# Patient Record
Sex: MALE | Race: WHITE | Employment: OTHER | URBAN - METROPOLITAN AREA
[De-identification: names, ages, dates, MRNs, and addresses within clinical notes are randomized per-mention and may not be internally consistent; named-entity substitution may affect disease eponyms.]

---

## 2017-01-03 RX ORDER — BUDESONIDE AND FORMOTEROL FUMARATE DIHYDRATE 160; 4.5 UG/1; UG/1
AEROSOL RESPIRATORY (INHALATION)
Qty: 1 INHALER | Refills: 5 | Status: SHIPPED | OUTPATIENT
Start: 2017-01-03 | End: 2018-10-30 | Stop reason: ALTCHOICE

## 2017-01-04 ENCOUNTER — OFFICE VISIT (OUTPATIENT)
Dept: PULMONOLOGY | Age: 57
End: 2017-01-04

## 2017-01-04 VITALS
HEIGHT: 64 IN | WEIGHT: 252 LBS | DIASTOLIC BLOOD PRESSURE: 70 MMHG | TEMPERATURE: 97.8 F | BODY MASS INDEX: 43.02 KG/M2 | SYSTOLIC BLOOD PRESSURE: 110 MMHG | OXYGEN SATURATION: 98 % | RESPIRATION RATE: 14 BRPM | HEART RATE: 78 BPM

## 2017-01-04 DIAGNOSIS — G47.33 SEVERE OBSTRUCTIVE SLEEP APNEA: Primary | ICD-10-CM

## 2017-01-04 DIAGNOSIS — Z91.09 ENVIRONMENTAL ALLERGIES: ICD-10-CM

## 2017-01-04 PROCEDURE — 99214 OFFICE O/P EST MOD 30 MIN: CPT | Performed by: INTERNAL MEDICINE

## 2017-01-04 RX ORDER — ALBUTEROL SULFATE 2.5 MG/3ML
2.5 SOLUTION RESPIRATORY (INHALATION) EVERY 6 HOURS PRN
Qty: 120 EACH | Refills: 6 | Status: SHIPPED | OUTPATIENT
Start: 2017-01-04 | End: 2017-02-23

## 2017-01-04 ASSESSMENT — SLEEP AND FATIGUE QUESTIONNAIRES
HOW LIKELY ARE YOU TO NOD OFF OR FALL ASLEEP WHILE SITTING QUIETLY AFTER LUNCH WITHOUT ALCOHOL: 1
HOW LIKELY ARE YOU TO NOD OFF OR FALL ASLEEP WHILE WATCHING TV: 1
NECK CIRCUMFERENCE (INCHES): 20
HOW LIKELY ARE YOU TO NOD OFF OR FALL ASLEEP WHILE SITTING INACTIVE IN A PUBLIC PLACE: 0
ESS TOTAL SCORE: 8
HOW LIKELY ARE YOU TO NOD OFF OR FALL ASLEEP WHILE LYING DOWN TO REST IN THE AFTERNOON WHEN CIRCUMSTANCES PERMIT: 1
HOW LIKELY ARE YOU TO NOD OFF OR FALL ASLEEP WHEN YOU ARE A PASSENGER IN A CAR FOR AN HOUR WITHOUT A BREAK: 2
HOW LIKELY ARE YOU TO NOD OFF OR FALL ASLEEP WHILE SITTING AND TALKING TO SOMEONE: 0
HOW LIKELY ARE YOU TO NOD OFF OR FALL ASLEEP IN A CAR, WHILE STOPPED FOR A FEW MINUTES IN TRAFFIC: 0
HOW LIKELY ARE YOU TO NOD OFF OR FALL ASLEEP WHILE SITTING AND READING: 3

## 2017-01-05 ENCOUNTER — HOSPITAL ENCOUNTER (OUTPATIENT)
Dept: PULMONOLOGY | Age: 57
Discharge: HOME | End: 2017-01-05
Attending: INTERNAL MEDICINE | Admitting: INTERNAL MEDICINE

## 2017-01-05 RX ORDER — ALBUTEROL SULFATE 2.5 MG/3ML
2.5 SOLUTION RESPIRATORY (INHALATION) ONCE
Status: COMPLETED | OUTPATIENT
Start: 2017-01-05 | End: 2017-01-05

## 2017-01-05 RX ORDER — CYCLOBENZAPRINE HCL 10 MG
TABLET ORAL
Qty: 30 TABLET | Refills: 0 | Status: SHIPPED | OUTPATIENT
Start: 2017-01-05 | End: 2017-01-27 | Stop reason: SDUPTHER

## 2017-01-05 RX ADMIN — ALBUTEROL SULFATE 2.5 MG: 2.5 SOLUTION RESPIRATORY (INHALATION) at 13:45

## 2017-01-10 RX ORDER — OMEGA-3-ACID ETHYL ESTERS 1 G/1
CAPSULE, LIQUID FILLED ORAL
Qty: 120 CAPSULE | Refills: 0 | Status: SHIPPED | OUTPATIENT
Start: 2017-01-10 | End: 2017-02-07 | Stop reason: SDUPTHER

## 2017-01-12 RX ORDER — GLUCOSAM/CHON-MSM1/C/MANG/BOSW 500-416.6
TABLET ORAL
Qty: 100 EACH | Refills: 0 | Status: SHIPPED | OUTPATIENT
Start: 2017-01-12 | End: 2017-02-07 | Stop reason: SDUPTHER

## 2017-01-12 RX ORDER — DILTIAZEM HYDROCHLORIDE 180 MG/1
CAPSULE, COATED, EXTENDED RELEASE ORAL
Qty: 30 CAPSULE | Refills: 0 | Status: SHIPPED | OUTPATIENT
Start: 2017-01-12 | End: 2017-02-07 | Stop reason: SDUPTHER

## 2017-01-12 RX ORDER — METOPROLOL SUCCINATE 200 MG/1
TABLET, EXTENDED RELEASE ORAL
Qty: 30 TABLET | Refills: 0 | Status: SHIPPED | OUTPATIENT
Start: 2017-01-12 | End: 2017-02-07 | Stop reason: SDUPTHER

## 2017-01-12 RX ORDER — BLOOD SUGAR DIAGNOSTIC
STRIP MISCELLANEOUS
Qty: 50 EACH | Refills: 0 | Status: SHIPPED | OUTPATIENT
Start: 2017-01-12 | End: 2017-03-09 | Stop reason: SDUPTHER

## 2017-01-12 RX ORDER — FLUTICASONE PROPIONATE 50 MCG
SPRAY, SUSPENSION (ML) NASAL
Qty: 16 G | Refills: 0 | Status: SHIPPED | OUTPATIENT
Start: 2017-01-12 | End: 2017-02-20 | Stop reason: SDUPTHER

## 2017-01-12 RX ORDER — CETIRIZINE HYDROCHLORIDE 10 MG/1
TABLET ORAL
Qty: 30 TABLET | Refills: 0 | Status: SHIPPED | OUTPATIENT
Start: 2017-01-12 | End: 2017-02-07 | Stop reason: SDUPTHER

## 2017-01-13 RX ORDER — EZETIMIBE 10 MG/1
10 TABLET ORAL DAILY
Qty: 30 TABLET | Refills: 5 | Status: SHIPPED | OUTPATIENT
Start: 2017-01-13 | End: 2018-07-05 | Stop reason: ALTCHOICE

## 2017-01-23 RX ORDER — RANITIDINE 150 MG/1
150 TABLET ORAL 2 TIMES DAILY
Qty: 60 TABLET | Refills: 3 | Status: SHIPPED | OUTPATIENT
Start: 2017-01-23 | End: 2017-04-21 | Stop reason: ALTCHOICE

## 2017-01-24 ENCOUNTER — OFFICE VISIT (OUTPATIENT)
Dept: FAMILY MEDICINE CLINIC | Age: 57
End: 2017-01-24

## 2017-01-24 VITALS
OXYGEN SATURATION: 97 % | WEIGHT: 251 LBS | BODY MASS INDEX: 42.85 KG/M2 | RESPIRATION RATE: 17 BRPM | HEART RATE: 69 BPM | SYSTOLIC BLOOD PRESSURE: 122 MMHG | TEMPERATURE: 97.5 F | HEIGHT: 64 IN | DIASTOLIC BLOOD PRESSURE: 78 MMHG

## 2017-01-24 DIAGNOSIS — G47.33 OSA ON CPAP: ICD-10-CM

## 2017-01-24 DIAGNOSIS — K21.9 GASTROESOPHAGEAL REFLUX DISEASE WITHOUT ESOPHAGITIS: ICD-10-CM

## 2017-01-24 DIAGNOSIS — I15.2 HYPERTENSION DUE TO ENDOCRINE DISORDER: ICD-10-CM

## 2017-01-24 DIAGNOSIS — Z99.89 OSA ON CPAP: ICD-10-CM

## 2017-01-24 DIAGNOSIS — I25.10 CORONARY ARTERY DISEASE INVOLVING NATIVE CORONARY ARTERY OF NATIVE HEART WITHOUT ANGINA PECTORIS: ICD-10-CM

## 2017-01-24 DIAGNOSIS — K64.0 FIRST DEGREE HEMORRHOIDS: ICD-10-CM

## 2017-01-24 DIAGNOSIS — E11.9 TYPE 2 DIABETES MELLITUS WITHOUT COMPLICATION, WITHOUT LONG-TERM CURRENT USE OF INSULIN (HCC): Primary | ICD-10-CM

## 2017-01-24 DIAGNOSIS — E78.5 HYPERLIPIDEMIA WITH TARGET LDL LESS THAN 100: ICD-10-CM

## 2017-01-24 LAB — HBA1C MFR BLD: 7.5 %

## 2017-01-24 PROCEDURE — 83036 HEMOGLOBIN GLYCOSYLATED A1C: CPT | Performed by: NURSE PRACTITIONER

## 2017-01-24 PROCEDURE — 99214 OFFICE O/P EST MOD 30 MIN: CPT | Performed by: NURSE PRACTITIONER

## 2017-01-24 RX ORDER — GABAPENTIN 100 MG/1
200 CAPSULE ORAL 3 TIMES DAILY
Qty: 90 CAPSULE | Refills: 0 | Status: SHIPPED
Start: 2017-01-24 | End: 2017-09-25 | Stop reason: SDUPTHER

## 2017-01-24 ASSESSMENT — ENCOUNTER SYMPTOMS
HEARTBURN: 0
VOMITING: 0
COUGH: 0
DIARRHEA: 0

## 2017-02-10 ENCOUNTER — TELEPHONE (OUTPATIENT)
Dept: FAMILY MEDICINE CLINIC | Age: 57
End: 2017-02-10

## 2017-02-10 RX ORDER — BUSPIRONE HYDROCHLORIDE 10 MG/1
TABLET ORAL
Qty: 30 TABLET | Refills: 0 | Status: SHIPPED | OUTPATIENT
Start: 2017-02-10 | End: 2017-03-02 | Stop reason: SDUPTHER

## 2017-02-13 ENCOUNTER — TELEPHONE (OUTPATIENT)
Dept: PULMONOLOGY | Age: 57
End: 2017-02-13

## 2017-02-15 ENCOUNTER — OFFICE VISIT (OUTPATIENT)
Dept: CARDIOLOGY CLINIC | Age: 57
End: 2017-02-15

## 2017-02-15 ENCOUNTER — PROCEDURE VISIT (OUTPATIENT)
Dept: CARDIOLOGY CLINIC | Age: 57
End: 2017-02-15

## 2017-02-15 VITALS
DIASTOLIC BLOOD PRESSURE: 70 MMHG | HEIGHT: 64 IN | BODY MASS INDEX: 42.92 KG/M2 | SYSTOLIC BLOOD PRESSURE: 128 MMHG | HEART RATE: 80 BPM | WEIGHT: 251.4 LBS

## 2017-02-15 DIAGNOSIS — I15.2 HYPERTENSION DUE TO ENDOCRINE DISORDER: ICD-10-CM

## 2017-02-15 DIAGNOSIS — Z95.810 AUTOMATIC IMPLANTABLE CARDIOVERTER-DEFIBRILLATOR IN SITU: ICD-10-CM

## 2017-02-15 DIAGNOSIS — I47.20 VENTRICULAR TACHYCARDIA: ICD-10-CM

## 2017-02-15 DIAGNOSIS — I25.10 CORONARY ARTERY DISEASE INVOLVING NATIVE CORONARY ARTERY OF NATIVE HEART WITHOUT ANGINA PECTORIS: Primary | ICD-10-CM

## 2017-02-15 PROCEDURE — 99213 OFFICE O/P EST LOW 20 MIN: CPT | Performed by: INTERNAL MEDICINE

## 2017-02-15 PROCEDURE — 93282 PRGRMG EVAL IMPLANTABLE DFB: CPT | Performed by: INTERNAL MEDICINE

## 2017-02-20 RX ORDER — PEN NEEDLE, DIABETIC 31 GX5/16"
NEEDLE, DISPOSABLE MISCELLANEOUS
Qty: 100 EACH | Refills: 5 | Status: ON HOLD | OUTPATIENT
Start: 2017-02-20 | End: 2022-10-04 | Stop reason: ALTCHOICE

## 2017-02-20 RX ORDER — FLUTICASONE PROPIONATE 50 MCG
SPRAY, SUSPENSION (ML) NASAL
Qty: 16 G | Refills: 3 | Status: SHIPPED | OUTPATIENT
Start: 2017-02-20 | End: 2017-05-24 | Stop reason: SDUPTHER

## 2017-02-20 RX ORDER — DICYCLOMINE HYDROCHLORIDE 10 MG/1
CAPSULE ORAL
Qty: 120 CAPSULE | Refills: 3 | Status: SHIPPED | OUTPATIENT
Start: 2017-02-20 | End: 2017-03-02

## 2017-02-20 RX ORDER — ASPIRIN 325 MG
TABLET, DELAYED RELEASE (ENTERIC COATED) ORAL
Qty: 30 TABLET | Refills: 11 | Status: SHIPPED | OUTPATIENT
Start: 2017-02-20 | End: 2021-11-11

## 2017-02-20 RX ORDER — LISINOPRIL 20 MG/1
TABLET ORAL
Qty: 30 TABLET | Refills: 11 | Status: ON HOLD | OUTPATIENT
Start: 2017-02-20 | End: 2022-02-07 | Stop reason: HOSPADM

## 2017-02-20 RX ORDER — ATORVASTATIN CALCIUM 80 MG/1
TABLET, FILM COATED ORAL
Qty: 30 TABLET | Refills: 11 | Status: SHIPPED | OUTPATIENT
Start: 2017-02-20

## 2017-02-21 ENCOUNTER — TELEPHONE (OUTPATIENT)
Dept: FAMILY MEDICINE CLINIC | Age: 57
End: 2017-02-21

## 2017-02-21 DIAGNOSIS — K21.9 GASTROESOPHAGEAL REFLUX DISEASE, ESOPHAGITIS PRESENCE NOT SPECIFIED: Primary | ICD-10-CM

## 2017-02-21 RX ORDER — FAMOTIDINE 20 MG/1
20 TABLET, FILM COATED ORAL 2 TIMES DAILY
Qty: 60 TABLET | Refills: 3 | Status: SHIPPED | OUTPATIENT
Start: 2017-02-21 | End: 2017-05-24 | Stop reason: SDUPTHER

## 2017-03-02 ENCOUNTER — OFFICE VISIT (OUTPATIENT)
Dept: FAMILY MEDICINE CLINIC | Age: 57
End: 2017-03-02

## 2017-03-02 VITALS
SYSTOLIC BLOOD PRESSURE: 124 MMHG | BODY MASS INDEX: 42 KG/M2 | WEIGHT: 246 LBS | TEMPERATURE: 98.2 F | DIASTOLIC BLOOD PRESSURE: 76 MMHG | HEIGHT: 64 IN | HEART RATE: 84 BPM | OXYGEN SATURATION: 94 %

## 2017-03-02 DIAGNOSIS — R06.2 WHEEZING: ICD-10-CM

## 2017-03-02 DIAGNOSIS — R06.02 SOB (SHORTNESS OF BREATH): ICD-10-CM

## 2017-03-02 DIAGNOSIS — J44.1 CHRONIC OBSTRUCTIVE PULMONARY DISEASE WITH ACUTE EXACERBATION (HCC): Primary | ICD-10-CM

## 2017-03-02 PROCEDURE — 96372 THER/PROPH/DIAG INJ SC/IM: CPT | Performed by: NURSE PRACTITIONER

## 2017-03-02 PROCEDURE — 94640 AIRWAY INHALATION TREATMENT: CPT | Performed by: NURSE PRACTITIONER

## 2017-03-02 PROCEDURE — 99214 OFFICE O/P EST MOD 30 MIN: CPT | Performed by: NURSE PRACTITIONER

## 2017-03-02 RX ORDER — ALBUTEROL SULFATE 2.5 MG/3ML
2.5 SOLUTION RESPIRATORY (INHALATION) ONCE
Status: COMPLETED | OUTPATIENT
Start: 2017-03-02 | End: 2017-03-02

## 2017-03-02 RX ORDER — BUDESONIDE 0.5 MG/2ML
0.5 INHALANT ORAL ONCE
Status: COMPLETED | OUTPATIENT
Start: 2017-03-02 | End: 2017-03-02

## 2017-03-02 RX ORDER — METHYLPREDNISOLONE 4 MG/1
TABLET ORAL
Qty: 1 KIT | Refills: 0 | Status: SHIPPED | OUTPATIENT
Start: 2017-03-02 | End: 2017-03-15 | Stop reason: ALTCHOICE

## 2017-03-02 RX ADMIN — ALBUTEROL SULFATE 2.5 MG: 2.5 SOLUTION RESPIRATORY (INHALATION) at 13:37

## 2017-03-02 RX ADMIN — BUDESONIDE 500 MCG: 0.5 INHALANT ORAL at 13:38

## 2017-03-02 ASSESSMENT — ENCOUNTER SYMPTOMS
SORE THROAT: 0
SHORTNESS OF BREATH: 1
COUGH: 1
GASTROINTESTINAL NEGATIVE: 1
EYES NEGATIVE: 1

## 2017-03-03 RX ORDER — INSULIN GLARGINE 100 [IU]/ML
INJECTION, SOLUTION SUBCUTANEOUS
Qty: 15 ML | Refills: 0 | Status: SHIPPED | OUTPATIENT
Start: 2017-03-03 | End: 2017-03-15 | Stop reason: DRUGHIGH

## 2017-03-03 RX ORDER — BUSPIRONE HYDROCHLORIDE 10 MG/1
TABLET ORAL
Qty: 30 TABLET | Refills: 0 | Status: SHIPPED | OUTPATIENT
Start: 2017-03-03 | End: 2017-03-16 | Stop reason: SDUPTHER

## 2017-03-09 RX ORDER — BLOOD SUGAR DIAGNOSTIC
STRIP MISCELLANEOUS
Qty: 50 EACH | Refills: 0 | Status: SHIPPED | OUTPATIENT
Start: 2017-03-09 | End: 2017-04-03 | Stop reason: SDUPTHER

## 2017-03-09 RX ORDER — INSULIN ASPART 100 [IU]/ML
INJECTION, SUSPENSION SUBCUTANEOUS
Qty: 15 ML | Refills: 0 | Status: SHIPPED | OUTPATIENT
Start: 2017-03-09 | End: 2017-03-16 | Stop reason: SDUPTHER

## 2017-03-09 RX ORDER — CLOPIDOGREL BISULFATE 75 MG/1
TABLET ORAL
Qty: 30 TABLET | Refills: 0 | Status: SHIPPED | OUTPATIENT
Start: 2017-03-09 | End: 2017-03-16 | Stop reason: SDUPTHER

## 2017-03-14 ENCOUNTER — TELEPHONE (OUTPATIENT)
Dept: FAMILY MEDICINE CLINIC | Age: 57
End: 2017-03-14

## 2017-03-15 ENCOUNTER — OFFICE VISIT (OUTPATIENT)
Dept: FAMILY MEDICINE CLINIC | Age: 57
End: 2017-03-15

## 2017-03-15 VITALS
HEIGHT: 65 IN | DIASTOLIC BLOOD PRESSURE: 64 MMHG | TEMPERATURE: 97.5 F | SYSTOLIC BLOOD PRESSURE: 118 MMHG | BODY MASS INDEX: 40.69 KG/M2 | WEIGHT: 244.2 LBS | OXYGEN SATURATION: 98 % | HEART RATE: 76 BPM

## 2017-03-15 DIAGNOSIS — J01.00 ACUTE NON-RECURRENT MAXILLARY SINUSITIS: Primary | ICD-10-CM

## 2017-03-15 PROCEDURE — G8598 ASA/ANTIPLAT THER USED: HCPCS | Performed by: NURSE PRACTITIONER

## 2017-03-15 PROCEDURE — G8417 CALC BMI ABV UP PARAM F/U: HCPCS | Performed by: NURSE PRACTITIONER

## 2017-03-15 PROCEDURE — 3017F COLORECTAL CA SCREEN DOC REV: CPT | Performed by: NURSE PRACTITIONER

## 2017-03-15 PROCEDURE — G8427 DOCREV CUR MEDS BY ELIG CLIN: HCPCS | Performed by: NURSE PRACTITIONER

## 2017-03-15 PROCEDURE — G8484 FLU IMMUNIZE NO ADMIN: HCPCS | Performed by: NURSE PRACTITIONER

## 2017-03-15 PROCEDURE — 99213 OFFICE O/P EST LOW 20 MIN: CPT | Performed by: NURSE PRACTITIONER

## 2017-03-15 PROCEDURE — 1036F TOBACCO NON-USER: CPT | Performed by: NURSE PRACTITIONER

## 2017-03-15 RX ORDER — SULFAMETHOXAZOLE AND TRIMETHOPRIM 800; 160 MG/1; MG/1
1 TABLET ORAL 2 TIMES DAILY
Qty: 20 TABLET | Refills: 0 | Status: SHIPPED | OUTPATIENT
Start: 2017-03-15 | End: 2017-03-25

## 2017-03-15 RX ORDER — METHYLPREDNISOLONE 4 MG/1
TABLET ORAL
Qty: 1 KIT | Refills: 0 | Status: SHIPPED | OUTPATIENT
Start: 2017-03-15 | End: 2017-04-24 | Stop reason: ALTCHOICE

## 2017-03-15 ASSESSMENT — ENCOUNTER SYMPTOMS
RESPIRATORY NEGATIVE: 1
SORE THROAT: 1
SINUS PRESSURE: 1
EYES NEGATIVE: 1
GASTROINTESTINAL NEGATIVE: 1

## 2017-03-16 RX ORDER — INSULIN GLARGINE 100 [IU]/ML
INJECTION, SOLUTION SUBCUTANEOUS
Qty: 15 ML | Refills: 0 | Status: SHIPPED | OUTPATIENT
Start: 2017-03-16 | End: 2017-04-24 | Stop reason: SDUPTHER

## 2017-03-16 RX ORDER — INSULIN ASPART 100 [IU]/ML
INJECTION, SUSPENSION SUBCUTANEOUS
Qty: 15 ML | Refills: 0 | Status: SHIPPED | OUTPATIENT
Start: 2017-03-16 | End: 2017-07-24 | Stop reason: SDUPTHER

## 2017-03-16 RX ORDER — BUSPIRONE HYDROCHLORIDE 10 MG/1
TABLET ORAL
Qty: 30 TABLET | Refills: 0 | Status: SHIPPED | OUTPATIENT
Start: 2017-03-16 | End: 2017-04-17 | Stop reason: SDUPTHER

## 2017-03-16 RX ORDER — CLOPIDOGREL BISULFATE 75 MG/1
TABLET ORAL
Qty: 30 TABLET | Refills: 0 | Status: SHIPPED | OUTPATIENT
Start: 2017-03-16 | End: 2017-05-05 | Stop reason: SDUPTHER

## 2017-03-17 ENCOUNTER — TELEPHONE (OUTPATIENT)
Dept: FAMILY MEDICINE CLINIC | Age: 57
End: 2017-03-17

## 2017-04-03 RX ORDER — BLOOD SUGAR DIAGNOSTIC
STRIP MISCELLANEOUS
Qty: 50 EACH | Refills: 0 | Status: SHIPPED | OUTPATIENT
Start: 2017-04-03 | End: 2017-06-09 | Stop reason: SDUPTHER

## 2017-04-07 RX ORDER — TIOTROPIUM BROMIDE INHALATION SPRAY 3.12 UG/1
SPRAY, METERED RESPIRATORY (INHALATION)
Qty: 1 INHALER | Refills: 5 | Status: SHIPPED | OUTPATIENT
Start: 2017-04-07 | End: 2018-07-05 | Stop reason: ALTCHOICE

## 2017-04-10 ENCOUNTER — TELEPHONE (OUTPATIENT)
Dept: FAMILY MEDICINE CLINIC | Age: 57
End: 2017-04-10

## 2017-04-18 RX ORDER — BUSPIRONE HYDROCHLORIDE 10 MG/1
TABLET ORAL
Qty: 30 TABLET | Refills: 0 | Status: SHIPPED | OUTPATIENT
Start: 2017-04-18 | End: 2017-05-05 | Stop reason: SDUPTHER

## 2017-04-19 ENCOUNTER — HOSPITAL ENCOUNTER (OUTPATIENT)
Dept: OTHER | Age: 57
Discharge: HOME | End: 2017-04-19
Attending: NURSE PRACTITIONER | Admitting: NURSE PRACTITIONER

## 2017-04-19 LAB
CHOLESTEROL, TOTAL: 138 MG/DL (ref 0–199)
HDLC SERPL-MCNC: 31 MG/DL (ref 40–60)
LDL CHOLESTEROL CALCULATED: 48 MG/DL
TRIGL SERPL-MCNC: 297 MG/DL (ref 0–150)
VLDLC SERPL CALC-MCNC: 59 MG/DL

## 2017-04-24 ENCOUNTER — OFFICE VISIT (OUTPATIENT)
Dept: FAMILY MEDICINE CLINIC | Age: 57
End: 2017-04-24

## 2017-04-24 VITALS
HEIGHT: 65 IN | WEIGHT: 234.4 LBS | OXYGEN SATURATION: 98 % | HEART RATE: 88 BPM | SYSTOLIC BLOOD PRESSURE: 134 MMHG | DIASTOLIC BLOOD PRESSURE: 78 MMHG | BODY MASS INDEX: 39.05 KG/M2

## 2017-04-24 DIAGNOSIS — Z79.4 TYPE 2 DIABETES MELLITUS WITHOUT COMPLICATION, WITH LONG-TERM CURRENT USE OF INSULIN (HCC): Primary | ICD-10-CM

## 2017-04-24 DIAGNOSIS — E78.5 HYPERLIPIDEMIA WITH TARGET LDL LESS THAN 100: ICD-10-CM

## 2017-04-24 DIAGNOSIS — K21.9 GASTROESOPHAGEAL REFLUX DISEASE WITHOUT ESOPHAGITIS: ICD-10-CM

## 2017-04-24 DIAGNOSIS — E11.9 TYPE 2 DIABETES MELLITUS WITHOUT COMPLICATION, WITH LONG-TERM CURRENT USE OF INSULIN (HCC): Primary | ICD-10-CM

## 2017-04-24 DIAGNOSIS — I15.2 HYPERTENSION DUE TO ENDOCRINE DISORDER: ICD-10-CM

## 2017-04-24 DIAGNOSIS — M54.50 ACUTE MIDLINE LOW BACK PAIN WITHOUT SCIATICA: ICD-10-CM

## 2017-04-24 LAB — HBA1C MFR BLD: 6.9 %

## 2017-04-24 PROCEDURE — 3044F HG A1C LEVEL LT 7.0%: CPT | Performed by: NURSE PRACTITIONER

## 2017-04-24 PROCEDURE — G8417 CALC BMI ABV UP PARAM F/U: HCPCS | Performed by: NURSE PRACTITIONER

## 2017-04-24 PROCEDURE — 3017F COLORECTAL CA SCREEN DOC REV: CPT | Performed by: NURSE PRACTITIONER

## 2017-04-24 PROCEDURE — G8598 ASA/ANTIPLAT THER USED: HCPCS | Performed by: NURSE PRACTITIONER

## 2017-04-24 PROCEDURE — 1036F TOBACCO NON-USER: CPT | Performed by: NURSE PRACTITIONER

## 2017-04-24 PROCEDURE — 99214 OFFICE O/P EST MOD 30 MIN: CPT | Performed by: NURSE PRACTITIONER

## 2017-04-24 PROCEDURE — 83036 HEMOGLOBIN GLYCOSYLATED A1C: CPT | Performed by: NURSE PRACTITIONER

## 2017-04-24 PROCEDURE — G8427 DOCREV CUR MEDS BY ELIG CLIN: HCPCS | Performed by: NURSE PRACTITIONER

## 2017-04-24 RX ORDER — METHYLPREDNISOLONE 4 MG/1
TABLET ORAL
Qty: 1 KIT | Refills: 0 | Status: SHIPPED | OUTPATIENT
Start: 2017-04-24 | End: 2018-07-05 | Stop reason: ALTCHOICE

## 2017-04-24 ASSESSMENT — ENCOUNTER SYMPTOMS
CHEST TIGHTNESS: 0
SPUTUM PRODUCTION: 0
WHEEZING: 0
SHORTNESS OF BREATH: 1
HEMOPTYSIS: 0
FREQUENT THROAT CLEARING: 1
DIFFICULTY BREATHING: 1
COUGH: 1

## 2017-04-24 ASSESSMENT — COPD QUESTIONNAIRES: COPD: 1

## 2017-04-25 ENCOUNTER — TELEPHONE (OUTPATIENT)
Dept: FAMILY MEDICINE CLINIC | Age: 57
End: 2017-04-25

## 2017-05-01 ENCOUNTER — PROCEDURE VISIT (OUTPATIENT)
Dept: CARDIOLOGY CLINIC | Age: 57
End: 2017-05-01

## 2017-05-01 ENCOUNTER — OFFICE VISIT (OUTPATIENT)
Dept: CARDIOLOGY CLINIC | Age: 57
End: 2017-05-01

## 2017-05-01 VITALS
DIASTOLIC BLOOD PRESSURE: 78 MMHG | HEART RATE: 73 BPM | SYSTOLIC BLOOD PRESSURE: 118 MMHG | WEIGHT: 239.4 LBS | RESPIRATION RATE: 16 BRPM | HEIGHT: 64 IN | BODY MASS INDEX: 40.87 KG/M2

## 2017-05-01 DIAGNOSIS — I47.20 VENTRICULAR TACHYCARDIA: ICD-10-CM

## 2017-05-01 DIAGNOSIS — Z95.810 AUTOMATIC IMPLANTABLE CARDIOVERTER-DEFIBRILLATOR IN SITU: ICD-10-CM

## 2017-05-01 DIAGNOSIS — I47.20 VENTRICULAR TACHYCARDIA: Primary | ICD-10-CM

## 2017-05-01 PROCEDURE — G8598 ASA/ANTIPLAT THER USED: HCPCS | Performed by: INTERNAL MEDICINE

## 2017-05-01 PROCEDURE — 3017F COLORECTAL CA SCREEN DOC REV: CPT | Performed by: INTERNAL MEDICINE

## 2017-05-01 PROCEDURE — 99204 OFFICE O/P NEW MOD 45 MIN: CPT | Performed by: INTERNAL MEDICINE

## 2017-05-01 PROCEDURE — G8427 DOCREV CUR MEDS BY ELIG CLIN: HCPCS | Performed by: INTERNAL MEDICINE

## 2017-05-01 PROCEDURE — 1036F TOBACCO NON-USER: CPT | Performed by: INTERNAL MEDICINE

## 2017-05-01 PROCEDURE — 93282 PRGRMG EVAL IMPLANTABLE DFB: CPT | Performed by: INTERNAL MEDICINE

## 2017-05-01 PROCEDURE — G8417 CALC BMI ABV UP PARAM F/U: HCPCS | Performed by: INTERNAL MEDICINE

## 2017-05-05 RX ORDER — CLOPIDOGREL BISULFATE 75 MG/1
TABLET ORAL
Qty: 30 TABLET | Refills: 0 | Status: SHIPPED | OUTPATIENT
Start: 2017-05-05 | End: 2017-05-24 | Stop reason: SDUPTHER

## 2017-05-05 RX ORDER — LIRAGLUTIDE 6 MG/ML
INJECTION SUBCUTANEOUS
Qty: 9 ML | Refills: 0 | Status: SHIPPED | OUTPATIENT
Start: 2017-05-05 | End: 2017-07-06 | Stop reason: SDUPTHER

## 2017-05-05 RX ORDER — BUSPIRONE HYDROCHLORIDE 10 MG/1
TABLET ORAL
Qty: 30 TABLET | Refills: 0 | Status: SHIPPED | OUTPATIENT
Start: 2017-05-05 | End: 2017-06-01 | Stop reason: SDUPTHER

## 2017-05-09 ENCOUNTER — TELEPHONE (OUTPATIENT)
Dept: FAMILY MEDICINE CLINIC | Age: 57
End: 2017-05-09

## 2017-05-24 RX ORDER — FLUTICASONE PROPIONATE 50 MCG
SPRAY, SUSPENSION (ML) NASAL
Qty: 16 G | Refills: 3 | OUTPATIENT
Start: 2017-05-24

## 2017-05-24 RX ORDER — CLOPIDOGREL BISULFATE 75 MG/1
TABLET ORAL
Qty: 30 TABLET | Refills: 3 | Status: SHIPPED | OUTPATIENT
Start: 2017-05-24 | End: 2017-09-12 | Stop reason: SDUPTHER

## 2017-05-24 RX ORDER — FAMOTIDINE 20 MG/1
20 TABLET, FILM COATED ORAL 2 TIMES DAILY
Qty: 60 TABLET | Refills: 3 | OUTPATIENT
Start: 2017-05-24

## 2017-05-24 RX ORDER — FAMOTIDINE 20 MG/1
TABLET, FILM COATED ORAL
Qty: 60 TABLET | Refills: 0 | Status: SHIPPED | OUTPATIENT
Start: 2017-05-24 | End: 2017-07-06 | Stop reason: SDUPTHER

## 2017-05-24 RX ORDER — CLOPIDOGREL BISULFATE 75 MG/1
TABLET ORAL
Qty: 30 TABLET | Refills: 0 | OUTPATIENT
Start: 2017-05-24

## 2017-05-24 RX ORDER — FLUTICASONE PROPIONATE 50 MCG
SPRAY, SUSPENSION (ML) NASAL
Qty: 16 G | Refills: 0 | Status: SHIPPED | OUTPATIENT
Start: 2017-05-24 | End: 2017-08-22 | Stop reason: SDUPTHER

## 2017-05-25 RX ORDER — DICYCLOMINE HYDROCHLORIDE 10 MG/1
CAPSULE ORAL
Qty: 120 CAPSULE | Refills: 0 | Status: SHIPPED | OUTPATIENT
Start: 2017-05-25 | End: 2018-10-30 | Stop reason: ALTCHOICE

## 2017-06-01 RX ORDER — BUSPIRONE HYDROCHLORIDE 10 MG/1
TABLET ORAL
Qty: 30 TABLET | Refills: 0 | Status: SHIPPED | OUTPATIENT
Start: 2017-06-01 | End: 2018-07-05

## 2017-06-08 ENCOUNTER — TELEPHONE (OUTPATIENT)
Dept: PULMONOLOGY | Age: 57
End: 2017-06-08

## 2017-06-09 ENCOUNTER — TELEPHONE (OUTPATIENT)
Dept: FAMILY MEDICINE CLINIC | Age: 57
End: 2017-06-09

## 2017-06-09 RX ORDER — CALCIUM CITRATE/VITAMIN D3 200MG-6.25
TABLET ORAL
Qty: 100 EACH | Refills: 5 | Status: ON HOLD | OUTPATIENT
Start: 2017-06-09 | End: 2022-10-04 | Stop reason: ALTCHOICE

## 2017-07-06 RX ORDER — LIRAGLUTIDE 6 MG/ML
INJECTION SUBCUTANEOUS
Qty: 9 ML | Refills: 0 | Status: SHIPPED | OUTPATIENT
Start: 2017-07-06 | End: 2017-07-21 | Stop reason: SDUPTHER

## 2017-07-06 RX ORDER — INSULIN GLARGINE 100 [IU]/ML
INJECTION, SOLUTION SUBCUTANEOUS
Qty: 15 ML | Refills: 0 | Status: SHIPPED | OUTPATIENT
Start: 2017-07-06 | End: 2017-07-21 | Stop reason: SDUPTHER

## 2017-07-06 RX ORDER — FAMOTIDINE 20 MG/1
TABLET, FILM COATED ORAL
Qty: 60 TABLET | Refills: 0 | Status: SHIPPED | OUTPATIENT
Start: 2017-07-06 | End: 2017-07-21 | Stop reason: SDUPTHER

## 2017-07-06 RX ORDER — CANAGLIFLOZIN 300 MG/1
TABLET, FILM COATED ORAL
Qty: 30 TABLET | Refills: 0 | Status: SHIPPED | OUTPATIENT
Start: 2017-07-06 | End: 2017-07-21 | Stop reason: SDUPTHER

## 2017-07-10 RX ORDER — CYCLOBENZAPRINE HCL 10 MG
TABLET ORAL
Qty: 30 TABLET | Refills: 0 | Status: SHIPPED | OUTPATIENT
Start: 2017-07-10 | End: 2017-07-21 | Stop reason: SDUPTHER

## 2017-07-21 RX ORDER — INSULIN GLARGINE 100 [IU]/ML
INJECTION, SOLUTION SUBCUTANEOUS
Qty: 15 ML | Refills: 0 | Status: SHIPPED | OUTPATIENT
Start: 2017-07-21 | End: 2017-08-22 | Stop reason: SDUPTHER

## 2017-07-21 RX ORDER — LIRAGLUTIDE 6 MG/ML
INJECTION SUBCUTANEOUS
Qty: 9 ML | Refills: 0 | Status: SHIPPED | OUTPATIENT
Start: 2017-07-21 | End: 2017-08-15 | Stop reason: SDUPTHER

## 2017-07-21 RX ORDER — CYCLOBENZAPRINE HCL 10 MG
TABLET ORAL
Qty: 30 TABLET | Refills: 0 | Status: SHIPPED | OUTPATIENT
Start: 2017-07-21 | End: 2017-08-15 | Stop reason: SDUPTHER

## 2017-07-21 RX ORDER — CANAGLIFLOZIN 300 MG/1
TABLET, FILM COATED ORAL
Qty: 30 TABLET | Refills: 0 | Status: SHIPPED | OUTPATIENT
Start: 2017-07-21 | End: 2017-08-15 | Stop reason: SDUPTHER

## 2017-07-21 RX ORDER — FAMOTIDINE 20 MG/1
TABLET, FILM COATED ORAL
Qty: 60 TABLET | Refills: 0 | Status: SHIPPED | OUTPATIENT
Start: 2017-07-21 | End: 2017-08-15 | Stop reason: SDUPTHER

## 2017-07-24 RX ORDER — INSULIN ASPART 100 [IU]/ML
INJECTION, SUSPENSION SUBCUTANEOUS
Qty: 15 ML | Refills: 0 | Status: SHIPPED | OUTPATIENT
Start: 2017-07-24 | End: 2017-08-15 | Stop reason: SDUPTHER

## 2017-08-08 ENCOUNTER — NURSE ONLY (OUTPATIENT)
Dept: CARDIOLOGY CLINIC | Age: 57
End: 2017-08-08

## 2017-08-08 DIAGNOSIS — Z95.810 AUTOMATIC IMPLANTABLE CARDIOVERTER-DEFIBRILLATOR IN SITU: ICD-10-CM

## 2017-08-08 DIAGNOSIS — I47.20 VENTRICULAR TACHYCARDIA: ICD-10-CM

## 2017-08-08 PROCEDURE — 93296 REM INTERROG EVL PM/IDS: CPT | Performed by: INTERNAL MEDICINE

## 2017-08-08 PROCEDURE — 93295 DEV INTERROG REMOTE 1/2/MLT: CPT | Performed by: INTERNAL MEDICINE

## 2017-08-15 RX ORDER — INSULIN ASPART 100 [IU]/ML
INJECTION, SUSPENSION SUBCUTANEOUS
Qty: 15 ML | Refills: 0 | Status: SHIPPED | OUTPATIENT
Start: 2017-08-15 | End: 2018-07-05 | Stop reason: ALTCHOICE

## 2017-08-15 RX ORDER — LIRAGLUTIDE 6 MG/ML
INJECTION SUBCUTANEOUS
Qty: 9 ML | Refills: 0 | Status: SHIPPED | OUTPATIENT
Start: 2017-08-15 | End: 2018-07-05 | Stop reason: SDUPTHER

## 2017-08-15 RX ORDER — CYCLOBENZAPRINE HCL 10 MG
TABLET ORAL
Qty: 30 TABLET | Refills: 0 | Status: SHIPPED | OUTPATIENT
Start: 2017-08-15 | End: 2017-09-25 | Stop reason: SDUPTHER

## 2017-08-15 RX ORDER — FAMOTIDINE 20 MG/1
TABLET, FILM COATED ORAL
Qty: 60 TABLET | Refills: 0 | Status: SHIPPED | OUTPATIENT
Start: 2017-08-15 | End: 2017-09-25 | Stop reason: SDUPTHER

## 2017-08-15 RX ORDER — CANAGLIFLOZIN 300 MG/1
TABLET, FILM COATED ORAL
Qty: 30 TABLET | Refills: 0 | Status: SHIPPED | OUTPATIENT
Start: 2017-08-15 | End: 2017-09-25 | Stop reason: SDUPTHER

## 2017-08-23 RX ORDER — FLUTICASONE PROPIONATE 50 MCG
SPRAY, SUSPENSION (ML) NASAL
Qty: 16 G | Refills: 0 | Status: SHIPPED | OUTPATIENT
Start: 2017-08-23 | End: 2017-09-12 | Stop reason: SDUPTHER

## 2017-08-23 RX ORDER — INSULIN GLARGINE 100 [IU]/ML
INJECTION, SOLUTION SUBCUTANEOUS
Qty: 15 ML | Refills: 0 | Status: SHIPPED | OUTPATIENT
Start: 2017-08-23 | End: 2018-10-30 | Stop reason: ALTCHOICE

## 2017-09-08 ENCOUNTER — OFFICE VISIT (OUTPATIENT)
Dept: CARDIOLOGY CLINIC | Age: 57
End: 2017-09-08

## 2017-09-08 VITALS
BODY MASS INDEX: 41.64 KG/M2 | SYSTOLIC BLOOD PRESSURE: 110 MMHG | HEART RATE: 71 BPM | DIASTOLIC BLOOD PRESSURE: 60 MMHG | WEIGHT: 242.6 LBS

## 2017-09-08 DIAGNOSIS — I15.2 HYPERTENSION DUE TO ENDOCRINE DISORDER: ICD-10-CM

## 2017-09-08 DIAGNOSIS — I25.10 CORONARY ARTERY DISEASE INVOLVING NATIVE CORONARY ARTERY OF NATIVE HEART WITHOUT ANGINA PECTORIS: Primary | ICD-10-CM

## 2017-09-08 DIAGNOSIS — I47.20 VENTRICULAR TACHYCARDIA: ICD-10-CM

## 2017-09-08 PROCEDURE — 99214 OFFICE O/P EST MOD 30 MIN: CPT | Performed by: INTERNAL MEDICINE

## 2017-09-13 RX ORDER — CLOPIDOGREL BISULFATE 75 MG/1
TABLET ORAL
Qty: 30 TABLET | Refills: 0 | Status: SHIPPED | OUTPATIENT
Start: 2017-09-13 | End: 2017-09-25 | Stop reason: SDUPTHER

## 2017-09-13 RX ORDER — FLUTICASONE PROPIONATE 50 MCG
SPRAY, SUSPENSION (ML) NASAL
Qty: 16 G | Refills: 0 | Status: ON HOLD | OUTPATIENT
Start: 2017-09-13 | End: 2022-10-04 | Stop reason: ALTCHOICE

## 2017-09-26 RX ORDER — CYCLOBENZAPRINE HCL 10 MG
TABLET ORAL
Qty: 30 TABLET | Refills: 0 | Status: SHIPPED | OUTPATIENT
Start: 2017-09-26 | End: 2020-04-08 | Stop reason: ALTCHOICE

## 2017-09-26 RX ORDER — FAMOTIDINE 20 MG/1
TABLET, FILM COATED ORAL
Qty: 60 TABLET | Refills: 0 | Status: ON HOLD | OUTPATIENT
Start: 2017-09-26 | End: 2022-01-27 | Stop reason: HOSPADM

## 2017-09-26 RX ORDER — CANAGLIFLOZIN 300 MG/1
TABLET, FILM COATED ORAL
Qty: 30 TABLET | Refills: 0 | Status: SHIPPED | OUTPATIENT
Start: 2017-09-26 | End: 2019-05-24

## 2017-09-26 RX ORDER — CLOPIDOGREL BISULFATE 75 MG/1
TABLET ORAL
Qty: 30 TABLET | Refills: 0 | Status: SHIPPED | OUTPATIENT
Start: 2017-09-26 | End: 2022-01-12

## 2017-09-26 RX ORDER — GABAPENTIN 100 MG/1
CAPSULE ORAL
Qty: 180 CAPSULE | Refills: 0 | Status: ON HOLD | OUTPATIENT
Start: 2017-09-26 | End: 2022-10-04 | Stop reason: ALTCHOICE

## 2017-10-11 RX ORDER — POTASSIUM CHLORIDE 20 MEQ/1
TABLET, EXTENDED RELEASE ORAL
Qty: 30 TABLET | Refills: 5 | Status: SHIPPED | OUTPATIENT
Start: 2017-10-11 | End: 2018-10-22 | Stop reason: SDUPTHER

## 2017-10-11 RX ORDER — FUROSEMIDE 20 MG/1
TABLET ORAL
Qty: 30 TABLET | Refills: 5 | Status: SHIPPED | OUTPATIENT
Start: 2017-10-11 | End: 2018-10-22 | Stop reason: SDUPTHER

## 2017-11-07 ENCOUNTER — NURSE ONLY (OUTPATIENT)
Dept: CARDIOLOGY CLINIC | Age: 57
End: 2017-11-07

## 2017-11-07 DIAGNOSIS — I47.20 VENTRICULAR TACHYCARDIA: ICD-10-CM

## 2017-11-07 DIAGNOSIS — Z95.810 AUTOMATIC IMPLANTABLE CARDIOVERTER-DEFIBRILLATOR IN SITU: ICD-10-CM

## 2017-11-07 PROCEDURE — 93296 REM INTERROG EVL PM/IDS: CPT | Performed by: INTERNAL MEDICINE

## 2017-11-07 PROCEDURE — 93295 DEV INTERROG REMOTE 1/2/MLT: CPT | Performed by: INTERNAL MEDICINE

## 2018-02-06 ENCOUNTER — NURSE ONLY (OUTPATIENT)
Dept: CARDIOLOGY CLINIC | Age: 58
End: 2018-02-06

## 2018-02-06 DIAGNOSIS — Z95.810 AUTOMATIC IMPLANTABLE CARDIOVERTER-DEFIBRILLATOR IN SITU: ICD-10-CM

## 2018-02-06 DIAGNOSIS — I47.20 VENTRICULAR TACHYCARDIA: ICD-10-CM

## 2018-02-06 PROCEDURE — 93295 DEV INTERROG REMOTE 1/2/MLT: CPT | Performed by: INTERNAL MEDICINE

## 2018-02-06 PROCEDURE — 93296 REM INTERROG EVL PM/IDS: CPT | Performed by: INTERNAL MEDICINE

## 2018-02-06 NOTE — LETTER
[address] [facility] [phone]  Luige Hai [address] [facility]    Pacemaker/Defibrillator Clinic          02/09/18        [address]        Dear [address] [facility] [address] This letter is to inform you that we received the transmission from your monitor at home that checks your pacemaker and/or defibrillator, or implanted heart monitor. Everything is within normal limits. The next date your monitor will automatically transmit will be 5/8/18. Please do not send additional routine transmissions unless specifically requested. Your device and monitor are wireless and most transmit cellularly, but please periodically check your monitor is still plugged in to the electrical outlet. If you still use the telephone land line to send please ensure the connection to the phone edu is secure. This will help to ensure successful automatic transmissions in the future. Also, the monitor needs to be close to you while sleeping at night. Please be aware that the remote device transmission sites are periodically monitored only during regular business hours during which simultaneous in-office device clinics are being run. If your transmission requires attention, we will contact you as soon as possible. Thank you.             Matt

## 2018-03-09 ENCOUNTER — OFFICE VISIT (OUTPATIENT)
Dept: CARDIOLOGY CLINIC | Age: 58
End: 2018-03-09

## 2018-03-09 VITALS
BODY MASS INDEX: 41.37 KG/M2 | SYSTOLIC BLOOD PRESSURE: 140 MMHG | HEART RATE: 85 BPM | WEIGHT: 241 LBS | DIASTOLIC BLOOD PRESSURE: 80 MMHG | OXYGEN SATURATION: 96 %

## 2018-03-09 DIAGNOSIS — Z95.810 AUTOMATIC IMPLANTABLE CARDIOVERTER-DEFIBRILLATOR IN SITU: ICD-10-CM

## 2018-03-09 DIAGNOSIS — I15.2 HYPERTENSION DUE TO ENDOCRINE DISORDER: Primary | ICD-10-CM

## 2018-03-09 DIAGNOSIS — E78.5 HYPERLIPIDEMIA WITH TARGET LDL LESS THAN 100: ICD-10-CM

## 2018-03-09 DIAGNOSIS — I25.10 CORONARY ARTERY DISEASE INVOLVING NATIVE CORONARY ARTERY OF NATIVE HEART WITHOUT ANGINA PECTORIS: ICD-10-CM

## 2018-03-09 PROCEDURE — G8598 ASA/ANTIPLAT THER USED: HCPCS | Performed by: INTERNAL MEDICINE

## 2018-03-09 PROCEDURE — G8417 CALC BMI ABV UP PARAM F/U: HCPCS | Performed by: INTERNAL MEDICINE

## 2018-03-09 PROCEDURE — 99213 OFFICE O/P EST LOW 20 MIN: CPT | Performed by: INTERNAL MEDICINE

## 2018-03-09 PROCEDURE — G8484 FLU IMMUNIZE NO ADMIN: HCPCS | Performed by: INTERNAL MEDICINE

## 2018-03-09 PROCEDURE — 1036F TOBACCO NON-USER: CPT | Performed by: INTERNAL MEDICINE

## 2018-03-09 PROCEDURE — 3017F COLORECTAL CA SCREEN DOC REV: CPT | Performed by: INTERNAL MEDICINE

## 2018-03-09 PROCEDURE — G8427 DOCREV CUR MEDS BY ELIG CLIN: HCPCS | Performed by: INTERNAL MEDICINE

## 2018-03-09 RX ORDER — NITROGLYCERIN 400 UG/1
1 SPRAY ORAL EVERY 5 MIN PRN
Qty: 2 BOTTLE | Refills: 3 | Status: SHIPPED | OUTPATIENT
Start: 2018-03-09 | End: 2018-09-28 | Stop reason: SDUPTHER

## 2018-03-09 NOTE — PROGRESS NOTES
LDL less than 100    Hypertension due to endocrine disorder             Plan:       STable ischemic CMP. Patient continues to take metoprolol without any ICD discharges. Gets meds refilled weekly packs by [address]. Add lasix 20 mg Mon thurs and K 10 meq mon and thurs. LVEF 44% with no ischemia and inferolateral infarction. No signs of CHF. NYHA 1 and CCS 1. He got his disability coverage after 7 years. Continue current medications. May proceed with stimulator implant for bowel motililty from the cardiac perspective on March 20 2018. Roland Zhong

## 2018-03-29 DIAGNOSIS — M79.671 CHRONIC PAIN OF RIGHT HEEL: Primary | ICD-10-CM

## 2018-03-29 DIAGNOSIS — G89.29 CHRONIC PAIN OF RIGHT HEEL: Primary | ICD-10-CM

## 2018-05-08 ENCOUNTER — NURSE ONLY (OUTPATIENT)
Dept: CARDIOLOGY CLINIC | Age: 58
End: 2018-05-08

## 2018-05-08 DIAGNOSIS — I47.20 VENTRICULAR TACHYCARDIA: ICD-10-CM

## 2018-05-08 DIAGNOSIS — Z95.810 AUTOMATIC IMPLANTABLE CARDIOVERTER-DEFIBRILLATOR IN SITU: ICD-10-CM

## 2018-05-08 PROCEDURE — 93296 REM INTERROG EVL PM/IDS: CPT | Performed by: INTERNAL MEDICINE

## 2018-05-08 PROCEDURE — 93295 DEV INTERROG REMOTE 1/2/MLT: CPT | Performed by: INTERNAL MEDICINE

## 2018-05-23 ENCOUNTER — PROCEDURE VISIT (OUTPATIENT)
Dept: CARDIOLOGY CLINIC | Age: 58
End: 2018-05-23

## 2018-05-23 ENCOUNTER — OFFICE VISIT (OUTPATIENT)
Dept: CARDIOLOGY CLINIC | Age: 58
End: 2018-05-23

## 2018-05-23 VITALS
HEART RATE: 83 BPM | DIASTOLIC BLOOD PRESSURE: 70 MMHG | SYSTOLIC BLOOD PRESSURE: 170 MMHG | WEIGHT: 240 LBS | BODY MASS INDEX: 40.97 KG/M2 | HEIGHT: 64 IN | OXYGEN SATURATION: 97 %

## 2018-05-23 DIAGNOSIS — Z95.810 AUTOMATIC IMPLANTABLE CARDIOVERTER-DEFIBRILLATOR IN SITU: Primary | ICD-10-CM

## 2018-05-23 DIAGNOSIS — I25.5 ISCHEMIC CARDIOMYOPATHY: ICD-10-CM

## 2018-05-23 DIAGNOSIS — I47.20 VENTRICULAR TACHYCARDIA: Primary | ICD-10-CM

## 2018-05-23 DIAGNOSIS — Z95.810 AUTOMATIC IMPLANTABLE CARDIOVERTER-DEFIBRILLATOR IN SITU: ICD-10-CM

## 2018-05-23 PROCEDURE — 99214 OFFICE O/P EST MOD 30 MIN: CPT | Performed by: INTERNAL MEDICINE

## 2018-05-23 PROCEDURE — 3017F COLORECTAL CA SCREEN DOC REV: CPT | Performed by: INTERNAL MEDICINE

## 2018-05-23 PROCEDURE — G8427 DOCREV CUR MEDS BY ELIG CLIN: HCPCS | Performed by: INTERNAL MEDICINE

## 2018-05-23 PROCEDURE — G8598 ASA/ANTIPLAT THER USED: HCPCS | Performed by: INTERNAL MEDICINE

## 2018-05-23 PROCEDURE — 93282 PRGRMG EVAL IMPLANTABLE DFB: CPT | Performed by: INTERNAL MEDICINE

## 2018-05-23 PROCEDURE — G8417 CALC BMI ABV UP PARAM F/U: HCPCS | Performed by: INTERNAL MEDICINE

## 2018-05-23 PROCEDURE — 1036F TOBACCO NON-USER: CPT | Performed by: INTERNAL MEDICINE

## 2018-07-06 NOTE — PROGRESS NOTES
you have not been preregistered yet. On the day of your procedure bring your insurance card and photo ID. You will be registered at your bedside once brought back to your room. 5. DO NOT EAT OR DRINK ANYTHING AFTER MIDNIGHT. 6. MEDICATIONS    Take the following medications with a SMALL sip of water: metoprolol, diltiazem, pepcid, Breo, Depakote, Cymbalta. Bring ventolin. Follow advise for lantus dose evening before,   Use your usual dose of inhalers the morning of surgery. BRING your rescue inhaler with you to hospital.    Anesthesia does NOT want you to take insulin the morning of surgery. They will control your blood sugar while you are at the hospital. Please contact your ordering physician for instructions regarding your insulin the night before your procedure. If you have an insulin pump, please keep it set on basal rate. 7. Do not swallow water when brushing teeth. No gum, candy, mints or ice chips. Refrain from smoking or at least decrease the amount. 8. Dress in loose, comfortable clothing appropriate for redressing after your procedure. Do not wear jewelry (including body piercings), make-up (especially NO eye make-up), fingernail polish (NO toenail polish if foot/leg surgery), lotion, powders or metal hairclips. 9. Dentures, glasses, or contacts will need to be removed before your procedure. Bring cases for your glasses, contacts, dentures, or hearing aids to protect them while you are in surgery. 10. If you use a CPAP, please bring it with you on the day of your procedure. 11. We recommend that valuable personal  belongings, such as credit cards, cash, cell phones, e-tablets or jewelry, be left at home during your stay. The hospital will not be responsible for valuables that are not secured in the hospital safe. However, if your insurance requires a co-pay, you may want to bring a method of payment, i.e. Check or credit card, if you wish to pay your co-pay the day of surgery. drowsiness, changes in your vital signs or breathing patterns. Nausea, headache, muscle aches, or sore throat may also occur after anesthesia. Your nurse will help you manage these potential side effects. 2. For comfort and safety, arrange to have someone at home with you for the first 24 hours after discharge. 3. You and your family will be given written instructions about your diet, activity, dressing care, medications, and return visits. 4. Once at home, should issues with nausea, pain, or bleeding occur, or should you notice any signs of infection, you should call your surgeon. 5. Always clean your hands before and after caring for your wound. Do not let your family touch your surgery site without cleaning their hands. 6. Narcotic pain medications can cause significant constipation. You may want to add a stool softener to your postoperative medication schedule or speak to your surgeon on how best to manage this SIDE EFFECT. SPECIAL INSTRUCTIONS     Thank you for allowing us to care for you. We strive to exceed your expectations in the delivery of care and service provided to you and your family. If you need to contact us for any reason, please call us at [phone]    Instructions reviewed with patient during preadmission testing phone interview. Florence Dawkins. 7/6/2018 .10:50 AM      ADDITIONAL EDUCATIONAL INFORMATION REVIEWED PER PHONE WITH YOU AND/OR YOUR FAMILY:  No Bring a urine sample on day of surgery  Yes Pain Goal-Taking Control of Your Pain  No FAQs about Surgical Site Infections  No Hibiclens® Bathing Instructions   Yes Antibacterial Soap  No Iggy® Wipes Bathing Instructions (Obtained from: https://www.[employer].com/. pdf )  No Incentive Spirometer Education  No Other

## 2018-07-06 NOTE — PROGRESS NOTES
[facility] / [facility] ([facility]) [address] [facility], [address]    Acknowledgment of Informed Consent for Surgical or Medical Procedure and Sedation  I agree to allow doctor(s) ZIGGY JOHNSON and his/her associates or assistants, including residents and/or other qualified medical practitioner to perform the following medical treatment or procedure and to administer or direct the administration of sedation as necessary:  Procedure(s): REPAIR CALCANEAL SPUR, REPAIR OF ACHILLES TENDON RIGHT FOOT   My doctor has explained the following regarding the proposed procedure:   the explanation of the procedure   the benefits of the procedure   the potential problems that might occur during recuperation   the risks and side effects of the procedure which could include but are not limited to severe blood loss, infection, stroke or death   the benefits, risks and side effect of alternative procedures including the consequences of declining this procedure or any alternative procedures   the likelihood of achieving satisfactory results. I acknowledge no guarantee or assurance has been made to me regarding the results. I understand that during the course of this treatment/procedure, unforeseen conditions can occur which require an additional or different procedure. I agree to allow my physician or assistants to perform such extension of the original procedure as they may find necessary. I understand that sedation will often result in temporary impairment of memory and fine motor skills and that sedation can occasionally progress to a state of deep sedation or general anesthesia. I understand the risks of anesthesia for surgery include, but are not limited to, sore throat, hoarseness, injury to face, mouth, or teeth; nausea; headache; injury to blood vessels or nerves; death, brain damage, or paralysis.     I understand that if I have a Limitation of Treatment order in effect during my hospitalization, the order may or may not be in effect during this procedure. I give my doctor permission to give me blood or blood products. I understand that there are risks with receiving blood such as hepatitis, AIDS, fever, or allergic reaction. I acknowledge that the risks, benefits, and alternatives of this treatment have been explained to me and that no express or implied warranty has been given by the hospital, any blood bank, or any person or entity as to the blood or blood components transfused. At the discretion of my doctor, I agree to allow observers, equipment/product representatives and allow photographing, and/or televising of the procedure, provided my name or identity is maintained confidentially. I agree the hospital may dispose of or use for scientific or educational purposes any tissue, fluid, or body parts which may be removed.     ________________________________Date________Time______ am/pm  ([facility])  Patient or Signature of Closest Relative or Legal Guardian    ________________________________Date________Time______am/pm      Page 1 of  1  Witness

## 2018-07-06 NOTE — PROGRESS NOTES
The following educational items and goals will be achieved upon completion of the patient's Pre-admission testing experience:             Identify the learner who is being assessed for education:  patient                    Ability to Learn:  Exhibits ability to grasp concepts and respond to questions: High  Ready to Learn: Yes  calm   Preferred Method of Learning:  verbal  Barriers to Learning: Verbalizes interest  Special Considerations due to cultural, Adventism, spiritual beliefs:  No  Language:  English  :  Sarah Beth Au  [x] Appropriate evaluation / integration of data as delineated by ASPAN Standards of Perianesthesia Nursing Practice    Pain scale and pain management   [x]Patient verbalizes understanding of pain scale and pain management  [x]Pre-operative determination of patients anticipated Post-Operative pain goal:   4 of 10 on 10 point scale post op goal  [] Other     Medication(s) - Compliance with preop medication instructions  [x] Patient verbalizes understanding of preop medications (see [facility] Presurgical Instructions)    Instructions, Pre op                                                                                            [x] Patient verbalizes understanding of presurgical instructions as reviewed with phone interview nurse or in-person nurse review    Fall Risk Potential, Preoperatively                                                                                   [x]No preoperative risk identified  []Preop risk identified:                    []Sensory deficit        []Motor deficit        []Balance problem        []Home medication        []Uses assistive device                    []History of a Fall within the last 30 days    Goal(s) for fall prevention:  [x]Prevent fall or injury by requesting assistance with activities of daily living  [x]Patient / Significant other verbalizes understanding the need to call for

## 2018-07-09 ENCOUNTER — HOSPITAL ENCOUNTER (OUTPATIENT)
Dept: PREADMISSION TESTING | Age: 58
Discharge: HOME | End: 2018-07-09
Attending: PODIATRIST | Admitting: PODIATRIST

## 2018-07-09 VITALS
DIASTOLIC BLOOD PRESSURE: 59 MMHG | TEMPERATURE: 97.2 F | OXYGEN SATURATION: 94 % | HEART RATE: 69 BPM | BODY MASS INDEX: 41.15 KG/M2 | HEIGHT: 65 IN | SYSTOLIC BLOOD PRESSURE: 104 MMHG | WEIGHT: 247 LBS | RESPIRATION RATE: 16 BRPM

## 2018-07-09 DIAGNOSIS — M77.31 CALCANEAL SPUR, RIGHT: ICD-10-CM

## 2018-07-09 DIAGNOSIS — G89.18 POST-OP PAIN: Primary | ICD-10-CM

## 2018-07-09 LAB
GLUCOSE BLD-MCNC: 158 MG/DL (ref 70–99)
GLUCOSE BLD-MCNC: 169 MG/DL (ref 70–99)
PERFORMED ON: ABNORMAL
PERFORMED ON: ABNORMAL

## 2018-07-09 RX ORDER — SODIUM CHLORIDE 0.9 % (FLUSH) 0.9 %
10 SYRINGE (ML) INJECTION EVERY 12 HOURS SCHEDULED
Status: DISCONTINUED | OUTPATIENT
Start: 2018-07-09 | End: 2018-07-10 | Stop reason: HOSPADM

## 2018-07-09 RX ORDER — HYDRALAZINE HYDROCHLORIDE 20 MG/ML
5 INJECTION INTRAMUSCULAR; INTRAVENOUS EVERY 10 MIN PRN
Status: DISCONTINUED | OUTPATIENT
Start: 2018-07-09 | End: 2018-07-10 | Stop reason: HOSPADM

## 2018-07-09 RX ORDER — IBUPROFEN 200 MG
800 TABLET ORAL EVERY 8 HOURS PRN
Qty: 30 TABLET | Refills: 0 | Status: SHIPPED | OUTPATIENT
Start: 2018-07-09 | End: 2019-09-18 | Stop reason: ALTCHOICE

## 2018-07-09 RX ORDER — SODIUM CHLORIDE, SODIUM LACTATE, POTASSIUM CHLORIDE, CALCIUM CHLORIDE 600; 310; 30; 20 MG/100ML; MG/100ML; MG/100ML; MG/100ML
INJECTION, SOLUTION INTRAVENOUS CONTINUOUS
Status: DISCONTINUED | OUTPATIENT
Start: 2018-07-09 | End: 2018-07-10 | Stop reason: HOSPADM

## 2018-07-09 RX ORDER — ONDANSETRON 2 MG/ML
4 INJECTION INTRAMUSCULAR; INTRAVENOUS
Status: ACTIVE | OUTPATIENT
Start: 2018-07-09 | End: 2018-07-09

## 2018-07-09 RX ORDER — LIDOCAINE HYDROCHLORIDE 10 MG/ML
1 INJECTION, SOLUTION EPIDURAL; INFILTRATION; INTRACAUDAL; PERINEURAL
Status: ACTIVE | OUTPATIENT
Start: 2018-07-09 | End: 2018-07-09

## 2018-07-09 RX ORDER — LABETALOL HYDROCHLORIDE 5 MG/ML
5 INJECTION, SOLUTION INTRAVENOUS EVERY 10 MIN PRN
Status: DISCONTINUED | OUTPATIENT
Start: 2018-07-09 | End: 2018-07-10 | Stop reason: HOSPADM

## 2018-07-09 RX ORDER — SODIUM CHLORIDE 0.9 % (FLUSH) 0.9 %
10 SYRINGE (ML) INJECTION PRN
Status: DISCONTINUED | OUTPATIENT
Start: 2018-07-09 | End: 2018-07-10 | Stop reason: HOSPADM

## 2018-07-09 RX ORDER — OXYCODONE HYDROCHLORIDE AND ACETAMINOPHEN 5; 325 MG/1; MG/1
1-2 TABLET ORAL EVERY 6 HOURS PRN
Qty: 25 TABLET | Refills: 0 | Status: SHIPPED | OUTPATIENT
Start: 2018-07-09 | End: 2018-07-16

## 2018-07-09 RX ADMIN — SODIUM CHLORIDE, SODIUM LACTATE, POTASSIUM CHLORIDE, CALCIUM CHLORIDE: 600; 310; 30; 20 INJECTION, SOLUTION INTRAVENOUS at 07:10

## 2018-07-09 ASSESSMENT — PAIN - FUNCTIONAL ASSESSMENT: PAIN_FUNCTIONAL_ASSESSMENT: 0-10

## 2018-07-09 ASSESSMENT — PAIN SCALES - GENERAL
PAINLEVEL_OUTOF10: 0
PAINLEVEL_OUTOF10: 0

## 2018-07-09 NOTE — PROGRESS NOTES
PACU Transfer to [facility]    Vitals:    07/09/18 1000   BP: (!) 119/58   Pulse: 66   Resp: 16   Temp: 97.5 °F (36.4 °C)   SpO2: 94%   B/P meet [facility] discharge criteria      Intake/Output Summary (Last 24 hours) at 07/09/18 1016  Last data filed at 07/09/18 0909   Gross per 24 hour   Intake             1600 ml   Output                0 ml   Net             1600 ml       Pain assessment:  none  Pain Level: 0    Patient transferred to care of [facility] RN.    7/9/2018 10:16 AM

## 2018-07-09 NOTE — BRIEF OP NOTE
Brief Postoperative Note    Chano Dias. YOB: 1960  [id number]     Surgeon: Dr. Brennan Gale DPM    Assistant(s):Gaby Cortes PGY2, Prudence Bourgeois MS4    Pre-operative Diagnosis:   Retrocalcaneal spur to right foot    Post-operative Diagnosis: Same    Procedure:   Removal of calcaneal spur, right   Repair of achilles tendon, right   Application of splint    Anesthesia: General    Injectables:   20 cc of 0.5% marcaine plain, post op     Hemostasis: pneumatic thigh tourniquet, 56 min    Materials:   Arthrex speed bridge fiberwires  2-0 vicryl  4-0 vicryl  4-0 monocryl      Estimated Blood Loss: minimal    Complications: None    Specimens: Was Not Obtained    Findings: thickened, fibrotic tissue noted.       Electronically signed by Viv Ayers DPM on 7/9/2018 at 9:11 AM

## 2018-07-09 NOTE — OP NOTE
vascular status intact to all aspects of the patient's right lower extremity and digital capillary refill time immediate to the digits of the right  foot. Following a period of post-operative monitoring, the patient will be discharged home with written and oral wound care and follow-up instructions per Dr. Sukhdeep Dangelo. The patient is to follow-up with Dr. Sukhdeep Dangelo in his private office within 5-7 days. The patient is to keep dressing clean, dry and intact at all times. The patient is to call with if any complications occur. Dictated on behalf of Dr. Sukhdeep Dangelo.     Hill Weber DPM   PGY-2, Pager #: [phone]

## 2018-07-09 NOTE — PROGRESS NOTES
[facility] PACU Education and Care Plan Goals  The following items will be achieved upon completion of the patient's transfer or discharge from the PACU:    Post Operative Pain Management                                                                               [x] Patient will verbalize understanding of pain scale and pain management. [x] Patient achieves predetermined pain goal of 4  [x] Self reports a comfort level acceptable for discharge  [] Other     Fall Risk Potential  [x] Due to Perioperative medication administration  Additional Risk Identified:   [x] Sensory deficit         [x] Motor deficit         [x] Balance problem         [] Home medication         [] Uses assistive device to ambulate    Goal(s) for fall prevention:  [] Prevent fall or injury by calling for assistance with activity and use of siderails while hospitalized  [x] Prevent fall or injury by using assistance with activity after discharge. [] Patient / Significant other verbalize understanding in use of any ordered assistive devices    Mobility Safety/ ADL  [x] Reach a functional mobility goal within limitations of the procedure. Infection Precautions                                                                                                            []Patient understands implementation of infection precautions (see [facility] Presurgical Instructions and SSI Prevention Handout)    Post operative Assessment and Care                                                             [x] Standards of care met as delineated by ASPAN.                                                               Discharge Education and Goals  [x]Patient voices understanding of PACU discharge criteria  [x]Outpatient / significant other voices understanding of home care and follow up procedures (See [facility] Procedure Discharge Instructions)  [x] Patient / significant other understanding of Special Needs:  [] Cooling device  [x]Wound Support

## 2018-07-09 NOTE — ANESTHESIA PRE-OP
ANA Acosta CNP   fluticasone (FLONASE) 50 MCG/ACT nasal spray USE 1 SPRAY IN EACH NOSTRIL EVERY DAY 9/13/17  Yes ANA Pope CNP   LANTUS SOLOSTAR 100 UNIT/ML injection pen INJECT 60 UNITS AT BEDTIME SUBCUTANEOUSLY 8/23/17  Yes ANA Pope CNP   albuterol sulfate HFA (PROAIR HFA) 108 (90 BASE) MCG/ACT inhaler Inhale 2 puffs into the lungs every 6 hours as needed for Wheezing 2/23/17 7/6/18 Yes Allison Rosales MD   lisinopril (PRINIVIL;ZESTRIL) 20 MG tablet TAKE 1 TABLET BY MOUTH DAILY 2/20/17  Yes ANA Pond CNP   atorvastatin (LIPITOR) 80 MG tablet TAKE ONE TABLET BY MOUTH DAILY 2/20/17  Yes ANA Pond CNP   diltiazem (CARDIZEM CD) 180 MG extended release capsule TAKE 1 CAPSULE DAILY 2/8/17  Yes ANA Pond CNP   ALL DAY ALLERGY 10 MG tablet TAKE 1 TABLET BY MOUTH DAILY 2/8/17  Yes ANA Pond CNP   metoprolol succinate (TOPROL XL) 200 MG extended release tablet TAKE ONE-HALF TABLET TWICE A DAY 2/8/17  Yes ANA Pond CNP   DULoxetine (CYMBALTA) 60 MG extended release capsule Take 60 mg by mouth daily   Yes Historical Provider, MD   divalproex (DEPAKOTE) 500 MG DR tablet Take 1,000 mg by mouth nightly   Yes Historical Provider, MD   traZODone (DESYREL) 50 MG tablet Take 100 mg by mouth nightly  12/8/14  Yes Historical Provider, MD   Liraglutide (VICTOZA SC) Inject 1.8 Units into the skin daily.    Yes Historical Provider, MD NAVARRO  (90 Base) MCG/ACT inhaler INHALE 2 PUFFS EVERY 4 HOURS AS NEEDED 9/26/17   ANA Hood CNP   TRUE METRIX BLOOD GLUCOSE TEST strip CHECK SUGARS TWICE A DAY AS DIRECTED, DX: E11.9 6/9/17   ANA Pope CNP   dicyclomine (BENTYL) 10 MG capsule TAKE ONE CAPSULE BY MOUTH 4 TIMES DAILY 5/25/17   ANA Pope CNP   aspirin 325 MG EC tablet TAKE ONE TABLET BY MOUTH DAILY 2/20/17   ANA Pond CNP   UNIFINE PENTIPS 31G X 8 MM MISC USE AS DIRECTED FOR for Wheezing 1 Inhaler 0    lisinopril (PRINIVIL;ZESTRIL) 20 MG tablet TAKE 1 TABLET BY MOUTH DAILY 30 tablet 11    atorvastatin (LIPITOR) 80 MG tablet TAKE ONE TABLET BY MOUTH DAILY 30 tablet 11    diltiazem (CARDIZEM CD) 180 MG extended release capsule TAKE 1 CAPSULE DAILY 30 capsule 11    ALL DAY ALLERGY 10 MG tablet TAKE 1 TABLET BY MOUTH DAILY 30 tablet 11    metoprolol succinate (TOPROL XL) 200 MG extended release tablet TAKE ONE-HALF TABLET TWICE A DAY 30 tablet 11    DULoxetine (CYMBALTA) 60 MG extended release capsule Take 60 mg by mouth daily      divalproex (DEPAKOTE) 500 MG DR tablet Take 1,000 mg by mouth nightly      traZODone (DESYREL) 50 MG tablet Take 100 mg by mouth nightly       Liraglutide (VICTOZA SC) Inject 1.8 Units into the skin daily.  VENTOLIN  (90 Base) MCG/ACT inhaler INHALE 2 PUFFS EVERY 4 HOURS AS NEEDED 18 g 0    TRUE METRIX BLOOD GLUCOSE TEST strip CHECK SUGARS TWICE A DAY AS DIRECTED, DX: E11.9 100 each 5    dicyclomine (BENTYL) 10 MG capsule TAKE ONE CAPSULE BY MOUTH 4 TIMES DAILY 120 capsule 0    aspirin 325 MG EC tablet TAKE ONE TABLET BY MOUTH DAILY 30 tablet 11    UNIFINE PENTIPS 31G X 8 MM MISC USE AS DIRECTED FOR INSULIN .00 100 each 5    TRUEPLUS LANCETS 30G MISC CHECK BLOOD SUGAR TWICE A DAY DX E11.9 100 each 11    SYMBICORT 160-4.5 MCG/ACT AERO INHALE 2 PUFFS TWICE A DAY IN THE MORNING AND EVENING 1 Inhaler 5     Current Facility-Administered Medications   Medication Dose Route Frequency Provider Last Rate Last Dose    lactated ringers infusion   Intravenous Continuous Dell Morrow  mL/hr at 07/09/18 0710         Allergies:     Allergies   Allergen Reactions    Pcn [Penicillins] Hives       Problem List:    Patient Active Problem List   Diagnosis Code    Ventricular tachycardia (HCC) I47.2    Presence of stent in left circumflex coronary artery Z95.5    Myocardial infarction, nontransmural (HCC) I21.4    Myocardial 5/22/2001    Smokeless tobacco: Never Used      Comment: PASSIVE SMOKER    Alcohol use No                                Counseling given: Not Answered      Vital Signs (Current):   Vitals:    07/06/18 1044   Weight: 247 lb (112 kg)   Height: 5' 4.5\" (1.638 m)                                              BP Readings from Last 3 Encounters:   07/05/18 132/78   06/04/18 (!) 156/85   05/23/18 (!) 170/70       NPO Status: Time of last liquid consumption: 0500                        Time of last solid consumption: 2000                        Date of last liquid consumption: 07/09/18                        Date of last solid food consumption: 07/08/18    BMI:   Wt Readings from Last 3 Encounters:   07/06/18 247 lb (112 kg)   07/05/18 247 lb (112 kg)   06/04/18 230 lb (104.3 kg)     Body mass index is 41.74 kg/m². Anesthesia Evaluation  Patient summary reviewed  Airway: Mallampati: II  TM distance: >3 FB   Neck ROM: full  Mouth opening: > = 3 FB Dental:          Pulmonary:   (+) COPD:  sleep apnea: on CPAP and noncompliant,                             Cardiovascular:    (+) hypertension:, pacemaker:, past MI:, CAD:,                ROS comment:    Conclusions      Summary   Left ventricle size is normal. Normal left ventricular wall thickness. The   left ventricular systolic function is lower limits of normal of normal with   an ejection fraction of 50%. There is mild hypokinesis of the basal inferior   wall. Trivial mitral & tricuspid regurgitation is present.   Pacer / ICD wire is visualized in the right ventricle.        Neuro/Psych:               GI/Hepatic/Renal:   (+) GERD:, liver disease:,           Endo/Other:    (+) Diabetes, . Abdominal:           Vascular:                                        Anesthesia Plan      general     ASA 3             Anesthetic plan and risks discussed with patient.                       Nathaniel Baumgarten, MD   7/9/2018

## 2018-07-27 ENCOUNTER — APPOINTMENT (OUTPATIENT)
Dept: GENERAL RADIOLOGY | Age: 58
End: 2018-07-27
Payer: MEDICARE

## 2018-07-27 ENCOUNTER — HOSPITAL ENCOUNTER (EMERGENCY)
Age: 58
Discharge: HOME | End: 2018-07-27
Attending: EMERGENCY MEDICINE
Payer: MEDICARE

## 2018-07-27 VITALS
SYSTOLIC BLOOD PRESSURE: 122 MMHG | HEIGHT: 64 IN | RESPIRATION RATE: 18 BRPM | HEART RATE: 76 BPM | OXYGEN SATURATION: 99 % | DIASTOLIC BLOOD PRESSURE: 71 MMHG | TEMPERATURE: 98.1 F | BODY MASS INDEX: 41.15 KG/M2 | WEIGHT: 241 LBS

## 2018-07-27 DIAGNOSIS — Z51.89 VISIT FOR WOUND CHECK: Primary | ICD-10-CM

## 2018-07-27 PROCEDURE — 99283 EMERGENCY DEPT VISIT LOW MDM: CPT

## 2018-07-27 PROCEDURE — 73630 X-RAY EXAM OF FOOT: CPT

## 2018-07-27 PROCEDURE — 73610 X-RAY EXAM OF ANKLE: CPT

## 2018-07-27 ASSESSMENT — PAIN SCALES - GENERAL
PAINLEVEL_OUTOF10: 7
PAINLEVEL_OUTOF10: 2

## 2018-07-27 ASSESSMENT — PAIN DESCRIPTION - LOCATION
LOCATION: ANKLE
LOCATION: ANKLE

## 2018-07-27 ASSESSMENT — ENCOUNTER SYMPTOMS
COUGH: 0
NAUSEA: 0
SHORTNESS OF BREATH: 0
VOMITING: 0
RECTAL PAIN: 0
ABDOMINAL PAIN: 0

## 2018-07-27 ASSESSMENT — PAIN DESCRIPTION - ORIENTATION
ORIENTATION: LEFT
ORIENTATION: RIGHT

## 2018-07-27 ASSESSMENT — PAIN DESCRIPTION - DESCRIPTORS
DESCRIPTORS: DISCOMFORT
DESCRIPTORS: ACHING;TENDER

## 2018-07-27 ASSESSMENT — PAIN DESCRIPTION - PAIN TYPE: TYPE: ACUTE PAIN

## 2018-07-27 NOTE — ED PROVIDER NOTES
his maternal uncle and mother; Hypertension in his maternal uncle. SOCIAL HISTORY   reports that he quit smoking about 17 years ago. His smoking use included Cigarettes. He has a 40.00 pack-year smoking history. He has never used smokeless tobacco. He reports that he does not drink alcohol or use drugs. HOME MEDICATIONS     Prior to Admission medications    Medication Sig Start Date End Date Taking?  Authorizing Provider   ibuprofen (ADVIL;MOTRIN) 200 MG tablet Take 4 tablets by mouth every 8 hours as needed for Pain 7/9/18   Anel Magana DPM   Fluticasone Furoate-Vilanterol (BREO ELLIPTA IN) Inhale into the lungs daily     Historical Provider, MD   albuterol (ACCUNEB) 1.25 MG/3ML nebulizer solution Inhale 1 ampule into the lungs every 6 hours as needed for Wheezing    Historical Provider, MD   insulin lispro (HUMALOG) 100 UNIT/ML injection vial Inject into the skin 2 times daily    Historical Provider, MD   nitroGLYCERIN (NITROLINGUAL) 0.4 MG/SPRAY 0.4 mg spray Place 1 spray under the tongue every 5 minutes as needed for Chest pain 3/9/18   Isabel Ling MD   furosemide (LASIX) 20 MG tablet TAKE ONE TABLET BY MOUTH MONDAY AND THURSDAY 10/11/17   Isabel Ling MD   potassium chloride (KLOR-CON M) 20 MEQ extended release tablet TAKE ONE TABLET BY MOUTH WITH LASIX ON MONDAY AND THURSDAY 10/11/17   Isabel Ling MD   clopidogrel (PLAVIX) 75 MG tablet TAKE ONE TABLET BY MOUTH DAILY 9/26/17   ANA Hanna CNP   VENTOLIN  (90 Base) MCG/ACT inhaler INHALE 2 PUFFS EVERY 4 HOURS AS NEEDED 9/26/17   ANA Hanna CNP   INVOKANA 300 MG TABS tablet TAKE 1 TABLET BY MOUTH DAILY BEFORE THE FIRST MEAL OF THE DAY 9/26/17   ANA Hanna CNP   metFORMIN (GLUCOPHAGE) 1000 MG tablet TAKE ONE TABLET BY MOUTH 2 TIMES DAILY WITH MORNING AND EVENING MEALS 9/26/17   ANA Hanna CNP   cyclobenzaprine (FLEXERIL) 10 MG tablet TAKE 1 TABLET BY MOUTH DAILY, MAY CAUSE DROWSINESS 9/26/17   ANA Chavez CNP   famotidine (PEPCID) 20 MG tablet TAKE 1 TABLET BY MOUTH 2 TIMES DAILY 9/26/17   ANA Chavez CNP   gabapentin (NEURONTIN) 100 MG capsule TAKE 2 CAPSULES BY MOUTH 3 TIMES DAILY 9/26/17   ANA Chavez CNP   fluticasone [facility]) 50 MCG/ACT nasal spray USE 1 SPRAY IN [facility] NOSTRIL EVERY DAY 9/13/17   ANA Davalos CNP   LANTUS SOLOSTAR 100 UNIT/ML injection pen INJECT 60 UNITS AT BEDTIME SUBCUTANEOUSLY 8/23/17   ANA Davalos CNP   TRUE METRIX BLOOD GLUCOSE TEST strip CHECK SUGARS TWICE A DAY AS DIRECTED, DX: E11.9 6/9/17   ANA Davalos CNP   dicyclomine (BENTYL) 10 MG capsule TAKE ONE CAPSULE BY MOUTH 4 TIMES DAILY 5/25/17   ANA Davalos CNP   albuterol sulfate HFA (PROAIR HFA) 108 (90 BASE) MCG/ACT inhaler Inhale 2 puffs into the lungs every 6 hours as needed for Wheezing 2/23/17 7/6/18  Alana Markham MD   lisinopril (PRINIVIL;ZESTRIL) 20 MG tablet TAKE 1 TABLET BY MOUTH DAILY 2/20/17   ANA Pond CNP   atorvastatin (LIPITOR) 80 MG tablet TAKE ONE TABLET BY MOUTH DAILY 2/20/17   ANA Pond CNP   aspirin 325 MG EC tablet TAKE ONE TABLET BY MOUTH DAILY 2/20/17   ANA Pond CNP   UNIFINE PENTIPS 31G X 8 MM MISC USE AS DIRECTED FOR INSULIN .00 2/20/17   ANA Pond CNP   diltiazem (CARDIZEM CD) 180 MG extended release capsule TAKE 1 CAPSULE DAILY 2/8/17   ANA Pond CNP   ALL DAY ALLERGY 10 MG tablet TAKE 1 TABLET BY MOUTH DAILY 2/8/17   ANA Pond CNP   metoprolol succinate (TOPROL XL) 200 MG extended release tablet TAKE ONE-HALF TABLET TWICE A DAY 2/8/17   Farrah [place], APRN - CNP   TRUEPLUS LANCETS 30G MISC CHECK BLOOD SUGAR TWICE A DAY DX E11.9 2/8/17   ANA Pond - CNP   SYMBICORT 160-4.5 MCG/ACT AERO INHALE 2 PUFFS TWICE A DAY IN THE MORNING AND EVENING 1/3/17   Daysi Caceres MD   DULoxetine Comparison: None. Findings: No acute fractures or dislocation. There are postsurgical changes in the posterior soft tissues over the heel. Enthesopathic spurring at the Achilles insertion; adjacent lucency likely reflects postsurgical changes. There is a plantar calcaneal spur. Vascular calcifications. No acute fracture. Xr Toe Left (min 2 Views)    Result Date: 7/5/2018  EXAMINATION: 3 XRAY VIEWS OF THE LEFT TOE 7/5/2018 3:07 pm COMPARISON: None. HISTORY: ORDERING PHYSICIAN PROVIDED HISTORY: fall TECHNOLOGIST PROVIDED HISTORY: Technologist Provided Reason for Exam: bent toe back yesterday fall Acuity: Acute FINDINGS: The soft tissues are unremarkable. There is no fracture or dislocation. No focal destructive osseous lesion. No radiopaque foreign body is identified. No acute abnormality       Emergency Department Course:  I spoke with the patient and we discussed his examination and that there was no overt sign of infection at this time. I would contact his orthopedist to discuss the case. 2:16 PM  I spoke with Dr. Minor Espinal. We thoroughly discussed the history, physical exam, laboratory and imaging studies, as well as, emergency department course. Based upon that discussion, we've decided to discharge [facility]. home. We've agreed upon prompt follow in their office for a re-assessment. We discussed at this time patient has no calf tenderness no leg edema and no leg pain. His only concerns were at the incision site and foot. So at this time patient will be discharged to follow-up with Dr. Tamir Aldridge. Is not felt that any further testing or imaging would be of benefit at this time. 2:24 PM:  Discussed results, diagnosis and plan with patient and/or family. Questions addressed. Disposition and follow-up agreed upon. Specific discharge instructions explained.    The patient and/or family and I have discussed the diagnosis and risks, and we agree with discharging home to follow-up with their primary care, specialist or referral doctor. We also discussed returning to the Emergency Department immediately if new or worsening symptoms occur. We have discussed the symptoms which are most concerning that necessitate immediate return. This document serves as a record of the services and decisions personally performed by Brenda Barajas MD. It was created on the provider's behalf by Danelle Wong, a trained medical scribe. The creation of this document is based on the provider's statements to the medical scribe. The document has been reviewed and approved by the provider. Anahi Sandy, 1:13 PM 7/27/18 scribing for and in the presence of Brenda Barajas MD.        This dictation was generated by voice recognition computer software. Although all attempts are made to edit the dictation for accuracy, there may be errors in the transcription that are not intended.      The Clinical Impression is wound check        Brenda Barajas MD  07/27/18 142

## 2018-08-20 ENCOUNTER — OFFICE VISIT (OUTPATIENT)
Dept: PULMONOLOGY | Age: 58
End: 2018-08-20

## 2018-08-20 VITALS
WEIGHT: 239 LBS | HEART RATE: 97 BPM | OXYGEN SATURATION: 98 % | BODY MASS INDEX: 40.8 KG/M2 | TEMPERATURE: 98.8 F | SYSTOLIC BLOOD PRESSURE: 116 MMHG | RESPIRATION RATE: 16 BRPM | DIASTOLIC BLOOD PRESSURE: 76 MMHG | HEIGHT: 64 IN

## 2018-08-20 DIAGNOSIS — Z99.89 OSA ON CPAP: Primary | ICD-10-CM

## 2018-08-20 DIAGNOSIS — G47.33 OSA ON CPAP: Primary | ICD-10-CM

## 2018-08-20 DIAGNOSIS — Z71.89 CPAP USE COUNSELING: ICD-10-CM

## 2018-08-20 DIAGNOSIS — E66.01 OBESITY, MORBID, BMI 40.0-49.9 (HCC): ICD-10-CM

## 2018-08-20 PROCEDURE — 99213 OFFICE O/P EST LOW 20 MIN: CPT | Performed by: NURSE PRACTITIONER

## 2018-08-20 PROCEDURE — G8427 DOCREV CUR MEDS BY ELIG CLIN: HCPCS | Performed by: NURSE PRACTITIONER

## 2018-08-20 PROCEDURE — G8598 ASA/ANTIPLAT THER USED: HCPCS | Performed by: NURSE PRACTITIONER

## 2018-08-20 PROCEDURE — G8417 CALC BMI ABV UP PARAM F/U: HCPCS | Performed by: NURSE PRACTITIONER

## 2018-08-20 PROCEDURE — 3017F COLORECTAL CA SCREEN DOC REV: CPT | Performed by: NURSE PRACTITIONER

## 2018-08-20 PROCEDURE — 1036F TOBACCO NON-USER: CPT | Performed by: NURSE PRACTITIONER

## 2018-08-20 ASSESSMENT — SLEEP AND FATIGUE QUESTIONNAIRES
HOW LIKELY ARE YOU TO NOD OFF OR FALL ASLEEP WHILE SITTING AND READING: 3
HOW LIKELY ARE YOU TO NOD OFF OR FALL ASLEEP WHILE SITTING QUIETLY AFTER LUNCH WITHOUT ALCOHOL: 3
ESS TOTAL SCORE: 18
NECK CIRCUMFERENCE (INCHES): 19.25
HOW LIKELY ARE YOU TO NOD OFF OR FALL ASLEEP WHEN YOU ARE A PASSENGER IN A CAR FOR AN HOUR WITHOUT A BREAK: 3
HOW LIKELY ARE YOU TO NOD OFF OR FALL ASLEEP WHILE WATCHING TV: 3
HOW LIKELY ARE YOU TO NOD OFF OR FALL ASLEEP WHILE LYING DOWN TO REST IN THE AFTERNOON WHEN CIRCUMSTANCES PERMIT: 3
HOW LIKELY ARE YOU TO NOD OFF OR FALL ASLEEP WHILE SITTING AND TALKING TO SOMEONE: 1
HOW LIKELY ARE YOU TO NOD OFF OR FALL ASLEEP IN A CAR, WHILE STOPPED FOR A FEW MINUTES IN TRAFFIC: 1
HOW LIKELY ARE YOU TO NOD OFF OR FALL ASLEEP WHILE SITTING INACTIVE IN A PUBLIC PLACE: 1

## 2018-08-20 NOTE — PROGRESS NOTES
Pt is UTD on flu vaccine.
History:   Diagnosis Date    Arthritis     CAD (coronary artery disease)     COPD (chronic obstructive pulmonary disease) ([facility])     Dr. Greta Gamez at [facility] told the patient that he did not have COPD, just asthma    Emphysema of lung ([address]     Fatty liver     GERD (gastroesophageal reflux disease)     Hyperlipidemia     Hypertension     MI, old     Sleep apnea     pt wears cpap at night. states does not have cpap    Type II or unspecified type diabetes mellitus without mention of complication, not stated as uncontrolled        Past Surgical History:        Procedure Laterality Date    CARDIAC PACEMAKER PLACEMENT  2011    [facility]. pace maker difib    CARPAL TUNNEL RELEASE Bilateral 2013    CATARACT REMOVAL WITH IMPLANT Left 2/28/14    COLONOSCOPY      CORONARY ANGIOPLASTY WITH STENT PLACEMENT  2001,2008    CYST REMOVAL  1982    coccyx area    DIAGNOSTIC CARDIAC CATH LAB PROCEDURE      ENDOSCOPY, COLON, DIAGNOSTIC      ERCP  9/15/2013    ERCP  10/11/13    WITH STENT REMOVAL    FOOT SURGERY Right 07/09/2018     REPAIR CALCANEAL SPUR, REPAIR OF ACHILLES TENDON RIGHT FOOT    OTHER SURGICAL HISTORY      back stimulator in lower back    PACEMAKER PLACEMENT      ICD    UPPER GASTROINTESTINAL ENDOSCOPY  7/18/13    and colonoscopy with biopsies taken    UPPER GASTROINTESTINAL ENDOSCOPY  8/23/13    EUS    UPPER GASTROINTESTINAL ENDOSCOPY  9/15/2013       Allergies:  is allergic to pcn [penicillins]. Social History:    TOBACCO:   reports that he quit smoking about 17 years ago. His smoking use included Cigarettes. He has a 40.00 pack-year smoking history. He has never used smokeless tobacco.  ETOH:   reports that he does not drink alcohol.     Family History:   Family History   Problem Relation Age of Onset    Heart Disease Mother     Heart Failure Mother     Cancer Father     Diabetes Maternal Grandmother     Heart Failure Maternal Uncle     Hypertension Maternal Uncle     Asthma Neg Hx

## 2018-08-21 PROBLEM — E66.01 OBESITY, MORBID, BMI 40.0-49.9 (HCC): Status: ACTIVE | Noted: 2018-08-21

## 2018-08-28 ENCOUNTER — NURSE ONLY (OUTPATIENT)
Dept: CARDIOLOGY CLINIC | Age: 58
End: 2018-08-28

## 2018-08-28 DIAGNOSIS — Z95.810 AUTOMATIC IMPLANTABLE CARDIOVERTER-DEFIBRILLATOR IN SITU: ICD-10-CM

## 2018-08-28 DIAGNOSIS — I25.5 ISCHEMIC CARDIOMYOPATHY: ICD-10-CM

## 2018-08-28 PROCEDURE — 93295 DEV INTERROG REMOTE 1/2/MLT: CPT | Performed by: INTERNAL MEDICINE

## 2018-08-28 PROCEDURE — 93296 REM INTERROG EVL PM/IDS: CPT | Performed by: INTERNAL MEDICINE

## 2018-08-28 NOTE — LETTER
[address] [facility] [phone]  [address] [facility]  Fredi Lore [address] [facility]    Pacemaker/Defibrillator Clinic          08/28/18        [address]        [place] [address] This letter is to inform you that we received the transmission from your monitor at home that checks your pacemaker and/or defibrillator, or implanted heart monitor. The next date your monitor will automatically transmit will be 2/26/19. Please do not send additional routine transmissions unless specifically requested. Your device and monitor are wireless and most transmit cellularly, but please periodically check your monitor is still plugged in to the electrical outlet. If you still use the telephone land line to send please ensure the connection to the phone edu is secure. This will help to ensure successful automatic transmissions in the future. Also, the monitor needs to be close to you while sleeping at night. Please be aware that the remote device transmission sites are periodically monitored only during regular business hours during which simultaneous in-office device clinics are being run. If your transmission requires attention, we will contact you as soon as possible. Thank you.             Tania [address]

## 2018-08-28 NOTE — PROGRESS NOTES
Merlin remote transmission shows normal function. No new episodes/arrhythmias noted. Dr. iJmbo Valentin to review interrogation. See interrogation/Paceart report for further details.

## 2018-09-21 ENCOUNTER — TELEPHONE (OUTPATIENT)
Dept: PULMONOLOGY | Age: 58
End: 2018-09-21

## 2018-09-28 ENCOUNTER — OFFICE VISIT (OUTPATIENT)
Dept: CARDIOLOGY CLINIC | Age: 58
End: 2018-09-28
Payer: MEDICARE

## 2018-09-28 VITALS
WEIGHT: 240.6 LBS | BODY MASS INDEX: 41.3 KG/M2 | DIASTOLIC BLOOD PRESSURE: 70 MMHG | HEART RATE: 84 BPM | SYSTOLIC BLOOD PRESSURE: 130 MMHG

## 2018-09-28 DIAGNOSIS — Z95.5 PRESENCE OF STENT IN LEFT CIRCUMFLEX CORONARY ARTERY: ICD-10-CM

## 2018-09-28 DIAGNOSIS — I47.20 VENTRICULAR TACHYCARDIA: Primary | ICD-10-CM

## 2018-09-28 DIAGNOSIS — Z95.810 AUTOMATIC IMPLANTABLE CARDIOVERTER-DEFIBRILLATOR IN SITU: ICD-10-CM

## 2018-09-28 DIAGNOSIS — I25.10 CORONARY ARTERY DISEASE INVOLVING NATIVE CORONARY ARTERY OF NATIVE HEART WITHOUT ANGINA PECTORIS: ICD-10-CM

## 2018-09-28 PROCEDURE — G8598 ASA/ANTIPLAT THER USED: HCPCS | Performed by: INTERNAL MEDICINE

## 2018-09-28 PROCEDURE — G8417 CALC BMI ABV UP PARAM F/U: HCPCS | Performed by: INTERNAL MEDICINE

## 2018-09-28 PROCEDURE — 1036F TOBACCO NON-USER: CPT | Performed by: INTERNAL MEDICINE

## 2018-09-28 PROCEDURE — G8427 DOCREV CUR MEDS BY ELIG CLIN: HCPCS | Performed by: INTERNAL MEDICINE

## 2018-09-28 PROCEDURE — 3017F COLORECTAL CA SCREEN DOC REV: CPT | Performed by: INTERNAL MEDICINE

## 2018-09-28 PROCEDURE — 99213 OFFICE O/P EST LOW 20 MIN: CPT | Performed by: INTERNAL MEDICINE

## 2018-09-28 RX ORDER — NITROGLYCERIN 400 UG/1
1 SPRAY ORAL EVERY 5 MIN PRN
Qty: 2 BOTTLE | Refills: 3 | Status: ON HOLD | OUTPATIENT
Start: 2018-09-28 | End: 2021-07-14 | Stop reason: HOSPADM

## 2018-10-24 RX ORDER — FUROSEMIDE 20 MG/1
TABLET ORAL
Qty: 30 TABLET | Refills: 1 | Status: SHIPPED | OUTPATIENT
Start: 2018-10-24 | End: 2018-12-18 | Stop reason: SDUPTHER

## 2018-10-24 RX ORDER — POTASSIUM CHLORIDE 20 MEQ/1
TABLET, EXTENDED RELEASE ORAL
Qty: 30 TABLET | Refills: 1 | Status: SHIPPED | OUTPATIENT
Start: 2018-10-24 | End: 2018-12-18 | Stop reason: SDUPTHER

## 2018-10-30 ENCOUNTER — ANESTHESIA EVENT (OUTPATIENT)
Dept: OPERATING ROOM | Age: 58
End: 2018-10-30
Payer: MEDICARE

## 2018-10-30 RX ORDER — TRAZODONE HYDROCHLORIDE 100 MG/1
300 TABLET ORAL NIGHTLY
COMMUNITY
End: 2019-05-24

## 2018-10-30 NOTE — PROGRESS NOTES
if you have not been preregistered yet. On the day of your procedure bring your insurance card and photo ID. You will be registered at your bedside once brought back to your room. 5. DO NOT EAT OR DRINK ANYTHING AFTER MIDNIGHT. 6. MEDICATIONS    Take the following medications with a SMALL sip of water: METOPROLOL AND DILTIAZEM. PATIENT STATED HIS MEDS COME IN PACKS. HE WILL BRING HIS AM PACK WITH HIM DOS    Use your usual dose of inhalers the morning of surgery. BRING your rescue inhaler with you to hospital.    Anesthesia does NOT want you to take insulin the morning of surgery. They will control your blood sugar while you are at the hospital. Please contact your ordering physician for instructions regarding your insulin the night before your procedure. If you have an insulin pump, please keep it set on basal rate. 7. Do not swallow water when brushing teeth. No gum, candy, mints or ice chips. Refrain from smoking or at least decrease the amount. 8. Dress in loose, comfortable clothing appropriate for redressing after your procedure. Do not wear jewelry (including body piercings), make-up (especially NO eye make-up), fingernail polish (NO toenail polish if foot/leg surgery), lotion, powders or metal hairclips. 9. Dentures, glasses, or contacts will need to be removed before your procedure. Bring cases for your glasses, contacts, dentures, or hearing aids to protect them while you are in surgery. 10. If you use a CPAP, please bring it with you on the day of your procedure. 11. We recommend that valuable personal  belongings, such as credit cards, cash, cell phones, e-tablets or jewelry, be left at home during your stay. The hospital will not be responsible for valuables that are not secured in the hospital safe. However, if your insurance requires a co-pay, you may want to bring a method of payment, i.e. Check or credit card, if you wish to pay your co-pay the day of surgery.       12. drowsiness, changes in your vital signs or breathing patterns. Nausea, headache, muscle aches, or sore throat may also occur after anesthesia. Your nurse will help you manage these potential side effects. 2. For comfort and safety, arrange to have someone at home with you for the first 24 hours after discharge. 3. You and your family will be given written instructions about your diet, activity, dressing care, medications, and return visits. 4. Once at home, should issues with nausea, pain, or bleeding occur, or should you notice any signs of infection, you should call your surgeon. 5. Always clean your hands before and after caring for your wound. Do not let your family touch your surgery site without cleaning their hands. 6. Narcotic pain medications can cause significant constipation. You may want to add a stool softener to your postoperative medication schedule or speak to your surgeon on how best to manage this SIDE EFFECT. SPECIAL INSTRUCTIONS     Thank you for allowing us to care for you. We strive to exceed your expectations in the delivery of care and service provided to you and your family. If you need to contact us for any reason, please call us at [phone]    Instructions reviewed with patient during preadmission testing phone interview. Jas Nick. 10/30/2018 .2:04 PM      ADDITIONAL EDUCATIONAL INFORMATION REVIEWED PER PHONE WITH YOU AND/OR YOUR FAMILY:    Yes Antibacterial Soap

## 2018-10-31 ENCOUNTER — ANESTHESIA (OUTPATIENT)
Dept: OPERATING ROOM | Age: 58
End: 2018-10-31
Payer: MEDICARE

## 2018-10-31 ENCOUNTER — HOSPITAL ENCOUNTER (OUTPATIENT)
Age: 58
Setting detail: OUTPATIENT SURGERY
Discharge: HOME | End: 2018-10-31
Attending: PODIATRIST | Admitting: PODIATRIST
Payer: MEDICARE

## 2018-10-31 ENCOUNTER — APPOINTMENT (OUTPATIENT)
Dept: GENERAL RADIOLOGY | Age: 58
End: 2018-10-31
Attending: PODIATRIST
Payer: MEDICARE

## 2018-10-31 VITALS — TEMPERATURE: 98.4 F | SYSTOLIC BLOOD PRESSURE: 129 MMHG | OXYGEN SATURATION: 99 % | DIASTOLIC BLOOD PRESSURE: 63 MMHG

## 2018-10-31 VITALS
BODY MASS INDEX: 40.65 KG/M2 | WEIGHT: 244 LBS | DIASTOLIC BLOOD PRESSURE: 78 MMHG | HEIGHT: 65 IN | HEART RATE: 94 BPM | RESPIRATION RATE: 16 BRPM | TEMPERATURE: 97.7 F | OXYGEN SATURATION: 91 % | SYSTOLIC BLOOD PRESSURE: 132 MMHG

## 2018-10-31 DIAGNOSIS — G89.18 POST-OP PAIN: Primary | ICD-10-CM

## 2018-10-31 LAB
ANION GAP SERPL CALCULATED.3IONS-SCNC: 17 MMOL/L (ref 3–16)
BUN BLDV-MCNC: 15 MG/DL (ref 7–20)
CALCIUM SERPL-MCNC: 9.4 MG/DL (ref 8.3–10.6)
CHLORIDE BLD-SCNC: 100 MMOL/L (ref 99–110)
CO2: 21 MMOL/L (ref 21–32)
CREAT SERPL-MCNC: 0.5 MG/DL (ref 0.9–1.3)
GFR AFRICAN AMERICAN: >60
GFR NON-AFRICAN AMERICAN: >60
GLUCOSE BLD-MCNC: 186 MG/DL (ref 70–99)
GLUCOSE BLD-MCNC: 194 MG/DL (ref 70–99)
GLUCOSE BLD-MCNC: 228 MG/DL (ref 70–99)
HCT VFR BLD CALC: 44.6 % (ref 40.5–52.5)
HEMOGLOBIN: 15.1 G/DL (ref 13.5–17.5)
MCH RBC QN AUTO: 29.5 PG (ref 26–34)
MCHC RBC AUTO-ENTMCNC: 33.9 G/DL (ref 31–36)
MCV RBC AUTO: 87.2 FL (ref 80–100)
PDW BLD-RTO: 14.2 % (ref 12.4–15.4)
PERFORMED ON: ABNORMAL
PERFORMED ON: ABNORMAL
PLATELET # BLD: 146 K/UL (ref 135–450)
PMV BLD AUTO: 7.5 FL (ref 5–10.5)
POTASSIUM SERPL-SCNC: 4.4 MMOL/L (ref 3.5–5.1)
RBC # BLD: 5.12 M/UL (ref 4.2–5.9)
SODIUM BLD-SCNC: 138 MMOL/L (ref 136–145)
WBC # BLD: 5.1 K/UL (ref 4–11)

## 2018-10-31 PROCEDURE — 7100000011 HC PHASE II RECOVERY - ADDTL 15 MIN: Performed by: PODIATRIST

## 2018-10-31 PROCEDURE — 6360000002 HC RX W HCPCS

## 2018-10-31 PROCEDURE — 2709999900 HC NON-CHARGEABLE SUPPLY: Performed by: PODIATRIST

## 2018-10-31 PROCEDURE — 3700000001 HC ADD 15 MINUTES (ANESTHESIA): Performed by: PODIATRIST

## 2018-10-31 PROCEDURE — C1713 ANCHOR/SCREW BN/BN,TIS/BN: HCPCS | Performed by: PODIATRIST

## 2018-10-31 PROCEDURE — 6360000002 HC RX W HCPCS: Performed by: ANESTHESIOLOGY

## 2018-10-31 PROCEDURE — 2500000003 HC RX 250 WO HCPCS: Performed by: PODIATRIST

## 2018-10-31 PROCEDURE — 7100000000 HC PACU RECOVERY - FIRST 15 MIN: Performed by: PODIATRIST

## 2018-10-31 PROCEDURE — 73630 X-RAY EXAM OF FOOT: CPT

## 2018-10-31 PROCEDURE — S0028 INJECTION, FAMOTIDINE, 20 MG: HCPCS

## 2018-10-31 PROCEDURE — 85027 COMPLETE CBC AUTOMATED: CPT

## 2018-10-31 PROCEDURE — 2500000003 HC RX 250 WO HCPCS: Performed by: NURSE ANESTHETIST, CERTIFIED REGISTERED

## 2018-10-31 PROCEDURE — 3700000000 HC ANESTHESIA ATTENDED CARE: Performed by: PODIATRIST

## 2018-10-31 PROCEDURE — 80048 BASIC METABOLIC PNL TOTAL CA: CPT

## 2018-10-31 PROCEDURE — 2580000003 HC RX 258: Performed by: NURSE ANESTHETIST, CERTIFIED REGISTERED

## 2018-10-31 PROCEDURE — 3600000004 HC SURGERY LEVEL 4 BASE: Performed by: PODIATRIST

## 2018-10-31 PROCEDURE — S0028 INJECTION, FAMOTIDINE, 20 MG: HCPCS | Performed by: NURSE ANESTHETIST, CERTIFIED REGISTERED

## 2018-10-31 PROCEDURE — 6360000002 HC RX W HCPCS: Performed by: NURSE ANESTHETIST, CERTIFIED REGISTERED

## 2018-10-31 PROCEDURE — 2500000003 HC RX 250 WO HCPCS

## 2018-10-31 PROCEDURE — 2580000003 HC RX 258: Performed by: ANESTHESIOLOGY

## 2018-10-31 PROCEDURE — 7100000001 HC PACU RECOVERY - ADDTL 15 MIN: Performed by: PODIATRIST

## 2018-10-31 PROCEDURE — 6360000002 HC RX W HCPCS: Performed by: PODIATRIST

## 2018-10-31 PROCEDURE — 7100000010 HC PHASE II RECOVERY - FIRST 15 MIN: Performed by: PODIATRIST

## 2018-10-31 PROCEDURE — 2580000003 HC RX 258: Performed by: PODIATRIST

## 2018-10-31 PROCEDURE — 3600000014 HC SURGERY LEVEL 4 ADDTL 15MIN: Performed by: PODIATRIST

## 2018-10-31 DEVICE — ALLOGRAFT HUM TISS 1 CC AMNION AMNIFLO CRYOPRESERVED: Type: IMPLANTABLE DEVICE | Site: FOOT | Status: FUNCTIONAL

## 2018-10-31 RX ORDER — ONDANSETRON 2 MG/ML
4 INJECTION INTRAMUSCULAR; INTRAVENOUS
Status: DISCONTINUED | OUTPATIENT
Start: 2018-10-31 | End: 2018-10-31 | Stop reason: HOSPADM

## 2018-10-31 RX ORDER — FENTANYL CITRATE 50 UG/ML
50 INJECTION, SOLUTION INTRAMUSCULAR; INTRAVENOUS EVERY 5 MIN PRN
Status: DISCONTINUED | OUTPATIENT
Start: 2018-10-31 | End: 2018-10-31 | Stop reason: HOSPADM

## 2018-10-31 RX ORDER — PROMETHAZINE HYDROCHLORIDE 25 MG/ML
6.25 INJECTION, SOLUTION INTRAMUSCULAR; INTRAVENOUS
Status: DISCONTINUED | OUTPATIENT
Start: 2018-10-31 | End: 2018-10-31 | Stop reason: HOSPADM

## 2018-10-31 RX ORDER — HYDROMORPHONE HCL 110MG/55ML
PATIENT CONTROLLED ANALGESIA SYRINGE INTRAVENOUS PRN
Status: DISCONTINUED | OUTPATIENT
Start: 2018-10-31 | End: 2018-10-31 | Stop reason: SDUPTHER

## 2018-10-31 RX ORDER — LIDOCAINE HYDROCHLORIDE 10 MG/ML
1 INJECTION, SOLUTION EPIDURAL; INFILTRATION; INTRACAUDAL; PERINEURAL
Status: DISCONTINUED | OUTPATIENT
Start: 2018-10-31 | End: 2018-10-31 | Stop reason: HOSPADM

## 2018-10-31 RX ORDER — SODIUM CHLORIDE 0.9 % (FLUSH) 0.9 %
10 SYRINGE (ML) INJECTION EVERY 12 HOURS SCHEDULED
Status: DISCONTINUED | OUTPATIENT
Start: 2018-10-31 | End: 2018-10-31 | Stop reason: HOSPADM

## 2018-10-31 RX ORDER — OXYCODONE HYDROCHLORIDE AND ACETAMINOPHEN 5; 325 MG/1; MG/1
2 TABLET ORAL EVERY 4 HOURS PRN
Status: DISCONTINUED | OUTPATIENT
Start: 2018-10-31 | End: 2018-10-31 | Stop reason: HOSPADM

## 2018-10-31 RX ORDER — PROPOFOL 10 MG/ML
INJECTION, EMULSION INTRAVENOUS PRN
Status: DISCONTINUED | OUTPATIENT
Start: 2018-10-31 | End: 2018-10-31 | Stop reason: SDUPTHER

## 2018-10-31 RX ORDER — LIDOCAINE HYDROCHLORIDE 20 MG/ML
INJECTION, SOLUTION INFILTRATION; PERINEURAL PRN
Status: DISCONTINUED | OUTPATIENT
Start: 2018-10-31 | End: 2018-10-31 | Stop reason: SDUPTHER

## 2018-10-31 RX ORDER — BUPIVACAINE HYDROCHLORIDE 5 MG/ML
INJECTION, SOLUTION EPIDURAL; INTRACAUDAL PRN
Status: DISCONTINUED | OUTPATIENT
Start: 2018-10-31 | End: 2018-10-31 | Stop reason: HOSPADM

## 2018-10-31 RX ORDER — MEPERIDINE HYDROCHLORIDE 25 MG/ML
12.5 INJECTION INTRAMUSCULAR; INTRAVENOUS; SUBCUTANEOUS EVERY 10 MIN PRN
Status: DISCONTINUED | OUTPATIENT
Start: 2018-10-31 | End: 2018-10-31 | Stop reason: HOSPADM

## 2018-10-31 RX ORDER — EPHEDRINE SULFATE 50 MG/ML
INJECTION INTRAVENOUS PRN
Status: DISCONTINUED | OUTPATIENT
Start: 2018-10-31 | End: 2018-10-31 | Stop reason: SDUPTHER

## 2018-10-31 RX ORDER — LIDOCAINE HYDROCHLORIDE 20 MG/ML
INJECTION, SOLUTION INFILTRATION; PERINEURAL PRN
Status: DISCONTINUED | OUTPATIENT
Start: 2018-10-31 | End: 2018-10-31 | Stop reason: HOSPADM

## 2018-10-31 RX ORDER — ROCURONIUM BROMIDE 10 MG/ML
INJECTION, SOLUTION INTRAVENOUS PRN
Status: DISCONTINUED | OUTPATIENT
Start: 2018-10-31 | End: 2018-10-31 | Stop reason: SDUPTHER

## 2018-10-31 RX ORDER — SUCCINYLCHOLINE CHLORIDE 20 MG/ML
INJECTION INTRAMUSCULAR; INTRAVENOUS PRN
Status: DISCONTINUED | OUTPATIENT
Start: 2018-10-31 | End: 2018-10-31 | Stop reason: SDUPTHER

## 2018-10-31 RX ORDER — CLINDAMYCIN PHOSPHATE 900 MG/50ML
900 INJECTION INTRAVENOUS ONCE
Status: COMPLETED | OUTPATIENT
Start: 2018-10-31 | End: 2018-10-31

## 2018-10-31 RX ORDER — BACITRACIN ZINC AND POLYMYXIN B SULFATE 500; 10000 [USP'U]/G; [USP'U]/G
OINTMENT TOPICAL PRN
Status: DISCONTINUED | OUTPATIENT
Start: 2018-10-31 | End: 2018-10-31 | Stop reason: HOSPADM

## 2018-10-31 RX ORDER — SODIUM CHLORIDE 9 MG/ML
INJECTION, SOLUTION INTRAVENOUS CONTINUOUS PRN
Status: DISCONTINUED | OUTPATIENT
Start: 2018-10-31 | End: 2018-10-31 | Stop reason: SDUPTHER

## 2018-10-31 RX ORDER — FENTANYL CITRATE 50 UG/ML
25 INJECTION, SOLUTION INTRAMUSCULAR; INTRAVENOUS EVERY 5 MIN PRN
Status: DISCONTINUED | OUTPATIENT
Start: 2018-10-31 | End: 2018-10-31 | Stop reason: HOSPADM

## 2018-10-31 RX ORDER — NEOSTIGMINE METHYLSULFATE 1 MG/ML
INJECTION, SOLUTION INTRAVENOUS PRN
Status: DISCONTINUED | OUTPATIENT
Start: 2018-10-31 | End: 2018-10-31 | Stop reason: SDUPTHER

## 2018-10-31 RX ORDER — SODIUM CHLORIDE 0.9 % (FLUSH) 0.9 %
10 SYRINGE (ML) INJECTION PRN
Status: DISCONTINUED | OUTPATIENT
Start: 2018-10-31 | End: 2018-10-31 | Stop reason: HOSPADM

## 2018-10-31 RX ORDER — OXYCODONE HYDROCHLORIDE AND ACETAMINOPHEN 5; 325 MG/1; MG/1
1-2 TABLET ORAL EVERY 6 HOURS PRN
Qty: 20 TABLET | Refills: 0 | Status: SHIPPED | OUTPATIENT
Start: 2018-10-31 | End: 2018-11-07

## 2018-10-31 RX ORDER — CEFAZOLIN SODIUM 2 G/50ML
2 SOLUTION INTRAVENOUS ONCE
Status: DISCONTINUED | OUTPATIENT
Start: 2018-10-31 | End: 2018-10-31

## 2018-10-31 RX ORDER — SODIUM CHLORIDE, SODIUM LACTATE, POTASSIUM CHLORIDE, CALCIUM CHLORIDE 600; 310; 30; 20 MG/100ML; MG/100ML; MG/100ML; MG/100ML
INJECTION, SOLUTION INTRAVENOUS CONTINUOUS
Status: DISCONTINUED | OUTPATIENT
Start: 2018-10-31 | End: 2018-10-31 | Stop reason: HOSPADM

## 2018-10-31 RX ORDER — GLYCOPYRROLATE 0.2 MG/ML
INJECTION INTRAMUSCULAR; INTRAVENOUS PRN
Status: DISCONTINUED | OUTPATIENT
Start: 2018-10-31 | End: 2018-10-31 | Stop reason: SDUPTHER

## 2018-10-31 RX ORDER — DIPHENHYDRAMINE HYDROCHLORIDE 50 MG/ML
INJECTION INTRAMUSCULAR; INTRAVENOUS PRN
Status: DISCONTINUED | OUTPATIENT
Start: 2018-10-31 | End: 2018-10-31 | Stop reason: SDUPTHER

## 2018-10-31 RX ORDER — ONDANSETRON 2 MG/ML
INJECTION INTRAMUSCULAR; INTRAVENOUS PRN
Status: DISCONTINUED | OUTPATIENT
Start: 2018-10-31 | End: 2018-10-31 | Stop reason: SDUPTHER

## 2018-10-31 RX ORDER — MEPERIDINE HYDROCHLORIDE 25 MG/ML
INJECTION INTRAMUSCULAR; INTRAVENOUS; SUBCUTANEOUS
Status: COMPLETED
Start: 2018-10-31 | End: 2018-10-31

## 2018-10-31 RX ADMIN — ONDANSETRON 8 MG: 2 INJECTION INTRAMUSCULAR; INTRAVENOUS at 07:42

## 2018-10-31 RX ADMIN — EPHEDRINE SULFATE 10 MG: 50 INJECTION INTRAVENOUS at 08:33

## 2018-10-31 RX ADMIN — Medication 5 MG: at 09:09

## 2018-10-31 RX ADMIN — PHENYLEPHRINE HYDROCHLORIDE 80 MCG: 10 INJECTION, SOLUTION INTRAMUSCULAR; INTRAVENOUS; SUBCUTANEOUS at 08:44

## 2018-10-31 RX ADMIN — ROCURONIUM BROMIDE 10 MG: 10 INJECTION, SOLUTION INTRAVENOUS at 07:45

## 2018-10-31 RX ADMIN — EPHEDRINE SULFATE 10 MG: 50 INJECTION INTRAVENOUS at 08:27

## 2018-10-31 RX ADMIN — EPHEDRINE SULFATE 15 MG: 50 INJECTION INTRAVENOUS at 09:04

## 2018-10-31 RX ADMIN — FAMOTIDINE 20 MG: 10 INJECTION, SOLUTION INTRAVENOUS at 07:08

## 2018-10-31 RX ADMIN — PROPOFOL 200 MG: 10 INJECTION, EMULSION INTRAVENOUS at 07:45

## 2018-10-31 RX ADMIN — MEPERIDINE HYDROCHLORIDE 12.5 MG: 25 INJECTION INTRAMUSCULAR; INTRAVENOUS; SUBCUTANEOUS at 10:23

## 2018-10-31 RX ADMIN — LIDOCAINE HYDROCHLORIDE 100 MG: 20 INJECTION, SOLUTION INFILTRATION; PERINEURAL at 07:45

## 2018-10-31 RX ADMIN — SODIUM CHLORIDE, POTASSIUM CHLORIDE, SODIUM LACTATE AND CALCIUM CHLORIDE: 600; 310; 30; 20 INJECTION, SOLUTION INTRAVENOUS at 06:50

## 2018-10-31 RX ADMIN — EPHEDRINE SULFATE 15 MG: 50 INJECTION INTRAVENOUS at 08:59

## 2018-10-31 RX ADMIN — PHENYLEPHRINE HYDROCHLORIDE 80 MCG: 10 INJECTION, SOLUTION INTRAMUSCULAR; INTRAVENOUS; SUBCUTANEOUS at 08:47

## 2018-10-31 RX ADMIN — FAMOTIDINE 20 MG: 10 INJECTION, SOLUTION INTRAVENOUS at 07:42

## 2018-10-31 RX ADMIN — PHENYLEPHRINE HYDROCHLORIDE 100 MCG: 10 INJECTION, SOLUTION INTRAMUSCULAR; INTRAVENOUS; SUBCUTANEOUS at 08:15

## 2018-10-31 RX ADMIN — HYDROMORPHONE HYDROCHLORIDE 2 MG: 2 INJECTION, SOLUTION INTRAMUSCULAR; INTRAVENOUS; SUBCUTANEOUS at 07:45

## 2018-10-31 RX ADMIN — SODIUM CHLORIDE: 900 INJECTION, SOLUTION INTRAVENOUS at 08:43

## 2018-10-31 RX ADMIN — PHENYLEPHRINE HYDROCHLORIDE 80 MCG: 10 INJECTION, SOLUTION INTRAMUSCULAR; INTRAVENOUS; SUBCUTANEOUS at 08:51

## 2018-10-31 RX ADMIN — DIPHENHYDRAMINE HYDROCHLORIDE 12.5 MG: 50 INJECTION, SOLUTION INTRAMUSCULAR; INTRAVENOUS at 07:43

## 2018-10-31 RX ADMIN — GLYCOPYRROLATE 0.7 MG: 0.2 INJECTION INTRAMUSCULAR; INTRAVENOUS at 09:08

## 2018-10-31 RX ADMIN — SUCCINYLCHOLINE CHLORIDE 150 MG: 20 INJECTION, SOLUTION INTRAMUSCULAR; INTRAVENOUS; PARENTERAL at 07:46

## 2018-10-31 RX ADMIN — FENTANYL CITRATE 50 MCG: 50 INJECTION, SOLUTION INTRAMUSCULAR; INTRAVENOUS at 10:00

## 2018-10-31 RX ADMIN — CLINDAMYCIN PHOSPHATE 900 MG: 18 INJECTION, SOLUTION INTRAVENOUS at 07:55

## 2018-10-31 ASSESSMENT — PULMONARY FUNCTION TESTS
PIF_VALUE: 25
PIF_VALUE: 27
PIF_VALUE: 14
PIF_VALUE: 27
PIF_VALUE: 26
PIF_VALUE: 26
PIF_VALUE: 7
PIF_VALUE: 27
PIF_VALUE: 28
PIF_VALUE: 25
PIF_VALUE: 22
PIF_VALUE: 28
PIF_VALUE: 26
PIF_VALUE: 25
PIF_VALUE: 27
PIF_VALUE: 1
PIF_VALUE: 27
PIF_VALUE: 25
PIF_VALUE: 29
PIF_VALUE: 28
PIF_VALUE: 9
PIF_VALUE: 17
PIF_VALUE: 6
PIF_VALUE: 7
PIF_VALUE: 6
PIF_VALUE: 27
PIF_VALUE: 21
PIF_VALUE: 27
PIF_VALUE: 28
PIF_VALUE: 22
PIF_VALUE: 25
PIF_VALUE: 12
PIF_VALUE: 22
PIF_VALUE: 27
PIF_VALUE: 1
PIF_VALUE: 26
PIF_VALUE: 27
PIF_VALUE: 27
PIF_VALUE: 1
PIF_VALUE: 22
PIF_VALUE: 27
PIF_VALUE: 0
PIF_VALUE: 22
PIF_VALUE: 27
PIF_VALUE: 1
PIF_VALUE: 28
PIF_VALUE: 10
PIF_VALUE: 27
PIF_VALUE: 22
PIF_VALUE: 26
PIF_VALUE: 28
PIF_VALUE: 26
PIF_VALUE: 23
PIF_VALUE: 7
PIF_VALUE: 27
PIF_VALUE: 5
PIF_VALUE: 1
PIF_VALUE: 27
PIF_VALUE: 19
PIF_VALUE: 27
PIF_VALUE: 28
PIF_VALUE: 27
PIF_VALUE: 16
PIF_VALUE: 18
PIF_VALUE: 25
PIF_VALUE: 24
PIF_VALUE: 21
PIF_VALUE: 23
PIF_VALUE: 7
PIF_VALUE: 26
PIF_VALUE: 22
PIF_VALUE: 28
PIF_VALUE: 21
PIF_VALUE: 27
PIF_VALUE: 28
PIF_VALUE: 29
PIF_VALUE: 0
PIF_VALUE: 27
PIF_VALUE: 3
PIF_VALUE: 23
PIF_VALUE: 8
PIF_VALUE: 28
PIF_VALUE: 22
PIF_VALUE: 27
PIF_VALUE: 29
PIF_VALUE: 29
PIF_VALUE: 27
PIF_VALUE: 8
PIF_VALUE: 27
PIF_VALUE: 27
PIF_VALUE: 14
PIF_VALUE: 25
PIF_VALUE: 27
PIF_VALUE: 26
PIF_VALUE: 25
PIF_VALUE: 7
PIF_VALUE: 8
PIF_VALUE: 23
PIF_VALUE: 28
PIF_VALUE: 1
PIF_VALUE: 26
PIF_VALUE: 25
PIF_VALUE: 23
PIF_VALUE: 7
PIF_VALUE: 5
PIF_VALUE: 19
PIF_VALUE: 19

## 2018-10-31 ASSESSMENT — PAIN DESCRIPTION - DESCRIPTORS: DESCRIPTORS: SHARP

## 2018-10-31 ASSESSMENT — PAIN SCALES - GENERAL
PAINLEVEL_OUTOF10: 7
PAINLEVEL_OUTOF10: 0
PAINLEVEL_OUTOF10: 0
PAINLEVEL_OUTOF10: 3

## 2018-10-31 ASSESSMENT — PAIN - FUNCTIONAL ASSESSMENT: PAIN_FUNCTIONAL_ASSESSMENT: 0-10

## 2018-10-31 ASSESSMENT — PAIN DESCRIPTION - ORIENTATION: ORIENTATION: RIGHT

## 2018-10-31 ASSESSMENT — PAIN DESCRIPTION - LOCATION: LOCATION: FOOT

## 2018-10-31 NOTE — PROGRESS NOTES
PACU Transfer to hospitals  Pt's Current Allergies: Pcn [penicillins]    Pt meets criteria to transfer to next phase of care per LUIS SCORE and ASPAN standards    Recent Labs      10/31/18   0702  10/31/18   0957   POCGLU  186*  228*       Vitals:    10/31/18 1030   BP: (!) 150/68   Pulse: 92   Resp: 11   Temp: 98 °F (36.7 °C)   SpO2: 96%      BP within 20% of pt's admitting BP as per Luis Score      Intake/Output Summary (Last 24 hours) at 10/31/18 1048  Last data filed at 10/31/18 1030   Gross per 24 hour   Intake             1275 ml   Output                0 ml   Net             1275 ml       Pain assessment:  none  Pain Level: 0    Post op shivering resolved. Patient transferred to care of Pritesh Keane RN.   Family updated and directed to Pritesh Keane    10/31/2018 10:48 AM

## 2018-10-31 NOTE — PROGRESS NOTES
Ambulatory Surgery/Procedure Discharge Note    Vitals:    10/31/18 1056   BP: 132/78   Pulse: 94   Resp: 16   Temp: 97.7 °F (36.5 °C)   SpO2: 91%       In: 1275 [P.O.:150; I.V.:1125]  Out: -     Restroom use offered before discharge. Yes    Pain assessment:  present - adequately treated  Pain Level: 0        Patient discharged to home/self care.  Patient discharged via wheel chair by transporter to waiting family/S.O.       10/31/2018 11:11 AM

## 2018-10-31 NOTE — ANESTHESIA POSTPROCEDURE EVALUATION
Department of Anesthesiology  Postprocedure Note    Patient: Jani Welch. MRN: [id number]  YOB: 1960  Date of evaluation: 10/31/2018  Time:  12:04 PM     Procedure Summary     Date:  10/31/18 Room / Location:  [facility] OR [address] [place] [address] [facility]    Anesthesia Start:  0741 Anesthesia Stop:      Procedures:       REMOVAL OF CALCANEAL SPUR, ACHILLES TENDON REPAIR RIGHT LOWER EXTREMITY (Right )      GASTROCNEMIUS RECESSION RIGHT FOOT (Right ) Diagnosis:  (CALCANEAL SPUR , ACHILLES TENDON TEAR RIGHT LOWER EXTREMITY)    Surgeon:  Brook Junior DPM Responsible Provider:  Tamara Mccormick MD    Anesthesia Type:  general ASA Status:  4          Anesthesia Type: general    Shekhar Phase I: Shekhar Score: 10    Shekhar Phase II: Shekhar Score: 10    Last vitals: Reviewed and per EMR flowsheets.        Anesthesia Post Evaluation    Patient location during evaluation: PACU  Patient participation: complete - patient participated  Level of consciousness: awake and alert  Airway patency: patent  Nausea & Vomiting: no nausea and no vomiting  Complications: no  Cardiovascular status: blood pressure returned to baseline  Respiratory status: acceptable  Hydration status: stable

## 2018-10-31 NOTE — ANESTHESIA PRE PROCEDURE
cyclobenzaprine (FLEXERIL) 10 MG tablet TAKE 1 TABLET BY MOUTH DAILY, MAY CAUSE DROWSINESS 9/26/17  Yes ANA Mohr CNP   famotidine (PEPCID) 20 MG tablet TAKE 1 TABLET BY MOUTH 2 TIMES DAILY 9/26/17  Yes ANA Mohr CNP   gabapentin (NEURONTIN) 100 MG capsule TAKE 2 CAPSULES BY MOUTH 3 TIMES DAILY 9/26/17  Yes ANA Mohr CNP   fluticasone (FLONASE) 50 MCG/ACT nasal spray USE 1 SPRAY IN EACH NOSTRIL EVERY DAY 9/13/17  Yes ANA Horan CNP   TRUE METRIX BLOOD GLUCOSE TEST strip CHECK SUGARS TWICE A DAY AS DIRECTED, DX: E11.9 6/9/17  Yes ANA Horan CNP   albuterol sulfate HFA (PROAIR HFA) 108 (90 BASE) MCG/ACT inhaler Inhale 2 puffs into the lungs every 6 hours as needed for Wheezing 2/23/17 10/30/18 Yes Yanet Gonzalez MD   lisinopril (PRINIVIL;ZESTRIL) 20 MG tablet TAKE 1 TABLET BY MOUTH DAILY 2/20/17  Yes ANA Pond CNP   atorvastatin (LIPITOR) 80 MG tablet TAKE ONE TABLET BY MOUTH DAILY 2/20/17  Yes ANA Pond CNP   UNIFINE PENTIPS 31G X 8 MM MISC USE AS DIRECTED FOR INSULIN .00 2/20/17  Yes ANA Lowe CNP   diltiazem (CARDIZEM CD) 180 MG extended release capsule TAKE 1 CAPSULE DAILY 2/8/17  Yes ANA Pond CNP   ALL DAY ALLERGY 10 MG tablet TAKE 1 TABLET BY MOUTH DAILY 2/8/17  Yes ANA Pond CNP   metoprolol succinate (TOPROL XL) 200 MG extended release tablet TAKE ONE-HALF TABLET TWICE A DAY 2/8/17  Yes ANA Pond CNP   TRUEPLUS LANCETS 30G MISC CHECK BLOOD SUGAR TWICE A DAY DX E11.9 2/8/17  Yes ANA Lowe CNP   DULoxetine (CYMBALTA) 60 MG extended release capsule Take 60 mg by mouth daily   Yes Historical Provider, MD   divalproex (DEPAKOTE) 500 MG DR tablet Take 1,000 mg by mouth nightly   Yes Historical Provider, MD   traZODone (DESYREL) 50 MG tablet Take 100 mg by mouth nightly  12/8/14  Yes Historical Provider, MD   Liraglutide (VICTOZA SC) Inject 1.8 Units of surgery. Cardiovascular:Negative CV ROS    (+) hypertension: moderate, pacemaker: AICD, past MI: no interval change, CAD: no interval change, CABG/stent: no interval change, dysrhythmias: ventricular tachycardia, hyperlipidemia      ECG reviewed  Rhythm: regular  Rate: normal  Echocardiogram reviewed         Beta Blocker:  Dose within 24 Hrs      ROS comment: Ischemic cardiomyopathy  ECHO 2015 EF 50%     Neuro/Psych:   Negative Neuro/Psych ROS              GI/Hepatic/Renal:   (+) GERD:, morbid obesity     (-) hiatal hernia       Endo/Other:    (+) DiabetesType II DM, , .                 Abdominal:           Vascular: negative vascular ROS. Anesthesia Plan      general     ASA 4       Induction: intravenous. MIPS: Postoperative opioids intended. Anesthetic plan and risks discussed with patient. Plan discussed with CRNA.     Attending anesthesiologist reviewed and agrees with Charlee Zhang MD   10/31/2018

## 2018-10-31 NOTE — PROGRESS NOTES
Pt's shivering not helped by fentanyl but pain greatly improving.   Call to Dr Colleen Sosa re pt post op shivering and tenseness

## 2018-10-31 NOTE — OP NOTE
block was then injected proximal to the incision site. The right  lower extremity was then scrubbed, prepped, and draped in the usual sterile fashion. A time-out was performed. The patient, procedure, and operative site were confirmed. An Esmarch bandage was then utilized to exsanguinate the patient's right lower extremity. The tourniquet was then inflated to 300 mmHg and the following procedure was performed. PROCEDURE#1: Gastrocnemius recession, right     Attention was directed towards the distal and middle 1/3 of the leg. A 2 cm incision was made linearly at the center of the gastroc-soleal junction with a #15 blade. A hemostat was used as blunt dissection down to the crural fascia to the medial and lateral margins. The peritenon was then reflected off the tendinous junction. Using a 15 blade the tendon band was incised medial to lateral with a transverse incision. The foot was then dorsiflexed and noted significant increase in dorsiflexion with knee flexed and extended. The incision site irrigated copiously. The crural fascia and deep tissue was then closed with 3-0 vicryl. The subcutaneous tissue was closed with 4-0 vicryl and the skin was closed with 4-0 monocryl. PROCEDURE #2: Retrocalcaneal spur removal, right     Attention was directed towards the posterior heel where significant scar tissue was noted along the achilles tendon. an approximately 6-cm linear skin incision was created along the midline of the patient Achilles tendon. The incision was deepened down to the level of the subcutaneous tissue. Significant, thick, gelatin-like scar tissue with no defined tissue plane was noted. All coursing venous tributaries were identified, isolated, clamped, cut, and electrocoagulated as encountered. All vital neurovascular structures were retracted in a medial-to-lateral-type fashion. At this time, the Achilles tendon was incised down to the level of the superior aspect of the calcaneus.  It was

## 2018-11-30 ENCOUNTER — TELEPHONE (OUTPATIENT)
Dept: PULMONOLOGY | Age: 58
End: 2018-11-30

## 2018-11-30 ENCOUNTER — OFFICE VISIT (OUTPATIENT)
Dept: PULMONOLOGY | Age: 58
End: 2018-11-30
Payer: MEDICARE

## 2018-11-30 VITALS
DIASTOLIC BLOOD PRESSURE: 71 MMHG | BODY MASS INDEX: 39.82 KG/M2 | RESPIRATION RATE: 16 BRPM | WEIGHT: 239 LBS | HEIGHT: 65 IN | HEART RATE: 78 BPM | TEMPERATURE: 97.7 F | OXYGEN SATURATION: 97 % | SYSTOLIC BLOOD PRESSURE: 136 MMHG

## 2018-11-30 DIAGNOSIS — Z99.89 OSA ON CPAP: Primary | ICD-10-CM

## 2018-11-30 DIAGNOSIS — G47.33 OSA ON CPAP: Primary | ICD-10-CM

## 2018-11-30 DIAGNOSIS — Z71.89 CPAP USE COUNSELING: ICD-10-CM

## 2018-11-30 DIAGNOSIS — Z78.9 DIFFICULTY WITH CPAP USE: ICD-10-CM

## 2018-11-30 DIAGNOSIS — E66.01 OBESITY, MORBID, BMI 40.0-49.9 (HCC): ICD-10-CM

## 2018-11-30 PROCEDURE — G8598 ASA/ANTIPLAT THER USED: HCPCS | Performed by: NURSE PRACTITIONER

## 2018-11-30 PROCEDURE — 1036F TOBACCO NON-USER: CPT | Performed by: NURSE PRACTITIONER

## 2018-11-30 PROCEDURE — 3017F COLORECTAL CA SCREEN DOC REV: CPT | Performed by: NURSE PRACTITIONER

## 2018-11-30 PROCEDURE — G8484 FLU IMMUNIZE NO ADMIN: HCPCS | Performed by: NURSE PRACTITIONER

## 2018-11-30 PROCEDURE — G8427 DOCREV CUR MEDS BY ELIG CLIN: HCPCS | Performed by: NURSE PRACTITIONER

## 2018-11-30 PROCEDURE — G8417 CALC BMI ABV UP PARAM F/U: HCPCS | Performed by: NURSE PRACTITIONER

## 2018-11-30 PROCEDURE — 99214 OFFICE O/P EST MOD 30 MIN: CPT | Performed by: NURSE PRACTITIONER

## 2018-11-30 ASSESSMENT — SLEEP AND FATIGUE QUESTIONNAIRES
ESS TOTAL SCORE: 13
HOW LIKELY ARE YOU TO NOD OFF OR FALL ASLEEP WHEN YOU ARE A PASSENGER IN A CAR FOR AN HOUR WITHOUT A BREAK: 3
HOW LIKELY ARE YOU TO NOD OFF OR FALL ASLEEP WHILE SITTING AND READING: 3
HOW LIKELY ARE YOU TO NOD OFF OR FALL ASLEEP WHILE SITTING AND TALKING TO SOMEONE: 0
HOW LIKELY ARE YOU TO NOD OFF OR FALL ASLEEP WHILE SITTING QUIETLY AFTER LUNCH WITHOUT ALCOHOL: 3
HOW LIKELY ARE YOU TO NOD OFF OR FALL ASLEEP WHILE WATCHING TV: 2
HOW LIKELY ARE YOU TO NOD OFF OR FALL ASLEEP WHILE LYING DOWN TO REST IN THE AFTERNOON WHEN CIRCUMSTANCES PERMIT: 2
NECK CIRCUMFERENCE (INCHES): 19.25
HOW LIKELY ARE YOU TO NOD OFF OR FALL ASLEEP WHILE SITTING INACTIVE IN A PUBLIC PLACE: 0
HOW LIKELY ARE YOU TO NOD OFF OR FALL ASLEEP IN A CAR, WHILE STOPPED FOR A FEW MINUTES IN TRAFFIC: 0

## 2018-11-30 NOTE — PATIENT INSTRUCTIONS
Please keep all of your future appointments scheduled by [facility], [facility], [place] ([facility]) Pulmonary and Sleep. Remember to bring your sleep machine, cord and mask to each appointment    You should have a follow up appointment scheduled with a sleep medicine provider within 12 months. Routine parts, masks, tubing and filters should all be obtainable from your DME (equipment) provider. Any problems related to mask fit, comfort or functioning of equipment should also be addressed directly with your DME provider. If you are unable to resolve problems, then please notify our office and schedule an appointment to be seen sooner than the usual follow up appointment. For all patients who are evaluated for sleep disorders, never drive or operate motorized equipment while sleepy, fatigued or groggy. Please bring in all of your sleep equipment to all of your appointments, including machine, mask, cords and hoses. Here are some tips to to getting better sleep  1- Avoid napping during the day: This will ensure you are tired at bedtime. If you have to take a nap, sleep less than one hour, before 3 pm.   2- Exercise regularly, but not right before bed: but the timing of the workout is important. Exercising in the morning or early afternoon will not interfere with sleep. Exercising within two hours before bedtime can decrease your ability to fall asleep. Regular exercise is recommended to help you deepen the sleep. 3- Avoid heavy, spicy, or sugary foods 4-6 hours before bedtime: These can affect your ability to stay asleep. 4- Have a light snack before bed: Having an empty stomach can interfere with your sleep. Dairy products and turkey contain tryptophan, which acts as a natural sleep inducer. 5- Stay away from caffeine, nicotine and alcohol at least 4-6 hours before bed: Caffeine and nicotine are stimulants that interfere with your ability to fall asleep.  While alcohol has an immediate sleep-inducing effect, a few hours later, as alcohol levels in your blood start to fall, there is a stimulant effect and you will experience fragmented sleep. 6- Take a hot bath 90 minutes before bedtime:  A hot bath will raise your body temperature, but it is the drop in body temperature that may leave you feeling sleepy  7- Develop sleep rituals: it is important to give your body cues that it is time to slow down and sleep. Listen to relaxing music, read something soothing for 15 minutes, have a cup of caffeine free tea, or do relaxation exercises such as yoga or deep breathing help relieve anxiety and reduce muscle tension. 8- Fix a bedtime and an awakening time: Even on weekends! When your sleep cycle has a regular rhythm, you will feel better. 9- Sleep only when sleepy: This reduces the time you are awake in bed. 10- Get into your favorite sleeping position: If you can't fall asleep within 15-30 minutes, get up and do something boring until you feel sleepy. Sit quietly in the dark or read the warranty on your refrigerator. Don't expose yourself to bright light while you are up, it gives cues to your brain that it is time to wake up. 11- Only use your bed for sleeping: Dont use the bed as an office, workroom or recreation room. Let your body \"know\" that the bed is associated with sleeping  12- Use comfortable bedding. Uncomfortable bedding can prevent good sleep. Evaluate whether or not this is a source of your problem, and make appropriate changes. 13- Make sure your bed and bedroom are quiet and comfortable: A hot room can be uncomfortable. A cooler room, along with enough blankets to stay warm is recommended. Get a blackout shade or wear a slumber mask and wear earplugs or get a \"white noise\" machine for light and noise distractions. 14- Use sunlight to set your biological clock: When you get up in the morning, go outside and turn your face to the sun for 15 minutes.   15- Dont take your worries to bed: Leave

## 2018-11-30 NOTE — PROGRESS NOTES
Patient ID: Crystal Peterson is a 56 y.o. male who is being seen today for   Chief Complaint   Patient presents with    Sleep Apnea         HPI:     Crystal Peterson is a 56 y.o. male in office for ALIN follow up. States he is not doing well with new CPAP states pressure is too low. Patient is using CPAP 1-3 hrs/night. Using humidifier. Unsure if snoring on CPAP. The pressure feels light, states he is waking choking and gasping after about an hour of having CPAP on. The mask is comfortable- nasal mask. No mask leak- does feel he might be opening his mouth. Some daytime sleepiness. No nodding off when driving. No dry nose or throat. No fatigue. Bedtime is 10-11 pm and rise time is 8 am. Sleep onset is few minutes. Wakes up 0-1 times at night total. 0-1 nocturia. It takes few minutes to fall back a sleep. Sometimes naps during the day. No headache in am. No weight gain. 8 caffienated beverages during the day. No alcohol. ESS is 13.       Sleep Medicine 11/30/2018 8/20/2018 1/4/2017 6/27/2016 10/12/2015 8/13/2015   Sitting and reading 3 3 3 2 0 1   Watching TV 2 3 1 3 0 1   Sitting, inactive in a public place (e.g. a theatre or a meeting) 0 1 0 1 0 0   As a passenger in a car for an hour without a break 3 3 2 0 2 3   Lying down to rest in the afternoon when circumstances permit 2 3 1 3 3 3   Sitting and talking to someone 0 1 0 1 0 0   Sitting quietly after a lunch without alcohol 3 3 1 3 1 2   In a car, while stopped for a few minutes in traffic 0 1 0 1 0 0   Total score 13 18 8 14 6 10   Neck circumference 19.25 19.25 20 19 19 19.5       Past Medical History:  Past Medical History:   Diagnosis Date    Arthritis     Asthma     CAD (coronary artery disease)     COPD (chronic obstructive pulmonary disease) ([facility]     Dr[facility] Lashanda Hearn at [facility] told the patient that he did not have COPD, just asthma    [facility] of lung [facility]     Fatty liver     GERD (gastroesophageal reflux disease)     Emphysema Neg Hx        Current Medications:    Current Outpatient Prescriptions:     traZODone (DESYREL) 100 MG tablet, Take 300 mg by mouth nightly, Disp: , Rfl:     furosemide (LASIX) 20 MG tablet, TAKE ONE TABLET BY MOUTH MONDAY AND THURSDAY, Disp: 30 tablet, Rfl: 1    potassium chloride (KLOR-CON M) 20 MEQ extended release tablet, TAKE ONE TABLET BY MOUTH WITH LASIX ON MONDAY AND THURSDAY, Disp: 30 tablet, Rfl: 1    nitroGLYCERIN (NITROLINGUAL) 0.4 MG/SPRAY 0.4 mg spray, Place 1 spray under the tongue every 5 minutes as needed for Chest pain, Disp: 2 Bottle, Rfl: 3    insulin detemir (LEVEMIR) 100 UNIT/ML injection vial, Inject 60 Units into the skin nightly , Disp: , Rfl:     ibuprofen (ADVIL;MOTRIN) 200 MG tablet, Take 4 tablets by mouth every 8 hours as needed for Pain, Disp: 30 tablet, Rfl: 0    Fluticasone Furoate-Vilanterol (BREO ELLIPTA IN), Inhale into the lungs daily , Disp: , Rfl:     albuterol (ACCUNEB) 1.25 MG/3ML nebulizer solution, Inhale 1 ampule into the lungs every 6 hours as needed for Wheezing, Disp: , Rfl:     insulin lispro (HUMALOG) 100 UNIT/ML injection vial, Inject into the skin 2 times daily, Disp: , Rfl:     clopidogrel (PLAVIX) 75 MG tablet, TAKE ONE TABLET BY MOUTH DAILY, Disp: 30 tablet, Rfl: 0    INVOKANA 300 MG TABS tablet, TAKE 1 TABLET BY MOUTH DAILY BEFORE THE FIRST MEAL OF THE DAY, Disp: 30 tablet, Rfl: 0    metFORMIN (GLUCOPHAGE) 1000 MG tablet, TAKE ONE TABLET BY MOUTH 2 TIMES DAILY WITH MORNING AND EVENING MEALS, Disp: 60 tablet, Rfl: 0    cyclobenzaprine (FLEXERIL) 10 MG tablet, TAKE 1 TABLET BY MOUTH DAILY, MAY CAUSE DROWSINESS, Disp: 30 tablet, Rfl: 0    famotidine (PEPCID) 20 MG tablet, TAKE 1 TABLET BY MOUTH 2 TIMES DAILY, Disp: 60 tablet, Rfl: 0    gabapentin (NEURONTIN) 100 MG capsule, TAKE 2 CAPSULES BY MOUTH 3 TIMES DAILY, Disp: 180 capsule, Rfl: 0    fluticasone (FLONASE) 50 MCG/ACT nasal spray, USE 1 SPRAY IN EACH NOSTRIL EVERY DAY, Disp: 16 g,

## 2018-12-19 RX ORDER — FUROSEMIDE 20 MG/1
TABLET ORAL
Qty: 30 TABLET | Refills: 5 | Status: SHIPPED | OUTPATIENT
Start: 2018-12-19 | End: 2020-01-15

## 2018-12-19 RX ORDER — POTASSIUM CHLORIDE 20 MEQ/1
TABLET, EXTENDED RELEASE ORAL
Qty: 30 TABLET | Refills: 5 | Status: SHIPPED | OUTPATIENT
Start: 2018-12-19 | End: 2020-01-15

## 2019-01-01 ENCOUNTER — APPOINTMENT (OUTPATIENT)
Dept: GENERAL RADIOLOGY | Age: 59
End: 2019-01-01
Payer: MEDICARE

## 2019-01-01 ENCOUNTER — HOSPITAL ENCOUNTER (EMERGENCY)
Age: 59
Discharge: HOME | End: 2019-01-01
Payer: MEDICARE

## 2019-01-01 VITALS
SYSTOLIC BLOOD PRESSURE: 154 MMHG | BODY MASS INDEX: 41.32 KG/M2 | HEIGHT: 64 IN | OXYGEN SATURATION: 98 % | WEIGHT: 242 LBS | HEART RATE: 81 BPM | DIASTOLIC BLOOD PRESSURE: 85 MMHG | RESPIRATION RATE: 20 BRPM | TEMPERATURE: 98.1 F

## 2019-01-01 DIAGNOSIS — S90.212A CONTUSION OF LEFT GREAT TOE WITH DAMAGE TO NAIL, INITIAL ENCOUNTER: ICD-10-CM

## 2019-01-01 DIAGNOSIS — S91.209A TOENAIL AVULSION, INITIAL ENCOUNTER: Primary | ICD-10-CM

## 2019-01-01 PROCEDURE — 73660 X-RAY EXAM OF TOE(S): CPT

## 2019-01-01 PROCEDURE — 99283 EMERGENCY DEPT VISIT LOW MDM: CPT

## 2019-01-01 PROCEDURE — 6370000000 HC RX 637 (ALT 250 FOR IP)

## 2019-01-01 RX ORDER — CEPHALEXIN 500 MG/1
500 CAPSULE ORAL ONCE
Status: COMPLETED | OUTPATIENT
Start: 2019-01-01 | End: 2019-01-01

## 2019-01-01 RX ORDER — CEPHALEXIN 500 MG/1
500 CAPSULE ORAL 3 TIMES DAILY
Qty: 15 CAPSULE | Refills: 0 | Status: SHIPPED | OUTPATIENT
Start: 2019-01-01 | End: 2019-01-06

## 2019-01-01 RX ORDER — CEPHALEXIN 500 MG/1
CAPSULE ORAL
Status: COMPLETED
Start: 2019-01-01 | End: 2019-01-01

## 2019-01-01 RX ADMIN — CEPHALEXIN 500 MG: 500 CAPSULE ORAL at 20:23

## 2019-01-01 ASSESSMENT — PAIN DESCRIPTION - DESCRIPTORS: DESCRIPTORS: ACHING;DISCOMFORT

## 2019-01-01 ASSESSMENT — PAIN DESCRIPTION - LOCATION: LOCATION: TOE (COMMENT WHICH ONE)

## 2019-01-01 ASSESSMENT — PAIN DESCRIPTION - PAIN TYPE: TYPE: ACUTE PAIN

## 2019-01-01 ASSESSMENT — ENCOUNTER SYMPTOMS
VOMITING: 0
COLOR CHANGE: 0

## 2019-01-01 ASSESSMENT — PAIN DESCRIPTION - ORIENTATION: ORIENTATION: LEFT

## 2019-01-01 ASSESSMENT — PAIN SCALES - GENERAL: PAINLEVEL_OUTOF10: 2

## 2019-01-01 ASSESSMENT — PAIN DESCRIPTION - FREQUENCY: FREQUENCY: INTERMITTENT

## 2019-01-04 ENCOUNTER — OFFICE VISIT (OUTPATIENT)
Dept: PULMONOLOGY | Age: 59
End: 2019-01-04
Payer: MEDICARE

## 2019-01-04 VITALS
WEIGHT: 242 LBS | BODY MASS INDEX: 40.32 KG/M2 | HEIGHT: 65 IN | RESPIRATION RATE: 20 BRPM | TEMPERATURE: 98 F | HEART RATE: 88 BPM | DIASTOLIC BLOOD PRESSURE: 68 MMHG | OXYGEN SATURATION: 98 % | SYSTOLIC BLOOD PRESSURE: 132 MMHG

## 2019-01-04 DIAGNOSIS — E66.01 OBESITY, MORBID, BMI 40.0-49.9 (HCC): ICD-10-CM

## 2019-01-04 DIAGNOSIS — G47.33 OSA ON CPAP: Primary | ICD-10-CM

## 2019-01-04 DIAGNOSIS — Z71.89 CPAP USE COUNSELING: ICD-10-CM

## 2019-01-04 DIAGNOSIS — Z99.89 OSA ON CPAP: Primary | ICD-10-CM

## 2019-01-04 PROCEDURE — 99213 OFFICE O/P EST LOW 20 MIN: CPT | Performed by: NURSE PRACTITIONER

## 2019-01-04 PROCEDURE — 3017F COLORECTAL CA SCREEN DOC REV: CPT | Performed by: NURSE PRACTITIONER

## 2019-01-04 PROCEDURE — G8598 ASA/ANTIPLAT THER USED: HCPCS | Performed by: NURSE PRACTITIONER

## 2019-01-04 PROCEDURE — G8484 FLU IMMUNIZE NO ADMIN: HCPCS | Performed by: NURSE PRACTITIONER

## 2019-01-04 PROCEDURE — G8427 DOCREV CUR MEDS BY ELIG CLIN: HCPCS | Performed by: NURSE PRACTITIONER

## 2019-01-04 PROCEDURE — G8417 CALC BMI ABV UP PARAM F/U: HCPCS | Performed by: NURSE PRACTITIONER

## 2019-01-04 PROCEDURE — 1036F TOBACCO NON-USER: CPT | Performed by: NURSE PRACTITIONER

## 2019-01-04 ASSESSMENT — SLEEP AND FATIGUE QUESTIONNAIRES
HOW LIKELY ARE YOU TO NOD OFF OR FALL ASLEEP WHEN YOU ARE A PASSENGER IN A CAR FOR AN HOUR WITHOUT A BREAK: 2
NECK CIRCUMFERENCE (INCHES): 18.75
HOW LIKELY ARE YOU TO NOD OFF OR FALL ASLEEP WHILE SITTING AND TALKING TO SOMEONE: 1
HOW LIKELY ARE YOU TO NOD OFF OR FALL ASLEEP WHILE SITTING QUIETLY AFTER LUNCH WITHOUT ALCOHOL: 2
HOW LIKELY ARE YOU TO NOD OFF OR FALL ASLEEP WHILE LYING DOWN TO REST IN THE AFTERNOON WHEN CIRCUMSTANCES PERMIT: 3
HOW LIKELY ARE YOU TO NOD OFF OR FALL ASLEEP WHILE WATCHING TV: 2
HOW LIKELY ARE YOU TO NOD OFF OR FALL ASLEEP WHILE SITTING AND READING: 2
HOW LIKELY ARE YOU TO NOD OFF OR FALL ASLEEP WHILE SITTING INACTIVE IN A PUBLIC PLACE: 1
HOW LIKELY ARE YOU TO NOD OFF OR FALL ASLEEP IN A CAR, WHILE STOPPED FOR A FEW MINUTES IN TRAFFIC: 1
ESS TOTAL SCORE: 14

## 2019-02-25 ENCOUNTER — TELEPHONE (OUTPATIENT)
Dept: PULMONOLOGY | Age: 59
End: 2019-02-25

## 2019-02-26 ENCOUNTER — NURSE ONLY (OUTPATIENT)
Dept: CARDIOLOGY CLINIC | Age: 59
End: 2019-02-26
Payer: MEDICARE

## 2019-02-26 DIAGNOSIS — Z95.810 AUTOMATIC IMPLANTABLE CARDIOVERTER-DEFIBRILLATOR IN SITU: ICD-10-CM

## 2019-02-26 DIAGNOSIS — I25.5 ISCHEMIC CARDIOMYOPATHY: ICD-10-CM

## 2019-02-26 PROCEDURE — 93295 DEV INTERROG REMOTE 1/2/MLT: CPT | Performed by: INTERNAL MEDICINE

## 2019-02-26 PROCEDURE — 93296 REM INTERROG EVL PM/IDS: CPT | Performed by: INTERNAL MEDICINE

## 2019-03-08 ENCOUNTER — HOSPITAL ENCOUNTER (OUTPATIENT)
Dept: SLEEP CENTER | Age: 59
Discharge: HOME | End: 2019-03-10
Payer: MEDICARE

## 2019-03-08 DIAGNOSIS — Z99.89 OSA ON CPAP: ICD-10-CM

## 2019-03-08 DIAGNOSIS — E66.01 OBESITY, MORBID, BMI 40.0-49.9 (HCC): ICD-10-CM

## 2019-03-08 DIAGNOSIS — G47.33 OSA ON CPAP: ICD-10-CM

## 2019-03-08 DIAGNOSIS — Z71.89 CPAP USE COUNSELING: ICD-10-CM

## 2019-03-08 DIAGNOSIS — Z78.9 DIFFICULTY WITH CPAP USE: ICD-10-CM

## 2019-03-08 PROCEDURE — 95811 POLYSOM 6/>YRS CPAP 4/> PARM: CPT

## 2019-03-13 DIAGNOSIS — G47.33 OSA (OBSTRUCTIVE SLEEP APNEA): Primary | ICD-10-CM

## 2019-03-22 ENCOUNTER — TELEPHONE (OUTPATIENT)
Dept: PULMONOLOGY | Age: 59
End: 2019-03-22

## 2019-03-27 ENCOUNTER — PROCEDURE VISIT (OUTPATIENT)
Dept: CARDIOLOGY CLINIC | Age: 59
End: 2019-03-27
Payer: MEDICARE

## 2019-03-27 ENCOUNTER — OFFICE VISIT (OUTPATIENT)
Dept: CARDIOLOGY CLINIC | Age: 59
End: 2019-03-27
Payer: MEDICARE

## 2019-03-27 VITALS
SYSTOLIC BLOOD PRESSURE: 166 MMHG | HEART RATE: 93 BPM | BODY MASS INDEX: 39.49 KG/M2 | HEIGHT: 65 IN | WEIGHT: 237 LBS | DIASTOLIC BLOOD PRESSURE: 84 MMHG

## 2019-03-27 DIAGNOSIS — Z95.810 AUTOMATIC IMPLANTABLE CARDIOVERTER-DEFIBRILLATOR IN SITU: ICD-10-CM

## 2019-03-27 DIAGNOSIS — I25.5 ISCHEMIC CARDIOMYOPATHY: ICD-10-CM

## 2019-03-27 DIAGNOSIS — I47.20 VENTRICULAR TACHYCARDIA: Primary | ICD-10-CM

## 2019-03-27 DIAGNOSIS — I47.20 VENTRICULAR TACHYCARDIA: ICD-10-CM

## 2019-03-27 PROCEDURE — G8598 ASA/ANTIPLAT THER USED: HCPCS | Performed by: INTERNAL MEDICINE

## 2019-03-27 PROCEDURE — 93000 ELECTROCARDIOGRAM COMPLETE: CPT | Performed by: INTERNAL MEDICINE

## 2019-03-27 PROCEDURE — G8417 CALC BMI ABV UP PARAM F/U: HCPCS | Performed by: INTERNAL MEDICINE

## 2019-03-27 PROCEDURE — 93282 PRGRMG EVAL IMPLANTABLE DFB: CPT | Performed by: INTERNAL MEDICINE

## 2019-03-27 PROCEDURE — 99214 OFFICE O/P EST MOD 30 MIN: CPT | Performed by: INTERNAL MEDICINE

## 2019-03-27 PROCEDURE — 1036F TOBACCO NON-USER: CPT | Performed by: INTERNAL MEDICINE

## 2019-03-27 PROCEDURE — G8484 FLU IMMUNIZE NO ADMIN: HCPCS | Performed by: INTERNAL MEDICINE

## 2019-03-27 PROCEDURE — 3017F COLORECTAL CA SCREEN DOC REV: CPT | Performed by: INTERNAL MEDICINE

## 2019-03-27 PROCEDURE — G8427 DOCREV CUR MEDS BY ELIG CLIN: HCPCS | Performed by: INTERNAL MEDICINE

## 2019-03-29 NOTE — TELEPHONE ENCOUNTER
We did received a fax order confirmation that they received orders for Bipap. LM asking patient to return call to office.

## 2019-03-29 NOTE — TELEPHONE ENCOUNTER
Patient returned call and was informed of message below. Patient stated that he spoke to [facility] and they told him that they have the order. Patient was advised to call the office by the end of next week if [facility] has not contacted him to set him up.

## 2019-04-01 ENCOUNTER — OFFICE VISIT (OUTPATIENT)
Dept: CARDIOLOGY CLINIC | Age: 59
End: 2019-04-01
Payer: MEDICARE

## 2019-04-01 VITALS
WEIGHT: 238 LBS | SYSTOLIC BLOOD PRESSURE: 130 MMHG | BODY MASS INDEX: 40.22 KG/M2 | DIASTOLIC BLOOD PRESSURE: 84 MMHG | HEART RATE: 82 BPM

## 2019-04-01 DIAGNOSIS — I47.20 VENTRICULAR TACHYCARDIA: ICD-10-CM

## 2019-04-01 DIAGNOSIS — I25.10 CORONARY ARTERY DISEASE INVOLVING NATIVE CORONARY ARTERY OF NATIVE HEART WITHOUT ANGINA PECTORIS: Primary | ICD-10-CM

## 2019-04-01 DIAGNOSIS — R06.02 SHORTNESS OF BREATH: ICD-10-CM

## 2019-04-01 DIAGNOSIS — E78.2 MIXED HYPERLIPIDEMIA: ICD-10-CM

## 2019-04-01 DIAGNOSIS — R00.2 PALPITATIONS: ICD-10-CM

## 2019-04-01 PROCEDURE — G8598 ASA/ANTIPLAT THER USED: HCPCS | Performed by: INTERNAL MEDICINE

## 2019-04-01 PROCEDURE — 1036F TOBACCO NON-USER: CPT | Performed by: INTERNAL MEDICINE

## 2019-04-01 PROCEDURE — 99214 OFFICE O/P EST MOD 30 MIN: CPT | Performed by: INTERNAL MEDICINE

## 2019-04-01 PROCEDURE — G8417 CALC BMI ABV UP PARAM F/U: HCPCS | Performed by: INTERNAL MEDICINE

## 2019-04-01 PROCEDURE — 3017F COLORECTAL CA SCREEN DOC REV: CPT | Performed by: INTERNAL MEDICINE

## 2019-04-01 PROCEDURE — G8427 DOCREV CUR MEDS BY ELIG CLIN: HCPCS | Performed by: INTERNAL MEDICINE

## 2019-04-01 RX ORDER — EMPAGLIFLOZIN 25 MG/1
25 TABLET, FILM COATED ORAL DAILY
COMMUNITY
Start: 2019-02-18

## 2019-04-01 NOTE — PROGRESS NOTES
Subjective:      Patient ID: Alex Sanches. is a 66 y.o. . CC:  Follow up of Cardiomyopathy and CAD. HPI:   Mr. Raj Benton has no chest pain and wife past away 25 months ago. Patient seems to be coping adequately with his loss. He feels well. Needs stimulator implanted to move bowels. No chest pain nor syncope, no ICD discharges. Going to bipap. Allergies   Allergen Reactions    Pcn [Penicillins] Hives        Social History     Socioeconomic History    Marital status:       Spouse name: Not on file    Number of children: Not on file    Years of education: Not on file    Highest education level: Not on file   Occupational History    Not on file   Social Needs    Financial resource strain: Not on file    Food insecurity:     Worry: Not on file     Inability: Not on file    Transportation needs:     Medical: Not on file     Non-medical: Not on file   Tobacco Use    Smoking status: Former Smoker     Packs/day: 2.00     Years: 20.00     Pack years: 40.00     Types: Cigarettes     Last attempt to quit: 2001     Years since quittin.8    Smokeless tobacco: Never Used    Tobacco comment: PASSIVE SMOKER   Substance and Sexual Activity    Alcohol use: No     Alcohol/week: 0.0 oz    Drug use: No    Sexual activity: Never   Lifestyle    Physical activity:     Days per week: Not on file     Minutes per session: Not on file    Stress: Not on file   Relationships    Social connections:     Talks on phone: Not on file     Gets together: Not on file     Attends Tenriism service: Not on file     Active member of club or organization: Not on file     Attends meetings of clubs or organizations: Not on file     Relationship status: Not on file    Intimate partner violence:     Fear of current or ex partner: Not on file     Emotionally abused: Not on file     Physically abused: Not on file     Forced sexual activity: Not on file   Other Topics Concern    Not on file   Social History Narrative    Not on file        Patient has a family history includes Cancer in his father; Diabetes in his maternal grandmother; Heart Disease in his mother; Heart Failure in his maternal uncle and mother; Hypertension in his maternal uncle. Patient  has a past medical history of Arthritis, Asthma, CAD (coronary artery disease), COPD (chronic obstructive pulmonary disease) ([address] Emphysema of lung ([address] Fatty liver, GERD (gastroesophageal reflux disease), Hyperlipidemia, Hypertension, MI, old, Sleep apnea, and Type II or unspecified type diabetes mellitus without mention of complication, not stated as uncontrolled. Vitals  Weight: 238 lb (108 kg)  Blood Pressure: BP: (130)/(84)    Pulse: 82       Review of Systems   Constitutional: Negative. HENT: Negative. Eyes: Negative. Respiratory: Negative. Cardiovascular: Negative. Gastrointestinal: Negative. Genitourinary: Negative. Musculoskeletal: Negative. Skin: Negative. Neurological: Negative. Hematological: Negative. Psychiatric/Behavioral: Negative. Objective:   Physical Exam   Constitutional: He is oriented to person, place, and time. Vital signs are normal. He appears well-nourished. HENT:   Head: Normocephalic and atraumatic. Mouth/Throat: Oropharynx is clear and moist and mucous membranes are normal.   Eyes: Conjunctivae and EOM are normal. Pupils are equal, round, and reactive to light. Neck: Normal range of motion. Neck supple. No JVD present. No tracheal deviation present. No thyromegaly present. Cardiovascular: Normal rate, regular rhythm, S1 normal, S2 normal, normal heart sounds, intact distal pulses and normal pulses. PMI is not displaced. Exam reveals no gallop and no friction rub. No murmur heard. Pulses:       Carotid pulses are 2+ on the right side, and 2+ on the left side. Radial pulses are 2+ on the right side, and 2+ on the left side.    Pulmonary/Chest: Effort normal and breath sounds normal.   Abdominal: Normal appearance and bowel sounds are normal.   Musculoskeletal: Normal range of motion. Lymphadenopathy:     He has no cervical adenopathy. Neurological: He is alert and oriented to person, place, and time. He has normal strength. No cranial nerve deficit. Coordination normal.   Skin: Skin is warm, dry and intact. Psychiatric: He has a normal mood and affect. His speech is normal and behavior is normal.       Assessment:       Diagnosis Orders   1. Coronary artery disease involving native coronary artery of native heart without angina pectoris  CBC Auto Differential    Comprehensive Metabolic Panel    Lipid Panel   2. Mixed hyperlipidemia  CBC Auto Differential    Comprehensive Metabolic Panel    Lipid Panel   3. Palpitations  CBC Auto Differential    Comprehensive Metabolic Panel    Lipid Panel   4. Shortness of breath  CBC Auto Differential    Comprehensive Metabolic Panel    Lipid Panel   5. Ventricular tachycardia (HCC)  CBC Auto Differential    Comprehensive Metabolic Panel    Lipid Panel             Plan:       STable ischemic CMP. Patient continues to take metoprolol without any ICD discharges. Gets meds refilled weekly packs by [facility]. Edema:  Controlled. Continue lasix 20 mg Mon thurs and K 10 meq mon and thurs. HLP:  Check labs. HTN:  Controlled. LVEF 44% with no ischemia and inferolateral infarction. No signs of CHF. NYHA 1 and CCS 1. He got his disability coverage after 7 years. Continue current medications. Continue current medications. Stable cardiovascular status.

## 2019-04-12 ENCOUNTER — TELEPHONE (OUTPATIENT)
Dept: PULMONOLOGY | Age: 59
End: 2019-04-12

## 2019-04-12 NOTE — TELEPHONE ENCOUNTER
[facility] stating that PSG is needed on pt. Faxed PSG from 4/5/12 to [phone].

## 2019-04-18 ENCOUNTER — TELEPHONE (OUTPATIENT)
Dept: PULMONOLOGY | Age: 59
End: 2019-04-18

## 2019-04-18 NOTE — TELEPHONE ENCOUNTER
SW patient. He states [facility] is telling him they never received his orders. I called [facility] and told them that they actually requested more info on patient. Patient has decided to switch to [facility]. Orders faxed to them. 31-90 day fu is scheduled for 6/3/19.

## 2019-05-14 ENCOUNTER — TELEPHONE (OUTPATIENT)
Dept: CARDIOLOGY CLINIC | Age: 59
End: 2019-05-14

## 2019-05-14 NOTE — TELEPHONE ENCOUNTER
Left detailed message for pt to call me back ASAP after battery advisory alert on [facility]. Need to see pt ASAP and review.

## 2019-05-15 ENCOUNTER — TELEPHONE (OUTPATIENT)
Dept: CARDIOLOGY CLINIC | Age: 59
End: 2019-05-15

## 2019-05-15 NOTE — PRE-PROCEDURE INSTRUCTIONS
Left voicemail regarding their procedure tomorrow. Patient given these instructions:    1. Arrive at 0730 for your 0900 procedure. 2.  DO NOT eat or drink anything after midnight the night before your procedure. 3.  Take all of your usual morning medications the day of the procedure with just a sip of water as directed by your physician. 4.  Bring a current list of all of your medications with you. 5. Please have a responsible adult drive you home after your procedure.     Vick Wood RN

## 2019-05-15 NOTE — TELEPHONE ENCOUNTER
Pt called back and I explained the SJ premature battery depletion advisory may have indeed occurred in his case. We will see him in the office ASAP.

## 2019-05-16 ENCOUNTER — HOSPITAL ENCOUNTER (OUTPATIENT)
Dept: CARDIAC CATH/INVASIVE PROCEDURES | Age: 59
Discharge: HOME | End: 2019-05-16
Attending: INTERNAL MEDICINE | Admitting: INTERNAL MEDICINE
Payer: MEDICARE

## 2019-05-16 VITALS — BODY MASS INDEX: 40.15 KG/M2 | WEIGHT: 241 LBS | OXYGEN SATURATION: 95 % | HEIGHT: 65 IN | TEMPERATURE: 97.8 F

## 2019-05-16 LAB
ANION GAP SERPL CALCULATED.3IONS-SCNC: 15 MMOL/L (ref 3–16)
BUN BLDV-MCNC: 9 MG/DL (ref 7–20)
CALCIUM SERPL-MCNC: 9.4 MG/DL (ref 8.3–10.6)
CHLORIDE BLD-SCNC: 96 MMOL/L (ref 99–110)
CO2: 23 MMOL/L (ref 21–32)
CREAT SERPL-MCNC: 0.6 MG/DL (ref 0.9–1.3)
GFR AFRICAN AMERICAN: >60
GFR NON-AFRICAN AMERICAN: >60
GLUCOSE BLD-MCNC: 245 MG/DL (ref 70–99)
HCT VFR BLD CALC: 46.5 % (ref 40.5–52.5)
HEMOGLOBIN: 15.7 G/DL (ref 13.5–17.5)
INR BLD: 0.87 (ref 0.86–1.14)
MCH RBC QN AUTO: 29.3 PG (ref 26–34)
MCHC RBC AUTO-ENTMCNC: 33.8 G/DL (ref 31–36)
MCV RBC AUTO: 86.8 FL (ref 80–100)
PDW BLD-RTO: 14.7 % (ref 12.4–15.4)
PLATELET # BLD: 162 K/UL (ref 135–450)
PMV BLD AUTO: 7 FL (ref 5–10.5)
POTASSIUM SERPL-SCNC: 4.3 MMOL/L (ref 3.5–5.1)
PROTHROMBIN TIME: 9.9 SEC (ref 9.8–13)
RBC # BLD: 5.36 M/UL (ref 4.2–5.9)
SODIUM BLD-SCNC: 134 MMOL/L (ref 136–145)
WBC # BLD: 4.5 K/UL (ref 4–11)

## 2019-05-16 PROCEDURE — 6360000002 HC RX W HCPCS: Performed by: INTERNAL MEDICINE

## 2019-05-16 PROCEDURE — 33262 RMVL& REPLC PULSE GEN 1 LEAD: CPT | Performed by: INTERNAL MEDICINE

## 2019-05-16 PROCEDURE — 33262 RMVL& REPLC PULSE GEN 1 LEAD: CPT

## 2019-05-16 PROCEDURE — 94761 N-INVAS EAR/PLS OXIMETRY MLT: CPT

## 2019-05-16 PROCEDURE — C1722 AICD, SINGLE CHAMBER: HCPCS

## 2019-05-16 PROCEDURE — 99153 MOD SED SAME PHYS/QHP EA: CPT

## 2019-05-16 PROCEDURE — 85027 COMPLETE CBC AUTOMATED: CPT

## 2019-05-16 PROCEDURE — 99152 MOD SED SAME PHYS/QHP 5/>YRS: CPT

## 2019-05-16 PROCEDURE — 93005 ELECTROCARDIOGRAM TRACING: CPT | Performed by: INTERNAL MEDICINE

## 2019-05-16 PROCEDURE — 94640 AIRWAY INHALATION TREATMENT: CPT

## 2019-05-16 PROCEDURE — 2580000003 HC RX 258: Performed by: INTERNAL MEDICINE

## 2019-05-16 PROCEDURE — 94664 DEMO&/EVAL PT USE INHALER: CPT

## 2019-05-16 PROCEDURE — 2709999900 HC NON-CHARGEABLE SUPPLY

## 2019-05-16 PROCEDURE — 85610 PROTHROMBIN TIME: CPT

## 2019-05-16 PROCEDURE — 80048 BASIC METABOLIC PNL TOTAL CA: CPT

## 2019-05-16 PROCEDURE — 2720000010 HC SURG SUPPLY STERILE

## 2019-05-16 RX ORDER — ALBUTEROL SULFATE 2.5 MG/3ML
2.5 SOLUTION RESPIRATORY (INHALATION) EVERY 6 HOURS PRN
Status: DISCONTINUED | OUTPATIENT
Start: 2019-05-16 | End: 2019-05-16 | Stop reason: HOSPADM

## 2019-05-16 RX ORDER — ESOMEPRAZOLE MAGNESIUM 40 MG/1
40 CAPSULE, DELAYED RELEASE ORAL DAILY
COMMUNITY

## 2019-05-16 RX ORDER — AMINOPHYLLINE DIHYDRATE 25 MG/ML
350 INJECTION, SOLUTION INTRAVENOUS ONCE
Status: DISCONTINUED | OUTPATIENT
Start: 2019-05-16 | End: 2019-05-16 | Stop reason: SDUPTHER

## 2019-05-16 RX ADMIN — ALBUTEROL SULFATE 2.5 MG: 2.5 SOLUTION RESPIRATORY (INHALATION) at 11:45

## 2019-05-16 RX ADMIN — VANCOMYCIN HYDROCHLORIDE 1000 MG: 10 INJECTION, POWDER, LYOPHILIZED, FOR SOLUTION INTRAVENOUS at 09:44

## 2019-05-16 NOTE — PROCEDURES
[address]     Electrophysiology Procedure Note       Date of Procedure: 5/16/2019  Patient's Name: Sakina Sevilla. YOB: 1960   Medical Record Number: [id number]  Procedure Performed by: Horacio Penn MD    FORTIFY ICD GENERATOR UNDER RECALL     Procedures performed:    · Explantation of SJM FORTIFY ICD generator   · Implantation of SC SJMICD generator  · Re-connection of  right ventricular pace/sense/shock lead  · Electronic analysis of lead and device. · Anesthesia: Local and Monitored Anesthesia Care  · Level of sedation plan: Moderate sedation (conscious sedation) with intravenous Midazolam 2 mg and Fentanyl 50 mcg   · Start time: 10:45  · Stop time: 11:30  · Mallampati airway assessment class: I  · ASA class: 1  · (there were no independent trained observers who had no other duties involved in this procedure)      Indication of the procedure:    Sakina Berrios is a 66 y.o. male with ischemic cardiomyopathy, VT and SJM device has premature battery depletion. Hence, he was recommended to have ICD generator replacement. Details of procedure: The patient was brought to the electrophysiology laboratory in stable condition. The patient was in a fasting and non-sedated state. The risks, benefits and alternatives of the procedure were discussed with the patient. The risks including, but not limited to, the risks of vascular injury, bleeding, infection, device malfunction, lead dislodgement, radiation exposure, injury to cardiac and surrounding structures (including pneumothorax), stroke, myocardial infarction and death were discussed in detail. The patient opted to proceed with the device implantation. Written informed consent was signed and placed in the chart. Prophylactic antibiotic using vancomycin 1 g was given. Fluoroscopy was performed to identify any insulation defect on the leads.   The lead was assessed in 3 different views and cine fluoroscopy was performed. There is no evidence of externalization of the leads. The patient was prepped and draped in sterile fashion. A timeout protocol was completed to identify the patient and the procedure being performed. IV sedation was provided with IV Versed and IV Fentanyl. The patient was monitored continuously with ECG, pulse oximetry, blood pressure monitoring, and direct observation. An incision was made in the previous surgical site after administration of lidocaine/bupivicaine combination. Using electrocautery and blunt dissection, the previous pocket identified and entered. The previous generator was removed, and a new generator was inserted. The chronic leads were then connected to the new pulse generator which was then placed into the pocket. Copious amount (> 200 cc) of saline flush was used to irrigate the pocket. The pocket was then closed using a 2-0 Vicryl and 3- Vicryl subcutaneous layer and a subcuticular layer using 4-0 Vicryl. The skin was covered with Steri-Strips and pressure dressing. All sponge and needle counts were reported as correct at the end of the procedure. The patient tolerated the procedure well and there were no complications. Patient was transported to the holding area in stable condition. Device and Leads information:    Device pulse generator is SJM Model # CD 1357/40 c, serial # [id number]    Right ventricular lead model # D0027703, serial # ADT [id number]    RV lead measurements: sensed R  wave of more than 12 mV, impedance of 560 ohms, high voltage lead impedance 98 ohms, pacing capture threshold of 0.75 V @ 0.5 ms. Device was programmed to VVI with a low rate of 40 bpm.    ICD therapy - VT 1 - 177 bpm;  bpm  Impression:    Pre- and post-procedural diagnoses of ischemic with premature depletion of ICD battery, successful generator change to a SJM ICD. Plan:     The patient will be receive the usual post-operative care.  The patient may be discharged home today after recovery. Follow-up will be a 1-week wound check with our nurse in the [facility] and a 1-month follow-up with Ms. Evelio Kramer    Thank you for allowing us to participate in the care of your patient. If you have any questions, please do not hesitate to contact me.     Kasandra Rubalcava MD   Cardiac Electrophysiology  [address] [facility] [phone]  [employer]

## 2019-05-16 NOTE — H&P
[address]   Cardiac Electrophysiology H & P  Date: 5/16/2019  Admit Date:  5/16/2019  Reason for admission : ICD gen change  Consult Requesting Physician: Ousmane Richardson*     No chief complaint on file. HPI: Wynelle Spatz. is a 66 y.o. male with a past medical history significant for ischemic cardiomyopathy, status post single chamber ICD implantation for primary prevention of sudden cardiac arrest, history of ICD shock, is coming in for ICD generator change on an urgent basis as his device prematurely depleted battery. In 2001, he had a single chamber [employer] [place] ICD implantation. In 2005, which was replaced as he had a battery depletion. In 2007, he underwent lead extraction at [facility] for fractured RV lead. In 2008, he received several ICD shocks in the setting of not taking his medications. I do not have the records of the same. There is a remote history of ventricular tachycardia. His device was checked couple of months ago and he had 4 years of battery life left. His ICD device - SJM Fortify VR - known to have lithium deposits leading on to premature battery depletion    Yesterday, received an urgent message through [facility] that his device battery prematurely depleted. Patient did not get any vibration and alert. Even though, patient does not dependent on his device, due to previous history of ICD therapy and history of ventricular tachycardia (records are not available for my personal review), he was recommended to go through ICD generator change as soon as possible. Hence, patient is here today.     Past Medical History:   Diagnosis Date    Arthritis     Asthma     CAD (coronary artery disease)     COPD (chronic obstructive pulmonary disease) (HCC)     Dr. Bruno Gallardo at [facility] told the patient that he did not have COPD, just asthma    Emphysema of lung ([facility] [address]     Fatty liver     GERD (gastroesophageal reflux disease)     Hyperlipidemia     Hypertension     MI, old     Sleep apnea     pt wears cpap at night. states does not have cpap    Type II or unspecified type diabetes mellitus without mention of complication, not stated as uncontrolled         Past Surgical History:   Procedure Laterality Date    CARDIAC PACEMAKER PLACEMENT  2011    [facility] [employer] [facility]. pace maker difib   [address] Bilateral 2013    CATARACT REMOVAL WITH IMPLANT Left 2/28/14    COLONOSCOPY      CORONARY ANGIOPLASTY WITH STENT PLACEMENT  2001,2008    CYST REMOVAL  1982    coccyx area    DIAGNOSTIC CARDIAC CATH LAB PROCEDURE      ENDOSCOPY, COLON, DIAGNOSTIC      ERCP  9/15/2013    ERCP  10/11/13    WITH STENT REMOVAL    FOOT SURGERY Right 07/09/2018     REPAIR CALCANEAL SPUR, REPAIR OF ACHILLES TENDON RIGHT FOOT    OTHER SURGICAL HISTORY      back stimulator in lower back    PACEMAKER PLACEMENT      ICD    [place] GASTROCNEMIUS RECESSION Right 10/31/2018    GASTROCNEMIUS RECESSION RIGHT FOOT performed by Dian Maxwell DPM at [address] Right 10/31/2018    REMOVAL OF CALCANEAL SPUR, ACHILLES TENDON REPAIR RIGHT LOWER EXTREMITY [facility]  7/18/13    and colonoscopy with biopsies taken    UPPER GASTROINTESTINAL ENDOSCOPY  8/23/13    EUS    UPPER GASTROINTESTINAL ENDOSCOPY  9/15/2013       Allergies   Allergen Reactions    Pcn [Penicillins] Hives       Social History:  Reviewed. reports that he quit smoking about 17 years ago. His smoking use included cigarettes. He has a 40.00 pack-year smoking history. He has never used smokeless tobacco. He reports that he does not drink alcohol or use drugs. Family History:  Reviewed. family history includes Cancer in his father; Diabetes in his maternal grandmother; Heart Disease in his mother; Heart Failure in his maternal uncle and mother; Hypertension in his maternal uncle. No premature CAD.      Review of System:  All other systems reviewed except for that noted above. Pertinent negatives and positives are:     Objective      · General: negative for fever, chills   · Ophthalmic ROS: negative for - eye pain or loss of vision  · ENT ROS: negative for - headaches, sore throat   · Respiratory: negative for - cough, sputum  · Cardiovascular: Reviewed in HPI  · Gastrointestinal: negative for - abdominal pain, diarrhea, N/V  · Hematology: negative for - bleeding, blood clots, bruising or jaundice  · Genito-Urinary:  negative for - Dysuria or incontinence  · Musculoskeletal: negative for - Joint swelling, muscle pain  · Neurological: negative for - confusion, dizziness, headaches   · Psychiatric: No anxiety, no depression. · Dermatological: negative for - rash    Physical Examination:  Vitals:    19 0720   Temp: 97.8 °F (36.6 °C)      No intake or output data in the 24 hours ending 19 0958  No intake/output data recorded. Wt Readings from Last 3 Encounters:   19 241 lb (109.3 kg)   19 238 lb (108 kg)   19 237 lb (107.5 kg)     Temp  Av.8 °F (36.6 °C)  Min: 97.8 °F (36.6 °C)  Max: 97.8 °F (36.6 °C)    · Telemetry: Sinus rhythm   · Constitutional: Alert. Oriented to person, place, and time. No distress. · Head: Normocephalic and atraumatic. · Mouth/Throat: Lips appear moist. Oropharynx is clear and moist.  · Eyes: Conjunctivae normal. EOM are normal.   · Neck: Neck supple. No lymphadenopathy. No rigidity. No JVD present. · Cardiovascular: Normal rate, regular rhythm. Normal S1&S2. Carotid pulse 2+ bilaterally. · Pulmonary/Chest: Bilateral respiratory sounds present. No respiratory accessory muscle use. No wheezes, No rhonchi. · Abdominal: Soft. Normal bowel sounds present. No distension, No tenderness. No splenomegaly. No hernia. · Musculoskeletal: No tenderness. No edema    · Lymphadenopathy: Has no cervical adenopathy. · Neurological: Alert and oriented.  Cranial nerve II-XII grossly intact, No gross deficit to touch. · Skin: Skin is warm and dry. No rash, lesions, ulcerations noted. · Psychiatric: No anxiety nor agitation. Labs:  Reviewed. Recent Labs     05/16/19  0707   *   K 4.3   CL 96*   CO2 23   BUN 9   CREATININE 0.6*     Recent Labs     05/16/19  0707   WBC 4.5   HGB 15.7   HCT 46.5   MCV 86.8        Lab Results   Component Value Date    TROPONINI <0.01 03/28/2017     Lab Results   Component Value Date    BNP 30.0 03/28/2017     Lab Results   Component Value Date    PROTIME 9.9 05/16/2019    PROTIME 11.5 05/19/2011    INR 0.87 05/16/2019    INR 1.05 05/19/2011     Lab Results   Component Value Date    CHOL 138 04/19/2017    HDL 31 04/19/2017    TRIG 297 04/19/2017       Diagnostic and imaging results reviewed. I independently reviewed the ECG and telemetry. Scheduled Meds:   vancomycin  1,000 mg Intravenous Once    irrigation builder  2 g Irrigation Once     Continuous Infusions:  PRN Meds:.      Assessment:   Patient Active Problem List    Diagnosis Date Noted    Obesity, morbid, BMI 40.0-49.9 ([address] 08/21/2018    Ischemic cardiomyopathy 05/23/2018    Hypertension due to endocrine disorder 01/24/2017    ALIN on CPAP 08/11/2015    Gastroesophageal reflux disease without esophagitis 08/11/2015    Hyperlipidemia with target LDL less than 100 08/11/2015    Automatic implantable cardioverter-defibrillator in situ 06/03/2014    CAD (coronary artery disease) 05/28/2014    Automatic implantable cardioverter-defibrillator in situ 05/29/2013    Ventricular tachycardia ([address] 02/22/2012    Presence of stent in left circumflex coronary artery 02/22/2012    Myocardial infarction, nontransmural ([address] 02/22/2012    Myocardial infarction, inferoposterior wall, subsequent care 02/22/2012      Active Hospital Problems    Diagnosis Date Noted    Ischemic cardiomyopathy [I25.5] 05/23/2018    Ventricular tachycardia ([address] [I47.2] 02/22/2012         Recommendation (s):    I had a long discussion with the patient about the need for ICD generator change. This device had at least 4 years of battery life left when this was checked 2 months ago. Now, that is so certain depletion of the battery. Most likely this is secondary to lithium deposits leading onto premature battery depletion. Proceed with ICD generator change. I also had a long discussion with [facility] clinical representative about the financial implications for the patient. Apparently, as the device is more than 8 years, it is out of the warranty as per the company regulations. However, the device was noted to have 4 years of life left, couple of months ago. Hence, I do strongly believe that this is premature depletion of the ICD battery. CONSENT:  The indications, risks, benefits and alternatives to ICD gen change have been reviewed in detail with the patient. Procedure will be performed with moderate sedation. The risks include perforation, bleeding, pocket pain or hematoma, pocket or system infection, lead dislodgement, malfunctioning device and complications associated with defibrillation testing. Lay terms were used and patient's questions were answered in detail. Post-procedure wound care and restrictions were also discussed. A detailed instruction sheet will be given to the patient and family after procedure and before discharge from the hospital. Regular post-procedure follow-up in the device clinic was emphasized. The patient verbalized understanding and wishes to proceed with the procedure. Thank you for allowing me to participate in the care of [address] . If you have any questions/comments, please do not hesitate to contact us.       Nader Alas MD   Cardiac Electrophysiology  [facility]

## 2019-05-17 LAB
EKG ATRIAL RATE: 77 BPM
EKG DIAGNOSIS: NORMAL
EKG P AXIS: 54 DEGREES
EKG P-R INTERVAL: 178 MS
EKG Q-T INTERVAL: 372 MS
EKG QRS DURATION: 92 MS
EKG QTC CALCULATION (BAZETT): 420 MS
EKG R AXIS: 22 DEGREES
EKG T AXIS: 83 DEGREES
EKG VENTRICULAR RATE: 77 BPM

## 2019-05-17 PROCEDURE — 93010 ELECTROCARDIOGRAM REPORT: CPT | Performed by: INTERNAL MEDICINE

## 2019-05-21 ENCOUNTER — APPOINTMENT (OUTPATIENT)
Dept: GENERAL RADIOLOGY | Age: 59
End: 2019-05-21
Payer: MEDICARE

## 2019-05-21 ENCOUNTER — HOSPITAL ENCOUNTER (EMERGENCY)
Age: 59
Discharge: HOME | End: 2019-05-21
Attending: EMERGENCY MEDICINE
Payer: MEDICARE

## 2019-05-21 VITALS
WEIGHT: 240 LBS | DIASTOLIC BLOOD PRESSURE: 58 MMHG | HEART RATE: 84 BPM | SYSTOLIC BLOOD PRESSURE: 122 MMHG | BODY MASS INDEX: 40.97 KG/M2 | RESPIRATION RATE: 17 BRPM | OXYGEN SATURATION: 94 % | HEIGHT: 64 IN | TEMPERATURE: 98.2 F

## 2019-05-21 DIAGNOSIS — R53.1 GENERAL WEAKNESS: Primary | ICD-10-CM

## 2019-05-21 LAB
A/G RATIO: 1.4 (ref 1.1–2.2)
ALBUMIN SERPL-MCNC: 4.3 G/DL (ref 3.4–5)
ALP BLD-CCNC: 89 U/L (ref 40–129)
ALT SERPL-CCNC: 14 U/L (ref 10–40)
ANION GAP SERPL CALCULATED.3IONS-SCNC: 17 MMOL/L (ref 3–16)
AST SERPL-CCNC: 11 U/L (ref 15–37)
BASOPHILS ABSOLUTE: 0 K/UL (ref 0–0.2)
BASOPHILS RELATIVE PERCENT: 0.5 %
BILIRUB SERPL-MCNC: 0.5 MG/DL (ref 0–1)
BILIRUBIN URINE: NEGATIVE
BLOOD, URINE: NEGATIVE
BUN BLDV-MCNC: 8 MG/DL (ref 7–20)
CALCIUM SERPL-MCNC: 9.8 MG/DL (ref 8.3–10.6)
CHLORIDE BLD-SCNC: 96 MMOL/L (ref 99–110)
CLARITY: CLEAR
CO2: 23 MMOL/L (ref 21–32)
COLOR: YELLOW
CREAT SERPL-MCNC: 0.7 MG/DL (ref 0.9–1.3)
EKG ATRIAL RATE: 96 BPM
EKG DIAGNOSIS: NORMAL
EKG P AXIS: 62 DEGREES
EKG P-R INTERVAL: 152 MS
EKG Q-T INTERVAL: 314 MS
EKG QRS DURATION: 96 MS
EKG QTC CALCULATION (BAZETT): 396 MS
EKG R AXIS: -23 DEGREES
EKG T AXIS: 64 DEGREES
EKG VENTRICULAR RATE: 96 BPM
EOSINOPHILS ABSOLUTE: 0 K/UL (ref 0–0.6)
EOSINOPHILS RELATIVE PERCENT: 0.4 %
GFR AFRICAN AMERICAN: >60
GFR NON-AFRICAN AMERICAN: >60
GLOBULIN: 3 G/DL
GLUCOSE BLD-MCNC: 267 MG/DL (ref 70–99)
GLUCOSE URINE: >=1000 MG/DL
HCT VFR BLD CALC: 44.6 % (ref 40.5–52.5)
HEMOGLOBIN: 14.9 G/DL (ref 13.5–17.5)
KETONES, URINE: 15 MG/DL
LEUKOCYTE ESTERASE, URINE: NEGATIVE
LYMPHOCYTES ABSOLUTE: 1.5 K/UL (ref 1–5.1)
LYMPHOCYTES RELATIVE PERCENT: 16.4 %
MCH RBC QN AUTO: 29.1 PG (ref 26–34)
MCHC RBC AUTO-ENTMCNC: 33.4 G/DL (ref 31–36)
MCV RBC AUTO: 87 FL (ref 80–100)
MICROSCOPIC EXAMINATION: ABNORMAL
MONOCYTES ABSOLUTE: 1 K/UL (ref 0–1.3)
MONOCYTES RELATIVE PERCENT: 10.5 %
NEUTROPHILS ABSOLUTE: 6.6 K/UL (ref 1.7–7.7)
NEUTROPHILS RELATIVE PERCENT: 72.2 %
NITRITE, URINE: NEGATIVE
PDW BLD-RTO: 14.4 % (ref 12.4–15.4)
PH UA: 6.5 (ref 5–8)
PLATELET # BLD: 183 K/UL (ref 135–450)
PMV BLD AUTO: 6.8 FL (ref 5–10.5)
POTASSIUM SERPL-SCNC: 3.8 MMOL/L (ref 3.5–5.1)
PROTEIN UA: NEGATIVE MG/DL
RBC # BLD: 5.12 M/UL (ref 4.2–5.9)
SODIUM BLD-SCNC: 136 MMOL/L (ref 136–145)
SPECIFIC GRAVITY UA: <=1.005 (ref 1–1.03)
TOTAL PROTEIN: 7.3 G/DL (ref 6.4–8.2)
TROPONIN: <0.01 NG/ML
URINE REFLEX TO CULTURE: ABNORMAL
URINE TYPE: ABNORMAL
UROBILINOGEN, URINE: 0.2 E.U./DL
WBC # BLD: 9.1 K/UL (ref 4–11)

## 2019-05-21 PROCEDURE — 93010 ELECTROCARDIOGRAM REPORT: CPT | Performed by: INTERNAL MEDICINE

## 2019-05-21 PROCEDURE — 84484 ASSAY OF TROPONIN QUANT: CPT

## 2019-05-21 PROCEDURE — 85025 COMPLETE CBC W/AUTO DIFF WBC: CPT

## 2019-05-21 PROCEDURE — 2580000003 HC RX 258: Performed by: EMERGENCY MEDICINE

## 2019-05-21 PROCEDURE — 99285 EMERGENCY DEPT VISIT HI MDM: CPT

## 2019-05-21 PROCEDURE — 93005 ELECTROCARDIOGRAM TRACING: CPT | Performed by: EMERGENCY MEDICINE

## 2019-05-21 PROCEDURE — 71045 X-RAY EXAM CHEST 1 VIEW: CPT

## 2019-05-21 PROCEDURE — 80053 COMPREHEN METABOLIC PANEL: CPT

## 2019-05-21 PROCEDURE — 96360 HYDRATION IV INFUSION INIT: CPT

## 2019-05-21 PROCEDURE — 81003 URINALYSIS AUTO W/O SCOPE: CPT

## 2019-05-21 PROCEDURE — 36415 COLL VENOUS BLD VENIPUNCTURE: CPT

## 2019-05-21 RX ORDER — 0.9 % SODIUM CHLORIDE 0.9 %
1000 INTRAVENOUS SOLUTION INTRAVENOUS ONCE
Status: COMPLETED | OUTPATIENT
Start: 2019-05-21 | End: 2019-05-21

## 2019-05-21 RX ADMIN — SODIUM CHLORIDE 1000 ML: 9 INJECTION, SOLUTION INTRAVENOUS at 04:27

## 2019-05-21 NOTE — ED PROVIDER NOTES
Triage Chief Complaint:   Extremity Weakness (Pt brought in by [facility] squad, pt sts \"got up to the bathroom, legs became weak, fell into my nightstand, difibrilator placed on Tuesday\". No EKG or IV access by squad)      [place]:  Wynelle Spatz. is a 66 y.o. male that presents with generalized weakness. Patient states that he had a defibrillating pacemaker placed a week ago. He has had evolving generalized weakness and felt his legs were very wobbly for the last 2 or 3 days and today he was very weak and had difficulty getting out of bed and fell and then had difficulty getting into the living room to call 911. He had difficulty sitting up in the bed without assistance. He has had atrial fibrillation in the past, and MI, and is diabetic with COPD hypertension and hyperlipidemia. He did not have chest pain or shortness of breath was not diaphoretic and denies a fever. ROS:  Review of systems was reviewed for 10 systems and is otherwise negative except as in the [address]    Past Medical History:   Diagnosis Date    Arthritis     Asthma     CAD (coronary artery disease)     COPD (chronic obstructive pulmonary disease) ([address]     Dr. Bruno Gallardo at [facility] told the patient that he did not have COPD, just asthma    Emphysema of lung ([address]     Fatty liver     GERD (gastroesophageal reflux disease)     Hyperlipidemia     Hypertension     MI, old     Sleep apnea     pt wears cpap at night. states does not have cpap    Type II or unspecified type diabetes mellitus without mention of complication, not stated as uncontrolled      Past Surgical History:   Procedure Laterality Date    C[facility]  [facility] difib   [address] Bilateral 2013    CATARACT REMOVAL WITH IMPLANT Left 2/28/14    COLONOSCOPY      CORONARY ANGIOPLASTY WITH STENT PLACEMENT  2001,2008    CYST REMOVAL  1982    coccyx area    DIAGNOSTIC CARDIAC CATH LAB PROCEDURE      ENDOSCOPY, COLON, DIAGNOSTIC      ERCP  9/15/2013    ERCP  10/11/13    WITH STENT REMOVAL    FOOT SURGERY Right 2018     REPAIR CALCANEAL SPUR, REPAIR OF ACHILLES TENDON RIGHT FOOT    OTHER SURGICAL HISTORY      back stimulator in lower back    PACEMAKER PLACEMENT      ICD    [place] GASTROCNEMIUS RECESSION Right 10/31/2018    GASTROCNEMIUS RECESSION RIGHT FOOT performed by Roland Thayer DPM at [address] Right 10/31/2018    REMOVAL OF CALCANEAL SPUR, ACHILLES TENDON REPAIR RIGHT LOWER EXTREMITY performed by Roland Thayer DPM at [address]  13    and colonoscopy with biopsies taken    UPPER GASTROINTESTINAL ENDOSCOPY  13    EUS    UPPER GASTROINTESTINAL ENDOSCOPY  9/15/2013     Family History   Problem Relation Age of Onset    Heart Disease Mother     Heart Failure Mother     Cancer Father     Diabetes Maternal Grandmother     Heart Failure Maternal Uncle     Hypertension Maternal Uncle     Asthma Neg Hx     Emphysema Neg Hx      Social History     Socioeconomic History    Marital status:       Spouse name: Not on file    Number of children: Not on file    Years of education: Not on file    Highest education level: Not on file   Occupational History    Not on file   Social Needs    Financial resource strain: Not on file    Food insecurity:     Worry: Not on file     Inability: Not on file    Transportation needs:     Medical: Not on file     Non-medical: Not on file   Tobacco Use    Smoking status: Former Smoker     Packs/day: 2.00     Years: 20.00     Pack years: 40.00     Types: Cigarettes     Last attempt to quit: 2001     Years since quittin.0    Smokeless tobacco: Never Used    Tobacco comment: PASSIVE SMOKER   Substance and Sexual Activity    Alcohol use: No     Alcohol/week: 0.0 oz    Drug use: No    Sexual activity: Never   Lifestyle    Physical activity:     Days per week: Not on file Minutes per session: Not on file    Stress: Not on file   Relationships    Social connections:     Talks on phone: Not on file     Gets together: Not on file     Attends Mu-ism service: Not on file     Active member of club or organization: Not on file     Attends meetings of clubs or organizations: Not on file     Relationship status: Not on file    Intimate partner violence:     Fear of current or ex partner: Not on file     Emotionally abused: Not on file     Physically abused: Not on file     Forced sexual activity: Not on file   Other Topics Concern    Not on file   Social History Narrative    Not on file     No current facility-administered medications for this encounter.       Current Outpatient Medications   Medication Sig Dispense Refill    Esomeprazole Magnesium (NEXIUM PO) Take by mouth      JARDIANCE 25 MG tablet       potassium chloride (KLOR-CON M) 20 MEQ extended release tablet TAKE ONE TABLET BY MOUTH WITH LASIX ON MONDAY AND THURSDAY 30 tablet 5    furosemide (LASIX) 20 MG tablet TAKE ONE TABLET BY MOUTH MONDAY AND THURSDAY 30 tablet 5    traZODone (DESYREL) 100 MG tablet Take 300 mg by mouth nightly      nitroGLYCERIN (NITROLINGUAL) 0.4 MG/SPRAY 0.4 mg spray Place 1 spray under the tongue every 5 minutes as needed for Chest pain 2 Bottle 3    insulin detemir (LEVEMIR) 100 UNIT/ML injection vial Inject 60 Units into the skin nightly       ibuprofen (ADVIL;MOTRIN) 200 MG tablet Take 4 tablets by mouth every 8 hours as needed for Pain 30 tablet 0    Fluticasone Furoate-Vilanterol (BREO ELLIPTA IN) Inhale into the lungs daily       albuterol (ACCUNEB) 1.25 MG/3ML nebulizer solution Inhale 1 ampule into the lungs every 6 hours as needed for Wheezing      insulin lispro (HUMALOG) 100 UNIT/ML injection vial Inject into the skin 2 times daily      clopidogrel (PLAVIX) 75 MG tablet TAKE ONE TABLET BY MOUTH DAILY 30 tablet 0    INVOKANA 300 MG TABS tablet TAKE 1 TABLET BY MOUTH DAILY BEFORE THE FIRST MEAL OF THE DAY 30 tablet 0    metFORMIN (GLUCOPHAGE) 1000 MG tablet TAKE ONE TABLET BY MOUTH 2 TIMES DAILY WITH MORNING AND EVENING MEALS 60 tablet 0    cyclobenzaprine (FLEXERIL) 10 MG tablet TAKE 1 TABLET BY MOUTH DAILY, MAY CAUSE DROWSINESS 30 tablet 0    famotidine (PEPCID) 20 MG tablet TAKE 1 TABLET BY MOUTH 2 TIMES DAILY 60 tablet 0    gabapentin (NEURONTIN) 100 MG capsule TAKE 2 CAPSULES BY MOUTH 3 TIMES DAILY 180 capsule 0    fluticasone (FLONASE) 50 MCG/ACT nasal spray USE 1 SPRAY IN EACH NOSTRIL EVERY DAY 16 g 0    TRUE METRIX BLOOD GLUCOSE TEST strip CHECK SUGARS TWICE A DAY AS DIRECTED, DX: E11.9 100 each 5    lisinopril (PRINIVIL;ZESTRIL) 20 MG tablet TAKE 1 TABLET BY MOUTH DAILY 30 tablet 11    atorvastatin (LIPITOR) 80 MG tablet TAKE ONE TABLET BY MOUTH DAILY 30 tablet 11    aspirin 325 MG EC tablet TAKE ONE TABLET BY MOUTH DAILY 30 tablet 11    UNIFINE PENTIPS 31G X 8 MM MISC USE AS DIRECTED FOR INSULIN .00 100 each 5    diltiazem (CARDIZEM CD) 180 MG extended release capsule TAKE 1 CAPSULE DAILY 30 capsule 11    ALL DAY ALLERGY 10 MG tablet TAKE 1 TABLET BY MOUTH DAILY 30 tablet 11    metoprolol succinate (TOPROL XL) 200 MG extended release tablet TAKE ONE-HALF TABLET TWICE A DAY 30 tablet 11    TRUEPLUS LANCETS 30G MISC CHECK BLOOD SUGAR TWICE A DAY DX E11.9 100 each 11    DULoxetine (CYMBALTA) 60 MG extended release capsule Take 60 mg by mouth daily      divalproex (DEPAKOTE) 500 MG DR tablet Take 1,000 mg by mouth nightly      Liraglutide (VICTOZA SC) Inject 1.8 Units into the skin daily.       albuterol sulfate HFA (PROAIR HFA) 108 (90 BASE) MCG/ACT inhaler Inhale 2 puffs into the lungs every 6 hours as needed for Wheezing 1 Inhaler 0     Allergies   Allergen Reactions    Pcn [Penicillins] Hives     Nursing Notes Reviewed    Physical Exam:  ED Triage Vitals [05/21/19 0402]   Enc Vitals Group      /69      Pulse 96      Resp 18      Temp 98.2 °F (36.8 °C)      Temp Source Oral      SpO2 95 %      Weight 240 lb (108.9 kg)      Height 5' 4\" (1.626 m)      Head Circumference       Peak Flow       Pain Score       Pain Loc       Pain Edu? Excl. in [address]? GENERAL APPEARANCE: A pleasant unhealthy appearing elderly male in moderate distress   HEAD: Normocephalic, atraumatic  EYES: Sclera anicteric.no conjunctival injection,   ENT: Mucous membranes moist, no nasal discharge, pharynx clear,   NECK: Supple, no meningismus, no adenopathy,  HEART: RRR without rubs murmurs or gallops  LUNGS:  Clear good air movement no wheezing no retraction or accessory muscle use,  ABDOMEN: Soft, non-tender to palpation, no guarding or rebound. , no mass or distention and no hepatosplenomegaly. No peritoneal signs, focal findings, or evidence of an acute abdomen at time of exam   EXTREMITIES: No acute deformities, no peripheral edema  SKIN: Warm and dry.  Normal color, no rash,  capillary refill less than 2 seconds  MENTAL STATUS: Alert, oriented, interactive,   NEUROLOGICAL:  No facial drooping. moves all 4 extremities, sensation intact, no focal findings     Nursing note and vital signs reviewed     I have reviewed and interpreted all of the currently available lab results from this visit (if applicable):  Results for orders placed or performed during the hospital encounter of 05/21/19   CBC Auto Differential   Result Value Ref Range    WBC 9.1 4.0 - 11.0 K/uL    RBC 5.12 4.20 - 5.90 M/uL    Hemoglobin 14.9 13.5 - 17.5 g/dL    Hematocrit 44.6 40.5 - 52.5 %    MCV 87.0 80.0 - 100.0 fL    MCH 29.1 26.0 - 34.0 pg    MCHC 33.4 31.0 - 36.0 g/dL    RDW 14.4 12.4 - 15.4 %    Platelets 562 890 - 474 K/uL    MPV 6.8 5.0 - 10.5 fL    Neutrophils % 72.2 %    Lymphocytes % 16.4 %    Monocytes % 10.5 %    Eosinophils % 0.4 %    Basophils % 0.5 %    Neutrophils # 6.6 1.7 - 7.7 K/uL    Lymphocytes # 1.5 1.0 - 5.1 K/uL    Monocytes # 1.0 0.0 - 1.3 K/uL    Eosinophils # 0.0 0.0 - 0.6 K/uL Basophils # 0.0 0.0 - 0.2 K/uL   Comprehensive Metabolic Panel   Result Value Ref Range    Sodium 136 136 - 145 mmol/L    Potassium 3.8 3.5 - 5.1 mmol/L    Chloride 96 (L) 99 - 110 mmol/L    CO2 23 21 - 32 mmol/L    Anion Gap 17 (H) 3 - 16    Glucose 267 (H) 70 - 99 mg/dL    BUN 8 7 - 20 mg/dL    CREATININE 0.7 (L) 0.9 - 1.3 mg/dL    GFR Non-African American >60 >60    GFR African American >60 >60    Calcium 9.8 8.3 - 10.6 mg/dL    Total Protein 7.3 6.4 - 8.2 g/dL    Alb 4.3 3.4 - 5.0 g/dL    Albumin/Globulin Ratio 1.4 1.1 - 2.2    Total Bilirubin 0.5 0.0 - 1.0 mg/dL    Alkaline Phosphatase 89 40 - 129 U/L    ALT 14 10 - 40 U/L    AST 11 (L) 15 - 37 U/L    Globulin 3.0 g/dL   Troponin   Result Value Ref Range    Troponin <0.01 <0.01 ng/mL   Urinalysis Reflex to Culture   Result Value Ref Range    Color, UA Yellow Straw/Yellow    Clarity, UA Clear Clear    Glucose, Ur >=1000 (A) Negative mg/dL    Bilirubin Urine Negative Negative    Ketones, Urine 15 (A) Negative mg/dL    Specific Gravity, UA <=1.005 1.005 - 1.030    Blood, Urine Negative Negative    pH, UA 6.5 5.0 - 8.0    Protein, UA Negative Negative mg/dL    Urobilinogen, Urine 0.2 <2.0 E.U./dL    Nitrite, Urine Negative Negative    Leukocyte Esterase, Urine Negative Negative    Microscopic Examination Not Indicated     Urine Reflex to Culture Not Indicated     Urine Type Not Specified         Radiographs (if obtained):  ? Radiologist's Report Reviewed:  XR CHEST PORTABLE   Final Result   No acute disease. ? Discussed with Radiologist:     ?  The following radiograph was interpreted by myself in the absence of a radiologist:     EKG (if obtained): (All EKG's are interpreted by myself in the absence of a cardiologist)  EKG demonstrates a normal sinus rhythm with inferior and lateral Q waves but no acute ischemic changes or arrhythmia identified heart rate 96 and not significantly different from previous EKGs    MDM:   Patient with generalized weakness presents to the ER for evaluation. Initially stated he was unable to get up from bed and walk to the bathroom. His labs were not diagnostically abnormal and he is been to the bathroom here in the emergency department had no difficulty walking. I have offered him admission if he feels he cannot function at home but he prefers to be treated as an outpatient we'll follow up with his family physician. At this point in time I found no evidence of any dangerous or emergent process as a cause for his generalized weakness which seems to have resolved. Final Impression:  1. General weakness        Critical Care:       Disposition referral (if applicable):  Melanie Healy PA-C  [address] [place] [address]  [phone]    In 1 day        Disposition medications (if applicable):  New Prescriptions    No medications on file       Comment: Please note this report has been produced using speech recognition software and may contain errors related to that system including errors in grammar, punctuation, and spelling, as well as words and phrases that may be inappropriate. If there are any questions or concerns please feel free to contact the dictating provider for clarification.       (Please note that portions of this note may have been completed with a voice recognition program. Efforts were made to edit the dictations but occasionally words are mis-transcribed.)    MD Vilma Dumont., MD  05/21/19 8198

## 2019-05-23 ENCOUNTER — NURSE ONLY (OUTPATIENT)
Dept: CARDIOLOGY CLINIC | Age: 59
End: 2019-05-23

## 2019-05-24 ENCOUNTER — HOSPITAL ENCOUNTER (EMERGENCY)
Age: 59
Discharge: HOME | End: 2019-05-24
Admitting: EMERGENCY MEDICINE
Payer: MEDICARE

## 2019-05-24 VITALS
BODY MASS INDEX: 40.15 KG/M2 | TEMPERATURE: 97.2 F | RESPIRATION RATE: 18 BRPM | WEIGHT: 241 LBS | OXYGEN SATURATION: 95 % | SYSTOLIC BLOOD PRESSURE: 127 MMHG | HEIGHT: 65 IN | DIASTOLIC BLOOD PRESSURE: 71 MMHG | HEART RATE: 87 BPM

## 2019-05-24 DIAGNOSIS — J45.909 ASTHMA WITH BRONCHITIS: Primary | ICD-10-CM

## 2019-05-24 DIAGNOSIS — Z87.891 FORMER SMOKER: ICD-10-CM

## 2019-05-24 DIAGNOSIS — Z77.22 SECOND HAND SMOKE EXPOSURE: ICD-10-CM

## 2019-05-24 PROCEDURE — 99283 EMERGENCY DEPT VISIT LOW MDM: CPT

## 2019-05-24 RX ORDER — DOXYCYCLINE HYCLATE 100 MG
100 TABLET ORAL 2 TIMES DAILY
Qty: 20 TABLET | Refills: 0 | Status: SHIPPED | OUTPATIENT
Start: 2019-05-24 | End: 2019-06-03

## 2019-05-24 ASSESSMENT — PAIN DESCRIPTION - LOCATION: LOCATION: HEAD;THROAT

## 2019-05-24 ASSESSMENT — PAIN SCALES - GENERAL: PAINLEVEL_OUTOF10: 6

## 2019-05-24 ASSESSMENT — PAIN DESCRIPTION - ORIENTATION: ORIENTATION: LEFT

## 2019-06-03 ENCOUNTER — TELEPHONE (OUTPATIENT)
Dept: PULMONOLOGY | Age: 59
End: 2019-06-03

## 2019-06-03 NOTE — TELEPHONE ENCOUNTER
Patient did not show for 31-90 day sleep follow-up appointment  with JENSEN Vizcaino on 6/3/19    Same Day Cancellation: No    Patient rescheduled:  NA    New appointment: n/a    Patient was also no show on: 6/8/17    LOV   Assessment: 1/4/19      · Severe ALIN. CPAP 14 cm H2O- poor compliance today due to tolerance of the pressure   · Obesity  · Asthma followed by PCP  · Ischemic cardiomyopathy, ICD, CAD- followed by cardiology  Plan:        - Changed to CPAP 16 cm H2O for now   -CPAP/ BIPAP Titration since no improvement with new CPAP- need to reschedule as patient canceled previously scheduled testing.  -Try full face mask  - Advised to use CPAP 6-8 hrs at night and during naps- need to increase use. -Again discussed severity of sleep apnea and importance of CPAP use. - Replacement of mask, tubing, head straps every 3-6 months or sooner if damaged. - Patient instructed to contact [employer] company for any mask, tubing or machine trouble shooting if problems arise.  - Sleep hygiene  - Avoid sedatives, alcohol and caffeinated drinks at bed time. - Patient counseled to never drive or operate heavy machinery while fatigue, drowsy or sleepy.    - Weight loss is recommended as a long-term intervention.    - Complications of ALIN if not treated were discussed with patient patient, including: systemic hypertension, pulmonary hypertension, cardiovascular morbidities, car accidents and all cause mortality.  -Patient education handout provided regarding sleep tips and CPAP cleaning recommendations

## 2019-06-05 NOTE — TELEPHONE ENCOUNTER
Spoke w/ pt states he's having mask problems, that he has two different mask that he's tried. Instructed to call DME as well. Did r/s appt to discuss with [facility]. States hasn't been using machine d/t mask issues.

## 2019-06-10 ENCOUNTER — HOSPITAL ENCOUNTER (OUTPATIENT)
Dept: NEUROLOGY | Age: 59
Discharge: HOME | End: 2019-06-10
Payer: MEDICARE

## 2019-06-10 PROCEDURE — 95909 NRV CNDJ TST 5-6 STUDIES: CPT

## 2019-06-10 PROCEDURE — 95886 MUSC TEST DONE W/N TEST COMP: CPT

## 2019-06-10 NOTE — PROCEDURES
Electrodiagnostic Report  [address] [place] [address]  Physical Medicine & Rehabilitation    06/10/19    Say Smith.  66 y.o.  1960  [id number]  Referring Provider: Maryjane Galarza PA-C    Clinical Problem:  66 y.o with history of bilateral leg weakness, left more severe than right. Onset few months ago. No known injury PMH: + diabetes managed with insulin and oral meds. + heart disease.  + right heel spur surgery 7 and 10 2019 no back or hip surgery PE: reflexes absent normal     EMG SUMMARY TABLE RIGHT/LEFT LOWER       Spontaneous     MUAP   Recruitment    Insertional Activity Fibrillation Potential Positive Sharp Waves   Fasiculation High Frequency Potentials   Amp Duration PPP Pattern   Vastus Medialis L2-4 Femoral normal none none none none normal normal normal normal   Anterior Tibialis L4,5 Deep Peroneal normal none none none none normal normal normal normal   Gluteus Medius L4-S1 Superior Gut normal none none none none normal normal normal normal   Peroneus Longus L5-S1 Sup Peroneal normal none none none none normal normal normal normal   Posterior Tibialis L5-S1 Tibial normal none none none none normal normal normal normal   Medial Gastroc S1,2 Tibial normal none none none none normal normal normal normal   Extensor Hallicis B4-U2 Peroneal normal none none none none normal normal normal normal   Extensor Dig Brevis L5-S1 Peroneal normal none none none none normal normal normal normal   LS Paraspinals Posterior Rami       normal none none none none normal normal normal normal       Nerve Conduction Studies     Nerve Sensory Distal Latency (msec) Sensory Distal Latency (msec) Amp uv Amp uv Motor Distal Latency (msec) Motor Distal Latency (msec) Amp uv Amp uv Motor NCV (m/sec) Motor NCV (m/sec)    Right Left Right Left Right Left Right Left Right Left   Sural 4.5 (n<4.2) 4.6 (n<4.2) 3  3          Peroneal     5.0 (n<5.5) 4.5 (n<5.5) 3.6  5.3  40 (n>40) 41 (n>40)   Above Knee (n>40) (n>40)   Tibial     (n<6.2) 4.1 (n<6.2)  11.0  40 (n>40) (n>40)   H-Reflex                  Summary of EMG and Nerve Conduction Findings: The above EMG needle exam was within normal limits. Nerve conduction studies demonstrate prolongation of both sural sensroy distal latencies remainder within normal limits    Overall Impression: Mild predmoninantly sensory peripheral neuropathy. No evidence of an acute radiculopathy Thank you. Electronically signed by:  Yuly Oconnell DO,6/10/2019,9:51 AM

## 2019-07-01 ENCOUNTER — TELEPHONE (OUTPATIENT)
Dept: PULMONOLOGY | Age: 59
End: 2019-07-01

## 2019-08-27 ENCOUNTER — NURSE ONLY (OUTPATIENT)
Dept: CARDIOLOGY CLINIC | Age: 59
End: 2019-08-27
Payer: MEDICARE

## 2019-08-27 DIAGNOSIS — I47.20 VENTRICULAR TACHYCARDIA: ICD-10-CM

## 2019-08-27 DIAGNOSIS — Z95.810 AUTOMATIC IMPLANTABLE CARDIOVERTER-DEFIBRILLATOR IN SITU: Primary | ICD-10-CM

## 2019-08-27 DIAGNOSIS — I25.5 ISCHEMIC CARDIOMYOPATHY: ICD-10-CM

## 2019-08-27 PROCEDURE — 93295 DEV INTERROG REMOTE 1/2/MLT: CPT | Performed by: INTERNAL MEDICINE

## 2019-08-27 PROCEDURE — 93296 REM INTERROG EVL PM/IDS: CPT | Performed by: INTERNAL MEDICINE

## 2019-09-16 ENCOUNTER — TELEPHONE (OUTPATIENT)
Dept: CARDIOLOGY CLINIC | Age: 59
End: 2019-09-16

## 2019-09-18 ENCOUNTER — NURSE ONLY (OUTPATIENT)
Dept: CARDIOLOGY CLINIC | Age: 59
End: 2019-09-18
Payer: MEDICARE

## 2019-09-18 ENCOUNTER — OFFICE VISIT (OUTPATIENT)
Dept: CARDIOLOGY CLINIC | Age: 59
End: 2019-09-18
Payer: MEDICARE

## 2019-09-18 VITALS
HEART RATE: 74 BPM | BODY MASS INDEX: 40.8 KG/M2 | WEIGHT: 239 LBS | SYSTOLIC BLOOD PRESSURE: 122 MMHG | HEIGHT: 64 IN | DIASTOLIC BLOOD PRESSURE: 70 MMHG

## 2019-09-18 DIAGNOSIS — I47.20 VENTRICULAR TACHYCARDIA: ICD-10-CM

## 2019-09-18 DIAGNOSIS — Z95.810 ICD (IMPLANTABLE CARDIOVERTER-DEFIBRILLATOR) IN PLACE: Primary | ICD-10-CM

## 2019-09-18 DIAGNOSIS — I25.5 ISCHEMIC CARDIOMYOPATHY: ICD-10-CM

## 2019-09-18 DIAGNOSIS — Z95.810 AUTOMATIC IMPLANTABLE CARDIOVERTER-DEFIBRILLATOR IN SITU: Primary | ICD-10-CM

## 2019-09-18 PROCEDURE — 93282 PRGRMG EVAL IMPLANTABLE DFB: CPT | Performed by: INTERNAL MEDICINE

## 2019-09-18 PROCEDURE — 3017F COLORECTAL CA SCREEN DOC REV: CPT | Performed by: INTERNAL MEDICINE

## 2019-09-18 PROCEDURE — 99214 OFFICE O/P EST MOD 30 MIN: CPT | Performed by: INTERNAL MEDICINE

## 2019-09-18 PROCEDURE — G8427 DOCREV CUR MEDS BY ELIG CLIN: HCPCS | Performed by: INTERNAL MEDICINE

## 2019-09-18 PROCEDURE — 1036F TOBACCO NON-USER: CPT | Performed by: INTERNAL MEDICINE

## 2019-09-18 PROCEDURE — G8598 ASA/ANTIPLAT THER USED: HCPCS | Performed by: INTERNAL MEDICINE

## 2019-09-18 PROCEDURE — G8417 CALC BMI ABV UP PARAM F/U: HCPCS | Performed by: INTERNAL MEDICINE

## 2019-09-18 NOTE — PROGRESS NOTES
Patient comes in for programming evaluation for his defibrillator. All sensing and pacing parameters are within normal range. Patient's ventricular output was lowered from 2.5V to 2.0V. No events recorded. Please see interrogation for more detail. Patient will see Dr. Rosaura Serna in office today. Patient will follow up in 3 months in office or remotely. See Paceart report under the Cardiology tab.

## 2019-09-18 NOTE — PROGRESS NOTES
gastrointestinal endoscopy (8/23/13); Carpal tunnel release (Bilateral, 2013); ERCP (9/15/2013); Upper gastrointestinal endoscopy (9/15/2013); ERCP (10/11/13); Cataract removal with implant (Left, 2/28/14); Diagnostic Cardiac Cath Lab Procedure; other surgical history; pacemaker placement; Cardiac pacemaker placement (2011); Foot surgery (Right, 07/09/2018); pr length/short leg/ankl tendon,single (Right, 10/31/2018); and pr gastrocnemius recession (Right, 10/31/2018). Social History:   reports that he quit smoking about 18 years ago. His smoking use included cigarettes. He has a 40.00 pack-year smoking history. He has never used smokeless tobacco. He reports that he does not drink alcohol or use drugs. Family History:  family history includes Cancer in his father; Diabetes in his maternal grandmother; Heart Disease in his mother; Heart Failure in his maternal uncle and mother; Hypertension in his maternal uncle.      Home Medications:  Outpatient Encounter Medications as of 9/18/2019   Medication Sig Dispense Refill    Fexofenadine HCl (ALLEGRA PO) Take by mouth      Esomeprazole Magnesium (NEXIUM PO) Take 40 mg by mouth       JARDIANCE 25 MG tablet Take 25 mg by mouth daily       potassium chloride (KLOR-CON M) 20 MEQ extended release tablet TAKE ONE TABLET BY MOUTH WITH LASIX ON MONDAY AND THURSDAY 30 tablet 5    furosemide (LASIX) 20 MG tablet TAKE ONE TABLET BY MOUTH MONDAY AND THURSDAY 30 tablet 5    nitroGLYCERIN (NITROLINGUAL) 0.4 MG/SPRAY 0.4 mg spray Place 1 spray under the tongue every 5 minutes as needed for Chest pain 2 Bottle 3    insulin detemir (LEVEMIR) 100 UNIT/ML injection vial Inject 60 Units into the skin nightly       Fluticasone Furoate-Vilanterol (BREO ELLIPTA IN) Inhale into the lungs daily       insulin lispro (HUMALOG) 100 UNIT/ML injection vial Inject into the skin 2 times daily Indications: 10 units in AM, 10 units at dinner       clopidogrel (PLAVIX) 75 MG tablet TAKE

## 2019-10-14 ENCOUNTER — OFFICE VISIT (OUTPATIENT)
Dept: CARDIOLOGY CLINIC | Age: 59
End: 2019-10-14
Payer: MEDICARE

## 2019-10-14 VITALS
HEART RATE: 65 BPM | DIASTOLIC BLOOD PRESSURE: 70 MMHG | SYSTOLIC BLOOD PRESSURE: 110 MMHG | WEIGHT: 241.4 LBS | BODY MASS INDEX: 41.44 KG/M2

## 2019-10-14 DIAGNOSIS — I47.20 VENTRICULAR TACHYCARDIA: ICD-10-CM

## 2019-10-14 DIAGNOSIS — I25.10 CORONARY ARTERY DISEASE INVOLVING NATIVE CORONARY ARTERY OF NATIVE HEART WITHOUT ANGINA PECTORIS: Primary | ICD-10-CM

## 2019-10-14 DIAGNOSIS — E78.5 HYPERLIPIDEMIA WITH TARGET LDL LESS THAN 100: ICD-10-CM

## 2019-10-14 PROCEDURE — G8484 FLU IMMUNIZE NO ADMIN: HCPCS | Performed by: INTERNAL MEDICINE

## 2019-10-14 PROCEDURE — G8427 DOCREV CUR MEDS BY ELIG CLIN: HCPCS | Performed by: INTERNAL MEDICINE

## 2019-10-14 PROCEDURE — G8417 CALC BMI ABV UP PARAM F/U: HCPCS | Performed by: INTERNAL MEDICINE

## 2019-10-14 PROCEDURE — 3017F COLORECTAL CA SCREEN DOC REV: CPT | Performed by: INTERNAL MEDICINE

## 2019-10-14 PROCEDURE — 1036F TOBACCO NON-USER: CPT | Performed by: INTERNAL MEDICINE

## 2019-10-14 PROCEDURE — 99213 OFFICE O/P EST LOW 20 MIN: CPT | Performed by: INTERNAL MEDICINE

## 2019-10-14 PROCEDURE — G8598 ASA/ANTIPLAT THER USED: HCPCS | Performed by: INTERNAL MEDICINE

## 2019-11-27 ENCOUNTER — HOSPITAL ENCOUNTER (OUTPATIENT)
Age: 59
Setting detail: OBSERVATION
Discharge: HOME | End: 2019-11-29
Attending: INTERNAL MEDICINE | Admitting: INTERNAL MEDICINE
Payer: MEDICARE

## 2019-11-27 ENCOUNTER — APPOINTMENT (OUTPATIENT)
Dept: GENERAL RADIOLOGY | Age: 59
End: 2019-11-27
Payer: MEDICARE

## 2019-11-27 ENCOUNTER — APPOINTMENT (OUTPATIENT)
Dept: CT IMAGING | Age: 59
End: 2019-11-27
Payer: MEDICARE

## 2019-11-27 ENCOUNTER — HOSPITAL ENCOUNTER (EMERGENCY)
Age: 59
Discharge: ANOTHER HOSPITAL | End: 2019-11-27
Attending: EMERGENCY MEDICINE
Payer: MEDICARE

## 2019-11-27 VITALS
HEART RATE: 94 BPM | RESPIRATION RATE: 14 BRPM | BODY MASS INDEX: 41.2 KG/M2 | TEMPERATURE: 98.6 F | DIASTOLIC BLOOD PRESSURE: 69 MMHG | OXYGEN SATURATION: 95 % | WEIGHT: 240 LBS | SYSTOLIC BLOOD PRESSURE: 137 MMHG

## 2019-11-27 DIAGNOSIS — J44.9 CHRONIC OBSTRUCTIVE PULMONARY DISEASE, UNSPECIFIED COPD TYPE (HCC): ICD-10-CM

## 2019-11-27 DIAGNOSIS — R25.1 TREMORS OF NERVOUS SYSTEM: Primary | ICD-10-CM

## 2019-11-27 DIAGNOSIS — I42.8 OTHER CARDIOMYOPATHY (HCC): ICD-10-CM

## 2019-11-27 DIAGNOSIS — M62.81 GENERALIZED MUSCLE WEAKNESS: ICD-10-CM

## 2019-11-27 DIAGNOSIS — R29.6 FREQUENT FALLS: ICD-10-CM

## 2019-11-27 DIAGNOSIS — Z95.0 PACEMAKER: ICD-10-CM

## 2019-11-27 DIAGNOSIS — R26.2 INABILITY TO WALK: ICD-10-CM

## 2019-11-27 PROBLEM — R53.1 WEAKNESS: Status: ACTIVE | Noted: 2019-11-27

## 2019-11-27 LAB
A/G RATIO: 1.8 (ref 1.1–2.2)
ALBUMIN SERPL-MCNC: 4.6 G/DL (ref 3.4–5)
ALP BLD-CCNC: 62 U/L (ref 40–129)
ALT SERPL-CCNC: 23 U/L (ref 10–40)
ANION GAP SERPL CALCULATED.3IONS-SCNC: 16 MMOL/L (ref 3–16)
ANION GAP SERPL CALCULATED.3IONS-SCNC: 17 MMOL/L (ref 3–16)
AST SERPL-CCNC: 16 U/L (ref 15–37)
BASOPHILS ABSOLUTE: 0 K/UL (ref 0–0.2)
BASOPHILS ABSOLUTE: 0.1 K/UL (ref 0–0.2)
BASOPHILS RELATIVE PERCENT: 0.4 %
BASOPHILS RELATIVE PERCENT: 0.5 %
BILIRUB SERPL-MCNC: 0.7 MG/DL (ref 0–1)
BILIRUBIN URINE: NEGATIVE
BLOOD, URINE: NEGATIVE
BUN BLDV-MCNC: 10 MG/DL (ref 7–20)
BUN BLDV-MCNC: 9 MG/DL (ref 7–20)
CALCIUM SERPL-MCNC: 9.1 MG/DL (ref 8.3–10.6)
CALCIUM SERPL-MCNC: 9.2 MG/DL (ref 8.3–10.6)
CHLORIDE BLD-SCNC: 92 MMOL/L (ref 99–110)
CHLORIDE BLD-SCNC: 94 MMOL/L (ref 99–110)
CLARITY: CLEAR
CO2: 22 MMOL/L (ref 21–32)
CO2: 23 MMOL/L (ref 21–32)
COLOR: YELLOW
CREAT SERPL-MCNC: 0.7 MG/DL (ref 0.9–1.3)
CREAT SERPL-MCNC: 0.7 MG/DL (ref 0.9–1.3)
EKG ATRIAL RATE: 99 BPM
EKG DIAGNOSIS: NORMAL
EKG P AXIS: 56 DEGREES
EKG P-R INTERVAL: 180 MS
EKG Q-T INTERVAL: 332 MS
EKG QRS DURATION: 94 MS
EKG QTC CALCULATION (BAZETT): 426 MS
EKG R AXIS: -35 DEGREES
EKG T AXIS: 73 DEGREES
EKG VENTRICULAR RATE: 99 BPM
EOSINOPHILS ABSOLUTE: 0 K/UL (ref 0–0.6)
EOSINOPHILS ABSOLUTE: 0.1 K/UL (ref 0–0.6)
EOSINOPHILS RELATIVE PERCENT: 0.3 %
EOSINOPHILS RELATIVE PERCENT: 0.5 %
GFR AFRICAN AMERICAN: >60
GFR AFRICAN AMERICAN: >60
GFR NON-AFRICAN AMERICAN: >60
GFR NON-AFRICAN AMERICAN: >60
GLOBULIN: 2.5 G/DL
GLUCOSE BLD-MCNC: 134 MG/DL (ref 70–99)
GLUCOSE BLD-MCNC: 142 MG/DL (ref 70–99)
GLUCOSE BLD-MCNC: 149 MG/DL (ref 70–99)
GLUCOSE URINE: >=1000 MG/DL
HCT VFR BLD CALC: 43.9 % (ref 40.5–52.5)
HCT VFR BLD CALC: 44.9 % (ref 40.5–52.5)
HEMOGLOBIN: 14.9 G/DL (ref 13.5–17.5)
HEMOGLOBIN: 14.9 G/DL (ref 13.5–17.5)
INR BLD: 0.96 (ref 0.86–1.14)
KETONES, URINE: 15 MG/DL
LACTIC ACID: 2.3 MMOL/L (ref 0.4–2)
LACTIC ACID: 3.8 MMOL/L (ref 0.4–2)
LEUKOCYTE ESTERASE, URINE: NEGATIVE
LYMPHOCYTES ABSOLUTE: 1.4 K/UL (ref 1–5.1)
LYMPHOCYTES ABSOLUTE: 2.2 K/UL (ref 1–5.1)
LYMPHOCYTES RELATIVE PERCENT: 11.9 %
LYMPHOCYTES RELATIVE PERCENT: 17.8 %
MAGNESIUM: 1.8 MG/DL (ref 1.8–2.4)
MCH RBC QN AUTO: 29.3 PG (ref 26–34)
MCH RBC QN AUTO: 29.8 PG (ref 26–34)
MCHC RBC AUTO-ENTMCNC: 33.1 G/DL (ref 31–36)
MCHC RBC AUTO-ENTMCNC: 34 G/DL (ref 31–36)
MCV RBC AUTO: 87.8 FL (ref 80–100)
MCV RBC AUTO: 88.5 FL (ref 80–100)
MICROSCOPIC EXAMINATION: ABNORMAL
MONOCYTES ABSOLUTE: 0.9 K/UL (ref 0–1.3)
MONOCYTES ABSOLUTE: 1 K/UL (ref 0–1.3)
MONOCYTES RELATIVE PERCENT: 8.1 %
MONOCYTES RELATIVE PERCENT: 8.2 %
NEUTROPHILS ABSOLUTE: 9 K/UL (ref 1.7–7.7)
NEUTROPHILS ABSOLUTE: 9.1 K/UL (ref 1.7–7.7)
NEUTROPHILS RELATIVE PERCENT: 73.2 %
NEUTROPHILS RELATIVE PERCENT: 79.1 %
NITRITE, URINE: NEGATIVE
PDW BLD-RTO: 14.5 % (ref 12.4–15.4)
PDW BLD-RTO: 14.6 % (ref 12.4–15.4)
PERFORMED ON: ABNORMAL
PH UA: 7.5 (ref 5–8)
PHOSPHORUS: 2.1 MG/DL (ref 2.5–4.9)
PLATELET # BLD: 113 K/UL (ref 135–450)
PLATELET # BLD: 94 K/UL (ref 135–450)
PLATELET SLIDE REVIEW: ABNORMAL
PMV BLD AUTO: 7 FL (ref 5–10.5)
PMV BLD AUTO: 7.1 FL (ref 5–10.5)
POTASSIUM REFLEX MAGNESIUM: 4 MMOL/L (ref 3.5–5.1)
POTASSIUM SERPL-SCNC: 4.5 MMOL/L (ref 3.5–5.1)
PROTEIN UA: NEGATIVE MG/DL
PROTHROMBIN TIME: 11.1 SEC (ref 10–13.2)
RBC # BLD: 5 M/UL (ref 4.2–5.9)
RBC # BLD: 5.07 M/UL (ref 4.2–5.9)
SLIDE REVIEW: ABNORMAL
SODIUM BLD-SCNC: 130 MMOL/L (ref 136–145)
SODIUM BLD-SCNC: 134 MMOL/L (ref 136–145)
SPECIFIC GRAVITY UA: 1.01 (ref 1–1.03)
TOTAL CK: 61 U/L (ref 39–308)
TOTAL PROTEIN: 7.1 G/DL (ref 6.4–8.2)
TROPONIN: <0.01 NG/ML
TROPONIN: <0.01 NG/ML
URINE TYPE: ABNORMAL
UROBILINOGEN, URINE: 0.2 E.U./DL
WBC # BLD: 11.4 K/UL (ref 4–11)
WBC # BLD: 12.3 K/UL (ref 4–11)

## 2019-11-27 PROCEDURE — 36415 COLL VENOUS BLD VENIPUNCTURE: CPT

## 2019-11-27 PROCEDURE — 84436 ASSAY OF TOTAL THYROXINE: CPT

## 2019-11-27 PROCEDURE — 84484 ASSAY OF TROPONIN QUANT: CPT

## 2019-11-27 PROCEDURE — 94150 VITAL CAPACITY TEST: CPT

## 2019-11-27 PROCEDURE — 83735 ASSAY OF MAGNESIUM: CPT

## 2019-11-27 PROCEDURE — 84443 ASSAY THYROID STIM HORMONE: CPT

## 2019-11-27 PROCEDURE — 83605 ASSAY OF LACTIC ACID: CPT

## 2019-11-27 PROCEDURE — 99285 EMERGENCY DEPT VISIT HI MDM: CPT

## 2019-11-27 PROCEDURE — 2580000003 HC RX 258: Performed by: NURSE PRACTITIONER

## 2019-11-27 PROCEDURE — 81003 URINALYSIS AUTO W/O SCOPE: CPT

## 2019-11-27 PROCEDURE — 82010 KETONE BODYS QUAN: CPT

## 2019-11-27 PROCEDURE — 71045 X-RAY EXAM CHEST 1 VIEW: CPT

## 2019-11-27 PROCEDURE — 83036 HEMOGLOBIN GLYCOSYLATED A1C: CPT

## 2019-11-27 PROCEDURE — 82550 ASSAY OF CK (CPK): CPT

## 2019-11-27 PROCEDURE — 85610 PROTHROMBIN TIME: CPT

## 2019-11-27 PROCEDURE — 96366 THER/PROPH/DIAG IV INF ADDON: CPT

## 2019-11-27 PROCEDURE — 80048 BASIC METABOLIC PNL TOTAL CA: CPT

## 2019-11-27 PROCEDURE — 6370000000 HC RX 637 (ALT 250 FOR IP): Performed by: INTERNAL MEDICINE

## 2019-11-27 PROCEDURE — 85025 COMPLETE CBC W/AUTO DIFF WBC: CPT

## 2019-11-27 PROCEDURE — 96365 THER/PROPH/DIAG IV INF INIT: CPT

## 2019-11-27 PROCEDURE — 84100 ASSAY OF PHOSPHORUS: CPT

## 2019-11-27 PROCEDURE — 2580000003 HC RX 258: Performed by: EMERGENCY MEDICINE

## 2019-11-27 PROCEDURE — 93010 ELECTROCARDIOGRAM REPORT: CPT | Performed by: INTERNAL MEDICINE

## 2019-11-27 PROCEDURE — 6370000000 HC RX 637 (ALT 250 FOR IP): Performed by: NURSE PRACTITIONER

## 2019-11-27 PROCEDURE — 80053 COMPREHEN METABOLIC PANEL: CPT

## 2019-11-27 PROCEDURE — 93005 ELECTROCARDIOGRAM TRACING: CPT | Performed by: EMERGENCY MEDICINE

## 2019-11-27 PROCEDURE — G0379 DIRECT REFER HOSPITAL OBSERV: HCPCS

## 2019-11-27 PROCEDURE — 84480 ASSAY TRIIODOTHYRONINE (T3): CPT

## 2019-11-27 PROCEDURE — 70450 CT HEAD/BRAIN W/O DYE: CPT

## 2019-11-27 PROCEDURE — G0378 HOSPITAL OBSERVATION PER HR: HCPCS

## 2019-11-27 RX ORDER — SODIUM CHLORIDE 9 MG/ML
INJECTION, SOLUTION INTRAVENOUS CONTINUOUS
Status: DISCONTINUED | OUTPATIENT
Start: 2019-11-27 | End: 2019-11-29 | Stop reason: HOSPADM

## 2019-11-27 RX ORDER — DILTIAZEM HYDROCHLORIDE 180 MG/1
180 CAPSULE, COATED, EXTENDED RELEASE ORAL DAILY
Status: DISCONTINUED | OUTPATIENT
Start: 2019-11-28 | End: 2019-11-29 | Stop reason: HOSPADM

## 2019-11-27 RX ORDER — DEXTROSE MONOHYDRATE 25 G/50ML
12.5 INJECTION, SOLUTION INTRAVENOUS PRN
Status: DISCONTINUED | OUTPATIENT
Start: 2019-11-27 | End: 2019-11-29 | Stop reason: HOSPADM

## 2019-11-27 RX ORDER — CLOPIDOGREL BISULFATE 75 MG/1
75 TABLET ORAL DAILY
Status: DISCONTINUED | OUTPATIENT
Start: 2019-11-28 | End: 2019-11-29 | Stop reason: HOSPADM

## 2019-11-27 RX ORDER — NICOTINE POLACRILEX 4 MG
15 LOZENGE BUCCAL PRN
Status: DISCONTINUED | OUTPATIENT
Start: 2019-11-27 | End: 2019-11-29 | Stop reason: HOSPADM

## 2019-11-27 RX ORDER — FAMOTIDINE 20 MG/1
20 TABLET, FILM COATED ORAL 2 TIMES DAILY
Status: DISCONTINUED | OUTPATIENT
Start: 2019-11-27 | End: 2019-11-29 | Stop reason: HOSPADM

## 2019-11-27 RX ORDER — LISINOPRIL 20 MG/1
20 TABLET ORAL DAILY
Status: DISCONTINUED | OUTPATIENT
Start: 2019-11-28 | End: 2019-11-29 | Stop reason: HOSPADM

## 2019-11-27 RX ORDER — SODIUM CHLORIDE 0.9 % (FLUSH) 0.9 %
10 SYRINGE (ML) INJECTION EVERY 12 HOURS SCHEDULED
Status: DISCONTINUED | OUTPATIENT
Start: 2019-11-27 | End: 2019-11-29 | Stop reason: HOSPADM

## 2019-11-27 RX ORDER — ACETAMINOPHEN 325 MG/1
650 TABLET ORAL EVERY 4 HOURS PRN
Status: DISCONTINUED | OUTPATIENT
Start: 2019-11-27 | End: 2019-11-29 | Stop reason: HOSPADM

## 2019-11-27 RX ORDER — DEXTROSE MONOHYDRATE 50 MG/ML
100 INJECTION, SOLUTION INTRAVENOUS PRN
Status: DISCONTINUED | OUTPATIENT
Start: 2019-11-27 | End: 2019-11-29 | Stop reason: HOSPADM

## 2019-11-27 RX ORDER — ALBUTEROL SULFATE 90 UG/1
2 AEROSOL, METERED RESPIRATORY (INHALATION) EVERY 6 HOURS PRN
Status: DISCONTINUED | OUTPATIENT
Start: 2019-11-27 | End: 2019-11-29 | Stop reason: HOSPADM

## 2019-11-27 RX ORDER — DIVALPROEX SODIUM 250 MG/1
1000 TABLET, DELAYED RELEASE ORAL NIGHTLY
Status: DISCONTINUED | OUTPATIENT
Start: 2019-11-27 | End: 2019-11-29 | Stop reason: HOSPADM

## 2019-11-27 RX ORDER — FLUTICASONE PROPIONATE 50 MCG
1 SPRAY, SUSPENSION (ML) NASAL DAILY
Status: DISCONTINUED | OUTPATIENT
Start: 2019-11-28 | End: 2019-11-29 | Stop reason: HOSPADM

## 2019-11-27 RX ORDER — INSULIN GLARGINE 100 [IU]/ML
60 INJECTION, SOLUTION SUBCUTANEOUS NIGHTLY
Status: DISCONTINUED | OUTPATIENT
Start: 2019-11-27 | End: 2019-11-29 | Stop reason: HOSPADM

## 2019-11-27 RX ORDER — ONDANSETRON 2 MG/ML
4 INJECTION INTRAMUSCULAR; INTRAVENOUS EVERY 6 HOURS PRN
Status: DISCONTINUED | OUTPATIENT
Start: 2019-11-27 | End: 2019-11-29 | Stop reason: HOSPADM

## 2019-11-27 RX ORDER — 0.9 % SODIUM CHLORIDE 0.9 %
1000 INTRAVENOUS SOLUTION INTRAVENOUS ONCE
Status: COMPLETED | OUTPATIENT
Start: 2019-11-27 | End: 2019-11-27

## 2019-11-27 RX ORDER — SODIUM CHLORIDE 9 MG/ML
INJECTION, SOLUTION INTRAVENOUS CONTINUOUS
Status: DISCONTINUED | OUTPATIENT
Start: 2019-11-27 | End: 2019-11-27 | Stop reason: HOSPADM

## 2019-11-27 RX ORDER — FLUTICASONE FUROATE AND VILANTEROL 200; 25 UG/1; UG/1
1 POWDER RESPIRATORY (INHALATION) DAILY
Status: DISCONTINUED | OUTPATIENT
Start: 2019-11-28 | End: 2019-11-27 | Stop reason: CLARIF

## 2019-11-27 RX ORDER — DULOXETIN HYDROCHLORIDE 60 MG/1
60 CAPSULE, DELAYED RELEASE ORAL DAILY
Status: DISCONTINUED | OUTPATIENT
Start: 2019-11-28 | End: 2019-11-29 | Stop reason: HOSPADM

## 2019-11-27 RX ORDER — ATORVASTATIN CALCIUM 80 MG/1
80 TABLET, FILM COATED ORAL DAILY
Status: DISCONTINUED | OUTPATIENT
Start: 2019-11-28 | End: 2019-11-29 | Stop reason: HOSPADM

## 2019-11-27 RX ORDER — GABAPENTIN 100 MG/1
100 CAPSULE ORAL 3 TIMES DAILY
Status: DISCONTINUED | OUTPATIENT
Start: 2019-11-27 | End: 2019-11-29 | Stop reason: HOSPADM

## 2019-11-27 RX ORDER — METOPROLOL SUCCINATE 50 MG/1
100 TABLET, EXTENDED RELEASE ORAL 2 TIMES DAILY
Status: DISCONTINUED | OUTPATIENT
Start: 2019-11-27 | End: 2019-11-29 | Stop reason: HOSPADM

## 2019-11-27 RX ORDER — SODIUM CHLORIDE 0.9 % (FLUSH) 0.9 %
10 SYRINGE (ML) INJECTION PRN
Status: DISCONTINUED | OUTPATIENT
Start: 2019-11-27 | End: 2019-11-29 | Stop reason: HOSPADM

## 2019-11-27 RX ADMIN — FAMOTIDINE 20 MG: 20 TABLET ORAL at 23:20

## 2019-11-27 RX ADMIN — INSULIN GLARGINE 60 UNITS: 100 INJECTION, SOLUTION SUBCUTANEOUS at 23:20

## 2019-11-27 RX ADMIN — SODIUM CHLORIDE: 9 INJECTION, SOLUTION INTRAVENOUS at 23:24

## 2019-11-27 RX ADMIN — SODIUM CHLORIDE 1000 ML: 9 INJECTION, SOLUTION INTRAVENOUS at 21:54

## 2019-11-27 RX ADMIN — Medication 10 ML: at 23:30

## 2019-11-27 RX ADMIN — SODIUM CHLORIDE: 9 INJECTION, SOLUTION INTRAVENOUS at 16:18

## 2019-11-27 RX ADMIN — INSULIN LISPRO 1 UNITS: 100 INJECTION, SOLUTION INTRAVENOUS; SUBCUTANEOUS at 23:21

## 2019-11-27 RX ADMIN — DIVALPROEX SODIUM 1000 MG: 250 TABLET, DELAYED RELEASE ORAL at 23:20

## 2019-11-27 RX ADMIN — GABAPENTIN 100 MG: 100 CAPSULE ORAL at 23:19

## 2019-11-27 RX ADMIN — METOPROLOL SUCCINATE 100 MG: 50 TABLET, EXTENDED RELEASE ORAL at 23:28

## 2019-11-27 ASSESSMENT — ENCOUNTER SYMPTOMS
ABDOMINAL PAIN: 0
FACIAL SWELLING: 0
STRIDOR: 0
WHEEZING: 0
TROUBLE SWALLOWING: 0
ABDOMINAL DISTENTION: 0
SINUS PAIN: 0
SINUS PRESSURE: 0
SORE THROAT: 0
VOMITING: 0
VOICE CHANGE: 0
SHORTNESS OF BREATH: 0

## 2019-11-27 ASSESSMENT — PAIN SCALES - GENERAL: PAINLEVEL_OUTOF10: 8

## 2019-11-27 ASSESSMENT — PAIN DESCRIPTION - LOCATION: LOCATION: NECK

## 2019-11-27 ASSESSMENT — PAIN DESCRIPTION - PAIN TYPE: TYPE: CHRONIC PAIN

## 2019-11-28 LAB
A/G RATIO: 1.7 (ref 1.1–2.2)
ALBUMIN SERPL-MCNC: 4.2 G/DL (ref 3.4–5)
ALP BLD-CCNC: 54 U/L (ref 40–129)
ALT SERPL-CCNC: 18 U/L (ref 10–40)
AMPHETAMINE SCREEN, URINE: NORMAL
ANION GAP SERPL CALCULATED.3IONS-SCNC: 14 MMOL/L (ref 3–16)
AST SERPL-CCNC: 14 U/L (ref 15–37)
BARBITURATE SCREEN URINE: NORMAL
BASOPHILS ABSOLUTE: 0 K/UL (ref 0–0.2)
BASOPHILS RELATIVE PERCENT: 0.4 %
BENZODIAZEPINE SCREEN, URINE: NORMAL
BETA-HYDROXYBUTYRATE: 0.58 MMOL/L (ref 0–0.27)
BILIRUB SERPL-MCNC: 0.5 MG/DL (ref 0–1)
BUN BLDV-MCNC: 10 MG/DL (ref 7–20)
CALCIUM SERPL-MCNC: 9 MG/DL (ref 8.3–10.6)
CANNABINOID SCREEN URINE: NORMAL
CHLORIDE BLD-SCNC: 97 MMOL/L (ref 99–110)
CO2: 24 MMOL/L (ref 21–32)
COCAINE METABOLITE SCREEN URINE: NORMAL
CREAT SERPL-MCNC: 0.6 MG/DL (ref 0.9–1.3)
EOSINOPHILS ABSOLUTE: 0 K/UL (ref 0–0.6)
EOSINOPHILS RELATIVE PERCENT: 0.5 %
ESTIMATED AVERAGE GLUCOSE: 159.9 MG/DL
ETHANOL: NORMAL MG/DL (ref 0–0.08)
GFR AFRICAN AMERICAN: >60
GFR NON-AFRICAN AMERICAN: >60
GLOBULIN: 2.5 G/DL
GLUCOSE BLD-MCNC: 118 MG/DL (ref 70–99)
GLUCOSE BLD-MCNC: 144 MG/DL (ref 70–99)
GLUCOSE BLD-MCNC: 152 MG/DL (ref 70–99)
GLUCOSE BLD-MCNC: 152 MG/DL (ref 70–99)
GLUCOSE BLD-MCNC: 164 MG/DL (ref 70–99)
HBA1C MFR BLD: 7.2 %
HCT VFR BLD CALC: 43.7 % (ref 40.5–52.5)
HEMOGLOBIN: 14.8 G/DL (ref 13.5–17.5)
LYMPHOCYTES ABSOLUTE: 2.3 K/UL (ref 1–5.1)
LYMPHOCYTES RELATIVE PERCENT: 28 %
Lab: NORMAL
MCH RBC QN AUTO: 30 PG (ref 26–34)
MCHC RBC AUTO-ENTMCNC: 34 G/DL (ref 31–36)
MCV RBC AUTO: 88.3 FL (ref 80–100)
METHADONE SCREEN, URINE: NORMAL
MONOCYTES ABSOLUTE: 0.6 K/UL (ref 0–1.3)
MONOCYTES RELATIVE PERCENT: 8 %
NEUTROPHILS ABSOLUTE: 5.1 K/UL (ref 1.7–7.7)
NEUTROPHILS RELATIVE PERCENT: 63.1 %
OPIATE SCREEN URINE: NORMAL
OXYCODONE URINE: NORMAL
PDW BLD-RTO: 14.5 % (ref 12.4–15.4)
PERFORMED ON: ABNORMAL
PH UA: 7
PHENCYCLIDINE SCREEN URINE: NORMAL
PLATELET # BLD: 96 K/UL (ref 135–450)
PLATELET SLIDE REVIEW: ABNORMAL
PMV BLD AUTO: 7 FL (ref 5–10.5)
POTASSIUM REFLEX MAGNESIUM: 4.1 MMOL/L (ref 3.5–5.1)
PROPOXYPHENE SCREEN: NORMAL
RBC # BLD: 4.95 M/UL (ref 4.2–5.9)
SLIDE REVIEW: ABNORMAL
SODIUM BLD-SCNC: 135 MMOL/L (ref 136–145)
T3 TOTAL: 1.1 NG/ML (ref 0.8–2)
T4 TOTAL: 6.7 UG/DL (ref 4.5–10.9)
TOTAL PROTEIN: 6.7 G/DL (ref 6.4–8.2)
TROPONIN: <0.01 NG/ML
TSH SERPL DL<=0.05 MIU/L-ACNC: 2.75 UIU/ML (ref 0.27–4.2)
WBC # BLD: 8.1 K/UL (ref 4–11)

## 2019-11-28 PROCEDURE — 2580000003 HC RX 258: Performed by: NURSE PRACTITIONER

## 2019-11-28 PROCEDURE — G0378 HOSPITAL OBSERVATION PER HR: HCPCS

## 2019-11-28 PROCEDURE — 85025 COMPLETE CBC W/AUTO DIFF WBC: CPT

## 2019-11-28 PROCEDURE — 94640 AIRWAY INHALATION TREATMENT: CPT

## 2019-11-28 PROCEDURE — 80053 COMPREHEN METABOLIC PANEL: CPT

## 2019-11-28 PROCEDURE — G0480 DRUG TEST DEF 1-7 CLASSES: HCPCS

## 2019-11-28 PROCEDURE — 36415 COLL VENOUS BLD VENIPUNCTURE: CPT

## 2019-11-28 PROCEDURE — 6370000000 HC RX 637 (ALT 250 FOR IP): Performed by: NURSE PRACTITIONER

## 2019-11-28 PROCEDURE — 51798 US URINE CAPACITY MEASURE: CPT

## 2019-11-28 PROCEDURE — 6360000002 HC RX W HCPCS: Performed by: NURSE PRACTITIONER

## 2019-11-28 PROCEDURE — 80307 DRUG TEST PRSMV CHEM ANLYZR: CPT

## 2019-11-28 PROCEDURE — 96372 THER/PROPH/DIAG INJ SC/IM: CPT

## 2019-11-28 PROCEDURE — 84484 ASSAY OF TROPONIN QUANT: CPT

## 2019-11-28 RX ADMIN — METOPROLOL SUCCINATE 100 MG: 50 TABLET, EXTENDED RELEASE ORAL at 08:20

## 2019-11-28 RX ADMIN — FLUTICASONE PROPIONATE 1 SPRAY: 50 SPRAY, METERED NASAL at 08:18

## 2019-11-28 RX ADMIN — INSULIN LISPRO 1 UNITS: 100 INJECTION, SOLUTION INTRAVENOUS; SUBCUTANEOUS at 08:24

## 2019-11-28 RX ADMIN — INSULIN LISPRO 10 UNITS: 100 INJECTION, SOLUTION INTRAVENOUS; SUBCUTANEOUS at 18:29

## 2019-11-28 RX ADMIN — GABAPENTIN 100 MG: 100 CAPSULE ORAL at 20:26

## 2019-11-28 RX ADMIN — INSULIN LISPRO 10 UNITS: 100 INJECTION, SOLUTION INTRAVENOUS; SUBCUTANEOUS at 08:24

## 2019-11-28 RX ADMIN — Medication 2 PUFF: at 19:56

## 2019-11-28 RX ADMIN — INSULIN GLARGINE 60 UNITS: 100 INJECTION, SOLUTION SUBCUTANEOUS at 21:37

## 2019-11-28 RX ADMIN — ENOXAPARIN SODIUM 40 MG: 40 INJECTION SUBCUTANEOUS at 08:18

## 2019-11-28 RX ADMIN — SODIUM CHLORIDE: 9 INJECTION, SOLUTION INTRAVENOUS at 18:33

## 2019-11-28 RX ADMIN — Medication 2 PUFF: at 08:35

## 2019-11-28 RX ADMIN — DIVALPROEX SODIUM 1000 MG: 250 TABLET, DELAYED RELEASE ORAL at 20:26

## 2019-11-28 RX ADMIN — GABAPENTIN 100 MG: 100 CAPSULE ORAL at 14:51

## 2019-11-28 RX ADMIN — FAMOTIDINE 20 MG: 20 TABLET ORAL at 20:26

## 2019-11-28 RX ADMIN — LISINOPRIL 20 MG: 20 TABLET ORAL at 08:20

## 2019-11-28 RX ADMIN — INSULIN LISPRO 1 UNITS: 100 INJECTION, SOLUTION INTRAVENOUS; SUBCUTANEOUS at 18:29

## 2019-11-28 RX ADMIN — CLOPIDOGREL BISULFATE 75 MG: 75 TABLET ORAL at 08:20

## 2019-11-28 RX ADMIN — ATORVASTATIN CALCIUM 80 MG: 80 TABLET, FILM COATED ORAL at 08:20

## 2019-11-28 RX ADMIN — GABAPENTIN 100 MG: 100 CAPSULE ORAL at 08:20

## 2019-11-28 RX ADMIN — Medication 10 ML: at 20:26

## 2019-11-28 RX ADMIN — SODIUM CHLORIDE: 9 INJECTION, SOLUTION INTRAVENOUS at 08:18

## 2019-11-28 RX ADMIN — METOPROLOL SUCCINATE 100 MG: 50 TABLET, EXTENDED RELEASE ORAL at 20:26

## 2019-11-28 RX ADMIN — FAMOTIDINE 20 MG: 20 TABLET ORAL at 08:20

## 2019-11-28 RX ADMIN — ASPIRIN 325 MG: 325 TABLET, DELAYED RELEASE ORAL at 08:20

## 2019-11-28 RX ADMIN — DULOXETINE HYDROCHLORIDE 60 MG: 60 CAPSULE, DELAYED RELEASE ORAL at 08:20

## 2019-11-28 RX ADMIN — INSULIN LISPRO 1 UNITS: 100 INJECTION, SOLUTION INTRAVENOUS; SUBCUTANEOUS at 21:37

## 2019-11-28 RX ADMIN — DILTIAZEM HYDROCHLORIDE 180 MG: 180 CAPSULE, COATED, EXTENDED RELEASE ORAL at 08:20

## 2019-11-28 ASSESSMENT — PAIN SCALES - GENERAL
PAINLEVEL_OUTOF10: 0
PAINLEVEL_OUTOF10: 0

## 2019-11-29 VITALS
WEIGHT: 242 LBS | TEMPERATURE: 97.5 F | HEART RATE: 81 BPM | BODY MASS INDEX: 41.32 KG/M2 | OXYGEN SATURATION: 95 % | DIASTOLIC BLOOD PRESSURE: 72 MMHG | SYSTOLIC BLOOD PRESSURE: 137 MMHG | HEIGHT: 64 IN | RESPIRATION RATE: 16 BRPM

## 2019-11-29 LAB
GLUCOSE BLD-MCNC: 114 MG/DL (ref 70–99)
GLUCOSE BLD-MCNC: 140 MG/DL (ref 70–99)
GLUCOSE BLD-MCNC: 187 MG/DL (ref 70–99)
LV EF: 38 %
LVEF MODALITY: NORMAL
PERFORMED ON: ABNORMAL

## 2019-11-29 PROCEDURE — 97116 GAIT TRAINING THERAPY: CPT

## 2019-11-29 PROCEDURE — G0378 HOSPITAL OBSERVATION PER HR: HCPCS

## 2019-11-29 PROCEDURE — 94640 AIRWAY INHALATION TREATMENT: CPT

## 2019-11-29 PROCEDURE — C8929 TTE W OR WO FOL WCON,DOPPLER: HCPCS

## 2019-11-29 PROCEDURE — 97161 PT EVAL LOW COMPLEX 20 MIN: CPT

## 2019-11-29 PROCEDURE — 97535 SELF CARE MNGMENT TRAINING: CPT

## 2019-11-29 PROCEDURE — 2580000003 HC RX 258: Performed by: NURSE PRACTITIONER

## 2019-11-29 PROCEDURE — 99220 PR INITIAL OBSERVATION CARE/DAY 70 MINUTES: CPT | Performed by: PSYCHIATRY & NEUROLOGY

## 2019-11-29 PROCEDURE — 97110 THERAPEUTIC EXERCISES: CPT

## 2019-11-29 PROCEDURE — 6370000000 HC RX 637 (ALT 250 FOR IP): Performed by: NURSE PRACTITIONER

## 2019-11-29 PROCEDURE — 97166 OT EVAL MOD COMPLEX 45 MIN: CPT

## 2019-11-29 PROCEDURE — 93306 TTE W/DOPPLER COMPLETE: CPT

## 2019-11-29 RX ADMIN — Medication 10 ML: at 09:16

## 2019-11-29 RX ADMIN — Medication 2 PUFF: at 08:26

## 2019-11-29 RX ADMIN — INSULIN LISPRO 1 UNITS: 100 INJECTION, SOLUTION INTRAVENOUS; SUBCUTANEOUS at 13:06

## 2019-11-29 RX ADMIN — LISINOPRIL 20 MG: 20 TABLET ORAL at 09:15

## 2019-11-29 RX ADMIN — DILTIAZEM HYDROCHLORIDE 180 MG: 180 CAPSULE, COATED, EXTENDED RELEASE ORAL at 09:15

## 2019-11-29 RX ADMIN — CLOPIDOGREL BISULFATE 75 MG: 75 TABLET ORAL at 09:15

## 2019-11-29 RX ADMIN — ASPIRIN 325 MG: 325 TABLET, DELAYED RELEASE ORAL at 09:15

## 2019-11-29 RX ADMIN — METOPROLOL SUCCINATE 100 MG: 50 TABLET, EXTENDED RELEASE ORAL at 09:15

## 2019-11-29 RX ADMIN — ATORVASTATIN CALCIUM 80 MG: 80 TABLET, FILM COATED ORAL at 09:15

## 2019-11-29 RX ADMIN — DULOXETINE HYDROCHLORIDE 60 MG: 60 CAPSULE, DELAYED RELEASE ORAL at 09:15

## 2019-11-29 RX ADMIN — GABAPENTIN 100 MG: 100 CAPSULE ORAL at 14:43

## 2019-11-29 RX ADMIN — FAMOTIDINE 20 MG: 20 TABLET ORAL at 09:15

## 2019-11-29 RX ADMIN — GABAPENTIN 100 MG: 100 CAPSULE ORAL at 09:15

## 2019-11-29 RX ADMIN — FLUTICASONE PROPIONATE 1 SPRAY: 50 SPRAY, METERED NASAL at 13:05

## 2019-11-29 RX ADMIN — SODIUM CHLORIDE: 9 INJECTION, SOLUTION INTRAVENOUS at 04:46

## 2019-12-24 ENCOUNTER — NURSE ONLY (OUTPATIENT)
Dept: CARDIOLOGY CLINIC | Age: 59
End: 2019-12-24
Payer: MEDICARE

## 2019-12-24 DIAGNOSIS — I25.5 ISCHEMIC CARDIOMYOPATHY: ICD-10-CM

## 2019-12-24 DIAGNOSIS — Z95.810 AUTOMATIC IMPLANTABLE CARDIOVERTER-DEFIBRILLATOR IN SITU: ICD-10-CM

## 2019-12-24 DIAGNOSIS — Z95.810 ICD (IMPLANTABLE CARDIOVERTER-DEFIBRILLATOR) IN PLACE: ICD-10-CM

## 2019-12-24 DIAGNOSIS — I47.20 VENTRICULAR TACHYCARDIA: ICD-10-CM

## 2019-12-24 PROCEDURE — 93296 REM INTERROG EVL PM/IDS: CPT | Performed by: INTERNAL MEDICINE

## 2019-12-24 PROCEDURE — 93295 DEV INTERROG REMOTE 1/2/MLT: CPT | Performed by: INTERNAL MEDICINE

## 2020-01-15 ENCOUNTER — HOSPITAL ENCOUNTER (OUTPATIENT)
Age: 60
Discharge: HOME | End: 2020-01-15
Payer: MEDICARE

## 2020-01-15 ENCOUNTER — HOSPITAL ENCOUNTER (OUTPATIENT)
Dept: GENERAL RADIOLOGY | Age: 60
Discharge: HOME | End: 2020-01-15
Payer: MEDICARE

## 2020-01-15 PROCEDURE — 72040 X-RAY EXAM NECK SPINE 2-3 VW: CPT

## 2020-01-15 PROCEDURE — 72100 X-RAY EXAM L-S SPINE 2/3 VWS: CPT

## 2020-01-15 RX ORDER — FUROSEMIDE 20 MG/1
TABLET ORAL
Qty: 4 TABLET | Refills: 0 | Status: SHIPPED | OUTPATIENT
Start: 2020-01-15 | End: 2020-03-06

## 2020-01-15 RX ORDER — POTASSIUM CHLORIDE 20 MEQ/1
TABLET, EXTENDED RELEASE ORAL
Qty: 8 TABLET | Refills: 0 | Status: SHIPPED | OUTPATIENT
Start: 2020-01-15 | End: 2020-03-06

## 2020-03-03 ENCOUNTER — OFFICE VISIT (OUTPATIENT)
Dept: ORTHOPEDIC SURGERY | Age: 60
End: 2020-03-03
Payer: MEDICARE

## 2020-03-03 VITALS
SYSTOLIC BLOOD PRESSURE: 132 MMHG | BODY MASS INDEX: 41.33 KG/M2 | WEIGHT: 242.06 LBS | DIASTOLIC BLOOD PRESSURE: 86 MMHG | HEIGHT: 64 IN | HEART RATE: 76 BPM

## 2020-03-03 PROCEDURE — 99204 OFFICE O/P NEW MOD 45 MIN: CPT | Performed by: PHYSICAL MEDICINE & REHABILITATION

## 2020-03-03 NOTE — PROGRESS NOTES
New Patient: SPINE    Referring Provider:  Bard Tru PA-C    Chief Complaint   Patient presents with    Lower Back Pain     NP LSP       HISTORY OF PRESENT ILLNESS:      · The patient is being sent at the request of Bard Tru PA-C in consultation as a new spine patient for low back pain. The patient is a 64 y.o. male whom reports symptoms for 10 years. Symptoms progressed over the last  10 years. Patient reports there was not a significant event to cause the symptoms. Today discomfort is report at 10 out of 10, describing it as sharp, stabbing. Symptoms are aggravated by: bending. Patient has undergone recent treatment including, physical therapy, muscle relaxer. Patient denies previous lumbar spine surgery. · Mr. Quiroga presents today for chronic low back pain without any radiation. He notes that his symptoms are intermittent in nature. He feels as if there is weakness in his legs that causes him to fall. However, he is unsure if this is related to his low back pain or not. His symptoms do not wake him up at night. Physical therapy helped slightly. He feels that Flexeril has stopped working and his body has built up a tolerance to the medication. He is not currently ambulating with any assistive devices today in the office.      Pain Assessment  Location of Pain: Back(LSP)  Severity of Pain: 10  Quality of Pain: Sharp  Duration of Pain: Persistent  Frequency of Pain: Intermittent  Date Pain First Started: (2008)  Aggravating Factors: Bending  Limiting Behavior: Yes  Result of Injury: No  Work-Related Injury: No  Are there other pain locations you wish to document?: No      Associated signs and symptoms:   Neurogenic bowel or bladder symptoms:  no   Perceived weakness:  yes   Difficulty walking:  no    Recent Imaging (within past one year)   Xrays: yes   MRI or CT of spine: no    Current/Past Treatment:   · Physical Therapy:  yes  · Chiropractic:  none  · Injection: metFORMIN (GLUCOPHAGE) 1000 MG tablet, TAKE ONE TABLET BY MOUTH 2 TIMES DAILY WITH MORNING AND EVENING MEALS, Disp: 60 tablet, Rfl: 0    cyclobenzaprine (FLEXERIL) 10 MG tablet, TAKE 1 TABLET BY MOUTH DAILY, MAY CAUSE DROWSINESS, Disp: 30 tablet, Rfl: 0    famotidine (PEPCID) 20 MG tablet, TAKE 1 TABLET BY MOUTH 2 TIMES DAILY, Disp: 60 tablet, Rfl: 0    gabapentin (NEURONTIN) 100 MG capsule, TAKE 2 CAPSULES BY MOUTH 3 TIMES DAILY, Disp: 180 capsule, Rfl: 0    fluticasone (FLONASE) 50 MCG/ACT nasal spray, USE 1 SPRAY IN EACH NOSTRIL EVERY DAY, Disp: 16 g, Rfl: 0    TRUE METRIX BLOOD GLUCOSE TEST strip, CHECK SUGARS TWICE A DAY AS DIRECTED, DX: E11.9, Disp: 100 each, Rfl: 5    lisinopril (PRINIVIL;ZESTRIL) 20 MG tablet, TAKE 1 TABLET BY MOUTH DAILY, Disp: 30 tablet, Rfl: 11    atorvastatin (LIPITOR) 80 MG tablet, TAKE ONE TABLET BY MOUTH DAILY, Disp: 30 tablet, Rfl: 11    aspirin 325 MG EC tablet, TAKE ONE TABLET BY MOUTH DAILY, Disp: 30 tablet, Rfl: 11    UNIFINE PENTIPS 31G X 8 MM MISC, USE AS DIRECTED FOR INSULIN .00, Disp: 100 each, Rfl: 5    diltiazem (CARDIZEM CD) 180 MG extended release capsule, TAKE 1 CAPSULE DAILY, Disp: 30 capsule, Rfl: 11    metoprolol succinate (TOPROL XL) 200 MG extended release tablet, TAKE ONE-HALF TABLET TWICE A DAY, Disp: 30 tablet, Rfl: 11    TRUEPLUS LANCETS 30G MISC, CHECK BLOOD SUGAR TWICE A DAY DX E11.9, Disp: 100 each, Rfl: 11    DULoxetine (CYMBALTA) 60 MG extended release capsule, Take 60 mg by mouth daily, Disp: , Rfl:     divalproex (DEPAKOTE) 500 MG DR tablet, Take 1,000 mg by mouth nightly, Disp: , Rfl:     Liraglutide (VICTOZA SC), Inject 1.8 Units into the skin daily. , Disp: , Rfl:     albuterol sulfate HFA (PROAIR HFA) 108 (90 BASE) MCG/ACT inhaler, Inhale 2 puffs into the lungs every 6 hours as needed for Wheezing, Disp: 1 Inhaler, Rfl: 0  Allergies:  Pcn [penicillins]  Social History:    reports that he quit smoking about 18 years ago.  His Neutrophils % 63.1 %    Lymphocytes % 28.0 %    Monocytes % 8.0 %    Eosinophils % 0.5 %    Basophils % 0.4 %    Neutrophils Absolute 5.1 1.7 - 7.7 K/uL    Lymphocytes Absolute 2.3 1.0 - 5.1 K/uL    Monocytes Absolute 0.6 0.0 - 1.3 K/uL    Eosinophils Absolute 0.0 0.0 - 0.6 K/uL    Basophils Absolute 0.0 0.0 - 0.2 K/uL   Basic Metabolic Panel w/ Reflex to MG   Result Value Ref Range    Sodium 130 (L) 136 - 145 mmol/L    Potassium reflex Magnesium 4.0 3.5 - 5.1 mmol/L    Chloride 92 (L) 99 - 110 mmol/L    CO2 22 21 - 32 mmol/L    Anion Gap 16 3 - 16    Glucose 134 (H) 70 - 99 mg/dL    BUN 9 7 - 20 mg/dL    CREATININE 0.7 (L) 0.9 - 1.3 mg/dL    GFR Non-African American >60 >60    GFR African American >60 >60    Calcium 9.2 8.3 - 10.6 mg/dL   CBC auto differential   Result Value Ref Range    WBC 12.3 (H) 4.0 - 11.0 K/uL    RBC 5.00 4.20 - 5.90 M/uL    Hemoglobin 14.9 13.5 - 17.5 g/dL    Hematocrit 43.9 40.5 - 52.5 %    MCV 87.8 80.0 - 100.0 fL    MCH 29.8 26.0 - 34.0 pg    MCHC 34.0 31.0 - 36.0 g/dL    RDW 14.6 12.4 - 15.4 %    Platelets 802 (L) 851 - 450 K/uL    MPV 7.0 5.0 - 10.5 fL    Neutrophils % 73.2 %    Lymphocytes % 17.8 %    Monocytes % 8.2 %    Eosinophils % 0.3 %    Basophils % 0.5 %    Neutrophils Absolute 9.0 (H) 1.7 - 7.7 K/uL    Lymphocytes Absolute 2.2 1.0 - 5.1 K/uL    Monocytes Absolute 1.0 0.0 - 1.3 K/uL    Eosinophils Absolute 0.0 0.0 - 0.6 K/uL    Basophils Absolute 0.1 0.0 - 0.2 K/uL   Lactic acid, plasma   Result Value Ref Range    Lactic Acid 2.3 (H) 0.4 - 2.0 mmol/L   Hemoglobin A1c   Result Value Ref Range    Hemoglobin A1C 7.2 See comment %    eAG 159.9 mg/dL   Beta-Hydroxybutyrate   Result Value Ref Range    Beta-Hydroxybutyrate 0.58 (H) 0.00 - 0.27 mmol/L   Ethanol   Result Value Ref Range    Ethanol Lvl None Detected mg/dL   Drug screen multi urine   Result Value Ref Range    Amphetamine Screen, Urine Neg Negative <1000ng/mL    Barbiturate Screen, Ur Neg Negative <200 ng/mL therapeutic injections. The indications for additional imaging/laboratory studies. The indications for (possible future) interventions. After considering the various options discussed, Tej Martinez elected to pursue a course of treatment that includes the followin. Medications: No further recommendations for new medications. 2. PT:  Encouraged to continue with Home exercise program.    3. Further studies: CT of the lumbar spine due to a defibrillator. Failed PT from December to 2020.      4. Interventional:  After further imaging is obtained, interventional options will be reviewed and recommended. 5. Healthy Lifestyle Measures:  Patient education material reviewing the following was distributed to Tej Rodriguez. Anatomic drawings  Healthy lifestyle education  Osteoporosis prevention,   Back and neck pain educational information   Advanced imaging preparedness    Posture education   Proper lifting and carrying techniques,   Weight management  Quitting smoking and   Minor ways to treat back pain  For further information regarding the spine conditions and to review interventional treatments the patient was directed to [employer].    6.  Follow up:  1-2 weeks    Tej Martinez was instructed to call the office if his symptoms worsen or if new symptoms appear prior to the next scheduled visit. He is specifically instructed to contact the office between now & his scheduled appointment if he has concerns related to his condition or if he needs assistance in scheduling the above tests. He is welcome to call for an appointment sooner if he has any additional concerns or questions. Jim Valle ATC, am scribing for and in the presence of Dr. Juvenal Tim. 20 3:54 PM Efe Goncalves ATC    The physical examination was performed between the patient and Dr. Juvenal Tim.  All counseling during the appointment was performed between the patient and

## 2020-03-04 ENCOUNTER — TELEPHONE (OUTPATIENT)
Dept: ORTHOPEDIC SURGERY | Age: 60
End: 2020-03-04

## 2020-03-04 NOTE — TELEPHONE ENCOUNTER
L/m on patient's v/m regarding CT LSP approval and authorization being valid until 05/02/20. Patient was instructed to call [facility] to schedule CT LSP, then contact our office for follow up appointment. CT LSP results will not be given over the phone. Patient currently has a follow up appointment schedule for 3/17/20 @ [facility]. Advised to contact the office if needing to reschedule this to accommodate MRI scan.

## 2020-03-05 ENCOUNTER — HOSPITAL ENCOUNTER (OUTPATIENT)
Dept: CT IMAGING | Age: 60
Discharge: HOME | End: 2020-03-05
Payer: MEDICARE

## 2020-03-05 PROCEDURE — 72131 CT LUMBAR SPINE W/O DYE: CPT

## 2020-03-06 RX ORDER — FUROSEMIDE 20 MG/1
TABLET ORAL
Qty: 30 TABLET | Refills: 0 | Status: SHIPPED | OUTPATIENT
Start: 2020-03-06 | End: 2020-06-01

## 2020-03-06 RX ORDER — POTASSIUM CHLORIDE 20 MEQ/1
TABLET, EXTENDED RELEASE ORAL
Qty: 30 TABLET | Refills: 0 | Status: SHIPPED | OUTPATIENT
Start: 2020-03-06 | End: 2020-05-01

## 2020-03-06 NOTE — TELEPHONE ENCOUNTER
Requested Prescriptions     Pending Prescriptions Disp Refills    potassium chloride (KLOR-CON M) 20 MEQ extended release tablet [Pharmacy Med Name: POTASSIUM CL ER 20 MEQ TABLET] 8 tablet 0     Sig: TAKE ONE TABLET BY MOUTH WITH LASIX ON MONDAY AND THURSDAY    furosemide (LASIX) 20 MG tablet [Pharmacy Med Name: FUROSEMIDE TABLET 20 MG] 8 tablet 0     Sig: TAKE ONE TABLET BY MOUTH MONDAY AND THURSDAY     Last ov 10/14/19  Next ov 4/17/20

## 2020-03-24 ENCOUNTER — NURSE ONLY (OUTPATIENT)
Dept: CARDIOLOGY CLINIC | Age: 60
End: 2020-03-24
Payer: MEDICARE

## 2020-03-24 PROCEDURE — 93296 REM INTERROG EVL PM/IDS: CPT | Performed by: INTERNAL MEDICINE

## 2020-03-24 PROCEDURE — 93295 DEV INTERROG REMOTE 1/2/MLT: CPT | Performed by: INTERNAL MEDICINE

## 2020-04-07 ENCOUNTER — TELEPHONE (OUTPATIENT)
Dept: ORTHOPEDIC SURGERY | Age: 60
End: 2020-04-07

## 2020-04-07 ENCOUNTER — OFFICE VISIT (OUTPATIENT)
Dept: ORTHOPEDIC SURGERY | Age: 60
End: 2020-04-07
Payer: MEDICARE

## 2020-04-07 VITALS
DIASTOLIC BLOOD PRESSURE: 86 MMHG | SYSTOLIC BLOOD PRESSURE: 132 MMHG | WEIGHT: 242.06 LBS | HEIGHT: 64 IN | BODY MASS INDEX: 41.33 KG/M2

## 2020-04-07 PROCEDURE — 99214 OFFICE O/P EST MOD 30 MIN: CPT | Performed by: PHYSICIAN ASSISTANT

## 2020-04-07 RX ORDER — METHYLPREDNISOLONE 4 MG/1
TABLET ORAL
Qty: 1 KIT | Refills: 0 | Status: SHIPPED | OUTPATIENT
Start: 2020-04-07 | End: 2020-04-21 | Stop reason: ALTCHOICE

## 2020-04-07 RX ORDER — TAMSULOSIN HYDROCHLORIDE 0.4 MG/1
0.4 CAPSULE ORAL DAILY
COMMUNITY
End: 2021-07-13 | Stop reason: ALTCHOICE

## 2020-04-07 NOTE — TELEPHONE ENCOUNTER
Patient needs scheduled for an injection. Pre-Procedure sheet was given to the patient. x1 (LMBB #1 B/L L3, L4, L5 DR)    JENNA Categorized as level 2    Please call patient to schedule once able to do so.

## 2020-04-07 NOTE — LETTER
Please schedule the following with:     Date:   05/15/20 @      Account: [id number]  Patient: Benji Egan. : 1960  Address:  [address]    Phone (H):  [phone] (home)      ----------------------------------------------------------------------------------------------  Diagnosis:     ICD-10-CM    1. Spondylosis without myelopathy or radiculopathy, lumbar region M47.816    2. Degenerative disc disease, lumbar M51.36    3. Lumbar stenosis with neurogenic claudication M48.062      Procedure: Medial Branch Block Lumbar #1    Levels: L3, L4, L5 DR   Side: Bilateral   CPT Codes 34084, 32470  ----------------------------------------------------------------------------------------------  Injection # MBB 1  [email or web address]    Attending Physician       Donald Hearn.  Jasmin Pizano MD.  ----------------------------------------------------------------------------------------------  Injection Scheduled For:    At:    [facility]  Pre-Cert#    [employer]     Pre-Cert#    Comments:    · Infection control  · Tested positive for MRSA in past 12 months:  no  · Tested positive for MSSA \"staph infection\" in past 12 months: no  · Tested positive for VRE (Vancomycin Resistant Enterococci) in past 12 months:   no  · Currently on any antibiotics for an infection: no  · Anticoagulants:  · On a blood thinner:  yes , ASA and Plavix - DOES NOT STOP FOR MBB  · Any history of bleeding disorder: no   · Advanced Liver disease: no   · Advanced Renal disease: no   · Glaucoma: no   · Diabetes: yes     Sedation:  No  -----------------------------------------------------------------------------------------------  Allergies   Allergen Reactions    Pcn [Penicillins] Hives

## 2020-04-07 NOTE — PROGRESS NOTES
Follow up: [address].  1960  [id number]         Chief Complaint   Patient presents with    Lower Back Pain     TR CT LSP         HISTORY OF PRESENT ILLNESS:  Mr. Bala Roger is a 64 y.o. male returns for a follow up visit for multiple medical problems. His current presenting problems are   1. Spondylosis without myelopathy or radiculopathy, lumbar region    2. Degenerative disc disease, lumbar    3. Lumbar stenosis with neurogenic claudication    . As per information/history obtained from the PADT(patient assessment and documentation tool) - He complains of pain in the lower back with radiation to the hips Bilateral, upper leg Bilateral and lower leg Bilateral He rates the pain 8/10 and describes it as sharp. Pain is made worse by: bending. He denies side effects from the current pain regimen. Patient reports that since the last follow up visit the physical functioning is worse, family/social relationships are worse, mood is worse and sleep patterns are worse, and that the overall functioning is worse. Patient denies neurological bowel or bladder. The patient presents today in follow up to review the CT scan of the lumbar spine. The patient describes that a majority of his pain is in the lower back and he will have occasional pain down the bilateral legs. The patient describes that the biggest complaint in the legs is that he feels weak and he will have moments when he will fall, the left leg is worse than the right at this time. The patient uses Flexeril to help with the pain.        Associated signs and symptoms:   Neurogenic bowel or bladder symptoms:  no   Perceived weakness:  yes   Difficulty walking:  yes              Past Medical History:   Past Medical History:   Diagnosis Date    Arthritis     Asthma     CAD (coronary artery disease)     COPD (chronic obstructive pulmonary disease) ([facility])     Dr. Yun Gayle at [facility] told the patient that he did not have COPD, just aspirin 325 MG EC tablet, TAKE ONE TABLET BY MOUTH DAILY, Disp: 30 tablet, Rfl: 11    UNIFINE PENTIPS 31G X 8 MM MISC, USE AS DIRECTED FOR INSULIN .00, Disp: 100 each, Rfl: 5    diltiazem (CARDIZEM CD) 180 MG extended release capsule, TAKE 1 CAPSULE DAILY, Disp: 30 capsule, Rfl: 11    metoprolol succinate (TOPROL XL) 200 MG extended release tablet, TAKE ONE-HALF TABLET TWICE A DAY, Disp: 30 tablet, Rfl: 11    TRUEPLUS LANCETS 30G MISC, CHECK BLOOD SUGAR TWICE A DAY DX E11.9, Disp: 100 each, Rfl: 11    DULoxetine (CYMBALTA) 60 MG extended release capsule, Take 60 mg by mouth daily, Disp: , Rfl:     divalproex (DEPAKOTE) 500 MG DR tablet, Take 1,000 mg by mouth nightly, Disp: , Rfl:     Liraglutide (VICTOZA SC), Inject 1.8 Units into the skin daily. , Disp: , Rfl:   Allergies:  Pcn [penicillins]  Social History:    reports that he quit smoking about 18 years ago. His smoking use included cigarettes. He has a 40.00 pack-year smoking history. He has never used smokeless tobacco. He reports that he does not drink alcohol or use drugs. Family History:   Family History   Problem Relation Age of Onset    Heart Disease Mother     Heart Failure Mother     Cancer Father     Diabetes Maternal Grandmother     Heart Failure Maternal Uncle     Hypertension Maternal Uncle     Asthma Neg Hx     Emphysema Neg Hx        REVIEW OF SYSTEMS:   CONSTITUTIONAL: Denies unexplained weight loss, fevers, chills or fatigue  NEUROLOGICAL: Denies unsteady gait or progressive weakness  MUSCULOSKELETAL: Denies joint swelling or redness  GI: Denies nausea, vomiting, diarrhea   : Denies bowel or bladder issues       PHYSICAL EXAM:    Vitals: Blood pressure 132/86, height 5' 4.02\" (1.626 m), weight 242 lb 1 oz (109.8 kg). GENERAL EXAM:  · General Apparence: Patient is adequately groomed with no evidence of malnutrition. · Psychiatric: Orientation: The patient is oriented to time, place and person.  The patient's mood and affect are appropriate   · Vascular: Examination reveals no swelling and palpation reveals no tenderness in upper or lower extremities. Good capillary refill. · The lymphatic examination of the neck, axillae and groin reveals all areas to be without enlargement or induration  · Sensation is intact without deficit in the upper and lower extremities to light touch and pinprick  · Coordination of the upper and lower extremities are normal.  · RIGHT UPPER EXTREMITY:  Inspection/examination of the right upper extremity does not show any tenderness, deformity or injury. Range of motion is unremarkable and pain-free. There is no gross instability. There are no rashes, ulcerations or lesions. Strength and tone are normal. No atrophy or abnormal movements are noted. · LEFT UPPER EXTREMITY: Inspection/examination of the left upper extremity does not show any tenderness, deformity or injury. Range of motion is unremarkable and pain-free. There is no gross instability. There are no rashes, ulcerations or lesions. Strength and tone are normal. No atrophy or abnormal movements are noted. LUMBAR/SACRAL EXAMINATION:  · Inspection: Local inspection shows no step-off or bruising. Lumbar alignment is normal. No instability is noted. · Palpation:   No evidence of tenderness at the midline. Lumbar paraspinal tenderness: Mild L4/5 and L5/S1 tenderness  Bursal tenderness No tenderness bilaterally  There is no paraspinal spasm. · Range of Motion: limited by 50% in all planes due to pain specifically with extension and facet loading bilaterally. · Strength:   Strength testing is 5/5 in all muscle groups tested. · Special Tests:   Straight leg raise and crossed SLR negative. Alexey's testing is negative bilaterally. FADIR's testing is negative bilaterally. Bowstring test negative. Slump test negative. · Skin: There are no rashes, ulcerations or lesions.   · Reflexes: Reflexes are symmetrically 1+ at the patellar and ankle tendons. Clonus absent bilaterally at the feet. · Gait & station: normal, patient ambulates without assistance and no ataxia  · Additional Examinations:  · RIGHT LOWER EXTREMITY: Inspection/examination of the right lower extremity does not show any tenderness, deformity or injury. Range of motion is normal and pain-free. There is no gross instability. There are no rashes, ulcerations or lesions. Strength and tone are normal. No atrophy or abnormal movements are noted. · LEFT LOWER EXTREMITY:  Inspection/examination of the left lower extremity does not show any tenderness, deformity or injury. Range of motion is normal and pain-free. There is no gross instability. There are no rashes, ulcerations or lesions. Strength and tone are normal. No atrophy or abnormal movements are noted. Diagnostic Testing:    CT Scan of the Lumbar Spine from 3/5/20:   FINDINGS:   BONES/ALIGNMENT: There is normal alignment of the spine. The vertebral body   heights are maintained. No osseous destructive lesion is seen.       DEGENERATIVE CHANGES:       L1-L2: There is no significant disc protrusion, central spinal canal stenosis   or neural foraminal narrowing.       L2-L3: There is no significant disc protrusion, central spinal canal stenosis   or neural foraminal narrowing.       L3-L4: There is no significant disc protrusion, central spinal canal stenosis   or neural foraminal narrowing.       L4-L5: There is no significant disc protrusion, central spinal canal stenosis   or neural foraminal narrowing.       L5-S1: There is no significant disc protrusion, central spinal canal stenosis   or neural foraminal narrowing.       SOFT TISSUES/RETROPERITONEUM: Paraspinal soft tissues are normal.  Partial   visualization of a stimulator wire in the region of the left sacrum.           Impression   Unremarkable non-contrast CT of the lumbar spine.         Results for orders placed or performed during the hospital encounter of 11/27/19 Comprehensive Metabolic Panel w/ Reflex to MG   Result Value Ref Range    Sodium 135 (L) 136 - 145 mmol/L    Potassium reflex Magnesium 4.1 3.5 - 5.1 mmol/L    Chloride 97 (L) 99 - 110 mmol/L    CO2 24 21 - 32 mmol/L    Anion Gap 14 3 - 16    Glucose 118 (H) 70 - 99 mg/dL    BUN 10 7 - 20 mg/dL    CREATININE 0.6 (L) 0.9 - 1.3 mg/dL    GFR Non-African American >60 >60    GFR African American >60 >60    Calcium 9.0 8.3 - 10.6 mg/dL    Total Protein 6.7 6.4 - 8.2 g/dL    Alb 4.2 3.4 - 5.0 g/dL    Albumin/Globulin Ratio 1.7 1.1 - 2.2    Total Bilirubin 0.5 0.0 - 1.0 mg/dL    Alkaline Phosphatase 54 40 - 129 U/L    ALT 18 10 - 40 U/L    AST 14 (L) 15 - 37 U/L    Globulin 2.5 g/dL   Troponin   Result Value Ref Range    Troponin <0.01 <0.01 ng/mL   Troponin   Result Value Ref Range    Troponin <0.01 <0.01 ng/mL   CBC auto differential   Result Value Ref Range    WBC 8.1 4.0 - 11.0 K/uL    RBC 4.95 4.20 - 5.90 M/uL    Hemoglobin 14.8 13.5 - 17.5 g/dL    Hematocrit 43.7 40.5 - 52.5 %    MCV 88.3 80.0 - 100.0 fL    MCH 30.0 26.0 - 34.0 pg    MCHC 34.0 31.0 - 36.0 g/dL    RDW 14.5 12.4 - 15.4 %    Platelets 96 (L) 747 - 450 K/uL    MPV 7.0 5.0 - 10.5 fL    PLATELET SLIDE REVIEW Decreased     SLIDE REVIEW see below     Neutrophils % 63.1 %    Lymphocytes % 28.0 %    Monocytes % 8.0 %    Eosinophils % 0.5 %    Basophils % 0.4 %    Neutrophils Absolute 5.1 1.7 - 7.7 K/uL    Lymphocytes Absolute 2.3 1.0 - 5.1 K/uL    Monocytes Absolute 0.6 0.0 - 1.3 K/uL    Eosinophils Absolute 0.0 0.0 - 0.6 K/uL    Basophils Absolute 0.0 0.0 - 0.2 K/uL   Basic Metabolic Panel w/ Reflex to MG   Result Value Ref Range    Sodium 130 (L) 136 - 145 mmol/L    Potassium reflex Magnesium 4.0 3.5 - 5.1 mmol/L    Chloride 92 (L) 99 - 110 mmol/L    CO2 22 21 - 32 mmol/L    Anion Gap 16 3 - 16    Glucose 134 (H) 70 - 99 mg/dL    BUN 9 7 - 20 mg/dL    CREATININE 0.7 (L) 0.9 - 1.3 mg/dL    GFR Non-African American >60 >60    GFR  >60 >60

## 2020-04-08 ENCOUNTER — TELEPHONE (OUTPATIENT)
Dept: ORTHOPEDIC SURGERY | Age: 60
End: 2020-04-08

## 2020-04-08 RX ORDER — CYCLOBENZAPRINE HCL 5 MG
5 TABLET ORAL NIGHTLY
Qty: 30 TABLET | Refills: 0 | Status: CANCELLED | OUTPATIENT
Start: 2020-04-08 | End: 2020-05-08

## 2020-04-08 RX ORDER — CYCLOBENZAPRINE HCL 10 MG
10 TABLET ORAL NIGHTLY
Qty: 30 TABLET | Refills: 0 | Status: SHIPPED | OUTPATIENT
Start: 2020-04-08 | End: 2020-05-08

## 2020-04-08 NOTE — TELEPHONE ENCOUNTER
S/W PEDRO PABLO at .[facility] - states there is no Christine Haas that work there - regarding questions about medication.

## 2020-04-21 ENCOUNTER — VIRTUAL VISIT (OUTPATIENT)
Dept: ORTHOPEDIC SURGERY | Age: 60
End: 2020-04-21
Payer: MEDICARE

## 2020-04-21 VITALS — BODY MASS INDEX: 41.33 KG/M2 | WEIGHT: 242.06 LBS | HEIGHT: 64 IN | RESPIRATION RATE: 12 BRPM

## 2020-04-21 PROCEDURE — 99213 OFFICE O/P EST LOW 20 MIN: CPT | Performed by: PHYSICAL MEDICINE & REHABILITATION

## 2020-04-21 NOTE — PROGRESS NOTES
pulmonary disease) ([facility] [address]     Dr. Angeles Gerber at [facility] told the patient that he did not have COPD, just asthma    Emphysema of lung ([facility] [address]     Fatty liver     GERD (gastroesophageal reflux disease)     Hyperlipidemia     Hypertension     Medical history reviewed with no changes     MI, old     Sleep apnea     pt wears cpap at night. states does not have cpap    Type II or unspecified type diabetes mellitus without mention of complication, not stated as uncontrolled       Past Surgical History:     Past Surgical History:   Procedure Laterality Date    CARDIAC PACEMAKER PLACEMENT  2011    NOT MRI COMPATIABLE OLD LEADS st Lemmie Johanny.  pace maker difib   Everlena Fermo Bilateral 2013    CATARACT REMOVAL WITH IMPLANT Left 2/28/14    COLONOSCOPY      CORONARY ANGIOPLASTY WITH STENT PLACEMENT  2001,2008    CYST REMOVAL  1982    coccyx area    DIAGNOSTIC CARDIAC CATH LAB PROCEDURE      ENDOSCOPY, COLON, DIAGNOSTIC      ERCP  9/15/2013    ERCP  10/11/13    WITH STENT REMOVAL    FOOT SURGERY Right 07/09/2018     REPAIR CALCANEAL SPUR, REPAIR OF ACHILLES TENDON RIGHT FOOT    OTHER SURGICAL HISTORY      back stimulator in lower back    PACEMAKER PLACEMENT      ICD    [place] GASTROCNEMIUS RECESSION Right 10/31/2018    GASTROCNEMIUS RECESSION RIGHT FOOT performed by Sharad Farr DPM at [address] [facility] Right 10/31/2018    REMOVAL OF CALCANEAL SPUR, ACHILLES TENDON REPAIR RIGHT LOWER EXTREMITY performed by Sharad Farr DPM at [address] [facility]  7/18/13    and colonoscopy with biopsies taken    UPPER GASTROINTESTINAL ENDOSCOPY  8/23/13    EUS    UPPER GASTROINTESTINAL ENDOSCOPY  9/15/2013     Current Medications:     Current Outpatient Medications:     cyclobenzaprine (FLEXERIL) 10 MG tablet, Take 1 tablet by mouth nightly, Disp: 30 tablet, Rfl: 0    tamsulosin (FLOMAX) 0.4 MG capsule, Take 0.4 mg by mouth daily, Disp: , Rfl:     potassium Troponin <0.01 <0.01 ng/mL   Troponin   Result Value Ref Range    Troponin <0.01 <0.01 ng/mL   CBC auto differential   Result Value Ref Range    WBC 8.1 4.0 - 11.0 K/uL    RBC 4.95 4.20 - 5.90 M/uL    Hemoglobin 14.8 13.5 - 17.5 g/dL    Hematocrit 43.7 40.5 - 52.5 %    MCV 88.3 80.0 - 100.0 fL    MCH 30.0 26.0 - 34.0 pg    MCHC 34.0 31.0 - 36.0 g/dL    RDW 14.5 12.4 - 15.4 %    Platelets 96 (L) 707 - 450 K/uL    MPV 7.0 5.0 - 10.5 fL    PLATELET SLIDE REVIEW Decreased     SLIDE REVIEW see below     Neutrophils % 63.1 %    Lymphocytes % 28.0 %    Monocytes % 8.0 %    Eosinophils % 0.5 %    Basophils % 0.4 %    Neutrophils Absolute 5.1 1.7 - 7.7 K/uL    Lymphocytes Absolute 2.3 1.0 - 5.1 K/uL    Monocytes Absolute 0.6 0.0 - 1.3 K/uL    Eosinophils Absolute 0.0 0.0 - 0.6 K/uL    Basophils Absolute 0.0 0.0 - 0.2 K/uL   Basic Metabolic Panel w/ Reflex to MG   Result Value Ref Range    Sodium 130 (L) 136 - 145 mmol/L    Potassium reflex Magnesium 4.0 3.5 - 5.1 mmol/L    Chloride 92 (L) 99 - 110 mmol/L    CO2 22 21 - 32 mmol/L    Anion Gap 16 3 - 16    Glucose 134 (H) 70 - 99 mg/dL    BUN 9 7 - 20 mg/dL    CREATININE 0.7 (L) 0.9 - 1.3 mg/dL    GFR Non-African American >60 >60    GFR African American >60 >60    Calcium 9.2 8.3 - 10.6 mg/dL   CBC auto differential   Result Value Ref Range    WBC 12.3 (H) 4.0 - 11.0 K/uL    RBC 5.00 4.20 - 5.90 M/uL    Hemoglobin 14.9 13.5 - 17.5 g/dL    Hematocrit 43.9 40.5 - 52.5 %    MCV 87.8 80.0 - 100.0 fL    MCH 29.8 26.0 - 34.0 pg    MCHC 34.0 31.0 - 36.0 g/dL    RDW 14.6 12.4 - 15.4 %    Platelets 113 (L) 170 - 450 K/uL    MPV 7.0 5.0 - 10.5 fL    Neutrophils % 73.2 %    Lymphocytes % 17.8 %    Monocytes % 8.2 %    Eosinophils % 0.3 %    Basophils % 0.5 %    Neutrophils Absolute 9.0 (H) 1.7 - 7.7 K/uL    Lymphocytes Absolute 2.2 1.0 - 5.1 K/uL    Monocytes Absolute 1.0 0.0 - 1.3 K/uL    Eosinophils Absolute 0.0 0.0 - 0.6 K/uL    Basophils Absolute 0.1 0.0 - 0.2 K/uL   Lactic acid, Medicine  [facility] ([facility])             This dictation was performed with a verbal recognition program [facility] ) and it was checked for errors. It is possible that there are still dictated errors within this office note. If so, please bring any errors to my attention for an addendum. All efforts were made to ensure that this office note is accurate. Patient has verbally accepted the following: We confirm that, for purposes of billing, this is a virtual visit with the provider for which we will submit a claim for reimbursement with the insurance company. The patient accepts responsibility for any co-pays, coinsurance amounts or other amounts not covered by the insurance company. Patient location: [facility] - [place]  Physician location: [facility]  Time spent: 15 min      Pursuant to the emergency declaration under the [facility], [facility] waiver authority and the [employer] and [employer] General Act, this Virtual  Visit was conducted, with patient's consent, to reduce the patient's risk of exposure to COVID-19 and provide continuity of care for an established patient. Services were provided through a video synchronous discussion virtually to substitute for in-person clinic visit. We have confirmed that, for purposes of billing, this is a virtual visit with for which we will submit a claim for reimbursement with your insurance company. The patient has accepted responsibility for any copays, coinsurance amounts or other amounts not covered by their insurance company. This visit was completed virtually using [email or web address] me.

## 2020-05-01 NOTE — TELEPHONE ENCOUNTER
Requested Prescriptions     Pending Prescriptions Disp Refills    potassium chloride (KLOR-CON M) 20 MEQ extended release tablet [Pharmacy Med Name: POTASSIUM CL ER 20 MEQ TABLET] 30 tablet 3     Sig: TAKE ONE TABLET BY MOUTH WITH LASIX ON MONDAY AND THURSDAY       Last Office Visit: 0735/65    Next Office Visit: 5/11/20

## 2020-05-05 RX ORDER — POTASSIUM CHLORIDE 20 MEQ/1
TABLET, EXTENDED RELEASE ORAL
Qty: 30 TABLET | Refills: 3 | Status: ON HOLD | OUTPATIENT
Start: 2020-05-05 | End: 2021-04-27 | Stop reason: HOSPADM

## 2020-05-06 ENCOUNTER — TELEPHONE (OUTPATIENT)
Dept: ORTHOPEDIC SURGERY | Age: 60
End: 2020-05-06

## 2020-05-11 ENCOUNTER — TELEPHONE (OUTPATIENT)
Dept: ORTHOPEDIC SURGERY | Age: 60
End: 2020-05-11

## 2020-05-11 ENCOUNTER — VIRTUAL VISIT (OUTPATIENT)
Dept: CARDIOLOGY CLINIC | Age: 60
End: 2020-05-11
Payer: MEDICARE

## 2020-05-11 VITALS
BODY MASS INDEX: 41.52 KG/M2 | DIASTOLIC BLOOD PRESSURE: 67 MMHG | HEART RATE: 82 BPM | SYSTOLIC BLOOD PRESSURE: 137 MMHG | WEIGHT: 242 LBS

## 2020-05-11 PROCEDURE — 99214 OFFICE O/P EST MOD 30 MIN: CPT | Performed by: INTERNAL MEDICINE

## 2020-05-11 NOTE — PROGRESS NOTES
2020    TELEHEALTH EVALUATION -- Audio/Visual (During ITZJX-60 public health emergency)    HPI:  Follow up CAD and defibrillator discharge historically. HLP and HTN    [address] (:  1960) has requested an audio/video evaluation for the following concern(s):    Feels good. No CP no sob no orthopnea no ICD discharges. No syncope. No palps or muscle aches. Reviewed meds. Walks dogs daily. Mows grass. Review of Systems    Prior to Visit Medications    Medication Sig Taking?  Authorizing Provider   potassium chloride (KLOR-CON M) 20 MEQ extended release tablet TAKE ONE TABLET BY MOUTH WITH LASIX ON MONDAY AND THURSDAY  Radha Bennett MD   tamsulosin (FLOMAX) 0.4 MG capsule Take 0.4 mg by mouth daily  Historical Provider, MD   furosemide (LASIX) 20 MG tablet TAKE ONE TABLET BY MOUTH MONDAY AND THURSDAY  Radha Bennett MD   TRAZODONE HCL PO Take by mouth  Historical Provider, MD   UREA 20 EX Apply topically  Historical Provider, MD   Fexofenadine HCl (ALLEGRA PO) Take by mouth  Historical Provider, MD   Esomeprazole Magnesium (NEXIUM PO) Take 40 mg by mouth   Historical Provider, MD   JARDIANCE 25 MG tablet Take 25 mg by mouth daily   Historical Provider, MD   nitroGLYCERIN (NITROLINGUAL) 0.4 MG/SPRAY 0.4 mg spray Place 1 spray under the tongue every 5 minutes as needed for Chest pain  Radha Bennett MD   insulin detemir (LEVEMIR) 100 UNIT/ML injection vial Inject 60 Units into the skin nightly   Historical Provider, MD   Fluticasone Furoate-Vilanterol (BREO ELLIPTA IN) Inhale into the lungs daily   Historical Provider, MD   insulin lispro (HUMALOG) 100 UNIT/ML injection vial Inject into the skin 2 times daily Indications: 10 units in AM, 10 units at dinner   Historical Provider, MD   clopidogrel (PLAVIX) 75 MG tablet TAKE ONE TABLET BY MOUTH DAILY  ANA Vela CNP   metFORMIN (GLUCOPHAGE) 1000 MG tablet TAKE ONE TABLET BY MOUTH 2 TIMES DAILY WITH MORNING AND

## 2020-05-21 ENCOUNTER — OFFICE VISIT (OUTPATIENT)
Dept: PRIMARY CARE CLINIC | Age: 60
End: 2020-05-21

## 2020-05-22 LAB
SARS-COV-2: NOT DETECTED
SOURCE: NORMAL

## 2020-05-26 ENCOUNTER — HOSPITAL ENCOUNTER (OUTPATIENT)
Age: 60
Setting detail: OUTPATIENT SURGERY
Discharge: HOME | End: 2020-05-26
Attending: PHYSICAL MEDICINE & REHABILITATION | Admitting: PHYSICAL MEDICINE & REHABILITATION
Payer: MEDICARE

## 2020-05-26 VITALS
SYSTOLIC BLOOD PRESSURE: 149 MMHG | HEIGHT: 65 IN | DIASTOLIC BLOOD PRESSURE: 90 MMHG | HEART RATE: 81 BPM | RESPIRATION RATE: 16 BRPM | BODY MASS INDEX: 40.32 KG/M2 | OXYGEN SATURATION: 99 % | WEIGHT: 242 LBS

## 2020-05-26 LAB
GLUCOSE BLD-MCNC: 195 MG/DL (ref 70–99)
PERFORMED ON: ABNORMAL

## 2020-05-26 PROCEDURE — 6360000004 HC RX CONTRAST MEDICATION: Performed by: PHYSICAL MEDICINE & REHABILITATION

## 2020-05-26 PROCEDURE — 3600000002 HC SURGERY LEVEL 2 BASE: Performed by: PHYSICAL MEDICINE & REHABILITATION

## 2020-05-26 PROCEDURE — 3600000012 HC SURGERY LEVEL 2 ADDTL 15MIN: Performed by: PHYSICAL MEDICINE & REHABILITATION

## 2020-05-26 PROCEDURE — 2709999900 HC NON-CHARGEABLE SUPPLY: Performed by: PHYSICAL MEDICINE & REHABILITATION

## 2020-05-26 PROCEDURE — 2500000003 HC RX 250 WO HCPCS: Performed by: PHYSICAL MEDICINE & REHABILITATION

## 2020-05-26 PROCEDURE — 7100000010 HC PHASE II RECOVERY - FIRST 15 MIN: Performed by: PHYSICAL MEDICINE & REHABILITATION

## 2020-05-26 RX ORDER — LIDOCAINE HYDROCHLORIDE 10 MG/ML
INJECTION, SOLUTION EPIDURAL; INFILTRATION; INTRACAUDAL; PERINEURAL PRN
Status: DISCONTINUED | OUTPATIENT
Start: 2020-05-26 | End: 2020-05-26 | Stop reason: ALTCHOICE

## 2020-05-26 RX ORDER — BUPIVACAINE HYDROCHLORIDE 5 MG/ML
INJECTION, SOLUTION EPIDURAL; INTRACAUDAL PRN
Status: DISCONTINUED | OUTPATIENT
Start: 2020-05-26 | End: 2020-05-26 | Stop reason: ALTCHOICE

## 2020-05-26 RX ORDER — BUPIVACAINE HYDROCHLORIDE 5 MG/ML
INJECTION, SOLUTION EPIDURAL; INTRACAUDAL
Status: DISCONTINUED
Start: 2020-05-26 | End: 2020-05-26 | Stop reason: HOSPADM

## 2020-05-26 ASSESSMENT — PAIN DESCRIPTION - DESCRIPTORS: DESCRIPTORS: SHARP

## 2020-05-26 ASSESSMENT — PAIN - FUNCTIONAL ASSESSMENT
PAIN_FUNCTIONAL_ASSESSMENT: 0-10
PAIN_FUNCTIONAL_ASSESSMENT: PREVENTS OR INTERFERES SOME ACTIVE ACTIVITIES AND ADLS

## 2020-05-28 ENCOUNTER — TELEPHONE (OUTPATIENT)
Dept: ORTHOPEDIC SURGERY | Age: 60
End: 2020-05-28

## 2020-06-03 RX ORDER — FUROSEMIDE 20 MG/1
TABLET ORAL
Qty: 8 TABLET | Refills: 6 | Status: SHIPPED | OUTPATIENT
Start: 2020-06-03 | End: 2021-01-06

## 2020-06-05 ENCOUNTER — TELEPHONE (OUTPATIENT)
Dept: ORTHOPEDIC SURGERY | Age: 60
End: 2020-06-05

## 2020-06-08 ENCOUNTER — TELEPHONE (OUTPATIENT)
Dept: ORTHOPEDIC SURGERY | Age: 60
End: 2020-06-08

## 2020-06-08 NOTE — TELEPHONE ENCOUNTER
DOS   06/09/2020  CPT   62160.50  51020.50  DX   M47.816   M51.36  M48.062  OP SX AUTH  [id number]  VALID   06/09/2020 - 06/22/2020    Bilateral   LEVELS   L3  L4  L5   PROCEDURE   Medial branch blocks  DR. Tamika harveyrmont  INSURANCE:   Tia Little  CPT  68458.14   13542  LIT

## 2020-06-09 ENCOUNTER — HOSPITAL ENCOUNTER (OUTPATIENT)
Age: 60
Setting detail: OUTPATIENT SURGERY
Discharge: HOME | End: 2020-06-09
Attending: PHYSICAL MEDICINE & REHABILITATION | Admitting: PHYSICAL MEDICINE & REHABILITATION
Payer: MEDICARE

## 2020-06-09 VITALS
WEIGHT: 242 LBS | TEMPERATURE: 97 F | DIASTOLIC BLOOD PRESSURE: 96 MMHG | BODY MASS INDEX: 40.32 KG/M2 | OXYGEN SATURATION: 95 % | SYSTOLIC BLOOD PRESSURE: 166 MMHG | HEIGHT: 65 IN | RESPIRATION RATE: 16 BRPM | HEART RATE: 86 BPM

## 2020-06-09 LAB
GLUCOSE BLD-MCNC: 215 MG/DL (ref 70–99)
PERFORMED ON: ABNORMAL

## 2020-06-09 PROCEDURE — 3600000002 HC SURGERY LEVEL 2 BASE: Performed by: PHYSICAL MEDICINE & REHABILITATION

## 2020-06-09 PROCEDURE — 7100000010 HC PHASE II RECOVERY - FIRST 15 MIN: Performed by: PHYSICAL MEDICINE & REHABILITATION

## 2020-06-09 PROCEDURE — 2709999900 HC NON-CHARGEABLE SUPPLY: Performed by: PHYSICAL MEDICINE & REHABILITATION

## 2020-06-09 PROCEDURE — 2500000003 HC RX 250 WO HCPCS: Performed by: PHYSICAL MEDICINE & REHABILITATION

## 2020-06-09 PROCEDURE — 6360000004 HC RX CONTRAST MEDICATION: Performed by: PHYSICAL MEDICINE & REHABILITATION

## 2020-06-09 RX ORDER — LIDOCAINE HYDROCHLORIDE 20 MG/ML
INJECTION, SOLUTION EPIDURAL; INFILTRATION; INTRACAUDAL; PERINEURAL PRN
Status: DISCONTINUED | OUTPATIENT
Start: 2020-06-09 | End: 2020-06-09 | Stop reason: ALTCHOICE

## 2020-06-09 RX ORDER — LIDOCAINE HYDROCHLORIDE 10 MG/ML
INJECTION, SOLUTION EPIDURAL; INFILTRATION; INTRACAUDAL; PERINEURAL PRN
Status: DISCONTINUED | OUTPATIENT
Start: 2020-06-09 | End: 2020-06-09 | Stop reason: ALTCHOICE

## 2020-06-09 RX ORDER — LIDOCAINE HYDROCHLORIDE 20 MG/ML
INJECTION, SOLUTION EPIDURAL; INFILTRATION; INTRACAUDAL; PERINEURAL
Status: DISCONTINUED
Start: 2020-06-09 | End: 2020-06-09 | Stop reason: HOSPADM

## 2020-06-09 ASSESSMENT — PAIN - FUNCTIONAL ASSESSMENT
PAIN_FUNCTIONAL_ASSESSMENT: PREVENTS OR INTERFERES WITH MANY ACTIVE NOT PASSIVE ACTIVITIES
PAIN_FUNCTIONAL_ASSESSMENT: 0-10

## 2020-06-09 ASSESSMENT — PAIN DESCRIPTION - DESCRIPTORS: DESCRIPTORS: SHARP

## 2020-06-09 NOTE — H&P
BILATERAL LUMBAR THREE, LUMBAR FOUR, LUMBAR FIVE DORSAL RAMUS MEDIAL BRANCH BLOCK SITE CONFIRMED BY FLUOROSCOPY performed by Ruy Rodríguez MD at [address] [facility] Right 10/31/2018    GASTROCNEMIUS RECESSION RIGHT FOOT performed by Coy Bennett DPM at [address] [facility] Right 10/31/2018    REMOVAL OF CALCANEAL SPUR, ACHILLES TENDON REPAIR RIGHT LOWER EXTREMITY performed by Coy Bennett DPM at [address]  7/18/13    and colonoscopy with biopsies taken    UPPER GASTROINTESTINAL ENDOSCOPY  8/23/13    EUS    UPPER GASTROINTESTINAL ENDOSCOPY  9/15/2013     Current Medications:   Prior to Admission medications    Medication Sig Start Date End Date Taking?  Authorizing Provider   furosemide (LASIX) 20 MG tablet TAKE ONE TABLET BY MOUTH MONDAY AND THURSDAY 6/3/20  Yes Christi Leon MD   potassium chloride (KLOR-CON M) 20 MEQ extended release tablet TAKE ONE TABLET BY MOUTH WITH LASIX ON MONDAY AND THURSDAY 5/5/20  Yes Christi Leon MD   tamsulosin (FLOMAX) 0.4 MG capsule Take 0.4 mg by mouth daily   Yes Historical Provider, MD   TRAZODONE HCL PO Take by mouth   Yes Historical Provider, MD   UREA 20 EX Apply topically   Yes Historical Provider, MD   Fexofenadine HCl (ALLEGRA PO) Take by mouth   Yes Historical Provider, MD   Esomeprazole Magnesium (NEXIUM PO) Take 40 mg by mouth    Yes Historical Provider, MD   JARDIANCE 25 MG tablet Take 25 mg by mouth daily  2/18/19  Yes Historical Provider, MD   insulin detemir (LEVEMIR) 100 UNIT/ML injection vial Inject 60 Units into the skin nightly    Yes Historical Provider, MD   Fluticasone Furoate-Vilanterol (BREO ELLIPTA IN) Inhale into the lungs daily    Yes Historical Provider, MD   insulin lispro (HUMALOG) 100 UNIT/ML injection vial Inject into the skin 2 times daily Indications: 10 units in AM, 10 units at dinner    Yes Historical Provider, MD   metFORMIN (GLUCOPHAGE)

## 2020-06-09 NOTE — PROGRESS NOTES
Pt arrived to PACU, alert and oriented, denies any new pain, vitals stable, site unremarkable, will monitor

## 2020-06-10 ENCOUNTER — TELEPHONE (OUTPATIENT)
Dept: ORTHOPEDIC SURGERY | Age: 60
End: 2020-06-10

## 2020-06-22 ENCOUNTER — TELEPHONE (OUTPATIENT)
Dept: ORTHOPEDIC SURGERY | Age: 60
End: 2020-06-22

## 2020-06-22 NOTE — TELEPHONE ENCOUNTER
PATIENT STATES THAT HE IS FEELING MUCH BETTER AFTER HIS LAST INJECTION, PATIENT CAN BE REACHED [phone]

## 2020-06-23 ENCOUNTER — NURSE ONLY (OUTPATIENT)
Dept: CARDIOLOGY CLINIC | Age: 60
End: 2020-06-23
Payer: MEDICARE

## 2020-06-23 PROCEDURE — 93296 REM INTERROG EVL PM/IDS: CPT | Performed by: INTERNAL MEDICINE

## 2020-06-23 PROCEDURE — 93295 DEV INTERROG REMOTE 1/2/MLT: CPT | Performed by: INTERNAL MEDICINE

## 2020-09-22 ENCOUNTER — NURSE ONLY (OUTPATIENT)
Dept: CARDIOLOGY CLINIC | Age: 60
End: 2020-09-22
Payer: MEDICARE

## 2020-09-22 PROCEDURE — 93295 DEV INTERROG REMOTE 1/2/MLT: CPT | Performed by: INTERNAL MEDICINE

## 2020-09-22 PROCEDURE — 93296 REM INTERROG EVL PM/IDS: CPT | Performed by: INTERNAL MEDICINE

## 2020-09-22 NOTE — PROGRESS NOTES
Merlin Remote transmission of pacemaker and/or ICD, or implanted heart monitor shows normal cardiac device function. No new arrhythmias. Follow up in 3 months via [phone].

## 2020-10-08 ENCOUNTER — APPOINTMENT (OUTPATIENT)
Dept: CT IMAGING | Age: 60
End: 2020-10-08
Payer: MEDICARE

## 2020-10-08 ENCOUNTER — APPOINTMENT (OUTPATIENT)
Dept: GENERAL RADIOLOGY | Age: 60
End: 2020-10-08
Payer: MEDICARE

## 2020-10-08 ENCOUNTER — HOSPITAL ENCOUNTER (EMERGENCY)
Age: 60
Discharge: HOME | End: 2020-10-08
Attending: EMERGENCY MEDICINE
Payer: MEDICARE

## 2020-10-08 VITALS
TEMPERATURE: 98 F | HEART RATE: 85 BPM | WEIGHT: 244 LBS | DIASTOLIC BLOOD PRESSURE: 71 MMHG | HEIGHT: 65 IN | SYSTOLIC BLOOD PRESSURE: 130 MMHG | OXYGEN SATURATION: 98 % | BODY MASS INDEX: 40.65 KG/M2 | RESPIRATION RATE: 15 BRPM

## 2020-10-08 PROCEDURE — 70450 CT HEAD/BRAIN W/O DYE: CPT

## 2020-10-08 PROCEDURE — 73560 X-RAY EXAM OF KNEE 1 OR 2: CPT

## 2020-10-08 PROCEDURE — 99284 EMERGENCY DEPT VISIT MOD MDM: CPT

## 2020-10-08 PROCEDURE — 73502 X-RAY EXAM HIP UNI 2-3 VIEWS: CPT

## 2020-10-08 PROCEDURE — 72100 X-RAY EXAM L-S SPINE 2/3 VWS: CPT

## 2020-10-08 PROCEDURE — 73030 X-RAY EXAM OF SHOULDER: CPT

## 2020-10-08 PROCEDURE — 6370000000 HC RX 637 (ALT 250 FOR IP): Performed by: EMERGENCY MEDICINE

## 2020-10-08 RX ORDER — OXYCODONE HYDROCHLORIDE AND ACETAMINOPHEN 5; 325 MG/1; MG/1
1 TABLET ORAL ONCE
Status: COMPLETED | OUTPATIENT
Start: 2020-10-08 | End: 2020-10-08

## 2020-10-08 RX ADMIN — OXYCODONE AND ACETAMINOPHEN 1 TABLET: 5; 325 TABLET ORAL at 19:48

## 2020-10-08 ASSESSMENT — PAIN DESCRIPTION - ORIENTATION
ORIENTATION_2: RIGHT
ORIENTATION: LEFT;RIGHT;LOWER

## 2020-10-08 ASSESSMENT — PAIN DESCRIPTION - ONSET
ONSET: SUDDEN
ONSET_2: SUDDEN

## 2020-10-08 ASSESSMENT — PAIN - FUNCTIONAL ASSESSMENT: PAIN_FUNCTIONAL_ASSESSMENT: ACTIVITIES ARE NOT PREVENTED

## 2020-10-08 ASSESSMENT — PAIN SCALES - GENERAL
PAINLEVEL_OUTOF10: 3
PAINLEVEL_OUTOF10: 8
PAINLEVEL_OUTOF10: 8

## 2020-10-08 ASSESSMENT — PAIN DESCRIPTION - INTENSITY: RATING_2: 7

## 2020-10-08 ASSESSMENT — PAIN DESCRIPTION - DESCRIPTORS
DESCRIPTORS_2: CONSTANT
DESCRIPTORS: SHARP

## 2020-10-08 ASSESSMENT — PAIN DESCRIPTION - LOCATION
LOCATION_2: SHOULDER
LOCATION: BACK

## 2020-10-08 ASSESSMENT — PAIN DESCRIPTION - PROGRESSION
CLINICAL_PROGRESSION: NOT CHANGED
CLINICAL_PROGRESSION_2: NOT CHANGED

## 2020-10-08 ASSESSMENT — PAIN DESCRIPTION - PAIN TYPE: TYPE: ACUTE PAIN

## 2020-10-08 ASSESSMENT — PAIN DESCRIPTION - DURATION: DURATION_2: CONTINUOUS

## 2020-10-08 ASSESSMENT — PAIN DESCRIPTION - FREQUENCY: FREQUENCY: CONTINUOUS

## 2020-10-08 NOTE — ED PROVIDER NOTES
Emergency Department Attending Note    Georgi Howard MD    Date of ED VIsit: 10/8/2020    CHIEF COMPLAINT  Fall (Increasing falls over the last year, today around 1pm patient fell into gravel from a standing positon into gravel while unloading his truck. Has small laceration to right knee, laceration with bleeding stopped to right forearm also complains of right shoulder pain and lower back. Patient states it took him an hour and a half to crawl to his truck to get here. )      Voldi Merlin Lion. is a 64 y.o. male  With Vital signs of BP (!) 144/69   Pulse 88   Temp 98.2 °F (36.8 °C) (Oral)   Resp 16   Ht 5' 4.5\" (1.638 m)   Wt 244 lb (110.7 kg)   SpO2 96%   BMI 41.24 kg/m²  who presents to the ED with a complaint of dizziness and fall. Patient seen and evaluated in room 3. Patient states he has had bouts of dizziness ongoing for about a year and is currently being worked up by his primary care doctor for that. Today he had a bout of dizziness not syncope and he fell to the ground. He is on Plavix did not hit his head does not complain of any headache. But despite that was to do a scan of his head just to make sure there is no injury there but otherwise he is complaining of right shoulder pain left hip pain lumbar sacral pain and right knee pain. .  Right now he is not dizzy he is able to get up and ambulate without any difficulty in the room. So we will undergo this work-up and see to make sure that everything is okay and refer him back to the primary care physician for further work-up of this intermittent dizziness no other complaints, modifying factors or associated symptoms.     Patients Past medical history reviewed and listed below  Past Medical History:   Diagnosis Date    Arthritis     Asthma     CAD (coronary artery disease)     COPD (chronic obstructive pulmonary disease) ([facility])     Dr. Zohreh Campo at [facility] told the patient that he did not have COPD, just asthma    Emphysema of lung ([facility] [address]     Fatty liver     GERD (gastroesophageal reflux disease)     Hyperlipidemia     Hypertension     Medical history reviewed with no changes     MI, old     Sleep apnea     pt wears cpap at night. states does not have cpap    Type II or unspecified type diabetes mellitus without mention of complication, not stated as uncontrolled      Past Surgical History:   Procedure Laterality Date    CARDIAC PACEMAKER PLACEMENT  2011    NOT MRI COMPATIABLE OLD LEADS st [facility].  pace maker difib   [address] Bilateral 2013    CATARACT REMOVAL WITH IMPLANT Left 2/28/14    COLONOSCOPY      CORONARY ANGIOPLASTY WITH STENT PLACEMENT  2001,2008    CYST REMOVAL  1982    coccyx area    DIAGNOSTIC CARDIAC CATH LAB PROCEDURE      ENDOSCOPY, COLON, DIAGNOSTIC      ERCP  9/15/2013    ERCP  10/11/13    WITH STENT REMOVAL    FOOT SURGERY Right 07/09/2018     REPAIR CALCANEAL SPUR, REPAIR OF ACHILLES TENDON RIGHT FOOT    OTHER SURGICAL HISTORY      back stimulator in lower back    PACEMAKER PLACEMENT      ICD    PAIN MANAGEMENT PROCEDURE Bilateral 5/26/2020    BILATERAL LUMBAR THREE, LUMBAR FOUR, LUMBAR FIVE DORSAL RAMUS MEDIAL BRANCH BLOCK SITE CONFIRMED BY FLUOROSCOPY performed by Ofe Gonzalez MD at [address] Bilateral 6/9/2020    BILATERAL LUMBAR THREE, LUMBAR FOUR, LUMBAR FIVE DORSAL RAMUS MEDIAL BRANCH BLOCK SITE CONFIRMED BY FLUOROSCOPY performed by Ofe Gonzalez MD at [facility] Right 10/31/2018    GASTROCNEMIUS RECESSION RIGHT FOOT performed by Alicia Bush DPM at [address] [facility] Right 10/31/2018    REMOVAL OF CALCANEAL SPUR, ACHILLES TENDON REPAIR RIGHT LOWER EXTREMITY performed by Alicia Bush DPM at [address]  7/18/13    and colonoscopy with biopsies taken   [facility]  8/23/13    EUS    UPPER GASTROINTESTINAL ENDOSCOPY  9/15/2013       I have reviewed the following from the nursing documentation. Family History   Problem Relation Age of Onset    Heart Disease Mother     Heart Failure Mother     Cancer Father     Diabetes Maternal Grandmother     Heart Failure Maternal Uncle     Hypertension Maternal Uncle     Asthma Neg Hx     Emphysema Neg Hx      Social History     Socioeconomic History    Marital status:       Spouse name: Not on file    Number of children: Not on file    Years of education: Not on file    Highest education level: Not on file   Occupational History    Not on file   Social Needs    Financial resource strain: Not on file    Food insecurity     Worry: Not on file     Inability: Not on file    Transportation needs     Medical: Not on file     Non-medical: Not on file   Tobacco Use    Smoking status: Former Smoker     Packs/day: 2.00     Years: 20.00     Pack years: 40.00     Types: Cigarettes     Last attempt to quit: 2001     Years since quittin.3    Smokeless tobacco: Never Used    Tobacco comment: PASSIVE SMOKER   Substance and Sexual Activity    Alcohol use: No     Alcohol/week: 0.0 standard drinks    Drug use: No    Sexual activity: Never   Lifestyle    Physical activity     Days per week: Not on file     Minutes per session: Not on file    Stress: Not on file   Relationships    Social connections     Talks on phone: Not on file     Gets together: Not on file     Attends Nondenominational service: Not on file     Active member of club or organization: Not on file     Attends meetings of clubs or organizations: Not on file     Relationship status: Not on file    Intimate partner violence     Fear of current or ex partner: Not on file     Emotionally abused: Not on file     Physically abused: Not on file     Forced sexual activity: Not on file   Other Topics Concern    Not on file   Social History Narrative    Not on file     Current Facility-Administered Medications   Medication Dose Route Frequency Provider Last Rate Last Dose    oxyCODONE-acetaminophen (PERCOCET) 5-325 MG per tablet 1 tablet  1 tablet Oral Once Teri Mcfarlane MD         Current Outpatient Medications   Medication Sig Dispense Refill    furosemide (LASIX) 20 MG tablet TAKE ONE TABLET BY MOUTH MONDAY AND THURSDAY 8 tablet 6    potassium chloride (KLOR-CON M) 20 MEQ extended release tablet TAKE ONE TABLET BY MOUTH WITH LASIX ON MONDAY AND THURSDAY 30 tablet 3    tamsulosin (FLOMAX) 0.4 MG capsule Take 0.4 mg by mouth daily      TRAZODONE HCL PO Take by mouth      UREA 20 EX Apply topically      Fexofenadine HCl (ALLEGRA PO) Take by mouth      Esomeprazole Magnesium (NEXIUM PO) Take 40 mg by mouth       JARDIANCE 25 MG tablet Take 25 mg by mouth daily       nitroGLYCERIN (NITROLINGUAL) 0.4 MG/SPRAY 0.4 mg spray Place 1 spray under the tongue every 5 minutes as needed for Chest pain 2 Bottle 3    insulin detemir (LEVEMIR) 100 UNIT/ML injection vial Inject 60 Units into the skin nightly       Fluticasone Furoate-Vilanterol (BREO ELLIPTA IN) Inhale into the lungs daily       insulin lispro (HUMALOG) 100 UNIT/ML injection vial Inject into the skin 2 times daily Indications: 10 units in AM, 10 units at dinner       clopidogrel (PLAVIX) 75 MG tablet TAKE ONE TABLET BY MOUTH DAILY 30 tablet 0    metFORMIN (GLUCOPHAGE) 1000 MG tablet TAKE ONE TABLET BY MOUTH 2 TIMES DAILY WITH MORNING AND EVENING MEALS 60 tablet 0    famotidine (PEPCID) 20 MG tablet TAKE 1 TABLET BY MOUTH 2 TIMES DAILY 60 tablet 0    gabapentin (NEURONTIN) 100 MG capsule TAKE 2 CAPSULES BY MOUTH 3 TIMES DAILY 180 capsule 0    fluticasone (FLONASE) 50 MCG/ACT nasal spray USE 1 SPRAY IN EACH NOSTRIL EVERY DAY 16 g 0    TRUE METRIX BLOOD GLUCOSE TEST strip CHECK SUGARS TWICE A DAY AS DIRECTED, DX: E11.9 100 each 5    lisinopril (PRINIVIL;ZESTRIL) 20 MG tablet TAKE 1 TABLET BY MOUTH DAILY 30 tablet 11  atorvastatin (LIPITOR) 80 MG tablet TAKE ONE TABLET BY MOUTH DAILY 30 tablet 11    aspirin 325 MG EC tablet TAKE ONE TABLET BY MOUTH DAILY 30 tablet 11    UNIFINE PENTIPS 31G X 8 MM MISC USE AS DIRECTED FOR INSULIN .00 100 each 5    diltiazem (CARDIZEM CD) 180 MG extended release capsule TAKE 1 CAPSULE DAILY 30 capsule 11    metoprolol succinate (TOPROL XL) 200 MG extended release tablet TAKE ONE-HALF TABLET TWICE A DAY 30 tablet 11    TRUEPLUS LANCETS 30G MISC CHECK BLOOD SUGAR TWICE A DAY DX E11.9 100 each 11    DULoxetine (CYMBALTA) 60 MG extended release capsule Take 60 mg by mouth daily      divalproex (DEPAKOTE) 500 MG DR tablet Take 1,000 mg by mouth nightly      Liraglutide (VICTOZA SC) Inject 1.8 Units into the skin daily. Allergies   Allergen Reactions    Pcn [Penicillins] Hives       REVIEW OF SYSTEMS  10 systems reviewed, pertinent positives per HPI otherwise noted to be negative     PHYSICAL EXAM  BP (!) 144/69   Pulse 88   Temp 98.2 °F (36.8 °C) (Oral)   Resp 16   Ht 5' 4.5\" (1.638 m)   Wt 244 lb (110.7 kg)   SpO2 96%   BMI 41.24 kg/m²   GENERAL APPEARANCE: Awake and alert. Cooperative. In no obvious distress. HEAD: Normocephalic. Atraumatic. EYES: PERRL. EOM's grossly intact. ENT: Mucous membranes are pink and moist.   NECK: Supple. HEART: RRR. No murmurs. LUNGS: Respirations unlabored. CTAB. Good air exchange. ABDOMEN: Soft. Non-distended. Non-tender. No masses. No organomegaly. No guarding or rebound. EXTREMITIES: No peripheral edema. Moves all extremities equally. All extremities neurovascularly intact. Patient has pain over the right knee right shoulder and left hip all have full mobility. And distally they are neurovascularly intact. He is also complaining lumbar sacral back pain as well. SKIN: Warm and dry. No acute rashes. NEUROLOGICAL: Alert and oriented. Annual nerves II through XII are intact. Strength 5/5, sensation intact.    Gait normal. PSYCHIATRIC: Normal mood and affect. No HI or SI expressed to me. RADIOLOGY    If acquired see below     EKG:     If acquired see below       ED COURSE/MDM    Patient had radiographs that were all negative. From his fall. At this point though I explained to him that he needed to check in with his primary care doctor as to why he is having of these falls. Especially being on a blood thinner so he is in a follow-up with his primary care physician for further testing    ED Course as of Oct 08 2053   Thu Oct 08, 2020   2011 IMPRESSION:  No acute osseous abnormality of the right shoulder   XR SHOULDER RIGHT (MIN 2 VIEWS) [DL]   2011 IMPRESSION:  No acute osseous abnormality of the right knee. XR KNEE RIGHT (1-2 VIEWS) [DL]   2011 XR HIP LEFT (2-3 VIEWS) [DL]   2011 IMPRESSION:  No acute osseous abnormality of the lumbar spine. Mild lumbar spondylosis  and facet arthropathy.     No acute osseous abnormality of the pelvis or left hip. XR LUMBAR SPINE (2-3 VIEWS) [DL]   2051 CT Head WO Contrast [DL]   2051 IMPRESSION:  No acute intracranial abnormality.     Stable generalized cerebral atrophy   CT Head WO Contrast [DL]      ED Course User Index  [DL] Jc Canas MD       The ED course and plan were reviewed and results discussed with the patient    The patient understood and agreed with the Discharge/transfer planning.     CLINICAL IMPRESSION and [address] was stable and diagnosed with fall       Jc Canas MD  10/08/20 3850       Jc Canas MD  10/14/20 1939       Jc Canas MD  10/14/20 0630

## 2020-10-22 ENCOUNTER — TELEPHONE (OUTPATIENT)
Dept: CARDIOLOGY CLINIC | Age: 60
End: 2020-10-22

## 2020-10-22 NOTE — TELEPHONE ENCOUNTER
Park from patient's dentist's office called, Rubi Medina DDS). Chano needs to have 4 teeth extracted. They would like permission and instructions for how long patient can be off of Plavix 75 mg and aspirin 325 mg. Please call Park about this at [phone]. Thanks.

## 2020-10-27 NOTE — TELEPHONE ENCOUNTER
Manjit Parents from pt dental office called in requesting to speak to nurse Bridger Majano regarding pt teeth extracted. Manjit Parents would like Elyse to give her a call.  Thanks

## 2020-10-27 NOTE — TELEPHONE ENCOUNTER
Spoke with dental office and clarified per Gail's last message. All questions answered at this time.

## 2020-11-12 ENCOUNTER — TELEPHONE (OUTPATIENT)
Dept: ORTHOPEDIC SURGERY | Age: 60
End: 2020-11-12

## 2020-11-12 NOTE — TELEPHONE ENCOUNTER
SARTHAK CALLED PATIENT WANTS TO SCHEDULE AN INJECTION NEEDS TO BE BETWEEN 1 & 2 O'CLOCK, NO SPECIFIC DAY.

## 2020-11-13 ENCOUNTER — TELEPHONE (OUTPATIENT)
Dept: ORTHOPEDIC SURGERY | Age: 60
End: 2020-11-13

## 2020-11-25 ENCOUNTER — OFFICE VISIT (OUTPATIENT)
Dept: PRIMARY CARE CLINIC | Age: 60
End: 2020-11-25
Payer: MEDICARE

## 2020-11-25 PROCEDURE — 99211 OFF/OP EST MAY X REQ PHY/QHP: CPT | Performed by: NURSE PRACTITIONER

## 2020-11-25 NOTE — PROGRESS NOTES
[address] [place] [address] received a viral test for COVID-19. They were educated on isolation and quarantine as appropriate. For any symptoms, they were directed to seek care from their PCP, given contact information to establish with a doctor, directed to an urgent care or the emergency room.

## 2020-11-25 NOTE — PATIENT INSTRUCTIONS

## 2020-11-26 LAB — SARS-COV-2: NOT DETECTED

## 2020-11-30 ENCOUNTER — TELEPHONE (OUTPATIENT)
Dept: ORTHOPEDIC SURGERY | Age: 60
End: 2020-11-30

## 2020-11-30 NOTE — TELEPHONE ENCOUNTER
Auth: # [id number]    Date: 12/08/2020 thru 12/21/2020  Type of SX:  Outpatient Neuroromty  Location: [facility]  CPT: 50031, 67961   DX Code: Y28.790, M51.36, M48.062  SX area: Lumbar spine  Insurance: [place] [employer]    CPT: 50 MOD, [id number], [id number]  NPR

## 2020-12-01 ENCOUNTER — HOSPITAL ENCOUNTER (OUTPATIENT)
Age: 60
Setting detail: OUTPATIENT SURGERY
Discharge: HOME | End: 2020-12-01
Attending: PHYSICAL MEDICINE & REHABILITATION | Admitting: PHYSICAL MEDICINE & REHABILITATION
Payer: MEDICARE

## 2020-12-01 VITALS
TEMPERATURE: 97 F | HEART RATE: 83 BPM | OXYGEN SATURATION: 98 % | HEIGHT: 64 IN | DIASTOLIC BLOOD PRESSURE: 64 MMHG | BODY MASS INDEX: 41.32 KG/M2 | SYSTOLIC BLOOD PRESSURE: 106 MMHG | WEIGHT: 242 LBS | RESPIRATION RATE: 15 BRPM

## 2020-12-01 LAB
GLUCOSE BLD-MCNC: 167 MG/DL (ref 70–99)
PERFORMED ON: ABNORMAL

## 2020-12-01 PROCEDURE — 2580000003 HC RX 258: Performed by: PHYSICAL MEDICINE & REHABILITATION

## 2020-12-01 PROCEDURE — 99153 MOD SED SAME PHYS/QHP EA: CPT | Performed by: PHYSICAL MEDICINE & REHABILITATION

## 2020-12-01 PROCEDURE — 6360000002 HC RX W HCPCS: Performed by: PHYSICAL MEDICINE & REHABILITATION

## 2020-12-01 PROCEDURE — 7100000010 HC PHASE II RECOVERY - FIRST 15 MIN: Performed by: PHYSICAL MEDICINE & REHABILITATION

## 2020-12-01 PROCEDURE — 3600000002 HC SURGERY LEVEL 2 BASE: Performed by: PHYSICAL MEDICINE & REHABILITATION

## 2020-12-01 PROCEDURE — 3600000012 HC SURGERY LEVEL 2 ADDTL 15MIN: Performed by: PHYSICAL MEDICINE & REHABILITATION

## 2020-12-01 PROCEDURE — 99152 MOD SED SAME PHYS/QHP 5/>YRS: CPT | Performed by: PHYSICAL MEDICINE & REHABILITATION

## 2020-12-01 PROCEDURE — 2709999900 HC NON-CHARGEABLE SUPPLY: Performed by: PHYSICAL MEDICINE & REHABILITATION

## 2020-12-01 PROCEDURE — 2500000003 HC RX 250 WO HCPCS

## 2020-12-01 PROCEDURE — 7100000011 HC PHASE II RECOVERY - ADDTL 15 MIN: Performed by: PHYSICAL MEDICINE & REHABILITATION

## 2020-12-01 PROCEDURE — 2500000003 HC RX 250 WO HCPCS: Performed by: PHYSICAL MEDICINE & REHABILITATION

## 2020-12-01 RX ORDER — LIDOCAINE HYDROCHLORIDE 10 MG/ML
INJECTION, SOLUTION INFILTRATION; PERINEURAL PRN
Status: DISCONTINUED | OUTPATIENT
Start: 2020-12-01 | End: 2020-12-01 | Stop reason: ALTCHOICE

## 2020-12-01 RX ORDER — BUPIVACAINE HYDROCHLORIDE 5 MG/ML
INJECTION, SOLUTION EPIDURAL; INTRACAUDAL PRN
Status: DISCONTINUED | OUTPATIENT
Start: 2020-12-01 | End: 2020-12-01 | Stop reason: ALTCHOICE

## 2020-12-01 RX ORDER — FENTANYL CITRATE 50 UG/ML
INJECTION, SOLUTION INTRAMUSCULAR; INTRAVENOUS
Status: DISCONTINUED
Start: 2020-12-01 | End: 2020-12-01 | Stop reason: HOSPADM

## 2020-12-01 RX ORDER — FENTANYL CITRATE 50 UG/ML
INJECTION, SOLUTION INTRAMUSCULAR; INTRAVENOUS PRN
Status: DISCONTINUED | OUTPATIENT
Start: 2020-12-01 | End: 2020-12-01 | Stop reason: ALTCHOICE

## 2020-12-01 RX ORDER — MIDAZOLAM HYDROCHLORIDE 1 MG/ML
INJECTION INTRAMUSCULAR; INTRAVENOUS
Status: DISCONTINUED
Start: 2020-12-01 | End: 2020-12-01 | Stop reason: HOSPADM

## 2020-12-01 RX ORDER — SODIUM CHLORIDE, SODIUM LACTATE, POTASSIUM CHLORIDE, CALCIUM CHLORIDE 600; 310; 30; 20 MG/100ML; MG/100ML; MG/100ML; MG/100ML
INJECTION, SOLUTION INTRAVENOUS CONTINUOUS
Status: DISCONTINUED | OUTPATIENT
Start: 2020-12-01 | End: 2020-12-01 | Stop reason: HOSPADM

## 2020-12-01 RX ORDER — LIDOCAINE HYDROCHLORIDE 10 MG/ML
INJECTION, SOLUTION EPIDURAL; INFILTRATION; INTRACAUDAL; PERINEURAL
Status: COMPLETED
Start: 2020-12-01 | End: 2020-12-01

## 2020-12-01 RX ORDER — DEXAMETHASONE SODIUM PHOSPHATE 10 MG/ML
INJECTION INTRAMUSCULAR; INTRAVENOUS PRN
Status: DISCONTINUED | OUTPATIENT
Start: 2020-12-01 | End: 2020-12-01 | Stop reason: ALTCHOICE

## 2020-12-01 RX ORDER — MIDAZOLAM HYDROCHLORIDE 1 MG/ML
INJECTION INTRAMUSCULAR; INTRAVENOUS PRN
Status: DISCONTINUED | OUTPATIENT
Start: 2020-12-01 | End: 2020-12-01 | Stop reason: ALTCHOICE

## 2020-12-01 RX ORDER — LIDOCAINE HYDROCHLORIDE 20 MG/ML
INJECTION, SOLUTION EPIDURAL; INFILTRATION; INTRACAUDAL; PERINEURAL PRN
Status: DISCONTINUED | OUTPATIENT
Start: 2020-12-01 | End: 2020-12-01 | Stop reason: ALTCHOICE

## 2020-12-01 RX ADMIN — SODIUM CHLORIDE, POTASSIUM CHLORIDE, SODIUM LACTATE AND CALCIUM CHLORIDE: 600; 310; 30; 20 INJECTION, SOLUTION INTRAVENOUS at 08:58

## 2020-12-01 RX ADMIN — LIDOCAINE HYDROCHLORIDE 0.1 ML: 10 INJECTION, SOLUTION EPIDURAL; INFILTRATION; INTRACAUDAL; PERINEURAL at 08:58

## 2020-12-01 ASSESSMENT — PAIN DESCRIPTION - DESCRIPTORS: DESCRIPTORS: ACHING;SHARP

## 2020-12-01 NOTE — PROGRESS NOTES
Discharge instructions given to pt and pts friend verbalized understanding, states pain is at a tolerable level, no numbness or weakness noted, pt wheeled out to car without complications

## 2020-12-01 NOTE — H&P
HISTORY AND PHYSICAL/PRE-SEDATION ASSESSMENT    Patient:  Maria Dolores Shepherd   :  1960  Medical Record No.:  [id number]   Date:  2020  Physician:  Roshan Rene M.D. Facility: [facility]    HISTORY OF PRESENT ILLNESS:                 The patient is a 64 y.o. male whom presents with lower back pain. Review of the imaging and physical exam of the patient confirmed the pre-procedure diagnosis. After a thorough discussion of risks, benefits and alternatives informed consent was obtained. Past Medical History:   Past Medical History:   Diagnosis Date    Arthritis     Asthma     CAD (coronary artery disease)     COPD [facility]ch[facility] obstructive [facility] disease) ([facility])     [facility] Shayna Morales at [facility] told the patient that he did not have COPD, just asthma    Emphysema of lung ([address]     Fatty liver     GERD (gastroesophageal reflux disease)     Hyperlipidemia     Hypertension     Medical history reviewed with no changes     MI, old     Sleep apnea     pt wears cpap at night. states does not have cpap    Type II or unspecified type diabetes mellitus without mention of complication, not stated as uncontrolled       Past Surgical History:     Past Surgical History:   Procedure Laterality Date    CARDIAC PACEMAKER PLACEMENT      NOT MRI COMPATIABLE OLD LEADS st Nazanin Zechariah.  pace maker difib   Rossy Madden Bilateral 2013    CATARACT REMOVAL WITH IMPLANT Left 14    COLONOSCOPY      CORONARY ANGIOPLASTY WITH STENT PLACEMENT  ,    CYST REMOVAL  1982    coccyx area    DIAGNOSTIC CARDIAC CATH LAB PROCEDURE      ENDOSCOPY, COLON, DIAGNOSTIC      ERCP  9/15/2013    ERCP  10/11/13    WITH STENT REMOVAL    FOOT SURGERY Right 2018     REPAIR CALCANEAL SPUR, REPAIR OF ACHILLES TENDON RIGHT FOOT    OTHER SURGICAL HISTORY      back stimulator in lower back    PACEMAKER PLACEMENT      ICD    PAIN MANAGEMENT PROCEDURE Bilateral 2020 Indications: 10 units in AM, 10 units at dinner    Yes Historical Provider, MD   clopidogrel (PLAVIX) 75 MG tablet TAKE ONE TABLET BY MOUTH DAILY 9/26/17  Yes Corrinne Plumber, APRN - CNP   metFORMIN (GLUCOPHAGE) 1000 MG tablet TAKE ONE TABLET BY MOUTH 2 TIMES DAILY WITH MORNING AND EVENING MEALS 9/26/17  Yes Corrinne Plumber, APRN - CNP   famotidine (PEPCID) 20 MG tablet TAKE 1 TABLET BY MOUTH 2 TIMES DAILY 9/26/17  Yes Corrinne Plumber, APRN - CNP   gabapentin (NEURONTIN) 100 MG capsule TAKE 2 CAPSULES BY MOUTH 3 TIMES DAILY 9/26/17  Yes Corrinne Plumber, APRN - CNP   fluticasone (FLONASE) 50 MCG/ACT nasal spray USE 1 SPRAY IN EACH NOSTRIL EVERY DAY 9/13/17  Yes ANA Avila CNP   lisinopril (PRINIVIL;ZESTRIL) 20 MG tablet TAKE 1 TABLET BY MOUTH DAILY 2/20/17  Yes ANA Pond CNP   atorvastatin (LIPITOR) 80 MG tablet TAKE ONE TABLET BY MOUTH DAILY 2/20/17  Yes ANA Pond CNP   aspirin 325 MG EC tablet TAKE ONE TABLET BY MOUTH DAILY 2/20/17  Yes ANA Pond CNP   diltiazem (CARDIZEM CD) 180 MG extended release capsule TAKE 1 CAPSULE DAILY 2/8/17  Yes ANA Pond CNP   metoprolol succinate (TOPROL XL) 200 MG extended release tablet TAKE ONE-HALF TABLET TWICE A DAY 2/8/17  Yes ANA Pond CNP   DULoxetine (CYMBALTA) 60 MG extended release capsule Take 60 mg by mouth daily   Yes Historical Provider, MD   divalproex (DEPAKOTE) 500 MG DR tablet Take 1,000 mg by mouth nightly   Yes Historical Provider, MD   Liraglutide (VICTOZA SC) Inject 1.8 Units into the skin daily.    Yes Historical Provider, MD   TRAZODONE HCL PO Take by mouth    Historical Provider, MD   UREA 20 EX Apply topically    Historical Provider, MD   nitroGLYCERIN (NITROLINGUAL) 0.4 MG/SPRAY 0.4 mg spray Place 1 spray under the tongue every 5 minutes as needed for Chest pain 9/28/18   aDrya Antunez MD   TRUE METRIX BLOOD GLUCOSE TEST strip CHECK SUGARS TWICE A DAY AS DIRECTED, DX: E11.9 6/9/17   ANA Powell CNP   UNIFINE PENTIPS 31G X 8 MM MISC USE AS DIRECTED FOR INSULIN .00 2/20/17   ANA Pond CNP   TRUEPLUS LANCETS 30G MISC CHECK BLOOD SUGAR TWICE A DAY DX E11.9 2/8/17   ANA Pond CNP     Allergies:  Pcn [penicillins]  Social History:    reports that he quit smoking about 19 years ago. His smoking use included cigarettes. He has a 40.00 pack-year smoking history. He has never used smokeless tobacco. He reports that he does not drink alcohol or use drugs. Family History:   Family History   Problem Relation Age of Onset    Heart Disease Mother     Heart Failure Mother     Cancer Father     Diabetes Maternal Grandmother     Heart Failure Maternal Uncle     Hypertension Maternal Uncle     Asthma Neg Hx     Emphysema Neg Hx        Vitals: Blood pressure 133/89, pulse 83, temperature 97 °F (36.1 °C), temperature source Temporal, resp. rate 18, height 5' 4\" (1.626 m), weight 242 lb (109.8 kg), SpO2 96 %. PHYSICAL EXAM:  HENT: Airway patent and reviewed  Cardiovascular: Normal rate, regular rhythm, normal heart sounds. Pulmonary/Chest: No wheezes. No rhonchi. No rales. Abdominal: Soft. Bowel sounds are normal. No distension. Extremities: Moves all extremities equally  Lumbar Spine: Painful range of motion, no midline tenderness       Diagnosis:Lumbar spondylosis without radiculopathy    Plan: Proceed with planned procedure    The patient was counseled at length about the risks of angelia Covid-19 in the sophie-operative and post-operative states including the recovery window of their procedure. The patient was made aware that angelia Covid-19 after a surgical procedure may worsen their prognosis for recovering from the virus and lend to a higher morbidity and or mortality risk. The patient was given the options of postponing their procedure. All of the risks, benefits, and alternatives were discussed.  The patient does wish to proceed with the procedure. ASA CLASS:         []   I. Normal, healthy adult           [x]   II.  Mild systemic disease            []   III. Severe systemic disease      Mallampati: Mallampati Class II - (soft palate, fauces & uvula are visible)      Sedation plan:   [x]  Local              []  Minimal                  []  General anesthesia    Patient's condition acceptable for planned procedure/sedation. Post Procedure Plan   Return to same level of care   ______________________     The risks and benefits as well as alternatives to the procedure have been discussed with the patient and or family. The patient and or next of kin understands and agrees to proceed.     Steven Love M.D.

## 2020-12-28 ENCOUNTER — OFFICE VISIT (OUTPATIENT)
Dept: CARDIOLOGY CLINIC | Age: 60
End: 2020-12-28
Payer: MEDICARE

## 2020-12-28 VITALS
TEMPERATURE: 97.1 F | HEART RATE: 100 BPM | SYSTOLIC BLOOD PRESSURE: 138 MMHG | WEIGHT: 239 LBS | BODY MASS INDEX: 41.02 KG/M2 | DIASTOLIC BLOOD PRESSURE: 62 MMHG

## 2020-12-28 PROCEDURE — 99214 OFFICE O/P EST MOD 30 MIN: CPT | Performed by: INTERNAL MEDICINE

## 2020-12-28 NOTE — PROGRESS NOTES
Subjective:      Patient ID: Stephanie Julian. is a 64 y.o. . CC:  Follow up of Cardiomyopathy and CAD. HPI:   Mr. Bibiana Ruiz has no chest pain and wife past away 25 months ago. Patient seems to be coping adequately with his loss. He feels well. Needs stimulator implanted to move bowels. No chest pain nor syncope, no ICD discharges. Going to bipap. Says chol is high but cant see any results anywhere. Cant do cpap. Got nerve injections in low back. Has sciatic nerve pain. Allergies   Allergen Reactions    Pcn [Penicillins] Hives        Social History     Socioeconomic History    Marital status:       Spouse name: Not on file    Number of children: Not on file    Years of education: Not on file    Highest education level: Not on file   Occupational History    Not on file   Social Needs    Financial resource strain: Not on file    Food insecurity     Worry: Not on file     Inability: Not on file    Transportation needs     Medical: Not on file     Non-medical: Not on file   Tobacco Use    Smoking status: Former Smoker     Packs/day: 2.00     Years: 20.00     Pack years: 40.00     Types: Cigarettes     Quit date: 2001     Years since quittin.6    Smokeless tobacco: Never Used    Tobacco comment: PASSIVE SMOKER   Substance and Sexual Activity    Alcohol use: No     Alcohol/week: 0.0 standard drinks    Drug use: No    Sexual activity: Never   Lifestyle    Physical activity     Days per week: Not on file     Minutes per session: Not on file    Stress: Not on file   Relationships    Social connections     Talks on phone: Not on file     Gets together: Not on file     Attends [employer] service: Not on file     Active member of club or organization: Not on file     Attends meetings of clubs or organizations: Not on file     Relationship status: Not on file    Intimate partner violence     Fear of current or ex partner: Not on file     Emotionally abused: Not on file     Physically abused: Not on file     Forced sexual activity: Not on file   Other Topics Concern    Not on file   Social History Narrative    Not on file        Patient has a family history includes Cancer in his father; Diabetes in his maternal grandmother; Heart Disease in his mother; Heart Failure in his maternal uncle and mother; Hypertension in his maternal uncle. Patient  has a past medical history of Arthritis, Asthma, CAD (coronary artery disease), COPD (chronic obstructive pulmonary disease) ([address] Emphysema of lung ([address] Fatty liver, GERD (gastroesophageal reflux disease), Hyperlipidemia, Hypertension, Medical history reviewed with no changes, MI, old, Sleep apnea, and Type II or unspecified type diabetes mellitus without mention of complication, not stated as uncontrolled. Vitals  Weight: 239 lb (108.4 kg)  Blood Pressure: BP: (138)/(62)    Pulse: 100       Review of Systems   Constitutional: Negative. HENT: Negative. Eyes: Negative. Respiratory: Negative. Cardiovascular: Negative. Gastrointestinal: Negative. Genitourinary: Negative. Musculoskeletal: Negative. Skin: Negative. Neurological: Negative. Hematological: Negative. Psychiatric/Behavioral: Negative. Objective:   Physical Exam   Constitutional: He is oriented to person, place, and time. Vital signs are normal. He appears well-nourished. HENT:   Head: Normocephalic and atraumatic. Mouth/Throat: Oropharynx is clear and moist and mucous membranes are normal.   Eyes: Conjunctivae and EOM are normal. Pupils are equal, round, and reactive to light. Neck: Normal range of motion. Neck supple. No JVD present. No tracheal deviation present. No thyromegaly present. Cardiovascular: Normal rate, regular rhythm, S1 normal, S2 normal, normal heart sounds, intact distal pulses and normal pulses. PMI is not displaced. Exam reveals no gallop and no friction rub. No murmur heard.   Pulses: Carotid pulses are 2+ on the right side, and 2+ on the left side. Radial pulses are 2+ on the right side, and 2+ on the left side. Pulmonary/Chest: Effort normal and breath sounds normal.   Abdominal: Normal appearance and bowel sounds are normal.   Musculoskeletal: Normal range of motion. Lymphadenopathy:     He has no cervical adenopathy. Neurological: He is alert and oriented to person, place, and time. He has normal strength. No cranial nerve deficit. Coordination normal.   Skin: Skin is warm, dry and intact. Psychiatric: He has a normal mood and affect. His speech is normal and behavior is normal.       Assessment:       Diagnosis Orders   1. Ventricular tachycardia (HCC)  LIPID PANEL    Comprehensive Metabolic Panel    CBC WITH AUTO DIFFERENTIAL   2. Ischemic cardiomyopathy  LIPID PANEL    Comprehensive Metabolic Panel    CBC WITH AUTO DIFFERENTIAL   3. Hyperlipidemia with target LDL less than 100  LIPID PANEL    Comprehensive Metabolic Panel    CBC WITH AUTO DIFFERENTIAL   4. HTN (hypertension), benign  LIPID PANEL    Comprehensive Metabolic Panel    CBC WITH AUTO DIFFERENTIAL             Plan:       STable ischemic CMP. Patient continues to take metoprolol without any ICD discharges. Gets meds refilled weekly packs by [facility]. Edema:  Controlled. Continue lasix 20 mg Mon thurs and K 10 meq mon and thurs. HLP:  Check labs. HTN:  Controlled. LVEF 44% with no ischemia and inferolateral infarction. No signs of CHF. NYHA 1 and CCS 1. He got his disability coverage after 7 years. Continue current medications. Continue current medications. Stable cardiovascular status.

## 2020-12-29 ENCOUNTER — HOSPITAL ENCOUNTER (OUTPATIENT)
Age: 60
Discharge: HOME | End: 2020-12-29
Payer: MEDICARE

## 2020-12-29 ENCOUNTER — OFFICE VISIT (OUTPATIENT)
Dept: ORTHOPEDIC SURGERY | Age: 60
End: 2020-12-29
Payer: MEDICARE

## 2020-12-29 VITALS — BODY MASS INDEX: 40.8 KG/M2 | WEIGHT: 238.98 LBS | RESPIRATION RATE: 14 BRPM | HEIGHT: 64 IN | TEMPERATURE: 96.9 F

## 2020-12-29 LAB
A/G RATIO: 1.8 (ref 1.1–2.2)
ALBUMIN SERPL-MCNC: 4.2 G/DL (ref 3.4–5)
ALP BLD-CCNC: 71 U/L (ref 40–129)
ALT SERPL-CCNC: 22 U/L (ref 10–40)
ANION GAP SERPL CALCULATED.3IONS-SCNC: 9 MMOL/L (ref 3–16)
AST SERPL-CCNC: 12 U/L (ref 15–37)
BASOPHILS ABSOLUTE: 0 K/UL (ref 0–0.2)
BASOPHILS RELATIVE PERCENT: 0.5 %
BILIRUB SERPL-MCNC: 0.4 MG/DL (ref 0–1)
BUN BLDV-MCNC: 15 MG/DL (ref 7–20)
CALCIUM SERPL-MCNC: 9.7 MG/DL (ref 8.3–10.6)
CHLORIDE BLD-SCNC: 100 MMOL/L (ref 99–110)
CHOLESTEROL, TOTAL: 170 MG/DL (ref 0–199)
CO2: 26 MMOL/L (ref 21–32)
CREAT SERPL-MCNC: 0.7 MG/DL (ref 0.9–1.3)
EOSINOPHILS ABSOLUTE: 0.1 K/UL (ref 0–0.6)
EOSINOPHILS RELATIVE PERCENT: 1.3 %
GFR AFRICAN AMERICAN: >60
GFR NON-AFRICAN AMERICAN: >60
GLOBULIN: 2.4 G/DL
GLUCOSE BLD-MCNC: 201 MG/DL (ref 70–99)
HCT VFR BLD CALC: 44.9 % (ref 40.5–52.5)
HDLC SERPL-MCNC: 29 MG/DL (ref 40–60)
HEMOGLOBIN: 15.2 G/DL (ref 13.5–17.5)
LDL CHOLESTEROL CALCULATED: ABNORMAL MG/DL
LDL CHOLESTEROL DIRECT: 93 MG/DL
LYMPHOCYTES ABSOLUTE: 1.9 K/UL (ref 1–5.1)
LYMPHOCYTES RELATIVE PERCENT: 32.8 %
MCH RBC QN AUTO: 29.3 PG (ref 26–34)
MCHC RBC AUTO-ENTMCNC: 33.8 G/DL (ref 31–36)
MCV RBC AUTO: 86.5 FL (ref 80–100)
MONOCYTES ABSOLUTE: 0.5 K/UL (ref 0–1.3)
MONOCYTES RELATIVE PERCENT: 8.6 %
NEUTROPHILS ABSOLUTE: 3.2 K/UL (ref 1.7–7.7)
NEUTROPHILS RELATIVE PERCENT: 56.8 %
PDW BLD-RTO: 14.7 % (ref 12.4–15.4)
PLATELET # BLD: 143 K/UL (ref 135–450)
PMV BLD AUTO: 6.9 FL (ref 5–10.5)
POTASSIUM SERPL-SCNC: 4.4 MMOL/L (ref 3.5–5.1)
RBC # BLD: 5.18 M/UL (ref 4.2–5.9)
SODIUM BLD-SCNC: 135 MMOL/L (ref 136–145)
TOTAL PROTEIN: 6.6 G/DL (ref 6.4–8.2)
TRIGL SERPL-MCNC: 442 MG/DL (ref 0–150)
VLDLC SERPL CALC-MCNC: ABNORMAL MG/DL
WBC # BLD: 5.7 K/UL (ref 4–11)

## 2020-12-29 PROCEDURE — 36415 COLL VENOUS BLD VENIPUNCTURE: CPT

## 2020-12-29 PROCEDURE — 20611 DRAIN/INJ JOINT/BURSA W/US: CPT | Performed by: PHYSICAL MEDICINE & REHABILITATION

## 2020-12-29 PROCEDURE — 85025 COMPLETE CBC W/AUTO DIFF WBC: CPT

## 2020-12-29 PROCEDURE — 83721 ASSAY OF BLOOD LIPOPROTEIN: CPT

## 2020-12-29 PROCEDURE — 80053 COMPREHEN METABOLIC PANEL: CPT

## 2020-12-29 PROCEDURE — 80061 LIPID PANEL: CPT

## 2020-12-29 PROCEDURE — 99214 OFFICE O/P EST MOD 30 MIN: CPT | Performed by: PHYSICAL MEDICINE & REHABILITATION

## 2020-12-29 RX ORDER — TRIAMCINOLONE ACETONIDE 40 MG/ML
80 INJECTION, SUSPENSION INTRA-ARTICULAR; INTRAMUSCULAR ONCE
Status: COMPLETED | OUTPATIENT
Start: 2020-12-29 | End: 2020-12-29

## 2020-12-29 RX ADMIN — TRIAMCINOLONE ACETONIDE 80 MG: 40 INJECTION, SUSPENSION INTRA-ARTICULAR; INTRAMUSCULAR at 12:55

## 2020-12-29 NOTE — LETTER
Please schedule the following with:     Date:   2021 @ 8:30     Account: [id number]  Patient: Alyssa Lemon. : 1960  Address:  [address]    Phone (H):  [phone] (home)      ----------------------------------------------------------------------------------------------  Diagnosis:     ICD-10-CM    1. Sacroiliac joint dysfunction of left side  M53.3    2. Spondylosis without myelopathy or radiculopathy, lumbar region  M47.816    3. Degenerative disc disease, lumbar  M51.36    4. Greater trochanteric bursitis of left hip  M70.62        Levels:sacroiliac joint   Procedure type sacroiliac joint dysfunction  Side left  CPT Codes 20981  ----------------------------------------------------------------------------------------------  Injection # 1   [email or web address]    Attending Physician       Tara Dominguez. Reyes Rocha MD.  ----------------------------------------------------------------------------------------------  Injection Scheduled For:    At:    [facility]  Pre-Cert#  [employer]     Pre-Cert#    Comments or Special instructions:  COVID TEST: yes    · Infection control  ? Tested positive for MRSA in past 12 months:  no  ? Tested positive for MSSA \"staph infection\" in past 12 months: no  ? Tested positive for VRE (Vancomycin Resistant Enterococci) in past 12 months:   no  ? Currently on any antibiotics for an infection: no  · Anticoagulants:  ? On a blood thinner:  yes , ASA and Plavix - does not need to stop  ?  Any history of bleeding disorder: no   · Advanced Liver disease: no   · Advanced Renal disease: no   · Glaucoma: no   · Diabetes: yes      Sedation:         No  -----------------------------------------------------------------------------------------------  Allergies   Allergen Reactions    Pcn [Penicillins] Hives

## 2020-12-29 NOTE — PROGRESS NOTES
12/29/20 12:52 PM         NDC: 4637-3748-38   -   LIDOCAINE 1%    Lot Number: [id number]       Comments: L GREATER TROCHANTER BURSA INJECTION

## 2020-12-29 NOTE — PROGRESS NOTES
Follow up: Yahir Corky [address]  1960  [id number]         Chief Complaint   Patient presents with    Lower Back Pain     12/1/2020 RFA B/L L3, L4 MB L5 DR     Hip Pain     c/o L hip pain. feels this is the primary issue at this time. HISTORY OF PRESENT ILLNESS:  Mr. Cornelius Duval is a 64 y.o. male returns for a follow up visit for multiple medical problems. His current presenting problems are   1. Sacroiliac joint dysfunction of left side    2. Spondylosis without myelopathy or radiculopathy, lumbar region    3. Degenerative disc disease, lumbar    4. Greater trochanteric bursitis of left hip    . As per information/history obtained from the PADT(patient assessment and documentation tool) - He complains of pain in the lower back with radiation to the buttocks and hips Left He rates the pain 8/10 and describes it as stabbing. Pain is made worse by: bending. He denies side effects from the current pain regimen. Patient reports that since the last follow up visit the physical functioning is better, family/social relationships are better, mood is better and sleep patterns are better, and that the overall functioning is better. Patient denies neurological bowel or bladder. Mr. Cornelius Duval presents for follow up of ongoing low back pain. He recently underwent a bilateral L3, L4, L5 DR radiofrequency ablation on 12/1/2020. He reports 95% relief of his low back pain following this procedure. The patient reports his left hip is still bothering him. This has been ongoing since the back pain began. The patient states the pain is near the spinal cord stimulator battery. Bending over and putting weight over the left hip and buttock aggravate his pain. Standing up alleviates his pain. He is able to sit, stand and walk for 20 minutes without pain.      Associated signs and symptoms:   Neurogenic bowel or bladder symptoms:  no   Perceived weakness:  no   Difficulty walking:  no Past medical, surgical, social and family history reviewed with the patient. No pertinent relevant history  Past Medical History:   Past Medical History:   Diagnosis Date    Arthritis     Asthma     CAD (coronary artery disease)     COPD (chronic obstructive pulmonary disease) ([facility])     Dr. Mane aVlentin at [facility] told the patient that he did not have COPD, just asthma    Emphysema of lung ([address]     Fatty liver     GERD (gastroesophageal reflux disease)     Hyperlipidemia     Hypertension     Medical history reviewed with no changes     MI, old     Sleep apnea     pt wears cpap at night. states does not have cpap    Type II or unspecified type diabetes mellitus without mention of complication, not stated as uncontrolled       Past Surgical History:     Past Surgical History:   Procedure Laterality Date    CARDIAC PACEMAKER PLACEMENT  2011    NOT MRI COMPATIABLE OLD LEADS st [facility] Dec.  pace maker difib   Tammi Rees Bilateral 2013    CATARACT REMOVAL WITH IMPLANT Left 2/28/14    COLONOSCOPY      CORONARY ANGIOPLASTY WITH STENT PLACEMENT  2001,2008    CYST REMOVAL  1982    coccyx area    DIAGNOSTIC CARDIAC CATH LAB PROCEDURE      ENDOSCOPY, COLON, DIAGNOSTIC      ERCP  9/15/2013    ERCP  10/11/13    WITH STENT REMOVAL    FOOT SURGERY Right 07/09/2018     REPAIR CALCANEAL SPUR, REPAIR OF ACHILLES TENDON RIGHT FOOT    NERVE SURGERY Bilateral 12/1/2020    BILATERAL LUMBAR THREE LUMBAR FOUR LUMBAR FIVE DORSAL RAMUS  RADIOFREQUENCY ABLATION SITE CONFIRMED BY FLUOROSCOPY performed by Beverley Cardenas MD at [address]      back stimulator in lower back    PACEMAKER PLACEMENT      ICD    PAIN MANAGEMENT PROCEDURE Bilateral 5/26/2020    BILATERAL LUMBAR THREE, LUMBAR FOUR, LUMBAR FIVE DORSAL RAMUS MEDIAL BRANCH BLOCK SITE CONFIRMED BY FLUOROSCOPY performed by Beverley Cardenas MD at [address] Bilateral 6/9/2020 BILATERAL LUMBAR THREE, LUMBAR FOUR, LUMBAR FIVE DORSAL RAMUS MEDIAL BRANCH BLOCK SITE CONFIRMED BY FLUOROSCOPY performed by Asif Cruz MD at [address] [facility] Right 10/31/2018    GASTROCNEMIUS RECESSION RIGHT FOOT performed by Mike Rushing DPM at [address] Right 10/31/2018    REMOVAL OF CALCANEAL SPUR, ACHILLES TENDON REPAIR RIGHT LOWER EXTREMITY performed by Mike Rushing DPM at [address]  7/18/13    and colonoscopy with biopsies taken    UPPER GASTROINTESTINAL ENDOSCOPY  8/23/13    EUS    UPPER GASTROINTESTINAL ENDOSCOPY  9/15/2013     Current Medications:     Current Outpatient Medications:     furosemide (LASIX) 20 MG tablet, TAKE ONE TABLET BY MOUTH MONDAY AND THURSDAY, Disp: 8 tablet, Rfl: 6    potassium chloride (KLOR-CON M) 20 MEQ extended release tablet, TAKE ONE TABLET BY MOUTH WITH LASIX ON MONDAY AND THURSDAY, Disp: 30 tablet, Rfl: 3    tamsulosin (FLOMAX) 0.4 MG capsule, Take 0.4 mg by mouth daily, Disp: , Rfl:     TRAZODONE HCL PO, Take by mouth, Disp: , Rfl:     UREA 20 EX, Apply topically, Disp: , Rfl:     Fexofenadine HCl (ALLEGRA PO), Take by mouth, Disp: , Rfl:     Esomeprazole Magnesium (NEXIUM PO), Take 40 mg by mouth , Disp: , Rfl:     JARDIANCE 25 MG tablet, Take 25 mg by mouth daily , Disp: , Rfl:     nitroGLYCERIN (NITROLINGUAL) 0.4 MG/SPRAY 0.4 mg spray, Place 1 spray under the tongue every 5 minutes as needed for Chest pain, Disp: 2 Bottle, Rfl: 3    insulin detemir (LEVEMIR) 100 UNIT/ML injection vial, Inject 60 Units into the skin nightly , Disp: , Rfl:     Fluticasone Furoate-Vilanterol (BREO ELLIPTA IN), Inhale into the lungs daily , Disp: , Rfl:     insulin lispro (HUMALOG) 100 UNIT/ML injection vial, Inject into the skin 2 times daily Indications: 10 units in AM, 10 units at dinner , Disp: , Rfl:   clopidogrel (PLAVIX) 75 MG tablet, TAKE ONE TABLET BY MOUTH DAILY, Disp: 30 tablet, Rfl: 0    metFORMIN (GLUCOPHAGE) 1000 MG tablet, TAKE ONE TABLET BY MOUTH 2 TIMES DAILY WITH MORNING AND EVENING MEALS, Disp: 60 tablet, Rfl: 0    famotidine (PEPCID) 20 MG tablet, TAKE 1 TABLET BY MOUTH 2 TIMES DAILY, Disp: 60 tablet, Rfl: 0    gabapentin (NEURONTIN) 100 MG capsule, TAKE 2 CAPSULES BY MOUTH 3 TIMES DAILY, Disp: 180 capsule, Rfl: 0    fluticasone (FLONASE) 50 MCG/ACT nasal spray, USE 1 SPRAY IN EACH NOSTRIL EVERY DAY, Disp: 16 g, Rfl: 0    TRUE METRIX BLOOD GLUCOSE TEST strip, CHECK SUGARS TWICE A DAY AS DIRECTED, DX: E11.9, Disp: 100 each, Rfl: 5    lisinopril (PRINIVIL;ZESTRIL) 20 MG tablet, TAKE 1 TABLET BY MOUTH DAILY, Disp: 30 tablet, Rfl: 11    atorvastatin (LIPITOR) 80 MG tablet, TAKE ONE TABLET BY MOUTH DAILY, Disp: 30 tablet, Rfl: 11    aspirin 325 MG EC tablet, TAKE ONE TABLET BY MOUTH DAILY, Disp: 30 tablet, Rfl: 11    UNIFINE PENTIPS 31G X 8 MM MISC, USE AS DIRECTED FOR INSULIN .00, Disp: 100 each, Rfl: 5    diltiazem (CARDIZEM CD) 180 MG extended release capsule, TAKE 1 CAPSULE DAILY, Disp: 30 capsule, Rfl: 11    metoprolol succinate (TOPROL XL) 200 MG extended release tablet, TAKE ONE-HALF TABLET TWICE A DAY, Disp: 30 tablet, Rfl: 11    TRUEPLUS LANCETS 30G MISC, CHECK BLOOD SUGAR TWICE A DAY DX E11.9, Disp: 100 each, Rfl: 11    DULoxetine (CYMBALTA) 60 MG extended release capsule, Take 60 mg by mouth daily, Disp: , Rfl:     divalproex (DEPAKOTE) 500 MG DR tablet, Take 1,000 mg by mouth nightly, Disp: , Rfl:     Liraglutide (VICTOZA SC), Inject 1.8 Units into the skin daily. , Disp: , Rfl:   Allergies:  Pcn [penicillins]  Social History:    reports that he quit smoking about 19 years ago. His smoking use included cigarettes. He has a 40.00 pack-year smoking history. He has never used smokeless tobacco. He reports that he does not drink alcohol or use drugs.   Family History: Family History   Problem Relation Age of Onset    Heart Disease Mother     Heart Failure Mother     Cancer Father     Diabetes Maternal Grandmother     Heart Failure Maternal Uncle     Hypertension Maternal Uncle     Asthma Neg Hx     Emphysema Neg Hx        REVIEW OF SYSTEMS:   CONSTITUTIONAL: Denies unexplained weight loss, fevers, chills or fatigue  NEUROLOGICAL: Denies unsteady gait or progressive weakness  MUSCULOSKELETAL: Denies joint swelling or redness  GI: Denies nausea, vomiting, diarrhea   : Denies bowel or bladder issues       PHYSICAL EXAM:    Vitals: Temperature 96.9 °F (36.1 °C), resp. rate 14, height 5' 4.02\" (1.626 m), weight 238 lb 15.7 oz (108.4 kg). GENERAL EXAM:  · General Apparence: Patient is adequately groomed with no evidence of malnutrition. · Psychiatric: Orientation: The patient is oriented to time, place and person. The patient's mood and affect are appropriate   · Vascular: Examination reveals no swelling and palpation reveals no tenderness in upper or lower extremities. Good capillary refill. · The lymphatic examination of the neck, axillae and groin reveals all areas to be without enlargement or induration  · Sensation is intact without deficit in the upper and lower extremities to light touch and pinprick  · Coordination of the upper and lower extremities are normal.  · RIGHT UPPER EXTREMITY:  Inspection/examination of the right upper extremity does not show any tenderness, deformity or injury. Range of motion is unremarkable and pain-free. There is no gross instability. There are no rashes, ulcerations or lesions. Strength and tone are normal. No atrophy or abnormal movements are noted. · LEFT UPPER EXTREMITY: Inspection/examination of the left upper extremity does not show any tenderness, deformity or injury. Range of motion is unremarkable and pain-free. There is no gross instability. There are no rashes, ulcerations or lesions. Strength and tone are normal. No atrophy or abnormal movements are noted. LUMBAR/SACRAL EXAMINATION:  · Inspection: Local inspection shows no step-off or bruising. Lumbar alignment is normal. No instability is noted. · Palpation:   No evidence of tenderness at the midline. Lumbar paraspinal tenderness: No tenderness to palpation of the lumbar paraspinals  Bursal tenderness + left GT bursa tenderness There is no paraspinal spasm. · Range of Motion: limited by 25% in all planes due to pain  · Strength:   Strength testing is 5/5 in all muscle groups tested. · Special Tests:   Straight leg raise and crossed SLR negative. Alexey's testing is positive left. Thigh thrust positive left. SI joint compression test positive. FADIR's testing is negative bilaterally. Bowstring test negative. Slump test negative. · Skin: There are no rashes, ulcerations or lesions. · Reflexes: Reflexes are symmetrically 1+ at the patellar and ankle tendons. Clonus absent bilaterally at the feet. · Gait & station: normal, patient ambulates without assistance and no ataxia  · Additional Examinations:  · RIGHT LOWER EXTREMITY: Inspection/examination of the right lower extremity does not show any tenderness, deformity or injury. Range of motion is normal and pain-free. There is no gross instability. There are no rashes, ulcerations or lesions. Strength and tone are normal. No atrophy or abnormal movements are noted. · LEFT LOWER EXTREMITY:  Inspection/examination of the left lower extremity does not show any tenderness, deformity or injury. Range of motion is normal and pain-free. There is no gross instability. There are no rashes, ulcerations or lesions. Strength and tone are normal. No atrophy or abnormal movements are noted. Diagnostic Testing:    MR Lumbar spine shows  3/5/2020      L1-L2: There is no significant disc protrusion, central spinal canal stenosis   or neural foraminal narrowing.       L2-L3: There is no significant disc protrusion, central spinal canal stenosis   or neural foraminal narrowing.       L3-L4: There is no significant disc protrusion, central spinal canal stenosis   or neural foraminal narrowing.       L4-L5: There is no significant disc protrusion, central spinal canal stenosis   or neural foraminal narrowing.       L5-S1: There is no significant disc protrusion, central spinal canal stenosis   or neural foraminal narrowing.       SOFT TISSUES/RETROPERITONEUM: Paraspinal soft tissues are normal.  Partial   visualization of a stimulator wire in the region of the left sacrum.        Results for orders placed or performed during the hospital encounter of 12/29/20   CBC WITH AUTO DIFFERENTIAL   Result Value Ref Range    WBC 5.7 4.0 - 11.0 K/uL    RBC 5.18 4.20 - 5.90 M/uL    Hemoglobin 15.2 13.5 - 17.5 g/dL    Hematocrit 44.9 40.5 - 52.5 %    MCV 86.5 80.0 - 100.0 fL    MCH 29.3 26.0 - 34.0 pg    MCHC 33.8 31.0 - 36.0 g/dL    RDW 14.7 12.4 - 15.4 %    Platelets 651 899 - 012 K/uL    MPV 6.9 5.0 - 10.5 fL    Neutrophils % 56.8 %    Lymphocytes % 32.8 %    Monocytes % 8.6 %    Eosinophils % 1.3 %    Basophils % 0.5 %    Neutrophils Absolute 3.2 1.7 - 7.7 K/uL    Lymphocytes Absolute 1.9 1.0 - 5.1 K/uL    Monocytes Absolute 0.5 0.0 - 1.3 K/uL    Eosinophils Absolute 0.1 0.0 - 0.6 K/uL    Basophils Absolute 0.0 0.0 - 0.2 K/uL   Comprehensive Metabolic Panel   Result Value Ref Range Sodium 135 (L) 136 - 145 mmol/L    Potassium 4.4 3.5 - 5.1 mmol/L    Chloride 100 99 - 110 mmol/L    CO2 26 21 - 32 mmol/L    Anion Gap 9 3 - 16    Glucose 201 (H) 70 - 99 mg/dL    BUN 15 7 - 20 mg/dL    CREATININE 0.7 (L) 0.9 - 1.3 mg/dL    GFR Non-African American >60 >60    GFR African American >60 >60    Calcium 9.7 8.3 - 10.6 mg/dL    Total Protein 6.6 6.4 - 8.2 g/dL    Alb 4.2 3.4 - 5.0 g/dL    Albumin/Globulin Ratio 1.8 1.1 - 2.2    Total Bilirubin 0.4 0.0 - 1.0 mg/dL    Alkaline Phosphatase 71 40 - 129 U/L    ALT 22 10 - 40 U/L    AST 12 (L) 15 - 37 U/L    Globulin 2.4 g/dL     Impression:       1. Sacroiliac joint dysfunction of left side    2. Spondylosis without myelopathy or radiculopathy, lumbar region    3. Degenerative disc disease, lumbar    4. Greater trochanteric bursitis of left hip        Plan:  Clinical Course: Diagnosis #4 is worsening, diagnoses 2-3 are improving. I discussed the diagnosis and the treatment options with Brad NickPrateek today. In Summary:  The various treatment options were outlined and discussed with Brad NickPrateek including:  Conservative care options: physical therapy, ice, medications, bracing, and activity modification. The indications for therapeutic injections. The indications for additional imaging/laboratory studies. The indications for (possible future) interventions. After considering the various options discussed, Brad NickPrateek elected to pursue a course of treatment that includes the followin. Medications:  He is not taking any pain medications    2. PT:  Encouraged to continue with Home exercise program.    3. Further studies: No further studies. 4. Interventional:  We discussed pursuing a left Sacroiliac joint injection as SI joint pathology is suspected as the etiology of the chronic low back/hip pain. There has been failure of non-invasive and conservative treatment including physical therapy/home exercise program, rest, NSAIDs or acetaminophen. Risks include but are not limited to bleeding, infection, nerve injury, increase in pain and lack of pain relief. The patient verbalized understanding and would like to proceed. As such, I have confirmed the patient has met the general requirements including failure of conservative management including prescription strength analgesics, adjunctive medications were utilized , therapeutic exercise program, and no underlying addiction or behavioral disorders were identified to the ability of the provider. Symptoms are impacting their ADLs or iADLs such as walking and transferring and significant pain was noted in the HPI. Imaging studies as noted in the diagnostic imaging section of the consultation were reviewed and correlated with clinical findings. Fluoroscopy is utilized for interventional procedures. Repeat Therapeutic Intraarticular Sacroiliac Joint Injections  A repeat therapeutic intra-articular sacroiliac joint injection is considered medically necessary as the patient has recurrence of symptoms, 50% relief following and improvement in ADLs for at least 6 weeks following the previous injection.

## 2021-01-04 ENCOUNTER — TELEPHONE (OUTPATIENT)
Dept: CARDIOLOGY CLINIC | Age: 61
End: 2021-01-04

## 2021-01-04 ENCOUNTER — NURSE ONLY (OUTPATIENT)
Dept: CARDIOLOGY CLINIC | Age: 61
End: 2021-01-04
Payer: MEDICARE

## 2021-01-04 DIAGNOSIS — I47.20 VENTRICULAR TACHYCARDIA: ICD-10-CM

## 2021-01-04 DIAGNOSIS — I25.5 ISCHEMIC CARDIOMYOPATHY: ICD-10-CM

## 2021-01-04 DIAGNOSIS — Z95.810 AUTOMATIC IMPLANTABLE CARDIOVERTER-DEFIBRILLATOR IN SITU: ICD-10-CM

## 2021-01-04 PROCEDURE — 93296 REM INTERROG EVL PM/IDS: CPT | Performed by: INTERNAL MEDICINE

## 2021-01-04 PROCEDURE — 93295 DEV INTERROG REMOTE 1/2/MLT: CPT | Performed by: INTERNAL MEDICINE

## 2021-01-04 NOTE — PROGRESS NOTES
[facility]lin Remote transmission of pacemaker and/or ICD, or implanted heart monitor shows normal cardiac device function. No  arrhythmias recorded. Magnet response on 12/1/20. Pt had a Procedure: Radiofrequency ablation of the Bilateral L3, L4 Medial branches and L5 Dorsal ramus. Pre-Procedure Diagnosis: Lumbar spondylosis without radiculopathy. Follow up in 3 months via [phone].

## 2021-01-04 NOTE — TELEPHONE ENCOUNTER
Patient retuning a call he received on 12-31-20. He thinks its about his results. Please call patient

## 2021-01-06 ENCOUNTER — OFFICE VISIT (OUTPATIENT)
Dept: PRIMARY CARE CLINIC | Age: 61
End: 2021-01-06
Payer: MEDICARE

## 2021-01-06 DIAGNOSIS — Z01.818 PREOP TESTING: Primary | ICD-10-CM

## 2021-01-06 PROCEDURE — 99211 OFF/OP EST MAY X REQ PHY/QHP: CPT | Performed by: NURSE PRACTITIONER

## 2021-01-06 RX ORDER — ICOSAPENT ETHYL 1000 MG/1
2 CAPSULE ORAL 2 TIMES DAILY
Qty: 120 CAPSULE | Refills: 1 | Status: SHIPPED | OUTPATIENT
Start: 2021-01-06 | End: 2021-02-04

## 2021-01-06 NOTE — TELEPHONE ENCOUNTER
Requested Prescriptions     Pending Prescriptions Disp Refills    furosemide (LASIX) 20 MG tablet [Pharmacy Med Name: FUROSEMIDE TABLET 20 MG] 8 tablet 0     Sig: TAKE ONE TABLET BY MOUTH [address]          Number: 8    Refills: 5    Last Office Visit: 12/28/2020     Next Office Visit: 4/8/2021     Last Labs: 12/29/2020  Creat: 0.7  BUN: 15

## 2021-01-06 NOTE — PATIENT INSTRUCTIONS
Advance Care Planning  People with COVID-19 may have no symptoms, mild symptoms, such as fever, cough, and shortness of breath or they may have more severe illness, developing severe and fatal pneumonia. As a result, Advance Care Planning with attention to naming a health care decision maker (someone you trust to make healthcare decisions for you if you could not speak for yourself) and sharing other health care preferences is important BEFORE a possible health crisis. Please contact your Primary Care Provider to discuss Advance Care Planning. Preventing the Spread of Coronavirus Disease 2019 in Homes and Residential Communities  For the most recent information go to [email or web address]    Prevention steps for People with confirmed or suspected COVID-19 (including persons under investigation) who do not need to be hospitalized  and   People with confirmed COVID-19 who were hospitalized and determined to be medically stable to go home    Your healthcare provider and public health staff will evaluate whether you can be cared for at home. If it is determined that you do not need to be hospitalized and can be isolated at home, you will be monitored by staff from your local or state health department. You should follow the prevention steps below until a healthcare provider or local or state health department says you can return to your normal activities. Stay home except to get medical care  People who are mildly ill with COVID-19 are able to isolate at home during their illness. You should restrict activities outside your home, except for getting medical care. Do not go to work, school, or public areas. Avoid using public transportation, ride-sharing, or taxis.   Separate yourself from other people and animals in your home People: As much as possible, you should stay in a specific room and away from other people in your home. Also, you should use a separate bathroom, if available. Animals: You should restrict contact with pets and other animals while you are sick with COVID-19, just like you would around other people. Although there have not been reports of pets or other animals becoming sick with COVID-19, it is still recommended that people sick with COVID-19 limit contact with animals until more information is known about the virus. When possible, have another member of your household care for your animals while you are sick. If you are sick with COVID-19, avoid contact with your pet, including petting, snuggling, being kissed or licked, and sharing food. If you must care for your pet or be around animals while you are sick, wash your hands before and after you interact with pets and wear a facemask. Call ahead before visiting your doctor  If you have a medical appointment, call the healthcare provider and tell them that you have or may have COVID-19. This will help the healthcare providers office take steps to keep other people from getting infected or exposed. Wear a facemask  You should wear a facemask when you are around other people (e.g., sharing a room or vehicle) or pets and before you enter a healthcare providers office. If you are not able to wear a facemask (for example, because it causes trouble breathing), then people who live with you should not stay in the same room with you, or they should wear a facemask if they enter your room. Cover your coughs and sneezes  Cover your mouth and nose with a tissue when you cough or sneeze. Throw used tissues in a lined trash can. Immediately wash your hands with soap and water for at least 20 seconds or, if soap and water are not available, clean your hands with an alcohol-based hand  that contains at least 60% alcohol.   Clean your hands often Seek prompt medical attention if your illness is worsening (e.g., difficulty breathing). Before seeking care, call your healthcare provider and tell them that you have, or are being evaluated for, COVID-19. Put on a facemask before you enter the facility. These steps will help the healthcare providers office to keep other people in the office or waiting room from getting infected or exposed. Ask your healthcare provider to call the local or state health department. Persons who are placed under active monitoring or facilitated self-monitoring should follow instructions provided by their local health department or occupational health professionals, as appropriate. When working with your local health department check their available hours. If you have a medical emergency and need to call 911, notify the dispatch personnel that you have, or are being evaluated for COVID-19. If possible, put on a facemask before emergency medical services arrive. Discontinuing home isolation  Patients with confirmed COVID-19 should remain under home isolation precautions until the risk of secondary transmission to others is thought to be low. The decision to discontinue home isolation precautions should be made on a case-by-case basis, in consultation with healthcare providers and state and local health departments.

## 2021-01-06 NOTE — PROGRESS NOTES
[address] [place] [address] received a viral test for COVID-19. They were educated on isolation and quarantine as appropriate. For any symptoms, they were directed to seek care from their PCP, given contact information to establish with a doctor, directed to an urgent care or the emergency room.

## 2021-01-07 LAB — SARS-COV-2: NOT DETECTED

## 2021-01-07 RX ORDER — FUROSEMIDE 20 MG/1
TABLET ORAL
Qty: 8 TABLET | Refills: 5 | Status: ON HOLD | OUTPATIENT
Start: 2021-01-07 | End: 2021-04-27 | Stop reason: HOSPADM

## 2021-01-11 ENCOUNTER — TELEPHONE (OUTPATIENT)
Dept: ORTHOPEDIC SURGERY | Age: 61
End: 2021-01-11

## 2021-01-12 ENCOUNTER — HOSPITAL ENCOUNTER (OUTPATIENT)
Age: 61
Setting detail: OUTPATIENT SURGERY
Discharge: HOME | End: 2021-01-12
Attending: PHYSICAL MEDICINE & REHABILITATION | Admitting: PHYSICAL MEDICINE & REHABILITATION
Payer: MEDICARE

## 2021-01-12 VITALS
TEMPERATURE: 96.3 F | SYSTOLIC BLOOD PRESSURE: 147 MMHG | OXYGEN SATURATION: 97 % | HEART RATE: 78 BPM | RESPIRATION RATE: 16 BRPM | DIASTOLIC BLOOD PRESSURE: 79 MMHG

## 2021-01-12 LAB
GLUCOSE BLD-MCNC: 191 MG/DL (ref 70–99)
PERFORMED ON: ABNORMAL

## 2021-01-12 PROCEDURE — 3600000002 HC SURGERY LEVEL 2 BASE: Performed by: PHYSICAL MEDICINE & REHABILITATION

## 2021-01-12 PROCEDURE — 6360000004 HC RX CONTRAST MEDICATION: Performed by: PHYSICAL MEDICINE & REHABILITATION

## 2021-01-12 PROCEDURE — 2709999900 HC NON-CHARGEABLE SUPPLY: Performed by: PHYSICAL MEDICINE & REHABILITATION

## 2021-01-12 PROCEDURE — 6360000002 HC RX W HCPCS: Performed by: PHYSICAL MEDICINE & REHABILITATION

## 2021-01-12 PROCEDURE — 7100000010 HC PHASE II RECOVERY - FIRST 15 MIN: Performed by: PHYSICAL MEDICINE & REHABILITATION

## 2021-01-12 PROCEDURE — 2500000003 HC RX 250 WO HCPCS: Performed by: PHYSICAL MEDICINE & REHABILITATION

## 2021-01-12 PROCEDURE — 3600000012 HC SURGERY LEVEL 2 ADDTL 15MIN: Performed by: PHYSICAL MEDICINE & REHABILITATION

## 2021-01-12 RX ORDER — BUPIVACAINE HYDROCHLORIDE 5 MG/ML
INJECTION, SOLUTION EPIDURAL; INTRACAUDAL
Status: DISCONTINUED
Start: 2021-01-12 | End: 2021-01-12 | Stop reason: HOSPADM

## 2021-01-12 RX ORDER — METHYLPREDNISOLONE ACETATE 80 MG/ML
INJECTION, SUSPENSION INTRA-ARTICULAR; INTRALESIONAL; INTRAMUSCULAR; SOFT TISSUE
Status: DISCONTINUED
Start: 2021-01-12 | End: 2021-01-12 | Stop reason: HOSPADM

## 2021-01-12 RX ORDER — LIDOCAINE HYDROCHLORIDE 10 MG/ML
INJECTION, SOLUTION EPIDURAL; INFILTRATION; INTRACAUDAL; PERINEURAL PRN
Status: DISCONTINUED | OUTPATIENT
Start: 2021-01-12 | End: 2021-01-12 | Stop reason: ALTCHOICE

## 2021-01-12 ASSESSMENT — PAIN DESCRIPTION - DESCRIPTORS: DESCRIPTORS: ACHING

## 2021-01-12 NOTE — OP NOTE
Patient:  Ari Staton  YOB: 1960  Medical Record #:  [id number]   Place:  [address], [place]  Date:  1/12/2021   Physician:  Emma Padilla MD, ABRAHAM    Procedure:   Left Sacro-iliac Joint Injection with Fluoroscopy    CPT 45671     Pre-Procedure Diagnosis: Left SI joint dysfunction and pain    Post-Procedure Diagnosis: Same    Sedation: Local with 1% Lidocaine 3 ml and no IV sedation    EBL: None    Complications: None    Procedure Summary:    The patient was seen in the office for complaints of left sacral pain. Review of the imaging and physical exam of the patient confirmed the pre-procedure diagnosis. After a thorough discussion of risks, benefits and alternatives informed consent was obtained. The patient was brought to the procedure suite and placed in the prone position. The skin overlying the sacrum was prepped and draped in the usual sterile fashion. Using rotational fluoroscopic guidance, the distal 1/3 of the left sacro-iliac joint space was identified. Through anesthetized skin a 22 gauge 3.5 inch curved tip spinal needle was advanced into the articular space. Isovue M300 was instilled showing an articular pattern. 2 ml of a solution mixed with 80 mg of depomedrol and 0.5% Marcaine was instilled. The needle was removed and a band-aid applied. The patient was transferred to the post-operative area in stable condition.

## 2021-01-12 NOTE — H&P
BILATERAL LUMBAR THREE LUMBAR FOUR LUMBAR FIVE DORSAL RAMUS  RADIOFREQUENCY ABLATION SITE CONFIRMED BY FLUOROSCOPY performed by Bri Lewis MD at [address]      back stimulator in lower back    PACEMAKER PLACEMENT      ICD    PAIN MANAGEMENT PROCEDURE Bilateral 5/26/2020    BILATERAL LUMBAR THREE, LUMBAR FOUR, LUMBAR FIVE DORSAL RAMUS MEDIAL BRANCH BLOCK SITE CONFIRMED BY FLUOROSCOPY performed by Bri Lewis MD at [address] Bilateral 6/9/2020    BILATERAL LUMBAR THREE, LUMBAR FOUR, LUMBAR FIVE DORSAL RAMUS MEDIAL BRANCH BLOCK SITE CONFIRMED BY FLUOROSCOPY performed by Bri Lewis MD at [address] [facility] Right 10/31/2018    GASTROCNEMIUS RECESSION RIGHT FOOT performed by Robby Min DPM at [address]  Right 10/31/2018    REMOVAL OF CALCANEAL SPUR, ACHILLES TENDON REPAIR RIGHT LOWER EXTREMITY performed by Robby Min DPM at [address]  7/18/13    and colonoscopy with biopsies taken    UPPER GASTROINTESTINAL ENDOSCOPY  8/23/13    EUS    UPPER GASTROINTESTINAL ENDOSCOPY  9/15/2013     Current Medications:   Prior to Admission medications    Medication Sig Start Date End Date Taking?  Authorizing Provider   furosemide (LASIX) 20 MG tablet TAKE ONE TABLET BY MOUTH MONDAY AND THURSDAY 1/7/21  Yes Brooke Bernardo MD   Icosapent Ethyl (VASCEPA) 1 g CAPS capsule Take 2 capsules by mouth 2 times daily 1/6/21  Yes Brooke Bernardo MD   potassium chloride (KLOR-CON M) 20 MEQ extended release tablet TAKE ONE TABLET BY MOUTH WITH LASIX ON MONDAY AND THURSDAY 5/5/20  Yes Brooke Bernardo MD   tamsulosin (FLOMAX) 0.4 MG capsule Take 0.4 mg by mouth daily   Yes Historical Provider, MD   Fexofenadine HCl (ALLEGRA PO) Take by mouth   Yes Historical Provider, MD JARDIANCE 25 MG tablet Take 25 mg by mouth daily  2/18/19  Yes Historical Provider, MD   insulin detemir (LEVEMIR) 100 UNIT/ML injection vial Inject 60 Units into the skin nightly    Yes Historical Provider, MD   Fluticasone Furoate-Vilanterol (BREO ELLIPTA IN) Inhale into the lungs daily    Yes Historical Provider, MD   insulin lispro (HUMALOG) 100 UNIT/ML injection vial Inject into the skin 2 times daily Indications: 10 units in AM, 10 units at dinner    Yes Historical Provider, MD   metFORMIN (GLUCOPHAGE) 1000 MG tablet TAKE ONE TABLET BY MOUTH 2 TIMES DAILY WITH MORNING AND EVENING MEALS 9/26/17  Yes ANA Quintana CNP   famotidine (PEPCID) 20 MG tablet TAKE 1 TABLET BY MOUTH 2 TIMES DAILY 9/26/17  Yes ANA Quintana CNP   gabapentin (NEURONTIN) 100 MG capsule TAKE 2 CAPSULES BY MOUTH 3 TIMES DAILY 9/26/17  Yes ANA Quintana CNP   fluticasone (FLONASE) 50 MCG/ACT nasal spray USE 1 SPRAY IN EACH NOSTRIL EVERY DAY 9/13/17  Yes ANA Maurer CNP   lisinopril (PRINIVIL;ZESTRIL) 20 MG tablet TAKE 1 TABLET BY MOUTH DAILY 2/20/17  Yes ANA Pond CNP   atorvastatin (LIPITOR) 80 MG tablet TAKE ONE TABLET BY MOUTH DAILY 2/20/17  Yes ANA Pond CNP   diltiazem (CARDIZEM CD) 180 MG extended release capsule TAKE 1 CAPSULE DAILY 2/8/17  Yes ANA Pond CNP   metoprolol succinate (TOPROL XL) 200 MG extended release tablet TAKE ONE-HALF TABLET TWICE A DAY 2/8/17  Yes ANA Pond CNP   DULoxetine (CYMBALTA) 60 MG extended release capsule Take 60 mg by mouth daily   Yes Historical Provider, MD   divalproex (DEPAKOTE) 500 MG DR tablet Take 1,000 mg by mouth nightly   Yes Historical Provider, MD   Liraglutide (VICTOZA SC) Inject 1.8 Units into the skin daily.    Yes Historical Provider, MD   Esomeprazole Magnesium (NEXIUM PO) Take 40 mg by mouth     Historical Provider, MD The patient was counseled at length about the risks of angelia Covid-19 in the sophie-operative and post-operative states including the recovery window of their procedure. The patient was made aware that angelia Covid-19 after a surgical procedure may worsen their prognosis for recovering from the virus and lend to a higher morbidity and or mortality risk. The patient was given the options of postponing their procedure. All of the risks, benefits, and alternatives were discussed. The patient does wish to proceed with the procedure. ASA CLASS:         []   I. Normal, healthy adult           [x]   II.  Mild systemic disease            []   III. Severe systemic disease      Mallampati: Mallampati Class II - (soft palate, fauces & uvula are visible)      Sedation plan:   [x]  Local              []  Minimal                  []  General anesthesia    Patient's condition acceptable for planned procedure/sedation. Post Procedure Plan   Return to same level of care   ______________________     The risks and benefits as well as alternatives to the procedure have been discussed with the patient and or family. The patient and or next of kin understands and agrees to proceed.     John Hollins M.D.

## 2021-01-18 NOTE — TELEPHONE ENCOUNTER
L/m on reviewer line to call back for peer to peer.  Left PA MARSHALL direct line and PSS direct line

## 2021-01-18 NOTE — TELEPHONE ENCOUNTER
PENDING DENIAL  Date: 1/12/2021  Type of SX: Outpatient JENNA  Location: [facility]  CPT: 25250  SX area: SI Joint  REASON: Additional information required  Peer to peer: [phone]  Reference # [id number]       [id number]  NPR

## 2021-01-26 ENCOUNTER — OFFICE VISIT (OUTPATIENT)
Dept: ORTHOPEDIC SURGERY | Age: 61
End: 2021-01-26
Payer: MEDICARE

## 2021-01-26 VITALS — WEIGHT: 238.98 LBS | TEMPERATURE: 96.9 F | HEIGHT: 64 IN | BODY MASS INDEX: 40.8 KG/M2 | RESPIRATION RATE: 14 BRPM

## 2021-01-26 DIAGNOSIS — M51.36 DEGENERATIVE DISC DISEASE, LUMBAR: ICD-10-CM

## 2021-01-26 DIAGNOSIS — M53.3 SACROILIAC JOINT DYSFUNCTION OF LEFT SIDE: Primary | ICD-10-CM

## 2021-01-26 DIAGNOSIS — M47.816 SPONDYLOSIS WITHOUT MYELOPATHY OR RADICULOPATHY, LUMBAR REGION: ICD-10-CM

## 2021-01-26 PROCEDURE — 3017F COLORECTAL CA SCREEN DOC REV: CPT | Performed by: PHYSICAL MEDICINE & REHABILITATION

## 2021-01-26 PROCEDURE — 1036F TOBACCO NON-USER: CPT | Performed by: PHYSICAL MEDICINE & REHABILITATION

## 2021-01-26 PROCEDURE — G8427 DOCREV CUR MEDS BY ELIG CLIN: HCPCS | Performed by: PHYSICAL MEDICINE & REHABILITATION

## 2021-01-26 PROCEDURE — 99213 OFFICE O/P EST LOW 20 MIN: CPT | Performed by: PHYSICAL MEDICINE & REHABILITATION

## 2021-01-26 PROCEDURE — G8484 FLU IMMUNIZE NO ADMIN: HCPCS | Performed by: PHYSICAL MEDICINE & REHABILITATION

## 2021-01-26 PROCEDURE — G8417 CALC BMI ABV UP PARAM F/U: HCPCS | Performed by: PHYSICAL MEDICINE & REHABILITATION

## 2021-02-04 NOTE — TELEPHONE ENCOUNTER
Requested Prescriptions     Pending Prescriptions Disp Refills    VASCEPA 1 g CAPS capsule [Pharmacy Med Name: Sondra Yan 1 GM] 360 capsule 3     Sig: TAKE 2 CAPSULES BY MOUTH 2 TIMES A DAY              Last Office Visit: 12/28/2020     Next Office Visit: 4/8/2021

## 2021-02-09 ENCOUNTER — TELEPHONE (OUTPATIENT)
Dept: CARDIOLOGY CLINIC | Age: 61
End: 2021-02-09

## 2021-02-09 RX ORDER — ICOSAPENT ETHYL 1000 MG/1
CAPSULE ORAL
Qty: 360 CAPSULE | Refills: 3 | Status: SHIPPED | OUTPATIENT
Start: 2021-02-09 | End: 2021-02-12

## 2021-02-09 NOTE — TELEPHONE ENCOUNTER
The patient called stating the Vascepa he was prescribed is causing him to get a very itchy rash. The patient states since he stopped taking the medication the rash started clearing up. The patient is not having any other side effects at this time. Please call the patient back at [phone] to advise.

## 2021-02-11 RX ORDER — OMEGA-3-ACID ETHYL ESTERS 1 G/1
2 CAPSULE, LIQUID FILLED ORAL 2 TIMES DAILY
Qty: 60 CAPSULE | Refills: 3 | Status: ON HOLD | OUTPATIENT
Start: 2021-02-11 | End: 2021-04-26

## 2021-04-05 ENCOUNTER — INITIAL CONSULT (OUTPATIENT)
Dept: SURGERY | Age: 61
End: 2021-04-05
Payer: MEDICARE

## 2021-04-05 VITALS
HEART RATE: 82 BPM | WEIGHT: 247.6 LBS | BODY MASS INDEX: 42.27 KG/M2 | SYSTOLIC BLOOD PRESSURE: 135 MMHG | DIASTOLIC BLOOD PRESSURE: 76 MMHG | TEMPERATURE: 97.1 F | HEIGHT: 64 IN

## 2021-04-05 DIAGNOSIS — M79.89 SOFT TISSUE MASS: Primary | ICD-10-CM

## 2021-04-05 PROCEDURE — 3017F COLORECTAL CA SCREEN DOC REV: CPT | Performed by: SURGERY

## 2021-04-05 PROCEDURE — G8417 CALC BMI ABV UP PARAM F/U: HCPCS | Performed by: SURGERY

## 2021-04-05 PROCEDURE — G8427 DOCREV CUR MEDS BY ELIG CLIN: HCPCS | Performed by: SURGERY

## 2021-04-05 PROCEDURE — 99203 OFFICE O/P NEW LOW 30 MIN: CPT | Performed by: SURGERY

## 2021-04-05 PROCEDURE — 1036F TOBACCO NON-USER: CPT | Performed by: SURGERY

## 2021-04-18 NOTE — PROGRESS NOTES
[facility]    HPI:  Patient is 64y.o. year old male seen at request of Fatemeh Altamirano PA-C. He presents because of lump located on LUQ abdominal wall. There has been a recent increase in size. There is not  previous history of similar. There has not been any biopsy. He denies pain. Past Medical History:   Diagnosis Date    Arthritis     Asthma     CAD (coronary artery disease)     COPD (chronic obstructive pulmonary disease) ([facility])     Dr. Bouchra Lynch at [facility] told the patient that he did not have COPD, just asthma    Emphysema of lung ([address]     Fatty liver     GERD (gastroesophageal reflux disease)     Hyperlipidemia     Hypertension     Medical history reviewed with no changes     MI, old     Sleep apnea     pt wears cpap at night. states does not have cpap    Type II or unspecified type diabetes mellitus without mention of complication, not stated as uncontrolled        Past Surgical History:   Procedure Laterality Date    CARDIAC PACEMAKER PLACEMENT  2011    NOT MRI COMPATIABLE OLD LEADS st [address].  pace maker difib   [address] Bilateral 2013    CATARACT REMOVAL WITH IMPLANT Left 2/28/14    COLONOSCOPY      CORONARY ANGIOPLASTY WITH STENT PLACEMENT  2001,2008    CYST REMOVAL  1982    coccyx area    DIAGNOSTIC CARDIAC CATH LAB PROCEDURE      ENDOSCOPY, COLON, DIAGNOSTIC      ERCP  9/15/2013    ERCP  10/11/13    WITH STENT REMOVAL    FOOT SURGERY Right 07/09/2018     REPAIR CALCANEAL SPUR, REPAIR OF ACHILLES TENDON RIGHT FOOT    NERVE SURGERY Bilateral 12/1/2020    BILATERAL LUMBAR THREE LUMBAR FOUR LUMBAR FIVE DORSAL RAMUS  RADIOFREQUENCY ABLATION SITE CONFIRMED BY FLUOROSCOPY performed by Agus Ramirez MD at [address]      back stimulator in lower back    PACEMAKER PLACEMENT      ICD    PAIN MANAGEMENT PROCEDURE Bilateral 5/26/2020    BILATERAL LUMBAR THREE, LUMBAR FOUR, LUMBAR FIVE DORSAL RAMUS MEDIAL BRANCH  insulin lispro (HUMALOG) 100 UNIT/ML injection vial Inject into the skin 2 times daily Indications: 10 units in AM, 10 units at dinner       clopidogrel (PLAVIX) 75 MG tablet TAKE ONE TABLET BY MOUTH DAILY 30 tablet 0    metFORMIN (GLUCOPHAGE) 1000 MG tablet TAKE ONE TABLET BY MOUTH 2 TIMES DAILY WITH MORNING AND EVENING MEALS 60 tablet 0    famotidine (PEPCID) 20 MG tablet TAKE 1 TABLET BY MOUTH 2 TIMES DAILY 60 tablet 0    gabapentin (NEURONTIN) 100 MG capsule TAKE 2 CAPSULES BY MOUTH 3 TIMES DAILY 180 capsule 0    fluticasone (FLONASE) 50 MCG/ACT nasal spray USE 1 SPRAY IN EACH NOSTRIL EVERY DAY 16 g 0    TRUE METRIX BLOOD GLUCOSE TEST strip CHECK SUGARS TWICE A DAY AS DIRECTED, DX: E11.9 100 each 5    lisinopril (PRINIVIL;ZESTRIL) 20 MG tablet TAKE 1 TABLET BY MOUTH DAILY 30 tablet 11    atorvastatin (LIPITOR) 80 MG tablet TAKE ONE TABLET BY MOUTH DAILY 30 tablet 11    aspirin 325 MG EC tablet TAKE ONE TABLET BY MOUTH DAILY 30 tablet 11    UNIFINE PENTIPS 31G X 8 MM MISC USE AS DIRECTED FOR INSULIN .00 100 each 5    diltiazem (CARDIZEM CD) 180 MG extended release capsule TAKE 1 CAPSULE DAILY 30 capsule 11    metoprolol succinate (TOPROL XL) 200 MG extended release tablet TAKE ONE-HALF TABLET TWICE A DAY 30 tablet 11    TRUEPLUS LANCETS 30G MISC CHECK BLOOD SUGAR TWICE A DAY DX E11.9 100 each 11    DULoxetine (CYMBALTA) 60 MG extended release capsule Take 60 mg by mouth daily      divalproex (DEPAKOTE) 500 MG DR tablet Take 1,000 mg by mouth nightly      Liraglutide (VICTOZA SC) Inject 1.8 Units into the skin daily. No current facility-administered medications on file prior to visit. Allergies   Allergen Reactions    Other      VASCEPA CAUSED RASH AND ITCHING    Pcn [Penicillins] Hives       Social History     Socioeconomic History    Marital status:       Spouse name: Not on file    Number of children: Not on file    Years of education: Not on file    Highest education level: Not on file   Occupational History    Not on file   Social Needs    Financial resource strain: Not on file    Food insecurity     Worry: Not on file     Inability: Not on file    Transportation needs     Medical: Not on file     Non-medical: Not on file   Tobacco Use    Smoking status: Former Smoker     Packs/day: 2.00     Years: 20.00     Pack years: 40.00     Types: Cigarettes     Quit date: 2001     Years since quittin.9    Smokeless tobacco: Never Used    Tobacco comment: PASSIVE SMOKER   Substance and Sexual Activity    Alcohol use: No     Alcohol/week: 0.0 standard drinks    Drug use: No    Sexual activity: Never   Lifestyle    Physical activity     Days per week: Not on file     Minutes per session: Not on file    Stress: Not on file   Relationships    Social connections     Talks on phone: Not on file     Gets together: Not on file     Attends Yazidism service: Not on file     Active member of club or organization: Not on file     Attends meetings of clubs or organizations: Not on file     Relationship status: Not on file    Intimate partner violence     Fear of current or ex partner: Not on file     Emotionally abused: Not on file     Physically abused: Not on file     Forced sexual activity: Not on file   Other Topics Concern    Not on file   Social History Narrative    Not on file       Family History   Problem Relation Age of Onset    Heart Disease Mother     Heart Failure Mother     Cancer Father     Diabetes Maternal Grandmother     Heart Failure Maternal Uncle     Hypertension Maternal Uncle     Asthma Neg Hx     Emphysema Neg Hx        ROS:  He reports no complaints related to the eyes, ears , nose throat or mouth. He denies weight loss. No chest pain. No SOB. No urinary complaints. No musculoskeletal complaints. Skin complaints include lump LUQ abdominal wall. No neurologic deficits. No bleeding tendencies. No GI complaints.     Physical Exam:  Vitals:    04/05/21 1349   BP: 135/76   Pulse: 82   Temp: 97.1 °F (36.2 °C)   247#    General:  Comfortable. No distress. Eyes:  No scleral icterus  Ears:  Normal  Nose:  Normal  Mouth:  Mucous membranes moist  Respiratory: Lungs CTA. No accessory muscle use. Heart:  Regular rhythm  Abdomen:  Soft. Non tender. Non distended. Musculoskeletal:  No abnormal movements. ROM extremities normal.  Skin:  No rashes. Lesion    Location:   LUQ abdominal wall asymmetry. Non tender. Pigmented:   No   Diameter:  5 cm   Crusting absent             Neurologic:  No focal deficits. Psychiatric:  AAA. O x 3.            ASSESSMENT:  1. Soft tissue mass            PLAN:  No symptoms and no evidence of hernia. Suspect soft tissue mass. Observe at present. Follow up prn.

## 2021-04-22 ENCOUNTER — HOSPITAL ENCOUNTER (OUTPATIENT)
Dept: CT IMAGING | Age: 61
Discharge: HOME | End: 2021-04-22
Payer: MEDICARE

## 2021-04-22 DIAGNOSIS — M54.12 RADICULOPATHY OF CERVICAL SPINE: ICD-10-CM

## 2021-04-22 PROCEDURE — 72125 CT NECK SPINE W/O DYE: CPT

## 2021-04-26 ENCOUNTER — APPOINTMENT (OUTPATIENT)
Dept: GENERAL RADIOLOGY | Age: 61
End: 2021-04-26
Payer: MEDICARE

## 2021-04-26 ENCOUNTER — HOSPITAL ENCOUNTER (OUTPATIENT)
Age: 61
Setting detail: OBSERVATION
Discharge: HOME | End: 2021-04-27
Attending: EMERGENCY MEDICINE | Admitting: INTERNAL MEDICINE
Payer: MEDICARE

## 2021-04-26 ENCOUNTER — APPOINTMENT (OUTPATIENT)
Dept: CT IMAGING | Age: 61
End: 2021-04-26
Payer: MEDICARE

## 2021-04-26 DIAGNOSIS — R73.9 HYPERGLYCEMIA: ICD-10-CM

## 2021-04-26 DIAGNOSIS — S00.01XA ABRASION, SCALP W/O INFECTION: ICD-10-CM

## 2021-04-26 DIAGNOSIS — R26.81 UNSTEADY GAIT: ICD-10-CM

## 2021-04-26 DIAGNOSIS — S09.90XA TRAUMATIC INJURY OF HEAD, INITIAL ENCOUNTER: ICD-10-CM

## 2021-04-26 DIAGNOSIS — R55 NEAR SYNCOPE: ICD-10-CM

## 2021-04-26 DIAGNOSIS — E87.20 LACTIC ACIDOSIS: Primary | ICD-10-CM

## 2021-04-26 LAB
A/G RATIO: 1.8 (ref 1.1–2.2)
ALBUMIN SERPL-MCNC: 4.6 G/DL (ref 3.4–5)
ALP BLD-CCNC: 80 U/L (ref 40–129)
ALT SERPL-CCNC: 20 U/L (ref 10–40)
ANION GAP SERPL CALCULATED.3IONS-SCNC: 15 MMOL/L (ref 3–16)
AST SERPL-CCNC: 12 U/L (ref 15–37)
BASOPHILS ABSOLUTE: 0.1 K/UL (ref 0–0.2)
BASOPHILS RELATIVE PERCENT: 0.8 %
BILIRUB SERPL-MCNC: 0.6 MG/DL (ref 0–1)
BILIRUBIN URINE: NEGATIVE
BLOOD, URINE: NEGATIVE
BUN BLDV-MCNC: 13 MG/DL (ref 7–20)
CALCIUM SERPL-MCNC: 10.3 MG/DL (ref 8.3–10.6)
CHLORIDE BLD-SCNC: 95 MMOL/L (ref 99–110)
CLARITY: CLEAR
CO2: 24 MMOL/L (ref 21–32)
COLOR: YELLOW
CREAT SERPL-MCNC: 0.8 MG/DL (ref 0.9–1.3)
EKG ATRIAL RATE: 91 BPM
EKG DIAGNOSIS: NORMAL
EKG P AXIS: 47 DEGREES
EKG P-R INTERVAL: 160 MS
EKG Q-T INTERVAL: 342 MS
EKG QRS DURATION: 94 MS
EKG QTC CALCULATION (BAZETT): 420 MS
EKG R AXIS: 7 DEGREES
EKG T AXIS: 59 DEGREES
EKG VENTRICULAR RATE: 91 BPM
EOSINOPHILS ABSOLUTE: 0 K/UL (ref 0–0.6)
EOSINOPHILS RELATIVE PERCENT: 0.3 %
GFR AFRICAN AMERICAN: >60
GFR NON-AFRICAN AMERICAN: >60
GLOBULIN: 2.6 G/DL
GLUCOSE BLD-MCNC: 280 MG/DL (ref 70–99)
GLUCOSE BLD-MCNC: 294 MG/DL (ref 70–99)
GLUCOSE BLD-MCNC: 362 MG/DL (ref 70–99)
GLUCOSE URINE: >=1000 MG/DL
HCT VFR BLD CALC: 44.1 % (ref 40.5–52.5)
HEMOGLOBIN: 14.8 G/DL (ref 13.5–17.5)
KETONES, URINE: 15 MG/DL
LACTIC ACID: 0.9 MMOL/L (ref 0.4–2)
LACTIC ACID: 1.1 MMOL/L (ref 0.4–2)
LACTIC ACID: 3 MMOL/L (ref 0.4–2)
LEUKOCYTE ESTERASE, URINE: NEGATIVE
LYMPHOCYTES ABSOLUTE: 1.6 K/UL (ref 1–5.1)
LYMPHOCYTES RELATIVE PERCENT: 15.9 %
MAGNESIUM: 1.9 MG/DL (ref 1.8–2.4)
MCH RBC QN AUTO: 29.3 PG (ref 26–34)
MCHC RBC AUTO-ENTMCNC: 33.5 G/DL (ref 31–36)
MCV RBC AUTO: 87.6 FL (ref 80–100)
MICROSCOPIC EXAMINATION: ABNORMAL
MONOCYTES ABSOLUTE: 0.8 K/UL (ref 0–1.3)
MONOCYTES RELATIVE PERCENT: 8.4 %
NEUTROPHILS ABSOLUTE: 7.5 K/UL (ref 1.7–7.7)
NEUTROPHILS RELATIVE PERCENT: 74.6 %
NITRITE, URINE: NEGATIVE
PDW BLD-RTO: 14.8 % (ref 12.4–15.4)
PERFORMED ON: ABNORMAL
PERFORMED ON: ABNORMAL
PH UA: 6.5 (ref 5–8)
PLATELET # BLD: 148 K/UL (ref 135–450)
PMV BLD AUTO: 7.5 FL (ref 5–10.5)
POTASSIUM SERPL-SCNC: 4.7 MMOL/L (ref 3.5–5.1)
PROTEIN UA: NEGATIVE MG/DL
RBC # BLD: 5.04 M/UL (ref 4.2–5.9)
SARS-COV-2, NAAT: NOT DETECTED
SODIUM BLD-SCNC: 134 MMOL/L (ref 136–145)
SPECIFIC GRAVITY UA: 1.01 (ref 1–1.03)
TOTAL PROTEIN: 7.2 G/DL (ref 6.4–8.2)
TROPONIN: <0.01 NG/ML
URINE TYPE: ABNORMAL
UROBILINOGEN, URINE: 0.2 E.U./DL
WBC # BLD: 10 K/UL (ref 4–11)

## 2021-04-26 PROCEDURE — 6370000000 HC RX 637 (ALT 250 FOR IP): Performed by: NURSE PRACTITIONER

## 2021-04-26 PROCEDURE — 81003 URINALYSIS AUTO W/O SCOPE: CPT

## 2021-04-26 PROCEDURE — 1200000000 HC SEMI PRIVATE

## 2021-04-26 PROCEDURE — 72125 CT NECK SPINE W/O DYE: CPT

## 2021-04-26 PROCEDURE — 83605 ASSAY OF LACTIC ACID: CPT

## 2021-04-26 PROCEDURE — 36415 COLL VENOUS BLD VENIPUNCTURE: CPT

## 2021-04-26 PROCEDURE — 85025 COMPLETE CBC W/AUTO DIFF WBC: CPT

## 2021-04-26 PROCEDURE — 71045 X-RAY EXAM CHEST 1 VIEW: CPT

## 2021-04-26 PROCEDURE — 96360 HYDRATION IV INFUSION INIT: CPT

## 2021-04-26 PROCEDURE — 96372 THER/PROPH/DIAG INJ SC/IM: CPT

## 2021-04-26 PROCEDURE — 2580000003 HC RX 258: Performed by: NURSE PRACTITIONER

## 2021-04-26 PROCEDURE — 87040 BLOOD CULTURE FOR BACTERIA: CPT

## 2021-04-26 PROCEDURE — 6360000002 HC RX W HCPCS: Performed by: NURSE PRACTITIONER

## 2021-04-26 PROCEDURE — 83735 ASSAY OF MAGNESIUM: CPT

## 2021-04-26 PROCEDURE — 93005 ELECTROCARDIOGRAM TRACING: CPT | Performed by: EMERGENCY MEDICINE

## 2021-04-26 PROCEDURE — 70450 CT HEAD/BRAIN W/O DYE: CPT

## 2021-04-26 PROCEDURE — 99222 1ST HOSP IP/OBS MODERATE 55: CPT | Performed by: PHYSICIAN ASSISTANT

## 2021-04-26 PROCEDURE — 84484 ASSAY OF TROPONIN QUANT: CPT

## 2021-04-26 PROCEDURE — 96361 HYDRATE IV INFUSION ADD-ON: CPT

## 2021-04-26 PROCEDURE — 87635 SARS-COV-2 COVID-19 AMP PRB: CPT

## 2021-04-26 PROCEDURE — 93010 ELECTROCARDIOGRAM REPORT: CPT | Performed by: INTERNAL MEDICINE

## 2021-04-26 PROCEDURE — 94640 AIRWAY INHALATION TREATMENT: CPT

## 2021-04-26 PROCEDURE — 6370000000 HC RX 637 (ALT 250 FOR IP)

## 2021-04-26 PROCEDURE — 94761 N-INVAS EAR/PLS OXIMETRY MLT: CPT

## 2021-04-26 PROCEDURE — 2580000003 HC RX 258: Performed by: EMERGENCY MEDICINE

## 2021-04-26 PROCEDURE — 99285 EMERGENCY DEPT VISIT HI MDM: CPT

## 2021-04-26 PROCEDURE — 80053 COMPREHEN METABOLIC PANEL: CPT

## 2021-04-26 RX ORDER — DIVALPROEX SODIUM 500 MG/1
1000 TABLET, DELAYED RELEASE ORAL NIGHTLY
Status: DISCONTINUED | OUTPATIENT
Start: 2021-04-26 | End: 2021-04-27 | Stop reason: HOSPADM

## 2021-04-26 RX ORDER — DULOXETIN HYDROCHLORIDE 60 MG/1
60 CAPSULE, DELAYED RELEASE ORAL DAILY
Status: DISCONTINUED | OUTPATIENT
Start: 2021-04-26 | End: 2021-04-27 | Stop reason: HOSPADM

## 2021-04-26 RX ORDER — SODIUM CHLORIDE 0.9 % (FLUSH) 0.9 %
5-40 SYRINGE (ML) INJECTION PRN
Status: DISCONTINUED | OUTPATIENT
Start: 2021-04-26 | End: 2021-04-27 | Stop reason: HOSPADM

## 2021-04-26 RX ORDER — FLUTICASONE PROPIONATE 50 MCG
1 SPRAY, SUSPENSION (ML) NASAL DAILY
Status: DISCONTINUED | OUTPATIENT
Start: 2021-04-26 | End: 2021-04-27 | Stop reason: HOSPADM

## 2021-04-26 RX ORDER — DEXTROSE MONOHYDRATE 25 G/50ML
12.5 INJECTION, SOLUTION INTRAVENOUS PRN
Status: DISCONTINUED | OUTPATIENT
Start: 2021-04-26 | End: 2021-04-27 | Stop reason: HOSPADM

## 2021-04-26 RX ORDER — GABAPENTIN 100 MG/1
CAPSULE ORAL
Status: COMPLETED
Start: 2021-04-26 | End: 2021-04-26

## 2021-04-26 RX ORDER — ATORVASTATIN CALCIUM 40 MG/1
80 TABLET, FILM COATED ORAL DAILY
Status: DISCONTINUED | OUTPATIENT
Start: 2021-04-26 | End: 2021-04-27 | Stop reason: HOSPADM

## 2021-04-26 RX ORDER — CETIRIZINE HYDROCHLORIDE 10 MG/1
10 TABLET ORAL DAILY
Status: DISCONTINUED | OUTPATIENT
Start: 2021-04-26 | End: 2021-04-27 | Stop reason: HOSPADM

## 2021-04-26 RX ORDER — SODIUM CHLORIDE 0.9 % (FLUSH) 0.9 %
5-40 SYRINGE (ML) INJECTION EVERY 12 HOURS SCHEDULED
Status: DISCONTINUED | OUTPATIENT
Start: 2021-04-26 | End: 2021-04-27 | Stop reason: HOSPADM

## 2021-04-26 RX ORDER — CYCLOBENZAPRINE HCL 10 MG
10 TABLET ORAL NIGHTLY
Status: ON HOLD | COMMUNITY
Start: 2021-03-10 | End: 2021-04-27 | Stop reason: HOSPADM

## 2021-04-26 RX ORDER — DEXTROSE MONOHYDRATE 50 MG/ML
100 INJECTION, SOLUTION INTRAVENOUS PRN
Status: DISCONTINUED | OUTPATIENT
Start: 2021-04-26 | End: 2021-04-27 | Stop reason: HOSPADM

## 2021-04-26 RX ORDER — ASPIRIN 81 MG/1
324 TABLET ORAL DAILY
Status: DISCONTINUED | OUTPATIENT
Start: 2021-04-26 | End: 2021-04-27 | Stop reason: HOSPADM

## 2021-04-26 RX ORDER — CLOPIDOGREL BISULFATE 75 MG/1
75 TABLET ORAL DAILY
Status: DISCONTINUED | OUTPATIENT
Start: 2021-04-26 | End: 2021-04-27 | Stop reason: HOSPADM

## 2021-04-26 RX ORDER — 0.9 % SODIUM CHLORIDE 0.9 %
500 INTRAVENOUS SOLUTION INTRAVENOUS ONCE
Status: COMPLETED | OUTPATIENT
Start: 2021-04-26 | End: 2021-04-26

## 2021-04-26 RX ORDER — SODIUM CHLORIDE 9 MG/ML
25 INJECTION, SOLUTION INTRAVENOUS PRN
Status: DISCONTINUED | OUTPATIENT
Start: 2021-04-26 | End: 2021-04-27 | Stop reason: HOSPADM

## 2021-04-26 RX ORDER — ACETAMINOPHEN 650 MG/1
650 SUPPOSITORY RECTAL EVERY 6 HOURS PRN
Status: DISCONTINUED | OUTPATIENT
Start: 2021-04-26 | End: 2021-04-27 | Stop reason: HOSPADM

## 2021-04-26 RX ORDER — GABAPENTIN 100 MG/1
200 CAPSULE ORAL 3 TIMES DAILY
Status: DISCONTINUED | OUTPATIENT
Start: 2021-04-26 | End: 2021-04-27 | Stop reason: HOSPADM

## 2021-04-26 RX ORDER — LISINOPRIL 20 MG/1
20 TABLET ORAL DAILY
Status: DISCONTINUED | OUTPATIENT
Start: 2021-04-26 | End: 2021-04-27 | Stop reason: HOSPADM

## 2021-04-26 RX ORDER — SODIUM CHLORIDE 9 MG/ML
INJECTION, SOLUTION INTRAVENOUS CONTINUOUS
Status: DISCONTINUED | OUTPATIENT
Start: 2021-04-26 | End: 2021-04-27 | Stop reason: HOSPADM

## 2021-04-26 RX ORDER — TAMSULOSIN HYDROCHLORIDE 0.4 MG/1
0.4 CAPSULE ORAL DAILY
Status: DISCONTINUED | OUTPATIENT
Start: 2021-04-26 | End: 2021-04-27 | Stop reason: HOSPADM

## 2021-04-26 RX ORDER — BUDESONIDE AND FORMOTEROL FUMARATE DIHYDRATE 160; 4.5 UG/1; UG/1
2 AEROSOL RESPIRATORY (INHALATION) 2 TIMES DAILY
Status: DISCONTINUED | OUTPATIENT
Start: 2021-04-26 | End: 2021-04-27 | Stop reason: HOSPADM

## 2021-04-26 RX ORDER — PANTOPRAZOLE SODIUM 40 MG/1
40 TABLET, DELAYED RELEASE ORAL
Status: DISCONTINUED | OUTPATIENT
Start: 2021-04-27 | End: 2021-04-27 | Stop reason: HOSPADM

## 2021-04-26 RX ORDER — CHLORAL HYDRATE 500 MG
2 CAPSULE ORAL 4 TIMES DAILY
COMMUNITY
End: 2022-09-21 | Stop reason: ALTCHOICE

## 2021-04-26 RX ORDER — POLYETHYLENE GLYCOL 3350 17 G/17G
17 POWDER, FOR SOLUTION ORAL DAILY PRN
Status: DISCONTINUED | OUTPATIENT
Start: 2021-04-26 | End: 2021-04-27 | Stop reason: HOSPADM

## 2021-04-26 RX ORDER — FENOFIBRATE 160 MG/1
1 TABLET ORAL
COMMUNITY
Start: 2021-03-10 | End: 2021-07-13 | Stop reason: ALTCHOICE

## 2021-04-26 RX ORDER — PROMETHAZINE HYDROCHLORIDE 25 MG/1
12.5 TABLET ORAL EVERY 6 HOURS PRN
Status: DISCONTINUED | OUTPATIENT
Start: 2021-04-26 | End: 2021-04-27 | Stop reason: HOSPADM

## 2021-04-26 RX ORDER — NICOTINE POLACRILEX 4 MG
15 LOZENGE BUCCAL PRN
Status: DISCONTINUED | OUTPATIENT
Start: 2021-04-26 | End: 2021-04-27 | Stop reason: HOSPADM

## 2021-04-26 RX ORDER — INSULIN GLARGINE 100 [IU]/ML
60 INJECTION, SOLUTION SUBCUTANEOUS NIGHTLY
Status: DISCONTINUED | OUTPATIENT
Start: 2021-04-26 | End: 2021-04-27 | Stop reason: HOSPADM

## 2021-04-26 RX ORDER — DILTIAZEM HYDROCHLORIDE 180 MG/1
180 CAPSULE, COATED, EXTENDED RELEASE ORAL DAILY
Status: DISCONTINUED | OUTPATIENT
Start: 2021-04-26 | End: 2021-04-27 | Stop reason: HOSPADM

## 2021-04-26 RX ORDER — METOPROLOL SUCCINATE 50 MG/1
200 TABLET, EXTENDED RELEASE ORAL DAILY
Status: DISCONTINUED | OUTPATIENT
Start: 2021-04-26 | End: 2021-04-27 | Stop reason: HOSPADM

## 2021-04-26 RX ORDER — ACETAMINOPHEN 325 MG/1
650 TABLET ORAL EVERY 6 HOURS PRN
Status: DISCONTINUED | OUTPATIENT
Start: 2021-04-26 | End: 2021-04-27 | Stop reason: HOSPADM

## 2021-04-26 RX ORDER — ONDANSETRON 2 MG/ML
4 INJECTION INTRAMUSCULAR; INTRAVENOUS EVERY 6 HOURS PRN
Status: DISCONTINUED | OUTPATIENT
Start: 2021-04-26 | End: 2021-04-27 | Stop reason: HOSPADM

## 2021-04-26 RX ADMIN — SODIUM CHLORIDE 500 ML: 9 INJECTION, SOLUTION INTRAVENOUS at 13:30

## 2021-04-26 RX ADMIN — TAMSULOSIN HYDROCHLORIDE 0.4 MG: 0.4 CAPSULE ORAL at 18:10

## 2021-04-26 RX ADMIN — DILTIAZEM HYDROCHLORIDE 180 MG: 180 CAPSULE, EXTENDED RELEASE ORAL at 18:09

## 2021-04-26 RX ADMIN — SODIUM CHLORIDE: 9 INJECTION, SOLUTION INTRAVENOUS at 20:59

## 2021-04-26 RX ADMIN — GABAPENTIN 100 MG: 100 CAPSULE ORAL at 18:10

## 2021-04-26 RX ADMIN — DULOXETINE HYDROCHLORIDE 60 MG: 60 CAPSULE, DELAYED RELEASE ORAL at 18:11

## 2021-04-26 RX ADMIN — ASPIRIN 324 MG: 81 TABLET, FILM COATED ORAL at 18:09

## 2021-04-26 RX ADMIN — SODIUM CHLORIDE 500 ML: 9 INJECTION, SOLUTION INTRAVENOUS at 13:29

## 2021-04-26 RX ADMIN — LISINOPRIL 20 MG: 20 TABLET ORAL at 18:11

## 2021-04-26 RX ADMIN — CLOPIDOGREL BISULFATE 75 MG: 75 TABLET ORAL at 18:11

## 2021-04-26 RX ADMIN — GABAPENTIN 100 MG: 100 CAPSULE ORAL at 18:28

## 2021-04-26 RX ADMIN — GABAPENTIN 200 MG: 100 CAPSULE ORAL at 20:59

## 2021-04-26 RX ADMIN — ATORVASTATIN CALCIUM 80 MG: 40 TABLET, FILM COATED ORAL at 18:10

## 2021-04-26 RX ADMIN — ENOXAPARIN SODIUM 40 MG: 40 INJECTION SUBCUTANEOUS at 18:11

## 2021-04-26 RX ADMIN — INSULIN GLARGINE 60 UNITS: 100 INJECTION, SOLUTION SUBCUTANEOUS at 21:03

## 2021-04-26 RX ADMIN — METOPROLOL SUCCINATE 200 MG: 50 TABLET, EXTENDED RELEASE ORAL at 18:09

## 2021-04-26 RX ADMIN — SODIUM CHLORIDE: 9 INJECTION, SOLUTION INTRAVENOUS at 18:04

## 2021-04-26 RX ADMIN — CETIRIZINE HYDROCHLORIDE 10 MG: 10 TABLET ORAL at 18:07

## 2021-04-26 RX ADMIN — Medication 2 PUFF: at 19:39

## 2021-04-26 RX ADMIN — DIVALPROEX SODIUM 1000 MG: 500 TABLET, DELAYED RELEASE ORAL at 20:59

## 2021-04-26 ASSESSMENT — PAIN SCALES - GENERAL
PAINLEVEL_OUTOF10: 5
PAINLEVEL_OUTOF10: 5

## 2021-04-26 ASSESSMENT — PAIN DESCRIPTION - LOCATION: LOCATION: HEAD

## 2021-04-26 ASSESSMENT — PAIN DESCRIPTION - PAIN TYPE: TYPE: ACUTE PAIN

## 2021-04-26 ASSESSMENT — PAIN DESCRIPTION - FREQUENCY: FREQUENCY: CONTINUOUS

## 2021-04-26 ASSESSMENT — PAIN DESCRIPTION - DESCRIPTORS: DESCRIPTORS: ACHING

## 2021-04-26 ASSESSMENT — PAIN DESCRIPTION - ORIENTATION: ORIENTATION: POSTERIOR

## 2021-04-26 NOTE — H&P
Hospital Medicine History & Physical      PCP: Trixie Obrien PA-C    Date of Admission: 4/26/2021    Date of Service: Pt seen/examined on 4/26/2021    Chief Complaint:    Chief Complaint   Patient presents with    Fall     History Of Present Illness: The patient is a 64 y.o. male with CAD, COPD, GERD, HLD, HTN, ALIN, DM who presented to [facility] ED with complaint of fall and weakness. Patient reports that this has been an ongoing issue for him. He gets days where he gets weak, will feel like he cant walk well, will have multiple falls, and then it will resolve and he is able to start walking normally again. He reports feeling weak this morning and went out to his truck to drive when his legs were too wobbly and he fell backward, striking his head and left hip on the ground. His family was able to come help him up and he reports no associate LOC. He was able to walk and drive after falling but did notice some pain to the L hip and overall felt weak. He reports a hx of cardiac arrhythmia with pacemaker/defibillator placement but denies any associated CP, SOB or shocks. He reports he has otherwise been feeling well without sick contacts. He does report chronic tremors and states that he and his brother grew up in foster care and have no access to family medical history. He does have chronic back and hip pain that he follows with pain management but states that his defibrillator is not MRI compatible. UTD on his tetanus shot.       Past Medical History:        Diagnosis Date    Arthritis     Asthma     CAD (coronary artery disease)     COPD (chronic obstructive pulmonary disease) ([facility])     Dr. Kristina Webb at [facility] told the patient that he did not have COPD, just asthma    Emphysema of lung ([facility] [address]     Fatty liver     GERD (gastroesophageal reflux disease)     Hyperlipidemia     Hypertension     Medical history reviewed with no changes     MI, old     Sleep apnea     pt wears cpap at night. states does not have cpap    Type II or unspecified type diabetes mellitus without mention of complication, not stated as uncontrolled        Past Surgical History:        Procedure Laterality Date    CARDIAC PACEMAKER PLACEMENT  2011    NOT MRI COMPATIABLE OLD LEADS [facility].  pace maker difib   [address] Bilateral 2013    CATARACT REMOVAL WITH IMPLANT Left 2/28/14    COLONOSCOPY      CORONARY ANGIOPLASTY WITH STENT PLACEMENT  2001,2008    CYST REMOVAL  1982    coccyx area    DIAGNOSTIC CARDIAC CATH LAB PROCEDURE      ENDOSCOPY, COLON, DIAGNOSTIC      ERCP  9/15/2013    ERCP  10/11/13    WITH STENT REMOVAL    FOOT SURGERY Right 07/09/2018     REPAIR CALCANEAL SPUR, REPAIR OF ACHILLES TENDON RIGHT FOOT    NERVE SURGERY Bilateral 12/1/2020    BILATERAL LUMBAR THREE LUMBAR FOUR LUMBAR FIVE DORSAL RAMUS  RADIOFREQUENCY ABLATION SITE CONFIRMED BY FLUOROSCOPY performed by Lady Hood MD at [address]      back stimulator in lower back    PACEMAKER PLACEMENT      ICD    PAIN MANAGEMENT PROCEDURE Bilateral 5/26/2020    BILATERAL LUMBAR THREE, LUMBAR FOUR, LUMBAR FIVE DORSAL RAMUS MEDIAL BRANCH BLOCK SITE CONFIRMED BY FLUOROSCOPY performed by Lady Hood MD at [address] [facility] Bilateral 6/9/2020    BILATERAL LUMBAR THREE, LUMBAR FOUR, LUMBAR FIVE DORSAL RAMUS MEDIAL BRANCH BLOCK SITE CONFIRMED BY FLUOROSCOPY performed by Lady Hood MD at [facility] Left 1/12/2021    LEFT SACROILIAC JOINT INJECTION SITE CONFIRMED BY FLUOROSCOPY performed by Lady Hood MD at [facility] Right 10/31/2018    GASTROCNEMIUS RECESSION RIGHT FOOT performed by Bud Osman DPM at [address] [facility] Right 10/31/2018    REMOVAL OF CALCANEAL SPUR, ACHILLES TENDON REPAIR RIGHT LOWER EXTREMITY performed by Bud Osman DPM at [address] [facility] GASTROINTESTINAL ENDOSCOPY  7/18/13    and colonoscopy with biopsies taken    UPPER GASTROINTESTINAL ENDOSCOPY  8/23/13    EUS    UPPER GASTROINTESTINAL ENDOSCOPY  9/15/2013       Medications Prior to Admission:    Prior to Admission medications    Medication Sig Start Date End Date Taking?  Authorizing Provider   omega-3 acid ethyl esters (LOVAZA) 1 g capsule Take 2 capsules by mouth 2 times daily 2/11/21   Nenita Watt MD   furosemide (LASIX) 20 MG tablet TAKE ONE TABLET BY MOUTH MONDAY AND THURSDAY 1/7/21   Nenita Watt MD   potassium chloride (KLOR-CON M) 20 MEQ extended release tablet TAKE ONE TABLET BY MOUTH WITH LASIX ON MONDAY AND THURSDAY 5/5/20   Nenita Watt MD   tamsulosin (FLOMAX) 0.4 MG capsule Take 0.4 mg by mouth daily    Historical Provider, MD   Fexofenadine HCl (ALLEGRA PO) Take by mouth    Historical Provider, MD   Esomeprazole Magnesium (NEXIUM PO) Take 40 mg by mouth     Historical Provider, MD   JARDIANCE 25 MG tablet Take 25 mg by mouth daily  2/18/19   Historical Provider, MD   nitroGLYCERIN (NITROLINGUAL) 0.4 MG/SPRAY 0.4 mg spray Place 1 spray under the tongue every 5 minutes as needed for Chest pain 9/28/18   Nenita Watt MD   insulin detemir (LEVEMIR) 100 UNIT/ML injection vial Inject 60 Units into the skin nightly     Historical Provider, MD   Fluticasone Furoate-Vilanterol (BREO ELLIPTA IN) Inhale into the lungs daily     Historical Provider, MD   insulin lispro (HUMALOG) 100 UNIT/ML injection vial Inject into the skin 2 times daily Indications: 10 units in AM, 10 units at dinner     Historical Provider, MD   clopidogrel (PLAVIX) 75 MG tablet TAKE ONE TABLET BY MOUTH DAILY 9/26/17   ANA Head CNP   metFORMIN (GLUCOPHAGE) 1000 MG tablet TAKE ONE TABLET BY MOUTH 2 TIMES DAILY WITH MORNING AND EVENING MEALS 9/26/17   ANA Head CNP   famotidine (PEPCID) 20 MG tablet TAKE 1 TABLET BY MOUTH 2 TIMES DAILY 9/26/17   Karuna El ANA Batres CNP   gabapentin (NEURONTIN) 100 MG capsule TAKE 2 CAPSULES BY MOUTH 3 TIMES DAILY 9/26/17   Brendanian PedrosANA CNP   fluticasone [facility]) 50 MCG/ACT nasal spray USE 1 SPRAY IN EACH NOSTRIL EVERY DAY 9/13/17   Usha Gutierrez, APRN - CNP   TRUE METRIX BLOOD GLUCOSE TEST strip CHECK SUGARS TWICE A DAY AS DIRECTED, DX: E11.9 6/9/17   Usha Gutierrez, APRN - CNP   lisinopril (PRINIVIL;ZESTRIL) 20 MG tablet TAKE 1 TABLET BY MOUTH DAILY 2/20/17   ANA Pond CNP   atorvastatin (LIPITOR) 80 MG tablet TAKE ONE TABLET BY MOUTH DAILY 2/20/17   ANA Pond CNP   aspirin 325 MG EC tablet TAKE ONE TABLET BY MOUTH DAILY 2/20/17   ANA Pond CNP   UNIFINE PENTIPS 31G X 8 MM MISC USE AS DIRECTED FOR INSULIN .00 2/20/17   ANA Pond CNP   diltiazem (CARDIZEM CD) 180 MG extended release capsule TAKE 1 CAPSULE DAILY 2/8/17   ANA Pond CNP   metoprolol succinate (TOPROL XL) 200 MG extended release tablet TAKE ONE-HALF TABLET TWICE A DAY 2/8/17   ANA Pond CNP   TRUEPLUS LANCETS 30G MISC CHECK BLOOD SUGAR TWICE A DAY DX E11.9 2/8/17   ANA Pond CNP   DULoxetine (CYMBALTA) 60 MG extended release capsule Take 60 mg by mouth daily    Historical Provider, MD   divalproex (DEPAKOTE) 500 MG DR tablet Take 1,000 mg by mouth nightly    Historical Provider, MD   Liraglutide (VICTOZA SC) Inject 1.8 Units into the skin daily. Historical Provider, MD       Allergies: Other and Pcn [penicillins]    Social History:  The patient currently lives at home     TOBACCO:   reports that he quit smoking about 19 years ago. His smoking use included cigarettes. He has a 40.00 pack-year smoking history. He has never used smokeless tobacco.  ETOH:   reports no history of alcohol use.       Family History:   Positive as follows:        Problem Relation Age of Onset    Heart Disease Mother     Heart Failure Mother     Cancer Father     Diabetes Maternal Grandmother     Heart Failure Maternal Uncle     Hypertension Maternal Uncle     Asthma Neg Hx     Emphysema Neg Hx        REVIEW OF SYSTEMS:       Constitutional: Negative for fever   HENT: Negative for sore throat   Eyes: Negative for redness   Respiratory: Negative  for dyspnea, cough   Cardiovascular: Negative for chest pain   Gastrointestinal: Negative for vomiting, diarrhea   Genitourinary: Negative for hematuria   Musculoskeletal: + arthralgias   Skin: Negative for rash   Neurological: + weakness, + tremors   Hematological: Negative for adenopathy   Psychiatric/Behavorial: Negative for anxiety    PHYSICAL EXAM:    BP (!) 163/71   Pulse 78   Temp 97.8 °F (36.6 °C) (Oral)   Resp 16   Ht 5' 4\" (1.626 m)   Wt 244 lb (110.7 kg)   SpO2 97%   BMI 41.88 kg/m²     Gen: Obese male, No distress. Alert. Eyes: PERRL. No sclera icterus. No conjunctival injection. HENT: No discharge. Pharynx clear. superficail abrasion to the crown of the head   Neck:  Trachea midline. Resp: No accessory muscle use. No crackles. No wheezes. No rhonchi. CV: Regular rate. Regular rhythm. No murmur. No rub. + edema. Capillary Refill: Brisk,< 3 seconds   Peripheral Pulses: +2 palpable, equal bilaterally   GI: Non-tender. Non-distended. Normal bowel sounds. Skin: Warm and dry. See above  M/S: Mild TTP to L greater trochanter with full ROM of the L hip and negative log roll, no midline C/T/L spine TTP   Neuro: Awake. Grossly nonfocal, mild R sided LE weakness compared to the L with hip flexion, normal DTR in BLEs    Psych: Oriented x 3. No anxiety or agitation.      CBC:   Recent Labs     04/26/21  1238   WBC 10.0   HGB 14.8   HCT 44.1   MCV 87.6        BMP:   Recent Labs     04/26/21  1238   *   K 4.7   CL 95*   CO2 24   BUN 13   CREATININE 0.8*     LIVER PROFILE:   Recent Labs     04/26/21  1238   AST 12*   ALT 20   BILITOT 0.6   ALKPHOS 80     UA:  Recent Labs     04/26/21  1230   COLORU Yellow   PHUR 6.5 CLARITYU Clear   SPECGRAV 1.010   LEUKOCYTESUR Negative   UROBILINOGEN 0.2   BILIRUBINUR Negative   BLOODU Negative   GLUCOSEU >=1000*     CULTURES  COVID-19: not detected     EKG:  I have reviewed the EKG with the following interpretation:   Normal sinus rhythm  Possible Lateral infarct , age undetermined  Inferior infarct , age undetermined    RADIOLOGY  XR CHEST PORTABLE   Final Result   No active cardiopulmonary disease         CT CERVICAL SPINE WO CONTRAST   Final Result   No acute intracranial abnormality. Cerebral atrophy. No acute fracture or subluxation of cervical spine. Stable degenerative   changes. CT HEAD WO CONTRAST   Final Result   No acute intracranial abnormality. Cerebral atrophy. No acute fracture or subluxation of cervical spine. Stable degenerative   changes.            Pertinent previous results reviewed   None     ASSESSMENT/PLAN:  Fall   - 2/2 weakness and fall   - Interrogate pacemaker    - Hit his head, no LOC  - CT head and C spine WNL  - PT/OT evaluation     LA--resolved   - no acute infectious source  - Initial LA: 3.0 > 1.1   - suspect 2/2 dehydration   - IVF and monitor     L hip pain  - check Xray   - suspect likely contusion     DM2  - BG is poorly controlled, BG > 350 on admission   - Hold home oral Rx   - Continue Lantus, SSI   - Hgb A1c: pending     Soft Tissue Abdominal Mass  - Has been evaluated by general surgery   - Non-tender     CAD  Hx of VT   - s/p pacemaker placement   - Follows with Cardiology   - HR controlled   - Continue ASA, Plavix, BB, statin,     COPD  - No AE   - Continue home medications     GERD  - Continue PPI     HLD  - Continue statin     HTN  - BP is elevated    - Continue BB, lisinopril, Cardizem      ALIN  - Continue home CPAP    Tremors   - reports this has been ongoing for a while  - reports difficulty with walking at times with weakness  - Did not evaluate gait at this time, await Xray of L hip   - will likely need further OP work-up, possible neuro eval with no family medical history knowledge as he grew up in foster care    Morbid Obesity  - Body mass index is 41.88 kg/m². - Complicating assessment and treatment. Placing patient at risk for multiple co-morbidities as well as early death and contributing to the patient's presentation.   - Counseled on weight loss.       DVT Prophylaxis: Lovenox   Diet: DIET CARB CONTROL;   Code Status: Full Code      Valentine Hercules PA-C  4/26/2021 3:49 PM

## 2021-04-26 NOTE — ED TRIAGE NOTES
Presents per chapin ems from home for a fall from standing position. States he was getting into his vehicle when his legs became wobbly and he fell backward and hit his head. Denies loss of consciousness. Was able to get up and then drove his brother to the store and returned home. Pt has dried blood around 2 superficial lacerations posterior head.

## 2021-04-26 NOTE — ED NOTES
[facility] [address]  04/26/21 [id number]

## 2021-04-26 NOTE — PROGRESS NOTES
Patient is not able to demonstrated the ability to move from a reclining position to an upright position within the recliner.  however patient is alert, oriented and able to provide informed consent Gabriel Wiggins RN\

## 2021-04-26 NOTE — PROGRESS NOTES
Received return call from [address] (rep from [facility]) representative to be here around 8am to interrogate pacer/defib. Ashley Ferrer RN

## 2021-04-26 NOTE — PROGRESS NOTES
4 Eyes Skin Assessment     The patient is being assess for   Admission    I agree that 2 RN's have performed a thorough Head to Toe Skin Assessment on the patient. ALL assessment sites listed below have been assessed. Areas assessed by both nurses:   [x]   Head, Face, and Ears   [x]   Shoulders, Back, and Chest, Abdomen  [x]   Arms, Elbows, and Hands   [x]   Coccyx, Sacrum, and Ischium  [x]   Legs, Feet, and Heels        2 abrasions to back of head and skin tear to left hand     **SHARE this note so that the co-signing nurse is able to place an eSignature**    Co-signer eSignature: Electronically signed by Severo Saucier, RN on 4/26/21 at 7:11 PM EDT    Does the Patient have Skin Breakdown?   No          Omega Prevention initiated:  No   Wound Care Orders initiated:  No      WOC nurse consulted for Pressure Injury (Stage 3,4, Unstageable, DTI, NWPT, Complex wounds)and New or Established Ostomies:  No      Primary Nurse eSignature: Electronically signed by Maira Flores RN on 4/26/21 at 7:06 PM EDT

## 2021-04-26 NOTE — ED PROVIDER NOTES
[address]        Pt Name: Conrad Bauer MRN: [id number]  Birthdate 1960  Date of evaluation: 4/26/2021  Provider: Carmelo Hanna MD  PCP: Rah Birmingham PA-C      CHIEF COMPLAINT       Chief Complaint   Patient presents with    Fall       [facility]   (Location/Symptom, Timing/Onset, Context/Setting, Quality, Duration, Modifying Factors,Severity)  Note limiting factors. Conrad Bauer is a 64 y.o. male presenting today due to concern for being severely lightheaded along with feeling like he is going to fall multiple times since last night to the point where he did ultimately fall and hit his head just prior to arrival.  He feels that his \"legs are wobbly. \"  He does complain of a mild headache since the fall along with some neck discomfort on the left side. No numbness or weakness on one side of the body although he does feel weak all over. No reported vomiting or diarrhea. He denies any recent medication changes. No chest pain or shortness of breath. No abdominal pain. He normally does not need a cane but due to severely unsteady gait, he came to the emergency department for further evaluation. He believes that his tetanus shot is up-to-date. He does have a wound to his head from the fall. He still does not feel like himself. REVIEW OF SYSTEMS    (2-9 systems for level 4, 10 or more for level 5)     Review of Systems   Constitutional: Negative for chills, diaphoresis, fatigue and fever. HENT: Negative for congestion. Eyes: Negative for visual disturbance. Respiratory: Negative for shortness of breath. Cardiovascular: Negative for chest pain. Gastrointestinal: Negative for abdominal pain, diarrhea, nausea and vomiting. Genitourinary: Negative for difficulty urinating, dysuria and flank pain. Musculoskeletal: Positive for gait problem (due to weakness) and neck pain (left side). Negative for arthralgias, back pain, joint swelling and neck stiffness. Skin: Positive for wound (scalp). Neurological: Positive for weakness (generalized), light-headedness and headaches. Negative for dizziness, syncope and numbness. Psychiatric/Behavioral: Negative for confusion. The patient is not nervous/anxious. Positives and Pertinent negatives as per HPI. PASTMEDICAL HISTORY     Past Medical History:   Diagnosis Date    Arthritis     Asthma     CAD (coronary artery disease)     COPD (chronic obstructive pulmonary disease) ([facility])     Dr. Milena Bartholomew at [facility] told the patient that he did not have COPD, just asthma    Emphysema of lung ([facility] [address]     Fatty liver     GERD (gastroesophageal reflux disease)     Hyperlipidemia     Hypertension     Medical history reviewed with no changes     MI, old     Sleep apnea     pt wears cpap at night. states does not have cpap    Type II or unspecified type diabetes mellitus without mention of complication, not stated as uncontrolled          SURGICAL HISTORY       Past Surgical History:   Procedure Laterality Date    CARDIAC PACEMAKER PLACEMENT  2011    NOT MRI COMPATIABLE OLD LEADS st Ladena Jennifer.  pace maker difib   [address] Bilateral 2013    CATARACT REMOVAL WITH IMPLANT Left 2/28/14    COLONOSCOPY      CORONARY ANGIOPLASTY WITH STENT PLACEMENT  2001,2008    CYST REMOVAL  1982    coccyx area    DIAGNOSTIC CARDIAC CATH LAB PROCEDURE      ENDOSCOPY, COLON, DIAGNOSTIC      ERCP  9/15/2013    ERCP  10/11/13    WITH STENT REMOVAL    FOOT SURGERY Right 07/09/2018     REPAIR CALCANEAL SPUR, REPAIR OF ACHILLES TENDON RIGHT FOOT    NERVE SURGERY Bilateral 12/1/2020    BILATERAL LUMBAR THREE LUMBAR FOUR LUMBAR FIVE DORSAL RAMUS  RADIOFREQUENCY ABLATION SITE CONFIRMED BY FLUOROSCOPY performed by Rema Ayala MD at [address]      back stimulator in lower back   [facility]      ICD    PAIN MANAGEMENT PROCEDURE Bilateral 5/26/2020    BILATERAL LUMBAR THREE, LUMBAR FOUR, LUMBAR FIVE DORSAL RAMUS MEDIAL BRANCH BLOCK SITE CONFIRMED BY FLUOROSCOPY performed by Keith Rodriguez MD at [address] [facility] Bilateral 6/9/2020    BILATERAL LUMBAR THREE, LUMBAR FOUR, LUMBAR FIVE DORSAL RAMUS MEDIAL BRANCH BLOCK SITE CONFIRMED BY FLUOROSCOPY performed by Keith Rodriguez MD at [address] [facility] Left 1/12/2021    LEFT SACROILIAC JOINT INJECTION SITE CONFIRMED BY FLUOROSCOPY performed by Keith Rodriguez MD at [address] [facility] Right 10/31/2018    GASTROCNEMIUS RECESSION RIGHT FOOT performed by Rosa Francois DPM at [address] [facility] Right 10/31/2018    REMOVAL OF CALCANEAL SPUR, ACHILLES TENDON REPAIR RIGHT LOWER EXTREMITY performed by Rosa Francois DPM at [place] [address]  7/18/13    and colonoscopy with biopsies taken    UPPER GASTROINTESTINAL ENDOSCOPY  8/23/13    EUS    UPPER GASTROINTESTINAL ENDOSCOPY  9/15/2013         CURRENT MEDICATIONS       Discharge Medication List as of 4/27/2021  2:42 PM      CONTINUE these medications which have NOT CHANGED    Details   Omega-3 1000 MG CAPS Take 2 capsules by mouth 4 times dailyHistorical Med      tamsulosin (FLOMAX) 0.4 MG capsule Take 0.4 mg by mouth dailyHistorical Med      Fexofenadine HCl (ALLEGRA PO) Take by mouthHistorical Med      Esomeprazole Magnesium (NEXIUM PO) Take 40 mg by mouth Historical Med      JARDIANCE 25 MG tablet Take 25 mg by mouth daily , DAWHistorical Med      insulin detemir (LEVEMIR) 100 UNIT/ML injection vial Inject 60 Units into the skin nightly Historical Med      Fluticasone Furoate-Vilanterol (BREO ELLIPTA IN) Inhale into the lungs daily Historical Med      clopidogrel (PLAVIX) 75 MG tablet TAKE ONE TABLET BY MOUTH DAILY, Disp-30 tablet, R-0Normal      metFORMIN (GLUCOPHAGE) 1000 MG Onset    Heart Disease Mother     Heart Failure Mother     Cancer Father     Diabetes Maternal Grandmother     Heart Failure Maternal Uncle     Hypertension Maternal Uncle     Asthma Neg Hx     Emphysema Neg Hx           SOCIAL HISTORY       Social History     Socioeconomic History    Marital status:       Spouse name: None    Number of children: None    Years of education: None    Highest education level: None   Occupational History    None   Social Needs    Financial resource strain: None    Food insecurity     Worry: None     Inability: None    Transportation needs     Medical: None     Non-medical: None   Tobacco Use    Smoking status: Former Smoker     Packs/day: 2.00     Years: 20.00     Pack years: 40.00     Types: Cigarettes     Quit date: 2001     Years since quittin.9    Smokeless tobacco: Never Used    Tobacco comment: PASSIVE SMOKER   Substance and Sexual Activity    Alcohol use: No     Alcohol/week: 0.0 standard drinks    Drug use: No    Sexual activity: Never   Lifestyle    Physical activity     Days per week: None     Minutes per session: None    Stress: None   Relationships    Social connections     Talks on phone: None     Gets together: None     Attends [employer] service: None     Active member of club or organization: None     Attends meetings of clubs or organizations: None     Relationship status: None    Intimate partner violence     Fear of current or ex partner: None     Emotionally abused: None     Physically abused: None     Forced sexual activity: None   Other Topics Concern    None   Social History Narrative    None       SCREENINGS    Fifi Coma Scale  Eye Opening: Spontaneous  Best Verbal Response: Oriented  Best Motor Response: Obeys commands  Fifi Coma Scale Score: 15           PHYSICAL EXAM    (up to 7 for level 4, 8 or more for level 5)     ED Triage Vitals   BP Temp Temp src Pulse Resp SpO2 Height Weight   -- -- -- -- -- -- -- -- Physical Exam  Vitals signs and nursing note reviewed. Constitutional:       General: He is awake. He is not in acute distress. Appearance: Normal appearance. He is well-developed and well-groomed. He is morbidly obese. He is not ill-appearing, toxic-appearing or diaphoretic. Interventions: He is not intubated. HENT:      Head: Normocephalic. Abrasion present. No raccoon eyes, Wei's sign, contusion, masses, right periorbital erythema, left periorbital erythema or laceration. Hair is normal.      Jaw: There is normal jaw occlusion. No trismus, tenderness, swelling or pain on movement. Right Ear: External ear normal. No tenderness. No mastoid tenderness. Left Ear: External ear normal. No tenderness. No mastoid tenderness. Nose: Nose normal. No laceration, nasal tenderness, mucosal edema, congestion or rhinorrhea. Right Nostril: No epistaxis or septal hematoma. Left Nostril: No epistaxis or septal hematoma. Right Sinus: No maxillary sinus tenderness or frontal sinus tenderness. Left Sinus: No maxillary sinus tenderness or frontal sinus tenderness. Mouth/Throat:      Lips: Pink. No lesions. Mouth: Mucous membranes are dry. No injury, lacerations, oral lesions or angioedema. Tongue: No lesions. Tongue does not deviate from midline. Palate: No mass and lesions. Pharynx: Oropharynx is clear. No oropharyngeal exudate. Eyes:      General: No scleral icterus. Right eye: No discharge. Left eye: No discharge. Pupils: Pupils are equal, round, and reactive to light. Neck:      Musculoskeletal: Full passive range of motion without pain, normal range of motion and neck supple. Normal range of motion. Muscular tenderness (left) present. No edema, erythema, neck rigidity, crepitus, injury, pain with movement, torticollis or spinous process tenderness. Vascular: No JVD.       Trachea: Trachea and phonation normal. No tracheal deviation. Cardiovascular:      Rate and Rhythm: Normal rate and regular rhythm. Pulses: Normal pulses. Radial pulses are 2+ on the right side and 2+ on the left side. Heart sounds: No friction rub. No gallop. Pulmonary:      Effort: Pulmonary effort is normal. No tachypnea, bradypnea, accessory muscle usage, prolonged expiration, respiratory distress or retractions. He is not intubated. Breath sounds: Normal breath sounds and air entry. No stridor, decreased air movement or transmitted upper airway sounds. No decreased breath sounds, wheezing, rhonchi or rales. Chest:      Chest wall: No tenderness. Abdominal:      General: Abdomen is flat. Bowel sounds are normal. There is no distension. Palpations: Abdomen is soft. Tenderness: There is no abdominal tenderness. There is no right CVA tenderness, left CVA tenderness, guarding or rebound. Negative signs include Solano's sign and McBurney's sign. Musculoskeletal: Normal range of motion. General: No deformity. Cervical back: He exhibits tenderness and pain. He exhibits normal range of motion, no bony tenderness, no swelling, no edema, no deformity, no laceration and no spasm. Thoracic back: He exhibits normal range of motion, no tenderness and no bony tenderness. Lumbar back: He exhibits normal range of motion, no tenderness and no bony tenderness. Right lower le+ Pitting Edema present. Left lower le+ Pitting Edema present. Comments: MSK: Normal range of motion of bilateral shoulders, elbows, wrists, hips, knees, ankles and nontender to palpation of all joints      Skin:     General: Skin is warm and dry. Coloration: Skin is not ashen, cyanotic, jaundiced or pale. Findings: Abrasion (scalp) present. No abscess, bruising, burn, ecchymosis, erythema, signs of injury, laceration, lesion or rash. Neurological:      General: No focal deficit present.       Mental Status: He is alert and oriented to person, place, and time. Mental status is at baseline. GCS: GCS eye subscore is 4. GCS verbal subscore is 5. GCS motor subscore is 6. Sensory: Sensation is intact. No sensory deficit. Motor: Motor function is intact. No weakness, tremor, atrophy, abnormal muscle tone or seizure activity. Gait: Gait abnormal (needing cane for assistance). Psychiatric:         Mood and Affect: Mood normal.         Behavior: Behavior normal. Behavior is cooperative. DIAGNOSTIC RESULTS   :    Labs Reviewed   COMPREHENSIVE METABOLIC PANEL - Abnormal; Notable for the following components:       Result Value    Sodium 134 (*)     Chloride 95 (*)     Glucose 362 (*)     CREATININE 0.8 (*)     AST 12 (*)     All other components within normal limits    Narrative:     Performed at:  [facility]. [facility]  [address] [place] [address]. [place] [facility] [employer]    Phone [phone]   LACTIC ACID, PLASMA - Abnormal; Notable for the following components:    Lactic Acid 3.0 (*)     All other components within normal limits    Narrative:     Performed at:  [facility]. [facility]  [address] [place] [address]. [place], [address] [employer]   Phone [phone] Abnormal; Notable for the following components:    Glucose, Ur >=1000 (*)     Ketones, Urine 15 (*)     All other components within normal limits    Narrative:     Performed at:  [facility]. [facility]  [address] [place] [address].  [place], [address] [employer]   Phone [phone]   BASIC METABOLIC PANEL W/ REFLEX TO MG FOR LOW K - Abnormal; Notable for the following components:    Sodium 131 (*)     Chloride 95 (*)     Glucose 141 (*)     CREATININE 0.6 (*)     All other components within normal limits    Narrative:     Performed at:  [facility],  [place], [facility]   Phone [phone]   POCT GLUCOSE - Abnormal; Notable for the following components:    POC Glucose 280 (*)     All other components within normal limits    Narrative:     Performed at:  [facility],  [place], [employer]   Phone [phone]   POCT GLUCOSE - Abnormal; Notable for the following components:    POC Glucose 294 (*)     All other components within normal limits    Narrative:     Performed at:  [facility],  [place], [employer]   Phone [phone]   POCT GLUCOSE - Abnormal; Notable for the following components:    POC Glucose 134 (*)     All other components within normal limits    Narrative:     Performed at:  [facility],  [place], [employer]   Phone [phone]   POCT GLUCOSE - Abnormal; Notable for the following components:    POC Glucose 135 (*)     All other components within normal limits    Narrative:     Performed at:  [facility],  [place], [employer]   Phone [phone]   POCT GLUCOSE - Abnormal; Notable for the following components:    POC Glucose 171 (*)     All other components within normal limits    Narrative:     Performed at:  [facility],  [place], [employer]   Phone [phone], RAPID    Narrative:     Performed at:  [facility],  [place], [employer]   Phone [phone]   CULTURE, BLOOD 1   CULTURE, BLOOD 2   CBC WITH AUTO DIFFERENTIAL    Narrative:     Performed at:  [facility]. [facility]  [address] [place] [address]. [place] [address] [employer]    Phone [phone]   MAGNESIUM    Narrative:     Performed at:  [facility]. [facility]  [address] [place] [address].  [place] [address] [employer]    Phone [phone]   TROPONIN    Narrative:     Performed at:  [facility]. [facility]  [facility] [place], [place]. [place], [facility]    Phone [phone]   LACTIC ACID, PLASMA    Narrative:     Performed at:  [facility]. [facility]  [facility] [place], [place]. [place], [facility] Main    Phone [phone]   LACTIC ACID, PLASMA    Narrative:     Performed at:  [facility] - [facility],  [place], [facility]   Phone [phone]   LACTIC ACID, PLASMA    Narrative:     Performed at:  [facility],  [place], [facility]   Phone [phone]   HEMOGLOBIN A1C    Narrative:     Performed at:  [facility]  [address] [place] [address]   Phone [phone]   CBC    Narrative:     Performed at:  [facility]) - [facility],  [place], [facility]   Phone [phone]   POCT GLUCOSE   POCT GLUCOSE   POCT GLUCOSE   POCT GLUCOSE   POCT GLUCOSE   POCT GLUCOSE       All other labs were within normal range or not returned asof this dictation. EKG: All EKG's are interpreted by the Emergency Department Physician who either signs or Co-signs this chart in the absence of a cardiologist.    The Ekg interpreted by me shows  normal sinus rhythm with a rate of 91  Axis is   Normal  QTc is  normal  Intervals and Durations are unremarkable. ST Segments: no acute change and nonspecific changes  No significant change from prior EKG dated - 11/27/19  No STEMI           RADIOLOGY:   Non-plain film images such as CT, Ultrasound and MRI are read by the radiologist. Emily Hafsa images are visualized and preliminarily interpreted by the  ED Provider with the belowfindings:        Interpretation per the Radiologist below, if available at the time of this note:    XR HIP 1 VW W PELVIS LEFT   Final Result   No acute bone or joint abnormality with above findings unchanged. XR CHEST PORTABLE   Final Result   No active cardiopulmonary disease         CT CERVICAL SPINE WO CONTRAST   Final Result   No acute intracranial abnormality. Cerebral atrophy. No acute fracture or subluxation of cervical spine. Stable degenerative   changes. CT HEAD WO CONTRAST   Final Result   No acute intracranial abnormality. Cerebral atrophy. No acute fracture or subluxation of cervical spine. Stable degenerative   changes. PROCEDURES   Unless otherwise noted below, none     Procedures    CRITICAL CARE TIME   N/A    CONSULTS: Spoke with MADISON Jeffery at [phone] for admission.    IP CONSULT TO HOSPITALIST  IP CONSULT TO HOME CARE NEEDS    EMERGENCY DEPARTMENT COURSE and DIFFERENTIAL DIAGNOSIS/MDM:   Vitals:    Vitals:    04/27/21 0158 04/27/21 0733 04/27/21 0800 04/27/21 1425   BP: 119/68  133/75 127/70   Pulse: 66  72 74   Resp: 16 16 16    Temp: 98.2 °F (36.8 °C)  98.4 °F (36.9 °C) 96.8 °F (36 °C)   TempSrc: Oral  Oral Axillary   SpO2: 94% 94% 94% 99%   Weight:       Height:           Patient was given the following medications:  Medications   aspirin EC tablet 324 mg (324 mg Oral Given 4/27/21 0901)   atorvastatin (LIPITOR) tablet 80 mg (80 mg Oral Given 4/27/21 0901)   clopidogrel (PLAVIX) tablet 75 mg (75 mg Oral Given 4/27/21 0902)   dilTIAZem (CARDIZEM CD) extended release capsule 180 mg (180 mg Oral Given 4/27/21 0904)   divalproex (DEPAKOTE) DR tablet 1,000 mg (1,000 mg Oral Given 4/26/21 2059)   DULoxetine (CYMBALTA) extended release capsule 60 mg (60 mg Oral Given 4/27/21 0901)   pantoprazole (PROTONIX) tablet 40 mg (40 mg Oral Given 4/27/21 0601)   fluticasone (FLONASE) 50 MCG/ACT nasal spray 1 spray (1 spray Each Nostril Given 4/27/21 0904)   cetirizine (ZYRTEC) tablet 10 mg (10 mg Oral Given 4/27/21 0901)   budesonide-formoterol (SYMBICORT) 160-4.5 MCG/ACT inhaler 2 puff (2 puffs Inhalation Given 4/27/21 0730)   gabapentin (NEURONTIN) capsule 200 mg (200 mg Oral Not Given 4/27/21 1430)   insulin glargine (LANTUS) injection vial 60 Units (60 Units Subcutaneous Given 4/26/21 2103)   lisinopril (PRINIVIL;ZESTRIL) tablet 20 mg (20 mg Oral Given 4/27/21 0901)   metoprolol succinate (TOPROL XL) extended release tablet 200 mg (200 mg Oral Given 4/27/21 0901)   tamsulosin (FLOMAX) capsule 0.4 mg (0.4 mg Oral Given 4/27/21 0901)   sodium chloride flush 0.9 % injection 5-40 mL (10 mLs Intravenous Given 4/27/21 0905)   sodium chloride flush 0.9 % injection 5-40 mL (has no administration in time range)   0.9 % sodium chloride infusion (has no administration in time range)   enoxaparin (LOVENOX) injection 40 mg (40 mg Subcutaneous Given 4/27/21 0900)   promethazine (PHENERGAN) tablet 12.5 mg (has no administration in time range)     Or   ondansetron (ZOFRAN) injection 4 mg (has no administration in time range)   polyethylene glycol (GLYCOLAX) packet 17 g (has no administration in time range)   acetaminophen (TYLENOL) tablet 650 mg (has no administration in time range)     Or   acetaminophen (TYLENOL) suppository 650 mg (has no administration in time range)   0.9 % sodium chloride infusion ( Intravenous New Bag 4/26/21 2059)   insulin lispro (HUMALOG) injection vial 0-12 Units (0 Units Subcutaneous Not Given 4/27/21 1337)   insulin lispro (HUMALOG) injection vial 0-6 Units (3 Units Subcutaneous Given 4/26/21 2103)   glucose (GLUTOSE) 40 % oral gel 15 g (has no administration in time range)   dextrose 50 % IV solution (has no administration in time range)   glucagon (rDNA) injection 1 mg (has no administration in time range)   dextrose 5 % solution (has no administration in time range)   insulin lispro (HUMALOG) 100 UNIT/ML injection vial (  Not Given 4/26/21 1815)   0.9 % sodium chloride bolus (0 mLs Intravenous Stopped 4/26/21 1439)   0.9 % sodium chloride bolus (0 mLs Intravenous Stopped 4/26/21 1438)     Patient was evaluated due to concern for feeling very unsteady with his gait since yesterday to the point where he fell and hit his head because he felt so weak. He has a mild headache at this time. No midline neck pain but based on traumatic head injury, CT of the cervical spine was obtained and head CT showed no intracranial hemorrhage and cervical spine CT showed no fracture per radiologist.  He did initially have a lactic acidosis and therefore with his generalized weakness, I do wonder if he could be dealing with either dehydration or early infection. After IV fluids, lactic acidosis did improve but he still did not feel well. At this point, I do believe he would benefit from admission to ensure he does not worsen over the night. No sign of infection at this point. The patient was in no acute distress at time of admission and feels comfortable with this plan. He would prefer to be admitted if possible. The patient tolerated their visit well. The patient and / or the family were informed of the results of any tests, a time was given to answer questions. FINAL IMPRESSION      1. Lactic acidosis    2. Traumatic injury of head, initial encounter    3. Abrasion, scalp w/o infection    4. Near syncope    5. Unsteady gait    6.  Hyperglycemia          DISPOSITION/PLAN   DISPOSITION Admitted 04/26/2021 01:52:57 PM      PATIENT REFERRED TO:  Mike Mario PA-C  [address] [place] [address]  [phone]    In 2 weeks        DISCHARGEMEDICATIONS:  Discharge Medication List as of 4/27/2021  2:42 PM          DISCONTINUED MEDICATIONS:  Discharge Medication List as of 4/27/2021  2:42 PM      STOP taking these medications       cyclobenzaprine (FLEXERIL) 10 MG tablet Comments:   Reason for Stopping:         omega-3 acid ethyl esters (LOVAZA) 1 g capsule Comments:   Reason for Stopping:         furosemide (LASIX) 20 MG tablet Comments:   Reason for Stopping:         potassium chloride (KLOR-CON M) 20 MEQ extended release tablet Comments:   Reason for Stopping:

## 2021-04-26 NOTE — PROGRESS NOTES
Call placed to [facility] ([place]) to interrogate pacemaker/defibrillator, writer to receive return call from representative by 1930 for confirmation. Bobo Contreras RN\

## 2021-04-27 ENCOUNTER — APPOINTMENT (OUTPATIENT)
Dept: GENERAL RADIOLOGY | Age: 61
End: 2021-04-27
Payer: MEDICARE

## 2021-04-27 VITALS
WEIGHT: 244 LBS | RESPIRATION RATE: 16 BRPM | OXYGEN SATURATION: 99 % | TEMPERATURE: 96.8 F | SYSTOLIC BLOOD PRESSURE: 127 MMHG | HEIGHT: 64 IN | HEART RATE: 74 BPM | DIASTOLIC BLOOD PRESSURE: 70 MMHG | BODY MASS INDEX: 41.66 KG/M2

## 2021-04-27 PROBLEM — R53.1 GENERAL WEAKNESS: Status: ACTIVE | Noted: 2021-04-27

## 2021-04-27 LAB
ANION GAP SERPL CALCULATED.3IONS-SCNC: 14 MMOL/L (ref 3–16)
BUN BLDV-MCNC: 12 MG/DL (ref 7–20)
CALCIUM SERPL-MCNC: 9.2 MG/DL (ref 8.3–10.6)
CHLORIDE BLD-SCNC: 95 MMOL/L (ref 99–110)
CO2: 22 MMOL/L (ref 21–32)
CREAT SERPL-MCNC: 0.6 MG/DL (ref 0.9–1.3)
ESTIMATED AVERAGE GLUCOSE: 205.9 MG/DL
GFR AFRICAN AMERICAN: >60
GFR NON-AFRICAN AMERICAN: >60
GLUCOSE BLD-MCNC: 134 MG/DL (ref 70–99)
GLUCOSE BLD-MCNC: 135 MG/DL (ref 70–99)
GLUCOSE BLD-MCNC: 141 MG/DL (ref 70–99)
GLUCOSE BLD-MCNC: 171 MG/DL (ref 70–99)
HBA1C MFR BLD: 8.8 %
HCT VFR BLD CALC: 40.5 % (ref 40.5–52.5)
HEMOGLOBIN: 13.6 G/DL (ref 13.5–17.5)
LACTIC ACID: 0.7 MMOL/L (ref 0.4–2)
MCH RBC QN AUTO: 29.5 PG (ref 26–34)
MCHC RBC AUTO-ENTMCNC: 33.6 G/DL (ref 31–36)
MCV RBC AUTO: 87.8 FL (ref 80–100)
PDW BLD-RTO: 14.9 % (ref 12.4–15.4)
PERFORMED ON: ABNORMAL
PLATELET # BLD: 135 K/UL (ref 135–450)
PMV BLD AUTO: 7.3 FL (ref 5–10.5)
POTASSIUM REFLEX MAGNESIUM: 3.9 MMOL/L (ref 3.5–5.1)
RBC # BLD: 4.61 M/UL (ref 4.2–5.9)
SODIUM BLD-SCNC: 131 MMOL/L (ref 136–145)
WBC # BLD: 6.7 K/UL (ref 4–11)

## 2021-04-27 PROCEDURE — 2580000003 HC RX 258: Performed by: NURSE PRACTITIONER

## 2021-04-27 PROCEDURE — 85027 COMPLETE CBC AUTOMATED: CPT

## 2021-04-27 PROCEDURE — 83605 ASSAY OF LACTIC ACID: CPT

## 2021-04-27 PROCEDURE — 36415 COLL VENOUS BLD VENIPUNCTURE: CPT

## 2021-04-27 PROCEDURE — G0378 HOSPITAL OBSERVATION PER HR: HCPCS

## 2021-04-27 PROCEDURE — 99238 HOSP IP/OBS DSCHRG MGMT 30/<: CPT | Performed by: INTERNAL MEDICINE

## 2021-04-27 PROCEDURE — 73501 X-RAY EXAM HIP UNI 1 VIEW: CPT

## 2021-04-27 PROCEDURE — 83036 HEMOGLOBIN GLYCOSYLATED A1C: CPT

## 2021-04-27 PROCEDURE — 94640 AIRWAY INHALATION TREATMENT: CPT

## 2021-04-27 PROCEDURE — 6360000002 HC RX W HCPCS: Performed by: NURSE PRACTITIONER

## 2021-04-27 PROCEDURE — 96372 THER/PROPH/DIAG INJ SC/IM: CPT

## 2021-04-27 PROCEDURE — 6370000000 HC RX 637 (ALT 250 FOR IP): Performed by: NURSE PRACTITIONER

## 2021-04-27 PROCEDURE — 80048 BASIC METABOLIC PNL TOTAL CA: CPT

## 2021-04-27 RX ADMIN — DULOXETINE HYDROCHLORIDE 60 MG: 60 CAPSULE, DELAYED RELEASE ORAL at 09:01

## 2021-04-27 RX ADMIN — METOPROLOL SUCCINATE 200 MG: 50 TABLET, EXTENDED RELEASE ORAL at 09:01

## 2021-04-27 RX ADMIN — PANTOPRAZOLE SODIUM 40 MG: 40 TABLET, DELAYED RELEASE ORAL at 06:01

## 2021-04-27 RX ADMIN — SODIUM CHLORIDE, PRESERVATIVE FREE 10 ML: 5 INJECTION INTRAVENOUS at 09:05

## 2021-04-27 RX ADMIN — Medication 2 PUFF: at 07:30

## 2021-04-27 RX ADMIN — CETIRIZINE HYDROCHLORIDE 10 MG: 10 TABLET ORAL at 09:01

## 2021-04-27 RX ADMIN — DILTIAZEM HYDROCHLORIDE 180 MG: 180 CAPSULE, EXTENDED RELEASE ORAL at 09:04

## 2021-04-27 RX ADMIN — ATORVASTATIN CALCIUM 80 MG: 40 TABLET, FILM COATED ORAL at 09:01

## 2021-04-27 RX ADMIN — LISINOPRIL 20 MG: 20 TABLET ORAL at 09:01

## 2021-04-27 RX ADMIN — ASPIRIN 324 MG: 81 TABLET, FILM COATED ORAL at 09:01

## 2021-04-27 RX ADMIN — ENOXAPARIN SODIUM 40 MG: 40 INJECTION SUBCUTANEOUS at 09:00

## 2021-04-27 RX ADMIN — TAMSULOSIN HYDROCHLORIDE 0.4 MG: 0.4 CAPSULE ORAL at 09:01

## 2021-04-27 RX ADMIN — GABAPENTIN 200 MG: 100 CAPSULE ORAL at 09:01

## 2021-04-27 RX ADMIN — FLUTICASONE PROPIONATE 1 SPRAY: 50 SPRAY, METERED NASAL at 09:04

## 2021-04-27 RX ADMIN — CLOPIDOGREL BISULFATE 75 MG: 75 TABLET ORAL at 09:02

## 2021-04-27 ASSESSMENT — ENCOUNTER SYMPTOMS
SHORTNESS OF BREATH: 0
ABDOMINAL PAIN: 0
DIARRHEA: 0
NAUSEA: 0
VOMITING: 0
BACK PAIN: 0

## 2021-04-27 ASSESSMENT — PAIN SCALES - GENERAL: PAINLEVEL_OUTOF10: 3

## 2021-04-27 NOTE — PROGRESS NOTES
Discharge instruction given, including med review, follow up appointments and signs and symptoms to report to MD. Helen King patient ridaugustina.

## 2021-04-27 NOTE — FLOWSHEET NOTE
04/26/21 1908   Vital Signs   Temp 97.9 °F (36.6 °C)   Temp Source Oral   Pulse 85   Heart Rate Source Monitor   Resp 16   BP (!) 162/81   BP Location Left upper arm   Patient Position Semi fowlers   Level of Consciousness Alert (0)   MEWS Score 1   Oxygen Therapy   SpO2 96 %   O2 Device None (Room air)   Pt A/O x 4 assessment completed. Scabbed over laceration noted to back of head TILA. Meds given per MAR. Pt denies any needs. Call light within reach and bed alarm on.

## 2021-04-27 NOTE — PROGRESS NOTES
Bedside report given to Librado Bain [id number] RN pt in stable condition no needs at this time.  Call light within reach

## 2021-04-27 NOTE — CARE COORDINATION
DISCHARGE ORDER  Date/Time 2021 2:56 PM  Completed by: Alma Rosa King, Case Management    Patient Name: Jim An. : 1960  Admitting Diagnosis: Lactic acidosis [E87.2]  General weakness [R53.1]      Admit order Date and Status: Observation 21  (verify MD's last order for status of admission)      Noted discharge order. If applicable PT/OT recommendation at Discharge: N/A  DME recommendation by PT/OT:N/A  Confirmed discharge plan   Yes  with whom patient  If pt confirmed DC plan does family need to be contacted by CM No  Discharge Plan: Order for dc noted. Spoke with pt at bedside. Plan remains for home at dc. Discussed HHC and pt wou;d like [facility] and chooses [place] [id number]. Referral called and faxed to [facility] at [place] [address] who states in network with insurance and will accept with [facility] on Thursday. Chart reviewed and no other dc needs identified. Reviewed chart. Role of discharge planner explained and patient verbalized understanding. Discharge order is noted. Has Home O2 in place on admit:  No  Informed of need to bring portable home O2 tank on day of discharge for nursing to connect prior to leaving:   Not Indicated  Verbalized agreement/Understanding:   Not Indicated  Pt is being d/c'd to home today. Pt's O2 sats are 99% on RA    Discharge timeout done with  Nsg, CM and pt. All discharge needs and concerns addressed.

## 2021-04-27 NOTE — PROGRESS NOTES
Awake, in bed. Resp. Even and unlabored. Denies pain or discomfort. Bed alarm on. Pacemaker rep at bedside. Pacemaker interrogated. Per rep. No issues/events seen on interrogation.

## 2021-04-27 NOTE — DISCHARGE INSTR - COC
Continuity of Care Form    Patient Name: Matt Herndon. :  1960  MRN:  [id number]    Admit date:  2021  Discharge date:  ***    Code Status Order: Full Code   Advance Directives:   Advance Care Flowsheet Documentation     Date/Time Healthcare Directive Type of Healthcare Directive Copy in [address] Agent's Name Healthcare Agent's Phone Number    [phone]  No, patient does not have an advance directive for healthcare treatment -- -- -- -- --          Admitting Physician:  Oliver Draper MD  PCP: Farhat Cason PA-C    Discharging Nurse: [facility] Unit/Room#: 0215/0215-02  Discharging Unit Phone Number: ***    Emergency Contact:   Extended Emergency Contact Information  Primary Emergency Contact: Jonah Farris  [place] Phone: [phone]  Relation: Other  Secondary Emergency Contact: [address] [place] [address] Phone: [phone]  Relation: Brother/Sister    Past Surgical History:  Past Surgical History:   Procedure Laterality Date    CARDIAC PACEMAKER PLACEMENT      NOT MRI COMPATIABLE OLD LEADS st Freddy Shone.  pace maker difib   [address] Bilateral     CATARACT REMOVAL WITH IMPLANT Left 14    COLONOSCOPY      CORONARY ANGIOPLASTY WITH STENT PLACEMENT  ,    CYST REMOVAL  1982    coccyx area    DIAGNOSTIC CARDIAC CATH LAB PROCEDURE      ENDOSCOPY, COLON, DIAGNOSTIC      ERCP  9/15/2013    ERCP  10/11/13    WITH STENT REMOVAL    FOOT SURGERY Right 2018     REPAIR CALCANEAL SPUR, REPAIR OF ACHILLES TENDON RIGHT FOOT    NERVE SURGERY Bilateral 2020    BILATERAL LUMBAR THREE LUMBAR FOUR LUMBAR FIVE DORSAL RAMUS  RADIOFREQUENCY ABLATION SITE CONFIRMED BY FLUOROSCOPY performed by Keith Rodriguez MD at [address]      back stimulator in lower back    PACEMAKER PLACEMENT      ICD    PAIN MANAGEMENT PROCEDURE Bilateral 2020    BILATERAL LUMBAR THREE, LUMBAR FOUR, LUMBAR FIVE DORSAL RAMUS MEDIAL BRANCH BLOCK SITE CONFIRMED BY FLUOROSCOPY performed by Eusebio Juárez MD at [facility] Bilateral 6/9/2020    BILATERAL LUMBAR THREE, LUMBAR FOUR, LUMBAR FIVE DORSAL RAMUS MEDIAL BRANCH BLOCK SITE CONFIRMED BY FLUOROSCOPY performed by Eusebio Juárez MD at [address] Left 1/12/2021    LEFT SACROILIAC JOINT INJECTION SITE CONFIRMED BY FLUOROSCOPY performed by Eusebio Juárez MD at [facility] Right 10/31/2018    GASTROCNEMIUS RECESSION RIGHT FOOT performed by Monisha Owusu DPM at [address] Right 10/31/2018    REMOVAL OF CALCANEAL SPUR, ACHILLES TENDON REPAIR RIGHT LOWER EXTREMITY performed by Monisha Owusu DPM at [facility]  7/18/13    and colonoscopy with biopsies taken    UPPER GASTROINTESTINAL ENDOSCOPY  8/23/13    EUS    UPPER GASTROINTESTINAL ENDOSCOPY  9/15/2013       Immunization History:   Immunization History   Administered Date(s) Administered    Influenza Virus Vaccine 12/12/2008, 10/22/2012, 09/17/2015, 10/01/2018    Influenza, Kathlen Ing, IM, (6 mo and older Fluzone, Flulaval, Fluarix and 3 yrs and older Afluria) 11/18/2016, 10/02/2017    Pneumococcal Conjugate 7-valent (Prevnar7) 10/22/2012    Pneumococcal Polysaccharide (Mmlzycyvi88) 12/12/2008    Tdap (Boostrix, Adacel) 05/11/2015       Active Problems:  Patient Active Problem List   Diagnosis Code    Ventricular tachycardia ([facility] [address] I47.2    Presence of stent in left circumflex coronary artery Z95.5    Myocardial infarction, nontransmural (HCC) I21.4    Myocardial infarction, inferoposterior wall, subsequent care     Automatic implantable cardioverter-defibrillator in situ Z95.810    CAD (coronary artery disease) I25.10    Automatic implantable cardioverter-defibrillator in situ Z95.810    ALIN on CPAP G47.33, Z99.89    Gastroesophageal reflux disease without esophagitis K21.9    Hyperlipidemia with target LDL less than 100 E78.5    Hypertension due to endocrine disorder I15.2    Ischemic cardiomyopathy I25.5    Obesity, morbid, BMI 40.0-49.9 (HCC) E66.01    Weakness R53.1    Acute encephalopathy G93.40    TIA involving left internal carotid artery G45.1    DM (diabetes mellitus), secondary, uncontrolled, w/neurologic complic ([facility]) U85.04, D66.73    Dyslipidemia E78.5    HTN (hypertension), benign I10    Lactic acidosis E87.2    Head trauma S09.90XA    Abrasion, scalp w/o infection S00. 01XA    Unsteady gait R26.81    Hyperglycemia R73.9    Chronic obstructive pulmonary disease (HCC) J44.9    Coarse tremor G25.2    General weakness R53.1       Isolation/Infection:   Isolation          No Isolation        Patient Infection Status     Infection Onset Added Last Indicated Last Indicated By Review Planned Expiration Resolved Resolved By    None active    Resolved    COVID-19 Rule Out 04/26/21 04/26/21 04/26/21 COVID-19, Rapid (Ordered)   04/26/21 Rule-Out Test Resulted          Nurse Assessment:  Last Vital Signs: /70   Pulse 74   Temp 96.8 °F (36 °C) (Axillary)   Resp 16   Ht 5' 4\" (1.626 m)   Wt 244 lb (110.7 kg)   SpO2 99%   BMI 41.88 kg/m²     Last documented pain score (0-10 scale): Pain Level: 3  Last Weight:   Wt Readings from Last 1 Encounters:   04/26/21 244 lb (110.7 kg)     Mental Status:  {IP PT MENTAL STATUS:87732}    IV Access:  { DELTA IV ACCESS:053136799}    Nursing Mobility/ADLs:  Walking   {P DME PSQZ:567475968}  Transfer  {[facility] DME CZKO:932524125}  Bathing  {[facility] DME HHMP:398934848}  Dressing  {P DME LASL:164994896}  Toileting  {P DME MYHT:203349281}  Feeding  {P DME GKOY:750404772}  Med Admin  {P DME VNBN:955130228}  Med Delivery   {[facility] MED Delivery:830466303}    Wound Care Documentation and Therapy:  Incision 10/31/18 Foot Right (Active)   Number of days: 122 at Discharge: Stable    Rehab Potential (if transferring to Rehab): {Prognosis:7738397101}    Recommended Labs or Other Treatments After Discharge: ***    Physician Certification: I certify the above information and transfer of Johny Burrell.  is necessary for the continuing treatment of the diagnosis listed and that he requires [address] for less 30 days.      Update Admission H&P: No change in H&P    PHYSICIAN SIGNATURE:  Electronically signed by LISSY Sanderson RN on 4/27/21 at 2:41 PM EDT

## 2021-04-27 NOTE — PROGRESS NOTES
Pt awake at this time. AM meds given. Pt denies any needs.  Call light within reach and bed alarm on

## 2021-04-27 NOTE — DISCHARGE SUMMARY
Name:  Christiana Lagos  Room:  0215/0215-02  MRN:    [id number]    Discharge Summary      This discharge summary is in conjunction with a complete physical exam done on the day of discharge. Discharging Physician: Dr. Smith Edwar: 4/26/2021  Discharge:  4/27/2021    HPI taken from admission H&P:      The patient is a 64 y.o. male with CAD, COPD, GERD, HLD, HTN, ALIN, DM who presented to [place] ED with complaint of fall and weakness. Patient reports that this has been an ongoing issue for him. He gets days where he gets weak, will feel like he cant walk well, will have multiple falls, and then it will resolve and he is able to start walking normally again. He reports feeling weak this morning and went out to his truck to drive when his legs were too wobbly and he fell backward, striking his head and left hip on the ground. His family was able to come help him up and he reports no associate LOC. He was able to walk and drive after falling but did notice some pain to the L hip and overall felt weak. He reports a hx of cardiac arrhythmia with pacemaker/defibillator placement but denies any associated CP, SOB or shocks. He reports he has otherwise been feeling well without sick contacts. He does report chronic tremors and states that he and his brother grew up in foster care and have no access to family medical history. He does have chronic back and hip pain that he follows with pain management but states that his defibrillator is not MRI compatible. UTD on his tetanus shot. Diagnoses this Admission and Hospital Course     Fall   - 2/2 weakness and  dehydration   - Interrogated pacemaker - functioning well . No arrhythmias noted   - Hit his head, no LOC  - CT head and C spine WNL  - PT/OT evaluated. - he has mild hyponatremia. Also elevated glucose . Hydrated gently . feels much better today . Up and ambulating . Not orthostatic .    DC home   I stopped lasix and flexeril     LA - resolved - no acute infectious source  - Initial LA: 3.0 > 1.1   - suspect 2/2 dehydration   - s/p IVF and monitored (trended to normal)     L hip pain  - checked Xray - this showed no acute fractures or joint abnormalities     DM2  - BG  poorly controlled, BG > 350 on admission   - Continued Lantus, SSI   - Hgb A1c was 8.8 %  - resumed home regimen at d/c, will need to f/u with PCP for better glycemic control. D/W pt . He admits to poor diet compliance at home. BG much better in the hospital with proper diet      Soft Tissue Abdominal Mass  - Has been evaluated by general surgery   - Non-tender   F/U outpt     CAD  Hx of VT   - s/p pacemaker placement   - Follows with Cardiology   - HR controlled   - Continued ASA, Plavix, BB, statin. PM was interrogated today     COPD  - No AE   - Continued home medications      GERD  - Continued PPI      HLD  - Continued statin      HTN  - BP  was elevated on arrival but improved    - Continued BB, lisinopril, Cardizem       ALIN  - Continued home CPAP     Tremors   - reported this has been ongoing for a while  - reported difficulty with walking at times with weakneness  - will likely need further OP work-up, possible neuro eval with no family medical history knowledge as he grew up in foster care     Morbid Obesity  - Body mass index is 41.88 kg/m². - Complicating assessment and treatment. Placing patient at risk for multiple co-morbidities as well as early death and contributing to the patient's presentation.   - Counseled on weight loss. Procedures (Please Review Full Report for Details)  None    Consults    None    Physical Exam at Discharge:    /75   Pulse 72   Temp 98.4 °F (36.9 °C) (Oral)   Resp 16   Ht 5' 4\" (1.626 m)   Wt 244 lb (110.7 kg)   SpO2 94%   BMI 41.88 kg/m²     Gen: Obese male, No distress. Alert. Eyes: PERRL. No sclera icterus. No conjunctival injection. HENT: No discharge. Pharynx clear.  superficail abrasion to the crown of the head   Neck: Trachea midline. Resp: No accessory muscle use. No crackles. No wheezes. No rhonchi. CV: Regular rate. Regular rhythm. No murmur. No rub. + edema. Capillary Refill: Brisk,< 3 seconds   Peripheral Pulses: +2 palpable, equal bilaterally   GI: Non-tender. Non-distended. Normal bowel sounds. Skin: Warm and dry. See above  M/S: Mild TTP to L greater trochanter with full ROM of the L hip and negative log roll, no midline C/T/L spine TTP   Neuro: Awake. Grossly nonfocal, mild R sided LE weakness compared to the L with hip flexion, normal DTR in BLEs    Psych: Oriented x 3. No anxiety or agitation. CBC:   Recent Labs     04/26/21  1238 04/27/21  0304   WBC 10.0 6.7   HGB 14.8 13.6   HCT 44.1 40.5   MCV 87.6 87.8    135     BMP:   Recent Labs     04/26/21  1238 04/27/21  0304   * 131*   K 4.7 3.9   CL 95* 95*   CO2 24 22   BUN 13 12   CREATININE 0.8* 0.6*     LIVER PROFILE:   Recent Labs     04/26/21  1238   AST 12*   ALT 20   BILITOT 0.6   ALKPHOS 80     UA:  Recent Labs     04/26/21  1230   COLORU Yellow   PHUR 6.5   CLARITYU Clear   SPECGRAV 1.010   LEUKOCYTESUR Negative   UROBILINOGEN 0.2   BILIRUBINUR Negative   BLOODU Negative   GLUCOSEU >=1000*     CULTURES    SARS-COV-2 - Rapid: Not detected     Blood cx x2:  NGTD    RADIOLOGY    XR HIP 1 VW W PELVIS LEFT 4/27/2021   Final Result   No acute bone or joint abnormality with above findings unchanged. XR CHEST PORTABLE   Final Result   No active cardiopulmonary disease         CT CERVICAL SPINE WO CONTRAST 4/26/2021   Final Result   No acute intracranial abnormality. Cerebral atrophy. No acute fracture or subluxation of cervical spine. Stable degenerative   changes. CT HEAD WO CONTRAST 4/26/2021   Final Result   No acute intracranial abnormality. Cerebral atrophy. No acute fracture or subluxation of cervical spine. Stable degenerative   changes.            Discharge Medications     Medication List      CHANGE how you take these medications    fluticasone 50 MCG/ACT nasal spray  Commonly known as: FLONASE  USE 1 SPRAY IN EACH NOSTRIL EVERY DAY  What changed: See the new instructions. insulin lispro 100 UNIT/ML injection vial  Commonly known as: HUMALOG  What changed:   · how much to take  · when to take this  · Another medication with the same name was removed. Continue taking this medication, and follow the directions you see here.         CONTINUE taking these medications    ALLEGRA PO     aspirin 325 MG EC tablet  TAKE ONE TABLET BY MOUTH DAILY     atorvastatin 80 MG tablet  Commonly known as: LIPITOR  TAKE ONE TABLET BY MOUTH DAILY     BREO ELLIPTA IN     clopidogrel 75 MG tablet  Commonly known as: PLAVIX  TAKE ONE TABLET BY MOUTH DAILY     Cymbalta 60 MG extended release capsule  Generic drug: DULoxetine     dilTIAZem 180 MG extended release capsule  Commonly known as: CARDIZEM CD  TAKE 1 CAPSULE DAILY     divalproex 500 MG DR tablet  Commonly known as: DEPAKOTE     famotidine 20 MG tablet  Commonly known as: PEPCID  TAKE 1 TABLET BY MOUTH 2 TIMES DAILY     fenofibrate 160 MG tablet  Commonly known as: TRIGLIDE     gabapentin 100 MG capsule  Commonly known as: NEURONTIN  TAKE 2 CAPSULES BY MOUTH 3 TIMES DAILY     Jardiance 25 MG tablet  Generic drug: empagliflozin     Levemir 100 UNIT/ML injection vial  Generic drug: insulin detemir     lisinopril 20 MG tablet  Commonly known as: PRINIVIL;ZESTRIL  TAKE 1 TABLET BY MOUTH DAILY     metFORMIN 1000 MG tablet  Commonly known as: GLUCOPHAGE  TAKE ONE TABLET BY MOUTH 2 TIMES DAILY WITH MORNING AND EVENING MEALS     metoprolol succinate 200 MG extended release tablet  Commonly known as: TOPROL XL  TAKE ONE-HALF TABLET TWICE A DAY     NEXIUM PO     nitroGLYCERIN 0.4 MG/SPRAY 0.4 mg spray  Commonly known as: NITROLINGUAL  Place 1 spray under the tongue every 5 minutes as needed for Chest pain     Omega-3 1000 MG Caps     ProAir  (90 Base) MCG/ACT inhaler  Generic drug: albuterol sulfate HFA     tamsulosin 0.4 MG capsule  Commonly known as: FLOMAX     True Metrix Blood Glucose Test strip  Generic drug: blood glucose test strips  CHECK SUGARS TWICE A DAY AS DIRECTED, DX: E11.9     TRUEplus Lancets 30G Misc  CHECK BLOOD SUGAR TWICE A DAY DX E11.9     Unifine Pentips 31G X 8 MM Misc  Generic drug: Insulin Pen Needle  USE AS DIRECTED FOR INSULIN .00     VICTOZA SC        STOP taking these medications    cyclobenzaprine 10 MG tablet  Commonly known as: FLEXERIL     furosemide 20 MG tablet  Commonly known as: LASIX     potassium chloride 20 MEQ extended release tablet  Commonly known as: KLOR-CON M              Discharged in stable condition to home. Follow Up: Follow up with PCP in 1 week.     Sudheer Bridges MD  4/27/21

## 2021-04-27 NOTE — PLAN OF CARE
Problem: Falls - Risk of:  Goal: Will remain free from falls  Description: Will remain free from falls  4/27/2021 0032 by Christine Vuong RN  Outcome: Ongoing  4/26/2021 1833 by Jasmina Ma RN  Outcome: Ongoing  Goal: Absence of physical injury  Description: Absence of physical injury  4/26/2021 1833 by Jasmina Ma RN  Outcome: Ongoing     Problem: Infection:  Goal: Will remain free from infection  Description: Will remain free from infection  4/27/2021 0032 by Christine Vuong RN  Outcome: Ongoing  4/26/2021 1833 by Jasmina Ma RN  Outcome: Ongoing     Problem: Safety:  Goal: Free from accidental physical injury  Description: Free from accidental physical injury  4/26/2021 1833 by Jasmina Ma RN  Outcome: Ongoing  Goal: Free from intentional harm  Description: Free from intentional harm  4/26/2021 1833 by Jasmina Ma RN  Outcome: Ongoing     Problem: Daily Care:  Goal: Daily care needs are met  Description: Daily care needs are met  4/27/2021 0032 by Christine Vuong RN  Outcome: Ongoing  4/26/2021 1833 by Jasmina Ma RN  Outcome: Ongoing     Problem: Pain:  Goal: Patient's pain/discomfort is manageable  Description: Patient's pain/discomfort is manageable  4/26/2021 1833 by Jasmina Ma RN  Outcome: Ongoing     Problem: Skin Integrity:  Goal: Skin integrity will stabilize  Description: Skin integrity will stabilize  4/27/2021 0032 by Christine Vuong RN  Outcome: Ongoing  4/26/2021 1833 by Jasmina Ma RN  Outcome: Ongoing     Problem: Discharge Planning:  Goal: Patients continuum of care needs are met  Description: Patients continuum of care needs are met  4/27/2021 0032 by Christine Vuong RN  Outcome: Ongoing  4/26/2021 1833 by Jasmina Ma RN  Outcome: Ongoing

## 2021-04-27 NOTE — ACP (ADVANCE CARE PLANNING)
Advance Care Planning   Healthcare Decision Maker: Tamika Cruz, brother in law: [phone]      Click here to complete Healthcare Decision Makers including selection of the Healthcare Decision Maker Relationship (ie \"Primary\").

## 2021-04-27 NOTE — CARE COORDINATION
Case Management Assessment  Initial Evaluation      Patient Name: Melly Hernandez. YOB: 1960  Diagnosis: Lactic acidosis [E87.2]  Date / Time: 4/26/2021 11:46 AM    Admission status/Date:INPT 4/26/21  Chart Reviewed: Yes      Patient Interviewed: Yes   Family Interviewed:  No      Hospitalization in the last 30 days:  No      Health Care Decision Maker : Kiran Damian, brother in law   (CM - must 1st enter selection under Navigator - emergency contact- Health Care Decision Maker Relationship and pick relationship)   Who do you trust or have selected to make healthcare decisions for you      Met with: pt at bedside  Interview conducted  (bedside/phone):    Current PCP:  Fredi Au required for SNF : Y, N          3 night stay required - Y, N    ADLS  Support Systems/Care Needs: Family Members  Transportation: self    Meal Preparation: self    Housing  Living Arrangements: home alone.  Brother lives in same apt complex  Steps: n/a  Intent for return to present living arrangements: Yes  Identified Issues: n/a    Home Care Information  Active with Home Health Care : No Agency:(Services)  Type of Home Care Services: None  Passport/Waiver : No  :                      Phone Number:    Passport/Waiver Services: n/a          Durable Medical Equiptment   DME Provider: n/a  Equipment:   Walker_prn__Cane_X__RTS___ BSC___Shower Chair___Hospital Bed___W/C_X-doesn't use___Other________  02 at ____Liter(s)---wears(frequency)_______ HHN _X__ CPAP_X- doesn't use__ BiPap___   N/A____      Home O2 Use :  No    If No for home O2---if presently on O2 during hospitalization:  No  if yes CM to follow for potential DC O2 need  Informed of need for care provider to bring portable home O2 tank on day of discharge for nursing to connect prior to leaving:   Not Indicated  Verbalized agreement/Understanding:   Not Indicated    Community Service Affiliation  Dialysis:  No · Agency:  · Location:  · Dialysis Schedule:  · Phone:   · Fax: Other Community Services: (ex:PT/OT,Mental Health,Wound Clinic, [address] [facility] Pain clinic PRN. DISCHARGE PLAN: Explained Case Management role/services. Reviewed chart and met with pt. Pt reports to be IPTA and lives at home alone. Pt states that his brother lives nearby in the same apt complex. Pt plans to return home at discharge. Denies needs currently. CM will follow.

## 2021-04-28 RX ORDER — POTASSIUM CHLORIDE 20 MEQ/1
TABLET, EXTENDED RELEASE ORAL
Qty: 24 TABLET | Refills: 3 | Status: SHIPPED | OUTPATIENT
Start: 2021-04-28 | End: 2021-06-18 | Stop reason: SDUPTHER

## 2021-05-01 LAB
BLOOD CULTURE, ROUTINE: NORMAL
CULTURE, BLOOD 2: NORMAL

## 2021-06-18 ENCOUNTER — OFFICE VISIT (OUTPATIENT)
Dept: CARDIOLOGY CLINIC | Age: 61
End: 2021-06-18
Payer: MEDICARE

## 2021-06-18 VITALS
HEART RATE: 78 BPM | SYSTOLIC BLOOD PRESSURE: 128 MMHG | WEIGHT: 244.2 LBS | BODY MASS INDEX: 41.92 KG/M2 | DIASTOLIC BLOOD PRESSURE: 84 MMHG

## 2021-06-18 DIAGNOSIS — I10 HTN (HYPERTENSION), BENIGN: Primary | ICD-10-CM

## 2021-06-18 DIAGNOSIS — R55 VASOVAGAL SYNCOPE: ICD-10-CM

## 2021-06-18 DIAGNOSIS — E66.01 OBESITY, CLASS III, BMI 40-49.9 (MORBID OBESITY) (HCC): ICD-10-CM

## 2021-06-18 DIAGNOSIS — I25.5 ISCHEMIC CARDIOMYOPATHY: ICD-10-CM

## 2021-06-18 DIAGNOSIS — E78.5 DYSLIPIDEMIA: ICD-10-CM

## 2021-06-18 PROCEDURE — 99214 OFFICE O/P EST MOD 30 MIN: CPT | Performed by: INTERNAL MEDICINE

## 2021-06-18 RX ORDER — FUROSEMIDE 20 MG/1
TABLET ORAL
Qty: 90 TABLET | Refills: 2 | Status: ON HOLD | OUTPATIENT
Start: 2021-06-18 | End: 2022-01-27 | Stop reason: HOSPADM

## 2021-06-18 RX ORDER — POTASSIUM CHLORIDE 20 MEQ/1
TABLET, EXTENDED RELEASE ORAL
Qty: 24 TABLET | Refills: 3 | Status: ON HOLD | OUTPATIENT
Start: 2021-06-18 | End: 2022-01-27 | Stop reason: HOSPADM

## 2021-06-18 NOTE — PROGRESS NOTES
Emotionally Abused:     Physically Abused:     Sexually Abused:         Patient has a family history includes Cancer in his father; Diabetes in his maternal grandmother; Heart Disease in his mother; Heart Failure in his maternal uncle and mother; Hypertension in his maternal uncle. Patient  has a past medical history of Arthritis, Asthma, CAD (coronary artery disease), COPD (chronic obstructive pulmonary disease) ([address] Emphysema of lung ([address] Fatty liver, GERD (gastroesophageal reflux disease), Hyperlipidemia, Hypertension, Medical history reviewed with no changes, MI, old, Sleep apnea, and Type II or unspecified type diabetes mellitus without mention of complication, not stated as uncontrolled. Vitals  Weight: 244 lb 3.2 oz (110.8 kg)  Blood Pressure: BP: (128)/(84)    Pulse: 78       Review of Systems   Constitutional: Negative. HENT: Negative. Eyes: Negative. Respiratory: Negative. Cardiovascular: Negative. Gastrointestinal: Negative. Genitourinary: Negative. Musculoskeletal: Negative. Skin: Negative. Neurological: Negative. Hematological: Negative. Psychiatric/Behavioral: Negative. Objective:   Physical Exam   Constitutional: He is oriented to person, place, and time. Vital signs are normal. He appears well-nourished. HENT:   Head: Normocephalic and atraumatic. Mouth/Throat: Oropharynx is clear and moist and mucous membranes are normal.   Eyes: Conjunctivae and EOM are normal. Pupils are equal, round, and reactive to light. Neck: Normal range of motion. Neck supple. No JVD present. No tracheal deviation present. No thyromegaly present. Cardiovascular: Normal rate, regular rhythm, S1 normal, S2 normal, normal heart sounds, intact distal pulses and normal pulses. PMI is not displaced. Exam reveals no gallop and no friction rub. No murmur heard. Pulses:       Carotid pulses are 2+ on the right side, and 2+ on the left side.        Radial pulses are 2+ on the right side, and 2+ on the left side. Pulmonary/Chest: Effort normal and breath sounds normal.   Abdominal: Normal appearance and bowel sounds are normal.   Musculoskeletal: Normal range of motion. Lymphadenopathy:     He has no cervical adenopathy. Neurological: He is alert and oriented to person, place, and time. He has normal strength. No cranial nerve deficit. Coordination normal.   Skin: Skin is warm, dry and intact. Psychiatric: He has a normal mood and affect. His speech is normal and behavior is normal.       Assessment:       Diagnosis Orders   1. HTN (hypertension), benign  Stress test, lexiscan   2. Vasovagal syncope  Stress test, lexiscan   3. Ischemic cardiomyopathy  Stress test, lexiscan   4. Dyslipidemia  Stress test, lexiscan   5. Obesity, Class III, BMI 40-49.9 (morbid obesity) ([facility]         Plan:       STable ischemic CMP. Patient continues to take metoprolol without any ICD discharges. Gets meds refilled weekly packs by [facility]. Edema:  Controlled. Continue lasix 20 mg Mon thurs and K 10 meq mon and thurs. HLP:  Check labs. HTN:  Controlled. LVEF 44% with no ischemia and inferolateral infarction. No signs of CHF. NYHA 1 and CCS 1. He got his disability coverage after 7 years. Syncope:  Check lexiscan MPI in a week or so. Continue current medications. Stable cardiovascular status.

## 2021-07-02 ENCOUNTER — HOSPITAL ENCOUNTER (OUTPATIENT)
Dept: NON INVASIVE DIAGNOSTICS | Age: 61
Discharge: HOME | End: 2021-07-02
Payer: MEDICARE

## 2021-07-02 LAB
LV EF: 49 %
LVEF MODALITY: NORMAL

## 2021-07-02 PROCEDURE — 93017 CV STRESS TEST TRACING ONLY: CPT

## 2021-07-02 PROCEDURE — 6360000002 HC RX W HCPCS: Performed by: INTERNAL MEDICINE

## 2021-07-02 PROCEDURE — 3430000000 HC RX DIAGNOSTIC RADIOPHARMACEUTICAL: Performed by: INTERNAL MEDICINE

## 2021-07-02 PROCEDURE — 78452 HT MUSCLE IMAGE SPECT MULT: CPT

## 2021-07-02 PROCEDURE — 2580000003 HC RX 258: Performed by: INTERNAL MEDICINE

## 2021-07-02 PROCEDURE — A9502 TC99M TETROFOSMIN: HCPCS | Performed by: INTERNAL MEDICINE

## 2021-07-02 RX ORDER — SODIUM CHLORIDE 0.9 % (FLUSH) 0.9 %
10 SYRINGE (ML) INJECTION PRN
Status: DISCONTINUED | OUTPATIENT
Start: 2021-07-02 | End: 2021-07-03 | Stop reason: HOSPADM

## 2021-07-02 RX ADMIN — Medication 10 ML: at 14:32

## 2021-07-02 RX ADMIN — TETROFOSMIN 10 MILLICURIE: 1.38 INJECTION, POWDER, LYOPHILIZED, FOR SOLUTION INTRAVENOUS at 13:13

## 2021-07-02 RX ADMIN — TETROFOSMIN 30 MILLICURIE: 1.38 INJECTION, POWDER, LYOPHILIZED, FOR SOLUTION INTRAVENOUS at 14:21

## 2021-07-02 RX ADMIN — REGADENOSON 0.4 MG: 0.08 INJECTION, SOLUTION INTRAVENOUS at 14:41

## 2021-07-02 RX ADMIN — Medication 10 ML: at 13:23

## 2021-07-08 ENCOUNTER — PREP FOR PROCEDURE (OUTPATIENT)
Dept: CARDIOLOGY CLINIC | Age: 61
End: 2021-07-08

## 2021-07-08 DIAGNOSIS — R94.39 ABNORMAL STRESS TEST: Primary | ICD-10-CM

## 2021-07-08 RX ORDER — SODIUM CHLORIDE 9 MG/ML
25 INJECTION, SOLUTION INTRAVENOUS PRN
Status: CANCELLED | OUTPATIENT
Start: 2021-07-08

## 2021-07-08 RX ORDER — SODIUM CHLORIDE 0.9 % (FLUSH) 0.9 %
5-40 SYRINGE (ML) INJECTION EVERY 12 HOURS SCHEDULED
Status: CANCELLED | OUTPATIENT
Start: 2021-07-08

## 2021-07-08 RX ORDER — ASPIRIN 325 MG
325 TABLET ORAL ONCE
Status: CANCELLED | OUTPATIENT
Start: 2021-07-13

## 2021-07-08 RX ORDER — SODIUM CHLORIDE 9 MG/ML
INJECTION, SOLUTION INTRAVENOUS CONTINUOUS
Status: CANCELLED | OUTPATIENT
Start: 2021-07-08

## 2021-07-08 RX ORDER — SODIUM CHLORIDE 0.9 % (FLUSH) 0.9 %
5-40 SYRINGE (ML) INJECTION PRN
Status: CANCELLED | OUTPATIENT
Start: 2021-07-08

## 2021-07-08 NOTE — PROGRESS NOTES
Left Heart Catheterization    A left heart catheterization is a procedure that provides your cardiologist with detailed information regarding how your heart functions. A small catheter (long, fine tube) is inserted into an artery (a vessel that carries blood and oxygen) that leads to your heart. While watching with x-ray equipment, small amounts of dye are injected which enables visualization of the heart arteries and chambers. The pictures that your cardiologist receives from the cardiac catheterization enable him or her to decide on the best treatment for you. Date of the procedure:  7/13/21    Time of arrival: 0830    Cardiologist performing the procedure: ___Tramuta_______________________________    Instructions for your left heart catheterization:    1. Bring a list of your medications to the hospital.    2.  Please notify us before the procedure if you are allergic to anything; especially x-ray contrast dye, iodine, nickel, or any type of jewelry. This is very important! 3. Do not eat or drink anything at all after midnight (or 8 hours) prior to the procedure. 4.  Take all morning medications EXCEPT any diuretics (water pills) the day of the procedure with a small sip of water. 5.  If you are on Coumadin, Warfarin, or Latisha Sickle, please notify us so that we can make adjustments to your medication. 6.  If you are taking Xarelto, Eliquis, or Pradaxa, please stop staking these medications two days prior to the procedure (including the day of the procedure). 7.  If you are diabetic, check your blood sugar in the morning. If your blood sugar is 120 or less, do not take insulin. If your blood sugar is more than 120, take half the dose of your normal insulin. Do not take Metformin the night before your procedure or morning of the procedure. 8.  You MUST have someone to drive you home--no driving for 24 hours after your procedure.   If an intervention is performed, you might stay overnight in the hospital.    9.  Discharge instructions will be given to you at the time of your procedure. 10.  For any questions or if you cannot keep this appointment for any reason, please call [phone]. Spoke with pt regarding procedure. Pre-op instructions, time and location of arrival discussed. Pt will get COVID test 5-6 days prior to procedure. Pt verbalized understanding and all questions answered at this time.

## 2021-07-12 ENCOUNTER — NURSE ONLY (OUTPATIENT)
Dept: CARDIOLOGY CLINIC | Age: 61
End: 2021-07-12
Payer: MEDICARE

## 2021-07-12 DIAGNOSIS — I47.20 VENTRICULAR TACHYCARDIA: ICD-10-CM

## 2021-07-12 DIAGNOSIS — I25.5 ISCHEMIC CARDIOMYOPATHY: ICD-10-CM

## 2021-07-12 DIAGNOSIS — Z95.810 AUTOMATIC IMPLANTABLE CARDIOVERTER-DEFIBRILLATOR IN SITU: ICD-10-CM

## 2021-07-12 PROCEDURE — 93295 DEV INTERROG REMOTE 1/2/MLT: CPT | Performed by: INTERNAL MEDICINE

## 2021-07-12 PROCEDURE — 93296 REM INTERROG EVL PM/IDS: CPT | Performed by: INTERNAL MEDICINE

## 2021-07-12 NOTE — PROGRESS NOTES
Remote transmission received for patients single chamber ICD. Transmission shows normal sensing and pacing function. EP physician will review. See interrogation under the cardiology tab in the [address] [place] [address] [address] field for more details. Will continue to monitor remotely. 1 PSVT x 8 sec. (toprol xl). Corvue is within normal limits.   Vern WOODS 7/14

## 2021-07-12 NOTE — PRE-PROCEDURE INSTRUCTIONS
Called patient about procedure. Told to be here at 0830 for procedure at 1000. NPO after midnight, but can take morning medication with sips of water, patient stated they are not on blood thinners. To have a responsible adult be with patient take them home and stay with them afterwards, if they do not get admitted to [address] [place] [address]. And if available bring current list of medications. No other questions or concerns.

## 2021-07-13 ENCOUNTER — HOSPITAL ENCOUNTER (OUTPATIENT)
Dept: CARDIAC CATH/INVASIVE PROCEDURES | Age: 61
Setting detail: OBSERVATION
Discharge: HOME | End: 2021-07-14
Attending: INTERNAL MEDICINE | Admitting: INTERNAL MEDICINE
Payer: MEDICARE

## 2021-07-13 DIAGNOSIS — Z98.61 CAD S/P PERCUTANEOUS CORONARY ANGIOPLASTY: ICD-10-CM

## 2021-07-13 DIAGNOSIS — I25.10 CAD S/P PERCUTANEOUS CORONARY ANGIOPLASTY: ICD-10-CM

## 2021-07-13 LAB
A/G RATIO: 1.7 (ref 1.1–2.2)
ALBUMIN SERPL-MCNC: 4.3 G/DL (ref 3.4–5)
ALP BLD-CCNC: 72 U/L (ref 40–129)
ALT SERPL-CCNC: 26 U/L (ref 10–40)
ANION GAP SERPL CALCULATED.3IONS-SCNC: 15 MMOL/L (ref 3–16)
AST SERPL-CCNC: 24 U/L (ref 15–37)
BILIRUB SERPL-MCNC: 0.3 MG/DL (ref 0–1)
BUN BLDV-MCNC: 10 MG/DL (ref 7–20)
CALCIUM SERPL-MCNC: 9.8 MG/DL (ref 8.3–10.6)
CHLORIDE BLD-SCNC: 97 MMOL/L (ref 99–110)
CO2: 23 MMOL/L (ref 21–32)
CREAT SERPL-MCNC: 0.7 MG/DL (ref 0.9–1.3)
GFR AFRICAN AMERICAN: >60
GFR NON-AFRICAN AMERICAN: >60
GLOBULIN: 2.6 G/DL
GLUCOSE BLD-MCNC: 151 MG/DL (ref 70–99)
GLUCOSE BLD-MCNC: 200 MG/DL (ref 70–99)
GLUCOSE BLD-MCNC: 227 MG/DL (ref 70–99)
GLUCOSE BLD-MCNC: 284 MG/DL (ref 70–99)
HCT VFR BLD CALC: 44.6 % (ref 40.5–52.5)
HEMOGLOBIN: 15.1 G/DL (ref 13.5–17.5)
INR BLD: 0.9 (ref 0.88–1.12)
LEFT VENTRICULAR EJECTION FRACTION MODE: NORMAL
LV EF: 40 %
MCH RBC QN AUTO: 29.8 PG (ref 26–34)
MCHC RBC AUTO-ENTMCNC: 33.8 G/DL (ref 31–36)
MCV RBC AUTO: 88.1 FL (ref 80–100)
PDW BLD-RTO: 15.3 % (ref 12.4–15.4)
PERFORMED ON: ABNORMAL
PLATELET # BLD: 140 K/UL (ref 135–450)
PMV BLD AUTO: 7.2 FL (ref 5–10.5)
POC ACT LR: 351 SEC
POC ACT LR: >400 SEC
POTASSIUM SERPL-SCNC: 4.1 MMOL/L (ref 3.5–5.1)
PROTHROMBIN TIME: 10.1 SEC (ref 9.9–12.7)
RBC # BLD: 5.07 M/UL (ref 4.2–5.9)
SODIUM BLD-SCNC: 135 MMOL/L (ref 136–145)
TOTAL PROTEIN: 6.9 G/DL (ref 6.4–8.2)
WBC # BLD: 4.8 K/UL (ref 4–11)

## 2021-07-13 PROCEDURE — C1874 STENT, COATED/COV W/DEL SYS: HCPCS

## 2021-07-13 PROCEDURE — 2060000000 HC ICU INTERMEDIATE R&B

## 2021-07-13 PROCEDURE — 92929 HC PRQ CARD STENT W/ANGIO ADDL: CPT

## 2021-07-13 PROCEDURE — 92928 PRQ TCAT PLMT NTRAC ST 1 LES: CPT

## 2021-07-13 PROCEDURE — 6370000000 HC RX 637 (ALT 250 FOR IP)

## 2021-07-13 PROCEDURE — 6360000002 HC RX W HCPCS

## 2021-07-13 PROCEDURE — C1769 GUIDE WIRE: HCPCS

## 2021-07-13 PROCEDURE — 6370000000 HC RX 637 (ALT 250 FOR IP): Performed by: INTERNAL MEDICINE

## 2021-07-13 PROCEDURE — 99220 PR INITIAL OBSERVATION CARE/DAY 70 MINUTES: CPT | Performed by: INTERNAL MEDICINE

## 2021-07-13 PROCEDURE — 85347 COAGULATION TIME ACTIVATED: CPT

## 2021-07-13 PROCEDURE — C1894 INTRO/SHEATH, NON-LASER: HCPCS

## 2021-07-13 PROCEDURE — 2580000003 HC RX 258: Performed by: INTERNAL MEDICINE

## 2021-07-13 PROCEDURE — 85027 COMPLETE CBC AUTOMATED: CPT

## 2021-07-13 PROCEDURE — 85610 PROTHROMBIN TIME: CPT

## 2021-07-13 PROCEDURE — 80053 COMPREHEN METABOLIC PANEL: CPT

## 2021-07-13 PROCEDURE — 99152 MOD SED SAME PHYS/QHP 5/>YRS: CPT

## 2021-07-13 PROCEDURE — 93005 ELECTROCARDIOGRAM TRACING: CPT | Performed by: INTERNAL MEDICINE

## 2021-07-13 PROCEDURE — C1760 CLOSURE DEV, VASC: HCPCS

## 2021-07-13 PROCEDURE — 99153 MOD SED SAME PHYS/QHP EA: CPT

## 2021-07-13 PROCEDURE — 2709999900 HC NON-CHARGEABLE SUPPLY

## 2021-07-13 PROCEDURE — G0378 HOSPITAL OBSERVATION PER HR: HCPCS

## 2021-07-13 PROCEDURE — G0379 DIRECT REFER HOSPITAL OBSERV: HCPCS

## 2021-07-13 PROCEDURE — 93458 L HRT ARTERY/VENTRICLE ANGIO: CPT

## 2021-07-13 PROCEDURE — C1887 CATHETER, GUIDING: HCPCS

## 2021-07-13 RX ORDER — TRAZODONE HYDROCHLORIDE 100 MG/1
100 TABLET ORAL NIGHTLY
Status: DISCONTINUED | OUTPATIENT
Start: 2021-07-13 | End: 2021-07-14 | Stop reason: HOSPADM

## 2021-07-13 RX ORDER — SODIUM CHLORIDE 0.9 % (FLUSH) 0.9 %
5-40 SYRINGE (ML) INJECTION EVERY 12 HOURS SCHEDULED
Status: DISCONTINUED | OUTPATIENT
Start: 2021-07-13 | End: 2021-07-14 | Stop reason: HOSPADM

## 2021-07-13 RX ORDER — ONDANSETRON 2 MG/ML
4 INJECTION INTRAMUSCULAR; INTRAVENOUS EVERY 6 HOURS PRN
Status: DISCONTINUED | OUTPATIENT
Start: 2021-07-13 | End: 2021-07-14 | Stop reason: HOSPADM

## 2021-07-13 RX ORDER — ALBUTEROL SULFATE 2.5 MG/3ML
2.5 SOLUTION RESPIRATORY (INHALATION) 4 TIMES DAILY
Status: DISCONTINUED | OUTPATIENT
Start: 2021-07-13 | End: 2021-07-13

## 2021-07-13 RX ORDER — SODIUM CHLORIDE 9 MG/ML
INJECTION, SOLUTION INTRAVENOUS CONTINUOUS
Status: ACTIVE | OUTPATIENT
Start: 2021-07-13 | End: 2021-07-13

## 2021-07-13 RX ORDER — DEXTROSE MONOHYDRATE 25 G/50ML
12.5 INJECTION, SOLUTION INTRAVENOUS PRN
Status: DISCONTINUED | OUTPATIENT
Start: 2021-07-13 | End: 2021-07-14 | Stop reason: HOSPADM

## 2021-07-13 RX ORDER — METOPROLOL SUCCINATE 100 MG/1
100 TABLET, EXTENDED RELEASE ORAL 2 TIMES DAILY
Status: DISCONTINUED | OUTPATIENT
Start: 2021-07-13 | End: 2021-07-14 | Stop reason: HOSPADM

## 2021-07-13 RX ORDER — POTASSIUM CHLORIDE 750 MG/1
10 TABLET, EXTENDED RELEASE ORAL
Status: DISCONTINUED | OUTPATIENT
Start: 2021-07-14 | End: 2021-07-14 | Stop reason: HOSPADM

## 2021-07-13 RX ORDER — LISINOPRIL 20 MG/1
20 TABLET ORAL DAILY
Status: DISCONTINUED | OUTPATIENT
Start: 2021-07-14 | End: 2021-07-14 | Stop reason: HOSPADM

## 2021-07-13 RX ORDER — DIVALPROEX SODIUM 500 MG/1
1000 TABLET, DELAYED RELEASE ORAL NIGHTLY
Status: DISCONTINUED | OUTPATIENT
Start: 2021-07-13 | End: 2021-07-14 | Stop reason: HOSPADM

## 2021-07-13 RX ORDER — ATROPINE SULFATE 0.4 MG/ML
0.5 AMPUL (ML) INJECTION
Status: ACTIVE | OUTPATIENT
Start: 2021-07-13 | End: 2021-07-13

## 2021-07-13 RX ORDER — ARFORMOTEROL TARTRATE 15 UG/2ML
15 SOLUTION RESPIRATORY (INHALATION) 2 TIMES DAILY
Status: DISCONTINUED | OUTPATIENT
Start: 2021-07-13 | End: 2021-07-14 | Stop reason: HOSPADM

## 2021-07-13 RX ORDER — SODIUM CHLORIDE 9 MG/ML
INJECTION, SOLUTION INTRAVENOUS CONTINUOUS
Status: DISCONTINUED | OUTPATIENT
Start: 2021-07-13 | End: 2021-07-13 | Stop reason: SDUPTHER

## 2021-07-13 RX ORDER — BUDESONIDE 0.5 MG/2ML
0.5 INHALANT ORAL 2 TIMES DAILY
Status: DISCONTINUED | OUTPATIENT
Start: 2021-07-13 | End: 2021-07-14 | Stop reason: HOSPADM

## 2021-07-13 RX ORDER — ALBUTEROL SULFATE 2.5 MG/3ML
2.5 SOLUTION RESPIRATORY (INHALATION) EVERY 4 HOURS PRN
Status: DISCONTINUED | OUTPATIENT
Start: 2021-07-13 | End: 2021-07-14 | Stop reason: HOSPADM

## 2021-07-13 RX ORDER — SODIUM CHLORIDE 0.9 % (FLUSH) 0.9 %
5-40 SYRINGE (ML) INJECTION PRN
Status: DISCONTINUED | OUTPATIENT
Start: 2021-07-13 | End: 2021-07-14 | Stop reason: HOSPADM

## 2021-07-13 RX ORDER — GABAPENTIN 100 MG/1
200 CAPSULE ORAL 3 TIMES DAILY
Status: DISCONTINUED | OUTPATIENT
Start: 2021-07-13 | End: 2021-07-14 | Stop reason: HOSPADM

## 2021-07-13 RX ORDER — SODIUM CHLORIDE 9 MG/ML
25 INJECTION, SOLUTION INTRAVENOUS PRN
Status: DISCONTINUED | OUTPATIENT
Start: 2021-07-13 | End: 2021-07-14 | Stop reason: HOSPADM

## 2021-07-13 RX ORDER — ATORVASTATIN CALCIUM 80 MG/1
80 TABLET, FILM COATED ORAL DAILY
Status: DISCONTINUED | OUTPATIENT
Start: 2021-07-13 | End: 2021-07-14 | Stop reason: HOSPADM

## 2021-07-13 RX ORDER — OMEGA-3-ACID ETHYL ESTERS 1 G/1
2 CAPSULE, LIQUID FILLED ORAL 2 TIMES DAILY
Status: DISCONTINUED | OUTPATIENT
Start: 2021-07-13 | End: 2021-07-14 | Stop reason: HOSPADM

## 2021-07-13 RX ORDER — DULOXETIN HYDROCHLORIDE 60 MG/1
60 CAPSULE, DELAYED RELEASE ORAL DAILY
Status: DISCONTINUED | OUTPATIENT
Start: 2021-07-13 | End: 2021-07-14 | Stop reason: HOSPADM

## 2021-07-13 RX ORDER — ASPIRIN 325 MG
325 TABLET ORAL ONCE
Status: COMPLETED | OUTPATIENT
Start: 2021-07-13 | End: 2021-07-13

## 2021-07-13 RX ORDER — NITROGLYCERIN 0.4 MG/1
0.4 TABLET SUBLINGUAL EVERY 5 MIN PRN
Refills: 3 | Status: DISCONTINUED | OUTPATIENT
Start: 2021-07-13 | End: 2021-07-14 | Stop reason: HOSPADM

## 2021-07-13 RX ORDER — TRAZODONE HYDROCHLORIDE 100 MG/1
100 TABLET ORAL NIGHTLY
Status: ON HOLD | COMMUNITY
End: 2022-02-07 | Stop reason: HOSPADM

## 2021-07-13 RX ORDER — ACETAMINOPHEN 325 MG/1
650 TABLET ORAL EVERY 4 HOURS PRN
Status: DISCONTINUED | OUTPATIENT
Start: 2021-07-13 | End: 2021-07-14 | Stop reason: HOSPADM

## 2021-07-13 RX ORDER — FLUTICASONE FUROATE AND VILANTEROL 200; 25 UG/1; UG/1
1 POWDER RESPIRATORY (INHALATION) DAILY
Status: DISCONTINUED | OUTPATIENT
Start: 2021-07-13 | End: 2021-07-13 | Stop reason: CLARIF

## 2021-07-13 RX ORDER — FLUTICASONE PROPIONATE 50 MCG
2 SPRAY, SUSPENSION (ML) NASAL DAILY
Status: DISCONTINUED | OUTPATIENT
Start: 2021-07-13 | End: 2021-07-14 | Stop reason: HOSPADM

## 2021-07-13 RX ORDER — NICOTINE POLACRILEX 4 MG
15 LOZENGE BUCCAL PRN
Status: DISCONTINUED | OUTPATIENT
Start: 2021-07-13 | End: 2021-07-14 | Stop reason: HOSPADM

## 2021-07-13 RX ORDER — INSULIN LISPRO 100 [IU]/ML
10 INJECTION, SOLUTION INTRAVENOUS; SUBCUTANEOUS 2 TIMES DAILY
Status: DISCONTINUED | OUTPATIENT
Start: 2021-07-13 | End: 2021-07-14 | Stop reason: HOSPADM

## 2021-07-13 RX ORDER — DEXTROSE MONOHYDRATE 50 MG/ML
100 INJECTION, SOLUTION INTRAVENOUS PRN
Status: DISCONTINUED | OUTPATIENT
Start: 2021-07-13 | End: 2021-07-14 | Stop reason: HOSPADM

## 2021-07-13 RX ORDER — DILTIAZEM HYDROCHLORIDE 180 MG/1
180 CAPSULE, COATED, EXTENDED RELEASE ORAL DAILY
Status: DISCONTINUED | OUTPATIENT
Start: 2021-07-13 | End: 2021-07-14 | Stop reason: HOSPADM

## 2021-07-13 RX ORDER — CLOPIDOGREL BISULFATE 75 MG/1
75 TABLET ORAL DAILY
Status: DISCONTINUED | OUTPATIENT
Start: 2021-07-13 | End: 2021-07-14 | Stop reason: HOSPADM

## 2021-07-13 RX ADMIN — INSULIN GLARGINE 60 UNITS: 100 INJECTION, SOLUTION SUBCUTANEOUS at 21:44

## 2021-07-13 RX ADMIN — DULOXETINE HYDROCHLORIDE 60 MG: 60 CAPSULE, DELAYED RELEASE ORAL at 13:46

## 2021-07-13 RX ADMIN — TRAZODONE HYDROCHLORIDE 100 MG: 100 TABLET ORAL at 21:45

## 2021-07-13 RX ADMIN — OMEGA-3-ACID ETHYL ESTERS 2 G: 1 CAPSULE, LIQUID FILLED ORAL at 21:45

## 2021-07-13 RX ADMIN — SODIUM CHLORIDE: 9 INJECTION, SOLUTION INTRAVENOUS at 13:45

## 2021-07-13 RX ADMIN — INSULIN LISPRO 10 UNITS: 100 INJECTION, SOLUTION INTRAVENOUS; SUBCUTANEOUS at 20:32

## 2021-07-13 RX ADMIN — GABAPENTIN 200 MG: 100 CAPSULE ORAL at 21:45

## 2021-07-13 RX ADMIN — DIVALPROEX SODIUM 1000 MG: 500 TABLET, DELAYED RELEASE ORAL at 21:45

## 2021-07-13 RX ADMIN — OMEGA-3-ACID ETHYL ESTERS 2 G: 1 CAPSULE, LIQUID FILLED ORAL at 13:46

## 2021-07-13 RX ADMIN — SODIUM CHLORIDE: 9 INJECTION, SOLUTION INTRAVENOUS at 09:20

## 2021-07-13 RX ADMIN — Medication 10 ML: at 21:55

## 2021-07-13 RX ADMIN — DILTIAZEM HYDROCHLORIDE 180 MG: 180 CAPSULE, COATED, EXTENDED RELEASE ORAL at 13:45

## 2021-07-13 RX ADMIN — ASPIRIN 325 MG ORAL TABLET 325 MG: 325 PILL ORAL at 09:45

## 2021-07-13 RX ADMIN — METOPROLOL SUCCINATE 100 MG: 100 TABLET, EXTENDED RELEASE ORAL at 17:25

## 2021-07-13 RX ADMIN — ATORVASTATIN CALCIUM 80 MG: 80 TABLET, FILM COATED ORAL at 13:45

## 2021-07-13 RX ADMIN — GABAPENTIN 200 MG: 100 CAPSULE ORAL at 13:46

## 2021-07-13 ASSESSMENT — PAIN SCALES - GENERAL
PAINLEVEL_OUTOF10: 0
PAINLEVEL_OUTOF10: 0

## 2021-07-13 NOTE — PROCEDURES
[address], [address]                            CARDIAC CATHETERIZATION    PATIENT NAME: Daron Foley                 :        1960  MED REC NO:   [id number]                          ROOM:  ACCOUNT NO:   [de-identified]                           ADMIT DATE: 2021  PROVIDER:     Richard Anderson. Umair Vargas MD    DATE OF PROCEDURE:  2021    CARDIAC CATHETERIZATION, PERCUTANEOUS CORONARY INTERVENTION, LEFT  VENTRICULOGRAPHY, AND ANGIO-SEAL PROCEDURE NOTE    INDICATION FOR PROCEDURE:  The patient had a recent episode of syncope. He has known coronary artery disease with prior defibrillator placement. There were no ICD discharges. A nuclear scan was performed which showed  inferior, inferolateral ischemia. His ejection fraction is known to be  reduced in the 40% range. Because of the findings of the nuclear scan,  the recent syncope and the known coronary artery disease, status post  stenting as for MI in the past, he presents today for invasive risk  stratification with coronary angiography. DESCRIPTION OF PROCEDURE:  The start time of the procedure was 10:28,  the stop time was 11:29. The patient was sedated per my order during  the entire procedure with a total of 3 mg of Versed and 175 mcg of  fentanyl given in divided doses by my order. We did monitor the  patient's cardiopulmonary status that includes myself during the entire  procedure. After informed consent was obtained, the patient was prepped and draped  in a sterile manner. Locally anesthetized with 1% lidocaine in the  right femoral region. A 5-[place] sheath was placed in a retrograde  manner in right femoral artery over a guidewire. Georgann Luke and pigtail  catheters were used during the diagnostic procedure. All were aspirated  and flushed prior to use.     FINDINGS:  The left main coronary artery is normal.  The left anterior  descending coronary artery has minor luminal irregularities. There is  JAMES grade 3 flow. Diagonal branches unremarkable, courses around the  apex of the left ventricle. The circumflex is a dominant artery. The  circumflex early on comes off in the AV groove and there is a stent in  the distal circumflex into the obtuse marginal branch. That obtuse  marginal continues on and forms the left posterior descending and a  large obtuse marginal branch. Just in front of the stent in the OM,  there is 80% stenosis. There is some haziness within the proximal  portion of the stent. The true left PDA and its mid distal area has an  80% stenosis. There is pre-aneurysmal dilatation of that artery. The obtuse marginal branch is large and feeds a significant portion of  the posterolateral wall of left ventricle and there was an 80% diffuse  stenosis from the bifurcation. It does not include the bifurcation,  however. The JR4 catheter engages the right coronary artery. It is a nondominant  right coronary artery. There was some dampening of pressure, but there  was no disease with two small RV branches. A pigtail catheter in the left ventricle showed a post angiogram LVEDP  of 15 mmHg. Power injection in the SANDERS projection shows inferior wall  and inferoposterior wall hypokinesia with an LV ejection fraction  estimated at 40%. The anterior wall and the apex appeared to be moving  well. There was no mitral regurgitation. There was no gradient upon  pullback from the left ventricle to the ascending aorta. The 5-Vietnamese sheath was upsized to a 6-Vietnamese sheath over the 0.035  J-wire. Injection of the sheath showed that it was located squarely in  the normal right common femoral artery. An XP 3.5 guide was used to engage the left main coronary artery. BMW  guidewire was advanced from the circumflex into the left posterior  descending coronary artery. This was beyond the 80% stenosis.   I chose  a 2.5 x 15 mm Resolute [place] drug-eluting stent. The stent was deployed  at 14 atmospheres x20 seconds. There was JAMES-3 blood flow with nearly  0% residual stenosis. The BMW guidewire was redirected into the obtuse  marginal branch beyond the diffuse 80% stenosis and I took a 2.5 x 22 mm  Resolute [place] drug-eluting stent, inflated that to 14 atmospheres x20  seconds with JAMES-3 blood flow and nearly 0% residual stenosis. Next, my attention was turned to the circumflex and the early portion of  that obtuse marginal where there was a large stent in place, that stent  proximal to it had a fairly focal 80% stenosis and there was some  narrowing within the proximal aspect of that stent. I chose a 3.5 x 18  Resolute [place] drug-eluting stent and deployed that at 16 atmospheres x25  seconds. There was JAMES-3 flow with nearly 0% residual stenosis. The  patient tolerated the procedure well and the angioplasty hardware was  removed. An Evolution Angio-Seal device was deployed. Heparin was given during  the procedure. At the conclusion of the procedure, the ACT was 350  seconds. I gave one dose of Integrilin bolus and then Plavix 600 mg. The angioplasty hardware like I said was removed and the Angio-Seal  device was deployed successfully with hemostasis achieved. CONCLUSION:  Successful stenting of the true circumflex into the obtuse  marginal branch, successful stenting of the left posterior descending  coronary artery and successful stenting of the obtuse marginal branch,  all 80% stenosis reduced to 0 with these three drug-eluting stents. JAMES-3 blood flow was present in the circumflex artery and all its  branches. The LAD is normal with minor irregularities only and the left  main is normal.  Nondominant right coronary artery. LV ejection  fraction of 40% with inferior wall and inferoposterior hypokinesia. PLAN:  Hydration, bedrest, risk factor modifications to continue.     ESTIMATED BLOOD LOSS:  Less than 25 mL.    TOTAL CONTRAST USED:  180 mL.         Josefina Cervantes MD    D: 07/13/2021 12:20:06       T: 07/13/2021 13:20:50     [email or web address]  Job#: [id number]     Doc#: [id number]    CC:

## 2021-07-13 NOTE — PLAN OF CARE
Problem: Falls - Risk of:  Goal: Will remain free from falls  Outcome: Ongoing  Patient is alert, oriented and able to follow commands. Ambulates in room independently. Gait steady. Sitting on side of bed with call light in reach. Will keep room free of clutter and continue to monitor for safety. Problem: Falls - Risk of:  Goal: Absence of physical injury  Outcome: Ongoing  Patient was received from cardiac cath lab at 1606. Alert and oriented. NSR on monitor. Right groin cath site remains unremarkable and without signs of bleeding. Will continue to monitor.

## 2021-07-13 NOTE — H&P
Subjective:      Patient ID: Zain Henry. is a 64 y.o. .     CC: Follow up of Cardiomyopathy and CAD.     HPI:   Mr. Maxime Hays had syncope and hit head  No MI at [facility]. .  Continue diuretics. Has CAD. No ICD discharges.           Allergies   Allergen Reactions    Other         VASCEPA CAUSED RASH AND ITCHING    Pcn [Penicillins] Hives         Social History               Socioeconomic History    Marital status:        Spouse name: Not on file    Number of children: Not on file    Years of education: Not on file    Highest education level: Not on file   Occupational History    Not on file   Tobacco Use    Smoking status: Former Smoker       Packs/day: 2.00       Years: 20.00       Pack years: 40.00       Types: Cigarettes       Quit date: 2001       Years since quittin.0    Smokeless tobacco: Never Used    Tobacco comment: PASSIVE SMOKER   Vaping Use    Vaping Use: Never used   Substance and Sexual Activity    Alcohol use: No       Alcohol/week: 0.0 standard drinks    Drug use: No    Sexual activity: Never   Other Topics Concern    Not on file   Social History Narrative    Not on file      Social Determinants of Health          Financial Resource Strain:     Difficulty of Paying Living Expenses:    Food Insecurity:     Worried About Running Out of Food in the Last Year:     [address] in the Last Year:    Transportation Needs:     Lack of Transportation (Medical):      Lack of Transportation (Non-Medical):    Physical Activity:     Days of Exercise per Week:     Minutes of Exercise per Session:    Stress:     Feeling of Stress :    Social Connections:     Frequency of Communication with Friends and Family:     Frequency of Social Gatherings with Friends and Family:     Attends Taoist Services:     Active Member of Clubs or Organizations:     Attends Club or Organization Meetings:     Marital Status:    Intimate Partner Violence:     Fear of Current or Ex-Partner:     Emotionally Abused:     Physically Abused:     Sexually Abused:             Patient has a family history includes Cancer in his father; Diabetes in his maternal grandmother; Heart Disease in his mother; Heart Failure in his maternal uncle and mother; Hypertension in his maternal uncle.     Patient  has a past medical history of Arthritis, Asthma, CAD (coronary artery disease), COPD (chronic obstructive pulmonary disease) ([address] Emphysema of lung ([address] Fatty liver, GERD (gastroesophageal reflux disease), Hyperlipidemia, Hypertension, Medical history reviewed with no changes, MI, old, Sleep apnea, and Type II or unspecified type diabetes mellitus without mention of complication, not stated as uncontrolled.            Vitals  Weight: 244 lb 3.2 oz (110.8 kg)  Blood Pressure: BP: (128)/(84)    Pulse: 78         Review of Systems   Constitutional: Negative. HENT: Negative. Eyes: Negative. Respiratory: Negative. Cardiovascular: Negative. Gastrointestinal: Negative. Genitourinary: Negative. Musculoskeletal: Negative. Skin: Negative. Neurological: Negative. Hematological: Negative. Psychiatric/Behavioral: Negative.          Objective:   Physical Exam   Constitutional: He is oriented to person, place, and time. Vital signs are normal. He appears well-nourished. HENT:   Head: Normocephalic and atraumatic. Mouth/Throat: Oropharynx is clear and moist and mucous membranes are normal.   Eyes: Conjunctivae and EOM are normal. Pupils are equal, round, and reactive to light. Neck: Normal range of motion. Neck supple. No JVD present. No tracheal deviation present. No thyromegaly present. Cardiovascular: Normal rate, regular rhythm, S1 normal, S2 normal, normal heart sounds, intact distal pulses and normal pulses. PMI is not displaced. Exam reveals no gallop and no friction rub. No murmur heard.   Pulses:       Carotid pulses are 2+ on the right side, and 2+ on the left side. Radial pulses are 2+ on the right side, and 2+ on the left side. Pulmonary/Chest: Effort normal and breath sounds normal.   Abdominal: Normal appearance and bowel sounds are normal.   Musculoskeletal: Normal range of motion. Lymphadenopathy:     He has no cervical adenopathy. Neurological: He is alert and oriented to person, place, and time. He has normal strength. No cranial nerve deficit. Coordination normal.   Skin: Skin is warm, dry and intact. Psychiatric: He has a normal mood and affect. His speech is normal and behavior is normal.         Assessment:     Diagnosis Orders   1. HTN (hypertension), benign  Stress test, lexiscan   2. Vasovagal syncope  Stress test, lexiscan   3. Ischemic cardiomyopathy  Stress test, lexiscan   4. Dyslipidemia  Stress test, lexiscan   5. Obesity, Class III, BMI 40-49.9 (morbid obesity) ([facility]            Plan:       STable ischemic CMP. Patient continues to take metoprolol without any ICD discharges. Gets meds refilled weekly packs by [facility]. Edema:  Controlled. Continue lasix 20 mg Mon thurs and K 10 meq mon and thurs. HLP:  Check labs. HTN:  Controlled. LVEF 44% with no ischemia and inferolateral infarction. No signs of CHF. NYHA 1 and CCS 1. He got his disability coverage after 7 years.   Syncope:  Check lexiscan MPI in a week or so.      Addendum:    Syncope workup showed ischemia on myoview in the inferolateral and mid inferior segments.

## 2021-07-13 NOTE — PLAN OF CARE
Brief Pre-Op Note/Sedation Assessment      Chanoperry Quiroga   1960  Room/bed info not found      [id number]  10:31 AM    Planned Procedure: Cardiac Catheterization Procedure    Post Procedure Plan: Return to same level of care    Consent: I have discussed with the patient and/or the patient representative the indication, alternatives, and the possible risks and/or complications of the planned procedure and the anesthesia methods. The patient and/or patient representative appear to understand and agree to proceed. Chief Complaint: Anginal Equivalent      Indications for Cath Procedure:  Suspected CAD, LV Dysfunction and Syncope  Anginal Classification within 2 weeks:  No symptoms  NYHA Heart Failure Class within 2 weeks: No symptoms  Is Cath Lab Visit Valve-related?: No  Surgical Risk: Intermediate  Functional Type: < 4 METS    Anti- Anginal Meds within 2 weeks:   Yes: Beta Blockers    Stress or Imaging Studies Performed (within 6 months):  Stress Test with SPECT Result: Positive:  inferior and inferolateral Risk/Extent of Ischemia:  Intermediate     Vital Signs:  Temp 98.4 °F (36.9 °C) (Oral)   Ht 5' 4\" (1.626 m)   Wt 244 lb (110.7 kg)   BMI 41.88 kg/m²     Allergies:   Allergies   Allergen Reactions    Other      VASCEPA CAUSED RASH AND ITCHING    Pcn [Penicillins] Hives       Past Medical History:  Past Medical History:   Diagnosis Date    Arthritis     Asthma     CAD (coronary artery disease)     COPD [facility]chronic obstructive pulmonary disease) [facility])     Dr. Wallace Owens at [facility] told the patient that he did not have COPD, just asthma    Emphysema of lung ([facility] [address]     Fatty liver     GERD (gastroesophageal reflux disease)     Hyperlipidemia     Hypertension     Medical history reviewed with no changes     MI, old     Sleep apnea     pt wears cpap at night. states does not have cpap    Type II or unspecified type diabetes mellitus without mention of complication, not stated as uncontrolled          Surgical History:  Past Surgical History:   Procedure Laterality Date    CARDIAC PACEMAKER PLACEMENT  2011    NOT MRI COMPATIABLE OLD LEADS [facility].  pace maker difib   [address] Bilateral 2013    CATARACT REMOVAL WITH IMPLANT Left 2/28/14    COLONOSCOPY      CORONARY ANGIOPLASTY WITH STENT PLACEMENT  2001,2008    CYST REMOVAL  1982    coccyx area    DIAGNOSTIC CARDIAC CATH LAB PROCEDURE      ENDOSCOPY, COLON, DIAGNOSTIC      ERCP  9/15/2013    ERCP  10/11/13    WITH STENT REMOVAL    FOOT SURGERY Right 07/09/2018     REPAIR CALCANEAL SPUR, REPAIR OF ACHILLES TENDON RIGHT FOOT    NERVE SURGERY Bilateral 12/1/2020    BILATERAL LUMBAR THREE LUMBAR FOUR LUMBAR FIVE DORSAL RAMUS  RADIOFREQUENCY ABLATION SITE CONFIRMED BY FLUOROSCOPY performed by Kyle Ahuja MD at [address]      back stimulator in lower back    PACEMAKER PLACEMENT      ICD    PAIN MANAGEMENT PROCEDURE Bilateral 5/26/2020    BILATERAL LUMBAR THREE, LUMBAR FOUR, LUMBAR FIVE DORSAL RAMUS MEDIAL BRANCH BLOCK SITE CONFIRMED BY FLUOROSCOPY performed by Kyle Ahuja MD at [facility] Bilateral 6/9/2020    BILATERAL LUMBAR THREE, LUMBAR FOUR, LUMBAR FIVE DORSAL RAMUS MEDIAL BRANCH BLOCK SITE CONFIRMED BY FLUOROSCOPY performed by Kyle Ahuja MD at [facility] Left 1/12/2021    LEFT SACROILIAC JOINT INJECTION SITE CONFIRMED BY FLUOROSCOPY performed by Kyle Ahuja MD at [facility] Right 10/31/2018    GASTROCNEMIUS RECESSION RIGHT FOOT performed by Lorene Choi DPM at [facility] Right 10/31/2018    REMOVAL OF CALCANEAL SPUR, ACHILLES TENDON REPAIR RIGHT LOWER EXTREMITY performed by Lorene Choi DPM at [facility]  7/18/13    and colonoscopy with biopsies taken    UPPER GASTROINTESTINAL ENDOSCOPY  8/23/13    EUS    UPPER GASTROINTESTINAL ENDOSCOPY  9/15/2013         Medications:  Current Outpatient Medications   Medication Sig Dispense Refill    traZODone (DESYREL) 100 MG tablet Take 100 mg by mouth nightly      Dulaglutide (TRULICITY) 8.58 /3.3JY SOPN Inject 0.75 mg into the skin once a week      furosemide (LASIX) 20 MG tablet TAKE ONE TABLET  ON Monday and Thursday 90 tablet 2    potassium chloride (KLOR-CON M) 20 MEQ extended release tablet TAKE ONE TABLET BY MOUTH WITH LASIX ON MONDAY AND THURSDAY 24 tablet 3    insulin lispro (HUMALOG) 100 UNIT/ML injection vial Inject 10 Units into the skin 2 times daily Indications: 10 units in AM, 10 units at dinner      Fexofenadine HCl (ALLEGRA PO) Take by mouth      Esomeprazole Magnesium (NEXIUM PO) Take 40 mg by mouth       JARDIANCE 25 MG tablet Take 25 mg by mouth daily       clopidogrel (PLAVIX) 75 MG tablet TAKE ONE TABLET BY MOUTH DAILY 30 tablet 0    famotidine (PEPCID) 20 MG tablet TAKE 1 TABLET BY MOUTH 2 TIMES DAILY 60 tablet 0    gabapentin (NEURONTIN) 100 MG capsule TAKE 2 CAPSULES BY MOUTH 3 TIMES DAILY 180 capsule 0    fluticasone (FLONASE) 50 MCG/ACT nasal spray USE 1 SPRAY IN EACH NOSTRIL EVERY DAY (Patient taking differently: 2 sprays daily ) 16 g 0    lisinopril (PRINIVIL;ZESTRIL) 20 MG tablet TAKE 1 TABLET BY MOUTH DAILY 30 tablet 11    atorvastatin (LIPITOR) 80 MG tablet TAKE ONE TABLET BY MOUTH DAILY 30 tablet 11    aspirin 325 MG EC tablet TAKE ONE TABLET BY MOUTH DAILY 30 tablet 11    diltiazem (CARDIZEM CD) 180 MG extended release capsule TAKE 1 CAPSULE DAILY 30 capsule 11    metoprolol succinate (TOPROL XL) 200 MG extended release tablet TAKE ONE-HALF TABLET TWICE A DAY 30 tablet 11    DULoxetine (CYMBALTA) 60 MG extended release capsule Take 60 mg by mouth daily      divalproex (DEPAKOTE) 500 MG DR tablet Take 1,000 mg by mouth nightly      omega-3 acid ethyl esters (LOVAZA) 1 g capsule Take 2 capsules by mouth 2 times daily 360 capsule 3    Omega-3 1000 MG CAPS Take 2 capsules by mouth 4 times daily      PROAIR  (90 Base) MCG/ACT inhaler Inhale 2 puffs into the lungs every 4 hours as needed      nitroGLYCERIN (NITROLINGUAL) 0.4 MG/SPRAY 0.4 mg spray Place 1 spray under the tongue every 5 minutes as needed for Chest pain 2 Bottle 3    insulin detemir (LEVEMIR) 100 UNIT/ML injection vial Inject 60 Units into the skin nightly       Fluticasone Furoate-Vilanterol (BREO ELLIPTA IN) Inhale into the lungs daily       metFORMIN (GLUCOPHAGE) 1000 MG tablet TAKE ONE TABLET BY MOUTH 2 TIMES DAILY WITH MORNING AND EVENING MEALS 60 tablet 0    TRUE METRIX BLOOD GLUCOSE TEST strip CHECK SUGARS TWICE A DAY AS DIRECTED, DX: E11.9 100 each 5    UNIFINE PENTIPS 31G X 8 MM MISC USE AS DIRECTED FOR INSULIN .00 100 each 5    TRUEPLUS LANCETS 30G MISC CHECK BLOOD SUGAR TWICE A DAY DX E11.9 100 each 11     Current Facility-Administered Medications   Medication Dose Route Frequency Provider Last Rate Last Admin    0.9 % sodium chloride infusion   Intravenous Continuous Chris Demarco MD        0.9 % sodium chloride infusion  25 mL Intravenous PRN Chris Demarco MD        aspirin tablet 325 mg  325 mg Oral Once Chris Demarco MD        sodium chloride flush 0.9 % injection 5-40 mL  5-40 mL Intravenous 2 times per day Chris Demarco MD        sodium chloride flush 0.9 % injection 5-40 mL  5-40 mL Intravenous PRN Chris Demarco MD               Pre-Sedation:    Pre-Sedation Documentation and Exam:  I have personally completed a history, physical exam & review of systems for this patient (see notes). Prior History of Anesthesia Complications:   none    Modified Mallampati:  II (soft palate, uvula, fauces visible)    ASA Classification:  Class 2 - A normal healthy patient with mild systemic disease      Shekhar Scale:   Activity:  2 - Able to move 4 extremities voluntarily on command  Respiration:

## 2021-07-13 NOTE — RT PROTOCOL NOTE
RT Nebulizer Bronchodilator Protocol Note    There is a bronchodilator order in the chart from a provider indicating to follow the RT Bronchodilator Protocol and there is an Initiate RT Bronchodilator Protocol order as well (see protocol at bottom of note). The findings from the last RT Protocol Assessment were as follows:  Smoking: <15 Pack years  Surgical Status: No surgery  Xray: X-ray not available  Respiratory Pattern: RR 12-20  Mental Status: Alert and Oriented  Breath Sounds: Clear  Cough: Strong, spontaneous, non-productive  Activity Level: Walking unassisted  Oxygen Requirement: Room Air - 2LNC/28% or home setting  Indication for Bronchodilator Therapy: On home bronchodilators  Bronchodilator Assessment Score: 1    Aerosolized bronchodilator medication orders have been revised according to the RT Bronchodilator Protocol. RT Bronchodilator Protocol:    Respiratory Therapist to perform RT Therapy Protocol Assessment then follow the protocol. No Indications  adjust the frequency to every 6 hours PRN wheezing or bronchospasm, if no treatments needed after 48 hours then discontinue using Per Protocol order mode. If indication present, adjust the RT bronchodilator orders based on the Bronchodilator Assessment Score as follows:    0-6  enter or revise RT Bronchodilator order to Albuterol Nebulizer order with frequency of every 2 hours PRN for wheezing or increased work of breathing using Per Protocol order mode. Repeat RT Therapy Protocol Assessment as needed. 7-10 - discontinue any other Inpatient aerosolized bronchodilator medication orders and enter or revise two Albuterol Nebulizer orders, one with BID frequency and one with frequency of every 2 hours PRN wheezing or increased work of breathing using Per Protocol order mode. Repeat RT Therapy Protocol Assessment with second treatment then BID and as needed.       11-13 - discontinue any other Inpatient aerosolized bronchodilator medication orders and enter [employer] order with QID frequency and an Albuterol Nebulizer order with frequency of every 2 hours PRN wheezing or increased work of breathing using Per Protocol order mode. Repeat RT Therapy Protocol Assessment with second treatment then QID and as needed. Greater than 13 - discontinue any other Inpatient aerosolized bronchodilator medication orders and enter a DuoNeb Nebulizer order with every 4 hours frequency and an Albuterol Nebulizer order with frequency of every 2 hours PRN wheezing or increased work of breathing using Per Protocol order mode. Repeat RT Therapy Protocol Assessment with second treatment then every 4 hours and as needed. RT to enter RT Home Evaluation for COPD & MDI Assessment order using Per Protocol order mode.     Electronically signed by Maxx Cabrera RCP on 7/13/2021 at 6:36 PM

## 2021-07-14 VITALS
RESPIRATION RATE: 16 BRPM | HEART RATE: 68 BPM | SYSTOLIC BLOOD PRESSURE: 140 MMHG | BODY MASS INDEX: 40.99 KG/M2 | TEMPERATURE: 98 F | OXYGEN SATURATION: 97 % | HEIGHT: 64 IN | DIASTOLIC BLOOD PRESSURE: 72 MMHG | WEIGHT: 240.08 LBS

## 2021-07-14 LAB
GLUCOSE BLD-MCNC: 157 MG/DL (ref 70–99)
GLUCOSE BLD-MCNC: 232 MG/DL (ref 70–99)
HCT VFR BLD CALC: 42.5 % (ref 40.5–52.5)
HEMOGLOBIN: 14.7 G/DL (ref 13.5–17.5)
MCH RBC QN AUTO: 29.9 PG (ref 26–34)
MCHC RBC AUTO-ENTMCNC: 34.6 G/DL (ref 31–36)
MCV RBC AUTO: 86.5 FL (ref 80–100)
PDW BLD-RTO: 15.3 % (ref 12.4–15.4)
PERFORMED ON: ABNORMAL
PERFORMED ON: ABNORMAL
PLATELET # BLD: 121 K/UL (ref 135–450)
PMV BLD AUTO: 7 FL (ref 5–10.5)
RBC # BLD: 4.91 M/UL (ref 4.2–5.9)
WBC # BLD: 4.8 K/UL (ref 4–11)

## 2021-07-14 PROCEDURE — G0378 HOSPITAL OBSERVATION PER HR: HCPCS

## 2021-07-14 PROCEDURE — 36415 COLL VENOUS BLD VENIPUNCTURE: CPT

## 2021-07-14 PROCEDURE — 2580000003 HC RX 258: Performed by: INTERNAL MEDICINE

## 2021-07-14 PROCEDURE — 83036 HEMOGLOBIN GLYCOSYLATED A1C: CPT

## 2021-07-14 PROCEDURE — 99217 PR OBSERVATION CARE DISCHARGE MANAGEMENT: CPT | Performed by: INTERNAL MEDICINE

## 2021-07-14 PROCEDURE — 6370000000 HC RX 637 (ALT 250 FOR IP): Performed by: INTERNAL MEDICINE

## 2021-07-14 PROCEDURE — 85027 COMPLETE CBC AUTOMATED: CPT

## 2021-07-14 RX ORDER — ALBUTEROL SULFATE 2.5 MG/3ML
2.5 SOLUTION RESPIRATORY (INHALATION) EVERY 4 HOURS PRN
Qty: 120 EACH | Refills: 3 | Status: ON HOLD | OUTPATIENT
Start: 2021-07-14 | End: 2022-02-07 | Stop reason: HOSPADM

## 2021-07-14 RX ORDER — CLOPIDOGREL BISULFATE 75 MG/1
75 TABLET ORAL DAILY
Qty: 90 TABLET | Refills: 1 | Status: SHIPPED | OUTPATIENT
Start: 2021-07-14 | End: 2022-01-12

## 2021-07-14 RX ADMIN — CLOPIDOGREL BISULFATE 75 MG: 75 TABLET ORAL at 08:14

## 2021-07-14 RX ADMIN — GABAPENTIN 200 MG: 100 CAPSULE ORAL at 08:14

## 2021-07-14 RX ADMIN — ASPIRIN 325 MG: 325 TABLET, COATED ORAL at 08:13

## 2021-07-14 RX ADMIN — LISINOPRIL 20 MG: 20 TABLET ORAL at 08:14

## 2021-07-14 RX ADMIN — Medication 10 ML: at 08:15

## 2021-07-14 RX ADMIN — GABAPENTIN 200 MG: 100 CAPSULE ORAL at 15:21

## 2021-07-14 RX ADMIN — DILTIAZEM HYDROCHLORIDE 180 MG: 180 CAPSULE, COATED, EXTENDED RELEASE ORAL at 08:14

## 2021-07-14 RX ADMIN — POTASSIUM CHLORIDE 10 MEQ: 750 TABLET, EXTENDED RELEASE ORAL at 08:15

## 2021-07-14 RX ADMIN — METOPROLOL SUCCINATE 100 MG: 100 TABLET, EXTENDED RELEASE ORAL at 08:14

## 2021-07-14 RX ADMIN — DULOXETINE HYDROCHLORIDE 60 MG: 60 CAPSULE, DELAYED RELEASE ORAL at 08:14

## 2021-07-14 RX ADMIN — OMEGA-3-ACID ETHYL ESTERS 2 G: 1 CAPSULE, LIQUID FILLED ORAL at 15:20

## 2021-07-14 RX ADMIN — ATORVASTATIN CALCIUM 80 MG: 80 TABLET, FILM COATED ORAL at 08:14

## 2021-07-14 ASSESSMENT — PAIN SCALES - GENERAL
PAINLEVEL_OUTOF10: 0

## 2021-07-14 NOTE — PLAN OF CARE
Problem: Falls - Risk of:  Goal: Will remain free from falls  Description: Will remain free from falls  7/14/2021 0618 by Ping Wild RN  Outcome: Ongoing  Post angio bed rest patient was made up adlib by day shift. Patient is stable on his feet and has called out if needing help. No falls this shift. Problem: Cardiac Output - Decreased:  Goal: Hemodynamic stability will improve  Description: Hemodynamic stability will improve  7/14/2021 0618 by Ping Wild RN  Outcome: Ongoing  Patients vitals have been stable. Groin site had no significant changes or concerns. Checking Q2 for any issues.

## 2021-07-14 NOTE — DISCHARGE SUMMARY
prescribed as long as well as his diabetes and COPD medications and he can see me back in the office in 2 to 3 weeks

## 2021-07-14 NOTE — CARE COORDINATION
Case Management Assessment            Discharge Note                    Date / Time of Note: 7/14/2021 11:52 AM                  Discharge Note Completed by: Marcin Brown RN    Patient Name: Dani Francis YOB: 1960  Diagnosis: CAD S/P percutaneous coronary angioplasty [I25.10, Z98.61]   Date / Time: 7/13/2021  3:59 PM    Current PCP: Bernice Aldridge PA-C  Clinic patient: No    Hospitalization in the last 30 days: No    Advance Directives:  Code Status: Full Code  [place] DNR form completed and on chart: Not Indicated    Financial:  Payor: Cayla Ba / Plan: Dale Prasad ESSENTIAL/PLUS / Product Type: *No Product type* /      Pharmacy:    [address] [place] [address] [phone] - F [phone]  [address] [place] [address]  Phone: [phone] Fax: [phone], [facility] [phone] - f [phone]  [facility] [address]  [address] [facility]  [address] [place] [address]  Phone: [phone] Fax: [phone]      Assistance purchasing medications?: Potential Assistance Purchasing Medications: No  Assistance provided by Case Management: None at this time    Does patient want to participate in local refill/ meds to beds program?: No    Meds To Beds General Rules:  1. Can ONLY be done Monday- Friday between 8:30am-5pm  2. Prescription(s) must be in pharmacy by 3pm to be filled same day  3. Copy of patient's insurance/ prescription drug card and patient face sheet must be sent along with the prescription(s)  4. Cost of Rx cannot be added to hospital bill. If financial assistance is needed, please contact unit  or ;  or  CANNOT provide pharmacy voucher for patients co-pays  5.  Patients can then  the prescription on their way out of the hospital at discharge, or pharmacy can deliver to the bedside if staff is available. (payment due at time of pick-up or delivery - cash, check, or card accepted)     Able to afford home medications/ co-pay costs: Yes    ADLS:  Current PT AM-PAC Score:   /24  Current OT AM-PAC Score:   /24      DISCHARGE Disposition: Home- No Services Needed    LOC at discharge: Not Applicable  DELTA Completed: Not Indicated    Notification completed in HENS/PAS?:  Not Applicable    IMM Completed:   Not Indicated    Transportation:  Transportation PLAN for discharge: friend   Mode of Transport: [address]:  [address] ordered at discharge: Not [address]: Not Applicable  Orders faxed: No    Durable Medical Equipment:  DME Provider: n/a  Equipment obtained during hospitalization: [address] and Respiratory Equipment:  Oxygen needed at discharge?: Not [address]: Not Applicable  Portable tank available for discharge?: Not Indicated    Dialysis:  Dialysis patient: No    Dialysis Center:  Not Applicable    Hospice Services:  Location: Not Applicable  Agency: Not Applicable    Consents signed: Not Indicated    Referrals made at [facility] for outpatient continued care:  Not Applicable    Additional CM Notes: Patient is from home alone, independent pta. No CM needs at this time. A friend will transport home. The Plan for Transition of Care is related to the following treatment goals of CAD S/P percutaneous coronary angioplasty [I25.10, Z98.61]    The Patient and/or patient representative Sandy Pérez and his family were provided with a choice of provider and agrees with the discharge plan Yes    Freedom of choice list was provided with basic dialogue that supports the patient's individualized plan of care/goals and shares the quality data associated with the providers.  Yes    Care Transitions patient: No    Toni Ray RN  [facility] [employer]  Case Management Department  Ph: [phone]  Fax: [phone]

## 2021-07-15 LAB
EKG ATRIAL RATE: 72 BPM
EKG DIAGNOSIS: NORMAL
EKG P AXIS: 39 DEGREES
EKG P-R INTERVAL: 190 MS
EKG Q-T INTERVAL: 386 MS
EKG QRS DURATION: 98 MS
EKG QTC CALCULATION (BAZETT): 422 MS
EKG R AXIS: 17 DEGREES
EKG T AXIS: 90 DEGREES
EKG VENTRICULAR RATE: 72 BPM
ESTIMATED AVERAGE GLUCOSE: 211.6 MG/DL
HBA1C MFR BLD: 9 %

## 2021-07-15 PROCEDURE — 93010 ELECTROCARDIOGRAM REPORT: CPT | Performed by: INTERNAL MEDICINE

## 2021-07-29 ENCOUNTER — TELEPHONE (OUTPATIENT)
Dept: GASTROENTEROLOGY | Age: 61
End: 2021-07-29

## 2021-07-29 NOTE — TELEPHONE ENCOUNTER
NP to establish w Dr Sudheer Pelletier in [facility]   Stated he has a complicated hx and prev patient of Janet Leiva.  Faxed records request today to [phone]

## 2021-09-13 ENCOUNTER — OFFICE VISIT (OUTPATIENT)
Dept: CARDIOLOGY CLINIC | Age: 61
End: 2021-09-13
Payer: MEDICARE

## 2021-09-13 VITALS
HEART RATE: 82 BPM | WEIGHT: 239 LBS | BODY MASS INDEX: 41.02 KG/M2 | DIASTOLIC BLOOD PRESSURE: 76 MMHG | SYSTOLIC BLOOD PRESSURE: 128 MMHG

## 2021-09-13 DIAGNOSIS — I25.10 CORONARY ARTERY DISEASE INVOLVING NATIVE CORONARY ARTERY OF NATIVE HEART WITHOUT ANGINA PECTORIS: Primary | ICD-10-CM

## 2021-09-13 DIAGNOSIS — I47.20 VENTRICULAR TACHYCARDIA: ICD-10-CM

## 2021-09-13 DIAGNOSIS — E78.5 HYPERLIPIDEMIA, UNSPECIFIED HYPERLIPIDEMIA TYPE: ICD-10-CM

## 2021-09-13 DIAGNOSIS — I10 HTN (HYPERTENSION), BENIGN: ICD-10-CM

## 2021-09-13 PROCEDURE — 99214 OFFICE O/P EST MOD 30 MIN: CPT | Performed by: INTERNAL MEDICINE

## 2021-09-13 NOTE — PROGRESS NOTES
Subjective:      Patient ID: Lennox Duhamel. is a 64 y.o. . CC:  Follow up of Cardiomyopathy and CAD. HPI:   Mr. Eliana Bethea had syncope and hit head  No MI at [facility]. .  Continue diuretics. Has CAD. No ICD discharges. Allergies   Allergen Reactions    Other      VASCEPA CAUSED RASH AND ITCHING    Pcn [Penicillins] Hives        Social History     Socioeconomic History    Marital status:      Spouse name: Not on file    Number of children: Not on file    Years of education: Not on file    Highest education level: Not on file   Occupational History    Not on file   Tobacco Use    Smoking status: Former Smoker     Packs/day: 2.00     Years: 20.00     Pack years: 40.00     Types: Cigarettes     Quit date: 2001     Years since quittin.3    Smokeless tobacco: Never Used    Tobacco comment: PASSIVE SMOKER   Vaping Use    Vaping Use: Never used   Substance and Sexual Activity    Alcohol use: No     Alcohol/week: 0.0 standard drinks    Drug use: No    Sexual activity: Never   Other Topics Concern    Not on file   Social History Narrative    Not on file     Social Determinants of Health     Financial Resource Strain:     Difficulty of Paying Living Expenses:    Food Insecurity:     Worried About [address] in the Last Year:     [address] [facility] [address] in the Last Year:    Transportation Needs:     Lack of Transportation (Medical):      Lack of Transportation (Non-Medical):    Physical Activity:     Days of Exercise per Week:     Minutes of Exercise per Session:    Stress:     Feeling of Stress :    Social Connections:     Frequency of Communication with Friends and Family:     Frequency of Social Gatherings with Friends and Family:     Attends [employer] Services:     Active Member of Clubs or Organizations:     Attends Club or Organization Meetings:     Marital Status:    Intimate Partner Violence:     Fear of Current or Ex-Partner:     Emotionally Abused:     Physically Abused:     Sexually Abused:         Patient has a family history includes Cancer in his father; Diabetes in his maternal grandmother; Heart Disease in his mother; Heart Failure in his maternal uncle and mother; Hypertension in his maternal uncle. Patient  has a past medical history of Arthritis, Asthma, CAD (coronary artery disease), COPD (chronic obstructive pulmonary disease) ([address] Emphysema of lung ([address] Fatty liver, GERD (gastroesophageal reflux disease), Hyperlipidemia, Hypertension, Medical history reviewed with no changes, MI, old, Sleep apnea, and Type II or unspecified type diabetes mellitus without mention of complication, not stated as uncontrolled. Vitals  Weight: 239 lb (108.4 kg)  Blood Pressure: BP: (128)/(76)    Pulse: 82       Review of Systems   Constitutional: Negative. HENT: Negative. Eyes: Negative. Respiratory: Negative. Cardiovascular: Negative. Gastrointestinal: Negative. Genitourinary: Negative. Musculoskeletal: Negative. Skin: Negative. Neurological: Negative. Hematological: Negative. Psychiatric/Behavioral: Negative. Objective:   Physical Exam   Constitutional: He is oriented to person, place, and time. Vital signs are normal. He appears well-nourished. HENT:   Head: Normocephalic and atraumatic. Mouth/Throat: Oropharynx is clear and moist and mucous membranes are normal.   Eyes: Conjunctivae and EOM are normal. Pupils are equal, round, and reactive to light. Neck: Normal range of motion. Neck supple. No JVD present. No tracheal deviation present. No thyromegaly present. Cardiovascular: Normal rate, regular rhythm, S1 normal, S2 normal, normal heart sounds, intact distal pulses and normal pulses. PMI is not displaced. Exam reveals no gallop and no friction rub. No murmur heard. Pulses:       Carotid pulses are 2+ on the right side, and 2+ on the left side.        Radial pulses are 2+ on

## 2021-09-29 ENCOUNTER — TELEPHONE (OUTPATIENT)
Dept: CARDIOLOGY CLINIC | Age: 61
End: 2021-09-29

## 2021-09-29 NOTE — TELEPHONE ENCOUNTER
Pt had an apt with CHUCK today and he had to cancel due to lack of transportation. Pt wanted to reschedule but there is nothing for the rest of the year. Can pt wait until we get the 2022 calendar or does he need to be worked in sometime this year. Please advise.

## 2021-10-11 ENCOUNTER — NURSE ONLY (OUTPATIENT)
Dept: CARDIOLOGY CLINIC | Age: 61
End: 2021-10-11
Payer: MEDICARE

## 2021-10-11 DIAGNOSIS — I47.20 VENTRICULAR TACHYCARDIA: ICD-10-CM

## 2021-10-11 DIAGNOSIS — Z95.810 AUTOMATIC IMPLANTABLE CARDIOVERTER-DEFIBRILLATOR IN SITU: ICD-10-CM

## 2021-10-11 DIAGNOSIS — I25.5 ISCHEMIC CARDIOMYOPATHY: ICD-10-CM

## 2021-10-11 PROCEDURE — 93296 REM INTERROG EVL PM/IDS: CPT | Performed by: INTERNAL MEDICINE

## 2021-10-11 PROCEDURE — 93295 DEV INTERROG REMOTE 1/2/MLT: CPT | Performed by: INTERNAL MEDICINE

## 2021-10-11 NOTE — PROGRESS NOTES
Remote transmission received for patients single chamber ICD. Transmission shows normal sensing and pacing function. EP physician will review. See interrogation under the cardiology tab in the [address] [place] [address] [address] field for more details. Will continue to monitor remotely. 1 NSVT (cardizem cd, toprol xl)). Corvue is within normal limits.

## 2021-10-18 ENCOUNTER — TELEPHONE (OUTPATIENT)
Dept: CARDIOLOGY CLINIC | Age: 61
End: 2021-10-18

## 2021-10-18 NOTE — TELEPHONE ENCOUNTER
Attempted to call patient - no VM set up. Patient needs in office device interrogation for programming changes per JMB from last remote. Walt Hardin MD   10/14/2021  3:43 PM EDT       VT episode not consistently sensed. Lets change VT detection to 380 msec. Lot #: [id number] Lot #: [id number]

## 2021-10-20 ENCOUNTER — NURSE ONLY (OUTPATIENT)
Dept: CARDIOLOGY CLINIC | Age: 61
End: 2021-10-20
Payer: MEDICARE

## 2021-10-20 DIAGNOSIS — G45.1 TIA INVOLVING LEFT INTERNAL CAROTID ARTERY: ICD-10-CM

## 2021-10-20 DIAGNOSIS — Z95.810 ICD (IMPLANTABLE CARDIOVERTER-DEFIBRILLATOR) IN PLACE: ICD-10-CM

## 2021-10-20 DIAGNOSIS — I47.20 VENTRICULAR TACHYCARDIA: ICD-10-CM

## 2021-10-20 PROCEDURE — 93282 PRGRMG EVAL IMPLANTABLE DFB: CPT | Performed by: INTERNAL MEDICINE

## 2021-10-20 NOTE — PROGRESS NOTES
Patient presents to the device clinic today for a programming evaluation for his defibrillator. Patient has a history of VT, TIA, and ICM. Takes Plavix, Cardizem, and Toprol XL. Last device interrogation was on 10/11. Since then, no arrhythmias recorded.  0.0%    All sensing and pacing parameters are within normal range. VT detection changed to 380 ms per JMB's request.    Patient education was provided about device functionality, in home monitoring, and any other patient questions and/or concerns were addressed. Patient voices understanding. Please see interrogation for more detail. Patient will follow up in 3 months in office or remotely. See Paceart report under the Cardiology tab.

## 2021-11-09 ENCOUNTER — INITIAL CONSULT (OUTPATIENT)
Dept: GASTROENTEROLOGY | Age: 61
End: 2021-11-09

## 2021-11-09 VITALS
HEART RATE: 84 BPM | DIASTOLIC BLOOD PRESSURE: 53 MMHG | WEIGHT: 243 LBS | BODY MASS INDEX: 41.48 KG/M2 | SYSTOLIC BLOOD PRESSURE: 194 MMHG | HEIGHT: 64 IN

## 2021-11-09 DIAGNOSIS — Z86.010 HX OF ADENOMATOUS COLONIC POLYPS: Primary | ICD-10-CM

## 2021-11-09 PROCEDURE — 99999 PR OFFICE/OUTPT VISIT,PROCEDURE ONLY: CPT | Performed by: INTERNAL MEDICINE

## 2021-11-09 RX ORDER — POLYETHYLENE GLYCOL 3350 17 G/17G
238 POWDER ORAL DAILY
Qty: 255 G | Refills: 0 | Status: SHIPPED | OUTPATIENT
Start: 2021-11-09 | End: 2022-08-23

## 2021-11-09 NOTE — PATIENT INSTRUCTIONS
Patient Education        Colon Polyps: Care Instructions  Your Care Instructions     Colon polyps are growths in the colon or the rectum. The cause of most colon polyps is not known, and most people who get them do not have any problems. But a certain kind can turn into cancer. For this reason, regular testing for colon polyps is important for people as they get older. It is also important for anyone who has an increased risk for colon cancer. Polyps are usually found through routine colon cancer screening tests. Although most colon polyps are not cancerous, they are usually removed and then tested for cancer. Screening for colon cancer saves lives because the cancer can usually be cured if it is caught early. If you have a polyp that is the type that can turn into cancer, you may need more tests to examine your entire colon. The doctor will remove any other polyps that he or she finds, and you will be tested more often. Follow-up care is a key part of your treatment and safety. Be sure to make and go to all appointments, and call your doctor if you are having problems. It's also a good idea to know your test results and keep a list of the medicines you take. How can you care for yourself at home? Regular exams to look for colon polyps are the best way to prevent polyps from turning into colon cancer. These can include stool tests, sigmoidoscopy, colonoscopy, and CT colonography. Talk with your doctor about a testing schedule that is right for you. To prevent polyps  There is no home treatment that can prevent colon polyps. But these steps may help lower your risk for cancer. · Stay active. Being active can help you get to and stay at a healthy weight. Try to exercise on most days of the week. Walking is a good choice. · Eat well. Choose a variety of vegetables, fruits, legumes (such as peas and beans), fish, poultry, and whole grains. · Do not smoke.  If you need help quitting, talk to your doctor about stop-smoking programs and medicines. These can increase your chances of quitting for good. · If you drink alcohol, limit how much you drink. Limit alcohol to 2 drinks a day for men and 1 drink a day for women. When should you call for help? Call your doctor now or seek immediate medical care if:    · You have severe belly pain.     · Your stools are maroon or very bloody. Watch closely for changes in your health, and be sure to contact your doctor if:    · You have a fever.     · You have nausea or vomiting.     · You have a change in bowel habits (new constipation or diarrhea).     · Your symptoms get worse or are not improving as expected. Where can you learn more? Go to https://MDJunctionpeZankeb.[employer]. org and sign in to your [employer] account. Enter 95 [id number] in the [employer] box to learn more about \"Colon Polyps: Care Instructions. \"     If you do not have an account, please click on the \"Sign Up Now\" link. Current as of: December 17, 2020               Content Version: 13.0  © 2006-2021 [employer]. Care instructions adapted under license by [facility] ([facility]). If you have questions about a medical condition or this instruction, always ask your healthcare professional. [place] [address] any warranty or liability for your use of this information. Patient Education        Colonoscopy: Before Your Procedure  What is a colonoscopy? A colonoscopy is a test that lets a doctor look inside your colon. The doctor uses a thin, lighted tube called a colonoscope to look for problems. These include small growths called polyps, cancer, or bleeding. During the test, the doctor can take samples of tissue that can be checked for cancer or other problems. This is called a biopsy. The doctor can also take out polyps. Before the test, you will need to stop eating solid foods. You also will be given instructions on how to clean out your colon.  This helps your doctor be able to see inside your colon during the test.  How do you prepare for the procedure? Procedures can be stressful. This information will help you understand what you can expect. And it will help you safely prepare for your procedure. Preparing for the procedure    · Be sure you have someone to take you home. Anesthesia and pain medicine will make it unsafe for you to drive or get home on your own. · Understand exactly what procedure is planned, along with the risks, benefits, and other options.     · Tell your doctor ALL the medicines, vitamins, supplements, and herbal remedies you take. Some may increase the risk of problems during your procedure. Your doctor will tell you if you should stop taking any of them before the procedure and how soon to do it.     · If you take aspirin or some other blood thinner, ask your doctor if you should stop taking it before your procedure. Make sure that you understand exactly what your doctor wants you to do. These medicines increase the risk of bleeding.     · Make sure your doctor and the hospital have a copy of your advance directive. If you don't have one, you may want to prepare one. It lets others know your health care wishes. It's a good thing to have before any type of surgery or procedure. Before the procedure    · Follow your doctor's directions about when to stop eating solid foods and drink only clear liquids. You can drink water, clear juices, clear broths, flavored ice pops, and gelatin (such as Jell-O). Do not eat or drink anything red or purple. This includes grape juice and grape-flavored ice pops. It also includes fruit punch and cherry gelatin.     · Drink the \"colon prep\" liquid as your doctor tells you. You will want to stay home, because the liquid will make you go to the bathroom a lot. Your stools will be loose and watery. It's very important to drink all of the liquid.  If you have problems drinking it, call your doctor.     · Do not eat any solid foods after you drink the colon prep.     · Stop drinking clear liquids for a few hours before the test. Your doctor will tell you how many hours this will be. What happens on the day of the procedure? · Follow the instructions exactly about when to stop eating and drinking. If you don't, your procedure may be canceled. If your doctor told you to take your medicines on the day of the procedure, take them with only a sip of water.     · Take a bath or shower before you come in for your procedure. Do not apply lotions, perfumes, deodorants, or nail polish.     · Take off all jewelry and piercings. And take out contact lenses, if you wear them. At the doctor's office or hospital   · Bring a picture ID.     · You will be kept comfortable and safe by your anesthesia provider. The anesthesia may make you sleep.     · You will lie on your back or your side with your knees drawn up toward your belly. The doctor will gently put a gloved finger into your anus. Then the doctor puts the scope in and moves it into your colon. The scope goes in easily because it is lubricated.     · The doctor may also use small tools to take tissue samples for a biopsy or to remove polyps. This does not hurt.     · The test usually takes 30 to 45 minutes. But it may take longer. It depends on what is found and what is done. When should you call your doctor? · You have questions or concerns.     · You don't understand how to prepare for your procedure.     · You are having trouble with the bowel prep.     · You become ill before the procedure (such as fever, flu, or a cold).     · You need to reschedule or have changed your mind about having the procedure. Where can you learn more? Go to https://[employer]. org and sign in to your [employer] account. Enter C315 in the [employer] box to learn more about \"Colonoscopy: Before Your Procedure. \"     If you do not have an account, please click on the \"Sign Up Now\" link. Current as of: December 17, 2020               Content Version: 13.0  © 6019-8514 [employer]. Care instructions adapted under license by [employer]. If you have questions about a medical condition or this instruction, always ask your healthcare professional. [address] any warranty or liability for your use of this information.

## 2021-11-09 NOTE — PROGRESS NOTES
[facility]    [address],  [address] [facility]  [place], [facility]  Phone: [phone]   BYD:[phone]    CHIEF COMPLAINT     Chief Complaint   Patient presents with    New Patient     consult for colonoscopy, last one was 2018, polyps       HPI     Thank you Anirudh Brandon PA-C for asking me to see Rancho WoodsonPrateek in consultation. Rancho Post is a 64 y.o. 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 Denies abdominal pain, nausea, vomiting, fever, chills, unintentional weight loss, constipation, diarrhea, hematochezia or melenic stools. Last Encounter Reviewed: None  Pertinent PMH, FH, SH is reviewed below. EGD 10/11/2013 Dr. Tommy Landeros: Apparent complete resection of the periampullary adenoma. Successful stent removal   Last EGD 7/18/2013 Dr. Gasper Perea: Prominent periampullary mucosa, biopsied (path-duodenal adenoma, negative for high-grade dysplasia). Normal duodenum (path-normal). Mild chronic gastritis negative for H. pylori, intestinal metaplasia. Last Colonoscopy 7/18/2013 Dr. Jackman Confer: Normal terminal ileum, biopsied (path - normal). 11 mm sessile polyp in the ascending colon status post snare cautery polypectomy (path -tubular adenoma). 2 clips applied. 7 mm sessile polyp in the sigmoid colon status post hot snare polypectomy (path -tubular adenoma). Mild internal hemorrhoids. Random colon biopsies (path - normal)   Upper EUS 8/23/2013 Dr. Tommy Landeros: Superficial duodenal adenoma 1.33 X 0.31 cm with involvement of the ampulla.    ERCP 9/102013 Dr. Tommy Landeros: Periampullary adenoma status post biliary and pancreatic stent placements, biliary sphincterotomy, and polypectomy with Argon plasma coagulation to a bit of residual polyp at the proximal aspect and status post. Endoclip placement to close the defect. Review of available records reveals:   Wt Readings from Last 50 Encounters:   11/09/21 243 lb (110.2 kg)   09/13/21 239 lb (108.4 kg)   07/13/21 240 lb 1.3 oz (108.9 kg)   06/18/21 244 lb 3.2 oz (110.8 kg)   04/26/21 244 lb (110.7 kg)   04/05/21 247 lb 9.6 oz (112.3 kg)   01/26/21 238 lb 15.7 oz (108.4 kg)   12/29/20 238 lb 15.7 oz (108.4 kg)   12/28/20 239 lb (108.4 kg)   12/01/20 242 lb (109.8 kg)   10/08/20 244 lb (110.7 kg)   06/09/20 242 lb (109.8 kg)   05/26/20 242 lb (109.8 kg)   05/11/20 242 lb (109.8 kg)   04/21/20 242 lb 1 oz (109.8 kg)   04/07/20 242 lb 1 oz (109.8 kg)   03/03/20 242 lb 1 oz (109.8 kg)   11/27/19 242 lb (109.8 kg)   11/27/19 240 lb (108.9 kg)   10/14/19 241 lb 6.4 oz (109.5 kg)   09/18/19 239 lb (108.4 kg)   05/24/19 241 lb (109.3 kg)   05/21/19 240 lb (108.9 kg)   05/16/19 241 lb (109.3 kg)   04/01/19 238 lb (108 kg)   03/27/19 237 lb (107.5 kg)   01/04/19 242 lb (109.8 kg)   01/01/19 242 lb (109.8 kg)   11/30/18 239 lb (108.4 kg)   10/31/18 244 lb (110.7 kg)   09/28/18 240 lb 9.6 oz (109.1 kg)   08/20/18 239 lb (108.4 kg)   07/27/18 241 lb (109.3 kg)   07/09/18 247 lb (112 kg)   07/05/18 247 lb (112 kg)   06/04/18 230 lb (104.3 kg)   05/23/18 240 lb (108.9 kg)   03/09/18 241 lb (109.3 kg)   09/08/17 242 lb 9.6 oz (110 kg)   05/01/17 239 lb 6.4 oz (108.6 kg)   04/24/17 234 lb 6.4 oz (106.3 kg)   04/21/17 244 lb (110.7 kg)   03/28/17 244 lb (110.7 kg)   03/15/17 244 lb 3.2 oz (110.8 kg)   03/11/17 241 lb (109.3 kg)   03/02/17 246 lb (111.6 kg)   02/23/17 251 lb (113.9 kg)   02/15/17 251 lb 6.4 oz (114 kg)   01/24/17 251 lb (113.9 kg)   01/04/17 252 lb (114.3 kg)       No components found for: HGBA1C  BP Readings from Last 3 Encounters:   11/09/21 (!) 194/53   09/13/21 128/76   07/14/21 (!) 140/72     Health Maintenance   Topic Date Due    Hepatitis C screen  Never done    COVID-19 Vaccine (1) Never done    HIV screen  Never done    Hepatitis B vaccine (1 of 3 - Risk 3-dose series) Never done    Shingles Vaccine (1 of 2) Never done    Diabetic microalbuminuria test  08/18/2017    Diabetic retinal exam  08/29/2017    Diabetic foot exam  11/01/2017    Annual Wellness Visit (AWV)  Never done    Flu vaccine (1) 09/01/2021    A1C test (Diabetic or Prediabetic)  10/14/2021    Lipid screen  12/29/2021    Potassium monitoring  07/13/2022    Creatinine monitoring  07/13/2022    DTaP/Tdap/Td vaccine (2 - Td or Tdap) 05/11/2025    Colon cancer screen colonoscopy  02/12/2026    Pneumococcal 0-64 years Vaccine (2 of 2 - PPSV23) 12/08/2027    Hepatitis A vaccine  Aged Out    Hib vaccine  Aged Out    Meningococcal (ACWY) vaccine  Aged Out       No components found for: Abundance Generation     PAST MEDICAL HISTORY     Past Medical History:   Diagnosis Date    Arthritis     Asthma     CAD (coronary artery disease)     COPD (chronic obstructive pulmonary disease) ([facility] [address]     [facility] Eryn Maloney at [facility] told the patient that he did not have COPD, just asthma    Emphysema of lung ([facility] [address]     Fatty liver     GERD (gastroesophageal reflux disease)     Hyperlipidemia     Hypertension     Medical history reviewed with no changes     MI, old     Sleep apnea     pt wears cpap at night. states does not have cpap    Type II or unspecified type diabetes mellitus without mention of complication, not stated as uncontrolled      FAMILY HISTORY     Family History   Problem Relation Age of Onset    Heart Disease Mother     Heart Failure Mother     Cancer Father     Diabetes Maternal Grandmother     Heart Failure Maternal Uncle     Hypertension Maternal Uncle     Asthma Neg Hx     Emphysema Neg Hx      SOCIAL HISTORY     Social History     Socioeconomic History    Marital status:      Spouse name: Not on file    Number of children: Not on file    Years of education: Not on file    Highest education level: Not on file   Occupational History    Not on file   Tobacco Use    Smoking status: Former Smoker     Packs/day: 2.00     Years: 20.00     Pack years: 40.00     Types: Cigarettes     Quit date: 2001     Years since quittin.4    Smokeless tobacco: Never Used    Tobacco comment: PASSIVE SMOKER   Vaping Use    Vaping Use: Never used   Substance and Sexual Activity    Alcohol use: No     Alcohol/week: 0.0 standard drinks    Drug use: No    Sexual activity: Never   Other Topics Concern    Not on file   Social History Narrative    Not on file     Social Determinants of Health     Financial Resource Strain:     Difficulty of Paying Living Expenses: Not on file   Food Insecurity:     Worried About [address] in the Last Year: Not on file    Luis of Food in the Last Year: Not on file   Transportation Needs:     Lack of Transportation (Medical): Not on file    Lack of Transportation (Non-Medical):  Not on file   Physical Activity:     Days of Exercise per Week: Not on file    Minutes of Exercise per Session: Not on file   Stress:     Feeling of Stress : Not on file   Social Connections:     Frequency of Communication with Friends and Family: Not on file    Frequency of Social Gatherings with Friends and Family: Not on file    Attends [employer] Services: Not on file    Active Member of Clubs or Organizations: Not on file    Attends Club or Organization Meetings: Not on file    Marital Status: Not on file   Intimate Partner Violence:     Fear of Current or Ex-Partner: Not on file    Emotionally Abused: Not on file    Physically Abused: Not on file    Sexually Abused: Not on file   Housing Stability:     Unable to Pay for Housing in the Last Year: Not on file    Number of [place] in the Last Year: Not on file    Unstable Housing in the Last Year: Not on file     SURGICAL HISTORY     Past Surgical History:   Procedure Laterality Date    CARDIAC PACEMAKER PLACEMENT  2011    NOT MRI COMPATIABLE OLD LEADS [facility].  pace maker difib   [address] Bilateral 2013    CATARACT REMOVAL WITH IMPLANT Left 2/28/14    COLONOSCOPY      CORONARY ANGIOPLASTY WITH STENT PLACEMENT  2001,2008    CYST REMOVAL  1982    coccyx area    DIAGNOSTIC CARDIAC CATH LAB PROCEDURE      ENDOSCOPY, COLON, DIAGNOSTIC      ERCP  9/15/2013    ERCP  10/11/13    WITH STENT REMOVAL    FOOT SURGERY Right 07/09/2018     REPAIR CALCANEAL SPUR, REPAIR OF ACHILLES TENDON RIGHT FOOT    NERVE SURGERY Bilateral 12/1/2020    BILATERAL LUMBAR THREE LUMBAR FOUR LUMBAR FIVE DORSAL RAMUS  RADIOFREQUENCY ABLATION SITE CONFIRMED BY FLUOROSCOPY performed by Enid Rees MD at [address]      back stimulator in lower back    PACEMAKER PLACEMENT      ICD    PAIN MANAGEMENT PROCEDURE Bilateral 5/26/2020    BILATERAL LUMBAR THREE, LUMBAR FOUR, LUMBAR FIVE DORSAL RAMUS MEDIAL BRANCH BLOCK SITE CONFIRMED BY FLUOROSCOPY performed by Enid Rees MD at [address] [facility] Bilateral 6/9/2020    BILATERAL LUMBAR THREE, LUMBAR FOUR, LUMBAR FIVE DORSAL RAMUS MEDIAL BRANCH BLOCK SITE CONFIRMED BY FLUOROSCOPY performed by Enid Rees MD at [address] [facility] Left 1/12/2021    LEFT SACROILIAC JOINT INJECTION SITE CONFIRMED BY FLUOROSCOPY performed by Enid Rees MD at [address] [facility] Right 10/31/2018    GASTROCNEMIUS RECESSION RIGHT FOOT performed by Ze Loving DPM at [address] [facility] Right 10/31/2018    REMOVAL OF CALCANEAL SPUR, ACHILLES TENDON REPAIR RIGHT LOWER EXTREMITY performed by Ze Loving DPM at [address] [facility]  7/18/13    and colonoscopy with biopsies taken    UPPER GASTROINTESTINAL ENDOSCOPY  8/23/13    EUS    UPPER GASTROINTESTINAL ENDOSCOPY  9/15/2013     CURRENT MEDICATIONS   (This list may include medications prescribed during this encounter as epic can not insert only the list prior to this encounter.)  Current Outpatient Rx   Medication Sig Dispense Refill    bisacodyl (DULCOLAX) 5 MG EC tablet Take 4 tablets by mouth once for 1 dose Take as directed for colonoscopy.  4 tablet 0    polyethylene glycol (MIRALAX) 17 GM/SCOOP POWD powder Take 238 g by mouth daily Take as directed for colonoscopy 255 g 0    aspirin 325 MG EC tablet Take 1 tablet by mouth daily 30 tablet 3    albuterol (PROVENTIL) (2.5 MG/3ML) 0.083% nebulizer solution Take 3 mLs by nebulization every 4 hours as needed for Wheezing 120 each 3    clopidogrel (PLAVIX) 75 MG tablet Take 1 tablet by mouth daily 90 tablet 1    traZODone (DESYREL) 100 MG tablet Take 100 mg by mouth nightly      Dulaglutide (TRULICITY) 8.52 WB/3.7ED SOPN Inject 0.75 mg into the skin once a week      furosemide (LASIX) 20 MG tablet TAKE ONE TABLET  ON Monday and Thursday 90 tablet 2    potassium chloride (KLOR-CON M) 20 MEQ extended release tablet TAKE ONE TABLET BY MOUTH WITH LASIX ON MONDAY AND THURSDAY 24 tablet 3    omega-3 acid ethyl esters (LOVAZA) 1 g capsule Take 2 capsules by mouth 2 times daily 360 capsule 3    insulin lispro (HUMALOG) 100 UNIT/ML injection vial Inject 10 Units into the skin 2 times daily Indications: 10 units in AM, 10 units at dinner      Omega-3 1000 MG CAPS Take 2 capsules by mouth 4 times daily      PROAIR  (90 Base) MCG/ACT inhaler Inhale 2 puffs into the lungs every 4 hours as needed      Fexofenadine HCl (ALLEGRA PO) Take by mouth      Esomeprazole Magnesium (NEXIUM PO) Take 40 mg by mouth       JARDIANCE 25 MG tablet Take 25 mg by mouth daily       insulin detemir (LEVEMIR) 100 UNIT/ML injection vial Inject 60 Units into the skin nightly       Fluticasone Furoate-Vilanterol (BREO ELLIPTA IN) Inhale into the lungs daily       clopidogrel (PLAVIX) 75 MG tablet TAKE ONE TABLET BY MOUTH DAILY 30 tablet 0    metFORMIN (GLUCOPHAGE) 1000 MG tablet TAKE ONE TABLET BY MOUTH 2 TIMES DAILY WITH MORNING AND EVENING MEALS 60 tablet 0    famotidine (PEPCID) 20 MG tablet TAKE 1 TABLET BY MOUTH 2 TIMES DAILY 60 tablet 0    gabapentin (NEURONTIN) 100 MG capsule TAKE 2 CAPSULES BY MOUTH 3 TIMES DAILY 180 capsule 0    fluticasone (FLONASE) 50 MCG/ACT nasal spray USE 1 SPRAY IN EACH NOSTRIL EVERY DAY (Patient taking differently: 2 sprays daily ) 16 g 0    TRUE METRIX BLOOD GLUCOSE TEST strip CHECK SUGARS TWICE A DAY AS DIRECTED, DX: E11.9 100 each 5    lisinopril (PRINIVIL;ZESTRIL) 20 MG tablet TAKE 1 TABLET BY MOUTH DAILY 30 tablet 11    atorvastatin (LIPITOR) 80 MG tablet TAKE ONE TABLET BY MOUTH DAILY 30 tablet 11    aspirin 325 MG EC tablet TAKE ONE TABLET BY MOUTH DAILY 30 tablet 11    UNIFINE PENTIPS 31G X 8 MM MISC USE AS DIRECTED FOR INSULIN .00 100 each 5    diltiazem (CARDIZEM CD) 180 MG extended release capsule TAKE 1 CAPSULE DAILY 30 capsule 11    metoprolol succinate (TOPROL XL) 200 MG extended release tablet TAKE ONE-HALF TABLET TWICE A DAY 30 tablet 11    TRUEPLUS LANCETS 30G MISC CHECK BLOOD SUGAR TWICE A DAY DX E11.9 100 each 11    DULoxetine (CYMBALTA) 60 MG extended release capsule Take 60 mg by mouth daily      divalproex (DEPAKOTE) 500 MG DR tablet Take 1,000 mg by mouth nightly       ALLERGIES     Allergies   Allergen Reactions    Other      VASCEPA CAUSED RASH AND ITCHING    Pcn [Penicillins] Hives     IMMUNIZATIONS     Immunization History   Administered Date(s) Administered    Influenza Virus Vaccine 12/12/2008, 10/22/2012, 09/17/2015, 10/01/2018    Influenza, Quadv, IM, (6 mo and older Fluzone, Flulaval, Fluarix and 3 yrs and older Afluria) 11/18/2016, 10/02/2017    Pneumococcal Conjugate 7-valent (Prevnar7) 10/22/2012    Pneumococcal Polysaccharide (Vcaefflsj71) 12/12/2008    Tdap (Boostrix, Adacel) 05/11/2015     REVIEW OF SYSTEMS   See HPI for further details and pertinent postiives. Negative for the following:  Constitutional: Negative for weight change. Negative for appetite change and fatigue. HENT: Negative for nosebleeds, sore throat, mouth sores, and voice change. Respiratory: Negative for cough, choking and chest tightness. Cardiovascular: Negative for chest pain   Gastrointestinal: See HPI  Musculoskeletal: Negative for arthralgias. Skin: Negative for pallor. Neurological: Negative for weakness and light-headedness. Hematological: Negative for adenopathy. Does not bruise/bleed easily. Psychiatric/Behavioral: Negative for suicidal ideas. PHYSICAL EXAM   VITAL SIGNS: BP (!) 194/53 (Site: Left Wrist, Position: Sitting)   Pulse 84   Ht 5' 4\" (1.626 m)   Wt 243 lb (110.2 kg)   BMI 41.71 kg/m²   Wt Readings from Last 3 Encounters:   11/09/21 243 lb (110.2 kg)   09/13/21 239 lb (108.4 kg)   07/13/21 240 lb 1.3 oz (108.9 kg)     Constitutional: Obese male. Well developed, Well nourished, No acute distress, Non-toxic appearance. HENT: Normocephalic, Atraumatic, Bilateral external ears normal, Nose normal.   Eyes: Conjunctiva normal, No discharge. Neck: Normal range of motion, No tenderness, Supple  Cardiovascular: Normal heart rate, Normal rhythm, No murmurs, No rubs, No gallops. Thorax & Lungs: Normal breath sounds, No respiratory distress, No wheezing  Abdomen: Pannus abdomen, normal bowel sounds, soft, non tender, non distended, scars consistent with stated surgeries, no hernias   Rectal:  Deferred. Skin: Warm, Dry, No erythema, No rash. No bruising  Lower Extremities: Intact distal pulses, No edema, No tenderness, No cyanosis, No clubbing. Neurologic: Alert & oriented x 3  RADIOLOGY/PROCEDURES   No results found. FINAL IMPRESSION   Harrison Tate was seen today for new patient.     Diagnoses and all orders for this visit:    Hx of adenomatous colonic polyps  -     COLONOSCOPY W/ OR W/O BIOPSY  -     bisacodyl (DULCOLAX) 5 MG EC tablet; Take 4 tablets by mouth once for 1 dose Take as directed for colonoscopy. -     polyethylene glycol (MIRALAX) 17 GM/SCOOP POWD powder; Take 238 g by mouth daily Take as directed for colonoscopy  -     Requested cardiac and on holding Plavix 5 days prior to planned colonoscopy. Patient may decrease to aspirin 81 mg daily. Colonoscopy with alternatives, rationale, benefits and risks, not limited to bleeding, infection, perforation, need of surgery, prolong hospital stay and anesthesia side effects were explained. Patient verbalized understanding and agrees to proceed with colonoscopy. Orders Placed This Encounter   Procedures    COLONOSCOPY W/ OR W/O BIOPSY     Scheduling Instructions:      Schedule with anesthesia provided diprivan. Please provide prep of choice instructions and prescription. General guidelines for holding blood thinners/anticoagulants around endoscopic procedure are but patients are encouraged to check with their prescribing physician. The patient may hold Plavix, Effient, Brilinta 5 days prior to the procedure unless:       A drug eluting stent has been placed within past 12 months. A nondrug eluting stent has been placed within past 1 month. Coumadin may be held 4 days prior to the procedure unless:        Mechanical mitral valve replacement (requires heparin bridge while Coumadin held and is managed by pharmacy)      Pradaxa, Xarelto, Eliquis may be held 2-3 days prior to procedure. According to pharmacokinetics of the drug, package insert, cardiology practice patterns, and T1/2 of theses drugs (12 hrs), Eliquis and Xarelto are held 48hrs prior to any procedure, including major surgical procedures w/o       increased bleeding.  That is usually the standard of care, as coagulation would/should be normalized at 48hrs.       Every attempt should be made to maintain ASA 81mg per day throughout the sophie-operative period in patients with diagnosis of ASHD. These recommendations may need to be modified by the provider/ based on risk /benefit analysis of the procedure and the patients history. If anticoagulation can not be held because recent cardiac stent, elective endoscopic procedures should be delayed until they have received the minimum duration of recommended antiplatlet therapy and it can safely be held. Again if unsure, patient should discuss with prescribing physician/service. If anticoagulation can not be stopped, endoscopic procedures can still be performed either diagnostically at a somewhat higher risk. Understand that any therapeutic procedure where anything beyond looking is performed, carries higher risks. For this reason without overt bleeding other testing       such as cologuard may be more appropriate. High risk endoscopic procedures that require stopping antiplatelet and anticoagulation therapy include polypectomy, biliary or pancreatic sphincterotomy, pneumatic or bougie dilation, PEG placement, therapeutic balloon-assisted enteroscopy, EUS and FNA, tumor ablation by any technique,       cystogastrostomy,and treatment of varices. ORDERED FUTURE/PENDING TESTS     Lab Frequency Next Occurrence   EMG Once 09/30/2021   ICD remote interrogate     ICD interrogate     ICD analysis single with reprogram         FOLLOWUP   No follow-ups on file.           Lashonda Murrieta MD 11/9/21 9:52 AM EST    CC:  Isaias Peña PA-C

## 2021-11-11 ENCOUNTER — TELEPHONE (OUTPATIENT)
Dept: GASTROENTEROLOGY | Age: 61
End: 2021-11-11

## 2021-11-11 DIAGNOSIS — Z12.11 SCREEN FOR COLON CANCER: Primary | ICD-10-CM

## 2021-11-11 RX ORDER — ASPIRIN 325 MG
TABLET, DELAYED RELEASE (ENTERIC COATED) ORAL
Qty: 90 TABLET | Refills: 3 | Status: SHIPPED | OUTPATIENT
Start: 2021-11-11 | End: 2022-01-12

## 2021-11-11 NOTE — TELEPHONE ENCOUNTER
Requested Prescriptions     Pending Prescriptions Disp Refills    aspirin 325 MG EC tablet [Pharmacy Med Name: ASPIRIN  MG TABLET] 90 tablet 3     Sig: TAKE ONE TABLET BY MOUTH EVERY DAY                  Last Office Visit: 9/13/2021     Next Office Visit: 3/14/2022

## 2021-11-11 NOTE — TELEPHONE ENCOUNTER
Pt number voice mail not set up  Left message on emergency contact to have patient call the office regarding rescheduling procedure.      Sent cancel to endo  Awaiting call from patient to reschedule

## 2021-11-11 NOTE — TELEPHONE ENCOUNTER
----- Message from Danica Farias MD sent at 11/10/2021  3:32 PM EST -----  Thank you Elyse. We will schedule colonoscopy after January 20 with him stopping Plavix for 5 days and continuing aspirin 81 mg daily. Lilia Benjamin.  ----- Message -----  From: Parker Keita RN  Sent: 11/10/2021   2:33 PM EST  To: Danica Farias MD    Per Dr Samantha Malin, patient needs to wait until a minimum of Jan 20 th before stopping Plavix for 5 days. He can continue the Aspirin at 81 mg as requested  Thank Epifanio Merlin RN  ----- Message -----  From: Danica Farias MD  Sent: 11/9/2021   3:31 PM EST  To: Ho Valderrama MD    Hi Dr. Samantha Malin,     I am planning a surveillance colonoscopy on Mr. Quiroga and want ask if it is ok to hold Plavix for 5 days prior. He may be on aspirin 81mg but not 325mg if possible.  Thanks    Lilia Benjamin

## 2021-11-11 NOTE — TELEPHONE ENCOUNTER
Pt scheduled at [facility] on 12- @ 12:00pm arrive at 11:00am   Instructions given in office  COVID test needs done on 12-  Order faxed to [facility]

## 2021-11-12 RX ORDER — POLYETHYLENE GLYCOL 3350 17 G/17G
POWDER, FOR SOLUTION ORAL
Qty: 238 G | Refills: 0 | Status: ON HOLD | OUTPATIENT
Start: 2021-11-12 | End: 2022-02-07 | Stop reason: HOSPADM

## 2021-11-12 RX ORDER — BISACODYL 5 MG
TABLET, DELAYED RELEASE (ENTERIC COATED) ORAL
Qty: 4 TABLET | Refills: 0 | Status: ON HOLD | OUTPATIENT
Start: 2021-11-12 | End: 2022-02-07 | Stop reason: HOSPADM

## 2021-11-12 NOTE — TELEPHONE ENCOUNTER
Patient returned call and is aware   Colonoscopy has been rescheduled for 1- @ 8am arriving at 7:00am  Patient new prep instructions mailed with instructions on when to hold medications  Covid sheet and directions to [facility] updated  Faxed to endo

## 2021-12-17 ENCOUNTER — TELEPHONE (OUTPATIENT)
Dept: GASTROENTEROLOGY | Age: 61
End: 2021-12-17

## 2021-12-17 NOTE — TELEPHONE ENCOUNTER
Pt was scheduled at [facility]   Dr. Yadira Simental updated his schedule and he will be at [facility] that week with procedures.      Need to know if pt wants to change location or days    No vm set up unable to leave message

## 2022-01-10 ENCOUNTER — NURSE ONLY (OUTPATIENT)
Dept: CARDIOLOGY CLINIC | Age: 62
End: 2022-01-10
Payer: MEDICARE

## 2022-01-10 DIAGNOSIS — Z95.810 AUTOMATIC IMPLANTABLE CARDIOVERTER-DEFIBRILLATOR IN SITU: ICD-10-CM

## 2022-01-10 DIAGNOSIS — I47.20 VENTRICULAR TACHYCARDIA: ICD-10-CM

## 2022-01-10 DIAGNOSIS — I25.5 ISCHEMIC CARDIOMYOPATHY: ICD-10-CM

## 2022-01-11 PROCEDURE — 93295 DEV INTERROG REMOTE 1/2/MLT: CPT | Performed by: INTERNAL MEDICINE

## 2022-01-11 PROCEDURE — 93296 REM INTERROG EVL PM/IDS: CPT | Performed by: INTERNAL MEDICINE

## 2022-01-11 NOTE — PROGRESS NOTES
Remote transmission received for patients single chamber ICD. Transmission shows normal sensing and pacing function. EP physician will review. See interrogation under the cardiology tab in the [address] [place] [address] [address] field for more details. Will continue to monitor remotely. 1 NSVT (cardizem cd, toprol xl)). Corvue is within normal limits.

## 2022-01-12 ENCOUNTER — APPOINTMENT (OUTPATIENT)
Dept: GENERAL RADIOLOGY | Age: 62
DRG: 177 | End: 2022-01-12
Payer: MEDICARE

## 2022-01-12 ENCOUNTER — APPOINTMENT (OUTPATIENT)
Dept: CT IMAGING | Age: 62
DRG: 177 | End: 2022-01-12
Payer: MEDICARE

## 2022-01-12 ENCOUNTER — HOSPITAL ENCOUNTER (INPATIENT)
Age: 62
LOS: 14 days | Discharge: NURSING, REHAB OR LONG-TERM CARE | DRG: 177 | End: 2022-01-27
Attending: EMERGENCY MEDICINE | Admitting: HOSPITALIST
Payer: MEDICARE

## 2022-01-12 DIAGNOSIS — R26.2 UNABLE TO AMBULATE: ICD-10-CM
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
## 2022-01-12 LAB
A/G RATIO: 1.7 (ref 1.1–2.2)
ALBUMIN SERPL-MCNC: 4.6 G/DL (ref 3.4–5)
ALP BLD-CCNC: 72 U/L (ref 40–129)
ALT SERPL-CCNC: 33 U/L (ref 10–40)
ANION GAP SERPL CALCULATED.3IONS-SCNC: 18 MMOL/L (ref 3–16)
AST SERPL-CCNC: 27 U/L (ref 15–37)
BASE EXCESS VENOUS: -0.6 MMOL/L (ref -3–3)
BASOPHILS ABSOLUTE: 0 K/UL (ref 0–0.2)
BASOPHILS RELATIVE PERCENT: 0.5 %
BILIRUB SERPL-MCNC: 0.5 MG/DL (ref 0–1)
BILIRUBIN URINE: NEGATIVE
BLOOD, URINE: NEGATIVE
BUN BLDV-MCNC: 12 MG/DL (ref 7–20)
CALCIUM SERPL-MCNC: 10.1 MG/DL (ref 8.3–10.6)
CARBOXYHEMOGLOBIN: 1.7 % (ref 0–1.5)
CHLORIDE BLD-SCNC: 97 MMOL/L (ref 99–110)
CLARITY: CLEAR
CO2: 20 MMOL/L (ref 21–32)
COLOR: YELLOW
CREAT SERPL-MCNC: 0.8 MG/DL (ref 0.9–1.3)
EOSINOPHILS ABSOLUTE: 0 K/UL (ref 0–0.6)
EOSINOPHILS RELATIVE PERCENT: 0.3 %
GFR AFRICAN AMERICAN: >60
GFR NON-AFRICAN AMERICAN: >60
GLUCOSE BLD-MCNC: 220 MG/DL (ref 70–99)
GLUCOSE URINE: >=1000 MG/DL
HCO3 VENOUS: 23.7 MMOL/L (ref 23–29)
HCT VFR BLD CALC: 44.1 % (ref 40.5–52.5)
HEMOGLOBIN: 14.8 G/DL (ref 13.5–17.5)
KETONES, URINE: 15 MG/DL
LEUKOCYTE ESTERASE, URINE: NEGATIVE
LYMPHOCYTES ABSOLUTE: 1.4 K/UL (ref 1–5.1)
LYMPHOCYTES RELATIVE PERCENT: 29.1 %
MCH RBC QN AUTO: 29.4 PG (ref 26–34)
MCHC RBC AUTO-ENTMCNC: 33.7 G/DL (ref 31–36)
MCV RBC AUTO: 87.4 FL (ref 80–100)
METHEMOGLOBIN VENOUS: 0.3 %
MICROSCOPIC EXAMINATION: ABNORMAL
MONOCYTES ABSOLUTE: 0.7 K/UL (ref 0–1.3)
MONOCYTES RELATIVE PERCENT: 14 %
NEUTROPHILS ABSOLUTE: 2.6 K/UL (ref 1.7–7.7)
NEUTROPHILS RELATIVE PERCENT: 56.1 %
NITRITE, URINE: NEGATIVE
O2 SAT, VEN: 57 %
O2 THERAPY: ABNORMAL
PCO2, VEN: 38.4 MMHG (ref 40–50)
PDW BLD-RTO: 15.3 % (ref 12.4–15.4)
PH UA: 5 (ref 5–8)
PH VENOUS: 7.41 (ref 7.35–7.45)
PLATELET # BLD: 86 K/UL (ref 135–450)
PLATELET SLIDE REVIEW: ABNORMAL
PMV BLD AUTO: 6.9 FL (ref 5–10.5)
PO2, VEN: 29.5 MMHG (ref 25–40)
POTASSIUM REFLEX MAGNESIUM: 4.5 MMOL/L (ref 3.5–5.1)
PROTEIN UA: NEGATIVE MG/DL
RBC # BLD: 5.04 M/UL (ref 4.2–5.9)
SARS-COV-2, NAAT: DETECTED
SLIDE REVIEW: ABNORMAL
SODIUM BLD-SCNC: 135 MMOL/L (ref 136–145)
SPECIFIC GRAVITY UA: 1.01 (ref 1–1.03)
TCO2 CALC VENOUS: 25 MMOL/L
TOTAL PROTEIN: 7.3 G/DL (ref 6.4–8.2)
URINE TYPE: ABNORMAL
UROBILINOGEN, URINE: 0.2 E.U./DL
WBC # BLD: 4.7 K/UL (ref 4–11)

## 2022-01-12 PROCEDURE — 82728 ASSAY OF FERRITIN: CPT

## 2022-01-12 PROCEDURE — 70450 CT HEAD/BRAIN W/O DYE: CPT

## 2022-01-12 PROCEDURE — 87635 SARS-COV-2 COVID-19 AMP PRB: CPT

## 2022-01-12 PROCEDURE — 86140 C-REACTIVE PROTEIN: CPT

## 2022-01-12 PROCEDURE — 99285 EMERGENCY DEPT VISIT HI MDM: CPT

## 2022-01-12 PROCEDURE — 81003 URINALYSIS AUTO W/O SCOPE: CPT

## 2022-01-12 PROCEDURE — 2580000003 HC RX 258: Performed by: EMERGENCY MEDICINE

## 2022-01-12 PROCEDURE — 82803 BLOOD GASES ANY COMBINATION: CPT

## 2022-01-12 PROCEDURE — 71045 X-RAY EXAM CHEST 1 VIEW: CPT

## 2022-01-12 PROCEDURE — 83036 HEMOGLOBIN GLYCOSYLATED A1C: CPT

## 2022-01-12 PROCEDURE — 85025 COMPLETE CBC W/AUTO DIFF WBC: CPT

## 2022-01-12 PROCEDURE — 80053 COMPREHEN METABOLIC PANEL: CPT

## 2022-01-12 PROCEDURE — 36415 COLL VENOUS BLD VENIPUNCTURE: CPT

## 2022-01-12 PROCEDURE — 83615 LACTATE (LD) (LDH) ENZYME: CPT

## 2022-01-12 RX ORDER — NICOTINE POLACRILEX 4 MG
15 LOZENGE BUCCAL PRN
Status: DISCONTINUED | OUTPATIENT
Start: 2022-01-12 | End: 2022-01-27 | Stop reason: HOSPADM

## 2022-01-12 RX ORDER — ALBUTEROL SULFATE 2.5 MG/3ML
2.5 SOLUTION RESPIRATORY (INHALATION) EVERY 4 HOURS PRN
Status: DISCONTINUED | OUTPATIENT
Start: 2022-01-12 | End: 2022-01-27 | Stop reason: HOSPADM

## 2022-01-12 RX ORDER — DEXTROSE MONOHYDRATE 25 G/50ML
12.5 INJECTION, SOLUTION INTRAVENOUS PRN
Status: DISCONTINUED | OUTPATIENT
Start: 2022-01-12 | End: 2022-01-27 | Stop reason: HOSPADM

## 2022-01-12 RX ORDER — DILTIAZEM HYDROCHLORIDE 180 MG/1
180 CAPSULE, COATED, EXTENDED RELEASE ORAL DAILY
Status: DISCONTINUED | OUTPATIENT
Start: 2022-01-13 | End: 2022-01-16

## 2022-01-12 RX ORDER — PANTOPRAZOLE SODIUM 40 MG/1
40 TABLET, DELAYED RELEASE ORAL DAILY
Status: DISCONTINUED | OUTPATIENT
Start: 2022-01-13 | End: 2022-01-27 | Stop reason: HOSPADM

## 2022-01-12 RX ORDER — DEXTROSE MONOHYDRATE 50 MG/ML
100 INJECTION, SOLUTION INTRAVENOUS PRN
Status: DISCONTINUED | OUTPATIENT
Start: 2022-01-12 | End: 2022-01-27 | Stop reason: HOSPADM

## 2022-01-12 RX ORDER — METOPROLOL SUCCINATE 50 MG/1
100 TABLET, EXTENDED RELEASE ORAL DAILY
Status: DISCONTINUED | OUTPATIENT
Start: 2022-01-13 | End: 2022-01-15

## 2022-01-12 RX ORDER — GABAPENTIN 100 MG/1
200 CAPSULE ORAL 3 TIMES DAILY
Status: DISCONTINUED | OUTPATIENT
Start: 2022-01-12 | End: 2022-01-27 | Stop reason: HOSPADM

## 2022-01-12 RX ORDER — ONDANSETRON 2 MG/ML
4 INJECTION INTRAMUSCULAR; INTRAVENOUS EVERY 6 HOURS PRN
Status: DISCONTINUED | OUTPATIENT
Start: 2022-01-12 | End: 2022-01-18

## 2022-01-12 RX ORDER — LISINOPRIL 10 MG/1
20 TABLET ORAL DAILY
Status: DISCONTINUED | OUTPATIENT
Start: 2022-01-13 | End: 2022-01-16

## 2022-01-12 RX ORDER — SODIUM CHLORIDE 0.9 % (FLUSH) 0.9 %
5-40 SYRINGE (ML) INJECTION EVERY 12 HOURS SCHEDULED
Status: DISCONTINUED | OUTPATIENT
Start: 2022-01-12 | End: 2022-01-16 | Stop reason: SDUPTHER

## 2022-01-12 RX ORDER — DULOXETIN HYDROCHLORIDE 60 MG/1
60 CAPSULE, DELAYED RELEASE ORAL DAILY
Status: DISCONTINUED | OUTPATIENT
Start: 2022-01-13 | End: 2022-01-17

## 2022-01-12 RX ORDER — CLOPIDOGREL BISULFATE 75 MG/1
75 TABLET ORAL DAILY
Status: DISCONTINUED | OUTPATIENT
Start: 2022-01-13 | End: 2022-01-27 | Stop reason: HOSPADM

## 2022-01-12 RX ORDER — POLYETHYLENE GLYCOL 3350 17 G/17G
17 POWDER, FOR SOLUTION ORAL DAILY PRN
Status: DISCONTINUED | OUTPATIENT
Start: 2022-01-12 | End: 2022-01-27 | Stop reason: HOSPADM

## 2022-01-12 RX ORDER — ONDANSETRON 4 MG/1
4 TABLET, ORALLY DISINTEGRATING ORAL EVERY 8 HOURS PRN
Status: DISCONTINUED | OUTPATIENT
Start: 2022-01-12 | End: 2022-01-18

## 2022-01-12 RX ORDER — SODIUM CHLORIDE 9 MG/ML
25 INJECTION, SOLUTION INTRAVENOUS PRN
Status: DISCONTINUED | OUTPATIENT
Start: 2022-01-12 | End: 2022-01-16 | Stop reason: SDUPTHER

## 2022-01-12 RX ORDER — ACETAMINOPHEN 650 MG/1
650 SUPPOSITORY RECTAL EVERY 6 HOURS PRN
Status: DISCONTINUED | OUTPATIENT
Start: 2022-01-12 | End: 2022-01-27 | Stop reason: HOSPADM

## 2022-01-12 RX ORDER — ATORVASTATIN CALCIUM 40 MG/1
80 TABLET, FILM COATED ORAL DAILY
Status: DISCONTINUED | OUTPATIENT
Start: 2022-01-13 | End: 2022-01-27 | Stop reason: HOSPADM

## 2022-01-12 RX ORDER — 0.9 % SODIUM CHLORIDE 0.9 %
1000 INTRAVENOUS SOLUTION INTRAVENOUS ONCE
Status: COMPLETED | OUTPATIENT
Start: 2022-01-12 | End: 2022-01-12

## 2022-01-12 RX ORDER — FLUTICASONE FUROATE AND VILANTEROL 200; 25 UG/1; UG/1
2 POWDER RESPIRATORY (INHALATION) DAILY
Status: DISCONTINUED | OUTPATIENT
Start: 2022-01-13 | End: 2022-01-13

## 2022-01-12 RX ORDER — TRAZODONE HYDROCHLORIDE 50 MG/1
100 TABLET ORAL NIGHTLY
Status: DISCONTINUED | OUTPATIENT
Start: 2022-01-12 | End: 2022-01-14

## 2022-01-12 RX ORDER — ACETAMINOPHEN 325 MG/1
650 TABLET ORAL EVERY 6 HOURS PRN
Status: DISCONTINUED | OUTPATIENT
Start: 2022-01-12 | End: 2022-01-27 | Stop reason: HOSPADM

## 2022-01-12 RX ORDER — SODIUM CHLORIDE 0.9 % (FLUSH) 0.9 %
5-40 SYRINGE (ML) INJECTION PRN
Status: DISCONTINUED | OUTPATIENT
Start: 2022-01-12 | End: 2022-01-16 | Stop reason: SDUPTHER

## 2022-01-12 RX ORDER — INSULIN GLARGINE 100 [IU]/ML
60 INJECTION, SOLUTION SUBCUTANEOUS NIGHTLY
Status: DISCONTINUED | OUTPATIENT
Start: 2022-01-12 | End: 2022-01-27 | Stop reason: HOSPADM

## 2022-01-12 RX ORDER — FLUTICASONE PROPIONATE 50 MCG
2 SPRAY, SUSPENSION (ML) NASAL DAILY
Status: DISCONTINUED | OUTPATIENT
Start: 2022-01-13 | End: 2022-01-27 | Stop reason: HOSPADM

## 2022-01-12 RX ORDER — DIVALPROEX SODIUM 500 MG/1
1000 TABLET, DELAYED RELEASE ORAL NIGHTLY
Status: DISCONTINUED | OUTPATIENT
Start: 2022-01-12 | End: 2022-01-16

## 2022-01-12 RX ADMIN — SODIUM CHLORIDE 1000 ML: 9 INJECTION, SOLUTION INTRAVENOUS at 20:04

## 2022-01-13 PROBLEM — U07.1 COVID-19: Status: ACTIVE | Noted: 2022-01-13

## 2022-01-13 LAB
ALBUMIN SERPL-MCNC: 4.4 G/DL (ref 3.4–5)
ALP BLD-CCNC: 60 U/L (ref 40–129)
ALT SERPL-CCNC: 28 U/L (ref 10–40)
ANION GAP SERPL CALCULATED.3IONS-SCNC: 17 MMOL/L (ref 3–16)
AST SERPL-CCNC: 22 U/L (ref 15–37)
BASOPHILS ABSOLUTE: 0 K/UL (ref 0–0.2)
BASOPHILS RELATIVE PERCENT: 0.3 %
BILIRUB SERPL-MCNC: 0.6 MG/DL (ref 0–1)
BILIRUBIN DIRECT: <0.2 MG/DL (ref 0–0.3)
BILIRUBIN, INDIRECT: NORMAL MG/DL (ref 0–1)
BUN BLDV-MCNC: 10 MG/DL (ref 7–20)
C-REACTIVE PROTEIN: 5.3 MG/L (ref 0–5.1)
CALCIUM SERPL-MCNC: 9.3 MG/DL (ref 8.3–10.6)
CHLORIDE BLD-SCNC: 96 MMOL/L (ref 99–110)
CHP ED QC CHECK: NORMAL
CO2: 21 MMOL/L (ref 21–32)
CREAT SERPL-MCNC: 0.7 MG/DL (ref 0.9–1.3)
EOSINOPHILS ABSOLUTE: 0 K/UL (ref 0–0.6)
EOSINOPHILS RELATIVE PERCENT: 0.1 %
ESTIMATED AVERAGE GLUCOSE: 171.4 MG/DL
FERRITIN: 161 NG/ML (ref 30–400)
GFR AFRICAN AMERICAN: >60
GFR NON-AFRICAN AMERICAN: >60
GLUCOSE BLD-MCNC: 140 MG/DL
GLUCOSE BLD-MCNC: 140 MG/DL (ref 70–99)
GLUCOSE BLD-MCNC: 140 MG/DL (ref 70–99)
GLUCOSE BLD-MCNC: 148 MG/DL (ref 70–99)
GLUCOSE BLD-MCNC: 169 MG/DL (ref 70–99)
GLUCOSE BLD-MCNC: 224 MG/DL
GLUCOSE BLD-MCNC: 224 MG/DL (ref 70–99)
HBA1C MFR BLD: 7.6 %
HCT VFR BLD CALC: 43.3 % (ref 40.5–52.5)
HEMOGLOBIN: 14.5 G/DL (ref 13.5–17.5)
LACTATE DEHYDROGENASE: 184 U/L (ref 100–190)
LYMPHOCYTES ABSOLUTE: 0.9 K/UL (ref 1–5.1)
LYMPHOCYTES RELATIVE PERCENT: 22 %
MCH RBC QN AUTO: 29.6 PG (ref 26–34)
MCHC RBC AUTO-ENTMCNC: 33.6 G/DL (ref 31–36)
MCV RBC AUTO: 88.2 FL (ref 80–100)
MONOCYTES ABSOLUTE: 0.6 K/UL (ref 0–1.3)
MONOCYTES RELATIVE PERCENT: 15.2 %
NEUTROPHILS ABSOLUTE: 2.7 K/UL (ref 1.7–7.7)
NEUTROPHILS RELATIVE PERCENT: 62.4 %
PDW BLD-RTO: 15.1 % (ref 12.4–15.4)
PERFORMED ON: ABNORMAL
PLATELET # BLD: 76 K/UL (ref 135–450)
PMV BLD AUTO: 6.8 FL (ref 5–10.5)
POTASSIUM REFLEX MAGNESIUM: 4.2 MMOL/L (ref 3.5–5.1)
RBC # BLD: 4.92 M/UL (ref 4.2–5.9)
SODIUM BLD-SCNC: 134 MMOL/L (ref 136–145)
TOTAL PROTEIN: 6.9 G/DL (ref 6.4–8.2)
WBC # BLD: 4.3 K/UL (ref 4–11)

## 2022-01-13 PROCEDURE — 1200000000 HC SEMI PRIVATE

## 2022-01-13 PROCEDURE — 94761 N-INVAS EAR/PLS OXIMETRY MLT: CPT

## 2022-01-13 PROCEDURE — 36415 COLL VENOUS BLD VENIPUNCTURE: CPT

## 2022-01-13 PROCEDURE — 6370000000 HC RX 637 (ALT 250 FOR IP): Performed by: HOSPITALIST

## 2022-01-13 PROCEDURE — 85025 COMPLETE CBC W/AUTO DIFF WBC: CPT

## 2022-01-13 PROCEDURE — 80048 BASIC METABOLIC PNL TOTAL CA: CPT

## 2022-01-13 PROCEDURE — 99221 1ST HOSP IP/OBS SF/LOW 40: CPT | Performed by: NURSE PRACTITIONER

## 2022-01-13 PROCEDURE — 83036 HEMOGLOBIN GLYCOSYLATED A1C: CPT

## 2022-01-13 PROCEDURE — 6370000000 HC RX 637 (ALT 250 FOR IP): Performed by: EMERGENCY MEDICINE

## 2022-01-13 PROCEDURE — 94640 AIRWAY INHALATION TREATMENT: CPT

## 2022-01-13 PROCEDURE — 80076 HEPATIC FUNCTION PANEL: CPT

## 2022-01-13 RX ORDER — CYCLOBENZAPRINE HCL 5 MG
5 TABLET ORAL 3 TIMES DAILY PRN
Status: ON HOLD | COMMUNITY
End: 2022-02-07 | Stop reason: HOSPADM

## 2022-01-13 RX ORDER — BUDESONIDE AND FORMOTEROL FUMARATE DIHYDRATE 160; 4.5 UG/1; UG/1
2 AEROSOL RESPIRATORY (INHALATION) 2 TIMES DAILY
Status: DISCONTINUED | OUTPATIENT
Start: 2022-01-13 | End: 2022-01-27 | Stop reason: HOSPADM

## 2022-01-13 RX ORDER — HYDROXYZINE HYDROCHLORIDE 25 MG/1
25 TABLET, FILM COATED ORAL 3 TIMES DAILY PRN
COMMUNITY
End: 2022-08-26

## 2022-01-13 RX ORDER — LORAZEPAM 1 MG/1
1 TABLET ORAL ONCE
Status: COMPLETED | OUTPATIENT
Start: 2022-01-13 | End: 2022-01-13

## 2022-01-13 RX ORDER — ALBUTEROL SULFATE 90 UG/1
2 AEROSOL, METERED RESPIRATORY (INHALATION) 2 TIMES DAILY
Status: DISCONTINUED | OUTPATIENT
Start: 2022-01-13 | End: 2022-01-27 | Stop reason: HOSPADM

## 2022-01-13 RX ADMIN — LORAZEPAM 1 MG: 1 TABLET ORAL at 04:03

## 2022-01-13 RX ADMIN — GABAPENTIN 200 MG: 100 CAPSULE ORAL at 00:39

## 2022-01-13 RX ADMIN — INSULIN GLARGINE 60 UNITS: 100 INJECTION, SOLUTION SUBCUTANEOUS at 22:12

## 2022-01-13 RX ADMIN — DIVALPROEX SODIUM 1000 MG: 500 TABLET, DELAYED RELEASE ORAL at 22:09

## 2022-01-13 RX ADMIN — TRAZODONE HYDROCHLORIDE 100 MG: 50 TABLET ORAL at 22:09

## 2022-01-13 RX ADMIN — BUDESONIDE AND FORMOTEROL FUMARATE DIHYDRATE 2 PUFF: 160; 4.5 AEROSOL RESPIRATORY (INHALATION) at 19:51

## 2022-01-13 RX ADMIN — GABAPENTIN 200 MG: 100 CAPSULE ORAL at 22:09

## 2022-01-13 NOTE — ED NOTES
Pt is still waiting on his ride home at this time. Pt hit call light stating he spilled his water all over himself and needs help getting cleaned up. Asked pt again how he expected to be able to take care of himself when he gets home, when he cannot do it here. Pt stated his friend will help him. And that he sleeps in his recliner and doesn't have this problem at home. Pt cleaned up and placed in new gown. Spoke with pt again instructing him that he should stay in hospital but continues to refuse stating he wants to go home with his dogs.      Bryon Conley RN  01/13/22 [id number]

## 2022-01-13 NOTE — ED NOTES
Repeat blood work collected at this time for morning labs. Pt took off all EKG leads at this time and pulled gown up to his neck so he is partially naked. Pt sleeping at this time, no signs of any distress.      Armani Lara RN  01/13/22 [id number]

## 2022-01-13 NOTE — ED NOTES
Pt yelled out for his brother Remy Lazaro at this time. Pt apologized and stated he dozed off and thought he was at home and was yelling for his brother to make sure he was there. Stated he can see now that he is not at home. Notified pt that I do not think his ride is coming, pt agreed. Notified him that I am going to put his IV back in that he pulled off. Instructed pt to not pull it out again and to leave his monitor leads and clothes on as well. States he usually sleeps naked at home. Instructed pt that he needs to keep his gown on here or he will freeze.      Farideh Abraham RN  01/13/22 [id number]

## 2022-01-13 NOTE — ED NOTES
Pt noted with large BM smeared to ER bed, floor and trash can. Upon further questioning, pt states \"I had to poop. \" Pt asked by ER staff why he did not use the bedside commode, states \"I don't know. \" Alexia care given to pt via ER staff x [address], RN  01/13/22 [id number]

## 2022-01-13 NOTE — ED NOTES
Pt stating his neighbor is coming to get him, because someone is coming to get him. Pt is alert and oriented X3 at this time. Stating he wants to get home to be with his dogs.      Jigar Riddle RN  01/13/22 [id number]

## 2022-01-13 NOTE — ED NOTES
Pt placed on bedpan at this time per request. Ivonne Truong he had to have BM.      Aleks Benton RN  01/13/22 [id number]

## 2022-01-13 NOTE — ED NOTES
Report called to Umu Abarca RN @ [facility] in [address] format     Brady Bolaños RN  01/13/22 4374

## 2022-01-13 NOTE — ED NOTES
Pt yelled out of help at this time. Stated he dropped his call light and he cannot reach it also stated he dropped his urinal as well and messed himself. Pt cleaned up and placed another pad on the bed. Pt given another warm blanket for comfort at this time. Also placed another pad between pt.'s legs just in case he needed it. Urinal placed within reach and pt denied any other needs at this time.      Mayela Licona RN  01/12/22 6915

## 2022-01-13 NOTE — ED NOTES
Pt hit call light at this time asking if his ride is here. Notified pt that his ride is not here.      Edna Lyons RN  01/13/22 6538

## 2022-01-13 NOTE — ED NOTES
Pt continues to refuse ordered meds. States \"I don't need that now. I will take it when I get settled. \" MD made aware.  Pt continues to await bed placement @ Josseline Briscoe RN  01/13/22 6521

## 2022-01-13 NOTE — ED NOTES
Pt refusing to be admitted, states \"I just want to go home. \" MD made aware.  Pts emergency contact, Mono Skaggs, called for transport home, per pts request.     Suni Hadley RN  01/13/22 1123

## 2022-01-13 NOTE — ED NOTES
Pt taken off of bedpan at this time and pt did not have bowel movement. Pt placed back in hospital gown after he striped himself naked. Pt given another warm blanket after throwing other one on the floor. Lights turned off and instructed pt to try and get some sleep. Pt stated he cannot sleep without his dogs.      Jaime Burdick RN  01/13/22 2277

## 2022-01-13 NOTE — ED NOTES
Pt hit call light again at this time asking whether his ride is here yet.   Notified pt that his ride is not here and I will let him know when he is.     Meghann Camilo RN  01/13/22 8714

## 2022-01-13 NOTE — ED NOTES
Pts friend, Zita Fernandez, in to ER to transport pt to home. Pt now refusing to go home, states \"I want to be admitted now. \" MD made aware     Roro Stout RN  01/13/22 1128

## 2022-01-13 NOTE — ED NOTES
Pt had another episode of incontinence at this time. Pt cleaned up and given new pad.      Natasha Russell, MAX  01/13/22 1717

## 2022-01-13 NOTE — ED PROVIDER NOTES
Emergency Department Attending Note    Robin Queen MD    Date of ED VIsit: 1/12/2022    CHIEF COMPLAINT  Fall (pt reports trying to get out of bed to use restroom, reports falling three times en route to bathroom. Denies any known injurys. Bruise to right flank. Per EMS pt has fallen approx 8 times in the last two days, pt lives at home alone.)      Maryuri Dominguez. is a 63 y.o. male  With Vital signs of /62   Pulse 87   Temp 98.6 °F (37 °C) (Oral)   Resp 14   Ht 5' 4\" (1.626 m)   Wt 241 lb (109.3 kg)   SpO2 97%   BMI 41.37 kg/m²  who presents to the ED with a complaint of multiple falls at home. Patient seen and evaluated in room 2. Patient reports that he has fell 8 times over the past 2days especially while trying to go to the bathroom. He has hit his right side and has an ecchymotic area over the right rib cage but has no rib tenderness or any shortness of breath. When asked about the falling the patient does not state that it feels like he is in a pass out it is just that he feels like his legs get weak and he falls to the ground. He has had some back work including an abrasion to his left sciatic nerve. Patient has not fallen and hit his head. There has been no loss of consciousness. No fevers chills no vomiting but he has been nauseated. No urinary complaints. No blood from down below. NIH stroke scale is documented below. .  No other complaints, modifying factors or associated symptoms.     Angela Jones Jr.'s NIH Stroke Scale at 7:16 PM is:  Level of Consciousness:  0 - alert; keenly responsive    LOC Questions:  0 - answers both questions correctly    LOC Commands:  0 - performs both tasks correctly    Best Gaze:  0 - normal    Visual Fields:  0 - no visual loss    Facial Palsy:  0 - normal symmetric movement    Motor-Arm-Left:  0 - no drift, limb holds 90 (or 45) degrees for full 10 seconds    Motor-Leg-Left:  0 - no drift; leg holds 30 degree position for full 5 seconds    Motor-Arm-Right:  0 - no drift, limb holds 90 (or 45) degrees for full 10 seconds    Motor-Leg-Right:  0 - no drift; leg holds 30 degree position for full 5 seconds    Limb Ataxia:  0 - absent    Sensory:  0 - normal; no sensory loss    Best Language:  0 - no aphasia, normal    Dysarthria:  0 - normal    Extinction and Inattention:  0 - no abnormality    NIH stroke scale of 0    Patients Past medical history reviewed and listed below  Past Medical History:   Diagnosis Date    Arthritis     Asthma     CAD (coronary artery disease)     COPD (chronic obstructive pulmonary disease) ([facility])     Dr. Elias Martínez at [facility] told the patient that he did not have COPD, just asthma    Emphysema of lung ([address]     Fatty liver     GERD (gastroesophageal reflux disease)     Hyperlipidemia     Hypertension     Medical history reviewed with no changes     MI, old     Sleep apnea     pt wears cpap at night. states does not have cpap    Type II or unspecified type diabetes mellitus without mention of complication, not stated as uncontrolled      Past Surgical History:   Procedure Laterality Date    CARDIAC PACEMAKER PLACEMENT  2011    NOT MRI COMPATIABLE OLD LEADS st [facility].  pace maker difib   [address] Bilateral 2013    CATARACT REMOVAL WITH IMPLANT Left 2/28/14    COLONOSCOPY      CORONARY ANGIOPLASTY WITH STENT PLACEMENT  2001,2008    CYST REMOVAL  1982    coccyx area    DIAGNOSTIC CARDIAC CATH LAB PROCEDURE      ENDOSCOPY, COLON, DIAGNOSTIC      ERCP  9/15/2013    ERCP  10/11/13    WITH STENT REMOVAL    FOOT SURGERY Right 07/09/2018     REPAIR CALCANEAL SPUR, REPAIR OF ACHILLES TENDON RIGHT FOOT    NERVE SURGERY Bilateral 12/1/2020    BILATERAL LUMBAR THREE LUMBAR FOUR LUMBAR FIVE DORSAL RAMUS  RADIOFREQUENCY ABLATION SITE CONFIRMED BY FLUOROSCOPY performed by Hector Farley MD at [address]      back stimulator in lower back    PACEMAKER PLACEMENT      ICD    PAIN MANAGEMENT PROCEDURE Bilateral 2020    BILATERAL LUMBAR THREE, LUMBAR FOUR, LUMBAR FIVE DORSAL RAMUS MEDIAL BRANCH BLOCK SITE CONFIRMED BY FLUOROSCOPY performed by Shelly St MD at [address] [facility] Bilateral 2020    BILATERAL LUMBAR THREE, LUMBAR FOUR, LUMBAR FIVE DORSAL RAMUS MEDIAL BRANCH BLOCK SITE CONFIRMED BY FLUOROSCOPY performed by Shelly St MD at [address] [facility] Left 2021    LEFT SACROILIAC JOINT INJECTION SITE CONFIRMED BY FLUOROSCOPY performed by Shelly St MD at [address] [facility] Right 10/31/2018    GASTROCNEMIUS RECESSION RIGHT FOOT performed by Luis Carlos Easton DPM at [address] [employer] [facility] Right 10/31/2018    REMOVAL OF CALCANEAL SPUR, ACHILLES TENDON REPAIR RIGHT LOWER EXTREMITY performed by Luis Carlos Easton DPM at [address]    and colonoscopy with biopsies taken    UPPER GASTROINTESTINAL ENDOSCOPY  13    EUS    UPPER GASTROINTESTINAL ENDOSCOPY  9/15/2013       I have reviewed the following from the nursing documentation. Family History   Problem Relation Age of Onset    Heart Disease Mother     Heart Failure Mother     Cancer Father     Diabetes Maternal Grandmother     Heart Failure Maternal Uncle     Hypertension Maternal Uncle     Asthma Neg Hx     Emphysema Neg Hx      Social History     Socioeconomic History    Marital status:       Spouse name: Not on file    Number of children: Not on file    Years of education: Not on file    Highest education level: Not on file   Occupational History    Not on file   Tobacco Use    Smoking status: Former Smoker     Packs/day: 2.00     Years: 20.00     Pack years: 40.00     Types: Cigarettes     Quit date: 2001     Years since quittin.6    Smokeless tobacco: Never Used    Tobacco comment: PASSIVE SMOKER Vaping Use    Vaping Use: Never used   Substance and Sexual Activity    Alcohol use: No     Alcohol/week: 0.0 standard drinks    Drug use: No    Sexual activity: Never   Other Topics Concern    Not on file   Social History Narrative    Not on file     Social Determinants of Health     Financial Resource Strain:     Difficulty of Paying Living Expenses: Not on file   Food Insecurity:     Worried About Running Out of Food in the Last Year: Not on file    Luis of Food in the Last Year: Not on file   Transportation Needs:     Lack of Transportation (Medical): Not on file    Lack of Transportation (Non-Medical):  Not on file   Physical Activity:     Days of Exercise per Week: Not on file    Minutes of Exercise per Session: Not on file   Stress:     Feeling of Stress : Not on file   Social Connections:     Frequency of Communication with Friends and Family: Not on file    Frequency of Social Gatherings with Friends and Family: Not on file    Attends Voodoo Services: Not on file    Active Member of [address] [place] [address] or Organizations: Not on file    Attends Club or Organization Meetings: Not on file    Marital Status: Not on file   Intimate Partner Violence:     Fear of Current or Ex-Partner: Not on file    Emotionally Abused: Not on file    Physically Abused: Not on file    Sexually Abused: Not on file   Housing Stability:     Unable to Pay for Housing in the Last Year: Not on file    Number of [place] in the Last Year: Not on file    Unstable Housing in the Last Year: Not on file     Current Facility-Administered Medications   Medication Dose Route Frequency Provider Last Rate Last Admin    0.9 % sodium chloride bolus  1,000 mL IntraVENous Once Yazmin Mcnulty MD         Current Outpatient Medications   Medication Sig Dispense Refill    clopidogrel (PLAVIX) 75 MG tablet TAKE ONE TABLET BY MOUTH DAILY 30 tablet 3    aspirin 325 MG EC tablet Take 1 tablet by mouth daily 30 tablet 3    albuterol (PROVENTIL) (2.5 MG/3ML) 0.083% nebulizer solution Take 3 mLs by nebulization every 4 hours as needed for Wheezing 120 each 3    traZODone (DESYREL) 100 MG tablet Take 100 mg by mouth nightly      Dulaglutide (TRULICITY) 9.88 TN/4.5KT SOPN Inject 0.75 mg into the skin once a week      furosemide (LASIX) 20 MG tablet TAKE ONE TABLET  ON Monday and Thursday 90 tablet 2    potassium chloride (KLOR-CON M) 20 MEQ extended release tablet TAKE ONE TABLET BY MOUTH WITH LASIX ON MONDAY AND THURSDAY 24 tablet 3    omega-3 acid ethyl esters (LOVAZA) 1 g capsule Take 2 capsules by mouth 2 times daily 360 capsule 3    insulin lispro (HUMALOG) 100 UNIT/ML injection vial Inject 10 Units into the skin 2 times daily Indications: 10 units in AM, 10 units at dinner      Omega-3 1000 MG CAPS Take 2 capsules by mouth 4 times daily      PROAIR  (90 Base) MCG/ACT inhaler Inhale 2 puffs into the lungs every 4 hours as needed      Fexofenadine HCl (ALLEGRA PO) Take by mouth      Esomeprazole Magnesium (NEXIUM PO) Take 40 mg by mouth       JARDIANCE 25 MG tablet Take 25 mg by mouth daily       insulin detemir (LEVEMIR) 100 UNIT/ML injection vial Inject 60 Units into the skin nightly       Fluticasone Furoate-Vilanterol (BREO ELLIPTA IN) Inhale into the lungs daily       metFORMIN (GLUCOPHAGE) 1000 MG tablet TAKE ONE TABLET BY MOUTH 2 TIMES DAILY WITH MORNING AND EVENING MEALS 60 tablet 0    famotidine (PEPCID) 20 MG tablet TAKE 1 TABLET BY MOUTH 2 TIMES DAILY 60 tablet 0    gabapentin (NEURONTIN) 100 MG capsule TAKE 2 CAPSULES BY MOUTH 3 TIMES DAILY 180 capsule 0    fluticasone (FLONASE) 50 MCG/ACT nasal spray USE 1 SPRAY IN EACH NOSTRIL EVERY DAY (Patient taking differently: 2 sprays daily ) 16 g 0    TRUE METRIX BLOOD GLUCOSE TEST strip CHECK SUGARS TWICE A DAY AS DIRECTED, DX: E11.9 100 each 5    lisinopril (PRINIVIL;ZESTRIL) 20 MG tablet TAKE 1 TABLET BY MOUTH DAILY 30 tablet 11    atorvastatin (LIPITOR) 80 MG tablet TAKE ONE TABLET BY MOUTH DAILY 30 tablet 11    UNIFINE PENTIPS 31G X 8 MM MISC USE AS DIRECTED FOR INSULIN .00 100 each 5    diltiazem (CARDIZEM CD) 180 MG extended release capsule TAKE 1 CAPSULE DAILY 30 capsule 11    metoprolol succinate (TOPROL XL) 200 MG extended release tablet TAKE ONE-HALF TABLET TWICE A DAY 30 tablet 11    TRUEPLUS LANCETS 30G MISC CHECK BLOOD SUGAR TWICE A DAY DX E11.9 100 each 11    DULoxetine (CYMBALTA) 60 MG extended release capsule Take 60 mg by mouth daily      divalproex (DEPAKOTE) 500 MG DR tablet Take 1,000 mg by mouth nightly      polyethylene glycol (GLYCOLAX) 17 GM/SCOOP powder Use as directed for coloscopy prep 238 g 0    bisacodyl (BISACODYL) 5 MG EC tablet Take all four tablets day before colonscopy 4 tablet 0    polyethylene glycol (MIRALAX) 17 GM/SCOOP POWD powder Take 238 g by mouth daily Take as directed for colonoscopy 255 g 0     Allergies   Allergen Reactions    Other      VASCEPA CAUSED RASH AND ITCHING    Pcn [Penicillins] Hives       REVIEW OF SYSTEMS  10 systems reviewed, pertinent positives per HPI otherwise noted to be negative    PHYSICAL EXAM  /62   Pulse 87   Temp 98.6 °F (37 °C) (Oral)   Resp 14   Ht 5' 4\" (1.626 m)   Wt 241 lb (109.3 kg)   SpO2 97%   BMI 41.37 kg/m²   GENERAL APPEARANCE: Awake and alert. Cooperative. In no obvious distress. HEAD: Normocephalic. Atraumatic. EYES: PERRL. EOM's grossly intact. ENT: Mucous membranes are pink and moist.   NECK: Supple. HEART: RRR. No murmurs. LUNGS: Respirations unlabored. CTAB. Good air exchange. ABDOMEN: Soft. Non-distended. Non-tender. No masses. No organomegaly. No guarding or rebound. EXTREMITIES: No peripheral edema. Moves all extremities equally. All extremities neurovascularly intact. SKIN: Warm and dry. No acute rashes. NEUROLOGICAL: Alert and oriented.  There seems to be a mild weakness in the left leg which the patient says is normal and has been there since he has had the ablation to his sciatic nerve on that side. . Strength 5/5, sensation intact. Gait normal.   PSYCHIATRIC: Normal mood and affect. No HI or SI expressed to me. RADIOLOGY    If acquired see below     EKG:     If acquired see below       ED COURSE/MDM      ED Course as of 01/13/22 0614   Wed Jan 12, 2022 2013 CBC Auto Differential(!):    WBC 4.7   RBC 5.04   Hemoglobin Quant 14.8   Hematocrit 44.1   MCV 87.4   MCH 29.4   MCHC 33.7   RDW 15.3   Platelet Count 86(!)   MPV 6.9   Neutrophils % 56.1   Lymphocyte % 29.1   Monocytes % 14.0   Eosinophils % 0.3   Basophils % 0.5   Neutrophils Absolute 2.6   Lymphocytes Absolute 1.4   Monocytes Absolute 0.7   Eosinophils Absolute 0.0   Basophils Absolute 0.0  CBC was essentially normal. [DL]   2013 COVID-19, Rapid(!!):    SARS-CoV-2, NAAT DETECTED(!!)  Patient is COVID positive [DL]   2014 Comprehensive Metabolic Panel w/ Reflex to MG(!):    Sodium 135(!)   Potassium 4.5   Chloride 97(!)   CO2 20(!)   Anion Gap 18(!)   Glucose 220(!)   BUN 12   Creatinine 0.8(!)   GFR Non- >60   GFR  >60   CALCIUM, SERUM, 948649 10.1   Total Protein 7.3   Albumin 4.6   Albumin/Globulin Ratio 1.7   Bilirubin 0.5   Alk Phos 72   ALT 33   AST 27 [DL]   2014 Urinalysis, reflex to microscopic [DL]   2014 Comprehensive Metabolic Panel w/ Reflex to MG(!):    Sodium 135(!)   Potassium 4.5   Chloride 97(!)   CO2 20(!)   Anion Gap 18(!)   Glucose 220(!)   BUN 12   Creatinine 0.8(!)   GFR Non- >60   GFR  >60   CALCIUM, SERUM, 826716 10.1   Total Protein 7.3   Albumin 4.6   Albumin/Globulin Ratio 1.7   Bilirubin 0.5   Alk Phos 72   ALT 33   AST 27  Comprehensive metabolic panel reveals a sodium of 135 chloride of 97 CO2 of 20 with a glucose of 220 anion gap of 18 his creatinine was 0.8 with a GFR greater than 60 [DL]   2015 XR CHEST PORTABLE  FINDINGS:  Frontal view of the chest.  Normal lung volume. No focal airspace disease. Normal pulmonary vasculature. No pleural effusion or pneumothorax. Stable  cardiomediastinal silhouette and great vessels with redemonstration of  cardiomegaly. Stable left pectoral transvenous cardiac pacer/ICD. Multilevel degenerative disc disease.     IMPRESSION:  No acute cardiopulmonary process.    [DL]   2027    IMPRESSION:  No acute intracranial abnormality.    [DL]   2137 CBC Auto Differential(!):    WBC 4.7   RBC 5.04   Hemoglobin Quant 14.8   Hematocrit 44.1   MCV 87.4   MCH 29.4   MCHC 33.7   RDW 15.3   Platelet Count 86(!)   MPV 6.9   PLATELET SLIDE REVIEW Decreased   SLIDE REVIEW see below   Neutrophils % 56.1   Lymphocyte % 29.1   Monocytes % 14.0   Eosinophils % 0.3   Basophils % 0.5   Neutrophils Absolute 2.6   Lymphocytes Absolute 1.4   Monocytes Absolute 0.7   Eosinophils Absolute 0.0   Basophils Absolute 0.0  CBC was normal on this patient [DL]   2157 Urinalysis, reflex to microscopic(!):    Color, UA Yellow   Clarity, UA Clear   Glucose, UA >=1000(!)   Bilirubin, Urine Negative   Ketones, Urine 15(!)   Specific Gravity, UA 1.015   Blood, Urine Negative   pH, UA 5.0   Protein, UA Negative   Urobilinogen, Urine 0.2   Nitrite, Urine Negative   Leukocyte Esterase, Urine Negative   Microscopic Examination Not Indicated   Urine Type NotGiven  Urinalysis reveals glucose greater than thousand ketones of 15 but negative nitrite negative leukocyte esterase.  [DL]   7885 Blood Gas, Venous(!):    pH, Reji 7.409   pCO2, Reji 38.4(!)   pO2, Reji 29.5   HCO3, Venous 23.7   Base Excess, Reji -0.6   O2 Sat, Reji 57   Carboxyhemoglobin 1.7(!)   MetHgb, Reji 0.3   TC02 (Calc), Reji 25   O2 Therapy Unknown  Venous blood gas revealed a pH of 7.409 with a PCO2 of 38 PO2 of 29 bicarb of 23 and a base deficit of 0.6 [DL]      ED Course User Index  [DL] Elijah Chang MD       The ED course and plan were reviewed and results discussed with the patient who is in agreement for admission    The patient understood and agreed with the Discharge/transfer planning. CLINICAL IMPRESSION and [address]. was stable and diagnosed with multiple falls and inability to walk along with COVID positivity    Patient was admitted/transferred to [facility] in the morning. I spoke to Dr. Leta Chaparro and reviewed the patients labs and radiographic images, who accepted the continuity of this patients care.          Nichelle Hardy MD  01/12/22 3863       Nichelle Hardy MD  01/13/22 9493

## 2022-01-13 NOTE — H&P
Hospital Medicine History & Physical      PCP: Abril Forrest PA-C    Date of Admission: 2022    Date of Service: Pt seen/examined on 22     Chief Complaint:    Chief Complaint   Patient presents with   [facility] Fall     pt reports trying to get out of bed to use restroom, reports falling three times en route to bathroom. Denies any known injurys. Bruise to right flank. Per EMS pt has fallen approx 8 times in the last two days, pt lives at home alone. History Of Present Illness: The patient is a 63 y.o. male with DMII, Chronic sCHF, CAD s/p stent placement x3, hx of MI, asthma, HTN, HLD, GERD, lumbar spondylosis and DDD s/p bilateral dorsal ramus medial branch ablation and interstim implant, depression who presented to [address] ED with complaint of generalized weakness ongoing for approximately 2 months and frequent falls. Patient is awake and alert appears to be a poor historian. He stated that I could call his brother for collateral information, brother did not answer but Sima Madsen was on emergency contacts. I was able to talk with Sima Madsen who stated that patient is usually very active he remains to drive. He stated patient has been depressed since his wife . Patient stated that he has a walker at home when he does not use it. Patient denies any chest pain, palpitations, dizziness or lightheadedness prior to these falls. He stated his legs are weak and they just give out. He denies any increase in pain since his falls. He denies any incontinent episodes of bowel or bladder. He does follow with Dr. Chloe Jane for lumbar spndylosis and DDD. He denies any shortness of breath, does have a cough. He stated his cough is dry, he is not able to cough anything up. In ED, CT of the head showed no acute abnormality. Chest x-ray with no acute cardiopulmonary process. Patient did test positive for COVID. He is vaccinated x2, no booster. Patient admitted to med-surg for further care. PT/OT consulted. Past Medical History:        Diagnosis Date    Arthritis     Asthma     CAD (coronary artery disease)     COPD (chronic obstructive pulmonary disease) ([facility])     Dr. Claudeen Curl at [facility] told the patient that he did not have COPD, just asthma    Emphysema of lung ([address]     Fatty liver     GERD (gastroesophageal reflux disease)     Hyperlipidemia     Hypertension     Medical history reviewed with no changes     MI, old     Sleep apnea     pt wears cpap at night. states does not have cpap    Type II or unspecified type diabetes mellitus without mention of complication, not stated as uncontrolled        Past Surgical History:        Procedure Laterality Date    CARDIAC PACEMAKER PLACEMENT  2011    NOT MRI COMPATIABLE OLD LE[employer] [facility].  pace maker difib    CARPAL TUNNEL RELEASE Bilateral 2013    CATARACT REMOVAL WITH IMPLANT Left 2/28/14    COLONOSCOPY      CORONARY ANGIOPLASTY WITH STENT PLACEMENT  2001,2008    CYST REMOVAL  1982    coccyx area    DIAGNOSTIC CARDIAC CATH LAB PROCEDURE      ENDOSCOPY, COLON, DIAGNOSTIC      ERCP  9/15/2013    ERCP  10/11/13    WITH STENT REMOVAL    FOOT SURGERY Right 07/09/2018     REPAIR CALCANEAL SPUR, REPAIR OF ACHILLES TENDON RIGHT FOOT    NERVE SURGERY Bilateral 12/1/2020    BILATERAL LUMBAR THREE LUMBAR FOUR LUMBAR FIVE DORSAL RAMUS  RADIOFREQUENCY ABLATION SITE CONFIRMED BY FLUOROSCOPY performed by Jaxon Tompkins MD at [address]      back stimulator in lower back    PACEMAKER PLACEMENT      ICD    PAIN MANAGEMENT PROCEDURE Bilateral 5/26/2020    BILATERAL LUMBAR THREE, LUMBAR FOUR, LUMBAR FIVE DORSAL RAMUS MEDIAL BRANCH BLOCK SITE CONFIRMED BY FLUOROSCOPY performed by Jaxon Tompkins MD at [facility] Bilateral 6/9/2020    BILATERAL LUMBAR THREE, LUMBAR FOUR, LUMBAR FIVE DORSAL RAMUS MEDIAL BRANCH BLOCK SITE CONFIRMED BY FLUOROSCOPY performed by Jaxon Tompkins MD at [facility]    PAIN MANAGEMENT PROCEDURE Left 1/12/2021    LEFT SACROILIAC JOINT INJECTION SITE CONFIRMED BY FLUOROSCOPY performed by Taz Moreno MD at [facility] Right 10/31/2018    GASTROCNEMIUS RECESSION RIGHT FOOT performed by Ruperto Triana DPM at [facility] Right 10/31/2018    REMOVAL OF CALCANEAL SPUR, ACHILLES TENDON REPAIR RIGHT LOWER EXTREMITY performed by Ruperto Triana DPM at [address]  7/18/13    and colonoscopy with biopsies taken    UPPER GASTROINTESTINAL ENDOSCOPY  8/23/13    EUS    UPPER GASTROINTESTINAL ENDOSCOPY  9/15/2013       Medications Prior to Admission:    Prior to Admission medications    Medication Sig Start Date End Date Taking?  Authorizing Provider   clopidogrel (PLAVIX) 75 MG tablet TAKE ONE TABLET BY MOUTH DAILY 1/5/22  Yes Mariel Reynolds MD   aspirin 325 MG EC tablet Take 1 tablet by mouth daily 7/15/21  Yes Mariel Reynolds MD   albuterol (PROVENTIL) (2.5 MG/3ML) 0.083% nebulizer solution Take 3 mLs by nebulization every 4 hours as needed for Wheezing 7/14/21  Yes Mariel Reynolds MD   traZODone (DESYREL) 100 MG tablet Take 100 mg by mouth nightly   Yes Historical Provider, MD   Dulaglutide (TRULICITY) 1.52 MH/6.9YG SOPN Inject 0.75 mg into the skin once a week   Yes Historical Provider, MD   furosemide (LASIX) 20 MG tablet TAKE ONE TABLET  ON Monday and Thursday 6/18/21  Yes Mariel Reynolds MD   potassium chloride (KLOR-CON M) 20 MEQ extended release tablet TAKE ONE TABLET BY MOUTH WITH LASIX ON MONDAY AND THURSDAY 6/18/21  Yes Mariel Reynolds MD   omega-3 acid ethyl esters (LOVAZA) 1 g capsule Take 2 capsules by mouth 2 times daily 5/27/21  Yes Mariel eRynolds MD   insulin lispro (HUMALOG) 100 UNIT/ML injection vial Inject 10 Units into the skin 2 times daily Indications: 10 units in AM, 10 units at dinner 4/27/21  Yes Avery Dubois MD   Omega-3 1000 MG CAPS Take 2 capsules by mouth 4 times daily   Yes Historical Provider, MD   PROAIR  (90 Base) MCG/ACT inhaler Inhale 2 puffs into the lungs every 4 hours as needed 2/9/21  Yes Historical Provider, MD   Fexofenadine HCl (ALLEGRA PO) Take by mouth   Yes Historical Provider, MD   Esomeprazole Magnesium (NEXIUM PO) Take 40 mg by mouth    Yes Historical Provider, MD   JARDIANCE 25 MG tablet Take 25 mg by mouth daily  2/18/19  Yes Historical Provider, MD   insulin detemir (LEVEMIR) 100 UNIT/ML injection vial Inject 60 Units into the skin nightly    Yes Historical Provider, MD   Fluticasone Furoate-Vilanterol (BREO ELLIPTA IN) Inhale into the lungs daily    Yes Historical Provider, MD   metFORMIN (GLUCOPHAGE) 1000 MG tablet TAKE ONE TABLET BY MOUTH 2 TIMES DAILY WITH MORNING AND EVENING MEALS 9/26/17  Yes ANA Fair CNP   famotidine (PEPCID) 20 MG tablet TAKE 1 TABLET BY MOUTH 2 TIMES DAILY 9/26/17  Yes ANA Fair CNP   gabapentin (NEURONTIN) 100 MG capsule TAKE 2 CAPSULES BY MOUTH 3 TIMES DAILY 9/26/17  Yes ANA Fair CNP   fluticasone (FLONASE) 50 MCG/ACT nasal spray USE 1 SPRAY IN EACH NOSTRIL EVERY DAY  Patient taking differently: 2 sprays daily  9/13/17  Yes ANA Griffith CNP   TRUE METRIX BLOOD GLUCOSE TEST strip CHECK SUGARS TWICE A DAY AS DIRECTED, DX: E11.9 6/9/17  Yes ANA Griffith CNP   lisinopril (PRINIVIL;ZESTRIL) 20 MG tablet TAKE 1 TABLET BY MOUTH DAILY 2/20/17  Yes ANA Pond CNP   atorvastatin (LIPITOR) 80 MG tablet TAKE ONE TABLET BY MOUTH DAILY 2/20/17  Yes ANA Pond CNP   UNIFINE PENTIPS 31G X 8 MM MISC USE AS DIRECTED FOR INSULIN .00 2/20/17  Yes ANA Pond CNP   diltiazem (CARDIZEM CD) 180 MG extended release capsule TAKE 1 CAPSULE DAILY 2/8/17  Yes ANA Pond CNP   metoprolol succinate (TOPROL XL) 200 MG extended release tablet TAKE ONE-HALF TABLET TWICE A DAY 2/8/17  Yes NAA Pond CNP   TRUEPLUS LANCETS 30G MISC CHECK BLOOD SUGAR TWICE A DAY DX E11.9 2/8/17  Yes ANA Pond CNP   DULoxetine (CYMBALTA) 60 MG extended release capsule Take 60 mg by mouth daily   Yes Historical Provider, MD   divalproex (DEPAKOTE) 500 MG DR tablet Take 1,000 mg by mouth nightly   Yes Historical Provider, MD   polyethylene glycol (GLYCOLAX) 17 GM/SCOOP powder Use as directed for coloscopy prep 11/12/21   Barry Pedro MD   bisacodyl (BISACODYL) 5 MG EC tablet Take all four tablets day before colonscopy 11/12/21   Barry Pedro MD   polyethylene glycol (MIRALAX) 17 GM/SCOOP POWD powder Take 238 g by mouth daily Take as directed for colonoscopy 11/9/21   Barry Pedro MD       Allergies: Other and Pcn [penicillins]    Social History:  The patient currently lives home alone     TOBACCO:   reports that he quit smoking about 20 years ago. His smoking use included cigarettes. He has a 40.00 pack-year smoking history. He has never used smokeless tobacco.  ETOH:   reports no history of alcohol use.       Family History:   Positive as follows:        Problem Relation Age of Onset    Heart Disease Mother     Heart Failure Mother     Cancer Father     Diabetes Maternal Grandmother     Heart Failure Maternal Uncle     Hypertension Maternal Uncle     Asthma Neg Hx     Emphysema Neg Hx        REVIEW OF SYSTEMS:     Constitutional: Negative for fever +genearlzied weakness, fatigue  HENT: Negative for sore throat   Eyes: Negative for redness   Respiratory: Negative  for dyspnea,  +cough   Cardiovascular: Negative for chest pain   Gastrointestinal: Negative for vomiting, diarrhea   Genitourinary: Negative for hematuria   Musculoskeletal: +chronic back pain   Skin: Negative for rash   Neurological: Negative for syncope   Hematological: Negative for adenopathy   Psychiatric/Behavorial: Negative for anxiety    PHYSICAL EXAM:    /76   Pulse 70   Temp 98.6 °F (37 °C) (Oral) Resp 18   Ht 5' 4\" (1.626 m)   Wt 241 lb (109.3 kg)   SpO2 98%   BMI 41.37 kg/m²     Gen: No distress. Alert. Appears older than stated age  Eyes: PERRL. No sclera icterus. No conjunctival injection. ENT: No discharge. Pharynx clear. Neck: No JVD. Trachea midline. Resp: No accessory muscle use. No crackles. No wheezes. No rhonchi. CV: Regular rate. Regular rhythm. No murmur. No rub. No edema. Capillary Refill: Brisk,< 3 seconds   Peripheral Pulses: +2 palpable, equal bilaterally   GI: Non-tender. Non-distended. No masses. No organomegaly. Normal bowel sounds. No hernia. Skin: Warm and dry. No nodule on exposed extremities. No rash on exposed extremities. ecchymosis on right rib cage, no tenderness upon palpation   M/S: No cyanosis. No joint deformity. No clubbing. Neuro: Awake. Grossly nonfocal, BLE with generalized weakness, neurovascularly intact  Psych: Oriented x 3. No anxiety or agitation. CBC:   Recent Labs     01/12/22 2007 01/13/22  0730   WBC 4.7 4.3   HGB 14.8 14.5   HCT 44.1 43.3   MCV 87.4 88.2   PLT 86* 76*     BMP:   Recent Labs     01/12/22 1840 01/13/22  0730   * 134*   K 4.5 4.2   CL 97* 96*   CO2 20* 21   BUN 12 10   CREATININE 0.8* 0.7*     LIVER PROFILE:   Recent Labs     01/12/22 1840 01/13/22  0730   AST 27 22   ALT 33 28   BILIDIR  --  <0.2   BILITOT 0.5 0.6   ALKPHOS 72 60     UA:  Recent Labs     01/12/22  2150   COLORU Yellow   PHUR 5.0   CLARITYU Clear   SPECGRAV 1.015   LEUKOCYTESUR Negative   UROBILINOGEN 0.2   BILIRUBINUR Negative   BLOODU Negative   GLUCOSEU >=1000*        U/A:    Lab Results   Component Value Date    COLORU Yellow 01/12/2022    CLARITYU Clear 01/12/2022    SPECGRAV 1.015 01/12/2022    LEUKOCYTESUR Negative 01/12/2022    BLOODU Negative 01/12/2022    GLUCOSEU >=1000 01/12/2022     CULTURES  Results for Jc Palomo (MRN [id number]) as of 1/13/2022 13:29   Ref.  Range 1/12/2022 19:35   SARS-CoV-2, NAAT Latest Ref Range: Not Detected  DETECTED (AA)       EKG:  Normal sinus rhythm  Inferior infarct , age undetermined  Nonspecific ST and T wave abnormality  Abnormal ECG  Confirmed by [facility] - ZUNILDA FU MD ([id number]) on 7/15/2021 1:29:22 PM    RADIOLOGY  CT Head WO Contrast   Final Result   No acute intracranial abnormality. XR CHEST PORTABLE   Final Result   No acute cardiopulmonary process. Pertinent previous results reviewed   Diagnostic cardiac cath 7/13/21  CONCLUSION:  Successful stenting of the true circumflex into the obtuse  marginal branch, successful stenting of the left posterior descending  coronary artery and successful stenting of the obtuse marginal branch,  all 80% stenosis reduced to 0 with these three drug-eluting stents. JAMES-3 blood flow was present in the circumflex artery and all its  branches. The LAD is normal with minor irregularities only and the left  main is normal.  Nondominant right coronary artery. LV ejection  fraction of 40% with inferior wall and inferoposterior hypokinesia. ECHO 11/27/19  Summary   The left ventricular systolic function is moderately reduced with an   ejection fraction of 35 -40 %. There is hypokinesis of the inferior and basal inferior walls. There is akinesis of the inferolateral wall. Left ventricular cavity size is moderately dilated. Compared to previous study from 2-2-2015 no changes noted in left   ventricular function. Mild mitral regurgitation.     ASSESSMENT/PLAN:  #Debility and weakness  #Multiple Falls  -patient stated that his legs give out which makes him fall  -PT/OT evaluation  -CT of head showed no acute abnormaility  -check orthostatic blood pressure and interrogate pacemaker/ICD    #COVID 19  -Positive 1/12/22  -only complaints is a cough  -patient is vaccinated x2- no booster  -Not hypoxic  -No covid specific treatment indicated at this time  -Monitor, continuous pulse ox and telemetry     #DMII  - not controlled noted to have >1000 glucose in urine  -Hold home oral meds  -Continue lantus 60 nightly  -Low dose SSI  -POCT glucose  -CC diet   - add A1C    #Thrombocytopenia  -Plt 86 -> 76  -Monitor CBC while on lovenox      #CAD s/p stent placement x3  #Hx of MI  # s/p single ICD- not MRI compatible per notes  Hx of VTACH  - ASA, statin, plavix  - denies chest pain or palpitations   - interrogation of  ICD on 1/10 showed x1 episode of NSVT  - interrogate pacemaker today  - place on telemetry     #Chronic sCHF no AE   -Last echo 11/27/19, EF 35-40%  -Pacemaker/ICD in place  -Continue ACEi, toprol XL, cardizem  - patient takes Lasix on Monday and Thursday - holding at this time     #Asthma no AE  -Symbicort  -albuterol prn  - on room air    #HTN  -BP stable  -Continue home meds  -Monitor BP    #HLD  -continue statin    #GERD  -continue ppi    #Lumbar spondylosis and DDD   -s/p bilateral dorsal ramus medial branch ablation and interstim implant  -PDMP displays on chronic gabapentin, continue  - follows with Dr. Roz Valentin     #Tremors  - ongoing, noted admission in 4/21        #ALIN  - unsure if patient is compliant with CPAP ? If he still has CPAP at home    Morbid Obesity  - Body mass index is 41.37 kg/m². - Complicating assessment and treatment. Placing patient at risk for multiple co-morbidities as well as early death and contributing to the patient's presentation.   - Counseled on weight loss. DVT Prophylaxis: Lovenox  Diet: ADULT DIET;  Regular; 3 carb choices (45 gm/meal)  Code Status: Full Code    ANA Tovar - JENSEN  01/13/22

## 2022-01-13 NOTE — ED NOTES
Pt refusing repeat accucheck. States \"I'm OK. \" MD made aware. [facility] here to transport pt.  Pt remains alert and without c/o's     Misty Phelps RN  01/13/22 [id number]

## 2022-01-14 LAB
ANION GAP SERPL CALCULATED.3IONS-SCNC: 21 MMOL/L (ref 3–16)
ANION GAP SERPL CALCULATED.3IONS-SCNC: 29 MMOL/L (ref 3–16)
BASOPHILS ABSOLUTE: 0 K/UL (ref 0–0.2)
BASOPHILS RELATIVE PERCENT: 0.5 %
BUN BLDV-MCNC: 12 MG/DL (ref 7–20)
BUN BLDV-MCNC: 18 MG/DL (ref 7–20)
CALCIUM SERPL-MCNC: 9.4 MG/DL (ref 8.3–10.6)
CALCIUM SERPL-MCNC: 9.5 MG/DL (ref 8.3–10.6)
CHLORIDE BLD-SCNC: 91 MMOL/L (ref 99–110)
CHLORIDE BLD-SCNC: 93 MMOL/L (ref 99–110)
CO2: 10 MMOL/L (ref 21–32)
CO2: 14 MMOL/L (ref 21–32)
CREAT SERPL-MCNC: 0.9 MG/DL (ref 0.9–1.3)
CREAT SERPL-MCNC: 0.9 MG/DL (ref 0.9–1.3)
EKG ATRIAL RATE: 106 BPM
EKG DIAGNOSIS: NORMAL
EKG P AXIS: 65 DEGREES
EKG P-R INTERVAL: 172 MS
EKG Q-T INTERVAL: 330 MS
EKG QRS DURATION: 102 MS
EKG QTC CALCULATION (BAZETT): 438 MS
EKG R AXIS: -38 DEGREES
EKG T AXIS: 122 DEGREES
EKG VENTRICULAR RATE: 106 BPM
EOSINOPHILS ABSOLUTE: 0 K/UL (ref 0–0.6)
EOSINOPHILS RELATIVE PERCENT: 0 %
ESTIMATED AVERAGE GLUCOSE: 165.7 MG/DL
GFR AFRICAN AMERICAN: >60
GFR AFRICAN AMERICAN: >60
GFR NON-AFRICAN AMERICAN: >60
GFR NON-AFRICAN AMERICAN: >60
GLUCOSE BLD-MCNC: 144 MG/DL (ref 70–99)
GLUCOSE BLD-MCNC: 180 MG/DL (ref 70–99)
GLUCOSE BLD-MCNC: 193 MG/DL (ref 70–99)
GLUCOSE BLD-MCNC: 217 MG/DL (ref 70–99)
GLUCOSE BLD-MCNC: 219 MG/DL (ref 70–99)
GLUCOSE BLD-MCNC: 240 MG/DL (ref 70–99)
GLUCOSE BLD-MCNC: 242 MG/DL (ref 70–99)
HBA1C MFR BLD: 7.4 %
HCT VFR BLD CALC: 49.3 % (ref 40.5–52.5)
HEMOGLOBIN: 16 G/DL (ref 13.5–17.5)
LYMPHOCYTES ABSOLUTE: 1.4 K/UL (ref 1–5.1)
LYMPHOCYTES RELATIVE PERCENT: 25.3 %
MCH RBC QN AUTO: 28.8 PG (ref 26–34)
MCHC RBC AUTO-ENTMCNC: 32.6 G/DL (ref 31–36)
MCV RBC AUTO: 88.4 FL (ref 80–100)
MONOCYTES ABSOLUTE: 1 K/UL (ref 0–1.3)
MONOCYTES RELATIVE PERCENT: 17.8 %
NEUTROPHILS ABSOLUTE: 3.1 K/UL (ref 1.7–7.7)
NEUTROPHILS RELATIVE PERCENT: 56.4 %
PDW BLD-RTO: 15.5 % (ref 12.4–15.4)
PERFORMED ON: ABNORMAL
PLATELET # BLD: 97 K/UL (ref 135–450)
PMV BLD AUTO: 7 FL (ref 5–10.5)
POTASSIUM REFLEX MAGNESIUM: 4.3 MMOL/L (ref 3.5–5.1)
POTASSIUM SERPL-SCNC: 4.6 MMOL/L (ref 3.5–5.1)
RBC # BLD: 5.57 M/UL (ref 4.2–5.9)
SODIUM BLD-SCNC: 126 MMOL/L (ref 136–145)
SODIUM BLD-SCNC: 132 MMOL/L (ref 136–145)
WBC # BLD: 5.5 K/UL (ref 4–11)

## 2022-01-14 PROCEDURE — 97530 THERAPEUTIC ACTIVITIES: CPT

## 2022-01-14 PROCEDURE — 93010 ELECTROCARDIOGRAM REPORT: CPT | Performed by: INTERNAL MEDICINE

## 2022-01-14 PROCEDURE — 97162 PT EVAL MOD COMPLEX 30 MIN: CPT

## 2022-01-14 PROCEDURE — 1200000000 HC SEMI PRIVATE

## 2022-01-14 PROCEDURE — 99232 SBSQ HOSP IP/OBS MODERATE 35: CPT | Performed by: INTERNAL MEDICINE

## 2022-01-14 PROCEDURE — 97535 SELF CARE MNGMENT TRAINING: CPT

## 2022-01-14 PROCEDURE — 93005 ELECTROCARDIOGRAM TRACING: CPT | Performed by: INTERNAL MEDICINE

## 2022-01-14 PROCEDURE — 6370000000 HC RX 637 (ALT 250 FOR IP): Performed by: INTERNAL MEDICINE

## 2022-01-14 PROCEDURE — 85025 COMPLETE CBC W/AUTO DIFF WBC: CPT

## 2022-01-14 PROCEDURE — 97166 OT EVAL MOD COMPLEX 45 MIN: CPT

## 2022-01-14 PROCEDURE — 80048 BASIC METABOLIC PNL TOTAL CA: CPT

## 2022-01-14 PROCEDURE — 6370000000 HC RX 637 (ALT 250 FOR IP): Performed by: HOSPITALIST

## 2022-01-14 PROCEDURE — 2500000003 HC RX 250 WO HCPCS: Performed by: INTERNAL MEDICINE

## 2022-01-14 PROCEDURE — 94761 N-INVAS EAR/PLS OXIMETRY MLT: CPT

## 2022-01-14 PROCEDURE — 94640 AIRWAY INHALATION TREATMENT: CPT

## 2022-01-14 PROCEDURE — 6360000002 HC RX W HCPCS: Performed by: HOSPITALIST

## 2022-01-14 PROCEDURE — 36415 COLL VENOUS BLD VENIPUNCTURE: CPT

## 2022-01-14 PROCEDURE — 2580000003 HC RX 258: Performed by: HOSPITALIST

## 2022-01-14 PROCEDURE — 2580000003 HC RX 258: Performed by: INTERNAL MEDICINE

## 2022-01-14 RX ORDER — TRAZODONE HYDROCHLORIDE 100 MG/1
100 TABLET ORAL NIGHTLY PRN
Status: DISCONTINUED | OUTPATIENT
Start: 2022-01-14 | End: 2022-01-27 | Stop reason: HOSPADM

## 2022-01-14 RX ADMIN — DULOXETINE HYDROCHLORIDE 60 MG: 60 CAPSULE, DELAYED RELEASE ORAL at 09:26

## 2022-01-14 RX ADMIN — GABAPENTIN 200 MG: 100 CAPSULE ORAL at 12:21

## 2022-01-14 RX ADMIN — LISINOPRIL 20 MG: 10 TABLET ORAL at 09:26

## 2022-01-14 RX ADMIN — Medication 2 PUFF: at 07:39

## 2022-01-14 RX ADMIN — GABAPENTIN 200 MG: 100 CAPSULE ORAL at 09:26

## 2022-01-14 RX ADMIN — SODIUM CHLORIDE, PRESERVATIVE FREE 10 ML: 5 INJECTION INTRAVENOUS at 21:34

## 2022-01-14 RX ADMIN — Medication 2 PUFF: at 19:26

## 2022-01-14 RX ADMIN — DIVALPROEX SODIUM 1000 MG: 500 TABLET, DELAYED RELEASE ORAL at 21:33

## 2022-01-14 RX ADMIN — INSULIN GLARGINE 60 UNITS: 100 INJECTION, SOLUTION SUBCUTANEOUS at 21:36

## 2022-01-14 RX ADMIN — CLOPIDOGREL BISULFATE 75 MG: 75 TABLET ORAL at 09:26

## 2022-01-14 RX ADMIN — TRAZODONE HYDROCHLORIDE 100 MG: 100 TABLET ORAL at 21:34

## 2022-01-14 RX ADMIN — ENOXAPARIN SODIUM 40 MG: 100 INJECTION SUBCUTANEOUS at 09:25

## 2022-01-14 RX ADMIN — ATORVASTATIN CALCIUM 80 MG: 40 TABLET, FILM COATED ORAL at 09:26

## 2022-01-14 RX ADMIN — ACETAMINOPHEN 650 MG: 325 TABLET ORAL at 02:59

## 2022-01-14 RX ADMIN — GABAPENTIN 200 MG: 100 CAPSULE ORAL at 21:33

## 2022-01-14 RX ADMIN — SODIUM CHLORIDE, PRESERVATIVE FREE 10 ML: 5 INJECTION INTRAVENOUS at 09:24

## 2022-01-14 RX ADMIN — ACETAMINOPHEN 650 MG: 325 TABLET ORAL at 21:34

## 2022-01-14 RX ADMIN — SODIUM BICARBONATE: 84 INJECTION, SOLUTION INTRAVENOUS at 10:48

## 2022-01-14 RX ADMIN — BUDESONIDE AND FORMOTEROL FUMARATE DIHYDRATE 2 PUFF: 160; 4.5 AEROSOL RESPIRATORY (INHALATION) at 19:26

## 2022-01-14 RX ADMIN — PANTOPRAZOLE SODIUM 40 MG: 40 TABLET, DELAYED RELEASE ORAL at 09:26

## 2022-01-14 RX ADMIN — METOPROLOL SUCCINATE 100 MG: 50 TABLET, EXTENDED RELEASE ORAL at 09:26

## 2022-01-14 RX ADMIN — BUDESONIDE AND FORMOTEROL FUMARATE DIHYDRATE 2 PUFF: 160; 4.5 AEROSOL RESPIRATORY (INHALATION) at 07:39

## 2022-01-14 RX ADMIN — ASPIRIN 325 MG: 325 TABLET, COATED ORAL at 09:26

## 2022-01-14 RX ADMIN — DILTIAZEM HYDROCHLORIDE 180 MG: 180 CAPSULE, COATED, EXTENDED RELEASE ORAL at 09:26

## 2022-01-14 ASSESSMENT — PAIN SCALES - GENERAL
PAINLEVEL_OUTOF10: 0

## 2022-01-14 NOTE — PROGRESS NOTES
Called into Pt room by PCA. Pt vital signs all increased. Gave Tylenol PRN for temp 100.6 and advised to recheck. Notified Charge of Mews score being 5 at the time.  Upon recheck Mews score went back down to 3 and vitals returned to normal

## 2022-01-14 NOTE — PROGRESS NOTES
Spoke with [facility] about interrogating patients pacemaker they will be here around 230-3pm and will leave findings in patient chart

## 2022-01-14 NOTE — PLAN OF CARE
Problem: Airway Clearance - Ineffective  Goal: Achieve or maintain patent airway  1/14/2022 0354 by Tammy Cope RN  Outcome: Ongoing  1/13/2022 1753 by Rut Zambrano RN  Outcome: Ongoing     Problem: Gas Exchange - Impaired  Goal: Absence of hypoxia  1/14/2022 0354 by Tammy Cope RN  Outcome: Ongoing  1/13/2022 1753 by Rut Zambrano RN  Outcome: Ongoing  Goal: Promote optimal lung function  Outcome: Ongoing     Problem: Breathing Pattern - Ineffective  Goal: Ability to achieve and maintain a regular respiratory rate  Outcome: Ongoing     Problem:  Body Temperature -  Risk of, Imbalanced  Goal: Ability to maintain a body temperature within defined limits  Outcome: Ongoing  Goal: Will regain or maintain usual level of consciousness  Outcome: Ongoing  Goal: Complications related to the disease process, condition or treatment will be avoided or minimized  Outcome: Ongoing     Problem: Isolation Precautions - Risk of Spread of Infection  Goal: Prevent transmission of infection  Outcome: Ongoing     Problem: Nutrition Deficits  Goal: Optimize nutritional status  Outcome: Ongoing     Problem: Risk for Fluid Volume Deficit  Goal: Maintain normal heart rhythm  Outcome: Ongoing  Goal: Maintain absence of muscle cramping  Outcome: Ongoing  Goal: Maintain normal serum potassium, sodium, calcium, phosphorus, and pH  Outcome: Ongoing     Problem: Loneliness or Risk for Loneliness  Goal: Demonstrate positive use of time alone when socialization is not possible  Outcome: Ongoing     Problem: Fatigue  Goal: Verbalize increase energy and improved vitality  Outcome: Ongoing     Problem: Patient Education: Go to Patient Education Activity  Goal: Patient/Family Education  Outcome: Ongoing     Problem: Falls - Risk of:  Goal: Will remain free from falls  Description: Will remain free from falls  Outcome: Ongoing  Goal: Absence of physical injury  Description: Absence of physical injury  Outcome: Ongoing     Problem: Skin Integrity:  Goal: Will show no infection signs and symptoms  Description: Will show no infection signs and symptoms  Outcome: Ongoing  Goal: Absence of new skin breakdown  Description: Absence of new skin breakdown  Outcome: Ongoing     Problem: Confusion - Acute:  Goal: Absence of continued neurological deterioration signs and symptoms  Description: Absence of continued neurological deterioration signs and symptoms  Outcome: Ongoing  Goal: Mental status will be restored to baseline  Description: Mental status will be restored to baseline  Outcome: Ongoing     Problem: Discharge Planning:  Goal: Ability to perform activities of daily living will improve  Description: Ability to perform activities of daily living will improve  Outcome: Ongoing  Goal: Participates in care planning  Description: Participates in care planning  Outcome: Ongoing     Problem: Injury - Risk of, Physical Injury:  Goal: Will remain free from falls  Description: Will remain free from falls  Outcome: Ongoing  Goal: Absence of physical injury  Description: Absence of physical injury  Outcome: Ongoing     Problem: Mood - Altered:  Goal: Mood stable  Description: Mood stable  Outcome: Ongoing  Goal: Absence of abusive behavior  Description: Absence of abusive behavior  Outcome: Ongoing  Goal: Verbalizations of feeling emotionally comfortable while being cared for will increase  Description: Verbalizations of feeling emotionally comfortable while being cared for will increase  Outcome: Ongoing     Problem: Psychomotor Activity - Altered:  Goal: Absence of psychomotor disturbance signs and symptoms  Description: Absence of psychomotor disturbance signs and symptoms  Outcome: Ongoing     Problem: Sensory Perception - Impaired:  Goal: Demonstrations of improved sensory functioning will increase  Description: Demonstrations of improved sensory functioning will increase  Outcome: Ongoing  Goal: Decrease in sensory misperception frequency  Description: Decrease in sensory misperception frequency  Outcome: Ongoing  Goal: Able to refrain from responding to false sensory perceptions  Description: Able to refrain from responding to false sensory perceptions  Outcome: Ongoing  Goal: Demonstrates accurate environmental perceptions  Description: Demonstrates accurate environmental perceptions  Outcome: Ongoing  Goal: Able to distinguish between reality-based and nonreality-based thinking  Description: Able to distinguish between reality-based and nonreality-based thinking  Outcome: Ongoing  Goal: Able to interrupt nonreality-based thinking  Description: Able to interrupt nonreality-based thinking  Outcome: Ongoing     Problem: Sleep Pattern Disturbance:  Goal: Appears well-rested  Description: Appears well-rested  Outcome: Ongoing

## 2022-01-14 NOTE — PROGRESS NOTES
Handoff report and transfer of care given at bedside to [facility]. Patient in stable condition, denies needs/concerns at this time. Call light within reach.

## 2022-01-14 NOTE — PROGRESS NOTES
Inpatient Occupational Therapy  Evaluation and Treatment    Unit: [facility]  Date:  2022  Patient Name:    César Graham. Admitting diagnosis:  Unable to ambulate [R26.2]  Fall, initial encounter [W19. XXXA]  COVID-19 [U07.1]  Admit Date:  2022  Precautions/Restrictions/WB Status/ Lines/ Wounds/ Oxygen: fall risk, purewick catheter, telemetry, continuous pulse ox and Droplet Plus precautions (+ COVID 19)    Treatment Time:    Treatment Number: 1     Billable Treatment Time: 35 minutes   Total Treatment Time:   45   minutes    Patient Goals for Therapy:  be able to move in his bed a home easier       Discharge Recommendations: SNF (continue to assess as able to increase participation/tolerance)  DME needs for discharge: defer to facility       Therapy recommendations for staff:   Assist of 1 with bed mobility (unable to tolerate standing due to increase in HR at EOB)    History of Present Illness: 63 y.o. male with DMII, Chronic sCHF, CAD s/p stent placement x3, hx of MI, asthma, HTN, HLD, GERD, lumbar spondylosis and DDD s/p bilateral dorsal ramus medial branch ablation and interstim implant, depression who presented to [facility] ED with complaint of generalized weakness ongoing for approximately 2 months and frequent falls. Patient is awake and alert appears to be a poor historian. He stated that I could call his brother for collateral information, brother did not answer but Nanette Garrido was on emergency contacts. I was able to talk with Nanette Garirdo who stated that patient is usually very active he remains to drive. He stated patient has been depressed since his wife . Patient stated that he has a walker at home when he does not use it. Patient denies any chest pain, palpitations, dizziness or lightheadedness prior to these falls. He stated his legs are weak and they just give out. He denies any increase in pain since his falls. He denies any incontinent episodes of bowel or bladder.  He does follow with Dr. Celeste Nails for lumbar spndylosis and DDD. He denies any shortness of breath, does have a cough. He stated his cough is dry, he is not able to cough anything up.     In ED, CT of the head showed no acute abnormality. Chest x-ray with no acute cardiopulmonary process. Patient did test positive for COVID. He is vaccinated x2, no booster. Patient admitted to med-surg for further care. PT/OT consulted. Home Health S4 Level Recommendation:  NA  AM-PAC Score: AM-PAC Inpatient Daily Activity Raw Score: 14    Preadmission Environment    Pt. Lives Alone, brother lives in the same apartment Pt is in, Pt looks after his brother who has paranoid schizophrenia    Home environment:  apartment   Steps to enter first floor:   0 steps to enter    Steps to second floor: N/A  Bathroom:  Walk-in Shower standard height toilet  Equipment owned:  [employer]      Preadmission Status / PLOF:  History of falls   Yes, falls 2-3 times a week for a long time, gets light headed when he first stands up and his legs give out when he gets tired  Pt. Able to drive   Yes  Pt Fully independent with ADL's  Yes  Pt. Required assistance from family for: Independent PTA    Pt. Fully independent for transfers and gait and walked with: [employer] just when up for a while, but doesn't use it in the house  Uses the motorized scooter at grocery store when shopping    Pain  No  Rating:NA  Location:  Pain Medicine Status: No request made      Cognition    A&O WFLs  Able to follow 1 step commands , delay in responses at times to questions and commands    Subjective  Patient lying supine in bed with no family present   Pt agreeable to this OT eval & tx.      Upper Extremity ROM:    Impaired R shld flexion 0-90 (PT reports hx of RCT), elbow to flexion WFLs   Left UE WFLs  Upper Extremity Strength:    BUE strength impaired but not formally assessed w/ MMT    Upper Extremity Sensation    Impaired    Upper Extremity Proprioception:  WFL    Coordination and Tone  WFL    Balance  Functional Sitting Balance:  Impaired SBA  Sat at the EOB for 10 minutes  Functional Standing Balance:NT due to increase in HR sitting at EOB    Bed mobility:    Supine to sit:   Mod A  Sit to supine:   Min A  Rolling:    CGA  Scooting in sitting:  Min A  Scooting to head of bed:   Min A of 2    Bridging:   Not Tested    Transfers:    Sit to stand:  Not Tested  Stand to sit:  Not Tested  Bed to chair:   Not Tested  Standard toilet: Not Tested  Bed to [facility]:  Not Tested    Dressing:      UE:   Not Tested  LE:    Total A     Bathing:    UE:  Not Tested  LE:  Not Tested    Eating:   Not Tested    Toileting:  Not Tested ,  purewick  Activity Tolerance   Pt completed therapy session with HR = 147 BPM   Supine at rest:  SpO2: 94%  HR: 132  BP: 145/92  Seated EOB:  SpO2: 100% on RA  HR: 145  BP: 149/105    Positioning Needs: In bed, call light and needs in reach. Alarm Set    Exercise / Activities Initiated:   N/A    Patient/Family Education:   Role of OT  Recommendations for DC    Assessment of Deficits: Pt seen for Occupational therapy evaluation in acute care setting. Pt demonstrated decreased Activity tolerance, ADLs, IADLs, Balance , Bed mobility, ROM, Safety Awareness, Strength and Transfers. Pt functioning below baseline and will likely benefit from skilled occupational therapy services to maximize safety and independence. Goal(s) : To be met in 3 Visits:  1). Bed to toilet/BSC: Min A    To be met in 5 Visits:  1). Supine to/from Sit:  Min A  2). Upper Body Bathing:   SBA  3). Lower Body Bathing: Mod A  4). Upper Body Dressing:  SBA  5). Lower Body Dressing: Mod A  6). Pt to demonstrate UE exs x 15 reps with minimal cues    Rehabilitation Potential:  Good for goals listed above. Strengths for achieving goals include: Pt motivated and Pt cooperative  Barriers to achieving goals include:  Weakness     Plan:   To be seen 3-5 x/wk while in acute care setting for therapeutic exercises, bed mobility, transfers, dressing, bathing, family/patient education, ADL/IADL retraining, energy conservation training.      Clementina Peng, OTR/L [id number]        If patient discharges from this facility prior to next visit, this note will serve as the Discharge Summary

## 2022-01-14 NOTE — ACP (ADVANCE CARE PLANNING)
Advance Care Planning     General Advance Care Planning (ACP) Conversation    Date of Conversation: 1/12/2022  Conducted with: Patient with Decision Making Capacity    Healthcare Decision Maker:    Primary Decision Maker: Miguelito Quiroga - Brother/Sister - [phone]  Click here to complete Healthcare Decision Makers including selection of the Healthcare Decision Maker Relationship (ie \"Primary\"). Today we documented Decision Maker(s) consistent with Legal Next of Kin hierarchy. Content/Action Overview:  Has NO ACP documents/care preferences - information provided, considering goals and options  Reviewed DNR/DNI and patient elects Full Code (Attempt Resuscitation)  ventilation preferences and resuscitation preferences  Pt wants to be a Full Code and wants CPR and intubation if needed.     Length of Voluntary ACP Conversation in minutes:  <16 minutes (Non-Billable)    Casandra Copeland RN

## 2022-01-14 NOTE — PROGRESS NOTES
Inpatient Physical Therapy Evaluation and Treatment    Unit: [facility]  Date:  2022  Patient Name:    Brinda Wayne Admitting diagnosis:  Unable to ambulate [R26.2]  Fall, initial encounter [W19. XXXA]  COVID-19 [U07.1]  Admit Date:  2022  Precautions/Restrictions/WB Status/ Lines/ Wounds/ Oxygen: Fall risk, Bed/chair alarm, Lines -Purewick catheter, Telemetry, Continuous pulse oximetry and Isolation Precautions: Droplet Plus - COVID     Treatment Time:  09:15-10:03  Treatment Number:  1   Timed Code Treatment Minutes: 38 minutes  Total Treatment Minutes:  48  minutes    Patient Goals for Therapy: not stated          Discharge Recommendations: SNF (CTA pending additional participation as medically appropriate)  DME needs for discharge: defer to facility       Therapy recommendation for EMS Transport: requires transport by cot due to medical concerns with mobility     Therapy recommendations for staff:   Assist of 1 for bed mobility. OOB mobility not assessed yet. History of Present Illness: Per H&P Verline Helvetia APRN : \"The patient is a 63 y.o. male with DMII, Chronic sCHF, CAD s/p stent placement x3, hx of MI, asthma, HTN, HLD, GERD, lumbar spondylosis and DDD s/p bilateral dorsal ramus medial branch ablation and interstim implant, depression who presented to [facility] ED with complaint of generalized weakness ongoing for approximately 2 months and frequent falls. Patient is awake and alert appears to be a poor historian. He stated that I could call his brother for collateral information, brother did not answer but [facility] was on emergency contacts. I was able to talk with [facility] who stated that patient is usually very active he remains to drive. He stated patient has been depressed since his wife . Patient stated that he has a walker at home when he does not use it. Patient denies any chest pain, palpitations, dizziness or lightheadedness prior to these falls.   He stated his legs are weak and they just give out. He denies any increase in pain since his falls. He denies any incontinent episodes of bowel or bladder. He does follow with Dr. Lio Patel for lumbar spndylosis and DDD. He denies any shortness of breath, does have a cough. He stated his cough is dry, he is not able to cough anything up.     In ED, CT of the head showed no acute abnormality. Chest x-ray with no acute cardiopulmonary process. Patient did test positive for COVID. He is vaccinated x2, no booster. Patient admitted to med-surg for further care. PT/OT consulted. \"    Home Health S4 Level Recommendation:  NA  AM-PAC Mobility Score       AM-PAC Inpatient Mobility without Stair Climbing Raw Score : 9    Preadmission Environment    Pt. Lives Alone, brother lives in the same apartment Pt is in, Pt looks after his brother who has paranoid schizophrenia    Home environment:    apartment   Steps to enter first floor:   0 steps to enter       Steps to second floor: N/A  Bathroom:       Walk-in Shower standard height toilet  Equipment owned:      [facility]       Preadmission Status / PLOF:  History of falls             Yes, falls 2-3 times a week for a long time, gets light headed when he first stands up and his legs give out when he gets tired  Pt. Able to drive          Yes  Pt Fully independent with ADL's         Yes  Pt. Required assistance from family for: Independent PTA    Pt. Fully independent for transfers and gait and walked with: [employer] just when up for a while, but doesn't use it in the house  Uses the motorized scooter at grocery store when shopping    Pain   No    Cognition    A&O WFL  Able to follow 1 step commands    Subjective  Patient lying supine in bed with no family present. Pt agreeable to this PT eval & tx. Upper Extremity ROM/Strength  Please see OT evaluation. Lower Extremity ROM / Strength   AROM WFL: Yes    Formal strength testing deferred due to pt's fatigue while sitting EOB.  Appears to be grossly sitting EOB. Pt reports long-standing h/o multiple falls per week. Pt will benefit from skilled PT in acute setting to promote activity tolerance and safe, independent functional mobility. Recommending SNF upon discharge as patient functioning well below baseline, demonstrates good rehab potential and unable to return home due to limited or no family support, home environment not conducive to patient recovery and limited safety awareness. Goals : To be met in 3 visits:  1). Independent with LE Ex x 10 reps    To be met in 6 visits:  1). Supine to/from sit: Independent  2). Sit to/from stand: Modified Independent  3). Bed to chair: Modified Independent  4). Gait: Ambulate 150 ft.  with  Modified Independent and use of LRAD (least restrictive assistive device)  5). Tolerate B LE exercises 3 sets of 10-15 reps  6). Ascend/descend 1 steps with SBA with use of hand rail unilateral and LRAD (least restrictive assistive device)    Rehabilitation Potential: Good  Strengths for achieving goals include:   Pt motivated, PLOF and Pt cooperative   Barriers to achieving goals include:    Complexity of condition    Plan    To be seen 3-5 x / week  while in acute care setting for therapeutic exercises, bed mobility, transfers, progressive gait training, balance training, and family/patient education. Signature: Verónica Duggan PT, DPT    If patient discharges from this facility prior to next visit, this note will serve as the Discharge Summary.

## 2022-01-14 NOTE — CARE COORDINATION
Case Management Assessment  Initial Evaluation      Patient Name: Ching Shepard. YOB: 1960  Diagnosis: Unable to ambulate [R26.2]  Fall, initial encounter [W19. TEQM]  [id number] [U07.1]  Date / Time: 1/12/2022  6:41 PM    Admission status/Date:1/13/2022  Chart Reviewed: Yes      Patient Interviewed: Yes   Family Interviewed:  No      Hospitalization in the last 30 days:  No      Health Care Decision Maker :   Primary Decision Maker: Miguelito Quiroga - Brother/Sister - [phone]    (CM - must 1st enter selection under Navigator - emergency contact- [place] Relationship and pick relationship)   Who do you trust or have selected to make healthcare decisions for you      Met with: pt  Interview conducted  (bedside/phone):telephone    Current PCP:   Saundra Briones required for SNF : N  waived        3 night stay required - [employer]  Support Systems/Care Needs: Family Members  Transportation: self    Meal Preparation: self    Housing  Living Arrangements: lives in 1st floor apt alone  Steps: 0  Intent for return to present living arrangements: Yes  Identified Issues: STR-pt is considering    Home Care Information  Active with [address] : No Agency:(Services)     Passport/Waiver : No  :                      Phone Number:    Passport/Waiver Services:           Durable Medical Equiptment   DME Provider: none  Equipment:   Walker___Cane_x__RTS___ BSC___Shower Chair___Hospital Bed___W/C____Other________  02 at ____Liter(s)---wears(frequency)_______ HHN ___ CPAP___ BiPap___   N/A____      Home O2 Use :  No    If No for home O2---if presently on O2 during hospitalization:  No     Community Service Affiliation  Dialysis:  No    · Agency:  · Location:  · Dialysis Schedule:  · Phone:   · Fax: Other Community Services:none     DISCHARGE PLAN: Explained Case Management role/services. Chart reviewed. C19+ on RA.  Pt states he lives in apt alone. Pt is IPTA +drives. Discussed PT/OT req's for STR and pt wants to think about it. Made pt aware there are limited facilities that will accept covid pt's. Will follow.

## 2022-01-14 NOTE — PROGRESS NOTES
Patient lying in bed offers no c/o denies pain/discomfort remains on room air 02 sat 94% no cough or congestion noted IV site to LFA unremarkable saline locked at this time

## 2022-01-14 NOTE — PROGRESS NOTES
Patient admitted to room 221 from ER. Patient oriented to room, call light, bed rails, phone, lights and bathroom. Patient instructed about the schedule of the day including: vital sign frequency, lab draws, possible tests, frequency of MD and staff rounds, daily weights, I &O's and prescribed diet. . Telemetry box in place, patient aware of placement and reason. Bed locked, in lowest position, side rails up 2/4, call light within reach. Recliner Assessment:     Patient is not able to demonstrated the ability to move from a reclining position to an upright position within the recliner. however patient is alert, oriented and able to provide informed consent       4 Eyes Skin Assessment     The patient is being assess for   Admission    I agree that 2 RN's have performed a thorough Head to Toe Skin Assessment on the patient. ALL assessment sites listed below have been assessed. Areas assessed for pressure by both nurses:   [x]   Head, Face, and Ears   [x]   Shoulders, Back, and Chest, Abdomen  [x]   Arms, Elbows, and Hands   [x]   Coccyx, Sacrum, and Ischium  [x]   Legs, Feet, and Heels    PT has a large bruise on his Right flank. Scattered bruises and abrasions. Dry, cracked heels. Red scrotum. **SHARE this note so that the co-signing nurse is able to place an eSignature**    Co-signer eSignature: {Esignature:[id number]}    Does the Patient have Skin Breakdown related to pressure? No              Omega Prevention initiated:  No   Wound Care Orders initiated:  no      [facility] nurse consulted for Pressure Injury (Stage 3,4, Unstageable, DTI, NWPT, Complex wounds)and New or Established Ostomies:  No      Primary Nurse eSignature: Electronically signed by Marj Hassan RN on 1/13/22 at 7:13 PM EST    Bedside Mobility Assessment Tool (BMAT):     Assessment Level 1- Sit and Shake    1. From a semi-reclined position, ask patient to sit up and rotate to a seated position at the side of the bed. Can use the bedrail. 2. Ask patient to reach out and grab your hand and shake making sure patient reaches across his/her midline. Pass- Patient is able to come to a seated position, maintain core strength. Maintains seated balance while reaching across midline. Move on to Assessment Level 2. Assessment Level 2- Stretch and Point   1. With patient in seated position at the side of the bed, have patient place both feet on the floor (or stool) with knees no higher than hips. 2. Ask patient to stretch one leg and straighten the knee, then bend the ankle/flex and point the toes. If appropriate, repeat with the other leg. Fail- Patient is unable to complete task. Patient is MOBILITY LEVEL 2. Assessment Level 3- Stand   1. Ask patient to elevate off the bed or chair (seated to standing) using an assistive device (cane, bedrail). 2. Patient should be able to raise buttocks off be and hold for a count of five. May repeat once. Fail- Patient unable to demonstrate standing stability. Patient is MOBILITY LEVEL 3. Assessment Level 4- Walk   1. Ask patient to march in place at bedside. 2. Then ask patient to advance step and return each foot. Some medical conditions may render a patient from stepping backwards, use your best clinical judgement. Fail- Patient not able to complete tasks OR requires use of assistive device. Patient is MOBILITY LEVEL 3.        Mobility Level- 2 This document is complete and the patient is ready for discharge.

## 2022-01-14 NOTE — PROGRESS NOTES
Shift assessment complete; see flow sheet. Scheduled medications administered; See MAR. IV flushed without difficulty. Pt denies pain at this time. Pt denies any needs at this time.  Pt educated on use of call light and to call out with needs, verbalized understanding, bed in low locked position for pt safety'

## 2022-01-14 NOTE — PROGRESS NOTES
Progress Note    Admit Date:  1/12/2022    63 y.o. male with DMII, Chronic sCHF, CAD s/p stent placement x3, hx of MI, asthma, HTN, HLD, GERD, lumbar spondylosis and DDD s/p bilateral dorsal ramus medial branch ablation and interstim implant, depression who presented to \[facility]\"" ED with complaint of generalized weakness ongoing for approximately 2 months and frequent falls. In ED, CT of the head showed no acute abnormality. Chest x-ray with no acute cardiopulmonary process. Patient did test positive for COVID. He is vaccinated x2, no booster. Patient admitted to med-surg for further care. PT/OT consulted. Subjective:  Mr. Claudio Carrasquillo seen  Up in bed,reports weakness and fatigue  No diarrhea    Acidotic on labs this am, remains on RA      Objective:   Patient Vitals for the past 4 hrs:   BP Temp Temp src Pulse Resp SpO2   01/14/22 0915 (!) 145/92 98.4 °F (36.9 °C) Oral 132 18 94 %        No intake or output data in the 24 hours ending 01/14/22 0931    Physical Exam:  Gen: middle aged obese male, No distress. Alert. Appears older than stated age  Eyes: PERRL. No sclera icterus. No conjunctival injection. ENT: No discharge. Pharynx clear. Neck: No JVD. Trachea midline. Resp: No accessory muscle use. No crackles. No wheezes. No rhonchi. CV: Regular rate. Regular rhythm. No murmur. No rub. No edema. Capillary Refill: Brisk,< 3 seconds   Peripheral Pulses: +2 palpable, equal bilaterally   GI: Non-tender. Non-distended. No masses. No organomegaly. Normal bowel sounds. No hernia. Skin: Warm and dry. No nodule on exposed extremities. No rash on exposed extremities. ecchymosis on right rib cage, no tenderness upon palpation   M/S: No cyanosis. No joint deformity. No clubbing. Neuro: Awake. Grossly nonfocal, BLE with generalized weakness, neurovascularly intact  Psych: Oriented x 3. No anxiety or agitation.      Data:  CBC:   Recent Labs     01/12/22 2007 01/13/22  0730 01/14/22  0600   WBC 4.7 4.3 5.5 HGB 14.8 14.5 16.0   HCT 44.1 43.3 49.3   MCV 87.4 88.2 88.4   PLT 86* 76* 97*     BMP:   Recent Labs     01/12/22  1840 01/13/22  0730 01/14/22  0600   * 134* 132*   K 4.5 4.2 4.3   CL 97* 96* 93*   CO2 20* 21 10*   BUN 12 10 12   CREATININE 0.8* 0.7* 0.9     LIVER PROFILE:   Recent Labs     01/12/22  1840 01/13/22  0730   AST 27 22   ALT 33 28   BILIDIR  --  <0.2   BILITOT 0.5 0.6   ALKPHOS 72 60     PT/INR: No results for input(s): PROTIME, INR in the last 72 hours. CULTURES  Results for Keny Irving (MRN [id number]) as of 1/13/2022 13:29    Ref. Range 1/12/2022 19:35   SARS-CoV-2, NAAT Latest Ref Range: Not Detected  DETECTED (AA)          RADIOLOGY  CT Head WO Contrast   Final Result   No acute intracranial abnormality. XR CHEST PORTABLE   Final Result   No acute cardiopulmonary process. Pertinent previous results reviewed   Diagnostic cardiac cath 7/13/21  CONCLUSION:  Successful stenting of the true circumflex into the obtuse  marginal branch, successful stenting of the left posterior descending  coronary artery and successful stenting of the obtuse marginal branch,  all 80% stenosis reduced to 0 with these three drug-eluting stents. JAMES-3 blood flow was present in the circumflex artery and all its  branches.  The LAD is normal with minor irregularities only and the left  main is normal.  Nondominant right coronary artery.  LV ejection  fraction of 40% with inferior wall and inferoposterior hypokinesia.     ECHO 11/27/19  Summary   The left ventricular systolic function is moderately reduced with an   ejection fraction of 35 -40 %.   There is hypokinesis of the inferior and basal inferior walls.   There is akinesis of the inferolateral wall.   Left ventricular cavity size is moderately dilated.   Compared to previous study from 2-2-2015 no changes noted in left   ventricular function.   Mild mitral regurgitation.       Assessment/Plan:    #Debility and weakness  #Multiple Falls  -patient stated that his legs give out which makes him fall  -PT/OT evaluation  -CT of head showed no acute abnormaility  -check orthostatic blood pressure and EKG , tele monitor   - recent ICD interrogation with one episode of NSVT     #COVID 19  -Positive 1/12/22  -only complaints is a cough  -patient is vaccinated x2- no booster  -Not hypoxic  -No covid specific treatment indicated at this time  -Monitor, continuous pulse ox and telemetry      #DMII  - not controlled noted to have >1000 glucose in urine  -Hold home oral meds  -Continue lantus 60 nightly  -Low dose SSI  -POCT glucose  -CC diet   -  A1C: 7.4     #Thrombocytopenia  -Plt 86 -> 76  -Monitor CBC while on lovenox        #CAD s/p stent placement x3  #Hx of MI  # s/p single ICD- not MRI compatible per notes  Hx of VTACH  - ASA, statin, plavix  - denies chest pain or palpitations   - interrogation of  ICD on 1/10 showed x1 episode of NSVT- conitnue BB, cardizem  - place on telemetry      #Chronic sCHF no AE   -Last echo 11/27/19, EF 35-40%  -Pacemaker/ICD in place  -Continue ACEi, toprol XL, cardizem  - patient takes Lasix on Monday and Thursday - holding at this time      #Asthma no AE  -Symbicort  -albuterol prn  - on room air     #HTN  -BP stable  -Continue home meds  -Monitor BP     #HLD  -continue statin     #GERD  -continue ppi     #Lumbar spondylosis and DDD   -s/p bilateral dorsal ramus medial branch ablation and interstim implant  -PDMP displays on chronic gabapentin, continue  - follows with Dr. Biat King      #Tremors  - ongoing, noted admission in 4/21          #ALIN  - unsure if patient is compliant with CPAP ? If he still has CPAP at home     Morbid Obesity  - Body mass index is 41.37 kg/m². - Complicating assessment and treatment.  Placing patient at risk for multiple co-morbidities as well as early death and contributing to the patient's presentation.   - Counseled on weight loss.      DVT Prophylaxis: Lovenox    Diet: ADULT DIET;  Regular; 3 carb choices (45 gm/meal)  Code Status: Full Code    Preeti Medina MD, 1/14/2022 1:30 PM

## 2022-01-14 NOTE — PLAN OF CARE
Problem: Airway Clearance - Ineffective  Goal: Achieve or maintain patent airway  1/14/2022 1028 by Javon Trinidad RN  Outcome: Ongoing  1/14/2022 0354 by Marcellina Krabbe, RN  Outcome: Ongoing     Problem: Gas Exchange - Impaired  Goal: Absence of hypoxia  1/14/2022 1028 by Javon Trinidad RN  Outcome: Ongoing  1/14/2022 0354 by Marcellina Krabbe, RN  Outcome: Ongoing  Goal: Promote optimal lung function  1/14/2022 1028 by Javon Trinidad RN  Outcome: Ongoing  1/14/2022 0354 by Marcellina Krabbe, RN  Outcome: Ongoing     Problem: Breathing Pattern - Ineffective  Goal: Ability to achieve and maintain a regular respiratory rate  1/14/2022 1028 by Javon Trinidad RN  Outcome: Ongoing  1/14/2022 0354 by Marcellina Krabbe, RN  Outcome: Ongoing     Problem:  Body Temperature -  Risk of, Imbalanced  Goal: Ability to maintain a body temperature within defined limits  1/14/2022 1028 by Javon Trinidad RN  Outcome: Ongoing  1/14/2022 0354 by Marcellina Krabbe, RN  Outcome: Ongoing  Goal: Will regain or maintain usual level of consciousness  1/14/2022 1028 by Javon Trinidad RN  Outcome: Ongoing  1/14/2022 0354 by Marcellina Krabbe, RN  Outcome: Ongoing  Goal: Complications related to the disease process, condition or treatment will be avoided or minimized  1/14/2022 1028 by Javon Trinidad RN  Outcome: Ongoing  1/14/2022 0354 by Marcellina Krabbe, RN  Outcome: Ongoing     Problem: Isolation Precautions - Risk of Spread of Infection  Goal: Prevent transmission of infection  1/14/2022 1028 by Javon Trinidad RN  Outcome: Ongoing  1/14/2022 0354 by Marcellina Krabbe, RN  Outcome: Ongoing     Problem: Nutrition Deficits  Goal: Optimize nutritional status  1/14/2022 1028 by Javon Trinidad RN  Outcome: Ongoing  1/14/2022 0354 by Marcellina Krabbe, RN  Outcome: Ongoing     Problem: Risk for Fluid Volume Deficit  Goal: Maintain normal heart rhythm  1/14/2022 1028 by Javon Trinidad RN  Outcome: Ongoing  1/14/2022 0354 by Marcellina Krabbe, RN  Outcome: Ongoing  Goal: Maintain absence of muscle by Gaby Hackett RN  Outcome: Ongoing  1/14/2022 0354 by Malika Alex RN  Outcome: Ongoing  Goal: Mental status will be restored to baseline  Description: Mental status will be restored to baseline  1/14/2022 1028 by Gaby Hackett RN  Outcome: Ongoing  1/14/2022 0354 by Malika Alex RN  Outcome: Ongoing     Problem: Discharge Planning:  Goal: Ability to perform activities of daily living will improve  Description: Ability to perform activities of daily living will improve  1/14/2022 1028 by Gaby Hackett RN  Outcome: Ongoing  1/14/2022 0354 by Malika Alex RN  Outcome: Ongoing  Goal: Participates in care planning  Description: Participates in care planning  1/14/2022 1028 by Gaby Hackett RN  Outcome: Ongoing  1/14/2022 0354 by Malika Alex RN  Outcome: Ongoing     Problem: Injury - Risk of, Physical Injury:  Goal: Will remain free from falls  Description: Will remain free from falls  1/14/2022 1028 by Gaby Hackett RN  Outcome: Ongoing  1/14/2022 0354 by Malika Alex RN  Outcome: Ongoing  Goal: Absence of physical injury  Description: Absence of physical injury  1/14/2022 1028 by Gaby Hackett RN  Outcome: Ongoing  1/14/2022 0354 by Malika Alex RN  Outcome: Ongoing     Problem: Mood - Altered:  Goal: Mood stable  Description: Mood stable  1/14/2022 1028 by Gaby Hackett RN  Outcome: Ongoing  1/14/2022 0354 by Malika Alex RN  Outcome: Ongoing  Goal: Absence of abusive behavior  Description: Absence of abusive behavior  1/14/2022 1028 by Gaby Hackett RN  Outcome: Ongoing  1/14/2022 0354 by Malika Alex RN  Outcome: Ongoing  Goal: Verbalizations of feeling emotionally comfortable while being cared for will increase  Description: Verbalizations of feeling emotionally comfortable while being cared for will increase  1/14/2022 1028 by Gaby Hackett RN  Outcome: Ongoing  1/14/2022 0354 by Malika Alex RN  Outcome: Ongoing     Problem: Psychomotor Activity - Altered:  Goal: Absence of psychomotor disturbance signs and symptoms  Description: Absence of psychomotor disturbance signs and symptoms  1/14/2022 1028 by Javon Massey RN  Outcome: Ongoing  1/14/2022 0354 by Meme Sharma RN  Outcome: Ongoing     Problem: Sensory Perception - Impaired:  Goal: Demonstrations of improved sensory functioning will increase  Description: Demonstrations of improved sensory functioning will increase  1/14/2022 1028 by Javon Massey RN  Outcome: Ongoing  1/14/2022 0354 by Meme Sharma RN  Outcome: Ongoing  Goal: Decrease in sensory misperception frequency  Description: Decrease in sensory misperception frequency  1/14/2022 1028 by Javon Massey RN  Outcome: Ongoing  1/14/2022 0354 by Meme Sharma RN  Outcome: Ongoing  Goal: Able to refrain from responding to false sensory perceptions  Description: Able to refrain from responding to false sensory perceptions  1/14/2022 1028 by Javon Massey RN  Outcome: Ongoing  1/14/2022 0354 by Meme Sharma RN  Outcome: Ongoing  Goal: Demonstrates accurate environmental perceptions  Description: Demonstrates accurate environmental perceptions  1/14/2022 1028 by Javon Massey RN  Outcome: Ongoing  1/14/2022 0354 by Meme Sharma RN  Outcome: Ongoing  Goal: Able to distinguish between reality-based and nonreality-based thinking  Description: Able to distinguish between reality-based and nonreality-based thinking  1/14/2022 1028 by Javon Massey RN  Outcome: Ongoing  1/14/2022 0354 by Meme Sharma RN  Outcome: Ongoing  Goal: Able to interrupt nonreality-based thinking  Description: Able to interrupt nonreality-based thinking  1/14/2022 1028 by Javon Massey RN  Outcome: Ongoing  1/14/2022 0354 by Meme Sharma RN  Outcome: Ongoing     Problem: Sleep Pattern Disturbance:  Goal: Appears well-rested  Description: Appears well-rested  1/14/2022 1028 by Javon Massey RN  Outcome: Ongoing  1/14/2022 0354 by Meme Sharma RN  Outcome: Ongoing

## 2022-01-14 NOTE — PROGRESS NOTES
Bronchodilator order with Frequency of every 4 hours PRN for wheezing or increased work of breathing using Per Protocol order mode. 4-6 - enter or revise RT Bronchodilator order(s) to two equivalent RT bronchodilator orders with one order with BID Frequency and one order with Frequency of every 4 hours PRN wheezing or increased work of breathing using Per Protocol order mode. 7-10 - enter or revise RT Bronchodilator order(s) to two equivalent RT bronchodilator orders with one order with TID Frequency and one order with Frequency of every 4 hours PRN wheezing or increased work of breathing using Per Protocol order mode. 11-13 - enter or revise RT Bronchodilator order(s) to one equivalent RT bronchodilator order with QID Frequency and an Albuterol order with Frequency of every 4 hours PRN wheezing or increased work of breathing using Per Protocol order mode. Greater than 13 - enter or revise RT Bronchodilator order(s) to one equivalent RT bronchodilator order with every 4 hours Frequency and an Albuterol order with Frequency of every 2 hours PRN wheezing or increased work of breathing using Per Protocol order mode.        Electronically signed by Farrukh Harris RCP on 1/13/2022 at 7:58 PM

## 2022-01-15 LAB
ANION GAP SERPL CALCULATED.3IONS-SCNC: 23 MMOL/L (ref 3–16)
BASOPHILS ABSOLUTE: 0 K/UL (ref 0–0.2)
BASOPHILS RELATIVE PERCENT: 0.4 %
BUN BLDV-MCNC: 18 MG/DL (ref 7–20)
CALCIUM SERPL-MCNC: 9 MG/DL (ref 8.3–10.6)
CHLORIDE BLD-SCNC: 91 MMOL/L (ref 99–110)
CO2: 14 MMOL/L (ref 21–32)
CREAT SERPL-MCNC: 0.9 MG/DL (ref 0.9–1.3)
EKG ATRIAL RATE: 111 BPM
EKG DIAGNOSIS: NORMAL
EKG P AXIS: 80 DEGREES
EKG P-R INTERVAL: 186 MS
EKG Q-T INTERVAL: 298 MS
EKG QRS DURATION: 96 MS
EKG QTC CALCULATION (BAZETT): 405 MS
EKG R AXIS: -6 DEGREES
EKG T AXIS: 93 DEGREES
EKG VENTRICULAR RATE: 111 BPM
EOSINOPHILS ABSOLUTE: 0 K/UL (ref 0–0.6)
EOSINOPHILS RELATIVE PERCENT: 0.2 %
GFR AFRICAN AMERICAN: >60
GFR NON-AFRICAN AMERICAN: >60
GLUCOSE BLD-MCNC: 163 MG/DL (ref 70–99)
GLUCOSE BLD-MCNC: 171 MG/DL (ref 70–99)
GLUCOSE BLD-MCNC: 180 MG/DL (ref 70–99)
GLUCOSE BLD-MCNC: 180 MG/DL (ref 70–99)
GLUCOSE BLD-MCNC: 183 MG/DL (ref 70–99)
GLUCOSE BLD-MCNC: 242 MG/DL (ref 70–99)
HCT VFR BLD CALC: 45.8 % (ref 40.5–52.5)
HEMOGLOBIN: 15.1 G/DL (ref 13.5–17.5)
LACTIC ACID: 1 MMOL/L (ref 0.4–2)
LYMPHOCYTES ABSOLUTE: 1.6 K/UL (ref 1–5.1)
LYMPHOCYTES RELATIVE PERCENT: 35.2 %
MCH RBC QN AUTO: 29 PG (ref 26–34)
MCHC RBC AUTO-ENTMCNC: 33 G/DL (ref 31–36)
MCV RBC AUTO: 88.1 FL (ref 80–100)
MONOCYTES ABSOLUTE: 0.5 K/UL (ref 0–1.3)
MONOCYTES RELATIVE PERCENT: 12 %
NEUTROPHILS ABSOLUTE: 2.4 K/UL (ref 1.7–7.7)
NEUTROPHILS RELATIVE PERCENT: 52.2 %
PDW BLD-RTO: 15.6 % (ref 12.4–15.4)
PERFORMED ON: ABNORMAL
PLATELET # BLD: 87 K/UL (ref 135–450)
PMV BLD AUTO: 7.2 FL (ref 5–10.5)
POTASSIUM REFLEX MAGNESIUM: 4.2 MMOL/L (ref 3.5–5.1)
RBC # BLD: 5.2 M/UL (ref 4.2–5.9)
SODIUM BLD-SCNC: 128 MMOL/L (ref 136–145)
WBC # BLD: 4.5 K/UL (ref 4–11)

## 2022-01-15 PROCEDURE — 2500000003 HC RX 250 WO HCPCS: Performed by: INTERNAL MEDICINE

## 2022-01-15 PROCEDURE — 85025 COMPLETE CBC W/AUTO DIFF WBC: CPT

## 2022-01-15 PROCEDURE — 83605 ASSAY OF LACTIC ACID: CPT

## 2022-01-15 PROCEDURE — 6370000000 HC RX 637 (ALT 250 FOR IP): Performed by: HOSPITALIST

## 2022-01-15 PROCEDURE — 2580000003 HC RX 258: Performed by: HOSPITALIST

## 2022-01-15 PROCEDURE — 80048 BASIC METABOLIC PNL TOTAL CA: CPT

## 2022-01-15 PROCEDURE — 99222 1ST HOSP IP/OBS MODERATE 55: CPT | Performed by: INTERNAL MEDICINE

## 2022-01-15 PROCEDURE — 6370000000 HC RX 637 (ALT 250 FOR IP): Performed by: INTERNAL MEDICINE

## 2022-01-15 PROCEDURE — 2500000003 HC RX 250 WO HCPCS: Performed by: HOSPITALIST

## 2022-01-15 PROCEDURE — 2000000000 HC ICU R&B

## 2022-01-15 PROCEDURE — 94761 N-INVAS EAR/PLS OXIMETRY MLT: CPT

## 2022-01-15 PROCEDURE — 2500000003 HC RX 250 WO HCPCS

## 2022-01-15 PROCEDURE — 99233 SBSQ HOSP IP/OBS HIGH 50: CPT | Performed by: INTERNAL MEDICINE

## 2022-01-15 PROCEDURE — 36415 COLL VENOUS BLD VENIPUNCTURE: CPT

## 2022-01-15 PROCEDURE — 93308 TTE F-UP OR LMTD: CPT

## 2022-01-15 PROCEDURE — 93010 ELECTROCARDIOGRAM REPORT: CPT | Performed by: INTERNAL MEDICINE

## 2022-01-15 PROCEDURE — 2700000000 HC OXYGEN THERAPY PER DAY

## 2022-01-15 PROCEDURE — 93005 ELECTROCARDIOGRAM TRACING: CPT | Performed by: INTERNAL MEDICINE

## 2022-01-15 PROCEDURE — 94640 AIRWAY INHALATION TREATMENT: CPT

## 2022-01-15 RX ORDER — DILTIAZEM HYDROCHLORIDE 5 MG/ML
10 INJECTION INTRAVENOUS ONCE
Status: DISCONTINUED | OUTPATIENT
Start: 2022-01-15 | End: 2022-01-15

## 2022-01-15 RX ORDER — METOPROLOL SUCCINATE 50 MG/1
200 TABLET, EXTENDED RELEASE ORAL DAILY
Status: DISCONTINUED | OUTPATIENT
Start: 2022-01-16 | End: 2022-01-16

## 2022-01-15 RX ORDER — DEXMEDETOMIDINE HYDROCHLORIDE 4 UG/ML
INJECTION, SOLUTION INTRAVENOUS
Status: COMPLETED
Start: 2022-01-15 | End: 2022-01-15

## 2022-01-15 RX ORDER — DEXMEDETOMIDINE HYDROCHLORIDE 4 UG/ML
.2-1.4 INJECTION, SOLUTION INTRAVENOUS CONTINUOUS
Status: DISCONTINUED | OUTPATIENT
Start: 2022-01-15 | End: 2022-01-16

## 2022-01-15 RX ORDER — METOPROLOL SUCCINATE 50 MG/1
100 TABLET, EXTENDED RELEASE ORAL ONCE
Status: COMPLETED | OUTPATIENT
Start: 2022-01-15 | End: 2022-01-15

## 2022-01-15 RX ORDER — METOPROLOL TARTRATE 5 MG/5ML
INJECTION INTRAVENOUS
Status: DISPENSED
Start: 2022-01-15 | End: 2022-01-15

## 2022-01-15 RX ORDER — ASPIRIN 81 MG/1
81 TABLET, CHEWABLE ORAL DAILY
Status: DISCONTINUED | OUTPATIENT
Start: 2022-01-16 | End: 2022-01-17

## 2022-01-15 RX ADMIN — Medication 2 PUFF: at 07:19

## 2022-01-15 RX ADMIN — METOPROLOL SUCCINATE 100 MG: 50 TABLET, EXTENDED RELEASE ORAL at 09:29

## 2022-01-15 RX ADMIN — Medication 0.6 MCG/KG/HR: at 21:18

## 2022-01-15 RX ADMIN — Medication 0.5 MCG/KG/HR: at 22:13

## 2022-01-15 RX ADMIN — CLOPIDOGREL BISULFATE 75 MG: 75 TABLET ORAL at 09:29

## 2022-01-15 RX ADMIN — INSULIN GLARGINE 60 UNITS: 100 INJECTION, SOLUTION SUBCUTANEOUS at 21:08

## 2022-01-15 RX ADMIN — Medication 0.4 MCG/KG/HR: at 13:11

## 2022-01-15 RX ADMIN — DILTIAZEM HYDROCHLORIDE 10 MG: 5 INJECTION, SOLUTION INTRAVENOUS at 11:04

## 2022-01-15 RX ADMIN — LISINOPRIL 20 MG: 10 TABLET ORAL at 09:28

## 2022-01-15 RX ADMIN — DIVALPROEX SODIUM 1000 MG: 500 TABLET, DELAYED RELEASE ORAL at 21:09

## 2022-01-15 RX ADMIN — BUDESONIDE AND FORMOTEROL FUMARATE DIHYDRATE 2 PUFF: 160; 4.5 AEROSOL RESPIRATORY (INHALATION) at 19:39

## 2022-01-15 RX ADMIN — DULOXETINE HYDROCHLORIDE 60 MG: 60 CAPSULE, DELAYED RELEASE ORAL at 09:28

## 2022-01-15 RX ADMIN — PANTOPRAZOLE SODIUM 40 MG: 40 TABLET, DELAYED RELEASE ORAL at 09:28

## 2022-01-15 RX ADMIN — Medication 2 PUFF: at 19:39

## 2022-01-15 RX ADMIN — ATORVASTATIN CALCIUM 80 MG: 40 TABLET, FILM COATED ORAL at 09:28

## 2022-01-15 RX ADMIN — SODIUM CHLORIDE, PRESERVATIVE FREE 10 ML: 5 INJECTION INTRAVENOUS at 09:29

## 2022-01-15 RX ADMIN — GABAPENTIN 200 MG: 100 CAPSULE ORAL at 21:09

## 2022-01-15 RX ADMIN — METOPROLOL SUCCINATE 100 MG: 50 TABLET, EXTENDED RELEASE ORAL at 12:10

## 2022-01-15 RX ADMIN — BUDESONIDE AND FORMOTEROL FUMARATE DIHYDRATE 2 PUFF: 160; 4.5 AEROSOL RESPIRATORY (INHALATION) at 07:19

## 2022-01-15 RX ADMIN — ASPIRIN 325 MG: 325 TABLET, COATED ORAL at 09:28

## 2022-01-15 RX ADMIN — Medication 0.5 MCG/KG/HR: at 19:45

## 2022-01-15 RX ADMIN — DILTIAZEM HYDROCHLORIDE 180 MG: 180 CAPSULE, COATED, EXTENDED RELEASE ORAL at 09:33

## 2022-01-15 RX ADMIN — GABAPENTIN 200 MG: 100 CAPSULE ORAL at 09:28

## 2022-01-15 ASSESSMENT — PAIN SCALES - GENERAL
PAINLEVEL_OUTOF10: 0

## 2022-01-15 ASSESSMENT — PAIN SCALES - WONG BAKER
WONGBAKER_NUMERICALRESPONSE: 0

## 2022-01-15 NOTE — PROGRESS NOTES
46 -- Writer called to evaluate pt due to \"pt. Calling out that he is being shocked\". Upon entering the room, pt. Is awake, alert and oriented x4. Pt. Is diaphoretic and has had a large incontinency of urine. Dr. Preet Rosales at bedside. At 1000 -- Pt. On bedside crash cart monitor, atrial fibrillation, rate 147   - Metoprolol 5m ivp given by Dr. Preet Rosales    At 1004, Cardizem 10mg ivp given per 1x order. 1006 -- EKG completed. 1015 -- Dr. Gaytan Heading at bedside to evaluate pt with Dr. Preet Rosales. 1030 -- Pt.  Transferred to [facility] via bed. Norberto Cabot, Rn Clinical

## 2022-01-15 NOTE — CONSULTS
[facility]  [phone]        Reason for Consultation/Chief Complaint: \"I have been having shocks . \"  Consulted for afib RVR    History of Present Illness:  Jim Glynn is a 63 y.o. patient who presented to [facility] 1/12/22 with c/o weakness and falls. Follows Dr. Martha Harris for cardiology--last saw 7/21. He has PMH CAD s/p 3 ROSHNI (7/21 s/p ROSHNI CCx into OM branch, Left PDA, and OM branch), chronic systolic CHF, DM, hx MI, ischemic CM, hx VT s/p ICD with generator change due to recall 5/19 and DJD/lumbar spondylosis. Most recent Lexiscan myoview 7/2/21 showed reversible defect RCA/CCx territory; EF=49%. Underwent LHC and 3 ROSHNI placed per Dr. Martha Harris. Reported EF=40% in 7/21 but no ECHO report found. Now presents with mainly weakness and falls. Tested positive for Covid. CT head and CXR unremarkable. Admit EKG ST; PVC; inferolateral infarct; NST change. Second EKG similar without PVC. Note he called out due to being shocked from device. Dr. Odell Geronimo at bedside reports felt rapid afib and gave 5mg IV metoprolol followed by 10mg IV diltiazem. He had frequent sustained grouped shocks intermittently per report. He denied any complaints prior to ICD firing. K+ 4.2; Mj=330; Covid + on 1/12. Patient with no complaints of chest pain, SOB, palpitations, dizziness, edema, or orthopnea/PND. I have been asked to provide consultation regarding further management and testing. Past Medical History:   has a past medical history of Arthritis, Asthma, CAD (coronary artery disease), COPD (chronic obstructive pulmonary disease) ([address] Emphysema of lung ([address] Fatty liver, GERD (gastroesophageal reflux disease), Hyperlipidemia, Hypertension, Medical history reviewed with no changes, MI, old, Sleep apnea, and Type II or unspecified type diabetes mellitus without mention of complication, not stated as uncontrolled.     Surgical History:   has a past surgical history that includes Coronary angioplasty with stent ([id number]); Colonoscopy; Endoscopy, colon, diagnostic; cyst removal (1982); Upper gastrointestinal endoscopy (7/18/13); Upper gastrointestinal endoscopy (8/23/13); Carpal tunnel release (Bilateral, 2013); ERCP (9/15/2013); Upper gastrointestinal endoscopy (9/15/2013); ERCP (10/11/13); Cataract removal with implant (Left, 2/28/14); Diagnostic Cardiac Cath Lab Procedure; other surgical history; pacemaker placement; Cardiac pacemaker placement (2011); Foot surgery (Right, 07/09/2018); pr length/short leg/ankl tendon,single (Right, 10/31/2018); pr gastrocnemius recession (Right, 10/31/2018); Pain management procedure (Bilateral, 5/26/2020); Pain management procedure (Bilateral, 6/9/2020); Nerve Surgery (Bilateral, 12/1/2020); and Pain management procedure (Left, 1/12/2021). Social History:   reports that he quit smoking about 20 years ago. His smoking use included cigarettes. He has a 40.00 pack-year smoking history. He has never used smokeless tobacco. He reports that he does not drink alcohol and does not use drugs. Family History:  family history includes Cancer in his father; Diabetes in his maternal grandmother; Heart Disease in his mother; Heart Failure in his maternal uncle and mother; Hypertension in his maternal uncle. Home Medications:  Were reviewed and are listed in nursing record. and/or listed below  Prior to Admission medications    Medication Sig Start Date End Date Taking?  Authorizing Provider   hydrOXYzine (ATARAX) 25 MG tablet Take 25 mg by mouth 3 times daily as needed for Itching or Anxiety   Yes Historical Provider, MD   cyclobenzaprine (FLEXERIL) 5 MG tablet Take 5 mg by mouth 3 times daily as needed for Muscle spasms   Yes Historical Provider, MD   clopidogrel (PLAVIX) 75 MG tablet TAKE ONE TABLET BY MOUTH DAILY 1/5/22  Yes Estelita Jordan MD   aspirin 325 MG EC tablet Take 1 tablet by mouth daily 7/15/21  Yes Estelita Jordan MD   albuterol (PROVENTIL) (2.5 MG/3ML) 0.083% nebulizer solution Take 3 mLs by nebulization every 4 hours as needed for Wheezing 7/14/21  Yes Diallo Gracia MD   traZODone (DESYREL) 100 MG tablet Take 100 mg by mouth nightly   Yes Historical Provider, MD   Dulaglutide (TRULICITY) 7.07 EW/1.0UW SOPN Inject 3 mg into the skin once a week    Yes Historical Provider, MD   furosemide (LASIX) 20 MG tablet TAKE ONE TABLET  ON Monday and Thursday 6/18/21  Yes Diallo Gracia MD   potassium chloride (KLOR-CON M) 20 MEQ extended release tablet TAKE ONE TABLET BY MOUTH WITH LASIX ON MONDAY AND THURSDAY 6/18/21  Yes Diallo Gracia, MD   omega-3 acid ethyl esters (LOVAZA) 1 g capsule Take 2 capsules by mouth 2 times daily 5/27/21  Yes Diallo Gracia MD   insulin lispro (HUMALOG) 100 UNIT/ML injection vial Inject 10 Units into the skin 2 times daily Indications: 10 units in AM, 10 units at dinner 4/27/21  Yes Sudheer Bridges MD   [place]-3 1000 MG CAPS Take 2 capsules by mouth 4 times daily   Yes Historical Provider, MD   PROAIR  (90 Base) MCG/ACT inhaler Inhale 2 puffs into the lungs every 4 hours as needed 2/9/21  Yes Historical Provider, MD   Fexofenadine HCl (ALLEGRA PO) Take by mouth   Yes Historical Provider, MD   Esomeprazole Magnesium (NEXIUM PO) Take 40 mg by mouth    Yes Historical Provider, MD   JARDIANCE 25 MG tablet Take 25 mg by mouth daily  2/18/19  Yes Historical Provider, MD   insulin detemir (LEVEMIR) 100 UNIT/ML injection vial Inject 60 Units into the skin nightly    Yes Historical Provider, MD   Fluticasone Furoate-Vilanterol (BREO ELLIPTA IN) Inhale into the lungs daily    Yes Historical Provider, MD   metFORMIN (GLUCOPHAGE) 1000 MG tablet TAKE ONE TABLET BY MOUTH 2 TIMES DAILY WITH MORNING AND EVENING MEALS 9/26/17  Yes ANA Li CNP   famotidine (PEPCID) 20 MG tablet TAKE 1 TABLET BY MOUTH 2 TIMES DAILY 9/26/17  Yes AAN Li CNP   gabapentin (NEURONTIN) 100 MG capsule TAKE 2 CAPSULES BY MOUTH 3 TIMES DAILY 9/26/17  Yes ANA Tan CNP   fluticasone [facility]) 50 MCG/ACT nasal spray USE 1 SPRAY IN EACH NOSTRIL EVERY DAY  Patient taking differently: 2 sprays daily  9/13/17  Yes ANA Yusuf CNP   TRUE METRIX BLOOD GLUCOSE TEST strip CHECK SUGARS TWICE A DAY AS DIRECTED, DX: E11.9 6/9/17  Yes ANA Yusuf CNP   lisinopril (PRINIVIL;ZESTRIL) 20 MG tablet TAKE 1 TABLET BY MOUTH DAILY 2/20/17  Yes ANA Pond CNP   atorvastatin (LIPITOR) 80 MG tablet TAKE ONE TABLET BY MOUTH DAILY 2/20/17  Yes ANA Pond CNP   UNIFINE PENTIPS 31G X 8 MM MISC USE AS DIRECTED FOR INSULIN .00 2/20/17  Yes ANA Bustillos CNP   diltiazem (CARDIZEM CD) 180 MG extended release capsule TAKE 1 CAPSULE DAILY 2/8/17  Yes ANA Pond CNP   metoprolol succinate (TOPROL XL) 200 MG extended release tablet TAKE ONE-HALF TABLET TWICE A DAY 2/8/17  Yes ANA Pond CNP   TRUEPLUS LANCETS 30G MISC CHECK BLOOD SUGAR TWICE A DAY DX E11.9 2/8/17  Yes ANA Pond CNP   DULoxetine (CYMBALTA) 60 MG extended release capsule Take 60 mg by mouth daily   Yes Historical Provider, MD   divalproex (DEPAKOTE) 500 MG DR tablet Take 1,000 mg by mouth nightly   Yes Historical Provider, MD   polyethylene glycol (GLYCOLAX) 17 GM/SCOOP powder Use as directed for coloscopy prep 11/12/21   Rinku Aparicio MD   bisacodyl (BISACODYL) 5 MG EC tablet Take all four tablets day before colonscopy 11/12/21   Rinku Aparicio MD   polyethylene glycol (MIRALAX) 17 GM/SCOOP POWD powder Take 238 g by mouth daily Take as directed for colonoscopy 11/9/21   Rinku Aparicio MD        Allergies:   Other and Pcn [penicillins]     Review of Systems:   12 point ROS negative in all areas as listed below except as in [place]  Constitutional, EENT, Cardiovascular, pulmonary, GI, , Musculoskeletal, skin, neurological, hematological, endocrine, Psychiatric    Physical Examination: Vitals:    01/15/22 1042   BP: (!) 134/106   Pulse: 124   Resp: 21   Temp: 97.1 °F (36.2 °C)   SpO2:     Weight: 241 lb (109.3 kg)         General Appearance:  Alert, cooperative, no distress, appears stated age   Head:  Normocephalic, without obvious abnormality, atraumatic   Eyes:  PERRL, conjunctiva/corneas clear       Nose: Nares normal, no drainage or sinus tenderness   Throat: Lips, mucosa, and tongue normal   Neck: Supple, symmetrical, trachea midline, no adenopathy, thyroid: not enlarged, symmetric, no tenderness/mass/nodules, no carotid bruit or JVD       Lungs:   Clear to auscultation bilaterally, respirations unlabored   Chest Wall:  No tenderness or deformity   Heart:  Regular rate and rhythm, S1, S2 normal, no murmur, rub or gallop   Abdomen:   Soft, non-tender, bowel sounds active all four quadrants,  no masses, no organomegaly           Extremities: Extremities normal, atraumatic, no cyanosis or edema   Pulses: 2+ and symmetric   Skin: Skin color, texture, turgor normal, no rashes or lesions   Pysch: Normal mood and affect   Neurologic: Normal gross motor and sensory exam.         Labs  CBC:   Lab Results   Component Value Date    WBC 4.5 01/15/2022    RBC 5.20 01/15/2022    HGB 15.1 01/15/2022    HCT 45.8 01/15/2022    MCV 88.1 01/15/2022    RDW 15.6 01/15/2022    PLT 87 01/15/2022     CMP:    Lab Results   Component Value Date     01/15/2022    K 4.2 01/15/2022    CL 91 01/15/2022    CO2 14 01/15/2022    BUN 18 01/15/2022    CREATININE 0.9 01/15/2022    GFRAA >60 01/15/2022    GFRAA >60 05/19/2011    AGRATIO 1.7 01/12/2022    LABGLOM >60 01/15/2022    GLUCOSE 163 01/15/2022    PROT 6.9 01/13/2022    PROT 6.9 11/08/2010    CALCIUM 9.0 01/15/2022    BILITOT 0.6 01/13/2022    ALKPHOS 60 01/13/2022    AST 22 01/13/2022    ALT 28 01/13/2022     PT/INR:  No results found for: [place]  Lab Results   Component Value Date    CKTOTAL 61 11/27/2019    TROPONINI <0.01 04/26/2021       EKG:  I have reviewed EKG with the following interpretation:  Impression:  See HPI    Savita Orozco 7/2/21      Summary    Overall findings represent a high risk scan.    Normal LV size with reduced function. EF 49%    Large mostly reversible defect involving the basal inferolateral, basal    inferior, mid inferolateral and mid inferior segments.    Findings concerning for ischemia.    ECG: Non-diagnostic EKG response due to failure to reach target heart rate. 7/13/21 CATH CONCLUSION:  Successful stenting of the true circumflex into the obtuse  marginal branch, successful stenting of the left posterior descending  coronary artery and successful stenting of the obtuse marginal branch,  all 80% stenosis reduced to 0 with these three drug-eluting stents. JAMES-3 blood flow was present in the circumflex artery and all its  branches. The LAD is normal with minor irregularities only and the left  main is normal.  Nondominant right coronary artery. LV ejection  fraction of 40% with inferior wall and inferoposterior hypokinesia. Assessment:  Komal Crespo is a 63 y.o. patient who presented to [facility] 1/12/22 with c/o weakness and falls. Follows Dr. Izzy Cordero for cardiology--last saw 7/21. He has PMH CAD s/p 3 ROSHNI (7/21 s/p ROSHNI CCx into OM branch, Left PDA, and OM branch), chronic systolic CHF, DM, hx MI, cardiomyopathy undefined, hx VT s/p ICD with generator change due to recall 5/19 and DJD/lumbar spondylosis. Most recent Lexiscan myoview 7/2/21 showed reversible defect RCA/CCx territory; EF=49%. Underwent LHC and 3 ROSHNI placed per Dr. Izzy Cordero. Reported EF=40% in 7/21 but no ECHO report found. Now presents with mainly weakness and falls. Tested positive for Covid. CT head and CXR unremarkable. Admit EKG ST; PVC; inferolateral infarct; NST change. Second EKG similar without PVC. Note he called out due to being shocked from device.  Dr. Kanchan Pop at bedside reports felt rapid afib and gave 5mg IV metoprolol followed by 10mg IV diltiazem. He had frequent sustained grouped shocks intermittently per report. He denied any complaints prior to ICD firing. K+ 4.2; Zn=176; Covid + on 1/12. Diagnosis of frequent ICD firing and rapid afib in middle-aged male with hx CAD s/p stents, chronic systolic CHF, and ischemic CM. Recs:  1. Concern for malfunctioning ICD given multiple, grouped firings for narrow complex tachycardia. 2. Need [facility] device interrogated. Dr. Joshua Ramírez to arrange. 3. Increase Toprol XL back to 200mg daily (decreased to 100mg due to lower BP last 2 days). 4. Continue diltiazem CD 180mg qd.  5. Continue plavix 75mg qd. 6. Switch from adult to baby asa given on DAPT and higher bleed risk with adult dose. 7. Continue lisinopril 20mg daily  8. Keep lytes repleted properly  9. I recommend ECHO to evaluate LVEF  10. Will need AC with NOAC if afib confirmed on device interrogation.       Patient Active Problem List   Diagnosis    Ventricular tachycardia ([address]    Presence of stent in left circumflex coronary artery    Myocardial infarction, nontransmural (HCC)    Myocardial infarction, inferoposterior wall, subsequent care    Automatic implantable cardioverter-defibrillator in situ    CAD (coronary artery disease)    Automatic implantable cardioverter-defibrillator in situ    ALIN on CPAP    Gastroesophageal reflux disease without esophagitis    Hyperlipidemia with target LDL less than 100    Hypertension due to endocrine disorder    Ischemic cardiomyopathy    Obesity, morbid, BMI 40.0-49.9 ([address]    Weakness    Acute encephalopathy    TIA involving left internal carotid artery    DM (diabetes mellitus), secondary, uncontrolled, w/neurologic complic ([address]    Dyslipidemia    HTN (hypertension), benign    Lactic acidosis    Head trauma    Abrasion, scalp w/o infection    Unsteady gait    Hyperglycemia    Chronic obstructive pulmonary disease (HCC)    Coarse tremor    Generalized weakness    Type 2 diabetes mellitus with hyperglycemia, with long-term current use of insulin ([address]    Essential hypertension    CAD S/P percutaneous coronary angioplasty    COVID-19    Fall    Unable to ambulate      Thank you for allowing to us to participate in the care or [address] . Further evaluation will be based upon the patient's clinical course and testing results.

## 2022-01-15 NOTE — PROGRESS NOTES
RT Inhaler-Nebulizer Bronchodilator Protocol Note    There is a bronchodilator order in the chart from a provider indicating to follow the RT Bronchodilator Protocol and there is an Initiate RT Inhaler-Nebulizer Bronchodilator Protocol order as well (see protocol at bottom of note). CXR Findings:  No results found. The findings from the last RT Protocol Assessment were as follows:   History Pulmonary Disease: Chronic pulmonary disease  Respiratory Pattern: Dyspnea on exertion or RR 21-25 bpm  Breath Sounds: Slightly diminished and/or crackles  Cough: Strong, spontaneous, non-productive  Indication for Bronchodilator Therapy: Decreased or absent breath sounds  Bronchodilator Assessment Score: 6    Aerosolized bronchodilator medication orders have been revised according to the RT Inhaler-Nebulizer Bronchodilator Protocol below. Respiratory Therapist to perform RT Therapy Protocol Assessment initially then follow the protocol. Repeat RT Therapy Protocol Assessment PRN for score 0-3 or on second treatment, BID, and PRN for scores above 3. No Indications - adjust the frequency to every 6 hours PRN wheezing or bronchospasm, if no treatments needed after 48 hours then discontinue using Per Protocol order mode. If indication present, adjust the RT bronchodilator orders based on the Bronchodilator Assessment Score as indicated below. Use Inhaler orders unless patient has one or more of the following: on home nebulizer, not able to hold breath for 10 seconds, is not alert and oriented, cannot activate and use MDI correctly, or respiratory rate 25 breaths per minute or more, then use the equivalent nebulizer order(s) with same Frequency and PRN reasons based on the score. If a patient is on this medication at home then do not decrease Frequency below that used at home.     0-3 - enter or revise RT bronchodilator order(s) to equivalent RT Bronchodilator order with Frequency of every 4 hours PRN for wheezing or increased work of breathing using Per Protocol order mode. 4-6 - enter or revise RT Bronchodilator order(s) to two equivalent RT bronchodilator orders with one order with BID Frequency and one order with Frequency of every 4 hours PRN wheezing or increased work of breathing using Per Protocol order mode. 7-10 - enter or revise RT Bronchodilator order(s) to two equivalent RT bronchodilator orders with one order with TID Frequency and one order with Frequency of every 4 hours PRN wheezing or increased work of breathing using Per Protocol order mode. 11-13 - enter or revise RT Bronchodilator order(s) to one equivalent RT bronchodilator order with QID Frequency and an Albuterol order with Frequency of every 4 hours PRN wheezing or increased work of breathing using Per Protocol order mode. Greater than 13 - enter or revise RT Bronchodilator order(s) to one equivalent RT bronchodilator order with every 4 hours Frequency and an Albuterol order with Frequency of every 2 hours PRN wheezing or increased work of breathing using Per Protocol order mode.        Electronically signed by Dionicio Sanders RCP on 1/14/2022 at 7:28 PM

## 2022-01-15 NOTE — PROGRESS NOTES
Progress Note    Admit Date:  1/12/2022    63 y.o. male with DMII, Chronic sCHF, CAD s/p stent placement x3, hx of MI, asthma, HTN, HLD, GERD, lumbar spondylosis and DDD s/p bilateral dorsal ramus medial branch ablation and interstim implant, depression who presented to [address] ED with complaint of generalized weakness ongoing for approximately 2 months and frequent falls. In ED, CT of the head showed no acute abnormality. Chest x-ray with no acute cardiopulmonary process. Patient did test positive for COVID. He is vaccinated x2, no booster. Patient admitted to med-surg for further care. PT/OT consulted. Subjective:  Mr. Abida Blum developed rapid HR what appears to be Afibb on TELE monitor and his pacer inappropriately delivered shock x 5 and this was witnessed by me and nursing staff. Pt awake, conscious during these episodes and by the end of the 5th shock, he started getting slightly confused    Given cardizem 10 mg IV x 1 and metoprolol 5 mg with control of HR  EKG after this episode showed sinus tach    Pt remains asymptomatic from covid so far except for gen weakness  No fevers. No hypoxia        Objective:   Patient Vitals for the past 4 hrs:   SpO2   01/15/22 0721 93 %            Intake/Output Summary (Last 24 hours) at 1/15/2022 0738  Last data filed at 1/14/2022 2134  Gross per 24 hour   Intake 250 ml   Output --   Net 250 ml       Physical Exam:  Gen: middle aged obese male, No distress. Awake, slightly delirious  Appears older than stated age  Eyes: PERRL. No sclera icterus. No conjunctival injection. ENT: No discharge. .   Neck: No JVD. Trachea midline. Resp: No accessory muscle use. No crackles. No wheezes. No rhonchi. CV: Regular rate. Regular rhythm. No murmur. No rub. No edema. Left sided pacer  Capillary Refill: Brisk,< 3 seconds   Peripheral Pulses: +2 palpable, equal bilaterally   GI: Non-tender. Non-distended. No masses. No organomegaly. Normal bowel sounds. No hernia. Skin: Warm and dry. No nodule on exposed extremities. No rash on exposed extremities. ecchymosis on right rib cage, no tenderness upon palpation   M/S: No cyanosis. No joint deformity. No clubbing. Neuro: Awake. Grossly nonfocal, BLE with generalized weakness, neurovascularly intact  Psych: Oriented x 2. No anxiety or agitation. Data:  CBC:   Recent Labs     01/13/22  0730 01/14/22  0600 01/15/22  0605   WBC 4.3 5.5 4.5   HGB 14.5 16.0 15.1   HCT 43.3 49.3 45.8   MCV 88.2 88.4 88.1   PLT 76* 97* 87*     BMP:   Recent Labs     01/14/22  0600 01/14/22  1747 01/15/22  0523   * 126* 128*   K 4.3 4.6 4.2   CL 93* 91* 91*   CO2 10* 14* 14*   BUN 12 18 18   CREATININE 0.9 0.9 0.9     LIVER PROFILE:   Recent Labs     01/12/22  1840 01/13/22  0730   AST 27 22   ALT 33 28   BILIDIR  --  <0.2   BILITOT 0.5 0.6   ALKPHOS 72 60     PT/INR: No results for input(s): PROTIME, INR in the last 72 hours. CULTURES  Results for Levokailyn Wong (MRN [id number]) as of 1/13/2022 13:29    Ref. Range 1/12/2022 19:35   SARS-CoV-2, NAAT Latest Ref Range: Not Detected  DETECTED (AA)          RADIOLOGY  CT Head WO Contrast   Final Result   No acute intracranial abnormality. XR CHEST PORTABLE   Final Result   No acute cardiopulmonary process. Pertinent previous results reviewed   Diagnostic cardiac cath 7/13/21  CONCLUSION:  Successful stenting of the true circumflex into the obtuse  marginal branch, successful stenting of the left posterior descending  coronary artery and successful stenting of the obtuse marginal branch,  all 80% stenosis reduced to 0 with these three drug-eluting stents.    JAMES-3 blood flow was present in the circumflex artery and all its  branches.  The LAD is normal with minor irregularities only and the left  main is normal.  Nondominant right coronary artery.  LV ejection  fraction of 40% with inferior wall and inferoposterior hypokinesia.     ECHO 11/27/19  Summary   The left ventricular systolic function is moderately reduced with an   ejection fraction of 35 -40 %.   There is hypokinesis of the inferior and basal inferior walls.   There is akinesis of the inferolateral wall.   Left ventricular cavity size is moderately dilated.   Compared to previous study from 2-2-2015 no changes noted in left   ventricular function.   Mild mitral regurgitation. Assessment/Plan:    #Debility and weakness  #Multiple Falls  -patient stated that his legs give out which makes him fall  -CT of head showed no acute abnormaility  - recent ICD interrogation with one episode of NSVT but now with recurrent shocks delivered from ICD. See workup below    # ICD firing - he had 5 witnessed shocks and concern for malfunctioning ICD  - for [facility] eval today . Cardiology consulted. EKG with Sinus tach. Hold further cardizem. Resume home meds toprol 200 mg , cardizem 180 mg  - further workup by cards  - transfer to PCU        #COVID 19  -Positive 1/12/22  -only complaints is a cough  -patient is vaccinated x2- no booster  -Not hypoxic  -No covid specific treatment indicated at this time  -Monitor, continuous pulse ox and telemetry      #DMII  - not controlled noted to have >1000 glucose in urine  -Hold home oral meds  -Continue lantus 60 nightly  -Low dose SSI  -POCT glucose  -CC diet   -  A1C: 7.4     #Thrombocytopenia  -Plt 87  -Monitor CBC while on lovenox        #CAD s/p stent placement x3  #Hx of MI  Hx of VTACH s.p ICD  - ASA, statin, plavix  - denies chest pain or palpitations   - interrogation of  ICD on 1/10 showed x1 episode of NSVT- repeat interrogation as above .   rozinaitrm BB, cardizem  - place on telemetry      #Chronic sCHF no AE   -Last echo 11/27/19, EF 35-40%  -Pacemaker/ICD in place  -Continue ACEi, toprol XL, cardizem  - patient takes Lasix on Monday and Thursday - holding at this time      #Asthma no AE  -Symbicort  -albuterol prn  - on room air     #HTN  -BP stable  -Continue home meds  -Monitor BP     #HLD  -continue statin     #GERD  -continue ppi     #Lumbar spondylosis and DDD   -s/p bilateral dorsal ramus medial branch ablation and interstim implant  -PDMP displays on chronic gabapentin, continue  - follows with Dr. Gwendolyn Yousif      #Tremors  - ongoing, noted admission in 4/21          #ALIN  - unsure if patient is compliant with CPAP ? If he still has CPAP at home     Morbid Obesity  - Body mass index is 41.37 kg/m². - Complicating assessment and treatment. Placing patient at risk for multiple co-morbidities as well as early death and contributing to the patient's presentation.   - Counseled on weight loss.      DVT Prophylaxis: Lovenox    Diet: ADULT DIET;  Regular; 3 carb choices (45 gm/meal)  Code Status: Full Code    Romeo Frankel, MD, 1/15/2022 7:38 AM

## 2022-01-15 NOTE — PROGRESS NOTES
Monitor tech called writer and stated that pacemaker appeared to be shocking patient and trying to get him back into rhythm HR now 144 charge notified and MD notified patient calling out stating that his pacemaker is shocking him

## 2022-01-15 NOTE — PLAN OF CARE
Problem: Airway Clearance - Ineffective  Goal: Achieve or maintain patent airway  Outcome: Ongoing     Problem: Gas Exchange - Impaired  Goal: Absence of hypoxia  Outcome: Ongoing  Goal: Promote optimal lung function  Outcome: Ongoing     Problem: Breathing Pattern - Ineffective  Goal: Ability to achieve and maintain a regular respiratory rate  Outcome: Ongoing     Problem:  Body Temperature -  Risk of, Imbalanced  Goal: Ability to maintain a body temperature within defined limits  Outcome: Ongoing  Goal: Will regain or maintain usual level of consciousness  Outcome: Ongoing  Goal: Complications related to the disease process, condition or treatment will be avoided or minimized  Outcome: Ongoing     Problem: Isolation Precautions - Risk of Spread of Infection  Goal: Prevent transmission of infection  Outcome: Ongoing     Problem: Nutrition Deficits  Goal: Optimize nutritional status  Outcome: Ongoing     Problem: Risk for Fluid Volume Deficit  Goal: Maintain normal heart rhythm  Outcome: Ongoing  Goal: Maintain absence of muscle cramping  Outcome: Ongoing  Goal: Maintain normal serum potassium, sodium, calcium, phosphorus, and pH  Outcome: Ongoing     Problem: Loneliness or Risk for Loneliness  Goal: Demonstrate positive use of time alone when socialization is not possible  Outcome: Ongoing     Problem: Fatigue  Goal: Verbalize increase energy and improved vitality  Outcome: Ongoing     Problem: Patient Education: Go to Patient Education Activity  Goal: Patient/Family Education  Outcome: Ongoing     Problem: Falls - Risk of:  Goal: Will remain free from falls  Description: Will remain free from falls  Outcome: Ongoing  Goal: Absence of physical injury  Description: Absence of physical injury  Outcome: Ongoing     Problem: Skin Integrity:  Goal: Will show no infection signs and symptoms  Description: Will show no infection signs and symptoms  Outcome: Ongoing  Goal: Absence of new skin breakdown  Description: Absence of new skin breakdown  Outcome: Ongoing     Problem: Confusion - Acute:  Goal: Absence of continued neurological deterioration signs and symptoms  Description: Absence of continued neurological deterioration signs and symptoms  Outcome: Ongoing  Goal: Mental status will be restored to baseline  Description: Mental status will be restored to baseline  Outcome: Ongoing     Problem: Discharge Planning:  Goal: Ability to perform activities of daily living will improve  Description: Ability to perform activities of daily living will improve  Outcome: Ongoing  Goal: Participates in care planning  Description: Participates in care planning  Outcome: Ongoing     Problem: Injury - Risk of, Physical Injury:  Goal: Will remain free from falls  Description: Will remain free from falls  Outcome: Ongoing  Goal: Absence of physical injury  Description: Absence of physical injury  Outcome: Ongoing     Problem: Mood - Altered:  Goal: Mood stable  Description: Mood stable  Outcome: Ongoing  Goal: Absence of abusive behavior  Description: Absence of abusive behavior  Outcome: Ongoing  Goal: Verbalizations of feeling emotionally comfortable while being cared for will increase  Description: Verbalizations of feeling emotionally comfortable while being cared for will increase  Outcome: Ongoing     Problem: Psychomotor Activity - Altered:  Goal: Absence of psychomotor disturbance signs and symptoms  Description: Absence of psychomotor disturbance signs and symptoms  Outcome: Ongoing     Problem: Sensory Perception - Impaired:  Goal: Demonstrations of improved sensory functioning will increase  Description: Demonstrations of improved sensory functioning will increase  Outcome: Ongoing  Goal: Decrease in sensory misperception frequency  Description: Decrease in sensory misperception frequency  Outcome: Ongoing  Goal: Able to refrain from responding to false sensory perceptions  Description: Able to refrain from responding to false sensory perceptions  Outcome: Ongoing  Goal: Demonstrates accurate environmental perceptions  Description: Demonstrates accurate environmental perceptions  Outcome: Ongoing  Goal: Able to distinguish between reality-based and nonreality-based thinking  Description: Able to distinguish between reality-based and nonreality-based thinking  Outcome: Ongoing  Goal: Able to interrupt nonreality-based thinking  Description: Able to interrupt nonreality-based thinking  Outcome: Ongoing     Problem: Sleep Pattern Disturbance:  Goal: Appears well-rested  Description: Appears well-rested  Outcome: Ongoing

## 2022-01-15 NOTE — PROGRESS NOTES
Patient resting comfortably at this time. Intermittently wakes up and attempts to pull at lines and leads. Restraints remain in place for patient safety. RASS appears to be -1. See flowsheet and eMar for additional documentation.

## 2022-01-15 NOTE — PROGRESS NOTES
Monitor tech called writer and notified Jean Greenwood that patient rhythm changed to a-fib with RVR with -170 MD notified consult placed to cardiology patient denies symptoms stating he feels pretty good at this time

## 2022-01-15 NOTE — PROGRESS NOTES
At 1694 charge first on scene to assess patient staff followed charge in with crash cart clinical notified by charge Dr. Kimberly Pollock on scene patient shaking and diaphoretic incontinent of urine stating that pacemaker keeps shocking him shocked patient total of 4 times at 1000 patient placed on crash cart monitor with rate of 147 a-fib metoprolol 5mg IVP x1 given by MD at 1004 MD pushed cardizem 10mg IVP x1 at 1006 STAT EKG performed at 1015 Dr. Evelina Blackburn at bedside with Dr. Kimberly Pollock assessing patient at 21  patient transferred to  report given to Senthil mAes RN

## 2022-01-15 NOTE — PROGRESS NOTES
Patient arrived on 3E A/O x4, however makes statements demonstrating he is confused. Patient is attempting to pull at leads, able to be directed at times. He appears diaphoretic, but pacemaker has not shocked him since he arrived.

## 2022-01-16 LAB
AMMONIA: 17 UMOL/L (ref 16–60)
ANION GAP SERPL CALCULATED.3IONS-SCNC: 20 MMOL/L (ref 3–16)
BASE EXCESS VENOUS: -8.6 MMOL/L (ref -3–3)
BASOPHILS ABSOLUTE: 0 K/UL (ref 0–0.2)
BASOPHILS RELATIVE PERCENT: 0.4 %
BUN BLDV-MCNC: 34 MG/DL (ref 7–20)
CALCIUM SERPL-MCNC: 8.8 MG/DL (ref 8.3–10.6)
CARBOXYHEMOGLOBIN: 8.6 % (ref 0–1.5)
CHLORIDE BLD-SCNC: 93 MMOL/L (ref 99–110)
CO2: 15 MMOL/L (ref 21–32)
CREAT SERPL-MCNC: 1.4 MG/DL (ref 0.9–1.3)
EOSINOPHILS ABSOLUTE: 0 K/UL (ref 0–0.6)
EOSINOPHILS RELATIVE PERCENT: 0 %
GFR AFRICAN AMERICAN: >60
GFR NON-AFRICAN AMERICAN: 52
GLUCOSE BLD-MCNC: 103 MG/DL (ref 70–99)
GLUCOSE BLD-MCNC: 153 MG/DL (ref 70–99)
GLUCOSE BLD-MCNC: 180 MG/DL (ref 70–99)
GLUCOSE BLD-MCNC: 198 MG/DL (ref 70–99)
GLUCOSE BLD-MCNC: 208 MG/DL (ref 70–99)
GLUCOSE BLD-MCNC: 234 MG/DL (ref 70–99)
GLUCOSE BLD-MCNC: 245 MG/DL (ref 70–99)
GLUCOSE BLD-MCNC: 97 MG/DL (ref 70–99)
HCO3 VENOUS: 16.1 MMOL/L (ref 23–29)
HCT VFR BLD CALC: 42.8 % (ref 40.5–52.5)
HEMOGLOBIN: 14.3 G/DL (ref 13.5–17.5)
INR BLD: 0.91 (ref 0.88–1.12)
INR BLD: 0.94 (ref 0.88–1.12)
LACTIC ACID: 1 MMOL/L (ref 0.4–2)
LYMPHOCYTES ABSOLUTE: 1 K/UL (ref 1–5.1)
LYMPHOCYTES RELATIVE PERCENT: 17.8 %
MCH RBC QN AUTO: 29 PG (ref 26–34)
MCHC RBC AUTO-ENTMCNC: 33.3 G/DL (ref 31–36)
MCV RBC AUTO: 86.9 FL (ref 80–100)
METHEMOGLOBIN VENOUS: 0.3 %
MONOCYTES ABSOLUTE: 0.5 K/UL (ref 0–1.3)
MONOCYTES RELATIVE PERCENT: 9.6 %
NEUTROPHILS ABSOLUTE: 4.1 K/UL (ref 1.7–7.7)
NEUTROPHILS RELATIVE PERCENT: 72.2 %
O2 CONTENT, VEN: 18 VOL %
O2 SAT, VEN: 89 %
O2 THERAPY: ABNORMAL
PCO2, VEN: 31.6 MMHG (ref 40–50)
PDW BLD-RTO: 15.5 % (ref 12.4–15.4)
PERFORMED ON: ABNORMAL
PERFORMED ON: NORMAL
PH VENOUS: 7.32 (ref 7.35–7.45)
PLATELET # BLD: 91 K/UL (ref 135–450)
PMV BLD AUTO: 7.3 FL (ref 5–10.5)
PO2, VEN: 59.8 MMHG (ref 25–40)
POTASSIUM REFLEX MAGNESIUM: 4.3 MMOL/L (ref 3.5–5.1)
PROTHROMBIN TIME: 10.3 SEC (ref 9.9–12.7)
PROTHROMBIN TIME: 10.6 SEC (ref 9.9–12.7)
RBC # BLD: 4.93 M/UL (ref 4.2–5.9)
SODIUM BLD-SCNC: 128 MMOL/L (ref 136–145)
TCO2 CALC VENOUS: 17 MMOL/L
VALPROIC ACID LEVEL: 31.5 UG/ML (ref 50–100)
WBC # BLD: 5.7 K/UL (ref 4–11)

## 2022-01-16 PROCEDURE — 80164 ASSAY DIPROPYLACETIC ACD TOT: CPT

## 2022-01-16 PROCEDURE — 2500000003 HC RX 250 WO HCPCS: Performed by: INTERNAL MEDICINE

## 2022-01-16 PROCEDURE — 6360000002 HC RX W HCPCS

## 2022-01-16 PROCEDURE — 6370000000 HC RX 637 (ALT 250 FOR IP): Performed by: HOSPITALIST

## 2022-01-16 PROCEDURE — 6370000000 HC RX 637 (ALT 250 FOR IP): Performed by: INTERNAL MEDICINE

## 2022-01-16 PROCEDURE — 85025 COMPLETE CBC W/AUTO DIFF WBC: CPT

## 2022-01-16 PROCEDURE — 2700000000 HC OXYGEN THERAPY PER DAY

## 2022-01-16 PROCEDURE — 82803 BLOOD GASES ANY COMBINATION: CPT

## 2022-01-16 PROCEDURE — 99223 1ST HOSP IP/OBS HIGH 75: CPT | Performed by: INTERNAL MEDICINE

## 2022-01-16 PROCEDURE — 94761 N-INVAS EAR/PLS OXIMETRY MLT: CPT

## 2022-01-16 PROCEDURE — 36415 COLL VENOUS BLD VENIPUNCTURE: CPT

## 2022-01-16 PROCEDURE — 82140 ASSAY OF AMMONIA: CPT

## 2022-01-16 PROCEDURE — 2580000003 HC RX 258: Performed by: INTERNAL MEDICINE

## 2022-01-16 PROCEDURE — 83605 ASSAY OF LACTIC ACID: CPT

## 2022-01-16 PROCEDURE — 99233 SBSQ HOSP IP/OBS HIGH 50: CPT | Performed by: INTERNAL MEDICINE

## 2022-01-16 PROCEDURE — 85610 PROTHROMBIN TIME: CPT

## 2022-01-16 PROCEDURE — 2000000000 HC ICU R&B

## 2022-01-16 PROCEDURE — 99291 CRITICAL CARE FIRST HOUR: CPT | Performed by: INTERNAL MEDICINE

## 2022-01-16 PROCEDURE — 80048 BASIC METABOLIC PNL TOTAL CA: CPT

## 2022-01-16 RX ORDER — DIVALPROEX SODIUM 250 MG/1
250 TABLET, DELAYED RELEASE ORAL NIGHTLY
Status: DISCONTINUED | OUTPATIENT
Start: 2022-01-16 | End: 2022-01-18

## 2022-01-16 RX ORDER — SODIUM CHLORIDE 9 MG/ML
25 INJECTION, SOLUTION INTRAVENOUS PRN
Status: DISCONTINUED | OUTPATIENT
Start: 2022-01-16 | End: 2022-01-27 | Stop reason: HOSPADM

## 2022-01-16 RX ORDER — DEXMEDETOMIDINE HYDROCHLORIDE 4 UG/ML
.2-1.4 INJECTION, SOLUTION INTRAVENOUS CONTINUOUS
Status: DISCONTINUED | OUTPATIENT
Start: 2022-01-16 | End: 2022-01-19

## 2022-01-16 RX ORDER — SODIUM CHLORIDE 0.9 % (FLUSH) 0.9 %
5-40 SYRINGE (ML) INJECTION PRN
Status: DISCONTINUED | OUTPATIENT
Start: 2022-01-16 | End: 2022-01-27 | Stop reason: HOSPADM

## 2022-01-16 RX ORDER — LORAZEPAM 2 MG/ML
INJECTION INTRAMUSCULAR
Status: COMPLETED
Start: 2022-01-16 | End: 2022-01-16

## 2022-01-16 RX ORDER — 0.9 % SODIUM CHLORIDE 0.9 %
500 INTRAVENOUS SOLUTION INTRAVENOUS ONCE
Status: COMPLETED | OUTPATIENT
Start: 2022-01-16 | End: 2022-01-16

## 2022-01-16 RX ORDER — LIDOCAINE HYDROCHLORIDE 10 MG/ML
5 INJECTION, SOLUTION INFILTRATION; PERINEURAL ONCE
Status: COMPLETED | OUTPATIENT
Start: 2022-01-16 | End: 2022-01-16

## 2022-01-16 RX ORDER — 0.9 % SODIUM CHLORIDE 0.9 %
1000 INTRAVENOUS SOLUTION INTRAVENOUS ONCE
Status: COMPLETED | OUTPATIENT
Start: 2022-01-16 | End: 2022-01-16

## 2022-01-16 RX ORDER — LORAZEPAM 2 MG/ML
2 INJECTION INTRAMUSCULAR EVERY 4 HOURS PRN
Status: DISCONTINUED | OUTPATIENT
Start: 2022-01-16 | End: 2022-01-17

## 2022-01-16 RX ORDER — DIVALPROEX SODIUM 250 MG/1
250 TABLET, DELAYED RELEASE ORAL NIGHTLY
Status: DISCONTINUED | OUTPATIENT
Start: 2022-01-16 | End: 2022-01-16

## 2022-01-16 RX ORDER — QUETIAPINE FUMARATE 25 MG/1
50 TABLET, FILM COATED ORAL 2 TIMES DAILY
Status: DISCONTINUED | OUTPATIENT
Start: 2022-01-16 | End: 2022-01-17

## 2022-01-16 RX ORDER — SODIUM CHLORIDE 0.9 % (FLUSH) 0.9 %
5-40 SYRINGE (ML) INJECTION EVERY 12 HOURS SCHEDULED
Status: DISCONTINUED | OUTPATIENT
Start: 2022-01-16 | End: 2022-01-27 | Stop reason: HOSPADM

## 2022-01-16 RX ADMIN — ATORVASTATIN CALCIUM 80 MG: 40 TABLET, FILM COATED ORAL at 08:57

## 2022-01-16 RX ADMIN — Medication 10 ML: at 21:55

## 2022-01-16 RX ADMIN — LORAZEPAM 2 MG: 2 INJECTION INTRAMUSCULAR; INTRAVENOUS at 10:40

## 2022-01-16 RX ADMIN — LIDOCAINE HYDROCHLORIDE 5 ML: 10 INJECTION, SOLUTION INFILTRATION; PERINEURAL at 15:08

## 2022-01-16 RX ADMIN — Medication 0.4 MCG/KG/HR: at 08:29

## 2022-01-16 RX ADMIN — PANTOPRAZOLE SODIUM 40 MG: 40 TABLET, DELAYED RELEASE ORAL at 08:58

## 2022-01-16 RX ADMIN — SODIUM CHLORIDE 1000 ML: 9 INJECTION, SOLUTION INTRAVENOUS at 08:45

## 2022-01-16 RX ADMIN — QUETIAPINE FUMARATE 50 MG: 25 TABLET ORAL at 16:11

## 2022-01-16 RX ADMIN — INSULIN GLARGINE 60 UNITS: 100 INJECTION, SOLUTION SUBCUTANEOUS at 21:43

## 2022-01-16 RX ADMIN — DIVALPROEX SODIUM 250 MG: 250 TABLET, DELAYED RELEASE ORAL at 21:43

## 2022-01-16 RX ADMIN — SODIUM CHLORIDE 500 ML: 9 INJECTION, SOLUTION INTRAVENOUS at 07:26

## 2022-01-16 RX ADMIN — SODIUM BICARBONATE: 84 INJECTION, SOLUTION INTRAVENOUS at 08:33

## 2022-01-16 RX ADMIN — Medication 0.2 MCG/KG/HR: at 13:15

## 2022-01-16 RX ADMIN — ASPIRIN 81 MG: 81 TABLET, CHEWABLE ORAL at 08:57

## 2022-01-16 RX ADMIN — DULOXETINE HYDROCHLORIDE 60 MG: 60 CAPSULE, DELAYED RELEASE ORAL at 08:57

## 2022-01-16 RX ADMIN — METOPROLOL TARTRATE 25 MG: 25 TABLET, FILM COATED ORAL at 21:34

## 2022-01-16 RX ADMIN — DILTIAZEM HYDROCHLORIDE 180 MG: 180 CAPSULE, COATED, EXTENDED RELEASE ORAL at 08:57

## 2022-01-16 ASSESSMENT — PAIN SCALES - GENERAL
PAINLEVEL_OUTOF10: 0

## 2022-01-16 ASSESSMENT — PAIN SCALES - WONG BAKER

## 2022-01-16 NOTE — PLAN OF CARE
symptoms  Outcome: Ongoing  Goal: Absence of new skin breakdown  Description: Absence of new skin breakdown  Outcome: Ongoing  Note: Turn and reposition self in bed. Problem: Confusion - Acute:  Goal: Absence of continued neurological deterioration signs and symptoms  Description: Absence of continued neurological deterioration signs and symptoms  Outcome: Ongoing  Goal: Mental status will be restored to baseline  Description: Mental status will be restored to baseline  Outcome: Ongoing     Problem: Discharge Planning:  Goal: Ability to perform activities of daily living will improve  Description: Ability to perform activities of daily living will improve  Outcome: Ongoing  Goal: Participates in care planning  Description: Participates in care planning  Outcome: Ongoing     Problem: Injury - Risk of, Physical Injury:  Goal: Will remain free from falls  Description: Will remain free from falls  Outcome: Ongoing  Note: Call light within reach, bed alarm intact, non-skid socks in place.    Goal: Absence of physical injury  Description: Absence of physical injury  Outcome: Ongoing     Problem: Mood - Altered:  Goal: Mood stable  Description: Mood stable  Outcome: Ongoing  Goal: Absence of abusive behavior  Description: Absence of abusive behavior  Outcome: Ongoing  Goal: Verbalizations of feeling emotionally comfortable while being cared for will increase  Description: Verbalizations of feeling emotionally comfortable while being cared for will increase  Outcome: Ongoing     Problem: Psychomotor Activity - Altered:  Goal: Absence of psychomotor disturbance signs and symptoms  Description: Absence of psychomotor disturbance signs and symptoms  Outcome: Ongoing     Problem: Sensory Perception - Impaired:  Goal: Demonstrations of improved sensory functioning will increase  Description: Demonstrations of improved sensory functioning will increase  Outcome: Ongoing  Goal: Decrease in sensory misperception frequency  Description: Decrease in sensory misperception frequency  Outcome: Ongoing  Goal: Able to refrain from responding to false sensory perceptions  Description: Able to refrain from responding to false sensory perceptions  Outcome: Ongoing  Goal: Demonstrates accurate environmental perceptions  Description: Demonstrates accurate environmental perceptions  Outcome: Ongoing  Goal: Able to distinguish between reality-based and nonreality-based thinking  Description: Able to distinguish between reality-based and nonreality-based thinking  Outcome: Ongoing  Goal: Able to interrupt nonreality-based thinking  Description: Able to interrupt nonreality-based thinking  Outcome: Ongoing     Problem: Sleep Pattern Disturbance:  Goal: Appears well-rested  Description: Appears well-rested  Outcome: Ongoing     Problem: Non-Violent Restraints  Goal: Removal from restraints as soon as assessed to be safe  Outcome: Ongoing  Note: Restraints in place, per order. Assess patient. Goal: No harm/injury to patient while restraints in use  Outcome: Ongoing  Goal: Patient's dignity will be maintained  Outcome: Ongoing     Problem: Gas Exchange - Impaired  Goal: Absence of hypoxia  Outcome: Not Met This Shift  Note: Pt has sleep apnea requiring oxygen at night. Wears a Cpap at home, but refuses it here.

## 2022-01-16 NOTE — PROGRESS NOTES
Consent obtained for PICC line by stevie SANTANA and Taisha SANTAAN.  Consent obtained by brother Radha Huang RN

## 2022-01-16 NOTE — FLOWSHEET NOTE
01/16/22 1720   Pain Assessment   RASS Score +2       Pt attempting to get out of bed. Ripping of leads and pulling at call. Unable to redirect. RASS score as shown above. Precedex gtt increased to 0.4 mcg/kg/hr. Pt stable and will continue to monitor.     Lio Hough RN

## 2022-01-16 NOTE — PROGRESS NOTES
Progress Note    Admit Date:  1/12/2022    63 y.o. male with DMII, Chronic sCHF, CAD s/p stent placement x3, hx of MI, asthma, HTN, HLD, GERD, lumbar spondylosis and DDD s/p bilateral dorsal ramus medial branch ablation and interstim implant, depression who presented to [address] ED with complaint of generalized weakness ongoing for approximately 2 months and frequent falls. In ED, CT of the head showed no acute abnormality. Chest x-ray with no acute cardiopulmonary process. Patient did test positive for COVID. He is vaccinated x2, no booster. Patient admitted to med-surg for further care. PT/OT consulted. Subjective:    Per Dr Liliam Tomlin Report on 1/15  Mr. Quiroga developed rapid HR what appears to be Afib on TELE monitor and his pacer inappropriately delivered shock x 5 and this was witnessed by me and nursing staff. Pt awake, conscious during these episodes and by the end of the 5th shock, he started getting slightly confused    Given cardizem 10 mg IV x 1 and metoprolol 5 mg with control of HR  EKG after this episode showed sinus tach    Pt remains asymptomatic from covid so far except for gen weakness  No fevers. No hypoxia      1/16  Pt transferred to ICU yesterday. He continued to have bursts of cardiac shocks delivered by his AICD pacemaker - I reviewed telemetry - it appears these shocks are delivered in rapid succession - and look like runs of [facility] on telemetry, though there are truly not. Underlying rhythm was Afib at the time and resulting rhythm is sinus. There were at least 4-5 bursts of this activity yesterday and none since then - he has actually remained in sinus rhythm since then. His BP was low - he is receiving IVF with good response. He continues to be agitated, albeit he is oriented to place and person. He is pulling out devices and all attempts to keep him clothed have failed.  He was resumed on precedex this am.     When I spoke to him he continues to keli any alcohol or drug use, though he clearly appears to be going through withdrawals on ?substance. I am getting CT abd pelvis today. Will try to resume low dose BB today. Discussed with cardiology - his device interrogation was not very helpful - it is being interpreted as [facility] - Dr Neha Dozier plans to talk to one of his EP partners at [facility] to get a better picture on his device function. I also decreased the dose of his depakote (1000DR) and ordered to check the level. Objective:   Patient Vitals for the past 4 hrs:   BP Temp Temp src Pulse Resp SpO2   01/16/22 1315 (!) 174/74 -- Oral 74 19 98 %   01/16/22 1115 (!) 108/56 -- -- 85 25 93 %   01/16/22 1045 (!) 88/55 98.2 °F (36.8 °C) Oral 75 23 93 %   01/16/22 1015 (!) 108/54 -- -- -- -- --   01/16/22 1002 110/71 -- -- 81 -- 97 %            Intake/Output Summary (Last 24 hours) at 1/16/2022 1336  Last data filed at 1/16/2022 1229  Gross per 24 hour   Intake 2506.6 ml   Output 1300 ml   Net 1206.6 ml       Physical Exam:  Gen: middle aged obese male, No distress. Awake, slightly delirious  Appears older than stated age  Eyes: PERRL. No sclera icterus. No conjunctival injection. ENT: No discharge. .   Neck: No JVD. Trachea midline. Resp: No accessory muscle use. No crackles. No wheezes. No rhonchi. CV: Regular rate. Regular rhythm. No murmur. No rub. No edema. Left sided pacer  Capillary Refill: Brisk,< 3 seconds   Peripheral Pulses: +2 palpable, equal bilaterally   GI: Non-tender. Non-distended. No masses. No organomegaly. Normal bowel sounds. No hernia. Skin: Warm and dry. No nodule on exposed extremities. No rash on exposed extremities. ecchymosis on right rib cage, no tenderness upon palpation   M/S: No cyanosis. No joint deformity. No clubbing. Neuro: Awake. Grossly nonfocal, BLE with generalized weakness, neurovascularly intact  Psych: Oriented x 2. No anxiety or agitation.      Data:  CBC:   Recent Labs     01/14/22  0600 01/15/22  0605 01/16/22  0513   WBC 5.5 4.5 5.7   HGB 16.0 15.1 14.3   HCT 49.3 45.8 42.8   MCV 88.4 88.1 86.9   PLT 97* 87* 91*     BMP:   Recent Labs     01/14/22  1747 01/15/22  0523 01/16/22 0513   * 128* 128*   K 4.6 4.2 4.3   CL 91* 91* 93*   CO2 14* 14* 15*   BUN 18 18 34*   CREATININE 0.9 0.9 1.4*     LIVER PROFILE:   No results for input(s): AST, ALT, LIPASE, BILIDIR, BILITOT, ALKPHOS in the last 72 hours. Invalid input(s): AMYLASE,  ALB  PT/INR:   Recent Labs     01/16/22 0513   PROTIME 10.3   INR 0.91       CULTURES  Results for Nury Bergeron (MRN [id number]) as of 1/13/2022 13:29    Ref. Range 1/12/2022 19:35   SARS-CoV-2, NAAT Latest Ref Range: Not Detected  DETECTED (AA)          RADIOLOGY  CT Head WO Contrast   Final Result   No acute intracranial abnormality. XR CHEST PORTABLE   Final Result   No acute cardiopulmonary process. IR PICC WO SQ PORT/PUMP > 5 YEARS    (Results Pending)   CT ABDOMEN PELVIS WO CONTRAST Additional Contrast? None    (Results Pending)     Pertinent previous results reviewed   Diagnostic cardiac cath 7/13/21  CONCLUSION:  Successful stenting of the true circumflex into the obtuse  marginal branch, successful stenting of the left posterior descending  coronary artery and successful stenting of the obtuse marginal branch,  all 80% stenosis reduced to 0 with these three drug-eluting stents.    JAMES-3 blood flow was present in the circumflex artery and all its  branches.  The LAD is normal with minor irregularities only and the left  main is normal.  Nondominant right coronary artery.  LV ejection  fraction of 40% with inferior wall and inferoposterior hypokinesia.     ECHO 11/27/19  Summary   The left ventricular systolic function is moderately reduced with an   ejection fraction of 35 -40 %.   There is hypokinesis of the inferior and basal inferior walls.   There is akinesis of the inferolateral wall.   Left ventricular cavity size is moderately dilated.   Compared to previous study from 2-2-2015 no changes noted in left   ventricular function.   Mild mitral regurgitation. Assessment/Plan:      # ICD firing - he had 5 witnessed shocks and then more bursts (4-5 additional episodes) of AICD discharges and concern for malfunctioning ICD  - for [facility] interrogation.   - Cardiology consulted. - Dr Evelina Blackburn will discuss with EP partner at [facility].    - BB - toprol XL stopped due to low BP - metoprolol 25BID with close monitoring.    - has been sinus rhythm since yesterday. Acute Encephalopathy - possibly toxic. Etiology remains unclear. CT head non focal.   He denies any drug or alcohol use. I am growing more concerned about possible inadvertent depakote toxicity - he normally takes 1000DR daily (constellation of AGMA with normal LA, tachycardia, encephalopathy, hypotension, tremors, agitation). - I stopped the med today   - check ammonia and valproic acid level stat. - cont supportive care. ADDENDUM:    - ammonia and depakote levels normal - will resume depakote at decreased dose and add PO seroquel to help with agitation. Wean precedex as able.          #COVID 19  -Positive 1/12/22  -only complaints is a cough  -patient is vaccinated x2- no booster  -Not hypoxic  -No covid specific treatment indicated at this time  -Monitor, continuous pulse ox and telemetry        #DMII  - not controlled noted to have >1000 glucose in urine  -Hold home oral meds  -Continue lantus 60 nightly  -Low dose SSI  -POCT glucose  -CC diet   -  A1C: 7.4       #Thrombocytopenia  -Plt 87  -Monitor CBC while on lovenox        #CAD s/p stent placement x3  #Hx of MI  Hx of VTACH s.p ICD  - ASA, statin, plavix  - denies chest pain or palpitations   - interrogation of  ICD on 1/10 showed x1 episode of NSVT- repeat interrogation as above.   conitnue BB, cardizem  - place on telemetry        #Chronic sCHF no AE   -Last echo 11/27/19, EF 35-40%  -Pacemaker/ICD in place  -Continue ACEi, toprol XL, cardizem  - patient takes Lasix on Monday and Thursday - holding at this time        #Asthma no AE  -Symbicort  -albuterol prn  - on room air     #HTN  BP low - see above.        #HLD  -continue statin       #GERD  -continue ppi       #Lumbar spondylosis and DDD   -s/p bilateral dorsal ramus medial branch ablation and interstim implant  -PDMP displays on chronic gabapentin, continue  - follows with Dr. Bueno Sample  #ALIN  - unsure if patient is compliant with CPAP ? If he still has CPAP at home       Morbid Obesity  - Body mass index is 41.37 kg/m². - Complicating assessment and treatment. Placing patient at risk for multiple co-morbidities as well as early death and contributing to the patient's presentation.   - Counseled on weight loss.        DVT Prophylaxis: Lovenox      Diet: ADULT DIET; Regular; 3 carb choices (45 gm/meal)  Code Status: Full Code      >30min of CC time today.        Haley Bolanos MD, 1/16/2022 1:36 PM

## 2022-01-16 NOTE — FLOWSHEET NOTE
01/16/22 1530   Vital Signs   BP (!) 129/43   MAP (mmHg) (!) 63   Pain Assessment   RASS Score -1     Softer BP; precedex gtt turned down to 0.2mcg/kg/hr. Pt able to arose. Stable.     Selam Do, RN

## 2022-01-16 NOTE — PROGRESS NOTES
Writer called in DIT for PICC placement; spoke with Faviola Borrego, DIT representative. Update given to shift MAX Viera Shaper.  Zelda Ramirez Clinical

## 2022-01-16 NOTE — PROGRESS NOTES
[address]   Progress Note  Cardiology      HPI: Seeing Mr. Quiroga today for f/u ICD shocks. He is confused in restraints. Oriented to person and place but not time. Started on precedex gtt yesterday for agitation and pulling lines out. Issues with hypotension overnight and gtt held but now restarted. He denies any specific complaints. Tele reviewed shows NSR. Physical Examination:    Vitals:    01/16/22 0645   BP: 107/76   Pulse: 65   Resp: 20   Temp:    SpO2: 95%          Constitutional and General Appearance: NAD   Respiratory:  · Normal excursion and expansion without use of accessory muscles  · Resp Auscultation: Normal breath sounds without dullness  Cardiovascular:  · The apical impulses not displaced  · Heart tones are crisp and normal  · Cervical veins are not engorged  · The carotid upstroke is normal in amplitude and contour without delay or bruit  · Normal S1S2, No S3, No Murmur  · Peripheral pulses are symmetrical and full  · There is no clubbing, cyanosis of the extremities. · No edema  · Pedal Pulses: 2+ and equal   Abdomen:  · No masses or tenderness  · Liver/Spleen: No Abnormalities Noted  Neurological/Psychiatric:  · Alert and oriented to person and place but not time  · Moves all extremities well  · +confusion  Skin: warm and dry        Lab Results   Component Value Date    WBC 5.7 01/16/2022    HGB 14.3 01/16/2022    HCT 42.8 01/16/2022    MCV 86.9 01/16/2022    PLT 91 (L) 01/16/2022     Lab Results   Component Value Date    CREATININE 1.4 (H) 01/16/2022    BUN 34 (H) 01/16/2022     (L) 01/16/2022    K 4.3 01/16/2022    CL 93 (L) 01/16/2022    CO2 15 (L) 01/16/2022     Lab Results   Component Value Date    INR 0.90 07/13/2021    PROTIME 10.1 07/13/2021           Lexiscan myoview 7/2/21       Summary    Overall findings represent a high risk scan.    Normal LV size with reduced function.  EF 49%    Large mostly reversible defect involving the basal inferolateral, basal  inferior, mid inferolateral and mid inferior segments.    Findings concerning for ischemia.    ECG: Non-diagnostic EKG response due to failure to reach target heart rate.      7/13/21 CATH CONCLUSION:  Successful stenting of the true circumflex into the obtuse  marginal branch, successful stenting of the left posterior descending  coronary artery and successful stenting of the obtuse marginal branch,  all 80% stenosis reduced to 0 with these three drug-eluting stents. JAMES-3 blood flow was present in the circumflex artery and all its  branches.  The LAD is normal with minor irregularities only and the left  main is normal.  Nondominant right coronary artery.  LV ejection  fraction of 40% with inferior wall and inferoposterior hypokinesia.     ECHO 1/15/22 Summary   This is a limited study afib, ischemic cmp, recurrent pacer firing. LV systolic function appears mildly reduced with EF estimated at 45-50%. There is more prominent HK of the inferior wall. Left ventricular size is decreased. There is mild concentric left ventricular hypertrophy. 11- 35-40% inf basal hypo, inferolateral akinesis, LVE, mild MR       Assessment:  César Sands is a 63 y.o. patient who presented to [facility] 1/12/22 with c/o weakness and falls. Follows Dr. John Mckeon for cardiology--last saw 7/21. He has PMH CAD s/p 3 ROSHNI (7/21 s/p ROSHNI CCx into OM branch, Left PDA, and OM branch), chronic systolic CHF, DM, hx MI, ischemic CM, hx VT s/p ICD with generator change due to recall 5/19 and DJD/lumbar spondylosis. Most recent Lexiscan myoview 7/2/21 showed reversible defect RCA/CCx territory; EF=49%. Underwent LHC and 3 ROSHNI placed per Dr. John Mckeon. Reported EF=40% in 7/21 but no ECHO report found. Now presents with mainly weakness and falls. Tested positive for Covid. CT head and CXR unremarkable. Admit EKG ST; PVC; inferolateral infarct; NST change. Second EKG similar without PVC.  Note he called out due to being shocked from device. Dr. Lars Rodríguez at bedside reports felt rapid afib and gave 5mg IV metoprolol followed by 10mg IV diltiazem. He had frequent sustained grouped shocks intermittently per report. He denied any complaints prior to ICD firing. K+ 4.2; Md=370; Covid + on 1/12. Diagnosis of frequent ICD firing in middle-aged male with hx CAD s/p stents, chronic systolic CHF, and ischemic CM.      Recs:  1. Hypotension possibly related to precedex gtt. 2. Restart Toprol XL at lower dose 25-50mg daily if BP will tolerate (prior home dose 200mg)  3. Would hold cardizem and use Toprol XL for rate control due to LV dysfcn. 4. Restart lisinopril (prior dose 20mg) for LV dysfunction when BP improves. 5. Continue plavix 75mg qd, baby aspirin, lipitor 80mg qd  6. I personally reviewed ECHO 1/15/22 EF=45-50%; inferior HK (improved EF from 11/19). 7. Keep lytes repleted properly (K+ 4 and Mg+ 2)  8. Will have EP partner review ICD interrogation.  Appears he received ATP and shocks for VT      Patient Active Problem List   Diagnosis    [facility]ricular tachycardia ([facility]    Presence of stent in left circumflex coronary artery    Myocardial infarction, nontransmural (HCC)    Myocardial infarction, inferoposterior wall, subsequent care    Automatic implantable cardioverter-defibrillator in situ    CAD (coronary artery disease)    Automatic implantable cardioverter-defibrillator in situ    ALIN on CPAP    Gastroesophageal reflux disease without esophagitis    Hyperlipidemia with target LDL less than 100    Hypertension due to endocrine disorder    Ischemic cardiomyopathy    Obesity, morbid, BMI 40.0-49.9 (HCC)    Weakness    Acute encephalopathy    TIA involving left internal carotid artery    DM (diabetes mellitus), secondary, uncontrolled, w/neurologic complic ([address]    Dyslipidemia    HTN (hypertension), benign    Lactic acidosis    Head trauma    Abrasion, scalp w/o infection    Unsteady gait  Hyperglycemia    Chronic obstructive pulmonary disease (HCC)    Coarse tremor    Generalized weakness    Type 2 diabetes mellitus with hyperglycemia, with long-term current use of insulin (HCC)    Essential hypertension    CAD S/P percutaneous coronary angioplasty    COVID-19    Fall    Unable to ambulate    Implantable cardioverter-defibrillator (ICD) discharge

## 2022-01-16 NOTE — PROGRESS NOTES
Jaclyn Bennett (brother) updated on care plan for patient. Addressed all family and patient concerns at this time. Pt stable.     Eddie Garcia RN

## 2022-01-16 NOTE — FLOWSHEET NOTE
01/16/22 0355   Vital Signs   Pulse 72   Heart Rate Source Monitor   Resp 20   BP (!) 75/44  (Precedex stopped and message to MD)   BP Location Left upper arm   Patient Position Supine   Level of Consciousness Responds to Voice (1)   Patient Currently in Pain Denies

## 2022-01-16 NOTE — FLOWSHEET NOTE
01/16/22 1314   Pain Assessment   RASS Score +2     Pt increasingly restless. Precedex restarted at 0.2mcg/kg/hr.     Loren Schaumann, RN

## 2022-01-16 NOTE — PROGRESS NOTES
Shift report given to Taisha SANTANA  at bedside. Patient care handed off in stable condition at this time.

## 2022-01-16 NOTE — CONSULTS
Reason for referral and CC: ICD firing, delerielijah COVID 19    HISTORY OF PRESENT ILLNESS: 64 yo male history of CAD, vtach, asthma, ICD presented progressive weakness and falls admission he was a poor historian but oriented to situation and self. Since admission his pacer has been firing frequently. He had his device interrogated and he was noted to be having A. fib as well as V. Tach. He has become progressively disoriented and agitated. The pt became hypotensive on Precedex and a bolus was given. I tried to stop the Precedex and use Ativan, but the precedex was more effective. Patient is delirious and not able to provide good history but does deny alcohol or drug use. PICC placed today  Past Medical History:   Diagnosis Date    Arthritis     Asthma     CAD (coronary artery disease)     COPD [facility]chronic obstructive pulmonary disease) ([facility])     [facility] Ashlie [facility] at [facility] told the patient that he did not have COPD, just asthma    Emphysema of lung ([address]     Fatty liver     GERD (gastroesophageal reflux disease)     Hyperlipidemia     Hypertension     Medical history reviewed with no changes     MI, old     Sleep apnea     pt wears cpap at night. states does not have cpap    Type II or unspecified type diabetes mellitus without mention of complication, not stated as uncontrolled      Past Surgical History:   Procedure Laterality Date    CARDIAC PACEMAKER PLACEMENT  2011    NOT MRI COMPATIABLE OLD LEADS st [place] [address].  pace maker difib   [address] Bilateral 2013    CATARACT REMOVAL WITH IMPLANT Left 2/28/14    COLONOSCOPY      CORONARY ANGIOPLASTY WITH STENT PLACEMENT  2001,2008    CYST REMOVAL  1982    coccyx area    DIAGNOSTIC CARDIAC CATH LAB PROCEDURE      ENDOSCOPY, COLON, DIAGNOSTIC      ERCP  9/15/2013    ERCP  10/11/13    WITH STENT REMOVAL    FOOT SURGERY Right 07/09/2018     REPAIR CALCANEAL SPUR, REPAIR OF ACHILLES TENDON RIGHT FOOT    NERVE SURGERY Bilateral 12/1/2020 BILATERAL LUMBAR THREE LUMBAR FOUR LUMBAR FIVE DORSAL RAMUS  RADIOFREQUENCY ABLATION SITE CONFIRMED BY FLUOROSCOPY performed by Siddharth Caraballo MD at [address]      back stimulator in lower back    PACEMAKER PLACEMENT      ICD    PAIN MANAGEMENT PROCEDURE Bilateral 5/26/2020    BILATERAL LUMBAR THREE, LUMBAR FOUR, LUMBAR FIVE DORSAL RAMUS MEDIAL BRANCH BLOCK SITE CONFIRMED BY FLUOROSCOPY performed by Siddharth Caraballo MD at [address] Bilateral 6/9/2020    BILATERAL LUMBAR THREE, LUMBAR FOUR, LUMBAR FIVE DORSAL RAMUS MEDIAL BRANCH BLOCK SITE CONFIRMED BY FLUOROSCOPY performed by Siddharth Caraballo MD at [facility] Left 1/12/2021    LEFT SACROILIAC JOINT INJECTION SITE CONFIRMED BY FLUOROSCOPY performed by Siddharth Caraballo MD at [facility] Right 10/31/2018    GASTROCNEMIUS RECESSION RIGHT FOOT performed by Daniel Hess DPM at [facility] Right 10/31/2018    REMOVAL OF CALCANEAL SPUR, ACHILLES TENDON REPAIR RIGHT LOWER EXTREMITY performed by Daniel Hess DPM at [facility]  7/18/13    and colonoscopy with biopsies taken    UPPER GASTROINTESTINAL ENDOSCOPY  8/23/13    EUS    UPPER GASTROINTESTINAL ENDOSCOPY  9/15/2013     Family History  family history includes Cancer in his father; Diabetes in his maternal grandmother; Heart Disease in his mother; Heart Failure in his maternal uncle and mother; Hypertension in his maternal uncle. Social History:  reports that he quit smoking about 20 years ago. His smoking use included cigarettes. He has a 40.00 pack-year smoking history. He has never used smokeless tobacco.   reports no history of alcohol use. ALLERGIES:  Patient is allergic to other and pcn [penicillins].   Continuous Infusions:   IV infusion builder      dextrose      sodium chloride Scheduled Meds:   sodium chloride  500 mL IntraVENous Once    [Held by provider] metoprolol succinate  200 mg Oral Daily    aspirin  81 mg Oral Daily    budesonide-formoterol  2 puff Inhalation BID    albuterol sulfate HFA  2 puff Inhalation BID    insulin lispro  0-6 Units SubCUTAneous TID WC    insulin lispro  0-3 Units SubCUTAneous Nightly    atorvastatin  80 mg Oral Daily    clopidogrel  75 mg Oral Daily    dilTIAZem  180 mg Oral Daily    divalproex  1,000 mg Oral Nightly    DULoxetine  60 mg Oral Daily    pantoprazole  40 mg Oral Daily    fluticasone  2 spray Each Nostril Daily    [Held by provider] gabapentin  200 mg Oral TID    insulin glargine  60 Units SubCUTAneous Nightly    sodium chloride flush  5-40 mL IntraVENous 2 times per day    enoxaparin  40 mg SubCUTAneous Daily     PRN Meds:  perflutren lipid microspheres, traZODone, glucose, dextrose, glucagon (rDNA), dextrose, albuterol, sodium chloride flush, sodium chloride, ondansetron **OR** ondansetron, polyethylene glycol, acetaminophen **OR** acetaminophen    REVIEW OF SYSTEMS:  Pt is unable to answer    PHYSICAL EXAM: /76   Pulse 65   Temp 98.1 °F (36.7 °C) (Oral)   Resp 20   Ht 5' 4\" (1.626 m)   Wt 241 lb (109.3 kg)   SpO2 95%   BMI 41.37 kg/m²  on 1L  Constitutional:  No acute distress. Eyes: PERRL. Conjunctivae anicteric. ENT: Normal nose. Normal tongue. Neck:  Trachea is midline. No thyroid tenderness. Respiratory: + accessory muscle usage. No wheezes. No rales. No Rhonchi. Cardiovascular: Normal S1S2. No digit clubbing. No digit cyanosis. No LE edema. Capillary refill is normal  Gastrointestinal: No mass palpated. No tenderness palpated. No umbilical hernia. Lymphatic: No cervical or supraclavicular adenopathy. Skin: No rash on the exposed extremities. No Nodules or induration on exposed extremities. Psychiatric: +  Agitation.  Alert not Oriented     CBC:   Recent Labs     01/14/22  0600 01/15/22  0605 01/16/22  0513   WBC 5.5 4.5 5.7   HGB 16.0 15.1 14.3   HCT 49.3 45.8 42.8   MCV 88.4 88.1 86.9   PLT 97* 87* 91*     BMP:   Recent Labs     01/14/22  1747 01/15/22  0523 01/16/22  0513   * 128* 128*   K 4.6 4.2 4.3   CL 91* 91* 93*   CO2 14* 14* 15*   BUN 18 18 34*   CREATININE 0.9 0.9 1.4*        Recent Labs     01/13/22  0730   AST 22   ALT 28   BILIDIR <0.2   BILITOT 0.6   ALKPHOS 60     No results for input(s): PROTIME, INR in the last 72 hours. No results for input(s): NITRITE, COLORU, PHUR, LABCAST, WBCUA, RBCUA, MUCUS, TRICHOMONAS, YEAST, BACTERIA, CLARITYU, SPECGRAV, LEUKOCYTESUR, UROBILINOGEN, BILIRUBINUR, BLOODU, GLUCOSEU, AMORPHOUS in the last 72 hours. Invalid input(s): KETONESU  No results for input(s): PHART, GIQ5ZHR, PO2ART in the last 72 hours.     1/12/22 COVID 19 detected    CT Head 1/12/22: no abnormality    Chest imaging was reviewed by me and showed 1/12/22 clear lungs    ASSESSMENT:  · Vtach vs Afib with frequent firing of  ICD: appropriate vs inappropriate firing  · Cardiomyopathy, EF 35% -improved to 45% on 1/12/22 TTE  · Agitated Delirium - cause unclear  · Metabolic acidosis - check lactate  · COVID 19 detected  · JAZMIN  · TMP  · DM2  · CAD s/p stents  · Asthma  · ALIN -CPAP 14 in the past but lost to follow up    PLAN:  · COVID 19 Droplet plus isolation  · COVID-specific intervention not indicated at this time - only hypoxic when sedated and has alin  · MIVF with bicarb  · Precedex for agitation  · PRN ativan  · Lantus and SSI  · Try to stop precedex  · Depakote was held and a level is pending  · Check lactate  · Cardiology following and requested EP to review tele and ICD data  · Betablocker if BP tolerates  · Plavix, asa, statin, (holding) Ace  · Lovenox DVT prophylaxis      Thank you Melford Scale, MD for this consult

## 2022-01-16 NOTE — PROGRESS NOTES
Precedex gtt increased to 0.5 mcg/kg/hr for restlessness. BP maintaining. Pt stable.     Loren Schaumann, RN

## 2022-01-16 NOTE — PROGRESS NOTES
Upon arrival to place PICC line assessed chart for issues related to picc placement, check for consent, and did time out with RN Dorita Morrow. Pt. Tolerated PICC placement well, no difficulty accessing basilic vein and 3CG technology used to verify PICC tip placement. Positive P wave with no negative deflection. Printed wave form and placed In chart.  Reported off to [employer]

## 2022-01-16 NOTE — FLOWSHEET NOTE
01/16/22 1045   Vital Signs   Pulse 75   BP (!) 88/55   Oxygen Therapy   SpO2 93 %   O2 Device Nasal cannula   O2 Flow Rate (L/min) 3 L/min     MD aware. Verbal order to given 2mg of ativan now and stop precedex gtt. IV bolus started. Pt snoring in bed. Able to arose. will continue to monitor.      Emile Rosa RN

## 2022-01-16 NOTE — FLOWSHEET NOTE
01/16/22 0830   Assessment   Charting Type Reassessment   Neurological   Neuro (WDL) X  (very impulsive; confused at times)   Level of Consciousness Alert (0)   Orientation Level Oriented X4   Cognition Poor judgement;Poor safety awareness;Poor attention/concentration   Language Clear   RUE Motor Response Responds to command;Normal extension;Normal flexion   Sensation RUE Full sensation   LUE Motor Response Responds to command;Normal extension;Normal flexion   Sensation LUE Full sensation   RLE Motor Response Responds to command;Normal extension;Normal flexion   Sensation RLE Full sensation   LLE Motor Response Responds to command;Normal extension;Normal flexion   Sensation LLE Full sensation   Swallow Screening   Is the patient able to remain alert for testing? Yes   Fifi Coma Scale   Eye Opening 4   Best Verbal Response 5   Best Motor Response 6   Radnor Coma Scale Score 15   HEENT   HEENT (WDL) WDL   Respiratory   Respiratory (WDL) X   Respiratory Pattern Regular   Respiratory Depth Normal   Respiratory Quality/Effort Unlabored   Chest Assessment Chest expansion symmetrical;Trachea midline   L Breath Sounds Diminished   R Breath Sounds Diminished   Cardiac   Cardiac (WDL) X  (pace for 20 years)   Cardiac Regularity Regular   Heart Sounds S1, S2   Cardiac Rhythm Sinus rhythm   Rhythm Interpretation   Pulse 75   Cardiac Monitor   Telemetry Monitor On Yes   Telemetry Audible Yes   Telemetry Alarms Set Yes   Pacemaker   Pacemaker Yes   Pacemaker Type Permanent   Gastrointestinal   Abdominal (WDL) WDL   Last BM (including prior to admit) 01/16/22   Peripheral Vascular   Peripheral Vascular (WDL) WDL   Edema None   Skin Color/Condition   Skin Color/Condition (WDL) X   Skin Color Ecchymosis   Skin Condition/Temp Warm;Dry;Flaky   Skin Integrity   Skin Integrity (WDL) X   Skin Integrity Abrasion;Bruising   Location scattered;  Large bruise to R side   Musculoskeletal   Musculoskeletal (WDL) X  (gen weakness) Genitourinary   Genitourinary (WDL) X  (call)   Flank Tenderness No   Suprapubic Tenderness No   Dysuria CLAUDIA   Anus/Rectum   Anus/Rectum (WDL) WDL   Psychosocial   Psychosocial (WDL) X   Patient Behaviors Anxious; Uncooperative   Pain Assessment   RASS Score +1     Pt assessment complete; see flow sheets. Vitals completed. Pt placed on precedex gtt for increased restlessnes and trying to pull out lines. Restraints remain. meds given per mar. Pt assisted with breakfast. Pt stable.     Jesus Mims RN

## 2022-01-16 NOTE — PROGRESS NOTES
JOSEFA +2= Agitated. Ripping off leads; attempting to get out of bed; trying to rip out call. Precedex gtt increased to 0.4mcg/kg/hr. Will continue to monitor.     Alden Dale RN

## 2022-01-17 ENCOUNTER — APPOINTMENT (OUTPATIENT)
Dept: GENERAL RADIOLOGY | Age: 62
DRG: 177 | End: 2022-01-17
Payer: MEDICARE

## 2022-01-17 ENCOUNTER — APPOINTMENT (OUTPATIENT)
Dept: CT IMAGING | Age: 62
DRG: 177 | End: 2022-01-17
Payer: MEDICARE

## 2022-01-17 ENCOUNTER — NURSE ONLY (OUTPATIENT)
Dept: CARDIOLOGY CLINIC | Age: 62
End: 2022-01-17
Payer: MEDICARE

## 2022-01-17 DIAGNOSIS — I47.20 VENTRICULAR TACHYCARDIA: ICD-10-CM

## 2022-01-17 DIAGNOSIS — I25.5 ISCHEMIC CARDIOMYOPATHY: ICD-10-CM

## 2022-01-17 DIAGNOSIS — Z95.810 ICD (IMPLANTABLE CARDIOVERTER-DEFIBRILLATOR) IN PLACE: ICD-10-CM

## 2022-01-17 LAB
ALBUMIN SERPL-MCNC: 3.5 G/DL (ref 3.4–5)
ALP BLD-CCNC: 58 U/L (ref 40–129)
ALT SERPL-CCNC: 22 U/L (ref 10–40)
AMMONIA: 43 UMOL/L (ref 16–60)
ANION GAP SERPL CALCULATED.3IONS-SCNC: 20 MMOL/L (ref 3–16)
AST SERPL-CCNC: 30 U/L (ref 15–37)
BASOPHILS ABSOLUTE: 0 K/UL (ref 0–0.2)
BASOPHILS RELATIVE PERCENT: 0.3 %
BILIRUB SERPL-MCNC: 0.5 MG/DL (ref 0–1)
BILIRUBIN DIRECT: <0.2 MG/DL (ref 0–0.3)
BILIRUBIN, INDIRECT: ABNORMAL MG/DL (ref 0–1)
BUN BLDV-MCNC: 14 MG/DL (ref 7–20)
CALCIUM SERPL-MCNC: 8.8 MG/DL (ref 8.3–10.6)
CHLORIDE BLD-SCNC: 94 MMOL/L (ref 99–110)
CO2: 20 MMOL/L (ref 21–32)
CREAT SERPL-MCNC: 0.8 MG/DL (ref 0.9–1.3)
EKG ATRIAL RATE: 96 BPM
EKG DIAGNOSIS: NORMAL
EKG P AXIS: 59 DEGREES
EKG P-R INTERVAL: 186 MS
EKG Q-T INTERVAL: 352 MS
EKG QRS DURATION: 100 MS
EKG QTC CALCULATION (BAZETT): 444 MS
EKG R AXIS: 32 DEGREES
EKG T AXIS: 116 DEGREES
EKG VENTRICULAR RATE: 96 BPM
EOSINOPHILS ABSOLUTE: 0 K/UL (ref 0–0.6)
EOSINOPHILS RELATIVE PERCENT: 0.1 %
GFR AFRICAN AMERICAN: >60
GFR NON-AFRICAN AMERICAN: >60
GLUCOSE BLD-MCNC: 130 MG/DL (ref 70–99)
GLUCOSE BLD-MCNC: 184 MG/DL (ref 70–99)
GLUCOSE BLD-MCNC: 75 MG/DL (ref 70–99)
GLUCOSE BLD-MCNC: 81 MG/DL (ref 70–99)
GLUCOSE BLD-MCNC: 95 MG/DL (ref 70–99)
HCT VFR BLD CALC: 38.6 % (ref 40.5–52.5)
HEMOGLOBIN: 13.3 G/DL (ref 13.5–17.5)
LYMPHOCYTES ABSOLUTE: 0.6 K/UL (ref 1–5.1)
LYMPHOCYTES RELATIVE PERCENT: 21.9 %
MAGNESIUM: 1.8 MG/DL (ref 1.8–2.4)
MCH RBC QN AUTO: 29 PG (ref 26–34)
MCHC RBC AUTO-ENTMCNC: 34.5 G/DL (ref 31–36)
MCV RBC AUTO: 84.1 FL (ref 80–100)
MONOCYTES ABSOLUTE: 0.3 K/UL (ref 0–1.3)
MONOCYTES RELATIVE PERCENT: 11.6 %
NEUTROPHILS ABSOLUTE: 1.9 K/UL (ref 1.7–7.7)
NEUTROPHILS RELATIVE PERCENT: 66.1 %
PDW BLD-RTO: 15.4 % (ref 12.4–15.4)
PERFORMED ON: ABNORMAL
PERFORMED ON: ABNORMAL
PERFORMED ON: NORMAL
PERFORMED ON: NORMAL
PLATELET # BLD: 62 K/UL (ref 135–450)
PMV BLD AUTO: 7.1 FL (ref 5–10.5)
POTASSIUM REFLEX MAGNESIUM: 3.4 MMOL/L (ref 3.5–5.1)
RBC # BLD: 4.59 M/UL (ref 4.2–5.9)
SODIUM BLD-SCNC: 134 MMOL/L (ref 136–145)
TOTAL PROTEIN: 6.3 G/DL (ref 6.4–8.2)
WBC # BLD: 2.9 K/UL (ref 4–11)

## 2022-01-17 PROCEDURE — 80076 HEPATIC FUNCTION PANEL: CPT

## 2022-01-17 PROCEDURE — 93005 ELECTROCARDIOGRAM TRACING: CPT | Performed by: INTERNAL MEDICINE

## 2022-01-17 PROCEDURE — 6360000002 HC RX W HCPCS: Performed by: INTERNAL MEDICINE

## 2022-01-17 PROCEDURE — 87040 BLOOD CULTURE FOR BACTERIA: CPT

## 2022-01-17 PROCEDURE — 94761 N-INVAS EAR/PLS OXIMETRY MLT: CPT

## 2022-01-17 PROCEDURE — 6370000000 HC RX 637 (ALT 250 FOR IP): Performed by: INTERNAL MEDICINE

## 2022-01-17 PROCEDURE — 93282 PRGRMG EVAL IMPLANTABLE DFB: CPT | Performed by: INTERNAL MEDICINE

## 2022-01-17 PROCEDURE — 99233 SBSQ HOSP IP/OBS HIGH 50: CPT | Performed by: INTERNAL MEDICINE

## 2022-01-17 PROCEDURE — 2500000003 HC RX 250 WO HCPCS: Performed by: INTERNAL MEDICINE

## 2022-01-17 PROCEDURE — 83735 ASSAY OF MAGNESIUM: CPT

## 2022-01-17 PROCEDURE — 2580000003 HC RX 258: Performed by: INTERNAL MEDICINE

## 2022-01-17 PROCEDURE — 6370000000 HC RX 637 (ALT 250 FOR IP): Performed by: HOSPITALIST

## 2022-01-17 PROCEDURE — 71045 X-RAY EXAM CHEST 1 VIEW: CPT

## 2022-01-17 PROCEDURE — 2000000000 HC ICU R&B

## 2022-01-17 PROCEDURE — 82140 ASSAY OF AMMONIA: CPT

## 2022-01-17 PROCEDURE — 85025 COMPLETE CBC W/AUTO DIFF WBC: CPT

## 2022-01-17 PROCEDURE — 36415 COLL VENOUS BLD VENIPUNCTURE: CPT

## 2022-01-17 PROCEDURE — 36592 COLLECT BLOOD FROM PICC: CPT

## 2022-01-17 PROCEDURE — 94640 AIRWAY INHALATION TREATMENT: CPT

## 2022-01-17 PROCEDURE — 93010 ELECTROCARDIOGRAM REPORT: CPT | Performed by: INTERNAL MEDICINE

## 2022-01-17 PROCEDURE — 99291 CRITICAL CARE FIRST HOUR: CPT | Performed by: INTERNAL MEDICINE

## 2022-01-17 PROCEDURE — 70450 CT HEAD/BRAIN W/O DYE: CPT

## 2022-01-17 PROCEDURE — 86022 PLATELET ANTIBODIES: CPT

## 2022-01-17 PROCEDURE — 80048 BASIC METABOLIC PNL TOTAL CA: CPT

## 2022-01-17 PROCEDURE — 2700000000 HC OXYGEN THERAPY PER DAY

## 2022-01-17 RX ORDER — LORAZEPAM 2 MG/ML
1 INJECTION INTRAMUSCULAR ONCE
Status: COMPLETED | OUTPATIENT
Start: 2022-01-17 | End: 2022-01-17

## 2022-01-17 RX ORDER — DILTIAZEM HYDROCHLORIDE 5 MG/ML
10 INJECTION INTRAVENOUS EVERY 6 HOURS PRN
Status: DISCONTINUED | OUTPATIENT
Start: 2022-01-17 | End: 2022-01-19

## 2022-01-17 RX ORDER — QUETIAPINE FUMARATE 25 MG/1
50 TABLET, FILM COATED ORAL NIGHTLY
Status: DISCONTINUED | OUTPATIENT
Start: 2022-01-17 | End: 2022-01-20

## 2022-01-17 RX ORDER — DULOXETIN HYDROCHLORIDE 60 MG/1
60 CAPSULE, DELAYED RELEASE ORAL DAILY
Status: DISCONTINUED | OUTPATIENT
Start: 2022-01-18 | End: 2022-01-19

## 2022-01-17 RX ORDER — POTASSIUM CHLORIDE 29.8 MG/ML
20 INJECTION INTRAVENOUS
Status: COMPLETED | OUTPATIENT
Start: 2022-01-17 | End: 2022-01-17

## 2022-01-17 RX ORDER — MAGNESIUM SULFATE IN WATER 40 MG/ML
2000 INJECTION, SOLUTION INTRAVENOUS ONCE
Status: COMPLETED | OUTPATIENT
Start: 2022-01-17 | End: 2022-01-17

## 2022-01-17 RX ORDER — METOPROLOL TARTRATE 5 MG/5ML
5 INJECTION INTRAVENOUS EVERY 6 HOURS PRN
Status: DISCONTINUED | OUTPATIENT
Start: 2022-01-17 | End: 2022-01-19

## 2022-01-17 RX ORDER — DEXAMETHASONE SODIUM PHOSPHATE 10 MG/ML
6 INJECTION, SOLUTION INTRAMUSCULAR; INTRAVENOUS EVERY 24 HOURS
Status: DISCONTINUED | OUTPATIENT
Start: 2022-01-17 | End: 2022-01-23

## 2022-01-17 RX ADMIN — POTASSIUM CHLORIDE 20 MEQ: 400 INJECTION, SOLUTION INTRAVENOUS at 13:26

## 2022-01-17 RX ADMIN — APIXABAN 5 MG: 5 TABLET, FILM COATED ORAL at 15:26

## 2022-01-17 RX ADMIN — Medication 10 ML: at 10:12

## 2022-01-17 RX ADMIN — INSULIN GLARGINE 60 UNITS: 100 INJECTION, SOLUTION SUBCUTANEOUS at 20:51

## 2022-01-17 RX ADMIN — FOLIC ACID: 5 INJECTION, SOLUTION INTRAMUSCULAR; INTRAVENOUS; SUBCUTANEOUS at 13:23

## 2022-01-17 RX ADMIN — CEFTRIAXONE 2000 MG: 2 INJECTION, POWDER, FOR SOLUTION INTRAMUSCULAR; INTRAVENOUS at 14:21

## 2022-01-17 RX ADMIN — ATORVASTATIN CALCIUM 80 MG: 40 TABLET, FILM COATED ORAL at 10:12

## 2022-01-17 RX ADMIN — ASPIRIN 81 MG: 81 TABLET, CHEWABLE ORAL at 10:14

## 2022-01-17 RX ADMIN — METOPROLOL TARTRATE 25 MG: 25 TABLET, FILM COATED ORAL at 10:12

## 2022-01-17 RX ADMIN — DEXAMETHASONE SODIUM PHOSPHATE 6 MG: 10 INJECTION, SOLUTION INTRAMUSCULAR; INTRAVENOUS at 13:56

## 2022-01-17 RX ADMIN — CLOPIDOGREL BISULFATE 75 MG: 75 TABLET ORAL at 10:12

## 2022-01-17 RX ADMIN — QUETIAPINE FUMARATE 50 MG: 25 TABLET ORAL at 22:36

## 2022-01-17 RX ADMIN — POTASSIUM CHLORIDE 20 MEQ: 400 INJECTION, SOLUTION INTRAVENOUS at 15:19

## 2022-01-17 RX ADMIN — Medication 0.5 MCG/KG/HR: at 19:57

## 2022-01-17 RX ADMIN — POTASSIUM CHLORIDE: 29.8 INJECTION INTRAVENOUS at 14:18

## 2022-01-17 RX ADMIN — LORAZEPAM 1 MG: 2 INJECTION INTRAMUSCULAR; INTRAVENOUS at 09:42

## 2022-01-17 RX ADMIN — APIXABAN 5 MG: 5 TABLET, FILM COATED ORAL at 20:48

## 2022-01-17 RX ADMIN — ACETAMINOPHEN 650 MG: 325 TABLET ORAL at 17:51

## 2022-01-17 RX ADMIN — PANTOPRAZOLE SODIUM 40 MG: 40 TABLET, DELAYED RELEASE ORAL at 10:12

## 2022-01-17 RX ADMIN — DIVALPROEX SODIUM 250 MG: 250 TABLET, DELAYED RELEASE ORAL at 20:48

## 2022-01-17 RX ADMIN — BUDESONIDE AND FORMOTEROL FUMARATE DIHYDRATE 2 PUFF: 160; 4.5 AEROSOL RESPIRATORY (INHALATION) at 19:10

## 2022-01-17 RX ADMIN — GABAPENTIN 200 MG: 100 CAPSULE ORAL at 20:48

## 2022-01-17 RX ADMIN — POTASSIUM CHLORIDE 20 MEQ: 400 INJECTION, SOLUTION INTRAVENOUS at 14:17

## 2022-01-17 RX ADMIN — MAGNESIUM SULFATE IN WATER 2000 MG: 40 INJECTION, SOLUTION INTRAVENOUS at 13:32

## 2022-01-17 RX ADMIN — Medication 2 PUFF: at 19:10

## 2022-01-17 ASSESSMENT — PAIN SCALES - WONG BAKER
WONGBAKER_NUMERICALRESPONSE: 0

## 2022-01-17 ASSESSMENT — PAIN SCALES - GENERAL: PAINLEVEL_OUTOF10: 2

## 2022-01-17 NOTE — PROGRESS NOTES
Bedside report and transfer of care given to [address] [place] [address]. Pt currently resting in bed with the call light within reach. Pt denies any other care needs at this time. Pt stable at this time.     Landen Smith RN

## 2022-01-17 NOTE — PROGRESS NOTES
Shift assessment complete; see flow sheet. Pt remains on oxygen at 2L. Restraints for impulsive/confusion behaviors; tele sitter in room as well and activated. Coker in place and draining appropriate. When asked pt has no complaints of pain or discomfort, however, pt arms remain flexed and fidgity with sheets and tele heart leads. Pt is redirectable after several repeated encounters of encouragement. VSS. Writer will continue to monitor.

## 2022-01-17 NOTE — PROGRESS NOTES
Device interrogation by company representative. See interrogation for further details. 1/12/2022 - present (5 days)  [facility] [place]  Adjustments made! Morphology only with interval stability and sudden onset set to passive. NID set to 30. PDF attached and I will upload full interrogation to [employer]. Patient still having a lot of RVR. Jodi Leavitt  CCDS, CEPS          See PACEART report under Cardiology tab.

## 2022-01-17 NOTE — PROGRESS NOTES
Pt transported to Ct for head and abdominal scan. Pt restless, pulling at telemetry leads, pulling mask off and moving head and legs. Attempted head ct x2 without success. Called Dr Kaykay Jenkins and obtained ativan 1mg ivp for head ct and order to skip abdominal ct. Pt given med. Scanned and brought back to 3e room 2021.   Evelio Gracia BSN, RN

## 2022-01-17 NOTE — PROGRESS NOTES
1/12/2022 - present (5 days)  [facility]    Since Last interrogation 1/10/22 5 episodes w/ ATP delivered for 5 events (ATP x3 attempts) and shocks delivered for 4 evetns. 20 NSVT and 12 SVT recordings. All events occurred on 1/15/21 between 1707-0179. SVT discriminator disagree. NSVT events show SVT. ATP and shocks slowed SVT. SVT w/ rates 160-190 bpm.  JMB TO REVIEW. Remote transmission received for patients single chamber ICD from [facility] on demand. Yang Graves SHOCKS FOR SVT. Transmission shows normal sensing and pacing function. EP physician will review. See interrogation under the cardiology tab in the [address] [place] [address] [address] field for more details. Will continue to monitor remotely. (cardizem cd, toprol xl)). Corvue is well above reference impedance indicating very dry status, trending down as of recent.     Site with [employer] capability: [facility]  [place], [address] [place] [address],Third Floor  JFDXK:[phone]  MZB:[phone]

## 2022-01-17 NOTE — CARE COORDINATION
INTERDISCIPLINARY PLAN OF CARE CONFERENCE    Date/Time: 1/17/2022 3:42 PM  Completed by: Cathy Ceron RN, Case Management      Patient Name:  Ching Shepard. YOB: 1960  Admitting Diagnosis: Unable to ambulate [R26.2]  Fall, initial encounter [W19. PVBT]  ICLLJ-39 [U07.1]     Admit Date/Time:  1/12/2022  6:41 PM    Chart reviewed. Interdisciplinary team contacted or reviewed plan related to patient progress and discharge plans. Disciplines included Case Management, Nursing, and Dietitian. Current Status: IP 01/13/2022  PT/OT recommendation for discharge plan of care: Will need PT/OT order when appropriate    Expected D/C Disposition:  SNF  Discharge Plan Comments:   +C19. O2 2 liters. No home O2 prior. Needs PT/OT when appropriate for possible SNF placement.  +Cm following

## 2022-01-17 NOTE — PROGRESS NOTES
Pulmonary & Critical Care Medicine ICU Progress Note    CC: COVID-19    Events of Last 24 hours:   Precedex 0.4 mcg/kg/hr -agitation and tachycardia when weaning Precedex  2LPM   Still with AMS   Denies any alcohol       Vascular lines: IV: PICC RUE  /   No results for input(s): PHART, [id number], PO2ART in the last 72 hours. IV:   IV infusion builder 75 mL/hr at 22 003    sodium chloride      dexmedetomidine HCl in NaCl 0.3 mcg/kg/hr (22)    dextrose         Vitals:  Blood pressure (!) 131/52, pulse 116, temperature 100.2 °F (37.9 °C), temperature source Axillary, resp. rate 19, height 5' 4\" (1.626 m), weight 241 lb (109.3 kg), SpO2 100 %. on 2 LPM   Temp  Av.3 °F (36.8 °C)  Min: 96.2 °F (35.7 °C)  Max: 100.2 °F (37.9 °C)    Intake/Output Summary (Last 24 hours) at 2022 0715  Last data filed at 2022 1192  Gross per 24 hour   Intake 1745.18 ml   Output 2450 ml   Net -704.82 ml     PE:  \Gen: No distress. Eyes: PERRL. No sclera icterus. No conjunctival injection. ENT: No discharge. Pharynx clear. Neck: Trachea midline. No obvious mass. Resp: No accessory muscle use. Few crackles. No wheezes. No rhonchi. No dullness on percussion. Good air entry. CV: Tachy rate. irregular rhythm. No murmur or rub. No edema. GI: Non-tender. Non-distended. No hernia. Skin: Warm and dry. No nodule on exposed extremities. Lymph: No cervical LAD. No supraclavicular LAD. M/S: No cyanosis. No joint deformity. No clubbing. Neuro: Awake. Alert. Moves all four extremities. Psych: Oriented x 1. No anxiety.        Scheduled Meds:   sodium chloride flush  5-40 mL IntraVENous 2 times per day    metoprolol tartrate  25 mg Oral BID    divalproex  250 mg Oral Nightly    QUEtiapine  50 mg Oral BID    aspirin  81 mg Oral Daily    budesonide-formoterol  2 puff Inhalation BID    albuterol sulfate HFA  2 puff Inhalation BID    insulin lispro  0-6 Units SubCUTAneous TID WC  insulin lispro  0-3 Units SubCUTAneous Nightly    atorvastatin  80 mg Oral Daily    clopidogrel  75 mg Oral Daily    DULoxetine  60 mg Oral Daily    pantoprazole  40 mg Oral Daily    fluticasone  2 spray Each Nostril Daily    [Held by provider] gabapentin  200 mg Oral TID    insulin glargine  60 Units SubCUTAneous Nightly    enoxaparin  40 mg SubCUTAneous Daily       Data:  CBC:   Recent Labs     01/15/22  0605 22  0513 22  0538   WBC 4.5 5.7 2.9*   HGB 15.1 14.3 13.3*   HCT 45.8 42.8 38.6*   MCV 88.1 86.9 84.1   PLT 87* 91* 62*     BMP:   Recent Labs     01/15/22  0523 22  0513 22  0538   * 128* 134*   K 4.2 4.3 3.4*   CL 91* 93* 94*   CO2 14* 15* 20*   BUN 18 34* 14   CREATININE 0.9 1.4* 0.8*     LIVER PROFILE: No results for input(s): AST, ALT, LIPASE, BILIDIR, BILITOT, ALKPHOS in the last 72 hours. Invalid input(s): AMYLASE,  ALB    Microbiology:   COVID-19 detected    Imagin/12 chest x-ray imaging reviewed by me and showed  No acute cardiopulmonary disease       CT head   No acute intracranial abnormalities      ASSESSMENT:  · Acute encephalopathy/metabolic encephalopathy-unclear etiology.  DDx alcohol/drug withdrawal, COVID encephalitis, viral meningitis/encephalitis  · Afib RVR with frequent firing of  ICD: appropriate vs inappropriate firing  · Electrolytes disorder   · Ischemic cardiomyopathy/chronic systolic CHF EF 40% on  TTE  · PAF with RVR   · Low-grade fever   · COVID-19 viral infection  · JAZMIN  · Thrombocytopenia - worsening   · CAD s/p stents  · ALIN -CPAP 14 in the past but lost to follow up       PLAN:  · COVID 19 Droplet plus isolation  · Precedex for agitation-wean off  · Start Decadron 6 mg IV daily   · Start IV ceftriaxone  · Repeat CXR  · Check cultures  · Bicarb drip for another 24 hrs   · Lantus and SSI  · Cardiology following and requested EP to review tele and ICD data  · Rally pack for 3 days  · Electrolytes replacement · Place NG for meds   · Check HIT, LFTs and ammonia   · Plavix and statin  · Eliquis and hold ASA/Lovenox per cardiology  · Holding Trazodone and Neurontin   · Lovenox DVT prophylaxis  · Discussed with internal medicine        Total critical care time caring for this patient with life threatening, unstable organ failure, including direct patient contact, management of life support systems, review of data including imaging and labs, discussions with other team members and physicians is 32 minutes, excluding procedures.

## 2022-01-17 NOTE — PROGRESS NOTES
Spoke with CT. Patient needs CT of Abdomen and head. Call prior to transferring to CT. Called [facility] for transport to Ct.  Electronically signed by John Paul Gimenez RN on 1/17/2022 at 8:50 AM

## 2022-01-17 NOTE — PROGRESS NOTES
Loosened the patients restraints because he has able to verbalize where he is and was able to say that he would not pull at any of his lines or tubes. Patient immediately attempted to pull his NG tube. Explained that he cannot pull this out and he verbalized understanding. Telesitter at bedside with loosened restraints. A: telesitter called out ant the patients pulled his wrist IV out. Attempted to verbally re-orient the patient failed. Restraints will remain on as the patient has repeatedly pulled his lines.  . Electronically signed by Moris Woods RN on 1/17/2022 at 5:42 PM

## 2022-01-17 NOTE — PROGRESS NOTES
RT Inhaler-Nebulizer Bronchodilator Protocol Note    There is a bronchodilator order in the chart from a provider indicating to follow the RT Bronchodilator Protocol and there is an Initiate RT Inhaler-Nebulizer Bronchodilator Protocol order as well (see protocol at bottom of note). CXR Findings:  No results found. The findings from the last RT Protocol Assessment were as follows:   History Pulmonary Disease: (P) Chronic pulmonary disease  Respiratory Pattern: (P) Dyspnea on exertion or RR 21-25 bpm  Breath Sounds: (P) Slightly diminished and/or crackles  Cough: (P) Strong, spontaneous, non-productive  Indication for Bronchodilator Therapy: (P) Decreased or absent breath sounds  Bronchodilator Assessment Score: (P) 6    Aerosolized bronchodilator medication orders have been revised according to the RT Inhaler-Nebulizer Bronchodilator Protocol below. Respiratory Therapist to perform RT Therapy Protocol Assessment initially then follow the protocol. Repeat RT Therapy Protocol Assessment PRN for score 0-3 or on second treatment, BID, and PRN for scores above 3. No Indications - adjust the frequency to every 6 hours PRN wheezing or bronchospasm, if no treatments needed after 48 hours then discontinue using Per Protocol order mode. If indication present, adjust the RT bronchodilator orders based on the Bronchodilator Assessment Score as indicated below. Use Inhaler orders unless patient has one or more of the following: on home nebulizer, not able to hold breath for 10 seconds, is not alert and oriented, cannot activate and use MDI correctly, or respiratory rate 25 breaths per minute or more, then use the equivalent nebulizer order(s) with same Frequency and PRN reasons based on the score. If a patient is on this medication at home then do not decrease Frequency below that used at home.     0-3 - enter or revise RT bronchodilator order(s) to equivalent RT Bronchodilator order with Frequency of every 4 hours PRN for wheezing or increased work of breathing using Per Protocol order mode. 4-6 - enter or revise RT Bronchodilator order(s) to two equivalent RT bronchodilator orders with one order with BID Frequency and one order with Frequency of every 4 hours PRN wheezing or increased work of breathing using Per Protocol order mode. 7-10 - enter or revise RT Bronchodilator order(s) to two equivalent RT bronchodilator orders with one order with TID Frequency and one order with Frequency of every 4 hours PRN wheezing or increased work of breathing using Per Protocol order mode. 11-13 - enter or revise RT Bronchodilator order(s) to one equivalent RT bronchodilator order with QID Frequency and an Albuterol order with Frequency of every 4 hours PRN wheezing or increased work of breathing using Per Protocol order mode. Greater than 13 - enter or revise RT Bronchodilator order(s) to one equivalent RT bronchodilator order with every 4 hours Frequency and an Albuterol order with Frequency of every 2 hours PRN wheezing or increased work of breathing using Per Protocol order mode.          Electronically signed by Julia Lopez RCP on 1/17/2022 at 11:06 AM

## 2022-01-17 NOTE — PROGRESS NOTES
D: Patient came back from the CT of his head and his precedex was slowed to half it's previous dose. 30 minutes later he was pulling at his restraints, heart rate jumped to 160 beats per minutes. He is alert but not able to follow commands.  Electronically signed by Kit Abreu RN on 1/17/2022 at 10:27 AM

## 2022-01-17 NOTE — PROGRESS NOTES
Patient is covid positive. Looks like morphology discriminator is accurate for all of them so maybe we can turn off the others. Also increase number of intervals to detection to 30 like Dr Vira Hook likes for all of his. But the 30 intervals may not have prevented these because the episodes were so long. Morphology may have. Shraa Checkmarxs  CCDS, CEPS      MD Stanley Suarez  It looks like the EGM criterion was satisfied for a match.  The arrhythmia was quite irregular as well.  Initially, it looks like VT therapies were withheld but eventually were delivered.  Did these criteria just time out? There were 3 discriminators running. Sudden onset, interval stability, and morphology. The device is currently set to if 2 of 3 discriminate as VT, them give therapy. The episodes started off quite irregular, so interval stability and morphology discriminated as SVT. Unfortunately the rates then got little faster and looked more stable and tricked the interval stability discriminator. At that point then sudden onset and interval stability indicated VT and triggered therapy. That is why I was potentially recommending only keeping morphology on since that seemed to always indicate SVT correctly. I could also change interval stability delta, but that would be making it very low and more likely to discriminate SVT on VT. Shara Checkmarxs  CCDS, CEPS      MD Stanley Suarez  Lets use only EGM recognition for now. [employer] rep Dawood Grimm aware and will make changes. Perfect. I'll make the change. That's HRS clinical consensus for SC-ICD too. NID to 30 as well? Maribel Gotti MD  Yes, NID to 30.

## 2022-01-17 NOTE — PROGRESS NOTES
Spoke with Dr. Joshua Ramírez. Orders to hold PO medications at this time. Possible administration after CT head and precedex is discontinued.  Electronically signed by Dorita Cruz RN on 1/17/2022 at 8:49 AM

## 2022-01-17 NOTE — PROGRESS NOTES
[address]   Progress Note  Cardiology      HPI: Seeing Mr. Quiroga today for f/u ICD shocks. He remains in restraints. Oriented to person and place but not time. Started on precedex gtt over weekend for agitation and pulling lines out. Issues with lower BP. He denies any specific complaints. Tele reviewed shows NSR. Physical Examination:    Vitals:    01/17/22 0800   BP: 121/63   Pulse:    Resp: 18   Temp:    SpO2:           Constitutional and General Appearance: NAD   Respiratory:  · Normal excursion and expansion without use of accessory muscles  · Resp Auscultation: Normal breath sounds without dullness  Cardiovascular:  · The apical impulses not displaced  · Heart tones are crisp and normal  · Cervical veins are not engorged  · The carotid upstroke is normal in amplitude and contour without delay or bruit  · Normal S1S2, No S3, No Murmur  · Peripheral pulses are symmetrical and full  · There is no clubbing, cyanosis of the extremities. · No edema  · Pedal Pulses: 2+ and equal   Abdomen:  · No masses or tenderness  · Liver/Spleen: No Abnormalities Noted  Neurological/Psychiatric:  · Alert and oriented to person and place but not time  · Moves all extremities well  · +confusion  Skin: warm and dry        Lab Results   Component Value Date    WBC 2.9 (L) 01/17/2022    HGB 13.3 (L) 01/17/2022    HCT 38.6 (L) 01/17/2022    MCV 84.1 01/17/2022    PLT 62 (L) 01/17/2022     Lab Results   Component Value Date    CREATININE 0.8 (L) 01/17/2022    BUN 14 01/17/2022     (L) 01/17/2022    K 3.4 (L) 01/17/2022    CL 94 (L) 01/17/2022    CO2 20 (L) 01/17/2022     Lab Results   Component Value Date    INR 0.94 01/16/2022    PROTIME 10.6 01/16/2022           Lexiscan myoview 7/2/21       Summary    Overall findings represent a high risk scan.    Normal LV size with reduced function.  EF 49%    Large mostly reversible defect involving the basal inferolateral, basal    inferior, mid inferolateral and mid inferior segments.    Findings concerning for ischemia.    ECG: Non-diagnostic EKG response due to failure to reach target heart rate.      7/13/21 CATH CONCLUSION:  Successful stenting of the true circumflex into the obtuse  marginal branch, successful stenting of the left posterior descending  coronary artery and successful stenting of the obtuse marginal branch,  all 80% stenosis reduced to 0 with these three drug-eluting stents. JAMES-3 blood flow was present in the circumflex artery and all its  branches.  The LAD is normal with minor irregularities only and the left  main is normal.  Nondominant right coronary artery.  LV ejection  fraction of 40% with inferior wall and inferoposterior hypokinesia.     ECHO 1/15/22 Summary   This is a limited study afib, ischemic cmp, recurrent pacer firing. LV systolic function appears mildly reduced with EF estimated at 45-50%. There is more prominent HK of the inferior wall. Left ventricular size is decreased. There is mild concentric left ventricular hypertrophy. 11- 35-40% inf basal hypo, inferolateral akinesis, LVE, mild MR     [id number] Score for Atrial Fibrillation Stroke Risk   Risk   Factors  Component Value   C CHF No 0   H HTN Yes 1   A2 Age >= 76 No,  (66 y.o.) 0   D DM Yes 1   S2 Prior Stroke/TIA Yes 2   V Vascular Disease Yes 1   A Age 74-69 No,  (66 y.o.) 0   Sc Sex male 0    [id number]  Score  5   Score last updated 1/17/22 9:02 AM EST    Assessment:  Deb Jensen is a 63 y.o. patient who presented to [facility] 1/12/22 with c/o weakness and falls. Follows Dr. Shaggy Sr for cardiology--last saw 7/21. He has PMH CAD s/p 3 ROSHNI (7/21 s/p ROSHNI CCx into OM branch, Left PDA, and OM branch), chronic systolic CHF, DM, hx MI, ischemic CM, hx VT s/p ICD with generator change due to recall 5/19 and DJD/lumbar spondylosis. Most recent Lexiscan myoview 7/2/21 showed reversible defect RCA/CCx territory; EF=49%.  Underwent LHC and 3 ROSHNI placed per  Luan. Reported EF=40% in 7/21 but no ECHO report found. Now presents with mainly weakness and falls. Tested positive for Covid. CT head and CXR unremarkable. Admit EKG ST; PVC; inferolateral infarct; NST change. Second EKG similar without PVC. Note he called out due to being shocked from device. Dr. Sherley Ojeda at bedside reports felt rapid afib and gave 5mg IV metoprolol followed by 10mg IV diltiazem. He had frequent sustained grouped shocks intermittently per report. He denied any complaints prior to ICD firing. K+ 4.2; Ih=270; Covid + on 1/12. Diagnosis of frequent ICD firing and rapid afib in middle-aged male with hx CAD s/p stents, chronic systolic CHF, and ischemic CM.      Recs:  1. Hypotension possibly related to precedex gtt. Ween off if able. 2. Continue metoprolol 25mg BID. Titrate if needed and BP can tolerate (prior home dose 200mg)  3. Hold cardizem and use BB for rate control due to LV dysfcn. 4. Restart lisinopril (prior dose 20mg) for LV dysfunction when BP improves. 5. Continue plavix 75mg qd and lipitor 80mg qd  6. I personally reviewed ECHO 1/15/22 EF=45-50%; inferior HK (improved EF from 11/19). 7. Keep lytes repleted properly (K+ 4 and Mg+ 2)  8. I d/w EP partner interrogation results and arrhythmia was rapid afib. ICD shocks inappropriate for rapid afib and he will see if any changes to device need to be made. 9. CT head pending. If no bleed then needs NOAC for AC of PAF. I d/w Dr. Maile Fabry. Hold aspirin if starting NOAC and continue plavix given hx stents. 10. EKG daily. I reviewed today's showing NSR with normal QTc.     Patient Active Problem List   Diagnosis    Ventricular tachycardia ([address]    Presence of stent in left circumflex coronary artery    Myocardial infarction, nontransmural (HCC)    Myocardial infarction, inferoposterior wall, subsequent care    Automatic implantable cardioverter-defibrillator in situ    CAD (coronary artery disease)  Automatic implantable cardioverter-defibrillator in situ    ALIN on CPAP    Gastroesophageal reflux disease without esophagitis    Hyperlipidemia with target LDL less than 100    Hypertension due to endocrine disorder    Ischemic cardiomyopathy    Obesity, morbid, BMI 40.0-49.9 (HCC)    Weakness    Acute encephalopathy    TIA involving left internal carotid artery    DM (diabetes mellitus), secondary, uncontrolled, w/neurologic complic ([facility] [facility]    Dyslipidemia    HTN (hypertension), benign    Lactic acidosis    Head trauma    Abrasion, scalp w/o infection    Unsteady gait    Hyperglycemia    Chronic obstructive pulmonary disease (HCC)    Coarse tremor    Generalized weakness    Type 2 diabetes mellitus with hyperglycemia, with long-term current use of insulin ([facility])    Essential hypertension    CAD S/P percutaneous coronary angioplasty    COVID-19    Fall    Unable to ambulate    Implantable cardioverter-defibrillator (ICD) discharge    AICD malfunction    Hypotension    Valproic acid toxicity

## 2022-01-17 NOTE — PROGRESS NOTES
Progress Note    Admit Date:  1/12/2022    63 y.o. male with DMII, Chronic sCHF, CAD s/p stent placement x3, hx of MI, asthma, HTN, HLD, GERD, lumbar spondylosis and DDD s/p bilateral dorsal ramus medial branch ablation and interstim implant, depression who presented to [facility] ED with complaint of generalized weakness ongoing for approximately 2 months and frequent falls. In ED, CT of the head showed no acute abnormality. Chest x-ray with no acute cardiopulmonary process. Patient did test positive for COVID. He is vaccinated x2, no booster. Patient admitted to med-surg for further care. PT/OT consulted. 1/15-   Mr. Quiroga developed rapid HR what appears to be Afib on TELE monitor and his pacer inappropriately delivered shock x 5 and this was witnessed by me and nursing staff. Pt awake, conscious during these episodes and by the end of the 5th shock, he started getting slightly confused    Given cardizem 10 mg IV x 1 and metoprolol 5 mg with control of HR  EKG after this episode showed sinus tach    Pt remains asymptomatic from covid so far except for gen weakness  No fevers. No hypoxia      Subjective:      Pt transferred to ICU    Pt very confused and became agitated with removing lines. Now on precedex gtt  No further shocks from ICD.    Now remains in Afibb , intermittent high rates    Low grade fevers noted       Objective:   Patient Vitals for the past 4 hrs:   BP Temp Temp src Pulse Resp SpO2   01/17/22 0800 121/63 -- -- -- 18 --   01/17/22 0730 105/63 100.5 °F (38.1 °C) Axillary -- 18 --   01/17/22 0625 (!) 131/52 -- -- 116 -- 100 %   01/17/22 0551 (!) 140/76 -- -- 137 -- 99 %   01/17/22 0436 (!) 121/52 100.2 °F (37.9 °C) Axillary 91 -- 99 %            Intake/Output Summary (Last 24 hours) at 1/17/2022 0818  Last data filed at 1/17/2022 1021  Gross per 24 hour   Intake 1745.18 ml   Output 2450 ml   Net -704.82 ml       Physical Exam:  Gen: middle aged obese male, confused and drowsy on sedation   Restless and agitated   . Appears to be not in any distress  Mucous Membranes:  Pink , anicteric  Neck: No JVD, no carotid bruit, no thyromegaly  Chest:  Clear to auscultation bilaterally, diminished in bases   Cardiovascular:  Irregular,  S1S2 heard, no murmurs or gallops- left sided ICD +  Abdomen:  Soft, undistended, non tender, no organomegaly, BS present  Extremities: No edema or cyanosis. Distal pulses well felt  Neurological : drowsy , restless , moving all ext. Confused       Data:  CBC:   Recent Labs     01/15/22  0605 01/16/22  0513 01/17/22  0538   WBC 4.5 5.7 2.9*   HGB 15.1 14.3 13.3*   HCT 45.8 42.8 38.6*   MCV 88.1 86.9 84.1   PLT 87* 91* 62*     BMP:   Recent Labs     01/15/22  0523 01/16/22  0513 01/17/22  0538   * 128* 134*   K 4.2 4.3 3.4*   CL 91* 93* 94*   CO2 14* 15* 20*   BUN 18 34* 14   CREATININE 0.9 1.4* 0.8*     LIVER PROFILE:   No results for input(s): AST, ALT, LIPASE, BILIDIR, BILITOT, ALKPHOS in the last 72 hours. Invalid input(s): AMYLASE,  ALB  PT/INR:   Recent Labs     01/16/22 0513 01/16/22  1426   PROTIME 10.3 10.6   INR 0.91 0.94       CULTURES  Results for Duglas Sims (MRN [id number]) as of 1/13/2022 13:29    Ref. Range 1/12/2022 19:35   SARS-CoV-2, NAAT Latest Ref Range: Not Detected  DETECTED (AA)          RADIOLOGY  XR CHEST PORTABLE   Final Result   NG tube is difficult to visualize, tip is likely below the diaphragm. Repeat   exam could be considered if clinically indicated. XR CHEST PORTABLE   Final Result   New elevated right hemidiaphragm, likely atelectasis or possibly infiltrate. Subpulmonic effusion not excluded. No overt failure. CT HEAD WO CONTRAST   Final Result   1. No acute intracranial abnormality. CT Head WO Contrast   Final Result   No acute intracranial abnormality. XR CHEST PORTABLE   Final Result   No acute cardiopulmonary process.            Pertinent previous results reviewed Diagnostic cardiac cath 7/13/21  CONCLUSION:  Successful stenting of the true circumflex into the obtuse  marginal branch, successful stenting of the left posterior descending  coronary artery and successful stenting of the obtuse marginal branch,  all 80% stenosis reduced to 0 with these three drug-eluting stents. JAMES-3 blood flow was present in the circumflex artery and all its  branches.  The LAD is normal with minor irregularities only and the left  main is normal.  Nondominant right coronary artery.  LV ejection  fraction of 40% with inferior wall and inferoposterior hypokinesia.     ECHO 11/27/19  Summary   The left ventricular systolic function is moderately reduced with an   ejection fraction of 35 -40 %.   There is hypokinesis of the inferior and basal inferior walls.   There is akinesis of the inferolateral wall.   Left ventricular cavity size is moderately dilated.   Compared to previous study from 2-2-2015 no changes noted in left   ventricular function.   Mild mitral regurgitation. Assessment/Plan:      # ICD firing - he had 5 witnessed shocks and then more bursts (4-5 additional episodes) of AICD discharges and concern for malfunctioning ICD  - s.p  [employer] interrogation and now ICD is adjusted . - Cardiology consulted. - resumed  BB - toprol XL stopped due to low BP - decreased to  metoprolol 25BID with close monitoring. Afibb with RVR  - new onset with above ICD issues  - resume home cardizem and BB if tolerated. Can use IV meds intermittently  - for Eliquis to start today       Acute Encephalopathy - possible sec to covid     CT head non focal x 2   He denies any drug or alcohol use. precedex gtt for agitation  Ammonia pending   Consider MRI brain  Supportive care for now        #COVID 19  -Positive 1/12/22  -only complaints is a cough  -patient is vaccinated x2- no booster  - minimal  Hypoxia at 2 L.  Starting decadron today   -Monitor, continuous pulse ox and telemetry      #DMII  - not controlled noted to have >1000 glucose in urine  -Hold home oral meds  -hold lantus , start D 5 fluids with NPO and low sugars   -Low dose SSI  -POCT glucose    #Thrombocytopenia  -Plt 87  -Monitor CBC while on Eliquis         #CAD s/p stent placement x3  #Hx of MI  Hx of VTACH s.p ICD  - ASA, statin, plavix       #Chronic sCHF no AE   -Last echo 11/27/19, EF 35-40%  -Pacemaker/ICD in place  -Continue ACEi, toprol XL, cardizem  - patient takes Lasix on Monday and Thursday - holding at this time        #Asthma no AE  -Symbicort  -albuterol prn  - on room air       #HLD  -continue statin       #GERD  -continue ppi       #Lumbar spondylosis and DDD   -s/p bilateral dorsal ramus medial branch ablation and interstim implant  -PDMP displays on chronic gabapentin, continue  - follows with Dr. Briseyda Barajas         #ALIN  - unsure if patient is compliant with CPAP ? If he still has CPAP at home       Morbid Obesity  - Body mass index is 41.37 kg/m². - Complicating assessment and treatment. Placing patient at risk for multiple co-morbidities as well as early death and contributing to the patient's presentation.   - Counseled on weight loss.        DVT Prophylaxis: Lovenox      Diet: ADULT DIET;  Regular; 3 carb choices (45 gm/meal)  Code Status: Full Code      Solitario Del Toro MD, 1/17/2022 8:18 AM

## 2022-01-18 ENCOUNTER — APPOINTMENT (OUTPATIENT)
Dept: GENERAL RADIOLOGY | Age: 62
DRG: 177 | End: 2022-01-18
Payer: MEDICARE

## 2022-01-18 LAB
ANION GAP SERPL CALCULATED.3IONS-SCNC: 14 MMOL/L (ref 3–16)
BASE EXCESS ARTERIAL: -0.8 MMOL/L (ref -3–3)
BASOPHILS ABSOLUTE: 0 K/UL (ref 0–0.2)
BASOPHILS RELATIVE PERCENT: 0.2 %
BUN BLDV-MCNC: 12 MG/DL (ref 7–20)
CALCIUM SERPL-MCNC: 8.2 MG/DL (ref 8.3–10.6)
CARBOXYHEMOGLOBIN ARTERIAL: 0.1 % (ref 0–1.5)
CHLORIDE BLD-SCNC: 94 MMOL/L (ref 99–110)
CO2: 21 MMOL/L (ref 21–32)
CREAT SERPL-MCNC: 0.7 MG/DL (ref 0.9–1.3)
EKG ATRIAL RATE: 70 BPM
EKG DIAGNOSIS: NORMAL
EKG P AXIS: 45 DEGREES
EKG P-R INTERVAL: 216 MS
EKG Q-T INTERVAL: 460 MS
EKG QRS DURATION: 90 MS
EKG QTC CALCULATION (BAZETT): 506 MS
EKG R AXIS: 12 DEGREES
EKG T AXIS: 102 DEGREES
EKG VENTRICULAR RATE: 73 BPM
EOSINOPHILS ABSOLUTE: 0 K/UL (ref 0–0.6)
EOSINOPHILS RELATIVE PERCENT: 0 %
GFR AFRICAN AMERICAN: >60
GFR NON-AFRICAN AMERICAN: >60
GLUCOSE BLD-MCNC: 165 MG/DL (ref 70–99)
GLUCOSE BLD-MCNC: 168 MG/DL (ref 70–99)
GLUCOSE BLD-MCNC: 173 MG/DL (ref 70–99)
GLUCOSE BLD-MCNC: 196 MG/DL (ref 70–99)
HCO3 ARTERIAL: 21.9 MMOL/L (ref 21–29)
HCT VFR BLD CALC: 37.7 % (ref 40.5–52.5)
HEMOGLOBIN, ART, EXTENDED: 13.3 G/DL (ref 13.5–17.5)
HEMOGLOBIN: 12.6 G/DL (ref 13.5–17.5)
LYMPHOCYTES ABSOLUTE: 0.7 K/UL (ref 1–5.1)
LYMPHOCYTES RELATIVE PERCENT: 29 %
MAGNESIUM: 2.1 MG/DL (ref 1.8–2.4)
MCH RBC QN AUTO: 29 PG (ref 26–34)
MCHC RBC AUTO-ENTMCNC: 33.3 G/DL (ref 31–36)
MCV RBC AUTO: 87.1 FL (ref 80–100)
METHEMOGLOBIN ARTERIAL: 0.3 %
MONOCYTES ABSOLUTE: 0.3 K/UL (ref 0–1.3)
MONOCYTES RELATIVE PERCENT: 12.3 %
NEUTROPHILS ABSOLUTE: 1.4 K/UL (ref 1.7–7.7)
NEUTROPHILS RELATIVE PERCENT: 58.5 %
O2 SAT, ARTERIAL: 96.9 %
O2 THERAPY: ABNORMAL
PCO2 ARTERIAL: 30.8 MMHG (ref 35–45)
PDW BLD-RTO: 15.5 % (ref 12.4–15.4)
PERFORMED ON: ABNORMAL
PH ARTERIAL: 7.47 (ref 7.35–7.45)
PHOSPHORUS: 3 MG/DL (ref 2.5–4.9)
PLATELET # BLD: 51 K/UL (ref 135–450)
PMV BLD AUTO: 7.8 FL (ref 5–10.5)
PO2 ARTERIAL: 83.2 MMHG (ref 75–108)
POTASSIUM REFLEX MAGNESIUM: 3.9 MMOL/L (ref 3.5–5.1)
RBC # BLD: 4.33 M/UL (ref 4.2–5.9)
SODIUM BLD-SCNC: 129 MMOL/L (ref 136–145)
TCO2 ARTERIAL: 22.9 MMOL/L
WBC # BLD: 2.3 K/UL (ref 4–11)

## 2022-01-18 PROCEDURE — 6370000000 HC RX 637 (ALT 250 FOR IP): Performed by: INTERNAL MEDICINE

## 2022-01-18 PROCEDURE — 6370000000 HC RX 637 (ALT 250 FOR IP): Performed by: HOSPITALIST

## 2022-01-18 PROCEDURE — 94761 N-INVAS EAR/PLS OXIMETRY MLT: CPT

## 2022-01-18 PROCEDURE — 99233 SBSQ HOSP IP/OBS HIGH 50: CPT | Performed by: INTERNAL MEDICINE

## 2022-01-18 PROCEDURE — 99291 CRITICAL CARE FIRST HOUR: CPT | Performed by: INTERNAL MEDICINE

## 2022-01-18 PROCEDURE — 2580000003 HC RX 258: Performed by: INTERNAL MEDICINE

## 2022-01-18 PROCEDURE — 71045 X-RAY EXAM CHEST 1 VIEW: CPT

## 2022-01-18 PROCEDURE — 2000000000 HC ICU R&B

## 2022-01-18 PROCEDURE — 2700000000 HC OXYGEN THERAPY PER DAY

## 2022-01-18 PROCEDURE — 6360000002 HC RX W HCPCS: Performed by: INTERNAL MEDICINE

## 2022-01-18 PROCEDURE — 80048 BASIC METABOLIC PNL TOTAL CA: CPT

## 2022-01-18 PROCEDURE — 82803 BLOOD GASES ANY COMBINATION: CPT

## 2022-01-18 PROCEDURE — 93010 ELECTROCARDIOGRAM REPORT: CPT | Performed by: INTERNAL MEDICINE

## 2022-01-18 PROCEDURE — 94640 AIRWAY INHALATION TREATMENT: CPT

## 2022-01-18 PROCEDURE — 2500000003 HC RX 250 WO HCPCS: Performed by: INTERNAL MEDICINE

## 2022-01-18 PROCEDURE — 85025 COMPLETE CBC W/AUTO DIFF WBC: CPT

## 2022-01-18 PROCEDURE — 83735 ASSAY OF MAGNESIUM: CPT

## 2022-01-18 PROCEDURE — 36415 COLL VENOUS BLD VENIPUNCTURE: CPT

## 2022-01-18 PROCEDURE — 84100 ASSAY OF PHOSPHORUS: CPT

## 2022-01-18 PROCEDURE — 93005 ELECTROCARDIOGRAM TRACING: CPT | Performed by: INTERNAL MEDICINE

## 2022-01-18 RX ORDER — VALPROIC ACID 250 MG/5ML
500 SOLUTION ORAL 3 TIMES DAILY
Status: DISCONTINUED | OUTPATIENT
Start: 2022-01-18 | End: 2022-01-20

## 2022-01-18 RX ORDER — DIVALPROEX SODIUM 250 MG/1
500 TABLET, DELAYED RELEASE ORAL EVERY 8 HOURS SCHEDULED
Status: DISCONTINUED | OUTPATIENT
Start: 2022-01-18 | End: 2022-01-18

## 2022-01-18 RX ORDER — QUETIAPINE FUMARATE 25 MG/1
25 TABLET, FILM COATED ORAL
Status: DISCONTINUED | OUTPATIENT
Start: 2022-01-19 | End: 2022-01-19

## 2022-01-18 RX ADMIN — QUETIAPINE FUMARATE 50 MG: 25 TABLET ORAL at 21:20

## 2022-01-18 RX ADMIN — DEXAMETHASONE SODIUM PHOSPHATE 6 MG: 10 INJECTION, SOLUTION INTRAMUSCULAR; INTRAVENOUS at 12:22

## 2022-01-18 RX ADMIN — METOPROLOL TARTRATE 25 MG: 25 TABLET, FILM COATED ORAL at 09:02

## 2022-01-18 RX ADMIN — Medication 0.3 MCG/KG/HR: at 11:49

## 2022-01-18 RX ADMIN — BUDESONIDE AND FORMOTEROL FUMARATE DIHYDRATE 2 PUFF: 160; 4.5 AEROSOL RESPIRATORY (INHALATION) at 08:53

## 2022-01-18 RX ADMIN — ATORVASTATIN CALCIUM 80 MG: 40 TABLET, FILM COATED ORAL at 09:02

## 2022-01-18 RX ADMIN — VALPROIC ACID 500 MG: 250 SOLUTION ORAL at 21:20

## 2022-01-18 RX ADMIN — METOPROLOL TARTRATE 25 MG: 25 TABLET, FILM COATED ORAL at 21:20

## 2022-01-18 RX ADMIN — FOLIC ACID: 5 INJECTION, SOLUTION INTRAMUSCULAR; INTRAVENOUS; SUBCUTANEOUS at 09:11

## 2022-01-18 RX ADMIN — CEFTRIAXONE 2000 MG: 2 INJECTION, POWDER, FOR SOLUTION INTRAMUSCULAR; INTRAVENOUS at 06:45

## 2022-01-18 RX ADMIN — DULOXETINE HYDROCHLORIDE 60 MG: 60 CAPSULE, DELAYED RELEASE ORAL at 09:02

## 2022-01-18 RX ADMIN — Medication 2 PUFF: at 08:53

## 2022-01-18 RX ADMIN — GABAPENTIN 200 MG: 100 CAPSULE ORAL at 09:02

## 2022-01-18 RX ADMIN — GABAPENTIN 200 MG: 100 CAPSULE ORAL at 13:57

## 2022-01-18 RX ADMIN — GABAPENTIN 200 MG: 100 CAPSULE ORAL at 21:20

## 2022-01-18 RX ADMIN — Medication 0.5 MCG/KG/HR: at 03:30

## 2022-01-18 RX ADMIN — PANTOPRAZOLE SODIUM 40 MG: 40 TABLET, DELAYED RELEASE ORAL at 09:02

## 2022-01-18 RX ADMIN — FLUTICASONE PROPIONATE 2 SPRAY: 50 SPRAY, METERED NASAL at 09:04

## 2022-01-18 RX ADMIN — INSULIN GLARGINE 60 UNITS: 100 INJECTION, SOLUTION SUBCUTANEOUS at 21:21

## 2022-01-18 RX ADMIN — DIVALPROEX SODIUM 500 MG: 250 TABLET, DELAYED RELEASE ORAL at 13:57

## 2022-01-18 RX ADMIN — Medication 2 PUFF: at 21:05

## 2022-01-18 RX ADMIN — BUDESONIDE AND FORMOTEROL FUMARATE DIHYDRATE 2 PUFF: 160; 4.5 AEROSOL RESPIRATORY (INHALATION) at 21:05

## 2022-01-18 RX ADMIN — Medication 10 ML: at 09:03

## 2022-01-18 RX ADMIN — Medication 0.6 MCG/KG/HR: at 23:14

## 2022-01-18 RX ADMIN — TRAZODONE HYDROCHLORIDE 100 MG: 100 TABLET ORAL at 21:20

## 2022-01-18 RX ADMIN — CLOPIDOGREL BISULFATE 75 MG: 75 TABLET ORAL at 09:02

## 2022-01-18 RX ADMIN — Medication 10 ML: at 21:20

## 2022-01-18 RX ADMIN — CEFTRIAXONE 2000 MG: 2 INJECTION, POWDER, FOR SOLUTION INTRAMUSCULAR; INTRAVENOUS at 17:32

## 2022-01-18 RX ADMIN — Medication 0.5 MCG/KG/HR: at 17:33

## 2022-01-18 ASSESSMENT — PAIN SCALES - GENERAL
PAINLEVEL_OUTOF10: 0

## 2022-01-18 ASSESSMENT — PAIN SCALES - WONG BAKER

## 2022-01-18 NOTE — PROGRESS NOTES
Speech Language Pathology  SLP Attempt Note        Name: Octavia Mallory. : 1960  Medical Diagnosis: Unable to ambulate [R26.2]  Fall, initial encounter [W19. GJLD]  DLKAM-49 [U07.1]    SLP attempting to see pt for dysphagia evaluation. Per RN, Sherlyn Marrero, pt was combative all day. Precedex was given to sedate. Pt finally calmed and sedated therefore unable to complete eval as pt not alert enough for PO trials. Will attempt to see pt  pending condition. Will attempt in am as priority as Sherlyn Marrero states MD has discussed feeding tube. No charges.  Thank you,    Signature:   Juliana Yung   SLP  Clinician     Co-Signing Supervisor:  Jessy De La Rosa M.A., [address] [place] [address] Pathologist  Phone: [phone], [phone]

## 2022-01-18 NOTE — FLOWSHEET NOTE
01/18/22 0751   Vital Signs   Temp 98 °F (36.7 °C)   Temp Source Oral   Pulse 70   Heart Rate Source Monitor   Resp 20   BP 98/67   Patient Position Supine   Level of Consciousness Alert (0)   MEWS Score 2   Pain Assessment   Pain Assessment 0-10   Pain Level 0   Oxygen Therapy   SpO2 98 %   O2 Device Nasal cannula   O2 Flow Rate (L/min) 2 L/min   Initial assessment pt resting quietly with restraints in place precedex infusing per order and telemonitor in place for pt safety

## 2022-01-18 NOTE — PROGRESS NOTES
Report to oncoming RN to assume care of pt .  Pt in bed tube feeding to left nare NG per order , tele sitter at [facility] for pt safety all lines intact at time of transfer of care

## 2022-01-18 NOTE — PLAN OF CARE
Problem: Airway Clearance - Ineffective  Goal: Achieve or maintain patent airway  Outcome: Ongoing     Problem: Breathing Pattern - Ineffective  Goal: Ability to achieve and maintain a regular respiratory rate  Outcome: Ongoing     Problem:  Body Temperature -  Risk of, Imbalanced  Goal: Ability to maintain a body temperature within defined limits  Outcome: Ongoing  Goal: Will regain or maintain usual level of consciousness  Outcome: Ongoing  Goal: Complications related to the disease process, condition or treatment will be avoided or minimized  Outcome: Ongoing     Problem: Risk for Fluid Volume Deficit  Goal: Maintain normal heart rhythm  Outcome: Ongoing  Goal: Maintain absence of muscle cramping  Outcome: Ongoing  Goal: Maintain normal serum potassium, sodium, calcium, phosphorus, and pH  Outcome: Ongoing     Problem: Nutrition Deficits  Goal: Optimize nutritional status  Outcome: Ongoing

## 2022-01-18 NOTE — FLOWSHEET NOTE
01/18/22 1800   Vital Signs   Temp 98 °F (36.7 °C)   Temp Source Axillary   Pulse 71   Heart Rate Source Monitor   Resp 24   BP (!) 125/58   BP Location Left upper arm   Patient Position Supine   Level of Consciousness Alert (0)   MEWS Score 2   Pain Assessment   Pain Assessment 0-10   Pain Level 0   Oxygen Therapy   SpO2 100 %   O2 Device Nasal cannula   O2 Flow Rate (L/min) 2 L/min   Pt continues to pull on  call positions self to get his right hand and pull on tubing , pt attempting to bend forward to pull out NG tube states \" I want that out: , this RN explained restraints will need to stay on as long as he pulls at medical equipment .

## 2022-01-18 NOTE — PROGRESS NOTES
[address]   Progress Note  Cardiology      HPI: Seeing Mr. Quiroga today for f/u ICD shocks. He is more alert today and answers questions appropriately. Started on precedex gtt over weekend for agitation and pulling lines out. Issues with lower BP. He denies any specific complaints. Tele reviewed shows NSR. Physical Examination:    Vitals:    01/18/22 0700   BP: 115/65   Pulse: 70   Resp:    Temp:    SpO2:           Constitutional and General Appearance: NAD   Respiratory:  · Normal excursion and expansion without use of accessory muscles  · Resp Auscultation: Normal breath sounds without dullness  Cardiovascular:  · The apical impulses not displaced  · Heart tones are crisp and normal  · Cervical veins are not engorged  · The carotid upstroke is normal in amplitude and contour without delay or bruit  · Normal S1S2, No S3, No Murmur  · Peripheral pulses are symmetrical and full  · There is no clubbing, cyanosis of the extremities. · No edema  · Pedal Pulses: 2+ and equal   Abdomen:  · No masses or tenderness  · Liver/Spleen: No Abnormalities Noted  Neurological/Psychiatric:  · Alert and oriented x3  · Moves all extremities well  Skin: warm and dry        Lab Results   Component Value Date    WBC 2.9 (L) 01/17/2022    HGB 13.3 (L) 01/17/2022    HCT 38.6 (L) 01/17/2022    MCV 84.1 01/17/2022    PLT 62 (L) 01/17/2022     Lab Results   Component Value Date    CREATININE 0.8 (L) 01/17/2022    BUN 14 01/17/2022     (L) 01/17/2022    K 3.4 (L) 01/17/2022    CL 94 (L) 01/17/2022    CO2 20 (L) 01/17/2022     Lab Results   Component Value Date    INR 0.94 01/16/2022    PROTIME 10.6 01/16/2022           Lexiscan myoview 7/2/21       Summary    Overall findings represent a high risk scan.    Normal LV size with reduced function.  EF 49%    Large mostly reversible defect involving the basal inferolateral, basal    inferior, mid inferolateral and mid inferior segments.    Findings concerning for ischemia.    ECG: Non-diagnostic EKG response due to failure to reach target heart rate.      7/13/21 CATH CONCLUSION:  Successful stenting of the true circumflex into the obtuse  marginal branch, successful stenting of the left posterior descending  coronary artery and successful stenting of the obtuse marginal branch,  all 80% stenosis reduced to 0 with these three drug-eluting stents. JAMES-3 blood flow was present in the circumflex artery and all its  branches.  The LAD is normal with minor irregularities only and the left  main is normal.  Nondominant right coronary artery.  LV ejection  fraction of 40% with inferior wall and inferoposterior hypokinesia.     ECHO 1/15/22 Summary   This is a limited study afib, ischemic cmp, recurrent pacer firing. LV systolic function appears mildly reduced with EF estimated at 45-50%. There is more prominent HK of the inferior wall. Left ventricular size is decreased. There is mild concentric left ventricular hypertrophy. 11- 35-40% inf basal hypo, inferolateral akinesis, LVE, mild MR     [id number] Score for Atrial Fibrillation Stroke Risk   Risk   Factors  Component Value   C CHF No 0   H HTN Yes 1   A2 Age >= 76 No,  (66 y.o.) 0   D DM Yes 1   S2 Prior Stroke/TIA Yes 2   V Vascular Disease Yes 1   A Age 74-69 No,  (66 y.o.) 0   Sc Sex male 0    [id number]  Score  5   Score last updated 1/17/22 9:02 AM EST    Assessment:  Aislinn Curiel is a 63 y.o. patient who presented to [facility] 1/12/22 with c/o weakness and falls. Follows Dr. Joanne Mancilla for cardiology--last saw 7/21. He has PMH CAD s/p 3 ROSHNI (7/21 s/p ROSHNI CCx into OM branch, Left PDA, and OM branch), chronic systolic CHF, DM, hx MI, ischemic CM, hx VT s/p ICD with generator change due to recall 5/19 and DJD/lumbar spondylosis. Most recent Lexiscan myoview 7/2/21 showed reversible defect RCA/CCx territory; EF=49%. Underwent LHC and 3 ROSHNI placed per Dr. Joanne Mancilla.  Reported EF=40% in 7/21 but no ECHO report found. Now presents with mainly weakness and falls. Tested positive for Covid. CT head and CXR unremarkable. Admit EKG ST; PVC; inferolateral infarct; NST change. Second EKG similar without PVC. Note he called out due to being shocked from device. Dr. Kamini Nicholas at bedside reports felt rapid afib and gave 5mg IV metoprolol followed by 10mg IV diltiazem. He had frequent sustained grouped shocks intermittently per report. He denied any complaints prior to ICD firing. K+ 4.2; Oi=912; Covid + on 1/12. Diagnosis of frequent ICD firing and rapid afib in middle-aged male with hx CAD s/p stents, chronic systolic CHF, and ischemic CM.      Recs:  1. Continue metoprolol 25mg BID. Titrate if needed and BP can tolerate (prior home dose 200mg)  2. Hold cardizem and use BB for rate control due to LV dysfcn. 3. Restart lisinopril (prior dose 20mg) for LV dysfunction when BP improves. 4. Continue plavix 75mg qd and lipitor 80mg qd  5. I personally reviewed ECHO 1/15/22 EF=45-50%; inferior HK (improved EF from 11/19). 6. Keep lytes repleted properly (K+ 4 and Mg+ 2)  7. I d/w EP partner interrogation results and arrhythmia was rapid afib. ICD shocks inappropriate and adjustments made by device rep on 1/17/21.   8. Hold eliquis given lower platelets 19V. Continue plavix given stents < 1 year. Can restart eliquis when platelets increase. Follow CBC.   10. I reviewed today's showing NSR (no sig change 1/17 EKG--QTc today not accurate)    Patient Active Problem List   Diagnosis    Ventricular tachycardia ([facility] [address]    Presence of stent in left circumflex coronary artery    Myocardial infarction, nontransmural (HCC)    Myocardial infarction, inferoposterior wall, subsequent care    Automatic implantable cardioverter-defibrillator in situ    CAD (coronary artery disease)    Automatic implantable cardioverter-defibrillator in situ    ALIN on CPAP    Gastroesophageal reflux disease without esophagitis    Hyperlipidemia with target LDL less than 100    Hypertension due to endocrine disorder    Ischemic cardiomyopathy    Obesity, morbid, BMI 40.0-49.9 (HCC)    Weakness    Acute encephalopathy    TIA involving left internal carotid artery    DM (diabetes mellitus), secondary, uncontrolled, w/neurologic complic ([facility])    Dyslipidemia    HTN (hypertension), benign    Lactic acidosis    Head trauma    Abrasion, scalp w/o infection    Unsteady gait    Hyperglycemia    Chronic obstructive pulmonary disease (HCC)    Coarse tremor    Generalized weakness    Type 2 diabetes mellitus with hyperglycemia, with long-term current use of insulin (HCC)    Essential hypertension    CAD S/P percutaneous coronary angioplasty    COVID-19    Fall    Unable to ambulate    Implantable cardioverter-defibrillator (ICD) discharge    AICD malfunction    Hypotension    Valproic acid toxicity    PAF (paroxysmal atrial fibrillation) (HCC)    COVID-19 virus infection    Disorder of electrolytes    Atrial fibrillation with RVR (HCC)    Low grade fever    Thrombocytopenia (HCC)

## 2022-01-18 NOTE — PROGRESS NOTES
Pulmonary & Critical Care Medicine ICU Progress Note    CC: COVID-19    Events of Last 24 hours:   Precedex 0.3 mcg/kg/hr -agitation and tachycardia when weaning down   BG in place for meds   2LPM  Low grade fever overnight    Still with AMS   Denies any alcohol       Vascular lines: IV: PICC RUE    No results for input(s): PHART, IFD1KEO, PO2ART in the last 72 hours. IV:   IV infusion builder 75 mL/hr at 22 0507    sodium chloride      dexmedetomidine HCl in NaCl 0.5 mcg/kg/hr (22 0507)    dextrose         Vitals:  Blood pressure 115/65, pulse 70, temperature 98.8 °F (37.1 °C), temperature source Oral, resp. rate 20, height 5' 4\" (1.626 m), weight 241 lb (109.3 kg), SpO2 94 %. on 2 LPM   Temp  Av.8 °F (37.7 °C)  Min: 98.8 °F (37.1 °C)  Max: 100.9 °F (38.3 °C)    Intake/Output Summary (Last 24 hours) at 2022 0715  Last data filed at 2022 0534  Gross per 24 hour   Intake 2688.35 ml   Output 1450 ml   Net 1238.35 ml     PE:  \Gen: No distress. Eyes: PERRL. No sclera icterus. No conjunctival injection. ENT: No discharge. Pharynx clear. Neck: Trachea midline. No obvious mass. Resp: No accessory muscle use. R crackles. No wheezes. No rhonchi. No dullness on percussion. Good air entry. CV: Tachy rate. irregular rhythm. No murmur or rub. No edema. GI: Non-tender. Non-distended. No hernia. Skin: Warm and dry. No nodule on exposed extremities. Lymph: No cervical LAD. No supraclavicular LAD. M/S: No cyanosis. No joint deformity. No clubbing. Neuro: Awake. Alert. Moves all four extremities. Psych: Oriented x 3. No anxiety.        Scheduled Meds:   DULoxetine  60 mg Oral Daily    QUEtiapine  50 mg Oral Nightly    folic acid, thiamine, multi-vitamin with vitamin K infusion   IntraVENous Daily    dexamethasone  6 mg IntraVENous Q24H    cefTRIAXone (ROCEPHIN) IV  2,000 mg IntraVENous Q12H    apixaban  5 mg Oral BID    sodium chloride flush  5-40 mL IntraVENous 2 times per day    metoprolol tartrate  25 mg Oral BID    divalproex  250 mg Oral Nightly    budesonide-formoterol  2 puff Inhalation BID    albuterol sulfate HFA  2 puff Inhalation BID    insulin lispro  0-6 Units SubCUTAneous TID WC    insulin lispro  0-3 Units SubCUTAneous Nightly    atorvastatin  80 mg Oral Daily    clopidogrel  75 mg Oral Daily    pantoprazole  40 mg Oral Daily    fluticasone  2 spray Each Nostril Daily    gabapentin  200 mg Oral TID    insulin glargine  60 Units SubCUTAneous Nightly       Data:  CBC:   Recent Labs     22  0513 22  0538   WBC 5.7 2.9*   HGB 14.3 13.3*   HCT 42.8 38.6*   MCV 86.9 84.1   PLT 91* 62*     BMP:   Recent Labs     22  0513 22  0538   * 134*   K 4.3 3.4*   CL 93* 94*   CO2 15* 20*   BUN 34* 14   CREATININE 1.4* 0.8*     LIVER PROFILE:   Recent Labs     22  1326   AST 30   ALT 22   BILIDIR <0.2   BILITOT 0.5   ALKPHOS 58       Microbiology:   COVID-19 detected   BC      Imagin/17 chest x-ray imaging reviewed by me and showed  R basilar ASD      CT head   No acute intracranial abnormalities      ASSESSMENT:  · Acute encephalopathy/metabolic encephalopathy-unclear etiology.  DDx alcohol/drug withdrawal, COVID encephalitis, viral meningitis/encephalitis  · Abnormal CXR- probably atelectasis and can not exclude PNA   · Afib RVR with frequent firing of  ICD: appropriate vs inappropriate firing  · Electrolytes disorder   · Ischemic cardiomyopathy/chronic systolic CHF EF 40% on  TTE  · PAF with RVR   · Low-grade fever   · COVID-19 viral infection  · JAZMIN  · Thrombocytopenia - worsening   · CAD s/p stents  · ALIN -CPAP 14 in the past but lost to follow up       PLAN:  · COVID 19 Droplet plus isolation  · Precedex for agitation-wean off  · Seroquel QHS  · Decadron 6 mg IV daily D#2  · IV ceftriaxone D#2  · Follow up Cx   · Check cultures  · D/C Bicarb drip   · Lantus and SSI  · Cardiology following · Rally pack for 3 days  · Electrolytes replacement   · NG for meds   · Plavix and statin   · Change Depakote to home dose   · Holding Elquis given TCP  · D/W cardiology and IM   · Holding Trazodone and Neurontin   · Lovenox DVT prophylaxis          Total critical care time caring for this patient with life threatening, unstable organ failure, including direct patient contact, management of life support systems, review of data including imaging and labs, discussions with other team members and physicians is 31 minutes, excluding procedures.

## 2022-01-18 NOTE — PROGRESS NOTES
Attempted to remove restaints . Pt instantly began to pull at call and NG tube replaced wrist restraints for pt safety .  Orders rec to adjust meds for confusion

## 2022-01-18 NOTE — PROGRESS NOTES
RT Inhaler-Nebulizer Bronchodilator Protocol Note    There is a bronchodilator order in the chart from a provider indicating to follow the RT Bronchodilator Protocol and there is an Initiate RT Inhaler-Nebulizer Bronchodilator Protocol order as well (see protocol at bottom of note). CXR Findings:  XR CHEST PORTABLE    Result Date: 1/17/2022  NG tube is difficult to visualize, tip is likely below the diaphragm. Repeat exam could be considered if clinically indicated. XR CHEST PORTABLE    Result Date: 1/17/2022  New elevated right hemidiaphragm, likely atelectasis or possibly infiltrate. Subpulmonic effusion not excluded. No overt failure. The findings from the last RT Protocol Assessment were as follows:   History Pulmonary Disease: (P) Chronic pulmonary disease  Respiratory Pattern: (P) Regular pattern and RR 12-20 bpm  Breath Sounds: (P) Slightly diminished and/or crackles  Cough: (P) Strong, spontaneous, non-productive  Indication for Bronchodilator Therapy: (P) Decreased or absent breath sounds  Bronchodilator Assessment Score: (P) 4    Aerosolized bronchodilator medication orders have been revised according to the RT Inhaler-Nebulizer Bronchodilator Protocol below. Respiratory Therapist to perform RT Therapy Protocol Assessment initially then follow the protocol. Repeat RT Therapy Protocol Assessment PRN for score 0-3 or on second treatment, BID, and PRN for scores above 3. No Indications - adjust the frequency to every 6 hours PRN wheezing or bronchospasm, if no treatments needed after 48 hours then discontinue using Per Protocol order mode. If indication present, adjust the RT bronchodilator orders based on the Bronchodilator Assessment Score as indicated below.   Use Inhaler orders unless patient has one or more of the following: on home nebulizer, not able to hold breath for 10 seconds, is not alert and oriented, cannot activate and use MDI correctly, or respiratory rate 25 breaths per minute or more, then use the equivalent nebulizer order(s) with same Frequency and PRN reasons based on the score. If a patient is on this medication at home then do not decrease Frequency below that used at home. 0-3 - enter or revise RT bronchodilator order(s) to equivalent RT Bronchodilator order with Frequency of every 4 hours PRN for wheezing or increased work of breathing using Per Protocol order mode. 4-6 - enter or revise RT Bronchodilator order(s) to two equivalent RT bronchodilator orders with one order with BID Frequency and one order with Frequency of every 4 hours PRN wheezing or increased work of breathing using Per Protocol order mode. 7-10 - enter or revise RT Bronchodilator order(s) to two equivalent RT bronchodilator orders with one order with TID Frequency and one order with Frequency of every 4 hours PRN wheezing or increased work of breathing using Per Protocol order mode. 11-13 - enter or revise RT Bronchodilator order(s) to one equivalent RT bronchodilator order with QID Frequency and an Albuterol order with Frequency of every 4 hours PRN wheezing or increased work of breathing using Per Protocol order mode. Greater than 13 - enter or revise RT Bronchodilator order(s) to one equivalent RT bronchodilator order with every 4 hours Frequency and an Albuterol order with Frequency of every 2 hours PRN wheezing or increased work of breathing using Per Protocol order mode.          Electronically signed by Sayra Rosa RCP on 1/17/2022 at 9:23 PM

## 2022-01-18 NOTE — FLOWSHEET NOTE
01/18/22 1115   Vital Signs   Temp 98.6 °F (37 °C)   Temp Source Axillary   Pulse 71   Heart Rate Source Monitor   Resp 18   /75   Patient Position Supine; Turns self   Level of Consciousness Alert (0)   MEWS Score 1   Pain Assessment   Pain Assessment 0-10   Pain Level 0   Oxygen Therapy   SpO2 98 %   O2 Device Nasal cannula   O2 Flow Rate (L/min) 2 L/min   PT CONTINUES  To pull at lines and yell out , speech clear will follow commands unable to leave restraints off at this time multiple attempts and tele monitor call pt pulling at call or cardiac lines and IV

## 2022-01-18 NOTE — FLOWSHEET NOTE
01/18/22 1030   Vital Signs   Temp 98 °F (36.7 °C)   Temp Source Oral   Pulse 72   Heart Rate Source Monitor   Resp 18   /75   Patient Position Supine   Level of Consciousness Alert (0)   MEWS Score 1   Pain Assessment   Pain Assessment 0-10   Pain Level 0   Oxygen Therapy   SpO2 98 %   O2 Device Nasal cannula   O2 Flow Rate (L/min) 2 L/min   attempting to titrate precedex down and remove restraints .  Pt continues to pull at call and NG when not restrained yells out will continue to monitor cognition and ability to safely remove restraints unable to at this time

## 2022-01-18 NOTE — FLOWSHEET NOTE
01/18/22 0915   Vital Signs   Pulse 78   Heart Rate Source Monitor   Resp 20   BP (!) 107/55   Patient Position Supine   Level of Consciousness Alert (0)   Pain Assessment   Pain Assessment 0-10   Pain Level 0   Oxygen Therapy   SpO2 100 %   O2 Device Nasal cannula   O2 Flow Rate (L/min) 2 L/min   precedex titrated down .  Pt becomes agitated/anxious  Yelling and pulling at NG and call ,incon;tinent of bowel   restraints in place for pt safety verbal order physician ordering seroquel , pt repositioned for comfort cold rag to head per his request , speech clear pt follows commands

## 2022-01-18 NOTE — PROGRESS NOTES
Tube feeding Jevity 1.5 with fiber started per order at 20ml/hr with a flush of 100ml q4 hours orders to  increase by 20ml every 4 hours to goal of 60ml/hr  See orders

## 2022-01-18 NOTE — PROGRESS NOTES
Initial assessment pt CAOX3. Speech clear, follows commands , reports \" loosen my hands and I will not pull anything out \" , this RN attempted trial out of wrist restraints  pt had call tubing and cardiac monitor wires  in hand , pulling at NG, attempted to redirect ,pt followed commands , BEDSIDE nursing swallow eval , pt able to sip water without noted diff , swallowed pill PO no noted throat clearing or concerns. Pt without redirection pulls at call, IV and NG , confirmed need for continued  Restraints will continue to monitor and assess pt safety.

## 2022-01-19 LAB
ANION GAP SERPL CALCULATED.3IONS-SCNC: 14 MMOL/L (ref 3–16)
BASOPHILS ABSOLUTE: 0 K/UL (ref 0–0.2)
BASOPHILS RELATIVE PERCENT: 0.3 %
BUN BLDV-MCNC: 11 MG/DL (ref 7–20)
CALCIUM SERPL-MCNC: 8.7 MG/DL (ref 8.3–10.6)
CHLORIDE BLD-SCNC: 95 MMOL/L (ref 99–110)
CO2: 23 MMOL/L (ref 21–32)
CREAT SERPL-MCNC: 0.7 MG/DL (ref 0.9–1.3)
EOSINOPHILS ABSOLUTE: 0 K/UL (ref 0–0.6)
EOSINOPHILS RELATIVE PERCENT: 0.1 %
GFR AFRICAN AMERICAN: >60
GFR NON-AFRICAN AMERICAN: >60
GLUCOSE BLD-MCNC: 164 MG/DL (ref 70–99)
GLUCOSE BLD-MCNC: 173 MG/DL (ref 70–99)
GLUCOSE BLD-MCNC: 203 MG/DL (ref 70–99)
GLUCOSE BLD-MCNC: 211 MG/DL (ref 70–99)
GLUCOSE BLD-MCNC: 238 MG/DL (ref 70–99)
GLUCOSE BLD-MCNC: 253 MG/DL (ref 70–99)
HCT VFR BLD CALC: 39.2 % (ref 40.5–52.5)
HEMOGLOBIN: 13.4 G/DL (ref 13.5–17.5)
HEPARIN INDUCED PLATELET ANTIBODY: NEGATIVE
LYMPHOCYTES ABSOLUTE: 0.7 K/UL (ref 1–5.1)
LYMPHOCYTES RELATIVE PERCENT: 21.3 %
MAGNESIUM: 1.9 MG/DL (ref 1.8–2.4)
MCH RBC QN AUTO: 28.8 PG (ref 26–34)
MCHC RBC AUTO-ENTMCNC: 34.2 G/DL (ref 31–36)
MCV RBC AUTO: 84.1 FL (ref 80–100)
MONOCYTES ABSOLUTE: 0.2 K/UL (ref 0–1.3)
MONOCYTES RELATIVE PERCENT: 6.2 %
NEUTROPHILS ABSOLUTE: 2.4 K/UL (ref 1.7–7.7)
NEUTROPHILS RELATIVE PERCENT: 72.1 %
PDW BLD-RTO: 15.7 % (ref 12.4–15.4)
PERFORMED ON: ABNORMAL
PHOSPHORUS: 2.7 MG/DL (ref 2.5–4.9)
PLATELET # BLD: 62 K/UL (ref 135–450)
PMV BLD AUTO: 7.4 FL (ref 5–10.5)
POTASSIUM REFLEX MAGNESIUM: 3.6 MMOL/L (ref 3.5–5.1)
POTASSIUM SERPL-SCNC: 3.6 MMOL/L (ref 3.5–5.1)
RBC # BLD: 4.66 M/UL (ref 4.2–5.9)
SODIUM BLD-SCNC: 132 MMOL/L (ref 136–145)
WBC # BLD: 3.4 K/UL (ref 4–11)

## 2022-01-19 PROCEDURE — 99232 SBSQ HOSP IP/OBS MODERATE 35: CPT | Performed by: NURSE PRACTITIONER

## 2022-01-19 PROCEDURE — 99233 SBSQ HOSP IP/OBS HIGH 50: CPT | Performed by: INTERNAL MEDICINE

## 2022-01-19 PROCEDURE — 6370000000 HC RX 637 (ALT 250 FOR IP): Performed by: HOSPITALIST

## 2022-01-19 PROCEDURE — 97530 THERAPEUTIC ACTIVITIES: CPT

## 2022-01-19 PROCEDURE — 92526 ORAL FUNCTION THERAPY: CPT

## 2022-01-19 PROCEDURE — 94761 N-INVAS EAR/PLS OXIMETRY MLT: CPT

## 2022-01-19 PROCEDURE — 85025 COMPLETE CBC W/AUTO DIFF WBC: CPT

## 2022-01-19 PROCEDURE — 2580000003 HC RX 258: Performed by: INTERNAL MEDICINE

## 2022-01-19 PROCEDURE — 2500000003 HC RX 250 WO HCPCS: Performed by: INTERNAL MEDICINE

## 2022-01-19 PROCEDURE — 94640 AIRWAY INHALATION TREATMENT: CPT

## 2022-01-19 PROCEDURE — 6360000002 HC RX W HCPCS: Performed by: INTERNAL MEDICINE

## 2022-01-19 PROCEDURE — 97164 PT RE-EVAL EST PLAN CARE: CPT

## 2022-01-19 PROCEDURE — 97162 PT EVAL MOD COMPLEX 30 MIN: CPT

## 2022-01-19 PROCEDURE — 6370000000 HC RX 637 (ALT 250 FOR IP): Performed by: INTERNAL MEDICINE

## 2022-01-19 PROCEDURE — 94644 CONT INHLJ TX 1ST HOUR: CPT

## 2022-01-19 PROCEDURE — 97110 THERAPEUTIC EXERCISES: CPT

## 2022-01-19 PROCEDURE — 97168 OT RE-EVAL EST PLAN CARE: CPT

## 2022-01-19 PROCEDURE — 92610 EVALUATE SWALLOWING FUNCTION: CPT

## 2022-01-19 PROCEDURE — 97535 SELF CARE MNGMENT TRAINING: CPT

## 2022-01-19 PROCEDURE — 97112 NEUROMUSCULAR REEDUCATION: CPT

## 2022-01-19 PROCEDURE — 2060000000 HC ICU INTERMEDIATE R&B

## 2022-01-19 PROCEDURE — 6370000000 HC RX 637 (ALT 250 FOR IP): Performed by: NURSE PRACTITIONER

## 2022-01-19 PROCEDURE — 80048 BASIC METABOLIC PNL TOTAL CA: CPT

## 2022-01-19 PROCEDURE — 83735 ASSAY OF MAGNESIUM: CPT

## 2022-01-19 PROCEDURE — 2700000000 HC OXYGEN THERAPY PER DAY

## 2022-01-19 PROCEDURE — 84100 ASSAY OF PHOSPHORUS: CPT

## 2022-01-19 RX ORDER — ASPIRIN 81 MG/1
81 TABLET ORAL DAILY
Status: DISCONTINUED | OUTPATIENT
Start: 2022-01-19 | End: 2022-01-21

## 2022-01-19 RX ORDER — DULOXETIN HYDROCHLORIDE 30 MG/1
30 CAPSULE, DELAYED RELEASE ORAL DAILY
Status: DISCONTINUED | OUTPATIENT
Start: 2022-01-19 | End: 2022-01-20

## 2022-01-19 RX ADMIN — INSULIN LISPRO 3 UNITS: 100 INJECTION, SOLUTION INTRAVENOUS; SUBCUTANEOUS at 13:44

## 2022-01-19 RX ADMIN — Medication 10 ML: at 09:45

## 2022-01-19 RX ADMIN — CLOPIDOGREL BISULFATE 75 MG: 75 TABLET ORAL at 09:43

## 2022-01-19 RX ADMIN — VALPROIC ACID 500 MG: 250 SOLUTION ORAL at 22:10

## 2022-01-19 RX ADMIN — GABAPENTIN 200 MG: 100 CAPSULE ORAL at 09:44

## 2022-01-19 RX ADMIN — BUDESONIDE AND FORMOTEROL FUMARATE DIHYDRATE 2 PUFF: 160; 4.5 AEROSOL RESPIRATORY (INHALATION) at 07:47

## 2022-01-19 RX ADMIN — METOPROLOL TARTRATE 25 MG: 25 TABLET, FILM COATED ORAL at 09:43

## 2022-01-19 RX ADMIN — CEFTRIAXONE 2000 MG: 2 INJECTION, POWDER, FOR SOLUTION INTRAMUSCULAR; INTRAVENOUS at 17:47

## 2022-01-19 RX ADMIN — METOPROLOL TARTRATE 25 MG: 25 TABLET, FILM COATED ORAL at 22:10

## 2022-01-19 RX ADMIN — INSULIN LISPRO 3 UNITS: 100 INJECTION, SOLUTION INTRAVENOUS; SUBCUTANEOUS at 16:43

## 2022-01-19 RX ADMIN — SODIUM CHLORIDE 25 ML: 9 INJECTION, SOLUTION INTRAVENOUS at 17:46

## 2022-01-19 RX ADMIN — DEXAMETHASONE SODIUM PHOSPHATE 6 MG: 10 INJECTION, SOLUTION INTRAMUSCULAR; INTRAVENOUS at 16:07

## 2022-01-19 RX ADMIN — GABAPENTIN 200 MG: 100 CAPSULE ORAL at 16:11

## 2022-01-19 RX ADMIN — ASPIRIN 81 MG: 81 TABLET, COATED ORAL at 22:10

## 2022-01-19 RX ADMIN — DULOXETINE HYDROCHLORIDE 30 MG: 30 CAPSULE, DELAYED RELEASE ORAL at 12:32

## 2022-01-19 RX ADMIN — INSULIN GLARGINE 60 UNITS: 100 INJECTION, SOLUTION SUBCUTANEOUS at 22:11

## 2022-01-19 RX ADMIN — QUETIAPINE FUMARATE 50 MG: 25 TABLET ORAL at 22:10

## 2022-01-19 RX ADMIN — Medication 2 PUFF: at 20:32

## 2022-01-19 RX ADMIN — Medication 0.6 MCG/KG/HR: at 05:57

## 2022-01-19 RX ADMIN — GABAPENTIN 200 MG: 100 CAPSULE ORAL at 22:10

## 2022-01-19 RX ADMIN — ATORVASTATIN CALCIUM 80 MG: 40 TABLET, FILM COATED ORAL at 09:43

## 2022-01-19 RX ADMIN — CEFTRIAXONE 2000 MG: 2 INJECTION, POWDER, FOR SOLUTION INTRAMUSCULAR; INTRAVENOUS at 05:57

## 2022-01-19 RX ADMIN — FOLIC ACID: 5 INJECTION, SOLUTION INTRAMUSCULAR; INTRAVENOUS; SUBCUTANEOUS at 10:00

## 2022-01-19 RX ADMIN — ENOXAPARIN SODIUM 40 MG: 100 INJECTION SUBCUTANEOUS at 16:07

## 2022-01-19 RX ADMIN — VALPROIC ACID 500 MG: 250 SOLUTION ORAL at 09:44

## 2022-01-19 RX ADMIN — QUETIAPINE FUMARATE 25 MG: 25 TABLET ORAL at 05:54

## 2022-01-19 RX ADMIN — PANTOPRAZOLE SODIUM 40 MG: 40 TABLET, DELAYED RELEASE ORAL at 09:44

## 2022-01-19 RX ADMIN — TRAZODONE HYDROCHLORIDE 100 MG: 100 TABLET ORAL at 22:10

## 2022-01-19 RX ADMIN — BUDESONIDE AND FORMOTEROL FUMARATE DIHYDRATE 2 PUFF: 160; 4.5 AEROSOL RESPIRATORY (INHALATION) at 20:32

## 2022-01-19 RX ADMIN — Medication 2 PUFF: at 07:47

## 2022-01-19 RX ADMIN — INSULIN LISPRO 3 UNITS: 100 INJECTION, SOLUTION INTRAVENOUS; SUBCUTANEOUS at 22:11

## 2022-01-19 RX ADMIN — ENOXAPARIN SODIUM 40 MG: 100 INJECTION SUBCUTANEOUS at 22:59

## 2022-01-19 RX ADMIN — VALPROIC ACID 500 MG: 250 SOLUTION ORAL at 16:11

## 2022-01-19 ASSESSMENT — PAIN SCALES - WONG BAKER
WONGBAKER_NUMERICALRESPONSE: 0

## 2022-01-19 ASSESSMENT — PAIN DESCRIPTION - LOCATION: LOCATION: ARM

## 2022-01-19 ASSESSMENT — PAIN DESCRIPTION - PAIN TYPE: TYPE: ACUTE PAIN

## 2022-01-19 ASSESSMENT — PAIN SCALES - GENERAL
PAINLEVEL_OUTOF10: 0
PAINLEVEL_OUTOF10: 0
PAINLEVEL_OUTOF10: 5

## 2022-01-19 ASSESSMENT — PAIN DESCRIPTION - ORIENTATION: ORIENTATION: RIGHT

## 2022-01-19 NOTE — PROGRESS NOTES
Rounding completed with Dr. Fred Corona and interdisciplinary team.  POC, labs, lines and assessment reviewed.        - pt okay to transfer from ICU  -checking to see if pacemaker is compatible with MRI  -add lovenox see MAR

## 2022-01-19 NOTE — CARE COORDINATION
[facility] - Acute Rehab Unit   After review, this patient is felt to be:       []  Appropriate for Acute Inpatient Rehab    []  Appropriate for Acute Inpatient Rehab Pending Insurance Authorization    []  Not appropriate for Acute Inpatient Rehab    [x]  Referral received and ARU reviewing patient; Evaluation ongoing. Will notify DCP with further updates. Thank you for the referral.    Thank you.    Mary Lou Khan M.A, FENG-SLP/ CBIS   Clinical Liason

## 2022-01-19 NOTE — PROGRESS NOTES
Sent message to Dr Chauncey Constantino per Dr. Timothy Andrade ok to transfer do you want to place orders and notified MD pacemaker is not MRI compatible. Will await orders.

## 2022-01-19 NOTE — PROGRESS NOTES
[address]  Cardiology  Progress Note    Admission date:  2022    Reason for follow up visit: ICD shocks    HPI/CC: Silverio Pichardo is a 63 y.o. male who presented 2022 for weakness and falls. While admitted he had inappropriate ICD shocks for AF RVR. Echo showed EF 45-50%. He has been treated for COVID and anemia. Rhythm has been sinus. Vitals:  Blood pressure 126/62, pulse 81, temperature 97.4 °F (36.3 °C), temperature source Axillary, resp. rate 16, height 5' 4\" (1.626 m), weight 241 lb (109.3 kg), SpO2 99 %.   Temp  Av °F (36.7 °C)  Min: 97.4 °F (36.3 °C)  Max: 98.6 °F (37 °C)  Pulse  Av  Min: 60  Max: 81  BP  Min: 107/55  Max: 144/75  SpO2  Av.5 %  Min: 95 %  Max: 100 %    24 hour I/O    Intake/Output Summary (Last 24 hours) at 2022 0845  Last data filed at 2022 0430  Gross per 24 hour   Intake 100 ml   Output 2190 ml   Net -2090 ml     Current Facility-Administered Medications   Medication Dose Route Frequency Provider Last Rate Last Admin    QUEtiapine (SEROQUEL) tablet 25 mg  25 mg Oral QAM AC Giuliano Pereyra MD   25 mg at 22 0554    valproic acid (DEPAKENE) 250 MG/5ML oral solution 500 mg  500 mg Per NG tube TID Man Garza MD   500 mg at 22    DULoxetine (CYMBALTA) extended release capsule 60 mg  60 mg Oral Daily Giuliano Pereyra MD   60 mg at 22 0902    QUEtiapine (SEROQUEL) tablet 50 mg  50 mg Oral Nightly Giuliano Pereyra MD   50 mg at 22    sodium chloride 0.9 % 044 mL with folic acid 1 mg, adult multi-vitamin with vitamin k 10 mL, thiamine 100 mg   IntraVENous Daily Roseanna Pink  mL/hr at 22 0911 New Bag at 22 0911    dexamethasone (PF) (DECADRON) injection 6 mg  6 mg IntraVENous Q24H Roseanna Pink MD   6 mg at 22 1222    cefTRIAXone (ROCEPHIN) 2000 mg IVPB in D5W 50ml minibag  2,000 mg IntraVENous Q12H Roseanna Pink MD   Stopped at 22 0630    [Held by provider] apixaban (ELIQUIS) tablet 5 mg  5 mg Oral BID Kendall Lynn MD   5 mg at 01/17/22 2048    metoprolol (LOPRESSOR) injection 5 mg  5 mg IntraVENous Q6H PRN Mart Parker MD        dilTIAZem injection 10 mg  10 mg IntraVENous Q6H PRN Mart Parker MD        sodium chloride flush 0.9 % injection 5-40 mL  5-40 mL IntraVENous 2 times per day Marielena Trinidad MD   10 mL at 01/18/22 2120    sodium chloride flush 0.9 % injection 5-40 mL  5-40 mL IntraVENous PRN Marielena Trinidad MD        0.9 % sodium chloride infusion  25 mL IntraVENous PRN Marielena Trinidad MD        dexmedetomidine (PRECEDEX) 400 mcg in sodium chloride 0.9 % 100 mL infusion  0.2-1.4 mcg/kg/hr IntraVENous Continuous Marielena Trinidad MD 16.4 mL/hr at 01/19/22 0557 0.6 mcg/kg/hr at 01/19/22 0557    metoprolol tartrate (LOPRESSOR) tablet 25 mg  25 mg Oral BID Marielena Trinidad MD   25 mg at 01/18/22 2120    perflutren lipid microspheres (DEFINITY) injection 1.65 mg  1.5 mL IntraVENous ONCE PRN Mart Parker MD        traZODone (DESYREL) tablet 100 mg  100 mg Oral Nightly PRN Mart Parker MD   100 mg at 01/18/22 2120    budesonide-formoterol (SYMBICORT) 160-4.5 MCG/ACT inhaler 2 puff  2 puff Inhalation BID Kat Schmitz MD   2 puff at 01/19/22 0747    albuterol sulfate  (90 Base) MCG/ACT inhaler 2 puff  2 puff Inhalation BID Kat Schmitz MD   2 puff at 01/19/22 0747    glucose (GLUTOSE) 40 % oral gel 15 g  15 g Oral PRN Kat Schmitz MD        dextrose 50 % IV solution  12.5 g IntraVENous PRN Kat Schmitz MD        glucagon (rDNA) injection 1 mg  1 mg IntraMUSCular PRN Kat Schmitz MD        dextrose 5 % solution  100 mL/hr IntraVENous PRN Kat Schmitz MD        insulin lispro (HUMALOG) injection vial 0-6 Units  0-6 Units SubCUTAneous TID  Kat Schmitz MD   1 Units at 01/16/22 1612    insulin lispro (HUMALOG) injection vial 0-3 Units  0-3 Units SubCUTAneous known to be  reduced in the 40% range. Because of the findings of the nuclear scan,  the recent syncope and the known coronary artery disease, status post  stenting as for MI in the past, he presents today for invasive risk  stratification with coronary angiography. DESCRIPTION OF PROCEDURE:  The start time of the procedure was 10:28,  the stop time was 11:29. The patient was sedated per my order during  the entire procedure with a total of 3 mg of Versed and 175 mcg of  fentanyl given in divided doses by my order. We did monitor the  patient's cardiopulmonary status that includes myself during the entire  procedure. After informed consent was obtained, the patient was prepped and draped  in a sterile manner. Locally anesthetized with 1% lidocaine in the  right femoral region. A 5-Ma[place] sheath was placed in a retrograde  manner in right femoral artery over a guidewire. [place] and pigtail  catheters were used during the diagnostic procedure. All were aspirated  and flushed prior to use. FINDINGS:  The left main coronary artery is normal.  The left anterior  descending coronary artery has minor luminal irregularities. There is  JAMES grade 3 flow. Diagonal branches unremarkable, courses around the  apex of the left ventricle. The circumflex is a dominant artery. The  circumflex early on comes off in the AV groove and there is a stent in  the distal circumflex into the obtuse marginal branch. That obtuse  marginal continues on and forms the left posterior descending and a  large obtuse marginal branch. Just in front of the stent in the OM,  there is 80% stenosis. There is some haziness within the proximal  portion of the stent. The true left PDA and its mid distal area has an  80% stenosis. There is pre-aneurysmal dilatation of that artery.   The obtuse marginal branch is large and feeds a significant portion of  the posterolateral wall of left ventricle and there was an 80% diffuse  stenosis from the bifurcation. It does not include the bifurcation,  however. The JR4 catheter engages the right coronary artery. It is a nondominant  right coronary artery. There was some dampening of pressure, but there  was no disease with two small RV branches. A pigtail catheter in the left ventricle showed a post angiogram LVEDP  of 15 mmHg. Power injection in the SANDERS projection shows inferior wall  and inferoposterior wall hypokinesia with an LV ejection fraction  estimated at 40%. The anterior wall and the apex appeared to be moving  well. There was no mitral regurgitation. There was no gradient upon  pullback from the left ventricle to the ascending aorta. The 5-Citizen of [place] sheath was upsized to a 6-Citizen of [place] sheath over the 0.035  J-wire. Injection of the sheath showed that it was located squarely in  the normal right common femoral artery. An XP 3.5 guide was used to engage the left main coronary artery. BMW  guidewire was advanced from the circumflex into the left posterior  descending coronary artery. This was beyond the 80% stenosis. I chose  a 2.5 x 15 mm Resolute [place] drug-eluting stent. The stent was deployed  at 14 atmospheres x20 seconds. There was JAMES-3 blood flow with nearly  0% residual stenosis. The BMW guidewire was redirected into the obtuse  marginal branch beyond the diffuse 80% stenosis and I took a 2.5 x 22 mm  Resolute [place] drug-eluting stent, inflated that to 14 atmospheres x20  seconds with JAMES-3 blood flow and nearly 0% residual stenosis. Next, my attention was turned to the circumflex and the early portion of  that obtuse marginal where there was a large stent in place, that stent  proximal to it had a fairly focal 80% stenosis and there was some  narrowing within the proximal aspect of that stent. I chose a 3.5 x 18  Resolute [place] drug-eluting stent and deployed that at 16 atmospheres x25  seconds. There was JAMES-3 flow with nearly 0% residual stenosis.   The  patient tolerated the procedure well and the angioplasty hardware was  removed. An Evolution Angio-Seal device was deployed. Heparin was given during  the procedure. At the conclusion of the procedure, the ACT was 350  seconds. I gave one dose of Integrilin bolus and then Plavix 600 mg. The angioplasty hardware like I said was removed and the Angio-Seal  device was deployed successfully with hemostasis achieved. CONCLUSION:  Successful stenting of the true circumflex into the obtuse  marginal branch, successful stenting of the left posterior descending  coronary artery and successful stenting of the obtuse marginal branch,  all 80% stenosis reduced to 0 with these three drug-eluting stents. JAMES-3 blood flow was present in the circumflex artery and all its  branches. The LAD is normal with minor irregularities only and the left  main is normal.  Nondominant right coronary artery. LV ejection  fraction of 40% with inferior wall and inferoposterior hypokinesia. PLAN:  Hydration, bedrest, risk factor modifications to continue.     Lab Reviewed:     Renal Profile:  Lab Results   Component Value Date    CREATININE 0.7 01/19/2022    BUN 11 01/19/2022     01/19/2022    K 3.6 01/19/2022    K 3.6 01/19/2022    CL 95 01/19/2022    CO2 23 01/19/2022     CBC:    Lab Results   Component Value Date    WBC 3.4 01/19/2022    RBC 4.66 01/19/2022    HGB 13.4 01/19/2022    HCT 39.2 01/19/2022    MCV 84.1 01/19/2022    RDW 15.7 01/19/2022    PLT 62 01/19/2022     BNP:  No results found for: PROBNP  Fasting Lipid Panel:    Lab Results   Component Value Date    CHOL 170 12/29/2020    HDL 29 12/29/2020    TRIG 442 12/29/2020     Cardiac Enzymes:  CK/MbTroponin  Lab Results   Component Value Date    CKTOTAL 61 11/27/2019    TROPONINI <0.01 04/26/2021     PT/ INR   Lab Results   Component Value Date    INR 0.94 01/16/2022    INR 0.91 01/16/2022    INR 0.90 07/13/2021    PROTIME 10.6 01/16/2022    PROTIME 10.3 01/16/2022    PROTIME 10.1 07/13/2021     PTT No results found for: PTT   Lab Results   Component Value Date    MG 1.90 01/19/2022      Lab Results   Component Value Date    TSH 2.75 11/27/2019     All labs and imaging reviewed today    Assessment:  Paroxysmal atrial fibrillation: stable, now in sinus, new diagnosis   - [id number] score 4 (CHF, HTN, DM, CAD)   - inappropriate ICD shocks noted 1/15/2022  CAD: s/p ROSHNI x3 LCx, LPDA, L OM 7/2021  Ischemic cardiomyopathy: ongoing, EF 45-50%  History of VT   - s/p ICD  Chronic systolic CHF: stable  COVID+  DM  ALIN  Encephalopathy  Thrombocytopenia    Plan:   1. Discussed with EP and patients ICD is NOT MRI compatible  2. Inappropriate ICD shocks for AF RVR noted 1/15/2022, device adjustments made  3. Continue plavix given PCI <1 year   4. Recommend eliquis 5 mg BID once platelets stable/increasing and deemed safe  5. Continue metoprolol 25 mg BID; change back to toprol at discharge, was taking 200 mg daily, may need adjusted  6.  Lisinopril once BP stable    Alvah Kayser, APRN-CNP  [address]  [phone]

## 2022-01-19 NOTE — PROGRESS NOTES
Shift assessment completed, see flow sheet. Pt is able to answer questions appropriately but stil with confusion pulling at lines. SpO2 97%. Respirations are easy, even, and unlabored. Bilateral lung sounds diminished. VSS  NSR on the monitor  NG in place at 65  NG removed per MD    PICC/PIV WNL precedex off  Coker and restraints removed by Dr Ivette Graham    All lines and monitoring devices in place. Bed in lowest position with wheels locked. No needs expressed at this time. Will continue to monitor.

## 2022-01-19 NOTE — PROGRESS NOTES
Spoke to pharmacist about Depakote being in pill form and not able to crush them.  Pharmacist changed to valproic acid syrup for using with OG tube

## 2022-01-19 NOTE — PROGRESS NOTES
Attempted to call Emmy Gamble to let him know pt was transferred to PCU room 320. No answer and no message available.

## 2022-01-19 NOTE — PROGRESS NOTES
Reassessment completed - see flowsheet  Pt is A/O x 4 still with intermittent confusion  Pt continues on RA. PICC line dressing changed. Pt continues with telesitter and reassurance to not pull monitors off.

## 2022-01-19 NOTE — PROGRESS NOTES
Pulmonary & Critical Care Medicine ICU Progress Note    CC: COVID-19    Events of Last 24 hours: Off Precedex since this am  RA  Low grade fever overnight          Vascular lines: IV: PICC RUE          /  /   Recent Labs     22  0830   PHART 7.470*   TVN2JVM 30.8*   PO2ART 83.2       IV:   sodium chloride      dexmedetomidine HCl in NaCl 0.6 mcg/kg/hr (22 0557)    dextrose         Vitals:  Blood pressure 126/62, pulse 81, temperature 97.4 °F (36.3 °C), temperature source Axillary, resp. rate 16, height 5' 4\" (1.626 m), weight 241 lb (109.3 kg), SpO2 99 %. on RA   Temp  Av °F (36.7 °C)  Min: 97.4 °F (36.3 °C)  Max: 98.6 °F (37 °C)    Intake/Output Summary (Last 24 hours) at 2022 0717  Last data filed at 2022 0430  Gross per 24 hour   Intake 100 ml   Output 2190 ml   Net -2090 ml     PE:  \Gen: No distress. Eyes: PERRL. No sclera icterus. No conjunctival injection. ENT: No discharge. Pharynx clear. Neck: Trachea midline. No obvious mass. Resp: No accessory muscle use. R crackles. No wheezes. No rhonchi. No dullness on percussion. Good air entry. CV: Tachy rate. irregular rhythm. No murmur or rub. No edema. GI: Non-tender. Non-distended. No hernia. Skin: Warm and dry. No nodule on exposed extremities. Lymph: No cervical LAD. No supraclavicular LAD. M/S: No cyanosis. No joint deformity. No clubbing. Neuro: Awake. Alert. Moves all four extremities. Some RUE weakness today with positive finger-nose test  Psych: Oriented x 2. No anxiety.        Scheduled Meds:   QUEtiapine  25 mg Oral QAM AC    valproic acid  500 mg Per NG tube TID    DULoxetine  60 mg Oral Daily    QUEtiapine  50 mg Oral Nightly    folic acid, thiamine, multi-vitamin with vitamin K infusion   IntraVENous Daily    dexamethasone  6 mg IntraVENous Q24H    cefTRIAXone (ROCEPHIN) IV  2,000 mg IntraVENous Q12H    [Held by provider] apixaban  5 mg Oral BID    sodium chloride flush  5-40 mL IntraVENous 2 times per day    metoprolol tartrate  25 mg Oral BID    budesonide-formoterol  2 puff Inhalation BID    albuterol sulfate HFA  2 puff Inhalation BID    insulin lispro  0-6 Units SubCUTAneous TID WC    insulin lispro  0-3 Units SubCUTAneous Nightly    atorvastatin  80 mg Oral Daily    clopidogrel  75 mg Oral Daily    pantoprazole  40 mg Oral Daily    fluticasone  2 spray Each Nostril Daily    gabapentin  200 mg Oral TID    insulin glargine  60 Units SubCUTAneous Nightly       Data:  CBC:   Recent Labs     22  0538 22  0517 22  0604   WBC 2.9* 2.3* 3.4*   HGB 13.3* 12.6* 13.4*   HCT 38.6* 37.7* 39.2*   MCV 84.1 87.1 84.1   PLT 62* 51* 62*     BMP:   Recent Labs     22  0538 22  0517 22  0604   * 129*  --    K 3.4* 3.9 3.6   CL 94* 94* 95*   CO2 20* 21 23   PHOS  --  3.0 2.7   BUN 14 12 11   CREATININE 0.8* 0.7* 0.7*     LIVER PROFILE:   Recent Labs     22  1326   AST 30   ALT 22   BILIDIR <0.2   BILITOT 0.5   ALKPHOS 62       Microbiology:   COVID-19 detected   BC      Imagin/18 chest x-ray imaging reviewed by me and showed  Basilar ASD      CT head   No acute intracranial abnormalities      ASSESSMENT:  · Acute encephalopathy/metabolic encephalopathy-unclear etiology.  DDx alcohol/drug withdrawal, COVID encephalitis, viral meningitis/encephalitis, and midbrain CVA  · Abnormal CXR- probably atelectasis and can not exclude PNA   · Afib RVR with frequent firing of  ICD: appropriate vs inappropriate firing  · Electrolytes disorder   · Ischemic cardiomyopathy/chronic systolic CHF EF 67% on  TTE  · PAF with RVR   · Low-grade fever   · COVID-19 viral infection  · JAZMIN  · Thrombocytopenia - worsening   · CAD s/p stents  · ALIN -CPAP 14 in the past but lost to follow up       PLAN:  · COVID 19 Droplet plus isolation  · D/C Precedex   · Seroquel QHS  · Decadron 6 mg IV daily D#3  · IV ceftriaxone D#3  · Follow up Cx   · Lantus and SSI  · Cardiology following   · Rally pack for 3 days  · Electrolytes replacement   · Plavix and statin   · Depakote to home dose   · Holding Trazodone and Neurontin   · Consider brain MRI or repeat head CT -discussed with internal medicine  · Lovenox DVT prophylaxis  · Okay to move out of the ICU from our perspective

## 2022-01-19 NOTE — PROGRESS NOTES
Shift change, bedside report given to [facility]. Care has been transferred at this time.

## 2022-01-19 NOTE — PROGRESS NOTES
Inpatient Occupational Therapy  Re-Evaluation and Treatment    Unit: [facility] overflow  Date:  1/19/2022  Patient Name:    April Pritchard. Admitting diagnosis:  Unable to ambulate [R26.2]  Fall, initial encounter [W19. XXXA]  COVID-19 [U07.1]  Admit Date:  1/12/2022  Precautions/Restrictions/WB Status/ Lines/ Wounds/ Oxygen: fall risk, IV, bed/chair alarm, telemetry and continuous pulse ox, cognitive deficit     Treatment Time:  9:50-10:40, 11:20-11:38 (to assist patient back to bed)  Treatment Number: 1     Billable Treatment Time: 40 + 18 minutes   Total Treatment Time:  50  minutes    Patient Goals for Therapy:  \" to go home \"      Discharge Recommendations: Acute Rehab (ARU)/ Inpatient [facility] ([facility])  DME needs for discharge: defer to facility       Therapy recommendations for staff:   Assist of 2 with use of HOSEA STEDY for all transfers to/from BSC/chair    History of Present Illness: 63 y. o. male with DMII, Chronic sCHF, CAD s/p stent placement x3, hx of MI, asthma, HTN, HLD, GERD, lumbar spondylosis and DDD s/p bilateral dorsal ramus medial branch ablation and interstim implant, depression who presented to [facility] ED with complaint of generalized weakness ongoing for approximately 2 months and frequent falls.     In ED, CT of the head showed no acute abnormality.  Chest x-ray with no acute cardiopulmonary process.  Patient did test positive for Chick Mitten is vaccinated x2, no booster.  Patient admitted to med-surg for further care. PT/OT consulted.      Mr. Quiroga developed rapid HR what appears to be Afib on TELE monitor and his pacer inappropriately delivered shock x 5 and this was witnessed by me and nursing staff.      Pt awake, conscious during these episodes and by the end of the 5th shock, he started getting slightly confused     Given cardizem 10 mg IV x 1 and metoprolol 5 mg with control of HR  EKG after this episode showed sinus tach     Progressively developed confusion , fevers , started on precedex [facility]       Home Health S4 Level Recommendation:  NA  AM-PAC Score: AM-PAC Inpatient Daily Activity Raw Score: 14    Preadmission Environment    Pt. Lives Alone, brother lives in the same apartment Pt is in, Pt looks after his brother who has paranoid schizophrenia    Home environment:    apartment   Steps to enter first floor:   0 steps to enter       Steps to second floor: N/A  Bathroom:       Walk-in Shower standard height toilet  Equipment owned:      [employer] and [employer]       Preadmission Status / PLOF:  History of falls             Yes, falls 2-3 times a week for a long time, gets light headed when he first stands up and his legs give out when he gets tired  Pt. Able to drive          Yes  Pt Fully independent with ADL's         Yes  Pt. Required assistance from family for: Independent PTA    Pt. Fully independent for transfers and gait and walked with: [employer] just when up for a while, but doesn't use it in the house  Uses the motorized scooter at grocery store when shopping    Pain  No  Rating:NA  Location:  Pain Medicine Status: Denies need      Cognition    A&O Person and Place   Able to follow 1 step commands with delayed response time and maximal verbal cues. Patient with flat affect and difficulty tracking therapist.     Subjective  Patient lying supine in bed with no family present   Pt agreeable to this OT eval & tx. Upper Extremity ROM:    WFL,  pt able to perform all bed mobility, transfers, and gait without ROM limitation. Upper Extremity Strength:    BUE strength impaired but not formally assessed w/ MMT due to poor motor planning    Upper Extremity Sensation    Impaired    Upper Extremity Proprioception:  Impaired (lateral lean to the left and posterior)     Coordination and Tone  Impaired- intensified ahney grasp reflex and difficulty with motor planning.      Balance  Functional Sitting Balance:  Impaired (mod A initially but improved to CGA)  Functional Standing Balance:Impaired (mod A in STEDY)    Bed mobility:    Supine to sit:   Mod A of 2  Sit to supine:   Not Tested  Rolling: Mod A  Scooting in sitting:  Min A  Scooting to head of bed:   Not Tested    Bridging:   Not Tested    Transfers:    Sit to stand: Mod A of 2 from EOB to STEDY  Stand to sit:  Mod A  Bed to chair:   Total A via Stedy  Standard toilet: Not Tested  Bed to [facility]:  Not Tested    Dressing:      UE: Mod A to don gwon  LE:    Max A to don briefs (poor motor planning with pulling pants up)    Bathing:    UE: Mod A  LE:  Max A    Eating: Mod A/HOHA to take a drink from cup    Toileting:  N/A    Activity Tolerance   Pt completed therapy session with No adverse symptoms noted w/activity  SpO2: >93%  HR: 80s  BP: 126/61 after transfer to chair    Positioning Needs:   Up in chair, call light and needs in reach. Exercise / Activities Initiated:   N/A    Patient/Family Education:   Role of OT  Recommendations for DC    Assessment of Deficits: Pt seen for Occupational therapy evaluation in acute care setting. Pt demonstrated decreased Activity tolerance, ADLs, IADLs, Balance , Bathing, Bed mobility, Dressing, ROM, Safety Awareness, Strength, Transfers, Cognition and Coping Skills. Pt functioning below baseline and will likely benefit from skilled occupational therapy services to maximize safety and independence. Goal(s) : To be met in 3 Visits:  1). Bed to toilet/BSC: Mod A and with use of RW    To be met in 5 Visits:  1). Supine to/from Sit:  Min A  2). Upper Body Bathing:   Min A  3). Lower Body Bathing: Mod A  4). Upper Body Dressing:  Min A  5). Lower Body Dressing: Mod A  6). Pt to demonstrate UE exs x 15 reps with minimal cues    Rehabilitation Potential:  Fair for goals listed above. Strengths for achieving goals include: PLOF  Barriers to achieving goals include:  Complexity of condition     Plan:   To be seen 3-5 x/wk while in acute care setting for therapeutic exercises, bed mobility, transfers, dressing, bathing, family/patient education, ADL/IADL retraining, energy conservation training.        Mae, OTR/L #[id number]      If patient discharges from this facility prior to next visit, this note will serve as the Discharge Summary

## 2022-01-19 NOTE — PROGRESS NOTES
RT Inhaler-Nebulizer Bronchodilator Protocol Note    There is a bronchodilator order in the chart from a provider indicating to follow the RT Bronchodilator Protocol and there is an Initiate RT Inhaler-Nebulizer Bronchodilator Protocol order as well (see protocol at bottom of note). CXR Findings:  XR CHEST PORTABLE    Result Date: 1/18/2022  NG tube with the tip and side-port in the stomach. Bibasilar atelectasis. XR CHEST PORTABLE    Result Date: 1/17/2022  NG tube is difficult to visualize, tip is likely below the diaphragm. Repeat exam could be considered if clinically indicated. XR CHEST PORTABLE    Result Date: 1/17/2022  New elevated right hemidiaphragm, likely atelectasis or possibly infiltrate. Subpulmonic effusion not excluded. No overt failure. The findings from the last RT Protocol Assessment were as follows:   History Pulmonary Disease: (P) Chronic pulmonary disease  Respiratory Pattern: (P) Regular pattern and RR 12-20 bpm  Breath Sounds: (P) Slightly diminished and/or crackles  Cough: (P) Strong, spontaneous, non-productive  Indication for Bronchodilator Therapy: (P) Decreased or absent breath sounds  Bronchodilator Assessment Score: (P) 4    Aerosolized bronchodilator medication orders have been revised according to the RT Inhaler-Nebulizer Bronchodilator Protocol below. Respiratory Therapist to perform RT Therapy Protocol Assessment initially then follow the protocol. Repeat RT Therapy Protocol Assessment PRN for score 0-3 or on second treatment, BID, and PRN for scores above 3. No Indications - adjust the frequency to every 6 hours PRN wheezing or bronchospasm, if no treatments needed after 48 hours then discontinue using Per Protocol order mode. If indication present, adjust the RT bronchodilator orders based on the Bronchodilator Assessment Score as indicated below.   Use Inhaler orders unless patient has one or more of the following: on home nebulizer, not able to hold breath for 10 seconds, is not alert and oriented, cannot activate and use MDI correctly, or respiratory rate 25 breaths per minute or more, then use the equivalent nebulizer order(s) with same Frequency and PRN reasons based on the score. If a patient is on this medication at home then do not decrease Frequency below that used at home. 0-3 - enter or revise RT bronchodilator order(s) to equivalent RT Bronchodilator order with Frequency of every 4 hours PRN for wheezing or increased work of breathing using Per Protocol order mode. 4-6 - enter or revise RT Bronchodilator order(s) to two equivalent RT bronchodilator orders with one order with BID Frequency and one order with Frequency of every 4 hours PRN wheezing or increased work of breathing using Per Protocol order mode. 7-10 - enter or revise RT Bronchodilator order(s) to two equivalent RT bronchodilator orders with one order with TID Frequency and one order with Frequency of every 4 hours PRN wheezing or increased work of breathing using Per Protocol order mode. 11-13 - enter or revise RT Bronchodilator order(s) to one equivalent RT bronchodilator order with QID Frequency and an Albuterol order with Frequency of every 4 hours PRN wheezing or increased work of breathing using Per Protocol order mode. Greater than 13 - enter or revise RT Bronchodilator order(s) to one equivalent RT bronchodilator order with every 4 hours Frequency and an Albuterol order with Frequency of every 2 hours PRN wheezing or increased work of breathing using Per Protocol order mode.          Electronically signed by Florence Cabrera RCP on 1/19/2022 at 1:14 AM

## 2022-01-19 NOTE — CARE COORDINATION
INTERDISCIPLINARY PLAN OF CARE CONFERENCE    Date/Time: 1/19/2022 10:46 AM  Completed by: Jasmine Gutierrez RN, Case Management      Patient Name:  Lang Guzman YOB: 1960  Admitting Diagnosis: Unable to ambulate [R26.2]  Fall, initial encounter [W19. ISTR]  [id number] [U07.1]     Admit Date/Time:  1/12/2022  6:41 PM    Chart reviewed. Interdisciplinary team contacted or reviewed plan related to patient progress and discharge plans. Disciplines included Case Management, Nursing, and Dietitian. Current Status:inpt  PT/OT recommendation for discharge plan of care: tbd    Expected D/C Disposition:  Home    Discharge Plan Comments: Reviewed chart. Pt confused. Pt from home alone. Awaiting PT evaluation. Following. 11:12 AM referral called to ARU to screen pt. Following. [id number] writer spoke with Jaclyn Ogden, from [place] [id number] and she states will follow and review PT progress tomorrow. Will need to find out whether ICD issues resolve and what they are related to, also cannot accept until 01/23/22 r/t C-19 precautions. Following.     Home O2 in place on admit: No  Pt informed of need to bring portable home O2 tank on day of discharge for nursing to connect prior to leaving:  Not Indicated  Verbalized agreement/Understanding:  Not Indicated

## 2022-01-19 NOTE — PROGRESS NOTES
Speech Language Pathology  Facility/Department: [facility]   CLINICAL BEDSIDE SWALLOW EVALUATION    NAME: Tomas Dias. : 1960  MRN: [id number]     Diet recommendation: IDDSI 7 Regular Solids; IDDSI 0 Thin Liquids - straws OK; Meds whole with thin liquids  Risk management: upright for all intake, stay upright for at least 30 mins after intake, small bites/sips, distant supervision with intake, oral care 2-3x/day to reduce adverse affects in the event of aspiration, increase physical mobility as able, slow rate of intake, general GERD precautions, general aspiration precautions and hold PO and contact SLP if s/s of aspiration or worsening respiratory status develop. Control risk factors for aspiration PNA by completing oral care 3-4x/day and increasing physical mobility as is medically feasible      ADMISSION DATE: 2022  ADMITTING DIAGNOSIS: has Ventricular tachycardia ([address] Presence of stent in left circumflex coronary artery; Myocardial infarction, nontransmural ([address] Myocardial infarction, inferoposterior wall, subsequent care; Automatic implantable cardioverter-defibrillator in situ; CAD (coronary artery disease); Automatic implantable cardioverter-defibrillator in situ; ALIN on CPAP; Gastroesophageal reflux disease without esophagitis; Hyperlipidemia with target LDL less than 100; Hypertension due to endocrine disorder; Ischemic cardiomyopathy; Obesity, morbid, BMI 40.0-49.9 ([address] Weakness; Acute encephalopathy; TIA involving left internal carotid artery; DM (diabetes mellitus), secondary, uncontrolled, w/neurologic complic ([address] Dyslipidemia; HTN (hypertension), benign; Lactic acidosis; Head trauma; Abrasion, scalp w/o infection; Unsteady gait; Hyperglycemia; Chronic obstructive pulmonary disease ([address] Coarse tremor; Generalized weakness; Type 2 diabetes mellitus with hyperglycemia, with long-term current use of insulin ([address]  Essential hypertension; CAD S/P percutaneous coronary angioplasty; COVID-19; Fall; Unable to ambulate; Implantable cardioverter-defibrillator (ICD) discharge; AICD malfunction; Hypotension; Valproic acid toxicity; PAF (paroxysmal atrial fibrillation) ([address] COVID-19 virus infection; Disorder of electrolytes; Atrial fibrillation with RVR ([address] Low grade fever; Thrombocytopenia ([facility] [facility] and Abnormal chest x-ray on their problem list.  ONSET DATE: 01/12/2022    Recent Chest Xray/CT of Chest: (01/18/2022)  NG tube with the tip and side-port in the stomach.  Bibasilar atelectasis.         Recent CT Head (01/17/2022)  No acute intracranial abnormality    Date of Eval: 1/19/2022  Evaluating Therapist: CHANTELLE Villalba    Current Diet level:  Current Diet : NPO  Current Liquid Diet : NPO      Primary Complaint  Patient Complaint: soure throat    Pain:  Pain Assessment  Pain Assessment: 0-10  Pain Level: 0  Morfin-Baker Pain Rating: No hurt  Patient's Stated Pain Goal: No pain  Response to Pain Intervention: Patient Satisfied  RASS Score: Alert and calm    Reason for Referral  Lacey Chase was referred for a bedside swallow evaluation to assess the efficiency of his swallow function, identify signs and symptoms of aspiration and make recommendations regarding safe dietary consistencies, effective compensatory strategies, and safe eating environment. Medical record review/interview: Per MD note, \"The patient is a 63 y.o. male with DMII, Chronic sCHF, CAD s/p stent placement x3, hx of MI, asthma, HTN, HLD, GERD, lumbar spondylosis and DDD s/p bilateral dorsal ramus medial branch ablation and interstim implant, depression who [facility] with complaint of generalized weakness ongoing for approximately 2 months and frequent falls. Patient is awake and alert appears to be a poor historian. He stated that I could call his brother for collateral information, brother did not answer but Silvia Marques was on emergency contacts.  I was able to talk with Griffin Cole who stated that patient is usually very active he remains to drive. He stated patient has been depressed since his wife . Patient stated that he has a walker at home when he does not use it. Patient denies any chest pain, palpitations, dizziness or lightheadedness prior to these falls. He stated his legs are weak and they just give out. He denies any increase in pain since his falls. He denies any incontinent episodes of bowel or bladder. He does follow with Dr. Jay Zhong for lumbar spndylosis and DDD. He denies any shortness of breath, does have a cough. He stated his cough is dry, he is not able to cough anything up. \"    Pt in wrist restraints. Orients to person, place, time. Pt somewhat impulsive and occasionally need repetition of commands. Pt c/o sore throat but denies pain. Independent PTA, drove, ate regular solids and thin liquids. No h/o dysphagia. Predisposing dysphagia risk factors: COPD (conflicting info in chart and pt report COPD v. Asthma) and GERD  Clinical signs of possible chronic dysphagia: N/A  Precipitating dysphagia risk factors: reduced physical mobility, increased O2 demands and AMS    Vitals/labs:   Temp: 97.4  SpO2: 95-96%  RR: 22  BP:126/82  HR: 63  WBCs: WNL    Cranial nerve exam:   CN V (trigeminal): ophthalmic, maxillary, and mandibular facial sensation- Impaired Land R (upper and lower face, though response may be impacted by confusion)  CN VII (facial): Impaired L and Reduced ROM to left lower face. CN IX/X (glossopharyngeal/vagus): MPT: Reduced; 3 sec; pitch range: WNL; vocal quality: WNL; cough: Strong-perceptually  CN XII (hypoglossal): WNL    RN aware of asymmetry and will reassess once NGT removed this a.m. CT head negative for acute abnormality.     Laryngeal function exam:   Secretions:   Vocal quality: See CN exam above  MPT: See CN exam above  S/Z ratio: DNT  Pitch range: See CN exam above  Cough: See CN exam above    Oral Care Status:   Good oral status. PO trials:   Ice: Oral swallow is WNL. No overt clinical signs of aspiration observed. IDDSI 0 (thin):   - 5cc bolus (tsp): no anterior bolus loss , suspect functional A-P bolus transit and no clinical s/s of aspiration  - Cup: DNT  - Straw: no anterior bolus loss , suspect functional A-P bolus transit, no clinical s/s of aspiration and vitals stable  IDDSI 2 (mildly thick): DNT  IDDSI 3 (moderately thick): DNT  IDDSI 4 (puree): no anterior bolus loss , suspect functional A-P bolus transit, oral clearance grossly WFL, no clinical s/s of aspiration and vitals stable  IDDSI 5 (minced and moist): DNT  IDDSI 6 (soft and bite sized): DNT  IDDSI 7 (regular): no anterior bolus loss , suspect functional A-P bolus transit, grossly functional mastication, oral clearance grossly WFL, oral stasis noted post swallow for which pt is aware, no clinical s/s of aspiration and vitals stable  3 oz water: PASS    Assessment: No clinical signs of oropharyngeal dysphagia, however pt would benefit from f/u 1-2 times to ensure diet tolerance and for education given recent confusion requiring medication and restraints and impulsivity. Swallow prognosis is good. Instrumental swallow study is not indicated. Given tolerance to PO at bedside, lack of clinical s/s of aspiration at bedside, stable respiratory status, good oral care status, immunocompetence  and potential for spontaneous improvement, pt is safe for oral diet at this time    Instrumentation: not warranted at this time  Diet recommendation: IDDSI 7 Regular Solids; IDDSI 0 Thin Liquids - straws OK;  Meds whole with thin liquids  Risk management: upright for all intake, stay upright for at least 30 mins after intake, small bites/sips, distant supervision with intake, oral care 2-3x/day to reduce adverse affects in the event of aspiration, increase physical mobility as able, slow rate of intake, general GERD precautions, general aspiration precautions and hold PO and contact SLP if s/s of aspiration or worsening respiratory status develop. Control risk factors for aspiration PNA by completing oral care 3-4x/day and increasing physical mobility as is medically feasible    Skilled instruction re: evidence based methods of decreasing adverse outcomes of associated with aspiration, demonstration and explanation of benefits of oral care and self-feeding, and sequencing of compensatory strategeies developed based on clinical assessment. Pt able to recall and demonstrate understanding of recommendations with min cues    Impression  Dysphagia Diagnosis: Swallow function appears grossly intact  Dysphagia Outcome Severity Scale: Level 7: Normal in all situations     Treatment Plan  Requires SLP Intervention: Yes  Duration/Frequency of Treatment: 1-2x/wk for LOS  D/C Recommendations: To be determined       Recommended Diet and Intervention  Diet Solids Recommendation: Regular  Liquid Consistency Recommendation: Thin  Recommended Form of Meds: Whole with water  Recommendations: Dysphagia treatment  Therapeutic Interventions: Diet tolerance monitoring;Patient/Family education    Compensatory Swallowing Strategies  Compensatory Swallowing Strategies: Small bites/sips;Eat/Feed slowly;Upright as possible for all oral intake;Remain upright for 30-45 minutes after meals    Treatment/Goals  Short-term Goals  Timeframe for Short-term Goals: 5 days by 01/24/2022  Goal 1: Pt will demonstrate functional swallow for recommended consistencies with no clinical signs of aspiration in 5/5 opportunities  Goal 2: Pt/caregiver will demonstrate understanding of safe swallow strategies and ways to decrease adverse outcomes associated with aspiration.   Long-term Goals  Timeframe for Long-term Goals: 7 days by 01/26/2022  Goal 1: Pt will demonstrate functional swallow of preferred consistencies with use of safe swallow strategies and no decline in respiratory status    General  Chart Reviewed: Yes  Subjective  Subjective: Chandrika/[employer] parks Slp for entry  Behavior/Cognition: Alert; Cooperative; Requires cueing  Respiratory Status: O2 via nasual cannula  O2 Device: Nasal cannula  Liters of Oxygen: 2 L  Communication Observation: Functional  Follows Directions: Simple  Dentition: Adequate  Patient Positioning: Upright in bed  Baseline Vocal Quality: Normal  Volitional Cough: Strong  Prior Dysphagia History: No h/o dysphagia  Consistencies Administered: Ice Chips; Thin - straw; Thin - teaspoon;Dysphagia Pureed (Dysphagia I); Reg solid                 Prognosis  Prognosis  Prognosis for safe diet advancement: good  Individuals consulted  Consulted and agree with results and recommendations: Patient;RN;MD    Education  Patient Education: Education completed with pt/RN re: results and recommendations, role of SLP in dysphagia, signs of aspiration, risks of aspiration, and benefits of oral care and self-feeding    Patient Education Response: Verbalizes understanding;Needs reinforcement  Safety Devices in place: Yes  Type of devices: All fall risk precautions in place;Call light within reach; Left in bed;Patient at risk for falls;Nurse notified (Dr with pt in no distress at end of session)       Therapy Time  SLP Individual Minutes  Time In: 0800  Time Out: [facility]  Minutes: [address] [facility]. Danii Chaudhary M.A.  [address] Cynthia Bailey, SLP  1/19/2022 8:57 AM

## 2022-01-19 NOTE — PROGRESS NOTES
Writer call and spoke with Janette Oviedo at cardiology , Shyam June was informed the device IS NOT compatible. writer called and spoke with WILL and updated her that device is not compatible.

## 2022-01-19 NOTE — PROGRESS NOTES
when he does not use it.  Patient denies any chest pain, palpitations, dizziness or lightheadedness prior to these falls. Nena Doyle stated his legs are weak and they just give out. He denies any increase in pain since his falls. He denies any incontinent episodes of bowel or bladder. He does follow with Dr. Leidy Wagner for lumbar spndylosis and DDD.   He denies any shortness of breath, does have a cough.  He stated his cough is dry, he is not able to cough anything up. In ED, CT of the head showed no acute abnormality.  Chest x-ray with no acute cardiopulmonary process.  Patient did test positive for Les Folds is vaccinated x2, no booster.  Patient admitted to [facility] for further care. PT/OT consulted. \"    Home Health S4 Level Recommendation:  Level 3 Safety  AM-PAC Mobility Score    AM-PAC Inpatient Mobility Raw Score : 9    Preadmission Environment  (Information taken from recent PT evaluation dated 1/14/2022, patient was unable to provide information)  Pt. Kati St, brother lives in the same apartment Pt is in, Pt looks after his brother who has paranoid schizophrenia    Home environment:    apartment   Steps to enter first floor:   0 steps to enter       Steps to second floor: N/A  Bathroom:       Walk-in Shower standard height toilet  Equipment owned:      SPC and Rollator       Preadmission Status / PLOF:  History of falls             Yes, falls 2-3 times a week for a long time, gets light headed when he first stands up and his legs give out when he gets tired  Pt. Able to drive          Yes  Pt Fully independent with ADL's         Yes  Pt. Required assistance from family for:  Independent PTA    Pt. Fully independent for transfers and gait and walked with: Cane just when up for a while, but doesn't use it in the house  Uses the motorized scooter at grocery store when shopping    Pain   No    Cognition    A&O x4   Able to follow 2 step commands    Subjective  Patient lying supine in bed with no family present.    Pt agreeable to this PT eval & tx. Upper Extremity ROM/Strength  Please see OT evaluation. Lower Extremity ROM / Strength   AROM WFL: Yes  ROM limitations: N/A    Strength Assessment (measured on a 0-5 scale):  R LE   Quad   3   Ant Tib  3   Hamstring 3   Iliopsoas 3  L LE  Quad   3   Ant Tib  3   Hamstring 3   Iliopsoas 3    Lower Extremity Sensation    NT    Lower Extremity Proprioception:   Impaired, Patient presented with diminished proprioceptive feedback and righting reactions on the R side. Absent body schema awareness, presence of grasp reflex bilaterally    Coordination and Tone  Impaired dynamic posture tone present during rolling in the bed, abnormal voluntary control and impaired coordination in RUE and RLE    Balance  Sitting:  Fair -; Min A   Comments: ~10 min, patient presented with LOB on the R side and needed Min A to maintain upright posture. Standing: Fair -; Max A  with STEDY  Comments: ~30 sec tolerance x 3 reps. Bed Mobility   Supine to Sit:    Mod A  and 2 persons  Sit to Supine: Mod A  and 2 persons  Rolling: Mod A   Scooting in sitting: Not Tested  Scooting in supine:  Max A  and 2 persons    Transfer Training    Sit to stand: Mod A  with STEDY  Stand to sit: Max A   Bed to Chair:   Total A and 2 persons with use of gait belt and HOSEA STEDY    Gait gait deferred due to difficulty with transfers; pt ambulated 0 ft. Stair Training deferred, pt does not have stairs in home environment     Activity Tolerance   Pt completed therapy session with No adverse symptoms noted w/activity  BP: after bed to chair transfers 126/61  HR: 78 bpm  SpO2: remained around 98-99% on RA    Positioning Needs   Pt up in chair, alarm set, positioned in proper neutral alignment and pressure relief provided. Call light provided and all needs within reach. Patient sat for 30 min after which RN reported that patient is sliding out in the attempt of getting up.   Pt in bed, alarm set, positioned in proper neutral alignment and pressure relief provided. Call light provided and all needs within reach. Patient was kept in chair sitting position. Exercises Initiated  all completed bilaterally unless indicated  LAQ x 10 reps    Other  None. Patient/Family Education   Pt educated on role of inpatient PT, POC, importance of continued activity, safety awareness and calling for assist with mobility. Assessment  Pt seen for Physical Therapy evaluation in acute care setting. Pt demonstrated decreased Activity tolerance, Balance, ROM, Safety and Strength as well as decreased independence with Ambulation, Bed Mobility  and Transfers. Recommending ARU/IRF/Inpatient Rehab Facility upon discharge as patient functioning well below baseline, demonstrates good rehab potential and unable to return home due to limited or no family support, burden of care beyond caregiver ability, home environment not conducive to patient recovery and limited safety awareness. Goals : To be met in 3 visits:  1). Independent with LE Ex x 10 reps    To be met in 6 visits:  1). Supine to/from sit: CGA  2). Sit to/from stand: Min A   3). Bed to chair: Min A   4). Gait: Ambulate 10 ft.   with  Mod A  and use of LRAD (least restrictive assistive device)  5). Tolerate B LE exercises 3 sets of 10-15 reps    Rehabilitation Potential: Fair  Strengths for achieving goals include:   PLOF   Barriers to achieving goals include:    Complexity of condition and imapaired cognition    Plan    To be seen 5x / week  while in acute care setting for therapeutic exercises, bed mobility, transfers, progressive gait training, balance training, and family/patient education. Signature: Noel Bose, MS PT, # [id number]    If patient discharges from this facility prior to next visit, this note will serve as the Discharge Summary.

## 2022-01-19 NOTE — PLAN OF CARE
Problem: Nutrition  Intervention: Swallowing evaluation  Note: Education completed with pt/RN re: results and recommendations, role of SLP in dysphagia, signs of aspiration, risks of aspiration, and benefits of oral care and self-feeding    Intervention: Aspiration precautions  Note: Education completed with pt/RN re: results and recommendations, role of SLP in dysphagia, signs of aspiration, risks of aspiration, and benefits of oral care and self-feeding

## 2022-01-20 LAB
ANION GAP SERPL CALCULATED.3IONS-SCNC: 12 MMOL/L (ref 3–16)
BASOPHILS ABSOLUTE: 0 K/UL (ref 0–0.2)
BASOPHILS RELATIVE PERCENT: 0.1 %
BUN BLDV-MCNC: 7 MG/DL (ref 7–20)
CALCIUM SERPL-MCNC: 9.3 MG/DL (ref 8.3–10.6)
CHLORIDE BLD-SCNC: 97 MMOL/L (ref 99–110)
CO2: 25 MMOL/L (ref 21–32)
CREAT SERPL-MCNC: 0.6 MG/DL (ref 0.9–1.3)
EOSINOPHILS ABSOLUTE: 0 K/UL (ref 0–0.6)
EOSINOPHILS RELATIVE PERCENT: 0 %
GFR AFRICAN AMERICAN: >60
GFR NON-AFRICAN AMERICAN: >60
GLUCOSE BLD-MCNC: 126 MG/DL (ref 70–99)
GLUCOSE BLD-MCNC: 153 MG/DL (ref 70–99)
GLUCOSE BLD-MCNC: 212 MG/DL (ref 70–99)
GLUCOSE BLD-MCNC: 256 MG/DL (ref 70–99)
GLUCOSE BLD-MCNC: 303 MG/DL (ref 70–99)
HCT VFR BLD CALC: 39.5 % (ref 40.5–52.5)
HEMOGLOBIN: 13.2 G/DL (ref 13.5–17.5)
LYMPHOCYTES ABSOLUTE: 0.6 K/UL (ref 1–5.1)
LYMPHOCYTES RELATIVE PERCENT: 20.6 %
MAGNESIUM: 1.9 MG/DL (ref 1.8–2.4)
MCH RBC QN AUTO: 28.8 PG (ref 26–34)
MCHC RBC AUTO-ENTMCNC: 33.4 G/DL (ref 31–36)
MCV RBC AUTO: 86.3 FL (ref 80–100)
MONOCYTES ABSOLUTE: 0.2 K/UL (ref 0–1.3)
MONOCYTES RELATIVE PERCENT: 8.9 %
NEUTROPHILS ABSOLUTE: 2 K/UL (ref 1.7–7.7)
NEUTROPHILS RELATIVE PERCENT: 70.4 %
PDW BLD-RTO: 15.8 % (ref 12.4–15.4)
PERFORMED ON: ABNORMAL
PHOSPHORUS: 2.3 MG/DL (ref 2.5–4.9)
PLATELET # BLD: 75 K/UL (ref 135–450)
PMV BLD AUTO: 7.8 FL (ref 5–10.5)
POTASSIUM SERPL-SCNC: 3.1 MMOL/L (ref 3.5–5.1)
RBC # BLD: 4.58 M/UL (ref 4.2–5.9)
SODIUM BLD-SCNC: 134 MMOL/L (ref 136–145)
WBC # BLD: 2.8 K/UL (ref 4–11)

## 2022-01-20 PROCEDURE — 6370000000 HC RX 637 (ALT 250 FOR IP): Performed by: NURSE PRACTITIONER

## 2022-01-20 PROCEDURE — 94761 N-INVAS EAR/PLS OXIMETRY MLT: CPT

## 2022-01-20 PROCEDURE — 94640 AIRWAY INHALATION TREATMENT: CPT

## 2022-01-20 PROCEDURE — 36415 COLL VENOUS BLD VENIPUNCTURE: CPT

## 2022-01-20 PROCEDURE — 85025 COMPLETE CBC W/AUTO DIFF WBC: CPT

## 2022-01-20 PROCEDURE — 80048 BASIC METABOLIC PNL TOTAL CA: CPT

## 2022-01-20 PROCEDURE — 2580000003 HC RX 258: Performed by: INTERNAL MEDICINE

## 2022-01-20 PROCEDURE — 6370000000 HC RX 637 (ALT 250 FOR IP): Performed by: INTERNAL MEDICINE

## 2022-01-20 PROCEDURE — 84100 ASSAY OF PHOSPHORUS: CPT

## 2022-01-20 PROCEDURE — 6360000002 HC RX W HCPCS: Performed by: INTERNAL MEDICINE

## 2022-01-20 PROCEDURE — 99233 SBSQ HOSP IP/OBS HIGH 50: CPT | Performed by: NURSE PRACTITIONER

## 2022-01-20 PROCEDURE — 83735 ASSAY OF MAGNESIUM: CPT

## 2022-01-20 PROCEDURE — 2060000000 HC ICU INTERMEDIATE R&B

## 2022-01-20 PROCEDURE — 6360000002 HC RX W HCPCS: Performed by: NURSE PRACTITIONER

## 2022-01-20 RX ORDER — QUETIAPINE FUMARATE 25 MG/1
12.5 TABLET, FILM COATED ORAL DAILY
Status: DISCONTINUED | OUTPATIENT
Start: 2022-01-20 | End: 2022-01-24

## 2022-01-20 RX ORDER — POTASSIUM CHLORIDE 20 MEQ/1
40 TABLET, EXTENDED RELEASE ORAL PRN
Status: DISCONTINUED | OUTPATIENT
Start: 2022-01-20 | End: 2022-01-27 | Stop reason: HOSPADM

## 2022-01-20 RX ORDER — METOPROLOL TARTRATE 50 MG/1
50 TABLET, FILM COATED ORAL 2 TIMES DAILY
Status: DISCONTINUED | OUTPATIENT
Start: 2022-01-20 | End: 2022-01-27 | Stop reason: HOSPADM

## 2022-01-20 RX ORDER — DIVALPROEX SODIUM 500 MG/1
1000 TABLET, DELAYED RELEASE ORAL NIGHTLY
Status: DISCONTINUED | OUTPATIENT
Start: 2022-01-20 | End: 2022-01-20

## 2022-01-20 RX ORDER — QUETIAPINE FUMARATE 25 MG/1
25 TABLET, FILM COATED ORAL NIGHTLY
Status: DISCONTINUED | OUTPATIENT
Start: 2022-01-20 | End: 2022-01-24

## 2022-01-20 RX ORDER — DIGOXIN 0.25 MG/ML
250 INJECTION INTRAMUSCULAR; INTRAVENOUS EVERY 6 HOURS
Status: DISCONTINUED | OUTPATIENT
Start: 2022-01-20 | End: 2022-01-20

## 2022-01-20 RX ORDER — POTASSIUM CHLORIDE 7.45 MG/ML
10 INJECTION INTRAVENOUS PRN
Status: DISCONTINUED | OUTPATIENT
Start: 2022-01-20 | End: 2022-01-27 | Stop reason: HOSPADM

## 2022-01-20 RX ORDER — DIVALPROEX SODIUM 500 MG/1
1000 TABLET, DELAYED RELEASE ORAL EVERY EVENING
Status: DISCONTINUED | OUTPATIENT
Start: 2022-01-20 | End: 2022-01-27 | Stop reason: HOSPADM

## 2022-01-20 RX ORDER — LISINOPRIL 5 MG/1
5 TABLET ORAL DAILY
Status: DISCONTINUED | OUTPATIENT
Start: 2022-01-21 | End: 2022-01-27 | Stop reason: HOSPADM

## 2022-01-20 RX ORDER — DULOXETIN HYDROCHLORIDE 60 MG/1
60 CAPSULE, DELAYED RELEASE ORAL DAILY
Status: DISCONTINUED | OUTPATIENT
Start: 2022-01-21 | End: 2022-01-27 | Stop reason: HOSPADM

## 2022-01-20 RX ORDER — DIVALPROEX SODIUM 500 MG/1
500 TABLET, DELAYED RELEASE ORAL EVERY MORNING
Status: DISCONTINUED | OUTPATIENT
Start: 2022-01-21 | End: 2022-01-24

## 2022-01-20 RX ORDER — LISINOPRIL 10 MG/1
10 TABLET ORAL DAILY
Status: DISCONTINUED | OUTPATIENT
Start: 2022-01-21 | End: 2022-01-20

## 2022-01-20 RX ADMIN — VALPROIC ACID 500 MG: 250 SOLUTION ORAL at 09:42

## 2022-01-20 RX ADMIN — DIVALPROEX SODIUM 1000 MG: 500 TABLET, DELAYED RELEASE ORAL at 18:58

## 2022-01-20 RX ADMIN — DEXAMETHASONE SODIUM PHOSPHATE 6 MG: 10 INJECTION, SOLUTION INTRAMUSCULAR; INTRAVENOUS at 13:37

## 2022-01-20 RX ADMIN — DULOXETINE HYDROCHLORIDE 30 MG: 30 CAPSULE, DELAYED RELEASE ORAL at 09:43

## 2022-01-20 RX ADMIN — CEFTRIAXONE 2000 MG: 2 INJECTION, POWDER, FOR SOLUTION INTRAMUSCULAR; INTRAVENOUS at 05:41

## 2022-01-20 RX ADMIN — ENOXAPARIN SODIUM 40 MG: 100 INJECTION SUBCUTANEOUS at 20:36

## 2022-01-20 RX ADMIN — INSULIN LISPRO 9 UNITS: 100 INJECTION, SOLUTION INTRAVENOUS; SUBCUTANEOUS at 18:50

## 2022-01-20 RX ADMIN — INSULIN LISPRO 6 UNITS: 100 INJECTION, SOLUTION INTRAVENOUS; SUBCUTANEOUS at 20:39

## 2022-01-20 RX ADMIN — QUETIAPINE FUMARATE 25 MG: 25 TABLET ORAL at 20:35

## 2022-01-20 RX ADMIN — INSULIN GLARGINE 60 UNITS: 100 INJECTION, SOLUTION SUBCUTANEOUS at 20:39

## 2022-01-20 RX ADMIN — METOPROLOL TARTRATE 50 MG: 50 TABLET, FILM COATED ORAL at 09:43

## 2022-01-20 RX ADMIN — INSULIN LISPRO 6 UNITS: 100 INJECTION, SOLUTION INTRAVENOUS; SUBCUTANEOUS at 12:13

## 2022-01-20 RX ADMIN — Medication 2 PUFF: at 08:22

## 2022-01-20 RX ADMIN — ATORVASTATIN CALCIUM 80 MG: 40 TABLET, FILM COATED ORAL at 09:43

## 2022-01-20 RX ADMIN — POTASSIUM CHLORIDE 40 MEQ: 1500 TABLET, EXTENDED RELEASE ORAL at 09:43

## 2022-01-20 RX ADMIN — ASPIRIN 81 MG: 81 TABLET, COATED ORAL at 09:43

## 2022-01-20 RX ADMIN — TRAZODONE HYDROCHLORIDE 100 MG: 100 TABLET ORAL at 20:35

## 2022-01-20 RX ADMIN — ENOXAPARIN SODIUM 40 MG: 100 INJECTION SUBCUTANEOUS at 09:43

## 2022-01-20 RX ADMIN — GABAPENTIN 200 MG: 100 CAPSULE ORAL at 09:43

## 2022-01-20 RX ADMIN — BUDESONIDE AND FORMOTEROL FUMARATE DIHYDRATE 2 PUFF: 160; 4.5 AEROSOL RESPIRATORY (INHALATION) at 08:22

## 2022-01-20 RX ADMIN — METOPROLOL TARTRATE 50 MG: 50 TABLET, FILM COATED ORAL at 20:36

## 2022-01-20 RX ADMIN — GABAPENTIN 200 MG: 100 CAPSULE ORAL at 20:35

## 2022-01-20 RX ADMIN — BUDESONIDE AND FORMOTEROL FUMARATE DIHYDRATE 2 PUFF: 160; 4.5 AEROSOL RESPIRATORY (INHALATION) at 20:27

## 2022-01-20 RX ADMIN — CEFTRIAXONE 2000 MG: 2 INJECTION, POWDER, FOR SOLUTION INTRAMUSCULAR; INTRAVENOUS at 18:58

## 2022-01-20 RX ADMIN — Medication 2 PUFF: at 20:27

## 2022-01-20 RX ADMIN — CLOPIDOGREL BISULFATE 75 MG: 75 TABLET ORAL at 09:43

## 2022-01-20 RX ADMIN — DIGOXIN 250 MCG: 250 INJECTION, SOLUTION INTRAMUSCULAR; INTRAVENOUS at 10:21

## 2022-01-20 RX ADMIN — QUETIAPINE FUMARATE 12.5 MG: 25 TABLET ORAL at 14:08

## 2022-01-20 RX ADMIN — GABAPENTIN 200 MG: 100 CAPSULE ORAL at 13:37

## 2022-01-20 RX ADMIN — VALPROIC ACID 500 MG: 250 SOLUTION ORAL at 13:37

## 2022-01-20 RX ADMIN — PANTOPRAZOLE SODIUM 40 MG: 40 TABLET, DELAYED RELEASE ORAL at 09:43

## 2022-01-20 ASSESSMENT — PAIN SCALES - WONG BAKER
WONGBAKER_NUMERICALRESPONSE: 0

## 2022-01-20 ASSESSMENT — PAIN SCALES - GENERAL
PAINLEVEL_OUTOF10: 0
PAINLEVEL_OUTOF10: 0

## 2022-01-20 NOTE — PLAN OF CARE
Nutrition Problem #1: Inadequate oral intake  Intervention: Food and/or Nutrient Delivery: Continue Current Diet,Start Oral Nutrition Supplement  Nutritional Goals: patient will consume 75% or greater of meals on ADULT DIET;  Regular; 4 carb choices per meal diet order x 3 meals per day + he will consume 75% or greater of magic cups with lunch and dinner meals

## 2022-01-20 NOTE — PLAN OF CARE
Problem: Airway Clearance - Ineffective  Goal: Achieve or maintain patent airway  Outcome: Met This Shift     Problem: Gas Exchange - Impaired  Goal: Absence of hypoxia  Outcome: Met This Shift  Goal: Promote optimal lung function  Outcome: Met This Shift     Problem: Breathing Pattern - Ineffective  Goal: Ability to achieve and maintain a regular respiratory rate  Outcome: Met This Shift     Problem:  Body Temperature -  Risk of, Imbalanced  Goal: Ability to maintain a body temperature within defined limits  Outcome: Met This Shift  Goal: Will regain or maintain usual level of consciousness  Outcome: Met This Shift  Goal: Complications related to the disease process, condition or treatment will be avoided or minimized  Outcome: Met This Shift     Problem: Isolation Precautions - Risk of Spread of Infection  Goal: Prevent transmission of infection  Outcome: Met This Shift     Problem: Nutrition Deficits  Goal: Optimize nutritional status  Outcome: Met This Shift     Problem: Risk for Fluid Volume Deficit  Goal: Maintain normal heart rhythm  Outcome: Met This Shift  Goal: Maintain absence of muscle cramping  Outcome: Met This Shift  Goal: Maintain normal serum potassium, sodium, calcium, phosphorus, and pH  Outcome: Met This Shift     Problem: Loneliness or Risk for Loneliness  Goal: Demonstrate positive use of time alone when socialization is not possible  Outcome: Met This Shift     Problem: Fatigue  Goal: Verbalize increase energy and improved vitality  Outcome: Met This Shift     Problem: Patient Education: Go to Patient Education Activity  Goal: Patient/Family Education  Outcome: Met This Shift     Problem: Falls - Risk of:  Goal: Will remain free from falls  Description: Will remain free from falls  Outcome: Met This Shift  Goal: Absence of physical injury  Description: Absence of physical injury  Outcome: Met This Shift     Problem: Skin Integrity:  Goal: Will show no infection signs and symptoms  Description: Will show no infection signs and symptoms  Outcome: Met This Shift  Goal: Absence of new skin breakdown  Description: Absence of new skin breakdown  Outcome: Met This Shift     Problem: Confusion - Acute:  Goal: Absence of continued neurological deterioration signs and symptoms  Description: Absence of continued neurological deterioration signs and symptoms  Outcome: Met This Shift  Goal: Mental status will be restored to baseline  Description: Mental status will be restored to baseline  Outcome: Met This Shift     Problem: Discharge Planning:  Goal: Ability to perform activities of daily living will improve  Description: Ability to perform activities of daily living will improve  Outcome: Met This Shift  Goal: Participates in care planning  Description: Participates in care planning  Outcome: Met This Shift     Problem: Injury - Risk of, Physical Injury:  Goal: Will remain free from falls  Description: Will remain free from falls  Outcome: Met This Shift  Goal: Absence of physical injury  Description: Absence of physical injury  Outcome: Met This Shift     Problem: Mood - Altered:  Goal: Mood stable  Description: Mood stable  Outcome: Met This Shift  Goal: Absence of abusive behavior  Description: Absence of abusive behavior  Outcome: Met This Shift  Goal: Verbalizations of feeling emotionally comfortable while being cared for will increase  Description: Verbalizations of feeling emotionally comfortable while being cared for will increase  Outcome: Met This Shift     Problem: Psychomotor Activity - Altered:  Goal: Absence of psychomotor disturbance signs and symptoms  Description: Absence of psychomotor disturbance signs and symptoms  Outcome: Met This Shift     Problem: Sensory Perception - Impaired:  Goal: Demonstrations of improved sensory functioning will increase  Description: Demonstrations of improved sensory functioning will increase  Outcome: Met This Shift  Goal: Decrease in sensory misperception frequency  Description: Decrease in sensory misperception frequency  Outcome: Met This Shift  Goal: Able to refrain from responding to false sensory perceptions  Description: Able to refrain from responding to false sensory perceptions  Outcome: Met This Shift  Goal: Demonstrates accurate environmental perceptions  Description: Demonstrates accurate environmental perceptions  Outcome: Met This Shift  Goal: Able to distinguish between reality-based and nonreality-based thinking  Description: Able to distinguish between reality-based and nonreality-based thinking  Outcome: Met This Shift  Goal: Able to interrupt nonreality-based thinking  Description: Able to interrupt nonreality-based thinking  Outcome: Met This Shift     Problem: Sleep Pattern Disturbance:  Goal: Appears well-rested  Description: Appears well-rested  Outcome: Met This Shift     Problem: Non-Violent Restraints  Goal: Removal from restraints as soon as assessed to be safe  Outcome: Met This Shift  Goal: No harm/injury to patient while restraints in use  Outcome: Met This Shift  Goal: Patient's dignity will be maintained  Outcome: Met This Shift     Problem: Pain:  Goal: Pain level will decrease  Description: Pain level will decrease  Outcome: Met This Shift  Goal: Control of acute pain  Description: Control of acute pain  Outcome: Met This Shift  Goal: Control of chronic pain  Description: Control of chronic pain  Outcome: Met This Shift

## 2022-01-20 NOTE — FLOWSHEET NOTE
01/19/22 1946   Vital Signs   Temp 98.6 °F (37 °C)   Temp Source Oral   Pulse 87   Heart Rate Source Monitor   Resp 20   /60   BP Location Left upper arm   Patient Position High fowlers   Level of Consciousness Alert (0)   MEWS Score 1   Patient Currently in Pain Yes   Pain Assessment   Pain Assessment 0-10   Pain Level 5   Patient's Stated Pain Goal No pain   Pain Type Acute pain   Pain Location Arm   Pain Orientation Right  (pt pulled out PICC line)   Oxygen Therapy   SpO2 92 %     Shift assessment completed, see flow sheet. Patient is A&O x4. Medications administered. Patient denies further needs at this time. Placed in restraints due to interference with medical devices and pulling out PICC line. Call light within reach and Telesitter in place for observation of safety. Will continue to monitor.

## 2022-01-20 NOTE — PROGRESS NOTES
Comprehensive Nutrition Assessment    Type and Reason for Visit:  Initial (LOS x 7 days)    Nutrition Recommendations/Plan:   1. Continue ADULT DIET; 4 carb choices per meal diet order. 2. Added magic cups with lunch and dinner meals. 3. Monitor appetite, meal intake, and acceptance/intake of ONS. 4. Monitor nutrition-related labs, bowel function, and weight trends. Nutrition Assessment:  patient was nutritionally compromised upon admission AEB decreased appetite/po intake and reported nausea PTA + patient was falling at home PTA; patient has slightly improved from a nutritional standpoint AEB improved po intake during admission (although, inconsistent intake) and improved mental status (+ confusion at times); will continue ADULT DIET; Regular; 4 carb choices per meal diet order and will add magic cups with lunch and dinner meals    Malnutrition Assessment:  Malnutrition Status: At risk for malnutrition    Context:  Acute Illness     Findings of the 6 clinical characteristics of malnutrition:  Energy Intake:  Mild decrease in energy intake (inconsistent po intake)  Weight Loss:  No significant weight loss     Body Fat Loss:  Unable to assess (COVID-19 +)     Muscle Mass Loss:  Unable to assess (COVID-19 +)    Fluid Accumulation:  No significant fluid accumulation     Strength:  Not Performed    Estimated Daily Nutrient Needs:  Energy (kcal):  1455 - 1746 kcals based on 15-18 kcals/kg/CBW; Weight Used for Energy Requirements:  Current     Protein (g):  77 - 89 g protein based on 1.3-1.5 g/kg/IBW; Weight Used for Protein Requirements:  Ideal        Fluid (ml/day):  1455 - 1746 ml; Method Used for Fluid Requirements:  1 ml/kcal      Nutrition Related Findings:  patient A & O x 4; + confusion at times; skin is dry and flaky; patient is from home alone and he reported having 8 falls in two days PTA; + BM on 1/20/22;  Na, K, Cl, and h/h are low; patient has lipitor, decadron, cymbalta, lovenox, gabapentin, 60 units Lantus nightly, high-dose SSI, protonix, seroquel, and depakene ordered at this time      Wounds:  None       Current Nutrition Therapies:    ADULT DIET; Regular; 4 carb choices (60 gm/meal)  ADULT ORAL NUTRITION SUPPLEMENT; Lunch, Dinner; Frozen Oral Supplement    Anthropometric Measures:  · Height: 5' 4\" (162.6 cm)  · Current Body Weight: 214 lb 4.8 oz (97.2 kg) (obtained on 1/20/22; actual weight)   · Admission Body Weight: 214 lb 4.8 oz (97.2 kg) (obtained on 1/20/22; actual weight)    · Usual Body Weight: 238 lb 15.7 oz (108.4 kg) (obtained on 1/26/21; actual weight)     · Ideal Body Weight: 130 lbs; % Ideal Body Weight 164.8 %   · BMI: 36.8  · BMI Categories: Obese Class 2 (BMI 35.0 -39.9)       Nutrition Diagnosis:   · Inadequate oral intake related to inadequate protein-energy intake,impaired respiratory function,increase demand for energy/nutrients,psychological cause or life stress as evidenced by poor intake prior to admission,intake 0-25%,intake 26-50%,intake 51-75%,lab values,other (comment) (COVID-19 +)      Nutrition Interventions:   Food and/or Nutrient Delivery:  Continue Current Diet,Start Oral Nutrition Supplement  Nutrition Education/Counseling:  No recommendation at this time   Coordination of Nutrition Care:  Continue to monitor while inpatient    Goals:  patient will consume 75% or greater of meals on ADULT DIET;  Regular; 4 carb choices per meal diet order x 3 meals per day + he will consume 75% or greater of magic cups with lunch and dinner meals       Nutrition Monitoring and Evaluation:   Behavioral-Environmental Outcomes:  None Identified   Food/Nutrient Intake Outcomes:  Food and Nutrient Intake,Supplement Intake  Physical Signs/Symptoms Outcomes:  Biochemical Data,GI Status,Hemodynamic Status,Skin,Weight     Discharge Planning:    Continue current diet     Electronically signed by Juan Haynes RD, LD on 1/20/22 at 1:39 PM EST    Contact: [phone]
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
## 2022-01-20 NOTE — PROGRESS NOTES
Progress Note    Admit Date:  1/12/2022    63 y.o. male with DMII, Chronic sCHF, CAD s/p stent placement x3, hx of MI, asthma, HTN, HLD, GERD, lumbar spondylosis and DDD s/p bilateral dorsal ramus medial branch ablation and interstim implant, depression who presented to [facility] ED with complaint of generalized weakness ongoing for approximately 2 months and frequent falls. In ED, CT of the head showed no acute abnormality. Chest x-ray with no acute cardiopulmonary process. Patient did test positive for COVID. He is vaccinated x2, no booster. Patient admitted to med-surg for further care. PT/OT consulted. 1/15  Mr. Quiroga developed rapid HR what appears to be Afib on TELE monitor and his pacer inappropriately delivered shock x 5 and this was witnessed by me and nursing staff. Pt awake, conscious during these episodes and by the end of the 5th shock, he started getting slightly confused    Given cardizem 10 mg IV x 1 and metoprolol 5 mg with control of HR  EKG after this episode showed sinus tach    Progressively developed confusion , fevers , started on precedex gtt    Pt transferred to ICU    Pt very confused and became agitated with removing lines. Now on precedex gtt, ct head neg   No further shocks from ICD. His ICD has been adjusted by cardiology   Tx to PCU on 1/19  Placed in restraints evening of 1/19    Subjective:  Resting in bed, pt alert and oriented. Bilateral wrist restraints removed. Pt is calm and cooperative at this time. He did go into AFIB with RVR this am and IV dig was given. He has now converted back to NSR. On 2 liters n/c. Denies any complaints at this time other than he is thirsty.          Objective:   Patient Vitals for the past 4 hrs:   BP Temp Pulse Resp SpO2 Height   01/20/22 1330 138/74 98.1 °F (36.7 °C) 99 22 90 % --   01/20/22 1246 -- -- -- -- -- 5' 4\" (1.626 m)   01/20/22 1021 -- -- 117 -- -- --            Intake/Output Summary (Last 24 hours) at 1/20/2022 2021 Que Dockery filed at 1/20/2022 1150  Gross per 24 hour   Intake 330 ml   Output 400 ml   Net -70 ml       Physical Exam:  Gen: middle aged obese male awake. Alert and better oriented today   Appears to be not in any distress  Mucous Membranes:  Pink , anicteric  Neck: No JVD, no carotid bruit, no thyromegaly  Chest:  Clear to auscultation bilaterally, diminished in bases   Cardiovascular:  regular,  S1S2 heard, no murmurs or gallops- left sided ICD +  Abdomen:  Soft, undistended, non tender, no organomegaly, BS present  Extremities: No edema or cyanosis. Distal pulses well felt  Neurological : improving mentation but still with intermittent confusion  Right sided increased stiffness noted yesterday, this has resolved today  No facial weakness. Speech clear      Data:  CBC:   Recent Labs     01/18/22  0517 01/19/22  0604 01/20/22  0533   WBC 2.3* 3.4* 2.8*   HGB 12.6* 13.4* 13.2*   HCT 37.7* 39.2* 39.5*   MCV 87.1 84.1 86.3   PLT 51* 62* 75*     BMP:   Recent Labs     01/18/22  0517 01/18/22  0517 01/19/22  0604 01/20/22  0533   *  --  132* 134*   K 3.9   < > 3.6  3.6 3.1*   CL 94*  --  95* 97*   CO2 21  --  23 25   PHOS 3.0  --  2.7 2.3*   BUN 12  --  11 7   CREATININE 0.7*  --  0.7* 0.6*    < > = values in this interval not displayed. CULTURES  Results for Nury Bergeron (MRN [id number]) as of 1/13/2022 13:29    Ref. Range 1/12/2022 19:35   SARS-CoV-2, NAAT Latest Ref Range: Not Detected  DETECTED (AA)          RADIOLOGY  XR CHEST PORTABLE   Final Result   NG tube with the tip and side-port in the stomach. Bibasilar atelectasis. XR CHEST PORTABLE   Final Result   NG tube is difficult to visualize, tip is likely below the diaphragm. Repeat   exam could be considered if clinically indicated. XR CHEST PORTABLE   Final Result   New elevated right hemidiaphragm, likely atelectasis or possibly infiltrate. Subpulmonic effusion not excluded. No overt failure.          CT HEAD WO CONTRAST   Final Result   1. No acute intracranial abnormality. CT Head WO Contrast   Final Result   No acute intracranial abnormality. XR CHEST PORTABLE   Final Result   No acute cardiopulmonary process. Pertinent previous results reviewed   Diagnostic cardiac cath 7/13/21  CONCLUSION:  Successful stenting of the true circumflex into the obtuse  marginal branch, successful stenting of the left posterior descending  coronary artery and successful stenting of the obtuse marginal branch,  all 80% stenosis reduced to 0 with these three drug-eluting stents. JAMES-3 blood flow was present in the circumflex artery and all its  branches.  The LAD is normal with minor irregularities only and the left  main is normal.  Nondominant right coronary artery.  LV ejection  fraction of 40% with inferior wall and inferoposterior hypokinesia.     ECHO 11/27/19  Summary   The left ventricular systolic function is moderately reduced with an   ejection fraction of 35 -40 %.   There is hypokinesis of the inferior and basal inferior walls.   There is akinesis of the inferolateral wall.   Left ventricular cavity size is moderately dilated.   Compared to previous study from 2-2-2015 no changes noted in left   ventricular function.   Mild mitral regurgitation. Limited echo 1/15/2021     Summary   This is a limited study afib, ischemic cmp, recurrent pacer firing. LV systolic function appears mildly reduced with EF estimated at 45-50%. There is more prominent HK of the inferior wall. Left ventricular size is decreased. There is mild concentric left ventricular hypertrophy. 11- 35-40% inf basal hypo, inferolateral akinesis, LVE, mild MR       Assessment/Plan  # ICD firing - he had 5 witnessed shocks and then more bursts (4-5 additional episodes) of AICD discharges and concern for malfunctioning ICD  - s.p  [employer] [place] interrogation and now ICD is adjusted . - Cardiology consulted.    -now back in NSR  - resumed  BB - toprol XL stopped due to low BP - decreased to  metoprolol 25BID--  Increased on 1/20 to 50 mg BID    Afibb with RVR  - new onset with above ICD issues  - resume home cardizem and BB if tolerated. Can use IV meds intermittently  - was in AFIB with RVR this am now in NSR   - started eliquis but holding with low PLT, likely resume tomorrow if platelets remain to increase  - cardiology following    Hypokalemia  - keep greater than 4  - PRN potassium SS ordered     Acute Encephalopathy - possible sec to covid encephalopathy   - NOT POA - developed slowly most pronounced after ICD firing  -CT head non focal x 2   -He denies any drug or alcohol use.   -precedex gtt for agitation used--- d/c on 1/19  - added seroquel  -Ammonia level wnl   -Consider MRI brain - unable to do with incompatible pacer  -Started on rocephin meningitis dose D#4  -Supportive care for now - improving slowly   -Stop prcedex gtt on 1/19  -Continue Seroquel- add 12.5 mg now and 25 at night, discussed with Dr. Olivia Ramey   -Supportive care, removed restraints.         #COVID 19 infection   -Positive 1/12/22  -only complaints is a cough  -patient is vaccinated x2- no booster  - minimal  Hypoxia at 2 L.   - Decadron D4  -Monitor, continuous pulse ox and telemetry        #DMII  - not controlled noted to have >1000 glucose in urine on admission   -Hold home oral meds- resume lantus increase as tolerated  -Low dose SSI--switched to high dose SSI  -resume diet today . Removed NG      #Thrombocytopenia  -Plt dropping to 61--75  -Monitor CBC , hold eliquis, if remains stable, cardiology likely to resume tomorrow  - Depakote can be contributing to this         #CAD s/p stent placement x3  #Hx of MI  Hx of VTACH s.p ICD  - ASA, statin, plavix       #Chronic sCHF no AE   - Last echo 11/27/19, EF 35-40 % Limited echo completed 1/15 which showed EF 45-50%.   - Pacemaker/ICD in place  -Continue toprol XL, cardizem  - patient takes Lasix on Monday and Thursday - holding at this time   - Lisinopril has been on hold, resume at a lower dose tomorrow patient is on 20 mg at home. Start 5 mg in am and can increase as blood pressure allows. Holding parameters in place.     #Asthma no AE  -Symbicort  -albuterol prn     #HLD  -continue statin       #GERD  -continue ppi       #Lumbar spondylosis and DDD   -s/p bilateral dorsal ramus medial branch ablation and interstim implant  -PDMP displays on chronic gabapentin, continue  - follows with Dr. Letty Romeo      #ALIN  - unsure if patient is compliant with CPAP ? If he still has CPAP at home       #Morbid Obesity  - Body mass index is 41.37 kg/m². - Complicating assessment and treatment. Placing patient at risk for multiple co-morbidities as well as early death and contributing to the patient's presentation.   - Counseled on weight loss.      Depression  - patient is on Cymbalta 60 mg at home, medication verified with pharmacy. Will increase dose  - also takes Depakote 500 in am and 1000 in evening also verified with pharmacy. Removed restraints at this time, discussed with nursing. Keep patient awake during day and blinds open in room. PT/OT is following       DVT Prophylaxis: eliquis- on hold Lovenox at this time  Diet: ADULT DIET;  Regular; 4 carb choices (60 gm/meal)  ADULT ORAL NUTRITION SUPPLEMENT; Lunch, Dinner; Frozen Oral Supplement  Code Status: Full Code    Discussed plan of care with Dr. Bryce Wright, ANA - CNP, 1/20/2022 1:45 PM

## 2022-01-20 NOTE — FLOWSHEET NOTE
01/20/22 1330   Vital Signs   Temp 98.1 °F (36.7 °C)   Pulse 99   Heart Rate Source Monitor   Resp 22   /74   BP Location Left lower arm   Patient Position High fowlers   Level of Consciousness Alert (0)   MEWS Score 2   Oxygen Therapy   SpO2 90 %   O2 Device Nasal cannula   O2 Flow Rate (L/min) 2 L/min       Pt sitting calmly in bed. Restraints removed.

## 2022-01-20 NOTE — PROGRESS NOTES
Shift assessment complete- see flow sheet. Patient is A/Ox4, but still appears confused, pulling at lines. VSS. However pt in Afib RVR this morning. -140's. New orders from Cardio. Morning medications given without difficulty. Currently on 2 l, SpO2 WNL. Lung sounds diminished. Denies CP, dizziness, or  shortness of breath. Says he feels great. Patient denies any further needs. Call light explained and in reach. Bed alarm on telesitter in place. Soft restraints in place. Restraint break given. Pt continued to pull off lines. Will continue to monitor.

## 2022-01-20 NOTE — PROGRESS NOTES
[facility]  Cardiology  Progress Note    Admission date:  2022    Reason for follow up visit: ICD shocks    HPI/CC: Radhikaor Malia. is a 63 y.o. male who presented 2022 for weakness and falls. While admitted he had inappropriate ICD shocks for AF RVR. Echo showed EF 45-50%. He has been treated for COVID, encephalopathy, anemia. Rhythm had been sinus, AF RVR since 0600 today. Subjective: Per RN possible hallucinations overnight, in restraints. Vitals:  Blood pressure 128/67, pulse 87, temperature 99 °F (37.2 °C), temperature source Axillary, resp. rate 20, height 5' 4\" (1.626 m), weight 214 lb 4.8 oz (97.2 kg), SpO2 93 %.   Temp  Av.6 °F (37 °C)  Min: 97.4 °F (36.3 °C)  Max: 100.5 °F (38.1 °C)  Pulse  Av.3  Min: 72  Max: 87  BP  Min: 126/57  Max: 163/51  SpO2  Av.3 %  Min: 92 %  Max: 97 %    24 hour I/O    Intake/Output Summary (Last 24 hours) at 2022 0911  Last data filed at 2022 0853  Gross per 24 hour   Intake 370 ml   Output 400 ml   Net -30 ml     Current Facility-Administered Medications   Medication Dose Route Frequency Provider Last Rate Last Admin    potassium chloride (KLOR-CON M) extended release tablet 40 mEq  40 mEq Oral PRN Marisue Flair, APRN - CNP        Or    potassium bicarb-citric acid (EFFER-K) effervescent tablet 40 mEq  40 mEq Oral PRN Marisue Flair, APRN - CNP        Or    potassium chloride 10 mEq/100 mL IVPB (Peripheral Line)  10 mEq IntraVENous PRN Marisue Flair, APRN - CNP        metoprolol tartrate (LOPRESSOR) tablet 50 mg  50 mg Oral BID ANA Shay CNP        digoxin (LANOXIN) injection 250 mcg  250 mcg IntraVENous Q6H ANA Shay CNP        DULoxetine (CYMBALTA) extended release capsule 30 mg  30 mg Oral Daily Jacqueline Mcdaniel MD   30 mg at 22 1232    insulin lispro (HUMALOG) injection vial 0-18 Units  0-18 Units SubCUTAneous TID  Jacqueline Mcdaniel MD   3 Units at 22 6312    insulin lispro (HUMALOG) injection vial 0-9 Units  0-9 Units SubCUTAneous Nightly Solitario Del Toro MD   3 Units at 01/19/22 2211    enoxaparin (LOVENOX) injection 40 mg  40 mg SubCUTAneous BID Solitario Del Toro MD   40 mg at 01/19/22 2259    aspirin EC tablet 81 mg  81 mg Oral Daily Enmanuel LeyvaANA - CNP   81 mg at 01/19/22 2210    valproic acid (DEPAKENE) 250 MG/5ML oral solution 500 mg  500 mg Per NG tube TID Solitario Del Toro MD   500 mg at 01/19/22 2210    QUEtiapine (SEROQUEL) tablet 50 mg  50 mg Oral Nightly Solitario Del Toro MD   50 mg at 01/19/22 2210    dexamethasone (PF) (DECADRON) injection 6 mg  6 mg IntraVENous Q24H Solitario Del Toro MD   6 mg at 01/19/22 1607    cefTRIAXone (ROCEPHIN) 2000 mg IVPB in D5W 50ml minibag  2,000 mg IntraVENous Q12H Solitario Del Toro MD   Stopped at 01/20/22 1981    [Held by provider] apixaban (ELIQUIS) tablet 5 mg  5 mg Oral BID Dany Milligan MD   5 mg at 01/17/22 2048    sodium chloride flush 0.9 % injection 5-40 mL  5-40 mL IntraVENous 2 times per day Solitario Del Toro MD   10 mL at 01/19/22 0945    sodium chloride flush 0.9 % injection 5-40 mL  5-40 mL IntraVENous PRN Solitario Del Toro MD        0.9 % sodium chloride infusion  25 mL IntraVENous PRN Solitario Del Toro  mL/hr at 01/19/22 1746 25 mL at 01/19/22 1746    perflutren lipid microspheres (DEFINITY) injection 1.65 mg  1.5 mL IntraVENous ONCE PRN Solitario Del Toro MD        traZODone (DESYREL) tablet 100 mg  100 mg Oral Nightly PRN Solitario Del Toro MD   100 mg at 01/19/22 2210    budesonide-formoterol (SYMBICORT) 160-4.5 MCG/ACT inhaler 2 puff  2 puff Inhalation BID Solitario Del Toro MD   2 puff at 01/20/22 0822    albuterol sulfate  (90 Base) MCG/ACT inhaler 2 puff  2 puff Inhalation BID Solitario Del Toro MD   2 puff at 01/20/22 0822    glucose (GLUTOSE) 40 % oral gel 15 g  15 g Oral PRN Solitario Del Toro MD        dextrose 50 % IV solution  12.5 g IntraVENous PRN Cassandra Jj MD        glucagon (rDNA) injection 1 mg  1 mg IntraMUSCular PRN Cassandra Jj MD        dextrose 5 % solution  100 mL/hr IntraVENous PRN Cassandra Jj MD        albuterol (PROVENTIL) nebulizer solution 2.5 mg  2.5 mg Nebulization Q4H PRN Cassandra Jj MD        atorvastatin (LIPITOR) tablet 80 mg  80 mg Oral Daily Cassandra Jj MD   80 mg at 01/19/22 0943    clopidogrel (PLAVIX) tablet 75 mg  75 mg Oral Daily Cassandra Jj MD   75 mg at 01/19/22 0943    pantoprazole (PROTONIX) tablet 40 mg  40 mg Oral Daily Cassandra Jj MD   40 mg at 01/19/22 0944    fluticasone (FLONASE) 50 MCG/ACT nasal spray 2 spray  2 spray Each Nostril Daily Cassandra Jj MD   2 spray at 01/18/22 6225    gabapentin (NEURONTIN) capsule 200 mg  200 mg Oral TID Cassandra Jj MD   200 mg at 01/19/22 2210    insulin glargine (LANTUS) injection vial 60 Units  60 Units SubCUTAneous Nightly Cassandra Jj MD   60 Units at 01/19/22 2211    polyethylene glycol (GLYCOLAX) packet 17 g  17 g Oral Daily PRN Cassandra Jj MD        acetaminophen (TYLENOL) tablet 650 mg  650 mg Oral Q6H PRN Cassandra Jj MD   650 mg at 01/17/22 1751    Or    acetaminophen (TYLENOL) suppository 650 mg  650 mg Rectal Q6H PRN Cassandra Jj MD         Objective:     Telemetry monitor: SR, AF RVR since 0600 today    Physical Exam:  Deferred due to COVID status and preserve PPE    Data Reviewed:    Echo 1/15/2022: This is a limited study afib, ischemic cmp, recurrent pacer firing. LV systolic function appears mildly reduced with EF estimated at 45-50%. There is more prominent HK of the inferior wall. Left ventricular size is decreased. There is mild concentric left ventricular hypertrophy.    11- 35-40% inf basal hypo, inferolateral akinesis, LVE, mild MR    Coronary angiogram 7/2021:  INDICATION FOR PROCEDURE:  The patient had a recent episode of syncope. He has known coronary artery disease with prior defibrillator placement. There were no ICD discharges. A nuclear scan was performed which showed  inferior, inferolateral ischemia. His ejection fraction is known to be  reduced in the 40% range. Because of the findings of the nuclear scan,  the recent syncope and the known coronary artery disease, status post  stenting as for MI in the past, he presents today for invasive risk  stratification with coronary angiography. DESCRIPTION OF PROCEDURE:  The start time of the procedure was 10:28,  the stop time was 11:29. The patient was sedated per my order during  the entire procedure with a total of 3 mg of Versed and 175 mcg of  fentanyl given in divided doses by my order. We did monitor the  patient's cardiopulmonary status that includes myself during the entire  procedure. After informed consent was obtained, the patient was prepped and draped  in a sterile manner. Locally anesthetized with 1% lidocaine in the  right femoral region. A 5-[place] sheath was placed in a retrograde  manner in right femoral artery over a guidewire. Clarance Ax and pigtail  catheters were used during the diagnostic procedure. All were aspirated  and flushed prior to use. FINDINGS:  The left main coronary artery is normal.  The left anterior  descending coronary artery has minor luminal irregularities. There is  JAMES grade 3 flow. Diagonal branches unremarkable, courses around the  apex of the left ventricle. The circumflex is a dominant artery. The  circumflex early on comes off in the AV groove and there is a stent in  the distal circumflex into the obtuse marginal branch. That obtuse  marginal continues on and forms the left posterior descending and a  large obtuse marginal branch. Just in front of the stent in the OM,  there is 80% stenosis. There is some haziness within the proximal  portion of the stent.   The true left PDA and its mid distal area has an  80% stenosis. There is pre-aneurysmal dilatation of that artery. The obtuse marginal branch is large and feeds a significant portion of  the posterolateral wall of left ventricle and there was an 80% diffuse  stenosis from the bifurcation. It does not include the bifurcation,  however. The JR4 catheter engages the right coronary artery. It is a nondominant  right coronary artery. There was some dampening of pressure, but there  was no disease with two small RV branches. A pigtail catheter in the left ventricle showed a post angiogram LVEDP  of 15 mmHg. Power injection in the SANDERS projection shows inferior wall  and inferoposterior wall hypokinesia with an LV ejection fraction  estimated at 40%. The anterior wall and the apex appeared to be moving  well. There was no mitral regurgitation. There was no gradient upon  pullback from the left ventricle to the ascending aorta. The 5-[place] sheath was upsized to a 6-Tongan sheath over the 0.035  J-wire. Injection of the sheath showed that it was located squarely in  the normal right common femoral artery. An XP 3.5 guide was used to engage the left main coronary artery. BMW  guidewire was advanced from the circumflex into the left posterior  descending coronary artery. This was beyond the 80% stenosis. I chose  a 2.5 x 15 mm Resolute [place] drug-eluting stent. The stent was deployed  at 14 atmospheres x20 seconds. There was JAMES-3 blood flow with nearly  0% residual stenosis. The BMW guidewire was redirected into the obtuse  marginal branch beyond the diffuse 80% stenosis and I took a 2.5 x 22 mm  Resolute [place] drug-eluting stent, inflated that to 14 atmospheres x20  seconds with JAMES-3 blood flow and nearly 0% residual stenosis.   Next, my attention was turned to the circumflex and the early portion of  that obtuse marginal where there was a large stent in place, that stent  proximal to it had a fairly focal 80% stenosis and there was some  narrowing within the proximal aspect of that stent. I chose a 3.5 x 18  Resolute [place] drug-eluting stent and deployed that at 16 atmospheres x25  seconds. There was JAMES-3 flow with nearly 0% residual stenosis. The  patient tolerated the procedure well and the angioplasty hardware was  removed. An Evolution Angio-Seal device was deployed. Heparin was given during  the procedure. At the conclusion of the procedure, the ACT was 350  seconds. I gave one dose of Integrilin bolus and then Plavix 600 mg. The angioplasty hardware like I said was removed and the Angio-Seal  device was deployed successfully with hemostasis achieved. CONCLUSION:  Successful stenting of the true circumflex into the obtuse  marginal branch, successful stenting of the left posterior descending  coronary artery and successful stenting of the obtuse marginal branch,  all 80% stenosis reduced to 0 with these three drug-eluting stents. JAMES-3 blood flow was present in the circumflex artery and all its  branches. The LAD is normal with minor irregularities only and the left  main is normal.  Nondominant right coronary artery. LV ejection  fraction of 40% with inferior wall and inferoposterior hypokinesia. PLAN:  Hydration, bedrest, risk factor modifications to continue.     Lab Reviewed:     Renal Profile:  Lab Results   Component Value Date    CREATININE 0.6 01/20/2022    BUN 7 01/20/2022     01/20/2022    K 3.1 01/20/2022    K 3.6 01/19/2022    CL 97 01/20/2022    CO2 25 01/20/2022     CBC:    Lab Results   Component Value Date    WBC 2.8 01/20/2022    RBC 4.58 01/20/2022    HGB 13.2 01/20/2022    HCT 39.5 01/20/2022    MCV 86.3 01/20/2022    RDW 15.8 01/20/2022    PLT 75 01/20/2022     BNP:  No results found for: PROBNP  Fasting Lipid Panel:    Lab Results   Component Value Date    CHOL 170 12/29/2020    HDL 29 12/29/2020    TRIG 442 12/29/2020     Cardiac Enzymes:  CK/MbTroponin  Lab Results   Component Value Date    CKTOTAL 64 11/27/2019    TROPONINI <0.01 04/26/2021     PT/ INR   Lab Results   Component Value Date    INR 0.94 01/16/2022    INR 0.91 01/16/2022    INR 0.90 07/13/2021    PROTIME 10.6 01/16/2022    PROTIME 10.3 01/16/2022    PROTIME 10.1 07/13/2021     PTT No results found for: PTT   Lab Results   Component Value Date    MG 1.90 01/20/2022      Lab Results   Component Value Date    TSH 2.75 11/27/2019     All labs and imaging reviewed today    Assessment:  Paroxysmal atrial fibrillation: RVR currently, new diagnosis   - [id number] score 4 (CHF, HTN, DM, CAD)   - inappropriate ICD shocks noted 1/15/2022  CAD: s/p ROSHNI x3 LCx, LPDA, L OM 7/2021  Ischemic cardiomyopathy: ongoing, EF 45-50%  History of VT   - s/p ICD  Chronic systolic CHF: stable  COVID+  DM  ALIN  Encephalopathy  Thrombocytopenia    Plan:   1. Replace potassium then load with IV digoxin 250 mcg q6 hours x4 (avoiding non dihydropyridine calcium channel blockers due to cardiomyopathy)  2. Increase metoprolol to 50 mg BID  3. Recommend eliquis 5 mg BID once platelets stable/increasing and deemed safe, today vs tomorrow  4. Continue aspirin 81 mg daily until eliquis started; PCI <1 year ago; stop aspirin after eliquis started to avoid triple therapy  5. Continue plavix  6. Discussed with EP and patients ICD is NOT MRI compatible  7. Inappropriate ICD shocks for AF RVR noted 1/15/2022, device adjustments made  8.  Lisinopril when able    Discussed with Dr. Caridad Alexis, APRN-CNP  [address]  [phone]

## 2022-01-21 LAB
ANION GAP SERPL CALCULATED.3IONS-SCNC: 12 MMOL/L (ref 3–16)
BASOPHILS ABSOLUTE: 0 K/UL (ref 0–0.2)
BASOPHILS RELATIVE PERCENT: 0.2 %
BLOOD CULTURE, ROUTINE: NORMAL
BUN BLDV-MCNC: 7 MG/DL (ref 7–20)
CALCIUM SERPL-MCNC: 8.8 MG/DL (ref 8.3–10.6)
CHLORIDE BLD-SCNC: 97 MMOL/L (ref 99–110)
CO2: 25 MMOL/L (ref 21–32)
CREAT SERPL-MCNC: <0.5 MG/DL (ref 0.9–1.3)
CULTURE, BLOOD 2: NORMAL
EOSINOPHILS ABSOLUTE: 0 K/UL (ref 0–0.6)
EOSINOPHILS RELATIVE PERCENT: 0.2 %
GFR AFRICAN AMERICAN: >60
GFR NON-AFRICAN AMERICAN: >60
GLUCOSE BLD-MCNC: 151 MG/DL (ref 70–99)
GLUCOSE BLD-MCNC: 174 MG/DL (ref 70–99)
GLUCOSE BLD-MCNC: 216 MG/DL (ref 70–99)
GLUCOSE BLD-MCNC: 224 MG/DL (ref 70–99)
GLUCOSE BLD-MCNC: 300 MG/DL (ref 70–99)
HCT VFR BLD CALC: 40.4 % (ref 40.5–52.5)
HEMOGLOBIN: 13.8 G/DL (ref 13.5–17.5)
LYMPHOCYTES ABSOLUTE: 0.7 K/UL (ref 1–5.1)
LYMPHOCYTES RELATIVE PERCENT: 22.7 %
MAGNESIUM: 2 MG/DL (ref 1.8–2.4)
MCH RBC QN AUTO: 28.9 PG (ref 26–34)
MCHC RBC AUTO-ENTMCNC: 34.1 G/DL (ref 31–36)
MCV RBC AUTO: 84.9 FL (ref 80–100)
MONOCYTES ABSOLUTE: 0.4 K/UL (ref 0–1.3)
MONOCYTES RELATIVE PERCENT: 12.5 %
NEUTROPHILS ABSOLUTE: 1.9 K/UL (ref 1.7–7.7)
NEUTROPHILS RELATIVE PERCENT: 64.4 %
PDW BLD-RTO: 15.5 % (ref 12.4–15.4)
PERFORMED ON: ABNORMAL
PHOSPHORUS: 2.1 MG/DL (ref 2.5–4.9)
PLATELET # BLD: 90 K/UL (ref 135–450)
PMV BLD AUTO: 7.3 FL (ref 5–10.5)
POTASSIUM SERPL-SCNC: 3.1 MMOL/L (ref 3.5–5.1)
RBC # BLD: 4.76 M/UL (ref 4.2–5.9)
SODIUM BLD-SCNC: 134 MMOL/L (ref 136–145)
WBC # BLD: 3 K/UL (ref 4–11)

## 2022-01-21 PROCEDURE — 2060000000 HC ICU INTERMEDIATE R&B

## 2022-01-21 PROCEDURE — 6370000000 HC RX 637 (ALT 250 FOR IP): Performed by: INTERNAL MEDICINE

## 2022-01-21 PROCEDURE — 36415 COLL VENOUS BLD VENIPUNCTURE: CPT

## 2022-01-21 PROCEDURE — 2580000003 HC RX 258: Performed by: INTERNAL MEDICINE

## 2022-01-21 PROCEDURE — 99232 SBSQ HOSP IP/OBS MODERATE 35: CPT | Performed by: NURSE PRACTITIONER

## 2022-01-21 PROCEDURE — 83735 ASSAY OF MAGNESIUM: CPT

## 2022-01-21 PROCEDURE — 6370000000 HC RX 637 (ALT 250 FOR IP): Performed by: NURSE PRACTITIONER

## 2022-01-21 PROCEDURE — 6360000002 HC RX W HCPCS: Performed by: INTERNAL MEDICINE

## 2022-01-21 PROCEDURE — 97535 SELF CARE MNGMENT TRAINING: CPT

## 2022-01-21 PROCEDURE — 99232 SBSQ HOSP IP/OBS MODERATE 35: CPT | Performed by: INTERNAL MEDICINE

## 2022-01-21 PROCEDURE — 2700000000 HC OXYGEN THERAPY PER DAY

## 2022-01-21 PROCEDURE — 97530 THERAPEUTIC ACTIVITIES: CPT

## 2022-01-21 PROCEDURE — 80048 BASIC METABOLIC PNL TOTAL CA: CPT

## 2022-01-21 PROCEDURE — 94640 AIRWAY INHALATION TREATMENT: CPT

## 2022-01-21 PROCEDURE — 97110 THERAPEUTIC EXERCISES: CPT

## 2022-01-21 PROCEDURE — 85025 COMPLETE CBC W/AUTO DIFF WBC: CPT

## 2022-01-21 PROCEDURE — 84100 ASSAY OF PHOSPHORUS: CPT

## 2022-01-21 PROCEDURE — 94761 N-INVAS EAR/PLS OXIMETRY MLT: CPT

## 2022-01-21 RX ORDER — MECOBALAMIN 5000 MCG
10 TABLET,DISINTEGRATING ORAL NIGHTLY
Status: DISCONTINUED | OUTPATIENT
Start: 2022-01-21 | End: 2022-01-27 | Stop reason: HOSPADM

## 2022-01-21 RX ADMIN — GABAPENTIN 200 MG: 100 CAPSULE ORAL at 14:25

## 2022-01-21 RX ADMIN — ATORVASTATIN CALCIUM 80 MG: 40 TABLET, FILM COATED ORAL at 09:13

## 2022-01-21 RX ADMIN — QUETIAPINE FUMARATE 25 MG: 25 TABLET ORAL at 22:06

## 2022-01-21 RX ADMIN — GABAPENTIN 200 MG: 100 CAPSULE ORAL at 09:13

## 2022-01-21 RX ADMIN — Medication 2 PUFF: at 07:54

## 2022-01-21 RX ADMIN — CEFTRIAXONE 2000 MG: 2 INJECTION, POWDER, FOR SOLUTION INTRAMUSCULAR; INTRAVENOUS at 18:54

## 2022-01-21 RX ADMIN — LISINOPRIL 5 MG: 5 TABLET ORAL at 09:13

## 2022-01-21 RX ADMIN — POTASSIUM CHLORIDE 40 MEQ: 1500 TABLET, EXTENDED RELEASE ORAL at 09:15

## 2022-01-21 RX ADMIN — APIXABAN 5 MG: 5 TABLET, FILM COATED ORAL at 22:06

## 2022-01-21 RX ADMIN — METOPROLOL TARTRATE 50 MG: 50 TABLET, FILM COATED ORAL at 22:06

## 2022-01-21 RX ADMIN — Medication 10 ML: at 22:08

## 2022-01-21 RX ADMIN — INSULIN LISPRO 3 UNITS: 100 INJECTION, SOLUTION INTRAVENOUS; SUBCUTANEOUS at 09:21

## 2022-01-21 RX ADMIN — Medication 2 PUFF: at 20:52

## 2022-01-21 RX ADMIN — ASPIRIN 81 MG: 81 TABLET, COATED ORAL at 09:14

## 2022-01-21 RX ADMIN — INSULIN LISPRO 6 UNITS: 100 INJECTION, SOLUTION INTRAVENOUS; SUBCUTANEOUS at 11:43

## 2022-01-21 RX ADMIN — Medication 10 ML: at 09:15

## 2022-01-21 RX ADMIN — ENOXAPARIN SODIUM 40 MG: 100 INJECTION SUBCUTANEOUS at 09:13

## 2022-01-21 RX ADMIN — GABAPENTIN 200 MG: 100 CAPSULE ORAL at 22:06

## 2022-01-21 RX ADMIN — PANTOPRAZOLE SODIUM 40 MG: 40 TABLET, DELAYED RELEASE ORAL at 09:13

## 2022-01-21 RX ADMIN — METOPROLOL TARTRATE 50 MG: 50 TABLET, FILM COATED ORAL at 09:13

## 2022-01-21 RX ADMIN — BUDESONIDE AND FORMOTEROL FUMARATE DIHYDRATE 2 PUFF: 160; 4.5 AEROSOL RESPIRATORY (INHALATION) at 20:52

## 2022-01-21 RX ADMIN — INSULIN LISPRO 6 UNITS: 100 INJECTION, SOLUTION INTRAVENOUS; SUBCUTANEOUS at 22:10

## 2022-01-21 RX ADMIN — Medication 10 MG: at 22:05

## 2022-01-21 RX ADMIN — BUDESONIDE AND FORMOTEROL FUMARATE DIHYDRATE 2 PUFF: 160; 4.5 AEROSOL RESPIRATORY (INHALATION) at 07:53

## 2022-01-21 RX ADMIN — INSULIN GLARGINE 60 UNITS: 100 INJECTION, SOLUTION SUBCUTANEOUS at 22:10

## 2022-01-21 RX ADMIN — CLOPIDOGREL BISULFATE 75 MG: 75 TABLET ORAL at 09:13

## 2022-01-21 RX ADMIN — DULOXETINE HYDROCHLORIDE 60 MG: 60 CAPSULE, DELAYED RELEASE ORAL at 09:13

## 2022-01-21 RX ADMIN — DEXAMETHASONE SODIUM PHOSPHATE 6 MG: 10 INJECTION, SOLUTION INTRAMUSCULAR; INTRAVENOUS at 14:26

## 2022-01-21 RX ADMIN — INSULIN LISPRO 6 UNITS: 100 INJECTION, SOLUTION INTRAVENOUS; SUBCUTANEOUS at 18:56

## 2022-01-21 RX ADMIN — CEFTRIAXONE 2000 MG: 2 INJECTION, POWDER, FOR SOLUTION INTRAMUSCULAR; INTRAVENOUS at 06:17

## 2022-01-21 RX ADMIN — QUETIAPINE FUMARATE 12.5 MG: 25 TABLET ORAL at 09:14

## 2022-01-21 RX ADMIN — DIVALPROEX SODIUM 1000 MG: 500 TABLET, DELAYED RELEASE ORAL at 18:55

## 2022-01-21 ASSESSMENT — PAIN SCALES - GENERAL: PAINLEVEL_OUTOF10: 0

## 2022-01-21 ASSESSMENT — PAIN SCALES - WONG BAKER
WONGBAKER_NUMERICALRESPONSE: 0

## 2022-01-21 NOTE — PROGRESS NOTES
Inpatient Physical Therapy Daily Treatment Note    Unit: PCU  Date:  1/21/2022  Patient Name:    Malcolm Dia. Admitting diagnosis:  Unable to ambulate [R26.2]  Fall, initial encounter [W19. XXXA]  COVID-19 [U07.1]  Admit Date:  1/12/2022  Precautions/Restrictions:   fall risk, IV, bed/chair alarm, telemetry and continuous pulse ox, cognitive deficit, pacemaker, 2L of O2      Discharge Recommendations: ARU/IRF (inpatient rehab facility)   DME needs for discharge: defer to facility       Therapy recommendation for EMS Transport: requires transport by cot due to requires lift equiptment to transfer    Therapy recommendations for staff:   Assist of 1 with use of [address] for all transfers to/from [facility]  to/from chair  to/from chair (keep holding the patient on the R side to maintain trunk upright)  History of Present Illness: \"63 y. o. male with DMII, Chronic sCHF, CAD s/p stent placement x3, hx of MI, asthma, HTN, HLD, GERD, lumbar spondylosis and DDD s/p bilateral dorsal ramus medial branch ablation and interstim implant, depression who presented to [facility] ED with complaint of generalized weakness ongoing for approximately 2 months and frequent falls.     In ED, CT of the head showed no acute abnormality.  Chest x-ray with no acute cardiopulmonary process.  Patient did test positive for [place] is vaccinated x2, no booster.  Patient admitted to med-surg for further care. PT/OT consulted.      Mr. Quiroga developed rapid HR what appears to be Afib on TELE monitor and his pacer inappropriately delivered shock x 5 and this was witnessed by me and nursing staff.      Pt awake, conscious during these episodes and by the end of the 5th shock, he started getting slightly confused     Given cardizem 10 mg IV x 1 and metoprolol 5 mg with control of HR  EKG after this episode showed sinus tach\"     Progressively developed confusion , fevers , started on precedex gtt    Home Health S4 Level Recommendation: NA  AM-PAC Mobility Score   AM-PAC Inpatient Mobility Raw Score : 10  AM-PAC Inpatient Mobility without Stair Climbing Raw Score : [address]. scored a 10/24 on the AM-PAC short mobility form. Current research shows that an AM-PAC score of 17 or less is typically not associated with a discharge to the patient's home setting. If patient discharges prior to next session this note will serve as a discharge summary. Please see below for the latest assessment towards goals. Treatment Time:  16:32-17:05  Treatment number: 2  Timed Code Treatment Minutes: 33 minutes  Total Treatment Minutes:  33  minutes    Cognition    A&O Person, Place and Time   Able to follow 1 step commands with increased time. Demonstrated flat affest    Subjective  Patient lying supine in bed with no family present   Pt agreeable to this PT tx. Pain   Yes  Location: R knee  Rating:    mild/10  Pain Medicine Status: No request made     Bed Mobility   Supine to Sit:    Mod A ,used handrail,increased time  Sit to Supine: Mod A   Rolling:   Not Tested  Scooting:   CGA    Transfer Training     Sit to stand:   Not Tested  Stand to sit:   Not Tested  Bed to Chair:   Not Tested with use of N/A (patient just returned to bed form chair prior to this session      Therapeutic Exercise all completed bilaterally unless indicated  Sitting EOB: weight shifting S-S required min assist to prevent LOB to the Right; unilateral reaches with UE  LAQ,seated march. Patient demonstrated fatigue completing this task. Supine: bridging, UE weighted reach in hooklying, bent knee fall outs, SAQ  Balance  Sitting:  Fair ; fluctuated from [address] A to CGA  Comments: sat ~10 mintues, fatigued    Standing: Not tested; Not Tested  Comments:     Patient Education      Role of PT, POC, Discharge recommendations, safety awareness, transfer techniques and calling for assist with mobility.      Positioning Needs       Pt in bed, alarm set, positioned in proper neutral alignment and pressure relief provided. Call light provided and all needs within reach  Patient sat in high sitting to eat dinner      Activity Tolerance   Pt completed therapy session with No adverse symptoms noted w/activity. Spo2 remained 93-96% wearing 2 L  HR 70-80  Other  Patient observed eating partial meal. Patient coughed x 2 while chewing food, which cleared. RN made aware of this. Assessment :  Patient demonstrated good participation with this session. Continues to demonstrate impaired balance, and deconditioning, along with decreased overall mobility. Recommending ARU/IRF/Inpatient Rehab Facility upon discharge as patient functioning well below baseline, demonstrates good rehab potential and unable to return home due to limited or no family support, burden of care beyond caregiver ability and home environment not conducive to patient recovery. Goals : To be met in 3 visits:  1). Independent with LE Ex x 10 reps     To be met in 6 visits:  1). Supine to/from sit: CGA  2). Sit to/from stand: Min A   3). Bed to chair: Min A   4). Gait: Ambulate 10 ft.   with  Mod A  and use of LRAD (least restrictive assistive device)  5). Tolerate B LE exercises 3 sets of 10-15 reps    Plan   Continue with plan of care. Signature: Kaylee Faith, PT #[id number]    If patient discharges from this facility prior to next visit, this note will serve as the Discharge Summary.

## 2022-01-21 NOTE — PROGRESS NOTES
RT Inhaler-Nebulizer Bronchodilator Protocol Note    There is a bronchodilator order in the chart from a provider indicating to follow the RT Bronchodilator Protocol and there is an Initiate RT Inhaler-Nebulizer Bronchodilator Protocol order as well (see protocol at bottom of note). CXR Findings:  No results found. The findings from the last RT Protocol Assessment were as follows:   History Pulmonary Disease: Chronic pulmonary disease  Respiratory Pattern: Regular pattern and RR 12-20 bpm  Breath Sounds: Slightly diminished and/or crackles  Cough: Strong, spontaneous, non-productive  Indication for Bronchodilator Therapy: Decreased or absent breath sounds  Bronchodilator Assessment Score: 4    Aerosolized bronchodilator medication orders have been revised according to the RT Inhaler-Nebulizer Bronchodilator Protocol below. Respiratory Therapist to perform RT Therapy Protocol Assessment initially then follow the protocol. Repeat RT Therapy Protocol Assessment PRN for score 0-3 or on second treatment, BID, and PRN for scores above 3. No Indications - adjust the frequency to every 6 hours PRN wheezing or bronchospasm, if no treatments needed after 48 hours then discontinue using Per Protocol order mode. If indication present, adjust the RT bronchodilator orders based on the Bronchodilator Assessment Score as indicated below. Use Inhaler orders unless patient has one or more of the following: on home nebulizer, not able to hold breath for 10 seconds, is not alert and oriented, cannot activate and use MDI correctly, or respiratory rate 25 breaths per minute or more, then use the equivalent nebulizer order(s) with same Frequency and PRN reasons based on the score. If a patient is on this medication at home then do not decrease Frequency below that used at home.     0-3 - enter or revise RT bronchodilator order(s) to equivalent RT Bronchodilator order with Frequency of every 4 hours PRN for wheezing or increased work of breathing using Per Protocol order mode. 4-6 - enter or revise RT Bronchodilator order(s) to two equivalent RT bronchodilator orders with one order with BID Frequency and one order with Frequency of every 4 hours PRN wheezing or increased work of breathing using Per Protocol order mode. 7-10 - enter or revise RT Bronchodilator order(s) to two equivalent RT bronchodilator orders with one order with TID Frequency and one order with Frequency of every 4 hours PRN wheezing or increased work of breathing using Per Protocol order mode. 11-13 - enter or revise RT Bronchodilator order(s) to one equivalent RT bronchodilator order with QID Frequency and an Albuterol order with Frequency of every 4 hours PRN wheezing or increased work of breathing using Per Protocol order mode. Greater than 13 - enter or revise RT Bronchodilator order(s) to one equivalent RT bronchodilator order with every 4 hours Frequency and an Albuterol order with Frequency of every 2 hours PRN wheezing or increased work of breathing using Per Protocol order mode.      .    Electronically signed by Renee Saenz RCP on 1/21/2022 at 12:23 PM

## 2022-01-21 NOTE — PROGRESS NOTES
Progress Note    Admit Date:  1/12/2022    63 y.o. male with DMII, Chronic sCHF, CAD s/p stent placement x3, hx of MI, asthma, HTN, HLD, GERD, lumbar spondylosis and DDD s/p bilateral dorsal ramus medial branch ablation and interstim implant, depression who presented to [address] [place] ED with complaint of generalized weakness ongoing for approximately 2 months and frequent falls. In ED, CT of the head showed no acute abnormality. Chest x-ray with no acute cardiopulmonary process. Patient did test positive for COVID. He is vaccinated x2, no booster. Patient admitted to med-surg for further care. PT/OT consulted. 1/15  Mr. Quiroga developed rapid HR what appears to be Afib on TELE monitor and his pacer inappropriately delivered shock x 5 and this was witnessed by me and nursing staff. Pt awake, conscious during these episodes and by the end of the 5th shock, he started getting slightly confused    Given cardizem 10 mg IV x 1 and metoprolol 5 mg with control of HR  EKG after this episode showed sinus tach    Progressively developed confusion , fevers , started on precedex gtt    Pt transferred to ICU    Pt very confused and became agitated with removing lines. Now on precedex gtt, ct head neg   No further shocks from ICD. His ICD has been adjusted by cardiology       Tx to PCU on 1/19  Placed in restraints evening of 1/19  Pt has not slept in over 24 hours    Subjective:  He is off restraints now still having intermittent episodes of restlessness and agitation. .  Not slept well. On O2 2L    Objective:   Patient Vitals for the past 4 hrs:   BP Temp Temp src Pulse Resp SpO2   01/21/22 1415 137/72 98.7 °F (37.1 °C) Oral 73 18 93 %            Intake/Output Summary (Last 24 hours) at 1/21/2022 1548  Last data filed at 1/21/2022 1222  Gross per 24 hour   Intake 120 ml   Output --   Net 120 ml       Physical Exam:    Gen: middle aged obese male awake.  Alert and better oriented today   Appears to be not in any distress  Mucous Membranes:  Pink , anicteric  Neck: No JVD, no carotid bruit, no thyromegaly  Chest:  Clear to auscultation bilaterally, diminished in bases   Cardiovascular:  regular,  S1S2 heard, no murmurs or gallops- left sided ICD +  Abdomen:  Soft, undistended, non tender, no organomegaly, BS present  Extremities: No edema or cyanosis. Distal pulses well felt  Neurological : improving mentation but still with intermittent confusion  Right sided increased stiffness noted yesterday, this has resolved today  No facial weakness. Speech clear      Data:  CBC:   Recent Labs     01/19/22  0604 01/20/22  0533 01/21/22  0513   WBC 3.4* 2.8* 3.0*   HGB 13.4* 13.2* 13.8   HCT 39.2* 39.5* 40.4*   MCV 84.1 86.3 84.9   PLT 62* 75* 90*     BMP:   Recent Labs     01/19/22  0604 01/20/22  0533 01/21/22  0513   * 134* 134*   K 3.6  3.6 3.1* 3.1*   CL 95* 97* 97*   CO2 23 25 25   PHOS 2.7 2.3* 2.1*   BUN 11 7 7   CREATININE 0.7* 0.6* <0.5*     CULTURES  Results for Nury Bergeron (MRN [id number]) as of 1/13/2022 13:29    Ref. Range 1/12/2022 19:35   SARS-CoV-2, NAAT Latest Ref Range: Not Detected  DETECTED (AA)          RADIOLOGY  XR CHEST PORTABLE   Final Result   NG tube with the tip and side-port in the stomach. Bibasilar atelectasis. XR CHEST PORTABLE   Final Result   NG tube is difficult to visualize, tip is likely below the diaphragm. Repeat   exam could be considered if clinically indicated. XR CHEST PORTABLE   Final Result   New elevated right hemidiaphragm, likely atelectasis or possibly infiltrate. Subpulmonic effusion not excluded. No overt failure. CT HEAD WO CONTRAST   Final Result   1. No acute intracranial abnormality. CT Head WO Contrast   Final Result   No acute intracranial abnormality. XR CHEST PORTABLE   Final Result   No acute cardiopulmonary process.            Pertinent previous results reviewed   Diagnostic cardiac cath 7/13/21  CONCLUSION:  Successful stenting of the true circumflex into the obtuse  marginal branch, successful stenting of the left posterior descending  coronary artery and successful stenting of the obtuse marginal branch,  all 80% stenosis reduced to 0 with these three drug-eluting stents. JAMES-3 blood flow was present in the circumflex artery and all its  branches.  The LAD is normal with minor irregularities only and the left  main is normal.  Nondominant right coronary artery.  LV ejection  fraction of 40% with inferior wall and inferoposterior hypokinesia.     ECHO 11/27/19  Summary   The left ventricular systolic function is moderately reduced with an   ejection fraction of 35 -40 %.   There is hypokinesis of the inferior and basal inferior walls.   There is akinesis of the inferolateral wall.   Left ventricular cavity size is moderately dilated.   Compared to previous study from 2-2-2015 no changes noted in left   ventricular function.   Mild mitral regurgitation. Limited echo 1/15/2021   Summary   This is a limited study afib, ischemic cmp, recurrent pacer firing. LV systolic function appears mildly reduced with EF estimated at 45-50%. There is more prominent HK of the inferior wall. Left ventricular size is decreased. There is mild concentric left ventricular hypertrophy. 11- 35-40% inf basal hypo, inferolateral akinesis, LVE, mild MR       Assessment/Plan  # ICD firing - he had 5 witnessed shocks and then more bursts (4-5 additional episodes) of AICD discharges and concern for malfunctioning ICD  - s.p  [place] interrogation and now ICD is adjusted . - Cardiology consulted. -now back in NSR  - resumed  BB - toprol XL stopped due to low BP - decreased to  metoprolol 25 BID--  Increased on 1/20 to 50 mg BID    #Afibb with RVR  - new onset with above ICD issues  - resume home cardizem and BB if tolerated.  Can use IV meds intermittently  - was in AFIB with RVR this am now in NSR   - started eliquis but holding with low PLT, likely resume tomorrow if platelets remain to increase  - resumed eliquis 1/21  - cardiology following      #Hypokalemia  - keep greater than 4  - PRN potassium SS ordered     #Acute Encephalopathy - possible sec to covid encephalopathy   - NOT POA - developed slowly most pronounced after ICD firing  -CT head non focal x 2   -He denies any drug or alcohol use.   -precedex gtt for agitation used--- d/c on 1/19  - added seroquel  -Ammonia level wnl   -Consider MRI brain - unable to do with incompatible pacer  -Started on rocephin meningitis dose D#4  -Supportive care for now - improving slowly   -Stop prcedex gtt on 1/19  -Continue Seroquel- add 12.5 mg now and 25 at night  -Supportive care, removed restraints. - add Melatonin tonight        #COVID 19 infection   -Positive 1/12/22  -only complaints is a cough  -patient is vaccinated x2- no booster  - minimal  Hypoxia at 2 L.   - Decadron D4  -Monitor, continuous pulse ox and telemetry        #DMII  - not controlled noted to have >1000 glucose in urine on admission   -Hold home oral meds- resume lantus increase as tolerated  -Low dose SSI--switched to high dose SSI  -resume diet today . Removed NG      #Thrombocytopenia  -Plt dropping to 61--75--90  -Monitor CBC , hold eliquis, if remains stable, cardiology likely to resumed today   - Depakote can be contributing to this         #CAD s/p stent placement x3  #Hx of MI  #Hx of VTACH s.p ICD  - ASA, statin, plavix       #Chronic sCHF no AE   - Last echo 11/27/19, EF 35-40 % Limited echo completed 1/15 which showed EF 45-50%. - Pacemaker/ICD in place  -Continue toprol XL, cardizem  - patient takes Lasix on Monday and Thursday - holding at this time   - Lisinopril has been on hold, resume at a lower dose tomorrow patient is on 20 mg at home. Start 5 mg in am and can increase as blood pressure allows.  Holding parameters in place.     #Asthma no AE  -Symbicort  -albuterol prn     #HLD  -continue statin       #GERD  -continue ppi       #Lumbar spondylosis and DDD   -s/p bilateral dorsal ramus medial branch ablation and interstim implant  -PDMP displays on chronic gabapentin, continue  - follows with Dr. Arlet Velasco      #ALIN  - unsure if patient is compliant with CPAP ? If he still has CPAP at home       #Morbid Obesity  - Body mass index is 41.37 kg/m². - Complicating assessment and treatment. Placing patient at risk for multiple co-morbidities as well as early death and contributing to the patient's presentation.   - Counseled on weight loss.      #Depression  - patient is on Cymbalta 60 mg at home, medication verified with pharmacy. Will increase dose  - also takes Depakote 500 in am and 1000 in evening also verified with pharmacy. PT/OT consulted recommend ARU    DVT Prophylaxis: eliquis- on hold Lovenox at this time  Diet: ADULT DIET;  Regular; 4 carb choices (60 gm/meal)  ADULT ORAL NUTRITION SUPPLEMENT; Lunch, Dinner; Frozen Oral Supplement  Code Status: Full Code    Nichelle Chun MD

## 2022-01-21 NOTE — FLOWSHEET NOTE
01/20/22 2036   Vital Signs   Temp 98 °F (36.7 °C)   Temp Source Oral   Pulse 102   Heart Rate Source Monitor   Resp 20   /69   BP Location Left upper arm   Patient Position Lying right side   Level of Consciousness Alert (0)   MEWS Score 2   Patient Currently in Pain Denies   Pain Assessment   Pain Assessment 0-10   Pain Level 0   Morfin-Baker Pain Rating 0   Oxygen Therapy   SpO2 90 %   O2 Device Nasal cannula   O2 Flow Rate (L/min) 3 L/min     Shift assessment completed, see flow sheet. Patient is A&O x4. Medications administered. Patient denies further needs at this time. Call light within reach and telesitter in place for safety. Will continue to monitor.

## 2022-01-21 NOTE — CARE COORDINATION
ARU is continuing to follow progress with therapy and would need to be out of restraints for 24 hours. Any questions, please call [phone]  Thank you. Mari Washington RN

## 2022-01-21 NOTE — PROGRESS NOTES
Occupational Therapy Daily Treatment Note    Unit: PCU  Date:  1/21/2022  Patient Name:    Christina Garcia. Admitting diagnosis:  Unable to ambulate [R26.2]  Fall, initial encounter [W19. XXXA]  COVID-19 [U07.1]  Admit Date:  1/12/2022  Precautions/Restrictions:   fall risk, IV, bed/chair alarm, telemetry and continuous pulse ox, cognitive deficit, pacemaker, 2L of O2        Discharge Recommendations: Acute Rehab (ARU)/ Inpatient Rehab Facility (IRF)  DME needs for discharge: defer to facility       Therapy recommendations for staff:   Assist of 1 (minimal assist) with use of [place] for all transfers to/from BSC/chair    AM-PAC Score: AM-PAC Inpatient Daily Activity Raw Score: 14  Home Health S4 Level: NA       Treatment Time:  14:51-15:25  Treatment number:  2   Total Treatment Time:  34 minutes    History of Present Illness: 63 y. o. male with DMII, Chronic sCHF, CAD s/p stent placement x3, hx of MI, asthma, HTN, HLD, GERD, lumbar spondylosis and DDD s/p bilateral dorsal ramus medial branch ablation and interstim implant, depression who presented to [facility] ED with complaint of generalized weakness ongoing for approximately 2 months and frequent falls.     In ED, CT of the head showed no acute abnormality.  Chest x-ray with no acute cardiopulmonary process.  Patient did test positive for [place] is vaccinated x2, no booster.  Patient admitted to med-surg for further care. PT/OT consulted.      Mr. Quiroga developed rapid HR what appears to be Afib on TELE monitor and his pacer inappropriately delivered shock x 5 and this was witnessed by me and nursing staff.      Pt awake, conscious during these episodes and by the end of the 5th shock, he started getting slightly confused     Given cardizem 10 mg IV x 1 and metoprolol 5 mg with control of HR  EKG after this episode showed sinus tach     Progressively developed confusion , fevers , started on precedex gtt    Subjective:  Patient supine in bed and agreeable to treatment. Oriented x4. Patient continues to require increased time to process information and follow directions. Patient with flat affect and decreased motor planning with simple tasks. Pain   No  Rating: NA  Location:  Pain Medicine Status: Denies need      Bed Mobility:   Supine to Sit:  Mod A  Sit to Supine:  Not Tested  Rolling:           Min A  Scooting:        Min A     Sitting balance: patient with posterior lean, requiring intermittent min A to correct. Transfer Training:   Sit to stand:   Min A from EOB to STEDY. Min A from chair to RW  Stand to sit:  Min A  Bed to Chair:  Total A via [place]. Attempted to take steps with RW, patient with posterior lean and required mod A to correct loss of balance. Bed to [facility]:   Not Tested  Standard toilet:   Not Tested    Activity Tolerance   Pt completed therapy session with No adverse symptoms noted w/activity  SpO2: 94% on 2L of O2. Dropped to 86% while seated at EOB with NC doffed. Recovered with NC donned and PLB. HR: 70s  BP:     ADL Training: limited by poor motor planning and posterior lean. Upper body dressing: Mod A  Upper body bathing: Mod A  Lower body dressing:  Max A (patient losing balance when attempting to don socks)   Lower body bathing:  Max A  Toileting:   Not Tested  Grooming/Hygiene: Mod A    Therapeutic Exercise:   N/A    Patient Education:   Role of OT  Recommendations for DC    Positioning Needs:   Up in chair, call light and needs in reach. Alarm Set    Family Present:  No    Assessment: Patient with significant improvements with bed mobility and standing in STEDY. Patient is also more alert and following directions. There continues to be cognitive deficits, weakness, and poor balance. Patient would benefit from intensive inpatient rehabilitation to return to independent lifestyle safely. GOALS  To be met in 3 Visits:  1). Bed to toilet/BSC: Mod A and with use of RW     To be met in 5 Visits:  1).  Supine to/from Sit:             Min A  2). Upper Body Bathing:         Min A  3). Lower Body Bathing: Mod A  4). Upper Body Dressing:       Min A  5). Lower Body Dressing: Mod A  6).  Pt to demonstrate UE exs x 15 reps with minimal cues    Plan: cont with [facility], OTR/L #[id number]        If patient discharges from this facility prior to next visit, this note will serve as the Discharge Summary

## 2022-01-21 NOTE — PLAN OF CARE
Problem: Airway Clearance - Ineffective  Goal: Achieve or maintain patent airway  Outcome: Met This Shift     Problem: Gas Exchange - Impaired  Goal: Absence of hypoxia  Outcome: Met This Shift  Goal: Promote optimal lung function  Outcome: Met This Shift     Problem: Breathing Pattern - Ineffective  Goal: Ability to achieve and maintain a regular respiratory rate  Outcome: Met This Shift     Problem:  Body Temperature -  Risk of, Imbalanced  Goal: Ability to maintain a body temperature within defined limits  Outcome: Met This Shift  Goal: Will regain or maintain usual level of consciousness  Outcome: Met This Shift  Goal: Complications related to the disease process, condition or treatment will be avoided or minimized  Outcome: Met This Shift     Problem: Isolation Precautions - Risk of Spread of Infection  Goal: Prevent transmission of infection  Outcome: Met This Shift     Problem: Nutrition Deficits  Goal: Optimize nutritional status  Outcome: Met This Shift     Problem: Risk for Fluid Volume Deficit  Goal: Maintain normal heart rhythm  Outcome: Met This Shift  Goal: Maintain absence of muscle cramping  Outcome: Met This Shift  Goal: Maintain normal serum potassium, sodium, calcium, phosphorus, and pH  Outcome: Met This Shift     Problem: Loneliness or Risk for Loneliness  Goal: Demonstrate positive use of time alone when socialization is not possible  Outcome: Met This Shift     Problem: Fatigue  Goal: Verbalize increase energy and improved vitality  Outcome: Met This Shift     Problem: Patient Education: Go to Patient Education Activity  Goal: Patient/Family Education  Outcome: Met This Shift     Problem: Falls - Risk of:  Goal: Will remain free from falls  Description: Will remain free from falls  Outcome: Met This Shift  Goal: Absence of physical injury  Description: Absence of physical injury  Outcome: Met This Shift     Problem: Skin Integrity:  Goal: Will show no infection signs and symptoms  Description: Will show no infection signs and symptoms  Outcome: Met This Shift  Goal: Absence of new skin breakdown  Description: Absence of new skin breakdown  Outcome: Met This Shift     Problem: Confusion - Acute:  Goal: Absence of continued neurological deterioration signs and symptoms  Description: Absence of continued neurological deterioration signs and symptoms  Outcome: Met This Shift  Goal: Mental status will be restored to baseline  Description: Mental status will be restored to baseline  Outcome: Met This Shift     Problem: Discharge Planning:  Goal: Ability to perform activities of daily living will improve  Description: Ability to perform activities of daily living will improve  Outcome: Met This Shift  Goal: Participates in care planning  Description: Participates in care planning  Outcome: Met This Shift     Problem: Injury - Risk of, Physical Injury:  Goal: Will remain free from falls  Description: Will remain free from falls  Outcome: Met This Shift  Goal: Absence of physical injury  Description: Absence of physical injury  Outcome: Met This Shift     Problem: Mood - Altered:  Goal: Mood stable  Description: Mood stable  Outcome: Met This Shift  Goal: Absence of abusive behavior  Description: Absence of abusive behavior  Outcome: Met This Shift  Goal: Verbalizations of feeling emotionally comfortable while being cared for will increase  Description: Verbalizations of feeling emotionally comfortable while being cared for will increase  Outcome: Met This Shift     Problem: Psychomotor Activity - Altered:  Goal: Absence of psychomotor disturbance signs and symptoms  Description: Absence of psychomotor disturbance signs and symptoms  Outcome: Met This Shift     Problem: Sensory Perception - Impaired:  Goal: Demonstrations of improved sensory functioning will increase  Description: Demonstrations of improved sensory functioning will increase  Outcome: Met This Shift  Goal: Decrease in sensory misperception frequency  Description: Decrease in sensory misperception frequency  Outcome: Met This Shift  Goal: Able to refrain from responding to false sensory perceptions  Description: Able to refrain from responding to false sensory perceptions  Outcome: Met This Shift  Goal: Demonstrates accurate environmental perceptions  Description: Demonstrates accurate environmental perceptions  Outcome: Met This Shift  Goal: Able to distinguish between reality-based and nonreality-based thinking  Description: Able to distinguish between reality-based and nonreality-based thinking  Outcome: Met This Shift  Goal: Able to interrupt nonreality-based thinking  Description: Able to interrupt nonreality-based thinking  Outcome: Met This Shift     Problem: Sleep Pattern Disturbance:  Goal: Appears well-rested  Description: Appears well-rested  Outcome: Met This Shift     Problem: Non-Violent Restraints  Goal: Removal from restraints as soon as assessed to be safe  Outcome: Met This Shift  Goal: No harm/injury to patient while restraints in use  Outcome: Met This Shift  Goal: Patient's dignity will be maintained  Outcome: Met This Shift     Problem: Nutrition  Goal: Optimal nutrition therapy  1/21/2022 0019 by Swathi Lemon RN  Outcome: Met This Shift  1/20/2022 1338 by Nirmal Mays RD, LD  Outcome: Ongoing  Goal: Understanding of nutritional guidelines  1/21/2022 0019 by Swathi Lemon RN  Outcome: Met This Shift  1/20/2022 1338 by Nirmal Mays RD, LD  Outcome: Ongoing     Problem: Pain:  Goal: Pain level will decrease  Description: Pain level will decrease  Outcome: Met This Shift  Goal: Control of acute pain  Description: Control of acute pain  Outcome: Met This Shift  Goal: Control of chronic pain  Description: Control of chronic pain  Outcome: Met This Shift

## 2022-01-21 NOTE — PROGRESS NOTES
RT Inhaler-Nebulizer Bronchodilator Protocol Note    There is a bronchodilator order in the chart from a provider indicating to follow the RT Bronchodilator Protocol and there is an Initiate RT Inhaler-Nebulizer Bronchodilator Protocol order as well (see protocol at bottom of note). CXR Findings:  No results found. The findings from the last RT Protocol Assessment were as follows:   History Pulmonary Disease: Chronic pulmonary disease  Respiratory Pattern: Regular pattern and RR 12-20 bpm  Breath Sounds: Slightly diminished and/or crackles  Cough: Strong, spontaneous, non-productive  Indication for Bronchodilator Therapy: Decreased or absent breath sounds  Bronchodilator Assessment Score: 4    Aerosolized bronchodilator medication orders have been revised according to the RT Inhaler-Nebulizer Bronchodilator Protocol below. Respiratory Therapist to perform RT Therapy Protocol Assessment initially then follow the protocol. Repeat RT Therapy Protocol Assessment PRN for score 0-3 or on second treatment, BID, and PRN for scores above 3. No Indications - adjust the frequency to every 6 hours PRN wheezing or bronchospasm, if no treatments needed after 48 hours then discontinue using Per Protocol order mode. If indication present, adjust the RT bronchodilator orders based on the Bronchodilator Assessment Score as indicated below. Use Inhaler orders unless patient has one or more of the following: on home nebulizer, not able to hold breath for 10 seconds, is not alert and oriented, cannot activate and use MDI correctly, or respiratory rate 25 breaths per minute or more, then use the equivalent nebulizer order(s) with same Frequency and PRN reasons based on the score. If a patient is on this medication at home then do not decrease Frequency below that used at home.     0-3 - enter or revise RT bronchodilator order(s) to equivalent RT Bronchodilator order with Frequency of every 4 hours PRN for wheezing or increased work of breathing using Per Protocol order mode. 4-6 - enter or revise RT Bronchodilator order(s) to two equivalent RT bronchodilator orders with one order with BID Frequency and one order with Frequency of every 4 hours PRN wheezing or increased work of breathing using Per Protocol order mode. 7-10 - enter or revise RT Bronchodilator order(s) to two equivalent RT bronchodilator orders with one order with TID Frequency and one order with Frequency of every 4 hours PRN wheezing or increased work of breathing using Per Protocol order mode. 11-13 - enter or revise RT Bronchodilator order(s) to one equivalent RT bronchodilator order with QID Frequency and an Albuterol order with Frequency of every 4 hours PRN wheezing or increased work of breathing using Per Protocol order mode. Greater than 13 - enter or revise RT Bronchodilator order(s) to one equivalent RT bronchodilator order with every 4 hours Frequency and an Albuterol order with Frequency of every 2 hours PRN wheezing or increased work of breathing using Per Protocol order mode.        Electronically signed by Lola Wiseman RCP on 1/20/2022 at 9:00 PM

## 2022-01-21 NOTE — PROGRESS NOTES
[address]   Daily Progress Note    Admit Date:  1/12/2022  HPI:    Chief Complaint   Patient presents with   Bita Odonnell Fall     pt reports trying to get out of bed to use restroom, reports falling three times en route to bathroom. Denies any known injurys. Bruise to right flank. Per EMS pt has fallen approx 8 times in the last two days, pt lives at home alone. Interval history: Virgie Soares is being followed for afib and ICD shocks. Admitted with falls, being treated for COVID, encephalopathy, anemia. Subjective:  Mr. Lauri Álvarez up to the chair. No chest pain. No shortness of breath. Remains in NSR with PVCs.      Objective:   BP (P) 137/72   Pulse (P) 73   Temp (P) 98.7 °F (37.1 °C) (Oral)   Resp (P) 18   Ht 5' 4\" (1.626 m)   Wt 208 lb 11.2 oz (94.7 kg)   SpO2 (P) 93%   BMI 35.82 kg/m²       Intake/Output Summary (Last 24 hours) at 1/21/2022 1432  Last data filed at 1/21/2022 1222  Gross per 24 hour   Intake 120 ml   Output --   Net 120 ml       NYHA: III    Physical Exam:  General:  Awake, alert, NAD, up to the chair   Skin:  Warm and dry  Neck:  JVD<8  Chest:  Clear throughout  to auscultation, no wheezes/rhonchi/rales  Telemetry: NSR  Cardiovascular:  RRR S1S2, no m/r/g   Abdomen:  Soft, nontender, +bowel sounds  Extremities:  no bilateral lower extremity edema, cool feet     Medications:    metoprolol tartrate  50 mg Oral BID    QUEtiapine  12.5 mg Oral Daily    QUEtiapine  25 mg Oral Nightly    [Held by provider] divalproex  500 mg Oral QAM    DULoxetine  60 mg Oral Daily    divalproex  1,000 mg Oral QPM    lisinopril  5 mg Oral Daily    insulin lispro  0-18 Units SubCUTAneous TID WC    insulin lispro  0-9 Units SubCUTAneous Nightly    enoxaparin  40 mg SubCUTAneous BID    aspirin  81 mg Oral Daily    dexamethasone  6 mg IntraVENous Q24H    cefTRIAXone (ROCEPHIN) IV  2,000 mg IntraVENous Q12H    [Held by provider] apixaban  5 mg Oral BID    sodium chloride flush  5-40 mL IntraVENous 2 times per day    budesonide-formoterol  2 puff Inhalation BID    albuterol sulfate HFA  2 puff Inhalation BID    atorvastatin  80 mg Oral Daily    clopidogrel  75 mg Oral Daily    pantoprazole  40 mg Oral Daily    fluticasone  2 spray Each Nostril Daily    gabapentin  200 mg Oral TID    insulin glargine  60 Units SubCUTAneous Nightly      sodium chloride 25 mL (01/19/22 8046)    dextrose         Lab Data:  CBC:   Recent Labs     01/19/22  0604 01/20/22  0533 01/21/22  0513   WBC 3.4* 2.8* 3.0*   HGB 13.4* 13.2* 13.8   PLT 62* 75* 90*     BMP:    Recent Labs     01/19/22  0604 01/20/22  0533 01/21/22  0513   * 134* 134*   K 3.6  3.6 3.1* 3.1*   CO2 23 25 25   BUN 11 7 7   CREATININE 0.7* 0.6* <0.5*     INR:  No results for input(s): INR in the last 72 hours. BNP:  No results for input(s): PROBNP in the last 72 hours. Lab Results   Component Value Date    LVEF 40 07/13/2021    LVEFMODE Cardiac Cath 07/13/2021       Testing:    Echo 1/15/2022: This is a limited study afib, ischemic cmp, recurrent pacer firing.   LV systolic function appears mildly reduced with EF estimated at 45-50%.   There is more prominent HK of the inferior wall.   Left ventricular size is decreased.   There is mild concentric left ventricular hypertrophy.   22- 35-40% inf basal hypo, inferolateral akinesis, LVE, mild MR     Coronary angiogram 7/2021:  INDICATION FOR PROCEDURE:  The patient had a recent episode of syncope. He has known coronary artery disease with prior defibrillator placement.   There were no ICD discharges.  A nuclear scan was performed which showed  inferior, inferolateral ischemia.  His ejection fraction is known to be  reduced in the 40% range.  Because of the findings of the nuclear scan,  the recent syncope and the known coronary artery disease, status post  stenting as for MI in the past, he presents today for invasive risk  stratification with coronary angiography. DESCRIPTION OF PROCEDURE:  The start time of the procedure was 10:28,  the stop time was 11:29.  The patient was sedated per my order during  the entire procedure with a total of 3 mg of Versed and 175 mcg of  fentanyl given in divided doses by my order.  We did monitor the  patient's cardiopulmonary status that includes myself during the entire  procedure. After informed consent was obtained, the patient was prepped and draped  in a sterile manner.  Locally anesthetized with 1% lidocaine in the  right femoral region.  A 5-[place] sheath was placed in a retrograde  manner in right femoral artery over a guidewire.  JL4, JR4 and pigtail  catheters were used during the diagnostic procedure.  All were aspirated  and flushed prior to use. FINDINGS:  The left main coronary artery is normal.  The left anterior  descending coronary artery has minor luminal irregularities.  There is  JAMES grade 3 flow.  Diagonal branches unremarkable, courses around the  apex of the left ventricle.  The circumflex is a dominant artery.  The  circumflex early on comes off in the AV groove and there is a stent in  the distal circumflex into the obtuse marginal branch.  That obtuse  marginal continues on and forms the left posterior descending and a  large obtuse marginal branch.  Just in front of the stent in the OM,  there is 80% stenosis. Lenetta Cave is some haziness within the proximal  portion of the stent.  The true left PDA and its mid distal area has an  80% stenosis. Lenetta Cave is pre-aneurysmal dilatation of that artery. The obtuse marginal branch is large and feeds a significant portion of  the posterolateral wall of left ventricle and there was an 80% diffuse  stenosis from the bifurcation.  It does not include the bifurcation,  however.   The JR4 catheter engages the right coronary artery.  It is a nondominant  right coronary artery. Lenetta Cave was some dampening of pressure, but there  was no disease with two small RV branches. A pigtail catheter in the left ventricle showed a post angiogram LVEDP  of 15 mmHg.  Power injection in the SANDERS projection shows inferior wall  and inferoposterior wall hypokinesia with an LV ejection fraction  estimated at 40%.  The anterior wall and the apex appeared to be moving  well.  There was no mitral regurgitation.  There was no gradient upon  pullback from the left ventricle to the ascending aorta. The 5-Ethiopian sheath was upsized to a 6-[place]ian sheath over the 0.035  J-wire.  Injection of the sheath showed that it was located squarely in  the normal right common femoral artery. An XP 3.5 guide was used to engage the left main coronary artery.  BMW  guidewire was advanced from the circumflex into the left posterior  descending coronary artery.  This was beyond the 80% stenosis.  I chose  a 2.5 x 15 mm Resolute [place] drug-eluting stent.  The stent was deployed  at 14 atmospheres x20 seconds.  There was JAMES-3 blood flow with nearly  0% residual stenosis.  The BMW guidewire was redirected into the obtuse  marginal branch beyond the diffuse 80% stenosis and I took a 2.5 x 22 mm  Resolute [place] drug-eluting stent, inflated that to 14 atmospheres x20  seconds with JAMES-3 blood flow and nearly 0% residual stenosis. Next, my attention was turned to the circumflex and the early portion of  that obtuse marginal where there was a large stent in place, that stent  proximal to it had a fairly focal 80% stenosis and there was some  narrowing within the proximal aspect of that stent.  I chose a 3.5 x 18  Resolute [place] drug-eluting stent and deployed that at 16 atmospheres x25  seconds.  There was JAMES-3 flow with nearly 0% residual stenosis.  The  patient tolerated the procedure well and the angioplasty hardware was  removed.   An Evolution Angio-Seal device was deployed. Marene Worth was given during  the procedure.  At the conclusion of the procedure, the ACT was 350  seconds.  I gave one dose of Integrilin bolus and then Plavix 600 mg. The angioplasty hardware like I said was removed and the Angio-Seal  device was deployed successfully with hemostasis achieved. CONCLUSION:  Successful stenting of the true circumflex into the obtuse  marginal branch, successful stenting of the left posterior descending  coronary artery and successful stenting of the obtuse marginal branch,  all 80% stenosis reduced to 0 with these three drug-eluting stents. JAMES-3 blood flow was present in the circumflex artery and all its  branches.  The LAD is normal with minor irregularities only and the left  main is normal.  Nondominant right coronary artery.  LV ejection  fraction of 40% with inferior wall and inferoposterior hypokinesia. PLAN:  Hydration, bedrest, risk factor modifications to continue. Active Problems:    Automatic implantable cardioverter-defibrillator in situ    CAD (coronary artery disease)    Gastroesophageal reflux disease without esophagitis    Ischemic cardiomyopathy    DM (diabetes mellitus), secondary, uncontrolled, w/neurologic complic (HCC)    HTN (hypertension), benign    Coarse tremor    Generalized weakness    COVID-19    Fall    Unable to ambulate    Implantable cardioverter-defibrillator (ICD) discharge    AICD malfunction    Hypotension    Valproic acid toxicity    PAF (paroxysmal atrial fibrillation) ([address]    COVID-19 virus infection    Disorder of electrolytes    Atrial fibrillation with RVR (HCC)    Low grade fever    Thrombocytopenia (HCC)    Abnormal chest x-ray  Resolved Problems:    * No resolved hospital problems.  *      Assessment:  PAF with RVR- resolved    - inappropriate ICD shocks 1/15/22  CAD s/p PCI to LCx, LPDA, L OM 7/2021  Ischemic cardiomyopathy  Hx of VT s/p ICD   -ICD is NOT MRI compatible   Systolic heart failure- chronic  COVID 19  Dm  ALIN  Encephalopathy      Plan:  Was loaded with digoxin 1/20/22 with successful conversion to NSR  Metoprolol 50mg BID  Restart eliquis 5mg BID    stop the aspirin to avoid triple therapy  Continue plavix   Replace potassium and target K of 4; will give an additional 40meq today   Lisinopril 5mg daily     Discussed with nursing  Cardiology will sign off, please call with questions       ANA Howe - CNP,  1/21/2022, 3:36 PM

## 2022-01-21 NOTE — CARE COORDINATION
INTERDISCIPLINARY PLAN OF CARE CONFERENCE    Date/Time: 1/21/2022 1:25 PM  Completed by: Cammy Gutierrez, RN, Case Management      Patient Name:  Oli Orlando. YOB: 1960  Admitting Diagnosis: Unable to ambulate [R26.2]  Fall, initial encounter [W19. DDRF]  [id number] [U07.1]     Admit Date/Time:  1/12/2022  6:41 PM    Chart reviewed. Interdisciplinary team contacted or reviewed plan related to patient progress and discharge plans. Disciplines included Case Management, Nursing, and Dietitian. Current Status:ongoing  PT/OT recommendation for discharge plan of care: ARU    Expected D/C Disposition:  AARU  Confirmed plan with patient and/or family Yes confirmed with: (name) Kaykay Howell ([phone])--attempted but no VM set up and he did not answer  Met with:Miguelito ([phone])--attempted but no VM set up and he did not answer    Discharge Plan Comments: Reviewed chart. LYNN attempted to call pt's primary contact/brother, Kaykay Howell ([phone]), but no VM was set up and he did not answer the phone. Pt lives alone at home and is independent prior to admission. Per Olaf Palacios RN, pt no longer has restraints. Pt is covid pos as of 1/12/22. Per Yumiko Howell with [facility], pt will be out of covid precautions as of 1/23/22 and they could accept then, if they are accepting pt. Awaiting for updated PT/OT notes. LYNN informed Wilber with PT that updated notes are needed.  She verbalized understanding      Home O2 in place on admit: No  Pt informed of need to bring portable home O2 tank on day of discharge for nursing to connect prior to leaving:  No  Verbalized agreement/Understanding:  No

## 2022-01-22 LAB
ANION GAP SERPL CALCULATED.3IONS-SCNC: 12 MMOL/L (ref 3–16)
BASOPHILS ABSOLUTE: 0 K/UL (ref 0–0.2)
BASOPHILS RELATIVE PERCENT: 0.1 %
BUN BLDV-MCNC: 9 MG/DL (ref 7–20)
CALCIUM SERPL-MCNC: 9 MG/DL (ref 8.3–10.6)
CHLORIDE BLD-SCNC: 99 MMOL/L (ref 99–110)
CO2: 26 MMOL/L (ref 21–32)
CREAT SERPL-MCNC: <0.5 MG/DL (ref 0.9–1.3)
EOSINOPHILS ABSOLUTE: 0 K/UL (ref 0–0.6)
EOSINOPHILS RELATIVE PERCENT: 0.1 %
GFR AFRICAN AMERICAN: >60
GFR NON-AFRICAN AMERICAN: >60
GLUCOSE BLD-MCNC: 132 MG/DL (ref 70–99)
GLUCOSE BLD-MCNC: 139 MG/DL (ref 70–99)
GLUCOSE BLD-MCNC: 191 MG/DL (ref 70–99)
GLUCOSE BLD-MCNC: 241 MG/DL (ref 70–99)
GLUCOSE BLD-MCNC: 243 MG/DL (ref 70–99)
GLUCOSE BLD-MCNC: 290 MG/DL (ref 70–99)
HCT VFR BLD CALC: 39.6 % (ref 40.5–52.5)
HEMOGLOBIN: 13.4 G/DL (ref 13.5–17.5)
LYMPHOCYTES ABSOLUTE: 0.6 K/UL (ref 1–5.1)
LYMPHOCYTES RELATIVE PERCENT: 24.1 %
MAGNESIUM: 2.1 MG/DL (ref 1.8–2.4)
MCH RBC QN AUTO: 28.6 PG (ref 26–34)
MCHC RBC AUTO-ENTMCNC: 33.8 G/DL (ref 31–36)
MCV RBC AUTO: 84.4 FL (ref 80–100)
MONOCYTES ABSOLUTE: 0.3 K/UL (ref 0–1.3)
MONOCYTES RELATIVE PERCENT: 10.9 %
NEUTROPHILS ABSOLUTE: 1.6 K/UL (ref 1.7–7.7)
NEUTROPHILS RELATIVE PERCENT: 64.8 %
PDW BLD-RTO: 15.7 % (ref 12.4–15.4)
PERFORMED ON: ABNORMAL
PHOSPHORUS: 2.9 MG/DL (ref 2.5–4.9)
PLATELET # BLD: 109 K/UL (ref 135–450)
PMV BLD AUTO: 7 FL (ref 5–10.5)
POTASSIUM SERPL-SCNC: 3.2 MMOL/L (ref 3.5–5.1)
RBC # BLD: 4.69 M/UL (ref 4.2–5.9)
SODIUM BLD-SCNC: 137 MMOL/L (ref 136–145)
WBC # BLD: 2.5 K/UL (ref 4–11)

## 2022-01-22 PROCEDURE — 80048 BASIC METABOLIC PNL TOTAL CA: CPT

## 2022-01-22 PROCEDURE — 6370000000 HC RX 637 (ALT 250 FOR IP): Performed by: NURSE PRACTITIONER

## 2022-01-22 PROCEDURE — 2700000000 HC OXYGEN THERAPY PER DAY

## 2022-01-22 PROCEDURE — 97530 THERAPEUTIC ACTIVITIES: CPT

## 2022-01-22 PROCEDURE — 2060000000 HC ICU INTERMEDIATE R&B

## 2022-01-22 PROCEDURE — 94761 N-INVAS EAR/PLS OXIMETRY MLT: CPT

## 2022-01-22 PROCEDURE — 6370000000 HC RX 637 (ALT 250 FOR IP): Performed by: INTERNAL MEDICINE

## 2022-01-22 PROCEDURE — 97535 SELF CARE MNGMENT TRAINING: CPT

## 2022-01-22 PROCEDURE — 36415 COLL VENOUS BLD VENIPUNCTURE: CPT

## 2022-01-22 PROCEDURE — 85025 COMPLETE CBC W/AUTO DIFF WBC: CPT

## 2022-01-22 PROCEDURE — 2580000003 HC RX 258: Performed by: INTERNAL MEDICINE

## 2022-01-22 PROCEDURE — 94640 AIRWAY INHALATION TREATMENT: CPT

## 2022-01-22 PROCEDURE — 97116 GAIT TRAINING THERAPY: CPT

## 2022-01-22 PROCEDURE — 84100 ASSAY OF PHOSPHORUS: CPT

## 2022-01-22 PROCEDURE — 6360000002 HC RX W HCPCS: Performed by: INTERNAL MEDICINE

## 2022-01-22 PROCEDURE — 83735 ASSAY OF MAGNESIUM: CPT

## 2022-01-22 RX ADMIN — DULOXETINE HYDROCHLORIDE 60 MG: 60 CAPSULE, DELAYED RELEASE ORAL at 08:52

## 2022-01-22 RX ADMIN — QUETIAPINE FUMARATE 25 MG: 25 TABLET ORAL at 20:28

## 2022-01-22 RX ADMIN — INSULIN GLARGINE 60 UNITS: 100 INJECTION, SOLUTION SUBCUTANEOUS at 21:11

## 2022-01-22 RX ADMIN — GABAPENTIN 200 MG: 100 CAPSULE ORAL at 20:28

## 2022-01-22 RX ADMIN — Medication 10 ML: at 08:53

## 2022-01-22 RX ADMIN — GABAPENTIN 200 MG: 100 CAPSULE ORAL at 14:55

## 2022-01-22 RX ADMIN — GABAPENTIN 200 MG: 100 CAPSULE ORAL at 08:52

## 2022-01-22 RX ADMIN — SODIUM CHLORIDE 25 ML: 9 INJECTION, SOLUTION INTRAVENOUS at 05:57

## 2022-01-22 RX ADMIN — ATORVASTATIN CALCIUM 80 MG: 40 TABLET, FILM COATED ORAL at 08:52

## 2022-01-22 RX ADMIN — Medication 10 ML: at 20:27

## 2022-01-22 RX ADMIN — INSULIN LISPRO 6 UNITS: 100 INJECTION, SOLUTION INTRAVENOUS; SUBCUTANEOUS at 08:58

## 2022-01-22 RX ADMIN — Medication 10 MG: at 20:28

## 2022-01-22 RX ADMIN — METOPROLOL TARTRATE 50 MG: 50 TABLET, FILM COATED ORAL at 20:28

## 2022-01-22 RX ADMIN — POTASSIUM CHLORIDE 40 MEQ: 1500 TABLET, EXTENDED RELEASE ORAL at 06:24

## 2022-01-22 RX ADMIN — DIVALPROEX SODIUM 1000 MG: 500 TABLET, DELAYED RELEASE ORAL at 17:53

## 2022-01-22 RX ADMIN — DIVALPROEX SODIUM 500 MG: 500 TABLET, DELAYED RELEASE ORAL at 08:52

## 2022-01-22 RX ADMIN — CEFTRIAXONE 2000 MG: 2 INJECTION, POWDER, FOR SOLUTION INTRAMUSCULAR; INTRAVENOUS at 17:59

## 2022-01-22 RX ADMIN — CLOPIDOGREL BISULFATE 75 MG: 75 TABLET ORAL at 08:52

## 2022-01-22 RX ADMIN — BUDESONIDE AND FORMOTEROL FUMARATE DIHYDRATE 2 PUFF: 160; 4.5 AEROSOL RESPIRATORY (INHALATION) at 20:31

## 2022-01-22 RX ADMIN — APIXABAN 5 MG: 5 TABLET, FILM COATED ORAL at 20:28

## 2022-01-22 RX ADMIN — QUETIAPINE FUMARATE 12.5 MG: 25 TABLET ORAL at 08:52

## 2022-01-22 RX ADMIN — LISINOPRIL 5 MG: 5 TABLET ORAL at 08:52

## 2022-01-22 RX ADMIN — METOPROLOL TARTRATE 50 MG: 50 TABLET, FILM COATED ORAL at 08:52

## 2022-01-22 RX ADMIN — CEFTRIAXONE 2000 MG: 2 INJECTION, POWDER, FOR SOLUTION INTRAMUSCULAR; INTRAVENOUS at 05:58

## 2022-01-22 RX ADMIN — INSULIN LISPRO 5 UNITS: 100 INJECTION, SOLUTION INTRAVENOUS; SUBCUTANEOUS at 21:11

## 2022-01-22 RX ADMIN — Medication 2 PUFF: at 20:31

## 2022-01-22 RX ADMIN — DEXAMETHASONE SODIUM PHOSPHATE 6 MG: 10 INJECTION, SOLUTION INTRAMUSCULAR; INTRAVENOUS at 14:55

## 2022-01-22 RX ADMIN — PANTOPRAZOLE SODIUM 40 MG: 40 TABLET, DELAYED RELEASE ORAL at 08:52

## 2022-01-22 RX ADMIN — APIXABAN 5 MG: 5 TABLET, FILM COATED ORAL at 08:52

## 2022-01-22 NOTE — FLOWSHEET NOTE
01/21/22 2122   Vital Signs   Temp 96.9 °F (36.1 °C)   Temp Source Oral   Pulse 94   Heart Rate Source Monitor   Resp 18   BP (!) 115/58   BP Location Left upper arm   Patient Position Lying right side   Level of Consciousness Alert (0)   MEWS Score 1   Patient Currently in Pain Denies   Oxygen Therapy   SpO2 94 %   Pulse Oximeter Device Mode Continuous   Pulse Oximeter Device Location Finger   O2 Device Nasal cannula   O2 Flow Rate (L/min) 2 L/min     Shift assessment complete, see flowsheets. Medications given see MAR. Pt A&Ox4. VSS. No s/s of distress. Drink provided. Repositioned for comfort. Complete bed/gown change. Bed locked in lowest position. Call light and bedside table within reach. Will continue to monitor.

## 2022-01-22 NOTE — PROGRESS NOTES
Progress Note    Admit Date:  1/12/2022    63 y.o. male with DMII, Chronic sCHF, CAD s/p stent placement x3, hx of MI, asthma, HTN, HLD, GERD, lumbar spondylosis and DDD s/p bilateral dorsal ramus medial branch ablation and interstim implant, depression who [facility] with complaint of generalized weakness ongoing for approximately 2 months and frequent falls. In ED, CT of the head showed no acute abnormality. Chest x-ray with no acute cardiopulmonary process. Patient did test positive for COVID. He is vaccinated x2, no booster. Patient admitted to med-surg for further care. PT/OT consulted. 1/15  Mr. Quiroga developed rapid HR what appears to be Afib on TELE monitor and his pacer inappropriately delivered shock x 5 and this was witnessed by me and nursing staff. Pt awake, conscious during these episodes and by the end of the 5th shock, he started getting slightly confused    Given cardizem 10 mg IV x 1 and metoprolol 5 mg with control of HR  EKG after this episode showed sinus tach    Progressively developed confusion , fevers , started on precedex gtt    Pt transferred to ICU    Pt very confused and became agitated with removing lines. Now on precedex gtt, ct head neg   No further shocks from ICD. His ICD has been adjusted by cardiology       Tx to PCU on 1/19  Placed in restraints evening of 1/19. He had not slept in a couple of days. Is doing much better now. Subjective:  Patient has remained stable off of restraints. He slept much better yesterday,  got melatonin. He is more calm now oxygen saturation stable on 2 L. .          Objective: Meryle Sandman      Vitals:    01/21/22 2122 01/22/22 0157 01/22/22 0326 01/22/22 0849   BP: (!) 115/58  131/75 129/68   Pulse: 94  67 65   Resp: 18  18 18   Temp: 96.9 °F (36.1 °C)  96.9 °F (36.1 °C) 97 °F (36.1 °C)   TempSrc: Oral  Axillary Oral   SpO2: 94%  93% 93%   Weight:  214 lb (97.1 kg)     Height:  5' 4\" (1.626 m) Intake/Output Summary (Last 24 hours) at 1/22/2022 1421  Last data filed at 1/22/2022 1211  Gross per 24 hour   Intake 928.51 ml   Output --   Net 928.51 ml       Physical Exam:    Gen: middle aged obese male awake. Alert and better oriented today   Appears to be not in any distress  Mucous Membranes:  Pink , anicteric  Neck: No JVD, no carotid bruit, no thyromegaly  Chest:  Clear to auscultation bilaterally, diminished in bases   Cardiovascular:  regular,  S1S2 heard, no murmurs or gallops- left sided ICD +  Abdomen:  Soft, undistended, non tender, no organomegaly, BS present  Extremities: No edema or cyanosis. Distal pulses well felt  Neurological : improving mentation but still with intermittent confusion  Right sided increased stiffness noted yesterday, this has resolved today  No facial weakness. Speech clear      Data:  CBC:   Recent Labs     01/20/22  0533 01/21/22  0513 01/22/22  0418   WBC 2.8* 3.0* 2.5*   HGB 13.2* 13.8 13.4*   HCT 39.5* 40.4* 39.6*   MCV 86.3 84.9 84.4   PLT 75* 90* 109*     BMP:   Recent Labs     01/20/22  0533 01/21/22  0513 01/22/22  0418   * 134* 137   K 3.1* 3.1* 3.2*   CL 97* 97* 99   CO2 25 25 26   PHOS 2.3* 2.1* 2.9   BUN 7 7 9   CREATININE 0.6* <0.5* <0.5*     CULTURES  Results for Akash Terrell (MRN [id number]) as of 1/13/2022 13:29    Ref. Range 1/12/2022 19:35   SARS-CoV-2, NAAT Latest Ref Range: Not Detected  DETECTED (AA)          RADIOLOGY  XR CHEST PORTABLE   Final Result   NG tube with the tip and side-port in the stomach. Bibasilar atelectasis. XR CHEST PORTABLE   Final Result   NG tube is difficult to visualize, tip is likely below the diaphragm. Repeat   exam could be considered if clinically indicated. XR CHEST PORTABLE   Final Result   New elevated right hemidiaphragm, likely atelectasis or possibly infiltrate. Subpulmonic effusion not excluded. No overt failure. CT HEAD WO CONTRAST   Final Result   1.  No acute intracranial abnormality. CT Head WO Contrast   Final Result   No acute intracranial abnormality. XR CHEST PORTABLE   Final Result   No acute cardiopulmonary process. Pertinent previous results reviewed   Diagnostic cardiac cath 7/13/21  CONCLUSION:  Successful stenting of the true circumflex into the obtuse  marginal branch, successful stenting of the left posterior descending  coronary artery and successful stenting of the obtuse marginal branch,  all 80% stenosis reduced to 0 with these three drug-eluting stents. JAMES-3 blood flow was present in the circumflex artery and all its  branches.  The LAD is normal with minor irregularities only and the left  main is normal.  Nondominant right coronary artery.  LV ejection  fraction of 40% with inferior wall and inferoposterior hypokinesia.     ECHO 11/27/19  Summary   The left ventricular systolic function is moderately reduced with an   ejection fraction of 35 -40 %.   There is hypokinesis of the inferior and basal inferior walls.   There is akinesis of the inferolateral wall.   Left ventricular cavity size is moderately dilated.   Compared to previous study from 2-2-2015 no changes noted in left   ventricular function.   Mild mitral regurgitation. Limited echo 1/15/2021   Summary   This is a limited study afib, ischemic cmp, recurrent pacer firing. LV systolic function appears mildly reduced with EF estimated at 45-50%. There is more prominent HK of the inferior wall. Left ventricular size is decreased. There is mild concentric left ventricular hypertrophy. 11- 35-40% inf basal hypo, inferolateral akinesis, LVE, mild MR       Assessment/Plan  # ICD firing - he had 5 witnessed shocks and then more bursts (4-5 additional episodes) of AICD discharges and concern for malfunctioning ICD  - s.p  [employer] [place] interrogation and now ICD is adjusted . - Cardiology consulted.    -now back in NSR  - resumed  BB - toprol XL stopped due to low BP - decreased to  metoprolol 25 BID--  Increased on 1/20 to 50 mg BID    #Afibb with RVR  - new onset with above ICD issues  - resume home cardizem and BB if tolerated. Can use IV meds intermittently  - was in AFIB with RVR this am now in NSR   - started eliquis but holding with low PLT, likely resume tomorrow if platelets remain to increase  - resumed eliquis 1/21  - cardiology following      #Hypokalemia  - keep greater than 4  - PRN potassium SS ordered     #Acute Encephalopathy - possible sec to covid encephalopathy   - NOT POA - developed slowly most pronounced after ICD firing  -CT head non focal x 2   -He denies any drug or alcohol use.   -precedex gtt for agitation used--- d/c on 1/19  - added seroquel  -Ammonia level wnl   -Consider MRI brain - unable to do with incompatible pacer  -Started on rocephin meningitis dose D#5  -Supportive care for now - improving slowly   -Stop prcedex gtt on 1/19  -Continue Seroquel- add 12.5 mg now and 25 at night  -Supportive care, removed restraints.    -Insomnia ->added Melatonin nightly. Patient is doing much better now, slept well last night.    Mental status changes during       #COVID 19 infection   -Positive 1/12/22  -only complaints is a cough  -patient is vaccinated x2- no booster  - minimal  Hypoxia at 2 L.   - Decadron D5  -Monitor, continuous pulse ox and telemetry        #DMII  - not controlled noted to have >1000 glucose in urine on admission   -Hold home oral meds- resume lantus increase as tolerated  -Low dose SSI--switched to high dose SSI  -Tolerating carb controlled diet      #Thrombocytopenia  -Plt dropping to 61--75--90  -Monitor CBC , hold eliquis, if remains stable, cardiology likely to resumed today   - Depakote can be contributing to this         #CAD s/p stent placement x3  #Hx of MI  #Hx of VTACH s.p ICD  - ASA, statin, plavix       #Chronic sCHF no AE   - Last echo 11/27/19, EF 35-40 % Limited echo completed 1/15 which showed EF

## 2022-01-22 NOTE — PROGRESS NOTES
on this medication at home then do not decrease Frequency below that used at home. 0-3 - enter or revise RT bronchodilator order(s) to equivalent RT Bronchodilator order with Frequency of every 4 hours PRN for wheezing or increased work of breathing using Per Protocol order mode. 4-6 - enter or revise RT Bronchodilator order(s) to two equivalent RT bronchodilator orders with one order with BID Frequency and one order with Frequency of every 4 hours PRN wheezing or increased work of breathing using Per Protocol order mode. 7-10 - enter or revise RT Bronchodilator order(s) to two equivalent RT bronchodilator orders with one order with TID Frequency and one order with Frequency of every 4 hours PRN wheezing or increased work of breathing using Per Protocol order mode. 11-13 - enter or revise RT Bronchodilator order(s) to one equivalent RT bronchodilator order with QID Frequency and an Albuterol order with Frequency of every 4 hours PRN wheezing or increased work of breathing using Per Protocol order mode. Greater than 13 - enter or revise RT Bronchodilator order(s) to one equivalent RT bronchodilator order with every 4 hours Frequency and an Albuterol order with Frequency of every 2 hours PRN wheezing or increased work of breathing using Per Protocol order mode.      Electronically signed by Nette Spann RCP on 1/22/2022 at 1:15 AM

## 2022-01-22 NOTE — FLOWSHEET NOTE
01/22/22 0326   Vital Signs   Temp 96.9 °F (36.1 °C)   Temp Source Axillary   Pulse 67   Heart Rate Source Monitor   Resp 18   /75   BP Location Left upper arm   Patient Position Semi fowlers   Level of Consciousness Alert (0)   MEWS Score 1   Patient Currently in Pain Denies   Oxygen Therapy   SpO2 93 %   Pulse Oximeter Device Mode Continuous   Pulse Oximeter Device Location Toe   O2 Device Nasal cannula   O2 Flow Rate (L/min) 2 L/min     Pt resting in bed with eyes open. Denies complaints of pain or SOB. VSS. No s/s of distress. Snack and drink refused. Repositioned for comfort. No needs at this time. Bed locked in lowest position. Call light and bedside table within reach. Will continue to monitor.

## 2022-01-22 NOTE — PROGRESS NOTES
Inpatient Physical Therapy Daily Treatment Note    Unit: PCU  Date:  1/22/2022  Patient Name:    Jim An. Admitting diagnosis:  Unable to ambulate [R26.2]  Fall, initial encounter [W19. XXXA]  COVID-19 [U07.1]  Admit Date:  1/12/2022  Precautions/Restrictions:  Fall risk, Bed/chair alarm, Lines -IV and Supplemental O2 (2 L), Telemetry, Continuous pulse oximetry and Isolation Precautions: Droplet Plus - COVID, Cognitive deficit      Discharge Recommendations: ARU/IRF (inpatient rehab facility)   DME needs for discharge: defer to facility       Therapy recommendation for EMS Transport: requires transport by cot due to requires lift equiptment to transfer    Therapy recommendations for staff:   Assist of 1 with use of HOSEA STEDY and gait belt for all transfers to/from [facility]  to/from chair    History of Present Illness: 63 y. o. male with DMII, Chronic sCHF, CAD s/p stent placement x3, hx of MI, asthma, HTN, HLD, GERD, lumbar spondylosis and DDD s/p bilateral dorsal ramus medial branch ablation and interstim implant, depression who presented to [facility] ED with complaint of generalized weakness ongoing for approximately 2 months and frequent falls.     In ED, CT of the head showed no acute abnormality.  Chest x-ray with no acute cardiopulmonary process.  Patient did test positive for [place] is vaccinated x2, no booster.  Patient admitted to med-surg for further care. PT/OT consulted.      Mr. Quiroga developed rapid HR what appears to be Afib on TELE monitor and his pacer inappropriately delivered shock x 5 and this was witnessed by me and nursing staff.      Pt awake, conscious during these episodes and by the end of the 5th shock, he started getting slightly confused     Given cardizem 10 mg IV x 1 and metoprolol 5 mg with control of HR  EKG after this episode showed sinus tach     Progressively developed confusion , fevers , started on precedex gtt    Home Health S4 Level Recommendation: NA  AM-PAC Mobility Score   AM-PAC Inpatient Mobility Raw Score : 600 [address] scored a 13/24 on the AM-PAC short mobility form. Current research shows that an AM-PAC score of 17 or less is typically not associated with a discharge to the patient's home setting. If patient discharges prior to next session this note will serve as a discharge summary. Please see below for the latest assessment towards goals. Treatment Time:  11:10-11:45  Treatment number: 3  Timed Code Treatment Minutes: 35 minutes  Total Treatment Minutes:  35 minutes    Cognition    A&O Person and Place, Time ([facility],1175)   Able to follow 1 step commands    Subjective  Patient lying supine in bed with no family present   Pt agreeable to this PT tx.      Pain   No      Bed Mobility   Supine to Sit:    Mod A   Sit to Supine:   Not Tested  Rolling:   Min A   Scooting:   Min A     Transfer Training     Sit to stand:   Min A  from EOB to RW,2 attempts, heavy VC for hand placement,and foot placement  Stand to sit:   Min A   Bed to Chair:   Min A  and 2 persons with use of gait belt and rolling walker (RW); Max VC for sequencing, motor planning and advancing RW    Gait Training gait completed as indicated below  Distance:      4 ft  Deviations (firm surface/linoleum):  decreased dory, increased BRYCE, shuffles and decreased step length bilaterally  Assistive Device Used:    gait belt and rolling walker (RW)  Level of Assist:    Min A  and 2 persons  Comment: unsteady , decreased safety awareness    Stair Training deferred, pt unsafe/not appropriate to complete stairs at this time    Therapeutic Exercise Moises deferred secondary to treatment focus on functional mobility    Balance  Sitting:  Fair +; CGA  Comments: impulsive and with posterior lean at times    Standing: Fair -; Min A  and 2 persons 75% of the time  Comments: decreased safety awareness, impulsive    Patient Education      Role of PT, POC, Discharge recommendations, DC recommendations, safety awareness, transfer techniques and calling for assist with mobility. Positioning Needs       Pt up in chair, alarm set, positioned in proper neutral alignment and pressure relief provided. Call light provided and all needs within reach      Activity Tolerance   Pt completed therapy session with No adverse symptoms noted w/activity. Spo2 90-94% wearing 2 L NP  HR 70's with activity  Other  RN/PCA aware of level of assist    Assessment :  Patient demonstrated improved functional mobility this date and progress toward goals, using RW to complete transfers. Recommend continued PT to improve balance, safety with transfers and progress ambulation to return to [facility]. Recommending ARU/IRF/Inpatient Rehab Facility upon discharge as patient functioning well below baseline, demonstrates good rehab potential and unable to return home due to limited or no family support, burden of care beyond caregiver ability, home environment not conducive to patient recovery and limited safety awareness. Goals : To be met in 3 visits:  1). Independent with LE Ex x 10 reps     To be met in 6 visits:  1). Supine to/from sit: CGA  2). Sit to/from stand: Min A   3). Bed to chair: Min A   4). Gait: Ambulate 10 ft.   with  Mod A  and use of LRAD (least restrictive assistive device)  5). Tolerate B LE exercises 3 sets of 10-15 reps    Plan   Continue with plan of care. Signature: Tariq Xiong PT #[id number]    If patient discharges from this facility prior to next visit, this note will serve as the Discharge Summary.

## 2022-01-22 NOTE — PROGRESS NOTES
Occupational Therapy Daily Treatment Note    Unit: PCU  Date:  1/22/2022  Patient Name:    Alcira Jones. Admitting diagnosis:  Unable to ambulate [R26.2]  Fall, initial encounter [W19. XXXA]  COVID-19 [U07.1]  Admit Date:  1/12/2022  Precautions/Restrictions:   fall risk, IV, bed/chair alarm, telemetry and continuous pulse ox, cognitive deficit, pacemaker, 2L of O2        Discharge Recommendations: Acute Rehab (ARU)/ Inpatient Rehab Facility (IRF)  DME needs for discharge: defer to facility       Therapy recommendations for staff:   Assist of 1 (minimal assist) with use of HOSEA STEDY for all transfers to/from BSC/chair    AM-PAC Score: AM-PAC Inpatient Daily Activity Raw Score: 14  Home Health S4 Level: NA       Treatment Time:  11:10-11:45  Treatment number:  3  Total Treatment Time:  35 minutes    History of Present Illness: 63 y. o. male with DMII, Chronic sCHF, CAD s/p stent placement x3, hx of MI, asthma, HTN, HLD, GERD, lumbar spondylosis and DDD s/p bilateral dorsal ramus medial branch ablation and interstim implant, depression who presented to [facility] ED with complaint of generalized weakness ongoing for approximately 2 months and frequent falls.     In ED, CT of the head showed no acute abnormality.  Chest x-ray with no acute cardiopulmonary process.  Patient did test positive for [place] is vaccinated x2, no booster.  Patient admitted to med-surg for further care. PT/OT consulted.      Mr. Quiroga developed rapid HR what appears to be Afib on TELE monitor and his pacer inappropriately delivered shock x 5 and this was witnessed by me and nursing staff.      Pt awake, conscious during these episodes and by the end of the 5th shock, he started getting slightly confused     Given cardizem 10 mg IV x 1 and metoprolol 5 mg with control of HR  EKG after this episode showed sinus tach     Progressively developed confusion , fevers , started on precedex gtt    Subjective:  Patient supine in bed and agreeable to treatment. Oriented x4. Patient continues to require increased time to process information and follow directions. Patient with flat affect and decreased motor planning with simple tasks. Unable to smile when asked. Patient also grinding teeth throughout session without being aware. Pain   No  Rating: NA  Location:  Pain Medicine Status: Denies need      Bed Mobility:   Supine to Sit:  Mod A  Sit to Supine:  Not Tested  Rolling:           Min A  Scooting:        Min A     Sitting balance: min -CGA provided due to impulsivity and posterior lean. Transfer Training:   Sit to stand:   Min A from EOB/chair to RW. Stand to sit:  Min A  Bed to Chair:  Min A of 2 and with use of RW (maximal verbal cues for motor planning and managing RW)   Bed to [facility]:   Not Tested  Standard toilet:   Not Tested    Activity Tolerance   Pt completed therapy session with No adverse symptoms noted w/activity  SpO2: 90-94% on 2L of O2. HR: 70s  BP:     ADL Training: limited by decreased motor planning and posterior lean. Upper body dressing:  Min A  Upper body bathing:  Min A  Lower body dressing: Mod A   Lower body bathing:  Max A  Toileting:   Not Tested  Grooming/Hygiene: Mod A    Therapeutic Exercise:   N/A    Patient Education:   Role of OT  Recommendations for DC    Positioning Needs:   Up in chair, call light and needs in reach. Alarm Set    Family Present:  No    Assessment: Patient with significant improvements with bed mobility and standing with walker. Progressing with ADLs but continues to be limited by decreased balance and motor planning. Patient would benefit from intensive inpatient rehabilitation to return to independent lifestyle safely. GOALS  To be met in 3 Visits:  1). Bed to toilet/BSC: Mod A and with use of RW     To be met in 5 Visits:  1). Supine to/from Sit:             Min A  2). Upper Body Bathing:         Min A  3). Lower Body Bathing: Mod A  4).  Upper Body Dressing: Min A  5). Lower Body Dressing: Mod A  6).  Pt to demonstrate UE exs x 15 reps with minimal cues    Plan: cont with [address] [facility], OTR/L #[id number]        If patient discharges from this facility prior to next visit, this note will serve as the Discharge Summary

## 2022-01-22 NOTE — PLAN OF CARE
Problem: Airway Clearance - Ineffective  Goal: Achieve or maintain patent airway  1/22/2022 0548 by Padma Ahn RN  Outcome: Ongoing  1/22/2022 0548 by Padma Ahn RN  Outcome: Ongoing     Problem: Gas Exchange - Impaired  Goal: Absence of hypoxia  1/22/2022 0548 by Padma Ahn RN  Outcome: Ongoing  1/22/2022 0548 by Padma Ahn, RN  Outcome: Ongoing  Goal: Promote optimal lung function  1/22/2022 0548 by Padma Ahn, RN  Outcome: Ongoing  1/22/2022 0548 by Padma Ahn, RN  Outcome: Ongoing     Problem: Breathing Pattern - Ineffective  Goal: Ability to achieve and maintain a regular respiratory rate  1/22/2022 0548 by Padma Ahn, RN  Outcome: Ongoing  1/22/2022 0548 by Padma Ahn RN  Outcome: Ongoing     Problem:  Body Temperature -  Risk of, Imbalanced  Goal: Ability to maintain a body temperature within defined limits  1/22/2022 0548 by Padma Ahn RN  Outcome: Ongoing  1/22/2022 0548 by Padma Ahn RN  Outcome: Ongoing  Goal: Will regain or maintain usual level of consciousness  1/22/2022 0548 by Padma Ahn, RN  Outcome: Ongoing  1/22/2022 0548 by Padma Ahn RN  Outcome: Ongoing  Goal: Complications related to the disease process, condition or treatment will be avoided or minimized  1/22/2022 0548 by Padma Ahn, RN  Outcome: Ongoing  1/22/2022 0548 by Padma Ahn RN  Outcome: Ongoing     Problem: Isolation Precautions - Risk of Spread of Infection  Goal: Prevent transmission of infection  1/22/2022 0548 by Padma Ahn, RN  Outcome: Ongoing  1/22/2022 0548 by Padma Ahn RN  Outcome: Ongoing     Problem: Nutrition Deficits  Goal: Optimize nutritional status  1/22/2022 0548 by Padma Ahn, RN  Outcome: Ongoing  1/22/2022 0548 by Padma Ahn RN  Outcome: Ongoing     Problem: Risk for Fluid Volume Deficit  Goal: Maintain normal heart rhythm  1/22/2022 0548 by Padma Ahn, RN  Outcome: Ongoing  1/22/2022 0548 by Padma Ahn RN  Outcome: Ongoing  Goal: Maintain absence of muscle cramping  1/22/2022 0548 by Margarete Angelucci, RN  Outcome: Ongoing  1/22/2022 0548 by Margarete Angelucci, RN  Outcome: Ongoing  Goal: Maintain normal serum potassium, sodium, calcium, phosphorus, and pH  1/22/2022 0548 by Margarete Angelucci, RN  Outcome: Ongoing  1/22/2022 0548 by Margarete Angelucci, RN  Outcome: Ongoing    Problem: Falls - Risk of:  Goal: Will remain free from falls  Description: Will remain free from falls  1/22/2022 0548 by Margarete Angelucci, RN  Outcome: Ongoing  1/22/2022 0548 by Margarete Angelucci, RN  Outcome: Ongoing  Goal: Absence of physical injury  Description: Absence of physical injury  1/22/2022 0548 by Margarete Angelucci, RN  Outcome: Ongoing  1/22/2022 0548 by Margarete Angelucci, RN  Outcome: Ongoing     Problem: Skin Integrity:  Goal: Will show no infection signs and symptoms  Description: Will show no infection signs and symptoms  1/22/2022 0548 by Margarete Angelucci, RN  Outcome: Ongoing  1/22/2022 0548 by Margarete Angelucci, RN  Outcome: Ongoing  Goal: Absence of new skin breakdown  Description: Absence of new skin breakdown  1/22/2022 0548 by Margarete Angelucci, RN  Outcome: Ongoing  1/22/2022 0548 by Margarete Angelucci, RN  Outcome: Ongoing     Problem: Confusion - Acute:  Goal: Absence of continued neurological deterioration signs and symptoms  Description: Absence of continued neurological deterioration signs and symptoms  1/22/2022 0548 by Margarete Angelucci, RN  Outcome: Ongoing  1/22/2022 0548 by Margarete Angelucci, RN  Outcome: Ongoing  Goal: Mental status will be restored to baseline  Description: Mental status will be restored to baseline  1/22/2022 0548 by Margarete Angelucci, RN  Outcome: Ongoing  1/22/2022 0548 by Margarete Angelucci, RN  Outcome: Ongoing     Problem: Discharge Planning:  Goal: Ability to perform activities of daily living will improve  Description: Ability to perform activities of daily living will improve  1/22/2022 0548 by Margarete Angelucci, RN  Outcome: Ongoing  1/22/2022 0548 by Margarete Angelucci, RN  Outcome: Ongoing  Goal: Participates in care planning  Description: Participates in care planning  1/22/2022 0548 by Berto Mcgowan RN  Outcome: Ongoing  1/22/2022 0548 by Berto Mcgowan RN  Outcome: Ongoing     Problem: Injury - Risk of, Physical Injury:  Goal: Will remain free from falls  Description: Will remain free from falls  1/22/2022 0548 by Berto Mcgowan RN  Outcome: Ongoing  1/22/2022 0548 by Berto Mcgowan RN  Outcome: Ongoing  Goal: Absence of physical injury  Description: Absence of physical injury  1/22/2022 0548 by Berto Mcgowan RN  Outcome: Ongoing  1/22/2022 0548 by Berto Mcgowan RN  Outcome: Ongoing     Problem: Mood - Altered:  Goal: Mood stable  Description: Mood stable  1/22/2022 0548 by Berto Mcgowan RN  Outcome: Ongoing  1/22/2022 0548 by Berto Mcgowan RN  Outcome: Ongoing  Goal: Absence of abusive behavior  Description: Absence of abusive behavior  1/22/2022 0548 by Berto Mcgowan RN  Outcome: Ongoing  1/22/2022 0548 by Berto Mcgowan RN  Outcome: Ongoing  Goal: Verbalizations of feeling emotionally comfortable while being cared for will increase  Description: Verbalizations of feeling emotionally comfortable while being cared for will increase  1/22/2022 0548 by Berto Mcgowan RN  Outcome: Ongoing  1/22/2022 0548 by Berto Mcgowan RN  Outcome: Ongoing     Problem: Psychomotor Activity - Altered:  Goal: Absence of psychomotor disturbance signs and symptoms  Description: Absence of psychomotor disturbance signs and symptoms  1/22/2022 0548 by Berto Mcgowan RN  Outcome: Ongoing  1/22/2022 0548 by Berto Mcgowan RN  Outcome: Ongoing     Problem: Sensory Perception - Impaired:  Goal: Demonstrations of improved sensory functioning will increase  Description: Demonstrations of improved sensory functioning will increase  1/22/2022 0548 by Berto Mcgowan RN  Outcome: Ongoing  1/22/2022 0548 by Berto Mcgowan RN  Outcome: Ongoing  Goal: Decrease in sensory misperception frequency  Description: Decrease in sensory misperception frequency  1/22/2022 0548 by Ruchi Navarrete RN  Outcome: Ongoing  1/22/2022 0548 by uRchi Navarrete RN  Outcome: Ongoing  Goal: Able to refrain from responding to false sensory perceptions  Description: Able to refrain from responding to false sensory perceptions  1/22/2022 0548 by Ruchi Navarrete RN  Outcome: Ongoing  1/22/2022 0548 by Ruchi Navarrete RN  Outcome: Ongoing  Goal: Demonstrates accurate environmental perceptions  Description: Demonstrates accurate environmental perceptions  1/22/2022 0548 by Ruchi Navarrete RN  Outcome: Ongoing  1/22/2022 0548 by Ruchi Navarrete RN  Outcome: Ongoing  Goal: Able to distinguish between reality-based and nonreality-based thinking  Description: Able to distinguish between reality-based and nonreality-based thinking  1/22/2022 0548 by Ruchi Navarrete RN  Outcome: Ongoing  1/22/2022 0548 by Ruchi Navarrete RN  Outcome: Ongoing  Goal: Able to interrupt nonreality-based thinking  Description: Able to interrupt nonreality-based thinking  1/22/2022 0548 by Ruchi Navarrete RN  Outcome: Ongoing  1/22/2022 0548 by Ruchi Navarrete RN  Outcome: Ongoing     Problem: Sleep Pattern Disturbance:  Goal: Appears well-rested  Description: Appears well-rested  1/22/2022 0548 by uRchi Navarrete RN  Outcome: Ongoing  1/22/2022 0548 by Ruchi Navarrete RN  Outcome: Ongoing

## 2022-01-22 NOTE — FLOWSHEET NOTE
01/22/22 1454   Vital Signs   Temp 97.2 °F (36.2 °C)   Temp Source Oral   Pulse 59   Heart Rate Source Monitor   Resp 18   /68   BP Location Left upper arm   Level of Consciousness Alert (0)   MEWS Score 1   Oxygen Therapy   SpO2 93 %   O2 Device Nasal cannula   O2 Flow Rate (L/min) 2 L/min     Pt. Resting in bed. Pt. Had taken oxygen off and saturation dropped to 83% on RA. Pt. Placed back on 2 L NC with a saturation of 93%. Pt. Reminded to leave O2 in nose. Pt. Paula Graves understanding. Will continue to monitor. Pt. denies further needs at this time. Call light is within reach.   Faith Logan RN

## 2022-01-22 NOTE — PROGRESS NOTES
Pt. Awake in bed. No signs of distress noted. Pt. Assessment completed- see flow sheet. Pt. denies further needs at this time. Will continue to monitor. Call light is within reach.   Shawna Silvestre RN

## 2022-01-23 LAB
ANION GAP SERPL CALCULATED.3IONS-SCNC: 12 MMOL/L (ref 3–16)
BASOPHILS ABSOLUTE: 0 K/UL (ref 0–0.2)
BASOPHILS RELATIVE PERCENT: 0.2 %
BUN BLDV-MCNC: 10 MG/DL (ref 7–20)
CALCIUM SERPL-MCNC: 8.9 MG/DL (ref 8.3–10.6)
CHLORIDE BLD-SCNC: 96 MMOL/L (ref 99–110)
CO2: 26 MMOL/L (ref 21–32)
CREAT SERPL-MCNC: <0.5 MG/DL (ref 0.9–1.3)
EOSINOPHILS ABSOLUTE: 0 K/UL (ref 0–0.6)
EOSINOPHILS RELATIVE PERCENT: 0.3 %
GFR AFRICAN AMERICAN: >60
GFR NON-AFRICAN AMERICAN: >60
GLUCOSE BLD-MCNC: 101 MG/DL (ref 70–99)
GLUCOSE BLD-MCNC: 140 MG/DL (ref 70–99)
GLUCOSE BLD-MCNC: 164 MG/DL (ref 70–99)
GLUCOSE BLD-MCNC: 175 MG/DL (ref 70–99)
GLUCOSE BLD-MCNC: 184 MG/DL (ref 70–99)
GLUCOSE BLD-MCNC: 196 MG/DL (ref 70–99)
HCT VFR BLD CALC: 41.5 % (ref 40.5–52.5)
HEMOGLOBIN: 13.7 G/DL (ref 13.5–17.5)
LYMPHOCYTES ABSOLUTE: 0.5 K/UL (ref 1–5.1)
LYMPHOCYTES RELATIVE PERCENT: 19.1 %
MAGNESIUM: 1.9 MG/DL (ref 1.8–2.4)
MCH RBC QN AUTO: 28.6 PG (ref 26–34)
MCHC RBC AUTO-ENTMCNC: 33 G/DL (ref 31–36)
MCV RBC AUTO: 86.7 FL (ref 80–100)
MONOCYTES ABSOLUTE: 0.2 K/UL (ref 0–1.3)
MONOCYTES RELATIVE PERCENT: 8.7 %
NEUTROPHILS ABSOLUTE: 1.9 K/UL (ref 1.7–7.7)
NEUTROPHILS RELATIVE PERCENT: 71.7 %
PDW BLD-RTO: 15.2 % (ref 12.4–15.4)
PERFORMED ON: ABNORMAL
PHOSPHORUS: 2.9 MG/DL (ref 2.5–4.9)
PLATELET # BLD: 120 K/UL (ref 135–450)
PMV BLD AUTO: 7.2 FL (ref 5–10.5)
POTASSIUM SERPL-SCNC: 3.3 MMOL/L (ref 3.5–5.1)
RBC # BLD: 4.79 M/UL (ref 4.2–5.9)
SODIUM BLD-SCNC: 134 MMOL/L (ref 136–145)
WBC # BLD: 2.6 K/UL (ref 4–11)

## 2022-01-23 PROCEDURE — 1200000000 HC SEMI PRIVATE

## 2022-01-23 PROCEDURE — 6360000002 HC RX W HCPCS: Performed by: INTERNAL MEDICINE

## 2022-01-23 PROCEDURE — 6370000000 HC RX 637 (ALT 250 FOR IP): Performed by: INTERNAL MEDICINE

## 2022-01-23 PROCEDURE — 2700000000 HC OXYGEN THERAPY PER DAY

## 2022-01-23 PROCEDURE — 2580000003 HC RX 258: Performed by: INTERNAL MEDICINE

## 2022-01-23 PROCEDURE — 94761 N-INVAS EAR/PLS OXIMETRY MLT: CPT

## 2022-01-23 PROCEDURE — 84100 ASSAY OF PHOSPHORUS: CPT

## 2022-01-23 PROCEDURE — 36415 COLL VENOUS BLD VENIPUNCTURE: CPT

## 2022-01-23 PROCEDURE — 85025 COMPLETE CBC W/AUTO DIFF WBC: CPT

## 2022-01-23 PROCEDURE — 83735 ASSAY OF MAGNESIUM: CPT

## 2022-01-23 PROCEDURE — 6370000000 HC RX 637 (ALT 250 FOR IP): Performed by: NURSE PRACTITIONER

## 2022-01-23 PROCEDURE — 94640 AIRWAY INHALATION TREATMENT: CPT

## 2022-01-23 PROCEDURE — 80048 BASIC METABOLIC PNL TOTAL CA: CPT

## 2022-01-23 PROCEDURE — 2060000000 HC ICU INTERMEDIATE R&B

## 2022-01-23 RX ORDER — POTASSIUM CHLORIDE 20 MEQ/1
20 TABLET, EXTENDED RELEASE ORAL
Status: DISCONTINUED | OUTPATIENT
Start: 2022-01-23 | End: 2022-01-23

## 2022-01-23 RX ORDER — POTASSIUM CHLORIDE 20 MEQ/1
20 TABLET, EXTENDED RELEASE ORAL
Status: DISCONTINUED | OUTPATIENT
Start: 2022-01-24 | End: 2022-01-27 | Stop reason: HOSPADM

## 2022-01-23 RX ORDER — QUETIAPINE FUMARATE 25 MG/1
12.5 TABLET, FILM COATED ORAL 2 TIMES DAILY PRN
Status: DISCONTINUED | OUTPATIENT
Start: 2022-01-23 | End: 2022-01-25

## 2022-01-23 RX ADMIN — Medication 10 ML: at 21:12

## 2022-01-23 RX ADMIN — QUETIAPINE FUMARATE 12.5 MG: 25 TABLET ORAL at 14:44

## 2022-01-23 RX ADMIN — TRAZODONE HYDROCHLORIDE 100 MG: 100 TABLET ORAL at 21:11

## 2022-01-23 RX ADMIN — BUDESONIDE AND FORMOTEROL FUMARATE DIHYDRATE 2 PUFF: 160; 4.5 AEROSOL RESPIRATORY (INHALATION) at 07:54

## 2022-01-23 RX ADMIN — QUETIAPINE FUMARATE 25 MG: 25 TABLET ORAL at 21:10

## 2022-01-23 RX ADMIN — DIVALPROEX SODIUM 1000 MG: 500 TABLET, DELAYED RELEASE ORAL at 19:13

## 2022-01-23 RX ADMIN — PANTOPRAZOLE SODIUM 40 MG: 40 TABLET, DELAYED RELEASE ORAL at 08:33

## 2022-01-23 RX ADMIN — DEXAMETHASONE 6 MG: 4 TABLET ORAL at 14:44

## 2022-01-23 RX ADMIN — DIVALPROEX SODIUM 500 MG: 500 TABLET, DELAYED RELEASE ORAL at 08:33

## 2022-01-23 RX ADMIN — APIXABAN 5 MG: 5 TABLET, FILM COATED ORAL at 08:34

## 2022-01-23 RX ADMIN — LISINOPRIL 5 MG: 5 TABLET ORAL at 08:34

## 2022-01-23 RX ADMIN — CEFTRIAXONE 2000 MG: 2 INJECTION, POWDER, FOR SOLUTION INTRAMUSCULAR; INTRAVENOUS at 06:31

## 2022-01-23 RX ADMIN — Medication 2 PUFF: at 07:54

## 2022-01-23 RX ADMIN — SODIUM CHLORIDE 25 ML: 9 INJECTION, SOLUTION INTRAVENOUS at 06:30

## 2022-01-23 RX ADMIN — POTASSIUM CHLORIDE 40 MEQ: 1500 TABLET, EXTENDED RELEASE ORAL at 08:33

## 2022-01-23 RX ADMIN — ATORVASTATIN CALCIUM 80 MG: 40 TABLET, FILM COATED ORAL at 08:33

## 2022-01-23 RX ADMIN — GABAPENTIN 200 MG: 100 CAPSULE ORAL at 21:10

## 2022-01-23 RX ADMIN — GABAPENTIN 200 MG: 100 CAPSULE ORAL at 08:33

## 2022-01-23 RX ADMIN — METOPROLOL TARTRATE 50 MG: 50 TABLET, FILM COATED ORAL at 08:35

## 2022-01-23 RX ADMIN — Medication 10 MG: at 21:11

## 2022-01-23 RX ADMIN — INSULIN LISPRO 3 UNITS: 100 INJECTION, SOLUTION INTRAVENOUS; SUBCUTANEOUS at 08:35

## 2022-01-23 RX ADMIN — APIXABAN 5 MG: 5 TABLET, FILM COATED ORAL at 21:11

## 2022-01-23 RX ADMIN — DULOXETINE HYDROCHLORIDE 60 MG: 60 CAPSULE, DELAYED RELEASE ORAL at 08:33

## 2022-01-23 RX ADMIN — QUETIAPINE FUMARATE 12.5 MG: 25 TABLET ORAL at 08:33

## 2022-01-23 RX ADMIN — CLOPIDOGREL BISULFATE 75 MG: 75 TABLET ORAL at 08:33

## 2022-01-23 RX ADMIN — GABAPENTIN 200 MG: 100 CAPSULE ORAL at 13:56

## 2022-01-23 NOTE — PROGRESS NOTES
Pt. In bed. Pt. Very restless at this time. Pts. Constantly moving in bed and rolling from side to side to where his monitor/leads come off. This RN and other staff in to fix it multiple times. Pt. Assessment completed- see flow sheet. Pt. denies further needs at this time. Call light is within reach.   Marylene Lively, RN

## 2022-01-23 NOTE — PLAN OF CARE
Problem: Gas Exchange - Impaired  Goal: Absence of hypoxia  1/23/2022 0339 by Clint Cerda RN  Outcome: Ongoing  1/23/2022 0339 by Clint Cerda RN  Outcome: Ongoing  1/23/2022 0338 by Clint Cerda RN  Outcome: Ongoing     Problem: Body Temperature -  Risk of, Imbalanced  Goal: Ability to maintain a body temperature within defined limits  1/23/2022 0339 by Clint Cerda RN  Outcome: Ongoing  1/23/2022 0339 by Clint Cerda RN  Outcome: Ongoing  1/23/2022 0338 by Clint Cerda RN  Outcome: Ongoing     Problem:  Body Temperature -  Risk of, Imbalanced  Goal: Will regain or maintain usual level of consciousness  1/23/2022 0339 by Clint Creda RN  Outcome: Ongoing  1/23/2022 0339 by Clint Cerda RN  Outcome: Ongoing  1/23/2022 0338 by Clint Cerda RN  Outcome: Ongoing     Problem: Isolation Precautions - Risk of Spread of Infection  Goal: Prevent transmission of infection  1/23/2022 0339 by Clitn Cerda RN  Outcome: Ongoing  1/23/2022 0339 by Clint Cerda RN  Outcome: Ongoing  1/23/2022 0338 by Clint Cerda RN  Outcome: Ongoing     Problem: Loneliness or Risk for Loneliness  Goal: Demonstrate positive use of time alone when socialization is not possible  1/23/2022 0339 by Clint Cerda RN  Outcome: Ongoing  1/23/2022 0339 by Clint Cerda RN  Outcome: Ongoing  1/23/2022 0338 by Clint Cerda RN  Outcome: Ongoing     Problem: Fatigue  Goal: Verbalize increase energy and improved vitality  1/23/2022 0339 by Clint Cerda RN  Outcome: Ongoing  1/23/2022 0339 by Clint Cerda RN  Outcome: Ongoing  1/23/2022 0338 by Clint Cerda RN  Outcome: Ongoing     Problem: Falls - Risk of:  Goal: Will remain free from falls  Description: Will remain free from falls  1/23/2022 0339 by Clint Cerda RN  Outcome: Ongoing  1/23/2022 0339 by Clint Cerda RN  Outcome: Ongoing  1/23/2022 0338 by Clint Cerda RN  Outcome: Ongoing

## 2022-01-23 NOTE — PROGRESS NOTES
RT Inhaler-Nebulizer Bronchodilator Protocol Note    There is a bronchodilator order in the chart from a provider indicating to follow the RT Bronchodilator Protocol and there is an Initiate RT Inhaler-Nebulizer Bronchodilator Protocol order as well (see protocol at bottom of note). CXR Findings:  No results found. The findings from the last RT Protocol Assessment were as follows:   History Pulmonary Disease: Chronic pulmonary disease  Respiratory Pattern: Regular pattern and RR 12-20 bpm  Breath Sounds: Slightly diminished and/or crackles  Cough: Strong, spontaneous, non-productive  Indication for Bronchodilator Therapy: Decreased or absent breath sounds  Bronchodilator Assessment Score: 4    Aerosolized bronchodilator medication orders have been revised according to the RT Inhaler-Nebulizer Bronchodilator Protocol below. Respiratory Therapist to perform RT Therapy Protocol Assessment initially then follow the protocol. Repeat RT Therapy Protocol Assessment PRN for score 0-3 or on second treatment, BID, and PRN for scores above 3. No Indications - adjust the frequency to every 6 hours PRN wheezing or bronchospasm, if no treatments needed after 48 hours then discontinue using Per Protocol order mode. If indication present, adjust the RT bronchodilator orders based on the Bronchodilator Assessment Score as indicated below. Use Inhaler orders unless patient has one or more of the following: on home nebulizer, not able to hold breath for 10 seconds, is not alert and oriented, cannot activate and use MDI correctly, or respiratory rate 25 breaths per minute or more, then use the equivalent nebulizer order(s) with same Frequency and PRN reasons based on the score. If a patient is on this medication at home then do not decrease Frequency below that used at home.     0-3 - enter or revise RT bronchodilator order(s) to equivalent RT Bronchodilator order with Frequency of every 4 hours PRN for wheezing or

## 2022-01-23 NOTE — FLOWSHEET NOTE
01/23/22 0030   Vital Signs   Temp 96.4 °F (35.8 °C)   Temp Source Oral   Pulse 63   Heart Rate Source Monitor   Resp 18   /69   BP Location Right upper arm   Patient Position Semi fowlers   Level of Consciousness Alert (0)   MEWS Score 1   Patient Currently in Pain Denies   Oxygen Therapy   SpO2 95 %   Pulse Oximeter Device Mode Continuous   Pulse Oximeter Device Location Other(comment)  (ear)   O2 Device Nasal cannula   O2 Flow Rate (L/min) 2 L/min   Height and Weight   Height 5' 4\" (1.626 m)   Weight 216 lb 3.2 oz (98.1 kg)   Weight Method Bed scale   BSA (Calculated - sq m) 2.1 sq meters   BMI (Calculated) 37.2     Pt resting in bed awake. VSS. Pt appears to be agitated. Pt pulling off heart monitor and o2. Monitor and o2 replaced. Spo2 remained above 90%. Drink provided. Repositioned for comfort. Bed locked in lowest position. Call light and bedside table within reach. Will continue to monitor.

## 2022-01-23 NOTE — PROGRESS NOTES
01/22/22 2100   RT Protocol   History Pulmonary Disease 2   Respiratory pattern 0   Breath sounds 2   Cough 0   Indications for Bronchodilator Therapy Decreased or absent breath sounds   Bronchodilator Assessment Score 4   RT Inhaler-Nebulizer Bronchodilator Protocol Note    There is a bronchodilator order in the chart from a provider indicating to follow the RT Bronchodilator Protocol and there is an Initiate RT Inhaler-Nebulizer Bronchodilator Protocol order as well (see protocol at bottom of note). CXR Findings:  No results found. The findings from the last RT Protocol Assessment were as follows:   History Pulmonary Disease: Chronic pulmonary disease  Respiratory Pattern: Regular pattern and RR 12-20 bpm  Breath Sounds: Slightly diminished and/or crackles  Cough: Strong, spontaneous, non-productive  Indication for Bronchodilator Therapy: Decreased or absent breath sounds  Bronchodilator Assessment Score: 4    Aerosolized bronchodilator medication orders have been revised according to the RT Inhaler-Nebulizer Bronchodilator Protocol below. Respiratory Therapist to perform RT Therapy Protocol Assessment initially then follow the protocol. Repeat RT Therapy Protocol Assessment PRN for score 0-3 or on second treatment, BID, and PRN for scores above 3. No Indications - adjust the frequency to every 6 hours PRN wheezing or bronchospasm, if no treatments needed after 48 hours then discontinue using Per Protocol order mode. If indication present, adjust the RT bronchodilator orders based on the Bronchodilator Assessment Score as indicated below. Use Inhaler orders unless patient has one or more of the following: on home nebulizer, not able to hold breath for 10 seconds, is not alert and oriented, cannot activate and use MDI correctly, or respiratory rate 25 breaths per minute or more, then use the equivalent nebulizer order(s) with same Frequency and PRN reasons based on the score.   If a patient is on this medication at home then do not decrease Frequency below that used at home. 0-3 - enter or revise RT bronchodilator order(s) to equivalent RT Bronchodilator order with Frequency of every 4 hours PRN for wheezing or increased work of breathing using Per Protocol order mode. 4-6 - enter or revise RT Bronchodilator order(s) to two equivalent RT bronchodilator orders with one order with BID Frequency and one order with Frequency of every 4 hours PRN wheezing or increased work of breathing using Per Protocol order mode. 7-10 - enter or revise RT Bronchodilator order(s) to two equivalent RT bronchodilator orders with one order with TID Frequency and one order with Frequency of every 4 hours PRN wheezing or increased work of breathing using Per Protocol order mode. 11-13 - enter or revise RT Bronchodilator order(s) to one equivalent RT bronchodilator order with QID Frequency and an Albuterol order with Frequency of every 4 hours PRN wheezing or increased work of breathing using Per Protocol order mode. Greater than 13 - enter or revise RT Bronchodilator order(s) to one equivalent RT bronchodilator order with every 4 hours Frequency and an Albuterol order with Frequency of every 2 hours PRN wheezing or increased work of breathing using Per Protocol order mode.      Electronically signed by Singh Ocasio RCP on 1/22/2022 at 9:50 PM

## 2022-01-23 NOTE — PROGRESS NOTES
Spoke to family Dylan Rushing) and gave an update. Updated him on possible D/C tomorrow. He requested to be called if pt will be discharged.

## 2022-01-23 NOTE — CARE COORDINATION
Writer spoke with Kortney Garcia from [facility] and she will review pt information with MD and let CM know whether can accept. Awaiting call back from Kortney Garcia. Following. 26 writer left  for Kortney Garcia with [facility] r/t pt is d/c ready. [address] spoke with Kortney Garcia from [facility] and she states  is covering for the weekend and unable to accept pt today. Will need to f/u with  in the AM. Following. 1500 writer spoke with Kortney Garcia from [facility] and she states cannot accept the pt at this time as he is on 2 liters of oxygen and has had C-19+ symptoms and pt is not 20 days out from initial positive test on 01/12/22. Will need f/u with pt's brother in the AM for SNF choices. Following.

## 2022-01-23 NOTE — CARE COORDINATION
[facility] Acute [facility]   After review, this patient is felt to be:       []  Appropriate for Acute Inpatient Rehab    []  Appropriate for Acute Inpatient Rehab Pending Insurance Authorization    [x]  Not appropriate for Acute Inpatient Rehab Patient tested positive for COVID on 1/12/22 and has new oxygen needs and sxs per MD so would not be able to accept until 2/2/22 if he was still appropriate for ARU    []  Referral received and ARU reviewing patient; Evaluation ongoing. Discussed with [facility], [address].  Thank you for the referral.  Leila Ortega RN

## 2022-01-23 NOTE — PROGRESS NOTES
Progress Note    Admit Date:  1/12/2022    63 y.o. male with DMII, Chronic sCHF, CAD s/p stent placement x3, hx of MI, asthma, HTN, HLD, GERD, lumbar spondylosis and DDD s/p bilateral dorsal ramus medial branch ablation and interstim implant, depression who presented to [facility] [place] [facility] with complaint of generalized weakness ongoing for approximately 2 months and frequent falls. In ED, CT of the head showed no acute abnormality. Chest x-ray with no acute cardiopulmonary process. Patient did test positive for COVID. He is vaccinated x2, no booster. Patient admitted to med-surg for further care. PT/OT consulted. 1/15  Mr. Quiroga developed rapid HR what appears to be Afib on TELE monitor and his pacer inappropriately delivered shock x 5  Pt awake, conscious during these episodes and by the end of the 5th shock, he started getting slightly confused  Given cardizem 10 mg IV x 1 and metoprolol 5 mg with control of HR  EKG after this episode showed sinus tach  Progressively developed confusion , fevers , started on precedex gtt  Pt transferred to ICU    Pt very confused and became agitated with removing lines. Placed on  on precedex gtt, ct head neg   No further shocks from ICD. His ICD has been adjusted by cardiology       Tx to PCU on 1/19  Placed in restraints evening of 1/19. He had not slept in a couple of days. Is doing much better now. He has been off restraints for the last couple of days. He started sleeping after starting on melatonin daily night. Mental status is clear. Neuro exam remains nonfocal    Subjective:    Patient remained stable on oxygen 2 L . Strength slowly improving with PT OT . Needs acute rehab vital signs stable oxygen saturation stable on 2 L . Objective: Markel Bernal      Vitals:    01/22/22 1930 01/23/22 0030 01/23/22 0754 01/23/22 0830   BP: (!) 128/56 135/69  (!) 118/50   Pulse: 67 63  68   Resp: 18 18 18 18   Temp: 97.1 °F (36.2 °C) 96.4 °F (35.8 °C)  97.2 °F (36.2 °C)   TempSrc: Axillary Oral  Oral   SpO2: 94% 95% 92% 95%   Weight:  216 lb 3.2 oz (98.1 kg)     Height:  5' 4\" (1.626 m)              Intake/Output Summary (Last 24 hours) at 1/23/2022 1254  Last data filed at 1/23/2022 1245  Gross per 24 hour   Intake 770 ml   Output --   Net 770 ml       Physical Exam:    Gen: middle aged obese male awake. Alert and better oriented today   Appears to be not in any distress  Mucous Membranes:  Pink , anicteric  Neck: No JVD, no carotid bruit, no thyromegaly. No nuchal rigidity  Chest:  Clear to auscultation bilaterally, diminished in bases   Cardiovascular:  regular,  S1S2 heard, no murmurs or gallops- left sided ICD +  Abdomen:  Soft, undistended, non tender, no organomegaly, BS present  Extremities: No edema or cyanosis. Distal pulses well felt  Neurological : improving mentation but still with intermittent confusion  Right sided increased stiffness noted yesterday, this has resolved today. Nonfocal exam now. Confusion is clear. He is awake and well-oriented  No facial weakness. Speech clear      Data:  CBC:   Recent Labs     01/21/22  0513 01/22/22  0418 01/23/22  0436   WBC 3.0* 2.5* 2.6*   HGB 13.8 13.4* 13.7   HCT 40.4* 39.6* 41.5   MCV 84.9 84.4 86.7   PLT 90* 109* 120*     BMP:   Recent Labs     01/21/22  0513 01/22/22  0418 01/23/22  0436   * 137 134*   K 3.1* 3.2* 3.3*   CL 97* 99 96*   CO2 25 26 26   PHOS 2.1* 2.9 2.9   BUN 7 9 10   CREATININE <0.5* <0.5* <0.5*     CULTURES  Results for Edgar Messina (MRN [id number]) as of 1/13/2022 13:29    Ref. Range 1/12/2022 19:35   SARS-CoV-2, NAAT Latest Ref Range: Not Detected  DETECTED (AA)          RADIOLOGY  XR CHEST PORTABLE   Final Result   NG tube with the tip and side-port in the stomach. Bibasilar atelectasis. XR CHEST PORTABLE   Final Result   NG tube is difficult to visualize, tip is likely below the diaphragm. Repeat   exam could be considered if clinically indicated.          XR CHEST PORTABLE   Final Result   New elevated right hemidiaphragm, likely atelectasis or possibly infiltrate. Subpulmonic effusion not excluded. No overt failure. CT HEAD WO CONTRAST   Final Result   1. No acute intracranial abnormality. CT Head WO Contrast   Final Result   No acute intracranial abnormality. XR CHEST PORTABLE   Final Result   No acute cardiopulmonary process. Pertinent previous results reviewed   Diagnostic cardiac cath 7/13/21  CONCLUSION:  Successful stenting of the true circumflex into the obtuse  marginal branch, successful stenting of the left posterior descending  coronary artery and successful stenting of the obtuse marginal branch,  all 80% stenosis reduced to 0 with these three drug-eluting stents. JAMES-3 blood flow was present in the circumflex artery and all its  branches.  The LAD is normal with minor irregularities only and the left  main is normal.  Nondominant right coronary artery.  LV ejection  fraction of 40% with inferior wall and inferoposterior hypokinesia.     ECHO 11/27/19  Summary   The left ventricular systolic function is moderately reduced with an   ejection fraction of 35 -40 %.   There is hypokinesis of the inferior and basal inferior walls.   There is akinesis of the inferolateral wall.   Left ventricular cavity size is moderately dilated.   Compared to previous study from 2-2-2015 no changes noted in left   ventricular function.   Mild mitral regurgitation. Limited echo 1/15/2021   Summary   This is a limited study afib, ischemic cmp, recurrent pacer firing. LV systolic function appears mildly reduced with EF estimated at 45-50%. There is more prominent HK of the inferior wall. Left ventricular size is decreased. There is mild concentric left ventricular hypertrophy.    11- 35-40% inf basal hypo, inferolateral akinesis, LVE, mild MR       Assessment/Plan:    # ICD firing - he had 5 witnessed shocks and then more bursts (4-5 additional episodes) of AICD discharges and concern for malfunctioning ICD  - s.p  [employer] [place] interrogation and now ICD is adjusted . - Cardiology consulted. - now back in NSR  - resumed  BB - toprol XL stopped due to low BP - decreased to  metoprolol 25 BID--  Increased on 1/20 to 50 mg BID. Heart rate remained stable now    #Afibb with RVR  - new onset with above ICD issues  -Seen by cardiology  - resume home cardizem and BB if tolerated. Can use IV meds intermittently  - was in AFIB with RVR ->  now in NSR   - started eliquis but holding with low PLT, likely resume tomorrow if platelets remain to increase  - resumed eliquis 1/21  Currently heart rate well controlled. Continued on beta-blockers as above . continued on Eliquis. #Hypokalemia  - keep greater than 4  - PRN potassium SS ordered   Continue scheduled potassium daily    #Acute Encephalopathy - possible sec to covid encephalopathy   - NOT POA - developed slowly most pronounced after ICD firing  -CT head non focal x 2   -He denies any drug or alcohol use.   -precedex gtt for agitation used--- d/c on 1/19  - added seroquel  -Ammonia level wnl   -Consider MRI brain - unable to do with incompatible pacer  -Started empirically on rocephin meningitis dose D#7. clinically no signs of meningitis. no nuchal rigidity nonfocal exam.  He was treated empirically for 7 days with antibiotics;  now can be discontinued. Patient has improved very well with supportive care  -Stopped prcedex gtt on 1/19  -Continue Seroquel- add 12.5 mg now and 25 at night  - removed restraints.    -Insomnia ->added Melatonin nightly. Patient is doing much better now, slept well last night.    Mental status changes have resolved     #COVID 19 infection   -Positive 1/12/22  -only complaints is a cough  -patient is vaccinated x2- no booster  - minimal  Hypoxia at 2 L.   - Decadron D6  -Monitor, continuous pulse ox and telemetry     #DMII  - not controlled noted to have >1000 glucose in urine on admission   -Hold home oral meds- resume lantus increase as tolerated  -Low dose SSI--switched to high dose SSI  -Tolerating carb controlled diet    #Thrombocytopenia  -Platelet count is improved now.      #CAD s/p stent placement x3  #Hx of MI  #Hx of VTACH s.p ICD  - ASA, statin, plavix    #Chronic sCHF no AE   - Last echo 11/27/19, EF 35-40 % Limited echo completed 1/15 which showed EF 45-50%. - Pacemaker/ICD in place  -Continue toprol XL, cardizem  - patient takes Lasix on Monday and Thursday - holding at this time   - Lisinopril has been on hold, resumed at a lower dose,   on 20 mg at home. Started 5 mg in am and can increase as blood pressure allows. Holding parameters in place.     #Asthma no AE  -Symbicort  -albuterol prn     #HLD  -continue statin     #GERD  -continue ppi    #Lumbar spondylosis and DDD   -s/p bilateral dorsal ramus medial branch ablation and interstim implant  -PDMP displays on chronic gabapentin, continue  - follows with Dr. Leidy Wagner      #ALIN  - unsure if patient is compliant with CPAP ? If he still has CPAP at home     #Morbid Obesity  - Body mass index is 41.37 kg/m². - Complicating assessment and treatment. Placing patient at risk for multiple co-morbidities as well as early death and contributing to the patient's presentation.   - Counseled on weight loss.      #Depression  - patient is on Cymbalta 60 mg at home, medication verified with pharmacy. Continued on home dose  - also takes Depakote 500 in am and 1000 in evening also verified with pharmacy. Continued on home dose    #Weakness  PT/OT consulted,  recommend ARU    DVT Prophylaxis: eliquis- on hold Lovenox at this time  Diet: ADULT DIET; Regular; 4 carb choices (60 gm/meal)  ADULT ORAL NUTRITION SUPPLEMENT; Lunch, Dinner; Frozen Oral Supplement  Code Status: Full Code     Patient stable he can be discharged to acute rehab today.        Sudheer Bridges MD

## 2022-01-23 NOTE — FLOWSHEET NOTE
01/22/22 1930   Vital Signs   Temp 97.1 °F (36.2 °C)   Temp Source Axillary   Pulse 67   Heart Rate Source Monitor   Resp 18   BP (!) 128/56   BP Location Right upper arm   Patient Position Lying left side   Level of Consciousness Alert (0)   MEWS Score 1   Patient Currently in Pain Denies   Oxygen Therapy   SpO2 94 %   Pulse Oximeter Device Mode Continuous   Pulse Oximeter Device Location Other(comment)  (ear)   O2 Device Nasal cannula   O2 Flow Rate (L/min) 2 L/min     Shift assessment complete, see flowsheets. Medications given, see MAR. Pt A&Ox4. Pt denies complaint of SOB or pain. No s/s of distress. VSS. Repositioned for comfort. Drink and snack provided to pt. Bed locked in lowest position. Call light and bedside table within reach. Will continue to monitor.

## 2022-01-24 LAB
ANION GAP SERPL CALCULATED.3IONS-SCNC: 13 MMOL/L (ref 3–16)
ANISOCYTOSIS: ABNORMAL
ATYPICAL LYMPHOCYTE RELATIVE PERCENT: 5 % (ref 0–6)
BASOPHILS ABSOLUTE: 0 K/UL (ref 0–0.2)
BASOPHILS RELATIVE PERCENT: 0 %
BUN BLDV-MCNC: 10 MG/DL (ref 7–20)
CALCIUM SERPL-MCNC: 9.1 MG/DL (ref 8.3–10.6)
CHLORIDE BLD-SCNC: 99 MMOL/L (ref 99–110)
CO2: 25 MMOL/L (ref 21–32)
CREAT SERPL-MCNC: <0.5 MG/DL (ref 0.9–1.3)
EOSINOPHILS ABSOLUTE: 0 K/UL (ref 0–0.6)
EOSINOPHILS RELATIVE PERCENT: 0 %
GFR AFRICAN AMERICAN: >60
GFR NON-AFRICAN AMERICAN: >60
GLUCOSE BLD-MCNC: 152 MG/DL (ref 70–99)
GLUCOSE BLD-MCNC: 154 MG/DL (ref 70–99)
GLUCOSE BLD-MCNC: 164 MG/DL (ref 70–99)
GLUCOSE BLD-MCNC: 173 MG/DL (ref 70–99)
GLUCOSE BLD-MCNC: 212 MG/DL (ref 70–99)
HCT VFR BLD CALC: 38.6 % (ref 40.5–52.5)
HEMOGLOBIN: 13.3 G/DL (ref 13.5–17.5)
LYMPHOCYTES ABSOLUTE: 0.6 K/UL (ref 1–5.1)
LYMPHOCYTES RELATIVE PERCENT: 13 %
MAGNESIUM: 1.8 MG/DL (ref 1.8–2.4)
MCH RBC QN AUTO: 28.6 PG (ref 26–34)
MCHC RBC AUTO-ENTMCNC: 34.4 G/DL (ref 31–36)
MCV RBC AUTO: 83.2 FL (ref 80–100)
METAMYELOCYTES RELATIVE PERCENT: 1 %
MONOCYTES ABSOLUTE: 0.4 K/UL (ref 0–1.3)
MONOCYTES RELATIVE PERCENT: 11 %
MYELOCYTE PERCENT: 2 %
NEUTROPHILS ABSOLUTE: 2.3 K/UL (ref 1.7–7.7)
NEUTROPHILS RELATIVE PERCENT: 68 %
PDW BLD-RTO: 15.3 % (ref 12.4–15.4)
PERFORMED ON: ABNORMAL
PHOSPHORUS: 3.4 MG/DL (ref 2.5–4.9)
PLATELET # BLD: 132 K/UL (ref 135–450)
PLATELET SLIDE REVIEW: ABNORMAL
PMV BLD AUTO: 6.7 FL (ref 5–10.5)
POIKILOCYTES: ABNORMAL
POTASSIUM SERPL-SCNC: 3.9 MMOL/L (ref 3.5–5.1)
RBC # BLD: 4.64 M/UL (ref 4.2–5.9)
SLIDE REVIEW: ABNORMAL
SODIUM BLD-SCNC: 137 MMOL/L (ref 136–145)
WBC # BLD: 3.2 K/UL (ref 4–11)

## 2022-01-24 PROCEDURE — 36415 COLL VENOUS BLD VENIPUNCTURE: CPT

## 2022-01-24 PROCEDURE — 6370000000 HC RX 637 (ALT 250 FOR IP): Performed by: NURSE PRACTITIONER

## 2022-01-24 PROCEDURE — 92526 ORAL FUNCTION THERAPY: CPT

## 2022-01-24 PROCEDURE — 85025 COMPLETE CBC W/AUTO DIFF WBC: CPT

## 2022-01-24 PROCEDURE — 2580000003 HC RX 258: Performed by: INTERNAL MEDICINE

## 2022-01-24 PROCEDURE — 84100 ASSAY OF PHOSPHORUS: CPT

## 2022-01-24 PROCEDURE — 83735 ASSAY OF MAGNESIUM: CPT

## 2022-01-24 PROCEDURE — 6370000000 HC RX 637 (ALT 250 FOR IP): Performed by: INTERNAL MEDICINE

## 2022-01-24 PROCEDURE — 97112 NEUROMUSCULAR REEDUCATION: CPT

## 2022-01-24 PROCEDURE — 94640 AIRWAY INHALATION TREATMENT: CPT

## 2022-01-24 PROCEDURE — 99233 SBSQ HOSP IP/OBS HIGH 50: CPT | Performed by: INTERNAL MEDICINE

## 2022-01-24 PROCEDURE — 2060000000 HC ICU INTERMEDIATE R&B

## 2022-01-24 PROCEDURE — 6360000002 HC RX W HCPCS: Performed by: INTERNAL MEDICINE

## 2022-01-24 PROCEDURE — 97530 THERAPEUTIC ACTIVITIES: CPT

## 2022-01-24 PROCEDURE — 94761 N-INVAS EAR/PLS OXIMETRY MLT: CPT

## 2022-01-24 PROCEDURE — 80048 BASIC METABOLIC PNL TOTAL CA: CPT

## 2022-01-24 RX ORDER — QUETIAPINE FUMARATE 25 MG/1
50 TABLET, FILM COATED ORAL NIGHTLY
Status: DISCONTINUED | OUTPATIENT
Start: 2022-01-24 | End: 2022-01-25

## 2022-01-24 RX ORDER — QUETIAPINE FUMARATE 25 MG/1
25 TABLET, FILM COATED ORAL DAILY
Status: DISCONTINUED | OUTPATIENT
Start: 2022-01-24 | End: 2022-01-25

## 2022-01-24 RX ADMIN — BUDESONIDE AND FORMOTEROL FUMARATE DIHYDRATE 2 PUFF: 160; 4.5 AEROSOL RESPIRATORY (INHALATION) at 08:03

## 2022-01-24 RX ADMIN — INSULIN LISPRO 6 UNITS: 100 INJECTION, SOLUTION INTRAVENOUS; SUBCUTANEOUS at 17:36

## 2022-01-24 RX ADMIN — Medication 2 PUFF: at 08:03

## 2022-01-24 RX ADMIN — DIVALPROEX SODIUM 1000 MG: 500 TABLET, DELAYED RELEASE ORAL at 17:35

## 2022-01-24 RX ADMIN — DEXAMETHASONE 6 MG: 4 TABLET ORAL at 09:54

## 2022-01-24 RX ADMIN — QUETIAPINE FUMARATE 25 MG: 25 TABLET ORAL at 09:54

## 2022-01-24 RX ADMIN — Medication 10 ML: at 22:00

## 2022-01-24 RX ADMIN — ATORVASTATIN CALCIUM 80 MG: 40 TABLET, FILM COATED ORAL at 09:54

## 2022-01-24 RX ADMIN — APIXABAN 5 MG: 5 TABLET, FILM COATED ORAL at 21:56

## 2022-01-24 RX ADMIN — BUDESONIDE AND FORMOTEROL FUMARATE DIHYDRATE 2 PUFF: 160; 4.5 AEROSOL RESPIRATORY (INHALATION) at 18:41

## 2022-01-24 RX ADMIN — Medication 10 MG: at 21:56

## 2022-01-24 RX ADMIN — APIXABAN 5 MG: 5 TABLET, FILM COATED ORAL at 09:54

## 2022-01-24 RX ADMIN — GABAPENTIN 200 MG: 100 CAPSULE ORAL at 21:56

## 2022-01-24 RX ADMIN — INSULIN LISPRO 3 UNITS: 100 INJECTION, SOLUTION INTRAVENOUS; SUBCUTANEOUS at 12:05

## 2022-01-24 RX ADMIN — INSULIN LISPRO 2 UNITS: 100 INJECTION, SOLUTION INTRAVENOUS; SUBCUTANEOUS at 21:59

## 2022-01-24 RX ADMIN — CLOPIDOGREL BISULFATE 75 MG: 75 TABLET ORAL at 09:55

## 2022-01-24 RX ADMIN — PANTOPRAZOLE SODIUM 40 MG: 40 TABLET, DELAYED RELEASE ORAL at 09:54

## 2022-01-24 RX ADMIN — GABAPENTIN 200 MG: 100 CAPSULE ORAL at 09:54

## 2022-01-24 RX ADMIN — POTASSIUM CHLORIDE 20 MEQ: 1500 TABLET, EXTENDED RELEASE ORAL at 10:05

## 2022-01-24 RX ADMIN — QUETIAPINE FUMARATE 50 MG: 25 TABLET ORAL at 21:55

## 2022-01-24 RX ADMIN — Medication 2 PUFF: at 18:41

## 2022-01-24 RX ADMIN — METOPROLOL TARTRATE 50 MG: 50 TABLET, FILM COATED ORAL at 09:55

## 2022-01-24 RX ADMIN — LISINOPRIL 5 MG: 5 TABLET ORAL at 09:55

## 2022-01-24 RX ADMIN — METOPROLOL TARTRATE 50 MG: 50 TABLET, FILM COATED ORAL at 21:56

## 2022-01-24 RX ADMIN — GABAPENTIN 200 MG: 100 CAPSULE ORAL at 14:28

## 2022-01-24 RX ADMIN — DULOXETINE HYDROCHLORIDE 60 MG: 60 CAPSULE, DELAYED RELEASE ORAL at 09:54

## 2022-01-24 ASSESSMENT — PAIN SCALES - GENERAL: PAINLEVEL_OUTOF10: 0

## 2022-01-24 NOTE — PROGRESS NOTES
RT Inhaler-Nebulizer Bronchodilator Protocol Note    There is a bronchodilator order in the chart from a provider indicating to follow the RT Bronchodilator Protocol and there is an Initiate RT Inhaler-Nebulizer Bronchodilator Protocol order as well (see protocol at bottom of note). CXR Findings:  No results found. The findings from the last RT Protocol Assessment were as follows:   History Pulmonary Disease: Chronic pulmonary disease  Respiratory Pattern: Regular pattern and RR 12-20 bpm  Breath Sounds: Slightly diminished and/or crackles  Cough: Strong, spontaneous, non-productive  Indication for Bronchodilator Therapy: Decreased or absent breath sounds  Bronchodilator Assessment Score: 4    Aerosolized bronchodilator medication orders have been revised according to the RT Inhaler-Nebulizer Bronchodilator Protocol below. Respiratory Therapist to perform RT Therapy Protocol Assessment initially then follow the protocol. Repeat RT Therapy Protocol Assessment PRN for score 0-3 or on second treatment, BID, and PRN for scores above 3. No Indications - adjust the frequency to every 6 hours PRN wheezing or bronchospasm, if no treatments needed after 48 hours then discontinue using Per Protocol order mode. If indication present, adjust the RT bronchodilator orders based on the Bronchodilator Assessment Score as indicated below. Use Inhaler orders unless patient has one or more of the following: on home nebulizer, not able to hold breath for 10 seconds, is not alert and oriented, cannot activate and use MDI correctly, or respiratory rate 25 breaths per minute or more, then use the equivalent nebulizer order(s) with same Frequency and PRN reasons based on the score. If a patient is on this medication at home then do not decrease Frequency below that used at home.     0-3 - enter or revise RT bronchodilator order(s) to equivalent RT Bronchodilator order with Frequency of every 4 hours PRN for wheezing or increased work of breathing using Per Protocol order mode. 4-6 - enter or revise RT Bronchodilator order(s) to two equivalent RT bronchodilator orders with one order with BID Frequency and one order with Frequency of every 4 hours PRN wheezing or increased work of breathing using Per Protocol order mode. 7-10 - enter or revise RT Bronchodilator order(s) to two equivalent RT bronchodilator orders with one order with TID Frequency and one order with Frequency of every 4 hours PRN wheezing or increased work of breathing using Per Protocol order mode. 11-13 - enter or revise RT Bronchodilator order(s) to one equivalent RT bronchodilator order with QID Frequency and an Albuterol order with Frequency of every 4 hours PRN wheezing or increased work of breathing using Per Protocol order mode. Greater than 13 - enter or revise RT Bronchodilator order(s) to one equivalent RT bronchodilator order with every 4 hours Frequency and an Albuterol order with Frequency of every 2 hours PRN wheezing or increased work of breathing using Per Protocol order mode.          Electronically signed by Carmel Lira RCP on 1/24/2022 at 6:49 PM

## 2022-01-24 NOTE — PROGRESS NOTES
Pt. Constantly pulling off telemetry leads and O2 probe. Pt. Very restless. Dr. Noelle Galvan made aware. New order for Seroquel PRN BID. Dose given at this time. Will continue to monitor.  Froilan Durham RN

## 2022-01-24 NOTE — PLAN OF CARE
Problem: Airway Clearance - Ineffective  Goal: Achieve or maintain patent airway  Outcome: Ongoing     Problem: Gas Exchange - Impaired  Goal: Absence of hypoxia  Outcome: Ongoing  Goal: Promote optimal lung function  Outcome: Ongoing     Problem: Breathing Pattern - Ineffective  Goal: Ability to achieve and maintain a regular respiratory rate  Outcome: Ongoing     Problem:  Body Temperature -  Risk of, Imbalanced  Goal: Ability to maintain a body temperature within defined limits  Outcome: Ongoing  Goal: Will regain or maintain usual level of consciousness  Outcome: Ongoing  Goal: Complications related to the disease process, condition or treatment will be avoided or minimized  Outcome: Ongoing     Problem: Isolation Precautions - Risk of Spread of Infection  Goal: Prevent transmission of infection  Outcome: Ongoing     Problem: Nutrition Deficits  Goal: Optimize nutritional status  Outcome: Ongoing     Problem: Risk for Fluid Volume Deficit  Goal: Maintain normal heart rhythm  Outcome: Ongoing  Goal: Maintain absence of muscle cramping  Outcome: Ongoing  Goal: Maintain normal serum potassium, sodium, calcium, phosphorus, and pH  Outcome: Ongoing     Problem: Loneliness or Risk for Loneliness  Goal: Demonstrate positive use of time alone when socialization is not possible  Outcome: Ongoing     Problem: Fatigue  Goal: Verbalize increase energy and improved vitality  Outcome: Ongoing     Problem: Patient Education: Go to Patient Education Activity  Goal: Patient/Family Education  Outcome: Ongoing     Problem: Falls - Risk of:  Goal: Will remain free from falls  Description: Will remain free from falls  Outcome: Ongoing  Goal: Absence of physical injury  Description: Absence of physical injury  Outcome: Ongoing     Problem: Skin Integrity:  Goal: Will show no infection signs and symptoms  Description: Will show no infection signs and symptoms  Outcome: Ongoing  Goal: Absence of new skin breakdown  Description: Absence of new skin breakdown  Outcome: Ongoing     Problem: Confusion - Acute:  Goal: Absence of continued neurological deterioration signs and symptoms  Description: Absence of continued neurological deterioration signs and symptoms  Outcome: Ongoing  Goal: Mental status will be restored to baseline  Description: Mental status will be restored to baseline  Outcome: Ongoing     Problem: Discharge Planning:  Goal: Ability to perform activities of daily living will improve  Description: Ability to perform activities of daily living will improve  Outcome: Ongoing  Goal: Participates in care planning  Description: Participates in care planning  Outcome: Ongoing     Problem: Injury - Risk of, Physical Injury:  Goal: Will remain free from falls  Description: Will remain free from falls  Outcome: Ongoing  Goal: Absence of physical injury  Description: Absence of physical injury  Outcome: Ongoing     Problem: Mood - Altered:  Goal: Mood stable  Description: Mood stable  Outcome: Ongoing  Goal: Absence of abusive behavior  Description: Absence of abusive behavior  Outcome: Ongoing  Goal: Verbalizations of feeling emotionally comfortable while being cared for will increase  Description: Verbalizations of feeling emotionally comfortable while being cared for will increase  Outcome: Ongoing     Problem: Psychomotor Activity - Altered:  Goal: Absence of psychomotor disturbance signs and symptoms  Description: Absence of psychomotor disturbance signs and symptoms  Outcome: Ongoing     Problem: Sensory Perception - Impaired:  Goal: Demonstrations of improved sensory functioning will increase  Description: Demonstrations of improved sensory functioning will increase  Outcome: Ongoing  Goal: Decrease in sensory misperception frequency  Description: Decrease in sensory misperception frequency  Outcome: Ongoing  Goal: Able to refrain from responding to false sensory perceptions  Description: Able to refrain from responding to false sensory perceptions  Outcome: Ongoing  Goal: Demonstrates accurate environmental perceptions  Description: Demonstrates accurate environmental perceptions  Outcome: Ongoing  Goal: Able to distinguish between reality-based and nonreality-based thinking  Description: Able to distinguish between reality-based and nonreality-based thinking  Outcome: Ongoing  Goal: Able to interrupt nonreality-based thinking  Description: Able to interrupt nonreality-based thinking  Outcome: Ongoing     Problem: Sleep Pattern Disturbance:  Goal: Appears well-rested  Description: Appears well-rested  Outcome: Ongoing     Problem: Nutrition  Goal: Optimal nutrition therapy  Outcome: Ongoing  Goal: Understanding of nutritional guidelines  Outcome: Ongoing     Problem: Non-Violent Restraints  Goal: Removal from restraints as soon as assessed to be safe  Outcome: Ongoing  Goal: No harm/injury to patient while restraints in use  Outcome: Ongoing  Goal: Patient's dignity will be maintained  Outcome: Ongoing     Problem: Pain:  Goal: Pain level will decrease  Description: Pain level will decrease  Outcome: Ongoing  Goal: Control of acute pain  Description: Control of acute pain  Outcome: Ongoing  Goal: Control of chronic pain  Description: Control of chronic pain  Outcome: Ongoing  Goal: Patient's pain/discomfort is manageable  Description: Patient's pain/discomfort is manageable  Outcome: Ongoing     Problem: Infection:  Goal: Will remain free from infection  Description: Will remain free from infection  Outcome: Ongoing     Problem: Safety:  Goal: Free from accidental physical injury  Description: Free from accidental physical injury  Outcome: Ongoing  Goal: Free from intentional harm  Description: Free from intentional harm  Outcome: Ongoing     Problem: Daily Care:  Goal: Daily care needs are met  Description: Daily care needs are met  Outcome: Ongoing     Problem: Skin Integrity:  Goal: Skin integrity will stabilize  Description: Skin integrity will stabilize  Outcome: Ongoing     Problem: Discharge Planning:  Goal: Patients continuum of care needs are met  Description: Patients continuum of care needs are met  Outcome: Ongoing

## 2022-01-24 NOTE — CARE COORDINATION
This RN CM spoke to Tenzin Jaime with [facility] who states that she spoke with Dr. Sruthi Barber and they are able to accept the Pt once he is out of restraints for 24 hours. Nursing aware and attempting to remove restraints today, if able. Will follow.

## 2022-01-24 NOTE — PROGRESS NOTES
Occupational Therapy Daily Treatment Note    Unit: [address] [place]  Date:  1/24/2022  Patient Name:    Octavia Mallory. Admitting diagnosis:  Unable to ambulate [R26.2]  Fall, initial encounter [W19. XXXA]  COVID-19 [U07.1]  Admit Date:  1/12/2022  Precautions/Restrictions:  fall risk  IV, bed/chair alarm, telemetry and continuous pulse ox, cognitive deficit, pacemaker, 2L of O2, posey belt         Discharge Recommendations: Acute Rehab (ARU)/ Inpatient Rehab Facility (IRF)  DME needs for discharge: defer to facility       Therapy recommendations for staff:   Assist of 1 (minimal assist) with use of rolling walker (RW) for all transfers to/from BSC/chair    AM-PAC Score: AM-PAC Inpatient Daily Activity Raw Score: 14  Home Health S4 Level: NA       Treatment Time:  15:00-15:26  Treatment number:  4   Total Treatment Time:   26 minutes    History of Present Illness: 63 y. o. male with DMII, Chronic sCHF, CAD s/p stent placement x3, hx of MI, asthma, HTN, HLD, GERD, lumbar spondylosis and DDD s/p bilateral dorsal ramus medial branch ablation and interstim implant, depression who presented to [facility] ED with complaint of generalized weakness ongoing for approximately 2 months and frequent falls.     In ED, CT of the head showed no acute abnormality.  Chest x-ray with no acute cardiopulmonary process.  Patient did test positive for Tanya Tyner is vaccinated x2, no booster.  Patient admitted to med-surg for further care. PT/OT consulted.      Mr. Quiroga developed rapid HR what appears to be Afib on TELE monitor and his pacer inappropriately delivered shock x 5 and this was witnessed by me and nursing staff.      Pt awake, conscious during these episodes and by the end of the 5th shock, he started getting slightly confused     Given cardizem 10 mg IV x 1 and metoprolol 5 mg with control of HR  EKG after this episode showed sinus tach     Progressively developed confusion , fevers , started on precedex gtt     Subjective: Patient supine in bed and agreeable to treatment. Subjective:  Pt agreeable to tx, RN taking telemetry monitor off so he can trial no wrist restraints     Pain   No  Rating: NA  Location:NA  Pain Medicine Status: No request made      Bed Mobility: all activities are compromised by pt cognitive deficit and slow processing  Supine to Sit:  Mod A  Sit to Supine:  Min A  Rolling:           Min A  Scooting:        CGA    Transfer Training:   Sit to stand:   CGA  Stand to sit:  CGA  Bed to Chair:  Min A and with use of RW  Bed to BSC:   N/A  Standard toilet:   N/A    Activity Tolerance   Pt completed therapy session with No adverse symptoms noted w/activity    ADL Training:   Upper body dressing:  N/A  Upper body bathing:  N/A  Lower body dressing: Mod A don socks   Lower body bathing:  N/A  Toileting:   N/A  Grooming/Hygiene:  N/A    Therapeutic Exercise:   N/A    Patient Education:   Role of OT  Safe RW use/hand placement    Positioning Needs: In bed, call light and needs in reach.  with posey belt and bed in locked postion     Family Present:  No    Assessment: When entered pt room this day he had managed to get his telemetry leads off and monitor was on floor (pt was in wrist restraints). Nurse entered shortly after therapist and took telemetry monitor off as well as wrist restraints for a trial.  Pt has slow processing and this makes transfers difficult as pt will stand at EOB not moving and when physically cues to take steps pt was still unable to follow directions. Pt asked for STNA to come assist as therapist was concerned that pt would attempt to sit without chair behind him. Pt was finally able to transfer with Min A but it took 3 attempts to complete task. Cont with OT tx as pt will greatly benefit from services. GOALS  1). Bed to toilet/BSC: Mod A and with use of RW     To be met in 5 Visits:  1). Supine to/from Sit:             Min A  2). Upper Body Bathing:         Min A  3).  Lower Body Bathing:         Mod A  4). Upper Body Dressing:       Min A  5). Lower Body Dressing:       Mod A  6).  Pt to demonstrate UE exs x 15 reps with minimal cues      Plan: cont with POC      Blu High OTR/L #[id number]        If patient discharges from this facility prior to next visit, this note will serve as the Discharge Summary

## 2022-01-24 NOTE — PROGRESS NOTES
RT Inhaler-Nebulizer Bronchodilator Protocol Note    There is a bronchodilator order in the chart from a provider indicating to follow the RT Bronchodilator Protocol and there is an Initiate RT Inhaler-Nebulizer Bronchodilator Protocol order as well (see protocol at bottom of note). CXR Findings:  No results found. The findings from the last RT Protocol Assessment were as follows:   History Pulmonary Disease: (P) Chronic pulmonary disease  Respiratory Pattern: (P) Regular pattern and RR 12-20 bpm  Breath Sounds: (P) Slightly diminished and/or crackles  Cough: (P) Strong, spontaneous, non-productive  Indication for Bronchodilator Therapy: (P) Decreased or absent breath sounds  Bronchodilator Assessment Score: (P) 4    Aerosolized bronchodilator medication orders have been revised according to the RT Inhaler-Nebulizer Bronchodilator Protocol below. Respiratory Therapist to perform RT Therapy Protocol Assessment initially then follow the protocol. Repeat RT Therapy Protocol Assessment PRN for score 0-3 or on second treatment, BID, and PRN for scores above 3. No Indications - adjust the frequency to every 6 hours PRN wheezing or bronchospasm, if no treatments needed after 48 hours then discontinue using Per Protocol order mode. If indication present, adjust the RT bronchodilator orders based on the Bronchodilator Assessment Score as indicated below. Use Inhaler orders unless patient has one or more of the following: on home nebulizer, not able to hold breath for 10 seconds, is not alert and oriented, cannot activate and use MDI correctly, or respiratory rate 25 breaths per minute or more, then use the equivalent nebulizer order(s) with same Frequency and PRN reasons based on the score. If a patient is on this medication at home then do not decrease Frequency below that used at home.     0-3 - enter or revise RT bronchodilator order(s) to equivalent RT Bronchodilator order with Frequency of every 4 hours PRN for wheezing or increased work of breathing using Per Protocol order mode. 4-6 - enter or revise RT Bronchodilator order(s) to two equivalent RT bronchodilator orders with one order with BID Frequency and one order with Frequency of every 4 hours PRN wheezing or increased work of breathing using Per Protocol order mode. 7-10 - enter or revise RT Bronchodilator order(s) to two equivalent RT bronchodilator orders with one order with TID Frequency and one order with Frequency of every 4 hours PRN wheezing or increased work of breathing using Per Protocol order mode. 11-13 - enter or revise RT Bronchodilator order(s) to one equivalent RT bronchodilator order with QID Frequency and an Albuterol order with Frequency of every 4 hours PRN wheezing or increased work of breathing using Per Protocol order mode. Greater than 13 - enter or revise RT Bronchodilator order(s) to one equivalent RT bronchodilator order with every 4 hours Frequency and an Albuterol order with Frequency of every 2 hours PRN wheezing or increased work of breathing using Per Protocol order mode. RT to enter RT Home Evaluation for COPD & MDI Assessment order using Per Protocol order mode.     Electronically signed by Satish Lin RCP on 1/24/2022 at 8:05 AM

## 2022-01-24 NOTE — PROGRESS NOTES
Tele-sitter alarmed nurse, pt nearly fell to to the floor. PCA in room holding patient between both bed rails with bottom off floor. Pt stated \" I was trying to get up so I can go to [facility] ([facility] \" Bilateral wrist restraints and pose belt implemented for patients safety. Pt also pulling at Oxygen. IV, telemetry. Three nurses and PCA placed pt back in bed and implemented restraints.

## 2022-01-24 NOTE — CARE COORDINATION
[facility] - Acute Rehab Unit   After review, this patient is felt to be:       [x]  Appropriate for Acute Inpatient Rehab pending the patient remains restraint free for 24 hours prior to admission. []  Appropriate for Acute Inpatient Rehab Pending Insurance Authorization    []  Not appropriate for Acute Inpatient Rehab    []  Referral received and ARU reviewing patient; Evaluation ongoing. Discussed with . Pt ok for ARU admission pending he remains medically stable and restraint free for 24 hours.   Thank you for the referral.    Nakul Ramirez, [address]  Speech-Language Pathologist  [facility]  Desk: [phone]

## 2022-01-24 NOTE — PLAN OF CARE
Problem: Airway Clearance - Ineffective  Goal: Achieve or maintain patent airway  Outcome: Ongoing     Problem: Gas Exchange - Impaired  Goal: Absence of hypoxia  Outcome: Ongoing  Goal: Promote optimal lung function  Outcome: Ongoing     Problem: Breathing Pattern - Ineffective  Goal: Ability to achieve and maintain a regular respiratory rate  Outcome: Ongoing     Problem:  Body Temperature -  Risk of, Imbalanced  Goal: Ability to maintain a body temperature within defined limits  Outcome: Ongoing  Goal: Will regain or maintain usual level of consciousness  Outcome: Ongoing  Goal: Complications related to the disease process, condition or treatment will be avoided or minimized  Outcome: Ongoing     Problem: Isolation Precautions - Risk of Spread of Infection  Goal: Prevent transmission of infection  Outcome: Ongoing     Problem: Nutrition Deficits  Goal: Optimize nutritional status  Outcome: Ongoing     Problem: Risk for Fluid Volume Deficit  Goal: Maintain normal heart rhythm  Outcome: Ongoing  Goal: Maintain absence of muscle cramping  Outcome: Ongoing  Goal: Maintain normal serum potassium, sodium, calcium, phosphorus, and pH  Outcome: Ongoing     Problem: Loneliness or Risk for Loneliness  Goal: Demonstrate positive use of time alone when socialization is not possible  Outcome: Ongoing     Problem: Fatigue  Goal: Verbalize increase energy and improved vitality  Outcome: Ongoing     Problem: Patient Education: Go to Patient Education Activity  Goal: Patient/Family Education  Outcome: Ongoing     Problem: Falls - Risk of:  Goal: Will remain free from falls  Description: Will remain free from falls  Outcome: Ongoing  Goal: Absence of physical injury  Description: Absence of physical injury  Outcome: Ongoing     Problem: Injury - Risk of, Physical Injury:  Goal: Will remain free from falls  Description: Will remain free from falls  Outcome: Ongoing  Goal: Absence of physical injury  Description: Absence of physical injury  Outcome: Ongoing

## 2022-01-24 NOTE — FLOWSHEET NOTE
01/24/22 0930   Vital Signs   Temp 96.9 °F (36.1 °C)   Temp Source Oral   Pulse 103   Heart Rate Source Monitor   Resp 18   /79   BP Location Left upper arm   Patient Position Semi fowlers   Level of Consciousness Alert (0)   MEWS Score 2   Patient Currently in Pain Denies   Pain Assessment   Pain Assessment 0-10   Pain Level 0   Oxygen Therapy   SpO2 90 %   O2 Device None (Room air)   Shift assessment complete. See flow sheet. Scheduled medications given, See MAR. Head to toe complete. Vital signs logged and active bowel sounds in all 4 quadrants. Pt awake in bed. Pt in bilateral wrist restraints and in posey lap belt. Pt able to answer orientation questions however pt very impulsive. Taking heart monitor off and throwing feet over bedside rails. Meds taken without difficulty. No further needs noted at this time. Call light and bedside table within reach. Bed in lowest position, wheels locked and side rails up x2.      Mitesh Mehta RN

## 2022-01-24 NOTE — PROGRESS NOTES
Pt transferred to [facility] via charge nurse and PCA, care transferred to [address]. Pt stable.

## 2022-01-24 NOTE — PROGRESS NOTES
Speech Language Pathology  Facility/Department: [address] [place] [address] [address] [place] [facility]  Dysphagia Daily Treatment Note    NAME: Virgie Soares   : 1960  MRN: [id number]    Patient Diagnosis(es):   Patient Active Problem List    Diagnosis Date Noted    Encephalopathy     Abnormal chest x-ray     [facility]oxysmal atrial fibrillation) [facility] [address]     COVID-19 virus infection     Disorder of electrolytes     Atrial fibrillation with RVR (HCC)     Low grade fever     Thrombocytopenia (HCC)     AICD malfunction     Hypotension     Valproic acid toxicity     Implantable cardioverter-defibrillator (ICD) discharge     Unable to ambulate     COVID-19 2022    Fall     CAD S/P percutaneous coronary angioplasty 2021    Type 2 diabetes mellitus with hyperglycemia, with long-term current use of insulin (HCC)     Essential hypertension     Generalized weakness 2021    Head trauma     Abrasion, scalp w/o infection     Unsteady gait     Hyperglycemia     Chronic obstructive pulmonary disease (HCC)     Coarse tremor     Lactic acidosis 2021    Acute encephalopathy     TIA involving left internal carotid artery     DM (diabetes mellitus), secondary, uncontrolled, w/neurologic complic ([facility] [address]     Dyslipidemia     HTN (hypertension), benign     Weakness 2019    Obesity, morbid, BMI 40.0-49.9 ([address] 2018    Ischemic cardiomyopathy 2018    Hypertension due to endocrine disorder 2017    ALIN on CPAP 2015    Gastroesophageal reflux disease without esophagitis 2015    Hyperlipidemia with target LDL less than 100 2015    Automatic implantable cardioverter-defibrillator in situ 2014    CAD (coronary artery disease) 2014    Automatic implantable cardioverter-defibrillator in situ 2013    Ventricular tachycardia ([facility] [address] 2012    Presence of stent in left circumflex coronary artery 2012    Myocardial infarction, nontransmural ([facility] [address] 02/22/2012    Myocardial infarction, inferoposterior wall, subsequent care 02/22/2012     Allergies: Allergies   Allergen Reactions    Other      VASCEPA CAUSED RASH AND ITCHING    Pcn [Penicillins] Hives     Onset Date: 01/12/2022    Subjective:Pt orients to person, place, year, recent events. Pt in wrist restraints as was pulling at heart monitor. Pain:denies pain    Current Diet: ADULT DIET; Regular; 4 carb choices (60 gm/meal)  ADULT ORAL NUTRITION SUPPLEMENT; Lunch, Dinner; Frozen Oral Supplement    Diet Tolerance:  Patient tolerating current diet level without signs/symptoms of aspiration. Dysphagia Treatment and Impressions:   Pt seen in room at bedside with RN permission   Chart Review/Interview : RN reports pt eating and drinking without difficulty. Rn states she was instructed to offer meds crushed or whole in puree.  Respiratory Status: Pt with SPO2% of 92% on RA  with RR of 16   Oral care completed by SLP d/t wrist restraints.  Liquid PO Trials:    IDDSI 0 Thin:  Assessed via straw: no anterior bolus loss , suspect functional A-P bolus transit and no clinical s/s of aspiration   Solid PO Trials   IDDSI 7 Regular:   no anterior bolus loss , suspect functional A-P bolus transit, grossly functional mastication, oral clearance grossly WFL, oral stasis noted post swallow of which pt is aware and which pt clears with thin liquids wash, and no clinical s/s of aspiration   IDDSI 7 Regular/Mixed consistencies: Pt assessed with thin liquids with whole meds with RN present. no anterior bolus loss , suspect functional A-P bolus transit, oral clearance grossly WFL and no clinical s/s of aspiration     Education: SLP edu pt re: Role of SLP, Rationale for dysphagia tx, Aspiration precautions, Evidenced based practice for current recommendations and treatment and Importance of oral care to reduce adverse affects in the event of aspiration.  Pt verbalized understanding and RN aware of recommendations   Assessment: Pt tolerating baseline diet with no clinical s/s of aspiration. Good carryover of recommended compensatory strategies   Recommendations: SLP recommends to continue with IDDSI 7 Regular Solids; IDDSI 0 Thin Liquids - straws OK; Meds whole with thin liquids   Risk Management: upright for all intake, stay upright for at least 30 mins after intake, small bites/sips, assist feed, oral care 2-3x/day to reduce adverse affects in the event of aspiration, increase physical mobility as able, slow rate of intake, general aspiration precautions and hold PO and contact SLP if s/s of aspiration or worsening respiratory status develop. . If the patient exhibits s/s of aspiration and/or worsening respiratory status, hold PO and contact SLP. Dysphagia Goals:  Timeframe for Long-term Goals: 7 days by 01/26/2022  Goal 1: Pt will demonstrate functional swallow of preferred consistencies with use of safe swallow strategies and no decline in respiratory status-01/24 GOAL MET. Pt demonstrating functional swallow of baseline consistencies     Short-term Goals  Timeframe for Short-term Goals: 5 days by 01/24/2022  Goal 1: Pt will demonstrate functional swallow for recommended consistencies with no clinical signs of aspiration in 5/5 opportunities 01/24 GOAL MET. Pt demonstrating functional swallow of baseline consistencies    Goal 2: Pt/caregiver will demonstrate understanding of safe swallow strategies and ways to decrease adverse outcomes associated with aspiration. 01/24 GOAL MET. See above.        Speech/Language/Cog Goals: N/A                        Recommendations:  Solid Consistency: IDDSI 7 Regular Solids  Liquid Consistency: IDDSI 0 Thin Liquids - straws OK  Medication: Meds whole with thin liquids    Patient/Family/Caregiver Education: Role of SLP, Rationale for dysphagia tx, Recommended compensatory strategies, Aspiration precautions, Evidenced based practice for current recommendations and treatment and Importance of oral care to reduce adverse affects in the event of aspiration    Compensatory Strategies: Upright for all intake, Remain upright at least 30 mins after intake, Small bites and Small sips    Plan:    Continued Dysphagia treatment with goals per plan of care. Discharge Recommendations: Recommend SLP tx at ARU for assessment of cognitive needs at next level of care to increase safety and independence upon d/c. Pt was independent in ADLs and drove PTA. Pt oriented in acute setting, however pt judgment and reasoning questionable as pt pulling at lines and requiring wrist restraints. Etiology for change in cognition from baseline is unclear as CT head shows no abnormality. Question role of acute infection, acute encephalopathy, medication, misfiring of ICD or combination of the above. Assessment of cognitive needs at ARU level may assist in identifying barriers/determining needs and functional treatment strategies which may contribute to pt's progress and ability to reach desired level of independence. No further dysphagia tx recommended at this time. If pt discharges from hospital prior to Speech/Swallowing discharge, this note serves as tx and discharge summary. Total Treatment Time / Charges     Time in Time out Total Time / units   Cognitive Tx         Speech Tx      Dysphagia Tx 1412 1438 26 min/1 unit     Signature:  Rafaela Solares, M.A.  [address] [facility]  Speech-Language Pathologist        Kaykay Restrepo, SLP

## 2022-01-24 NOTE — PROGRESS NOTES
Progress Note    Admit Date:  1/12/2022    63 y.o. male with DMII, Chronic sCHF, CAD s/p stent placement x3, hx of MI, asthma, HTN, HLD, GERD, lumbar spondylosis and DDD s/p bilateral dorsal ramus medial branch ablation and interstim implant, depression who presented to [facility] ED with complaint of generalized weakness ongoing for approximately 2 months and frequent falls. In ED, CT of the head showed no acute abnormality. Chest x-ray with no acute cardiopulmonary process. Patient did test positive for COVID. He is vaccinated x2, no booster. Patient admitted to med-surg for further care. PT/OT consulted. 1/15  Mr. Quiroga developed rapid HR what appears to be Afib on TELE monitor and his pacer inappropriately delivered shock x 5  Pt awake, conscious during these episodes and by the end of the 5th shock, he started getting slightly confused  Given cardizem 10 mg IV x 1 and metoprolol 5 mg with control of HR  EKG after this episode showed sinus tach  Progressively developed confusion , fevers , started on precedex gtt  Pt transferred to ICU    Pt very confused and became agitated with removing lines. Placed on  on precedex gtt, ct head neg   No further shocks from ICD. His ICD has been adjusted by cardiology       Tx to PCU on 1/19  Placed in restraints evening of 1/19. He had not slept in a couple of days. Is doing much better now. He has been off restraints for the last couple of days. He started sleeping after starting on melatonin daily night. Mental status is clear. Neuro exam remains nonfocal        Subjective:    Mental status waxing and waning - he is alert and oriented today, but with bouts of impulsive behavior, pulling out devices, ripping off heart monitor and trying to get up from bed - redirectable though very short lived. Objective: Ari Clancy      Vitals:    01/24/22 0159 01/24/22 0803 01/24/22 0930 01/24/22 1245   BP: 120/68  128/79 (!) 139/54   Pulse: 61  103 69   Resp: 20 18 18 18   Temp: 96.7 °F (35.9 °C)  96.9 °F (36.1 °C) 98.3 °F (36.8 °C)   TempSrc: Axillary  Oral Oral   SpO2: 91% 90% 90% 92%   Weight:       Height:                Intake/Output Summary (Last 24 hours) at 1/24/2022 1525  Last data filed at 1/24/2022 1250  Gross per 24 hour   Intake 240 ml   Output 100 ml   Net 140 ml       Physical Exam:    Gen: middle aged obese male awake. Alert and better oriented today   Appears to be not in any distress  Mucous Membranes:  Pink , anicteric  Neck: No JVD, no carotid bruit, no thyromegaly. No nuchal rigidity  Chest:  Clear to auscultation bilaterally, diminished in bases   Cardiovascular:  regular,  S1S2 heard, no murmurs or gallops- left sided ICD +  Abdomen:  Soft, undistended, non tender, no organomegaly, BS present  Extremities: No edema or cyanosis. Distal pulses well felt  Neurological : improving mentation but still with intermittent confusion  Right sided increased stiffness noted yesterday, this has resolved today. Nonfocal exam now. Confusion is clear. He is awake and well-oriented  No facial weakness. Speech clear      Data:  CBC:   Recent Labs     01/22/22 0418 01/23/22 0436 01/24/22  0518   WBC 2.5* 2.6* 3.2*   HGB 13.4* 13.7 13.3*   HCT 39.6* 41.5 38.6*   MCV 84.4 86.7 83.2   * 120* 132*     BMP:   Recent Labs     01/22/22 0418 01/23/22  0436 01/24/22  0518    134* 137   K 3.2* 3.3* 3.9   CL 99 96* 99   CO2 26 26 25   PHOS 2.9 2.9 3.4   BUN 9 10 10   CREATININE <0.5* <0.5* <0.5*     CULTURES  Results for Saurav Rust (MRN [id number]) as of 1/13/2022 13:29    Ref. Range 1/12/2022 19:35   SARS-CoV-2, NAAT Latest Ref Range: Not Detected  DETECTED (AA)          RADIOLOGY  XR CHEST PORTABLE   Final Result   NG tube with the tip and side-port in the stomach. Bibasilar atelectasis. XR CHEST PORTABLE   Final Result   NG tube is difficult to visualize, tip is likely below the diaphragm.   Repeat   exam could be considered if clinically indicated. XR CHEST PORTABLE   Final Result   New elevated right hemidiaphragm, likely atelectasis or possibly infiltrate. Subpulmonic effusion not excluded. No overt failure. CT HEAD WO CONTRAST   Final Result   1. No acute intracranial abnormality. CT Head WO Contrast   Final Result   No acute intracranial abnormality. XR CHEST PORTABLE   Final Result   No acute cardiopulmonary process. Pertinent previous results reviewed   Diagnostic cardiac cath 7/13/21  CONCLUSION:  Successful stenting of the true circumflex into the obtuse  marginal branch, successful stenting of the left posterior descending  coronary artery and successful stenting of the obtuse marginal branch,  all 80% stenosis reduced to 0 with these three drug-eluting stents. JAMES-3 blood flow was present in the circumflex artery and all its  branches.  The LAD is normal with minor irregularities only and the left  main is normal.  Nondominant right coronary artery.  LV ejection  fraction of 40% with inferior wall and inferoposterior hypokinesia.     ECHO 11/27/19  Summary   The left ventricular systolic function is moderately reduced with an   ejection fraction of 35 -40 %.   There is hypokinesis of the inferior and basal inferior walls.   There is akinesis of the inferolateral wall.   Left ventricular cavity size is moderately dilated.   Compared to previous study from 2-2-2015 no changes noted in left   ventricular function.   Mild mitral regurgitation. Limited echo 1/15/2021   Summary   This is a limited study afib, ischemic cmp, recurrent pacer firing. LV systolic function appears mildly reduced with EF estimated at 45-50%. There is more prominent HK of the inferior wall. Left ventricular size is decreased. There is mild concentric left ventricular hypertrophy.    11- 35-40% inf basal hypo, inferolateral akinesis, LVE, mild MR       Assessment/Plan:    # ICD firing - he had 5 witnessed shocks and then more bursts (4-5 additional episodes) of AICD discharges and concern for malfunctioning ICD  - s.p  [employer] interrogation and now ICD is adjusted . - Cardiology consulted. - now back in NSR  - resumed  BB - toprol XL stopped due to low BP - decreased to  metoprolol 25 BID--  Increased on 1/20 to 50 mg BID. Heart rate remained stable now    #Afibb with RVR  - new onset with above ICD issues  -Seen by cardiology  - resume home cardizem and BB if tolerated. Can use IV meds intermittently  - was in AFIB with RVR ->  now in NSR   - started eliquis but holding with low PLT, likely resume tomorrow if platelets remain to increase  - resumed eliquis 1/21  Currently heart rate well controlled. Continued on beta-blockers as above . continued on Eliquis. #Hypokalemia  - keep greater than 4  - PRN potassium SS ordered   Continue scheduled potassium daily    #Acute Encephalopathy - possible sec to covid encephalopathy   - NOT POA - developed slowly most pronounced after ICD firing  -CT head non focal x 2   -He denies any drug or alcohol use.   -precedex gtt for agitation used--- d/c on 1/19  - added seroquel  -Ammonia level wnl   -Consider MRI brain - unable to do with incompatible pacer  -Started empirically on rocephin meningitis dose D#7. clinically no signs of meningitis. no nuchal rigidity nonfocal exam.  He was treated empirically for 7 days with antibiotics;  now can be discontinued. Patient has improved very well with supportive care  -Stopped prcedex gtt on 1/19  -Continue Seroquel- add 12.5 mg now and 25 at night  - removed restraints.    -Insomnia ->added Melatonin nightly. Patient is doing much better now, slept well last night.    Mental status changes have resolved     #COVID 19 infection   -Positive 1/12/22  -only complaints is a cough  -patient is vaccinated x2- no booster  - minimal  Hypoxia at 2 L.   - Decadron D6  -Monitor, continuous pulse ox and telemetry #DMII  - not controlled noted to have >1000 glucose in urine on admission   -Hold home oral meds- resume lantus increase as tolerated  -Low dose SSI--switched to high dose SSI  -Tolerating carb controlled diet    #Thrombocytopenia  -Platelet count is improved now.      #CAD s/p stent placement x3  #Hx of MI  #Hx of VTACH s.p ICD  - ASA, statin, plavix    #Chronic sCHF no AE   - Last echo 11/27/19, EF 35-40 % Limited echo completed 1/15 which showed EF 45-50%. - Pacemaker/ICD in place  -Continue toprol XL, cardizem  - patient takes Lasix on Monday and Thursday - holding at this time   - Lisinopril has been on hold, resumed at a lower dose,   on 20 mg at home. Started 5 mg in am and can increase as blood pressure allows. Holding parameters in place.     #Asthma no AE  -Symbicort  -albuterol prn     #HLD  -continue statin     #GERD  -continue ppi    #Lumbar spondylosis and DDD   -s/p bilateral dorsal ramus medial branch ablation and interstim implant  -PDMP displays on chronic gabapentin, continue  - follows with Dr. Joceline Hampton      #ALIN  - unsure if patient is compliant with CPAP ? If he still has CPAP at home     #Morbid Obesity  - Body mass index is 41.37 kg/m². - Complicating assessment and treatment. Placing patient at risk for multiple co-morbidities as well as early death and contributing to the patient's presentation.   - Counseled on weight loss.      #Depression  - patient is on Cymbalta 60 mg at home, medication verified with pharmacy. Continued on home dose  - also takes Depakote 500 in am and 1000 in evening also verified with pharmacy. Continued on home dose    #Weakness  PT/OT consulted,  recommend ARU    DVT Prophylaxis: eliquis- on hold Lovenox at this time  Diet: ADULT DIET; Regular; 4 carb choices (60 gm/meal)  ADULT ORAL NUTRITION SUPPLEMENT; Lunch, Dinner; Frozen Oral Supplement  Code Status: Full Code       ARU once mental status better.    Stop day time dose of depakote - cont nightly dose - this would be his home regimen. Increase seroquel dose.          Elisa Paz MD

## 2022-01-24 NOTE — PROGRESS NOTES
New IV placed since pt. Pulled his out. Pt. Tolerated. Report given to [facility].  Lluvia Drummond RN

## 2022-01-24 NOTE — PROGRESS NOTES
Shift assessment complete, all meds given per MAR crushed in pudding, pt answered orientation questions correctly but unable to follow commands. Pt constantly pulling & removing monitor, pulse ox. VSS. , insulin held. Metoprolol held. Bed in lowest position, call light within reach. All needs addressed.

## 2022-01-25 LAB
ANION GAP SERPL CALCULATED.3IONS-SCNC: 10 MMOL/L (ref 3–16)
BANDED NEUTROPHILS RELATIVE PERCENT: 1 % (ref 0–7)
BASOPHILS ABSOLUTE: 0 K/UL (ref 0–0.2)
BASOPHILS RELATIVE PERCENT: 1 %
BUN BLDV-MCNC: 11 MG/DL (ref 7–20)
CALCIUM SERPL-MCNC: 9.2 MG/DL (ref 8.3–10.6)
CHLORIDE BLD-SCNC: 99 MMOL/L (ref 99–110)
CO2: 27 MMOL/L (ref 21–32)
CREAT SERPL-MCNC: 0.6 MG/DL (ref 0.9–1.3)
EOSINOPHILS ABSOLUTE: 0 K/UL (ref 0–0.6)
EOSINOPHILS RELATIVE PERCENT: 0 %
GFR AFRICAN AMERICAN: >60
GFR NON-AFRICAN AMERICAN: >60
GLUCOSE BLD-MCNC: 129 MG/DL (ref 70–99)
GLUCOSE BLD-MCNC: 146 MG/DL (ref 70–99)
GLUCOSE BLD-MCNC: 188 MG/DL (ref 70–99)
GLUCOSE BLD-MCNC: 193 MG/DL (ref 70–99)
GLUCOSE BLD-MCNC: 231 MG/DL (ref 70–99)
HCT VFR BLD CALC: 40.2 % (ref 40.5–52.5)
HEMOGLOBIN: 13.4 G/DL (ref 13.5–17.5)
LYMPHOCYTES ABSOLUTE: 0.9 K/UL (ref 1–5.1)
LYMPHOCYTES RELATIVE PERCENT: 25 %
MAGNESIUM: 1.9 MG/DL (ref 1.8–2.4)
MCH RBC QN AUTO: 28.6 PG (ref 26–34)
MCHC RBC AUTO-ENTMCNC: 33.3 G/DL (ref 31–36)
MCV RBC AUTO: 85.9 FL (ref 80–100)
MONOCYTES ABSOLUTE: 0.3 K/UL (ref 0–1.3)
MONOCYTES RELATIVE PERCENT: 8 %
NEUTROPHILS ABSOLUTE: 2.3 K/UL (ref 1.7–7.7)
NEUTROPHILS RELATIVE PERCENT: 65 %
PDW BLD-RTO: 15.4 % (ref 12.4–15.4)
PERFORMED ON: ABNORMAL
PHOSPHORUS: 3.4 MG/DL (ref 2.5–4.9)
PLATELET # BLD: 141 K/UL (ref 135–450)
PLATELET SLIDE REVIEW: ADEQUATE
PMV BLD AUTO: 7.1 FL (ref 5–10.5)
POTASSIUM SERPL-SCNC: 3.5 MMOL/L (ref 3.5–5.1)
RBC # BLD: 4.68 M/UL (ref 4.2–5.9)
SLIDE REVIEW: ABNORMAL
SODIUM BLD-SCNC: 136 MMOL/L (ref 136–145)
TOXIC GRANULATION: PRESENT
WBC # BLD: 3.5 K/UL (ref 4–11)

## 2022-01-25 PROCEDURE — 83735 ASSAY OF MAGNESIUM: CPT

## 2022-01-25 PROCEDURE — 36415 COLL VENOUS BLD VENIPUNCTURE: CPT

## 2022-01-25 PROCEDURE — 6370000000 HC RX 637 (ALT 250 FOR IP): Performed by: NURSE PRACTITIONER

## 2022-01-25 PROCEDURE — 6370000000 HC RX 637 (ALT 250 FOR IP): Performed by: INTERNAL MEDICINE

## 2022-01-25 PROCEDURE — 94761 N-INVAS EAR/PLS OXIMETRY MLT: CPT

## 2022-01-25 PROCEDURE — 84100 ASSAY OF PHOSPHORUS: CPT

## 2022-01-25 PROCEDURE — 85025 COMPLETE CBC W/AUTO DIFF WBC: CPT

## 2022-01-25 PROCEDURE — 80048 BASIC METABOLIC PNL TOTAL CA: CPT

## 2022-01-25 PROCEDURE — 97535 SELF CARE MNGMENT TRAINING: CPT

## 2022-01-25 PROCEDURE — 2060000000 HC ICU INTERMEDIATE R&B

## 2022-01-25 PROCEDURE — 97530 THERAPEUTIC ACTIVITIES: CPT

## 2022-01-25 PROCEDURE — 6360000002 HC RX W HCPCS: Performed by: INTERNAL MEDICINE

## 2022-01-25 PROCEDURE — 94640 AIRWAY INHALATION TREATMENT: CPT

## 2022-01-25 PROCEDURE — 99232 SBSQ HOSP IP/OBS MODERATE 35: CPT | Performed by: INTERNAL MEDICINE

## 2022-01-25 RX ORDER — MAGNESIUM CHLORIDE 64 MG
2 TABLET, DELAYED RELEASE (ENTERIC COATED) ORAL
Status: COMPLETED | OUTPATIENT
Start: 2022-01-25 | End: 2022-01-26

## 2022-01-25 RX ORDER — MAGNESIUM SULFATE IN WATER 40 MG/ML
2000 INJECTION, SOLUTION INTRAVENOUS ONCE
Status: DISCONTINUED | OUTPATIENT
Start: 2022-01-25 | End: 2022-01-25

## 2022-01-25 RX ORDER — QUETIAPINE FUMARATE 25 MG/1
75 TABLET, FILM COATED ORAL NIGHTLY
Status: DISCONTINUED | OUTPATIENT
Start: 2022-01-25 | End: 2022-01-27 | Stop reason: HOSPADM

## 2022-01-25 RX ORDER — QUETIAPINE FUMARATE 25 MG/1
50 TABLET, FILM COATED ORAL
Status: DISCONTINUED | OUTPATIENT
Start: 2022-01-25 | End: 2022-01-27 | Stop reason: HOSPADM

## 2022-01-25 RX ADMIN — LISINOPRIL 5 MG: 5 TABLET ORAL at 11:35

## 2022-01-25 RX ADMIN — BUDESONIDE AND FORMOTEROL FUMARATE DIHYDRATE 2 PUFF: 160; 4.5 AEROSOL RESPIRATORY (INHALATION) at 18:57

## 2022-01-25 RX ADMIN — ATORVASTATIN CALCIUM 80 MG: 40 TABLET, FILM COATED ORAL at 11:34

## 2022-01-25 RX ADMIN — Medication 2 PUFF: at 08:18

## 2022-01-25 RX ADMIN — QUETIAPINE FUMARATE 50 MG: 25 TABLET ORAL at 11:35

## 2022-01-25 RX ADMIN — INSULIN LISPRO 3 UNITS: 100 INJECTION, SOLUTION INTRAVENOUS; SUBCUTANEOUS at 11:40

## 2022-01-25 RX ADMIN — POTASSIUM CHLORIDE 20 MEQ: 1500 TABLET, EXTENDED RELEASE ORAL at 11:33

## 2022-01-25 RX ADMIN — DEXAMETHASONE 6 MG: 4 TABLET ORAL at 11:34

## 2022-01-25 RX ADMIN — CLOPIDOGREL BISULFATE 75 MG: 75 TABLET ORAL at 11:35

## 2022-01-25 RX ADMIN — INSULIN LISPRO 6 UNITS: 100 INJECTION, SOLUTION INTRAVENOUS; SUBCUTANEOUS at 17:49

## 2022-01-25 RX ADMIN — DIVALPROEX SODIUM 1000 MG: 500 TABLET, DELAYED RELEASE ORAL at 17:51

## 2022-01-25 RX ADMIN — QUETIAPINE FUMARATE 75 MG: 25 TABLET ORAL at 21:47

## 2022-01-25 RX ADMIN — GABAPENTIN 200 MG: 100 CAPSULE ORAL at 21:47

## 2022-01-25 RX ADMIN — APIXABAN 5 MG: 5 TABLET, FILM COATED ORAL at 11:34

## 2022-01-25 RX ADMIN — BUDESONIDE AND FORMOTEROL FUMARATE DIHYDRATE 2 PUFF: 160; 4.5 AEROSOL RESPIRATORY (INHALATION) at 08:18

## 2022-01-25 RX ADMIN — APIXABAN 5 MG: 5 TABLET, FILM COATED ORAL at 21:48

## 2022-01-25 RX ADMIN — METOPROLOL TARTRATE 50 MG: 50 TABLET, FILM COATED ORAL at 11:35

## 2022-01-25 RX ADMIN — INSULIN LISPRO 2 UNITS: 100 INJECTION, SOLUTION INTRAVENOUS; SUBCUTANEOUS at 22:03

## 2022-01-25 RX ADMIN — DULOXETINE HYDROCHLORIDE 60 MG: 60 CAPSULE, DELAYED RELEASE ORAL at 11:34

## 2022-01-25 RX ADMIN — MAGNESIUM 64 MG (MAGNESIUM CHLORIDE) TABLET,DELAYED RELEASE 128 MG: at 11:31

## 2022-01-25 RX ADMIN — GABAPENTIN 200 MG: 100 CAPSULE ORAL at 11:34

## 2022-01-25 RX ADMIN — PANTOPRAZOLE SODIUM 40 MG: 40 TABLET, DELAYED RELEASE ORAL at 11:35

## 2022-01-25 RX ADMIN — GABAPENTIN 200 MG: 100 CAPSULE ORAL at 16:20

## 2022-01-25 RX ADMIN — Medication 10 MG: at 21:47

## 2022-01-25 RX ADMIN — Medication 2 PUFF: at 18:57

## 2022-01-25 RX ADMIN — METOPROLOL TARTRATE 50 MG: 50 TABLET, FILM COATED ORAL at 21:47

## 2022-01-25 ASSESSMENT — PAIN SCALES - GENERAL
PAINLEVEL_OUTOF10: 0
PAINLEVEL_OUTOF10: 0

## 2022-01-25 NOTE — PROGRESS NOTES
Per Dr. Betty perea to d/c droplet plus precautions if pt tested positive more than 10 days ago. Pt tested positive for covid 1/12/22. Droplet plus precautions d/c at this time.

## 2022-01-25 NOTE — PROGRESS NOTES
RT Inhaler-Nebulizer Bronchodilator Protocol Note    There is a bronchodilator order in the chart from a provider indicating to follow the RT Bronchodilator Protocol and there is an Initiate RT Inhaler-Nebulizer Bronchodilator Protocol order as well (see protocol at bottom of note). CXR Findings:  No results found. The findings from the last RT Protocol Assessment were as follows:   History Pulmonary Disease: (P) Chronic pulmonary disease  Respiratory Pattern: (P) Regular pattern and RR 12-20 bpm  Breath Sounds: (P) Slightly diminished and/or crackles  Cough: (P) Strong, spontaneous, non-productive  Indication for Bronchodilator Therapy: (P) Decreased or absent breath sounds  Bronchodilator Assessment Score: (P) 4    Aerosolized bronchodilator medication orders have been revised according to the RT Inhaler-Nebulizer Bronchodilator Protocol below. Respiratory Therapist to perform RT Therapy Protocol Assessment initially then follow the protocol. Repeat RT Therapy Protocol Assessment PRN for score 0-3 or on second treatment, BID, and PRN for scores above 3. No Indications - adjust the frequency to every 6 hours PRN wheezing or bronchospasm, if no treatments needed after 48 hours then discontinue using Per Protocol order mode. If indication present, adjust the RT bronchodilator orders based on the Bronchodilator Assessment Score as indicated below. Use Inhaler orders unless patient has one or more of the following: on home nebulizer, not able to hold breath for 10 seconds, is not alert and oriented, cannot activate and use MDI correctly, or respiratory rate 25 breaths per minute or more, then use the equivalent nebulizer order(s) with same Frequency and PRN reasons based on the score. If a patient is on this medication at home then do not decrease Frequency below that used at home.     0-3 - enter or revise RT bronchodilator order(s) to equivalent RT Bronchodilator order with Frequency of every 4 hours PRN for wheezing or increased work of breathing using Per Protocol order mode. 4-6 - enter or revise RT Bronchodilator order(s) to two equivalent RT bronchodilator orders with one order with BID Frequency and one order with Frequency of every 4 hours PRN wheezing or increased work of breathing using Per Protocol order mode. 7-10 - enter or revise RT Bronchodilator order(s) to two equivalent RT bronchodilator orders with one order with TID Frequency and one order with Frequency of every 4 hours PRN wheezing or increased work of breathing using Per Protocol order mode. 11-13 - enter or revise RT Bronchodilator order(s) to one equivalent RT bronchodilator order with QID Frequency and an Albuterol order with Frequency of every 4 hours PRN wheezing or increased work of breathing using Per Protocol order mode. Greater than 13 - enter or revise RT Bronchodilator order(s) to one equivalent RT bronchodilator order with every 4 hours Frequency and an Albuterol order with Frequency of every 2 hours PRN wheezing or increased work of breathing using Per Protocol order mode. RT to enter RT Home Evaluation for COPD & MDI Assessment order using Per Protocol order mode.     Electronically signed by Stefanie Stout RCP on 1/25/2022 at 8:20 AM

## 2022-01-25 NOTE — PROGRESS NOTES
Progress Note    Admit Date:  1/12/2022    63 y.o. male with DMII, Chronic sCHF, CAD s/p stent placement x3, hx of MI, asthma, HTN, HLD, GERD, lumbar spondylosis and DDD s/p bilateral dorsal ramus medial branch ablation and interstim implant, depression who presented to [facility] ED with complaint of generalized weakness ongoing for approximately 2 months and frequent falls. In ED, CT of the head showed no acute abnormality. Chest x-ray with no acute cardiopulmonary process. Patient did test positive for COVID. He is vaccinated x2, no booster. Patient admitted to med-surg for further care. PT/OT consulted. 1/15  Mr. Quiroga developed rapid HR what appears to be Afib on TELE monitor and his pacer inappropriately delivered shock x 5  Pt awake, conscious during these episodes and by the end of the 5th shock, he started getting slightly confused  Given cardizem 10 mg IV x 1 and metoprolol 5 mg with control of HR  EKG after this episode showed sinus tach  Progressively developed confusion , fevers , started on precedex gtt  Pt transferred to ICU    Pt very confused and became agitated with removing lines. Placed on  on precedex gtt, ct head neg   No further shocks from ICD. His ICD has been adjusted by cardiology       Tx to PCU on 1/19  Placed in restraints evening of 1/19. He had not slept in a couple of days. Is doing much better now. Subjective:    Mental status waxing and waning - he is alert and oriented today, but with bouts of impulsive behavior, pulling out devices, ripping off heart monitor and trying to get up from bed - redirectable though very short lived. He had tele and IV discontinued. He has posy belt in place, but otherwise out of restraints. Objective: Greg Khan      Vitals:    01/24/22 1900 01/25/22 0245 01/25/22 0818 01/25/22 1115   BP: 138/70 138/67  125/72   Pulse: 72 67  78   Resp: 18 18 18 16   Temp: 98.5 °F (36.9 °C) 98.3 °F (36.8 °C)  98.1 °F (36.7 °C) TempSrc: Oral Oral  Oral   SpO2: 92%  90% 94%   Weight:       Height:                Intake/Output Summary (Last 24 hours) at 1/25/2022 1357  Last data filed at 1/25/2022 6352  Gross per 24 hour   Intake 120 ml   Output 300 ml   Net -180 ml       Physical Exam:    Gen: middle aged obese male awake. Alert and better oriented today   Appears to be not in any distress  Mucous Membranes:  Pink , anicteric  Neck: No JVD, no carotid bruit, no thyromegaly. No nuchal rigidity  Chest:  Clear to auscultation bilaterally, diminished in bases   Cardiovascular:  regular,  S1S2 heard, no murmurs or gallops- left sided ICD +  Abdomen:  Soft, undistended, non tender, no organomegaly, BS present  Extremities: No edema or cyanosis. Distal pulses well felt  Neurological : improving mentation but still with intermittent confusion  Right sided increased stiffness noted yesterday, this has resolved today. Nonfocal exam now. Confusion is clear. He is awake and well-oriented  No facial weakness. Speech clear      Data:  CBC:   Recent Labs     01/23/22  0436 01/24/22  0518 01/25/22  0550   WBC 2.6* 3.2* 3.5*   HGB 13.7 13.3* 13.4*   HCT 41.5 38.6* 40.2*   MCV 86.7 83.2 85.9   * 132* 141     BMP:   Recent Labs     01/23/22  0436 01/24/22  0518 01/25/22  0550   * 137 136   K 3.3* 3.9 3.5   CL 96* 99 99   CO2 26 25 27   PHOS 2.9 3.4 3.4   BUN 10 10 11   CREATININE <0.5* <0.5* 0.6*     CULTURES  Results for [facility] (MRN [id number]) as of 1/13/2022 13:29    Ref. Range 1/12/2022 19:35   SARS-CoV-2, NAAT Latest Ref Range: Not Detected  DETECTED (AA)          RADIOLOGY  XR CHEST PORTABLE   Final Result   NG tube with the tip and side-port in the stomach. Bibasilar atelectasis. XR CHEST PORTABLE   Final Result   NG tube is difficult to visualize, tip is likely below the diaphragm. Repeat   exam could be considered if clinically indicated.          XR CHEST PORTABLE   Final Result   New elevated right hemidiaphragm, likely atelectasis or possibly infiltrate. Subpulmonic effusion not excluded. No overt failure. CT HEAD WO CONTRAST   Final Result   1. No acute intracranial abnormality. CT Head WO Contrast   Final Result   No acute intracranial abnormality. XR CHEST PORTABLE   Final Result   No acute cardiopulmonary process. Pertinent previous results reviewed   Diagnostic cardiac cath 7/13/21  CONCLUSION:  Successful stenting of the true circumflex into the obtuse  marginal branch, successful stenting of the left posterior descending  coronary artery and successful stenting of the obtuse marginal branch,  all 80% stenosis reduced to 0 with these three drug-eluting stents. JAMES-3 blood flow was present in the circumflex artery and all its  branches.  The LAD is normal with minor irregularities only and the left  main is normal.  Nondominant right coronary artery.  LV ejection  fraction of 40% with inferior wall and inferoposterior hypokinesia.     ECHO 11/27/19  Summary   The left ventricular systolic function is moderately reduced with an   ejection fraction of 35 -40 %.   There is hypokinesis of the inferior and basal inferior walls.   There is akinesis of the inferolateral wall.   Left ventricular cavity size is moderately dilated.   Compared to previous study from 2-2-2015 no changes noted in left   ventricular function.   Mild mitral regurgitation. Limited echo 1/15/2021   Summary   This is a limited study afib, ischemic cmp, recurrent pacer firing. LV systolic function appears mildly reduced with EF estimated at 45-50%. There is more prominent HK of the inferior wall. Left ventricular size is decreased. There is mild concentric left ventricular hypertrophy.    11- 35-40% inf basal hypo, inferolateral akinesis, LVE, mild MR       Assessment/Plan:    # ICD firing - he had 5 witnessed shocks and then more bursts (4-5 additional episodes) of AICD discharges and concern for malfunctioning ICD  - s.p  [employer] interrogation and now ICD is adjusted . - Cardiology consulted. - now back in NSR  - resumed  BB - toprol XL stopped due to low BP - decreased to  metoprolol 25 BID--  Increased on 1/20 to 50 mg BID. Heart rate remained stable now      #Afibb with RVR  - new onset with above ICD issues  -Seen by cardiology  - resume home cardizem and BB if tolerated. Can use IV meds intermittently  - was in AFIB with RVR ->  now in NSR   - started eliquis but holding with low PLT, likely resume tomorrow if platelets remain to increase  - resumed eliquis 1/21  Currently heart rate well controlled. Continued on beta-blockers as above . continued on Eliquis. #Hypokalemia  - keep greater than 4  - PRN potassium SS ordered   Continue scheduled potassium daily      #Acute Encephalopathy - possible sec to covid encephalopathy   - NOT POA - developed slowly most pronounced after ICD firing  -CT head non focal x 2   -He denies any drug or alcohol use.   -precedex gtt for agitation used--- d/c on 1/19  - added seroquel   -Ammonia level wnl   -Consider MRI brain - unable to do with incompatible pacer  -Started empirically on rocephin meningitis dose D#7. clinically no signs of meningitis. no nuchal rigidity nonfocal exam.  He was treated empirically for 7 days with antibiotics;  and discontinued.    -Stopped prcedex gtt on 1/19  -Continue Seroquel- increase to 50am and 75 nightly. - still intermittent need for restraints.          #COVID 19 infection   -Positive 1/12/22  -only complaints is a cough  -patient is vaccinated x2- no booster  - minimal  Hypoxia at 2 L.   - Decadron completed. - over 12 days from initial testing - isolation stopped.     - stable on RA      #DMII  - not controlled noted to have >1000 glucose in urine on admission   -Hold home oral meds- resume lantus increase as tolerated  -Low dose SSI--switched to high dose SSI  -Tolerating carb controlled diet      #Thrombocytopenia  -Platelet count is improved now.        #CAD s/p stent placement x3  #Hx of MI  #Hx of VTACH s.p ICD  - ASA, statin, plavix      #Chronic sCHF no AE   - Last echo 11/27/19, EF 35-40 % Limited echo completed 1/15 which showed EF 45-50%. - Pacemaker/ICD in place  -Continue toprol XL, cardizem  - patient takes Lasix on Monday and Thursday - holding at this time   - Lisinopril has been on hold, resumed at a lower dose,   on 20 mg at home. Started 5 mg in am and can increase as blood pressure allows. Holding parameters in place.       #Asthma no AE  -Symbicort  -albuterol prn       #HLD  -continue statin       #GERD  -continue ppi    #Lumbar spondylosis and DDD   -s/p bilateral dorsal ramus medial branch ablation and interstim implant  -PDMP displays on chronic gabapentin, continue  - follows with Dr. Giovanny Munroe        #ALIN  - unsure if patient is compliant with CPAP ? If he still has CPAP at home       #Morbid Obesity  - Body mass index is 41.37 kg/m². - Complicating assessment and treatment. Placing patient at risk for multiple co-morbidities as well as early death and contributing to the patient's presentation.   - Counseled on weight loss.        #Depression  - patient is on Cymbalta 60 mg at home, medication verified with pharmacy. Continued on home dose  - also takes Depakote 500 am and 1000 in evening also verified with pharmacy - cont with evening dose only. #Weakness  PT/OT consulted,  recommend ARU        DVT Prophylaxis: eliquis continued. Diet: ADULT DIET; Regular; 4 carb choices (60 gm/meal)  ADULT ORAL NUTRITION SUPPLEMENT; Lunch, Dinner; Frozen Oral Supplement  Code Status: Full Code       ARU once mental status better. Stop day time dose of depakote - cont nightly dose   Increase seroquel dose.          Joanne Banks MD

## 2022-01-25 NOTE — PROGRESS NOTES
Occupational Therapy Daily Treatment Note    Unit: [address] [place]  Date:  1/25/2022  Patient Name:    Aislinn Mayfield. Admitting diagnosis:  Unable to ambulate [R26.2]  Fall, initial encounter [W19. XXXA]  COVID-19 [U07.1]  Admit Date:  1/12/2022  Precautions/Restrictions:  fall risk  IV, bed/chair alarm, telemetry and continuous pulse ox, cognitive deficit, pacemaker, telemonitor        Discharge Recommendations: Acute Rehab (ARU)/ Inpatient Rehab Facility (IRF)  DME needs for discharge: defer to facility       Therapy recommendations for staff:   Assist of 1 (minimal assist) with use of rolling walker (RW) for all transfers to/from BSC/chair    AM-PAC Score: AM-PAC Inpatient Daily Activity Raw Score: 14  Home Health S4 Level: NA    Treatment Time:  6933-5212  Treatment number:  5  Total Treatment Time:  25  minutes    History of Present Illness: 63 y. o. male with DMII, Chronic sCHF, CAD s/p stent placement x3, hx of MI, asthma, HTN, HLD, GERD, lumbar spondylosis and DDD s/p bilateral dorsal ramus medial branch ablation and interstim implant, depression who presented to [facility] ED with complaint of generalized weakness ongoing for approximately 2 months and frequent falls. In ED, CT of the head showed no acute abnormality.  Chest x-ray with no acute cardiopulmonary process.  Patient did test positive for Neeraj Braxtonight is vaccinated x2, no booster.  Patient admitted to [facility] for further care. PT/OT consulted.      Mr. Quiroga developed rapid HR what appears to be Afib on TELE monitor and his pacer inappropriately delivered shock x 5 and this was witnessed by me and nursing staff.    Pt awake, conscious during these episodes and by the end of the 5th shock, he started getting slightly confused  Given cardizem 10 mg IV x 1 and metoprolol 5 mg with control of HR  EKG after this episode showed sinus tach  Progressively developed confusion , fevers , started on precedex gtt     Subjective:  Patient supine in bed and agreeable to treatment. Pain   No  Rating: NA  Location:NA  Pain Medicine Status: No request made      Bed Mobility: all activities are compromised by pt cognitive deficit and slow processing  Supine to Sit:  Min A  Sit to Supine:  SBA  Rolling:           SBA  Scooting:        SBA    Transfer Training:   Sit to stand:   CGA  Stand to sit:  CGA  Bed to Chair:  Min A and with use of RW with delay at times-verbal cues needed to continue  Bed to [facility]:   N/A  Standard toilet:   N/A    Activity Tolerance   Pt completed therapy session with No adverse symptoms noted w/activity    ADL Training:   Upper body dressing:  SBA   Upper body bathing:  N/A  Lower body dressing: Mod A don socks   Lower body bathing:  N/A  Toileting:   N/A  Grooming/Hygiene:  N/A    Therapeutic Exercise:   N/A-declined    Patient Education:   Role of OT  Safe RW use/hand placement    Positioning Needs:   Supine in bed with tele-monitor present, needs within reach. Family Present:  No    Assessment:  Pt continues with slow processing and this makes transfers difficult as pt will stand at EOB not moving needs verbal cues to take steps. Improvement noted with pt's ability to follow directions. Demonstrated improved overall mobility today. Cont with OT tx as pt will greatly benefit from services. GOALS  1). Bed to toilet/BSC: Mod A and with use of RW     To be met in 5 Visits:  1). Supine to/from UYV:             KEY A-MET 1/25/22  2). Upper Body Bathing:         Min A  3). Lower Body Bathing:         Mod A  4). Upper Body Dressing:       Min A-MET 1/25/22  5). Lower Body Dressing:       Mod A  6).  Pt to demonstrate UE exs x 15 reps with minimal cues      Plan: cont with Charla San MS, OTR/L  #[id number]          If patient discharges from this facility prior to next visit, this note will serve as the Discharge Summary

## 2022-01-25 NOTE — PROGRESS NOTES
Pt alert to self, confused, agitated, pt in posey belt, released and repositioned, gave pt water posey back on, vss, assessment completed, pt has tele sitter in rm, medications given, call light in reach, will continue to monitor.  Margot Weber RN

## 2022-01-25 NOTE — PLAN OF CARE
Problem: Airway Clearance - Ineffective  Goal: Achieve or maintain patent airway  1/25/2022 0257 by Shaila Louis RN  Outcome: Ongoing  1/24/2022 1820 by Jasmine Linton RN  Outcome: Ongoing     Problem: Gas Exchange - Impaired  Goal: Absence of hypoxia  1/25/2022 0257 by Shaila Louis RN  Outcome: Ongoing  1/24/2022 1820 by Jasmine Linton RN  Outcome: Ongoing  Goal: Promote optimal lung function  1/25/2022 0257 by Shaila Louis RN  Outcome: Ongoing  1/24/2022 1820 by Jasmine Linton RN  Outcome: Ongoing     Problem: Breathing Pattern - Ineffective  Goal: Ability to achieve and maintain a regular respiratory rate  1/25/2022 0257 by Shaila Louis RN  Outcome: Ongoing  1/24/2022 1820 by Jasmine Linton RN  Outcome: Ongoing     Problem:  Body Temperature -  Risk of, Imbalanced  Goal: Ability to maintain a body temperature within defined limits  1/25/2022 0257 by Shaila Louis RN  Outcome: Ongoing  1/24/2022 1820 by Jasmine Linton RN  Outcome: Ongoing  Goal: Will regain or maintain usual level of consciousness  1/25/2022 0257 by Shaila Louis RN  Outcome: Ongoing  1/24/2022 1820 by Jasmine Linton RN  Outcome: Ongoing  Goal: Complications related to the disease process, condition or treatment will be avoided or minimized  1/25/2022 0257 by Shaila Louis RN  Outcome: Ongoing  1/24/2022 1820 by Jasmine Linton RN  Outcome: Ongoing     Problem: Isolation Precautions - Risk of Spread of Infection  Goal: Prevent transmission of infection  1/25/2022 0257 by Shaila Louis RN  Outcome: Ongoing  1/24/2022 1820 by Jasmine Linton RN  Outcome: Ongoing     Problem: Nutrition Deficits  Goal: Optimize nutritional status  1/25/2022 0257 by Shaila Louis RN  Outcome: Ongoing  1/24/2022 1820 by Jasmine Linton RN  Outcome: Ongoing     Problem: Risk for Fluid Volume Deficit  Goal: Maintain normal heart rhythm  1/25/2022 0257 by Shaila Louis RN  Outcome: Ongoing  1/24/2022 1820 by Jasmine Linton RN  Outcome: Ongoing  Goal: Maintain absence of muscle cramping  1/25/2022 0257 by Derik Hardy RN  Outcome: Ongoing  1/24/2022 1820 by Bean Garcia RN  Outcome: Ongoing  Goal: Maintain normal serum potassium, sodium, calcium, phosphorus, and pH  1/25/2022 0257 by Derik Hardy RN  Outcome: Ongoing  1/24/2022 1820 by Bean Garcia RN  Outcome: Ongoing     Problem: Loneliness or Risk for Loneliness  Goal: Demonstrate positive use of time alone when socialization is not possible  1/25/2022 0257 by Derik Hardy RN  Outcome: Ongoing  1/24/2022 1820 by Bean Garcia RN  Outcome: Ongoing     Problem: Fatigue  Goal: Verbalize increase energy and improved vitality  1/25/2022 0257 by Derik Hardy RN  Outcome: Ongoing  1/24/2022 1820 by Bean Garcia RN  Outcome: Ongoing     Problem: Patient Education: Go to Patient Education Activity  Goal: Patient/Family Education  1/25/2022 0257 by Derik Hardy RN  Outcome: Ongoing  1/24/2022 1820 by Bean Garcia RN  Outcome: Ongoing     Problem: Falls - Risk of:  Goal: Will remain free from falls  Description: Will remain free from falls  1/25/2022 0257 by Derik Hardy RN  Outcome: Ongoing  1/24/2022 1820 by Bean Garcia RN  Outcome: Ongoing  Goal: Absence of physical injury  Description: Absence of physical injury  1/25/2022 0257 by Derik Hardy RN  Outcome: Ongoing  1/24/2022 1820 by Bean Garcia RN  Outcome: Ongoing     Problem: Skin Integrity:  Goal: Will show no infection signs and symptoms  Description: Will show no infection signs and symptoms  1/25/2022 0257 by Derik Hardy RN  Outcome: Ongoing  1/24/2022 1820 by Bean Garcia RN  Outcome: Ongoing  Goal: Absence of new skin breakdown  Description: Absence of new skin breakdown  1/25/2022 0257 by Derik Hardy RN  Outcome: Ongoing  1/24/2022 1820 by Bean Garcia RN  Outcome: Ongoing     Problem: Confusion - Acute:  Goal: Absence of continued neurological deterioration signs and symptoms  Description: Absence of continued neurological deterioration signs and symptoms  1/25/2022 0257 by Jl Cassidy RN  Outcome: Ongoing  1/24/2022 1820 by Rema Lee RN  Outcome: Ongoing  Goal: Mental status will be restored to baseline  Description: Mental status will be restored to baseline  1/25/2022 0257 by Jl Cassidy RN  Outcome: Ongoing  1/24/2022 1820 by Rema Lee RN  Outcome: Ongoing     Problem: Discharge Planning:  Goal: Ability to perform activities of daily living will improve  Description: Ability to perform activities of daily living will improve  1/25/2022 0257 by Jl Cassidy RN  Outcome: Ongoing  1/24/2022 1820 by Rema Lee RN  Outcome: Ongoing  Goal: Participates in care planning  Description: Participates in care planning  1/25/2022 0257 by Jl Cassidy RN  Outcome: Ongoing  1/24/2022 1820 by Rema Lee RN  Outcome: Ongoing     Problem: Injury - Risk of, Physical Injury:  Goal: Will remain free from falls  Description: Will remain free from falls  1/25/2022 0257 by Jl Cassidy RN  Outcome: Ongoing  1/24/2022 1820 by Rema Lee RN  Outcome: Ongoing  Goal: Absence of physical injury  Description: Absence of physical injury  1/25/2022 0257 by Jl Cassidy RN  Outcome: Ongoing  1/24/2022 1820 by Rema Lee RN  Outcome: Ongoing     Problem: Mood - Altered:  Goal: Mood stable  Description: Mood stable  1/25/2022 0257 by Jl Cassidy RN  Outcome: Ongoing  1/24/2022 1820 by Rema Lee RN  Outcome: Ongoing  Goal: Absence of abusive behavior  Description: Absence of abusive behavior  1/25/2022 0257 by Jl Cassidy RN  Outcome: Ongoing  1/24/2022 1820 by Rema Lee RN  Outcome: Ongoing  Goal: Verbalizations of feeling emotionally comfortable while being cared for will increase  Description: Verbalizations of feeling emotionally comfortable while being cared for will increase  1/25/2022 0257 by Jl Cassidy RN  Outcome: Ongoing  1/24/2022 1820 by Rema Lee RN  Outcome: Ongoing     Problem: Psychomotor Activity - Altered:  Goal: Absence of psychomotor disturbance signs and symptoms  Description: Absence of psychomotor disturbance signs and symptoms  1/25/2022 0257 by Sugar Rodriguez RN  Outcome: Ongoing  1/24/2022 1820 by Judith De Leon RN  Outcome: Ongoing     Problem: Sensory Perception - Impaired:  Goal: Demonstrations of improved sensory functioning will increase  Description: Demonstrations of improved sensory functioning will increase  1/25/2022 0257 by Sugar Rodriguez RN  Outcome: Ongoing  1/24/2022 1820 by Judith De Leon RN  Outcome: Ongoing  Goal: Decrease in sensory misperception frequency  Description: Decrease in sensory misperception frequency  1/25/2022 0257 by Sugar Rodriguez RN  Outcome: Ongoing  1/24/2022 1820 by Judith De Leon RN  Outcome: Ongoing  Goal: Able to refrain from responding to false sensory perceptions  Description: Able to refrain from responding to false sensory perceptions  1/25/2022 0257 by Sugar Rodriguez RN  Outcome: Ongoing  1/24/2022 1820 by Judith De Leon RN  Outcome: Ongoing  Goal: Demonstrates accurate environmental perceptions  Description: Demonstrates accurate environmental perceptions  1/25/2022 0257 by Sugar Rodriguez RN  Outcome: Ongoing  1/24/2022 1820 by Judith De Leon RN  Outcome: Ongoing  Goal: Able to distinguish between reality-based and nonreality-based thinking  Description: Able to distinguish between reality-based and nonreality-based thinking  1/25/2022 0257 by Sugar Rodriguez RN  Outcome: Ongoing  1/24/2022 1820 by Judith De Leon RN  Outcome: Ongoing  Goal: Able to interrupt nonreality-based thinking  Description: Able to interrupt nonreality-based thinking  1/25/2022 0257 by Sugar Rodriguez RN  Outcome: Ongoing  1/24/2022 1820 by Judith De Leon RN  Outcome: Ongoing     Problem: Sleep Pattern Disturbance:  Goal: Appears well-rested  Description: Appears well-rested  1/25/2022 0257 by Sugar Rodriguez RN  Outcome: Ongoing  1/24/2022 1820 by Judith De Leon RN  Outcome: Ongoing     Problem: Non-Violent Restraints  Goal: Removal from restraints as soon as assessed to be safe  1/25/2022 0257 by Yvonne Jasso RN  Outcome: Ongoing  1/24/2022 1820 by Elisabeth Galvan RN  Outcome: Ongoing  Goal: No harm/injury to patient while restraints in use  1/25/2022 0257 by Yvonne Jasso RN  Outcome: Ongoing  1/24/2022 1820 by Elisabeth Galvan RN  Outcome: Ongoing  Goal: Patient's dignity will be maintained  1/25/2022 0257 by Yvonne Jasso RN  Outcome: Ongoing  1/24/2022 1820 by Elisabeth Galvan RN  Outcome: Ongoing     Problem: Nutrition  Goal: Optimal nutrition therapy  1/25/2022 0257 by Yvonne Jasso RN  Outcome: Ongoing  1/24/2022 1820 by Elisabeth Galvan RN  Outcome: Ongoing  Goal: Understanding of nutritional guidelines  1/25/2022 0257 by Yvonne Jasso RN  Outcome: Ongoing  1/24/2022 1820 by Elisabeth Galvan RN  Outcome: Ongoing     Problem: Pain:  Goal: Pain level will decrease  Description: Pain level will decrease  1/25/2022 0257 by Yvonne Jasso RN  Outcome: Ongoing  1/24/2022 1820 by Elisabeth Galvan RN  Outcome: Ongoing  Goal: Control of acute pain  Description: Control of acute pain  1/25/2022 0257 by Yvonne Jasso RN  Outcome: Ongoing  1/24/2022 1820 by Elisabeth Galvan RN  Outcome: Ongoing  Goal: Control of chronic pain  Description: Control of chronic pain  1/25/2022 0257 by Yvonne Jasso RN  Outcome: Ongoing  1/24/2022 1820 by Elisabeth aGlvan RN  Outcome: Ongoing  Goal: Patient's pain/discomfort is manageable  Description: Patient's pain/discomfort is manageable  1/25/2022 0257 by Yvonne Jasso RN  Outcome: Ongoing  1/24/2022 1820 by Elisabeth Galvan RN  Outcome: Ongoing     Problem: Infection:  Goal: Will remain free from infection  Description: Will remain free from infection  1/25/2022 0257 by Yvonne Jasso RN  Outcome: Ongoing  1/24/2022 1820 by Elisabeth Galvan RN  Outcome: Ongoing     Problem: Safety:  Goal: Free from accidental physical injury  Description: Free from accidental physical injury  1/25/2022 0257 by Yvonne Jasso, RN  Outcome: Ongoing  1/24/2022 1820 by Mitesh Mehta RN  Outcome: Ongoing  Goal: Free from intentional harm  Description: Free from intentional harm  1/25/2022 0257 by Jeb Carvajal RN  Outcome: Ongoing  1/24/2022 1820 by Mitesh Mehta RN  Outcome: Ongoing     Problem: Daily Care:  Goal: Daily care needs are met  Description: Daily care needs are met  1/25/2022 0257 by Jeb Carvajal RN  Outcome: Ongoing  1/24/2022 1820 by Mitesh Mehta RN  Outcome: Ongoing     Problem: Skin Integrity:  Goal: Skin integrity will stabilize  Description: Skin integrity will stabilize  1/25/2022 0257 by Jeb Carvajal RN  Outcome: Ongoing  1/24/2022 1820 by Mitesh Mehta RN  Outcome: Ongoing     Problem: Discharge Planning:  Goal: Patients continuum of care needs are met  Description: Patients continuum of care needs are met  1/25/2022 0257 by Jeb Carvajal RN  Outcome: Ongoing  1/24/2022 1820 by Mitesh Mehta RN  Outcome: Ongoing

## 2022-01-25 NOTE — PLAN OF CARE
Absence of new skin breakdown  Outcome: Ongoing     Problem: Confusion - Acute:  Goal: Absence of continued neurological deterioration signs and symptoms  Description: Absence of continued neurological deterioration signs and symptoms  Outcome: Ongoing  Goal: Mental status will be restored to baseline  Description: Mental status will be restored to baseline  Outcome: Ongoing     Problem: Discharge Planning:  Goal: Ability to perform activities of daily living will improve  Description: Ability to perform activities of daily living will improve  Outcome: Ongoing  Goal: Participates in care planning  Description: Participates in care planning  Outcome: Ongoing     Problem: Injury - Risk of, Physical Injury:  Goal: Will remain free from falls  Description: Will remain free from falls  Outcome: Ongoing  Goal: Absence of physical injury  Description: Absence of physical injury  Outcome: Ongoing     Problem: Mood - Altered:  Goal: Mood stable  Description: Mood stable  Outcome: Ongoing  Goal: Absence of abusive behavior  Description: Absence of abusive behavior  Outcome: Ongoing  Goal: Verbalizations of feeling emotionally comfortable while being cared for will increase  Description: Verbalizations of feeling emotionally comfortable while being cared for will increase  Outcome: Ongoing

## 2022-01-25 NOTE — FLOWSHEET NOTE
01/25/22 1115   Vital Signs   Temp 98.1 °F (36.7 °C)   Temp Source Oral   Pulse 78   Heart Rate Source Monitor   Resp 16   /72   BP Location Left upper arm   Patient Position Semi fowlers   Level of Consciousness Alert (0)   MEWS Score 1   Patient Currently in Pain Denies   Pain Assessment   Pain Assessment 0-10   Pain Level 0   Oxygen Therapy   SpO2 94 %   O2 Device None (Room air)   Shift assessment complete. See flow sheet. Scheduled medications given, See MAR. Head to toe complete. Vital signs logged and active bowel sounds in all 4 quadrants. Pt awake in bed at this time. Pt disoriented to time. Meds taken whole without difficulty and posey belt in place. No further needs noted at this time. Call light and bedside table within reach. Bed in lowest position, wheels locked and side rails up x2.      Jasmine Linton RN

## 2022-01-26 LAB
ANION GAP SERPL CALCULATED.3IONS-SCNC: 15 MMOL/L (ref 3–16)
ANISOCYTOSIS: ABNORMAL
BANDED NEUTROPHILS RELATIVE PERCENT: 1 % (ref 0–7)
BASOPHILS ABSOLUTE: 0 K/UL (ref 0–0.2)
BASOPHILS RELATIVE PERCENT: 0 %
BUN BLDV-MCNC: 10 MG/DL (ref 7–20)
CALCIUM SERPL-MCNC: 9.1 MG/DL (ref 8.3–10.6)
CHLORIDE BLD-SCNC: 99 MMOL/L (ref 99–110)
CO2: 22 MMOL/L (ref 21–32)
CREAT SERPL-MCNC: <0.5 MG/DL (ref 0.9–1.3)
EOSINOPHILS ABSOLUTE: 0 K/UL (ref 0–0.6)
EOSINOPHILS RELATIVE PERCENT: 1 %
GFR AFRICAN AMERICAN: >60
GFR NON-AFRICAN AMERICAN: >60
GLUCOSE BLD-MCNC: 169 MG/DL (ref 70–99)
GLUCOSE BLD-MCNC: 193 MG/DL (ref 70–99)
GLUCOSE BLD-MCNC: 219 MG/DL (ref 70–99)
GLUCOSE BLD-MCNC: 248 MG/DL (ref 70–99)
GLUCOSE BLD-MCNC: 313 MG/DL (ref 70–99)
HCT VFR BLD CALC: 39.3 % (ref 40.5–52.5)
HEMOGLOBIN: 13 G/DL (ref 13.5–17.5)
LYMPHOCYTES ABSOLUTE: 0.6 K/UL (ref 1–5.1)
LYMPHOCYTES RELATIVE PERCENT: 18 %
MAGNESIUM: 2.1 MG/DL (ref 1.8–2.4)
MCH RBC QN AUTO: 28 PG (ref 26–34)
MCHC RBC AUTO-ENTMCNC: 33.2 G/DL (ref 31–36)
MCV RBC AUTO: 84.4 FL (ref 80–100)
METAMYELOCYTES RELATIVE PERCENT: 1 %
MONOCYTES ABSOLUTE: 0.1 K/UL (ref 0–1.3)
MONOCYTES RELATIVE PERCENT: 4 %
MYELOCYTE PERCENT: 2 %
NEUTROPHILS ABSOLUTE: 2.7 K/UL (ref 1.7–7.7)
NEUTROPHILS RELATIVE PERCENT: 72 %
PDW BLD-RTO: 15.2 % (ref 12.4–15.4)
PERFORMED ON: ABNORMAL
PHOSPHORUS: 3.6 MG/DL (ref 2.5–4.9)
PLATELET # BLD: 138 K/UL (ref 135–450)
PLATELET SLIDE REVIEW: ADEQUATE
PMV BLD AUTO: 6.7 FL (ref 5–10.5)
POIKILOCYTES: ABNORMAL
POTASSIUM SERPL-SCNC: 3.8 MMOL/L (ref 3.5–5.1)
PROMYELOCYTES PERCENT: 1 %
RBC # BLD: 4.66 M/UL (ref 4.2–5.9)
SODIUM BLD-SCNC: 136 MMOL/L (ref 136–145)
TOXIC GRANULATION: PRESENT
VALPROIC ACID LEVEL: 56.7 UG/ML (ref 50–100)
WBC # BLD: 3.5 K/UL (ref 4–11)

## 2022-01-26 PROCEDURE — 84100 ASSAY OF PHOSPHORUS: CPT

## 2022-01-26 PROCEDURE — 6370000000 HC RX 637 (ALT 250 FOR IP): Performed by: NURSE PRACTITIONER

## 2022-01-26 PROCEDURE — 80164 ASSAY DIPROPYLACETIC ACD TOT: CPT

## 2022-01-26 PROCEDURE — 85025 COMPLETE CBC W/AUTO DIFF WBC: CPT

## 2022-01-26 PROCEDURE — 94761 N-INVAS EAR/PLS OXIMETRY MLT: CPT

## 2022-01-26 PROCEDURE — 36415 COLL VENOUS BLD VENIPUNCTURE: CPT

## 2022-01-26 PROCEDURE — 6370000000 HC RX 637 (ALT 250 FOR IP): Performed by: INTERNAL MEDICINE

## 2022-01-26 PROCEDURE — 99232 SBSQ HOSP IP/OBS MODERATE 35: CPT | Performed by: INTERNAL MEDICINE

## 2022-01-26 PROCEDURE — 80048 BASIC METABOLIC PNL TOTAL CA: CPT

## 2022-01-26 PROCEDURE — 83735 ASSAY OF MAGNESIUM: CPT

## 2022-01-26 PROCEDURE — 6360000002 HC RX W HCPCS: Performed by: INTERNAL MEDICINE

## 2022-01-26 PROCEDURE — 94640 AIRWAY INHALATION TREATMENT: CPT

## 2022-01-26 PROCEDURE — 2060000000 HC ICU INTERMEDIATE R&B

## 2022-01-26 RX ADMIN — INSULIN GLARGINE 30 UNITS: 100 INJECTION, SOLUTION SUBCUTANEOUS at 21:48

## 2022-01-26 RX ADMIN — Medication 2 PUFF: at 19:09

## 2022-01-26 RX ADMIN — DEXAMETHASONE 6 MG: 4 TABLET ORAL at 09:36

## 2022-01-26 RX ADMIN — METOPROLOL TARTRATE 50 MG: 50 TABLET, FILM COATED ORAL at 09:35

## 2022-01-26 RX ADMIN — APIXABAN 5 MG: 5 TABLET, FILM COATED ORAL at 09:35

## 2022-01-26 RX ADMIN — INSULIN LISPRO 12 UNITS: 100 INJECTION, SOLUTION INTRAVENOUS; SUBCUTANEOUS at 16:59

## 2022-01-26 RX ADMIN — QUETIAPINE FUMARATE 50 MG: 25 TABLET ORAL at 09:35

## 2022-01-26 RX ADMIN — GABAPENTIN 200 MG: 100 CAPSULE ORAL at 09:36

## 2022-01-26 RX ADMIN — QUETIAPINE FUMARATE 75 MG: 25 TABLET ORAL at 19:50

## 2022-01-26 RX ADMIN — MAGNESIUM 64 MG (MAGNESIUM CHLORIDE) TABLET,DELAYED RELEASE 128 MG: at 13:26

## 2022-01-26 RX ADMIN — INSULIN LISPRO 3 UNITS: 100 INJECTION, SOLUTION INTRAVENOUS; SUBCUTANEOUS at 09:58

## 2022-01-26 RX ADMIN — GABAPENTIN 200 MG: 100 CAPSULE ORAL at 14:31

## 2022-01-26 RX ADMIN — DIVALPROEX SODIUM 1000 MG: 500 TABLET, DELAYED RELEASE ORAL at 18:17

## 2022-01-26 RX ADMIN — POTASSIUM CHLORIDE 20 MEQ: 1500 TABLET, EXTENDED RELEASE ORAL at 09:35

## 2022-01-26 RX ADMIN — BUDESONIDE AND FORMOTEROL FUMARATE DIHYDRATE 2 PUFF: 160; 4.5 AEROSOL RESPIRATORY (INHALATION) at 08:12

## 2022-01-26 RX ADMIN — INSULIN LISPRO 6 UNITS: 100 INJECTION, SOLUTION INTRAVENOUS; SUBCUTANEOUS at 12:11

## 2022-01-26 RX ADMIN — LISINOPRIL 5 MG: 5 TABLET ORAL at 09:35

## 2022-01-26 RX ADMIN — INSULIN LISPRO 3 UNITS: 100 INJECTION, SOLUTION INTRAVENOUS; SUBCUTANEOUS at 21:47

## 2022-01-26 RX ADMIN — Medication 10 MG: at 19:50

## 2022-01-26 RX ADMIN — ATORVASTATIN CALCIUM 80 MG: 40 TABLET, FILM COATED ORAL at 09:36

## 2022-01-26 RX ADMIN — CLOPIDOGREL BISULFATE 75 MG: 75 TABLET ORAL at 09:36

## 2022-01-26 RX ADMIN — PANTOPRAZOLE SODIUM 40 MG: 40 TABLET, DELAYED RELEASE ORAL at 09:35

## 2022-01-26 RX ADMIN — Medication 2 PUFF: at 08:12

## 2022-01-26 RX ADMIN — DULOXETINE HYDROCHLORIDE 60 MG: 60 CAPSULE, DELAYED RELEASE ORAL at 09:35

## 2022-01-26 RX ADMIN — BUDESONIDE AND FORMOTEROL FUMARATE DIHYDRATE 2 PUFF: 160; 4.5 AEROSOL RESPIRATORY (INHALATION) at 19:09

## 2022-01-26 RX ADMIN — METOPROLOL TARTRATE 50 MG: 50 TABLET, FILM COATED ORAL at 19:50

## 2022-01-26 RX ADMIN — APIXABAN 5 MG: 5 TABLET, FILM COATED ORAL at 19:50

## 2022-01-26 RX ADMIN — GABAPENTIN 200 MG: 100 CAPSULE ORAL at 19:50

## 2022-01-26 ASSESSMENT — PAIN SCALES - GENERAL
PAINLEVEL_OUTOF10: 0
PAINLEVEL_OUTOF10: 0

## 2022-01-26 NOTE — PLAN OF CARE
Problem: Airway Clearance - Ineffective  Goal: Achieve or maintain patent airway  1/26/2022 0135 by Sheila Ziegler RN  Outcome: Ongoing  1/25/2022 1847 by Akiko Gabriel RN  Outcome: Ongoing     Problem: Gas Exchange - Impaired  Goal: Absence of hypoxia  1/26/2022 0135 by Sheila Ziegler RN  Outcome: Ongoing  1/25/2022 1847 by kAiko Gabriel RN  Outcome: Ongoing  Goal: Promote optimal lung function  1/26/2022 0135 by Sheila Ziegler RN  Outcome: Ongoing  1/25/2022 1847 by Akiko Gabriel RN  Outcome: Ongoing     Problem: Breathing Pattern - Ineffective  Goal: Ability to achieve and maintain a regular respiratory rate  1/26/2022 0135 by Sheila Ziegler RN  Outcome: Ongoing  1/25/2022 1847 by Akiko Gabriel RN  Outcome: Ongoing     Problem:  Body Temperature -  Risk of, Imbalanced  Goal: Ability to maintain a body temperature within defined limits  1/26/2022 0135 by Sheila Ziegler RN  Outcome: Ongoing  1/25/2022 1847 by Akiko Gabriel RN  Outcome: Ongoing  Goal: Will regain or maintain usual level of consciousness  1/26/2022 0135 by Sheila Ziegler RN  Outcome: Ongoing  1/25/2022 1847 by Akiko Gabriel RN  Outcome: Ongoing  Goal: Complications related to the disease process, condition or treatment will be avoided or minimized  1/26/2022 0135 by Sheila Ziegler RN  Outcome: Ongoing  1/25/2022 1847 by Akiko Gabriel RN  Outcome: Ongoing     Problem: Isolation Precautions - Risk of Spread of Infection  Goal: Prevent transmission of infection  1/26/2022 0135 by Sheila Ziegler RN  Outcome: Ongoing  1/25/2022 1847 by Akiko Gabriel RN  Outcome: Ongoing     Problem: Nutrition Deficits  Goal: Optimize nutritional status  1/26/2022 0135 by Sheila Ziegler RN  Outcome: Ongoing  1/25/2022 1847 by Akiko Gabriel RN  Outcome: Ongoing     Problem: Risk for Fluid Volume Deficit  Goal: Maintain normal heart rhythm  1/26/2022 0135 by Sheila Ziegler RN  Outcome: Ongoing  1/25/2022 1847 by Akiko Gabriel RN  Outcome: Ongoing  Goal: Maintain absence of muscle cramping  1/26/2022 0135 by Margot Weber RN  Outcome: Ongoing  1/25/2022 1847 by Liborio Adams RN  Outcome: Ongoing  Goal: Maintain normal serum potassium, sodium, calcium, phosphorus, and pH  1/26/2022 0135 by Margot Weber RN  Outcome: Ongoing  1/25/2022 1847 by Liborio Adams RN  Outcome: Ongoing     Problem: Loneliness or Risk for Loneliness  Goal: Demonstrate positive use of time alone when socialization is not possible  1/26/2022 0135 by Margot Weber RN  Outcome: Ongoing  1/25/2022 1847 by Liborio Adams RN  Outcome: Ongoing     Problem: Fatigue  Goal: Verbalize increase energy and improved vitality  1/26/2022 0135 by Margot Weber RN  Outcome: Ongoing  1/25/2022 1847 by Liborio Adams RN  Outcome: Ongoing     Problem: Patient Education: Go to Patient Education Activity  Goal: Patient/Family Education  1/26/2022 0135 by Margot Weber RN  Outcome: Ongoing  1/25/2022 1847 by Liborio Adams RN  Outcome: Ongoing     Problem: Falls - Risk of:  Goal: Will remain free from falls  Description: Will remain free from falls  1/26/2022 0135 by Margot Weber RN  Outcome: Ongoing  1/25/2022 1847 by Liborio Adams RN  Outcome: Ongoing  Goal: Absence of physical injury  Description: Absence of physical injury  1/26/2022 0135 by Margot Weber RN  Outcome: Ongoing  1/25/2022 1847 by Liborio Adams RN  Outcome: Ongoing     Problem: Skin Integrity:  Goal: Will show no infection signs and symptoms  Description: Will show no infection signs and symptoms  1/26/2022 0135 by Margot Weber RN  Outcome: Ongoing  1/25/2022 1847 by Liborio Adams RN  Outcome: Ongoing  Goal: Absence of new skin breakdown  Description: Absence of new skin breakdown  1/26/2022 0135 by Margot Weber RN  Outcome: Ongoing  1/25/2022 1847 by Liborio Adams RN  Outcome: Ongoing     Problem: Confusion - Acute:  Goal: Absence of continued neurological deterioration signs and symptoms  Description: Absence of continued neurological deterioration signs and symptoms  1/26/2022 0135 by Mamie Méndez RN  Outcome: Ongoing  1/25/2022 1847 by Jan Ogden RN  Outcome: Ongoing  Goal: Mental status will be restored to baseline  Description: Mental status will be restored to baseline  1/26/2022 0135 by Mamie Méndez RN  Outcome: Ongoing  1/25/2022 1847 by Jan Ogden RN  Outcome: Ongoing     Problem: Discharge Planning:  Goal: Ability to perform activities of daily living will improve  Description: Ability to perform activities of daily living will improve  1/26/2022 0135 by Mamie Méndez RN  Outcome: Ongoing  1/25/2022 1847 by Jan Ogden RN  Outcome: Ongoing  Goal: Participates in care planning  Description: Participates in care planning  1/26/2022 0135 by Mamie Méndez RN  Outcome: Ongoing  1/25/2022 1847 by Jan Ogden RN  Outcome: Ongoing     Problem: Injury - Risk of, Physical Injury:  Goal: Will remain free from falls  Description: Will remain free from falls  1/26/2022 0135 by Mamie Méndez RN  Outcome: Ongoing  1/25/2022 1847 by Jan Ogden RN  Outcome: Ongoing  Goal: Absence of physical injury  Description: Absence of physical injury  1/26/2022 0135 by Mamie Méndez RN  Outcome: Ongoing  1/25/2022 1847 by Jan Ogden RN  Outcome: Ongoing     Problem: Mood - Altered:  Goal: Mood stable  Description: Mood stable  1/26/2022 0135 by Mamie Méndez RN  Outcome: Ongoing  1/25/2022 1847 by Jan Ogden RN  Outcome: Ongoing  Goal: Absence of abusive behavior  Description: Absence of abusive behavior  1/26/2022 0135 by Mamie Méndez RN  Outcome: Ongoing  1/25/2022 1847 by Jan Ogden RN  Outcome: Ongoing  Goal: Verbalizations of feeling emotionally comfortable while being cared for will increase  Description: Verbalizations of feeling emotionally comfortable while being cared for will increase  1/26/2022 0135 by Mamie Méndez RN  Outcome: Ongoing  1/25/2022 1847 by Jan Ogden RN  Outcome: Ongoing     Problem: Psychomotor Activity - Altered:  Goal: Absence of psychomotor disturbance signs and symptoms  Description: Absence of psychomotor disturbance signs and symptoms  1/26/2022 0135 by Sandra Alas RN  Outcome: Ongoing  1/25/2022 1847 by Ignacio Lujan RN  Outcome: Ongoing     Problem: Sensory Perception - Impaired:  Goal: Demonstrations of improved sensory functioning will increase  Description: Demonstrations of improved sensory functioning will increase  1/26/2022 0135 by Sandra Alas RN  Outcome: Ongoing  1/25/2022 1847 by Ignacio Lujan RN  Outcome: Ongoing  Goal: Decrease in sensory misperception frequency  Description: Decrease in sensory misperception frequency  1/26/2022 0135 by Sandra Alas RN  Outcome: Ongoing  1/25/2022 1847 by Ignacio Lujan RN  Outcome: Ongoing  Goal: Able to refrain from responding to false sensory perceptions  Description: Able to refrain from responding to false sensory perceptions  1/26/2022 0135 by Sandra Alas RN  Outcome: Ongoing  1/25/2022 1847 by Ignacio Lujan RN  Outcome: Ongoing  Goal: Demonstrates accurate environmental perceptions  Description: Demonstrates accurate environmental perceptions  1/26/2022 0135 by Sandra Alas RN  Outcome: Ongoing  1/25/2022 1847 by Ignacio Lujan RN  Outcome: Ongoing  Goal: Able to distinguish between reality-based and nonreality-based thinking  Description: Able to distinguish between reality-based and nonreality-based thinking  1/26/2022 0135 by Sandra Alas RN  Outcome: Ongoing  1/25/2022 1847 by Ignacio Lujan RN  Outcome: Ongoing  Goal: Able to interrupt nonreality-based thinking  Description: Able to interrupt nonreality-based thinking  1/26/2022 0135 by Sandra Alas RN  Outcome: Ongoing  1/25/2022 1847 by Ignacio Lujan RN  Outcome: Ongoing     Problem: Sleep Pattern Disturbance:  Goal: Appears well-rested  Description: Appears well-rested  1/26/2022 0135 by Sandra Alas RN  Outcome: Ongoing  1/25/2022 1847 by Ignacio Lujan RN  Outcome: Ongoing     Problem: Non-Violent Restraints  Goal: Removal from restraints as soon as assessed to be safe  1/26/2022 0135 by Sheila Ziegler RN  Outcome: Ongoing  1/25/2022 1847 by Akiok Gabriel RN  Outcome: Ongoing  Goal: No harm/injury to patient while restraints in use  1/26/2022 0135 by Sheila Ziegler RN  Outcome: Ongoing  1/25/2022 1847 by Akiko Gabriel RN  Outcome: Ongoing  Goal: Patient's dignity will be maintained  1/26/2022 0135 by Sheila Ziegler RN  Outcome: Ongoing  1/25/2022 1847 by Akiko Gabriel RN  Outcome: Ongoing     Problem: Nutrition  Goal: Optimal nutrition therapy  1/26/2022 0135 by Sheila Ziegler RN  Outcome: Ongoing  1/25/2022 1847 by Akiko Gabriel RN  Outcome: Ongoing  Goal: Understanding of nutritional guidelines  1/26/2022 0135 by Sheila Ziegler RN  Outcome: Ongoing  1/25/2022 1847 by Akiko Gabriel RN  Outcome: Ongoing     Problem: Pain:  Goal: Pain level will decrease  Description: Pain level will decrease  1/26/2022 0135 by Sheila Ziegler RN  Outcome: Ongoing  1/25/2022 1847 by Akiko Gabriel RN  Outcome: Ongoing  Goal: Control of acute pain  Description: Control of acute pain  1/26/2022 0135 by Sheila Ziegler RN  Outcome: Ongoing  1/25/2022 1847 by Akiko Gabriel RN  Outcome: Ongoing  Goal: Control of chronic pain  Description: Control of chronic pain  1/26/2022 0135 by Sheila Ziegler RN  Outcome: Ongoing  1/25/2022 1847 by Akiko Gabriel RN  Outcome: Ongoing  Goal: Patient's pain/discomfort is manageable  Description: Patient's pain/discomfort is manageable  1/26/2022 0135 by Sheila Ziegler RN  Outcome: Ongoing  1/25/2022 1847 by kAiko Gabriel RN  Outcome: Ongoing     Problem: Infection:  Goal: Will remain free from infection  Description: Will remain free from infection  1/26/2022 0135 by Sheila Ziegler RN  Outcome: Ongoing  1/25/2022 1847 by Akiko Gabriel RN  Outcome: Ongoing     Problem: Safety:  Goal: Free from accidental physical injury  Description: Free from accidental physical injury  1/26/2022 0135 by Sheila Ziegler,

## 2022-01-26 NOTE — PLAN OF CARE
Problem: Airway Clearance - Ineffective  Goal: Achieve or maintain patent airway  1/26/2022 1127 by Zhane Madsen RN  Outcome: Ongoing  1/26/2022 0135 by Denia Munoz RN  Outcome: Ongoing     Problem: Gas Exchange - Impaired  Goal: Absence of hypoxia  1/26/2022 1127 by Zhane Madsen RN  Outcome: Ongoing  1/26/2022 0135 by Denia Munoz RN  Outcome: Ongoing  Goal: Promote optimal lung function  1/26/2022 1127 by Zhane Madsen RN  Outcome: Ongoing  1/26/2022 0135 by Denia Munoz RN  Outcome: Ongoing     Problem: Breathing Pattern - Ineffective  Goal: Ability to achieve and maintain a regular respiratory rate  1/26/2022 1127 by Zhane Madsen RN  Outcome: Ongoing  1/26/2022 0135 by Denia Munoz RN  Outcome: Ongoing     Problem:  Body Temperature -  Risk of, Imbalanced  Goal: Ability to maintain a body temperature within defined limits  1/26/2022 1127 by Zhane Madsen RN  Outcome: Ongoing  1/26/2022 0135 by Denia Munoz RN  Outcome: Ongoing  Goal: Will regain or maintain usual level of consciousness  1/26/2022 1127 by Zhane Madsen RN  Outcome: Ongoing  1/26/2022 0135 by Denia Munoz RN  Outcome: Ongoing  Goal: Complications related to the disease process, condition or treatment will be avoided or minimized  1/26/2022 1127 by Zhane Madsen RN  Outcome: Ongoing  1/26/2022 0135 by Denia Munoz RN  Outcome: Ongoing     Problem: Isolation Precautions - Risk of Spread of Infection  Goal: Prevent transmission of infection  1/26/2022 1127 by Zhane Madsen RN  Outcome: Ongoing  1/26/2022 0135 by Denia Munoz RN  Outcome: Ongoing     Problem: Nutrition Deficits  Goal: Optimize nutritional status  1/26/2022 1127 by Zhane Madsen RN  Outcome: Ongoing  1/26/2022 0135 by Denia Munoz RN  Outcome: Ongoing     Problem: Risk for Fluid Volume Deficit  Goal: Maintain normal heart rhythm  1/26/2022 1127 by Zhane Madsen RN  Outcome: Ongoing  1/26/2022 0135 by Denia Munoz RN  Outcome: Ongoing  Goal: Maintain absence of muscle cramping  1/26/2022 1127 by Tyrel Beltran RN  Outcome: Ongoing  1/26/2022 0135 by Willian Gomez RN  Outcome: Ongoing  Goal: Maintain normal serum potassium, sodium, calcium, phosphorus, and pH  1/26/2022 1127 by Tyrel Beltran RN  Outcome: Ongoing  1/26/2022 0135 by Willian Gomez RN  Outcome: Ongoing     Problem: Loneliness or Risk for Loneliness  Goal: Demonstrate positive use of time alone when socialization is not possible  1/26/2022 1127 by Tyrel Beltran RN  Outcome: Ongoing  1/26/2022 0135 by Willian Gomez RN  Outcome: Ongoing     Problem: Fatigue  Goal: Verbalize increase energy and improved vitality  1/26/2022 1127 by Tyrel Beltran RN  Outcome: Ongoing  1/26/2022 0135 by Willian Gomez RN  Outcome: Ongoing     Problem: Patient Education: Go to Patient Education Activity  Goal: Patient/Family Education  1/26/2022 1127 by Tyrel Beltran RN  Outcome: Ongoing  1/26/2022 0135 by Willian Gomez RN  Outcome: Ongoing     Problem: Falls - Risk of:  Goal: Will remain free from falls  Description: Will remain free from falls  1/26/2022 1127 by Tyrel Beltran RN  Outcome: Ongoing  1/26/2022 0135 by Willian Gomez RN  Outcome: Ongoing  Goal: Absence of physical injury  Description: Absence of physical injury  1/26/2022 1127 by Tyrel Beltran RN  Outcome: Ongoing  1/26/2022 0135 by Willian Gomez RN  Outcome: Ongoing     Problem: Skin Integrity:  Goal: Will show no infection signs and symptoms  Description: Will show no infection signs and symptoms  1/26/2022 1127 by Tyrel Beltran RN  Outcome: Ongoing  1/26/2022 0135 by Willian Gomez RN  Outcome: Ongoing  Goal: Absence of new skin breakdown  Description: Absence of new skin breakdown  1/26/2022 1127 by Tyrel Beltran RN  Outcome: Ongoing  1/26/2022 0135 by Willian Gomez RN  Outcome: Ongoing     Problem: Confusion - Acute:  Goal: Absence of continued neurological deterioration signs and symptoms  Description: Absence of continued neurological deterioration signs and symptoms  1/26/2022 1121 by Brittney Tobin RN  Outcome: Ongoing  1/26/2022 0135 by Esther Wilkinson RN  Outcome: Ongoing  Goal: Mental status will be restored to baseline  Description: Mental status will be restored to baseline  1/26/2022 1127 by Brittney Tobin RN  Outcome: Ongoing  1/26/2022 0135 by Esther Wilkinson RN  Outcome: Ongoing     Problem: Discharge Planning:  Goal: Ability to perform activities of daily living will improve  Description: Ability to perform activities of daily living will improve  1/26/2022 1127 by Brittney Tobin RN  Outcome: Ongoing  1/26/2022 0135 by Esther Wilkinson RN  Outcome: Ongoing  Goal: Participates in care planning  Description: Participates in care planning  1/26/2022 1127 by Brittney Tobin RN  Outcome: Ongoing  1/26/2022 0135 by Esther Wilkinson RN  Outcome: Ongoing     Problem: Injury - Risk of, Physical Injury:  Goal: Will remain free from falls  Description: Will remain free from falls  1/26/2022 1127 by Brittney Tobin RN  Outcome: Ongoing  1/26/2022 0135 by Esther Wilkinson RN  Outcome: Ongoing  Goal: Absence of physical injury  Description: Absence of physical injury  1/26/2022 1127 by Brittney Tobin RN  Outcome: Ongoing  1/26/2022 0135 by Esther Wilkinson RN  Outcome: Ongoing     Problem: Mood - Altered:  Goal: Mood stable  Description: Mood stable  1/26/2022 1127 by Brittney Tobin RN  Outcome: Ongoing  1/26/2022 0135 by Esther Wilkinson RN  Outcome: Ongoing  Goal: Absence of abusive behavior  Description: Absence of abusive behavior  1/26/2022 1127 by Brittney Tobin RN  Outcome: Ongoing  1/26/2022 0135 by Esther Wilkinson RN  Outcome: Ongoing  Goal: Verbalizations of feeling emotionally comfortable while being cared for will increase  Description: Verbalizations of feeling emotionally comfortable while being cared for will increase  1/26/2022 1127 by Brittney Tobin RN  Outcome: Ongoing  1/26/2022 0135 by Esther Wilkinson RN  Outcome: Ongoing     Problem: Psychomotor Activity - Altered:  Goal: Absence of psychomotor disturbance signs and symptoms  Description: Absence of psychomotor disturbance signs and symptoms  1/26/2022 1127 by Divina Montgomery RN  Outcome: Ongoing  1/26/2022 0135 by Patricia Gallardo RN  Outcome: Ongoing     Problem: Sensory Perception - Impaired:  Goal: Demonstrations of improved sensory functioning will increase  Description: Demonstrations of improved sensory functioning will increase  1/26/2022 1127 by Divina Montgomery RN  Outcome: Ongoing  1/26/2022 0135 by Patricia Gallardo RN  Outcome: Ongoing  Goal: Decrease in sensory misperception frequency  Description: Decrease in sensory misperception frequency  1/26/2022 1127 by Divina Montgomery RN  Outcome: Ongoing  1/26/2022 0135 by Patricia Gallardo RN  Outcome: Ongoing  Goal: Able to refrain from responding to false sensory perceptions  Description: Able to refrain from responding to false sensory perceptions  1/26/2022 1127 by Divina Montgomery RN  Outcome: Ongoing  1/26/2022 0135 by Patricia Gallardo RN  Outcome: Ongoing  Goal: Demonstrates accurate environmental perceptions  Description: Demonstrates accurate environmental perceptions  1/26/2022 1127 by Divina Montgomery RN  Outcome: Ongoing  1/26/2022 0135 by Patricia Gallardo RN  Outcome: Ongoing  Goal: Able to distinguish between reality-based and nonreality-based thinking  Description: Able to distinguish between reality-based and nonreality-based thinking  1/26/2022 1127 by Divina Montgomery RN  Outcome: Ongoing  1/26/2022 0135 by Patricia Gallardo RN  Outcome: Ongoing  Goal: Able to interrupt nonreality-based thinking  Description: Able to interrupt nonreality-based thinking  1/26/2022 1127 by Divina Montgomery RN  Outcome: Ongoing  1/26/2022 0135 by Patricia Gallardo RN  Outcome: Ongoing     Problem: Sleep Pattern Disturbance:  Goal: Appears well-rested  Description: Appears well-rested  1/26/2022 1127 by Divina Montgomery RN  Outcome: Ongoing  1/26/2022 0135 by Patricia Gallardo RN  Outcome: Ongoing     Problem: Nutrition  Goal: Optimal nutrition therapy  1/26/2022 1127 by Cyndy Marley Nat Carnes RN  Outcome: Ongoing  1/26/2022 0135 by Tabatha Fry RN  Outcome: Ongoing  Goal: Understanding of nutritional guidelines  1/26/2022 1127 by Keyonna Kaufman RN  Outcome: Ongoing  1/26/2022 0135 by Tabatha Fry RN  Outcome: Ongoing     Problem: Pain:  Goal: Pain level will decrease  Description: Pain level will decrease  1/26/2022 1127 by Keyonna Kaufman RN  Outcome: Ongoing  1/26/2022 0135 by Tabatha Fry RN  Outcome: Ongoing  Goal: Control of acute pain  Description: Control of acute pain  1/26/2022 1127 by Keyonna Kaufman RN  Outcome: Ongoing  1/26/2022 0135 by Tabatha Fry RN  Outcome: Ongoing  Goal: Control of chronic pain  Description: Control of chronic pain  1/26/2022 1127 by Keyonna Kaufman RN  Outcome: Ongoing  1/26/2022 0135 by Tabatha Fry RN  Outcome: Ongoing  Goal: Patient's pain/discomfort is manageable  Description: Patient's pain/discomfort is manageable  1/26/2022 1127 by Keyonna Kaufman RN  Outcome: Ongoing  1/26/2022 0135 by Tabatha Fry RN  Outcome: Ongoing     Problem: Infection:  Goal: Will remain free from infection  Description: Will remain free from infection  1/26/2022 1127 by Keyonna Kaufman RN  Outcome: Ongoing  1/26/2022 0135 by Tabatha Fry RN  Outcome: Ongoing     Problem: Safety:  Goal: Free from accidental physical injury  Description: Free from accidental physical injury  1/26/2022 1127 by Keyonna Kaufman RN  Outcome: Ongoing  1/26/2022 0135 by Tabatha Fry RN  Outcome: Ongoing  Goal: Free from intentional harm  Description: Free from intentional harm  1/26/2022 1127 by Keyonna Kaufman RN  Outcome: Ongoing  1/26/2022 0135 by Tabatha Fry RN  Outcome: Ongoing     Problem: Daily Care:  Goal: Daily care needs are met  Description: Daily care needs are met  1/26/2022 1127 by Keyonna Kaufman RN  Outcome: Ongoing  1/26/2022 0135 by Tabatha Fry RN  Outcome: Ongoing     Problem: Skin Integrity:  Goal: Skin integrity will stabilize  Description: Skin integrity will stabilize  1/26/2022 1127 by Di Mccarthy Carmelita Burgess RN  Outcome: Ongoing  1/26/2022 0135 by Esther Wilkinson RN  Outcome: Ongoing     Problem: Discharge Planning:  Goal: Patients continuum of care needs are met  Description: Patients continuum of care needs are met  1/26/2022 1127 by Brittney Tobin RN  Outcome: Ongoing  1/26/2022 0135 by Esther Wilkinson RN  Outcome: Ongoing

## 2022-01-26 NOTE — FLOWSHEET NOTE
AM assessment completed. See flowsheet. A/Ox3. Disoriented to time. LCTAB. Medications taken without difficulty. BS active x4. No c/o n/v/d. Cont of B&B. No edema noted. No c/o pain/discomfort at this time. Patient in recliner at this time. Chair alarm in place and functioning. Call light in reach.      01/26/22 0930   Vital Signs   Temp 98 °F (36.7 °C)   Temp Source Oral   Pulse 75   Heart Rate Source Monitor   Resp 16   /64   BP Location Left upper arm   Patient Position Semi fowlers   Level of Consciousness Alert (0)   MEWS Score 1   Patient Currently in Pain Denies   Pain Assessment   Pain Assessment 0-10   Pain Level 0   Oxygen Therapy   SpO2 91 %   O2 Device None (Room air)

## 2022-01-26 NOTE — PROGRESS NOTES
[place] belt removed. Pt resting comfortably in bed. Call light within reach, bed locked and in lowest position. Bed alarm in place.

## 2022-01-26 NOTE — PROGRESS NOTES
Removed pt's posey restraint, repositioned, gave snack, drink and medications. vss, assessment completed, pt agreed to use call light for assistance, pt has tele sitter in , called tele sitter to inform restraint discontinued. Bed alarm on, will continue to monitor.  Robbi Baumgarten, RN

## 2022-01-26 NOTE — PROGRESS NOTES
IM Progress Note    Admit Date:  1/12/2022    63 y.o. male with DMII, Chronic sCHF, CAD s/p stent placement x3, hx of MI, asthma, HTN, HLD, GERD, lumbar spondylosis and DDD s/p bilateral dorsal ramus medial branch ablation and interstim implant, depression who presented to [facility] ED with complaint of generalized weakness ongoing for approximately 2 months and frequent falls. In ED, CT of the head showed no acute abnormality. Chest x-ray with no acute cardiopulmonary process. Patient did test positive for COVID. He is vaccinated x2, no booster. Patient admitted to med-surg for further care. PT/OT consulted. 1/15  Mr. Quiroga developed rapid HR what appears to be Afib on TELE monitor and his pacer inappropriately delivered shock x 5  Pt awake, conscious during these episodes and by the end of the 5th shock, he started getting slightly confused  Given cardizem 10 mg IV x 1 and metoprolol 5 mg with control of HR  EKG after this episode showed sinus tach  Progressively developed confusion , fevers , started on precedex gtt  Pt transferred to ICU    Pt very confused and became agitated with removing lines. Placed on  on precedex gtt, ct head neg   No further shocks from ICD. His ICD has been adjusted by cardiology       Tx to PCU on 1/19  Placed in restraints evening of 1/19. He had not slept in a couple of days. Is doing much better now. Subjective:    Out of restraint this am.   Gets angered easily when telesitter attempts to redirect him. Otherwise feels well. Objective: Kartik Settler Vitals:    01/25/22 1857 01/25/22 2147 01/26/22 0812 01/26/22 0930   BP:  134/73  122/64   Pulse:  72  75   Resp: 16   16   Temp:  98.2 °F (36.8 °C)  98 °F (36.7 °C)   TempSrc:  Oral  Oral   SpO2: 96% 94% 96% 91%   Weight:       Height:              No intake or output data in the 24 hours ending 01/26/22 1452    Physical Exam:    Gen: middle aged obese male awake.  Alert and better oriented today   Appears to be not in any distress  Mucous Membranes:  Pink , anicteric  Neck: No JVD, no carotid bruit, no thyromegaly. No nuchal rigidity  Chest:  Clear to auscultation bilaterally, diminished in bases   Cardiovascular:  regular,  S1S2 heard, no murmurs or gallops- left sided ICD +  Abdomen:  Soft, undistended, non tender, no organomegaly, BS present  Extremities: No edema or cyanosis. Distal pulses well felt  Neurological : improving mentation but still with intermittent confusion  Right sided increased stiffness noted yesterday, this has resolved today. Nonfocal exam now. Confusion is clear. He is awake and well-oriented  No facial weakness. Speech clear      Data:  CBC:   Recent Labs     01/24/22  0518 01/25/22  0550 01/26/22 0518   WBC 3.2* 3.5* 3.5*   HGB 13.3* 13.4* 13.0*   HCT 38.6* 40.2* 39.3*   MCV 83.2 85.9 84.4   * 141 138     BMP:   Recent Labs     01/24/22  0518 01/25/22  0550 01/26/22  0518    136 136   K 3.9 3.5 3.8   CL 99 99 99   CO2 25 27 22   PHOS 3.4 3.4 3.6   BUN 10 11 10   CREATININE <0.5* 0.6* <0.5*     CULTURES  Results for Abilio Albert (MRN [id number]) as of 1/13/2022 13:29    Ref. Range 1/12/2022 19:35   SARS-CoV-2, NAAT Latest Ref Range: Not Detected  DETECTED (AA)          RADIOLOGY  XR CHEST PORTABLE   Final Result   NG tube with the tip and side-port in the stomach. Bibasilar atelectasis. XR CHEST PORTABLE   Final Result   NG tube is difficult to visualize, tip is likely below the diaphragm. Repeat   exam could be considered if clinically indicated. XR CHEST PORTABLE   Final Result   New elevated right hemidiaphragm, likely atelectasis or possibly infiltrate. Subpulmonic effusion not excluded. No overt failure. CT HEAD WO CONTRAST   Final Result   1. No acute intracranial abnormality. CT Head WO Contrast   Final Result   No acute intracranial abnormality. XR CHEST PORTABLE   Final Result   No acute cardiopulmonary process. Pertinent previous results reviewed   Diagnostic cardiac cath 7/13/21  CONCLUSION:  Successful stenting of the true circumflex into the obtuse  marginal branch, successful stenting of the left posterior descending  coronary artery and successful stenting of the obtuse marginal branch,  all 80% stenosis reduced to 0 with these three drug-eluting stents. JAMES-3 blood flow was present in the circumflex artery and all its  branches.  The LAD is normal with minor irregularities only and the left  main is normal.  Nondominant right coronary artery.  LV ejection  fraction of 40% with inferior wall and inferoposterior hypokinesia.     ECHO 11/27/19  Summary   The left ventricular systolic function is moderately reduced with an   ejection fraction of 35 -40 %.   There is hypokinesis of the inferior and basal inferior walls.   There is akinesis of the inferolateral wall.   Left ventricular cavity size is moderately dilated.   Compared to previous study from 2-2-2015 no changes noted in left   ventricular function.   Mild mitral regurgitation. Limited echo 1/15/2021   Summary   This is a limited study afib, ischemic cmp, recurrent pacer firing. LV systolic function appears mildly reduced with EF estimated at 45-50%. There is more prominent HK of the inferior wall. Left ventricular size is decreased. There is mild concentric left ventricular hypertrophy. 11- 35-40% inf basal hypo, inferolateral akinesis, LVE, mild MR       Assessment/Plan:    # ICD firing - he had 5 witnessed shocks and then more bursts (4-5 additional episodes) of AICD discharges and concern for malfunctioning ICD  - s.p  [employer] [place] interrogation and now ICD is adjusted . - Cardiology consulted. - now back in NSR  - resumed  BB - toprol XL stopped due to low BP - decreased to  metoprolol 25 BID--  Increased on 1/20 to 50 mg BID.   Heart rate remained stable now      #Afibb with RVR  - new onset with above ICD issues  -Seen by cardiology  - resume home cardizem and BB if tolerated. Can use IV meds intermittently  - was in AFIB with RVR ->  now in NSR   - started eliquis but holding with low PLT, likely resume tomorrow if platelets remain to increase  - resumed eliquis 1/21  Currently heart rate well controlled. Continued on beta-blockers as above . continued on Eliquis. #Hypokalemia  - keep greater than 4  - PRN potassium SS ordered   Continue scheduled potassium daily      #Acute Encephalopathy - possible sec to covid encephalopathy   - NOT POA - developed slowly most pronounced after ICD firing  -CT head non focal x 2   -He denies any drug or alcohol use.   -precedex gtt for agitation used--- d/c on 1/19  - added seroquel   -Ammonia level wnl   -Consider MRI brain - unable to do with incompatible pacer  -Started empirically on rocephin meningitis dose D#7. clinically no signs of meningitis. no nuchal rigidity nonfocal exam.  He was treated empirically for 7 days with antibiotics;  and discontinued.    -Stopped prcedex gtt on 1/19  -Continue Seroquel- increase to 50am and 75 nightly. - still intermittent need for restraints.          #COVID 19 infection   -Positive 1/12/22  -only complaints is a cough  -patient is vaccinated x2- no booster  - minimal  Hypoxia at 2 L.   - Decadron completed. - over 12 days from initial testing - isolation stopped. - stable on RA      #DMII  - not controlled noted to have >1000 glucose in urine on admission   -Hold home oral meds- resume lantus increase as tolerated  -Low dose SSI--switched to high dose SSI  -Tolerating carb controlled diet      #Thrombocytopenia  -Platelet count is improved now.        #CAD s/p stent placement x3  #Hx of MI  #Hx of VTACH s.p ICD  - ASA, statin, plavix      #Chronic sCHF no AE   - Last echo 11/27/19, EF 35-40 % Limited echo completed 1/15 which showed EF 45-50%.   - Pacemaker/ICD in place  -Continue toprol XL, cardizem  - patient takes Lasix on Monday and Thursday - holding at this time   - Lisinopril has been on hold, resumed at a lower dose,   on 20 mg at home. Started 5 mg in am and can increase as blood pressure allows. Holding parameters in place.       #Asthma no AE  -Symbicort  -albuterol prn       #HLD  -continue statin       #GERD  -continue ppi    #Lumbar spondylosis and DDD   -s/p bilateral dorsal ramus medial branch ablation and interstim implant  -PDMP displays on chronic gabapentin, continue  - follows with Dr. Rommel Quinonez        #ALIN  - unsure if patient is compliant with CPAP ? If he still has CPAP at home       #Morbid Obesity  - Body mass index is 41.37 kg/m². - Complicating assessment and treatment. Placing patient at risk for multiple co-morbidities as well as early death and contributing to the patient's presentation.   - Counseled on weight loss.        #Depression  - patient is on Cymbalta 60 mg at home, medication verified with pharmacy. Continued on home dose  - also takes Depakote 500 am and 1000 in evening also verified with pharmacy - cont with evening dose only. #Weakness  PT/OT consulted,  recommend ARU        DVT Prophylaxis: eliquis continued. Diet: ADULT DIET; Regular; 4 carb choices (60 gm/meal)  ADULT ORAL NUTRITION SUPPLEMENT; Lunch, Dinner; Frozen Oral Supplement  Code Status: Full Code       ARU once out of restraint. Doing better on current regimen of depakote and seroquel.            Jeninfer Moore MD

## 2022-01-26 NOTE — PROGRESS NOTES
RT Inhaler-Nebulizer Bronchodilator Protocol Note    There is a bronchodilator order in the chart from a provider indicating to follow the RT Bronchodilator Protocol and there is an Initiate RT Inhaler-Nebulizer Bronchodilator Protocol order as well (see protocol at bottom of note). CXR Findings:  No results found. The findings from the last RT Protocol Assessment were as follows:   History Pulmonary Disease: Chronic pulmonary disease  Respiratory Pattern: Regular pattern and RR 12-20 bpm  Breath Sounds: Slightly diminished and/or crackles  Cough: Strong, spontaneous, non-productive  Indication for Bronchodilator Therapy: Decreased or absent breath sounds  Bronchodilator Assessment Score: 4    Aerosolized bronchodilator medication orders have been revised according to the RT Inhaler-Nebulizer Bronchodilator Protocol below. Respiratory Therapist to perform RT Therapy Protocol Assessment initially then follow the protocol. Repeat RT Therapy Protocol Assessment PRN for score 0-3 or on second treatment, BID, and PRN for scores above 3. No Indications - adjust the frequency to every 6 hours PRN wheezing or bronchospasm, if no treatments needed after 48 hours then discontinue using Per Protocol order mode. If indication present, adjust the RT bronchodilator orders based on the Bronchodilator Assessment Score as indicated below. Use Inhaler orders unless patient has one or more of the following: on home nebulizer, not able to hold breath for 10 seconds, is not alert and oriented, cannot activate and use MDI correctly, or respiratory rate 25 breaths per minute or more, then use the equivalent nebulizer order(s) with same Frequency and PRN reasons based on the score. If a patient is on this medication at home then do not decrease Frequency below that used at home.     0-3 - enter or revise RT bronchodilator order(s) to equivalent RT Bronchodilator order with Frequency of every 4 hours PRN for wheezing or increased work of breathing using Per Protocol order mode. 4-6 - enter or revise RT Bronchodilator order(s) to two equivalent RT bronchodilator orders with one order with BID Frequency and one order with Frequency of every 4 hours PRN wheezing or increased work of breathing using Per Protocol order mode. 7-10 - enter or revise RT Bronchodilator order(s) to two equivalent RT bronchodilator orders with one order with TID Frequency and one order with Frequency of every 4 hours PRN wheezing or increased work of breathing using Per Protocol order mode. 11-13 - enter or revise RT Bronchodilator order(s) to one equivalent RT bronchodilator order with QID Frequency and an Albuterol order with Frequency of every 4 hours PRN wheezing or increased work of breathing using Per Protocol order mode. Greater than 13 - enter or revise RT Bronchodilator order(s) to one equivalent RT bronchodilator order with every 4 hours Frequency and an Albuterol order with Frequency of every 2 hours PRN wheezing or increased work of breathing using Per Protocol order mode.          Electronically signed by Jose Sheldon RCP on 1/26/2022 at 12:32 AM

## 2022-01-26 NOTE — CARE COORDINATION
ARU continuing to follow. Restraints removed last evening. Pt is required to be restraint free for 24 hrs in order to admit to ARU. Will follow-up with CM tomorrow morning re: status. Thank you.    Lion Gaffney M.A, FENG-SLP/ MARY JANE   Clinical Liason

## 2022-01-27 ENCOUNTER — HOSPITAL ENCOUNTER (INPATIENT)
Age: 62
LOS: 12 days | Discharge: HOME HEALTH | DRG: 948 | End: 2022-02-08
Attending: PHYSICAL MEDICINE & REHABILITATION | Admitting: PHYSICAL MEDICINE & REHABILITATION
Payer: MEDICARE

## 2022-01-27 VITALS
BODY MASS INDEX: 36.91 KG/M2 | RESPIRATION RATE: 20 BRPM | WEIGHT: 216.2 LBS | OXYGEN SATURATION: 95 % | HEIGHT: 64 IN | TEMPERATURE: 98.5 F | DIASTOLIC BLOOD PRESSURE: 64 MMHG | SYSTOLIC BLOOD PRESSURE: 130 MMHG | HEART RATE: 67 BPM

## 2022-01-27 PROBLEM — R53.81 DEBILITY: Status: ACTIVE | Noted: 2022-01-27

## 2022-01-27 LAB
GLUCOSE BLD-MCNC: 179 MG/DL (ref 70–99)
GLUCOSE BLD-MCNC: 200 MG/DL (ref 70–99)
GLUCOSE BLD-MCNC: 252 MG/DL (ref 70–99)
GLUCOSE BLD-MCNC: 273 MG/DL (ref 70–99)
GLUCOSE BLD-MCNC: 306 MG/DL (ref 70–99)
PERFORMED ON: ABNORMAL

## 2022-01-27 PROCEDURE — 6370000000 HC RX 637 (ALT 250 FOR IP): Performed by: INTERNAL MEDICINE

## 2022-01-27 PROCEDURE — 94640 AIRWAY INHALATION TREATMENT: CPT

## 2022-01-27 PROCEDURE — 97530 THERAPEUTIC ACTIVITIES: CPT

## 2022-01-27 PROCEDURE — 2580000003 HC RX 258: Performed by: PHYSICAL MEDICINE & REHABILITATION

## 2022-01-27 PROCEDURE — 94761 N-INVAS EAR/PLS OXIMETRY MLT: CPT

## 2022-01-27 PROCEDURE — 6370000000 HC RX 637 (ALT 250 FOR IP): Performed by: PHYSICAL MEDICINE & REHABILITATION

## 2022-01-27 PROCEDURE — 97116 GAIT TRAINING THERAPY: CPT

## 2022-01-27 PROCEDURE — 99239 HOSP IP/OBS DSCHRG MGMT >30: CPT | Performed by: INTERNAL MEDICINE

## 2022-01-27 PROCEDURE — 1280000000 HC REHAB R&B

## 2022-01-27 PROCEDURE — 6370000000 HC RX 637 (ALT 250 FOR IP): Performed by: NURSE PRACTITIONER

## 2022-01-27 PROCEDURE — 6360000002 HC RX W HCPCS: Performed by: INTERNAL MEDICINE

## 2022-01-27 RX ORDER — POLYETHYLENE GLYCOL 3350 17 G/17G
17 POWDER, FOR SOLUTION ORAL DAILY PRN
Status: DISCONTINUED | OUTPATIENT
Start: 2022-01-27 | End: 2022-01-27

## 2022-01-27 RX ORDER — PANTOPRAZOLE SODIUM 40 MG/1
40 TABLET, DELAYED RELEASE ORAL DAILY
Status: DISCONTINUED | OUTPATIENT
Start: 2022-01-28 | End: 2022-02-08 | Stop reason: HOSPADM

## 2022-01-27 RX ORDER — DULOXETIN HYDROCHLORIDE 60 MG/1
60 CAPSULE, DELAYED RELEASE ORAL DAILY
Status: CANCELLED | OUTPATIENT
Start: 2022-01-27

## 2022-01-27 RX ORDER — ASPIRIN 81 MG/1
81 TABLET ORAL DAILY
Qty: 30 TABLET | Refills: 1 | Status: ON HOLD | OUTPATIENT
Start: 2022-01-27 | End: 2022-02-07 | Stop reason: HOSPADM

## 2022-01-27 RX ORDER — SODIUM CHLORIDE 9 MG/ML
25 INJECTION, SOLUTION INTRAVENOUS PRN
Status: DISCONTINUED | OUTPATIENT
Start: 2022-01-27 | End: 2022-02-08 | Stop reason: HOSPADM

## 2022-01-27 RX ORDER — METOPROLOL TARTRATE 50 MG/1
50 TABLET, FILM COATED ORAL 2 TIMES DAILY
Status: DISCONTINUED | OUTPATIENT
Start: 2022-01-27 | End: 2022-02-08 | Stop reason: HOSPADM

## 2022-01-27 RX ORDER — GABAPENTIN 100 MG/1
200 CAPSULE ORAL 3 TIMES DAILY
Status: CANCELLED | OUTPATIENT
Start: 2022-01-27

## 2022-01-27 RX ORDER — DIVALPROEX SODIUM 250 MG/1
1000 TABLET, DELAYED RELEASE ORAL EVERY EVENING
Status: DISCONTINUED | OUTPATIENT
Start: 2022-01-27 | End: 2022-02-08 | Stop reason: HOSPADM

## 2022-01-27 RX ORDER — QUETIAPINE FUMARATE 25 MG/1
75 TABLET, FILM COATED ORAL NIGHTLY
Status: DISCONTINUED | OUTPATIENT
Start: 2022-01-27 | End: 2022-01-31

## 2022-01-27 RX ORDER — ATORVASTATIN CALCIUM 80 MG/1
80 TABLET, FILM COATED ORAL DAILY
Status: DISCONTINUED | OUTPATIENT
Start: 2022-01-27 | End: 2022-02-08 | Stop reason: HOSPADM

## 2022-01-27 RX ORDER — FLUTICASONE PROPIONATE 50 MCG
2 SPRAY, SUSPENSION (ML) NASAL DAILY
Status: CANCELLED | OUTPATIENT
Start: 2022-01-27

## 2022-01-27 RX ORDER — SODIUM CHLORIDE 9 MG/ML
25 INJECTION, SOLUTION INTRAVENOUS PRN
Status: CANCELLED | OUTPATIENT
Start: 2022-01-27

## 2022-01-27 RX ORDER — DEXTROSE MONOHYDRATE 25 G/50ML
12.5 INJECTION, SOLUTION INTRAVENOUS PRN
Status: CANCELLED | OUTPATIENT
Start: 2022-01-27

## 2022-01-27 RX ORDER — TRAZODONE HYDROCHLORIDE 50 MG/1
50 TABLET ORAL NIGHTLY PRN
Status: DISCONTINUED | OUTPATIENT
Start: 2022-01-27 | End: 2022-02-05

## 2022-01-27 RX ORDER — ONDANSETRON 4 MG/1
8 TABLET, ORALLY DISINTEGRATING ORAL EVERY 8 HOURS PRN
Status: CANCELLED | OUTPATIENT
Start: 2022-01-27

## 2022-01-27 RX ORDER — MECOBALAMIN 5000 MCG
10 TABLET,DISINTEGRATING ORAL NIGHTLY
Status: DISCONTINUED | OUTPATIENT
Start: 2022-01-27 | End: 2022-02-08 | Stop reason: HOSPADM

## 2022-01-27 RX ORDER — SODIUM CHLORIDE 0.9 % (FLUSH) 0.9 %
5-40 SYRINGE (ML) INJECTION EVERY 12 HOURS SCHEDULED
Status: DISCONTINUED | OUTPATIENT
Start: 2022-01-27 | End: 2022-02-06

## 2022-01-27 RX ORDER — INSULIN GLARGINE 100 [IU]/ML
60 INJECTION, SOLUTION SUBCUTANEOUS NIGHTLY
Status: CANCELLED | OUTPATIENT
Start: 2022-01-27

## 2022-01-27 RX ORDER — DEXTROSE MONOHYDRATE 25 G/50ML
12.5 INJECTION, SOLUTION INTRAVENOUS PRN
Status: DISCONTINUED | OUTPATIENT
Start: 2022-01-27 | End: 2022-01-28 | Stop reason: SDUPTHER

## 2022-01-27 RX ORDER — HYDRALAZINE HYDROCHLORIDE 25 MG/1
50 TABLET, FILM COATED ORAL EVERY 6 HOURS PRN
Status: DISCONTINUED | OUTPATIENT
Start: 2022-01-27 | End: 2022-02-08 | Stop reason: HOSPADM

## 2022-01-27 RX ORDER — QUETIAPINE FUMARATE 25 MG/1
75 TABLET, FILM COATED ORAL NIGHTLY
Status: CANCELLED | OUTPATIENT
Start: 2022-01-27

## 2022-01-27 RX ORDER — POLYETHYLENE GLYCOL 3350 17 G/17G
17 POWDER, FOR SOLUTION ORAL DAILY PRN
Status: CANCELLED | OUTPATIENT
Start: 2022-01-27

## 2022-01-27 RX ORDER — POTASSIUM CHLORIDE 20 MEQ/1
20 TABLET, EXTENDED RELEASE ORAL
Status: DISCONTINUED | OUTPATIENT
Start: 2022-01-28 | End: 2022-02-08 | Stop reason: HOSPADM

## 2022-01-27 RX ORDER — ATORVASTATIN CALCIUM 40 MG/1
80 TABLET, FILM COATED ORAL DAILY
Status: CANCELLED | OUTPATIENT
Start: 2022-01-27

## 2022-01-27 RX ORDER — GABAPENTIN 100 MG/1
200 CAPSULE ORAL 3 TIMES DAILY
Status: DISCONTINUED | OUTPATIENT
Start: 2022-01-27 | End: 2022-02-08 | Stop reason: HOSPADM

## 2022-01-27 RX ORDER — QUETIAPINE FUMARATE 25 MG/1
50 TABLET, FILM COATED ORAL
Status: CANCELLED | OUTPATIENT
Start: 2022-01-28

## 2022-01-27 RX ORDER — LISINOPRIL 5 MG/1
5 TABLET ORAL DAILY
Status: CANCELLED | OUTPATIENT
Start: 2022-01-27

## 2022-01-27 RX ORDER — ALBUTEROL SULFATE 2.5 MG/3ML
2.5 SOLUTION RESPIRATORY (INHALATION) EVERY 4 HOURS PRN
Status: DISCONTINUED | OUTPATIENT
Start: 2022-01-27 | End: 2022-02-08 | Stop reason: HOSPADM

## 2022-01-27 RX ORDER — ACETAMINOPHEN 325 MG/1
650 TABLET ORAL EVERY 4 HOURS PRN
Status: CANCELLED | OUTPATIENT
Start: 2022-01-27

## 2022-01-27 RX ORDER — SODIUM CHLORIDE 0.9 % (FLUSH) 0.9 %
5-40 SYRINGE (ML) INJECTION PRN
Status: CANCELLED | OUTPATIENT
Start: 2022-01-27

## 2022-01-27 RX ORDER — ALBUTEROL SULFATE 2.5 MG/3ML
2.5 SOLUTION RESPIRATORY (INHALATION) EVERY 4 HOURS PRN
Status: CANCELLED | OUTPATIENT
Start: 2022-01-27

## 2022-01-27 RX ORDER — CLOPIDOGREL BISULFATE 75 MG/1
75 TABLET ORAL DAILY
Status: DISCONTINUED | OUTPATIENT
Start: 2022-01-27 | End: 2022-02-08 | Stop reason: HOSPADM

## 2022-01-27 RX ORDER — BUDESONIDE AND FORMOTEROL FUMARATE DIHYDRATE 160; 4.5 UG/1; UG/1
2 AEROSOL RESPIRATORY (INHALATION) 2 TIMES DAILY
Status: DISCONTINUED | OUTPATIENT
Start: 2022-01-27 | End: 2022-02-08 | Stop reason: HOSPADM

## 2022-01-27 RX ORDER — TRAZODONE HYDROCHLORIDE 50 MG/1
50 TABLET ORAL NIGHTLY PRN
Status: CANCELLED | OUTPATIENT
Start: 2022-01-27

## 2022-01-27 RX ORDER — POTASSIUM CHLORIDE 20 MEQ/1
20 TABLET, EXTENDED RELEASE ORAL
Status: CANCELLED | OUTPATIENT
Start: 2022-01-28

## 2022-01-27 RX ORDER — MECOBALAMIN 5000 MCG
10 TABLET,DISINTEGRATING ORAL NIGHTLY
Status: CANCELLED | OUTPATIENT
Start: 2022-01-27

## 2022-01-27 RX ORDER — LISINOPRIL 5 MG/1
5 TABLET ORAL DAILY
Status: DISCONTINUED | OUTPATIENT
Start: 2022-01-28 | End: 2022-02-08 | Stop reason: HOSPADM

## 2022-01-27 RX ORDER — FLUTICASONE PROPIONATE 50 MCG
2 SPRAY, SUSPENSION (ML) NASAL DAILY
Status: DISCONTINUED | OUTPATIENT
Start: 2022-01-27 | End: 2022-02-08 | Stop reason: HOSPADM

## 2022-01-27 RX ORDER — ALBUTEROL SULFATE 90 UG/1
2 AEROSOL, METERED RESPIRATORY (INHALATION) 2 TIMES DAILY
Status: CANCELLED | OUTPATIENT
Start: 2022-01-27

## 2022-01-27 RX ORDER — DIVALPROEX SODIUM 500 MG/1
1000 TABLET, DELAYED RELEASE ORAL EVERY EVENING
Status: CANCELLED | OUTPATIENT
Start: 2022-01-27

## 2022-01-27 RX ORDER — HYDRALAZINE HYDROCHLORIDE 50 MG/1
50 TABLET, FILM COATED ORAL EVERY 6 HOURS PRN
Status: CANCELLED | OUTPATIENT
Start: 2022-01-27

## 2022-01-27 RX ORDER — QUETIAPINE FUMARATE 25 MG/1
50 TABLET, FILM COATED ORAL
Status: DISCONTINUED | OUTPATIENT
Start: 2022-01-28 | End: 2022-01-31

## 2022-01-27 RX ORDER — ACETAMINOPHEN 325 MG/1
650 TABLET ORAL EVERY 4 HOURS PRN
Status: DISCONTINUED | OUTPATIENT
Start: 2022-01-27 | End: 2022-01-27

## 2022-01-27 RX ORDER — ALBUTEROL SULFATE 90 UG/1
2 AEROSOL, METERED RESPIRATORY (INHALATION) 2 TIMES DAILY
Status: DISCONTINUED | OUTPATIENT
Start: 2022-01-27 | End: 2022-02-08 | Stop reason: HOSPADM

## 2022-01-27 RX ORDER — METOPROLOL TARTRATE 50 MG/1
50 TABLET, FILM COATED ORAL 2 TIMES DAILY
Status: CANCELLED | OUTPATIENT
Start: 2022-01-27

## 2022-01-27 RX ORDER — ACETAMINOPHEN 650 MG/1
650 SUPPOSITORY RECTAL EVERY 6 HOURS PRN
Status: DISCONTINUED | OUTPATIENT
Start: 2022-01-27 | End: 2022-02-08 | Stop reason: HOSPADM

## 2022-01-27 RX ORDER — PANTOPRAZOLE SODIUM 40 MG/1
40 TABLET, DELAYED RELEASE ORAL DAILY
Status: CANCELLED | OUTPATIENT
Start: 2022-01-27

## 2022-01-27 RX ORDER — BUDESONIDE AND FORMOTEROL FUMARATE DIHYDRATE 160; 4.5 UG/1; UG/1
2 AEROSOL RESPIRATORY (INHALATION) 2 TIMES DAILY
Status: CANCELLED | OUTPATIENT
Start: 2022-01-27

## 2022-01-27 RX ORDER — INSULIN GLARGINE 100 [IU]/ML
60 INJECTION, SOLUTION SUBCUTANEOUS NIGHTLY
Status: DISCONTINUED | OUTPATIENT
Start: 2022-01-27 | End: 2022-02-08 | Stop reason: HOSPADM

## 2022-01-27 RX ORDER — DEXAMETHASONE 6 MG/1
6 TABLET ORAL DAILY
Qty: 3 TABLET | Refills: 0 | Status: ON HOLD | OUTPATIENT
Start: 2022-01-28 | End: 2022-02-07 | Stop reason: HOSPADM

## 2022-01-27 RX ORDER — QUETIAPINE FUMARATE 25 MG/1
75 TABLET, FILM COATED ORAL NIGHTLY
Qty: 60 TABLET | Refills: 3 | Status: ON HOLD | OUTPATIENT
Start: 2022-01-27 | End: 2022-02-07 | Stop reason: HOSPADM

## 2022-01-27 RX ORDER — ACETAMINOPHEN 325 MG/1
650 TABLET ORAL EVERY 6 HOURS PRN
Status: DISCONTINUED | OUTPATIENT
Start: 2022-01-27 | End: 2022-02-08 | Stop reason: HOSPADM

## 2022-01-27 RX ORDER — NICOTINE POLACRILEX 4 MG
15 LOZENGE BUCCAL PRN
Status: CANCELLED | OUTPATIENT
Start: 2022-01-27

## 2022-01-27 RX ORDER — SODIUM CHLORIDE 0.9 % (FLUSH) 0.9 %
5-40 SYRINGE (ML) INJECTION PRN
Status: DISCONTINUED | OUTPATIENT
Start: 2022-01-27 | End: 2022-02-08 | Stop reason: HOSPADM

## 2022-01-27 RX ORDER — POLYETHYLENE GLYCOL 3350 17 G/17G
17 POWDER, FOR SOLUTION ORAL DAILY PRN
Status: DISCONTINUED | OUTPATIENT
Start: 2022-01-27 | End: 2022-02-08 | Stop reason: HOSPADM

## 2022-01-27 RX ORDER — DEXTROSE MONOHYDRATE 50 MG/ML
100 INJECTION, SOLUTION INTRAVENOUS PRN
Status: CANCELLED | OUTPATIENT
Start: 2022-01-27

## 2022-01-27 RX ORDER — DEXTROSE MONOHYDRATE 50 MG/ML
100 INJECTION, SOLUTION INTRAVENOUS PRN
Status: DISCONTINUED | OUTPATIENT
Start: 2022-01-27 | End: 2022-02-08 | Stop reason: HOSPADM

## 2022-01-27 RX ORDER — METOPROLOL SUCCINATE 50 MG/1
50 TABLET, EXTENDED RELEASE ORAL DAILY
Qty: 30 TABLET | Refills: 1 | Status: SHIPPED | OUTPATIENT
Start: 2022-01-27 | End: 2022-06-13

## 2022-01-27 RX ORDER — ACETAMINOPHEN 325 MG/1
650 TABLET ORAL EVERY 6 HOURS PRN
Status: CANCELLED | OUTPATIENT
Start: 2022-01-27

## 2022-01-27 RX ORDER — ONDANSETRON 4 MG/1
8 TABLET, ORALLY DISINTEGRATING ORAL EVERY 8 HOURS PRN
Status: DISCONTINUED | OUTPATIENT
Start: 2022-01-27 | End: 2022-02-08 | Stop reason: HOSPADM

## 2022-01-27 RX ORDER — SODIUM CHLORIDE 0.9 % (FLUSH) 0.9 %
5-40 SYRINGE (ML) INJECTION EVERY 12 HOURS SCHEDULED
Status: CANCELLED | OUTPATIENT
Start: 2022-01-27

## 2022-01-27 RX ORDER — DULOXETIN HYDROCHLORIDE 60 MG/1
60 CAPSULE, DELAYED RELEASE ORAL DAILY
Status: DISCONTINUED | OUTPATIENT
Start: 2022-01-27 | End: 2022-02-08 | Stop reason: HOSPADM

## 2022-01-27 RX ORDER — ACETAMINOPHEN 650 MG/1
650 SUPPOSITORY RECTAL EVERY 6 HOURS PRN
Status: CANCELLED | OUTPATIENT
Start: 2022-01-27

## 2022-01-27 RX ORDER — CLOPIDOGREL BISULFATE 75 MG/1
75 TABLET ORAL DAILY
Status: CANCELLED | OUTPATIENT
Start: 2022-01-27

## 2022-01-27 RX ORDER — TRAZODONE HYDROCHLORIDE 100 MG/1
100 TABLET ORAL NIGHTLY PRN
Status: CANCELLED | OUTPATIENT
Start: 2022-01-27

## 2022-01-27 RX ORDER — NICOTINE POLACRILEX 4 MG
15 LOZENGE BUCCAL PRN
Status: DISCONTINUED | OUTPATIENT
Start: 2022-01-27 | End: 2022-02-08 | Stop reason: HOSPADM

## 2022-01-27 RX ORDER — QUETIAPINE FUMARATE 50 MG/1
50 TABLET, FILM COATED ORAL
Qty: 60 TABLET | Refills: 3 | Status: ON HOLD | OUTPATIENT
Start: 2022-01-28 | End: 2022-02-07 | Stop reason: HOSPADM

## 2022-01-27 RX ORDER — TRAZODONE HYDROCHLORIDE 50 MG/1
100 TABLET ORAL NIGHTLY PRN
Status: DISCONTINUED | OUTPATIENT
Start: 2022-01-27 | End: 2022-02-05

## 2022-01-27 RX ADMIN — INSULIN LISPRO 5 UNITS: 100 INJECTION, SOLUTION INTRAVENOUS; SUBCUTANEOUS at 23:03

## 2022-01-27 RX ADMIN — GABAPENTIN 200 MG: 100 CAPSULE ORAL at 08:30

## 2022-01-27 RX ADMIN — QUETIAPINE FUMARATE 50 MG: 25 TABLET ORAL at 08:30

## 2022-01-27 RX ADMIN — DULOXETINE HYDROCHLORIDE 60 MG: 60 CAPSULE, DELAYED RELEASE ORAL at 08:30

## 2022-01-27 RX ADMIN — QUETIAPINE FUMARATE 75 MG: 25 TABLET ORAL at 20:41

## 2022-01-27 RX ADMIN — INSULIN GLARGINE 60 UNITS: 100 INJECTION, SOLUTION SUBCUTANEOUS at 23:04

## 2022-01-27 RX ADMIN — METOPROLOL TARTRATE 50 MG: 50 TABLET, FILM COATED ORAL at 08:31

## 2022-01-27 RX ADMIN — METOPROLOL TARTRATE 50 MG: 50 TABLET, FILM COATED ORAL at 20:42

## 2022-01-27 RX ADMIN — ACETAMINOPHEN 650 MG: 325 TABLET ORAL at 17:14

## 2022-01-27 RX ADMIN — GABAPENTIN 200 MG: 100 CAPSULE ORAL at 20:41

## 2022-01-27 RX ADMIN — TRAZODONE HYDROCHLORIDE 100 MG: 50 TABLET ORAL at 20:41

## 2022-01-27 RX ADMIN — DIVALPROEX SODIUM 1000 MG: 250 TABLET, DELAYED RELEASE ORAL at 16:48

## 2022-01-27 RX ADMIN — LISINOPRIL 5 MG: 5 TABLET ORAL at 08:29

## 2022-01-27 RX ADMIN — ATORVASTATIN CALCIUM 80 MG: 40 TABLET, FILM COATED ORAL at 08:29

## 2022-01-27 RX ADMIN — SODIUM CHLORIDE, PRESERVATIVE FREE 10 ML: 5 INJECTION INTRAVENOUS at 21:25

## 2022-01-27 RX ADMIN — DEXAMETHASONE 6 MG: 4 TABLET ORAL at 08:32

## 2022-01-27 RX ADMIN — INSULIN LISPRO 9 UNITS: 100 INJECTION, SOLUTION INTRAVENOUS; SUBCUTANEOUS at 11:43

## 2022-01-27 RX ADMIN — Medication 2 PUFF: at 20:13

## 2022-01-27 RX ADMIN — Medication 10 MG: at 20:41

## 2022-01-27 RX ADMIN — INSULIN LISPRO 3 UNITS: 100 INJECTION, SOLUTION INTRAVENOUS; SUBCUTANEOUS at 08:34

## 2022-01-27 RX ADMIN — APIXABAN 5 MG: 5 TABLET, FILM COATED ORAL at 20:41

## 2022-01-27 RX ADMIN — POTASSIUM CHLORIDE 20 MEQ: 1500 TABLET, EXTENDED RELEASE ORAL at 08:31

## 2022-01-27 RX ADMIN — GABAPENTIN 200 MG: 100 CAPSULE ORAL at 16:47

## 2022-01-27 RX ADMIN — Medication 2 PUFF: at 20:15

## 2022-01-27 RX ADMIN — PANTOPRAZOLE SODIUM 40 MG: 40 TABLET, DELAYED RELEASE ORAL at 08:31

## 2022-01-27 RX ADMIN — CLOPIDOGREL BISULFATE 75 MG: 75 TABLET ORAL at 08:32

## 2022-01-27 RX ADMIN — BUDESONIDE AND FORMOTEROL FUMARATE DIHYDRATE 2 PUFF: 160; 4.5 AEROSOL RESPIRATORY (INHALATION) at 07:43

## 2022-01-27 RX ADMIN — APIXABAN 5 MG: 5 TABLET, FILM COATED ORAL at 08:32

## 2022-01-27 RX ADMIN — INSULIN LISPRO 12 UNITS: 100 INJECTION, SOLUTION INTRAVENOUS; SUBCUTANEOUS at 16:48

## 2022-01-27 RX ADMIN — Medication 2 PUFF: at 07:43

## 2022-01-27 ASSESSMENT — PAIN SCALES - WONG BAKER: WONGBAKER_NUMERICALRESPONSE: 0

## 2022-01-27 ASSESSMENT — PAIN SCALES - GENERAL
PAINLEVEL_OUTOF10: 0
PAINLEVEL_OUTOF10: 6

## 2022-01-27 NOTE — PROGRESS NOTES
Nutrition Assessment     Type and Reason for Visit: Reassess    Nutrition Recommendations/Plan:   1. Continue the Regular diet   2. D/C supps      Nutrition Assessment:    Pt improving from a nutritional standpoint AEB consuming > 75% of melas and weigh gain of 2 #. Remains at risk for further nutritional compromise r/t potential for mental status change and possibility for decreased PO intakes . Will continue to monitor       Malnutrition Assessment:  Malnutrition Status: At risk for malnutrition (Comment)    Estimated Daily Nutrient Needs:  Energy (kcal): 1455 - 1746 kcals based on 15-18 kcals/kg/CBW; Weight Used for Energy Requirements:  Current     Protein (g): 77 - 89 g protein based on 1.3-1.5 g/kg/IBW; Weight Used for Protein Requirements:  Ideal        Fluid (ml/day): 1455 - 1746 ml; Weight Used for Fluid Requirements:  1 ml/kcal      Nutrition Related Findings: pt lying in bed with only a brief; he has a protruding abdomen with multiple bruising; very talkative; states he is eating well but has not had any ensure; going home tomorrow      Current Nutrition Therapies:    ADULT DIET;  Regular; 4 carb choices (60 gm/meal)  ADULT ORAL NUTRITION SUPPLEMENT; Lunch, Dinner; Frozen Oral Supplement    Anthropometric Measures:  · Height: 5' 4\" (162.6 cm)  · Current Body Wt: 214 lb 4.8 oz (97.2 kg) (obtained on 1/20/22; actual weight)   · BMI: 36.8    Nutrition Diagnosis:   · Inadequate oral intake related to inadequate protein-energy intake,impaired respiratory function,increase demand for energy/nutrients,psychological cause or life stress as evidenced by poor intake prior to admission,intake 0-25%,intake 26-50%,intake 51-75%,lab values,other (comment) (COVID-19 +)      Nutrition Interventions:   Food and/or Nutrient Delivery:  Continue Current Diet,Discontinue Oral Nutrition Supplement  Nutrition Education/Counseling:  No recommendation at this time   Coordination of Nutrition Care:  Continue to monitor while inpatient    Goals:  patient will consume 75% or greater of meals on ADULT DIET; Regular; 4 carb choices per meal diet order x 3 meals per day + he will consume 75% or greater of magic cups with lunch and dinner meals       Nutrition Monitoring and Evaluation:   Behavioral-Environmental Outcomes:  None Identified   Food/Nutrient Intake Outcomes:  Food and Nutrient Intake  Physical Signs/Symptoms Outcomes:  Weight,Nutrition Focused Physical Findings,Chewing or Swallowing     Discharge Planning:     Too soon to determine     Electronically signed by Maria Eugenia Ricks RD, LD on 1/26/22 at 8:04 PM EST    Contact: [phone]

## 2022-01-27 NOTE — DISCHARGE SUMMARY
Hospital Medicine Discharge Summary    Patient ID: Veronica Parker. Patient's PCP: Aisha Lee PA-C    Admit Date: 1/12/2022     Discharge Date:   1/27/2022    Admitting Provider: Ealine Aguero MD     Discharge Provider: Elisa Paz MD     Discharge Diagnoses: Active Hospital Problems    Diagnosis     Encephalopathy [G93.40]     Abnormal chest x-ray [R93.89]     PAF (paroxysmal atrial fibrillation) (HCC) [I48.0]     COVID-19 virus infection [U07.1]     Disorder of electrolytes [E87.8]     Atrial fibrillation with RVR (HCC) [I48.91]     Low grade fever [R50.9]     Thrombocytopenia (HCC) [D69.6]     AICD malfunction [T82.118A]     Hypotension [I95.9]     Valproic acid toxicity [T42.6X1A]     Implantable cardioverter-defibrillator (ICD) discharge [Z45.02]     Unable to ambulate [R26.2]     COVID-19 [U07.1]     Fall [W19. XXXA]     Generalized weakness [R53.1]     Coarse tremor [G25.2]     DM (diabetes mellitus), secondary, uncontrolled, w/neurologic complic ([facility] [address] [F96.74, S81.27]     HTN (hypertension), benign [I10]     Ischemic cardiomyopathy [I25.5]     Gastroesophageal reflux disease without esophagitis [K21.9]     CAD (coronary artery disease) [I25.10]     Automatic implantable cardioverter-defibrillator in situ [Z95.810]        The patient was seen and examined on day of discharge and this discharge summary is in conjunction with any daily progress note from day of discharge. Hospital Course:   63 y. o. male with DMII, Chronic sCHF, CAD s/p stent placement x3, hx of MI, asthma, HTN, HLD, GERD, lumbar spondylosis and DDD s/p bilateral dorsal ramus medial branch ablation and interstim implant, depression who presented to [facility] ED with complaint of generalized weakness ongoing for approximately 2 months and frequent falls.     In ED, CT of the head showed no acute abnormality.  Chest x-ray with no acute cardiopulmonary process.  Patient did test positive for [place] is vaccinated x2, no booster.  Patient admitted to med-surg for further care. PT/OT consulted.      1/15  Mr. Quiroga developed rapid HR what appears to be Afib on TELE monitor and his pacer inappropriately delivered shock x 5  Pt awake, conscious during these episodes and by the end of the 5th shock, he started getting slightly confused  Given cardizem 10 mg IV x 1 and metoprolol 5 mg with control of HR  EKG after this episode showed sinus tach  Progressively developed confusion , fevers , started on precedex gtt  Pt transferred to ICU    Pt very confused and became agitated with removing lines. Placed on  on precedex gtt, ct head neg   No further shocks from ICD. His ICD has been adjusted by cardiology         Tx to PCU on 1/19      Mental status continues waxing and waning but overall improved with uptitration of seroquel. Clinically now stable. # ICD firing - he had 5 witnessed shocks and then more bursts (4-5 additional episodes) of AICD discharges and concern for malfunctioning ICD  - s.p  [employer] [place] interrogation and now ICD is adjusted . - Cardiology consulted. - now back in NSR  - resumed  BB - toprol XL stopped due to low BP - decreased to  metoprolol 25 BID--  Increased on 1/20 to 50 mg BID. Heart rate remained stable now        #Afibb with RVR  - new onset with above ICD issues  -Seen by cardiology  - resume home cardizem and BB if tolerated. Can use IV meds intermittently  - was in AFIB with RVR ->  now in NSR   - started eliquis but holding with low PLT, likely resume tomorrow if platelets remain to increase  - resumed eliquis 1/21  Currently heart rate well controlled. Continued on beta-blockers as above .   continued on Eliquis.     #Hypokalemia  - keep greater than 4  - PRN potassium SS ordered   Continue scheduled potassium daily        #Acute Encephalopathy - possible sec to covid encephalopathy   - NOT POA - developed slowly most pronounced after ICD firing  -CT head non focal x 2   -He denies any drug or alcohol use.   -precedex gtt for agitation used--- d/c on 1/19  - added seroquel   -Ammonia level wnl   -Consider MRI brain - unable to do with incompatible pacer  -Started empirically on rocephin meningitis dose D#7. clinically no signs of meningitis. no nuchal rigidity nonfocal exam.  He was treated empirically for 7 days with antibiotics;  and discontinued.     -Stopped prcedex gtt on 1/19  -Continue Seroquel- increase to 50am and 75 nightly. - still intermittent need for restraints.            #COVID 19 infection   -Positive 1/12/22  -only complaints is a cough  -patient is vaccinated x2- no booster  - minimal  Hypoxia at 2 L.   - Decadron completed. - over 12 days from initial testing - isolation stopped. - stable on RA        #DMII  - not controlled noted to have >1000 glucose in urine on admission   -Hold home oral meds- resume lantus increase as tolerated  -Low dose SSI--switched to high dose SSI  -Tolerating carb controlled diet        #Thrombocytopenia  -Platelet count is improved now.         #CAD s/p stent placement x3  #Hx of MI  #Hx of VTACH s.p ICD  - ASA, statin, plavix        #Chronic sCHF no AE   - Last echo 11/27/19, EF 35-40 % Limited echo completed 1/15 which showed EF 45-50%. - Pacemaker/ICD in place  -Continue toprol XL, cardizem  - patient takes Lasix on Monday and Thursday - holding at this time   - Lisinopril has been on hold, resumed at a lower dose,   on 20 mg at home. Started 5 mg in am and can increase as blood pressure allows. Holding parameters in place.        #Asthma no AE  -Symbicort  -albuterol prn         #HLD  -continue statin        #GERD  -continue ppi     #Lumbar spondylosis and DDD   -s/p bilateral dorsal ramus medial branch ablation and interstim implant  -PDMP displays on chronic gabapentin, continue  - follows with Dr. Cheo Galo  #ALIN  - unsure if patient is compliant with CPAP ?  If he still has CPAP at home        #Morbid Obesity  - Body mass index is 41.37 kg/m². - Complicating assessment and treatment. Placing patient at risk for multiple co-morbidities as well as early death and contributing to the patient's presentation.   - Counseled on weight loss.         #Depression  - patient is on Cymbalta 60 mg at home, medication verified with pharmacy. Continued on home dose  - also takes Depakote 500 am and 1000 in evening also verified with pharmacy - cont with evening dose only.              #Weakness  PT/OT consulted,  recommend ARU          Transition to ARU on discharge. Physical Exam Performed:     /64   Pulse 67   Temp 98.5 °F (36.9 °C) (Oral)   Resp 20   Ht 5' 4\" (1.626 m)   Wt 216 lb 3.2 oz (98.1 kg)   SpO2 95%   BMI 37.11 kg/m²       Gen: middle aged obese male awake. Alert and better oriented today   Appears to be not in any distress  Mucous Membranes:  Pink , anicteric  Neck: No JVD, no carotid bruit, no thyromegaly. No nuchal rigidity  Chest:  Clear to auscultation bilaterally, diminished in bases   Cardiovascular:  regular,  S1S2 heard, no murmurs or gallops- left sided ICD +  Abdomen:  Soft, undistended, non tender, no organomegaly, BS present  Extremities: No edema or cyanosis. Distal pulses well felt  Neurological : improving mentation but still with intermittent confusion  Right sided increased stiffness noted yesterday, this has resolved today. Nonfocal exam now. Confusion is clear. He is awake and well-oriented  No facial weakness. Speech clear       Labs:  For convenience and continuity at follow-up the following most recent labs are provided:      CBC:    Lab Results   Component Value Date    WBC 3.5 01/26/2022    HGB 13.0 01/26/2022    HCT 39.3 01/26/2022     01/26/2022       Renal:    Lab Results   Component Value Date     01/26/2022    K 3.8 01/26/2022    K 3.6 01/19/2022    CL 99 01/26/2022    CO2 22 01/26/2022    BUN 10 01/26/2022 CREATININE <0.5 01/26/2022    CALCIUM 9.1 01/26/2022    PHOS 3.6 01/26/2022         Significant Diagnostic Studies    Radiology:   XR CHEST PORTABLE   Final Result   NG tube with the tip and side-port in the stomach. Bibasilar atelectasis. XR CHEST PORTABLE   Final Result   NG tube is difficult to visualize, tip is likely below the diaphragm. Repeat   exam could be considered if clinically indicated. XR CHEST PORTABLE   Final Result   New elevated right hemidiaphragm, likely atelectasis or possibly infiltrate. Subpulmonic effusion not excluded. No overt failure. CT HEAD WO CONTRAST   Final Result   1. No acute intracranial abnormality. CT Head WO Contrast   Final Result   No acute intracranial abnormality. XR CHEST PORTABLE   Final Result   No acute cardiopulmonary process. Consults:     IP CONSULT TO CARDIOLOGY  IP CONSULT TO DIETITIAN  IP CONSULT TO SOCIAL WORK    Disposition:  ARU  Condition at Discharge: Stable    Discharge Instructions/Follow-up:  PCP 1 week. Code Status:  Full Code     Activity: activity as tolerated    Diet: regular diet      Discharge Medications:     Current Discharge Medication List           Details   apixaban (ELIQUIS) 5 MG TABS tablet Take 1 tablet by mouth 2 times daily  Qty: 60 tablet, Refills: 1      !! QUEtiapine (SEROQUEL) 50 MG tablet Take 1 tablet by mouth daily (with breakfast)  Qty: 60 tablet, Refills: 3      !! QUEtiapine (SEROQUEL) 25 MG tablet Take 3 tablets by mouth nightly  Qty: 60 tablet, Refills: 3      dexamethasone (DECADRON) 6 MG tablet Take 1 tablet by mouth daily for 3 days  Qty: 3 tablet, Refills: 0       !! - Potential duplicate medications found. Please discuss with provider.            Details   aspirin 81 MG EC tablet Take 1 tablet by mouth daily  Qty: 30 tablet, Refills: 1      metoprolol succinate (TOPROL XL) 50 MG extended release tablet Take 1 tablet by mouth daily  Qty: 30 tablet, Refills: 1 Details   hydrOXYzine (ATARAX) 25 MG tablet Take 25 mg by mouth 3 times daily as needed for Itching or Anxiety      cyclobenzaprine (FLEXERIL) 5 MG tablet Take 5 mg by mouth 3 times daily as needed for Muscle spasms      albuterol (PROVENTIL) (2.5 MG/3ML) 0.083% nebulizer solution Take 3 mLs by nebulization every 4 hours as needed for Wheezing  Qty: 120 each, Refills: 3      traZODone (DESYREL) 100 MG tablet Take 100 mg by mouth nightly      Dulaglutide (TRULICITY) 9.52 BP/9.7NH SOPN Inject 3 mg into the skin once a week       omega-3 acid ethyl esters (LOVAZA) 1 g capsule Take 2 capsules by mouth 2 times daily  Qty: 360 capsule, Refills: 3      insulin lispro (HUMALOG) 100 UNIT/ML injection vial Inject 10 Units into the skin 2 times daily Indications: 10 units in AM, 10 units at dinner      Omega-3 1000 MG CAPS Take 2 capsules by mouth 4 times daily      PROAIR  (90 Base) MCG/ACT inhaler Inhale 2 puffs into the lungs every 4 hours as needed      Fexofenadine HCl (ALLEGRA PO) Take by mouth      Esomeprazole Magnesium (NEXIUM PO) Take 40 mg by mouth       JARDIANCE 25 MG tablet Take 25 mg by mouth daily       insulin detemir (LEVEMIR) 100 UNIT/ML injection vial Inject 60 Units into the skin nightly       Fluticasone Furoate-Vilanterol (BREO ELLIPTA IN) Inhale into the lungs daily       metFORMIN (GLUCOPHAGE) 1000 MG tablet TAKE ONE TABLET BY MOUTH 2 TIMES DAILY WITH MORNING AND EVENING MEALS  Qty: 60 tablet, Refills: 0      gabapentin (NEURONTIN) 100 MG capsule TAKE 2 CAPSULES BY MOUTH 3 TIMES DAILY  Qty: 180 capsule, Refills: 0      fluticasone (FLONASE) 50 MCG/ACT nasal spray USE 1 SPRAY IN EACH NOSTRIL EVERY DAY  Qty: 16 g, Refills: 0      TRUE METRIX BLOOD GLUCOSE TEST strip CHECK SUGARS TWICE A DAY AS DIRECTED, DX: E11.9  Qty: 100 each, Refills: 5    Comments: He uses [employer] True Metrix Brand Meter and strips      lisinopril (PRINIVIL;ZESTRIL) 20 MG tablet TAKE 1 TABLET BY MOUTH DAILY  Qty: 30 tablet, Refills: 11      atorvastatin (LIPITOR) 80 MG tablet TAKE ONE TABLET BY MOUTH DAILY  Qty: 30 tablet, Refills: 11      UNIFINE PENTIPS 31G X 8 MM MISC USE AS DIRECTED FOR INSULIN .00  Qty: 100 each, Refills: 5      diltiazem (CARDIZEM CD) 180 MG extended release capsule TAKE 1 CAPSULE DAILY  Qty: 30 capsule, Refills: 11      TRUEPLUS LANCETS 30G MISC CHECK BLOOD SUGAR TWICE A DAY DX E11.9  Qty: 100 each, Refills: 11      DULoxetine (CYMBALTA) 60 MG extended release capsule Take 60 mg by mouth daily      divalproex (DEPAKOTE) 500 MG DR tablet Take 1,000 mg by mouth nightly      ! ! polyethylene glycol (GLYCOLAX) 17 GM/SCOOP powder Use as directed for coloscopy prep  Qty: 238 g, Refills: 0    Associated Diagnoses: Screen for colon cancer      bisacodyl (BISACODYL) 5 MG EC tablet Take all four tablets day before colonscopy  Qty: 4 tablet, Refills: 0    Associated Diagnoses: Screen for colon cancer      ! ! polyethylene glycol (MIRALAX) 17 GM/SCOOP POWD powder Take 238 g by mouth daily Take as directed for colonoscopy  Qty: 255 g, Refills: 0       !! - Potential duplicate medications found. Please discuss with provider. Time Spent on discharge is more than 30 minutes in the examination, evaluation, counseling and review of medications and discharge plan. Signed:    Megan Fernando MD   1/27/2022      Thank you Connie Lindo PA-C for the opportunity to be involved in this patient's care. If you have any questions or concerns please feel free to contact me at [phone].

## 2022-01-27 NOTE — RT PROTOCOL NOTE
RT Inhaler-Nebulizer Bronchodilator Protocol Note    There is a bronchodilator order in the chart from a provider indicating to follow the RT Bronchodilator Protocol and there is an Initiate RT Inhaler-Nebulizer Bronchodilator Protocol order as well (see protocol at bottom of note). CXR Findings:  No results found. The findings from the last RT Protocol Assessment were as follows:   History Pulmonary Disease: Smoker 15 pack years or more  Respiratory Pattern: Regular pattern and RR 12-20 bpm  Breath Sounds: Clear breath sounds  Cough: Strong, spontaneous, non-productive  Indication for Bronchodilator Therapy:    Bronchodilator Assessment Score: 1    Aerosolized bronchodilator medication orders have been revised according to the RT Inhaler-Nebulizer Bronchodilator Protocol below. Respiratory Therapist to perform RT Therapy Protocol Assessment initially then follow the protocol. Repeat RT Therapy Protocol Assessment PRN for score 0-3 or on second treatment, BID, and PRN for scores above 3. No Indications - adjust the frequency to every 6 hours PRN wheezing or bronchospasm, if no treatments needed after 48 hours then discontinue using Per Protocol order mode. If indication present, adjust the RT bronchodilator orders based on the Bronchodilator Assessment Score as indicated below. Use Inhaler orders unless patient has one or more of the following: on home nebulizer, not able to hold breath for 10 seconds, is not alert and oriented, cannot activate and use MDI correctly, or respiratory rate 25 breaths per minute or more, then use the equivalent nebulizer order(s) with same Frequency and PRN reasons based on the score. If a patient is on this medication at home then do not decrease Frequency below that used at home.     0-3 - enter or revise RT bronchodilator order(s) to equivalent RT Bronchodilator order with Frequency of every 4 hours PRN for wheezing or increased work of breathing using Per Protocol order mode. 4-6 - enter or revise RT Bronchodilator order(s) to two equivalent RT bronchodilator orders with one order with BID Frequency and one order with Frequency of every 4 hours PRN wheezing or increased work of breathing using Per Protocol order mode. 7-10 - enter or revise RT Bronchodilator order(s) to two equivalent RT bronchodilator orders with one order with TID Frequency and one order with Frequency of every 4 hours PRN wheezing or increased work of breathing using Per Protocol order mode. 11-13 - enter or revise RT Bronchodilator order(s) to one equivalent RT bronchodilator order with QID Frequency and an Albuterol order with Frequency of every 4 hours PRN wheezing or increased work of breathing using Per Protocol order mode. Greater than 13 - enter or revise RT Bronchodilator order(s) to one equivalent RT bronchodilator order with every 4 hours Frequency and an Albuterol order with Frequency of every 2 hours PRN wheezing or increased work of breathing using Per Protocol order mode. RT to enter RT Home Evaluation for COPD & MDI Assessment order using Per Protocol order mode.     Electronically signed by Ashok Patel RCP on 1/27/2022 at 5:55 PM

## 2022-01-27 NOTE — PROGRESS NOTES
RT Inhaler-Nebulizer Bronchodilator Protocol Note    There is a bronchodilator order in the chart from a provider indicating to follow the RT Bronchodilator Protocol and there is an Initiate RT Inhaler-Nebulizer Bronchodilator Protocol order as well (see protocol at bottom of note). CXR Findings:  No results found. The findings from the last RT Protocol Assessment were as follows:   History Pulmonary Disease: Chronic pulmonary disease  Respiratory Pattern: Regular pattern and RR 12-20 bpm  Breath Sounds: Slightly diminished and/or crackles  Cough: Strong, spontaneous, non-productive  Indication for Bronchodilator Therapy: Decreased or absent breath sounds  Bronchodilator Assessment Score: 4    Aerosolized bronchodilator medication orders have been revised according to the RT Inhaler-Nebulizer Bronchodilator Protocol below. Respiratory Therapist to perform RT Therapy Protocol Assessment initially then follow the protocol. Repeat RT Therapy Protocol Assessment PRN for score 0-3 or on second treatment, BID, and PRN for scores above 3. No Indications - adjust the frequency to every 6 hours PRN wheezing or bronchospasm, if no treatments needed after 48 hours then discontinue using Per Protocol order mode. If indication present, adjust the RT bronchodilator orders based on the Bronchodilator Assessment Score as indicated below. Use Inhaler orders unless patient has one or more of the following: on home nebulizer, not able to hold breath for 10 seconds, is not alert and oriented, cannot activate and use MDI correctly, or respiratory rate 25 breaths per minute or more, then use the equivalent nebulizer order(s) with same Frequency and PRN reasons based on the score. If a patient is on this medication at home then do not decrease Frequency below that used at home.     0-3 - enter or revise RT bronchodilator order(s) to equivalent RT Bronchodilator order with Frequency of every 4 hours PRN for wheezing or increased work of breathing using Per Protocol order mode. 4-6 - enter or revise RT Bronchodilator order(s) to two equivalent RT bronchodilator orders with one order with BID Frequency and one order with Frequency of every 4 hours PRN wheezing or increased work of breathing using Per Protocol order mode. 7-10 - enter or revise RT Bronchodilator order(s) to two equivalent RT bronchodilator orders with one order with TID Frequency and one order with Frequency of every 4 hours PRN wheezing or increased work of breathing using Per Protocol order mode. 11-13 - enter or revise RT Bronchodilator order(s) to one equivalent RT bronchodilator order with QID Frequency and an Albuterol order with Frequency of every 4 hours PRN wheezing or increased work of breathing using Per Protocol order mode. Greater than 13 - enter or revise RT Bronchodilator order(s) to one equivalent RT bronchodilator order with every 4 hours Frequency and an Albuterol order with Frequency of every 2 hours PRN wheezing or increased work of breathing using Per Protocol order mode.        Electronically signed by Osei High RCP on 1/26/2022 at 7:12 PM

## 2022-01-27 NOTE — CARE COORDINATION
DISCHARGE ORDER  Date/Time 2022 11:00 AM  Completed by: Bruce Aviles RN, Case Management    Patient Name: Virgie Soares : 1960      Admit order Date and Status:21  Noted discharge order. (verify MD's last order for status of admission/Traditional Medicare 3 [place] Inpatient qualifying stay required for SNF)    Confirmed discharge plan with:              Patient:  Yes              When pt confirms DC plan does any support person need to be contacted by CM Ignacia Soto, Brother notified and aware. Discharge to Facility: [facility] phone number for staff giving report: [phone] completed: [id number] Notification (HENS) completed: NA   Discharge orders and Continuity of Care faxed to facility:  yes, [phone]      Transportation:               Medical Transport explained with choice list offered to pt/family. Choice:Yes(no preference)  Agency used: [facility] time:   1300      Pt/family/Nursing/Facility aware of  time:   Yes   Ambulance form completed:  yes       Pt is being d/c'd to [facility] rehab unit today. Pt's O2 sats are 95% on RA. Discharge timeout done with all disciplines. All discharge needs and concerns addressed. Discharging nurse to complete DELTA, reconcile AVS, and place final copy with patient's discharge packet. Discharging RN to ensure that written prescriptions for  Level II medications are sent with patient to the facility as per protocol.

## 2022-01-27 NOTE — PROGRESS NOTES
Pt alert in bed, pt has tele sitter in room and bed alarm on for safety. Vitals obtained, assessment completed, medications given gave pt snack and water, assisted pt to call sister, will continue to monitor.  Esther Wilkinson RN

## 2022-01-27 NOTE — CARE COORDINATION
Case Management ARU Admission Assessment     Objective:  will complete initial assessment and review the role of Case Management while on the ARU. Patient will be educated on team conferences as well as discuss family training and how it is encouraged on the unit. Order for \"discharge planning\" has been addressed. Family Present: No  Primary : Miguelito Quiroga    Admit date: 01/27/22              Insurance: [employer] ESSENTIAL/PLUS    Admitting diagnosis:COVID encephalopathy     Current home situation: [place], lives in a 1st floor apartment with a no step entry into home. Brother lives in the same apartment building down the [address]. Durable Medical Equipment at home:  Walker_rolling_Cane_x_RTS__ BSC__Shower Chair__  02__ HHN__ CPAP__  BiPap__  Hospital Bed__ W/C_standard__ Other__________    Meds to Beds program: Yes    Services prior to admission: [facility]    Preference for [place] [id number] or Outpatient therapy: [facility]    Patient's goal(s):Return home stronger    Working or [address] prior to admission:  __Yes _x_No                        Accessibility to community resources/transportation: Family     Has patient experienced a recent loss or significant life event that would impact their care or ability to participate? _x_No  __Yes, please explain:    Has patient ever been treated for emotional disorder(s)? _x_No  __Yes, how does this affect current situation? Is patient and family coping appropriately with stressful events and this hospitalization?   _x_Yes  __No, please explain:    Support system at home and in the community:Family:Brother/Son in law    Who will be the one to contact for family training: Miguelito Quiroga    24 hour care on discharge: TBD    PCP:Nilsa Luna PA-C    Pharmacy: [facility] to bed    Discharge plan/Summary:   spoke with patient at bedside to complete initial assessment and review the role of Case Management while on the ARU. Patient educated on team conferences as well as discuss family training and how it is encouraged on the unit. IPT[employer], lives in a 1st floor apartment with a no step entry into home. Brother lives in the same apartment building down the [address]. Patient was once active with [facility] and would like to use them again. Prior DME at home are standard wheel chair, rolling walker and cane.  Case Management (CM) will continue to follow for discharge planning recommendations from the treatment team.

## 2022-01-27 NOTE — FLOWSHEET NOTE
01/27/22 0815   Vital Signs   Temp 98.5 °F (36.9 °C)   Temp Source Oral   Pulse 67   Heart Rate Source Monitor   Resp 20   /64   BP Location Left upper arm   Patient Position Semi fowlers   Level of Consciousness Alert (0)   MEWS Score 1   Patient Currently in Pain Denies   Oxygen Therapy   SpO2 95 %   Pulse Oximeter Device Mode Intermittent   Pulse Oximeter Device Location Right;Finger   O2 Device None (Room air)     Patient Alert, vitals and assessments done at this time. Bed in lowest position, call light within reach.

## 2022-01-27 NOTE — PROGRESS NOTES
01/27/22 1754   RT Protocol   History Pulmonary Disease 1   Respiratory pattern 0   Breath sounds 0   Cough 0   Bronchodilator Assessment Score 1

## 2022-01-27 NOTE — PROGRESS NOTES
Inpatient Physical Therapy Daily Treatment Note    Unit: PCU  Date:  1/27/2022  Patient Name:    Veronica Parker. Admitting diagnosis:  Unable to ambulate [R26.2]  Fall, initial encounter [W19. XXXA]  COVID-19 [U07.1]  Admit Date:  1/12/2022  Precautions/Restrictions:  Fall risk, Bed/chair alarm, Telemetry and Continuous pulse oximetry      Discharge Recommendations: ARU/IRF (inpatient rehab facility)   DME needs for discharge: defer to facility       Therapy recommendation for EMS Transport: can transport by wheelchair    Therapy recommendations for staff:   Assist of 1 with use of rolling walker (RW) and gait belt for all ambulation to/from bathroom   CGA without walker for transfers to/from chair or BSC. History of Present Illness: Per Dr. Betty Leal progress note: \"63 y. o. male with DMII, Chronic sCHF, CAD s/p stent placement x3, hx of MI, asthma, HTN, HLD, GERD, lumbar spondylosis and DDD s/p bilateral dorsal ramus medial branch ablation and interstim implant, depression who presented to [facility] ED with complaint of generalized weakness ongoing for approximately 2 months and frequent falls.     In ED, CT of the head showed no acute abnormality.  Chest x-ray with no acute cardiopulmonary process.  Patient did test positive for Les Folds is vaccinated x2, no booster.  Patient admitted to med-surg for further care.  PT/OT consulted.   Mr. Didi Harrington developed rapid HR what appears to be Afib on TELE monitor and his pacer inappropriately delivered shock x 5 and this was witnessed by me and nursing staff.    Pt awake, conscious during these episodes and by the end of the 5th shock, he started getting slightly confused  Given cardizem 10 mg IV x 1 and metoprolol 5 mg with control of HR  EKG after this episode showed sinus tach  Progressively developed confusion , fevers , started on precedex gtt\"    Home Health S4 Level Recommendation: NA  AM-PAC Mobility Score   AM-PAC Inpatient Mobility Raw Score : [address] [place] [address] scored a 13/24 on the AM-PAC short mobility form. Current research shows that an AM-PAC score of 17 or less is typically not associated with a discharge to the patient's home setting. If patient discharges prior to next session this note will serve as a discharge summary. Please see below for the latest assessment towards goals. Treatment Time:  10:38-11:05  Treatment number: 4  Timed Code Treatment Minutes: 27 minutes  Total Treatment Minutes:  27 minutes    Cognition    A&O x4   Able to follow 1 step commands    Subjective  Patient lying supine in bed with no family present   Pt agreeable to this PT tx. Pain   No    Bed Mobility   Supine to Sit:    Supervision  Sit to Supine:   Supervision  Rolling:   Not Tested  Scooting:   SBA    Transfer Training     Sit to stand:   SBA from EOB without device. SBA from recliner with RW. Stand to sit:   SBA to EOB and to recliner. Bed to Chair:   CGA with use of gait belt. Comment: Pt took 3-4 small, festinating steps towards chair, placed hand lightly on armrest and paused a few seconds. Then took one large pivot step and sat abruptly. When asked about the pause, pt said he was not fatigued. He essentially said he paused to plan his next movements. Gait Training gait completed as indicated below  Distance:      25 ft  Deviations (firm surface/linoleum):  decreased dory and decreased step length bilaterally   Assistive Device Used:    gait belt and rolling walker (RW)  Level of Assist:    CGA typically; Min A for walker control during turns. Comment: Step pattern is erratic. He takes a handful of small, festinating steps (~1/4 foot length), then pauses. Sometimes adjusts hands on walker, at one time places a hand on nearby wall. Then restarts his steps. This happens consistently over course of this ambulation bout. Pt denies dizziness/lightheadedness/fatigue. When later asked about his pauses, he says his legs feel \"tired\". Stair Training deferred, pt unsafe/not appropriate to complete stairs at this time    Therapeutic Exercise all completed bilaterally unless indicated and completed in seated position   Ankle pumps, LAQ, marches x 10 reps each. Balance  Sitting:  Good ; Supervision  Comments: trunk control is improving. Standing: Fair +; CGA   Comments: no overt LOB, however mildly impaired as a function of delayed processing and motor planning, delayed righting/stepping reactions    Patient Education      Role of PT, POC, Discharge recommendations, DC recommendations, safety awareness, transfer techniques and calling for assist with mobility. Positioning Needs       Pt in bed, alarm set, positioned in proper neutral alignment and pressure relief provided. Call light provided and all needs within reach   Pt declined sitting up to chair. Activity Tolerance   Pt completed therapy session with No adverse symptoms noted w/activity. Seated EOB: FG=879/76, SpO2=97% on RA, HR=71  After ambulation: AD=676/68, SpO2=96% on RA, HR=66    Other  RN/PCA aware of level of assist    Assessment :  Patient is demonstrating steady progress with all aspects of functional mobility. Transfers and ambulation completed at [facility] with notable gait deficits that pt reports are not his baseline. Presents with fluctuating levels of cognition. Alert and oriented x 4 but often with delayed and/or truncated responses and delayed motor planning. Recommending ARU/IRF/Inpatient Rehab Facility upon discharge as patient functioning well below baseline, demonstrates good rehab potential and unable to return home due to limited or no family support, burden of care beyond caregiver ability, home environment not conducive to patient recovery and limited safety awareness. Goals : To be met in 3 visits:  1). Independent with LE Ex x 10 reps - 1/27 needs steady cues, limited carryover     To be met in 6 visits:  1).   Supine to/from sit: CGA - goal met 1/27 upgrade to indep  2). Sit to/from stand: Min A  - goal met 1/27 upgrade to mod indep  3). Bed to chair: Min A - goal met 1/27 upgrade to mod indep  4). Gait: Ambulate 10 ft.   with  Mod A  and use of LRAD (least restrictive assistive device)  goal met 1/27 upgrade to 100 ft mod indep  5). Tolerate B LE exercises 3 sets of 10-15 reps    Plan   Continue with plan of care. Signature: Matty Shrestha, PT, DPT    If patient discharges from this facility prior to next visit, this note will serve as the Discharge Summary.

## 2022-01-27 NOTE — DISCHARGE INSTR - COC
Continuity of Care Form    Patient Name: Tram Dumont. :  1960  MRN:  [id number]    Admit date:  2022  Discharge date:  2022    Code Status Order: Full Code   Advance Directives:      Admitting Physician:  Evelia Fonseca MD  PCP: Diaz Carrero PA-C    Discharging Nurse: [address] [facility] Unit/Room#: 0207/0207-02  Discharging Unit Phone Number: [phone]    Emergency Contact:   Extended Emergency Contact Information  Primary Emergency Contact: [facility]  Home Phone: [phone]  Relation: Other  Secondary Emergency Contact: Miguelito Quiroga  Home Phone: [phone]  Relation: Brother/Sister    Past Surgical History:  Past Surgical History:   Procedure Laterality Date    CARDIAC PACEMAKER PLACEMENT      NOT MRI COMPATIABLE OLD LEADS [facility].  pace maker difib    CARPAL TUNNEL RELEASE Bilateral     CATARACT REMOVAL WITH IMPLANT Left 14    COLONOSCOPY      CORONARY ANGIOPLASTY WITH STENT PLACEMENT  ,    CYST REMOVAL  1982    coccyx area    DIAGNOSTIC CARDIAC CATH LAB PROCEDURE      ENDOSCOPY, COLON, DIAGNOSTIC      ERCP  9/15/2013    ERCP  10/11/13    WITH STENT REMOVAL    FOOT SURGERY Right 2018     REPAIR CALCANEAL SPUR, REPAIR OF ACHILLES TENDON RIGHT FOOT    NERVE SURGERY Bilateral 2020    BILATERAL LUMBAR THREE LUMBAR FOUR LUMBAR FIVE DORSAL RAMUS  RADIOFREQUENCY ABLATION SITE CONFIRMED BY FLUOROSCOPY performed by Adelia Guerrero MD at [place] [address]      back stimulator in lower back    PACEMAKER PLACEMENT      ICD    PAIN MANAGEMENT PROCEDURE Bilateral 2020    BILATERAL LUMBAR THREE, LUMBAR FOUR, LUMBAR FIVE DORSAL RAMUS MEDIAL BRANCH BLOCK SITE CONFIRMED BY FLUOROSCOPY performed by Adelia Guerrero MD at [address] [place], [place] [address] Bilateral 2020    BILATERAL LUMBAR THREE, LUMBAR FOUR, LUMBAR FIVE DORSAL RAMUS MEDIAL BRANCH BLOCK SITE CONFIRMED BY FLUOROSCOPY performed by Hill Hazel MD at [address] [facility] Left 1/12/2021    LEFT SACROILIAC JOINT INJECTION SITE CONFIRMED BY FLUOROSCOPY performed by Hill Hazel MD at [address] Right 10/31/2018    GASTROCNEMIUS RECESSION RIGHT FOOT performed by Luis Rowe DPM at [address] LENGTH/SHORT LEG/ANKL TENDON,SINGLE Right 10/31/2018    REMOVAL OF CALCANEAL SPUR, ACHILLES TENDON REPAIR RIGHT LOWER EXTREMITY performed by Luis Rowe DPM at [address]  7/18/13    and colonoscopy with biopsies taken    UPPER GASTROINTESTINAL ENDOSCOPY  8/23/13    EUS    UPPER GASTROINTESTINAL ENDOSCOPY  9/15/2013       Immunization History:   Immunization History   Administered Date(s) Administered    Influenza Virus Vaccine 12/12/2008, 10/22/2012, 09/17/2015, 10/01/2018    Influenza, Luzmaria Thompson, IM, (6 mo and older Fluzone, Flulaval, Fluarix and 3 yrs and older Afluria) 11/18/2016, 10/02/2017    Pneumococcal Conjugate 7-valent (Prevnar7) 10/22/2012    Pneumococcal Polysaccharide ([id number]) 12/12/2008    Tdap (Boostrix, Adacel) 05/11/2015       Active Problems:  Patient Active Problem List   Diagnosis Code    Ventricular tachycardia ([facility] [address] I47.2    Presence of stent in left circumflex coronary artery Z95.5    Myocardial infarction, nontransmural (HCC) I21.4    Myocardial infarction, inferoposterior wall, subsequent care     Automatic implantable cardioverter-defibrillator in situ Z95.810    CAD (coronary artery disease) I25.10    Automatic implantable cardioverter-defibrillator in situ Z95.810    ALIN on CPAP G47.33, Z99.89    Gastroesophageal reflux disease without esophagitis K21.9    Hyperlipidemia with target LDL less than 100 E78.5    Hypertension due to endocrine disorder I15.2    Ischemic cardiomyopathy I25.5    Obesity, morbid, BMI 40.0-49.9 (HCC) E66.01    Weakness R53.1    Acute encephalopathy G93.40    TIA involving left internal carotid 216 lb 3.2 oz (98.1 kg)     Mental Status:  oriented, alert, and impulsive    IV Access:  - None    Nursing Mobility/ADLs:  Walking   Assisted  Transfer  Assisted  Bathing  Assisted  Dressing  Assisted  Toileting  Assisted  Feeding  Independent  Med Admin  Assisted  Med Delivery   whole    Wound Care Documentation and Therapy:  Incision 10/31/18 Foot Right (Active)   Number of days: 0634        Elimination:  Continence: Bowel: at times  Bladder: at times  Urinary Catheter: None   Colostomy/Ileostomy/Ileal Conduit: No       Date of Last BM: 1/27/2022    Intake/Output Summary (Last 24 hours) at 1/27/2022 1100  Last data filed at 1/27/2022 0913  Gross per 24 hour   Intake 240 ml   Output 100 ml   Net 140 ml     No intake/output data recorded. Safety Concerns:     History of Falls (last 30 days)    Impairments/Disabilities:      None    Nutrition Therapy:  Current Nutrition Therapy:   - Oral Diet:  Carb Control 4 carbs/meal (1800kcals/day)    Routes of Feeding: Oral  Liquids: Thin Liquids  Daily Fluid Restriction: no  Last Modified Barium Swallow with Video (Video Swallowing Test): not done    Treatments at the Time of Hospital Discharge:   Respiratory Treatments:  see discharge medications  Oxygen Therapy:  is not on home oxygen therapy.   Ventilator:    - No ventilator support    Rehab Therapies: Physical Therapy and Occupational Therapy  Weight Bearing Status/Restrictions: No weight bearing restirctions  Other Medical Equipment (for information only, NOT a DME order):  walker  Other Treatments: n/a    Patient's personal belongings (please select all that are sent with patient):  clothing    RN SIGNATURE:  Electronically signed by Chapito Valerio RN on 1/27/22 at 11:05 AM EST    CASE MANAGEMENT/SOCIAL WORK SECTION    Inpatient Status Date: 01/27/22    Readmission Risk Assessment Score:  Readmission Risk              Risk of Unplanned Readmission:  34           Discharging to Facility/ Agency   Name: [facility]  Phone: [phone]  Fax: [phone]      / signature: Electronically signed by Marina Martinez RN on 1/27/22 at 11:12 AM EST    PHYSICIAN SECTION    Prognosis: Fair    Condition at Discharge: Stable    Rehab Potential (if transferring to Rehab): Good    Recommended Labs or Other Treatments After Discharge:     Acute rehab with PTOT. Physician Certification: I certify the above information and transfer of Gallito Michaud.  is necessary for the continuing treatment of the diagnosis listed and that he requires [facility] for less 30 days.      Update Admission H&P: No change in H&P    PHYSICIAN SIGNATURE:  Electronically signed by Ap Mills MD on 1/27/22 at 11:20 AM EST

## 2022-01-27 NOTE — PLAN OF CARE
Problem: Airway Clearance - Ineffective  Goal: Achieve or maintain patent airway  Outcome: Ongoing     Problem: Gas Exchange - Impaired  Goal: Absence of hypoxia  Outcome: Ongoing  Goal: Promote optimal lung function  Outcome: Ongoing     Problem: Breathing Pattern - Ineffective  Goal: Ability to achieve and maintain a regular respiratory rate  Outcome: Ongoing     Problem:  Body Temperature -  Risk of, Imbalanced  Goal: Ability to maintain a body temperature within defined limits  Outcome: Ongoing  Goal: Will regain or maintain usual level of consciousness  Outcome: Ongoing  Goal: Complications related to the disease process, condition or treatment will be avoided or minimized  Outcome: Ongoing     Problem: Isolation Precautions - Risk of Spread of Infection  Goal: Prevent transmission of infection  Outcome: Ongoing     Problem: Nutrition Deficits  Goal: Optimize nutritional status  Outcome: Ongoing     Problem: Risk for Fluid Volume Deficit  Goal: Maintain normal heart rhythm  Outcome: Ongoing  Goal: Maintain absence of muscle cramping  Outcome: Ongoing  Goal: Maintain normal serum potassium, sodium, calcium, phosphorus, and pH  Outcome: Ongoing     Problem: Loneliness or Risk for Loneliness  Goal: Demonstrate positive use of time alone when socialization is not possible  Outcome: Ongoing     Problem: Fatigue  Goal: Verbalize increase energy and improved vitality  Outcome: Ongoing     Problem: Patient Education: Go to Patient Education Activity  Goal: Patient/Family Education  Outcome: Ongoing     Problem: Falls - Risk of:  Goal: Will remain free from falls  Description: Will remain free from falls  Outcome: Ongoing  Goal: Absence of physical injury  Description: Absence of physical injury  Outcome: Ongoing     Problem: Skin Integrity:  Goal: Will show no infection signs and symptoms  Description: Will show no infection signs and symptoms  Outcome: Ongoing  Goal: Absence of new skin breakdown  Description: Absence of new skin breakdown  Outcome: Ongoing     Problem: Confusion - Acute:  Goal: Absence of continued neurological deterioration signs and symptoms  Description: Absence of continued neurological deterioration signs and symptoms  Outcome: Ongoing  Goal: Mental status will be restored to baseline  Description: Mental status will be restored to baseline  Outcome: Ongoing     Problem: Discharge Planning:  Goal: Ability to perform activities of daily living will improve  Description: Ability to perform activities of daily living will improve  Outcome: Ongoing  Goal: Participates in care planning  Description: Participates in care planning  Outcome: Ongoing     Problem: Injury - Risk of, Physical Injury:  Goal: Will remain free from falls  Description: Will remain free from falls  Outcome: Ongoing  Goal: Absence of physical injury  Description: Absence of physical injury  Outcome: Ongoing     Problem: Mood - Altered:  Goal: Mood stable  Description: Mood stable  Outcome: Ongoing  Goal: Absence of abusive behavior  Description: Absence of abusive behavior  Outcome: Ongoing  Goal: Verbalizations of feeling emotionally comfortable while being cared for will increase  Description: Verbalizations of feeling emotionally comfortable while being cared for will increase  Outcome: Ongoing     Problem: Psychomotor Activity - Altered:  Goal: Absence of psychomotor disturbance signs and symptoms  Description: Absence of psychomotor disturbance signs and symptoms  Outcome: Ongoing     Problem: Sensory Perception - Impaired:  Goal: Demonstrations of improved sensory functioning will increase  Description: Demonstrations of improved sensory functioning will increase  Outcome: Ongoing  Goal: Decrease in sensory misperception frequency  Description: Decrease in sensory misperception frequency  Outcome: Ongoing  Goal: Able to refrain from responding to false sensory perceptions  Description: Able to refrain from responding to false sensory perceptions  Outcome: Ongoing  Goal: Demonstrates accurate environmental perceptions  Description: Demonstrates accurate environmental perceptions  Outcome: Ongoing  Goal: Able to distinguish between reality-based and nonreality-based thinking  Description: Able to distinguish between reality-based and nonreality-based thinking  Outcome: Ongoing  Goal: Able to interrupt nonreality-based thinking  Description: Able to interrupt nonreality-based thinking  Outcome: Ongoing     Problem: Sleep Pattern Disturbance:  Goal: Appears well-rested  Description: Appears well-rested  Outcome: Ongoing     Problem: Nutrition  Goal: Optimal nutrition therapy  Outcome: Ongoing  Goal: Understanding of nutritional guidelines  Outcome: Ongoing     Problem: Pain:  Goal: Pain level will decrease  Description: Pain level will decrease  Outcome: Ongoing  Goal: Control of acute pain  Description: Control of acute pain  Outcome: Ongoing  Goal: Control of chronic pain  Description: Control of chronic pain  Outcome: Ongoing  Goal: Patient's pain/discomfort is manageable  Description: Patient's pain/discomfort is manageable  Outcome: Ongoing     Problem: Infection:  Goal: Will remain free from infection  Description: Will remain free from infection  Outcome: Ongoing     Problem: Safety:  Goal: Free from accidental physical injury  Description: Free from accidental physical injury  Outcome: Ongoing  Goal: Free from intentional harm  Description: Free from intentional harm  Outcome: Ongoing     Problem: Daily Care:  Goal: Daily care needs are met  Description: Daily care needs are met  Outcome: Ongoing     Problem: Skin Integrity:  Goal: Skin integrity will stabilize  Description: Skin integrity will stabilize  Outcome: Ongoing     Problem: Discharge Planning:  Goal: Patients continuum of care needs are met  Description: Patients continuum of care needs are met  Outcome: Ongoing

## 2022-01-28 LAB
A/G RATIO: 1.3 (ref 1.1–2.2)
ALBUMIN SERPL-MCNC: 3.5 G/DL (ref 3.4–5)
ALP BLD-CCNC: 55 U/L (ref 40–129)
ALT SERPL-CCNC: 28 U/L (ref 10–40)
ANION GAP SERPL CALCULATED.3IONS-SCNC: 11 MMOL/L (ref 3–16)
AST SERPL-CCNC: 21 U/L (ref 15–37)
BILIRUB SERPL-MCNC: 0.6 MG/DL (ref 0–1)
BUN BLDV-MCNC: 8 MG/DL (ref 7–20)
CALCIUM SERPL-MCNC: 9.3 MG/DL (ref 8.3–10.6)
CHLORIDE BLD-SCNC: 99 MMOL/L (ref 99–110)
CO2: 28 MMOL/L (ref 21–32)
CREAT SERPL-MCNC: 0.6 MG/DL (ref 0.9–1.3)
GFR AFRICAN AMERICAN: >60
GFR NON-AFRICAN AMERICAN: >60
GLUCOSE BLD-MCNC: 145 MG/DL (ref 70–99)
GLUCOSE BLD-MCNC: 156 MG/DL (ref 70–99)
GLUCOSE BLD-MCNC: 160 MG/DL (ref 70–99)
GLUCOSE BLD-MCNC: 246 MG/DL (ref 70–99)
GLUCOSE BLD-MCNC: 247 MG/DL (ref 70–99)
HCT VFR BLD CALC: 39 % (ref 40.5–52.5)
HEMOGLOBIN: 13.1 G/DL (ref 13.5–17.5)
MCH RBC QN AUTO: 28.3 PG (ref 26–34)
MCHC RBC AUTO-ENTMCNC: 33.5 G/DL (ref 31–36)
MCV RBC AUTO: 84.5 FL (ref 80–100)
PDW BLD-RTO: 15.4 % (ref 12.4–15.4)
PERFORMED ON: ABNORMAL
PLATELET # BLD: 146 K/UL (ref 135–450)
PMV BLD AUTO: 6.4 FL (ref 5–10.5)
POTASSIUM REFLEX MAGNESIUM: 3.8 MMOL/L (ref 3.5–5.1)
RBC # BLD: 4.61 M/UL (ref 4.2–5.9)
SODIUM BLD-SCNC: 138 MMOL/L (ref 136–145)
TOTAL PROTEIN: 6.2 G/DL (ref 6.4–8.2)
WBC # BLD: 3.8 K/UL (ref 4–11)

## 2022-01-28 PROCEDURE — 97110 THERAPEUTIC EXERCISES: CPT

## 2022-01-28 PROCEDURE — 92523 SPEECH SOUND LANG COMPREHEN: CPT

## 2022-01-28 PROCEDURE — 97116 GAIT TRAINING THERAPY: CPT

## 2022-01-28 PROCEDURE — 97530 THERAPEUTIC ACTIVITIES: CPT

## 2022-01-28 PROCEDURE — 97167 OT EVAL HIGH COMPLEX 60 MIN: CPT

## 2022-01-28 PROCEDURE — 92610 EVALUATE SWALLOWING FUNCTION: CPT

## 2022-01-28 PROCEDURE — 97112 NEUROMUSCULAR REEDUCATION: CPT

## 2022-01-28 PROCEDURE — 97535 SELF CARE MNGMENT TRAINING: CPT

## 2022-01-28 PROCEDURE — 1280000000 HC REHAB R&B

## 2022-01-28 PROCEDURE — 94640 AIRWAY INHALATION TREATMENT: CPT

## 2022-01-28 PROCEDURE — 85027 COMPLETE CBC AUTOMATED: CPT

## 2022-01-28 PROCEDURE — 6360000002 HC RX W HCPCS: Performed by: PHYSICAL MEDICINE & REHABILITATION

## 2022-01-28 PROCEDURE — 80053 COMPREHEN METABOLIC PANEL: CPT

## 2022-01-28 PROCEDURE — 97162 PT EVAL MOD COMPLEX 30 MIN: CPT

## 2022-01-28 PROCEDURE — 36415 COLL VENOUS BLD VENIPUNCTURE: CPT

## 2022-01-28 PROCEDURE — 6370000000 HC RX 637 (ALT 250 FOR IP): Performed by: PHYSICAL MEDICINE & REHABILITATION

## 2022-01-28 RX ADMIN — TRAZODONE HYDROCHLORIDE 100 MG: 50 TABLET ORAL at 21:59

## 2022-01-28 RX ADMIN — APIXABAN 5 MG: 5 TABLET, FILM COATED ORAL at 09:29

## 2022-01-28 RX ADMIN — GABAPENTIN 200 MG: 100 CAPSULE ORAL at 09:29

## 2022-01-28 RX ADMIN — INSULIN LISPRO 3 UNITS: 100 INJECTION, SOLUTION INTRAVENOUS; SUBCUTANEOUS at 06:33

## 2022-01-28 RX ADMIN — FLUTICASONE PROPIONATE 2 SPRAY: 50 SPRAY, METERED NASAL at 09:35

## 2022-01-28 RX ADMIN — PANTOPRAZOLE SODIUM 40 MG: 40 TABLET, DELAYED RELEASE ORAL at 09:29

## 2022-01-28 RX ADMIN — INSULIN GLARGINE 60 UNITS: 100 INJECTION, SOLUTION SUBCUTANEOUS at 22:02

## 2022-01-28 RX ADMIN — INSULIN LISPRO 3 UNITS: 100 INJECTION, SOLUTION INTRAVENOUS; SUBCUTANEOUS at 12:33

## 2022-01-28 RX ADMIN — METOPROLOL TARTRATE 50 MG: 50 TABLET, FILM COATED ORAL at 21:58

## 2022-01-28 RX ADMIN — DEXAMETHASONE 6 MG: 4 TABLET ORAL at 09:29

## 2022-01-28 RX ADMIN — POTASSIUM CHLORIDE 20 MEQ: 20 TABLET, EXTENDED RELEASE ORAL at 09:35

## 2022-01-28 RX ADMIN — DIVALPROEX SODIUM 1000 MG: 250 TABLET, DELAYED RELEASE ORAL at 17:13

## 2022-01-28 RX ADMIN — GABAPENTIN 200 MG: 100 CAPSULE ORAL at 21:59

## 2022-01-28 RX ADMIN — METOPROLOL TARTRATE 50 MG: 50 TABLET, FILM COATED ORAL at 09:35

## 2022-01-28 RX ADMIN — ATORVASTATIN CALCIUM 80 MG: 80 TABLET, FILM COATED ORAL at 09:29

## 2022-01-28 RX ADMIN — APIXABAN 5 MG: 5 TABLET, FILM COATED ORAL at 21:59

## 2022-01-28 RX ADMIN — LISINOPRIL 5 MG: 5 TABLET ORAL at 09:35

## 2022-01-28 RX ADMIN — DULOXETINE HYDROCHLORIDE 60 MG: 60 CAPSULE, DELAYED RELEASE ORAL at 09:29

## 2022-01-28 RX ADMIN — Medication 2 PUFF: at 19:47

## 2022-01-28 RX ADMIN — QUETIAPINE FUMARATE 50 MG: 25 TABLET ORAL at 09:34

## 2022-01-28 RX ADMIN — CLOPIDOGREL BISULFATE 75 MG: 75 TABLET, FILM COATED ORAL at 09:35

## 2022-01-28 RX ADMIN — GABAPENTIN 200 MG: 100 CAPSULE ORAL at 13:57

## 2022-01-28 RX ADMIN — QUETIAPINE FUMARATE 75 MG: 25 TABLET ORAL at 21:58

## 2022-01-28 RX ADMIN — Medication 10 MG: at 21:58

## 2022-01-28 RX ADMIN — INSULIN LISPRO 3 UNITS: 100 INJECTION, SOLUTION INTRAVENOUS; SUBCUTANEOUS at 22:02

## 2022-01-28 RX ADMIN — INSULIN LISPRO 6 UNITS: 100 INJECTION, SOLUTION INTRAVENOUS; SUBCUTANEOUS at 17:12

## 2022-01-28 ASSESSMENT — 9 HOLE PEG TEST
TESTTIME_SECONDS: 47
TEST_RESULT: FUNCTIONAL
TESTTIME_SECONDS: 47
TEST_RESULT: FUNCTIONAL
TESTTIME_SECONDS: 58
TESTTIME_SECONDS: 58
TEST_RESULT: FUNCTIONAL
TEST_RESULT: FUNCTIONAL

## 2022-01-28 ASSESSMENT — PAIN DESCRIPTION - LOCATION: LOCATION: BACK

## 2022-01-28 ASSESSMENT — PAIN DESCRIPTION - PAIN TYPE: TYPE: CHRONIC PAIN

## 2022-01-28 ASSESSMENT — PAIN SCALES - GENERAL
PAINLEVEL_OUTOF10: 0
PAINLEVEL_OUTOF10: 2

## 2022-01-28 ASSESSMENT — PAIN DESCRIPTION - ORIENTATION: ORIENTATION: LOWER

## 2022-01-28 NOTE — PROGRESS NOTES
Physical Therapy    Facility/Department: [facility]  Initial Assessment    NAME: Chano Viera. : 1960  MRN: [id number]    Date of Service: 2022    Discharge Recommendations:  Continue to assess pending progress,24 hour supervision or assist,Home with Home health PT   PT Equipment Recommendations  Equipment Needed: Yes (TBD)    Assessment   Body structures, Functions, Activity limitations: Decreased functional mobility ; Decreased cognition;Decreased posture;Decreased ADL status; Decreased endurance;Decreased coordination;Decreased strength;Decreased balance;Decreased safe awareness  Assessment: pt is 63year old male admitted to [address] [place] [address] admission to [facility] with multiple falls and covid encepalpathy. prior to admission, pt reports being ind with no AD, cares for brother with household chores/cooking/laundry. pt currently functioning significantly below baseline, requiring use of STEDY initially due to mulitple posterior LOB, TD for gait due to close w/c follow. pt hallucinating during session, stating he saw cats during gait assessment, Rn notified. impaired command follwoing noted during ther ex. stair assessment deferred due to safety concerns. at this time, anticipate pt may require 24/7 supv/assist upon d/c, unsure if family able to provide and HHPT. DME : TBD. Treatment Diagnosis: impaired mobility  Prognosis: Fair  Decision Making: Medium Complexity  PT Education: Goals;Transfer Training;Equipment;Disease Specific Education;PT Role;Functional Mobility Training;Energy Conservation;Plan of Care;General Safety;Precautions; Family Education;Gait Training  Patient Education: pt educated on role of PT, safe mobility with transfers/gait, will require reinforcement  Barriers to Learning: cognition  REQUIRES PT FOLLOW UP: Yes  Activity Tolerance  Activity Tolerance: Patient limited by endurance; Patient limited by cognitive status  Activity Tolerance: impaired cog/ command following/ sequencing noted during therapy. limited follow through on education provided. 119/69, 68 bpm, Spo2= 96% on room air       Patient Diagnosis(es): There were no encounter diagnoses. has a past medical history of Arthritis, Asthma, CAD (coronary artery disease), COPD (chronic obstructive pulmonary disease) ([facility] [address] Emphysema of lung ([facility] [address] Fatty liver, GERD (gastroesophageal reflux disease), Hyperlipidemia, Hypertension, Medical history reviewed with no changes, MI, old, Sleep apnea, and Type II or unspecified type diabetes mellitus without mention of complication, not stated as uncontrolled. has a past surgical history that includes Coronary angioplasty with stent (8406,3700); Colonoscopy; Endoscopy, colon, diagnostic; cyst removal (1982); Upper gastrointestinal endoscopy (7/18/13); Upper gastrointestinal endoscopy (8/23/13); Carpal tunnel release (Bilateral, 2013); ERCP (9/15/2013); Upper gastrointestinal endoscopy (9/15/2013); ERCP (10/11/13); Cataract removal with implant (Left, 2/28/14); Diagnostic Cardiac Cath Lab Procedure; other surgical history; pacemaker placement; Cardiac pacemaker placement (2011); Foot surgery (Right, 07/09/2018); pr length/short leg/ankl tendon,single (Right, 10/31/2018); pr gastrocnemius recession (Right, 10/31/2018); Pain management procedure (Bilateral, 5/26/2020); Pain management procedure (Bilateral, 6/9/2020); Nerve Surgery (Bilateral, 12/1/2020); and Pain management procedure (Left, 1/12/2021).     Restrictions  Restrictions/Precautions  Restrictions/Precautions: General Precautions,Fall Risk  Position Activity Restriction  Other position/activity restrictions: up with assistance, Avasys  Vision/Hearing  Vision: Impaired  Vision Exceptions: Wears glasses at all times  Hearing: Within functional limits     Subjective  General  Chart Reviewed: Yes  Patient assessed for rehabilitation services?: Yes  Response To Previous Treatment: Not applicable  Referring Practitioner: Maria De Jesus Polanco MD  Referral Date : 01/28/22  Diagnosis: covid  Follows Commands: Within Functional Limits  General Comment  Comments: Rn cleared pt to participate  Subjective  Subjective: denies pain this am. found in recliner  Pain Screening  Patient Currently in Pain: No  Vital Signs  Patient Currently in Pain: No       Orientation  Orientation  Overall Orientation Status: Within Functional Limits  Social/Functional History  Social/Functional History  Lives With: Alone  Type of Home: Apartment  Home Layout: One level  Home Access: Level entry  Bathroom Shower/Tub: Walk-in shower  Bathroom Toilet: Standard  Bathroom Equipment: Grab bars in shower,Grab bars around toilet  Home Equipment: 4 wheeled [employer] walker  ADL Assistance: [address]: [address] [facility] Responsibilities: Yes  Meal Prep Responsibility: Primary  Laundry Responsibility: Primary  Cleaning Responsibility: Primary  Bill Paying/Finance Responsibility: Primary  Shopping Responsibility: Primary  Ambulation Assistance: Independent (intermittent use of AD for past month, mostly uses 4ww)  Transfer Assistance: Independent  Active : Yes  Mode of Transportation: [employer]  Occupation: On disability  Type of occupation: Used to work at [employer]  Additional Comments: Pt reports 6 falls in the last few months, all due to \"just losing my balance. \" Pt states that brother could assist at home but brother is a \"paranoid schizophrenic\". Cognition        Objective             Strength RLE  Strength RLE: WFL  Comment: grossly 4-/5  Strength LLE  Strength LLE: WFL  Comment: grossly 4-/5     Sensation  Overall Sensation Status: WFL  Bed mobility  Rolling to Left: Minimal assistance  Rolling to Right: Minimal assistance  Supine to Sit: Moderate assistance (with bed flat, no rails)  Sit to Supine: Contact guard assistance  Transfers  Sit to Stand:  Moderate Assistance  Stand to sit: Maximum Assistance  Bed to Chair: Dependent/Total  Stand Pivot Transfers: Maximum Assistance  Comment: attempted several stand pivots from recliner to EOB without AD. on first several trials, pt requires mod A to come to with immediate posterior LOB with max A to safely lower to chair. pt eventually able to stand with mod A to RW, takes several short steps torwards EOB with RW but experiences freezing episode and unable to progress LEs furhter, requires max A to TD from therapist to return safely to recliner. therapist required use of STEDY to safely transfer to EOB with TD. stand ivot from EOB to w/c with RW (pt pushign RW away once standing, attempting to hold onto recliner on opposite side and w/c arm rest with max A of 1). sit to stadn w/c to no AD wiht mod A. sit to stand w/c to Rw with CAG. car transfer completed with no AD and max A. pt with retropulsive tendencies during transfer. Ambulation  Ambulation?: Yes  More Ambulation?: Yes  Ambulation 1  Surface: level tile  Device: Servin rail;Hand-Held Assist  Assistance: Dependent/Total;Moderate assistance  Quality of Gait: significant posterior lean, significnatly shortened step lengths/lack of heel strike/swing phase. shuffled gait.  poor dory, slow velocity, appears to have episodes of freezing and needs to be redirected  Distance: 10 ft  Comments: close w/c follow due to retroupulsive tendencies  Ambulation 2  Surface - 2: level tile  Device 2: Rolling Walker  Other Apparatus 2: Wheelchair follow  Assistance 2: Dependent/Total;Moderate assistance  Quality of Gait 2: initially larger step lengths progressively getting smaller/shorter, periods of freezig and difficult to re-initiate gait, lack of heel strike/swing phase/ shuffled steps, slow velocity, interrupted/inconsistent dory  Distance: 10 ft  Comments: close w/c follow as pt abuptly sits with little warning  Stairs/Curb  Stairs?: No (deferred due to safety concerns)     Balance  Sitting - Static: Fair;-  Sitting - Dynamic: Fair;-  Standing - Static: Poor  Standing - Dynamic: Poor  Comments: pt sat EOB x 5-6 min with 1-2 UE support and consistent CGA. if pt removes UE supprot, pt with multiple LOB in all directions, unable to maintain self in midline without 2 UE Support and CGA for safety. poor static/dyn standing balance due to significant posterior lean. Exercises  Hip Flexion: x10 BLE  Knee Long Arc Quad: x10 BLE  Ankle Pumps: x10 BLE  Comments: completed in recliner with poor form despite verbal cues/ demonstration, tactile cues     Plan   Plan  Times per week: 5-7x/week  Times per day: Daily  Current Treatment Recommendations: Ghislaine Rucker Re-education,Patient/Caregiver Education & Training,Cognitive Reorientation,ROM,Wheelchair Mobility Training,Manual Therapy - Soft Tissue Mobilization,Equipment Evaluation, Education, & procurement,Balance Training,Gait Training,Home Exercise Program,Modalities,Functional Mobility Training,Stair training,Manual Therapy - Joint Manipulation,Safety Education & Training,Positioning  Safety Devices  Type of devices: Call light within reach,Left in chair,Chair alarm in place,Telesitter in use,Gait belt,Nurse notified  Goals  Short term goals  Time Frame for Short term goals: 7 days 2/3  Short term goal 1: pt will complete bed mobility with supv. Short term goal 2: pt will complete functional transfer with CGA and LRAD. Short term goal 3: pt will ambulate 50 ft with LRAD and min A. Long term goals  Time Frame for Long term goals : 21 days 2/17  Long term goal 1: pt will complete bed mobility with ind. Long term goal 2: pt will complete functional transfer with mod ind and LRAD. Long term goal 3: pt will ambulate 100 ft with LRAD and supv. Long term goal 4: pt will complete car transfer with LRAD and mod ind.   Patient Goals   Patient goals : \"get stronger\"       Therapy Time   Individual Concurrent Group Co-treatment   Time In 8776 Time Out 1130         Minutes 60         Timed Code Treatment Minutes: One [facility] [place]       Conor De Guzman, PT

## 2022-01-28 NOTE — PROGRESS NOTES
PT came to me and said that patient was very unbalanced while he was up and that patient stated he was \"seeing cats\" where there were no cats. I went into assess patient and patient stated, \"I was just messing with her. I don't really see anything. \"  Patient was educated that it is important to not make false statements as we have to address any actual changes as they happen.  Phani Bazan RN

## 2022-01-28 NOTE — PLAN OF CARE
Problem: Nutrition  Intervention: Swallowing evaluation  Note: SLP completed evaluation. Please refer to notes in EMR. Intervention: Aspiration precautions  Note: SLP completed evaluation. Please refer to notes in EMR. Problem: Neurological  Intervention: Speech Evaluation/treatment  Note: SLP completed evaluation. Please refer to notes in EMR.        Adrian Bennett MA [address] [facility]  Speech Language Pathologist

## 2022-01-28 NOTE — PROGRESS NOTES
Occupational Therapy   Occupational Therapy Initial Assessment/Treatment  Date: 2022   Patient Name: Shea Cunningham. MRN: [id number]     : 1960    Date of Service: 2022    Discharge Recommendations:  24 hour supervision or assist,Home with Home health OT,S Level 1  OT Equipment Recommendations  Equipment Needed: Yes  Mobility Devices: ADL Assistive Devices  ADL Assistive Devices: Shower Chair with back    Assessment   Performance deficits / Impairments: Decreased functional mobility ; Decreased safe awareness;Decreased balance;Decreased coordination;Decreased posture;Decreased vision/visual deficit; Decreased cognition;Decreased ADL status; Decreased endurance;Decreased high-level IADLs;Decreased strength;Decreased fine motor control  Assessment: Pt presents with the above deficits requiring skilled OT services to return to OF. Pt is a 63year old male admitted from [facility] where he presented with weakness and falls in the context of underlying DM, CHF, CAD, chronic back pain. He was found to be covid positive, vaccinated, no booster. He developed afib with RVR and was shocked by his pacer. His ICD was adjusted by cardiology. He was continued on beta blockers. He developed encephalopathy presumably secondary to covid, and did intermittently require restraints; he was started on seroquel. He was treated with decadron for his positive covid test. Pt performed functional mobility to/from bathroom with HHA on R side with min A. Pt completed toileting with mod A and bathing with mod A. Pt required max VCs for sequencing through bathing and dressing tasks. Pt completed standing at sink with SBA for oral hygiene. Pt completed NHPT with decreased speed for BUEs and poor strength/tolerance for BUE ther ex. Continue POC.   Prognosis: Good  Decision Making: High Complexity  Vitals: 114/64, HR 70, O2 sats 95%  REQUIRES OT FOLLOW UP: Yes           Patient Diagnosis(es): There were no encounter diagnoses. has a past medical history of Arthritis, Asthma, CAD (coronary artery disease), COPD (chronic obstructive pulmonary disease) ([facility] [address] Emphysema of lung ([address] Fatty liver, GERD (gastroesophageal reflux disease), Hyperlipidemia, Hypertension, Medical history reviewed with no changes, MI, old, Sleep apnea, and Type II or unspecified type diabetes mellitus without mention of complication, not stated as uncontrolled. has a past surgical history that includes Coronary angioplasty with stent ([id number]); Colonoscopy; Endoscopy, colon, diagnostic; cyst removal (1982); Upper gastrointestinal endoscopy (7/18/13); Upper gastrointestinal endoscopy (8/23/13); Carpal tunnel release (Bilateral, 2013); ERCP (9/15/2013); Upper gastrointestinal endoscopy (9/15/2013); ERCP (10/11/13); Cataract removal with implant (Left, 2/28/14); Diagnostic Cardiac Cath Lab Procedure; other surgical history; pacemaker placement; Cardiac pacemaker placement (2011); Foot surgery (Right, 07/09/2018); pr length/short leg/ankl tendon,single (Right, 10/31/2018); pr gastrocnemius recession (Right, 10/31/2018); Pain management procedure (Bilateral, 5/26/2020); Pain management procedure (Bilateral, 6/9/2020); Nerve Surgery (Bilateral, 12/1/2020); and Pain management procedure (Left, 1/12/2021). Restrictions  Restrictions/Precautions  Restrictions/Precautions: General Precautions,Fall Risk  Position Activity Restriction  Other position/activity restrictions: up with assistance, Avasys    Subjective   General  Chart Reviewed: Yes,Orders,Progress Notes,History and Physical,Imaging,Labs  Patient assessed for rehabilitation services?: Yes  Additional Pertinent Hx: COPD, Emphysema, CAD, pacemaker, DDD, HTN, DM  Family / Caregiver Present: No  Referring Practitioner: Jacqueline Moore MD  Diagnosis: COVID encephalopathy  Subjective  Subjective: Pt in bed, pleasant, agreeable to OT evaluation.   Patient Currently in Pain: Denies  Vital Signs  Patient Currently in Pain: Denies  Social/Functional History  Social/Functional History  Lives With: Alone  Type of Home: Apartment  Home Layout: One level  Home Access: Level entry  Bathroom Shower/Tub: Walk-in shower  Bathroom Toilet: Standard  Bathroom Equipment: Grab bars in shower,Grab bars around toilet  Home Equipment: 4 wheeled [employer] walker  ADL Assistance: [address]: [address] [facility] Responsibilities: Yes  Meal Prep Responsibility: Primary  Laundry Responsibility: Primary  Cleaning Responsibility: Primary  Bill Paying/Finance Responsibility: Primary  Shopping Responsibility: Primary  Ambulation Assistance: Independent  Transfer Assistance: Independent  Active : Yes  Mode of Transportation: Abhijeet Kindmarci  Occupation: On disability  Type of occupation: Used to work at [address]: [place]  Additional Comments: Pt reports 6 falls in the last few months, all due to \"just losing my balance. \" Pt states that brother could assist at home but brother is a \"paranoid schizophrenic\". Objective   Vision: Impaired  Vision Exceptions: Wears glasses at all times  Hearing: Within functional limits    Orientation  Overall Orientation Status: Within Functional Limits  Observation/Palpation  Posture: Fair  Observation: multiple pauses during tasks and conversation  Balance  Sitting Balance: Supervision  Standing Balance: Minimal assistance  Standing Balance  Time: 30 seconds x4, 3 minutes  Activity: transfer to/from bathroom, LB bathing, LB dressing, grooming in stance at sink  Comment: no AD  Functional Mobility  Functional - Mobility Device: No device  Activity: To/from bathroom  Assist Level: Minimal assistance  Toilet Transfers  Toilet - Technique: Ambulating  Equipment Used: Standard toilet  Toilet Transfer: Minimal assistance  Shower Transfers  Shower - Transfer From: Other  Shower - Transfer Type:  To and From  Shower - Transfer To: Transfer tub bench  Shower - Technique: Ambulating  Shower Transfers: Minimal assistance  ADL  Feeding: Independent  Grooming: Stand by assistance  UE Bathing: Supervision;Verbal cueing  LE Bathing: Moderate assistance;Verbal cueing  UE Dressing: Supervision  LE Dressing: Moderate assistance;Verbal cueing  Toileting: Minimal assistance  Tone RUE  RUE Tone: Normotonic  Tone LUE  LUE Tone: Normotonic  Coordination  Movements Are Fluid And Coordinated: No  Coordination and Movement description: Fine motor impairments;Gross motor impairments;Right UE;Left UE;Decreased accuracy        Transfers  Stand Step Transfers: Minimal assistance  Sit to stand: Contact guard assistance  Stand to sit: Contact guard assistance  Vision - Basic Assessment  Prior Vision: Wears glasses all the time  Visual History: No significant visual history  Patient Visual Report: No visual complaint reported. Oculo Motor Control: Impaired  Impairments: Tracking;Scanning  Vision Comments: Poor attention when tracking pen, poor cognition and unable to determine peripheral vision;  Cognition  Overall Cognitive Status: Exceptions  Arousal/Alertness: Delayed responses to stimuli  Following Commands: Follows one step commands with repetition; Follows one step commands with increased time  Attention Span: Difficulty attending to directions; Attends with cues to redirect  Memory: Decreased short term memory;Decreased recall of recent events;Decreased recall of precautions  Safety Judgement: Decreased awareness of need for safety;Decreased awareness of need for assistance  Problem Solving: Decreased awareness of errors  Insights: Decreased awareness of deficits  Initiation: Requires cues for some  Sequencing: Requires cues for some  Cognition Comment: Multiple pauses in conversation and during all ther ex.  Not finishing sets of exercises, easily distracted  Perception  Overall Perceptual Status: Impaired  Unilateral Attention: Appears intact  Initiation: Cues to initiate tasks  Motor Planning: Cues to use objects appropriately  Perseveration: Not present     Sensation  Overall Sensation Status: WFL  Type of ROM/Therapeutic Exercise  Type of ROM/Therapeutic Exercise: Free weights  Comment: 4#. Pt ceasing exercises when desired, not finishing to complete set as directed  Exercises  Shoulder Flexion: x10 LUE, x5 RUE  Elbow Flexion: x15 LUE, x10 RUE  Wrist Extension: x5 BUE     LUE AROM (degrees)  LUE AROM : WFL  Left Hand AROM (degrees)  Left Hand AROM: WFL  RUE AROM (degrees)  RUE AROM : WFL  Right Hand AROM (degrees)  Right Hand AROM: WFL  LUE Strength  Gross LUE Strength: Exceptions to [facility]  L Shoulder Flex: 4/5  L Elbow Flex: 4+/5  L Elbow Ext: 4+/5  L Wrist Flex: 4/5  L Wrist Ext: 4/5  L Hand General: 4+/5  RUE Strength  Gross RUE Strength: Exceptions to [facility]  R Shoulder Flex: 4/5  R Elbow Flex: 4+/5  R Elbow Ext: 4+/5  R Wrist Flex: 4/5  R Wrist Ext: 4/5  R Hand General: 4+/5     Hand Dominance  Hand Dominance: Left  Left 9-Hole Peg Test  Left 9-Hole Peg Test: Functional  Right 9-Hole Peg Test  Right 9-Hole Peg Test: Functional  Fine Motor Skills  Left 9-Hole Peg Test: Functional  Left 9 Hole Peg Test Time (secs): 47  Right 9-Hole Peg Test: Functional  Right 9 Hole Peg Test Time (secs): 58             Plan   Plan  Times per week: 5/7 days/week  Plan weeks: 3 weeks  Current Treatment Recommendations: Strengthening,ROM,Balance Training,Functional Mobility Training,Endurance Training,Neuromuscular Re-education,Equipment Evaluation, Education, & procurement,Home Management Training,Self-Care / ADL,Patient/Caregiver Education & Training,Safety Education & Training    Goals  Short term goals  Time Frame for Short term goals: 1 week- 2/3/21  Short term goal 1: Pt will perform functional transfers with CGA. Short term goal 2: Pt will complete toileting with min A. Short term goal 3: Pt will perform LB dressing with CGA.   Short term goal 4: Pt will complete grooming at sink with SPV. Long term goals  Time Frame for Long term goals : 3 weeks- 2/17/21  Long term goal 1: Pt will perform functional transfers with mod I.  Long term goal 2: Pt will complete toileting with mod I.  Long term goal 3: Pt will complete LB dressing with mod I.  Long term goal 4: Pt will perform LB dressing with mod I.  Long term goal 5: Pt will perform simple IADL task with mod I.   Patient Goals   Patient goals : \"get home, see my dogs\"       Therapy Time   Individual Concurrent Group Co-treatment   Time In 0730         Time Out 0900         Minutes 90         Timed Code Treatment Minutes: 75 Minutes (15 minute)       Melanie Christianson, OT

## 2022-01-28 NOTE — PLAN OF CARE
Problem: Nutrition  Goal: Optimal nutrition therapy  Outcome: Ongoing  Note: Nutrition Problem #1: Inadequate oral intake  Intervention: Food and/or Nutrient Delivery: Continue Current Diet,Start Oral Nutrition Supplement  Nutritional Goals: Consume 50% or greater of meals and ONS this ARU stay

## 2022-01-28 NOTE — PLAN OF CARE
ARU PATIENT TREATMENT PLAN  [address] [place] [address] 6, [address] [place] [address]   [phone]) Sergio Woodruff.     : 1960  Acct #: [de-identified]  MRN: [id number]   PHYSICIAN:  Andrea Pang MD  Primary Problem    Patient Active Problem List   Diagnosis    Ventricular tachycardia ([facility]    Presence of stent in left circumflex coronary artery    Myocardial infarction, nontransmural (HCC)    Myocardial infarction, inferoposterior wall, subsequent care    Automatic implantable cardioverter-defibrillator in situ    CAD (coronary artery disease)    Automatic implantable cardioverter-defibrillator in situ    ALIN on CPAP    Gastroesophageal reflux disease without esophagitis    Hyperlipidemia with target LDL less than 100    Hypertension due to endocrine disorder    Ischemic cardiomyopathy    Obesity, morbid, BMI 40.0-49.9 ([facility]    Weakness    Acute encephalopathy    TIA involving left internal carotid artery    DM (diabetes mellitus), secondary, uncontrolled, w/neurologic complic [facility]    Dyslipidemia    HTN (hypertension), benign    Lactic acidosis    Head trauma    Abrasion, scalp w/o infection    Unsteady gait    Hyperglycemia    Chronic obstructive pulmonary disease (HCC)    Coarse tremor    Generalized weakness    Type 2 diabetes mellitus with hyperglycemia, with long-term current use of insulin (HCC)    Essential hypertension    CAD S/P percutaneous coronary angioplasty    COVID-19    Fall    Unable to ambulate    Implantable cardioverter-defibrillator (ICD) discharge    AICD malfunction    Hypotension    Valproic acid toxicity    PAF (paroxysmal atrial fibrillation) ([facility]    COVID-19 virus infection    Disorder of electrolytes    Atrial fibrillation with RVR (HCC)    Low grade fever    Thrombocytopenia (HCC)    Abnormal chest x-ray    Encephalopathy    Debility       Rehabilitation Diagnosis:     Debility [R53.81]       ADMIT DAJ:5/46/3188    Patient Goals: \"get stronger\"    Admitting Impairments: Pt. Admitted to ARU d/t multiple falls and covid enceplopathy resulting in Decreased functional mobility ; Decreased cognition;Decreased posture;Decreased ADL status; Decreased endurance;Decreased coordination;Decreased strength;Decreased balance;Decreased safe awareness  Barriers: weakness, balance, cognition  Participation: Good     CARE PLAN     NURSING:  Chano Dias. while on this unit will:     [x] Be continent of bowel and bladder     [x] Have an adequate number of bowel movements  [x] Urinate with no urinary retention >300ml in bladder  [] Complete bladder protocol with call removal  [] Maintain O2 SATs at ___%  [x] Have pain managed while on ARU       [] Be pain free by discharge   [x] Have no skin breakdown while on ARU  [] Have improved skin integrity via wound measurements  [] Have no signs/symptoms of infection at the wound site  [x] Be free from injury during hospitalization   [] Complete education with patient/family with understanding demonstrated for:  [x] Adjustment   [] Other:   Nursing interventions may include bowel/bladder training, education for medical assistive devices, medication education, O2 saturation management, energy conservation, stress management techniques, fall prevention, alarms protocol, seating and positioning, skin/wound care, pressure relief instruction,dressing changes,  infection protection, DVT prophylaxis, and/or assistance with in room safety with transfers to bed, toilet, wheelchair, shower as well as bathroom activities and hygiene.      Patient/caregiver education for:   [x] Disease/sustained injury/management      [x] Medication Use   [] Surgical intervention   [x] Safety   [x] Body mechanics and or joint protection   [x] Health maintenance         PHYSICAL THERAPY:  Goals:                  Short term goals  Time Frame for Short term goals: 7 days 2/3  Short term goal 1: pt will complete bed mobility with supv. Short term goal 2: pt will complete functional transfer with CGA and LRAD. Short term goal 3: pt will ambulate 50 ft with LRAD and min A. Long term goals  Time Frame for Long term goals : 21 days 2/17  Long term goal 1: pt will complete bed mobility with ind. Long term goal 2: pt will complete functional transfer with mod ind and LRAD. Long term goal 3: pt will ambulate 100 ft with LRAD and supv. Long term goal 4: pt will complete car transfer with LRAD and mod ind. These goals were reviewed with this patient at the time of assessment and [address] [facility] [address] [facility] [address] in [facility]. Plan of Care: Pt to be seen 5 out of 7 days per week, 60   mins (exact) per day for 21 days (exact)                   Current Treatment Recommendations: [facility] Medicine Re-education,Patient/Caregiver Education & Training,Cognitive Reorientation,ROM,Wheelchair Mobility Training,Manual Therapy - Soft Tissue Mobilization,Equipment Evaluation, Education, & procurement,Balance Training,Gait Training,Home Exercise Program,Modalities,Functional Mobility Training,Stair training,Manual Therapy - Joint Manipulation,Safety Education & Training,Positioning      OCCUPATIONAL THERAPY:  Goals:             Short term goals  Time Frame for Short term goals: 1 week- 2/3/21  Short term goal 1: Pt will perform functional transfers with CGA. Short term goal 2: Pt will complete toileting with min A. Short term goal 3: Pt will perform LB dressing with CGA.   Short term goal 4: Pt will complete grooming at sink with SPV. :  Long term goals  Time Frame for Long term goals : 3 weeks- 2/17/21  Long term goal 1: Pt will perform functional transfers with mod I.  Long term goal 2: Pt will complete toileting with mod I.  Long term goal 3: Pt will complete LB dressing with mod I.  Long term goal 4: Pt will perform LB dressing with mod I.  Long term goal 5: Pt will perform simple IADL task with mod I. :    These goals were reviewed with this patient at the time of assessment and Jim An. is in agreement    Plan of Care:  Pt to be seen 5 out of 7 days per week, 60   mins (exact) per day for 21 days (exact)  Current Treatment Recommendations: Strengthening,ROM,Balance Training,Functional Mobility Training,Endurance Training,Neuromuscular Re-education,Equipment Evaluation, Education, & procurement,Home Management Training,Self-Care / ADL,Patient/Caregiver Education & Training,Safety Education & Training      SPEECH THERAPY: Goals will be left blank if speech is not following this patient. Goals:             Short-term Goals  Timeframe for Short-term Goals: 18 days (0214/2022)  Goal 1: Pt will complete graded recall tasks using compensatory strategies with 80% acc given min cues  Goal 2: Pt will complete executive function tasks (e.g. meds, time, money, etc) with 80% acc given min cues  Goal 3: Pt will complete problem solving and thought organization tasks with 80% acc given min cues  Goal 4: Pt will complete verbal and visual reasoning tasks with 80% acc given min cues              Timeframe for Short-term Goals: 18 days (0214/2022)    Long-term Goals  Timeframe for Long-term Goals: 21 days (02/17/2022)  Goal 1: Pt will improve overall cognitive-linguistic abilities to promote safe and independent return home PLOF     These goals were reviewed with this patient at the time of assessment and Jim An. is in agreement    Plan of Care: Pt to be seen 5 out of 7 days per week, 60 mins (exact) per day for 21 days (exact)             Dysphagia:  N/A           Speech/Language/Cognition: Compensatory strategy training and carryover, recall/STM, problem solving, reasoning, exec function, thought organization, attention.       CASE MANAGEMENT:  Goals:   Assist patient/family with discharge planning, patient/family counseling,   and coordination with insurance during ARU stay.    [facility]urs / IRF RAVIN SCORES:  ITEM CURRENT SCORE GOAL   Eating CARE Score: 6 Discharge Goal: Independent   Oral Hygiene CARE Score: 4 Discharge Goal: Independent   Toileting Hygiene CARE Score: 3 Discharge Goal: Independent   Shower/Bathe Self CARE Score: 3 Discharge Goal: Supervision or touching assistance   Upper Body Dressing CARE Score: 4 Discharge Goal: Independent   Lower Body Dressing CARE Score: 3 Discharge Goal: Independent   On/Off Footwear CARE Score: 2 Discharge Goal: Independent   Roll Left & Right CARE Score: 3 Discharge Goal: Independent   Sit to Lying  CARE Score: 4 Discharge Goal: Independent   Lying to Sitting EOB CARE Score: 2 Discharge Goal: Independent   Sit to Stand CARE Score: 1 Discharge Goal: Independent   Chair/Bed to Chair Transfer CARE Score: 1 Discharge Goal: Independent   Toilet Transfer CARE Score: 3 Discharge Goal: Independent   Car Transfer CARE Score: 1 Discharge Goal: Independent   Walk 10 Feet CARE Score: 1 Discharge Goal: Independent   Walk 50 Feet, 2 Turns CARE Score: 88 Discharge Goal: Independent   Walk 150 Feet CARE Score: 88 Discharge Goal: Independent   Walk 10 Feet, Uneven Surface CARE Score: 88 Discharge Goal: Independent   1 Step (Curb) CARE Score: 88 Discharge Goal: Supervision or touching assistance   4 Steps CARE Score: 88 Discharge Goal: Supervision or touching assistance   12 Steps CARE Score: 88 Discharge Goal: Partial/moderate assistance    Object CARE Score: 88 Discharge Goal: Independent   Wheel 50 feet, 2 turns       Wheel [facility]. will be seen a minimum of 3 hours of therapy per day, a minimum of 5 out of 7 days per week. [] In this rare instance due to the nature of this patient's medical involvement, this patient will be seen 15 hours per week (900 minutes within a 7 day period).     Treatments may include therapeutic exercises, gait training, neuromuscular re-ed, transfer training, community reintegration, bed mobility, w/c mobility and training, self care, home mgmt, cognitive training, energy conservation,dysphagia tx, speech/language/communication therapy, group therapy, and patient/family education. In addition, dietician/nutritionist may monitor calorie count as well as intake and collaboratively work with SLP on dietary upgrades. Neuropsychology/Psychology may evaluate and provide necessary support. Medical issues being managed closely and that require 24 hour availability of a physician:   [] Swallowing Precautions  [x] Bowel/Bladder Fx  [] Weight bearing precautions   [] Wound Care    [x] Pain Mgmt   [x] Infection Protection   [x] DVT Prophylaxis   [x] Fall Precautions  [x] Fluid/Electrolyte/Nutrition Balance   [] Voice Protection   [] Respiratory  [] Other:    Medical Prognosis: [x] Good  [] Fair    [] Guarded   Total expected IRF days 21  Anticipated discharge destination:    [] Home Independently   [] Home Modified Independent  [x] Home with supervision    []SNF     [] Other                                           Physician anticipated functional outcomes:  Home w/ supervision and home health services. IPOC brief synthesis: 63year old male admitted to ARU to address strengthening, endurance, balance, gait, ADLs, assistive/adaptive device needs, patient/family training, speech, language, cognition. This plan has been reviewed with César Sands on 1/28 in a language the patient understands. César Sands has had the opportunity to include input with the therapy team.      I have reviewed this initial plan of care and agree with its contents:    Title   Name    Date    Time    Physician: Jermain Salinas.  Valery Gonzalez MD 1/29/2022, 10:40 AM     Case Mgmt:Shima Donnelly RN    OT: Kali Herndon, OTR/L    PT: Ian Boyer PT, DPT     RN: Kiran Pena RN    ST: Jelani Wagner MA CCC-SLP     : Argelia Dye, OTR/L    Other:

## 2022-01-28 NOTE — CONSULTS
Comprehensive Nutrition Assessment    Type and Reason for Visit:  Initial,Consult    Nutrition Recommendations/Plan:   1. Continue carb control diet   2. Add Magic Cups BID - monitor acceptance   3. Encourage PO intakes   4. Monitor nutrition adequacy, pertinent labs, bowel habits, wt changes, and clinical progress    Nutrition Assessment:  64 yo male admitted to the ARU with debility. Hx of CAD, COPD, DM, fatty liver, HTN and HLD. Pt nutritionally compromised AEB weight loss and decreased PO intakes PTA. Ordered carb control diet with elevated blodd sugars. Pt reports good appetite today, but question as pt reports good PO PTA and no recent weight loss. Will add ONS to promote adequate PO intakes. Will continue to monitor. Malnutrition Assessment:  Malnutrition Status: At risk for malnutrition (Comment)    Context:  Acute Illness     Findings of the 6 clinical characteristics of malnutrition:  Energy Intake:  1 - 75% or less of estimated energy requirements for 7 or more days    Estimated Daily Nutrient Needs:  Energy (kcal):  5844-0467 kcal; Weight Used for Energy Requirements:  Ideal (59 kg)     Protein (g):  59-71 g; Weight Used for Protein Requirements:  Ideal (1.0-1.2 g/kg)        Fluid (ml/day):   ; Method Used for Fluid Requirements:  1 ml/kcal      Nutrition Related Findings:  9.2% weight loss in 6 months. -306 past 24 hrs. Active BS. Last BM on 1/27. Wounds:  None       Current Nutrition Therapies:    ADULT DIET;  Regular; 4 carb choices (60 gm/meal)    Anthropometric Measures:  · Height: 5' 4\" (162.6 cm)  · Current Body Weight: 218 lb (98.9 kg)   · Usual Body Weight: 240 lb (108.9 kg) (bed scale 7/13/21)     · Ideal Body Weight: 130 lbs; % Ideal Body Weight 167.7 %   · BMI: 37.4  · BMI Categories: Obese Class 2 (BMI 35.0 -39.9)       Nutrition Diagnosis:   · Inadequate oral intake related to inadequate protein-energy intake as evidenced by poor intake prior to Jhon Buster

## 2022-01-28 NOTE — H&P
Pain management procedure (Bilateral, 6/9/2020); Nerve Surgery (Bilateral, 12/1/2020); and Pain management procedure (Left, 1/12/2021). reports that he quit smoking about 20 years ago. His smoking use included cigarettes. He has a 40.00 pack-year smoking history. He has never used smokeless tobacco. He reports that he does not drink alcohol and does not use drugs. family history includes Cancer in his father; Diabetes in his maternal grandmother; Heart Disease in his mother; Heart Failure in his maternal uncle and mother; Hypertension in his maternal uncle.     Current Facility-Administered Medications   Medication Dose Route Frequency Provider Last Rate Last Admin    acetaminophen (TYLENOL) tablet 650 mg  650 mg Oral Q6H PRN Reba Main MD   650 mg at 01/27/22 1714    Or    acetaminophen (TYLENOL) suppository 650 mg  650 mg Rectal Q6H PRN Reba Main MD        polyethylene glycol (GLYCOLAX) packet 17 g  17 g Oral Daily PRN Reba Main MD        dextrose 5 % solution  100 mL/hr IntraVENous PRN Reba Main MD        dextrose 50 % IV solution  12.5 g IntraVENous PRN Reba Main MD        glucagon (rDNA) injection 1 mg  1 mg IntraMUSCular PRN Reba Main MD        glucose (GLUTOSE) 40 % oral gel 15 g  15 g Oral PRN Reba Main MD        0.9 % sodium chloride infusion  25 mL IntraVENous PRN Reba Main MD        albuterol (PROVENTIL) nebulizer solution 2.5 mg  2.5 mg Nebulization Q4H PRN Reba Main MD        albuterol sulfate  (90 Base) MCG/ACT inhaler 2 puff  2 puff Inhalation BID Reba Main MD   2 puff at 01/27/22 2015    apixaban (ELIQUIS) tablet 5 mg  5 mg Oral BID Reba Main MD   5 mg at 01/27/22 2041    atorvastatin (LIPITOR) tablet 80 mg  80 mg Oral Daily Reba Main MD        budesonide-formoterol [facility]) 160-4.5 MCG/ACT inhaler 2 puff  2 puff Inhalation BID Reba Main MD   2 puff at 01/27/22 2013    clopidogrel (PLAVIX) tablet 75 mg  75 mg Oral Daily Samantha Hightower MD        dexamethasone (DECADRON) tablet 6 mg  6 mg Oral Daily Samantha Hightower MD        divalproex (DEPAKOTE) DR tablet 1,000 mg  1,000 mg Oral QPM Samantha Hightower MD   1,000 mg at 01/27/22 1648    DULoxetine (CYMBALTA) extended release capsule 60 mg  60 mg Oral Daily Samantha Hightower MD        fluticasone (FLONASE) 50 MCG/ACT nasal spray 2 spray  2 spray Each Nostril Daily Samantha Hightower MD        gabapentin (NEURONTIN) capsule 200 mg  200 mg Oral TID Samantha Hightower MD   200 mg at 01/27/22 2041    hydrALAZINE (APRESOLINE) tablet 50 mg  50 mg Oral Q6H PRN Samantha Hightower MD        insulin glargine (LANTUS) injection vial 60 Units  60 Units SubCUTAneous Nightly Samantha Hightower MD   60 Units at 01/27/22 2304    insulin lispro (HUMALOG) injection vial 0-18 Units  0-18 Units SubCUTAneous TID WC Samantha Hightower MD   3 Units at 01/28/22 2429    insulin lispro (HUMALOG) injection vial 0-9 Units  0-9 Units SubCUTAneous Nightly Samantha Hightower MD   5 Units at 01/27/22 2303    lisinopril (PRINIVIL;ZESTRIL) tablet 5 mg  5 mg Oral Daily Samantha Hightower MD        melatonin disintegrating tablet 10 mg  10 mg Oral Nightly Samantha Hightower MD   10 mg at 01/27/22 2041    metoprolol tartrate (LOPRESSOR) tablet 50 mg  50 mg Oral BID Samantha Hightower MD   50 mg at 01/27/22 2042    ondansetron (ZOFRAN-ODT) disintegrating tablet 8 mg  8 mg Oral Q8H PRN Samantha Hightower MD        pantoprazole (PROTONIX) tablet 40 mg  40 mg Oral Daily Samantha Hightower MD        potassium chloride (KLOR-CON M) extended release tablet 20 mEq  20 mEq Oral Daily with breakfast Samantha Hightower MD        QUEtiapine (SEROQUEL) tablet 50 mg  50 mg Oral Daily with breakfast Samantha Hightower MD        QUEtiapine (SEROQUEL) tablet 75 mg  75 mg Oral Nightly Samantha Hightower MD   75 mg at 01/27/22 2041    sodium chloride flush 0.9 % injection 5-40 mL 5-40 mL IntraVENous 2 times per day Saint Skeans, MD   10 mL at 01/27/22 2125    sodium chloride flush 0.9 % injection 5-40 mL  5-40 mL IntraVENous PRN Saint Skeans, MD        traZODone (DESYREL) tablet 100 mg  100 mg Oral Nightly PRN Saint Skeans, MD   100 mg at 01/27/22 2041    traZODone (DESYREL) tablet 50 mg  50 mg Oral Nightly PRN Saint Skeans, MD        dextrose bolus (hypoglycemia) 10% 125 mL  125 mL IntraVENous PRN Saint Skeans, MD        Or    dextrose bolus (hypoglycemia) 10% 250 mL  250 mL IntraVENous PRN Saint Skeans, MD        influenza quadrivalent split vaccine (FLUZONE;FLUARIX;FLULAVAL;AFLURIA) injection 0.5 mL  0.5 mL IntraMUSCular Prior to discharge Saint Skeans, MD           Allergies   Allergen Reactions    Other      VASCEPA CAUSED RASH AND ITCHING    Pcn [Penicillins] Hives       Current Facility-Administered Medications   Medication Dose Route Frequency Provider Last Rate Last Admin    acetaminophen (TYLENOL) tablet 650 mg  650 mg Oral Q6H PRN Saint Skeans, MD   650 mg at 01/27/22 1714    Or    acetaminophen (TYLENOL) suppository 650 mg  650 mg Rectal Q6H PRN Saint Skeans, MD        polyethylene glycol (GLYCOLAX) packet 17 g  17 g Oral Daily PRN Saint Skeans, MD        dextrose 5 % solution  100 mL/hr IntraVENous PRN Saint Skeans, MD        dextrose 50 % IV solution  12.5 g IntraVENous PRN Saint Skeans, MD        glucagon (rDNA) injection 1 mg  1 mg IntraMUSCular PRN Saint Skeans, MD        glucose (GLUTOSE) 40 % oral gel 15 g  15 g Oral PRN Saint Skeans, MD        0.9 % sodium chloride infusion  25 mL IntraVENous PRN Saint Skeans, MD        albuterol (PROVENTIL) nebulizer solution 2.5 mg  2.5 mg Nebulization Q4H PRN Saint Skeans, MD        albuterol sulfate  (90 Base) MCG/ACT inhaler 2 puff  2 puff Inhalation BID Saint Skeans, MD   2 puff at 01/27/22 2015    apixaban (ELIQUIS) tablet 5 mg  5 mg Oral BID Mya Smith MD   5 mg at 01/27/22 2041    atorvastatin (LIPITOR) tablet 80 mg  80 mg Oral Daily Mya Smith MD        budesonide-formoterol [facility]) 160-4.5 MCG/ACT inhaler 2 puff  2 puff Inhalation BID Mya Smith MD   2 puff at 01/27/22 2013    clopidogrel (PLAVIX) tablet 75 mg  75 mg Oral Daily Mya Smith MD        dexamethasone (DECADRON) tablet 6 mg  6 mg Oral Daily Mya Smith MD        divalproex (DEPAKOTE) DR tablet 1,000 mg  1,000 mg Oral QPM Mya Smith MD   1,000 mg at 01/27/22 1648    DULoxetine (CYMBALTA) extended release capsule 60 mg  60 mg Oral Daily Mya Smith MD        fluticasone (FLONASE) 50 MCG/ACT nasal spray 2 spray  2 spray Each Nostril Daily Mya Smith MD        gabapentin (NEURONTIN) capsule 200 mg  200 mg Oral TID Mya Smith MD   200 mg at 01/27/22 2041    hydrALAZINE (APRESOLINE) tablet 50 mg  50 mg Oral Q6H PRN Mya Smith MD        insulin glargine (LANTUS) injection vial 60 Units  60 Units SubCUTAneous Nightly Mya Smith MD   60 Units at 01/27/22 2304    insulin lispro (HUMALOG) injection vial 0-18 Units  0-18 Units SubCUTAneous TID  Mya Smith MD   3 Units at 01/28/22 4780    insulin lispro (HUMALOG) injection vial 0-9 Units  0-9 Units SubCUTAneous Nightly Mya Smith MD   5 Units at 01/27/22 2303    lisinopril (PRINIVIL;ZESTRIL) tablet 5 mg  5 mg Oral Daily Mya Smith MD        melatonin disintegrating tablet 10 mg  10 mg Oral Nightly Mya Smith MD   10 mg at 01/27/22 2041    metoprolol tartrate (LOPRESSOR) tablet 50 mg  50 mg Oral BID Mya Smith MD   50 mg at 01/27/22 2042    ondansetron (ZOFRAN-ODT) disintegrating tablet 8 mg  8 mg Oral Q8H PRN Mya Smith MD        pantoprazole (PROTONIX) tablet 40 mg  40 mg Oral Daily Mya Smith MD        potassium chloride (KLOR-CON M) extended release tablet 20 mEq  20 mEq Oral Daily with breakfast Jorge Melton MD        QUEtiapine (SEROQUEL) tablet 50 mg  50 mg Oral Daily with breakfast Jorge Melton MD        QUEtiapine (SEROQUEL) tablet 75 mg  75 mg Oral Nightly Jorge Melton MD   75 mg at 01/27/22 2041    sodium chloride flush 0.9 % injection 5-40 mL  5-40 mL IntraVENous 2 times per day Jorge Melton MD   10 mL at 01/27/22 2125    sodium chloride flush 0.9 % injection 5-40 mL  5-40 mL IntraVENous PRN Jorge Melton MD        traZODone (DESYREL) tablet 100 mg  100 mg Oral Nightly PRN Jorge Melotn MD   100 mg at 01/27/22 2041    traZODone (DESYREL) tablet 50 mg  50 mg Oral Nightly PRN Jorge Melton MD        dextrose bolus (hypoglycemia) 10% 125 mL  125 mL IntraVENous PRN Jorge Melton MD        Or    dextrose bolus (hypoglycemia) 10% 250 mL  250 mL IntraVENous PRN Jorge Melton MD        influenza quadrivalent split vaccine (FLUZONE;FLUARIX;FLULAVAL;AFLURIA) injection 0.5 mL  0.5 mL IntraMUSCular Prior to discharge Jorge Melton MD             REVIEW OF SYSTEMS:   CONSTITUTIONAL: negative for fevers, chills, diaphoresis, activity change, appetite change, fatigue, night sweats and unexpected weight change. EYES: negative for blurred vision, eye discharge, visual disturbance and icterus. HEENT: negative for hearing loss, tinnitus, ear drainage, sinus pressure, nasal congestion, epistaxis and snoring. RESPIRATORY: Negative for hemoptysis, cough, sputum production. CARDIOVASCULAR: negative for chest pain, palpitations, exertional chest pressure/discomfort, edema, syncope. GASTROINTESTINAL: negative for nausea, vomiting, diarrhea, constipation, blood in stool and abdominal pain. GENITOURINARY: negative for frequency, dysuria, urinary incontinence, decreased urine volume, and hematuria. HEMATOLOGIC/LYMPHATIC: negative for easy bruising, bleeding and lymphadenopathy.    ALLERGIC/IMMUNOLOGIC: negative for recurrent infections, angioedema, anaphylaxis and drug reactions. ENDOCRINE: negative for weight changes and diabetic symptoms including polyuria, polydipsia and polyphagia. MUSCULOSKELETAL: negative for pain, joint swelling, decreased range of motion and muscle weakness. NEUROLOGICAL: negative for headaches, slurred speech, unilateral weakness. PSYCHIATRIC/BEHAVIORAL: negative for hallucinations, behavioral problems, confusion and agitation. All pertinent positives are noted in the HPI. Physical Examination:  Vitals:   Patient Vitals for the past 24 hrs:   BP Temp Temp src Pulse Resp SpO2 Height Weight   01/28/22 0744 114/64 97.5 °F (36.4 °C) Oral 70 -- 95 % -- --   01/27/22 2042 134/72 97.8 °F (36.6 °C) Axillary 80 18 94 % -- --   01/27/22 1412 125/68 97.3 °F (36.3 °C) Axillary 74 18 96 % 5' 4\" (1.626 m) 218 lb 6 oz (99.1 kg)     Psych: Stable mood, normal judgement, normal affect   Const: No distress  Eyes: Conjunctiva noninjected, no icterus noted; pupils equal, round, and reactive to light. HENT: Atraumatic, normocephalic; Oral mucosa moist  Neck: Trachea midline, neck supple. No thyromegaly noted. CV: Regular rate and rhythm, no murmur rub or gallop noted  Resp: Lungs clear to auscultation bilaterally, no rales wheezes or ronchi, no retractions. Respirations unlabored. GI: Soft, nontender, nondistended. Normal bowel sounds. No palpable masses. Neuro: Alert, oriented, appropriate. No cranial nerve deficits appreciated. Sensation intact to light touch. Motor examination reveals normal strength in all four limbs diffusely. No abnormalities with finger/nose or heel/shin noted. Reflexes normal and symmetric. Skin: Normal temperature and turgor  MSK: No joint abnormalities noted. Ext: No significant edema appreciated. No varicosities.     Lab Results   Component Value Date    WBC 3.8 (L) 01/28/2022    HGB 13.1 (L) 01/28/2022    HCT 39.0 (L) 01/28/2022    MCV 84.5 01/28/2022     01/28/2022     Lab Results   Component Value Date INR 0.94 01/16/2022    INR 0.91 01/16/2022    INR 0.90 07/13/2021    PROTIME 10.6 01/16/2022    PROTIME 10.3 01/16/2022    PROTIME 10.1 07/13/2021     Lab Results   Component Value Date    CREATININE 0.6 (L) 01/28/2022    BUN 8 01/28/2022     01/28/2022    K 3.8 01/28/2022    CL 99 01/28/2022    CO2 28 01/28/2022     Lab Results   Component Value Date    ALT 28 01/28/2022    AST 21 01/28/2022    ALKPHOS 55 01/28/2022    BILITOT 0.6 01/28/2022       XR CHEST PORTABLE   Final Result   NG tube with the tip and side-port in the stomach. Bibasilar atelectasis.         XR CHEST PORTABLE   Final Result   NG tube is difficult to visualize, tip is likely below the diaphragm. Repeat   exam could be considered if clinically indicated.           XR CHEST PORTABLE   Final Result   New elevated right hemidiaphragm, likely atelectasis or possibly infiltrate. Subpulmonic effusion not excluded. No overt failure.           CT HEAD WO CONTRAST   Final Result   1. No acute intracranial abnormality.           CT Head WO Contrast   Final Result   No acute intracranial abnormality.           XR CHEST PORTABLE   Final Result   No acute cardiopulmonary process. The above laboratory data have been reviewed. The above imaging data have been reviewed. The above medical testing have been reviewed. Body mass index is 37.48 kg/m². Barriers to Discharge: weakness, balance, cognition    POST ADMISSION PHYSICIAN EVALUATION  The patient has agreed to being admitted to our comprehensive inpatient  rehabilitation facility consisting of at least 180 minutes of therapy a day,  5 out of 7 days a week. The patient/family has a good understanding of our discharge process.  The  patient has potential to make improvement and is in need of at least two of  the following multidisciplinary therapies including but not limited to  physical, occupational, respiratory, and speech, nutritional services, wound care, and prosthetics and orthotics. Given the patients complex condition  and risk of further medical complications, rehabilitation services cannot be  safely provided at a lower level of care such as a skilled nursing facility. I have compared the patients medical and functional status at the time of the  preadmission screening and the same on this date, and there are no significant changes. By signing this document, I acknowledge that I have personally performed a  full physical examination on this patient within 24 hours of admission to  this inpatient rehabilitation facility and have determined the patient to be  able to tolerate the above course of treatment at an intensive level for a  reasonable period of time. I will be completing a detailed individualized  Plan of Care for this patient by day four of the patients stay based upon the  Preadmission Screen, this Post-Admission Evaluation, and the therapy  evaluations. Rehabilitation Diagnosis:  Brain, 2.1, Non-Traumatic    Assessment and Plan:  Acute encephalopathy secondary to covid  - continue scheduled seroquel, wean as able  - Consolidate sleep  - SLP    Afib/RVR  - continue beta blocker  - ICD adjusted per cardiology after inappropriate firing  - continue eliquis    Covid positive  - completed isolation  - completed decadron      DM  - lantus, SSI    Thrombocytopenia  - follow    CAD with h/o stent x 3, h/o MI, h/o VT  - asa, statin, plavix    CHF  - euvolemic  - continue medical management  - resume lasix twice weeklly if needed  - uptitrate acei if needed    Asthma  - inhalers as ordered    Hyperlipidemia  - statin    GERD  - PPI    Lumbar spondylosis  - f/u Dr. Briseyda Barajas    Morbid obesity  - dietary consulted    Depression  - cymbalta    Bowels: Schedule stool softener. Follow bowel movements. Enema or suppository if needed. Bladder: Check PVR x 3. [address] if PVR > 200ml or if any volume is > 500 ml. Sleep: Trazodone provided prn.      Follow up appointments: PCP, Dr. Ruby Montoya.  Erin Wells MD 1/28/2022, 9:01 AM

## 2022-01-28 NOTE — PROGRESS NOTES
Speech Language Pathology  Facility/Department: [facility] ARU  Initial Speech/Language/Cognitive Assessment    NAME: Gallito Michaud. : 1960   MRN: [id number]  ADMISSION DATE: 2022  ADMITTING DIAGNOSIS: has Ventricular tachycardia ([address] Presence of stent in left circumflex coronary artery; Myocardial infarction, nontransmural ([address] Myocardial infarction, inferoposterior wall, subsequent care; Automatic implantable cardioverter-defibrillator in situ; CAD (coronary artery disease); Automatic implantable cardioverter-defibrillator in situ; ALIN on CPAP; Gastroesophageal reflux disease without esophagitis; Hyperlipidemia with target LDL less than 100; Hypertension due to endocrine disorder; Ischemic cardiomyopathy; Obesity, morbid, BMI 40.0-49.9 ([address] Weakness; Acute encephalopathy; TIA involving left internal carotid artery; DM (diabetes mellitus), secondary, uncontrolled, w/neurologic complic ([address] Dyslipidemia; HTN (hypertension), benign; Lactic acidosis; Head trauma; Abrasion, scalp w/o infection; Unsteady gait; Hyperglycemia; Chronic obstructive pulmonary disease ([address] Coarse tremor; Generalized weakness; Type 2 diabetes mellitus with hyperglycemia, with long-term current use of insulin ([address] Essential hypertension; CAD S/P percutaneous coronary angioplasty; COVID-19; Fall; Unable to ambulate; Implantable cardioverter-defibrillator (ICD) discharge; AICD malfunction; Hypotension; Valproic acid toxicity; PAF (paroxysmal atrial fibrillation) ([address] COVID-19 virus infection; Disorder of electrolytes; Atrial fibrillation with RVR ([address] Low grade fever; Thrombocytopenia ([address] Abnormal chest x-ray; Encephalopathy; and Debility on their problem list.  DATE ONSET: Admitted to [facility] on 2022    Date of Eval: 2022   Evaluating Therapist: CHANTELLE Estrada    RECENT RESULTS  CT OF HEAD/MRI: 2022  Impression   1. No acute intracranial abnormality.        Primary Complaint:  Patient Complaint: Pt denies cognitive changes.      Pt's goals: \"to get straightened up after this COVID\"     Pain: Denies     Vitals/labs:   Temp: n/a  SpO2: 94%  RR: 16  BP: 113/65  HR: 65    Assessment:  Cognitive Diagnosis: Moderate cognitive-linguistic deficits     Per MD H&P: \"Mr. Didi Harrington is a 63year old male admitted from [facility] where he presented with weakness and falls in the context of underlying DM, CHF, CAD, chronic back pain. He was found to be covid positive, vaccinated, no booster. He developed afib with RVR and was shocked by his pacer. His ICD was adjusted by cardiology. He was continued on beta blockers. He developed encephalopathy presumably secondary to covid, and did intermittently require restraints; he was started on seroquel. He was treated with decadron for his positive covid test. This morning he has no new complaints. \"     Pt alert and cooperative, agreeable to eval. Pt upright in bed for session. Pt reports he lives alone in an apartment; his brother lives in the same apartment complex. Pt helps take care of his brother. Pt reports being independent with med management, finances, cooking, cleaning, laundry, grocery shopping. Pt is an active . 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. Pt enjoys fishing. SLP administered the SLUMS for assessment of cognitive-linguistic abilities. Pt scored 17/30 points, which is judged to be a moderate impairment when considering prior level of independence and high level of function. Results as follows:  -orientation: 3/3   -calculation: 13  -divergent namin/3  -delayed recall:   -backward number repetition: 0/2  -clock drawin/4  -following commands: 2/2  -auditory comp / paragraph retention:     Overall, pt's areas of strength include following simple commands, word finding, orientation. Pt's areas of deficit include recall, thought organization, problem solving, executive function, reasoning.  Pt overall with slow thought processing and reduced insight into deficits. Pt will benefit from continued speech therapy to promote improvement in these areas and achieve safe and independent return home at [facility] if safe and able. Discussed areas of deficit, goals, POC for ST with pt who is in agreement for participation in therapy. Recommendations:  Requires SLP Intervention: Yes  Duration/Frequency of Treatment: 5x/week for LOS  D/C Recommendations: To be determined    Plan:   Goals:  Short-term Goals  Timeframe for Short-term Goals: 18 days (0214/2022)  Goal 1: Pt will complete graded recall tasks using compensatory strategies with 80% acc given min cues  Goal 2: Pt will complete executive function tasks (e.g. meds, time, money, etc) with 80% acc given min cues  Goal 3: Pt will complete problem solving and thought organization tasks with 80% acc given min cues  Goal 4: Pt will complete verbal and visual reasoning tasks with 80% acc given min cues  Long-term Goals  Timeframe for Long-term Goals: 21 days (02/17/2022)  Goal 1: Pt will improve overall cognitive-linguistic abilities to promote safe and independent return home PLOF   Patient/family involved in developing goals and treatment plan: yes    Subjective:   Previous level of function and limitations: Lives alone. Independent with ADLs, meds, finances. Active . General  Chart Reviewed: Yes  Patient assessed for rehabilitation services?: Yes  Additional Pertinent Hx: DMII, MI, CHF, CAD s/p stent x3, GERD  Family / Caregiver Present: No  General Comment  Comments: Chart reviewed for completion of cog eval  Subjective  Subjective: Pt alert and cooperative, agreeable to eval. Pt upright in bed for session.   Social/Functional History  Lives With: Alone  Type of Home: Apartment  ADL Assistance: Independent  Homemaking Assistance: Independent  Homemaking Responsibilities: Yes  Laundry Responsibility: Primary  Cleaning Responsibility: Primary  Bill Paying/Finance Responsibility: Primary  Shopping Responsibility: Primary  Ambulation Assistance: Independent  Transfer Assistance: Independent  Active : Yes  Mode of Transportation: Truck;Van  Occupation: On disability  Type of occupation: Used to work at [employer]: [id number]  Vision  Vision: Impaired  Vision Exceptions: Wears glasses at all times  Hearing  Hearing: Within functional limits           Objective:     Oral/Motor  Oral Motor: Within functional limits    Auditory Comprehension  Comprehension: Exceptions  Interfering Components: Attention - sustained;Processing speed; Working memory    Reading Comprehension  Reading Status:  (did not assess)    Expression  Primary Mode of Expression: Verbal    Verbal Expression  Verbal Expression: Within functional limits    Written Expression  Dominant Hand: Right  Written Expression:  (did not assess)    Motor Speech  Motor Speech: Within Functional Limits    Pragmatics/Social Functioning  Pragmatics: Within functional limits    Cognition:      Orientation  Overall Orientation Status: Within Normal Limits  Attention  Attention: Exceptions to [facility]  Memory  Memory: [facility] to [facility]  Short-term Memory: Mild  Working Memory: Moderate  Problem Solving  Problem Solving: Exceptions to [facility]  Complex Functional Tasks: Moderate  Managing Finances: To be assessed in therapy  Managing Medications: To be assessed in therapy  Simple Functional Tasks: To be assessed in therapy  Verbal Reasoning Skills: To be assessed in therapy  Numeric Reasoning  Numeric Reasoning: Exceptions to [facility]   Calculations: To be assessed in therapy  Money Management:  To be assessed in therapy  Time: To be assessed in therapy  Abstract Reasoning  Abstract Reasoning: Exceptions to [facility]  Safety/Judgement  Safety/Judgement: Exceptions to [facility]  Insight: Moderate      Prognosis:  Speech Therapy Prognosis  Prognosis: Good  Individuals consulted  Consulted and agree with results and recommendations: Patient;RN    Education:  Patient Education: SLP edu pt re: role of SLP, rationale of cog eval, goals, POC  Patient Education Response: Verbalizes understanding;Needs reinforcement  Safety Devices in place: Yes  Type of devices: All fall risk precautions in place;Call light within reach; Left in bed;Patient at risk for falls; Bed alarm in place    Therapy Time:   Individual   Time In 1230   Time Out 1315   Minutes 55 Liliane Montoya [facility]  Speech Language Pathologist

## 2022-01-28 NOTE — CARE COORDINATION
Notification of admission sent to duglas Orourke MPH, RRT  Clinical Liaison [address]  (U)[phone] (k)[phone]   Electronically signed by Mariposa Orourke on 1/28/2022 at 3:34 PM

## 2022-01-28 NOTE — PROGRESS NOTES
Speech Language Pathology  Facility/Department: [facility]   CLINICAL BEDSIDE SWALLOW EVALUATION    NAME: [address] : 1960  MRN: [id number]    ADMISSION DATE: 2022  ADMITTING DIAGNOSIS: has Ventricular tachycardia ([address] Presence of stent in left circumflex coronary artery; Myocardial infarction, nontransmural ([address] Myocardial infarction, inferoposterior wall, subsequent care; Automatic implantable cardioverter-defibrillator in situ; CAD (coronary artery disease); Automatic implantable cardioverter-defibrillator in situ; ALIN on CPAP; Gastroesophageal reflux disease without esophagitis; Hyperlipidemia with target LDL less than 100; Hypertension due to endocrine disorder; Ischemic cardiomyopathy; Obesity, morbid, BMI 40.0-49.9 ([address] Weakness; Acute encephalopathy; TIA involving left internal carotid artery; DM (diabetes mellitus), secondary, uncontrolled, w/neurologic complic ([address] Dyslipidemia; HTN (hypertension), benign; Lactic acidosis; Head trauma; Abrasion, scalp w/o infection; Unsteady gait; Hyperglycemia; Chronic obstructive pulmonary disease ([address] Coarse tremor; Generalized weakness; Type 2 diabetes mellitus with hyperglycemia, with long-term current use of insulin ([address] Essential hypertension; CAD S/P percutaneous coronary angioplasty; COVID-19; Fall; Unable to ambulate; Implantable cardioverter-defibrillator (ICD) discharge; AICD malfunction; Hypotension; Valproic acid toxicity; PAF (paroxysmal atrial fibrillation) ([address] COVID-19 virus infection; Disorder of electrolytes; Atrial fibrillation with RVR ([address] Low grade fever; Thrombocytopenia ([address] Abnormal chest x-ray; Encephalopathy; and Debility on their problem list.  ONSET DATE: Admitted to [facility] on 2022    Recent Chest Xray/CT of Chest: 2022  Impression   NG tube with the tip and side-port in the stomach.  Bibasilar atelectasis.        Date of Eval: 2022  Evaluating Therapist: Yamile Rogel SLP    Current Diet level:  Current Diet : Regular  Current Liquid Diet : Thin    Primary Complaint  Patient Complaint: Pt denies s/s of dysphagia. Denies coughing/choking, pain when swallowing, globus sensation, difficulty masticating solids. Pt's goals: \"to get straightened up after this COVID\"    Pain: Denies    Vitals/labs:   Temp: n/a  SpO2: 94%  RR: 16  BP: 113/65  HR: 65      Reason for Referral  Elvira Tomlinson was referred for a bedside swallow evaluation to assess the efficiency of his swallow function, identify signs and symptoms of aspiration and make recommendations regarding safe dietary consistencies, effective compensatory strategies, and safe eating environment. Impression  Dysphagia Diagnosis: Swallow function appears grossly intact  Dysphagia Outcome Severity Scale: Level 7: Normal in all situations     Per MD H&P: \"Mr. Cleven Oppenheim is a 63year old male admitted from [facility] where he presented with weakness and falls in the context of underlying DM, CHF, CAD, chronic back pain. He was found to be covid positive, vaccinated, no booster. He developed afib with RVR and was shocked by his pacer. His ICD was adjusted by cardiology. He was continued on beta blockers. He developed encephalopathy presumably secondary to covid, and did intermittently require restraints; he was started on seroquel. He was treated with decadron for his positive covid test. This morning he has no new complaints. \"    Pt alert and cooperative, agreeable to eval. Pt upright in bed for session. Pt seen for dysphagia during admission at [facility] - recommended regular solids, thin liquids. All dysphagia goals met. Pt denies difficulty swallowing. Oral [facility] exam WFL. Pt with some missing teeth. Thin liquid via straw, puree, and regular solid PO trials. Adequate mastication, A-P transit, oral clearance. Suspect timely trigger of pharyngeal swallow and adequate laryngeal elevation upon palpation of anterior neck.  No overt s/s of aspiration. Edu re: safe swallow strategies. Pt verbalized understanding. SLP recommends continue regular solids, thin liquids, meds PO. RN made aware of recs. Pt's swallowing function appears WFL. Thus, further ST for dysphagia not warranted. Pt to be seen for cog tx only. Treatment Plan  Requires SLP Intervention: No  Duration/Frequency of Treatment: No further ST warranted for dysphagia. Pt to be seen for cog tx only. D/C Recommendations: To be determined       Recommended Diet and Intervention  Diet Solids Recommendation: Regular  Liquid Consistency Recommendation: Thin  Recommended Form of Meds: Whole with water    Compensatory Swallowing Strategies  Compensatory Swallowing Strategies: Small bites/sips;Upright as possible for all oral intake;Remain upright for 30-45 minutes after meals; Alternate solids and liquids;Eat/Feed slowly    Treatment/Goals  Short-term Goals  Timeframe for Short-term Goals: N/A  Long-term Goals  Timeframe for Long-term Goals: N/A    General  Comments: Chart reviewed for completion of BSE  Subjective  Subjective: Pt agreeable to BSE  Behavior/Cognition: Alert; Cooperative; Requires cueing  Respiratory Status: Room air  O2 Device: None (Room air)  Communication Observation: Functional  Follows Directions: Simple  Dentition: Adequate; Some missing teeth  Patient Positioning: Upright in bed  Baseline Vocal Quality: Normal  Volitional Cough: Strong  Prior Dysphagia History: Pt seen for BSE at [facility] - d/c and met all goals on 01/24/2022  Consistencies Administered: Dysphagia Pureed (Dysphagia I); Reg solid; Thin - straw      Vision/Hearing  Vision  Vision: Impaired  Vision Exceptions: Wears glasses at all times  Hearing  Hearing: Within functional limits    Oral Motor Deficits  Oral/Motor  Oral Motor: Within functional limits    Oral Phase Dysfunction  Oral Phase  Oral Phase: WNL  Oral Phase  Oral Phase - Comment: Adequate mastication, A-P transit, oral clearace.      Indicators of Pharyngeal Phase Dysfunction   Pharyngeal Phase  Pharyngeal Phase: WNL  Pharyngeal Phase   Pharyngeal: Suspect timelyt trigger of pharyngeal swallow and adequate laryngeal elevation. No overt s/s of aspiration. Prognosis  Prognosis  Prognosis for safe diet advancement: good  Individuals consulted  Consulted and agree with results and recommendations: Patient;RN    Education  Patient Education: SLP edu pt re: role of SLP, rationale of BSE, risk of aspiration, safe swallow strategies, diet recs, POC  Patient Education Response: Verbalizes understanding;Needs reinforcement  Safety Devices in place: Yes  Type of devices: All fall risk precautions in place;Call light within reach; Left in bed;Patient at risk for falls; Bed alarm in place       Therapy Time  SLP Individual Minutes  Time In: 0672  Time Out: [address]  Minutes: [facility]  Speech Language Pathologist

## 2022-01-29 LAB
GLUCOSE BLD-MCNC: 195 MG/DL (ref 70–99)
GLUCOSE BLD-MCNC: 204 MG/DL (ref 70–99)
GLUCOSE BLD-MCNC: 252 MG/DL (ref 70–99)
GLUCOSE BLD-MCNC: 287 MG/DL (ref 70–99)
PERFORMED ON: ABNORMAL

## 2022-01-29 PROCEDURE — 6360000002 HC RX W HCPCS: Performed by: PHYSICAL MEDICINE & REHABILITATION

## 2022-01-29 PROCEDURE — 97530 THERAPEUTIC ACTIVITIES: CPT

## 2022-01-29 PROCEDURE — 97112 NEUROMUSCULAR REEDUCATION: CPT

## 2022-01-29 PROCEDURE — 97116 GAIT TRAINING THERAPY: CPT

## 2022-01-29 PROCEDURE — 97110 THERAPEUTIC EXERCISES: CPT

## 2022-01-29 PROCEDURE — 97130 THER IVNTJ EA ADDL 15 MIN: CPT

## 2022-01-29 PROCEDURE — 1280000000 HC REHAB R&B

## 2022-01-29 PROCEDURE — 97129 THER IVNTJ 1ST 15 MIN: CPT

## 2022-01-29 PROCEDURE — 6370000000 HC RX 637 (ALT 250 FOR IP): Performed by: PHYSICAL MEDICINE & REHABILITATION

## 2022-01-29 RX ADMIN — METOPROLOL TARTRATE 50 MG: 50 TABLET, FILM COATED ORAL at 21:03

## 2022-01-29 RX ADMIN — GABAPENTIN 200 MG: 100 CAPSULE ORAL at 21:02

## 2022-01-29 RX ADMIN — GABAPENTIN 200 MG: 100 CAPSULE ORAL at 13:15

## 2022-01-29 RX ADMIN — LISINOPRIL 5 MG: 5 TABLET ORAL at 08:36

## 2022-01-29 RX ADMIN — APIXABAN 5 MG: 5 TABLET, FILM COATED ORAL at 21:02

## 2022-01-29 RX ADMIN — QUETIAPINE FUMARATE 50 MG: 25 TABLET ORAL at 08:36

## 2022-01-29 RX ADMIN — INSULIN GLARGINE 60 UNITS: 100 INJECTION, SOLUTION SUBCUTANEOUS at 21:05

## 2022-01-29 RX ADMIN — GABAPENTIN 200 MG: 100 CAPSULE ORAL at 08:36

## 2022-01-29 RX ADMIN — INSULIN LISPRO 6 UNITS: 100 INJECTION, SOLUTION INTRAVENOUS; SUBCUTANEOUS at 11:49

## 2022-01-29 RX ADMIN — CLOPIDOGREL BISULFATE 75 MG: 75 TABLET, FILM COATED ORAL at 08:35

## 2022-01-29 RX ADMIN — PANTOPRAZOLE SODIUM 40 MG: 40 TABLET, DELAYED RELEASE ORAL at 08:35

## 2022-01-29 RX ADMIN — POTASSIUM CHLORIDE 20 MEQ: 20 TABLET, EXTENDED RELEASE ORAL at 08:35

## 2022-01-29 RX ADMIN — TRAZODONE HYDROCHLORIDE 100 MG: 50 TABLET ORAL at 21:03

## 2022-01-29 RX ADMIN — METOPROLOL TARTRATE 50 MG: 50 TABLET, FILM COATED ORAL at 08:35

## 2022-01-29 RX ADMIN — DIVALPROEX SODIUM 1000 MG: 250 TABLET, DELAYED RELEASE ORAL at 16:12

## 2022-01-29 RX ADMIN — INSULIN LISPRO 3 UNITS: 100 INJECTION, SOLUTION INTRAVENOUS; SUBCUTANEOUS at 06:15

## 2022-01-29 RX ADMIN — ATORVASTATIN CALCIUM 80 MG: 80 TABLET, FILM COATED ORAL at 08:36

## 2022-01-29 RX ADMIN — INSULIN LISPRO 9 UNITS: 100 INJECTION, SOLUTION INTRAVENOUS; SUBCUTANEOUS at 16:12

## 2022-01-29 RX ADMIN — INSULIN LISPRO 5 UNITS: 100 INJECTION, SOLUTION INTRAVENOUS; SUBCUTANEOUS at 21:05

## 2022-01-29 RX ADMIN — Medication 10 MG: at 21:02

## 2022-01-29 RX ADMIN — QUETIAPINE FUMARATE 75 MG: 25 TABLET ORAL at 21:02

## 2022-01-29 RX ADMIN — DULOXETINE HYDROCHLORIDE 60 MG: 60 CAPSULE, DELAYED RELEASE ORAL at 08:35

## 2022-01-29 RX ADMIN — APIXABAN 5 MG: 5 TABLET, FILM COATED ORAL at 08:36

## 2022-01-29 RX ADMIN — DEXAMETHASONE 6 MG: 4 TABLET ORAL at 08:36

## 2022-01-29 ASSESSMENT — PAIN SCALES - GENERAL
PAINLEVEL_OUTOF10: 0
PAINLEVEL_OUTOF10: 0

## 2022-01-29 NOTE — PROGRESS NOTES
Physical Therapy  Facility/Department: [facility]  Daily Treatment Note  NAME: Estelle Fin[address]. : 1960  MRN: [id number]    Date of Service: 2022    Discharge Recommendations:  Continue to assess pending progress,24 hour supervision or assist,Home with Home health PT   PT Equipment Recommendations  Equipment Needed: Yes (TBD)    Assessment   Body structures, Functions, Activity limitations: Decreased functional mobility ; Decreased cognition;Decreased posture;Decreased ADL status; Decreased endurance;Decreased coordination;Decreased strength;Decreased balance;Decreased safe awareness  Assessment: Pt seen in am for PT tx, pt pleasant and agreeable with no c/o pain and demonstrates good participation in session. Pt with overall improved performance with all mobility requiring decreased assistance for t/f, gait and standing. Pt presents with poor proprioception, midline/kinesthetic awareness, poor sequencing and motor planning as well as decreased processing. Pt demonstrates short shuffling gait with retropulsion and episodes of freezing requiring cues to re-direct especially with retro-walking. Pt does respond well to counting down cue to re-initiate task. Pt still with retropulsive posture altough improved this date especially following activity to focus on anterior WS to promote t/f. Pt requires intermittent rest breaks d/t fatigue. Pt demonstrates bed mobility with Fili, t/f with Fili/modA, gait up to 40' with RW and Fili/modA for balance with w/c follow for safety.  Pt will benefit from continued skilled PT in ARU to address above deficits, will continue to progress mobility as tolerated  Treatment Diagnosis: impaired mobility  Specific instructions for Next Treatment: Progress mobility as tolerated  Prognosis: Fair  Decision Making: Medium Complexity  PT Education: Goals;Transfer Training;Equipment;Disease Specific Education;PT Role;Functional Mobility Training;Energy Conservation;Plan of Care;General Safety;Precautions; Family Education;Gait Training  Patient Education: pt educated on role of PT, safe mobility with transfers/gait, will require reinforcement  Barriers to Learning: cognition  REQUIRES PT FOLLOW UP: Yes  Activity Tolerance  Activity Tolerance: Patient limited by endurance; Patient limited by cognitive status     Patient Diagnosis(es): There were no encounter diagnoses. has a past medical history of Arthritis, Asthma, CAD (coronary artery disease), COPD (chronic obstructive pulmonary disease) [address] [address] Emphysema of lung [address] [address] Fatty liver, GERD (gastroesophageal reflux disease), Hyperlipidemia, Hypertension, Medical history reviewed with no changes, MI, old, Sleep apnea, and Type II or unspecified type diabetes mellitus without mention of complication, not stated as uncontrolled. has a past surgical history that includes Coronary angioplasty with stent (8594,2929); Colonoscopy; Endoscopy, colon, diagnostic; cyst removal (1982); Upper gastrointestinal endoscopy (7/18/13); Upper gastrointestinal endoscopy (8/23/13); Carpal tunnel release (Bilateral, 2013); ERCP (9/15/2013); Upper gastrointestinal endoscopy (9/15/2013); ERCP (10/11/13); Cataract removal with implant (Left, 2/28/14); Diagnostic Cardiac Cath Lab Procedure; other surgical history; pacemaker placement; Cardiac pacemaker placement (2011); Foot surgery (Right, 07/09/2018); pr length/short leg/ankl tendon,single (Right, 10/31/2018); pr gastrocnemius recession (Right, 10/31/2018); Pain management procedure (Bilateral, 5/26/2020); Pain management procedure (Bilateral, 6/9/2020); Nerve Surgery (Bilateral, 12/1/2020); and Pain management procedure (Left, 1/12/2021).     Restrictions  Restrictions/Precautions  Restrictions/Precautions: General Precautions,Fall Risk  Position Activity Restriction  Other position/activity restrictions: up with assistance, Marilynn  Subjective   General  Chart Reviewed: Yes  Response To Previous Treatment: Patient with no complaints from previous session. Family / Caregiver Present: No  Referring Practitioner: Dandre Garcia MD  Subjective  Subjective: Pt asleep in bed on arrival but easily arousable and pleasant and agreeable to PT tx  Pain Screening  Patient Currently in Pain: Denies  Vital Signs  Pulse: 86  Heart Rate Source: Monitor  BP: 127/62  BP Location: Right upper arm  Patient Position: Sitting  Patient Currently in Pain: Denies  Oxygen Therapy  SpO2: 96 %  O2 Device: None (Room air)       Orientation  Orientation  Overall Orientation Status: Within Functional Limits    Objective   Bed mobility  Supine to Sit: Minimal assistance (HOB slightly elevated with use of BR, Fili at trunk, cues for safety and sequence)  Sit to Supine: Unable to assess (Pt seated in recliner at end of session)     Transfers  Sit to Stand: Moderate Assistance;Minimal Assistance  Stand to sit: Moderate Assistance;Minimal Assistance  Bed to Chair: Moderate assistance;Minimal assistance  Stand Pivot Transfers: Moderate Assistance;Minimal Assistance  Comment: Pt completes multiple t/f including STS to RW and without AD, SPT from various surfaces including EOB, w/c and commode without AD, with RW and with grab bar, pt requires grossly modA to Fili for t/f, cues for naterior WS as pt retropulsive     Ambulation  Ambulation?: Yes  Ambulation 1  Surface: level tile  Device: Rolling Walker  Other Apparatus: Wheelchair follow  Assistance: Minimal assistance/modA  Quality of Gait: Slow short shuffling gait, B decreased heel strike, B decreased toe clearance, retropulsive (although improved from previous session), episodes of freezing noted with cues to re-direct. Pt unsteady with Fili to modA for balance and close w/c follow for safety.   Gait Deviations: Slow Josselin;Decreased step length;Decreased step height;Shuffles  Distance: 36' + 40' + 15'  Comments: Distances limited by fatigue, improved anterior WS but still retropulsive with episodes of freezing vs distraction, responds well to counting down 3, 2, 1 to re-initiate gait, increased freezing with fatigue  Stairs/Curb  Stairs?: No (deferred d/t safety)     Neuromuscular Education  Pt x 10 reps partial STS from EOM with pt's hand on PT shoulders cued to push PT away to initiate anterior WS for t/f. Increased cues to facilitate exaggerated WS. Decreased retropulsion noted during session following activity. Grossly Fili for balance. Pt completes dynamic neuro re-ed task x 3 reps   STS EOM to RW Fili   Reach to grab clothes pin on R, Fili for balance   Ambulation x 15' forward with RW to mirror and placing clothes pin on TB on mirror Fili   Retrowalking x 15' with RW, Fili to modA   Stand to sit on EOM  Second bout completed with red theraband at trunk pulling posterior to further facilitate anterior WS/lean, improved performance and gait pattern/sequence on third bout with TB removed. Pt requires significantly increased time and cues with retrowalking, pt with multiple episodes of freezing, takes 2-3 steps before freezing and requires max cues to re-initiate task. Cues for sequence/safety with seated rest break between each bout. Completed to improve motor planning, sequencing safety and improve dynamic stability with functional tasks. Mirror placed in front of pt for visual feedback of positioning. Balance  Posture: Fair  Sitting - Static: Fair  Sitting - Dynamic: Fair;-  Standing - Static: Poor;+  Standing - Dynamic: Poor  Comments: Pt sittin EOB x 10 minutes with consistent UUE support and grossly SBA while completing ADL, pt with multiple multi-directional LOB but is able to self correct this date. Poor midline and proprioceptive awareness noted. Fili to [address] for standing balance with RW d/t retropulsion                             Goals  Short term goals  Time Frame for Short term goals: 7 days 2/3  Short term goal 1: pt will complete bed mobility with supv.   Short term goal 2: pt will complete functional transfer with CGA and LRAD. Short term goal 3: pt will ambulate 50 ft with LRAD and min A. Long term goals  Time Frame for Long term goals : 21 days 2/17  Long term goal 1: pt will complete bed mobility with ind. Long term goal 2: pt will complete functional transfer with mod ind and LRAD. Long term goal 3: pt will ambulate 100 ft with LRAD and supv. Long term goal 4: pt will complete car transfer with LRAD and mod ind.   Patient Goals   Patient goals : \"get stronger\"    Plan    Plan  Times per week: 5-7x/week  Times per day: Daily  Specific instructions for Next Treatment: Progress mobility as tolerated  Current Treatment Recommendations: Conrad Grippe Re-education,Patient/Caregiver Education & Training,Cognitive Reorientation,ROM,Wheelchair Mobility Training,Manual Therapy - Soft Tissue Mobilization,Equipment Evaluation, Education, & procurement,Balance Training,Gait Training,Home Exercise Program,Modalities,Functional Mobility Training,Stair training,Manual Therapy - Joint Manipulation,Safety Education & Training,Positioning  Safety Devices  Type of devices: Call light within reach,Left in chair,Chair alarm in place,Telesitter in use,Gait belt,Nurse notified,All fall risk precautions in place,Patient at risk for falls     Therapy Time   Individual Concurrent Group Co-treatment   Time In 0730         Time Out 0830         Minutes 60         Timed Code Treatment Minutes: [facility], PT, DPT

## 2022-01-29 NOTE — PROGRESS NOTES
Occupational Therapy  Facility/Department: [facility]  Daily Treatment Note  NAME: Mariella Gibbs. : 1960  MRN: [id number]    Date of Service: 2022    Discharge Recommendations:  24 hour supervision or assist,Home with Home health OT,S Level 1  OT Equipment Recommendations  Equipment Needed: Yes  ADL Assistive Devices: Shower Chair with back    Assessment   Performance deficits / Impairments: Decreased functional mobility ; Decreased safe awareness;Decreased balance;Decreased coordination;Decreased posture;Decreased vision/visual deficit; Decreased cognition;Decreased ADL status; Decreased endurance;Decreased high-level IADLs;Decreased strength;Decreased fine motor control    Assessment: Pt agreeable to therapy this date. Pt performed functional mobility/t/fs with CGA-min A with RW and max vc's for safety and hand placement during t/fs. Pt performing brief periods of standing and functional mobility but requesting to sit down or initiating sitting during session d/t faitgue. Pt initiating standing rest breaks during mobility as well. Pt required max vc's for completing and correctly completing ther ex. Pt performing board and blocks image task with pt successfully placing/orienting 5/14 blocks on top of image. Pt required max vc's and max assistance during board and blocks task for visual perception, following directions, recalling directions, and attention to task. Pt would continue to benefit from skilled OT services to increase safety and independence with ADLS and functional mobility. Cont POC. Prognosis: Good  OT Education: Plan of Care;OT Role;Transfer Training;Equipment;Orientation  Patient Education: role of OT, safety with tranfers and mobility, ther ex  Barriers to Learning: Pt will require reinforcement.   REQUIRES OT FOLLOW UP: Yes  Activity Tolerance  Activity Tolerance: Treatment limited secondary to decreased cognition;Patient limited by fatigue  Activity Tolerance: /69, HR 60, O2 95%. Vitals WFL throughout tasks. Safety Devices  Safety Devices in place: Yes  Type of devices: Gait belt;Call light within reach; Left in chair;Chair alarm in place;Nurse notified         Patient Diagnosis(es): There were no encounter diagnoses. has a past medical history of Arthritis, Asthma, CAD (coronary artery disease), COPD (chronic obstructive pulmonary disease) ([address] Emphysema of lung ([address] Fatty liver, GERD (gastroesophageal reflux disease), Hyperlipidemia, Hypertension, Medical history reviewed with no changes, MI, old, Sleep apnea, and Type II or unspecified type diabetes mellitus without mention of complication, not stated as uncontrolled. has a past surgical history that includes Coronary angioplasty with stent ([id number]); Colonoscopy; Endoscopy, colon, diagnostic; cyst removal (1982); Upper gastrointestinal endoscopy (7/18/13); Upper gastrointestinal endoscopy (8/23/13); Carpal tunnel release (Bilateral, 2013); ERCP (9/15/2013); Upper gastrointestinal endoscopy (9/15/2013); ERCP (10/11/13); Cataract removal with implant (Left, 2/28/14); Diagnostic Cardiac Cath Lab Procedure; other surgical history; pacemaker placement; Cardiac pacemaker placement (2011); Foot surgery (Right, 07/09/2018); pr length/short leg/ankl tendon,single (Right, 10/31/2018); pr gastrocnemius recession (Right, 10/31/2018); Pain management procedure (Bilateral, 5/26/2020); Pain management procedure (Bilateral, 6/9/2020); Nerve Surgery (Bilateral, 12/1/2020); and Pain management procedure (Left, 1/12/2021).     Restrictions  Restrictions/Precautions  Restrictions/Precautions: General Precautions,Fall Risk  Position Activity Restriction  Other position/activity restrictions: up with assistance, [employer]     Subjective   General  Chart Reviewed: [employer] and Physical,Labs  Patient assessed for rehabilitation services?: Yes  Additional Pertinent Hx: COPD, Emphysema, CAD, pacemaker, DDD, HTN, DM  Response to previous treatment: Patient with no complaints from previous session  Family / Caregiver Present: No  Referring Practitioner: Lydia Eason MD  Diagnosis: COVID encephalopathy  Subjective  Subjective: Pt in bed, pleasant, agreeable to OT . Vital Signs  Pulse: 60  Heart Rate Source: Monitor  BP: 114/69  BP Location: Right upper arm  Patient Position: Supine  Patient Currently in Pain: Denies  Oxygen Therapy  SpO2: 95 %  Pulse Oximeter Device Mode: Intermittent  Pulse Oximeter Device Location: Finger  O2 Device: None (Room air)     Orientation  Orientation  Overall Orientation Status: Impaired  Orientation Level: Oriented to place;Oriented to time;Oriented to person;Disoriented to situation     Objective    ADL  Additional Comments: Pt declining ADLs this date. Balance  Standing Balance: Minimal assistance (CGA-min A)  Standing Balance  Time: 2x2min, 2:50, 3x1:25, 1minx2  Activity: functional mobility in room and in [place] to prepare for household distances, standing for pattern blocks and board task. Comment: RW used, posterior lean, pt initiating sitting without cuing during task. Functional Mobility  Functional - Mobility Device: Rolling Walker  Activity: Other (functional mobility in room and in [place] to prepare for household distances)  Assist Level: Minimal assistance  Bed mobility  Supine to Sit: Stand by assistance  Sit to Supine: Unable to assess  Scooting: Unable to assess  Comment: Pt seated in recliner at end of session. Transfers  Sit to stand: Contact guard assistance (CGA progressing to SBA.)  Stand to sit: Minimal assistance (CGA-min A with decreased eccentric control with fatigue.)  Transfer Comments: RW used, max vc's for safe t/fs and hand placement during t/fs. Coordination  Gross Motor: Pt with greater difficulties with RUE GM task than LUE GM task.   Fine Motor: Pt with difficulties placing blocks in image with blocks close to one another requiring assistance to place pieces with edges touching. Vision  Vision Comment: Pt required vc's for visual perceptual strategies during pattern blocks and board task. Cognition  Overall Cognitive Status: Exceptions  Arousal/Alertness: Delayed responses to stimuli  Following Commands: Follows one step commands with repetition; Follows one step commands with increased time  Attention Span: Difficulty attending to directions; Attends with cues to redirect  Memory: Decreased short term memory;Decreased recall of recent events;Decreased recall of precautions  Safety Judgement: Decreased awareness of need for safety;Decreased awareness of need for assistance  Problem Solving: Decreased awareness of errors;Assistance required to identify errors made;Assistance required to generate solutions  Insights: Decreased awareness of deficits  Initiation: Requires cues for some  Sequencing: Requires cues for some  Cognition Comment: Multiple pauses in conversation and during all ther ex. Not finishing sets of exercises, easily distracted. Pt requiring max vc's during blocks and board visual perceptual and direction following task to correctly identify needed pieces and to orient pieces on top of image. Perception  Overall Perceptual Status: Impaired  Initiation: Cues to initiate tasks  Motor Planning: Cues to use objects appropriately  MVPT: Pt requiring max vc's during blocks and board visual perceptual, FM, standing balance/endurance, attention, cognitive, memory and direction following task to correctly identify needed pieces and to orient pieces on top of image correctly. Increased time needed and startegies given and mod A to complete task. Pt correctly placing 5/14 pieces on top of image with increased time. Initial task was for pt to recreate image beside board, pt unable to complete without max vc's and assistance. Task modified for patient to orient pieces on top of board image.               Type of ROM/Therapeutic Exercise  Type of ROM/Therapeutic Exercise: Free weights  Comment: 4#. Pt ceasing exercises when desired requiring verbal cues to complete set. Max vc's for form  Exercises  Elbow Flexion: x10  Other: Chest press x10, shoulder press x10                    Plan   Plan  Times per week: 5/7 days/week  Plan weeks: 3 weeks  Current Treatment Recommendations: Strengthening,ROM,Balance Training,Functional Mobility Training,Endurance Training,Neuromuscular Re-education,Equipment Evaluation, Education, & procurement,Home Management Training,Self-Care / ADL,Patient/Caregiver Education & Training,Safety Education & Training    Goals  Short term goals  Time Frame for Short term goals: 1 week- 2/3/21  Short term goal 1: Pt will perform functional transfers with CGA. -Ongoing (1/29) CGA-min A. Short term goal 2: Pt will complete toileting with min A. Short term goal 3: Pt will perform LB dressing with CGA. Short term goal 4: Pt will complete grooming at sink with SPV. Long term goals  Time Frame for Long term goals : 3 weeks- 2/17/21  Long term goal 1: Pt will perform functional transfers with mod I.  Long term goal 2: Pt will complete toileting with mod I.  Long term goal 3: Pt will complete LB dressing with mod I.  Long term goal 4: Pt will perform LB dressing with mod I.  Long term goal 5: Pt will perform simple IADL task with mod I.   Patient Goals   Patient goals : \"get home, see my dogs\"       Therapy Time   Individual Concurrent Group Co-treatment   Time In 1000         Time Out 1100         Minutes 60         Timed Code Treatment Minutes: [id number] [facility], OTR/L

## 2022-01-29 NOTE — PROGRESS NOTES
Speech Language Pathology  [facility]: ACUTE [facility]  SPEECH-LANGUAGE PATHOLOGY      [x] Daily  [] Weekly Care Conference Note  [] Discharge    Patient:Chano Parmar. :1960  YZS:[id number]  Rehab Dx/Hx: Debility [R53.81]    Precautions: falls  Home situation: Lives alone. Active . Independent with meds and finances. ST Dx: [] Aphasia  [] Dysarthria  [] Apraxia   [] Oropharyngeal dysphagia [x] Cognitive Impairment  [] Other:   Date of Admit: 2022  Room #: 0156/0156-01    Current functional status (updated daily):         Pt being seen for : [] Speech/Language Treatment  [] Dysphagia Treatment [x] Cognitive Treatment  [] Other:  Communication: [x]WFL  [] Aphasia  [] Dysarthria  [] Apraxia  [] Pragmatic Impairment [] Non-verbal  [] Hearing Loss  [] Other:   Cognition: [] WFL  [] Mild  [x] Moderate  [] Severe [] Unable to Assess  [] Other:  Memory: [] WFL  [x] Mild  [x] Moderate  [] Severe [] Unable to Assess  [] Other:  Behavior: [x] Alert  [x] Cooperative  [x]  Pleasant  [] Confused  [] Agitated  [] Uncooperative  [] Distractible [] Motivated  [] Self-Limiting [] Anxious  [] Other:  Endurance:  [x] Adequate for participation in SLP sessions  [] Reduced overall  [] Lethargic  [] Other:  Safety: [] No concerns at this time  [x] Reduced insight into deficits  [x]  Reduced safety awareness [] Not following call light procedures  [] Unable to Assess  [] Other:    Current Diet Order:ADULT DIET; Regular; 4 carb choices (60 gm/meal)  ADULT ORAL NUTRITION SUPPLEMENT; Lunch, Dinner; Frozen Oral Supplement  Swallowing Precautions: Small bites/sips;Upright as possible for all oral intake;Remain upright for 30-45 minutes after meals; Alternate solids and liquids;Eat/Feed slowly        Date: 2022      Tx session 1  0930 - 1000 Tx session 2  1230 - 1300   Total Timed Code Min 30 30   Total Treatment Minutes 30 30   Individual Treatment Minutes 30 30   Group Treatment Minutes 0 0   Co-Treat Minutes 0 0   Variance/Reason:  n/a n/a   Pain denies Denies    Pain Intervention [] RN notified  [] Repositioned  [] Intervention offered and patient declined  [x] N/A  [] Other: [] RN notified  [] Repositioned  [] Intervention offered and patient declined  [x] N/A  [] Other:   Subjective     Pt alert and cooperative, agreeable to tx. Pt upright in bed for session. Pt alert and cooperative, agreeable to tx. Pt upright in bed for session. Objective:  Goals  Short-term Goals  Timeframe for Short-term Goals: 18 days (0214/2022)       Goal 1: Pt will complete graded recall tasks using compensatory strategies with 80% acc given min cues    Mental Manipulation: Ranking  -pt given 3 words; asked to repeat in a particular order  -e.g. jeromy, plane, fly. Repeat in order from smallest to largest  -pt used recall abilities to complete task with 61% acc given min cues, improved to 78% acc given mod-max cues    Picture Retention Task   -pt shown a picture scene; SLP reviewed details of picture with pt  -immediate recall: pt recalled details of picture with 36% acc given min cues, improved to 82% acc given mod-max cues  -10 min delay: pt recalled details of picture with 36% acc indep; improved to 73% acc given mod-max cues      Goal 2: Pt will complete executive function tasks (e.g. meds, time, money, etc) with 80% acc given min cues   Money General Questions  -e.g. do 5 pennies equal 1 nickel?  -pt completed task with 50% acc indep; improved to 57% acc given mod-max cues    Goal not directly targeted. Goal 3: Pt will complete problem solving and thought organization tasks with 80% acc given min cues   Mental Manipulation: Ranking  -pt given 3 words; asked to repeat in a particular order  -e.g. jeromy, plane, fly.  Repeat in order from smallest to largest  -pt used thought org abilities to complete task with 67% acc given min cues, improved to 72% acc given mod-max cues    Category Members / Thought Organization  -pt given a category and a set of letters; asked to name an item from the category that began with the letters  -e.g. holidays that begin with C, G, T, and M  -pt completed task with 33% acc indep, improved to 56% acc given mod-max cues      Goal 4: Pt will complete verbal and visual reasoning tasks with 80% acc given min cues   Word Deduction Task  -pt given 4 clues that describe an item; asked to name the item being described  -e.g. legs, cuffs, pockets, belt loops = pants  -pt completed task with 56% acc indep, improved to 89% acc given mod cues   -pt perseverating on previous answers; required frequent repetition  Comparison Task   -pt given 2 items; asked to identify a similarity and a difference  -pt identified a similarity with 75% acc given min cues, improved to 100% acc given mod cues  -pt identified a difference with 60% acc given mod cues   Other areas targeted: n/a n/a   Education:   SLP edu pt re: role of SLP, rationale of cog tx, recall strategies, calc strategies  SLP edu pt re: recall strategies, thought org strategies    Safety Devices: [x] Call light within reach  [] Chair alarm activated  [x] Bed alarm activated  [] Other: [] Call light within reach  [] Chair alarm activated  [] Bed alarm activated  [] Other:    Assessment: Tx session 1: Pt alert and cooperative, agreeable to tx. Pt overall presents with slow thought processing and reduced thought organization; requires frequent repetition of questions, cues to complete tasks, and increase time. Pt is impulsive, and demonstrates with reduced safety awareness and insight into deficits. Pt required mod-max cues to increase acc with executive function, recall, and thought org tasks. Continue goals above. Tx session 2: Pt alert and cooperative, agreeable to tx. Pt had difficulty completing cognitive tasks, requiring mod-max cues in order to improve acc. Despite the cues, pt still had some difficulty.  Pt's deficits in recall and thought org are barriers for d/c. At this time, SLP does recommend 24 hr assist at d/c due to cognitive deficits. Continue goals above. Plan: Continue as per plan of care. Additional Information:     Barriers toward progress: Limited family support, Cognitive deficit, Limited safety awareness and Limited insight into deficits  Discharge recommendations:  [] Home independently  [] Home with assistance [x]  24 hour supervision  [] ECF [x] Other: See PT/OT recs  Continued Tx Upon Discharge: ? [] Yes [] No [x] TBD based on progress while on ARU [] Vital Stim indicated [] Other:   Estimated discharge date: TBD    Interventions used this date:  [] Speech/Language Treatment  [] Instruction in HEP [] Group [] Dysphagia Treatment [x] Cognitive Treatment   [] Other:       Total Time Breakdown / Charges    Time in Time out Total Time / units   Cognitive Tx 0930  1230 1000  1300 30 min / 2 units  30 min / 2 units   Speech Tx -- -- --   Dysphagia Tx --- -- --       Electronically Signed by     Remberto Obregon MA CCC-SLP #[id number]  Speech Language Pathologist

## 2022-01-30 LAB
GLUCOSE BLD-MCNC: 158 MG/DL (ref 70–99)
GLUCOSE BLD-MCNC: 197 MG/DL (ref 70–99)
GLUCOSE BLD-MCNC: 296 MG/DL (ref 70–99)
GLUCOSE BLD-MCNC: 299 MG/DL (ref 70–99)
PERFORMED ON: ABNORMAL

## 2022-01-30 PROCEDURE — 94640 AIRWAY INHALATION TREATMENT: CPT

## 2022-01-30 PROCEDURE — 6360000002 HC RX W HCPCS: Performed by: PHYSICAL MEDICINE & REHABILITATION

## 2022-01-30 PROCEDURE — 1280000000 HC REHAB R&B

## 2022-01-30 PROCEDURE — 6370000000 HC RX 637 (ALT 250 FOR IP): Performed by: PHYSICAL MEDICINE & REHABILITATION

## 2022-01-30 RX ADMIN — METOPROLOL TARTRATE 50 MG: 50 TABLET, FILM COATED ORAL at 19:31

## 2022-01-30 RX ADMIN — POTASSIUM CHLORIDE 20 MEQ: 20 TABLET, EXTENDED RELEASE ORAL at 07:32

## 2022-01-30 RX ADMIN — QUETIAPINE FUMARATE 50 MG: 25 TABLET ORAL at 07:32

## 2022-01-30 RX ADMIN — DULOXETINE HYDROCHLORIDE 60 MG: 60 CAPSULE, DELAYED RELEASE ORAL at 07:32

## 2022-01-30 RX ADMIN — GABAPENTIN 200 MG: 100 CAPSULE ORAL at 12:05

## 2022-01-30 RX ADMIN — APIXABAN 5 MG: 5 TABLET, FILM COATED ORAL at 07:32

## 2022-01-30 RX ADMIN — INSULIN LISPRO 5 UNITS: 100 INJECTION, SOLUTION INTRAVENOUS; SUBCUTANEOUS at 19:33

## 2022-01-30 RX ADMIN — GABAPENTIN 200 MG: 100 CAPSULE ORAL at 19:31

## 2022-01-30 RX ADMIN — Medication 2 PUFF: at 19:56

## 2022-01-30 RX ADMIN — PANTOPRAZOLE SODIUM 40 MG: 40 TABLET, DELAYED RELEASE ORAL at 07:32

## 2022-01-30 RX ADMIN — INSULIN LISPRO 3 UNITS: 100 INJECTION, SOLUTION INTRAVENOUS; SUBCUTANEOUS at 11:32

## 2022-01-30 RX ADMIN — INSULIN LISPRO 9 UNITS: 100 INJECTION, SOLUTION INTRAVENOUS; SUBCUTANEOUS at 16:16

## 2022-01-30 RX ADMIN — GABAPENTIN 200 MG: 100 CAPSULE ORAL at 07:32

## 2022-01-30 RX ADMIN — ONDANSETRON 8 MG: 4 TABLET, ORALLY DISINTEGRATING ORAL at 20:03

## 2022-01-30 RX ADMIN — INSULIN LISPRO 3 UNITS: 100 INJECTION, SOLUTION INTRAVENOUS; SUBCUTANEOUS at 06:31

## 2022-01-30 RX ADMIN — INSULIN GLARGINE 60 UNITS: 100 INJECTION, SOLUTION SUBCUTANEOUS at 19:33

## 2022-01-30 RX ADMIN — LISINOPRIL 5 MG: 5 TABLET ORAL at 07:33

## 2022-01-30 RX ADMIN — METOPROLOL TARTRATE 50 MG: 50 TABLET, FILM COATED ORAL at 07:33

## 2022-01-30 RX ADMIN — Medication 2 PUFF: at 07:55

## 2022-01-30 RX ADMIN — ATORVASTATIN CALCIUM 80 MG: 80 TABLET, FILM COATED ORAL at 07:33

## 2022-01-30 RX ADMIN — CLOPIDOGREL BISULFATE 75 MG: 75 TABLET, FILM COATED ORAL at 07:33

## 2022-01-30 RX ADMIN — DEXAMETHASONE 6 MG: 4 TABLET ORAL at 07:32

## 2022-01-30 RX ADMIN — APIXABAN 5 MG: 5 TABLET, FILM COATED ORAL at 19:30

## 2022-01-30 RX ADMIN — Medication 10 MG: at 19:30

## 2022-01-30 RX ADMIN — DIVALPROEX SODIUM 1000 MG: 250 TABLET, DELAYED RELEASE ORAL at 16:15

## 2022-01-30 RX ADMIN — TRAZODONE HYDROCHLORIDE 100 MG: 50 TABLET ORAL at 19:30

## 2022-01-30 RX ADMIN — QUETIAPINE FUMARATE 75 MG: 25 TABLET ORAL at 19:30

## 2022-01-30 ASSESSMENT — PAIN SCALES - GENERAL
PAINLEVEL_OUTOF10: 0
PAINLEVEL_OUTOF10: 0

## 2022-01-31 LAB
ANION GAP SERPL CALCULATED.3IONS-SCNC: 11 MMOL/L (ref 3–16)
BASOPHILS ABSOLUTE: 0 K/UL (ref 0–0.2)
BASOPHILS RELATIVE PERCENT: 0.7 %
BUN BLDV-MCNC: 12 MG/DL (ref 7–20)
CALCIUM SERPL-MCNC: 9.4 MG/DL (ref 8.3–10.6)
CHLORIDE BLD-SCNC: 95 MMOL/L (ref 99–110)
CO2: 30 MMOL/L (ref 21–32)
CREAT SERPL-MCNC: 0.7 MG/DL (ref 0.9–1.3)
EOSINOPHILS ABSOLUTE: 0 K/UL (ref 0–0.6)
EOSINOPHILS RELATIVE PERCENT: 0.4 %
GFR AFRICAN AMERICAN: >60
GFR NON-AFRICAN AMERICAN: >60
GLUCOSE BLD-MCNC: 142 MG/DL (ref 70–99)
GLUCOSE BLD-MCNC: 161 MG/DL (ref 70–99)
GLUCOSE BLD-MCNC: 170 MG/DL (ref 70–99)
GLUCOSE BLD-MCNC: 303 MG/DL (ref 70–99)
GLUCOSE BLD-MCNC: 342 MG/DL (ref 70–99)
HCT VFR BLD CALC: 39.3 % (ref 40.5–52.5)
HEMOGLOBIN: 13.4 G/DL (ref 13.5–17.5)
LYMPHOCYTES ABSOLUTE: 1.6 K/UL (ref 1–5.1)
LYMPHOCYTES RELATIVE PERCENT: 24.8 %
MCH RBC QN AUTO: 28.4 PG (ref 26–34)
MCHC RBC AUTO-ENTMCNC: 34.2 G/DL (ref 31–36)
MCV RBC AUTO: 83.2 FL (ref 80–100)
MONOCYTES ABSOLUTE: 0.3 K/UL (ref 0–1.3)
MONOCYTES RELATIVE PERCENT: 4 %
NEUTROPHILS ABSOLUTE: 4.5 K/UL (ref 1.7–7.7)
NEUTROPHILS RELATIVE PERCENT: 70.1 %
PDW BLD-RTO: 15.8 % (ref 12.4–15.4)
PERFORMED ON: ABNORMAL
PLATELET # BLD: 146 K/UL (ref 135–450)
PMV BLD AUTO: 6.9 FL (ref 5–10.5)
POTASSIUM REFLEX MAGNESIUM: 4.3 MMOL/L (ref 3.5–5.1)
RBC # BLD: 4.72 M/UL (ref 4.2–5.9)
SODIUM BLD-SCNC: 136 MMOL/L (ref 136–145)
WBC # BLD: 6.5 K/UL (ref 4–11)

## 2022-01-31 PROCEDURE — 97530 THERAPEUTIC ACTIVITIES: CPT

## 2022-01-31 PROCEDURE — 97110 THERAPEUTIC EXERCISES: CPT

## 2022-01-31 PROCEDURE — 6370000000 HC RX 637 (ALT 250 FOR IP): Performed by: PHYSICAL MEDICINE & REHABILITATION

## 2022-01-31 PROCEDURE — 80048 BASIC METABOLIC PNL TOTAL CA: CPT

## 2022-01-31 PROCEDURE — 6360000002 HC RX W HCPCS: Performed by: PHYSICAL MEDICINE & REHABILITATION

## 2022-01-31 PROCEDURE — 94640 AIRWAY INHALATION TREATMENT: CPT

## 2022-01-31 PROCEDURE — 1280000000 HC REHAB R&B

## 2022-01-31 PROCEDURE — 97116 GAIT TRAINING THERAPY: CPT

## 2022-01-31 PROCEDURE — 36415 COLL VENOUS BLD VENIPUNCTURE: CPT

## 2022-01-31 PROCEDURE — 97129 THER IVNTJ 1ST 15 MIN: CPT

## 2022-01-31 PROCEDURE — 97112 NEUROMUSCULAR REEDUCATION: CPT

## 2022-01-31 PROCEDURE — 97130 THER IVNTJ EA ADDL 15 MIN: CPT

## 2022-01-31 PROCEDURE — 85025 COMPLETE CBC W/AUTO DIFF WBC: CPT

## 2022-01-31 PROCEDURE — 97535 SELF CARE MNGMENT TRAINING: CPT

## 2022-01-31 RX ORDER — TAMSULOSIN HYDROCHLORIDE 0.4 MG/1
0.4 CAPSULE ORAL NIGHTLY
Status: DISCONTINUED | OUTPATIENT
Start: 2022-01-31 | End: 2022-02-08 | Stop reason: HOSPADM

## 2022-01-31 RX ORDER — QUETIAPINE FUMARATE 25 MG/1
50 TABLET, FILM COATED ORAL NIGHTLY
Status: DISCONTINUED | OUTPATIENT
Start: 2022-01-31 | End: 2022-02-05

## 2022-01-31 RX ORDER — QUETIAPINE FUMARATE 25 MG/1
25 TABLET, FILM COATED ORAL
Status: DISCONTINUED | OUTPATIENT
Start: 2022-02-01 | End: 2022-02-08 | Stop reason: HOSPADM

## 2022-01-31 RX ADMIN — INSULIN LISPRO 12 UNITS: 100 INJECTION, SOLUTION INTRAVENOUS; SUBCUTANEOUS at 16:11

## 2022-01-31 RX ADMIN — GABAPENTIN 200 MG: 100 CAPSULE ORAL at 20:04

## 2022-01-31 RX ADMIN — DEXAMETHASONE 6 MG: 4 TABLET ORAL at 09:25

## 2022-01-31 RX ADMIN — METOPROLOL TARTRATE 50 MG: 50 TABLET, FILM COATED ORAL at 20:03

## 2022-01-31 RX ADMIN — FLUTICASONE PROPIONATE 2 SPRAY: 50 SPRAY, METERED NASAL at 09:30

## 2022-01-31 RX ADMIN — GABAPENTIN 200 MG: 100 CAPSULE ORAL at 09:25

## 2022-01-31 RX ADMIN — Medication 2 PUFF: at 08:14

## 2022-01-31 RX ADMIN — APIXABAN 5 MG: 5 TABLET, FILM COATED ORAL at 09:26

## 2022-01-31 RX ADMIN — ONDANSETRON 8 MG: 4 TABLET, ORALLY DISINTEGRATING ORAL at 18:10

## 2022-01-31 RX ADMIN — POTASSIUM CHLORIDE 20 MEQ: 20 TABLET, EXTENDED RELEASE ORAL at 09:30

## 2022-01-31 RX ADMIN — Medication 10 MG: at 20:03

## 2022-01-31 RX ADMIN — TRAZODONE HYDROCHLORIDE 100 MG: 50 TABLET ORAL at 20:03

## 2022-01-31 RX ADMIN — LISINOPRIL 5 MG: 5 TABLET ORAL at 09:26

## 2022-01-31 RX ADMIN — INSULIN GLARGINE 60 UNITS: 100 INJECTION, SOLUTION SUBCUTANEOUS at 20:08

## 2022-01-31 RX ADMIN — ATORVASTATIN CALCIUM 80 MG: 80 TABLET, FILM COATED ORAL at 09:25

## 2022-01-31 RX ADMIN — DIVALPROEX SODIUM 1000 MG: 250 TABLET, DELAYED RELEASE ORAL at 18:07

## 2022-01-31 RX ADMIN — QUETIAPINE FUMARATE 50 MG: 25 TABLET ORAL at 20:04

## 2022-01-31 RX ADMIN — METOPROLOL TARTRATE 50 MG: 50 TABLET, FILM COATED ORAL at 09:26

## 2022-01-31 RX ADMIN — TAMSULOSIN HYDROCHLORIDE 0.4 MG: 0.4 CAPSULE ORAL at 20:07

## 2022-01-31 RX ADMIN — PANTOPRAZOLE SODIUM 40 MG: 40 TABLET, DELAYED RELEASE ORAL at 09:25

## 2022-01-31 RX ADMIN — INSULIN LISPRO 3 UNITS: 100 INJECTION, SOLUTION INTRAVENOUS; SUBCUTANEOUS at 05:49

## 2022-01-31 RX ADMIN — APIXABAN 5 MG: 5 TABLET, FILM COATED ORAL at 20:03

## 2022-01-31 RX ADMIN — GABAPENTIN 200 MG: 100 CAPSULE ORAL at 14:00

## 2022-01-31 RX ADMIN — CLOPIDOGREL BISULFATE 75 MG: 75 TABLET, FILM COATED ORAL at 09:25

## 2022-01-31 RX ADMIN — INSULIN LISPRO 6 UNITS: 100 INJECTION, SOLUTION INTRAVENOUS; SUBCUTANEOUS at 20:08

## 2022-01-31 RX ADMIN — DULOXETINE HYDROCHLORIDE 60 MG: 60 CAPSULE, DELAYED RELEASE ORAL at 09:26

## 2022-01-31 RX ADMIN — INSULIN LISPRO 3 UNITS: 100 INJECTION, SOLUTION INTRAVENOUS; SUBCUTANEOUS at 12:30

## 2022-01-31 RX ADMIN — ACETAMINOPHEN 650 MG: 325 TABLET ORAL at 10:55

## 2022-01-31 ASSESSMENT — PAIN SCALES - GENERAL
PAINLEVEL_OUTOF10: 0
PAINLEVEL_OUTOF10: 0
PAINLEVEL_OUTOF10: 4
PAINLEVEL_OUTOF10: 0

## 2022-01-31 ASSESSMENT — PAIN DESCRIPTION - DESCRIPTORS: DESCRIPTORS: DISCOMFORT;ACHING

## 2022-01-31 ASSESSMENT — PAIN DESCRIPTION - FREQUENCY: FREQUENCY: CONTINUOUS

## 2022-01-31 ASSESSMENT — PAIN DESCRIPTION - ONSET: ONSET: ON-GOING

## 2022-01-31 ASSESSMENT — PAIN DESCRIPTION - PROGRESSION: CLINICAL_PROGRESSION: NOT CHANGED

## 2022-01-31 ASSESSMENT — PAIN DESCRIPTION - ORIENTATION: ORIENTATION: LOWER

## 2022-01-31 ASSESSMENT — PAIN - FUNCTIONAL ASSESSMENT: PAIN_FUNCTIONAL_ASSESSMENT: ACTIVITIES ARE NOT PREVENTED

## 2022-01-31 ASSESSMENT — PAIN DESCRIPTION - LOCATION: LOCATION: BACK

## 2022-01-31 ASSESSMENT — PAIN DESCRIPTION - PAIN TYPE: TYPE: CHRONIC PAIN

## 2022-01-31 NOTE — PROGRESS NOTES
Occupational Therapy  Facility/Department: [facility]  Daily Treatment Note  NAME: Dalton Hollis. : 1960  MRN: [id number]    Date of Service: 2022    Discharge Recommendations:  24 hour supervision or assist,Home with Home health OT,S Level 1  OT Equipment Recommendations  Equipment Needed: Yes  Mobility Devices: ADL Assistive Devices  ADL Assistive Devices: Shower Chair with back    Assessment   Performance deficits / Impairments: Decreased functional mobility ; Decreased safe awareness;Decreased balance;Decreased coordination;Decreased posture;Decreased vision/visual deficit; Decreased cognition;Decreased ADL status; Decreased endurance;Decreased high-level IADLs;Decreased strength;Decreased fine motor control  Assessment: Pt agreeable to OT tx and tolerates session with no adverse events. Pt continues to demo ongoing deficits in cognition requiring max cues for intiation and sequencing. Pt completes limited functional mobility with Fili to [address] with RW and max v.cues for safety. Pt completes transfers with SBA and max v.cues for safety/hand placement. Pt require max v.cues for correctly completing UE therex with fair strength. Pt completes KELLE game in stance with CGA to Fili for standing balance and max v.cues for playing. Pt requires max verbal cues during tanagram activity in stance for visual perception and directions, places 6/19 pieces appropriately independently. Pt completes small steps to place ring on number board corresponding to math problem. Pt able to answer 3/12 math problems correctly independently with remaining of problems requiring maxA and max v.cues for depth perception to place item on board. Cont POC. OT Education: Plan of Care;OT Role;Transfer Training;Equipment;Orientation  Patient Education: role of OT, safety with tranfers and mobility, ther ex  Barriers to Learning: Pt will require reinforcement.   REQUIRES OT FOLLOW UP: Yes  Activity Tolerance  Activity Tolerance: Treatment limited secondary to decreased cognition;Patient limited by fatigue  Activity Tolerance: Pt reports feeling fatigued/warm during session but vitals stable and WFL for duration of session (HR maintained in mid 60s-70s, spO2 95-96%; seated in chair /68, after mobility /65; Pt requiring extended rest breaks between each activity/exercise  Safety Devices  Safety Devices in place: Yes  Type of devices: Gait belt;Call light within reach; Left in chair;Chair alarm in place;Nurse notified         Patient Diagnosis(es): There were no encounter diagnoses. has a past medical history of Arthritis, Asthma, CAD (coronary artery disease), COPD (chronic obstructive pulmonary disease) ([address] Emphysema of lung ([facility] [address] Fatty liver, GERD (gastroesophageal reflux disease), Hyperlipidemia, Hypertension, Medical history reviewed with no changes, MI, old, Sleep apnea, and Type II or unspecified type diabetes mellitus without mention of complication, not stated as uncontrolled. has a past surgical history that includes Coronary angioplasty with stent (8056,8331); Colonoscopy; Endoscopy, colon, diagnostic; cyst removal (1982); Upper gastrointestinal endoscopy (7/18/13); Upper gastrointestinal endoscopy (8/23/13); Carpal tunnel release (Bilateral, 2013); ERCP (9/15/2013); Upper gastrointestinal endoscopy (9/15/2013); ERCP (10/11/13); Cataract removal with implant (Left, 2/28/14); Diagnostic Cardiac Cath Lab Procedure; other surgical history; pacemaker placement; Cardiac pacemaker placement (2011); Foot surgery (Right, 07/09/2018); pr length/short leg/ankl tendon,single (Right, 10/31/2018); pr gastrocnemius recession (Right, 10/31/2018); Pain management procedure (Bilateral, 5/26/2020); Pain management procedure (Bilateral, 6/9/2020); Nerve Surgery (Bilateral, 12/1/2020); and Pain management procedure (Left, 1/12/2021).     Restrictions  Restrictions/Precautions  Restrictions/Precautions: General Precautions,Fall Risk  Position Activity Restriction  Other position/activity restrictions: up with assistance, Avasys  Subjective   General  Chart Reviewed: Yes  Patient assessed for rehabilitation services?: Yes  Additional Pertinent Hx: COPD, Emphysema, CAD, pacemaker, DDD, HTN, DM  Response to previous treatment: Patient with no complaints from previous session  Family / Caregiver Present: No  Referring Practitioner: Jennifer uFentes MD  Diagnosis: COVID encephalopathy  Subjective  Subjective: Pt in chair, pleasant and agreeable to OT  General Comment  Comments: RN cleared  Vital Signs  Patient Currently in Pain: Denies   Orientation  Orientation  Orientation Level: Oriented to place;Oriented to time;Oriented to person;Disoriented to situation  Objective    ADL  Grooming: Stand by assistance;Setup (wash face seated)  UE Dressing: Setup;Supervision  Toileting: Minimal assistance (in stance over toilet)        Balance  Sitting Balance: Supervision  Standing Balance: Minimal assistance (Fili)  Standing Balance  Time: 1 min 5x, 3 mins 2x, 4 mins 1x, 5 mins 1x  Activity: functional mobility in room, standing for KELLE game, placing numbers on board and simple tanagram  Comment: RW used, cues for keeping walker with him in stance, pt initiating sitting 1x before at w/c  Functional Mobility  Functional - Mobility Device: Rolling Walker  Activity: Other (small steps chair to/from w/c; small steps forward to place numbers on board)  Assist Level:  Moderate assistance (min to modA needed, pt legs shaking/buckling with extended stand/mobility, pt attempting to sit 1x prior to reaching walker)  Functional Mobility Comments: close w/c follow needed  Bed mobility  Scooting: Unable to assess  Comment: Pt in recliner at start and end of session  Transfers  Sit to stand: Contact guard assistance (CGA to SBA)  Stand to sit: Minimal assistance (for controlled descent and position fully at w/c prior to sit)  Transfer Comments: RW used, max vc's for safe t/fs and hand placement during t/fs. Vision  Patient Visual Report: No visual complaint reported. Vision Comment: Pt required vc's for visual perceptual strategies and depth perception during tanagram and hanging numbers on board  Cognition  Arousal/Alertness: Delayed responses to stimuli  Following Commands: Follows one step commands with repetition; Follows one step commands with increased time  Attention Span: Difficulty attending to directions; Attends with cues to redirect  Memory: Decreased short term memory;Decreased recall of recent events;Decreased recall of precautions  Safety Judgement: Decreased awareness of need for safety;Decreased awareness of need for assistance  Problem Solving: Decreased awareness of errors;Assistance required to identify errors made;Assistance required to generate solutions  Insights: Decreased awareness of deficits  Initiation: Requires cues for all  Sequencing: Requires cues for all  Cognition Comment: pt pauses during conversation and then restarts with a different conversation, pt answers questions with tangential/off topic thoughts, pt requires max verbal cues to continue therex, Pt requires max v. cues for solving simple math problems with number board, playing KELLE and placement of tanagrams, solves 3/12 math problems correctly, tanagram placed 6/19 pieces appropriately independently                    Type of ROM/Therapeutic Exercise  Type of ROM/Therapeutic Exercise: Free weights  Comment: 3# weight BUE exercises, pt requires assist to initiate exercises appropriately and then can complete independently  Exercises  Shoulder Flexion: x10  Shoulder Extension: x10  Elbow Flexion: x10  Elbow Extension: x10  Wrist Flexion: x10  Wrist Extension: x10  Other: chest press x 10, abdominal exercises side to side and obliques 10x each                    Plan   Plan  Times per week: 5/7 days/week  Plan weeks: 3 weeks  Current Treatment Recommendations: Strengthening,ROM,Balance Training,Functional Mobility Training,Endurance Training,Neuromuscular Re-education,Equipment Evaluation, Education, & procurement,Home Management Training,Self-Care / ADL,Patient/Caregiver Education & Training,Safety Education & Training    Goals  Short term goals  Time Frame for Short term goals: 1 week- 2/3/21  Short term goal 1: Pt will perform functional transfers with CGA. -Ongoing (1/29) CGA-min A. Short term goal 2: Pt will complete toileting with min A  Short term goal 3: Pt will perform LB dressing with CGA. Short term goal 4: Pt will complete grooming at sink with SPV. Long term goals  Time Frame for Long term goals : 3 weeks- 2/17/21  Long term goal 1: Pt will perform functional transfers with mod I.  Long term goal 2: Pt will complete toileting with mod I.  Long term goal 3: Pt will complete LB dressing with mod I.  Long term goal 4: Pt will perform LB dressing with mod I.  Long term goal 5: Pt will perform simple IADL task with mod I.   Patient Goals   Patient goals : \"get home, see my dogs\"       Therapy Time   Individual Concurrent Group Co-treatment   Time In 1300         Time Out 1430         Minutes 90         Timed Code Treatment Minutes: [address]

## 2022-01-31 NOTE — PROGRESS NOTES
Cheryl Dias [facility]  1/31/2022  [id number]    Chief Complaint: Debility    Subjective:   Resting in bed; impulsive but pleasant and without complaint. ROS: No n/v, cp, sob, f/c  Objective:  Patient Vitals for the past 24 hrs:   BP Temp Temp src Pulse Resp SpO2   01/31/22 0816 -- -- -- -- -- 97 %   01/31/22 0800 119/61 97.8 °F (36.6 °C) Oral 61 16 95 %   01/30/22 1956 -- -- -- -- -- 99 %   01/30/22 1928 117/73 98.2 °F (36.8 °C) Oral 70 18 99 %     Gen: No distress, pleasant. HEENT: Normocephalic, atraumatic. CV: Regular rate and rhythm. Resp: No respiratory distress. Abd: Soft, nontender   Ext: No edema. Neuro: Alert, oriented, appropriately interactive. Wt Readings from Last 3 Encounters:   01/27/22 218 lb 6 oz (99.1 kg)   01/23/22 216 lb 3.2 oz (98.1 kg)   11/09/21 243 lb (110.2 kg)       Laboratory data:   Lab Results   Component Value Date    WBC 6.5 01/31/2022    HGB 13.4 (L) 01/31/2022    HCT 39.3 (L) 01/31/2022    MCV 83.2 01/31/2022     01/31/2022       Lab Results   Component Value Date     01/31/2022    K 4.3 01/31/2022    CL 95 01/31/2022    CO2 30 01/31/2022    BUN 12 01/31/2022    CREATININE 0.7 01/31/2022    GLUCOSE 142 01/31/2022    CALCIUM 9.4 01/31/2022        Therapy progress:  PT  Position Activity Restriction  Other position/activity restrictions: up with assistance, Avasys  Objective     Sit to Stand: Moderate Assistance,Minimal Assistance  Stand to sit: Moderate Assistance,Minimal Assistance  Bed to Chair: Moderate assistance,Minimal assistance  Device: Rolling Walker  Other Apparatus: Wheelchair follow  Assistance: Minimal assistance  Distance: 36' + 36' + 15'  OT  PT Equipment Recommendations  Equipment Needed: Yes (TBD)  Toilet - Technique: Ambulating  Equipment Used: Standard toilet  Assessment        SLP  Current Diet : Regular  Current Liquid Diet : Thin  Diet Solids Recommendation: Regular  Liquid Consistency Recommendation:  Thin    Body mass index is 37.48 kg/m². Rehabilitation Diagnosis:  Brain, 2.1, Non-Traumatic     Assessment and Plan:  Acute encephalopathy secondary to covid  - continue scheduled seroquel, wean as able  - Consolidate sleep  - SLP     Afib/RVR  - continue beta blocker  - ICD adjusted per cardiology after inappropriate firing  - continue eliquis     Covid positive  - completed isolation  - completed decadron       DM  - lantus, SSI     Thrombocytopenia  - follow    CAD with h/o stent x 3, h/o MI, h/o VT  - asa, statin, plavix     CHF  - euvolemic  - continue medical management  - resume lasix twice weeklly if needed  - uptitrate acei if needed     Asthma  - inhalers as ordered     Hyperlipidemia  - statin     GERD  - PPI     Lumbar spondylosis  - f/u Dr. You Johnson     Morbid obesity  - dietary consulted     Depression  - cymbalta     Bowels: Schedule stool softener. Follow bowel movements. Enema or suppository if needed.      Bladder: Check PVR x 3. [address] if PVR > 200ml or if any volume is > 500 ml.      Sleep: Trazodone provided prn.      Follow up appointments: PCP, Dr. Shanika Tena.  Laurie Berrios MD 1/31/2022, 9:12 AM

## 2022-01-31 NOTE — PROGRESS NOTES
Physical Therapy  Facility/Department: [facility]  Daily Treatment Note  NAME: Shea Dejesus : 1960  MRN: [id number]    Date of Service: 2022    Discharge Recommendations:  Continue to assess pending progress,24 hour supervision or assist,Home with Home health PT   PT Equipment Recommendations  Equipment Needed: Yes  Mobility Devices: Rosalind : Rolling    Assessment   Body structures, Functions, Activity limitations: Decreased functional mobility ; Decreased cognition;Decreased posture;Decreased ADL status; Decreased endurance;Decreased coordination;Decreased strength;Decreased balance;Decreased safe awareness  Assessment: pt found in recliner. denies pain, flat affect throughout. pt appears to present with parkinsonian-like gait with episodes of freezing, festinating and teeth grinding. CGA-min A for transfers with and withotu AD, demo's poor eccentri control upon sitting. gait / therapy tasks requires increased time due to slow processing and pt easily distracted. at this time, rec pt have 24/7 supv/assist at home and HHPT. DME: RW  Treatment Diagnosis: impaired mobility  Prognosis: Fair  Decision Making: Medium Complexity  PT Education: Goals;Transfer Training;Equipment;Disease Specific Education;PT Role;Functional Mobility Training;Energy Conservation;Plan of Care;General Safety;Precautions; Family Education;Gait Training  Patient Education: pt educated on role of PT, safe mobility with transfers/gait, will require reinforcement  Barriers to Learning: cognition  REQUIRES PT FOLLOW UP: Yes  Activity Tolerance  Activity Tolerance: Patient limited by endurance; Patient limited by cognitive status  Activity Tolerance: 130/60, 72 bpm, 94%     Patient Diagnosis(es): There were no encounter diagnoses.      has a past medical history of Arthritis, Asthma, CAD (coronary artery disease), COPD (chronic obstructive pulmonary disease) ([facility] [address] Emphysema of lung ([facility] [address] Fatty liver, GERD (gastroesophageal reflux disease), Hyperlipidemia, Hypertension, Medical history reviewed with no changes, MI, old, Sleep apnea, and Type II or unspecified type diabetes mellitus without mention of complication, not stated as uncontrolled. has a past surgical history that includes Coronary angioplasty with stent (4716,8299); Colonoscopy; Endoscopy, colon, diagnostic; cyst removal (1982); Upper gastrointestinal endoscopy (7/18/13); Upper gastrointestinal endoscopy (8/23/13); Carpal tunnel release (Bilateral, 2013); ERCP (9/15/2013); Upper gastrointestinal endoscopy (9/15/2013); ERCP (10/11/13); Cataract removal with implant (Left, 2/28/14); Diagnostic Cardiac Cath Lab Procedure; other surgical history; pacemaker placement; Cardiac pacemaker placement (2011); Foot surgery (Right, 07/09/2018); pr length/short leg/ankl tendon,single (Right, 10/31/2018); pr gastrocnemius recession (Right, 10/31/2018); Pain management procedure (Bilateral, 5/26/2020); Pain management procedure (Bilateral, 6/9/2020); Nerve Surgery (Bilateral, 12/1/2020); and Pain management procedure (Left, 1/12/2021). Restrictions  Restrictions/Precautions  Restrictions/Precautions: General Precautions,Fall Risk  Position Activity Restriction  Other position/activity restrictions: up with assistance, Avasys  Subjective   General  Chart Reviewed: Yes  Response To Previous Treatment: Patient with no complaints from previous session.   Family / Caregiver Present: No  Referring Practitioner: Leslie Cordova MD  Subjective  Subjective: denies pain  General Comment  Comments: found in recliner  Pain Screening  Patient Currently in Pain: No  Vital Signs  Patient Currently in Pain: No       Orientation  Orientation  Overall Orientation Status: Within Functional Limits  Cognition      Objective      Transfers  Sit to Stand: Contact guard assistance  Stand to sit: Contact guard assistance  Stand Pivot Transfers: Minimal Assistance  Comment: stand piovt from recliner to w/c with no AD, min A with poor eccentric control. sit to stand w/c ot Rw with CGA x multiple reps. stand pivot from w/c <> SCIFIT with RW and CGA-min A. stand pivot from w/c to recliner with no AD and min A (poor eccentric control). Ambulation  Ambulation?: Yes  Ambulation 1  Surface: level tile  Device: Rolling Walker  Assistance: Minimal assistance  Quality of Gait: significant posterior lean, significnatly shortened step lengths/lack of heel strike/swing phase. shuffled gait. poor dory, slow velocity, appears to have episodes of freezing and needs to be redirected  Distance: 104 ft, 112 ft  Comments: increased time to complete due to periods of freezing, pt presents with parkinsonian-like festinating gait pattern  Neuromuscular Education  NDT Treatment: Standing  Neuromuscular Comments: pt completed 2 sets of 1 min BLE alternating forward / backwards stepping to targets with LUE support on rail and min-mod A for balance. pt experiences multiple episodes of freezing, requires cues to re-initiate task. pt completed 4+ 1+ 2+ 3 forwrad/backward step over over 2\" cane on ground with LUE support on rail, requries min-mod a when stepping backwards with poor righting reactions . pt with 3 significant posterior LOB with max A to safley lower to chair to prevent LOB. pt sitting abruptly between sets, stating too tired to continue. Other exercises  Other exercises?: Yes  Other exercises 1: pt cmpleted 2x10 RLE and LLE step ups onto 6\" step wtih BUE support and GCA, 3# weights donned, cues to continue taks  Other exercises 2: pt completed 5 min on SCIFIT level 1 with BUE and BLE for increased reciprocal motion/ strenghtening. pt will complete 30-40 sec then come to slow stop, requires cues to intiitae task and hadn over hand to utilize full ROM         Goals  Short term goals  Time Frame for Short term goals: 7 days 2/3  Short term goal 1: pt will complete bed mobility with supv.   Short term goal 2: pt will complete functional transfer with CGA and LRAD. Short term goal 3: pt will ambulate 50 ft with LRAD and min A. Long term goals  Time Frame for Long term goals : 21 days 2/17  Long term goal 1: pt will complete bed mobility with ind. Long term goal 2: pt will complete functional transfer with mod ind and LRAD. Long term goal 3: pt will ambulate 100 ft with LRAD and supv. Long term goal 4: pt will complete car transfer with LRAD and mod ind.   Patient Goals   Patient goals : \"get stronger\"    Plan    Plan  Times per week: 5-7x/week  Times per day: Daily  Specific instructions for Next Treatment: Progress mobility as tolerated  Current Treatment Recommendations: Leela Anna Re-education,Patient/Caregiver Education & Training,Cognitive Reorientation,ROM,Wheelchair Mobility Training,Manual Therapy - Soft Tissue Mobilization,Equipment Evaluation, Education, & procurement,Balance Training,Gait Training,Home Exercise Program,Modalities,Functional Mobility Training,Stair training,Manual Therapy - Joint Manipulation,Safety Education & Training,Positioning  Safety Devices  Type of devices: Call light within reach,Left in chair,Chair alarm in place,Telesitter in use,Gait belt,Nurse notified,All fall risk precautions in place,Patient at risk for falls     Therapy Time   Individual Concurrent Group Co-treatment   Time In 0930         Time Out 1030         Minutes 60         Timed Code Treatment Minutes: 60 Minutes       Karol Perez, PT

## 2022-02-01 LAB
GLUCOSE BLD-MCNC: 138 MG/DL (ref 70–99)
GLUCOSE BLD-MCNC: 160 MG/DL (ref 70–99)
GLUCOSE BLD-MCNC: 275 MG/DL (ref 70–99)
GLUCOSE BLD-MCNC: 392 MG/DL (ref 70–99)
PERFORMED ON: ABNORMAL

## 2022-02-01 PROCEDURE — 97535 SELF CARE MNGMENT TRAINING: CPT

## 2022-02-01 PROCEDURE — 97129 THER IVNTJ 1ST 15 MIN: CPT

## 2022-02-01 PROCEDURE — 97530 THERAPEUTIC ACTIVITIES: CPT

## 2022-02-01 PROCEDURE — 94640 AIRWAY INHALATION TREATMENT: CPT

## 2022-02-01 PROCEDURE — 97112 NEUROMUSCULAR REEDUCATION: CPT

## 2022-02-01 PROCEDURE — 97116 GAIT TRAINING THERAPY: CPT

## 2022-02-01 PROCEDURE — 1280000000 HC REHAB R&B

## 2022-02-01 PROCEDURE — 6370000000 HC RX 637 (ALT 250 FOR IP): Performed by: PHYSICAL MEDICINE & REHABILITATION

## 2022-02-01 PROCEDURE — 97130 THER IVNTJ EA ADDL 15 MIN: CPT

## 2022-02-01 RX ADMIN — PANTOPRAZOLE SODIUM 40 MG: 40 TABLET, DELAYED RELEASE ORAL at 09:29

## 2022-02-01 RX ADMIN — INSULIN LISPRO 7 UNITS: 100 INJECTION, SOLUTION INTRAVENOUS; SUBCUTANEOUS at 21:39

## 2022-02-01 RX ADMIN — APIXABAN 5 MG: 5 TABLET, FILM COATED ORAL at 19:34

## 2022-02-01 RX ADMIN — LISINOPRIL 5 MG: 5 TABLET ORAL at 09:27

## 2022-02-01 RX ADMIN — DULOXETINE HYDROCHLORIDE 60 MG: 60 CAPSULE, DELAYED RELEASE ORAL at 09:27

## 2022-02-01 RX ADMIN — TAMSULOSIN HYDROCHLORIDE 0.4 MG: 0.4 CAPSULE ORAL at 19:34

## 2022-02-01 RX ADMIN — GABAPENTIN 200 MG: 100 CAPSULE ORAL at 19:34

## 2022-02-01 RX ADMIN — Medication 2 PUFF: at 19:07

## 2022-02-01 RX ADMIN — CLOPIDOGREL BISULFATE 75 MG: 75 TABLET, FILM COATED ORAL at 09:28

## 2022-02-01 RX ADMIN — INSULIN LISPRO 9 UNITS: 100 INJECTION, SOLUTION INTRAVENOUS; SUBCUTANEOUS at 16:17

## 2022-02-01 RX ADMIN — Medication 10 MG: at 19:34

## 2022-02-01 RX ADMIN — APIXABAN 5 MG: 5 TABLET, FILM COATED ORAL at 09:27

## 2022-02-01 RX ADMIN — GABAPENTIN 200 MG: 100 CAPSULE ORAL at 09:27

## 2022-02-01 RX ADMIN — GABAPENTIN 200 MG: 100 CAPSULE ORAL at 14:36

## 2022-02-01 RX ADMIN — ATORVASTATIN CALCIUM 80 MG: 80 TABLET, FILM COATED ORAL at 09:27

## 2022-02-01 RX ADMIN — POTASSIUM CHLORIDE 20 MEQ: 20 TABLET, EXTENDED RELEASE ORAL at 09:27

## 2022-02-01 RX ADMIN — METOPROLOL TARTRATE 50 MG: 50 TABLET, FILM COATED ORAL at 09:27

## 2022-02-01 RX ADMIN — DEXAMETHASONE 6 MG: 4 TABLET ORAL at 09:26

## 2022-02-01 RX ADMIN — DIVALPROEX SODIUM 1000 MG: 250 TABLET, DELAYED RELEASE ORAL at 17:06

## 2022-02-01 RX ADMIN — INSULIN LISPRO 3 UNITS: 100 INJECTION, SOLUTION INTRAVENOUS; SUBCUTANEOUS at 11:27

## 2022-02-01 RX ADMIN — Medication 2 PUFF: at 19:12

## 2022-02-01 RX ADMIN — TRAZODONE HYDROCHLORIDE 100 MG: 50 TABLET ORAL at 19:34

## 2022-02-01 RX ADMIN — QUETIAPINE FUMARATE 25 MG: 25 TABLET ORAL at 09:26

## 2022-02-01 RX ADMIN — QUETIAPINE FUMARATE 50 MG: 25 TABLET ORAL at 19:35

## 2022-02-01 RX ADMIN — INSULIN GLARGINE 60 UNITS: 100 INJECTION, SOLUTION SUBCUTANEOUS at 21:38

## 2022-02-01 RX ADMIN — METOPROLOL TARTRATE 50 MG: 50 TABLET, FILM COATED ORAL at 19:35

## 2022-02-01 ASSESSMENT — PAIN SCALES - GENERAL
PAINLEVEL_OUTOF10: 0

## 2022-02-01 NOTE — PROGRESS NOTES
Physical Therapy  Facility/Department: [facility]  Daily Treatment Note  NAME: Malcolm Dia. : 1960  MRN: [id number]    Date of Service: 2022    Discharge Recommendations:  24 hour supervision or assist,Home with Home health PT   PT Equipment Recommendations  Equipment Needed: Yes  Walker: Rolling    Assessment   Body structures, Functions, Activity limitations: Decreased functional mobility ; Decreased cognition;Decreased posture;Decreased ADL status; Decreased endurance;Decreased coordination;Decreased strength;Decreased balance;Decreased safe awareness  Assessment: pt found EOB, denies pain. therapist conferred with MD regarding pt's parkinsonian like gait, behaviors, teeth grinding. limited to no carryover of education provided for increased gait safety. grossly min a without AD for transfers due to impulsivity and poor eccentric control. rec pt have  upon d/c and HHPT, DME: RW  Treatment Diagnosis: impaired mobility  Prognosis: Fair  Decision Making: Medium Complexity  PT Education: Goals;Transfer Training;Equipment;Disease Specific Education;PT Role;Functional Mobility Training;Energy Conservation;Plan of Care;General Safety;Precautions; Family Education;Gait Training  Patient Education: pt educated on role of PT, safe mobility with transfers/gait, will require reinforcement  Barriers to Learning: cognition  REQUIRES PT FOLLOW UP: Yes  Activity Tolerance  Activity Tolerance: Patient limited by endurance; Patient limited by cognitive status  Activity Tolerance: 97% on room air, 61 bpm, 104/68     Patient Diagnosis(es): There were no encounter diagnoses.      has a past medical history of Arthritis, Asthma, CAD (coronary artery disease), COPD (chronic obstructive pulmonary disease) ([facility] [address] Emphysema of lung ([address] Fatty liver, GERD (gastroesophageal reflux disease), Hyperlipidemia, Hypertension, Medical history reviewed with no changes, MI, old, Sleep apnea, and Type II or unspecified type diabetes mellitus without mention of complication, not stated as uncontrolled. has a past surgical history that includes Coronary angioplasty with stent ([id number]); Colonoscopy; Endoscopy, colon, diagnostic; cyst removal (1982); Upper gastrointestinal endoscopy (7/18/13); Upper gastrointestinal endoscopy (8/23/13); Carpal tunnel release (Bilateral, 2013); ERCP (9/15/2013); Upper gastrointestinal endoscopy (9/15/2013); ERCP (10/11/13); Cataract removal with implant (Left, 2/28/14); Diagnostic Cardiac Cath Lab Procedure; other surgical history; pacemaker placement; Cardiac pacemaker placement (2011); Foot surgery (Right, 07/09/2018); pr length/short leg/ankl tendon,single (Right, 10/31/2018); pr gastrocnemius recession (Right, 10/31/2018); Pain management procedure (Bilateral, 5/26/2020); Pain management procedure (Bilateral, 6/9/2020); Nerve Surgery (Bilateral, 12/1/2020); and Pain management procedure (Left, 1/12/2021). Restrictions  Restrictions/Precautions  Restrictions/Precautions: General Precautions,Fall Risk  Position Activity Restriction  Other position/activity restrictions: up with assistance, [employer]  Subjective   General  Chart Reviewed: Yes  Response To Previous Treatment: Patient with no complaints from previous session. Family / Caregiver Present: No  Referring Practitioner: Pierre Rasheed MD  Subjective  Subjective: denies pain  General Comment  Comments: found EOB, initially motivated however motivation wanes as session continued  Pain Screening  Patient Currently in Pain: No  Vital Signs  Patient Currently in Pain: No       Orientation  Orientation  Overall Orientation Status: Within Functional Limits  Cognition      Objective      Transfers  Sit to Stand: Contact guard assistance  Stand to sit: Contact guard assistance  Stand Pivot Transfers: Minimal Assistance  Comment: stand pivot from EOB to wc with min A and cues without AD.  sit to stadn w/c to Rw with CGA, stand pivot from w/c to recliner wtih no Ad and min A, poor eccentric control. Ambulation  Ambulation?: Yes  More Ambulation?: Yes  Ambulation 1  Surface: level tile  Device: Rolling Walker  Assistance: Minimal assistance  Quality of Gait: significant posterior lean, significnatly shortened step lengths/lack of heel strike/swing phase. shuffled gait. poor dory, slow velocity, appears to have episodes of freezing and needs to be redirected  Distance: 75 ft, 120 ft  Comments: red TB placed on RW to promote increased step length with minimal improvement initially however does not conitnue past 15-20 ft  Ambulation 2  Surface - 2: level tile  Device 2: Rolling Walker  Assistance 2: Minimal assistance  Quality of Gait 2: initially larger step lengths progressively getting smaller/shorter, periods of freezig and difficult to re-initiate gait, lack of heel strike/swing phase/ shuffled steps, slow velocity, interrupted/inconsistent dory  Distance: 50 ft x 2 reps  Comments: on first rep, red TB donned to LLE for increased motor rectruiment to facilitate longer step lengths with minimal improvement, second 50 ft completed with no TB with iniital improvement x 10-15 ft in LLE step length however pt regresses  Neuromuscular Education  NDT Treatment: Standing  Neuromuscular Comments: dyn stand activity: RLE forwrad step over TB-> get clothespin from target superior/anterior with RUE-> stpe back to shoudler wdith Fabian-> repeat with LLE / LUE reaching/stepping. grossly CGA-min A for balance, requires multiple little steps to complete stepping over TB, ineffective stepping reactions with posterior LOB when stepping backwards. pt completed 7 reps, 6 reps of activity with seated rest between. pt donend 3# weights BLe, completed 3 laps of forward/backward walkign with 1-0 UE support and CGA to ambulate forward, min a for backwards with increased time and multiple episodes of freezing.    pt completed 3 sets of 1 lap each of R/L lateral walking with 3 # weights donned and max VC/tC to maintain frontal plane motion wtih BUE support and CGA. pt initiates seated rest btween obuts. Other exercises  Other exercises 1: pt completed 2 sets of 2.5 min on SCIFIT level 1 with BUE and BLE completed to increase reciprocal motion/ROM and strength. pt fatigues after 50-60 se and requires max encouraement to finish set. Goals  Short term goals  Time Frame for Short term goals: 7 days 2/3  Short term goal 1: pt will complete bed mobility with supv. Short term goal 2: pt will complete functional transfer with CGA and LRAD. Short term goal 3: pt will ambulate 50 ft with LRAD and min A. Long term goals  Time Frame for Long term goals : 21 days 2/17  Long term goal 1: pt will complete bed mobility with ind. Long term goal 2: pt will complete functional transfer with mod ind and LRAD. Long term goal 3: pt will ambulate 100 ft with LRAD and supv. Long term goal 4: pt will complete car transfer with LRAD and mod ind.   Patient Goals   Patient goals : \"get stronger\"    Plan    Plan  Times per week: 5-7x/week  Times per day: Daily  Specific instructions for Next Treatment: Progress mobility as tolerated  Current Treatment Recommendations: Cristiano Anglin Re-education,Patient/Caregiver Education & Training,Cognitive Reorientation,ROM,Wheelchair Mobility Training,Manual Therapy - Soft Tissue Mobilization,Equipment Evaluation, Education, & procurement,Balance Training,Gait Training,Home Exercise Program,Modalities,Functional Mobility Training,Stair training,Manual Therapy - Joint Manipulation,Safety Education & Training,Positioning  Safety Devices  Type of devices: Call light within reach,Left in chair,Chair alarm in place,Telesitter in use,Gait belt,Nurse notified,All fall risk precautions in place,Patient at risk for falls     Therapy Time   Individual Concurrent Group Co-treatment   Time In 0800         Time Out 0900 Minutes 60         Timed Code Treatment Minutes: 60 Minutes       Espinoza Romo, PT

## 2022-02-01 NOTE — PATIENT CARE CONFERENCE
[facility]  Inpatient Rehabilitation  Weekly Team Conference Note    Date: 2022  Patient Name: Paulo Wong MRN: [id number]    : 1960  (66 y.o.)  Gender: male   Referring Practitioner: Maria De Jesus Polanco MD  Diagnosis: covid      Interventions to be utilized toward barriers to discharge, per discipline:  [address] observed barriers to dc: Confusion, Cognitive deficit, Impulsivity and Limited safety awareness  Nursing interventions:Assist with ADL's, toileting, and medication management  Family Education: no  Fall Risk:  Yes      Physical therapy observed barriers to dc:    Baseline: ind with no AD, hx of falls at home. Cares for brother    Current level: CGA-min A transfers with AD, min A gait with RW x 100 ft (poor gait mechanics)   Barriers to DC: cognition, insight, motivation (often stops activities after 1 -2 rep and requires encouragement to continue), hx of falls , gait mechanics/carryover   Needs in order to achieve dc home/next level of care: if pt is to d/c home, pt will have to be at a mod ind- in level however anticipate pt will require 24/7 supv/assist due to mobility deficits, and use of RW. Physical therapy interventions:   Current Treatment Recommendations: Leela Anna Re-education,Patient/Caregiver Education & Training,Cognitive Reorientation,ROM,Wheelchair Mobility Training,Manual Therapy - Soft Tissue Mobilization,Equipment Evaluation, Education, & procurement,Balance Training,Gait Training,Home Exercise Program,Modalities,Functional Mobility Training,Stair training,Manual Therapy - Joint Manipulation,Safety Education & Training,Positioning      PHYSICAL THERAPY    PT Equipment Recommendations  Equipment Needed: Yes  Mobility Devices: Chan Austin: Rolling    Assessment: pt found EOB, denies pain.  therapist conferred with MD regarding pt's parkinsonian like gait, behaviors, teeth grinding. limited to no carryover of education provided for increased gait safety. grossly min a without AD for transfers due to impulsivity and poor eccentric control. rec pt have 24/7 upon d/c and HHPT, DME: RW      Occupational therapy observed barriers to dc:    Baseline: mod I all ADLs and transfers   Current level: min A transfers, min A LB ADLs, poor safety   Barriers to DC: limited assist at home, poor balance, poor safety and cognition   Needs in order to achieve dc home/next level of care: mod I all ADLs and transfers    Occupational Therapy interventions:  Current Treatment Recommendations: Strengthening,ROM,Balance Training,Functional Mobility Training,Endurance Training,Neuromuscular Re-education,Equipment Evaluation, Education, & procurement,Home Management Training,Self-Care / ADL,Patient/Caregiver Education & Training,Safety Education & Training      OCCUPATIONAL THERAPY  Assessment: Pt agreeable to OT session. Pt performed mobility in/out of bathroom and around bathroom with RW and CGA. Pt required mod VCs for proper use of RW when pt kept pushing away when approaching sink, toilet, and turning to sit in w/c. Pt completed w/c mobility down hallway with mod VCs for continuing task with pt pausing and stopping every 5-10 feet. Pt completed drill/bolt task at table top with min/mod VCs for problem solving and fair coordination for using drill to (un)screw bolts. Pt educated to attempt to use R hand at times but kept switching back to L hand and would not continue to use LUE. Pt ambulated from gym back to room with CGA and max VCs for increased step length and upright posture, due to forward flexed posture and festinating gait as pt fatigued. Vitals stable at end of session after walk back to room. Continue POC.         Speech therapy observed barriers to dc:    Baseline: pt lives at home indep (caregiver for brother who lives down the [address] and has paranoid schizophrenia), manages both his and brothers meds/finances, all household tasks, and active    Current level: mod-severe cognitive linguistic deficit    Barriers to DC: reduced insight/safety, severity of cognitive deficits    Needs in order to achieve dc home/next level of care: 24 hour assist, carryover of compensatory strategies, assist with meds/finances     Speech Therapy interventions:  Dysphagia: N/A  Speech/Language/Cognition: Compensatory strategy training and carryover, recall/STM, problem solving, reasoning, exec function, thought organization, attention. SPEECH THERAPY:  Pt initially laying down upon SLP arrival, but agreeable to sit on edge of bed. Pt cooperative and agreeable to participate. Pt grinding his teeth consistently throughout tx session, stating \"it was a nervous habit. \" Pt perseverating on going home and taking care of his two dogs and brother who his paranoid schizophrenia and wanting to go home as soon as possible. Portions of the IBIS completed today: telling time with 80% acc, counting money with 0% acc, solving daily math problems with 10% acc, understanding medicine labels with 80% acc, using a calendar with 40% acc, and reading instructions/comprehension with 30% acc. Pt unaware of difficulties, often looking away and getting off topic. Pt able to easily be redirected, but overall reduced insight into deficits. Discussed money management at home, pt stating \"I just spend what I want and check the account, I don't use cash, everything is just automatically withdrawn from my account. \" Pt will require 24 hour assist d/t severity of cognitive deficits, reduced insight and safety awareness. NUTRITION  Weight: 218 lb 6 oz (99.1 kg) / Body mass index is 37.48 kg/m². Diet Order: ADULT DIET;  Regular; 4 carb choices (60 gm/meal)  ADULT ORAL NUTRITION SUPPLEMENT; Lunch, Dinner; Frozen Oral Supplement  PO Meals Eaten (%): 76 - 100%  Education: Declined       CASE MANAGEMENT  Assessment: Therapy recommendation 24 hour supervision or assist,Home with Home health PT/OT services>[facility] referral faxed. DME needed on discharge rolling walker and shower chair with back. Kellee Odonnell (brother-in-law) can provide the 24 hour supervision or assist. CM will continue to support for discharge needs. Interdisciplinary Goals:   1.) pt will complete functional transfer with SBA and LRAD. 2.) Pt will implement use of compensatory memory strategies across all disciplines given mod cues   3.) Pt will complete toileting with SBA. Discharge Plan   Estimated discharge date: 2/10/2022  Destination: home health  Pass:No  Services at Discharge: [email or web address], Occupational Therapy, Speech Therapy, Nursing, Aid. Equipment at Discharge: Rolling walker    Team Members Present at Conference:  : Hetal Burden    Occupational Therapist: Chester Orlando, OTR/L  Physical Therapist: Gwen Medina PT, DPT   Speech Therapist: Iker Munoz, [facility] SLP   Nurse: Abelardo Robles RN  Dietician: Jeronimo España RDN, LD  : Dick Calderon, OTR/L  Psychiatry: N/A    Family members present at conference: No      I led this team conference and I approve the established interdisciplinary plan of care as documented within the medical record of [address] .    MD: Kena Estrada.  Reva Mascorro MD 2/2/2022, 11:19 AM

## 2022-02-01 NOTE — PROGRESS NOTES
Occupational Therapy  Facility/Department: [facility]  Daily Treatment Note  NAME: April Pritchard. : 1960  MRN: [id number]    Date of Service: 2022    Discharge Recommendations:  24 hour supervision or assist,Home with Home health OT,S Level 1  OT Equipment Recommendations  Equipment Needed: Yes  Mobility Devices: ADL Assistive Devices  ADL Assistive Devices: Shower Chair with back    Assessment   Performance deficits / Impairments: Decreased functional mobility ; Decreased safe awareness;Decreased balance;Decreased coordination;Decreased posture;Decreased vision/visual deficit; Decreased cognition;Decreased ADL status; Decreased endurance;Decreased high-level IADLs;Decreased strength;Decreased fine motor control  Assessment: Pt agreeable to OT session. Pt performed functional transfers with CGA/min A with RW and mod VCs for safety with RW and sit<>stands. Pt completed bathing with CGA for balance in stance and mod VCs for sequencing/thoroughness during task. Pt performed LB dressing with difficulty threading second foot into each of pants/brief. Pt switched which foot was first for articles of clothing but still got stuck on second step. Pt educated on use of sock aid and able to don socks with cues and supervision. Pt educated on use of reacher for next time donning pants. Pt completed [facility] task for manipulating coins with fair coordination, better on L than R. Pt performed mobility to collect cards in numerical order but pt froze longer than typical during standing and increased tremors/shakiness noted during and after freezing episode. BP checked afterwards at 99/55. RN made aware. . Pt left in recliner with all needs in reach. Continue POC. Prognosis: Good  OT Education: Plan of Care;OT Role;Transfer Training;Equipment;Orientation  Patient Education: role of OT, safety with tranfers and mobility, ther ex  Barriers to Learning: Pt will require reinforcement.   REQUIRES OT FOLLOW UP: Yes  Activity Tolerance  Activity Tolerance: Treatment limited secondary to decreased cognition;Patient limited by fatigue  Activity Tolerance: Pt freezing during standing task with increased tremors/shaking in stance and increased time for pause with decreased BP after task at 99/55. RN notified. Safety Devices  Safety Devices in place: Yes  Type of devices: Gait belt;Call light within reach; Left in chair;Chair alarm in place;Nurse notified         Patient Diagnosis(es): There were no encounter diagnoses. has a past medical history of Arthritis, Asthma, CAD (coronary artery disease), COPD (chronic obstructive pulmonary disease) ([facility] [address] Emphysema of lung ([facility] [address] Fatty liver, GERD (gastroesophageal reflux disease), Hyperlipidemia, Hypertension, Medical history reviewed with no changes, MI, old, Sleep apnea, and Type II or unspecified type diabetes mellitus without mention of complication, not stated as uncontrolled. has a past surgical history that includes Coronary angioplasty with stent (0807,9618); Colonoscopy; Endoscopy, colon, diagnostic; cyst removal (1982); Upper gastrointestinal endoscopy (7/18/13); Upper gastrointestinal endoscopy (8/23/13); Carpal tunnel release (Bilateral, 2013); ERCP (9/15/2013); Upper gastrointestinal endoscopy (9/15/2013); ERCP (10/11/13); Cataract removal with implant (Left, 2/28/14); Diagnostic Cardiac Cath Lab Procedure; other surgical history; pacemaker placement; Cardiac pacemaker placement (2011); Foot surgery (Right, 07/09/2018); pr length/short leg/ankl tendon,single (Right, 10/31/2018); pr gastrocnemius recession (Right, 10/31/2018); Pain management procedure (Bilateral, 5/26/2020); Pain management procedure (Bilateral, 6/9/2020); Nerve Surgery (Bilateral, 12/1/2020); and Pain management procedure (Left, 1/12/2021).     Restrictions  Restrictions/Precautions  Restrictions/Precautions: General Precautions,Fall Risk  Position Activity Restriction  Other position/activity restrictions: up with assistance, Avasys  Subjective   General  Chart Reviewed: Yes,Orders,Progress Notes,Imaging,Labs  Patient assessed for rehabilitation services?: Yes  Additional Pertinent Hx: COPD, Emphysema, CAD, pacemaker, DDD, HTN, DM  Response to previous treatment: Patient with no complaints from previous session  Family / Caregiver Present: No  Referring Practitioner: Lianne Orourke MD  Diagnosis: COVID encephalopathy  Subjective  Subjective: Pt in recliner, pleasant, receiving medications from RN, agreeable to OT session. General Comment  Comments: RN cleared  Vital Signs  Pulse: 65  Heart Rate Source: Monitor  BP: (!) 99/55  BP Location: Right upper arm  Patient Position: Sitting;Up in chair  Patient Currently in Pain: Denies  Oxygen Therapy  SpO2: 97 %  Pulse Oximeter Device Mode: Intermittent  Pulse Oximeter Device Location: Right;Finger  O2 Device: None (Room air)   Orientation  Orientation  Overall Orientation Status: Impaired  Orientation Level: Oriented to place;Oriented to time;Oriented to person;Disoriented to situation  Objective    ADL  Feeding: Independent  Grooming: Contact guard assistance (to stand at sink to brush teeth and comb hair)  UE Bathing: Supervision  LE Bathing: Contact guard assistance (when standing to bathe buttocks, min VCs for sequencing and thoroughness)  UE Dressing: Supervision  LE Dressing: Minimal assistance (to thread RLE into brief fully and LLE into pants fully, good use of sock aid to don socks with cues, able to pull brief/pants over hips with CGA for balance)        Balance  Sitting Balance: Supervision  Standing Balance:  Moderate assistance (CGA/min A with RW, LOB toward end of session with mod A to correct)  Standing Balance  Time: 3 minutes, 1:15, 30 seconds x6  Activity: transfer to/from bathroom, LB bathing, LB dressing, grooming at sink, collection of cards  Comment: with RW, mod VCs for safety with RW  Functional Mobility  Functional - Mobility Device: Rolling Walker  Activity: To/from bathroom  Assist Level: Minimal assistance  Shower Transfers  Shower - Transfer From: Other  Shower - Transfer Type: To and From  Shower - Transfer To: Transfer tub bench  Shower - Technique: Ambulating  Shower Transfers: Minimal assistance     Transfers  Stand Step Transfers: Minimal assistance  Sit to stand: Contact guard assistance  Stand to sit: Contact guard assistance        Coordination  Gross Motor: fair coordination for ADLs, mod VCs for use of sock aid  Fine Motor: fair to good coordination for removing coins and stacking, better on L than R, cues for use of RUE              Cognition  Overall Cognitive Status: Exceptions  Arousal/Alertness: Delayed responses to stimuli  Following Commands: Follows one step commands with repetition; Follows one step commands with increased time  Attention Span: Difficulty attending to directions; Attends with cues to redirect  Memory: Decreased short term memory;Decreased recall of recent events;Decreased recall of precautions  Safety Judgement: Decreased awareness of need for safety;Decreased awareness of need for assistance  Problem Solving: Decreased awareness of errors;Assistance required to identify errors made;Assistance required to generate solutions  Insights: Decreased awareness of deficits  Initiation: Requires cues for all  Sequencing: Requires cues for all     Perception  Overall Perceptual Status: Impaired  Unilateral Attention: Appears intact  Initiation: Cues to initiate tasks  Motor Planning: Cues to use objects appropriately  Perseveration: Not present                                   Plan   Plan  Times per week: 5/7 days/week  Plan weeks: 3 weeks  Current Treatment Recommendations: Strengthening,ROM,Balance Training,Functional Mobility Training,Endurance Training,Neuromuscular Re-education,Equipment Evaluation, Education, & procurement,Home Management Training,Self-Care / ADL,Patient/Caregiver Education & Training,Safety Education & Training    Goals  Short term goals  Time Frame for Short term goals: 1 week- 2/3/21  Short term goal 1: Pt will perform functional transfers with CGA. -Ongoing (2/1) CGA-min A. Short term goal 2: Pt will complete toileting with min A  Short term goal 3: Pt will perform LB dressing with CGA. Ongoing (2/1) min A. Short term goal 4: Pt will complete grooming at sink with SPV. Ongoing (2/1) CGA  Long term goals  Time Frame for Long term goals : 3 weeks- 2/17/21  Long term goal 1: Pt will perform functional transfers with mod I.  Long term goal 2: Pt will complete toileting with mod I.  Long term goal 3: Pt will complete LB dressing with mod I.  Long term goal 4: Pt will perform LB dressing with mod I.  Long term goal 5: Pt will perform simple IADL task with mod I.   Patient Goals   Patient goals : \"get home, see my dogs\"       Therapy Time   Individual Concurrent Group Co-treatment   Time In 0930         Time Out 1030         Minutes 60         Timed Code Treatment Minutes: 16 Liliane Vick OT

## 2022-02-01 NOTE — PROGRESS NOTES
Speech Language Pathology  [facility]: ACUTE REHAB UNIT  SPEECH-LANGUAGE PATHOLOGY      [x] Daily  [] Weekly Care Conference Note  [] Discharge    Patient:Chano Sim. :1960  DFW:[id number]  Rehab Dx/Hx: Debility [R53.81]    Precautions: falls  Home situation: Lives alone. Active . Independent with meds and finances, manages all household tasks for self and brother. Takes care of brother who has paranoid schizophrenia  ST Dx: [] Aphasia  [] Dysarthria  [] Apraxia   [] Oropharyngeal dysphagia [x] Cognitive Impairment  [] Other:   Date of Admit: 2022  Room #: 0156/0156-01    Current functional status (updated daily):         Pt being seen for : [] Speech/Language Treatment  [] Dysphagia Treatment [x] Cognitive Treatment  [] Other:  Communication: [x]WFL  [] Aphasia  [] Dysarthria  [] Apraxia  [] Pragmatic Impairment [] Non-verbal  [] Hearing Loss  [] Other:   Cognition: [] WFL  [] Mild  [x] Moderate  [x] Severe [] Unable to Assess  [] Other:  Memory: [] WFL  [x] Mild  [x] Moderate  [x] Severe [] Unable to Assess  [] Other:  Behavior: [x] Alert  [x] Cooperative  [x]  Pleasant  [] Confused  [] Agitated  [] Uncooperative  [] Distractible [] Motivated  [] Self-Limiting [] Anxious  [] Other:  Endurance:  [x] Adequate for participation in SLP sessions  [] Reduced overall  [] Lethargic  [] Other:  Safety: [] No concerns at this time  [x] Reduced insight into deficits  [x]  Reduced safety awareness [] Not following call light procedures  [] Unable to Assess  [] Other:    Current Diet Order:ADULT DIET; Regular; 4 carb choices (60 gm/meal)  ADULT ORAL NUTRITION SUPPLEMENT; Lunch, Dinner; Frozen Oral Supplement  Swallowing Precautions: Small bites/sips;Upright as possible for all oral intake;Remain upright for 30-45 minutes after meals; Alternate solids and liquids;Eat/Feed slowly        Date: 2022      Tx session 1  [id number] Tx session 2  All tx needs met in session 1    Total Timed Code Min 60    Total Treatment Minutes 60    Individual Treatment Minutes 60    Group Treatment Minutes 0    Co-Treat Minutes 0    Variance/Reason:  N/a    Pain Denies     Pain Intervention [] RN notified  [] Repositioned  [] Intervention offered and patient declined  [x] N/A  [] Other: [] RN notified  [] Repositioned  [] Intervention offered and patient declined  [] N/A  [] Other:   Subjective     Pt alert and oriented, cooperative and agreeable to participate in therapy. Pt seen sitting on edge of bed. Objective:  Goals  Short-term Goals  Timeframe for Short-term Goals: 18 days (0214/2022)    Goal 1: Pt will complete graded recall tasks using compensatory strategies with 80% acc given min cues    Recall indirectly targeted during portions of the IBIS being completed.  -pt required max cues for working memory  -pt required repetition of stimuli 2-3x for improved recall and comprehension      Goal 2: Pt will complete executive function tasks (e.g. meds, time, money, etc) with 80% acc given min cues   Portions of the IBIS (assessment of language related functional activities) completed today:  -Telling time: 80% acc min cues    -counting money: 0% accuracy, improving to 10% acc given max cues. Stopped at #7 as money problems were becoming too difficult for pt    -solving daily math problems: 10% acc    -understanding medicine labels: 80% acc    -using a calendar: 40% acc      Goal 3: Pt will complete problem solving and thought organization tasks with 80% acc given min cues   See goal 2 above. Overall significantly reduced thought organization noted throughout completion of tx tasks. Goal 4: Pt will complete verbal and visual reasoning tasks with 80% acc given min cues   See goal 2 above. Overall significantly reduced reasoning skills noted throughout completion of tx tasks above.        Other areas targeted: N/a    Education:   Edu provided re: rationale for tx tasks provided       Safety Devices: [x] Call light within reach  [] Chair alarm activated  [x] Bed alarm activated  [x] Other: AVASYS [] Call light within reach  [] Chair alarm activated  [] Bed alarm activated  [] Other:    Assessment: Pt initially laying down upon SLP arrival, but agreeable to sit on edge of bed. Pt cooperative and agreeable to participate. Pt grinding his teeth consistently throughout tx session, stating \"it was a nervous habit. \" Pt perseverating on going home and taking care of his two dogs and brother who his paranoid schizophrenia and wanting to go home as soon as possible. Portions of the IBIS completed today: telling time with 80% acc, counting money with 0% acc, solving daily math problems with 10% acc, understanding medicine labels with 80% acc, using a calendar with 40% acc, and reading instructions/comprehension with 30% acc. Pt unaware of difficulties, often looking away and getting off topic. Pt able to easily be redirected, but overall reduced insight into deficits. Discussed money management at home, pt stating \"I just spend what I want and check the account, I don't use cash, everything is just automatically withdrawn from my account. \" Pt will require 24 hour assist d/t severity of cognitive deficits, reduced insight and safety awareness. Continue goals above. Plan: Continue as per plan of care. Additional Information:     Barriers toward progress: Limited family support, Cognitive deficit, Limited safety awareness and Limited insight into deficits  Discharge recommendations:  [] Home independently  [] Home with assistance [x]  24 hour supervision  [] ECF [] Other:   Continued Tx Upon Discharge: ? [x] Yes [] No [x] TBD based on progress while on ARU [] Vital Stim indicated [] Other:   Estimated discharge date: TBD    Interventions used this date:  [] Speech/Language Treatment  [] Instruction in HEP [] Group [] Dysphagia Treatment [x] Cognitive Treatment   [] Other:       Total Time Breakdown / Charges    Time in Time out Total Time / units   Cognitive Tx 1230 1330 60 min/ 4 units    Speech Tx -- -- --   Dysphagia Tx --- -- --       Electronically Signed by      Mya Mckenzie. A CCC-SLP  Speech-Language Pathologist  [id number]

## 2022-02-01 NOTE — PROGRESS NOTES
[address] [facility].  2/1/2022  [id number]    Chief Complaint: Debility    Subjective:   Denies pain; no new issues this AM.    ROS: No n/v, cp, sob, f/c  Objective:  Patient Vitals for the past 24 hrs:   BP Temp Temp src Pulse Resp SpO2   02/01/22 0730 121/63 97.8 °F (36.6 °C) Oral 63 16 97 %   01/31/22 2000 (!) 162/79 97.6 °F (36.4 °C) Oral 64 16 96 %     Gen: No distress, pleasant. HEENT: Normocephalic, atraumatic. CV: Regular rate and rhythm. Resp: No respiratory distress. Abd: Soft, nontender   Ext: No edema. Neuro: Alert, oriented, appropriately interactive. Wt Readings from Last 3 Encounters:   01/27/22 218 lb 6 oz (99.1 kg)   01/23/22 216 lb 3.2 oz (98.1 kg)   11/09/21 243 lb (110.2 kg)       Laboratory data:   Lab Results   Component Value Date    WBC 6.5 01/31/2022    HGB 13.4 (L) 01/31/2022    HCT 39.3 (L) 01/31/2022    MCV 83.2 01/31/2022     01/31/2022       Lab Results   Component Value Date     01/31/2022    K 4.3 01/31/2022    CL 95 01/31/2022    CO2 30 01/31/2022    BUN 12 01/31/2022    CREATININE 0.7 01/31/2022    GLUCOSE 142 01/31/2022    CALCIUM 9.4 01/31/2022        Therapy progress:  PT  Position Activity Restriction  Other position/activity restrictions: up with assistance, Avasys  Objective     Sit to Stand: Contact guard assistance  Stand to sit: Contact guard assistance  Bed to Chair: Moderate assistance,Minimal assistance  Device: Rolling Walker  Other Apparatus: Wheelchair follow  Assistance: Minimal assistance  Distance: 104 ft, 112 ft  OT  PT Equipment Recommendations  Equipment Needed: Yes  Mobility Devices: Tanesha Perches: Rolling  Toilet - Technique: Ambulating  Equipment Used: Standard toilet  Assessment        SLP  Current Diet : Regular  Current Liquid Diet : Thin  Diet Solids Recommendation: Regular  Liquid Consistency Recommendation: Thin    Body mass index is 37.48 kg/m².     Rehabilitation Diagnosis:  Brain, 2.1, Non-Traumatic     Assessment and Plan:  Acute encephalopathy secondary to covid  - continue scheduled seroquel, wean as able  - Consolidate sleep  - SLP     Afib/RVR  - continue beta blocker  - ICD adjusted per cardiology after inappropriate firing  - continue eliquis     Covid positive  - completed isolation  - completed decadron       DM  - lantus, SSI     Thrombocytopenia  - follow    CAD with h/o stent x 3, h/o MI, h/o VT  - asa, statin, plavix     CHF  - euvolemic  - continue medical management  - resume lasix twice weeklly if needed  - uptitrate acei if needed     Asthma  - inhalers as ordered     Hyperlipidemia  - statin     GERD  - PPI     Lumbar spondylosis  - f/u Dr. Singh Screen     Morbid obesity  - dietary consulted     Depression  - cymbalta     Bowels: Schedule stool softener. Follow bowel movements. Enema or suppository if needed.      Bladder: Check PVR x 3. [facility] if PVR > 200ml or if any volume is > 500 ml.      Sleep: Trazodone provided prn.      Follow up appointments: PCP, Dr. Lynnette Lackey.  Tabatha Trejo MD 2/1/2022, 9:14 AM

## 2022-02-02 ENCOUNTER — PROCEDURE VISIT (OUTPATIENT)
Dept: CARDIOLOGY CLINIC | Age: 62
End: 2022-02-02
Payer: MEDICARE

## 2022-02-02 DIAGNOSIS — I47.20 VENTRICULAR TACHYCARDIA: ICD-10-CM

## 2022-02-02 DIAGNOSIS — Z95.810 AUTOMATIC IMPLANTABLE CARDIOVERTER-DEFIBRILLATOR IN SITU: ICD-10-CM

## 2022-02-02 DIAGNOSIS — I48.0 PAF (PAROXYSMAL ATRIAL FIBRILLATION) (HCC): ICD-10-CM

## 2022-02-02 DIAGNOSIS — G45.1 TIA INVOLVING LEFT INTERNAL CAROTID ARTERY: ICD-10-CM

## 2022-02-02 DIAGNOSIS — I25.5 ISCHEMIC CARDIOMYOPATHY: ICD-10-CM

## 2022-02-02 DIAGNOSIS — I48.91 ATRIAL FIBRILLATION WITH RVR (HCC): ICD-10-CM

## 2022-02-02 LAB
GLUCOSE BLD-MCNC: 149 MG/DL (ref 70–99)
GLUCOSE BLD-MCNC: 178 MG/DL (ref 70–99)
GLUCOSE BLD-MCNC: 241 MG/DL (ref 70–99)
GLUCOSE BLD-MCNC: 275 MG/DL (ref 70–99)
PERFORMED ON: ABNORMAL

## 2022-02-02 PROCEDURE — 6370000000 HC RX 637 (ALT 250 FOR IP): Performed by: PHYSICAL MEDICINE & REHABILITATION

## 2022-02-02 PROCEDURE — 97535 SELF CARE MNGMENT TRAINING: CPT

## 2022-02-02 PROCEDURE — 97110 THERAPEUTIC EXERCISES: CPT

## 2022-02-02 PROCEDURE — 97530 THERAPEUTIC ACTIVITIES: CPT

## 2022-02-02 PROCEDURE — 97129 THER IVNTJ 1ST 15 MIN: CPT

## 2022-02-02 PROCEDURE — 94640 AIRWAY INHALATION TREATMENT: CPT

## 2022-02-02 PROCEDURE — 97112 NEUROMUSCULAR REEDUCATION: CPT

## 2022-02-02 PROCEDURE — 1280000000 HC REHAB R&B

## 2022-02-02 PROCEDURE — 97116 GAIT TRAINING THERAPY: CPT

## 2022-02-02 PROCEDURE — 97130 THER IVNTJ EA ADDL 15 MIN: CPT

## 2022-02-02 RX ADMIN — DULOXETINE HYDROCHLORIDE 60 MG: 60 CAPSULE, DELAYED RELEASE ORAL at 08:45

## 2022-02-02 RX ADMIN — GABAPENTIN 200 MG: 100 CAPSULE ORAL at 20:36

## 2022-02-02 RX ADMIN — INSULIN LISPRO 3 UNITS: 100 INJECTION, SOLUTION INTRAVENOUS; SUBCUTANEOUS at 06:09

## 2022-02-02 RX ADMIN — DEXAMETHASONE 6 MG: 4 TABLET ORAL at 08:45

## 2022-02-02 RX ADMIN — Medication 10 MG: at 20:37

## 2022-02-02 RX ADMIN — Medication 2 PUFF: at 08:29

## 2022-02-02 RX ADMIN — APIXABAN 5 MG: 5 TABLET, FILM COATED ORAL at 08:51

## 2022-02-02 RX ADMIN — INSULIN LISPRO 5 UNITS: 100 INJECTION, SOLUTION INTRAVENOUS; SUBCUTANEOUS at 20:38

## 2022-02-02 RX ADMIN — QUETIAPINE FUMARATE 50 MG: 25 TABLET ORAL at 20:36

## 2022-02-02 RX ADMIN — INSULIN LISPRO 6 UNITS: 100 INJECTION, SOLUTION INTRAVENOUS; SUBCUTANEOUS at 16:29

## 2022-02-02 RX ADMIN — LISINOPRIL 5 MG: 5 TABLET ORAL at 08:54

## 2022-02-02 RX ADMIN — ATORVASTATIN CALCIUM 80 MG: 80 TABLET, FILM COATED ORAL at 08:44

## 2022-02-02 RX ADMIN — TRAZODONE HYDROCHLORIDE 100 MG: 50 TABLET ORAL at 20:36

## 2022-02-02 RX ADMIN — GABAPENTIN 200 MG: 100 CAPSULE ORAL at 08:53

## 2022-02-02 RX ADMIN — INSULIN LISPRO 3 UNITS: 100 INJECTION, SOLUTION INTRAVENOUS; SUBCUTANEOUS at 11:38

## 2022-02-02 RX ADMIN — APIXABAN 5 MG: 5 TABLET, FILM COATED ORAL at 20:36

## 2022-02-02 RX ADMIN — INSULIN GLARGINE 60 UNITS: 100 INJECTION, SOLUTION SUBCUTANEOUS at 20:37

## 2022-02-02 RX ADMIN — TAMSULOSIN HYDROCHLORIDE 0.4 MG: 0.4 CAPSULE ORAL at 20:36

## 2022-02-02 RX ADMIN — GABAPENTIN 200 MG: 100 CAPSULE ORAL at 12:33

## 2022-02-02 RX ADMIN — DIVALPROEX SODIUM 1000 MG: 250 TABLET, DELAYED RELEASE ORAL at 16:29

## 2022-02-02 RX ADMIN — METOPROLOL TARTRATE 50 MG: 50 TABLET, FILM COATED ORAL at 08:44

## 2022-02-02 RX ADMIN — POTASSIUM CHLORIDE 20 MEQ: 20 TABLET, EXTENDED RELEASE ORAL at 08:54

## 2022-02-02 RX ADMIN — CLOPIDOGREL BISULFATE 75 MG: 75 TABLET, FILM COATED ORAL at 08:52

## 2022-02-02 RX ADMIN — PANTOPRAZOLE SODIUM 40 MG: 40 TABLET, DELAYED RELEASE ORAL at 08:45

## 2022-02-02 RX ADMIN — METOPROLOL TARTRATE 50 MG: 50 TABLET, FILM COATED ORAL at 20:36

## 2022-02-02 RX ADMIN — QUETIAPINE FUMARATE 25 MG: 25 TABLET ORAL at 08:45

## 2022-02-02 ASSESSMENT — PAIN SCALES - GENERAL
PAINLEVEL_OUTOF10: 0

## 2022-02-02 NOTE — PROGRESS NOTES
Aspen Kaplan.  2/2/2022  [id number]    Chief Complaint: Debility    Subjective:   Resting in bed; no issues overnight; no complaints. ROS: No n/v, cp, sob, f/c  Objective:  Patient Vitals for the past 24 hrs:   BP Temp Temp src Pulse Resp SpO2   02/02/22 0900 (!) 105/52 97.6 °F (36.4 °C) Oral 69 14 98 %   02/02/22 0734 120/78 -- -- 65 -- 97 %   02/01/22 1932 119/68 97.5 °F (36.4 °C) Oral 72 16 95 %   02/01/22 1615 115/71 96.1 °F (35.6 °C) Oral 68 16 97 %   02/01/22 1130 (!) 118/58 97.6 °F (36.4 °C) Oral 67 16 97 %   02/01/22 1048 -- -- -- 63 -- --   02/01/22 1025 (!) 99/55 -- -- 65 -- 97 %     Gen: No distress, pleasant. HEENT: Normocephalic, atraumatic. CV: Regular rate and rhythm. Resp: No respiratory distress. Abd: Soft, nontender   Ext: No edema. Neuro: Alert, oriented, appropriately interactive.    Wt Readings from Last 3 Encounters:   01/27/22 218 lb 6 oz (99.1 kg)   01/23/22 216 lb 3.2 oz (98.1 kg)   11/09/21 243 lb (110.2 kg)       Laboratory data:   Lab Results   Component Value Date    WBC 6.5 01/31/2022    HGB 13.4 (L) 01/31/2022    HCT 39.3 (L) 01/31/2022    MCV 83.2 01/31/2022     01/31/2022       Lab Results   Component Value Date     01/31/2022    K 4.3 01/31/2022    CL 95 01/31/2022    CO2 30 01/31/2022    BUN 12 01/31/2022    CREATININE 0.7 01/31/2022    GLUCOSE 142 01/31/2022    CALCIUM 9.4 01/31/2022        Therapy progress:  PT  Position Activity Restriction  Other position/activity restrictions: up with assistance, Avasys  Objective     Sit to Stand: Contact guard assistance  Stand to sit: Contact guard assistance  Bed to Chair: Moderate assistance,Minimal assistance  Device: Rolling Walker  Other Apparatus: Wheelchair follow  Assistance: Minimal assistance  Distance: 75 ft, 120 ft  OT  PT Equipment Recommendations  Equipment Needed: Yes  Mobility Devices: Chan Wilkinsonill: Rolling  Toilet - Technique: Ambulating  Equipment Used: Standard toilet  Assessment SLP  Current Diet : Regular  Current Liquid Diet : Thin  Diet Solids Recommendation: Regular  Liquid Consistency Recommendation: Thin    Body mass index is 37.48 kg/m². Rehabilitation Diagnosis:  Brain, 2.1, Non-Traumatic     Assessment and Plan:  Acute encephalopathy secondary to covid  - continue scheduled seroquel, wean as able  - Consolidate sleep  - SLP     Afib/RVR  - continue beta blocker  - ICD adjusted per cardiology after inappropriate firing  - continue eliquis     Covid positive  - completed isolation  - completed decadron       DM  - lantus, SSI     Thrombocytopenia  - follow    CAD with h/o stent x 3, h/o MI, h/o VT  - asa, statin, plavix     CHF  - euvolemic  - continue medical management  - resume lasix twice weeklly if needed  - uptitrate acei if needed     Asthma  - inhalers as ordered     Hyperlipidemia  - statin     GERD  - PPI     Lumbar spondylosis  - f/u Dr. Joceline Hampton     Morbid obesity  - dietary consulted     Depression  - cymbalta     Bowels: Schedule stool softener. Follow bowel movements. Enema or suppository if needed.      Bladder: Check PVR x 3. [facility] if PVR > 200ml or if any volume is > 500 ml.      Sleep: Trazodone provided prn.      Follow up appointments: PCP, Dr. Joceline Hampton  ROBBIE: 2/10 home w/ home health  DME: tbd    Interdisciplinary team conference was held today with entire rehab treatment team including PT, OT, SLP, Dietician, RN, and SW. Discussion focused on progress toward rehab goals and discharge planning. Barriers: weakness, balance, pain control. Total treatment time >35 min with greater than 50% spent in care coordination. Kobe Patel MD 2/2/2022, 10:06 AM

## 2022-02-02 NOTE — PROGRESS NOTES
Occupational Therapy  Facility/Department: [facility]  Daily Treatment Note  NAME: Oli Orlando. : 1960  MRN: [id number]    Date of Service: 2022    Discharge Recommendations:  24 hour supervision or assist,Home with Home health OT,S Level 1  OT Equipment Recommendations  Equipment Needed: Yes  Mobility Devices: ADL Assistive Devices  ADL Assistive Devices: Shower Chair with back    Assessment   Performance deficits / Impairments: Decreased functional mobility ; Decreased safe awareness;Decreased balance;Decreased coordination;Decreased posture;Decreased vision/visual deficit; Decreased cognition;Decreased ADL status; Decreased endurance;Decreased high-level IADLs;Decreased strength;Decreased fine motor control  Assessment: Pt agreeable to OT session. Pt performed mobility in/out of bathroom and around bathroom with RW and CGA. Pt required mod VCs for proper use of RW when pt kept pushing away when approaching sink, toilet, and turning to sit in w/c. Pt completed w/c mobility down hallway with mod VCs for continuing task with pt pausing and stopping every 5-10 feet. Pt completed drill/bolt task at table top with min/mod VCs for problem solving and fair coordination for using drill to (un)screw bolts. Pt educated to attempt to use R hand at times but kept switching back to L hand and would not continue to use LUE. Pt ambulated from gym back to room with CGA and max VCs for increased step length and upright posture, due to forward flexed posture and festinating gait as pt fatigued. Vitals stable at end of session after walk back to room. Continue POC. Prognosis: Good  OT Education: Plan of Care;OT Role;Transfer Training;Equipment;Orientation;Precautions; ADL Adaptive Strategies  Patient Education: role of OT, safety with tranfers and mobility, ther ex, use of a/e, proper use of AD  Barriers to Learning: Pt will require reinforcement.   REQUIRES OT FOLLOW UP: Yes  Activity Tolerance  Activity Tolerance: Patient limited by fatigue;Patient Tolerated treatment well  Safety Devices  Safety Devices in place: Yes  Type of devices: Gait belt;Call light within reach; Left in chair;Chair alarm in place;Nurse notified         Patient Diagnosis(es): There were no encounter diagnoses. has a past medical history of Arthritis, Asthma, CAD (coronary artery disease), COPD (chronic obstructive pulmonary disease) ([facility] [address] Emphysema of lung ([facility] [address] Fatty liver, GERD (gastroesophageal reflux disease), Hyperlipidemia, Hypertension, Medical history reviewed with no changes, MI, old, Sleep apnea, and Type II or unspecified type diabetes mellitus without mention of complication, not stated as uncontrolled. has a past surgical history that includes Coronary angioplasty with stent (8677,7959); Colonoscopy; Endoscopy, colon, diagnostic; cyst removal (1982); Upper gastrointestinal endoscopy (7/18/13); Upper gastrointestinal endoscopy (8/23/13); Carpal tunnel release (Bilateral, 2013); ERCP (9/15/2013); Upper gastrointestinal endoscopy (9/15/2013); ERCP (10/11/13); Cataract removal with implant (Left, 2/28/14); Diagnostic Cardiac Cath Lab Procedure; other surgical history; pacemaker placement; Cardiac pacemaker placement (2011); Foot surgery (Right, 07/09/2018); pr length/short leg/ankl tendon,single (Right, 10/31/2018); pr gastrocnemius recession (Right, 10/31/2018); Pain management procedure (Bilateral, 5/26/2020); Pain management procedure (Bilateral, 6/9/2020); Nerve Surgery (Bilateral, 12/1/2020); and Pain management procedure (Left, 1/12/2021).     Restrictions  Restrictions/Precautions  Restrictions/Precautions: General Precautions,Fall Risk  Position Activity Restriction  Other position/activity restrictions: up with assistance, Avasys  Subjective   General  Chart Reviewed: Yes,Orders,Progress Notes,Imaging,Labs  Patient assessed for rehabilitation services?: Yes  Additional Pertinent Hx: COPD, Emphysema, CAD, pacemaker, DDD, HTN, DM  Response to previous treatment: Patient with no complaints from previous session  Family / Caregiver Present: No  Referring Practitioner: Terese Alanis MD  Diagnosis: COVID encephalopathy  Subjective  Subjective: Pt sitting on EOB, eating breakfast, pleasant, agreeable to OT session. General Comment  Comments: RN cleared  Vital Signs  Pulse: 65  Heart Rate Source: Monitor  BP: 120/78  BP Location: Right upper arm  Patient Position: Sitting  Patient Currently in Pain: Denies  Oxygen Therapy  SpO2: 97 %  Pulse Oximeter Device Mode: Intermittent  Pulse Oximeter Device Location: Right;Finger  O2 Device: None (Room air)   Orientation  Orientation  Overall Orientation Status: Impaired  Orientation Level: Oriented to place;Oriented to time;Oriented to person;Disoriented to situation  Objective    ADL  Feeding: Independent  Grooming: Stand by assistance (standing at sink to brush teeth)  Toileting: Contact guard assistance (in stance during clothing management)        Balance  Sitting Balance: Supervision  Standing Balance: Contact guard assistance (with RW)  Standing Balance  Time: 4 minutes  Activity: transfer to/from bathroom, grooming at sink, toileting  Comment: with RW, mod VCs for proper management of AD  Functional Mobility  Functional - Mobility Device: Rolling Walker  Activity: To/from bathroom  Assist Level: Contact guard assistance     Transfers  Stand Step Transfers: Contact guard assistance  Sit to stand: Contact guard assistance  Stand to sit: Contact guard assistance        Coordination  Gross Motor: fair coordination for (un)screwing bolts into board with hand drill to match picture with poor problem solving to make pattern              Cognition  Overall Cognitive Status: Exceptions  Arousal/Alertness: Delayed responses to stimuli  Following Commands: Follows one step commands with repetition; Follows one step commands with increased time  Attention Span: Difficulty attending to directions; Attends with cues to redirect  Memory: Decreased short term memory;Decreased recall of recent events;Decreased recall of precautions  Safety Judgement: Decreased awareness of need for safety;Decreased awareness of need for assistance  Problem Solving: Decreased awareness of errors;Assistance required to identify errors made;Assistance required to generate solutions  Insights: Decreased awareness of deficits  Initiation: Requires cues for all  Sequencing: Requires cues for all     Perception  Overall Perceptual Status: Impaired  Unilateral Attention: Appears intact  Initiation: Cues to initiate tasks  Motor Planning: Cues to use objects appropriately                                   Plan   Plan  Times per week: 5/7 days/week  Plan weeks: 3 weeks  Current Treatment Recommendations: Strengthening,ROM,Balance Training,Functional Mobility Training,Endurance Training,Neuromuscular Re-education,Equipment Evaluation, Education, & procurement,Home Management Training,Self-Care / ADL,Patient/Caregiver Education & Training,Safety Education & Training    Goals  Short term goals  Time Frame for Short term goals: 1 week- 2/3/21  Short term goal 1: Pt will perform functional transfers with CGA. -Ongoing (2/1) CGA-min A. Short term goal 2: Pt will complete toileting with min A  Short term goal 3: Pt will perform LB dressing with CGA. Ongoing (2/1) min A. Short term goal 4: Pt will complete grooming at sink with SPV. Ongoing (2/1) CGA  Long term goals  Time Frame for Long term goals : 3 weeks- 2/17/21  Long term goal 1: Pt will perform functional transfers with mod I.  Long term goal 2: Pt will complete toileting with mod I.  Long term goal 3: Pt will complete LB dressing with mod I.  Long term goal 4: Pt will perform LB dressing with mod I.  Long term goal 5: Pt will perform simple IADL task with mod I.   Patient Goals   Patient goals : \"get home, see my dogs\"       Therapy Time   Individual Concurrent Group Co-treatment   Time In 4572 Time Out 0830         Minutes 60         Timed Code Treatment Minutes: 900 Steve Mcmahan

## 2022-02-02 NOTE — PROGRESS NOTES
Physical Therapy  Facility/Department: [facility]  Daily Treatment Note  NAME: Leah Bhatti. : 1960  MRN: [id number]    Date of Service: 2022    Discharge Recommendations:  24 hour supervision or assist,Home with Home health PT   PT Equipment Recommendations  Equipment Needed: Yes  Walker: Rolling    Assessment   Body structures, Functions, Activity limitations: Decreased functional mobility ; Decreased cognition;Decreased posture;Decreased ADL status; Decreased endurance;Decreased coordination;Decreased strength;Decreased balance;Decreased safe awareness  Assessment: found supine in bed, denies pain. pt easily frustrated, expressing desire to d/c home asap. pt with poor insight into deficits. multiple LOB thsi date during gait adn balance activiteis with poor righting reactions. pt with poor participation, attempts 1-2 reps before stopping activity. limited particiaption in there x despite VC/ tactile cues and demonstration. pt also reporting to therapist he fell at home 2-3x/week at this time, rec home with  adn HHPT vs SNF if no assist available. Treatment Diagnosis: impaired mobility  Prognosis: Fair  Decision Making: Medium Complexity  PT Education: Goals;Transfer Training;Equipment;Disease Specific Education;PT Role;Functional Mobility Training;Energy Conservation;Plan of Care;General Safety;Precautions; Family Education;Gait Training  Patient Education: pt educated on role of PT, safe mobility with transfers/gait, will require reinforcement  Barriers to Learning: cognition  REQUIRES PT FOLLOW UP: Yes  Activity Tolerance  Activity Tolerance: Patient limited by endurance; Patient limited by cognitive status  Activity Tolerance: 124/60, 68 bpm, Spo2= 97% on rooma ir     Patient Diagnosis(es): There were no encounter diagnoses.      has a past medical history of Arthritis, Asthma, CAD (coronary artery disease), COPD (chronic obstructive pulmonary disease) ([facility] [address] Emphysema of lung ([facility] [address] Fatty liver, GERD (gastroesophageal reflux disease), Hyperlipidemia, Hypertension, Medical history reviewed with no changes, MI, old, Sleep apnea, and Type II or unspecified type diabetes mellitus without mention of complication, not stated as uncontrolled. has a past surgical history that includes Coronary angioplasty with stent ([id number],5688); Colonoscopy; Endoscopy, colon, diagnostic; cyst removal (1982); Upper gastrointestinal endoscopy (7/18/13); Upper gastrointestinal endoscopy (8/23/13); Carpal tunnel release (Bilateral, 2013); ERCP (9/15/2013); Upper gastrointestinal endoscopy (9/15/2013); ERCP (10/11/13); Cataract removal with implant (Left, 2/28/14); Diagnostic Cardiac Cath Lab Procedure; other surgical history; pacemaker placement; Cardiac pacemaker placement (2011); Foot surgery (Right, 07/09/2018); pr length/short leg/ankl tendon,single (Right, 10/31/2018); pr gastrocnemius recession (Right, 10/31/2018); Pain management procedure (Bilateral, 5/26/2020); Pain management procedure (Bilateral, 6/9/2020); Nerve Surgery (Bilateral, 12/1/2020); and Pain management procedure (Left, 1/12/2021). Restrictions  Restrictions/Precautions  Restrictions/Precautions: General Precautions,Fall Risk  Position Activity Restriction  Other position/activity restrictions: up with assistance, Avasys  Subjective   General  Chart Reviewed: Yes  Response To Previous Treatment: Patient with no complaints from previous session.   Family / Caregiver Present: No  Referring Practitioner: Jeanette Caldwell MD  Subjective  Subjective: denies pain  General Comment  Comments: found supine in bed. easily frustrated this date  Pain Screening  Patient Currently in Pain: No  Vital Signs  Patient Currently in Pain: No       Orientation  Orientation  Overall Orientation Status: Within Functional Limits  Cognition      Objective   Bed mobility  Supine to Sit: Supervision  Transfers  Sit to Stand: Contact guard assistance  Stand to sit: Contact guard assistance  Bed to Chair: Minimal assistance  Comment: sit to stand EOB to Rw with CGA, w/c to RW with CGA. pt demo's poor eccentric control upon sitting. demo's unsafe pivot technique when turning to sit in recliner at end of session (pushing RW away, unsafe gait) with min A. Ambulation  Ambulation?: Yes  More Ambulation?: Yes  Ambulation 1  Surface: level tile  Device: Rolling Walker  Assistance: Minimal assistance; Moderate assistance  Quality of Gait: significant posterior lean, significnatly shortened step lengths/lack of heel strike/swing phase. shuffled gait. poor dory, slow velocity, appears to have episodes of freezing and needs to be redirected  Distance: 60 ft, 90 ft  Comments: pt deviating to the R when traveling in straight path, multiple episodes of freezing with up tomod A to correct LOB R laterally. Ambulation 2  Surface - 2: level tile  Device 2: Rolling Walker  Assistance 2: Minimal assistance  Quality of Gait 2: initially larger step lengths progressively getting smaller/shorter, periods of freezig and difficult to re-initiate gait, lack of heel strike/swing phase/ shuffled steps, slow velocity, interrupted/inconsistent dory  Distance: 75 ft, 50 ft  Neuromuscular Education  NDT Treatment: Standing  Neuromuscular Comments: Alternating BLE forward/backwards stepping over 2 beanbags with 1 UE support 2 x 1.5 min, cues for proper sequencing. pt requries multiple steps to take backwards step, poor righting reactions. obstacle course: step over cane x 2 (goal as reciprocal step between canes, however pt unable to complete and used step to pattern)-> step onto/over 4\" step with-> 5 BLE alternating cone taps (pt knocking over cones 4/5 trials each LE). pt completed 2 reps with min -mod A, poor eccentric control upon descending step.      Other exercises  Other exercises 1: pt completed 10 step ups onto 4\" step with 1 UE support for increased LE strengthening       Attempted to have pt complete 15 reps of MITRA CASEY marches. Pt with poor participation, kicking legs out /back with no control, not listening to cues for decreased velocity despite cues/demosntrtion. Goals  Short term goals  Time Frame for Short term goals: 7 days 2/3  Short term goal 1: pt will complete bed mobility with supv. Short term goal 2: pt will complete functional transfer with CGA and LRAD. Short term goal 3: pt will ambulate 50 ft with LRAD and min A. Long term goals  Time Frame for Long term goals : 21 days 2/17  Long term goal 1: pt will complete bed mobility with ind. Long term goal 2: pt will complete functional transfer with mod ind and LRAD. Long term goal 3: pt will ambulate 100 ft with LRAD and supv. Long term goal 4: pt will complete car transfer with LRAD and mod ind.   Patient Goals   Patient goals : \"get stronger\"    Plan    Plan  Times per week: 5-7x/week  Times per day: Daily  Specific instructions for Next Treatment: Progress mobility as tolerated  Current Treatment Recommendations: Justin Singh Re-education,Patient/Caregiver Education & Training,Cognitive Reorientation,ROM,Wheelchair Mobility Training,Manual Therapy - Soft Tissue Mobilization,Equipment Evaluation, Education, & procurement,Balance Training,Gait Training,Home Exercise Program,Modalities,Functional Mobility Training,Stair training,Manual Therapy - Joint Manipulation,Safety Education & Training,Positioning  Safety Devices  Type of devices: Call light within reach,Left in chair,Chair alarm in place,Telesitter in use,Gait belt,Nurse notified,All fall risk precautions in place,Patient at risk for falls     Therapy Time   Individual Concurrent Group Co-treatment   Time In 0900         Time Out 1000         Minutes 60         Timed Code Treatment Minutes: 60 Minutes       Danny Cheney, PT

## 2022-02-02 NOTE — PROGRESS NOTES
[place] device checked per [employer], rep. He gave device interrogation given to Dr. Mary Vasquez.

## 2022-02-02 NOTE — CARE COORDINATION
Clinicals sent to Replaced by [facility] for continued stay review.     Ayana Duffy MPH, RRT  Clinical Liaison [address]  (I)[phone]  (B)[phone]   Electronically signed by Ayana Duffy on 2/2/2022 at 9:38 AM

## 2022-02-02 NOTE — CARE COORDINATION
CM spoke with Kalnai Lutz (brother)([phone]) via telephone with home dynamics with patient. Kalani Lutz stated that he can not help him at home. CM discussed if there is any additional family that could help. Kalani Lutz stated that David Escobar (brother-in-law) ([phone]) would be a alternative to talk with. CM acknowledged and will contact him. Debo Doyle RN    [address] spoke with Kathy Morocho (Brother-in-law)   [phone] via telephone with discharge plans. David [place]ton stated that he can assist with the 24 hour supervision or assist and check on him daily. David Escobar also stated for his home care services [address] [facility]. would be ideal since patient lives in [place]. CM will fax referral to [facility].  Debo Doyle RN

## 2022-02-02 NOTE — PROGRESS NOTES
Speech Language Pathology  [facility]: ACUTE REHAB UNIT  SPEECH-LANGUAGE PATHOLOGY      [x] Daily  [] Weekly Care Conference Note  [] Discharge    Patient:Chano Abreu. :1960  LSK:[id number]  Rehab Dx/Hx: Debility [R53.81]    Precautions: falls  Home situation: Lives alone. Active . Independent with meds and finances, manages all household tasks for self and brother. Takes care of brother who has paranoid schizophrenia  ST Dx: [] Aphasia  [] Dysarthria  [] Apraxia   [] Oropharyngeal dysphagia [x] Cognitive Impairment  [] Other:   Date of Admit: 2022  Room #: 0156/0156-01    Current functional status (updated daily):         Pt being seen for : [] Speech/Language Treatment  [] Dysphagia Treatment [x] Cognitive Treatment  [] Other:  Communication: [x]WFL  [] Aphasia  [] Dysarthria  [] Apraxia  [] Pragmatic Impairment [] Non-verbal  [] Hearing Loss  [] Other:   Cognition: [] WFL  [] Mild  [x] Moderate  [x] Severe [] Unable to Assess  [] Other:  Memory: [] WFL  [x] Mild  [x] Moderate  [x] Severe [] Unable to Assess  [] Other:  Behavior: [x] Alert  [x] Cooperative  [x]  Pleasant  [] Confused  [] Agitated  [] Uncooperative  [] Distractible [] Motivated  [] Self-Limiting [] Anxious  [] Other:  Endurance:  [x] Adequate for participation in SLP sessions  [] Reduced overall  [] Lethargic  [] Other:  Safety: [] No concerns at this time  [x] Reduced insight into deficits  [x]  Reduced safety awareness [] Not following call light procedures  [] Unable to Assess  [] Other:    Current Diet Order:ADULT DIET; Regular; 4 carb choices (60 gm/meal)  ADULT ORAL NUTRITION SUPPLEMENT; Lunch, Dinner; Frozen Oral Supplement  Swallowing Precautions: Small bites/sips;Upright as possible for all oral intake;Remain upright for 30-45 minutes after meals; Alternate solids and liquids;Eat/Feed slowly        Date: 2022      Tx session 1  4578-0045 Tx session 2  4546-0044   Total Timed Code Min 30 30   Total Treatment Minutes 30 30   Individual Treatment Minutes 30 30   Group Treatment Minutes 0 0   Co-Treat Minutes 0 0   Variance/Reason:  N/a N/a   Pain Denies  Back and leg pain   Pain Intervention [] RN notified  [] Repositioned  [] Intervention offered and patient declined  [x] N/A  [] Other: [] RN notified  [x] Repositioned  [x] Intervention offered and patient declined  [] N/A  [x] Other: pt reported he did not want any medications      Subjective     Pt alert and oriented, cooperative and agreeable to participate in therapy. Pt seen sitting upright in bedside chair. Pt alert and oriented, frustrated throughout tx session. Pt stating several times \"I am tired of being here I need to go\"      Objective:  Goals  Short-term Goals  Timeframe for Short-term Goals: 18 days (0214/2022)    Goal 1: Pt will complete graded recall tasks using compensatory strategies with 80% acc given min cues    Functional recall of 3 words:  -50% acc indep improving to 75% acc given max cues  Did not target. Goal 2: Pt will complete executive function tasks (e.g. meds, time, money, etc) with 80% acc given min cues   Did not target. Did not target.     Goal 3: Pt will complete problem solving and thought organization tasks with 80% acc given min cues   Thought organization/ word progression task:  -pt given three words and asked to rank the words in order that they occur  -ex: Sept, May, Nov- May, Sept, Nov  -15% acc indep improving to 25% acc given max cues     4 step picture sequencing task:  -20% acc indep improving to 50% acc given max cues  -pt throwing cards down onto the table when completed, very limited participation, not receptive to education    Goal 4: Pt will complete verbal and visual reasoning tasks with 80% acc given min cues   Odd one out picture card task:  -pt given a picture card with four items  -pt asked to determine which item doesn't belong and why  -odd item out: 53% acc   -reason as to why item doesn't belong: 10% acc, significant difficulty with providing a verbal reason    Indirectly targeted during sequencing task, see goal 3 above. Other areas targeted: N/a N/A   Education:   Edu provided re: compensatory memory strategies and reasoning strategies   Edu provided re: role of SLP, rationale for tx tasks provided    Safety Devices: [x] Call light within reach  [x] Chair alarm activated  [] Bed alarm activated  [x] Other: AVASYS [] Call light within reach  [] Chair alarm activated  [] Bed alarm activated  [] Other:    Assessment: Tx session 1: Pt pleasant and cooperative, agreeable to participate. Pt adamant about leaving and getting home as soon as possible. Pt continues to present with severe cognitive deficits. Pt completing functional recall task with 50% acc, but organization of three words sets in proper order, completing with 15% acc, significant max cues required. Pt also demonstrated difficulty with determining which item didn't belong. Pt with overall reduced insight into deficits, will require 24 hour assist at d/c. Tx session 2: Pt alert, very frustrated throughout tx session. Pt perseverating on \"I am tired of being here, I need to go\" Pt throwing picture cards down onto table after organizing them in sequential order, pt not receptive to education, and eventually gave up completing sequencing task. SLP provided extensive education regarding role of SLP and rationale for tx tasks provided. Pt responded \"I just need to go. \" recommending 24 hour assist at d/c. Continue goals above. Plan: Continue as per plan of care.       Additional Information:     Barriers toward progress: Limited family support, Cognitive deficit, Limited safety awareness and Limited insight into deficits  Discharge recommendations:  [] Home independently  [] Home with assistance [x]  24 hour supervision  [] ECF [] Other:   Continued Tx Upon Discharge: ? [x] Yes [] No [] TBD based on progress while on ARU [] Vital Stim indicated [] Other:   Estimated discharge date: 02/10/22    Interventions used this date:  [] Speech/Language Treatment  [] Instruction in HEP [] Group [] Dysphagia Treatment [x] Cognitive Treatment   [] Other: Total Time Breakdown / Charges    Time in Time out Total Time / units   Cognitive Tx 1030  1230 1100  1300 30 min/ 2 units   30 min/ 2 units    Speech Tx -- -- --   Dysphagia Tx --- -- --       Electronically Signed by      Skip Tian. A CCC-SLP  Speech-Language Pathologist  [id number]

## 2022-02-03 LAB
ANION GAP SERPL CALCULATED.3IONS-SCNC: 9 MMOL/L (ref 3–16)
BASOPHILS ABSOLUTE: 0 K/UL (ref 0–0.2)
BASOPHILS RELATIVE PERCENT: 0.3 %
BUN BLDV-MCNC: 13 MG/DL (ref 7–20)
CALCIUM SERPL-MCNC: 9.4 MG/DL (ref 8.3–10.6)
CHLORIDE BLD-SCNC: 96 MMOL/L (ref 99–110)
CO2: 30 MMOL/L (ref 21–32)
CREAT SERPL-MCNC: 0.7 MG/DL (ref 0.9–1.3)
EOSINOPHILS ABSOLUTE: 0 K/UL (ref 0–0.6)
EOSINOPHILS RELATIVE PERCENT: 0.6 %
GFR AFRICAN AMERICAN: >60
GFR NON-AFRICAN AMERICAN: >60
GLUCOSE BLD-MCNC: 130 MG/DL (ref 70–99)
GLUCOSE BLD-MCNC: 140 MG/DL (ref 70–99)
GLUCOSE BLD-MCNC: 210 MG/DL (ref 70–99)
GLUCOSE BLD-MCNC: 260 MG/DL (ref 70–99)
GLUCOSE BLD-MCNC: 360 MG/DL (ref 70–99)
HCT VFR BLD CALC: 39.1 % (ref 40.5–52.5)
HEMOGLOBIN: 13.3 G/DL (ref 13.5–17.5)
LYMPHOCYTES ABSOLUTE: 1.7 K/UL (ref 1–5.1)
LYMPHOCYTES RELATIVE PERCENT: 29.4 %
MCH RBC QN AUTO: 28.8 PG (ref 26–34)
MCHC RBC AUTO-ENTMCNC: 33.9 G/DL (ref 31–36)
MCV RBC AUTO: 84.9 FL (ref 80–100)
MONOCYTES ABSOLUTE: 0.4 K/UL (ref 0–1.3)
MONOCYTES RELATIVE PERCENT: 7.1 %
NEUTROPHILS ABSOLUTE: 3.7 K/UL (ref 1.7–7.7)
NEUTROPHILS RELATIVE PERCENT: 62.6 %
PDW BLD-RTO: 15.5 % (ref 12.4–15.4)
PERFORMED ON: ABNORMAL
PLATELET # BLD: 118 K/UL (ref 135–450)
PMV BLD AUTO: 6.9 FL (ref 5–10.5)
POTASSIUM REFLEX MAGNESIUM: 5 MMOL/L (ref 3.5–5.1)
RBC # BLD: 4.61 M/UL (ref 4.2–5.9)
SODIUM BLD-SCNC: 135 MMOL/L (ref 136–145)
WBC # BLD: 5.9 K/UL (ref 4–11)

## 2022-02-03 PROCEDURE — 97129 THER IVNTJ 1ST 15 MIN: CPT

## 2022-02-03 PROCEDURE — 85025 COMPLETE CBC W/AUTO DIFF WBC: CPT

## 2022-02-03 PROCEDURE — 1280000000 HC REHAB R&B

## 2022-02-03 PROCEDURE — 97116 GAIT TRAINING THERAPY: CPT

## 2022-02-03 PROCEDURE — 80048 BASIC METABOLIC PNL TOTAL CA: CPT

## 2022-02-03 PROCEDURE — 97530 THERAPEUTIC ACTIVITIES: CPT

## 2022-02-03 PROCEDURE — 36415 COLL VENOUS BLD VENIPUNCTURE: CPT

## 2022-02-03 PROCEDURE — 94640 AIRWAY INHALATION TREATMENT: CPT

## 2022-02-03 PROCEDURE — 97535 SELF CARE MNGMENT TRAINING: CPT

## 2022-02-03 PROCEDURE — 97130 THER IVNTJ EA ADDL 15 MIN: CPT

## 2022-02-03 PROCEDURE — 97110 THERAPEUTIC EXERCISES: CPT

## 2022-02-03 PROCEDURE — 6370000000 HC RX 637 (ALT 250 FOR IP): Performed by: PHYSICAL MEDICINE & REHABILITATION

## 2022-02-03 PROCEDURE — 97112 NEUROMUSCULAR REEDUCATION: CPT

## 2022-02-03 RX ADMIN — GABAPENTIN 200 MG: 100 CAPSULE ORAL at 20:00

## 2022-02-03 RX ADMIN — ATORVASTATIN CALCIUM 80 MG: 80 TABLET, FILM COATED ORAL at 07:37

## 2022-02-03 RX ADMIN — QUETIAPINE FUMARATE 50 MG: 25 TABLET ORAL at 20:00

## 2022-02-03 RX ADMIN — INSULIN LISPRO 7 UNITS: 100 INJECTION, SOLUTION INTRAVENOUS; SUBCUTANEOUS at 20:05

## 2022-02-03 RX ADMIN — TRAZODONE HYDROCHLORIDE 100 MG: 50 TABLET ORAL at 16:34

## 2022-02-03 RX ADMIN — CLOPIDOGREL BISULFATE 75 MG: 75 TABLET, FILM COATED ORAL at 07:37

## 2022-02-03 RX ADMIN — INSULIN GLARGINE 60 UNITS: 100 INJECTION, SOLUTION SUBCUTANEOUS at 20:04

## 2022-02-03 RX ADMIN — Medication 10 MG: at 20:00

## 2022-02-03 RX ADMIN — APIXABAN 5 MG: 5 TABLET, FILM COATED ORAL at 07:37

## 2022-02-03 RX ADMIN — APIXABAN 5 MG: 5 TABLET, FILM COATED ORAL at 20:01

## 2022-02-03 RX ADMIN — Medication 2 PUFF: at 20:02

## 2022-02-03 RX ADMIN — INSULIN LISPRO 3 UNITS: 100 INJECTION, SOLUTION INTRAVENOUS; SUBCUTANEOUS at 06:04

## 2022-02-03 RX ADMIN — GABAPENTIN 200 MG: 100 CAPSULE ORAL at 13:52

## 2022-02-03 RX ADMIN — Medication 2 PUFF: at 20:00

## 2022-02-03 RX ADMIN — POTASSIUM CHLORIDE 20 MEQ: 20 TABLET, EXTENDED RELEASE ORAL at 07:37

## 2022-02-03 RX ADMIN — DULOXETINE HYDROCHLORIDE 60 MG: 60 CAPSULE, DELAYED RELEASE ORAL at 07:37

## 2022-02-03 RX ADMIN — GABAPENTIN 200 MG: 100 CAPSULE ORAL at 07:38

## 2022-02-03 RX ADMIN — DIVALPROEX SODIUM 1000 MG: 250 TABLET, DELAYED RELEASE ORAL at 16:33

## 2022-02-03 RX ADMIN — Medication 2 PUFF: at 08:05

## 2022-02-03 RX ADMIN — DEXAMETHASONE 6 MG: 4 TABLET ORAL at 07:37

## 2022-02-03 RX ADMIN — METOPROLOL TARTRATE 50 MG: 50 TABLET, FILM COATED ORAL at 07:37

## 2022-02-03 RX ADMIN — INSULIN LISPRO 9 UNITS: 100 INJECTION, SOLUTION INTRAVENOUS; SUBCUTANEOUS at 16:33

## 2022-02-03 RX ADMIN — TAMSULOSIN HYDROCHLORIDE 0.4 MG: 0.4 CAPSULE ORAL at 20:00

## 2022-02-03 RX ADMIN — METOPROLOL TARTRATE 50 MG: 50 TABLET, FILM COATED ORAL at 20:01

## 2022-02-03 RX ADMIN — PANTOPRAZOLE SODIUM 40 MG: 40 TABLET, DELAYED RELEASE ORAL at 07:38

## 2022-02-03 RX ADMIN — LISINOPRIL 5 MG: 5 TABLET ORAL at 07:37

## 2022-02-03 RX ADMIN — INSULIN LISPRO 6 UNITS: 100 INJECTION, SOLUTION INTRAVENOUS; SUBCUTANEOUS at 11:23

## 2022-02-03 RX ADMIN — QUETIAPINE FUMARATE 25 MG: 25 TABLET ORAL at 07:37

## 2022-02-03 ASSESSMENT — PAIN SCALES - GENERAL
PAINLEVEL_OUTOF10: 0
PAINLEVEL_OUTOF10: 0

## 2022-02-03 NOTE — PROGRESS NOTES
Speech Language Pathology  [facility]: ACUTE REHAB UNIT  SPEECH-LANGUAGE PATHOLOGY      [x] Daily  [] Weekly Care Conference Note  [] Discharge    Patient:Chano Gilbert. :1960  [facility]:[id number]  Rehab Dx/Hx: Debility [R53.81]    Precautions: falls  Home situation: Lives alone. Active . Independent with meds and finances, manages all household tasks for self and brother. Takes care of brother who has paranoid schizophrenia  ST Dx: [] Aphasia  [] Dysarthria  [] Apraxia   [] Oropharyngeal dysphagia [x] Cognitive Impairment  [] Other:   Date of Admit: 2022  Room #: 0156/0156-01    Current functional status (updated daily):         Pt being seen for : [] Speech/Language Treatment  [] Dysphagia Treatment [x] Cognitive Treatment  [] Other:  Communication: [x]WFL  [] Aphasia  [] Dysarthria  [] Apraxia  [] Pragmatic Impairment [] Non-verbal  [] Hearing Loss  [] Other:   Cognition: [] WFL  [] Mild  [x] Moderate  [x] Severe [] Unable to Assess  [] Other:  Memory: [] WFL  [x] Mild  [x] Moderate  [x] Severe [] Unable to Assess  [] Other:  Behavior: [x] Alert  [x] Cooperative  [x]  Pleasant  [] Confused  [] Agitated  [] Uncooperative  [] Distractible [] Motivated  [] Self-Limiting [] Anxious  [] Other:  Endurance:  [x] Adequate for participation in SLP sessions  [] Reduced overall  [] Lethargic  [] Other:  Safety: [] No concerns at this time  [x] Reduced insight into deficits  [x]  Reduced safety awareness [] Not following call light procedures  [] Unable to Assess  [] Other:    Current Diet Order:ADULT DIET; Regular; 4 carb choices (60 gm/meal)  ADULT ORAL NUTRITION SUPPLEMENT; Lunch, Dinner; Frozen Oral Supplement  Swallowing Precautions: Small bites/sips;Upright as possible for all oral intake;Remain upright for 30-45 minutes after meals; Alternate solids and liquids;Eat/Feed slowly        Date: 2/3/2022      Tx session 1  0800 - 0900 Tx session 2  All tx needs met in session 1   Total Timed Code Min 60 0   Total Treatment Minutes 60 0   Individual Treatment Minutes 60 0   Group Treatment Minutes 0 0   Co-Treat Minutes 0 0   Variance/Reason:  N/a N/a   Pain denies    Pain Intervention [] RN notified  [] Repositioned  [] Intervention offered and patient declined  [x] N/A  [] Other: [] RN notified  [x] Repositioned  [] Intervention offered and patient declined  [] N/A  [] Other:      Subjective     Pt alert, cooperative, and oriented. Agreeable to tx. Pt upright in bed for session. Objective:  Goals  Short-term Goals  Timeframe for Short-term Goals: 18 days (0214/2022)    Goal 1: Pt will complete graded recall tasks using compensatory strategies with 80% acc given min cues    3 Item Grocery List  -SLP had pt pick 3 items he needed from grocery  -edu re: memory strategies (repetition, association, visualization) to improve recall  -pt recalled 3/3 items indep immediately and following a 5, 15, and 30 minute delay    Goal 2: Pt will complete executive function tasks (e.g. meds, time, money, etc) with 80% acc given min cues   Simple Money Questions  -e.g. how much is 5 dimes?  -pt completed with 90% acc indep, improved to 100% acc given mod cues    More Complex Money Questions  -e.g. do 5 nickels equal 3 dimes?  -pt completed with 60% acc indep, improved to 80% acc given mod cues       Goal 3: Pt will complete problem solving and thought organization tasks with 80% acc given min cues   Functional Problem Solving  -pt with poor insight and safety  -in middle of session, pt stood up to go to restroom without assistance  -pt resistant to edu re: use of call light, waiting for help from staff  -pt stated \"I can walk there on my own.  I was doing it before\"  -SLP edu pt re: being a fall risk - pt stated \"I don't care if I fall\"  -pt eventually sat back down, but required max cues to wait until PCA came into room to assist to restroom   -pt then with poor safety with using walker (using 1 hand only), walking without socks on, hitting obstacles along the way     Categorization / Thought Org Task  -pt given a category and a set of letters; asked to name items from the category that begin with the letters  -e.g. colors that begin with B, P, T, and F  -pt completed task with 67% acc indep, improved to 83% acc given mod cues       Goal 4: Pt will complete verbal and visual reasoning tasks with 80% acc given min cues   Naming a category  -pt given 3 items; asked to name the category the items belong to   -pt completed task with 80% acc indep, improved to 87% acc given mod cues    Adding to a category  -pt then asked to name another item that belonged to the category  -pt completed task with 77% acc indep, improved to 92% acc given mod cues       Other areas targeted: N/a N/A   Education:   SLP edu pt re: safety awareness and problem solving, fall risk, recall strategie    Safety Devices: [x] Call light within reach  [x] Chair alarm activated  [] Bed alarm activated  [x] Other: AVASYS [] Call light within reach  [] Chair alarm activated  [] Bed alarm activated  [] Other:    Assessment: Pt alert and cooperative, agreeable to tx. Pt has demonstrated some difficulty with cognitive tasks in recent sessions, thus simpler tasks presented. Pt asked to recall 3 grocery list items of his choosing - pt recalled immediately and after a 5, 15, and 30 min delay. Pt did well answering simple money questions (e.g. how much is 5 dimes), but more difficulty with more complex money questions (e.g. do 5 nickels equal 3 dimes). Pt required mod cues to increase acc when coming though org and reasoning tasks. Reduced thought organization with perseverating tendencies during cog tasks. Pt with reduced insight and safety - attempting to stand to walk to bathroom during session, max cues to wait for PCA. Pt then with poor safety awareness when walking to bathroom. Pt is at a high fall risk and will require 24 hr supervision due to impulsivity. Continue goals above. Plan: Continue as per plan of care. Additional Information:     Barriers toward progress: Limited family support, Cognitive deficit, Limited safety awareness and Limited insight into deficits  Discharge recommendations:  [] Home independently  [] Home with assistance [x]  24 hour supervision  [] ECF [] Other:   Continued Tx Upon Discharge: ? [x] Yes [] No [] TBD based on progress while on ARU [] Vital Stim indicated [] Other:   Estimated discharge date: 02/10/22    Interventions used this date:  [] Speech/Language Treatment  [] Instruction in HEP [] Group [] Dysphagia Treatment [x] Cognitive Treatment   [] Other:       Total Time Breakdown / Charges    Time in Time out Total Time / units   Cognitive Tx 0800 0900 60 min / 4 units   Speech Tx -- -- --   Dysphagia Tx --- -- --       Electronically Signed by      Adrian Bennett MA CCC-SLP #[id number]  Speech Language Pathologist

## 2022-02-03 NOTE — CARE COORDINATION
ARU Team Conference       Planned Discharge Date:02/10/2022   Durable medical equipment needed:Rolling walker and shower chair with back    Discharge Plan:Therapy recs [facility]. Diya Gisela (family) can help with care. DME:OT:Shower chair with back,PT:Rolling walker. Unable to contact family for training, but will try again tomorrow 02/04. CM will continue to support for discharge needs.  Yakelin Dhillon RN

## 2022-02-03 NOTE — PROGRESS NOTES
Occupational Therapy  Facility/Department: [facility]  Daily Treatment Note  NAME: Shea Cunningham. : 1960  MRN: [id number]    Date of Service: 2/3/2022    Discharge Recommendations:  24 hour supervision or assist,Home with Home health OT,S Level 1  OT Equipment Recommendations  Equipment Needed: Yes  Mobility Devices: ADL Assistive Devices  ADL Assistive Devices: Shower Chair with back    Assessment   Performance deficits / Impairments: Decreased functional mobility ; Decreased safe awareness;Decreased balance;Decreased coordination;Decreased posture;Decreased vision/visual deficit; Decreased cognition;Decreased ADL status; Decreased endurance;Decreased high-level IADLs;Decreased strength;Decreased fine motor control  Assessment: Pt agreeable to OT session. Pt performed functional transfers with SBA in room with use of RW and good standing balance for SPV/SBA in bathroom. Pt completed donning of pants with SBA to stand up only. Pt performed mobility to/from gym with fair tolerance but increased assist needed and festinating gait as pt fatigued. Pt able to stand for up to ~4 minutes prior to requiring seated rest break. Pt completed BUE ther ex with good strength but poor problem solving for simple math problems and copying of LEGO pattern. Pt demonstrated ability to carry large exercise ball while walking but gait deteriorated as pt fatigued with smaller step length, forward lean, and decreased balance. Continue POC. Prognosis: Good  OT Education: Plan of Care;OT Role;Transfer Training;Equipment;Orientation;Precautions; ADL Adaptive Strategies; Energy Conservation  Patient Education: role of OT, safety with tranfers and mobility, ther ex, use of a/e, proper use of AD  Barriers to Learning: Pt will require reinforcement.   REQUIRES OT FOLLOW UP: Yes  Activity Tolerance  Activity Tolerance: Patient limited by fatigue;Patient Tolerated treatment well  Activity Tolerance: Pt more unsteady and festinating gait as pt became fatigued. Safety Devices  Safety Devices in place: Yes  Type of devices: Gait belt;Call light within reach; Left in chair;Chair alarm in place;Nurse notified         Patient Diagnosis(es): There were no encounter diagnoses. has a past medical history of Arthritis, Asthma, CAD (coronary artery disease), COPD (chronic obstructive pulmonary disease) ([facility] [address] Emphysema of lung ([facility] [address] Fatty liver, GERD (gastroesophageal reflux disease), Hyperlipidemia, Hypertension, Medical history reviewed with no changes, MI, old, Sleep apnea, and Type II or unspecified type diabetes mellitus without mention of complication, not stated as uncontrolled. has a past surgical history that includes Coronary angioplasty with stent ([id number]); Colonoscopy; Endoscopy, colon, diagnostic; cyst removal (1982); Upper gastrointestinal endoscopy (7/18/13); Upper gastrointestinal endoscopy (8/23/13); Carpal tunnel release (Bilateral, 2013); ERCP (9/15/2013); Upper gastrointestinal endoscopy (9/15/2013); ERCP (10/11/13); Cataract removal with implant (Left, 2/28/14); Diagnostic Cardiac Cath Lab Procedure; other surgical history; pacemaker placement; Cardiac pacemaker placement (2011); Foot surgery (Right, 07/09/2018); pr length/short leg/ankl tendon,single (Right, 10/31/2018); pr gastrocnemius recession (Right, 10/31/2018); Pain management procedure (Bilateral, 5/26/2020); Pain management procedure (Bilateral, 6/9/2020); Nerve Surgery (Bilateral, 12/1/2020); and Pain management procedure (Left, 1/12/2021).     Restrictions  Restrictions/Precautions  Restrictions/Precautions: General Precautions,Fall Risk  Position Activity Restriction  Other position/activity restrictions: up with assistance, Avasys  Subjective   General  Chart Reviewed: Yes,Orders,Progress Notes,Imaging,Labs  Patient assessed for rehabilitation services?: Yes  Additional Pertinent Hx: COPD, Emphysema, CAD, pacemaker, DDD, HTN, DM  Response to previous treatment: Patient with no complaints from previous session  Family / Caregiver Present: No  Referring Practitioner: Jennifer Fuentes MD  Diagnosis: COVID encephalopathy  Subjective  Subjective: Pt in recliner, pleasant, agreeable to OT session.   General Comment  Comments: RN cleared  Vital Signs  Pulse: 58  Heart Rate Source: Monitor  BP: 121/80  BP Location: Left upper arm  Patient Position: Sitting;Up in chair  Patient Currently in Pain: Denies  Oxygen Therapy  SpO2: 98 %  Pulse Oximeter Device Mode: Intermittent  Pulse Oximeter Device Location: Left;Finger  O2 Device: None (Room air)   Orientation  Orientation  Overall Orientation Status: Within Functional Limits  Orientation Level: Oriented to place;Oriented to time;Oriented to situation;Oriented to person  Objective    ADL  Grooming: Stand by assistance (standing at sink to brush teeth and wash hands)  LE Dressing: Stand by assistance (to don pants)  Toileting: Stand by assistance (standing to urinate)        Balance  Sitting Balance: Supervision  Standing Balance: Contact guard assistance (SBA/CGA, with RW)  Standing Balance  Time: 3 minutes, 2 minutes, 4 minutes x2, 2 minutes x2  Activity: transfer to/from bathroom, grooming at sink, toileting in stance, mobility to gym, collection and placing of rings around gym, carrying large exercise ball while ambulating down hallway  Comment: with RW, min VCs for safety with RW, mod/max VCs for larger step length  Functional Mobility  Functional - Mobility Device: Rolling Walker  Activity: To/from bathroom  Assist Level: Contact guard assistance     Transfers  Stand Step Transfers: Contact guard assistance  Sit to stand: Stand by assistance  Stand to sit: Contact guard assistance        Coordination  Gross Motor: good coordination for placing rings and grooming tasks, mod VCs for solving simple math problems, fair coordination for LEGO task but poor problem solving to copy pattern              Cognition  Overall Cognitive Status: Exceptions  Arousal/Alertness: Delayed responses to stimuli  Following Commands: Follows one step commands with repetition; Follows one step commands with increased time  Attention Span: Difficulty attending to directions; Attends with cues to redirect  Memory: Decreased short term memory;Decreased recall of recent events;Decreased recall of precautions  Safety Judgement: Decreased awareness of need for safety;Decreased awareness of need for assistance  Problem Solving: Decreased awareness of errors;Assistance required to identify errors made;Assistance required to generate solutions  Insights: Decreased awareness of deficits  Initiation: Requires cues for some  Sequencing: Requires cues for some  Cognition Comment: poor ability to copy pattern of 'X' with 9 LEGOs with pt making 3 lines of LEGOs parallel to each other and requiring max VCs for following directions/pattern     Perception  Overall Perceptual Status: Impaired  Unilateral Attention: Appears intact  Initiation: Cues to initiate tasks  Motor Planning: Cues to use objects appropriately  Perseveration: Not present              Type of ROM/Therapeutic Exercise  Type of ROM/Therapeutic Exercise: Cane/Wand  Comment: 4# dowel asya  Exercises  Shoulder Flexion: x20  Shoulder Extension: x20  Elbow Flexion: x20  Elbow Extension: x20  Wrist Flexion: x20  Wrist Extension: x20  Other: chest press x20, forward/backward rows x20                    Plan   Plan  Times per week: 5/7 days/week  Plan weeks: 3 weeks  Current Treatment Recommendations: Strengthening,ROM,Balance Training,Functional Mobility Training,Endurance Training,Neuromuscular Re-education,Equipment Evaluation, Education, & procurement,Home Management Training,Self-Care / ADL,Patient/Caregiver Education & Training,Safety Education & Training    Goals  Short term goals  Time Frame for Short term goals: 1 week- 2/3/21  Short term goal 1: Pt will perform functional transfers with CGA.- GOAL MET 2/3/22 Pt performed functional transfers with CGA. Short term goal 2: Pt will complete toileting with min A. GOAL MET 2/3/22 Pt completed toileting in stance with SBA. Short term goal 3: Pt will perform LB dressing with CGA. GOAL MET 2/3/22 Pt demonstrated ability to don pants with SBA. Short term goal 4: Pt will complete grooming at sink with SPV. GOAL MET 2/3/22 Pt completed grooming at sink with supervision. Long term goals  Time Frame for Long term goals : 3 weeks- 2/17/21  Long term goal 1: Pt will perform functional transfers with mod I.  Long term goal 2: Pt will complete toileting with mod I.  Long term goal 3: Pt will complete LB dressing with mod I.  Long term goal 4: Pt will perform LB dressing with mod I.  Long term goal 5: Pt will perform simple IADL task with mod I.   Patient Goals   Patient goals : \"get home, see my dogs\"       Therapy Time   Individual Concurrent Group Co-treatment   Time In 0930         Time Out 1030         Minutes 60         Timed Code Treatment Minutes: 16 Liliane Vick OT

## 2022-02-03 NOTE — PROGRESS NOTES
Alon Orourke.  2/3/2022  [id number]    Chief Complaint: Debility    Subjective: Patient seen this AM.  Patient was discussed in rehab conference yesterday with the entire rehab team, and his estimated discharge date is now 2/10 to home with continued home therapies. His blood sugars are under control. Patient feels as if he is getting stronger with his rehab unit therapies. Repeat labs this AM look good and are stable. ROS: No n/v, cp, sob, f/c    Objective:  Patient Vitals for the past 24 hrs:   BP Temp Temp src Pulse Resp SpO2   02/03/22 0805 -- -- -- -- -- 98 %   02/03/22 0730 128/86 97.9 °F (36.6 °C) Oral 69 18 95 %   02/02/22 2032 (!) 149/84 97.7 °F (36.5 °C) Oral 68 16 98 %   02/02/22 1430 (!) 151/68 96.1 °F (35.6 °C) Oral 70 16 98 %     Gen: No distress, pleasant. HEENT: Normocephalic, atraumatic. CV: Regular rate and rhythm. Resp: No respiratory distress. Abd: Soft, nontender   Ext: No edema. Neuro: Alert, oriented, appropriately interactive.      Wt Readings from Last 3 Encounters:   01/27/22 218 lb 6 oz (99.1 kg)   01/23/22 216 lb 3.2 oz (98.1 kg)   11/09/21 243 lb (110.2 kg)       Laboratory data:   Lab Results   Component Value Date    WBC 5.9 02/03/2022    HGB 13.3 (L) 02/03/2022    HCT 39.1 (L) 02/03/2022    MCV 84.9 02/03/2022     (L) 02/03/2022       Lab Results   Component Value Date     02/03/2022    K 5.0 02/03/2022    CL 96 02/03/2022    CO2 30 02/03/2022    BUN 13 02/03/2022    CREATININE 0.7 02/03/2022    GLUCOSE 130 02/03/2022    CALCIUM 9.4 02/03/2022        Therapy progress:  PT  Position Activity Restriction  Other position/activity restrictions: up with assistance, Avasys  Objective     Sit to Stand: Contact guard assistance  Stand to sit: Contact guard assistance  Bed to Chair: Minimal assistance  Device: Rolling Walker  Other Apparatus: Wheelchair follow  Assistance: Minimal assistance,Moderate assistance  Distance: 60 ft, 90 ft  OT  PT Equipment Recommendations  Equipment Needed: Yes  Mobility Devices: Althea Champ: Rolling  Toilet - Technique: Ambulating  Equipment Used: Standard toilet  Assessment        SLP  Current Diet : Regular  Current Liquid Diet : Thin  Diet Solids Recommendation: Regular  Liquid Consistency Recommendation: Thin    Body mass index is 37.48 kg/m². Rehabilitation Diagnosis:  Brain, 2.1, Non-Traumatic     Assessment and Plan:  Acute encephalopathy secondary to covid  - continue scheduled seroquel, wean as able  - Consolidate sleep  - SLP     Afib/RVR  - continue beta blocker  - ICD adjusted per cardiology after inappropriate firing  - continue eliquis     Covid positive  - completed isolation  - completed decadron       DM  - lantus, SSI     Thrombocytopenia  - follow    CAD with h/o stent x 3, h/o MI, h/o VT  - asa, statin, plavix     CHF  - euvolemic  - continue medical management  - resume lasix twice weeklly if needed  - uptitrate acei if needed     Asthma  - inhalers as ordered     Hyperlipidemia  - statin     GERD  - PPI     Lumbar spondylosis  - f/u Dr. Samantha Ceja     Morbid obesity  - dietary consulted     Depression  - cymbalta     Bowels: Schedule stool softener. Follow bowel movements. Enema or suppository if needed.      Bladder: Check PVR x 3. [address] if PVR > 200ml or if any volume is > 500 ml.      Sleep: Trazodone provided prn.      Follow up appointments: PCP, Dr. Samantha Ceja  ROBBIE: 2/10 home w/ home health  DME: ajit Prater.  MD Raheel 2/3/2022, 9:28 AM

## 2022-02-03 NOTE — PROGRESS NOTES
Physical Therapy  Facility/Department: [facility]  Daily Treatment Note  NAME: Conrad Oneill. : 1960  MRN: [id number]    Date of Service: 2/3/2022    Discharge Recommendations:  24 hour supervision or assist,Home with Home health PT   PT Equipment Recommendations  Equipment Needed: Yes  Walker: Rolling    Assessment   Body structures, Functions, Activity limitations: Decreased functional mobility ; Decreased cognition;Decreased posture;Decreased ADL status; Decreased endurance;Decreased coordination;Decreased strength;Decreased balance;Decreased safe awareness  Assessment: found supine in bed, flat affect. poor command following and motor planning, unable to sequence R+L side stepping over a line on ground without max VC/ visual cues/tactile cues and demonstration. grossly CGA for transfers and CGA-min a for gait. 1 episode of mod A to maintain balance during cornhole activity. pt will require 24/7 supv/assist upon w/c due to mobility adn cog impairments, if not available, rec SNF for pt safety. DME: RW  Treatment Diagnosis: impaired mobility  Prognosis: Fair  Decision Making: Medium Complexity  PT Education: Goals;Transfer Training;Equipment;Disease Specific Education;PT Role;Functional Mobility Training;Energy Conservation;Plan of Care;General Safety;Precautions; Family Education;Gait Training  Patient Education: pt educated on role of PT, safe mobility with transfers/gait, will require reinforcement  Barriers to Learning: cognition  REQUIRES PT FOLLOW UP: Yes  Activity Tolerance  Activity Tolerance: Patient limited by endurance; Patient limited by cognitive status  Activity Tolerance: 120/57, 69 bpm, Spo2 -96% on room air     Patient Diagnosis(es): There were no encounter diagnoses.      has a past medical history of Arthritis, Asthma, CAD (coronary artery disease), COPD (chronic obstructive pulmonary disease) ([facility] [address] Emphysema of lung ([facility] [address] Fatty liver, GERD (gastroesophageal reflux disease), Hyperlipidemia, Hypertension, Medical history reviewed with no changes, MI, old, Sleep apnea, and Type II or unspecified type diabetes mellitus without mention of complication, not stated as uncontrolled. has a past surgical history that includes Coronary angioplasty with stent (1977,8792); Colonoscopy; Endoscopy, colon, diagnostic; cyst removal (1982); Upper gastrointestinal endoscopy (7/18/13); Upper gastrointestinal endoscopy (8/23/13); Carpal tunnel release (Bilateral, 2013); ERCP (9/15/2013); Upper gastrointestinal endoscopy (9/15/2013); ERCP (10/11/13); Cataract removal with implant (Left, 2/28/14); Diagnostic Cardiac Cath Lab Procedure; other surgical history; pacemaker placement; Cardiac pacemaker placement (2011); Foot surgery (Right, 07/09/2018); pr length/short leg/ankl tendon,single (Right, 10/31/2018); pr gastrocnemius recession (Right, 10/31/2018); Pain management procedure (Bilateral, 5/26/2020); Pain management procedure (Bilateral, 6/9/2020); Nerve Surgery (Bilateral, 12/1/2020); and Pain management procedure (Left, 1/12/2021). Restrictions  Restrictions/Precautions  Restrictions/Precautions: General Precautions,Fall Risk  Position Activity Restriction  Other position/activity restrictions: up with assistance, Avasys  Subjective   General  Chart Reviewed: Yes  Response To Previous Treatment: Patient with no complaints from previous session. Family / Caregiver Present: No  Referring Practitioner: Candelario Ott MD  Subjective  Subjective: denies pain  General Comment  Comments: found supine in bed.   Pain Screening  Patient Currently in Pain: No  Vital Signs  Patient Currently in Pain: No       Orientation  Orientation  Overall Orientation Status: Within Functional Limits  Cognition      Objective   Bed mobility  Supine to Sit: Supervision  Sit to Supine: Supervision  Transfers  Sit to Stand: Contact guard assistance;Stand by assistance  Stand to sit: Contact guard assistance;Stand by assistance  Comment: sit to stadn EOB to RW and chair to Rx x multiple reps with CGA-SBA  Ambulation  Ambulation?: Yes  Ambulation 1  Surface: level tile  Device: Rolling Walker  Assistance: Contact guard assistance;Minimal assistance  Quality of Gait: significant posterior lean, significnatly shortened step lengths/lack of heel strike/swing phase. shuffled gait. poor dory, slow velocity, appears to have episodes of freezing and needs to be redirected  Distance: 130 ft  Neuromuscular Education  NDT Treatment: Standing  Neuromuscular Comments: pt completed 2 sets of 2 min forward/backwards stepping over line with 1 UE support. pt completed 2 sets of 2 min lateral stepping over line with 1-2 UE support. pt with multiple LOB posterior with ineffective stepping reactions with up to min A to correct and pt abruptly sitting to regain balance in chair placed behind. poor recogntition/propriocpetion, stepping onto line 50-75% of time. poor motor planning when side steping adn requires visual ceus/ tactile cues and demosntration 100% of time to complete properly  Balance  Sitting - Static: Fair  Sitting - Dynamic: Fair  Standing - Static: Fair  Standing - Dynamic: Fair  Comments: dyn stadn activity: begin shoulder width BRYCE-> RLE step onto 4\" step -> reach for beanbag in various planes out of BRYCE x 10 reps-> toss to target 8 ft away-> step back and repeat wtih alternating LEs. 2 sets og 10 beanbags, pt with multiple LOB, grossly CGA-min A to correct, 1 episode of mod A to correct with delayed stepping reactions. pt retrieves beanbags from ground with reacher adn mod ind with RW for support x 10 reps.   Exercises  Comments: pt unable to follow directions to complete marches (for marches, kicks LEs out very quickly through partial ROM despite hand over hand assist, demonstration and cues), completed 1 min of each AP, LAQ seated in recliner  Other exercises  Other exercises 1: pt completed 10 min total on SCIFIT with pt stopping intermittently on own, maintains inconsistent rate/dory. uses both UE and LEs , goal of activity to encourage larger movements / reciprocal motion. Pt voided bladder standing at commode with no AD/ UE support and CGA, gait to sink x 5 ft with no AD,CGA to wash hands without LOB, gait to recliner x 12 ft with on AD (pushing RW away) and CGA. Cues to sit slowly  Goals  Short term goals  Time Frame for Short term goals: 7 days 2/3  Short term goal 1: pt will complete bed mobility with supv.- GOAL MET  Short term goal 2: pt will complete functional transfer with CGA and LRAD.- GOAL MET   Short term goal 3: pt will ambulate 50 ft with LRAD and min A.- GOAL MET with RW, CGA-min A. Long term goals  Time Frame for Long term goals : 21 days 2/17  Long term goal 1: pt will complete bed mobility with ind. Long term goal 2: pt will complete functional transfer with mod ind and LRAD. Long term goal 3: pt will ambulate 100 ft with LRAD and supv. Long term goal 4: pt will complete car transfer with LRAD and mod ind.   Patient Goals   Patient goals : \"get stronger\"    Plan    Plan  Times per week: 5-7x/week  Times per day: Daily  Specific instructions for Next Treatment: Progress mobility as tolerated  Current Treatment Recommendations: [facility] Re-education,Patient/Caregiver Education & Training,Cognitive Reorientation,ROM,Wheelchair Mobility Training,Manual Therapy - Soft Tissue Mobilization,Equipment Evaluation, Education, & procurement,Balance Training,Gait Training,Home Exercise Program,Modalities,Functional Mobility Training,Stair training,Manual Therapy - Joint Manipulation,Safety Education & Training,Positioning  Safety Devices  Type of devices: Call light within reach,Left in chair,Chair alarm in place,Telesitter in use,Gait belt,Nurse notified,All fall risk precautions in place,Patient at risk for falls     Therapy Time   Individual Concurrent Group Co-treatment   Time In 1230         Time Out 1330         Minutes 60         Timed Code Treatment Minutes: 60 Minutes       Ian Boyer, PT

## 2022-02-03 NOTE — PLAN OF CARE
Problem: Nutrition  Goal: Optimal nutrition therapy  Outcome: Ongoing  Note: Nutrition Problem #1: Inadequate oral intake  Intervention: Food and/or Nutrient Delivery: Continue Current Diet,Modify Oral Nutrition Supplement  Nutritional Goals: Consume 50% or greater of meals and ONS this ARU stay

## 2022-02-03 NOTE — PROGRESS NOTES
Comprehensive Nutrition Assessment    Type and Reason for Visit:  Reassess    Nutrition Recommendations/Plan:   1. Continue carb control diet   2. Decrease Magic Cups to daily   3. Recommend better BG management - MD to advise   4. Monitor nutrition adequacy, pertinent labs, bowel habits, wt changes, and clinical progress    Nutrition Assessment:  Follow up: Pt nutritionally improving AEB PO intakes of %. Continues on carb control diet. Blood sugars elevated. Will decrease current ONS to daily as PO intakes improving. Will continue to monitor. Malnutrition Assessment:  Malnutrition Status: At risk for malnutrition (Comment)    Context:  Acute Illness       Estimated Daily Nutrient Needs:  Energy (kcal):  2686-1246 kcal; Weight Used for Energy Requirements:  Ideal (59 kg)     Protein (g):  59-71 g; Weight Used for Protein Requirements:  Ideal (1.0-1.2 g/kg)        Fluid (ml/day):   ; Method Used for Fluid Requirements:  1 ml/kcal      Nutrition Related Findings:  -392 past 48 hours. +Steroids. Active BS. Last BM on 1/30. Na 135. K 5.0. Wounds:  None       Current Nutrition Therapies:    ADULT DIET;  Regular; 4 carb choices (60 gm/meal)  ADULT ORAL NUTRITION SUPPLEMENT; Lunch, Dinner; Frozen Oral Supplement    Anthropometric Measures:  · Height: 5' 4\" (162.6 cm)  · Current Body Weight: 218 lb (98.9 kg)   · Usual Body Weight: 240 lb (108.9 kg) (bed scale 7/13/21)     · Ideal Body Weight: 130 lbs; % Ideal Body Weight 167.7 %   · BMI: 37.4  · BMI Categories: Obese Class 2 (BMI 35.0 -39.9)       Nutrition Diagnosis:   · Inadequate oral intake related to inadequate protein-energy intake as evidenced by poor intake prior to admission,weight loss      Nutrition Interventions:   Food and/or Nutrient Delivery:  Continue Current Diet,Modify Oral Nutrition Supplement  Nutrition Education/Counseling:  Education declined   Coordination of Nutrition Care:  Continue to monitor while inpatient    Goals:  Consume 50% or greater of meals and ONS this ARU stay       Nutrition Monitoring and Evaluation:   Behavioral-Environmental Outcomes:  None Identified   Food/Nutrient Intake Outcomes:  Food and Nutrient Intake,Supplement Intake  Physical Signs/Symptoms Outcomes:  Biochemical Data,Nutrition Focused Physical Findings,Weight     Discharge Planning:    Continue current diet     Electronically signed by Kam Jordan MS, RD, LD on 2/3/22 at 12:14 PM EST    Contact: [phone]

## 2022-02-03 NOTE — CARE COORDINATION
CM left voice message with Isaiah Mckinnon (Brother-in-law) [phone] about setting up family training and to return writers call.  Rut Baker RN

## 2022-02-03 NOTE — PROGRESS NOTES
[facility] inpatient rep check -     <1%    Last device check 1/17    Numerous NSVT and SVT events recorded. EGMs start on page 79 - FYI    See Pace[facility] report under the Cardiology tab.

## 2022-02-04 LAB
BILIRUBIN URINE: NEGATIVE
BLOOD, URINE: NEGATIVE
CLARITY: CLEAR
COLOR: YELLOW
GLUCOSE BLD-MCNC: 127 MG/DL (ref 70–99)
GLUCOSE BLD-MCNC: 156 MG/DL (ref 70–99)
GLUCOSE BLD-MCNC: 290 MG/DL (ref 70–99)
GLUCOSE BLD-MCNC: 317 MG/DL (ref 70–99)
GLUCOSE URINE: >=1000 MG/DL
KETONES, URINE: ABNORMAL MG/DL
LEUKOCYTE ESTERASE, URINE: NEGATIVE
MICROSCOPIC EXAMINATION: ABNORMAL
NITRITE, URINE: NEGATIVE
PERFORMED ON: ABNORMAL
PH UA: 6.5 (ref 5–8)
PROTEIN UA: NEGATIVE MG/DL
SPECIFIC GRAVITY UA: 1.02 (ref 1–1.03)
URINE REFLEX TO CULTURE: ABNORMAL
URINE TYPE: ABNORMAL
UROBILINOGEN, URINE: 1 E.U./DL

## 2022-02-04 PROCEDURE — 97116 GAIT TRAINING THERAPY: CPT

## 2022-02-04 PROCEDURE — 94640 AIRWAY INHALATION TREATMENT: CPT

## 2022-02-04 PROCEDURE — 1280000000 HC REHAB R&B

## 2022-02-04 PROCEDURE — 97129 THER IVNTJ 1ST 15 MIN: CPT

## 2022-02-04 PROCEDURE — 93282 PRGRMG EVAL IMPLANTABLE DFB: CPT | Performed by: INTERNAL MEDICINE

## 2022-02-04 PROCEDURE — 81003 URINALYSIS AUTO W/O SCOPE: CPT

## 2022-02-04 PROCEDURE — 97535 SELF CARE MNGMENT TRAINING: CPT

## 2022-02-04 PROCEDURE — 97530 THERAPEUTIC ACTIVITIES: CPT

## 2022-02-04 PROCEDURE — 6370000000 HC RX 637 (ALT 250 FOR IP): Performed by: PHYSICAL MEDICINE & REHABILITATION

## 2022-02-04 PROCEDURE — 97110 THERAPEUTIC EXERCISES: CPT

## 2022-02-04 PROCEDURE — 97130 THER IVNTJ EA ADDL 15 MIN: CPT

## 2022-02-04 RX ORDER — LORAZEPAM 1 MG/1
1 TABLET ORAL EVERY 8 HOURS PRN
Status: DISCONTINUED | OUTPATIENT
Start: 2022-02-04 | End: 2022-02-05

## 2022-02-04 RX ORDER — QUETIAPINE FUMARATE 25 MG/1
50 TABLET, FILM COATED ORAL ONCE
Status: COMPLETED | OUTPATIENT
Start: 2022-02-04 | End: 2022-02-04

## 2022-02-04 RX ADMIN — QUETIAPINE FUMARATE 50 MG: 25 TABLET ORAL at 19:43

## 2022-02-04 RX ADMIN — GABAPENTIN 200 MG: 100 CAPSULE ORAL at 13:36

## 2022-02-04 RX ADMIN — METOPROLOL TARTRATE 50 MG: 50 TABLET, FILM COATED ORAL at 19:43

## 2022-02-04 RX ADMIN — LISINOPRIL 5 MG: 5 TABLET ORAL at 07:53

## 2022-02-04 RX ADMIN — GABAPENTIN 200 MG: 100 CAPSULE ORAL at 19:43

## 2022-02-04 RX ADMIN — INSULIN LISPRO 9 UNITS: 100 INJECTION, SOLUTION INTRAVENOUS; SUBCUTANEOUS at 16:29

## 2022-02-04 RX ADMIN — INSULIN GLARGINE 60 UNITS: 100 INJECTION, SOLUTION SUBCUTANEOUS at 19:47

## 2022-02-04 RX ADMIN — ATORVASTATIN CALCIUM 80 MG: 80 TABLET, FILM COATED ORAL at 07:53

## 2022-02-04 RX ADMIN — DULOXETINE HYDROCHLORIDE 60 MG: 60 CAPSULE, DELAYED RELEASE ORAL at 07:53

## 2022-02-04 RX ADMIN — PANTOPRAZOLE SODIUM 40 MG: 40 TABLET, DELAYED RELEASE ORAL at 07:53

## 2022-02-04 RX ADMIN — INSULIN LISPRO 3 UNITS: 100 INJECTION, SOLUTION INTRAVENOUS; SUBCUTANEOUS at 11:33

## 2022-02-04 RX ADMIN — Medication 10 MG: at 19:43

## 2022-02-04 RX ADMIN — DEXAMETHASONE 6 MG: 4 TABLET ORAL at 07:52

## 2022-02-04 RX ADMIN — LORAZEPAM 1 MG: 1 TABLET ORAL at 22:23

## 2022-02-04 RX ADMIN — TAMSULOSIN HYDROCHLORIDE 0.4 MG: 0.4 CAPSULE ORAL at 19:43

## 2022-02-04 RX ADMIN — CLOPIDOGREL BISULFATE 75 MG: 75 TABLET, FILM COATED ORAL at 07:53

## 2022-02-04 RX ADMIN — TRAZODONE HYDROCHLORIDE 100 MG: 50 TABLET ORAL at 19:44

## 2022-02-04 RX ADMIN — GABAPENTIN 200 MG: 100 CAPSULE ORAL at 07:52

## 2022-02-04 RX ADMIN — DIVALPROEX SODIUM 1000 MG: 250 TABLET, DELAYED RELEASE ORAL at 18:20

## 2022-02-04 RX ADMIN — APIXABAN 5 MG: 5 TABLET, FILM COATED ORAL at 07:53

## 2022-02-04 RX ADMIN — INSULIN LISPRO 6 UNITS: 100 INJECTION, SOLUTION INTRAVENOUS; SUBCUTANEOUS at 19:47

## 2022-02-04 RX ADMIN — Medication 2 PUFF: at 20:04

## 2022-02-04 RX ADMIN — Medication 2 PUFF: at 20:01

## 2022-02-04 RX ADMIN — METOPROLOL TARTRATE 50 MG: 50 TABLET, FILM COATED ORAL at 07:53

## 2022-02-04 RX ADMIN — APIXABAN 5 MG: 5 TABLET, FILM COATED ORAL at 19:43

## 2022-02-04 RX ADMIN — QUETIAPINE FUMARATE 50 MG: 25 TABLET ORAL at 21:02

## 2022-02-04 RX ADMIN — POTASSIUM CHLORIDE 20 MEQ: 20 TABLET, EXTENDED RELEASE ORAL at 07:53

## 2022-02-04 RX ADMIN — QUETIAPINE FUMARATE 25 MG: 25 TABLET ORAL at 07:52

## 2022-02-04 ASSESSMENT — PAIN SCALES - GENERAL
PAINLEVEL_OUTOF10: 0
PAINLEVEL_OUTOF10: 0

## 2022-02-04 NOTE — PROGRESS NOTES
ICD interrogation performed 2/2/22. He has a BiV ICD which was implanted on 5/16/2019. He was admitted on 1/12/2022 with inappropriate ICD shocks for rapidly conducted AF. Adjustments were made to VT detection at that time which included disabling interval stability and using rate and morphology criteria only. Device interrogation demonstrates another episode of atrial fibrillation on 1/20/2022 which was appropriately recognized by the device and no therapy was initiated. There have been no arrhythmia episodes since 1/20/2022. The device is functioning normally.

## 2022-02-04 NOTE — PROGRESS NOTES
Speech Language Pathology  [facility]: ACUTE REHAB UNIT  SPEECH-LANGUAGE PATHOLOGY      [x] Daily  [] Weekly Care Conference Note  [] Discharge    Patient:Chano Hurley. :1960  OQZ:[id number]  Rehab Dx/Hx: Debility [R53.81]    Precautions: falls  Home situation: Lives alone. Active . Independent with meds and finances, manages all household tasks for self and brother. Takes care of brother who has paranoid schizophrenia  ST Dx: [] Aphasia  [] Dysarthria  [] Apraxia   [] Oropharyngeal dysphagia [x] Cognitive Impairment  [] Other:   Date of Admit: 2022  Room #: 0156/0156-01    Current functional status (updated daily):         Pt being seen for : [] Speech/Language Treatment  [] Dysphagia Treatment [x] Cognitive Treatment  [] Other:  Communication: [x]WFL  [] Aphasia  [] Dysarthria  [] Apraxia  [] Pragmatic Impairment [] Non-verbal  [] Hearing Loss  [] Other:   Cognition: [] WFL  [] Mild  [x] Moderate  [x] Severe [] Unable to Assess  [] Other:  Memory: [] WFL  [x] Mild  [x] Moderate  [x] Severe [] Unable to Assess  [] Other:  Behavior: [x] Alert  [x] Cooperative  [x]  Pleasant  [] Confused  [] Agitated  [] Uncooperative  [] Distractible [] Motivated  [] Self-Limiting [] Anxious  [] Other:  Endurance:  [x] Adequate for participation in SLP sessions  [] Reduced overall  [] Lethargic  [] Other:  Safety: [] No concerns at this time  [x] Reduced insight into deficits  [x]  Reduced safety awareness [] Not following call light procedures  [] Unable to Assess  [] Other:    Current Diet Order:ADULT DIET; Regular; 4 carb choices (60 gm/meal)  ADULT ORAL NUTRITION SUPPLEMENT; Dinner; Frozen Oral Supplement  Swallowing Precautions: Small bites/sips;Upright as possible for all oral intake;Remain upright for 30-45 minutes after meals; Alternate solids and liquids;Eat/Feed slowly        Date: 2022      Tx session 1  0900 - 1000 Tx session 2  All tx needs met in session 1   Total Timed Code Min 60 0   Total Treatment Minutes 60 0   Individual Treatment Minutes 60 0   Group Treatment Minutes 0 0   Co-Treat Minutes 0 0   Variance/Reason:  N/a N/a   Pain denies    Pain Intervention [] RN notified  [] Repositioned  [] Intervention offered and patient declined  [x] N/A  [] Other: [] RN notified  [x] Repositioned  [] Intervention offered and patient declined  [] N/A  [] Other:      Subjective     Pt alert, cooperative, and oriented. Agreeable to tx. Pt upright in bedside chair for session. Objective:  Goals  Short-term Goals  Timeframe for Short-term Goals: 18 days (0214/2022)    Goal 1: Pt will complete graded recall tasks using compensatory strategies with 80% acc given min cues    3 Item Grocery List  -SLP provided pt with 3 grocery list items   -edu re: memory strategies (repetition, association, visualization) to improve recall  -immediate recall: 3/3 indep   -5 minute delay: 3/3 indep   -10 min delay: 2/3 indep; +1 given mod cues     Goal 2: Pt will complete executive function tasks (e.g. meds, time, money, etc) with 80% acc given min cues   Counting Coins  -pt shown various coins and amounts, asked to count   -pt completed with 50% acc indep, improved to 83% given mod cues     Time Word Problems  -e.g. you eat dinner at 8:00.  If it is 7:15 right now, how much time is there before dinner starts?  -pt answered with 17% acc indep, improved to 50% acc given MAX cues       Goal 3: Pt will complete problem solving and thought organization tasks with 80% acc given min cues   4-step sequencing picture cards  -pt completed with 0% acc indep  -MAX cues in order to complete  -pt unable to choose accurately when presenting with 4 picture cards; SLP decreased choices to 2 and pt continued to require max cues     Functional problem solving at home  -SLP asked pt problem solving scenarios re: home safety  -e.g. who do you call if there is an emergency?  -pt answered with 50% acc indep,improved to 75% acc given mod-max cues    Divergent Naming  -pt asked to name 5 items that belong to a concrete group  -e.g. name 5 fruits  -pt completed task with 55% acc indep, improved to 80% acc given mod-max cues         Goal 4: Pt will complete verbal and visual reasoning tasks with 80% acc given min cues   Listing items needed to complete a task   -e.g. what supplies do you need to do the laundry?  -pt completed task with 56% acc indep, improved to 88% acc given mod cues       Other areas targeted: N/a N/A   Education:   SLP edu pt re: role of SLP, rationale of cog tx, recall strategies, thought org strategies    Safety Devices: [x] Call light within reach  [] Chair alarm activated  [x] Bed alarm activated  [x] Other: AVASYS [] Call light within reach  [] Chair alarm activated  [] Bed alarm activated  [] Other:    Assessment: Pt alert and cooperative, agreeable to tx. SLP edu pt re: recall strategies - improved pt's ability to recall 3 grocery list items immediately and following a 5 minute delay and 10 min delay. Pt required mod cues to list items needed to complete a task and to count coins. Pt with significant difficulty sequencing 4-step picture cards - required extensive max cues in order to complete. Max cues for time word problems. Pt also required mod-max cues for divergent naming. Pt with poor safety awareness and insight. Pt kept saying throughout session \"I can do what I want at home and what I want here. \" Pt will require 24 hr supervision at d/c due to cog deficits. Pt required encouragement to participate. Continue goals above. Plan: Continue as per plan of care.       Additional Information:     Barriers toward progress: Limited family support, Cognitive deficit, Limited safety awareness and Limited insight into deficits  Discharge recommendations:  [] Home independently  [] Home with assistance [x]  24 hour supervision  [] ECF [] Other:   Continued Tx Upon Discharge: ? [x] Yes [] No [] TBD based on progress while on ARU [] Vital Stim indicated [] Other:   Estimated discharge date: 02/10/22    Interventions used this date:  [] Speech/Language Treatment  [] Instruction in HEP [] Group [] Dysphagia Treatment [x] Cognitive Treatment   [] Other:       Total Time Breakdown / Charges    Time in Time out Total Time / units   Cognitive Tx 0900 1000 60 min / 4 units   Speech Tx -- -- --   Dysphagia Tx --- -- --       Electronically Signed by      Stephany Reynolds MA CCC-SLP #[id number]  Speech Language Pathologist

## 2022-02-04 NOTE — PROGRESS NOTES
Physical Therapy  Facility/Department: [facility], formerly [facility]  Daily Treatment Note  NAME: Leah Bhatti. : 1960  MRN: [id number]    Date of Service: 2022    Discharge Recommendations:  24 hour supervision or assist,Home with Home health PT (vs SNF)   PT Equipment Recommendations  Equipment Needed: Yes  Walker: Rolling    Assessment   Body structures, Functions, Activity limitations: Decreased functional mobility ; Decreased cognition;Decreased posture;Decreased ADL status; Decreased endurance;Decreased coordination;Decreased strength;Decreased balance;Decreased safe awareness  Assessment: pt found EOB. utilized commode 2 times during session to void bladder. limited participation / motivation noted during session. therapist encouraging pt to participate as much as able to increase ind with mobility/decrease risk of falls. pt stating \" no, I don't want to. I don't try in therapy\". therapist asked pt if he would prefer a different therapist, pt stating \"no, I won't do anything with them either\". therapist encouraging pt to partiicpate as able for his benefit but pt conitnues to demo poor motivation and participation. poor safety during gait noted with no carryover of cues to improve mechanics/safety. pt with 1 significant LOB during item retrieval task requiring mod A to correct. due to high risk of falls , poor safety awarness/insight and decreased cog and level of assist, recommend pt dc SNF for conitnued skilled therapy/ supv if pt does not have family asssit upon dc. Treatment Diagnosis: impaired mobility  Prognosis: Fair  Decision Making: Medium Complexity  PT Education: Goals;Transfer Training;Equipment;Disease Specific Education;PT Role;Functional Mobility Training;Energy Conservation;Plan of Care;General Safety;Precautions; Family Education;Gait Training  Patient Education: pt educated on role of PT, safe mobility with transfers/gait, will require reinforcement  Barriers to Learning: cognition  REQUIRES PT FOLLOW UP: Yes  Activity Tolerance  Activity Tolerance: Patient limited by endurance; Patient limited by cognitive status  Activity Tolerance: 138/65, 70 bpm, Spo2= 98% on room air     Patient Diagnosis(es): There were no encounter diagnoses. has a past medical history of Arthritis, Asthma, CAD (coronary artery disease), COPD (chronic obstructive pulmonary disease) ([facility] [address] Emphysema of lung ([facility] [address] Fatty liver, GERD (gastroesophageal reflux disease), Hyperlipidemia, Hypertension, Medical history reviewed with no changes, MI, old, Sleep apnea, and Type II or unspecified type diabetes mellitus without mention of complication, not stated as uncontrolled. has a past surgical history that includes Coronary angioplasty with stent (5533,5062); Colonoscopy; Endoscopy, colon, diagnostic; cyst removal (1982); Upper gastrointestinal endoscopy (7/18/13); Upper gastrointestinal endoscopy (8/23/13); Carpal tunnel release (Bilateral, 2013); ERCP (9/15/2013); Upper gastrointestinal endoscopy (9/15/2013); ERCP (10/11/13); Cataract removal with implant (Left, 2/28/14); Diagnostic Cardiac Cath Lab Procedure; other surgical history; pacemaker placement; Cardiac pacemaker placement (2011); Foot surgery (Right, 07/09/2018); pr length/short leg/ankl tendon,single (Right, 10/31/2018); pr gastrocnemius recession (Right, 10/31/2018); Pain management procedure (Bilateral, 5/26/2020); Pain management procedure (Bilateral, 6/9/2020); Nerve Surgery (Bilateral, 12/1/2020); and Pain management procedure (Left, 1/12/2021). Restrictions  Restrictions/Precautions  Restrictions/Precautions: General Precautions,Fall Risk  Position Activity Restriction  Other position/activity restrictions: up with assistance, Avasys  Subjective   General  Chart Reviewed: Yes  Response To Previous Treatment: Patient with no complaints from previous session.   Family / Caregiver Present: No  Referring Practitioner: Dora Ochoa MD  Subjective  Subjective: denies pain  General Comment  Comments: pt openly admits he does not try in therapy this date  Pain Screening  Patient Currently in Pain: No  Vital Signs  Patient Currently in Pain: No       Orientation  Orientation  Overall Orientation Status: Within Functional Limits  Cognition      Objective   Bed mobility  Supine to Sit: Supervision  Sit to Supine: Supervision  Transfers  Sit to Stand: Contact guard assistance  Stand to sit: Contact guard assistance  Comment: sit to stand EOB to RW, chair to RW with CGA, consistent cues for safe hand placement  Ambulation  Ambulation?: Yes  More Ambulation?: Yes  Ambulation 1  Surface: level tile  Device: Rolling Walker  Assistance: Contact guard assistance;Minimal assistance  Quality of Gait: significant posterior lean, significnatly shortened step lengths/lack of heel strike/swing phase. shuffled gait. poor dory, slow velocity, appears to have episodes of freezing and needs to be redirected  Distance: 130 ft  Comments: R deviation from straight path, poor carryover of education provided from therapist/external cues  Ambulation 2  Surface - 2: level tile  Device 2: Rolling Walker  Assistance 2: Contact guard assistance; Moderate assistance  Quality of Gait 2: initially larger step lengths progressively getting smaller/shorter, periods of freezig and difficult to re-initiate gait, lack of heel strike/swing phase/ shuffled steps, slow velocity, interrupted/inconsistent dory. 1 significant LOB with mod A to correct  Distance: 180 ft  Comments: pt completed item retrieval task about unit with RW for mobility, poor gait quality observed throughout. at one point, pt with explosive forward momentum/ fast velocity resulting in tripping over toes and LOB with mod a to correct. pt wiht poor insight into situation/ safety awareness.      Balance  Sitting - Static: Fair  Sitting - Dynamic: Fair  Standing - Static: Fair  Standing - Dynamic: Fair  Comments: dyn stand x 2 reps while voiding bladder at commode stnding, impulsive when turning to wash hands at sink with CGA-min A. pt completed 2 sets of 2 min BLE alternating foot taps on 4\" step with BUE support on RW and CGA. pt completed 4 min3 0 sec of dyn stand activity with shoulder width BRYCE and 1-0 UE support on RW with GCA and no overt LOB  Exercises  Comments: pt completed 4 min of SCIFIT level 1 with BUE abd LE (goal was 6 min) however pt stops actiivty, unwilling to continue. when asked why he wont continue, pt states \"I don't want to\". Goals  Short term goals  Time Frame for Short term goals: 7 days 2/3  Short term goal 1: pt will complete bed mobility with supv.- GOAL MET, completes with supv  Short term goal 2: pt will complete functional transfer with CGA and LRAD.- GoAL MET, completes with CGA and RW  Short term goal 3: pt will ambulate 50 ft with LRAD and min A.- GOAL MET, completes with RW, CGA-min A  Long term goals  Time Frame for Long term goals : 21 days 2/17  Long term goal 1: pt will complete bed mobility with ind. Long term goal 2: pt will complete functional transfer with mod ind and LRAD. Long term goal 3: pt will ambulate 100 ft with LRAD and supv. Long term goal 4: pt will complete car transfer with LRAD and mod ind.   Patient Goals   Patient goals : \"get stronger\"    Plan    Plan  Times per week: 5-7x/week  Times per day: Daily  Specific instructions for Next Treatment: Progress mobility as tolerated  Current Treatment Recommendations: Macy Oconnor Re-education,Patient/Caregiver Education & Training,Cognitive Reorientation,ROM,Wheelchair Mobility Training,Manual Therapy - Soft Tissue Mobilization,Equipment Evaluation, Education, & procurement,Balance Training,Gait Training,Home Exercise Program,Modalities,Functional Mobility Training,Stair training,Manual Therapy - Joint Manipulation,Safety Education & Training,Positioning  Safety Devices  Type of devices: Call light within reach,Left in chair,Chair alarm in place,Telesitter in use,Gait belt,Nurse notified,All fall risk precautions in place,Patient at risk for falls     Therapy Time   Individual Concurrent Group Co-treatment   Time In 1400         Time Out 1500         Minutes 60         Timed Code Treatment Minutes: 60 Minutes       Danny Cheney, PT

## 2022-02-04 NOTE — PROGRESS NOTES
Arthor Delay.  2/4/2022  [id number]    Chief Complaint: Debility    Subjective: Patient seen this AM.  Patient is improving in his transfers and gait. We think patient will be ready for discharge to home on 2/10 with continued home therapies. His blood sugars are under control. Repeat labs yesterday look good and are stable. ROS: No n/v, cp, sob, f/c    Objective:  Patient Vitals for the past 24 hrs:   BP Temp Temp src Pulse Resp SpO2   02/04/22 0806 108/65 -- -- 73 -- 98 %   02/04/22 0745 127/64 98 °F (36.7 °C) Oral 63 16 95 %   02/03/22 1945 133/75 97.6 °F (36.4 °C) Oral 72 16 99 %     Gen: No distress, pleasant. HEENT: Normocephalic, atraumatic. CV: Regular rate and rhythm. Resp: No respiratory distress. Abd: Soft, nontender   Ext: No edema. Neuro: Alert, oriented, appropriately interactive.      Wt Readings from Last 3 Encounters:   01/27/22 218 lb 6 oz (99.1 kg)   01/23/22 216 lb 3.2 oz (98.1 kg)   11/09/21 243 lb (110.2 kg)       Laboratory data:   Lab Results   Component Value Date    WBC 5.9 02/03/2022    HGB 13.3 (L) 02/03/2022    HCT 39.1 (L) 02/03/2022    MCV 84.9 02/03/2022     (L) 02/03/2022       Lab Results   Component Value Date     02/03/2022    K 5.0 02/03/2022    CL 96 02/03/2022    CO2 30 02/03/2022    BUN 13 02/03/2022    CREATININE 0.7 02/03/2022    GLUCOSE 130 02/03/2022    CALCIUM 9.4 02/03/2022        Therapy progress:  PT  Position Activity Restriction  Other position/activity restrictions: up with assistance, Avasys  Objective     Sit to Stand: Contact guard assistance,Stand by assistance  Stand to sit: Contact guard assistance,Stand by assistance  Bed to Chair: Minimal assistance  Device: Rolling Walker  Other Apparatus: Wheelchair follow  Assistance: Contact guard assistance,Minimal assistance  Distance: 130 ft  OT  PT Equipment Recommendations  Equipment Needed: Yes  Mobility Devices: Albertina Tenisha: Rolling  Toilet - Technique: Ambulating  Equipment Used: Standard toilet  Assessment        SLP  Current Diet : Regular  Current Liquid Diet : Thin  Diet Solids Recommendation: Regular  Liquid Consistency Recommendation: Thin    Body mass index is 37.48 kg/m². Rehabilitation Diagnosis:  Brain, 2.1, Non-Traumatic     Assessment and Plan:  Acute encephalopathy secondary to covid  - continue scheduled seroquel, wean as able  - Consolidate sleep  - SLP     Afib/RVR  - continue beta blocker  - ICD adjusted per cardiology after inappropriate firing  - continue eliquis     Covid positive  - completed isolation  - completed decadron       DM  - lantus, SSI     Thrombocytopenia  - follow    CAD with h/o stent x 3, h/o MI, h/o VT  - asa, statin, plavix     CHF  - euvolemic  - continue medical management  - resume lasix twice weeklly if needed  - uptitrate acei if needed     Asthma  - inhalers as ordered     Hyperlipidemia  - statin     GERD  - PPI     Lumbar spondylosis  - f/u Dr. Letty Romeo     Morbid obesity  - dietary consulted     Depression  - cymbalta     Bowels: Schedule stool softener. Follow bowel movements. Enema or suppository if needed.      Bladder: Check PVR x 3. [facility] if PVR > 200ml or if any volume is > 500 ml.      Sleep: Trazodone provided prn.      Follow up appointments: PCP, Dr. Letty Romeo  ROBBIE: 2/10 home w/ home health  DME: ajit Pickering MD 2/4/2022, 10:00 AM

## 2022-02-04 NOTE — PROGRESS NOTES
Occupational Therapy  Facility/Department: [facility]  Daily Treatment Note  NAME: Octavia Mallory. : 1960  MRN: [id number]    Date of Service: 2022    Discharge Recommendations:  24 hour supervision or assist,Home with Home health OT,S Level 1       Assessment   Performance deficits / Impairments: Decreased functional mobility ; Decreased safe awareness;Decreased balance;Decreased coordination;Decreased posture;Decreased vision/visual deficit; Decreased cognition;Decreased ADL status; Decreased endurance;Decreased high-level IADLs;Decreased strength;Decreased fine motor control    Assessment: Pt agreeable to OT session. Pt performed functional transfers with SBA in room with use of RW and good-fair standing balance LBD and toileting in bathroom. Pt completed donning of pants with SBA to stand up only and required vc's for thoroughness with pericare. Pt UB dressing with set up and supervision. Pt performed mobility to gym with fair tolerance but increased assist needed and festinating gait as pt fatigued. Pt reaching outside BRYCE for fishing task with good-fair balance requiring increased assistance d/t fatigue. Pt required vc's for safety with RW and t/fs throughout session. Pt also required assistance with memory task and showed difficulties with completing math problems with numbers greater than single digits. Pt completed BUE ther ex with good strength and vc's for form. Continue POC. Prognosis: Good  OT Education: Plan of Care;OT Role;Transfer Training;Equipment;Orientation;Precautions; ADL Adaptive Strategies; Energy Conservation  Patient Education: role of OT, safety with tranfers and mobility, ther ex, use of a/e, proper use of AD  Barriers to Learning: Pt will require reinforcement.   REQUIRES OT FOLLOW UP: Yes  Activity Tolerance  Activity Tolerance: Patient limited by fatigue;Patient Tolerated treatment well  Activity Tolerance: Pt more unsteady and festinating gait as pt became fatigued., BP 108/65, HR 73, O2 98%. Vitals WFL throughout session. Safety Devices  Safety Devices in place: Yes  Type of devices: Gait belt;Call light within reach; Left in chair;Chair alarm in place;Nurse notified         Patient Diagnosis(es): There were no encounter diagnoses. has a past medical history of Arthritis, Asthma, CAD (coronary artery disease), COPD (chronic obstructive pulmonary disease) ([facility] [address] Emphysema of lung ([facility] [address] Fatty liver, GERD (gastroesophageal reflux disease), Hyperlipidemia, Hypertension, Medical history reviewed with no changes, MI, old, Sleep apnea, and Type II or unspecified type diabetes mellitus without mention of complication, not stated as uncontrolled. has a past surgical history that includes Coronary angioplasty with stent (4413,8601); Colonoscopy; Endoscopy, colon, diagnostic; cyst removal (1982); Upper gastrointestinal endoscopy (7/18/13); Upper gastrointestinal endoscopy (8/23/13); Carpal tunnel release (Bilateral, 2013); ERCP (9/15/2013); Upper gastrointestinal endoscopy (9/15/2013); ERCP (10/11/13); Cataract removal with implant (Left, 2/28/14); Diagnostic Cardiac Cath Lab Procedure; other surgical history; pacemaker placement; Cardiac pacemaker placement (2011); Foot surgery (Right, 07/09/2018); pr length/short leg/ankl tendon,single (Right, 10/31/2018); pr gastrocnemius recession (Right, 10/31/2018); Pain management procedure (Bilateral, 5/26/2020); Pain management procedure (Bilateral, 6/9/2020); Nerve Surgery (Bilateral, 12/1/2020); and Pain management procedure (Left, 1/12/2021).     Restrictions  Restrictions/Precautions  Restrictions/Precautions: General Precautions,Fall Risk  Position Activity Restriction  Other position/activity restrictions: up with assistance, Marilynn  Subjective   General  Chart Reviewed: Yes,Orders,Progress Notes,Labs  Patient assessed for rehabilitation services?: Yes  Additional Pertinent Hx: COPD, Emphysema, CAD, pacemaker, DDD, HTN, DM  Response to previous treatment: Patient with no complaints from previous session  Family / Caregiver Present: No  Referring Practitioner: Tc Medina MD  Diagnosis: COVID encephalopathy  Subjective  Subjective: Pt in bed, pleasant and agreeable to OT. General Comment  Comments: RN cleared  Vital Signs  Pulse: 73  Heart Rate Source: Monitor  BP: 108/65  BP Location: Left upper arm  Patient Position: Sitting  Patient Currently in Pain: Denies  Oxygen Therapy  SpO2: 98 %  Pulse Oximeter Device Mode: Intermittent  Pulse Oximeter Device Location: Finger  O2 Device: None (Room air)   Orientation  Orientation  Overall Orientation Status: Within Functional Limits  Orientation Level: Oriented to place;Oriented to time;Oriented to situation;Oriented to person  Objective    ADL  Grooming: Stand by assistance (to wash hands at sink.)  UE Dressing: Supervision (to don gown)  LE Dressing: Stand by assistance (to don pants and brief and socks using reacher for briefs. vc's for safety throughout and for reacher strategies. Pt becoming SOB and leaning more to the left with fatigue. RW used for balance.)  Toileting: Dependent/Total (incontinence of bowels, SBA for pericare, vc's for safety and thoroughness.)        Balance  Sitting Balance: Supervision  Standing Balance: Contact guard assistance (SBA-CGA with RW, 3 LOB requiring increased assistance for safety)  Standing Balance  Time: 4x30s, ~1minx2, ~90s, 2x2.5min, 2min  Activity: transfer to/from bathroom, grooming at sink, toileting in stance, mobility to gym, fishing task in stance  Comment: with RW, max VCs for safety with RW, mod/max VCs for larger step length  Functional Mobility  Functional - Mobility Device: Rolling Walker  Activity: To/from bathroom; Other (to therapy gym)  Assist Level: Contact guard assistance  Functional Mobility Comments: close w/c follow needed  Toilet Transfers  Toilet - Technique: Ambulating  Equipment Used: Standard toilet  Toilet Transfer: Contact guard assistance  Bed mobility  Supine to Sit: Supervision  Sit to Supine: Unable to assess  Scooting: Unable to assess  Comment: Pt ened session seated in recliner. Transfers  Sit to stand: Stand by assistance  Stand to sit: Contact guard assistance  Transfer Comments: RW used, max vc's for safe t/fs and hand placement during t/fs. Coordination  Gross Motor: good coordination for standing fishing task with B/L UEs with min vc's for problem solving with \"fishing asya\"  Fine Motor: fair coordination pulling wipes out of container for pericare              Cognition  Overall Cognitive Status: Exceptions  Arousal/Alertness: Delayed responses to stimuli  Following Commands: Follows one step commands with repetition; Follows one step commands with increased time  Attention Span: Difficulty attending to directions; Attends with cues to redirect  Memory: Decreased short term memory;Decreased recall of recent events;Decreased recall of precautions  Safety Judgement: Decreased awareness of need for safety;Decreased awareness of need for assistance  Problem Solving: Decreased awareness of errors;Assistance required to identify errors made;Assistance required to generate solutions  Insights: Decreased awareness of deficits  Initiation: Requires cues for some  Sequencing: Requires cues for some  Cognition Comment: mod assistance needed to recall the order pt was supposed to \"catch\" the fish. Pt requiring increased amount of vc's for safety this date, specifically with RW use and t/fs. Pt showing difficulties with computing math problems (addition and subtraction) with pt requiring increased time to complete problems with single digits and unable to solve double digit problems.                     Type of ROM/Therapeutic Exercise  Type of ROM/Therapeutic Exercise: Cane/Wand  Comment: 5# dowel asya  Exercises  Shoulder Flexion: x15  Shoulder Extension: x15  Elbow Flexion: x15  Elbow Extension: x15  Other: chest press x15 Plan   Plan  Times per week: 5/7 days/week  Plan weeks: 3 weeks  Current Treatment Recommendations: Strengthening,ROM,Balance Training,Functional Mobility Training,Endurance Training,Neuromuscular Re-education,Equipment Evaluation, Education, & procurement,Home Management Training,Self-Care / ADL,Patient/Caregiver Education & Training,Safety Education & Training    Goals  Short term goals  Time Frame for Short term goals: 1 week- 2/3/21  Short term goal 1: Pt will perform functional transfers with CGA.- GOAL MET 2/3/22 Pt performed functional transfers with CGA. Short term goal 2: Pt will complete toileting with min A. GOAL MET 2/3/22 Pt completed toileting in stance with SBA. Short term goal 3: Pt will perform LB dressing with CGA. GOAL MET 2/3/22 Pt demonstrated ability to don pants with SBA. Short term goal 4: Pt will complete grooming at sink with SPV. GOAL MET 2/3/22 Pt completed grooming at sink with supervision. Long term goals  Time Frame for Long term goals : 3 weeks- 2/17/21  Long term goal 1: Pt will perform functional transfers with mod I.  Long term goal 2: Pt will complete toileting with mod I.  Long term goal 3: Pt will complete LB dressing with mod I.  Long term goal 4: Pt will perform LB dressing with mod I.  Long term goal 5: Pt will perform simple IADL task with mod I. Patient Goals   Patient goals : \"get home, see my dogs\"       Therapy Time   Individual Concurrent Group Co-treatment   Time In 0800         Time Out 0900         Minutes 60         Timed Code Treatment Minutes: [address], OTR/L  If pt discharges prior to next session, this note will serve as discharge summary. See case management note for discharge disposition.

## 2022-02-05 LAB
GLUCOSE BLD-MCNC: 133 MG/DL (ref 70–99)
GLUCOSE BLD-MCNC: 152 MG/DL (ref 70–99)
GLUCOSE BLD-MCNC: 278 MG/DL (ref 70–99)
GLUCOSE BLD-MCNC: 308 MG/DL (ref 70–99)
PERFORMED ON: ABNORMAL

## 2022-02-05 PROCEDURE — 94640 AIRWAY INHALATION TREATMENT: CPT

## 2022-02-05 PROCEDURE — 6370000000 HC RX 637 (ALT 250 FOR IP): Performed by: PHYSICAL MEDICINE & REHABILITATION

## 2022-02-05 PROCEDURE — 1280000000 HC REHAB R&B

## 2022-02-05 RX ORDER — QUETIAPINE FUMARATE 25 MG/1
50 TABLET, FILM COATED ORAL NIGHTLY PRN
Status: DISCONTINUED | OUTPATIENT
Start: 2022-02-05 | End: 2022-02-08 | Stop reason: HOSPADM

## 2022-02-05 RX ORDER — LORAZEPAM 1 MG/1
1 TABLET ORAL EVERY 8 HOURS PRN
Status: DISCONTINUED | OUTPATIENT
Start: 2022-02-05 | End: 2022-02-08 | Stop reason: HOSPADM

## 2022-02-05 RX ORDER — QUETIAPINE FUMARATE 100 MG/1
100 TABLET, FILM COATED ORAL NIGHTLY
Status: DISCONTINUED | OUTPATIENT
Start: 2022-02-05 | End: 2022-02-08 | Stop reason: HOSPADM

## 2022-02-05 RX ADMIN — Medication 2 PUFF: at 08:14

## 2022-02-05 RX ADMIN — DIVALPROEX SODIUM 1000 MG: 250 TABLET, DELAYED RELEASE ORAL at 17:39

## 2022-02-05 RX ADMIN — DEXAMETHASONE 6 MG: 4 TABLET ORAL at 07:56

## 2022-02-05 RX ADMIN — DULOXETINE HYDROCHLORIDE 60 MG: 60 CAPSULE, DELAYED RELEASE ORAL at 07:57

## 2022-02-05 RX ADMIN — Medication 2 PUFF: at 20:53

## 2022-02-05 RX ADMIN — APIXABAN 5 MG: 5 TABLET, FILM COATED ORAL at 19:42

## 2022-02-05 RX ADMIN — INSULIN LISPRO 6 UNITS: 100 INJECTION, SOLUTION INTRAVENOUS; SUBCUTANEOUS at 19:47

## 2022-02-05 RX ADMIN — METOPROLOL TARTRATE 50 MG: 50 TABLET, FILM COATED ORAL at 07:57

## 2022-02-05 RX ADMIN — LORAZEPAM 1 MG: 1 TABLET ORAL at 07:57

## 2022-02-05 RX ADMIN — METOPROLOL TARTRATE 50 MG: 50 TABLET, FILM COATED ORAL at 19:42

## 2022-02-05 RX ADMIN — Medication 2 PUFF: at 20:54

## 2022-02-05 RX ADMIN — GABAPENTIN 200 MG: 100 CAPSULE ORAL at 07:56

## 2022-02-05 RX ADMIN — QUETIAPINE FUMARATE 50 MG: 25 TABLET ORAL at 19:34

## 2022-02-05 RX ADMIN — PANTOPRAZOLE SODIUM 40 MG: 40 TABLET, DELAYED RELEASE ORAL at 07:57

## 2022-02-05 RX ADMIN — ATORVASTATIN CALCIUM 80 MG: 80 TABLET, FILM COATED ORAL at 07:57

## 2022-02-05 RX ADMIN — INSULIN LISPRO 3 UNITS: 100 INJECTION, SOLUTION INTRAVENOUS; SUBCUTANEOUS at 11:48

## 2022-02-05 RX ADMIN — QUETIAPINE FUMARATE 25 MG: 25 TABLET ORAL at 07:56

## 2022-02-05 RX ADMIN — LISINOPRIL 5 MG: 5 TABLET ORAL at 07:56

## 2022-02-05 RX ADMIN — INSULIN GLARGINE 60 UNITS: 100 INJECTION, SOLUTION SUBCUTANEOUS at 19:49

## 2022-02-05 RX ADMIN — CLOPIDOGREL BISULFATE 75 MG: 75 TABLET, FILM COATED ORAL at 07:57

## 2022-02-05 RX ADMIN — POTASSIUM CHLORIDE 20 MEQ: 20 TABLET, EXTENDED RELEASE ORAL at 07:56

## 2022-02-05 RX ADMIN — TAMSULOSIN HYDROCHLORIDE 0.4 MG: 0.4 CAPSULE ORAL at 21:01

## 2022-02-05 RX ADMIN — APIXABAN 5 MG: 5 TABLET, FILM COATED ORAL at 07:57

## 2022-02-05 RX ADMIN — INSULIN LISPRO 9 UNITS: 100 INJECTION, SOLUTION INTRAVENOUS; SUBCUTANEOUS at 17:40

## 2022-02-05 RX ADMIN — ACETAMINOPHEN 650 MG: 325 TABLET ORAL at 19:36

## 2022-02-05 RX ADMIN — LORAZEPAM 1 MG: 1 TABLET ORAL at 17:39

## 2022-02-05 RX ADMIN — Medication 10 MG: at 19:35

## 2022-02-05 RX ADMIN — QUETIAPINE FUMARATE 100 MG: 100 TABLET ORAL at 19:34

## 2022-02-05 RX ADMIN — GABAPENTIN 200 MG: 100 CAPSULE ORAL at 14:47

## 2022-02-05 RX ADMIN — GABAPENTIN 200 MG: 100 CAPSULE ORAL at 19:36

## 2022-02-05 ASSESSMENT — PAIN SCALES - GENERAL
PAINLEVEL_OUTOF10: 5
PAINLEVEL_OUTOF10: 0

## 2022-02-05 ASSESSMENT — PAIN DESCRIPTION - LOCATION: LOCATION: GENERALIZED

## 2022-02-05 ASSESSMENT — PAIN DESCRIPTION - ONSET: ONSET: ON-GOING

## 2022-02-05 ASSESSMENT — PAIN - FUNCTIONAL ASSESSMENT: PAIN_FUNCTIONAL_ASSESSMENT: ACTIVITIES ARE NOT PREVENTED

## 2022-02-05 ASSESSMENT — PAIN DESCRIPTION - PAIN TYPE: TYPE: ACUTE PAIN

## 2022-02-05 ASSESSMENT — PAIN DESCRIPTION - PROGRESSION
CLINICAL_PROGRESSION: NOT CHANGED
CLINICAL_PROGRESSION: NOT CHANGED

## 2022-02-05 ASSESSMENT — PAIN DESCRIPTION - DESCRIPTORS: DESCRIPTORS: DISCOMFORT;ACHING

## 2022-02-05 ASSESSMENT — PAIN DESCRIPTION - FREQUENCY: FREQUENCY: CONTINUOUS

## 2022-02-06 LAB
GLUCOSE BLD-MCNC: 120 MG/DL (ref 70–99)
GLUCOSE BLD-MCNC: 125 MG/DL (ref 70–99)
GLUCOSE BLD-MCNC: 261 MG/DL (ref 70–99)
GLUCOSE BLD-MCNC: 284 MG/DL (ref 70–99)
PERFORMED ON: ABNORMAL

## 2022-02-06 PROCEDURE — 94640 AIRWAY INHALATION TREATMENT: CPT

## 2022-02-06 PROCEDURE — 6370000000 HC RX 637 (ALT 250 FOR IP): Performed by: PHYSICAL MEDICINE & REHABILITATION

## 2022-02-06 PROCEDURE — 1280000000 HC REHAB R&B

## 2022-02-06 RX ADMIN — TAMSULOSIN HYDROCHLORIDE 0.4 MG: 0.4 CAPSULE ORAL at 19:46

## 2022-02-06 RX ADMIN — DULOXETINE HYDROCHLORIDE 60 MG: 60 CAPSULE, DELAYED RELEASE ORAL at 07:56

## 2022-02-06 RX ADMIN — LORAZEPAM 1 MG: 1 TABLET ORAL at 07:57

## 2022-02-06 RX ADMIN — Medication 2 PUFF: at 20:06

## 2022-02-06 RX ADMIN — GABAPENTIN 200 MG: 100 CAPSULE ORAL at 14:34

## 2022-02-06 RX ADMIN — METOPROLOL TARTRATE 50 MG: 50 TABLET, FILM COATED ORAL at 19:46

## 2022-02-06 RX ADMIN — LORAZEPAM 1 MG: 1 TABLET ORAL at 17:20

## 2022-02-06 RX ADMIN — APIXABAN 5 MG: 5 TABLET, FILM COATED ORAL at 19:47

## 2022-02-06 RX ADMIN — INSULIN LISPRO 9 UNITS: 100 INJECTION, SOLUTION INTRAVENOUS; SUBCUTANEOUS at 16:41

## 2022-02-06 RX ADMIN — GABAPENTIN 200 MG: 100 CAPSULE ORAL at 19:46

## 2022-02-06 RX ADMIN — QUETIAPINE FUMARATE 100 MG: 100 TABLET ORAL at 19:47

## 2022-02-06 RX ADMIN — CLOPIDOGREL BISULFATE 75 MG: 75 TABLET, FILM COATED ORAL at 07:57

## 2022-02-06 RX ADMIN — Medication 10 MG: at 19:46

## 2022-02-06 RX ADMIN — INSULIN GLARGINE 60 UNITS: 100 INJECTION, SOLUTION SUBCUTANEOUS at 19:50

## 2022-02-06 RX ADMIN — INSULIN LISPRO 5 UNITS: 100 INJECTION, SOLUTION INTRAVENOUS; SUBCUTANEOUS at 19:50

## 2022-02-06 RX ADMIN — ATORVASTATIN CALCIUM 80 MG: 80 TABLET, FILM COATED ORAL at 07:56

## 2022-02-06 RX ADMIN — GABAPENTIN 200 MG: 100 CAPSULE ORAL at 07:57

## 2022-02-06 RX ADMIN — PANTOPRAZOLE SODIUM 40 MG: 40 TABLET, DELAYED RELEASE ORAL at 07:56

## 2022-02-06 RX ADMIN — APIXABAN 5 MG: 5 TABLET, FILM COATED ORAL at 07:57

## 2022-02-06 RX ADMIN — QUETIAPINE FUMARATE 25 MG: 25 TABLET ORAL at 07:56

## 2022-02-06 RX ADMIN — LISINOPRIL 5 MG: 5 TABLET ORAL at 07:57

## 2022-02-06 RX ADMIN — METOPROLOL TARTRATE 50 MG: 50 TABLET, FILM COATED ORAL at 07:56

## 2022-02-06 RX ADMIN — POTASSIUM CHLORIDE 20 MEQ: 20 TABLET, EXTENDED RELEASE ORAL at 07:57

## 2022-02-06 RX ADMIN — DIVALPROEX SODIUM 1000 MG: 250 TABLET, DELAYED RELEASE ORAL at 17:20

## 2022-02-06 ASSESSMENT — PAIN SCALES - GENERAL
PAINLEVEL_OUTOF10: 0

## 2022-02-06 ASSESSMENT — PAIN DESCRIPTION - PROGRESSION
CLINICAL_PROGRESSION: NOT CHANGED

## 2022-02-06 NOTE — PLAN OF CARE
Patient  Free from falls this shift. Avysis and bed alarms in  Place to notify staff of attempts of unassisted transfers. Fall precautions in place. Call light within reach.  Pt able and agreeable to call staff for assistance appropriately  sitter at bedside Telephone Visit   NUTRITION ASSESSMENT  Pt consented to telephone visit. PT at home for visit.     Struggling to lose weight.  Interested in bariatric surgery or weight loss medications. Reviewed both with pt. States [facility] advised against Phentermine. Pt reports she was on this before and lost 70-80# but gained it back.   Reports that she \"binge eats\"  Wants rapid wt loss and admits to wanting a quick fix.     Food recall:   Oatmeal  Peanut butter, apple  Hot dog and Morris    Reviewed portions with pt, plate method and increasing vegetables into meal plan. Reviewed low calorie, nutrient dense foods to keep pt full. Reviewed stress eating and how to over come.   Reviewed non-food ways to deal prevent binges.     Education  Discussed/Instructed on the Following:  Sources of Carbohydrates in Diet  Sources of Fat in Diet  Sources of Sodium in Diet  Sources of Protein in Diet  Healthy Weight  Activity/Exercise  Increasing Activity in Daily Routine    Handouts/Resources Provided/Introduced:  My Meal Plan  Websites    Nutrition Goals  1. Weight loss toward normal BMI     Nutrition Assessment  Support Structure Present: yes   Patient Understands Disease State: yes     Current Activity Level:  Sedentary    Primary Meal Preparer: patient    Dinning Out:  1-2 Times per Week    Barriers to Reaching Goals:   None    Treatment Plan  Goals:  Increase Normal Activities  Continue Working on Meal Plan  Reduce Fat in Diet  Work on Ways to Reduce Stress  Keep Food Log/Diary and Bring to Next Visit  Eat 3 Meals per Day    25 minutes spent on phone with pt   Rosemarie Mason RD

## 2022-02-06 NOTE — PLAN OF CARE
Problem: Falls - Risk of:  Goal: Absence of physical injury  Description: Absence of physical injury  Outcome: Ongoing Sitter at bedside to prevent future getting out of bed without assistance.  Will trade bed for a shorter one so that feet touch the floor when sitting on side of bed

## 2022-02-06 NOTE — PROGRESS NOTES
02/06/2022, 0700, patient found sitting on floor beside bed, was in bed prior to being found on floor. Assessed for no apparent injury. Patient slid from bed to floor. Message left with Dr. Hawa Tello to return call. Sharifa Flores contact called @ [phone]  and made aware. Order obtained for a sitter @ bedside. Advised to get bed that is lower to the ground  So his feet touch the floor when sitting.

## 2022-02-07 LAB
ANION GAP SERPL CALCULATED.3IONS-SCNC: 12 MMOL/L (ref 3–16)
BASOPHILS ABSOLUTE: 0 K/UL (ref 0–0.2)
BASOPHILS RELATIVE PERCENT: 0.6 %
BUN BLDV-MCNC: 14 MG/DL (ref 7–20)
CALCIUM SERPL-MCNC: 8.7 MG/DL (ref 8.3–10.6)
CHLORIDE BLD-SCNC: 98 MMOL/L (ref 99–110)
CO2: 24 MMOL/L (ref 21–32)
CREAT SERPL-MCNC: 0.6 MG/DL (ref 0.9–1.3)
EOSINOPHILS ABSOLUTE: 0.1 K/UL (ref 0–0.6)
EOSINOPHILS RELATIVE PERCENT: 1.2 %
GFR AFRICAN AMERICAN: >60
GFR NON-AFRICAN AMERICAN: >60
GLUCOSE BLD-MCNC: 137 MG/DL (ref 70–99)
GLUCOSE BLD-MCNC: 164 MG/DL (ref 70–99)
GLUCOSE BLD-MCNC: 167 MG/DL (ref 70–99)
GLUCOSE BLD-MCNC: 191 MG/DL (ref 70–99)
HCT VFR BLD CALC: 40.4 % (ref 40.5–52.5)
HEMOGLOBIN: 13.3 G/DL (ref 13.5–17.5)
LYMPHOCYTES ABSOLUTE: 1.8 K/UL (ref 1–5.1)
LYMPHOCYTES RELATIVE PERCENT: 36.8 %
MCH RBC QN AUTO: 28.5 PG (ref 26–34)
MCHC RBC AUTO-ENTMCNC: 33 G/DL (ref 31–36)
MCV RBC AUTO: 86.4 FL (ref 80–100)
MONOCYTES ABSOLUTE: 0.3 K/UL (ref 0–1.3)
MONOCYTES RELATIVE PERCENT: 6.6 %
NEUTROPHILS ABSOLUTE: 2.7 K/UL (ref 1.7–7.7)
NEUTROPHILS RELATIVE PERCENT: 54.8 %
PDW BLD-RTO: 16.9 % (ref 12.4–15.4)
PERFORMED ON: ABNORMAL
PLATELET # BLD: 107 K/UL (ref 135–450)
PMV BLD AUTO: 7.2 FL (ref 5–10.5)
POTASSIUM REFLEX MAGNESIUM: 4 MMOL/L (ref 3.5–5.1)
RBC # BLD: 4.67 M/UL (ref 4.2–5.9)
SODIUM BLD-SCNC: 134 MMOL/L (ref 136–145)
WBC # BLD: 4.9 K/UL (ref 4–11)

## 2022-02-07 PROCEDURE — 85025 COMPLETE CBC W/AUTO DIFF WBC: CPT

## 2022-02-07 PROCEDURE — 97129 THER IVNTJ 1ST 15 MIN: CPT

## 2022-02-07 PROCEDURE — 6370000000 HC RX 637 (ALT 250 FOR IP): Performed by: PHYSICAL MEDICINE & REHABILITATION

## 2022-02-07 PROCEDURE — 97535 SELF CARE MNGMENT TRAINING: CPT

## 2022-02-07 PROCEDURE — 97130 THER IVNTJ EA ADDL 15 MIN: CPT

## 2022-02-07 PROCEDURE — 97116 GAIT TRAINING THERAPY: CPT

## 2022-02-07 PROCEDURE — 97530 THERAPEUTIC ACTIVITIES: CPT

## 2022-02-07 PROCEDURE — 1280000000 HC REHAB R&B

## 2022-02-07 PROCEDURE — 36415 COLL VENOUS BLD VENIPUNCTURE: CPT

## 2022-02-07 PROCEDURE — 97110 THERAPEUTIC EXERCISES: CPT

## 2022-02-07 PROCEDURE — 80048 BASIC METABOLIC PNL TOTAL CA: CPT

## 2022-02-07 RX ORDER — QUETIAPINE FUMARATE 100 MG/1
100 TABLET, FILM COATED ORAL NIGHTLY
Qty: 60 TABLET | Refills: 3 | Status: SHIPPED | OUTPATIENT
Start: 2022-02-07 | End: 2022-08-23 | Stop reason: SINTOL

## 2022-02-07 RX ORDER — QUETIAPINE FUMARATE 50 MG/1
50 TABLET, FILM COATED ORAL 2 TIMES DAILY PRN
Qty: 60 TABLET | Refills: 3 | Status: SHIPPED | OUTPATIENT
Start: 2022-02-07 | End: 2022-08-23 | Stop reason: SINTOL

## 2022-02-07 RX ORDER — CLOPIDOGREL BISULFATE 75 MG/1
75 TABLET ORAL DAILY
Qty: 30 TABLET | Refills: 3 | Status: SHIPPED | OUTPATIENT
Start: 2022-02-08

## 2022-02-07 RX ORDER — LISINOPRIL 5 MG/1
5 TABLET ORAL DAILY
Qty: 30 TABLET | Refills: 3 | Status: SHIPPED | OUTPATIENT
Start: 2022-02-08

## 2022-02-07 RX ORDER — TAMSULOSIN HYDROCHLORIDE 0.4 MG/1
0.4 CAPSULE ORAL NIGHTLY
Qty: 30 CAPSULE | Refills: 3 | Status: SHIPPED | OUTPATIENT
Start: 2022-02-07

## 2022-02-07 RX ADMIN — APIXABAN 5 MG: 5 TABLET, FILM COATED ORAL at 08:24

## 2022-02-07 RX ADMIN — FLUTICASONE PROPIONATE 2 SPRAY: 50 SPRAY, METERED NASAL at 08:29

## 2022-02-07 RX ADMIN — POTASSIUM CHLORIDE 20 MEQ: 20 TABLET, EXTENDED RELEASE ORAL at 08:24

## 2022-02-07 RX ADMIN — GABAPENTIN 200 MG: 100 CAPSULE ORAL at 13:20

## 2022-02-07 RX ADMIN — LORAZEPAM 1 MG: 1 TABLET ORAL at 12:22

## 2022-02-07 RX ADMIN — QUETIAPINE FUMARATE 25 MG: 25 TABLET ORAL at 08:24

## 2022-02-07 RX ADMIN — Medication 10 MG: at 21:15

## 2022-02-07 RX ADMIN — APIXABAN 5 MG: 5 TABLET, FILM COATED ORAL at 21:17

## 2022-02-07 RX ADMIN — INSULIN GLARGINE 60 UNITS: 100 INJECTION, SOLUTION SUBCUTANEOUS at 23:09

## 2022-02-07 RX ADMIN — QUETIAPINE FUMARATE 100 MG: 100 TABLET ORAL at 21:15

## 2022-02-07 RX ADMIN — METOPROLOL TARTRATE 50 MG: 50 TABLET, FILM COATED ORAL at 08:24

## 2022-02-07 RX ADMIN — INSULIN LISPRO 2 UNITS: 100 INJECTION, SOLUTION INTRAVENOUS; SUBCUTANEOUS at 23:10

## 2022-02-07 RX ADMIN — INSULIN LISPRO 3 UNITS: 100 INJECTION, SOLUTION INTRAVENOUS; SUBCUTANEOUS at 16:54

## 2022-02-07 RX ADMIN — LISINOPRIL 5 MG: 5 TABLET ORAL at 08:24

## 2022-02-07 RX ADMIN — GABAPENTIN 200 MG: 100 CAPSULE ORAL at 08:24

## 2022-02-07 RX ADMIN — ATORVASTATIN CALCIUM 80 MG: 80 TABLET, FILM COATED ORAL at 08:24

## 2022-02-07 RX ADMIN — CLOPIDOGREL BISULFATE 75 MG: 75 TABLET, FILM COATED ORAL at 08:23

## 2022-02-07 RX ADMIN — ACETAMINOPHEN 650 MG: 325 TABLET ORAL at 12:21

## 2022-02-07 RX ADMIN — INSULIN LISPRO 3 UNITS: 100 INJECTION, SOLUTION INTRAVENOUS; SUBCUTANEOUS at 08:25

## 2022-02-07 RX ADMIN — GABAPENTIN 200 MG: 100 CAPSULE ORAL at 21:17

## 2022-02-07 RX ADMIN — TAMSULOSIN HYDROCHLORIDE 0.4 MG: 0.4 CAPSULE ORAL at 21:17

## 2022-02-07 RX ADMIN — DIVALPROEX SODIUM 1000 MG: 250 TABLET, DELAYED RELEASE ORAL at 16:53

## 2022-02-07 RX ADMIN — DULOXETINE HYDROCHLORIDE 60 MG: 60 CAPSULE, DELAYED RELEASE ORAL at 08:23

## 2022-02-07 RX ADMIN — PANTOPRAZOLE SODIUM 40 MG: 40 TABLET, DELAYED RELEASE ORAL at 08:24

## 2022-02-07 RX ADMIN — LORAZEPAM 1 MG: 1 TABLET ORAL at 04:45

## 2022-02-07 RX ADMIN — LORAZEPAM 1 MG: 1 TABLET ORAL at 21:16

## 2022-02-07 RX ADMIN — METOPROLOL TARTRATE 50 MG: 50 TABLET, FILM COATED ORAL at 21:17

## 2022-02-07 ASSESSMENT — PAIN DESCRIPTION - PROGRESSION
CLINICAL_PROGRESSION: NOT CHANGED

## 2022-02-07 ASSESSMENT — PAIN SCALES - GENERAL
PAINLEVEL_OUTOF10: 8
PAINLEVEL_OUTOF10: 0

## 2022-02-07 NOTE — PROGRESS NOTES
Cheryl Dias [facility]  2/7/2022  [id number]    Chief Complaint: Debility    Subjective: Patient seen this AM. Resting in bed. No issues overnight. ROS: No n/v, cp, sob, f/c    Objective:  Patient Vitals for the past 24 hrs:   BP Temp Temp src Pulse Resp SpO2   02/07/22 0815 -- 97.6 °F (36.4 °C) Axillary -- -- --   02/07/22 0739 117/75 -- -- 89 18 100 %   02/06/22 2009 -- -- -- -- -- 96 %   02/06/22 1946 125/64 97.8 °F (36.6 °C) Oral 75 16 94 %     Gen: No distress, pleasant. HEENT: Normocephalic, atraumatic. CV: Regular rate and rhythm. Resp: No respiratory distress. Abd: Soft, nontender   Ext: No edema. Neuro: Alert, oriented, appropriately interactive. Wt Readings from Last 3 Encounters:   01/27/22 218 lb 6 oz (99.1 kg)   01/23/22 216 lb 3.2 oz (98.1 kg)   11/09/21 243 lb (110.2 kg)       Laboratory data:   Lab Results   Component Value Date    WBC 4.9 02/07/2022    HGB 13.3 (L) 02/07/2022    HCT 40.4 (L) 02/07/2022    MCV 86.4 02/07/2022     (L) 02/07/2022       Lab Results   Component Value Date     02/07/2022    K 4.0 02/07/2022    CL 98 02/07/2022    CO2 24 02/07/2022    BUN 14 02/07/2022    CREATININE 0.6 02/07/2022    GLUCOSE 191 02/07/2022    CALCIUM 8.7 02/07/2022        Therapy progress:  PT  Position Activity Restriction  Other position/activity restrictions: up with assistance, Avasys  Objective     Sit to Stand: Contact guard assistance  Stand to sit: Contact guard assistance  Bed to Chair: Minimal assistance  Device: Rolling Walker  Other Apparatus: Wheelchair follow  Assistance: Contact guard assistance,Minimal assistance  Distance: 130 ft  OT  PT Equipment Recommendations  Equipment Needed: Yes  Mobility Devices: Jeffery Goldmann: Rolling  Toilet - Technique: Ambulating  Equipment Used: Standard toilet  Assessment        SLP  Current Diet : Regular  Current Liquid Diet : Thin  Diet Solids Recommendation: Regular  Liquid Consistency Recommendation:  Thin    Body mass index is 37.48 kg/m². Rehabilitation Diagnosis:  Brain, 2.1, Non-Traumatic     Assessment and Plan:  Acute encephalopathy secondary to covid  - continue scheduled seroquel, wean as able  - Consolidate sleep  - SLP     Afib/RVR  - continue beta blocker  - ICD adjusted per cardiology after inappropriate firing  - continue eliquis     Covid positive  - completed isolation  - completed decadron       DM  - lantus, SSI     Thrombocytopenia  - follow    CAD with h/o stent x 3, h/o MI, h/o VT  - asa, statin, plavix     CHF  - euvolemic  - continue medical management  - resume lasix twice weeklly if needed  - uptitrate acei if needed     Asthma  - inhalers as ordered     Hyperlipidemia  - statin     GERD  - PPI     Lumbar spondylosis  - f/u Dr. Jay Zhong     Morbid obesity  - dietary consulted     Depression  - cymbalta     Bowels: Schedule stool softener. Follow bowel movements. Enema or suppository if needed.      Bladder: Check PVR x 3. [facility] if PVR > 200ml or if any volume is > 500 ml.      Sleep: Trazodone provided prn.      Follow up appointments: PCP, Dr. Jay Zhong  ROBBIE: 2/10 home w/ home health  DME: ajit Dia.  Chen Casas MD 2/7/2022, 9:01 AM

## 2022-02-07 NOTE — PROGRESS NOTES
Physical Therapy  Discharge Summary    Name:Chano Quiroga Jr. NTP:[id number]  :1960  Treatment Diagnosis: impaired mobility  Diagnosis: covid    Restrictions/Precautions  Restrictions/Precautions: General Precautions,Fall Risk           Position Activity Restriction  Other position/activity restrictions: up with assistance, Avasys, sitter     Goals:                  Short term goals  Time Frame for Short term goals: 7 days 2/3  Short term goal 1: pt will complete bed mobility with supv.- GOAL MET, completes with supv  Short term goal 2: pt will complete functional transfer with CGA and LRAD.- GoAL MET, completes with CGA and RW  Short term goal 3: pt will ambulate 50 ft with LRAD and min A.- GOAL MET, completes with RW, CGA-min A            Long term goals  Time Frame for Long term goals : 21 days   Long term goal 1: pt will complete bed mobility with ind. -- : GOAL NOT MET S to Fili  Long term goal 2: pt will complete functional transfer with mod ind and LRAD. -- : GOAL NOT MET CGA with RW and without AD  Long term goal 3: pt will ambulate 100 ft with LRAD and supv. -- : GOAL NOT MET CGA to modA up to 130'  Long term goal 4: pt will complete car transfer with LRAD and mod ind. -- : GOAL NOT MET CGA with RW    Pt. Met 3/3 short term goals and 0/4 long term goals. LTG for MI with bed mobility, t/f and gait not met at this time d/t continued agitation, poor participation/motivation and poor safety awareness during mobility. Recommend strict 24hrA at home and family education provided with pt's sister \"Caryn\" as noted on tx on 22.        Discharge PT IRF:    CARE Score: 4                                   Pt. Currently ambulates 130 feet with RW and  CGA/Fili for balance (TD d/t close w/c follow)  Unsafe to complete steps d/t weakness, agitation and poor safety  Sit to/from stand with CGA  Bed mobility with S to Fili  Recommend strict 24hrA and HHPT in order to improve safety and I with mobility and decrease risk of falls, recommend RW for gait  Pt. Safe to return home with strict 24/hr assistance from family. Provided pt. with family education with sister \"Caryn\" 2/08 and provided with written HEP and instructed on completion of exercises.     Electronically signed by Haider Gonzalez, PT on 2/8/2022 at 3:12 PM

## 2022-02-07 NOTE — CARE COORDINATION
CM spoke with Elijah Tang (sister) via telephone about discharge plans. CM explained to [facility] that patient is getting increasingly aggravated and does not want to participate with therapy. [facility] stated that her brother has moments that he stops and stares and this is normal for him. [facility] also stated that he has his nerves in his back cauterized and that's why he shuffles his gate and this is something he has always done. [facility] and Richard Isaac are working on providing 24 hour supervision and will call writer back to get everything in place. CM referred Sololeonardo Hernandes for patient and Reyestello Bethdo will place call with them to see about nursing or nurse aide. Bia King, RN     [address] spoke with Christine Iverson (brother-in-law) via telephone with updated discharge plans. CM discussed that writer has spoken with [facility] Christine Iverson stated that they are having conversations about patients discharge plan and are trying to get a plan together for his discharge. CM discussed family training and patient can discharge tomorrow 02/08/22, Christine Iverson stated that he could be here at 5:00pm. CM stated that therapy leaves before that and writer will contact patients sister to come in if available. Christine Iverson agreeable with discharge plans.  Bia King RN

## 2022-02-07 NOTE — PROGRESS NOTES
Pt uncooperative with care, refusing to cooperate with therapy. Pt noted taking shirt off in the hallway, unwilling to put shirt back on. Pt taken back to room and in bed at this time. Sitter remains at bedside.

## 2022-02-07 NOTE — PROGRESS NOTES
Physical Therapy  Facility/Department: [facility]  Daily Treatment Note  NAME: Bin Flannery. : 1960  MRN: [id number]    Date of Service: 2022    Discharge Recommendations:  24 hour supervision or assist,Home with Home health PT (vs SNF d/t poor safety awareness)   PT Equipment Recommendations  Equipment Needed: Yes  Mobility Devices: [place]: Rolling    Assessment   Body structures, Functions, Activity limitations: Decreased functional mobility ; Decreased cognition;Decreased posture;Decreased ADL status; Decreased endurance;Decreased coordination;Decreased strength;Decreased balance;Decreased safe awareness  Assessment: Pt seated in recliner on approach with sitter present. Pt reports fatigue but intially does participation in session although is still impulsive and unsafe with mobility. Pt participates in bed mobility, t/f and initiates w/c mobility into hallway, in hallway pt reports fatigue then rips mask off face, despite education regarding need for mask when out of room. Pt stands impulsviely and unsafely from w/c multiple times without engaging brakes and with B leg rests still in place despite education regarding safety. RN then present and pt taking off shirt in hallway despite education from RN and PT for need to leave shirt in place, pt swinging arms at therapist so pt returned back to room for safety to de-escalate agitation and maintain safety and session terminated. Pt requires S to Fili for bed mobility, CGA for t/f and CGA to Fili for gait up to 10' with RW. Per chart pt with planned d/c  with family and pt's sister coming on for family training. Pt will benefit from family training session to improve safety with mobility at home. Will continue to progress mobility as tolerated.   Treatment Diagnosis: impaired mobility  Specific instructions for Next Treatment: Progress mobility as tolerated  Prognosis: Fair  Decision Making: Medium Complexity  PT Education: Goals; Transfer Training;Equipment;Disease Specific Education;PT Role;Functional Mobility Training;Energy Conservation;Plan of Care;General Safety;Precautions; Family Education;Gait Training  Patient Education: Educated on role of PT, safety with all mobility and use of brakes prior to t/f. Pt demonstrates no evidence of learning and is resistant to education  Barriers to Learning: cognition, agitation, motivation  REQUIRES PT FOLLOW UP: Yes  Activity Tolerance  Activity Tolerance: Patient limited by endurance; Patient limited by cognitive status;Treatment limited secondary to agitation  Activity Tolerance: /60, HR 79, SpO2 96% on RA. Pt reports fatigue but is initially participative in tasks, however during w/c mobility pt with increasing agitation, rips mask off, stands unsafely and impulsively from w/c multiple times and takes shirt off in hallway and refuses to leave it on swinging arms with RN and PT attempt to educate pt to leave shirt on. Pt is unable to be safely re-directed for participation in therapy, RN present and assisting to de-escalate pt and get him set back up in w/c. Patient Diagnosis(es): There were no encounter diagnoses. has a past medical history of Arthritis, Asthma, CAD (coronary artery disease), COPD (chronic obstructive pulmonary disease) ([facility] [address] Emphysema of lung ([facility] [address] Fatty liver, GERD (gastroesophageal reflux disease), Hyperlipidemia, Hypertension, Medical history reviewed with no changes, MI, old, Sleep apnea, and Type II or unspecified type diabetes mellitus without mention of complication, not stated as uncontrolled. has a past surgical history that includes Coronary angioplasty with stent (6238,2192); Colonoscopy; Endoscopy, colon, diagnostic; cyst removal (1982); Upper gastrointestinal endoscopy (7/18/13); Upper gastrointestinal endoscopy (8/23/13); Carpal tunnel release (Bilateral, 2013); ERCP (9/15/2013);  Upper gastrointestinal endoscopy (9/15/2013); ERCP (10/11/13); Walker  Assistance: Contact guard assistance;Minimal assistance  Quality of Gait: significant posterior lean, significnatly shortened step lengths/lack of heel strike/swing phase. shuffled gait. poor dory, slow velocity, appears to have episodes of freezing and needs to be redirected  Gait Deviations: Slow Dory;Decreased step length;Decreased step height;Shuffles  Distance: 10'  Stairs/Curb  Stairs?: No (deferred d/t safety)  Wheelchair Activities  Propulsion: Yes  Propulsion 1  Propulsion: Manual  Level: Level Tile  Method: RUE;LUE  Level of Assistance: Stand by assistance  Description/ Details: Pt completes over level tile, completing turn and navigating through doorway, increased time to complete with cues for sequence.  Following ~75' pt becoming agitated, standing impulsively and taking off shirt unable to be safely re-directed for participation in further w/c mobility  Distance: 75'  Gait Deviations  Gait Deviations: Slow Dory        Balance  Posture: Fair  Sitting - Static: Fair  Sitting - Dynamic: Fair  Standing - Static: Fair  Standing - Dynamic: Fair  Comments: Standing no AD and with RW CGA, unsteady without overt LOB                             Goals  Short term goals  Time Frame for Short term goals: 7 days 2/3  Short term goal 1: pt will complete bed mobility with supv.- GOAL MET, completes with supv  Short term goal 2: pt will complete functional transfer with CGA and LRAD.- GoAL MET, completes with CGA and RW  Short term goal 3: pt will ambulate 50 ft with LRAD and min A.- GOAL MET, completes with RW, CGA-min A  Long term goals  Time Frame for Long term goals : 21 days 2/17  Long term goal 1: pt will complete bed mobility with ind. -- 2/07: GOAL NOT MET S to Fili  Long term goal 2: pt will complete functional transfer with mod ind and LRAD. -- 2/07: GOAL NOT MET CGA with RW and without AD  Long term goal 3: pt will ambulate 100 ft with LRAD and supv. -- 2/07: GOAL NOT MET CGA to modA up to 130'  Long term goal 4: pt will complete car transfer with LRAD and mod ind. -- 2/07: GOAL NOT MET, pt refuses 2/07  Patient Goals   Patient goals : \"get stronger\"    Plan    Plan  Times per week: 5-7x/week  Times per day: Daily  Specific instructions for Next Treatment: Progress mobility as tolerated  Current Treatment Recommendations: Mg Carpenter Re-education,Patient/Caregiver Education & Training,Cognitive Reorientation,ROM,Wheelchair Mobility Training,Manual Therapy - Soft Tissue Mobilization,Equipment Evaluation, Education, & procurement,Balance Training,Gait Training,Home Exercise Program,Modalities,Functional Mobility Training,Stair training,Manual Therapy - Joint Manipulation,Safety Education & Training,Positioning  Safety Devices  Type of devices: Call light within reach,Telesitter in use,Gait belt,Nurse notified,All fall risk precautions in place,Patient at risk for falls,Sitter present,Left in bed (Sitter and RN present in room with pt)     Therapy Time   Individual Concurrent Group Co-treatment   Time In 1300         Time Out 1325         Minutes 25         Timed Code Treatment Minutes: [facility], PT, DPT

## 2022-02-07 NOTE — PROGRESS NOTES
Occupational Therapy  Discharge Summary     Name:Chano Quiroga Jr. BAU:[id number]  :1960  Treatment Diagnosis: impaired mobility  Diagnosis: covid    Restrictions/Precautions  Restrictions/Precautions: General Precautions,Fall Risk           Position Activity Restriction  Other position/activity restrictions: up with assistance, Avasys     Goals:   Short term goals  Time Frame for Short term goals: 1 week- 2/3/21  Short term goal 1: Pt will perform functional transfers with CGA.- GOAL MET 2/3/22 Pt performed functional transfers with CGA. Short term goal 2: Pt will complete toileting with min A. GOAL MET 2/3/22 Pt completed toileting in stance with SBA. Short term goal 3: Pt will perform LB dressing with CGA. GOAL MET 2/3/22 Pt demonstrated ability to don pants with SBA. Short term goal 4: Pt will complete grooming at sink with SPV. GOAL MET 2/3/22 Pt completed grooming at sink with supervision. Long term goals  Time Frame for Long term goals : 3 weeks- 21  Long term goal 1: Pt will perform functional transfers with mod I.-NOT MET, Fili-CGA for all ADLs  Long term goal 2: Pt will complete toileting with mod I.-NOT MET, Fili-CGA for all ADLs  Long term goal 3: Pt will complete LB dressing with mod I.-NOT MET, Fili-CGA for all ADLs  Long term goal 4: Pt will perform LB dressing with mod I.-NOT MET, Fili-CGA for all ADLs  Long term goal 5: Pt will perform simple IADL task with mod I-NOT MET, Fili-CGA for all ADLs    Pt. Met 4/4 short term goals and 0/4 long term goals. Patient did not meet any LTGs due to consistent need for CGA for all standing, mobility, and ADLs with poor safety/sequencing noted and poor balance. Pt will need direct 24 hour SPV at home, recommend HHOT S Level 3.     Current Functional Status:   ADL  Feeding: Independent  Grooming: Setup,Verbal cueing  UE Bathing: Stand by assistance  LE Bathing: Contact guard assistance,Stand by assistance  UE Dressing: Supervision  LE Dressing: Stand by assistance,Contact guard assistance  Toileting: Contact guard assistance  Additional Comments: Pt very implusive, agitated, and needing MAX cues for sitting vs standing; poor balancew ith multiple posterior LOB in bathroom needing maxA for recovery    Bed mobility  Bridging: Supervision  Rolling to Left: Supervision  Rolling to Right: Supervision  Supine to Sit: Supervision  Sit to Supine: Supervision  Scooting: Supervision  Comment: Pt ened session seated in recliner. Functional Transfers: Toilet Transfers  Toilet - Technique: Ambulating  Equipment Used: Standard toilet  Toilet Transfer: Contact guard assistance,Minimal assistance         Shower Transfers  Shower - Transfer From: Kirit & Frankie - Transfer Type: To and From  Shower - Transfer To: Transfer tub bench  Shower - Technique: Ambulating (RW)  Shower Transfers: Minimal assistance           Functional Mobility  Functional - Mobility Device: Rolling Walker  Activity: To/from bathroom,Other  Assist Level: Minimal assistance  Functional Mobility Comments: close w/c follow needed                           Perception  Overall Perceptual Status: Impaired  Unilateral Attention: Appears intact  Initiation: Cues to initiate tasks  Motor Planning: Cues to use objects appropriately  Perseveration: Not present  MVPT: Pt requiring max vc's during blocks and board visual perceptual, FM, standing balance/endurance, attention, cognitive, memory and direction following task to correctly identify needed pieces and to orient pieces on top of image correctly. Increased time needed and startegies given and mod A to complete task. Pt correctly placing 5/14 pieces on top of image with increased time. Initial task was for pt to recreate image beside board, pt unable to complete without max vc's and assistance. Task modified for patient to orient pieces on top of board image.          UE Function:  Hand Dominance  Hand Dominance: Left        Fine Motor Skills  Left 9-Hole Peg Test: Functional  Left 9 Hole Peg Test Time (secs): 47  Right 9-Hole Peg Test: Functional  Right 9 Hole Peg Test Time (secs): 58    AAssessment:   Assessment: Session very limited by patients cognition, restlessness, poor ability to follow sequencing/safety commands during transfers, ADLs, and mobility. Pt was able to complete shower task but needing mod-amx cues for sitting vs standing and pt refusing to listern to OT multiple times. Min-modA for mobility with RW and mod cues for wider step length and upright posture, poor carryover demo'd. Pt with limited abiltiy to follow BUE ex commands and mod cues for safety with turning to sit vs stand. Pt now has a sitter, RN called to room to give meds. Left in bed with sitter. Pt has not met goals due to consistent need for CGA-Fili for balance/management of RW, mod-max cues for safety, and consistent CGA for all standing trials. Cont to rec 24 hour SPV and HHOT S Level 3.  COnt to rec SC, BSC, RW. Pt WILL NEED direct 24 hour SPV at home. Prognosis: Good  Barriers to Learning: Pt will require reinforcement.   REQUIRES OT FOLLOW UP: Yes  Discharge Recommendations: 24 hour supervision or assist,Home with Home health OT,S Level 1    Equipment Recommendations:  BSC, SC, RW, WC      Electronically signed by Silvia Barry OT on 2/7/2022 at 10:25 AM

## 2022-02-07 NOTE — DISCHARGE INSTR - COC
Continuity of Care Form    Patient Name: Tram Dumont. :  1960  MRN:  [id number]    Admit date:  2022  Discharge date:  ***    Code Status Order: Full Code   Advance Directives:      Admitting Physician:  Jeanette Caldwell MD  PCP: Diaz Carrero PA-C    Discharging Nurse: [facility] Unit/Room#: 7851/3357-43  Discharging Unit Phone Number: ***    Emergency Contact:   Extended Emergency Contact Information  Primary Emergency Contact: [facility] Phone: [phone]  Relation: Other  Secondary Emergency Contact: Miguelito Quiroga  [place] Phone: [phone]  Relation: Brother/Sister    Past Surgical History:  Past Surgical History:   Procedure Laterality Date    CARDIAC PACEMAKER PLACEMENT      NOT MRI COMPATIABLE OLD LEADS [facility].  pace maker difib    CARPAL TUNNEL RELEASE Bilateral     CATARACT REMOVAL WITH IMPLANT Left 14    COLONOSCOPY      CORONARY ANGIOPLASTY WITH STENT PLACEMENT  ,    CYST REMOVAL  1982    coccyx area    DIAGNOSTIC CARDIAC CATH LAB PROCEDURE      ENDOSCOPY, COLON, DIAGNOSTIC      ERCP  9/15/2013    ERCP  10/11/13    WITH STENT REMOVAL    FOOT SURGERY Right 2018     REPAIR CALCANEAL SPUR, REPAIR OF ACHILLES TENDON RIGHT FOOT    NERVE SURGERY Bilateral 2020    BILATERAL LUMBAR THREE LUMBAR FOUR LUMBAR FIVE DORSAL RAMUS  RADIOFREQUENCY ABLATION SITE CONFIRMED BY FLUOROSCOPY performed by Adelia Guerrero MD at [facility]      back stimulator in lower back    PACEMAKER PLACEMENT      ICD    PAIN MANAGEMENT PROCEDURE Bilateral 2020    BILATERAL LUMBAR THREE, LUMBAR FOUR, LUMBAR FIVE DORSAL RAMUS MEDIAL BRANCH BLOCK SITE CONFIRMED BY FLUOROSCOPY performed by Adelia Guerrero MD at [facility] [employer] Bilateral 2020    BILATERAL LUMBAR THREE, LUMBAR FOUR, LUMBAR FIVE DORSAL RAMUS MEDIAL BRANCH BLOCK SITE CONFIRMED BY FLUOROSCOPY performed by Adelia Guerrero MD at [address] Left 1/12/2021    LEFT SACROILIAC JOINT INJECTION SITE CONFIRMED BY FLUOROSCOPY performed by Shelly St MD at [facility] [address] Right 10/31/2018    GASTROCNEMIUS RECESSION RIGHT FOOT performed by Luis Carlos Easton DPM at [employer] [address] LENGTH/SHORT LEG/ANKL TENDON,SINGLE Right 10/31/2018    REMOVAL OF CALCANEAL SPUR, ACHILLES TENDON REPAIR RIGHT LOWER EXTREMITY performed by Luis Carlos Easton DPM at [address] [facility]  7/18/13    and colonoscopy with biopsies taken    UPPER GASTROINTESTINAL ENDOSCOPY  8/23/13    EUS    UPPER GASTROINTESTINAL ENDOSCOPY  9/15/2013       Immunization History:   Immunization History   Administered Date(s) Administered    Influenza Virus Vaccine 12/12/2008, 10/22/2012, 09/17/2015, 10/01/2018    Influenza, Jo Ann Copas, IM, (6 mo and older Fluzone, Flulaval, Fluarix and 3 yrs and older Afluria) 11/18/2016, 10/02/2017    Pneumococcal Conjugate 7-valent (Prevnar7) 10/22/2012    Pneumococcal Polysaccharide (Httdkmlkw51) 12/12/2008    Tdap (Boostrix, Adacel) 05/11/2015       Active Problems:  Patient Active Problem List   Diagnosis Code    Ventricular tachycardia ([facility] [address] I47.2    Presence of stent in left circumflex coronary artery Z95.5    Myocardial infarction, nontransmural (HCC) I21.4    Myocardial infarction, inferoposterior wall, subsequent care     Automatic implantable cardioverter-defibrillator in situ Z95.810    CAD (coronary artery disease) I25.10    Automatic implantable cardioverter-defibrillator in situ Z95.810    ALIN on CPAP G47.33, Z99.89    Gastroesophageal reflux disease without esophagitis K21.9    Hyperlipidemia with target LDL less than 100 E78.5    Hypertension due to endocrine disorder I15.2    Ischemic cardiomyopathy I25.5    Obesity, morbid, BMI 40.0-49.9 (HCC) E66.01    Weakness R53.1    Acute encephalopathy G93.40    TIA involving left internal carotid artery G45.1    DM (diabetes mellitus), secondary, uncontrolled, w/neurologic complic ([facility] [address] C03.60, W73.31    Dyslipidemia E78.5    HTN (hypertension), benign I10    Lactic acidosis E87.2    Head trauma S09.90XA    Abrasion, scalp w/o infection S00. 01XA    Unsteady gait R26.81    Hyperglycemia R73.9    Chronic obstructive pulmonary disease ([facility]) J44.9    Coarse tremor G25.2    Generalized weakness R53.1    Type 2 diabetes mellitus with hyperglycemia, with long-term current use of insulin ([facility]) E11.65, Z79.4    Essential hypertension I10    CAD S/P percutaneous coronary angioplasty I25.10, Z98.61    COVID-19 U07.1    Fall W19. XXXA    Unable to ambulate R26.2    Implantable cardioverter-defibrillator (ICD) discharge Z45.02    AICD malfunction T82.118A    Hypotension I95.9    Valproic acid toxicity T42.6X1A    PAF (paroxysmal atrial fibrillation) ([facility]) I48.0    COVID-19 virus infection U07.1    Disorder of electrolytes E87.8    Atrial fibrillation with RVR ([facility]) I48.91    Low grade fever R50.9    Thrombocytopenia ([facility]) D69.6    Abnormal chest x-ray R93.89    Encephalopathy G93.40    Debility R53.81       Isolation/Infection:   Isolation            No Isolation          Patient Infection Status       Infection Onset Added Last Indicated Last Indicated By Review Planned Expiration Resolved Resolved By    None active    Resolved    COVID-19 01/12/22 01/12/22 01/12/22 COVID-19, Rapid   01/26/22 Winsome Brown RN    COVID-19 (Rule Out) 01/12/22 01/12/22 01/12/22 COVID-19, Rapid (Ordered)   01/12/22 Rule-Out Test Resulted    COVID-19 (Rule Out) 04/26/21 04/26/21 04/26/21 COVID-19, Rapid (Ordered)   04/26/21 Rule-Out Test Resulted            Nurse Assessment:  Last Vital Signs: /75   Pulse 89   Temp 97.6 °F (36.4 °C) (Axillary)   Resp 18   Ht 5' 4\" (1.626 m)   Wt 218 lb 6 oz (99.1 kg)   SpO2 100%   BMI 37.48 kg/m²     Last documented pain score (0-10 scale): Pain Level: 0  Last Weight:   Wt Readings from Last 1 Encounters:   01/27/22 218 lb 6 oz (99.1 kg)     Mental Status:  {IP PT MENTAL STATUS:34400}    IV Access:  { DELTA IV ACCESS:235344081}    Nursing Mobility/ADLs:  Walking   {CHP DME UDFV:463163122}  Transfer  {CHP DME AEGB:817968248}  Bathing  {CHP DME DVTD:712531262}  Dressing  {CHP DME JCXD:603051825}  Toileting  {CHP DME QANY:612931568}  Feeding  {CHP DME TCFA:789644664}  Med Admin  {P DME NVLO:135901958}  Med Delivery   { DELTA MED Delivery:189444067}    Wound Care Documentation and Therapy:  Incision 10/31/18 Foot Right (Active)   Number of days: 3309        Elimination:  Continence: Bowel: {YES / TH:51173}  Bladder: {YES / C}  Urinary Catheter: {Urinary Catheter:394267375}   Colostomy/Ileostomy/Ileal Conduit: {YES / B}       Date of Last BM: ***    Intake/Output Summary (Last 24 hours) at 2022 0957  Last data filed at 2022 1354  Gross per 24 hour   Intake 2220 ml   Output 479 ml   Net 1741 ml     I/O last 3 completed shifts:   In: 2200 [P.O.:2200]  Out: 479 [Urine:478; Stool:1]    Safety Concerns:     [employer] Safety Concerns:213462490}    Impairments/Disabilities:      [employer] Impairments/Disabilities:184829719}    Nutrition Therapy:  Current Nutrition Therapy:   [employer] Diet List:293009587}    Routes of Feeding: {P DME Other Feedings:461394598}  Liquids: {Slp liquid thickness:47964}  Daily Fluid Restriction: {CHP DME Yes amt example:428242710}  Last Modified Barium Swallow with Video (Video Swallowing Test): {Done Not Done PIQ}    Treatments at the Time of Hospital Discharge:   Respiratory Treatments: ***  Oxygen Therapy:  {Therapy; copd oxygen:30426}  Ventilator:    {[facility] Vent LJZZ:899545874}    Rehab Therapies: {THERAPEUTIC INTERVENTION:1801887829}  Weight Bearing Status/Restrictions: [address] [employer] Weight Bearin}  Other Medical Equipment (for information only, NOT a DME order):  {EQUIPMENT:352895438}  Other Treatments: ***    Patient's personal belongings (please select all that are sent with patient):  {ZULEYMA DME Belongings:219700212}    RN SIGNATURE:  {Esignature:312991906}    CASE MANAGEMENT/SOCIAL WORK SECTION    Inpatient Status Date: 01/27/2022    Readmission Risk Assessment Score:  Readmission Risk              Risk of Unplanned Readmission:  31       Discharging to Facility/ Shadi Irby   [address] [phone]    / signature: Electronically signed by Clint Paul RN on 2/8/22 at 8:47 AM EST    PHYSICIAN SECTION    Prognosis: Good    Condition at Discharge: Stable    Rehab Potential (if transferring to Rehab): Good    Recommended Labs or Other Treatments After Discharge: Home health as ordered; follow up with pcp, follow up with Dr. Chloe Jane    Physician Certification: I certify the above information and transfer of Bin Flannery.  is necessary for the continuing treatment of the diagnosis listed and that he requires [address] for less 30 days.      Update Admission H&P: No change in H&P    PHYSICIAN SIGNATURE:  Electronically signed by Luisito Martinez MD on 2/7/22 at 9:58 AM EST

## 2022-02-07 NOTE — PROGRESS NOTES
Physical Therapy  Minute Variance: 35  Reason: Refusal    Attempted to see pt for additional session to make up missed minutes d/t agitation in earlier session. Pt resting in bed with sitter present and adamantly refuses participation. Attempted to engage pt in functional task, supine therapeutic exercise or ambulation with PCA assisting to attempt to engage pt in therapy. Pt educated on importance of participation in [address] [place] [address] mobility and gait and benefit of mobility as well as encouraging regarding upcoming d/c, pt is resistant to education. Pt continues to refuse participation at this time.      Monse Pearl PT, DPT

## 2022-02-07 NOTE — PROGRESS NOTES
Occupational Therapy  Facility/Department: [facility]  Daily Treatment Note  NAME: Shea Dejesus : 1960  MRN: [id number]    Date of Service: 2022    Discharge Recommendations:  24 hour supervision or assist,Home with Home health OT,S Level 1  OT Equipment Recommendations  Equipment Needed: Yes  Mobility Devices: Hedwig Blotter; ADL Assistive Devices  Walker: Rolling  ADL Assistive Devices: Shower Chair with back    Assessment   Performance deficits / Impairments: Decreased functional mobility ; Decreased safe awareness;Decreased balance;Decreased coordination;Decreased posture;Decreased vision/visual deficit; Decreased cognition;Decreased ADL status; Decreased endurance;Decreased high-level IADLs;Decreased strength;Decreased fine motor control  Assessment: Session very limited by patients cognition, restlessness, poor ability to follow sequencing/safety commands during transfers, ADLs, and mobility. Pt was able to complete shower task but needing mod-amx cues for sitting vs standing and pt refusing to listern to OT multiple times. Min-modA for mobility with RW and mod cues for wider step length and upright posture, poor carryover demo'd. Pt with limited abiltiy to follow BUE ex commands and mod cues for safety with turning to sit vs stand. Pt now has a sitter, RN called to room to give meds. Left in bed with sitter. Pt has not met goals due to consistent need for CGA-Fili for balance/management of RW, mod-max cues for safety, and consistent CGA for all standing trials. Cont to rec 24 hour SPV and HHOT S Level 3.  COnt to rec SC, BSC, RW. Pt WILL NEED direct 24 hour SPV at home. Prognosis: Good  OT Education: Plan of Care;OT Role;Transfer Training;Equipment;Orientation;Precautions; ADL Adaptive Strategies; Energy Conservation  Patient Education: role of OT, safety with tranfers and mobility, ther ex, use of a/e, proper use of AD  Barriers to Learning: Pt will require reinforcement.   REQUIRES OT FOLLOW UP: Yes  Activity Tolerance  Activity Tolerance: Treatment limited secondary to decreased cognition;Patient limited by fatigue;Treatment limited secondary to agitation  Activity Tolerance: Pt more unsteady and festinating gait as pt became fatigued., /64, HR 75, O2 98%. Vitals WFL throughout session. Safety Devices  Safety Devices in place: Yes  Type of devices: Gait belt;Call light within reach;Nurse notified; Left in bed;Patient at risk for falls; Bed alarm in place; All fall risk precautions in place  Restraints  Initially in place: No         Patient Diagnosis(es): There were no encounter diagnoses. has a past medical history of Arthritis, Asthma, CAD (coronary artery disease), COPD (chronic obstructive pulmonary disease) ([facility] [address] Emphysema of lung ([facility] [address] Fatty liver, GERD (gastroesophageal reflux disease), Hyperlipidemia, Hypertension, Medical history reviewed with no changes, MI, old, Sleep apnea, and Type II or unspecified type diabetes mellitus without mention of complication, not stated as uncontrolled. has a past surgical history that includes Coronary angioplasty with stent (5319,0023); Colonoscopy; Endoscopy, colon, diagnostic; cyst removal (1982); Upper gastrointestinal endoscopy (7/18/13); Upper gastrointestinal endoscopy (8/23/13); Carpal tunnel release (Bilateral, 2013); ERCP (9/15/2013); Upper gastrointestinal endoscopy (9/15/2013); ERCP (10/11/13); Cataract removal with implant (Left, 2/28/14); Diagnostic Cardiac Cath Lab Procedure; other surgical history; pacemaker placement; Cardiac pacemaker placement (2011); Foot surgery (Right, 07/09/2018); pr length/short leg/ankl tendon,single (Right, 10/31/2018); pr gastrocnemius recession (Right, 10/31/2018); Pain management procedure (Bilateral, 5/26/2020); Pain management procedure (Bilateral, 6/9/2020);  Nerve Surgery (Bilateral, 12/1/2020); and Pain management procedure (Left, 1/12/2021). Restrictions  Restrictions/Precautions  Restrictions/Precautions: General Precautions,Fall Risk  Position Activity Restriction  Other position/activity restrictions: up with assistance, [employer]  Subjective   General  Chart Reviewed: Yes,Orders,Progress Notes,Labs  Patient assessed for rehabilitation services?: Yes  Additional Pertinent Hx: COPD, Emphysema, CAD, pacemaker, DDD, HTN, DM  Response to previous treatment: Patient with no complaints from previous session  Family / Caregiver Present: No  Referring Practitioner: Rosalie Hernandez MD  Diagnosis: COVID encephalopathy  Subjective  Subjective: Pt now has a sitter. Intermittently agitated needing  General Comment  Comments: RN cleared      Orientation  Orientation  Overall Orientation Status: Within Functional Limits  Orientation Level: Oriented to place;Oriented to time;Oriented to situation;Oriented to person  Objective    ADL  Feeding: Independent  Grooming: Stand by assistance  UE Bathing: Supervision  LE Bathing: Contact guard assistance;Stand by assistance  UE Dressing: Supervision  LE Dressing: Stand by assistance;Contact guard assistance  Toileting: Contact guard assistance;Minimal assistance  Additional Comments: Pt very implusive, agitated, and needing MAX cues for sitting vs standing; poor balancew ith multiple posterior LOB in bathroom needing maxA for recovery        Balance  Sitting Balance: Supervision  Standing Balance: Contact guard assistance (RW)  Standing Balance  Time: 4x30 seconds, 3x20 seconds, 3x1-2 minutes, x3 minutes, x2 minutes  Activity: transfer to/from bathroom, grooming at sink, toileting in stance, shower/bathing  Comment: with RW, max VCs for safety with RW, mod/max VCs for larger step length  Functional Mobility  Functional - Mobility Device: Rolling Walker  Activity: To/from bathroom; Other  Assist Level: Minimal assistance  Functional Mobility Comments: close w/c follow needed  Toilet Transfers  Toilet - Technique: Ambulating  Equipment Used: Standard toilet  Toilet Transfer: Contact guard assistance;Minimal assistance  Shower Transfers  Shower - Transfer From: Kirit & Frankie - Transfer Type: To and From  Shower - Transfer To: Transfer tub bench  Shower - Technique: Ambulating (RW)  Shower Transfers: Minimal assistance  Wheelchair Bed Transfers  Wheelchair/Bed - Technique: Stand step; To left; To right (RW)  Equipment Used: Bed;Wheelchair  Level of Asssistance: Moderate assistance  Bed mobility  Bridging: Supervision  Rolling to Left: Supervision  Rolling to Right: Supervision  Supine to Sit: Supervision  Sit to Supine: Supervision  Scooting: Supervision  Transfers  Stand Step Transfers: Minimal assistance;Contact guard assistance  Sit to stand: Contact guard assistance;Minimal assistance  Stand to sit: Contact guard assistance;Minimal assistance  Transfer Comments: RW used, max vc's for safe t/fs and hand placement during t/fs. Coordination  Gross Motor: very poor balanace, safety/sequencing this date; agitated when redirected, poor balance wiht turning, standing and mobility, throwing RW multiple times needing cues for de-escalation. MaxA for recovery of posterior LOB standing in shower multiple times  Fine Motor: fair coordination with brushing teeth              Cognition  Overall Cognitive Status: Exceptions  Arousal/Alertness: Delayed responses to stimuli  Following Commands: Follows one step commands with repetition; Follows one step commands with increased time  Attention Span: Difficulty attending to directions; Attends with cues to redirect  Memory: Decreased short term memory;Decreased recall of recent events;Decreased recall of precautions  Safety Judgement: Decreased awareness of need for safety;Decreased awareness of need for assistance  Problem Solving: Decreased awareness of errors;Assistance required to identify errors made;Assistance required to generate solutions  Insights: Decreased awareness of deficits  Initiation: Requires cues for some  Sequencing: Requires cues for some  Cognition Comment: Pt requiring increased amount of vc's for safety this date, specifically with RW use and t/fs. Perception  Overall Perceptual Status: Impaired  Unilateral Attention: Appears intact  Initiation: Cues to initiate tasks  Motor Planning: Cues to use objects appropriately  Perseveration: Not present              Type of ROM/Therapeutic Exercise  Type of ROM/Therapeutic Exercise: Free weights  Comment: 4# free weight 1UE at a time, 2x10 reps with max cues for demo/encouragement with poor ability to follow commands                    Plan   Plan  Times per week: 5/7 days/week  Times per day: Daily  Plan weeks: 3 weeks  Current Treatment Recommendations: Strengthening,ROM,Balance Training,Functional Mobility Training,Endurance Training,Neuromuscular Re-education,Equipment Evaluation, Education, & procurement,Home Management Training,Self-Care / ADL,Patient/Caregiver Education & Training,Safety Education & Training    Goals  Short term goals  Time Frame for Short term goals: 1 week- 2/3/21  Short term goal 1: Pt will perform functional transfers with CGA.- GOAL MET 2/3/22 Pt performed functional transfers with CGA. Short term goal 2: Pt will complete toileting with min A. GOAL MET 2/3/22 Pt completed toileting in stance with SBA. Short term goal 3: Pt will perform LB dressing with CGA. GOAL MET 2/3/22 Pt demonstrated ability to don pants with SBA. Short term goal 4: Pt will complete grooming at sink with SPV. GOAL MET 2/3/22 Pt completed grooming at sink with supervision.   Long term goals  Time Frame for Long term goals : 3 weeks- 2/17/21  Long term goal 1: Pt will perform functional transfers with mod I.-NOT MET, Fili-CGA for all ADLs  Long term goal 2: Pt will complete toileting with mod I.-NOT MET, Fili-CGA for all ADLs  Long term goal 3: Pt will complete LB dressing with mod I.-NOT MET, Fili-CGA for all ADLs  Long term goal 4: Pt will perform LB dressing with mod I.-NOT MET, Fili-CGA for all ADLs  Long term goal 5: Pt will perform simple IADL task with mod I-NOT MET, Fili-CGA for all ADLs  Patient Goals   Patient goals : \"get home, see my dogs\"       Therapy Time   Individual Concurrent Group Co-treatment   Time In 0736         Time Out 0844         Minutes 68         Timed Code Treatment Minutes: [address], OTR/L

## 2022-02-07 NOTE — PROGRESS NOTES
Speech Language Pathology  [facility]: ACUTE REHAB UNIT  SPEECH-LANGUAGE PATHOLOGY      [x] Daily  [] Weekly Care Conference Note  [x] Discharge    Patient:Chano Castillo. :1960  [facility]:[id number]  Rehab Dx/Hx: Debility [R53.81]    Precautions: falls  Home situation: Lives alone. Active . Independent with meds and finances, manages all household tasks for self and brother. Takes care of brother who has paranoid schizophrenia  ST Dx: [] Aphasia  [] Dysarthria  [] Apraxia   [] Oropharyngeal dysphagia [x] Cognitive Impairment  [] Other:   Date of Admit: 2022  Room #: 0156/0156-01    Current functional status (updated daily):         Pt being seen for : [] Speech/Language Treatment  [] Dysphagia Treatment [x] Cognitive Treatment  [] Other:  Communication: [x]WFL  [] Aphasia  [] Dysarthria  [] Apraxia  [] Pragmatic Impairment [] Non-verbal  [] Hearing Loss  [] Other:   Cognition: [] WFL  [] Mild  [x] Moderate  [x] Severe [] Unable to Assess  [] Other:  Memory: [] WFL  [x] Mild  [x] Moderate  [x] Severe [] Unable to Assess  [] Other:  Behavior: [x] Alert  [x] Cooperative  [x]  Pleasant  [] Confused  [] Agitated  [] Uncooperative  [] Distractible [] Motivated  [] Self-Limiting [] Anxious  [] Other:  Endurance:  [x] Adequate for participation in SLP sessions  [] Reduced overall  [] Lethargic  [] Other:  Safety: [] No concerns at this time  [x] Reduced insight into deficits  [x]  Reduced safety awareness [] Not following call light procedures  [] Unable to Assess  [] Other:    Current Diet Order:ADULT DIET; Regular; 4 carb choices (60 gm/meal)  ADULT ORAL NUTRITION SUPPLEMENT; Dinner; Frozen Oral Supplement  Swallowing Precautions: Small bites/sips;Upright as possible for all oral intake;Remain upright for 30-45 minutes after meals; Alternate solids and liquids;Eat/Feed slowly        Date: 2022       [facility]  [facility] Tx session 2  [id number]  Eve Atkins SHANT Nicole CCC-SLP DISCHARGE SUMMARY   Total Timed Code Min 42 20 0   Total Treatment Minutes 42 20 0   Individual Treatment Minutes 42 20 0   Group Treatment Minutes 0 0    Co-Treat Minutes 0 0    Variance/Reason:  18 mins- impulsive, behavior, fatigue  N/a    Pain denies Denies     Pain Intervention [] RN notified  [] Repositioned  [] Intervention offered and patient declined  [x] N/A  [] Other: [] RN notified  [] Repositioned  [] Intervention offered and patient declined  [x] N/A  [] Other:       Subjective     Pt very agitated, constantly getting up out of bed and chair. Max cues to participate. Pt reported being very angry and antsy and ready to go. Pt was given ativan this morning per RN, pt eventually fell sound asleep in bedside chair, therefore ST session ended early. Pt continued to be agitated, and required max cues to participate. Refusing most cog activities. Pt started upright in chair, but kept getting up to walk around room, use restroom, get in bed. Sitter presented. Objective:  Goals  Short-term Goals  Timeframe for Short-term Goals: 18 days (0214/2022)    Goal 1: Pt will complete graded recall tasks using compensatory strategies with 80% acc given min cues    Did not target. 5 Novel Word Recall on SLUMS  -SLP repeated words to pt 2x - pt refusing to attempt to repeat all 5 or 1 by 1   -3 minute delay: pt refused to attempt     Paragraph Retention Task / Auditory Comprehension on SLUMS  -pt refused to complete    Goal not met - Pt continues to present with memory deficits, and has not demonstrated ability to use compensatory strategies. Pt overall with decreased participation in recent sessions becoming increasingly agitated.     Goal 2: Pt will complete executive function tasks (e.g. meds, time, money, etc) with 80% acc given min cues   Discussed household tasks:  -pt reported he is in charge of cleaning, mopping, sweeping, taking out the trash, and cooking for both his and his brothers apt  -pt did not initiate to provide answers when SLP asked how pt completed these tasks at home     Discussed and reviewed pt's current medication list:  -pt able to recall 2/5 meds that SLP was able to review with pt   -task ended abruptly as pt continuously getting out of bed, chair, impulsively walking around room, throwing things    Calculation on SLUMS  -inaccurate addition and subtraction  -pt received 0/3 points  -pt did not appear as if he was trying to accurately complete task     Clock Drawing on SLUMS   -pt placed numbers 1-9 and 12 on clock, although all on right side  -refused to finish / put hands  -pt received 0/4 points  Goal not met - Pt continues to demonstrate with executive function deficits. When pt would participate, he would require mod-max cues in order to improve acc. Pt will require assistance with meds and finances at d/c. Goal 3: Pt will complete problem solving and thought organization tasks with 80% acc given min cues   Problem solving targeted during safety situations/transfers while trying to help pt today:  -pt required max cues to initiate use of gait belt, walker, and to allow SLP and PCA to assist with transferring  -pt ripping gait belt off, not using walker at times, becoming increasingly frustrated   Divergent Naming on SLUMS  -pt required MAX encouragement in order to attempt   -pt named 2 animals; refusing to name any more animals     Backward Number Repetition on SLUMS  -pt refused, kept yelling \"no\" when SLP attempted     Functional Problem Solving  -pt continued to be impulsive during task, frequently getting up on own and walking around room without allowing gait belt / walker  -pt attempting to go in hallway - not understanding that he cannot walk around without a shirt/mask/socks on   Goal not met - Pt with significantly reduced safety awareness and insight into deficits. Pt with poor problem solving and will require 24 hr supervision at d/c.     Goal 4: Pt will complete verbal and visual reasoning tasks with 80% acc given min cues   Did not target. Goal not directly targeted Goal not met - Pt continues to present with difficulty completing reasoning tasks. Other areas targeted: Orientation:  -[facility]  -pt did not respond when asked the month, date, or year   SLP attempted to repeat SLUMS for assessment of cog abilities. Results as follows:  -orientation: 1/3  (pt named correct state, incorrect year and LUCINA  -calculation: 0/3  -divergent namin/3  -delayed recall: pt refused   -clock drawin  -backward number repetition: pt refused  -following commands / visuospatial: 2/  -auditory comp / paragraph retention: pt refused     Education:   edu provided re: importance of participating in therapy and rationale for tx tasks provided       Safety Devices: [x] Call light within reach  [] Chair alarm activated  [] Bed alarm activated  [x] Other: AVASYS, sitter at bedside    [x] Call light within reach  [] Chair alarm activated  [] Bed alarm activated  [x] Other: AVASYS, sitter at bedside     Assessment: Tx session 1: Pt agitated, stating he was anxious and angry and wanting to go home. Unable to thoroughly complete tx tasks today as pt abruptly getting out of bed, moving from bed to chair, walking around room, going to the restroom, attempting to leave room etc. Pt required max cues for redirection, to participate in therapy. Pt required max cues regarding important use of gait belt, walker, safety during transfers and to allow SLP and PCA to help him. Pt with significantly reduce insight into deficits and overall reduced FELICITA as well. Pt's RN reported that pt received ativan this morning, pt eventually falling sound asleep in bedside chair, therefore SLP ended tx session early to allow pt to rest. Will attempt to see pt again this afternoon.      Tx session 2: Pt continued to be agitated throughout afternoon session, required max encouragement and still refusing to complete some tx tasks. Pt continued to abruptly get out of chair to walk around room, use restroom, or get into bed. Pt with poor insight and safety awareness. SLP attempted to administer SLUMS for re-assessment of cog abilities. Pt scored 17/30 points on initial eval - overall received 3 points this date. Pt did refuse to complete the following sections: recall of 5 words, number backward repetition, and auditory comprehension / paragraph retention. Pt oriented to state only. Difficulty with clock drawing, calculation, and divergent naming - difficult to determine if pt was trying though. Pt did correctly follow the commands for identifying largest shape and placing an X in the triangle. Pt becoming more agitated throughout session, refusing to do more. Discharge Summary: Pt had been agreeable to work with, but has become more agitated in recent sessions. Max encouragement to participate, and pt becoming increasingly agitated. Pt's LRD is regular solids and thin liquids. Pt did not meet cognitive goals, and continued to present with cognitive deficits. Despite refusal to complete most tasks this date, pt did often require mod-max cues to increase acc when he was participating. Pt with reduced memory abilities. Pt also with poor insight and safety awareness, thus will require 24 hr supervision at d/c. Pt with deficits with executive function - will require assistance with meds and finances at d/c. Pt will also benefit from ongoing ST at d/c to continue to target cognitive deficits. Plan: Continue as per plan of care.        Additional Information:     Barriers toward progress: Limited family support, Cognitive deficit, Limited safety awareness and Limited insight into deficits  Discharge recommendations:  [] Home independently  [] Home with assistance [x]  24 hour supervision  [] ECF [] Other:   Continued Tx Upon Discharge: ? [x] Yes [] No [] TBD based on progress while on ARU [] Vital Stim indicated [] Other:   Estimated discharge date: 02/10/22    Interventions used this date:  [] Speech/Language Treatment  [] Instruction in HEP [] Group [] Dysphagia Treatment [x] Cognitive Treatment   [] Other: Total Time Breakdown / Charges    Time in Time out Total Time / units   Cognitive Tx 0900  1400 0942  1420 42 mins/ 3 units   20 min / 1 unit   Speech Tx -- -- --   Dysphagia Tx --- -- --       Electronically Signed by      Tx session 1:  Man Santiago. A CCC-SLP  Speech-Language Pathologist  [id number]    Tx session 2 and Discharge:   Darlene Cope [address] [place] [address]  Speech Language Pathologist

## 2022-02-08 VITALS
HEART RATE: 97 BPM | SYSTOLIC BLOOD PRESSURE: 131 MMHG | DIASTOLIC BLOOD PRESSURE: 87 MMHG | OXYGEN SATURATION: 95 % | BODY MASS INDEX: 37.28 KG/M2 | TEMPERATURE: 97.8 F | WEIGHT: 218.38 LBS | HEIGHT: 64 IN | RESPIRATION RATE: 18 BRPM

## 2022-02-08 LAB
GLUCOSE BLD-MCNC: 203 MG/DL (ref 70–99)
GLUCOSE BLD-MCNC: 92 MG/DL (ref 70–99)
PERFORMED ON: ABNORMAL
PERFORMED ON: NORMAL

## 2022-02-08 PROCEDURE — 94640 AIRWAY INHALATION TREATMENT: CPT

## 2022-02-08 PROCEDURE — 6370000000 HC RX 637 (ALT 250 FOR IP): Performed by: PHYSICAL MEDICINE & REHABILITATION

## 2022-02-08 PROCEDURE — 97530 THERAPEUTIC ACTIVITIES: CPT

## 2022-02-08 PROCEDURE — 97116 GAIT TRAINING THERAPY: CPT

## 2022-02-08 RX ADMIN — LISINOPRIL 5 MG: 5 TABLET ORAL at 09:03

## 2022-02-08 RX ADMIN — PANTOPRAZOLE SODIUM 40 MG: 40 TABLET, DELAYED RELEASE ORAL at 09:04

## 2022-02-08 RX ADMIN — DULOXETINE HYDROCHLORIDE 60 MG: 60 CAPSULE, DELAYED RELEASE ORAL at 09:09

## 2022-02-08 RX ADMIN — GABAPENTIN 200 MG: 100 CAPSULE ORAL at 09:04

## 2022-02-08 RX ADMIN — ATORVASTATIN CALCIUM 80 MG: 80 TABLET, FILM COATED ORAL at 08:59

## 2022-02-08 RX ADMIN — Medication 2 PUFF: at 08:04

## 2022-02-08 RX ADMIN — APIXABAN 5 MG: 5 TABLET, FILM COATED ORAL at 08:59

## 2022-02-08 RX ADMIN — QUETIAPINE FUMARATE 25 MG: 25 TABLET ORAL at 08:14

## 2022-02-08 RX ADMIN — CLOPIDOGREL BISULFATE 75 MG: 75 TABLET, FILM COATED ORAL at 09:07

## 2022-02-08 RX ADMIN — Medication 2 PUFF: at 08:05

## 2022-02-08 RX ADMIN — INSULIN LISPRO 6 UNITS: 100 INJECTION, SOLUTION INTRAVENOUS; SUBCUTANEOUS at 11:34

## 2022-02-08 RX ADMIN — POTASSIUM CHLORIDE 20 MEQ: 20 TABLET, EXTENDED RELEASE ORAL at 08:14

## 2022-02-08 RX ADMIN — METOPROLOL TARTRATE 50 MG: 50 TABLET, FILM COATED ORAL at 09:10

## 2022-02-08 ASSESSMENT — PAIN DESCRIPTION - PROGRESSION
CLINICAL_PROGRESSION: NOT CHANGED

## 2022-02-08 NOTE — PROGRESS NOTES
Physical Therapy  Facility/Department: [facility]  Daily Treatment Note  NAME: Shea Cunningham. : 1960  MRN: [id number]    Date of Service: 2022    Discharge Recommendations:  24 hour supervision or assist,Home with Home health PT   PT Equipment Recommendations  Equipment Needed: Yes  Mobility Devices: Rosalind : Rolling    Assessment   Body structures, Functions, Activity limitations: Decreased functional mobility ; Decreased cognition;Decreased posture;Decreased ADL status; Decreased endurance;Decreased coordination;Decreased strength;Decreased balance;Decreased safe awareness  Assessment: Pt seen for family training session with pt's sister \"Caryn\" present. Education provided on pts current FELICITA, donning/doffing gait belt, current deficits and fall risk. Demonstrated guarding technique for bed mobility, t/f, ambulation and car t/f and recommendations for pt to have close 24hrA for all mobility with use of RW for gait activities and to limit distances. Educated on pts balance and gait pattern with noted LOB. Pt's sister providing guarding for ambulation and t/f and demosntrates undrestanding of education. Provided with written HEP and pt's sister educated on exercises if pt is willing to complete them at home. Pt's sister reports no questions regarding mobility or concerns regarding d/c. Recommend pt with strict 24hr close S/A at d/c and HHPT. Treatment Diagnosis: impaired mobility  Specific instructions for Next Treatment: Progress mobility as tolerated  Prognosis: Fair  Decision Making: Medium Complexity  PT Education: Goals;Transfer Training;Equipment;Disease Specific Education;PT Role;Functional Mobility Training;Energy Conservation;Plan of Care;General Safety;Precautions; Family Education;Gait Training  Patient Education: Provided with Sade Petty, Pt's sister participates in family training as noted and demonstrates understanding of education  Barriers to Learning: cognition, agitation, motivation  REQUIRES PT FOLLOW UP: Yes  Activity Tolerance  Activity Tolerance: Patient limited by endurance; Patient limited by cognitive status;Treatment limited secondary to agitation  Activity Tolerance: Pt with slightly improved participation althout still intermittently agitated, improved performance with cues for sister but overall limited by cognition, movtivation and agitation. /87, HR97, SpO2 95% on RA     Patient Diagnosis(es): There were no encounter diagnoses. has a past medical history of Arthritis, Asthma, CAD (coronary artery disease), COPD (chronic obstructive pulmonary disease) ([address] Emphysema of lung ([address] Fatty liver, GERD (gastroesophageal reflux disease), Hyperlipidemia, Hypertension, Medical history reviewed with no changes, MI, old, Sleep apnea, and Type II or unspecified type diabetes mellitus without mention of complication, not stated as uncontrolled. has a past surgical history that includes Coronary angioplasty with stent (0513,6695); Colonoscopy; Endoscopy, colon, diagnostic; cyst removal (1982); Upper gastrointestinal endoscopy (7/18/13); Upper gastrointestinal endoscopy (8/23/13); Carpal tunnel release (Bilateral, 2013); ERCP (9/15/2013); Upper gastrointestinal endoscopy (9/15/2013); ERCP (10/11/13); Cataract removal with implant (Left, 2/28/14); Diagnostic Cardiac Cath Lab Procedure; other surgical history; pacemaker placement; Cardiac pacemaker placement (2011); Foot surgery (Right, 07/09/2018); pr length/short leg/ankl tendon,single (Right, 10/31/2018); pr gastrocnemius recession (Right, 10/31/2018); Pain management procedure (Bilateral, 5/26/2020); Pain management procedure (Bilateral, 6/9/2020); Nerve Surgery (Bilateral, 12/1/2020); and Pain management procedure (Left, 1/12/2021).     Restrictions  Restrictions/Precautions  Restrictions/Precautions: General Precautions,Fall Risk  Position Activity Restriction  Other position/activity restrictions: up with assistance, avi Subramanian  Subjective   General  Chart Reviewed: Yes  Response To Previous Treatment: Patient reporting fatigue but able to participate. Family / Caregiver Present: Yes (Sister \"Caryn\" present for family training)  Referring Practitioner: Bautista Herr MD  Subjective  Subjective: Pt seated EOB on arrival, agreeable to PT tx for family training with encouragement  Pain Screening  Patient Currently in Pain: Denies  Vital Signs  Patient Currently in Pain: Denies       Orientation  Orientation  Overall Orientation Status: Within Functional Limits    Objective   Bed mobility  Sit to Supine: Supervision  Comment: Completed with HOB flat with BR, Caryn educated on pt requiring up to Fili at trunk without BR     Transfers  Sit to Stand: Contact guard assistance  Stand to sit: Contact guard assistance  Bed to Chair: Contact guard assistance  Car Transfer: Contact guard assistance;Minimal Assistance (With RW, poor safety awareness, cues provided for technique, despite cues pt getting into car in unsafe manner but without overt LOB)  Comment: Pt completes multiple t/f with RW and without AD with grossly CGA, completes multiple times from EOB and w/c, poor safety awareness and impulsive during t/f. Pt's sister Lance Iyer educated on recommendation of close 24hrA d/t impulsivity and poor safety awareness, educated on use of gait belt as able or pants if needed for safety. Educated on safe guarding techniques for t/f, Lance Iyer demonstrates understanding of education and provide appropriate guarding technique with t/f. Ambulation  Ambulation?: Yes  Ambulation 1  Surface: level tile;carpet (turf)  Device: Rolling Walker  Other Apparatus: Wheelchair follow  Assistance: Contact guard assistance;Minimal assistance  Quality of Gait: Short shuffling gait, R path deviation, slow dory, B decreased toe clearance and heel strike, B decreased hip/knee flexion in swing, increased posterior lean, episodes of freezing.  Pt unsteady with CGA to Fili for balance and close w/c follow for safety. Gait Deviations: Slow Josselin;Decreased step length;Decreased step height;Shuffles  Distance: 13' + 80' + 35' (including 15' over turf)  Comments: Pt is unsteady with cues for straigth path with intermittent assist for RW management, cues for upright posture. Radha Taylor educated on safe guarding techniques with gait and providing guarding for part of longer bout and demonstrates good awareness. Educated on safety and pts decreased balance with recommendation of use of gait belt and for pt to only ambulate with RW and assistance.   Stairs/Curb  Stairs?: No (unsafe to attempt)        Balance  Posture: Fair  Sitting - Static: Fair  Sitting - Dynamic: Fair  Standing - Static: Fair  Standing - Dynamic: Fair  Comments: CGA standing with and without RW, unsafe to trial picking item up from floor                           Goals  Short term goals  Time Frame for Short term goals: 7 days 2/3  Short term goal 1: pt will complete bed mobility with supv.- GOAL MET, completes with supv  Short term goal 2: pt will complete functional transfer with CGA and LRAD.- GoAL MET, completes with CGA and RW  Short term goal 3: pt will ambulate 50 ft with LRAD and min A.- GOAL MET, completes with RW, CGA-min A  Long term goals  Time Frame for Long term goals : 21 days 2/17  Long term goal 1: pt will complete bed mobility with ind. -- 2/07: GOAL NOT MET S to Fili  Long term goal 2: pt will complete functional transfer with mod ind and LRAD. -- 2/07: GOAL NOT MET CGA with RW and without AD  Long term goal 3: pt will ambulate 100 ft with LRAD and supv. -- 2/07: GOAL NOT MET CGA to modA up to 130'  Long term goal 4: pt will complete car transfer with LRAD and mod ind. -- 2/07: GOAL NOT MET CGA with RW  Patient Goals   Patient goals : \"get stronger\"    Plan    Plan  Times per week: 5-7x/week  Times per day: Daily  Specific instructions for Next Treatment: Progress mobility as tolerated  Current Treatment Recommendations: [facility] Re-education,Patient/Caregiver Education & Training,Cognitive Reorientation,ROM,Wheelchair Mobility Training,Manual Therapy - Soft Tissue Mobilization,Equipment Evaluation, Education, & procurement,Balance Training,Gait Training,Home Exercise Program,Modalities,Functional Mobility Training,Stair training,Manual Therapy - Joint Manipulation,Safety Education & Training,Positioning  Safety Devices  Type of devices: Call light within reach,Telesitter in use,Gait belt,Nurse notified,All fall risk precautions in place,Patient at risk for falls,Sitter present,Left in bed     Therapy Time   Individual Concurrent Group Co-treatment   Time In 1150         Time Out 1225         Minutes 35         Timed Code Treatment Minutes: [facility], PT, DPT

## 2022-02-08 NOTE — PROGRESS NOTES
ACUTE REHAB UNIT: [address] [place] [address]. Rehab Dx/Hx: Monico Aragon   UGW:[id number]  UTF:[id number]  Date of Admit: 1/27/2022   Date of Discharge: 2/8/2022    Subjective:   Order for patient discharge. Reviewed discharge summary (AVS) with patient and sister including medications, physical instructions (safety) and diet. Paper copy given to patient and/or family. Pt in stable condition. Discharge disposition:  Pt was discharged via:  [x]  Wheelchair  []  Stretcher  []  Safe ambulation and use of assistive device  []  Other:     Pt was discharged to:  [x]  Private car  []  Transportation service to next destination  []  Other:     Discharge destination:  [x]  Home  []  [facility]  []  [address]  []  Other:     Drug / Medication Review:   Medications were reviewed by RN at time of discharge with patient and/or family:  [x]  Prescriptions filled and given to patient from outpatient pharmacy. []  Paper prescriptions given to patient to be filled at a pharmacy of their choice after d/c  []  Electronic prescriptions were sent to pharmacy by MD per pt preference  []  Other:      Discharge BIMS:  Number of word repeated after first attempt:  []  0. None []  1. One []  2. Two [x]  3. Three    Able to report correct year:  []  0. Missed by >5 years, or no answer  []  1. Missed by 2-5 years  []  2. Missed by 1 year  [x]  3. Correct    Able to report correct month:   []  0. Missed by >1 month, or no answer  []  1. Missed by 6 days to 1 month  [x]  2. Accurate within 5 days    Able to report correct day of the week:  []  0. Incorrect or no answer  [x]  1. Correct    Recall:   Able to recall \"sock\":  []  0. No could not recall  [x]  1. Yes, after cueing  []  2. Yes, no cue required  Able to recall \"blue\":  []  0. No could not recall  [x]  1. Yes, after cueing  []  2. Yes, no cue required  Able to recall \"bed\":  []  0.  No could not recall

## 2022-02-08 NOTE — PLAN OF CARE
Patient  Free from falls this shift. Avysis and bed alarms in  Place to notify staff of attempts of unassisted transfers. Fall precautions in place. Call light within reach.  Pt does not respond to direction

## 2022-02-08 NOTE — CARE COORDINATION
CASE MANAGEMENT DISCHARGE SUMMARY      Discharge to: Home with family and home care    Precertification completed: [facility] Exemption Notification (HENS) completed: for SNF    IMM given: 02/08/2022     New Durable Medical Equipment ordered/agency:     Transportation:    Family/car: Private:Sister     Confirmed discharge plan with:     Patient: yes     Family:  yes   Name: Serene Munroe Brother/Sister Contact number:[phone]     Facility/Agency, name:     [address] [place] [address] [address] [facility]   [phone]: DELTA/AVS faxed        RN, name: Yuliana Dumont RN    Note: Discharging nurse to complete DELTA, reconcile AVS, and place final copy with patient's discharge packet. RN to ensure that written prescriptions for  Level II medications are sent with patient to the facility as per protocol.

## 2022-02-15 ENCOUNTER — APPOINTMENT (OUTPATIENT)
Dept: GENERAL RADIOLOGY | Age: 62
End: 2022-02-15
Payer: MEDICARE

## 2022-02-15 ENCOUNTER — HOSPITAL ENCOUNTER (EMERGENCY)
Age: 62
Discharge: LEFT BEFORE DISCHARGE | End: 2022-02-15
Payer: MEDICARE

## 2022-02-15 VITALS
WEIGHT: 220 LBS | HEART RATE: 106 BPM | BODY MASS INDEX: 37.56 KG/M2 | HEIGHT: 64 IN | RESPIRATION RATE: 20 BRPM | DIASTOLIC BLOOD PRESSURE: 88 MMHG | SYSTOLIC BLOOD PRESSURE: 133 MMHG | TEMPERATURE: 98.4 F | OXYGEN SATURATION: 98 %

## 2022-02-15 DIAGNOSIS — R29.898 WEAKNESS OF BOTH LOWER EXTREMITIES: Primary | ICD-10-CM

## 2022-02-15 DIAGNOSIS — Z53.29 LEFT AGAINST MEDICAL ADVICE: ICD-10-CM

## 2022-02-15 LAB
A/G RATIO: 1.6 (ref 1.1–2.2)
ALBUMIN SERPL-MCNC: 4.2 G/DL (ref 3.4–5)
ALP BLD-CCNC: 64 U/L (ref 40–129)
ALT SERPL-CCNC: 24 U/L (ref 10–40)
ANION GAP SERPL CALCULATED.3IONS-SCNC: 15 MMOL/L (ref 3–16)
AST SERPL-CCNC: 16 U/L (ref 15–37)
BASOPHILS ABSOLUTE: 0 K/UL (ref 0–0.2)
BASOPHILS RELATIVE PERCENT: 0.8 %
BILIRUB SERPL-MCNC: 0.5 MG/DL (ref 0–1)
BUN BLDV-MCNC: 9 MG/DL (ref 7–20)
CALCIUM SERPL-MCNC: 9.5 MG/DL (ref 8.3–10.6)
CHLORIDE BLD-SCNC: 98 MMOL/L (ref 99–110)
CO2: 22 MMOL/L (ref 21–32)
CREAT SERPL-MCNC: 0.6 MG/DL (ref 0.9–1.3)
EOSINOPHILS ABSOLUTE: 0.1 K/UL (ref 0–0.6)
EOSINOPHILS RELATIVE PERCENT: 1.9 %
GFR AFRICAN AMERICAN: >60
GFR NON-AFRICAN AMERICAN: >60
GLUCOSE BLD-MCNC: 227 MG/DL (ref 70–99)
HCT VFR BLD CALC: 39.5 % (ref 40.5–52.5)
HEMOGLOBIN: 13.3 G/DL (ref 13.5–17.5)
LYMPHOCYTES ABSOLUTE: 1.2 K/UL (ref 1–5.1)
LYMPHOCYTES RELATIVE PERCENT: 34.1 %
MCH RBC QN AUTO: 29 PG (ref 26–34)
MCHC RBC AUTO-ENTMCNC: 33.7 G/DL (ref 31–36)
MCV RBC AUTO: 86.1 FL (ref 80–100)
MONOCYTES ABSOLUTE: 0.3 K/UL (ref 0–1.3)
MONOCYTES RELATIVE PERCENT: 8.8 %
NEUTROPHILS ABSOLUTE: 1.9 K/UL (ref 1.7–7.7)
NEUTROPHILS RELATIVE PERCENT: 54.4 %
PDW BLD-RTO: 17.4 % (ref 12.4–15.4)
PLATELET # BLD: 94 K/UL (ref 135–450)
PMV BLD AUTO: 7 FL (ref 5–10.5)
POTASSIUM REFLEX MAGNESIUM: 3.9 MMOL/L (ref 3.5–5.1)
RBC # BLD: 4.59 M/UL (ref 4.2–5.9)
SODIUM BLD-SCNC: 135 MMOL/L (ref 136–145)
TOTAL PROTEIN: 6.8 G/DL (ref 6.4–8.2)
TROPONIN: <0.01 NG/ML
WBC # BLD: 3.4 K/UL (ref 4–11)

## 2022-02-15 PROCEDURE — 36415 COLL VENOUS BLD VENIPUNCTURE: CPT

## 2022-02-15 PROCEDURE — 80053 COMPREHEN METABOLIC PANEL: CPT

## 2022-02-15 PROCEDURE — 85025 COMPLETE CBC W/AUTO DIFF WBC: CPT

## 2022-02-15 PROCEDURE — 99284 EMERGENCY DEPT VISIT MOD MDM: CPT

## 2022-02-15 PROCEDURE — 84484 ASSAY OF TROPONIN QUANT: CPT

## 2022-02-15 RX ORDER — 0.9 % SODIUM CHLORIDE 0.9 %
1000 INTRAVENOUS SOLUTION INTRAVENOUS ONCE
Status: DISCONTINUED | OUTPATIENT
Start: 2022-02-15 | End: 2022-02-15 | Stop reason: HOSPADM

## 2022-02-15 ASSESSMENT — ENCOUNTER SYMPTOMS
SHORTNESS OF BREATH: 0
GASTROINTESTINAL NEGATIVE: 1
RESPIRATORY NEGATIVE: 1

## 2022-02-15 NOTE — DISCHARGE SUMMARY
Physical Medicine & Rehabilitation  Discharge Summary     Patient Identification:  Anisa Whiteside.   : 1960  Admit date: 2022  Discharge date: 2022  Attending provider: No att. providers found        Primary care provider: Remi Lyons PA-C     Discharge Diagnoses:   Patient Active Problem List   Diagnosis    Ventricular tachycardia ([address]    Presence of stent in left circumflex coronary artery    Myocardial infarction, nontransmural (HCC)    Myocardial infarction, inferoposterior wall, subsequent care    Automatic implantable cardioverter-defibrillator in situ    CAD (coronary artery disease)    Automatic implantable cardioverter-defibrillator in situ    ALIN on CPAP    Gastroesophageal reflux disease without esophagitis    Hyperlipidemia with target LDL less than 100    Hypertension due to endocrine disorder    Ischemic cardiomyopathy    Obesity, morbid, BMI 40.0-49.9 ([address]    Weakness    Acute encephalopathy    TIA involving left internal carotid artery    DM (diabetes mellitus), secondary, uncontrolled, w/neurologic complic ([address]    Dyslipidemia    HTN (hypertension), benign    Lactic acidosis    Head trauma    Abrasion, scalp w/o infection    Unsteady gait    Hyperglycemia    Chronic obstructive pulmonary disease (HCC)    Coarse tremor    Generalized weakness    Type 2 diabetes mellitus with hyperglycemia, with long-term current use of insulin (HCC)    Essential hypertension    CAD S/P percutaneous coronary angioplasty    COVID-19    Unable to ambulate    Implantable cardioverter-defibrillator (ICD) discharge    AICD malfunction    Hypotension    Valproic acid toxicity    PAF (paroxysmal atrial fibrillation) ([address]    COVID-19 virus infection    Disorder of electrolytes    Atrial fibrillation with RVR (HCC)    Low grade fever    Thrombocytopenia (HCC)    Abnormal chest x-ray    Encephalopathy    Debility       Discharge Functional Status: Physical therapy:  Bed Mobility: Scooting: Supervision  Transfers: Sit to Stand: Contact guard assistance  Stand to sit: Contact guard assistance  Bed to Chair: Contact guard assistance, Ambulation 1  Surface: level tile,carpet (turf)  Device: Rolling Walker  Other Apparatus: Wheelchair follow  Assistance: Contact guard assistance,Minimal assistance  Quality of Gait: Short shuffling gait, R path deviation, slow josselin, B decreased toe clearance and heel strike, B decreased hip/knee flexion in swing, increased posterior lean, episodes of freezing. Pt unsteady with CGA to Fili for balance and close w/c follow for safety. Gait Deviations: Slow Josselin,Decreased step length,Decreased step height,Shuffles  Distance: 13' + 80' + 35' (including 15' over turf)  Comments: Pt is unsteady with cues for straigth path with intermittent assist for RW management, cues for upright posture. Ksenia Farris educated on safe guarding techniques with gait and providing guarding for part of longer bout and demonstrates good awareness. Educated on safety and pts decreased balance with recommendation of use of gait belt and for pt to only ambulate with RW and assistance.,    Mobility:  , PT Equipment Recommendations  Equipment Needed: Yes  Mobility Devices: Greg [place]: Rolling, Assessment: Pt seen for family training session with pt's sister \"Caryn\" present. Education provided on pts current FELICITA, donning/doffing gait belt, current deficits and fall risk. Demonstrated guarding technique for bed mobility, t/f, ambulation and car t/f and recommendations for pt to have close 24hrA for all mobility with use of RW for gait activities and to limit distances. Educated on pts balance and gait pattern with noted LOB. Pt's sister providing guarding for ambulation and t/f and demosntrates undrestanding of education. Provided with written HEP and pt's sister educated on exercises if pt is willing to complete them at home.  Pt's sister reports no questions regarding mobility or concerns regarding d/c. Recommend pt with strict 24hr close S/A at d/c and HHPT. Occupational therapy:  ,  , Assessment: Session very limited by patients cognition, restlessness, poor ability to follow sequencing/safety commands during transfers, ADLs, and mobility. Pt was able to complete shower task but needing mod-amx cues for sitting vs standing and pt refusing to listern to OT multiple times. Min-modA for mobility with RW and mod cues for wider step length and upright posture, poor carryover demo'd. Pt with limited abiltiy to follow BUE ex commands and mod cues for safety with turning to sit vs stand. Pt now has a sitter, RN called to room to give meds. Left in bed with sitter. Pt has not met goals due to consistent need for CGA-Fili for balance/management of RW, mod-max cues for safety, and consistent CGA for all standing trials. Cont to rec 24 hour SPV and HHOT S Level 3.  COnt to rec SC, BSC, RW. Pt WILL NEED direct 24 hour SPV at home. Speech therapy:       Inpatient Rehabilitation Course:   Jim Glynn is a 63 y.o. male admitted to inpatient rehabilitation on 1/27/2022 s/p Debility. The patient participated in an aggressive multidisciplinary inpatient rehabilitation program involving 3 hours of therapy per day, at least 5 days per week.      Acute encephalopathy secondary to covid  - continue scheduled seroquel, wean as able  - Consolidate sleep  - SLP     Afib/RVR  - continue beta blocker  - ICD adjusted per cardiology after inappropriate firing  - continue eliquis     Covid positive  - completed isolation  - completed decadron       DM  - lantus, SSI     Thrombocytopenia  - follow    CAD with h/o stent x 3, h/o MI, h/o VT  - asa, statin, plavix     CHF  - euvolemic  - continue medical management  - resume lasix twice weeklly if needed  - uptitrate acei if needed     Asthma  - inhalers as ordered     Hyperlipidemia  - statin     GERD  - PPI     Lumbar spondylosis  - f/u Dr. Giovanny Munroe     Morbid obesity  - dietary consulted     Depression  - cymbalta    Discharge Exam:  Constitutional: Pleasant, no distress  Head: Normocephalic  Eyes: Conjunctiva noninjected  Pulm: CTA bilat  CV: No murmurs noted  Abd: Soft, nontender  Ext: No edema  Neuro: Alert, fully oriented, appropriate   MSK: No joint abnormalities noted     Significant Diagnostics:   Lab Results   Component Value Date    CREATININE 0.6 (L) 02/07/2022    BUN 14 02/07/2022     (L) 02/07/2022    K 4.0 02/07/2022    CL 98 (L) 02/07/2022    CO2 24 02/07/2022       Lab Results   Component Value Date    WBC 4.9 02/07/2022    HGB 13.3 (L) 02/07/2022    HCT 40.4 (L) 02/07/2022    MCV 86.4 02/07/2022     (L) 02/07/2022       Disposition:  Home in stable condition    Follow-up:  See after visit summary from hospitalization    Discharge Medications:     Medication List      START taking these medications    clopidogrel 75 MG tablet  Commonly known as: PLAVIX  Take 1 tablet by mouth daily     tamsulosin 0.4 MG capsule  Commonly known as: FLOMAX  Take 1 capsule by mouth at bedtime        CHANGE how you take these medications    fluticasone 50 MCG/ACT nasal spray  Commonly known as: FLONASE  USE 1 SPRAY IN EACH NOSTRIL EVERY DAY  What changed: See the new instructions. lisinopril 5 MG tablet  Commonly known as: PRINIVIL;ZESTRIL  Take 1 tablet by mouth daily  What changed: medication strength     polyethylene glycol 17 GM/SCOOP Powd powder  Commonly known as: MIRALAX  Take 238 g by mouth daily Take as directed for colonoscopy  What changed: Another medication with the same name was removed. Continue taking this medication, and follow the directions you see here. ProAir  (90 Base) MCG/ACT inhaler  Generic drug: albuterol sulfate HFA  What changed: Another medication with the same name was removed. Continue taking this medication, and follow the directions you see here.      * QUEtiapine 100 MG tablet  Commonly known as: SEROQUEL  Take 1 tablet by mouth nightly  What changed:   · medication strength  · how much to take  · when to take this     * QUEtiapine 50 MG tablet  Commonly known as: SEROQUEL  Take 1 tablet by mouth 2 times daily as needed for Agitation  What changed:   · medication strength  · how much to take  · when to take this  · reasons to take this         * This list has 2 medication(s) that are the same as other medications prescribed for you. Read the directions carefully, and ask your doctor or other care provider to review them with you.             CONTINUE taking these medications    ALLEGRA PO     apixaban 5 MG Tabs tablet  Commonly known as: ELIQUIS  Take 1 tablet by mouth 2 times daily     atorvastatin 80 MG tablet  Commonly known as: LIPITOR  TAKE ONE TABLET BY MOUTH DAILY     BREO ELLIPTA IN     Cymbalta 60 MG extended release capsule  Generic drug: DULoxetine     divalproex 500 MG DR tablet  Commonly known as: DEPAKOTE     gabapentin 100 MG capsule  Commonly known as: NEURONTIN  TAKE 2 CAPSULES BY MOUTH 3 TIMES DAILY     hydrOXYzine 25 MG tablet  Commonly known as: ATARAX     insulin lispro 100 UNIT/ML injection vial  Commonly known as: HUMALOG     Jardiance 25 MG tablet  Generic drug: empagliflozin     Levemir 100 UNIT/ML injection vial  Generic drug: insulin detemir     metFORMIN 1000 MG tablet  Commonly known as: GLUCOPHAGE  TAKE ONE TABLET BY MOUTH 2 TIMES DAILY WITH MORNING AND EVENING MEALS     metoprolol succinate 50 MG extended release tablet  Commonly known as: TOPROL XL  Take 1 tablet by mouth daily     NEXIUM PO     Omega-3 1000 MG Caps     True Metrix Blood Glucose Test strip  Generic drug: blood glucose test strips  CHECK SUGARS TWICE A DAY AS DIRECTED, DX: E11.9     TRUEplus Lancets 30G Misc  CHECK BLOOD SUGAR TWICE A DAY DX D29.0     Trulicity 3.13 PI/9.3CQ Sopn  Generic drug: Dulaglutide     Unifine Pentips 31G X 8 MM Misc  Generic drug: Insulin Pen Needle  USE AS DIRECTED FOR INSULIN .00        STOP taking these medications    aspirin 81 MG EC tablet     bisacodyl 5 MG EC tablet  Generic drug: bisacodyl     cyclobenzaprine 5 MG tablet  Commonly known as: FLEXERIL     dexamethasone 6 MG tablet  Commonly known as: DECADRON     dilTIAZem 180 MG extended release capsule  Commonly known as: CARDIZEM CD     omega-3 acid ethyl esters 1 g capsule  Commonly known as: LOVAZA     traZODone 100 MG tablet  Commonly known as: DESYREL           Where to Get Your Medications      These medications were sent to [facility] [address], [facility] [address] [phone] - F [phone]  [address], [address]    Phone: [phone]   · clopidogrel 75 MG tablet  · lisinopril 5 MG tablet  · QUEtiapine 100 MG tablet  · QUEtiapine 50 MG tablet  · tamsulosin 0.4 MG capsule         I spent over 35 minutes on this discharge encounter between counseling, coordination of care, and medication reconciliation.   To comply with [facility] anny R.II.4.1: Discharge order placed in advance to facilitate discharge planning with rehab team.     Jermaine Villarreal MD

## 2022-02-15 NOTE — ED NOTES
Pt removed IV and all monitor attachments. Pt states that he feels normal and wants to leave. Provider spoke with pt and pt decided to leave AMA. AMA form signed and pt left prior to DC paperwork being provided or DC vitals taken.      Kolby Bearden RN  02/15/22 [id number]

## 2022-02-15 NOTE — ED PROVIDER NOTES
[address] ED  EMERGENCY DEPARTMENT ENCOUNTER        Pt Name: Paulo Wong MRN: [id number]  Birthdate 1960  Date of evaluation: 2/15/2022  Provider: Virgil Eckert PA-C  PCP: Fatuma Early PA-C  Note Started: 2:23 PM EST       EBONI. I have evaluated this patient. My supervising physician was available for consultation. CHIEF COMPLAINT       Chief Complaint   Patient presents with    Extremity Weakness     Pt arrived via EMS c/o bilateral lower extremity weakness. Pt reports feeling completely normal now. Pt states that he was was the [employer] store shopping and felt as though both of his legs were going to give way. Pt denies falling, dizziness, or any other symptoms. [id number] pta. HISTORY OF PRESENT ILLNESS   (Location, Timing/Onset, Context/Setting, Quality, Duration, Modifying Factors, Severity, Associated Signs and Symptoms)  Note limiting factors. Chief Complaint: lower extremity weakness     Paulo Wong is a 63 y.o. male with a past medical history of CAD, asthma, COPD, fatty liver, GERD hypertension hyperlipidemia sleep apnea type 2 diabetes brought in today by EMS while he was out running errands he was at the dollar store and he felt as though both of his legs were going to give out. He states that he felt weak. He denies falling over or passing out. He denies chest pain or shortness of breath. Denies numbness or tingling or bowel or bladder incontinence. He states he was recently admitted at [facility] for COVID-23. He denies any nausea vomiting diarrhea. Denies any fevers or chills. He states that this has happened in the past.  No aggravating symptoms. No alleviating symptoms. Onset occurred just prior to arrival to the ED. Duration symptoms have been intermittent. He currently denies any symptoms states he no longer feels weak in his extremities. Nothing seems to make symptoms better or worse.   Otherwise denies any other complaints. Nursing Notes were all reviewed and agreed with or any disagreements were addressed in the HPI. REVIEW OF SYSTEMS    (2-9 systems for level 4, 10 or more for level 5)     Review of Systems   Constitutional: Negative. Respiratory: Negative. Negative for shortness of breath. Cardiovascular: Negative. Negative for chest pain. Gastrointestinal: Negative. Genitourinary: Negative. Musculoskeletal: Negative. Skin: Negative. Neurological: Positive for weakness. Negative for dizziness, light-headedness and numbness. Positives and Pertinent negatives as per HPI. Except as noted above in the ROS, all other systems were reviewed and negative. PAST MEDICAL HISTORY     Past Medical History:   Diagnosis Date    Arthritis     Asthma     CAD (coronary artery disease)     COPD [facility]chronic obstructive pulmonary disease) ([facility])     Dr[facility] Milena [facility]olomew at [facility] told the patient that he did not have COPD, just asthma    Emphysema of lung ([facility] [address]     Fatty liver     GERD (gastroesophageal reflux disease)     Hyperlipidemia     Hypertension     Medical history reviewed with no changes     MI, old     Sleep apnea     pt wears cpap at night. states does not have cpap    Type II or unspecified type diabetes mellitus without mention of complication, not stated as uncontrolled          SURGICAL HISTORY     Past Surgical History:   Procedure Laterality Date    CARDIAC PACEMAKER PLACEMENT  2011    NOT MRI COMPATIABLE OLD LEADS st [facility] Jennifer.  pace maker difib   [address] Bilateral 2013    CATARACT REMOVAL WITH IMPLANT Left 2/28/14    COLONOSCOPY      CORONARY ANGIOPLASTY WITH STENT PLACEMENT  2001,2008    CYST REMOVAL  1982    coccyx area    DIAGNOSTIC CARDIAC CATH LAB PROCEDURE      ENDOSCOPY, COLON, DIAGNOSTIC      ERCP  9/15/2013    ERCP  10/11/13    WITH STENT REMOVAL    FOOT SURGERY Right 07/09/2018     REPAIR CALCANEAL SPUR, REPAIR OF ACHILLES TENDON RIGHT FOOT  NERVE SURGERY Bilateral 2020    BILATERAL LUMBAR THREE LUMBAR FOUR LUMBAR FIVE DORSAL RAMUS  RADIOFREQUENCY ABLATION SITE CONFIRMED BY FLUOROSCOPY performed by Lotus Barrow MD at [address]      back stimulator in lower back    PACEMAKER PLACEMENT      ICD    PAIN MANAGEMENT PROCEDURE Bilateral 2020    BILATERAL LUMBAR THREE, LUMBAR FOUR, LUMBAR FIVE DORSAL RAMUS MEDIAL BRANCH BLOCK SITE CONFIRMED BY FLUOROSCOPY performed by Lotus Barrow MD at [address] Bilateral 2020    BILATERAL LUMBAR THREE, LUMBAR FOUR, LUMBAR FIVE DORSAL RAMUS MEDIAL BRANCH BLOCK SITE CONFIRMED BY FLUOROSCOPY performed by Lotus Barrow MD at [address] [facility] Left 2021    LEFT SACROILIAC JOINT INJECTION SITE CONFIRMED BY FLUOROSCOPY performed by Lotus Barrow MD at [address] [facility] Right 10/31/2018    GASTROCNEMIUS RECESSION RIGHT FOOT performed by Kristin Kussmaul, DPM at [address] [facility] Right 10/31/2018    REMOVAL OF CALCANEAL SPUR, ACHILLES TENDON REPAIR RIGHT LOWER EXTREMITY performed by Kristin Kussmaul, DPM at [address] [facility]  13    and colonoscopy with biopsies taken    UPPER GASTROINTESTINAL ENDOSCOPY  13    EUS    UPPER GASTROINTESTINAL ENDOSCOPY  9/15/2013         CURRENTMEDICATIONS       Current Discharge Medication List      CONTINUE these medications which have NOT CHANGED    Details   lisinopril (PRINIVIL;ZESTRIL) 5 MG tablet Take 1 tablet by mouth daily  Qty: 30 tablet, Refills: 3      !! QUEtiapine (SEROQUEL) 100 MG tablet Take 1 tablet by mouth nightly  Qty: 60 tablet, Refills: 3      !!  QUEtiapine (SEROQUEL) 50 MG tablet Take 1 tablet by mouth 2 times daily as needed for Agitation  Qty: 60 tablet, Refills: 3      tamsulosin (FLOMAX) 0.4 MG capsule Take 1 capsule by mouth at bedtime  Qty: 30 capsule, Refills: 3      clopidogrel (PLAVIX) 75 MG tablet Take 1 tablet by mouth daily  Qty: 30 tablet, Refills: 3      apixaban (ELIQUIS) 5 MG TABS tablet Take 1 tablet by mouth 2 times daily  Qty: 60 tablet, Refills: 1      metoprolol succinate (TOPROL XL) 50 MG extended release tablet Take 1 tablet by mouth daily  Qty: 30 tablet, Refills: 1      hydrOXYzine (ATARAX) 25 MG tablet Take 25 mg by mouth 3 times daily as needed for Itching or Anxiety      polyethylene glycol (MIRALAX) 17 GM/SCOOP POWD powder Take 238 g by mouth daily Take as directed for colonoscopy  Qty: 255 g, Refills: 0      Dulaglutide (TRULICITY) 3.96 PL/2.8IC SOPN Inject 3 mg into the skin once a week       insulin lispro (HUMALOG) 100 UNIT/ML injection vial Inject 10 Units into the skin 2 times daily Indications: 10 units in AM, 10 units at dinner      Omega-3 1000 MG CAPS Take 2 capsules by mouth 4 times daily      PROAIR  (90 Base) MCG/ACT inhaler Inhale 2 puffs into the lungs every 4 hours as needed      Fexofenadine HCl (ALLEGRA PO) Take by mouth      Esomeprazole Magnesium (NEXIUM PO) Take 40 mg by mouth       JARDIANCE 25 MG tablet Take 25 mg by mouth daily       insulin detemir (LEVEMIR) 100 UNIT/ML injection vial Inject 60 Units into the skin nightly       Fluticasone Furoate-Vilanterol (BREO ELLIPTA IN) Inhale into the lungs daily       metFORMIN (GLUCOPHAGE) 1000 MG tablet TAKE ONE TABLET BY MOUTH 2 TIMES DAILY WITH MORNING AND EVENING MEALS  Qty: 60 tablet, Refills: 0      gabapentin (NEURONTIN) 100 MG capsule TAKE 2 CAPSULES BY MOUTH 3 TIMES DAILY  Qty: 180 capsule, Refills: 0      fluticasone (FLONASE) 50 MCG/ACT nasal spray USE 1 SPRAY IN EACH NOSTRIL EVERY DAY  Qty: 16 g, Refills: 0      TRUE METRIX BLOOD GLUCOSE TEST strip CHECK SUGARS TWICE A DAY AS DIRECTED, DX: E11.9  Qty: 100 each, Refills: 5    Comments: He uses Leader True Metrix Brand Meter and strips      atorvastatin (LIPITOR) 80 MG tablet TAKE ONE TABLET BY MOUTH DAILY  Qty: 30 tablet, Refills: 11      UNIFINE PENTIPS 31G X 8 MM MISC USE AS DIRECTED FOR INSULIN .00  Qty: 100 each, Refills: 5      TRUEPLUS LANCETS 30G MISC CHECK BLOOD SUGAR TWICE A DAY DX E11.9  Qty: 100 each, Refills: 11      DULoxetine (CYMBALTA) 60 MG extended release capsule Take 60 mg by mouth daily      divalproex (DEPAKOTE) 500 MG DR tablet Take 1,000 mg by mouth nightly       !! - Potential duplicate medications found. Please discuss with provider. ALLERGIES     Other and Pcn [penicillins]    FAMILYHISTORY       Family History   Problem Relation Age of Onset    Heart Disease Mother     Heart Failure Mother     Cancer Father     Diabetes Maternal Grandmother     Heart Failure Maternal Uncle     Hypertension Maternal Uncle     Asthma Neg Hx     Emphysema Neg Hx           SOCIAL HISTORY       Social History     Tobacco Use    Smoking status: Former Smoker     Packs/day: 2.00     Years: 20.00     Pack years: 40.00     Types: Cigarettes     Quit date: 2001     Years since quittin.7    Smokeless tobacco: Never Used    Tobacco comment: PASSIVE SMOKER   Vaping Use    Vaping Use: Never used   Substance Use Topics    Alcohol use: No     Alcohol/week: 0.0 standard drinks    Drug use: No       SCREENINGS             PHYSICAL EXAM    (up to 7 for level 4, 8 or more for level 5)     ED Triage Vitals [02/15/22 1400]   BP Temp Temp Source Pulse Resp SpO2 Height Weight   133/88 98.4 °F (36.9 °C) Oral 106 20 98 % 5' 4\" (1.626 m) 220 lb (99.8 kg)       Physical Exam  Vitals and nursing note reviewed. Constitutional:       Appearance: He is well-developed. He is not diaphoretic. HENT:      Head: Normocephalic and atraumatic. Nose: Nose normal.   Eyes:      General: No visual field deficit. Right eye: No discharge. Left eye: No discharge. Cardiovascular:      Rate and Rhythm: Normal rate and regular rhythm. Heart sounds: Normal heart sounds.  No murmur heard. No gallop. Pulmonary:      Effort: Pulmonary effort is normal. No respiratory distress. Breath sounds: Normal breath sounds. No wheezing or rales. Chest:      Chest wall: No tenderness. Musculoskeletal:         General: No deformity. Normal range of motion. Cervical back: Normal range of motion and neck supple. Skin:     General: Skin is warm and dry. Neurological:      General: No focal deficit present. Mental Status: He is alert and oriented to person, place, and time. GCS: GCS eye subscore is 4. GCS verbal subscore is 5. GCS motor subscore is 6. Cranial Nerves: Cranial nerves are intact. No cranial nerve deficit, dysarthria or facial asymmetry. Sensory: Sensation is intact. Motor: Motor function is intact. No weakness, tremor, atrophy, abnormal muscle tone or pronator drift. Coordination: Coordination is intact. Romberg sign negative. Coordination normal. Finger-Nose-Finger Test and Heel to [facility] Test normal.      Gait: Gait is intact. Psychiatric:         Behavior: Behavior normal.         DIAGNOSTIC RESULTS   LABS:    Labs Reviewed   CBC WITH AUTO DIFFERENTIAL - Abnormal; Notable for the following components:       Result Value    WBC 3.4 (*)     Hemoglobin 13.3 (*)     Hematocrit 39.5 (*)     RDW 17.4 (*)     Platelets 94 (*)     All other components within normal limits    Narrative:     Performed at:  [place] [address],  [place], [facility]   Phone [phone] & INFLUENZA COMBO   COMPREHENSIVE METABOLIC PANEL W/ REFLEX TO MG FOR LOW K   TROPONIN   URINE RT REFLEX TO CULTURE       When ordered only abnormal lab results are displayed. All other labs were within normal range or not returned as of this dictation. EKG:  When ordered, EKG's are interpreted by the Emergency Department Physician in the absence of a cardiologist.  Please see their note for interpretation of EKG.    RADIOLOGY:   Non-plain film images such as CT, Ultrasound and MRI are read by the radiologist. Plain radiographic images are visualized and preliminarily interpreted by the ED Provider with the below findings:        Interpretation per the Radiologist below, if available at the time of this note:    No orders to display     No results found. PROCEDURES   Unless otherwise noted below, none     Procedures    CRITICAL CARE TIME       CONSULTS:  None      EMERGENCY DEPARTMENT COURSE and DIFFERENTIAL DIAGNOSIS/MDM:   Vitals:    Vitals:    02/15/22 1400   BP: 133/88   Pulse: 106   Resp: 20   Temp: 98.4 °F (36.9 °C)   TempSrc: Oral   SpO2: 98%   Weight: 220 lb (99.8 kg)   Height: 5' 4\" (1.626 m)       Patient was given the following medications:  Medications   0.9 % sodium chloride bolus (has no administration in time range)           Patient brought in today by EMS for complaints of lower extremity weakness. On exam he is alert oriented afebrile breathing on room air satting at 98%. Nontoxic. No acute respiratory distress. Old labs and records reviewed. GCS of 15. NIH of 0. Seen by myself and my attending was available as needed. Prior to work-up complete patient wanted to leave. I did discuss that he would be leaving [address] as I did recommend imaging and blood work. I did review and discuss the risk of leaving [address] including but not limited to increased injury disability or death. He did have sound mind and full capacity to make this decision. He signed AMA paperwork. He was told he could come to the ED at any time. Patient left AMA. FINAL IMPRESSION      1. Weakness of both lower extremities    2.  Left against medical advice          DISPOSITION/PLAN   DISPOSITION [place] 02/15/2022 02:44:13 PM      PATIENT REFERRED TO:  Salvatore Tucker PA-C  [address] [place] [address]  [phone]    Schedule an appointment as soon as possible for a visit in 1 day      [place] [facility] [place] [facility]  [address] [place] [address]  Schedule an appointment as soon as possible for a visit   As needed, If symptoms worsen      DISCHARGE MEDICATIONS:  Current Discharge Medication List          DISCONTINUED MEDICATIONS:  Current Discharge Medication List                 (Please note that portions of this note were completed with a voice recognition program.  Efforts were made to edit the dictations but occasionally words are mis-transcribed.)    Jb Jones PA-C (electronically signed)            Jb Jones PA-C  02/15/22 1450

## 2022-02-16 ENCOUNTER — NURSE ONLY (OUTPATIENT)
Dept: CARDIOLOGY CLINIC | Age: 62
End: 2022-02-16

## 2022-02-16 NOTE — PROGRESS NOTES
Chief Complaint   Patient presents with    Extremity Weakness       Pt arrived via EMS c/o bilateral lower extremity weakness. Pt reports feeling completely normal now. Pt states that he was was the dollar store shopping and felt as though both of his legs were going to give way. Pt denies falling, dizziness, or any other symptoms. SWI=[id number] pta. Pt went to [facility] ED 2/15, signed out AMA. Merlin alert received 2/16 for NSVT/SVT. Remote transmission received for patients single chamber ICD. Transmission shows normal sensing and pacing function. EP physician will review. See interrogation under the cardiology tab in the [address] [place] [address] [address] field for more details. Will continue to monitor remotely. Hx PAF-RVR. (eliquis, toprol xl). SVT recorded on 2/15/22. Morphology discriminator match. NSVT recordings prob SVT based on rates-no EGM to view. 1 NSVT event 2/14/22-no EGM to view w/ rate 205 bpm x 12 sec. No therapies delivered. corvue trending up.

## 2022-02-17 ENCOUNTER — HOSPITAL ENCOUNTER (EMERGENCY)
Age: 62
Discharge: LEFT BEFORE DISCHARGE | End: 2022-02-17
Attending: STUDENT IN AN ORGANIZED HEALTH CARE EDUCATION/TRAINING PROGRAM
Payer: MEDICARE

## 2022-02-17 VITALS
BODY MASS INDEX: 37.56 KG/M2 | HEIGHT: 64 IN | HEART RATE: 96 BPM | DIASTOLIC BLOOD PRESSURE: 98 MMHG | SYSTOLIC BLOOD PRESSURE: 179 MMHG | RESPIRATION RATE: 20 BRPM | TEMPERATURE: 98.4 F | OXYGEN SATURATION: 98 % | WEIGHT: 220 LBS

## 2022-02-17 DIAGNOSIS — R53.1 GENERAL WEAKNESS: ICD-10-CM

## 2022-02-17 DIAGNOSIS — R73.9 HYPERGLYCEMIA: Primary | ICD-10-CM

## 2022-02-17 LAB
A/G RATIO: 1.9 (ref 1.1–2.2)
ALBUMIN SERPL-MCNC: 4.5 G/DL (ref 3.4–5)
ALP BLD-CCNC: 59 U/L (ref 40–129)
ALT SERPL-CCNC: 20 U/L (ref 10–40)
ANION GAP SERPL CALCULATED.3IONS-SCNC: 16 MMOL/L (ref 3–16)
AST SERPL-CCNC: 17 U/L (ref 15–37)
BASE EXCESS VENOUS: -4.3 MMOL/L (ref -3–3)
BASOPHILS ABSOLUTE: 0 K/UL (ref 0–0.2)
BASOPHILS RELATIVE PERCENT: 0.8 %
BILIRUB SERPL-MCNC: 0.7 MG/DL (ref 0–1)
BILIRUBIN URINE: NEGATIVE
BLOOD, URINE: NEGATIVE
BUN BLDV-MCNC: 10 MG/DL (ref 7–20)
CALCIUM SERPL-MCNC: 9.8 MG/DL (ref 8.3–10.6)
CARBOXYHEMOGLOBIN: 2.7 % (ref 0–1.5)
CHLORIDE BLD-SCNC: 99 MMOL/L (ref 99–110)
CLARITY: CLEAR
CO2: 20 MMOL/L (ref 21–32)
COLOR: YELLOW
CREAT SERPL-MCNC: 0.6 MG/DL (ref 0.9–1.3)
EKG ATRIAL RATE: 101 BPM
EKG DIAGNOSIS: NORMAL
EKG P AXIS: 60 DEGREES
EKG P-R INTERVAL: 176 MS
EKG Q-T INTERVAL: 336 MS
EKG QRS DURATION: 90 MS
EKG QTC CALCULATION (BAZETT): 435 MS
EKG R AXIS: -2 DEGREES
EKG T AXIS: 74 DEGREES
EKG VENTRICULAR RATE: 101 BPM
EOSINOPHILS ABSOLUTE: 0 K/UL (ref 0–0.6)
EOSINOPHILS RELATIVE PERCENT: 0.7 %
GFR AFRICAN AMERICAN: >60
GFR NON-AFRICAN AMERICAN: >60
GLUCOSE BLD-MCNC: 178 MG/DL (ref 70–99)
GLUCOSE URINE: >=1000 MG/DL
HCO3 VENOUS: 19.5 MMOL/L (ref 23–29)
HCT VFR BLD CALC: 38.8 % (ref 40.5–52.5)
HEMOGLOBIN: 13.3 G/DL (ref 13.5–17.5)
KETONES, URINE: 40 MG/DL
LEUKOCYTE ESTERASE, URINE: NEGATIVE
LIPASE: 51 U/L (ref 13–60)
LYMPHOCYTES ABSOLUTE: 1 K/UL (ref 1–5.1)
LYMPHOCYTES RELATIVE PERCENT: 28.1 %
MCH RBC QN AUTO: 29.3 PG (ref 26–34)
MCHC RBC AUTO-ENTMCNC: 34.4 G/DL (ref 31–36)
MCV RBC AUTO: 85.2 FL (ref 80–100)
METHEMOGLOBIN VENOUS: 0.3 %
MICROSCOPIC EXAMINATION: ABNORMAL
MONOCYTES ABSOLUTE: 0.4 K/UL (ref 0–1.3)
MONOCYTES RELATIVE PERCENT: 9.8 %
NEUTROPHILS ABSOLUTE: 2.3 K/UL (ref 1.7–7.7)
NEUTROPHILS RELATIVE PERCENT: 60.6 %
NITRITE, URINE: NEGATIVE
O2 SAT, VEN: 92 %
O2 THERAPY: ABNORMAL
PCO2, VEN: 32.3 MMHG (ref 40–50)
PDW BLD-RTO: 16.7 % (ref 12.4–15.4)
PH UA: 5.5 (ref 5–8)
PH VENOUS: 7.4 (ref 7.35–7.45)
PLATELET # BLD: 103 K/UL (ref 135–450)
PMV BLD AUTO: 6.2 FL (ref 5–10.5)
PO2, VEN: 60.8 MMHG (ref 25–40)
POTASSIUM REFLEX MAGNESIUM: 4 MMOL/L (ref 3.5–5.1)
PROTEIN UA: NEGATIVE MG/DL
RBC # BLD: 4.56 M/UL (ref 4.2–5.9)
SODIUM BLD-SCNC: 135 MMOL/L (ref 136–145)
SPECIFIC GRAVITY UA: 1.02 (ref 1–1.03)
TCO2 CALC VENOUS: 21 MMOL/L
TOTAL PROTEIN: 6.9 G/DL (ref 6.4–8.2)
URINE REFLEX TO CULTURE: ABNORMAL
URINE TYPE: ABNORMAL
UROBILINOGEN, URINE: 0.2 E.U./DL
WBC # BLD: 3.7 K/UL (ref 4–11)

## 2022-02-17 PROCEDURE — 83690 ASSAY OF LIPASE: CPT

## 2022-02-17 PROCEDURE — 93010 ELECTROCARDIOGRAM REPORT: CPT | Performed by: INTERNAL MEDICINE

## 2022-02-17 PROCEDURE — 80053 COMPREHEN METABOLIC PANEL: CPT

## 2022-02-17 PROCEDURE — 36415 COLL VENOUS BLD VENIPUNCTURE: CPT

## 2022-02-17 PROCEDURE — 99285 EMERGENCY DEPT VISIT HI MDM: CPT

## 2022-02-17 PROCEDURE — 93005 ELECTROCARDIOGRAM TRACING: CPT | Performed by: STUDENT IN AN ORGANIZED HEALTH CARE EDUCATION/TRAINING PROGRAM

## 2022-02-17 PROCEDURE — 85025 COMPLETE CBC W/AUTO DIFF WBC: CPT

## 2022-02-17 PROCEDURE — 81003 URINALYSIS AUTO W/O SCOPE: CPT

## 2022-02-17 PROCEDURE — 82803 BLOOD GASES ANY COMBINATION: CPT

## 2022-02-17 NOTE — ED NOTES
Pt called out stating he wet his depends. Fresh depend, wipes, and clean linen provided to pt. Pt cleaned himself up and put fresh depend on. Pt called out and stated that he was going home. Made pts RN aware.     Rodolfo Vergara, student RN     Rodolfo Vergara  02/17/22 1421       Rodolfo Vergara  02/17/22 1431

## 2022-02-17 NOTE — ED NOTES
Pt called out stating he was leaving. This RN went in to evaluate the patient. Pt was at the edge of the bed, getting dressed. Pt states \"i'm tired of this, I want to go home, I've been in the hospital for weeks, ya know\". Pt explained the risks of leaving without knowing the final results of blood work. Pt stated he still would like to leave AMA. Pt IV removed and pressure applied. Pt ambulated to wheelchair independently, appeared in no distress, resp even and unlabored.      Shelley Griffin, RN  02/17/22 5342

## 2022-02-17 NOTE — ED PROVIDER NOTES
[facility] EMERGENCY DEPARTMENT      CHIEF COMPLAINT  Extremity Weakness (Pt presents to the ED from PCP c/o bilateral leg  pain and weakness. PCP called for possible DKA, pt c/o excessive urination and thirst. )       HISTORY OF PRESENT ILLNESS  Anisa Rodriguez is a 63 y.o. male  who presents to the ED complaining of bilateral lower extremity weakness. Patient states that he is been having falls recently due to his lower extremity weakness. He was seen in another ED 2 days ago for the same complaint. He left AMA before work-up was completed at that time. He was following up with his PCP today who noted that his urine had ketones and glucose in it and was concerned for possible DKA, prompting this ER evaluation. The patient himself has no other complaints other than bilateral lower extremity weakness. He has no back pain at rest, no chest pain, no abdominal pain, no nausea or vomiting, no fevers. No other complaints, modifying factors or associated symptoms. I have reviewed the following from the nursing documentation. Past Medical History:   Diagnosis Date    Arthritis     Asthma     CAD (coronary artery disease)     COPD (chronic obstructive pulmonary disease) ([facility])     Dr. Claudeen Curl at [facility] told the patient that he did not have COPD, just asthma    Emphy[facility]ma [facility] lung ([facility] [address]     Fatty liver     GERD (gastroesophageal reflux disease)     Hyperlipidemia     Hypertension     Medical history reviewed with no changes     MI, old     Sleep apnea     pt wears cpap at night. states does not have cpap    Type II or unspecified type diabetes mellitus without mention of complication, not stated as uncontrolled      Past Surgical History:   Procedure Laterality Date    CARDIAC PACEMAKER PLACEMENT  2011    NOT MRI COMPATIABLE OLD LEADS [facility].  pace maker difib    CARPAL TUNNEL RELEASE Bilateral 2013    CATARACT REMOVAL WITH IMPLANT Left 2/28/14    COLONOSCOPY      CORONARY ANGIOPLASTY WITH STENT PLACEMENT  2001,2008    CYST REMOVAL  1982    coccyx area    DIAGNOSTIC CARDIAC CATH LAB PROCEDURE      ENDOSCOPY, COLON, DIAGNOSTIC      ERCP  9/15/2013    ERCP  10/11/13    WITH STENT REMOVAL    FOOT SURGERY Right 07/09/2018     REPAIR CALCANEAL SPUR, REPAIR OF ACHILLES TENDON RIGHT FOOT    NERVE SURGERY Bilateral 12/1/2020    BILATERAL LUMBAR THREE LUMBAR FOUR LUMBAR FIVE DORSAL RAMUS  RADIOFREQUENCY ABLATION SITE CONFIRMED BY FLUOROSCOPY performed by Ángel Richard MD at [address]      back stimulator in lower back    PACEMAKER PLACEMENT      ICD    PAIN MANAGEMENT PROCEDURE Bilateral 5/26/2020    BILATERAL LUMBAR THREE, LUMBAR FOUR, LUMBAR FIVE DORSAL RAMUS MEDIAL BRANCH BLOCK SITE CONFIRMED BY FLUOROSCOPY performed by Ángel Richard MD at [address] [facility] Bilateral 6/9/2020    BILATERAL LUMBAR THREE, LUMBAR FOUR, LUMBAR FIVE DORSAL RAMUS MEDIAL BRANCH BLOCK SITE CONFIRMED BY FLUOROSCOPY performed by Ángel Richard MD at [address] [facility] Left 1/12/2021    LEFT SACROILIAC JOINT INJECTION SITE CONFIRMED BY FLUOROSCOPY performed by Ángel Richard MD at [address] [facility] Right 10/31/2018    GASTROCNEMIUS RECESSION RIGHT FOOT performed by Melinda Welsh DPM at [address] [facility] Right 10/31/2018    REMOVAL OF CALCANEAL SPUR, ACHILLES TENDON REPAIR RIGHT LOWER EXTREMITY performed by Melinda Welsh DPM at [address] [facility]  7/18/13    and colonoscopy with biopsies taken    UPPER GASTROINTESTINAL ENDOSCOPY  8/23/13    EUS    UPPER GASTROINTESTINAL ENDOSCOPY  9/15/2013     Family History   Problem Relation Age of Onset    Heart Disease Mother     Heart Failure Mother     Cancer Father     Diabetes Maternal Grandmother     Heart Failure Maternal Uncle     Hypertension Maternal Uncle     Asthma on file    Number of Places Lived in the Last Year: Not on file    Unstable Housing in the Last Year: Not on file     No current facility-administered medications for this encounter.      Current Outpatient Medications   Medication Sig Dispense Refill    lisinopril (PRINIVIL;ZESTRIL) 5 MG tablet Take 1 tablet by mouth daily 30 tablet 3    QUEtiapine (SEROQUEL) 100 MG tablet Take 1 tablet by mouth nightly 60 tablet 3    QUEtiapine (SEROQUEL) 50 MG tablet Take 1 tablet by mouth 2 times daily as needed for Agitation 60 tablet 3    tamsulosin (FLOMAX) 0.4 MG capsule Take 1 capsule by mouth at bedtime 30 capsule 3    clopidogrel (PLAVIX) 75 MG tablet Take 1 tablet by mouth daily 30 tablet 3    apixaban (ELIQUIS) 5 MG TABS tablet Take 1 tablet by mouth 2 times daily 60 tablet 1    metoprolol succinate (TOPROL XL) 50 MG extended release tablet Take 1 tablet by mouth daily 30 tablet 1    hydrOXYzine (ATARAX) 25 MG tablet Take 25 mg by mouth 3 times daily as needed for Itching or Anxiety      polyethylene glycol (MIRALAX) 17 GM/SCOOP POWD powder Take 238 g by mouth daily Take as directed for colonoscopy 255 g 0    Dulaglutide (TRULICITY) 8.55 AB/5.8QE SOPN Inject 3 mg into the skin once a week       insulin lispro (HUMALOG) 100 UNIT/ML injection vial Inject 10 Units into the skin 2 times daily Indications: 10 units in AM, 10 units at dinner      Omega-3 1000 MG CAPS Take 2 capsules by mouth 4 times daily      PROAIR  (90 Base) MCG/ACT inhaler Inhale 2 puffs into the lungs every 4 hours as needed      Fexofenadine HCl (ALLEGRA PO) Take by mouth      Esomeprazole Magnesium (NEXIUM PO) Take 40 mg by mouth       JARDIANCE 25 MG tablet Take 25 mg by mouth daily       insulin detemir (LEVEMIR) 100 UNIT/ML injection vial Inject 60 Units into the skin nightly       Fluticasone Furoate-Vilanterol (BREO ELLIPTA IN) Inhale into the lungs daily       metFORMIN (GLUCOPHAGE) 1000 MG tablet TAKE ONE TABLET BY MOUTH 2 TIMES DAILY WITH MORNING AND EVENING MEALS 60 tablet 0    gabapentin (NEURONTIN) 100 MG capsule TAKE 2 CAPSULES BY MOUTH 3 TIMES DAILY 180 capsule 0    fluticasone (FLONASE) 50 MCG/ACT nasal spray USE 1 SPRAY IN EACH NOSTRIL EVERY DAY (Patient taking differently: 2 sprays daily ) 16 g 0    TRUE METRIX BLOOD GLUCOSE TEST strip CHECK SUGARS TWICE A DAY AS DIRECTED, DX: E11.9 100 each 5    atorvastatin (LIPITOR) 80 MG tablet TAKE ONE TABLET BY MOUTH DAILY 30 tablet 11    UNIFINE PENTIPS 31G X 8 MM MISC USE AS DIRECTED FOR INSULIN .00 100 each 5    TRUEPLUS LANCETS 30G MISC CHECK BLOOD SUGAR TWICE A DAY DX E11.9 100 each 11    DULoxetine (CYMBALTA) 60 MG extended release capsule Take 60 mg by mouth daily      divalproex (DEPAKOTE) 500 MG DR tablet Take 1,000 mg by mouth nightly       Allergies   Allergen Reactions    Other      VASCEPA CAUSED RASH AND ITCHING    Pcn [Penicillins] Hives       REVIEW OF SYSTEMS  10 systems reviewed, pertinent positives per HPI otherwise noted to be negative. PHYSICAL EXAM  BP (!) 179/98   Pulse 96   Temp 98.4 °F (36.9 °C) (Oral)   Resp 20   Ht 5' 4\" (1.626 m)   Wt 220 lb (99.8 kg)   SpO2 98%   BMI 37.76 kg/m²    General: Appears well. Alert  HEENT: Head atraumatic, Eyes normal inspection, PERRL. Normal ENT inspection, Pharynx normal. No signs of dehydration  NECK: Normal inspection  RESPIRATORY: Normal breath sounds. No chest wall tenderness. No respiratory distress  CVS: Heart rate and rhythm regular. No Murmurs  ABDOMEN/GI: Soft, Non-tender, No distention  BACK: Normal inspection  EXTREMITIES: Non-Tender. Full ROM. Normal appearance. No Pedal edema  NEURO: Alert and oriented. Sensation normal. Motor normal  PSYCH: Mood normal. Affect normal.  SKIN: Color normal. No rash. Warm, Dry    LABS  I have reviewed all labs for this visit.    Results for orders placed or performed during the hospital encounter of 02/17/22   CBC with Auto Differential   Result Value Ref Range    WBC 3.7 (L) 4.0 - 11.0 K/uL    RBC 4.56 4.20 - 5.90 M/uL    Hemoglobin 13.3 (L) 13.5 - 17.5 g/dL    Hematocrit 38.8 (L) 40.5 - 52.5 %    MCV 85.2 80.0 - 100.0 fL    MCH 29.3 26.0 - 34.0 pg    MCHC 34.4 31.0 - 36.0 g/dL    RDW 16.7 (H) 12.4 - 15.4 %    Platelets 002 (L) 260 - 450 K/uL    MPV 6.2 5.0 - 10.5 fL    Neutrophils % 60.6 %    Lymphocytes % 28.1 %    Monocytes % 9.8 %    Eosinophils % 0.7 %    Basophils % 0.8 %    Neutrophils Absolute 2.3 1.7 - 7.7 K/uL    Lymphocytes Absolute 1.0 1.0 - 5.1 K/uL    Monocytes Absolute 0.4 0.0 - 1.3 K/uL    Eosinophils Absolute 0.0 0.0 - 0.6 K/uL    Basophils Absolute 0.0 0.0 - 0.2 K/uL   Comprehensive Metabolic Panel w/ Reflex to MG   Result Value Ref Range    Sodium 135 (L) 136 - 145 mmol/L    Potassium reflex Magnesium 4.0 3.5 - 5.1 mmol/L    Chloride 99 99 - 110 mmol/L    CO2 20 (L) 21 - 32 mmol/L    Anion Gap 16 3 - 16    Glucose 178 (H) 70 - 99 mg/dL    BUN 10 7 - 20 mg/dL    CREATININE 0.6 (L) 0.9 - 1.3 mg/dL    GFR Non-African American >60 >60    GFR African American >60 >60    Calcium 9.8 8.3 - 10.6 mg/dL    Total Protein 6.9 6.4 - 8.2 g/dL    Albumin 4.5 3.4 - 5.0 g/dL    Albumin/Globulin Ratio 1.9 1.1 - 2.2    Total Bilirubin 0.7 0.0 - 1.0 mg/dL    Alkaline Phosphatase 59 40 - 129 U/L    ALT 20 10 - 40 U/L    AST 17 15 - 37 U/L   Blood Gas, Venous   Result Value Ref Range    pH, Reji 7.399 7.350 - 7.450    pCO2, Reji 32.3 (L) 40.0 - 50.0 mmHg    pO2, Reji 60.8 (H) 25.0 - 40.0 mmHg    HCO3, Venous 19.5 (L) 23.0 - 29.0 mmol/L    Base Excess, Reji -4.3 (L) -3.0 - 3.0 mmol/L    O2 Sat, Reji 92 Not Established %    Carboxyhemoglobin 2.7 (H) 0.0 - 1.5 %    MetHgb, Reji 0.3 <1.5 %    TC02 (Calc), Reji 21 Not Established mmol/L    O2 Therapy Unknown    Urinalysis with Reflex to Culture    Specimen: Urine   Result Value Ref Range    Color, UA Yellow Straw/Yellow    Clarity, UA Clear Clear    Glucose, Ur >=1000 (A) Negative mg/dL    Bilirubin Urine Negative Negative    Ketones, Urine 40 (A) Negative mg/dL    Specific Gravity, UA 1.020 1.005 - 1.030    Blood, Urine Negative Negative    pH, UA 5.5 5.0 - 8.0    Protein, UA Negative Negative mg/dL    Urobilinogen, Urine 0.2 <2.0 E.U./dL    Nitrite, Urine Negative Negative    Leukocyte Esterase, Urine Negative Negative    Microscopic Examination Not Indicated     Urine Type NotGiven     Urine Reflex to Culture Not Indicated    Lipase   Result Value Ref Range    Lipase 51.0 13.0 - 60.0 U/L   EKG 12 Lead   Result Value Ref Range    Ventricular Rate 101 BPM    Atrial Rate 101 BPM    P-R Interval 176 ms    QRS Duration 90 ms    Q-T Interval 336 ms    QTc Calculation (Bazett) 435 ms    P Axis 60 degrees    R Axis -2 degrees    T Axis 74 degrees    Diagnosis       Sinus tachycardia with Premature atrial complexes with Aberrant conductionPossible Lateral infarct , age undeterminedInferior infarct , age undeterminedNonspecific ST abnormalityAbnormal ECGConfirmed by ADA MACE MD ([id number]) on 2/17/2022 5:32:05 PM       ECG  The Ekg interpreted by me shows  Sinus tachycardia with PACs and a rate of 101 bpm.  Normal axis. No acute injury pattern. , QRS 90, QTc 435. ED COURSE/MDM  Patient seen and evaluated. Old records reviewed. Labs and imaging reviewed and results discussed with patient. Lab work obtained to evaluate for possible DKA, per PCPs concern. Patient is agreed with evaluation, however before chemistry panel resulted he requested to leave [address] as he did not want to wait for results. He was hemodynamically stable and had normal mental status and was allowed to leave. Shortly after he left, chemistry panel returned and showed very mild metabolic acidosis with a glucose less than 200. During the patient's ED course, the patient was given:  Medications - No data to display     CLINICAL IMPRESSION  1. Hyperglycemia    2.  General weakness        Blood pressure (!) 179/98, pulse 96, temperature 98.4 °F (36.9 °C), temperature source Oral, resp. rate 20, height 5' 4\" (1.626 m), weight 220 lb (99.8 kg), SpO2 98 %. [id number] was LEFT AMA in stable condition. Patient was given scripts for the following medications. I counseled patient how to take these medications. New Prescriptions    No medications on file       Follow-up with:  No follow-up provider specified. DISCLAIMER: This chart was created using Dragon dictation software. Efforts were made by me to ensure accuracy, however some errors may be present due to limitations of this technology and occasionally words are not transcribed correctly.        Tabby Wei, DO  02/17/22 2030

## 2022-03-11 ENCOUNTER — TELEPHONE (OUTPATIENT)
Dept: CARDIOLOGY CLINIC | Age: 62
End: 2022-03-11

## 2022-03-11 NOTE — TELEPHONE ENCOUNTER
Patient was seen in [facility] for nida    And they took him off of his     1)potasssium medication,  2)cholestrol medication  And   3)water pill    Since covid, He is having trouble walking so he cannot make it to his     Appointment, seeing Dr Ayah Ahuja on April 13th if he can get a ride.     Patient would like to get started back on these medications,     Please advise  #[phone]

## 2022-03-17 RX ORDER — ATORVASTATIN CALCIUM 80 MG/1
80 TABLET, FILM COATED ORAL DAILY
Qty: 90 TABLET | Refills: 1 | Status: SHIPPED | OUTPATIENT
Start: 2022-03-17 | End: 2022-08-23 | Stop reason: SDUPTHER

## 2022-03-17 RX ORDER — POTASSIUM CHLORIDE 20 MEQ/1
TABLET, EXTENDED RELEASE ORAL
Qty: 90 TABLET | Refills: 1 | Status: SHIPPED | OUTPATIENT
Start: 2022-03-17

## 2022-03-17 RX ORDER — FUROSEMIDE 20 MG/1
TABLET ORAL
Qty: 90 TABLET | Refills: 1 | Status: SHIPPED | OUTPATIENT
Start: 2022-03-17

## 2022-04-11 ENCOUNTER — NURSE ONLY (OUTPATIENT)
Dept: CARDIOLOGY CLINIC | Age: 62
End: 2022-04-11
Payer: MEDICARE

## 2022-04-11 DIAGNOSIS — I47.20 VENTRICULAR TACHYCARDIA: ICD-10-CM

## 2022-04-11 DIAGNOSIS — I25.5 ISCHEMIC CARDIOMYOPATHY: ICD-10-CM

## 2022-04-11 DIAGNOSIS — Z95.810 AUTOMATIC IMPLANTABLE CARDIOVERTER-DEFIBRILLATOR IN SITU: ICD-10-CM

## 2022-04-11 PROCEDURE — 93296 REM INTERROG EVL PM/IDS: CPT | Performed by: INTERNAL MEDICINE

## 2022-04-11 PROCEDURE — 93295 DEV INTERROG REMOTE 1/2/MLT: CPT | Performed by: INTERNAL MEDICINE

## 2022-05-16 ENCOUNTER — OFFICE VISIT (OUTPATIENT)
Dept: CARDIOLOGY CLINIC | Age: 62
End: 2022-05-16
Payer: MEDICARE

## 2022-05-16 VITALS
BODY MASS INDEX: 37.28 KG/M2 | DIASTOLIC BLOOD PRESSURE: 90 MMHG | HEART RATE: 96 BPM | WEIGHT: 217.2 LBS | SYSTOLIC BLOOD PRESSURE: 132 MMHG

## 2022-05-16 DIAGNOSIS — I48.0 PAF (PAROXYSMAL ATRIAL FIBRILLATION) (HCC): ICD-10-CM

## 2022-05-16 DIAGNOSIS — I10 HTN (HYPERTENSION), BENIGN: ICD-10-CM

## 2022-05-16 DIAGNOSIS — E78.5 HYPERLIPIDEMIA, UNSPECIFIED HYPERLIPIDEMIA TYPE: Primary | ICD-10-CM

## 2022-05-16 DIAGNOSIS — Z98.61 CAD S/P PERCUTANEOUS CORONARY ANGIOPLASTY: ICD-10-CM

## 2022-05-16 DIAGNOSIS — I25.10 CAD S/P PERCUTANEOUS CORONARY ANGIOPLASTY: ICD-10-CM

## 2022-05-16 PROCEDURE — 99214 OFFICE O/P EST MOD 30 MIN: CPT | Performed by: INTERNAL MEDICINE

## 2022-05-16 NOTE — PROGRESS NOTES
Subjective:      Patient ID: Riky Ray. is a 63 y.o. . CC:  Follow up of Cardiomyopathy and CAD. HPI:   Mr. Cornelius Duval had syncope and hit head  [facility] MI at [facility]. .  Continue diuretics. Has CAD. Had covid and has weakness in legs. .    Allergies   Allergen Reactions    Other      VASCEPA CAUSED RASH AND ITCHING    Pcn [Penicillins] Hives        Social History     Socioeconomic History    Marital status:      Spouse name: Not on file    Number of children: Not on file    Years of education: Not on file    Highest education level: Not on file   Occupational History    Not on file   Tobacco Use    Smoking status: Former Smoker     Packs/day: 2.00     Years: 20.00     Pack years: 40.00     Types: Cigarettes     Quit date: 2001     Years since quittin.9    Smokeless tobacco: Never Used    Tobacco comment: PASSIVE SMOKER   Vaping Use    Vaping Use: Never used   Substance and Sexual Activity    Alcohol use: No     Alcohol/week: 0.0 standard drinks    Drug use: No    Sexual activity: Never   Other Topics Concern    Not on file   Social History Narrative    Not on file     Social Determinants of Health     Financial Resource Strain:     Difficulty of Paying Living Expenses: Not on file   Food Insecurity:     Worried About [address] in the Last Year: Not on file    Luis of Food in the Last Year: Not on file   Transportation Needs:     Lack of Transportation (Medical): Not on file    Lack of Transportation (Non-Medical):  Not on file   Physical Activity:     Days of Exercise per Week: Not on file    Minutes of Exercise per Session: Not on file   Stress:     Feeling of Stress : Not on file   Social Connections:     Frequency of Communication with Friends and Family: Not on file    Frequency of Social Gatherings with Friends and Family: Not on file    Attends Mandaeism Services: Not on file   [employer]s or Organizations: Not on file    Attends Club or Organization Meetings: Not on file    Marital Status: Not on file   Intimate Partner Violence:     Fear of Current or Ex-Partner: Not on file    Emotionally Abused: Not on file    Physically Abused: Not on file    Sexually Abused: Not on file   Housing Stability:     Unable to Pay for Housing in the Last Year: Not on file    Number of [place] in the Last Year: Not on file    Unstable Housing in the Last Year: Not on file        Patient has a family history includes Cancer in his father; Diabetes in his maternal grandmother; Heart Disease in his mother; Heart Failure in his maternal uncle and mother; Hypertension in his maternal uncle. Patient  has a past medical history of Arthritis, Asthma, CAD (coronary artery disease), COPD (chronic obstructive pulmonary disease) ([address] Emphysema of lung ([facility] [address] Fatty liver, GERD (gastroesophageal reflux disease), Hyperlipidemia, Hypertension, Medical history reviewed with no changes, MI, old, Sleep apnea, and Type II or unspecified type diabetes mellitus without mention of complication, not stated as uncontrolled. Vitals  Weight: 217 lb 3.2 oz (98.5 kg)  Blood Pressure: BP: (132)/(90)    Pulse: 96       Review of Systems   Constitutional: Negative. HENT: Negative. Eyes: Negative. Respiratory: Negative. Cardiovascular: Negative. Gastrointestinal: Negative. Genitourinary: Negative. Musculoskeletal: Negative. Skin: Negative. Neurological: Negative. Hematological: Negative. Psychiatric/Behavioral: Negative. Objective:   Physical Exam   Constitutional: He is oriented to person, place, and time. Vital signs are normal. He appears well-nourished. HENT:   Head: Normocephalic and atraumatic. Mouth/Throat: Oropharynx is clear and moist and mucous membranes are normal.   Eyes: Conjunctivae and EOM are normal. Pupils are equal, round, and reactive to light.    Neck: Normal range of motion. Neck supple. No JVD present. No tracheal deviation present. No thyromegaly present. Cardiovascular: Normal rate, regular rhythm, S1 normal, S2 normal, normal heart sounds, intact distal pulses and normal pulses. PMI is not displaced. Exam reveals no gallop and no friction rub. No murmur heard. Pulses:       Carotid pulses are 2+ on the right side, and 2+ on the left side. Radial pulses are 2+ on the right side, and 2+ on the left side. Pulmonary/Chest: Effort normal and breath sounds normal.   Abdominal: Normal appearance and bowel sounds are normal.   Musculoskeletal: Normal range of motion. Lymphadenopathy:     He has no cervical adenopathy. Neurological: He is alert and oriented to person, place, and time. He has normal strength. No cranial nerve deficit. Coordination normal.   Skin: Skin is warm, dry and intact. Psychiatric: He has a normal mood and affect. His speech is normal and behavior is normal.       Assessment:       Diagnosis Orders   1. Hyperlipidemia, unspecified hyperlipidemia type     2. HTN (hypertension), benign     3. PAF (paroxysmal atrial fibrillation) ([facility] [address]     4. CAD S/P percutaneous coronary angioplasty           Plan:       STable ischemic CMP. Patient continues to take metoprolol without any ICD discharges. Gets meds refilled weekly packs by [address] Edema:  Controlled. Continue lasix 20 mg Mon thurs and K 10 meq mon and thurs. HLP:  Check labs. HTN:  Controlled. LVEF 44% with no ischemia and inferolateral infarction. No signs of CHF. NYHA 1 and CCS 1. He got his disability coverage after 7 years. CAD:  Feels good after dominant left cx OM and PDA stenting on 7/13/21  ( 3 stents)  Continue current medications. Stable cardiovascular status.

## 2022-06-09 NOTE — PROGRESS NOTES
[facility]   Electrophysiology Outpatient Note              Date:  June 13, 2022  Patient name: Rachel Okeefe. YOB: 1960    Primary Care physician: Bobbye Lundborg, PA-C    HISTORY OF PRESENT ILLNESS: The patient is a 63 y.o.  male with a history of VT, ischemic cardiomyopathy, DM, chronic systolic heart failure, CAD, MI, COPD, asthma, HTN, HLD, and GERD. In 2001, he had his first original ICD implant, and it was replaced in 2005 for battery depletion. He underwent lead extraction at [address] [place] [address] in 2007 for fracture of the RV lead. He received several ICD shocks in 2008 when he did not take his medication. His current ICD was implanted in May 2011. He had a colon stimulator placed on 3/2018 and has not had any problems with device interactions. In 6/2021 he reported syncope to his primary cardiologist. In 7/2021 he had an abnormal stress test and underwent LHC. He had 3 ROSHNI placed (LCx, OM, and PDA). In 2/2022 he was hospitalized for weakness and falls and was diagnosed with COVID-19. During his hospitalization, he went into AF and his ICD inappropriately fired. VT detection was adjusted at that time. Today he is being seen for ischemic cardiomyopathy, NSVT and PAF. He has no complaints and has felt well since getting out of the hospital in February. He denies chest pain, palpitations, shortness of breath, and dizziness. He doesn't check his BP at home. He hasn't been able to drive so his insurance company sets up transport for his appointments. He is compliant with all his medications. He hasn't seen EP since 2019 because of the pandemic.      Device check today shows:   Brand: Abbott ([place])  Mode: VVI  Normal function   0 %   Arrhythmias: 3 NSVT events recorded  Battery life 6.3 years  RV impedance 530 ohms   RV threshold 0.75 V @ 0.5 ms  RV sensitivity >12.0 mV    Past Medical History:   has a past medical history of Arthritis, Asthma, CAD (coronary artery disease), COPD (chronic obstructive pulmonary disease) ([facility] Emphysema of lung ([facility] Fatty liver, GERD (gastroesophageal reflux disease), Hyperlipidemia, Hypertension, Medical history reviewed with no changes, MI, old, Sleep apnea, and Type II or unspecified type diabetes mellitus without mention of complication, not stated as uncontrolled. Past Surgical History:   has a past surgical history that includes Coronary angioplasty with stent ([id number],5673); Colonoscopy; Endoscopy, colon, diagnostic; cyst removal (1982); Upper gastrointestinal endoscopy (7/18/13); Upper gastrointestinal endoscopy (8/23/13); Carpal tunnel release (Bilateral, 2013); ERCP (9/15/2013); Upper gastrointestinal endoscopy (9/15/2013); ERCP (10/11/13); Cataract removal with implant (Left, 2/28/14); Diagnostic Cardiac Cath Lab Procedure; other surgical history; pacemaker placement; Cardiac pacemaker placement (2011); Foot surgery (Right, 07/09/2018); pr length/short leg/ankl tendon,single (Right, 10/31/2018); pr gastrocnemius recession (Right, 10/31/2018); Pain management procedure (Bilateral, 5/26/2020); Pain management procedure (Bilateral, 6/9/2020); Nerve Surgery (Bilateral, 12/1/2020); and Pain management procedure (Left, 1/12/2021). Home Medications:    Prior to Admission medications    Medication Sig Start Date End Date Taking?  Authorizing Provider   furosemide (LASIX) 20 MG tablet TAKE ONE TABLET ON MONDAY and THURSDAY 3/17/22  Yes Benji Rolle MD   potassium chloride (KLOR-CON M) 20 MEQ extended release tablet TAKE ONE TABLET WITH LASIX ON Monday AND THURSDAYS 3/17/22  Yes Benji Rolle MD   atorvastatin (LIPITOR) 80 MG tablet Take 1 tablet by mouth daily 3/17/22  Yes Benji Rolle MD   lisinopril (PRINIVIL;ZESTRIL) 5 MG tablet Take 1 tablet by mouth daily 2/8/22  Yes Laura Zimmerman MD   tamsulosin [facility]) 0.4 MG capsule Take 1 capsule by mouth at bedtime 2/7/22  Yes Laura Zimmemran MD   clopidogrel (PLAVIX) 75 MG tablet Take 1 tablet by mouth daily 2/8/22  Yes Jovan Moser MD   apixaban (ELIQUIS) 5 MG TABS tablet Take 1 tablet by mouth 2 times daily 1/27/22  Yes Kel Hadley MD   metoprolol succinate (TOPROL XL) 50 MG extended release tablet Take 1 tablet by mouth daily 1/27/22  Yes Kel Hadley MD   hydrOXYzine (ATARAX) 25 MG tablet Take 25 mg by mouth 3 times daily as needed for Itching or Anxiety   Yes Historical Provider, MD   polyethylene glycol (MIRALAX) 17 GM/SCOOP POWD powder Take 238 g by mouth daily Take as directed for colonoscopy 11/9/21  Yes Nila Meckel, MD   Dulaglutide (TRULICITY) 7.26 VU/7.0YT SOPN Inject 3 mg into the skin once a week    Yes Historical Provider, MD   insulin lispro (HUMALOG) 100 UNIT/ML injection vial Inject 10 Units into the skin 2 times daily Indications: 10 units in AM, 10 units at dinner 4/27/21  Yes Kareen Ramos MD   Omega-3 1000 MG CAPS Take 2 capsules by mouth 4 times daily   Yes Historical Provider, MD   PROAIR  (90 Base) MCG/ACT inhaler Inhale 2 puffs into the lungs every 4 hours as needed 2/9/21  Yes Historical Provider, MD   Fexofenadine HCl (ALLEGRA PO) Take by mouth   Yes Historical Provider, MD   Esomeprazole Magnesium (NEXIUM PO) Take 40 mg by mouth    Yes Historical Provider, MD   JARDIANCE 25 MG tablet Take 25 mg by mouth daily  2/18/19  Yes Historical Provider, MD   insulin detemir (LEVEMIR) 100 UNIT/ML injection vial Inject 60 Units into the skin nightly    Yes Historical Provider, MD   Fluticasone Furoate-Vilanterol (BREO ELLIPTA IN) Inhale into the lungs daily    Yes Historical Provider, MD   metFORMIN (GLUCOPHAGE) 1000 MG tablet TAKE ONE TABLET BY MOUTH 2 TIMES DAILY WITH MORNING AND EVENING MEALS 9/26/17  Yes Jermain Russ APRN - CNP   gabapentin (NEURONTIN) 100 MG capsule TAKE 2 CAPSULES BY MOUTH 3 TIMES DAILY 9/26/17  Yes Jermain Russ APRN - CNP   fluticasone (FLONASE) 50 MCG/ACT nasal spray USE 1 SPRAY IN [facility] NOSTRIL EVERY DAY  Patient taking differently: 2 sprays daily  9/13/17  Yes ANA Blakely CNP   TRUE METRIX BLOOD GLUCOSE TEST strip CHECK SUGARS TWICE A DAY AS DIRECTED, DX: E11.9 6/9/17  Yes ANA Blakely CNP   atorvastatin (LIPITOR) 80 MG tablet TAKE ONE TABLET BY MOUTH DAILY 2/20/17  Yes ANA Pond CNP   UNIFINE PENTIPS 31G X 8 MM MISC USE AS DIRECTED FOR INSULIN .00 2/20/17  Yes ANA Pond CNP   TRUEPLUS LANCETS 30G MISC CHECK BLOOD SUGAR TWICE A DAY DX E11.9 2/8/17  Yes ANA Pond CNP   DULoxetine (CYMBALTA) 60 MG extended release capsule Take 60 mg by mouth daily   Yes Historical Provider, MD   divalproex (DEPAKOTE) 500 MG DR tablet Take 1,000 mg by mouth nightly   Yes Historical Provider, MD   QUEtiapine (SEROQUEL) 100 MG tablet Take 1 tablet by mouth nightly  Patient not taking: Reported on 5/16/2022 2/7/22   Remi Chaudhari MD   QUEtiapine (SEROQUEL) 50 MG tablet Take 1 tablet by mouth 2 times daily as needed for Agitation  Patient not taking: Reported on 6/13/2022 2/7/22   Remi Chaudhari MD       Allergies: Other and Pcn [penicillins]    Social History:   reports that he quit smoking about 21 years ago. His smoking use included cigarettes. He has a 40.00 pack-year smoking history. He has never used smokeless tobacco. He reports that he does not drink alcohol and does not use drugs. Family History: family history includes Cancer in his father; Diabetes in his maternal grandmother; Heart Disease in his mother; Heart Failure in his maternal uncle and mother; Hypertension in his maternal uncle. All 14 point review of systems are completed and pertinent positives are mentioned in the history of present illness. Other systems are reviewed and are negative.      PHYSICAL EXAM:    Vital signs:    /74   Pulse (!) 110   Ht 5' 4\" (1.626 m)   Wt 217 lb (98.4 kg)   SpO2 97%   BMI 37.25 kg/m²      Constitutional and general appearance: alert, cooperative, no distress, appears stated age  [de-identified]: PERRL, no cervical lymphadenopathy. No masses palpable. Normal oral mucosa  Respiratory:  · Normal excursion and expansion without use of accessory muscles  · Resp auscultation: Normal breath sounds without wheezing, rhonchi, and rales  Cardiovascular:  · The apical impulse is not displaced  · Heart tones are crisp and normal. regular S1 and S2.  · Jugular venous pulsation normal  · The carotid upstroke is normal in amplitude and contour without delay or bruit  · Peripheral pulses are symmetrical and full   Abdomen:  · No masses or tenderness  · Bowel sounds present  Extremities:  ·  No cyanosis or clubbing  ·  No lower extremity edema  ·  Skin: warm and dry  Neurological:  · Alert and oriented  · Moves all extremities well  · No abnormalities of mood, affect, memory, mentation, or behavior are noted    DATA:    Limited Echo 1/15/2022:   Conclusions   Summary   This is a limited study afib, ischemic cmp, recurrent pacer firing. LV systolic function appears mildly reduced with EF estimated at 45-50%. There is more prominent HK of the inferior wall. Left ventricular size is decreased. There is mild concentric left ventricular hypertrophy. 11- 35-40% inf basal hypo, inferolateral akinesis, LVE, mild MR     [facility] 7/13/2021:  CONCLUSION:  Successful stenting of the true circumflex into the obtuse  marginal branch, successful stenting of the left posterior descending  coronary artery and successful stenting of the obtuse marginal branch,  all 80% stenosis reduced to 0 with these three drug-eluting stents. JAMES-3 blood flow was present in the circumflex artery and all its  branches. The LAD is normal with minor irregularities only and the left  main is normal.  Nondominant right coronary artery.   LV ejection  fraction of 40% with inferior wall and inferoposterior hypokinesia.     PLAN:  Hydration, bedrest, risk factor modifications to continue.     Echo 11/29/2019:   Conclusions   Summary   The left ventricular systolic function is moderately reduced with an   ejection fraction of 35 -40 %. There is hypokinesis of the inferior and basal inferior walls. There is akinesis of the inferolateral wall. Left ventricular cavity size is moderately dilated. Compared to previous study from 2-2-2015 no changes noted in left   ventricular function. Mild mitral regurgitation. All labs and testing reviewed. CARDIOLOGY LABS:   CBC: No results for input(s): WBC, HGB, HCT, PLT in the last 72 hours. BMP: No results for input(s): NA, K, CO2, BUN, CREATININE, LABGLOM, GLUCOSE in the last 72 hours. PT/INR: No results for input(s): PROTIME, INR in the last 72 hours. APTT:No results for input(s): APTT in the last 72 hours. FASTING LIPID PANEL:  Lab Results   Component Value Date    HDL 29 12/29/2020    LDLDIRECT 93 12/29/2020    LDLCALC see below 12/29/2020    TRIG 442 12/29/2020     LIVER PROFILE:No results for input(s): AST, ALT, ALB in the last 72 hours.     IMPRESSION:    Patient Active Problem List   Diagnosis    Ventricular tachycardia (HCC)    Presence of stent in left circumflex coronary artery    Myocardial infarction, nontransmural (HCC)    Myocardial infarction, inferoposterior wall, subsequent care    Automatic implantable cardioverter-defibrillator in situ    CAD (coronary artery disease)    Automatic implantable cardioverter-defibrillator in situ    ALIN on CPAP    Gastroesophageal reflux disease without esophagitis    Hyperlipidemia with target LDL less than 100    Hypertension due to endocrine disorder    Ischemic cardiomyopathy    Obesity, morbid, BMI 40.0-49.9 ([address]    Weakness    Acute encephalopathy    TIA involving left internal carotid artery    DM (diabetes mellitus), secondary, uncontrolled, w/neurologic complic ([address]    Dyslipidemia    HTN (hypertension), benign    Lactic acidosis    Head trauma    Abrasion, scalp w/o infection    Unsteady gait    Hyperglycemia    Chronic obstructive pulmonary disease (HCC)    Coarse tremor    Generalized weakness    Type 2 diabetes mellitus with hyperglycemia, with long-term current use of insulin (HCC)    Essential hypertension    CAD S/P percutaneous coronary angioplasty    COVID-19    Unable to ambulate    Implantable cardioverter-defibrillator (ICD) discharge    AICD malfunction    Hypotension    Valproic acid toxicity    PAF (paroxysmal atrial fibrillation) ([facility] [address] [facility]    COVID-19 virus infection    Disorder of electrolytes    Atrial fibrillation with RVR (HCC)    Low grade fever    Thrombocytopenia (HCC)    Abnormal chest x-ray    Encephalopathy    Debility     Assessment:   Ventricular tachycardia: ongoing   -no recent recurrences    -s/p BiV ICD implant 5/2019  NSVT: ongoing    -noted on device check. Unclear rhythm given limitations with RV lead only  Paroxysmal atrial fibrillation: ongoing, stable   -[id number] score: 3 (HTN, CAD, DM)   -noted during hospitalization 2/2022  Ischemic cardiomyopathy: ongoing   -EF 45-50% on limited Echo 1/2022, EF 40% on LHC 7/2021  Coronary artery disease:   -s/p 3 ROSHNI (LCx, PDA, and OM) [facility] 7/0221  HTN: controlled  HLD  ALIN  COPD  DM    Plan:   1. Increase Toprol to 50 mg BID due to elevated heart rates  2. Continue Eliquis, atorvastatin, Plavix, Lasix, and lisinoprol  3. Continue remote device transmissions every three months   4. Annual CBC and BMP due 2/2023  5. Follow up in 6 months or sooner if needed    MDM moderate    Device check and plan reviewed with Dr. Becky Nicholas. Due to limitations of RV lead (and one coil), unable to see P waves to determine rhythm of NSVT events. Patient was not symptomatic of the events and had recent LHC in 7/2021 with 3 ROSHNI. Will plan to monitor and continue with remote device transmissions every three months. Toprol increased to BID.       Matty Robledo, [address] [place]  [phone]

## 2022-06-13 ENCOUNTER — OFFICE VISIT (OUTPATIENT)
Dept: CARDIOLOGY CLINIC | Age: 62
End: 2022-06-13
Payer: MEDICARE

## 2022-06-13 ENCOUNTER — NURSE ONLY (OUTPATIENT)
Dept: CARDIOLOGY CLINIC | Age: 62
End: 2022-06-13
Payer: MEDICARE

## 2022-06-13 VITALS
HEIGHT: 64 IN | WEIGHT: 217 LBS | HEART RATE: 111 BPM | DIASTOLIC BLOOD PRESSURE: 74 MMHG | BODY MASS INDEX: 37.05 KG/M2 | OXYGEN SATURATION: 97 % | SYSTOLIC BLOOD PRESSURE: 136 MMHG

## 2022-06-13 DIAGNOSIS — I48.0 PAF (PAROXYSMAL ATRIAL FIBRILLATION) (HCC): ICD-10-CM

## 2022-06-13 DIAGNOSIS — I47.20 VENTRICULAR TACHYCARDIA: ICD-10-CM

## 2022-06-13 DIAGNOSIS — Z95.810 ICD (IMPLANTABLE CARDIOVERTER-DEFIBRILLATOR) IN PLACE: ICD-10-CM

## 2022-06-13 DIAGNOSIS — I25.5 ISCHEMIC CARDIOMYOPATHY: Primary | ICD-10-CM

## 2022-06-13 DIAGNOSIS — G45.1 TIA INVOLVING LEFT INTERNAL CAROTID ARTERY: ICD-10-CM

## 2022-06-13 DIAGNOSIS — I25.5 ISCHEMIC CARDIOMYOPATHY: ICD-10-CM

## 2022-06-13 DIAGNOSIS — I48.91 ATRIAL FIBRILLATION WITH RVR (HCC): ICD-10-CM

## 2022-06-13 PROCEDURE — 99214 OFFICE O/P EST MOD 30 MIN: CPT

## 2022-06-13 PROCEDURE — 93282 PRGRMG EVAL IMPLANTABLE DFB: CPT | Performed by: INTERNAL MEDICINE

## 2022-06-13 RX ORDER — METOPROLOL SUCCINATE 50 MG/1
50 TABLET, EXTENDED RELEASE ORAL 2 TIMES DAILY
Qty: 60 TABLET | Refills: 3 | Status: SHIPPED | OUTPATIENT
Start: 2022-06-13 | End: 2022-07-21

## 2022-06-13 NOTE — PROGRESS NOTES
Patient presents to the device clinic today for a programming evaluation for his defibrillator. Patient has a history of ICM, VT, TIA, pAF, and AF w/ RVR. Takes Eliquis, Plavix, and Toprol XL. Last device interrogation was on 4/11. Since then, 3 NSVT events recorded- longest event x 17 beats on 4/11. R wave: >12 mV     0.0%    All sensing and pacing parameters are within normal range. No changes need to be made at this time. Patient will see MARTHA Gomes in office today. Patient education was provided about device functionality, in home monitoring, and any other patient questions and/or concerns were addressed. Patient voices understanding. Patient will follow up in 3 months in office or remotely. Please see interrogation for more detail - Paceart report located under the Cardiology tab.

## 2022-06-13 NOTE — PATIENT INSTRUCTIONS
Increase your Toprol to 50 mg twice a day    Remote device transmissions every three months     Follow up in 6 months

## 2022-06-30 NOTE — PROGRESS NOTES
Posey lap belt re-applied. Pt continually attempting to get OOB and becoming agitated yelling \" No I will not get in bed! I'm going home\". unknown

## 2022-07-11 ENCOUNTER — NURSE ONLY (OUTPATIENT)
Dept: CARDIOLOGY CLINIC | Age: 62
End: 2022-07-11

## 2022-07-11 DIAGNOSIS — Z95.810 AUTOMATIC IMPLANTABLE CARDIOVERTER-DEFIBRILLATOR IN SITU: ICD-10-CM

## 2022-07-11 DIAGNOSIS — I25.5 ISCHEMIC CARDIOMYOPATHY: ICD-10-CM

## 2022-07-11 DIAGNOSIS — I47.20 VENTRICULAR TACHYCARDIA: ICD-10-CM

## 2022-07-21 RX ORDER — METOPROLOL SUCCINATE 50 MG/1
TABLET, EXTENDED RELEASE ORAL
Qty: 180 TABLET | Refills: 3 | Status: SHIPPED | OUTPATIENT
Start: 2022-07-21

## 2022-08-22 ENCOUNTER — TELEPHONE (OUTPATIENT)
Dept: FAMILY MEDICINE CLINIC | Age: 62
End: 2022-08-22

## 2022-08-22 NOTE — TELEPHONE ENCOUNTER
----- Message from [facility] [address] sent at 8/22/2022 11:30 AM EDT -----  Subject: Appointment Request    Reason for Call: New Patient/New to Provider Appointment needed: New   Patient Request Appointment    QUESTIONS    Reason for appointment request? No appointments available during search     Additional Information for Provider? Pt previously saw Dr. Ashlee Gilbert at [place] and would like to reestablish care. No current   symptoms reported. Please call pt to advise.   ---------------------------------------------------------------------------  --------------  Pam SANCHEZ  [phone];  Do not leave any message, patient will call back for answer  ---------------------------------------------------------------------------  --------------  SCRIPT ANSWERS  COVID Screen: [place]

## 2022-08-22 NOTE — TELEPHONE ENCOUNTER
Called to inform this is not the correct office he was trying to get a hold of.  No answer/VM not set up

## 2022-08-23 ENCOUNTER — OFFICE VISIT (OUTPATIENT)
Dept: FAMILY MEDICINE CLINIC | Age: 62
End: 2022-08-23
Payer: MEDICARE

## 2022-08-23 VITALS
HEIGHT: 64 IN | HEART RATE: 86 BPM | DIASTOLIC BLOOD PRESSURE: 70 MMHG | SYSTOLIC BLOOD PRESSURE: 138 MMHG | WEIGHT: 226 LBS | BODY MASS INDEX: 38.58 KG/M2 | OXYGEN SATURATION: 98 %

## 2022-08-23 DIAGNOSIS — Z12.11 COLON CANCER SCREENING: ICD-10-CM

## 2022-08-23 DIAGNOSIS — J44.9 CHRONIC OBSTRUCTIVE PULMONARY DISEASE, UNSPECIFIED COPD TYPE (HCC): ICD-10-CM

## 2022-08-23 DIAGNOSIS — E78.5 HYPERLIPIDEMIA WITH TARGET LDL LESS THAN 100: ICD-10-CM

## 2022-08-23 DIAGNOSIS — Z79.4 TYPE 2 DIABETES MELLITUS WITH HYPERGLYCEMIA, WITH LONG-TERM CURRENT USE OF INSULIN (HCC): ICD-10-CM

## 2022-08-23 DIAGNOSIS — I25.10 CORONARY ARTERY DISEASE INVOLVING NATIVE CORONARY ARTERY OF NATIVE HEART WITHOUT ANGINA PECTORIS: ICD-10-CM

## 2022-08-23 DIAGNOSIS — Z95.810 STATUS POST INTERNAL CARDIAC DEFIBRILLATOR PROCEDURE: ICD-10-CM

## 2022-08-23 DIAGNOSIS — E66.01 OBESITY, MORBID, BMI 40.0-49.9 (HCC): ICD-10-CM

## 2022-08-23 DIAGNOSIS — G47.33 OSA ON CPAP: ICD-10-CM

## 2022-08-23 DIAGNOSIS — Z99.89 OSA ON CPAP: ICD-10-CM

## 2022-08-23 DIAGNOSIS — I21.4: ICD-10-CM

## 2022-08-23 DIAGNOSIS — E11.65 TYPE 2 DIABETES MELLITUS WITH HYPERGLYCEMIA, WITH LONG-TERM CURRENT USE OF INSULIN (HCC): ICD-10-CM

## 2022-08-23 DIAGNOSIS — I25.5 ISCHEMIC CARDIOMYOPATHY: Primary | ICD-10-CM

## 2022-08-23 DIAGNOSIS — I25.10 CAD S/P PERCUTANEOUS CORONARY ANGIOPLASTY: ICD-10-CM

## 2022-08-23 DIAGNOSIS — I47.20 VENTRICULAR TACHYCARDIA: ICD-10-CM

## 2022-08-23 DIAGNOSIS — I48.91 ATRIAL FIBRILLATION WITH RVR (HCC): ICD-10-CM

## 2022-08-23 DIAGNOSIS — Z98.61 CAD S/P PERCUTANEOUS CORONARY ANGIOPLASTY: ICD-10-CM

## 2022-08-23 DIAGNOSIS — F31.9 BIPOLAR DISORDER WITH DEPRESSION (HCC): ICD-10-CM

## 2022-08-23 DIAGNOSIS — I10 HTN (HYPERTENSION), BENIGN: ICD-10-CM

## 2022-08-23 PROCEDURE — 99215 OFFICE O/P EST HI 40 MIN: CPT

## 2022-08-23 PROCEDURE — 3051F HG A1C>EQUAL 7.0%<8.0%: CPT

## 2022-08-23 RX ORDER — FAMOTIDINE 20 MG/1
20 TABLET, FILM COATED ORAL 2 TIMES DAILY
COMMUNITY

## 2022-08-23 ASSESSMENT — ENCOUNTER SYMPTOMS
COLOR CHANGE: 0
ABDOMINAL PAIN: 0
ABDOMINAL DISTENTION: 0
CONSTIPATION: 0
SHORTNESS OF BREATH: 1
CHEST TIGHTNESS: 0
BLURRED VISION: 0
EYE DISCHARGE: 0
DIARRHEA: 0
EYE PAIN: 0
SORE THROAT: 0
COUGH: 0

## 2022-08-26 ENCOUNTER — NURSE ONLY (OUTPATIENT)
Dept: FAMILY MEDICINE CLINIC | Age: 62
End: 2022-08-26
Payer: MEDICARE

## 2022-08-26 DIAGNOSIS — I25.5 ISCHEMIC CARDIOMYOPATHY: ICD-10-CM

## 2022-08-26 DIAGNOSIS — I25.10 CORONARY ARTERY DISEASE INVOLVING NATIVE CORONARY ARTERY OF NATIVE HEART WITHOUT ANGINA PECTORIS: ICD-10-CM

## 2022-08-26 DIAGNOSIS — E11.65 TYPE 2 DIABETES MELLITUS WITH HYPERGLYCEMIA, WITH LONG-TERM CURRENT USE OF INSULIN (HCC): ICD-10-CM

## 2022-08-26 DIAGNOSIS — Z79.4 TYPE 2 DIABETES MELLITUS WITH HYPERGLYCEMIA, WITH LONG-TERM CURRENT USE OF INSULIN (HCC): ICD-10-CM

## 2022-08-26 DIAGNOSIS — I10 HTN (HYPERTENSION), BENIGN: ICD-10-CM

## 2022-08-26 LAB
A/G RATIO: 2.5 (ref 1.1–2.2)
ALBUMIN SERPL-MCNC: 4.7 G/DL (ref 3.4–5)
ALP BLD-CCNC: 84 U/L (ref 40–129)
ALT SERPL-CCNC: 23 U/L (ref 10–40)
ANION GAP SERPL CALCULATED.3IONS-SCNC: 15 MMOL/L (ref 3–16)
AST SERPL-CCNC: 13 U/L (ref 15–37)
BASOPHILS ABSOLUTE: 0 K/UL (ref 0–0.2)
BASOPHILS RELATIVE PERCENT: 0.4 %
BILIRUB SERPL-MCNC: 0.6 MG/DL (ref 0–1)
BUN BLDV-MCNC: 10 MG/DL (ref 7–20)
CALCIUM SERPL-MCNC: 9.6 MG/DL (ref 8.3–10.6)
CHLORIDE BLD-SCNC: 98 MMOL/L (ref 99–110)
CHOLESTEROL, FASTING: 170 MG/DL (ref 0–199)
CO2: 27 MMOL/L (ref 21–32)
CREAT SERPL-MCNC: 0.8 MG/DL (ref 0.9–1.3)
CREATININE URINE: 39 MG/DL (ref 39–259)
EOSINOPHILS ABSOLUTE: 0 K/UL (ref 0–0.6)
EOSINOPHILS RELATIVE PERCENT: 0.8 %
GFR AFRICAN AMERICAN: >60
GFR NON-AFRICAN AMERICAN: >60
GLUCOSE BLD-MCNC: 218 MG/DL (ref 70–99)
HCT VFR BLD CALC: 48.5 % (ref 40.5–52.5)
HDLC SERPL-MCNC: 35 MG/DL (ref 40–60)
HEMOGLOBIN: 16.3 G/DL (ref 13.5–17.5)
LDL CHOLESTEROL CALCULATED: ABNORMAL MG/DL
LDL CHOLESTEROL DIRECT: 81 MG/DL
LYMPHOCYTES ABSOLUTE: 2 K/UL (ref 1–5.1)
LYMPHOCYTES RELATIVE PERCENT: 37.8 %
MCH RBC QN AUTO: 30.4 PG (ref 26–34)
MCHC RBC AUTO-ENTMCNC: 33.6 G/DL (ref 31–36)
MCV RBC AUTO: 90.3 FL (ref 80–100)
MICROALBUMIN UR-MCNC: <1.2 MG/DL
MICROALBUMIN/CREAT UR-RTO: NORMAL MG/G (ref 0–30)
MONOCYTES ABSOLUTE: 0.4 K/UL (ref 0–1.3)
MONOCYTES RELATIVE PERCENT: 8.1 %
NEUTROPHILS ABSOLUTE: 2.8 K/UL (ref 1.7–7.7)
NEUTROPHILS RELATIVE PERCENT: 52.9 %
PDW BLD-RTO: 14.5 % (ref 12.4–15.4)
PLATELET # BLD: 120 K/UL (ref 135–450)
PMV BLD AUTO: 7.2 FL (ref 5–10.5)
POTASSIUM SERPL-SCNC: 4.2 MMOL/L (ref 3.5–5.1)
RBC # BLD: 5.37 M/UL (ref 4.2–5.9)
SODIUM BLD-SCNC: 140 MMOL/L (ref 136–145)
TOTAL PROTEIN: 6.6 G/DL (ref 6.4–8.2)
TRIGLYCERIDE, FASTING: 369 MG/DL (ref 0–150)
VLDLC SERPL CALC-MCNC: ABNORMAL MG/DL
WBC # BLD: 5.4 K/UL (ref 4–11)

## 2022-08-26 PROCEDURE — 36415 COLL VENOUS BLD VENIPUNCTURE: CPT

## 2022-08-26 RX ORDER — HYDROXYZINE HYDROCHLORIDE 25 MG/1
TABLET, FILM COATED ORAL
Qty: 120 TABLET | Refills: 0 | Status: SHIPPED | OUTPATIENT
Start: 2022-08-26

## 2022-08-29 NOTE — RESULT ENCOUNTER NOTE
No protein in urine    Cholesterol panel shows elevated triglycerides at double the normal limit. Strongly encourage him to watch his diet intake. We could consider adding another medication for triglyceride management if he is open to this.     Kidney function is normal    CBC is grossly normal.  Slightly low platelet count

## 2022-08-30 ENCOUNTER — TELEPHONE (OUTPATIENT)
Dept: FAMILY MEDICINE CLINIC | Age: 62
End: 2022-08-30

## 2022-08-30 NOTE — TELEPHONE ENCOUNTER
Herminia with [facility] calling to just update PCP as FYI that they are working with the patient to get him more mental health resources, and that if Ra had any questions he could reach her back at [phone].

## 2022-09-22 ENCOUNTER — HOSPITAL ENCOUNTER (EMERGENCY)
Age: 62
Discharge: HOME | End: 2022-09-22
Attending: EMERGENCY MEDICINE
Payer: MEDICARE

## 2022-09-22 VITALS
OXYGEN SATURATION: 98 % | SYSTOLIC BLOOD PRESSURE: 136 MMHG | HEART RATE: 80 BPM | DIASTOLIC BLOOD PRESSURE: 62 MMHG | RESPIRATION RATE: 18 BRPM | BODY MASS INDEX: 40.29 KG/M2 | TEMPERATURE: 97.9 F | WEIGHT: 236 LBS | HEIGHT: 64 IN

## 2022-09-22 DIAGNOSIS — Z86.39 H/O DIABETIC NEUROPATHY: ICD-10-CM
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
## 2022-09-22 PROCEDURE — 99283 EMERGENCY DEPT VISIT LOW MDM: CPT

## 2022-09-22 ASSESSMENT — PAIN - FUNCTIONAL ASSESSMENT
PAIN_FUNCTIONAL_ASSESSMENT: NONE - DENIES PAIN
PAIN_FUNCTIONAL_ASSESSMENT: NONE - DENIES PAIN

## 2022-09-23 NOTE — ED PROVIDER NOTES
Emergency Department Physician Note     Location: [place] [address] EMERGENCY DEPARTMENT  9/22/2022    CHIEF COMPLAINT  Fall (PT presents to ED via [facility] with complaints of frequent falls. Pt states already been seen at [facility] and was told it was diabetic neuropathy. Pt denies any pain or injury at this time. Denies hitting head. States lump in stomach X 1 year.)      HISTORY OF PRESENT ILLNESS  Carine Koroma is a 63 y.o. male presents to the ED with fall, brought in by EMS, he reports he did not really want to come, but EMS told him he should come get checked out for the lump in his abdomen that has been there for a year or more, not painful, no vomiting/diarrhea constipation, he has even seen a surgeon and they did not feel it was anything of concern, not a hernia, worked up for falls at [facility] and told he has diabetic neuropathy that causes his falls. Triage note reports frequent falls, but patient reported it had been several months since he had fallen prior to today, no neck pain initially, but head injury, denies loss of consciousness, not on blood thinners, bleeding disorders, no new neck or back pain at this time, no chest pain or shortness of breath, no dizziness, no recent fevers, no other complaints, modifying factors or associated symptoms. He states he does not want to be further evaluated, and is already ready to go, has called a ride. I have reviewed the following from the nursing documentation.     Past Medical History:   Diagnosis Date    Arthritis     Asthma     CAD (coronary artery disease)     COPD (chronic obstructive pulmonary disease) ([facility])     Dr. Alejandra Hill at [facility] told the patient that he did not have COPD, just asthma    Emphysema of lung ([facility] [address]     Fatty liver     GERD (gastroesophageal reflux disease)     Hyperlipidemia     Hypertension     Medical history reviewed with no changes     MI, old     Sleep apnea     pt wears cpap at night. states does not have cpap    Type II or unspecified type diabetes mellitus without mention of complication, not stated as uncontrolled      Past Surgical History:   Procedure Laterality Date    CARDIAC PACEMAKER PLACEMENT  2011    NOT MRI COMPATIABLE OLD LEADS [facility].  pace maker difib    CARPAL TUNNEL RELEASE Bilateral 2013    CATARACT REMOVAL WITH IMPLANT Left 2/28/14    COLONOSCOPY      CORONARY ANGIOPLASTY WITH STENT PLACEMENT  2001,2008    CYST REMOVAL  1982    coccyx area    DIAGNOSTIC CARDIAC CATH LAB PROCEDURE      ENDOSCOPY, COLON, DIAGNOSTIC      ERCP  9/15/2013    ERCP  10/11/13    WITH STENT REMOVAL    FOOT SURGERY Right 07/09/2018     REPAIR CALCANEAL SPUR, REPAIR OF ACHILLES TENDON RIGHT FOOT    NERVE SURGERY Bilateral 12/1/2020    BILATERAL LUMBAR THREE LUMBAR FOUR LUMBAR FIVE DORSAL RAMUS  RADIOFREQUENCY ABLATION SITE CONFIRMED BY FLUOROSCOPY performed by Lamberto Goetz MD at [address]      back stimulator in lower back    PACEMAKER PLACEMENT      ICD    PAIN MANAGEMENT PROCEDURE Bilateral 5/26/2020    BILATERAL LUMBAR THREE, LUMBAR FOUR, LUMBAR FIVE DORSAL RAMUS MEDIAL BRANCH BLOCK SITE CONFIRMED BY FLUOROSCOPY performed by Labmerto Goetz MD at [address] Bilateral 6/9/2020    BILATERAL LUMBAR THREE, LUMBAR FOUR, LUMBAR FIVE DORSAL RAMUS MEDIAL BRANCH BLOCK SITE CONFIRMED BY FLUOROSCOPY performed by Lamberto Goetz MD at [address] Left 1/12/2021    LEFT SACROILIAC JOINT INJECTION SITE CONFIRMED BY FLUOROSCOPY performed by Lamberto Goetz MD at [place] [id number] Right 10/31/2018    GASTROCNEMIUS RECESSION RIGHT FOOT performed by Perico Duran DPM at [address] Right 10/31/2018    REMOVAL OF CALCANEAL SPUR, ACHILLES TENDON REPAIR RIGHT LOWER EXTREMITY performed by Perico Duran DPM at [address] [facility]    and colonoscopy with biopsies taken    UPPER GASTROINTESTINAL ENDOSCOPY  13    EUS    UPPER GASTROINTESTINAL ENDOSCOPY  9/15/2013     Family History   Problem Relation Age of Onset    Heart Disease Mother     Heart Failure Mother     Cancer Father     Diabetes Maternal Grandmother     Heart Failure Maternal Uncle     Hypertension Maternal Uncle     Asthma Neg Hx     Emphysema Neg Hx      Social History     Socioeconomic History    Marital status:      Spouse name: Not on file    Number of children: Not on file    Years of education: Not on file    Highest education level: Not on file   Occupational History    Not on file   Tobacco Use    Smoking status: Former     Packs/day: 2.00     Years: 20.00     Pack years: 40.00     Types: Cigarettes     Quit date: 2001     Years since quittin.3    Smokeless tobacco: Never    Tobacco comments:     PASSIVE SMOKER   Vaping Use    Vaping Use: Never used   Substance and Sexual Activity    Alcohol use: No     Alcohol/week: 0.0 standard drinks    Drug use: No    Sexual activity: Never   Other Topics Concern    Not on file   Social History Narrative    Not on file     Social Determinants of Health     Financial Resource Strain: Not on file   Food Insecurity: Not on file   Transportation Needs: Not on file   Physical Activity: Not on file   Stress: Not on file   Social Connections: Not on file   Intimate Partner Violence: Not on file   Housing Stability: Not on file     No current facility-administered medications for this encounter.      Current Outpatient Medications   Medication Sig Dispense Refill    aspirin 325 MG tablet Take 325 mg by mouth daily      Omega-3 Fatty Acids (FISH OIL) 1000 MG CAPS Take 1,000 mg by mouth 2 times daily      hydrOXYzine HCl (ATARAX) 25 MG tablet take 1 tablet by oral route 4 times every day as needed for itching 120 tablet 0    famotidine (PEPCID) 20 MG tablet Take 20 mg by mouth 2 times daily      metoprolol succinate (TOPROL XL) 50 MG extended release tablet TAKE ONE TABLET BY MOUTH TWICE DAILY 180 tablet 3    furosemide (LASIX) 20 MG tablet TAKE ONE TABLET ON MONDAY and THURSDAY 90 tablet 1    potassium chloride (KLOR-CON M) 20 MEQ extended release tablet TAKE ONE TABLET WITH LASIX ON Monday AND THURSDAYS 90 tablet 1    lisinopril (PRINIVIL;ZESTRIL) 5 MG tablet Take 1 tablet by mouth daily 30 tablet 3    tamsulosin (FLOMAX) 0.4 MG capsule Take 1 capsule by mouth at bedtime 30 capsule 3    clopidogrel (PLAVIX) 75 MG tablet Take 1 tablet by mouth daily 30 tablet 3    Dulaglutide (TRULICITY) 9.37 DJ/9.6SS SOPN Inject 3 mg into the skin once a week       insulin lispro (HUMALOG) 100 UNIT/ML injection vial Inject 15 Units into the skin 2 times daily 15 UNITS -AM AND 20 UNITS WITH DINNER      PROAIR  (90 Base) MCG/ACT inhaler Inhale 2 puffs into the lungs every 4 hours as needed      Fexofenadine HCl (ALLEGRA PO) Take 180 mg by mouth daily      Esomeprazole Magnesium (NEXIUM PO) Take 40 mg by mouth daily      JARDIANCE 25 MG tablet Take 25 mg by mouth daily       insulin detemir (LEVEMIR) 100 UNIT/ML injection vial Inject 74 Units into the skin nightly      metFORMIN (GLUCOPHAGE) 1000 MG tablet TAKE ONE TABLET BY MOUTH 2 TIMES DAILY WITH MORNING AND EVENING MEALS 60 tablet 0    gabapentin (NEURONTIN) 100 MG capsule TAKE 2 CAPSULES BY MOUTH 3 TIMES DAILY (Patient taking differently: Take by mouth 3 times daily. ) 180 capsule 0    fluticasone (FLONASE) 50 MCG/ACT nasal spray USE 1 SPRAY IN EACH NOSTRIL EVERY DAY (Patient taking differently: 2 sprays daily) 16 g 0    TRUE METRIX BLOOD GLUCOSE TEST strip CHECK SUGARS TWICE A DAY AS DIRECTED, DX: E11.9 100 each 5    atorvastatin (LIPITOR) 80 MG tablet TAKE ONE TABLET BY MOUTH DAILY 30 tablet 11    UNIFINE PENTIPS 31G X 8 MM MISC USE AS DIRECTED FOR INSULIN .00 100 each 5    TRUEPLUS LANCETS 30G MISC CHECK BLOOD SUGAR TWICE A DAY DX E11.9 100 each 11    DULoxetine (CYMBALTA) 60 MG extended release capsule Take 60 mg by mouth nightly      divalproex (DEPAKOTE) 500 MG DR tablet Take 1,000 mg by mouth nightly       Allergies   Allergen Reactions    Other      VASCEPA CAUSED RASH AND ITCHING    Pcn [Penicillins] Hives       REVIEW OF SYSTEMS  10 systems reviewed, pertinent positives per HPI otherwise noted to be negative. PHYSICAL EXAM   /62   Pulse 80   Temp 97.9 °F (36.6 °C) (Oral)   Resp 18   Ht 5' 4\" (1.626 m)   Wt 236 lb (107 kg)   SpO2 98%   BMI 40.51 kg/m²   GENERAL APPEARANCE: Awake and alert. Cooperative. No acute distress  HEAD: Normocephalic. Atraumatic. No suh's sign. EYES: PERRL. EOM's grossly intact. No scleral icterus. No drainage. No periorbital ecchymosis. ENT: Mucous membranes are moist. Airway patent. No stridor. No epistaxis. No otorrhea or rhinorrhea. NECK: Supple. No rigidity, trachea midline  HEART: RRR. No murmurs/gallups/rubs  LUNGS: Respirations unlabored, Lungs are clear to ausculation bilaterally, no wheezes/crackles/rhonchi   ABDOMEN: Soft. Non-distended. Non-tender. No guarding, no rebound tenderness, no rigidity. Normal bowel sounds. No McBurney's point tenderness, negative Rovsing's sign, negative Solano's sign, rotund abdomen, left side of the abdomen has elevated appearance as compared to right and appears to have more adipose tissue versus large lipoma, nontender, no ecchymosis or erythema, no palpable hernia defects  EXTREMITIES: No peripheral edema. Moves all extremities equally. No obvious deformities. SKIN: Warm and dry. No acute rashes. NEUROLOGICAL: Alert and oriented x4. No gross facial drooping. Strength 5/5, sensation intact. No truncal ataxia. 2+ DTR's present in the patellar bilaterally, normal speech, steady gait  PSYCHIATRIC: Normal mood and odd affect. PROCEDURES  -If applicable    I am the primary clinician of record. ED COURSE/MDM  Patient seen and evaluated. Old records reviewed.  Labs and imaging reviewed and results discussed with patient. 63 y.o. male status post fall, no visible outward injuries, when I went in patient reported he wants to leave, did not want any further evaluation or medication, based on exam I am not concerned about the abdominal mass, feels most consistent with lipoma, no current concern for acute surgical abdomen, no peritoneal signs, denies any pain at this time, initially hypertensive but blood pressure improved, vitals stable, I feel he is able to make his own decisions and can ambulate with steady gait, he called for a ride to come get him, explained that without further evaluation he will be signing out [address], he verbalized understanding, strict return precautions given, all questions answered, will return if any worsening symptoms or new concerns, see AVS for further discharge information, patient verbalized understanding of plan, felt comfortable going home. CLINICAL IMPRESSION  1. Fall, initial encounter    2. H/O diabetic neuropathy        Blood pressure 136/62, pulse 80, temperature 97.9 °F (36.6 °C), temperature source Oral, resp. rate 18, height 5' 4\" (1.626 m), weight 236 lb (107 kg), SpO2 98 %. [id number] left AGAINST MEDICAL ADVICE in stable condition.                    Spencer Diane,   09/26/22 [id number]

## 2022-10-03 ENCOUNTER — HOSPITAL ENCOUNTER (OUTPATIENT)
Age: 62
Setting detail: OBSERVATION
Discharge: HOME | End: 2022-10-04
Attending: EMERGENCY MEDICINE | Admitting: INTERNAL MEDICINE
Payer: MEDICARE

## 2022-10-03 ENCOUNTER — APPOINTMENT (OUTPATIENT)
Dept: GENERAL RADIOLOGY | Age: 62
End: 2022-10-03
Payer: MEDICARE

## 2022-10-03 ENCOUNTER — APPOINTMENT (OUTPATIENT)
Dept: CT IMAGING | Age: 62
End: 2022-10-03
Payer: MEDICARE

## 2022-10-03 DIAGNOSIS — R53.1 GENERALIZED WEAKNESS: Primary | ICD-10-CM

## 2022-10-03 DIAGNOSIS — Z60.2 LIVES ALONE: ICD-10-CM

## 2022-10-03 DIAGNOSIS — R26.2 TROUBLE WALKING: ICD-10-CM

## 2022-10-03 DIAGNOSIS — E87.20 LACTIC ACIDOSIS: ICD-10-CM

## 2022-10-03 LAB
A/G RATIO: 2.2 (ref 1.1–2.2)
ALBUMIN SERPL-MCNC: 4.4 G/DL (ref 3.4–5)
ALP BLD-CCNC: 85 U/L (ref 40–129)
ALT SERPL-CCNC: 27 U/L (ref 10–40)
ANION GAP SERPL CALCULATED.3IONS-SCNC: 12 MMOL/L (ref 3–16)
ANION GAP SERPL CALCULATED.3IONS-SCNC: 15 MMOL/L (ref 3–16)
AST SERPL-CCNC: 17 U/L (ref 15–37)
BASE EXCESS VENOUS: 0 MMOL/L (ref -3–3)
BASOPHILS ABSOLUTE: 0 K/UL (ref 0–0.2)
BASOPHILS RELATIVE PERCENT: 0.4 %
BILIRUB SERPL-MCNC: 0.6 MG/DL (ref 0–1)
BILIRUBIN URINE: NEGATIVE
BLOOD, URINE: NEGATIVE
BUN BLDV-MCNC: 11 MG/DL (ref 7–20)
BUN BLDV-MCNC: 11 MG/DL (ref 7–20)
CALCIUM SERPL-MCNC: 9.2 MG/DL (ref 8.3–10.6)
CALCIUM SERPL-MCNC: 9.6 MG/DL (ref 8.3–10.6)
CARBOXYHEMOGLOBIN: 1.4 % (ref 0–1.5)
CHLORIDE BLD-SCNC: 89 MMOL/L (ref 99–110)
CHLORIDE BLD-SCNC: 93 MMOL/L (ref 99–110)
CLARITY: CLEAR
CO2: 23 MMOL/L (ref 21–32)
CO2: 25 MMOL/L (ref 21–32)
COLOR: YELLOW
COMMENT UA: ABNORMAL
CREAT SERPL-MCNC: 0.7 MG/DL (ref 0.9–1.3)
CREAT SERPL-MCNC: 0.7 MG/DL (ref 0.9–1.3)
EOSINOPHILS ABSOLUTE: 0 K/UL (ref 0–0.6)
EOSINOPHILS RELATIVE PERCENT: 0.7 %
ETHANOL: NORMAL MG/DL (ref 0–0.08)
GFR AFRICAN AMERICAN: >60
GFR AFRICAN AMERICAN: >60
GFR NON-AFRICAN AMERICAN: >60
GFR NON-AFRICAN AMERICAN: >60
GLUCOSE BLD-MCNC: 236 MG/DL (ref 70–99)
GLUCOSE BLD-MCNC: 276 MG/DL (ref 70–99)
GLUCOSE URINE: >=1000 MG/DL
HCO3 VENOUS: 25.4 MMOL/L (ref 23–29)
HCT VFR BLD CALC: 45.6 % (ref 40.5–52.5)
HEMOGLOBIN: 15.8 G/DL (ref 13.5–17.5)
KETONES, URINE: ABNORMAL MG/DL
LACTIC ACID: 3.1 MMOL/L (ref 0.4–2)
LACTIC ACID: 3.2 MMOL/L (ref 0.4–2)
LEUKOCYTE ESTERASE, URINE: NEGATIVE
LYMPHOCYTES ABSOLUTE: 1.6 K/UL (ref 1–5.1)
LYMPHOCYTES RELATIVE PERCENT: 36.2 %
MAGNESIUM: 1.7 MG/DL (ref 1.8–2.4)
MCH RBC QN AUTO: 30.2 PG (ref 26–34)
MCHC RBC AUTO-ENTMCNC: 34.5 G/DL (ref 31–36)
MCV RBC AUTO: 87.5 FL (ref 80–100)
METHEMOGLOBIN VENOUS: 0.3 %
MICROSCOPIC EXAMINATION: ABNORMAL
MONOCYTES ABSOLUTE: 0.4 K/UL (ref 0–1.3)
MONOCYTES RELATIVE PERCENT: 8 %
NEUTROPHILS ABSOLUTE: 2.5 K/UL (ref 1.7–7.7)
NEUTROPHILS RELATIVE PERCENT: 54.7 %
NITRITE, URINE: NEGATIVE
O2 CONTENT, VEN: 17 VOL %
O2 SAT, VEN: 72 %
O2 THERAPY: NORMAL
PCO2, VEN: 43.7 MMHG (ref 40–50)
PDW BLD-RTO: 14.4 % (ref 12.4–15.4)
PH UA: 5 (ref 5–8)
PH VENOUS: 7.38 (ref 7.35–7.45)
PLATELET # BLD: 107 K/UL (ref 135–450)
PMV BLD AUTO: 7 FL (ref 5–10.5)
PO2, VEN: 38.9 MMHG (ref 25–40)
POTASSIUM SERPL-SCNC: 4 MMOL/L (ref 3.5–5.1)
POTASSIUM SERPL-SCNC: 4.5 MMOL/L (ref 3.5–5.1)
PRO-BNP: 182 PG/ML (ref 0–124)
PROTEIN UA: NEGATIVE MG/DL
RBC # BLD: 5.21 M/UL (ref 4.2–5.9)
RBC UA: ABNORMAL /HPF (ref 0–4)
SARS-COV-2, NAAT: NOT DETECTED
SODIUM BLD-SCNC: 127 MMOL/L (ref 136–145)
SODIUM BLD-SCNC: 130 MMOL/L (ref 136–145)
SPECIFIC GRAVITY UA: 1.01 (ref 1–1.03)
TCO2 CALC VENOUS: 27 MMOL/L
TOTAL PROTEIN: 6.4 G/DL (ref 6.4–8.2)
TROPONIN: <0.01 NG/ML
URINE TYPE: ABNORMAL
UROBILINOGEN, URINE: 0.2 E.U./DL
WBC # BLD: 4.6 K/UL (ref 4–11)
WBC UA: ABNORMAL /HPF (ref 0–5)

## 2022-10-03 PROCEDURE — 87635 SARS-COV-2 COVID-19 AMP PRB: CPT

## 2022-10-03 PROCEDURE — 80053 COMPREHEN METABOLIC PANEL: CPT

## 2022-10-03 PROCEDURE — G0378 HOSPITAL OBSERVATION PER HR: HCPCS

## 2022-10-03 PROCEDURE — 96360 HYDRATION IV INFUSION INIT: CPT

## 2022-10-03 PROCEDURE — 82803 BLOOD GASES ANY COMBINATION: CPT

## 2022-10-03 PROCEDURE — 83880 ASSAY OF NATRIURETIC PEPTIDE: CPT

## 2022-10-03 PROCEDURE — 81001 URINALYSIS AUTO W/SCOPE: CPT

## 2022-10-03 PROCEDURE — 71045 X-RAY EXAM CHEST 1 VIEW: CPT

## 2022-10-03 PROCEDURE — 84484 ASSAY OF TROPONIN QUANT: CPT

## 2022-10-03 PROCEDURE — 93005 ELECTROCARDIOGRAM TRACING: CPT | Performed by: EMERGENCY MEDICINE

## 2022-10-03 PROCEDURE — 36415 COLL VENOUS BLD VENIPUNCTURE: CPT

## 2022-10-03 PROCEDURE — 96361 HYDRATE IV INFUSION ADD-ON: CPT

## 2022-10-03 PROCEDURE — 1200000000 HC SEMI PRIVATE

## 2022-10-03 PROCEDURE — 83605 ASSAY OF LACTIC ACID: CPT

## 2022-10-03 PROCEDURE — 70450 CT HEAD/BRAIN W/O DYE: CPT

## 2022-10-03 PROCEDURE — 2580000003 HC RX 258: Performed by: EMERGENCY MEDICINE

## 2022-10-03 PROCEDURE — 99285 EMERGENCY DEPT VISIT HI MDM: CPT

## 2022-10-03 PROCEDURE — 83735 ASSAY OF MAGNESIUM: CPT

## 2022-10-03 PROCEDURE — 82077 ASSAY SPEC XCP UR&BREATH IA: CPT

## 2022-10-03 PROCEDURE — 85025 COMPLETE CBC W/AUTO DIFF WBC: CPT

## 2022-10-03 RX ORDER — 0.9 % SODIUM CHLORIDE 0.9 %
500 INTRAVENOUS SOLUTION INTRAVENOUS ONCE
Status: COMPLETED | OUTPATIENT
Start: 2022-10-03 | End: 2022-10-03

## 2022-10-03 RX ORDER — 0.9 % SODIUM CHLORIDE 0.9 %
1000 INTRAVENOUS SOLUTION INTRAVENOUS ONCE
Status: DISCONTINUED | OUTPATIENT
Start: 2022-10-03 | End: 2022-10-03

## 2022-10-03 RX ADMIN — SODIUM CHLORIDE 500 ML: 9 INJECTION, SOLUTION INTRAVENOUS at 16:48

## 2022-10-03 RX ADMIN — SODIUM CHLORIDE 500 ML: 9 INJECTION, SOLUTION INTRAVENOUS at 18:44

## 2022-10-03 SDOH — SOCIAL STABILITY - SOCIAL INSECURITY: PROBLEMS RELATED TO LIVING ALONE: Z60.2

## 2022-10-03 ASSESSMENT — ENCOUNTER SYMPTOMS
DIARRHEA: 0
COLOR CHANGE: 0
ABDOMINAL PAIN: 0
VOMITING: 0
CHEST TIGHTNESS: 0
SORE THROAT: 0
BACK PAIN: 0
NAUSEA: 0
EYE PAIN: 0
SHORTNESS OF BREATH: 0

## 2022-10-03 ASSESSMENT — PAIN - FUNCTIONAL ASSESSMENT
PAIN_FUNCTIONAL_ASSESSMENT: NONE - DENIES PAIN
PAIN_FUNCTIONAL_ASSESSMENT: NONE - DENIES PAIN

## 2022-10-03 NOTE — ED NOTES
Answered PT's call light. Requested ice water. Checked with Dr. Jeri Brennan, okay to give. Ice water with straw given to PT. PT aware waiting for CT results to admit to [facility].      Fermin Pizarro, EMT-P  10/03/22 1943

## 2022-10-03 NOTE — ED NOTES
Called [phone] @ [phone] to start transfer to [facility]. Awaiting call back.      Analy Garcia  10/03/22 [id number]

## 2022-10-03 NOTE — ED PROVIDER NOTES
EMERGENCY DEPARTMENT ENCOUNTER        Pt Name: Ector Knowles MRN: [id number]  Birthdate 1960  Date of evaluation: 10/3/2022  Provider: Lisa Paul MD  PCP: ANA Dos Santos - CNP      CHIEF COMPLAINT       Chief Complaint   Patient presents with    Fatigue     Arrived via EMS. States he has had weakness for years but feels more weak in arms and legs today. HISTORY OFPRESENT ILLNESS   (Location/Symptom, Timing/Onset, Context/Setting, Quality, Duration, Modifying Factors,Severity)  Note limiting factors. Ector Knowles is a 63 y.o. male presenting today due to concern for developing fatigue today when he woke up to the point where he had generalized weakness in both arms and legs and thought that he may fall. He reports having a history of being lightheaded in the past but denies any lightheadedness or dizziness today. He denies any falls or trauma today and states the last time he fell was last time he went to the emergency department on September 22, 2022. He denies any headache or neck pain. He denies any chest pain or shortness of breath. He denies any vomiting or diarrhea. He denies any urinary complaints. He is still eating normally. He lives by himself. He denies any suicidal thoughts. Prior to September 22, the last time he felt bad enough like this was months ago when he went to the ED. He denies any palpitations or feeling like his AICD fired recently. REVIEW OF SYSTEMS    (2-9 systems for level 4, 10 or more for level 5)     Review of Systems   Constitutional:  Positive for fatigue. HENT:  Negative for sore throat. Eyes:  Negative for pain. Respiratory:  Negative for chest tightness and shortness of breath. Cardiovascular:  Negative for chest pain and palpitations. Gastrointestinal:  Negative for abdominal pain, diarrhea, nausea and vomiting. Genitourinary:  Negative for dysuria and flank pain.    Musculoskeletal: Positive for gait problem (felt weak earlier today, improved now). Negative for back pain and neck pain. Skin:  Negative for color change and wound (denies any recent falls or trauma in the past 1.5 weeks since being seen in the ED). Neurological:  Positive for weakness (generalized all over body, states sometimes when he feels this way he thinks his legs may give out although denied this happening today). Negative for dizziness, syncope, light-headedness, numbness and headaches. Psychiatric/Behavioral:  Negative for confusion and suicidal ideas. The patient is not nervous/anxious. Positives and Pertinent negatives as per HPI. PASTMEDICAL HISTORY     Past Medical History:   Diagnosis Date    Arthritis     Asthma     CAD (coronary artery disease)     COPD (chronic obstructive pulmonary disease) ([facility])     Dr. Reinaldo Carr at [facility] told the patient that he did not have COPD, just asthma    Emphysema of lung ([address]     Fatty liver     GERD (gastroesophageal reflux disease)     Hyperlipidemia     Hypertension     Medical history reviewed with no changes     MI, old     Sleep apnea     pt wears cpap at night. states does not have cpap    Type II or unspecified type diabetes mellitus without mention of complication, not stated as uncontrolled          SURGICAL HISTORY       Past Surgical History:   Procedure Laterality Date    CARDIAC PACEMAKER PLACEMENT  2011    NOT MRI COMPATIABLE OLD LEADS [facility].  pace maker difib    CARPAL TUNNEL RELEASE Bilateral 2013    CATARACT REMOVAL WITH IMPLANT Left 2/28/14    COLONOSCOPY      CORONARY ANGIOPLASTY WITH STENT PLACEMENT  2001,2008    CYST REMOVAL  1982    coccyx area    DIAGNOSTIC CARDIAC CATH LAB PROCEDURE      ENDOSCOPY, COLON, DIAGNOSTIC      ERCP  9/15/2013    ERCP  10/11/13    WITH STENT REMOVAL    FOOT SURGERY Right 07/09/2018     REPAIR CALCANEAL SPUR, REPAIR OF ACHILLES TENDON RIGHT FOOT    NERVE SURGERY Bilateral 12/1/2020    BILATERAL LUMBAR THREE LUMBAR FOUR LUMBAR FIVE DORSAL RAMUS  RADIOFREQUENCY ABLATION SITE CONFIRMED BY FLUOROSCOPY performed by Nelli Mccollum MD at [address]      back stimulator in lower back    PACEMAKER PLACEMENT      ICD    PAIN MANAGEMENT PROCEDURE Bilateral 5/26/2020    BILATERAL LUMBAR THREE, LUMBAR FOUR, LUMBAR FIVE DORSAL RAMUS MEDIAL BRANCH BLOCK SITE CONFIRMED BY FLUOROSCOPY performed by Nelli Mccollum MD at [address] Bilateral 6/9/2020    BILATERAL LUMBAR THREE, LUMBAR FOUR, LUMBAR FIVE DORSAL RAMUS MEDIAL BRANCH BLOCK SITE CONFIRMED BY FLUOROSCOPY performed by Nelli Mccollum MD at [address] Left 1/12/2021    LEFT SACROILIAC JOINT INJECTION SITE CONFIRMED BY FLUOROSCOPY [facility] Right 10/31/2018    GASTROCNEMIUS RECESSION RIGHT FOOT performed by Sammi Stephenson DPM at [address] Right 10/31/2018    REMOVAL OF CALCANEAL SPUR, ACHILLES TENDON REPAIR RIGHT LOWER EXTREMITY performed by Samim Stephenson DPM at [address] [facility]  7/18/13    and colonoscopy with biopsies taken    UPPER GASTROINTESTINAL ENDOSCOPY  8/23/13    EUS    UPPER GASTROINTESTINAL ENDOSCOPY  9/15/2013         CURRENT MEDICATIONS       Discharge Medication List as of 10/4/2022  2:23 PM        CONTINUE these medications which have NOT CHANGED    Details   fluticasone (FLONASE) 50 MCG/ACT nasal spray 2 sprays by Each Nostril route daily as needed for RhinitisHistorical Med      gabapentin (NEURONTIN) 100 MG capsule Take 200 mg by mouth 3 times daily. Historical Med      insulin lispro protamine & lispro (HUMALOG MIX) (75-25) 100 UNIT per ML SUSP injection vial Inject subcutaneously twice daily 15 units with breakfast and 20 units with dinnerHistorical Med      Omega-3 Fatty Acids (FISH OIL) 1000 MG CAPS Take 1,000 mg by mouth 2 times dailyHistorical Med      hydrOXYzine HCl (ATARAX) 25 MG tablet take 1 tablet by oral route 4 times every day as needed for itching, Disp-120 tablet, R-0Normal      famotidine (PEPCID) 20 MG tablet Take 20 mg by mouth 2 times dailyHistorical Med      metoprolol succinate (TOPROL XL) 50 MG extended release tablet TAKE ONE TABLET BY MOUTH TWICE DAILY, Disp-180 tablet, R-3Normal      furosemide (LASIX) 20 MG tablet TAKE ONE TABLET ON MONDAY and THURSDAY, Disp-90 tablet, R-1Normal      potassium chloride (KLOR-CON M) 20 MEQ extended release tablet TAKE ONE TABLET WITH LASIX ON Monday AND THURSDAYS, Disp-90 tablet, R-1Normal      lisinopril (PRINIVIL;ZESTRIL) 5 MG tablet Take 1 tablet by mouth daily, Disp-30 tablet, R-3Normal      tamsulosin (FLOMAX) 0.4 MG capsule Take 1 capsule by mouth at bedtime, Disp-30 capsule, R-3Normal      clopidogrel (PLAVIX) 75 MG tablet Take 1 tablet by mouth daily, Disp-30 tablet, R-3Normal      Dulaglutide 3 MG/0.5ML SOPN Inject 3 mg into the skin once a week Historical Med      Fexofenadine HCl (ALLEGRA PO) Take 180 mg by mouth dailyHistorical Med      esomeprazole (NEXIUM) 40 MG delayed release capsule Take 40 mg by mouth dailyHistorical Med      JARDIANCE 25 MG tablet Take 25 mg by mouth daily , DAWHistorical Med      insulin detemir (LEVEMIR) 100 UNIT/ML injection vial Inject 74 Units into the skin nightlyHistorical Med      metFORMIN (GLUCOPHAGE) 1000 MG tablet TAKE ONE TABLET BY MOUTH 2 TIMES DAILY WITH MORNING AND EVENING MEALS, Disp-60 tablet, R-0Normal      atorvastatin (LIPITOR) 80 MG tablet TAKE ONE TABLET BY MOUTH DAILY, Disp-30 tablet, R-11      DULoxetine (CYMBALTA) 60 MG extended release capsule Take 60 mg by mouth nightlyHistorical Med      divalproex (DEPAKOTE) 500 MG DR tablet Take 500 mg by mouth every morning and 1000 mg every evening. Historical Med             ALLERGIES     Other and Pcn [penicillins]    FAMILY HISTORY       Family History   Problem Relation Age of Onset    Heart Disease Mother     Heart Failure Mother     Cancer Father     Diabetes Maternal Grandmother     Heart Failure Maternal Uncle     Hypertension Maternal Uncle     Asthma Neg Hx     Emphysema Neg Hx           SOCIAL HISTORY       Social History     Socioeconomic History    Marital status:      Spouse name: None    Number of children: None    Years of education: None    Highest education level: None   Tobacco Use    Smoking status: Former     Packs/day: 2.00     Years: 2.00     Pack years: 4.00     Types: Cigarettes     Quit date: 2001     Years since quittin.3    Smokeless tobacco: Never   Vaping Use    Vaping Use: Never used   Substance and Sexual Activity    Alcohol use: No     Alcohol/week: 0.0 standard drinks    Drug use: No    Sexual activity: Never       SCREENINGS    [place] Coma Scale  Eye Opening: Spontaneous  Best Verbal Response: Oriented  Best Motor Response: Obeys commands  Fifi Coma Scale Score: 15           PHYSICAL EXAM    (up to 7 for level 4, 8 or more for level 5)     ED Triage Vitals [10/03/22 1623]   BP Temp Temp src Pulse Resp SpO2 Height Weight   -- -- -- -- -- -- 5' 4\" (1.626 m) 239 lb (108.4 kg)       Physical Exam  Vitals and nursing note reviewed. Constitutional:       General: He is awake. He is not in acute distress. Appearance: Normal appearance. He is well-developed and well-groomed. He is morbidly obese. He is not ill-appearing, toxic-appearing or diaphoretic. HENT:      Head: Normocephalic and atraumatic. Right Ear: External ear normal.      Left Ear: External ear normal.      Nose: Nose normal.      Mouth/Throat:      Mouth: Mucous membranes are moist.      Pharynx: Oropharynx is clear. Eyes:      General: No scleral icterus. Right eye: No discharge. Left eye: No discharge. Conjunctiva/sclera: Conjunctivae normal.      Pupils: Pupils are equal, round, and reactive to light.    Neck:      Trachea: Trachea and phonation normal. No tracheal deviation. Cardiovascular:      Rate and Rhythm: Normal rate and regular rhythm. Pulses: Normal pulses. Pulmonary:      Effort: Pulmonary effort is normal. No tachypnea, bradypnea, accessory muscle usage or respiratory distress. Breath sounds: Normal breath sounds and air entry. No stridor. No decreased breath sounds, wheezing, rhonchi or rales. Chest:      Chest wall: No tenderness. Abdominal:      General: Abdomen is flat. Bowel sounds are normal. There is no distension. Palpations: Abdomen is soft. Tenderness: There is no abdominal tenderness. There is no guarding or rebound. Musculoskeletal:         General: No swelling, tenderness, deformity or signs of injury. Normal range of motion. Cervical back: Full passive range of motion without pain, normal range of motion and neck supple. No swelling, edema, deformity, erythema, signs of trauma, lacerations, rigidity, spasms, torticollis, tenderness, bony tenderness or crepitus. No pain with movement, spinous process tenderness or muscular tenderness. Normal range of motion. Thoracic back: No tenderness or bony tenderness. Lumbar back: No tenderness or bony tenderness. Right lower leg: No edema. Left lower leg: No edema. Comments: MSK: Normal range of motion of bilateral shoulders, elbows, wrists, hips, knees, ankles and nontender to palpation of all joints      Skin:     General: Skin is warm and dry. Coloration: Skin is not jaundiced or pale. Findings: No erythema or rash. Neurological:      General: No focal deficit present. Mental Status: He is alert and oriented to person, place, and time. Mental status is at baseline. GCS: GCS eye subscore is 4. GCS verbal subscore is 5. GCS motor subscore is 6. Cranial Nerves: No dysarthria or facial asymmetry. Sensory: Sensation is intact. No sensory deficit. Motor: Motor function is intact. No weakness, tremor, atrophy, abnormal muscle tone, seizure activity or pronator drift. Gait: Gait is intact. Psychiatric:         Attention and Perception: Attention normal.         Mood and Affect: Mood and affect normal. Mood is not anxious. Speech: Speech normal. Speech is not delayed or slurred. Behavior: Behavior normal. Behavior is cooperative.            DIAGNOSTIC RESULTS   :    Labs Reviewed   CBC WITH AUTO DIFFERENTIAL - Abnormal; Notable for the following components:       Result Value    Platelets 692 (*)     All other components within normal limits   COMPREHENSIVE METABOLIC PANEL - Abnormal; Notable for the following components:    Sodium 127 (*)     Chloride 89 (*)     Glucose 276 (*)     Creatinine 0.7 (*)     All other components within normal limits   MAGNESIUM - Abnormal; Notable for the following components:    Magnesium 1.70 (*)     All other components within normal limits   URINALYSIS WITH MICROSCOPIC - Abnormal; Notable for the following components:    Glucose, Ur >=1000 (*)     Ketones, Urine TRACE (*)     All other components within normal limits   LACTIC ACID - Abnormal; Notable for the following components:    Lactic Acid 3.1 (*)     All other components within normal limits   BRAIN NATRIURETIC PEPTIDE - Abnormal; Notable for the following components:    Pro- (*)     All other components within normal limits   LACTIC ACID - Abnormal; Notable for the following components:    Lactic Acid 3.2 (*)     All other components within normal limits   BASIC METABOLIC PANEL - Abnormal; Notable for the following components:    Sodium 130 (*)     Chloride 93 (*)     Glucose 236 (*)     Creatinine 0.7 (*)     All other components within normal limits   TSH WITH REFLEX TO FT4 - Abnormal; Notable for the following components:    TSH Reflex FT4 6.19 (*)     All other components within normal limits   BASIC METABOLIC PANEL W/ REFLEX TO MG FOR LOW K - Abnormal; Notable for the following components:    Sodium 133 (*)     Chloride 95 (*)     Glucose 184 (*)     Creatinine 0.8 (*)     All other components within normal limits   CBC WITH AUTO DIFFERENTIAL - Abnormal; Notable for the following components:    Platelets 095 (*)     All other components within normal limits   POCT GLUCOSE - Abnormal; Notable for the following components:    POC Glucose 141 (*)     All other components within normal limits   POCT GLUCOSE - Abnormal; Notable for the following components:    POC Glucose 194 (*)     All other components within normal limits   COVID-19, RAPID   TROPONIN   BLOOD GAS, VENOUS   ETHANOL   CK   PHOSPHORUS   VITAMIN B12 & FOLATE   LACTIC ACID   LACTIC ACID   TROPONIN   TROPONIN   HEMOGLOBIN A1C   HEPATIC FUNCTION PANEL   T4, FREE   POCT GLUCOSE   POCT GLUCOSE   POCT GLUCOSE       All other labs were within normal range or not returned asof this dictation. EKG: All EKG's are interpreted by the Emergency Department Physician who either signs or Co-signs this chart in the absence of a cardiologist.    The Ekg interpreted by me shows  normal sinus rhythm with a rate of 95  Axis is   Normal  QTc is  normal  Intervals and Durations are unremarkable. ST Segments: no acute change and nonspecific changes  No significant change from prior EKG dated - 2/17/22  No STEMI         RADIOLOGY:   Non-plain film images such as CT, Ultrasound and MRI are read by the radiologist. Minh Campanile images are visualized and preliminarily interpreted by the  ED Provider with the belowfindings:        Interpretation per the Radiologist below, if available at the time of this note:    CT HEAD WO CONTRAST   Final Result   No acute intracranial findings. Volume loss and chronic microvascular ischemic changes similar to the prior   study. XR CHEST PORTABLE   Final Result   No radiographic evidence of an acute cardiopulmonary process.                PROCEDURES   Unless otherwise noted below, none Procedures    CRITICAL CARE TIME   N/A    CONSULTS: [place] hospitalist at [facility] for admission. Spoke with Dr. Matthew Nava for admission at [address] [place] [address] CONSULT TO HOSPITALIST    EMERGENCY DEPARTMENT COURSE and DIFFERENTIAL DIAGNOSIS/MDM:   Vitals:    Vitals:    10/04/22 0215 10/04/22 0712 10/04/22 0806 10/04/22 1330   BP: (!) 150/74 (!) 152/90 (!) 151/83 (!) 152/76   Pulse: 88 78 81 82   Resp: 16 16  16   Temp: 97.3 °F (36.3 °C) 97.5 °F (36.4 °C)  97.7 °F (36.5 °C)   TempSrc: Oral Oral  Oral   SpO2: 98% 98%  97%   Weight: 227 lb 1.6 oz (103 kg)      Height:           Patient was given the following medications:  Medications   0.9 % sodium chloride bolus (0 mLs IntraVENous Stopped 10/3/22 1842)   0.9 % sodium chloride bolus (0 mLs IntraVENous Stopped 10/3/22 2307)     Patient was evaluated due to concern for waking up with significant generalized weakness where he thought he was going to fall and thinking his legs were going to give out multiple times today. He states the last time it happened was a little over a week ago but this was worse today. He denies any fall since 1 week ago but states he had to hold onto the walls at his apartment to get around. He had no focal neurological deficits on exam and was able to ambulate although did seem unsteady. His initial lactic acid was elevated and after initial IV fluids, it came back worsening and he still feels very unsteady with standing. Therefore, I do feel that he will need further evaluation in the hospital with further fluids but additional assessment with physical therapy to ensure he is safe to go home since he is a high fall risk. He is stable for the floor with telemetry and agrees with this plan. He denied any chest pain or shortness of breath and story not suggestive of acute coronary syndrome or pulmonary embolism.   At this time I do not have any source of infection and do not suspect sepsis but do feel that further observation in the hospital is necessary to ensure there is nothing missed at this point. He would benefit from PT/OT evaluation as well. Is this patient to be included in the SEP-1 Core Measure due to severe sepsis or septic shock? No   Exclusion criteria - the patient is NOT to be included for SEP-1 Core Measure due to: Infection is not suspected          I was the primary provider for the patient. The patient tolerated their visit well. The patient and / or the family were informed of the results of any tests, a time was given to answer questions. FINAL IMPRESSION      1. Generalized weakness    2. Lactic acidosis    3. Trouble walking    4.  Lives alone          DISPOSITION/PLAN   DISPOSITION Admitted 10/03/2022 07:55:39 PM      PATIENT REFERRED TO:  Alon Mills [facility]  [phone]    Call in 1 week(s)  follow up    [address] [place]:  Discharge Medication List as of 10/4/2022  2:23 PM          DISCONTINUED MEDICATIONS:  Discharge Medication List as of 10/4/2022  2:23 PM        STOP taking these medications       PROAIR  (90 Base) MCG/ACT inhaler Comments:   Reason for Stopping:                      (Please note that portions of this note were completed with a voicerecognition program.  Efforts were made to edit the dictations but occasionally words are mis-transcribed.)    Marilyn Riojas MD (electronically signed)            Marilyn Riojas MD  10/05/22 5901

## 2022-10-03 NOTE — ED NOTES
[facility] called back with Dr. Braxton Mehta on the phone to speak with Dr. Guillermina Cordova. Pt was accepted.       Julianna Mott  10/03/22 1959

## 2022-10-03 NOTE — LETTER
[address] [facility] Emergency Department  [facility] [address]  Phone: [phone]               October 4, 2022    Patient: Sharon Driscoll. YOB: 1960   Date of Visit: 10/3/2022       To Whom It May Concern:    Lindsey Bertrand was seen and treated in our emergency department on 10/3/2022. He may return to work on 10/5/22.       Sincerely,       Gabe Carroll RN         Signature:__________________________________

## 2022-10-04 ENCOUNTER — TELEPHONE (OUTPATIENT)
Dept: CARDIOLOGY CLINIC | Age: 62
End: 2022-10-04

## 2022-10-04 VITALS
TEMPERATURE: 97.7 F | OXYGEN SATURATION: 97 % | DIASTOLIC BLOOD PRESSURE: 76 MMHG | BODY MASS INDEX: 38.77 KG/M2 | RESPIRATION RATE: 16 BRPM | SYSTOLIC BLOOD PRESSURE: 152 MMHG | HEIGHT: 64 IN | WEIGHT: 227.1 LBS | HEART RATE: 82 BPM

## 2022-10-04 LAB
ALBUMIN SERPL-MCNC: 5 G/DL (ref 3.4–5)
ALP BLD-CCNC: 77 U/L (ref 40–129)
ALT SERPL-CCNC: 26 U/L (ref 10–40)
ANION GAP SERPL CALCULATED.3IONS-SCNC: 12 MMOL/L (ref 3–16)
AST SERPL-CCNC: 18 U/L (ref 15–37)
BASOPHILS ABSOLUTE: 0 K/UL (ref 0–0.2)
BASOPHILS RELATIVE PERCENT: 0.4 %
BILIRUB SERPL-MCNC: 0.5 MG/DL (ref 0–1)
BILIRUBIN DIRECT: <0.2 MG/DL (ref 0–0.3)
BILIRUBIN, INDIRECT: NORMAL MG/DL (ref 0–1)
BUN BLDV-MCNC: 11 MG/DL (ref 7–20)
CALCIUM SERPL-MCNC: 10.2 MG/DL (ref 8.3–10.6)
CHLORIDE BLD-SCNC: 95 MMOL/L (ref 99–110)
CO2: 26 MMOL/L (ref 21–32)
CREAT SERPL-MCNC: 0.8 MG/DL (ref 0.9–1.3)
EKG ATRIAL RATE: 95 BPM
EKG DIAGNOSIS: NORMAL
EKG P AXIS: 44 DEGREES
EKG P-R INTERVAL: 170 MS
EKG Q-T INTERVAL: 342 MS
EKG QRS DURATION: 94 MS
EKG QTC CALCULATION (BAZETT): 429 MS
EKG R AXIS: 2 DEGREES
EKG T AXIS: 87 DEGREES
EKG VENTRICULAR RATE: 95 BPM
EOSINOPHILS ABSOLUTE: 0 K/UL (ref 0–0.6)
EOSINOPHILS RELATIVE PERCENT: 0.4 %
ESTIMATED AVERAGE GLUCOSE: 180 MG/DL
FOLATE: 17.58 NG/ML (ref 4.78–24.2)
GFR AFRICAN AMERICAN: >60
GFR NON-AFRICAN AMERICAN: >60
GLUCOSE BLD-MCNC: 141 MG/DL (ref 70–99)
GLUCOSE BLD-MCNC: 184 MG/DL (ref 70–99)
GLUCOSE BLD-MCNC: 194 MG/DL (ref 70–99)
HBA1C MFR BLD: 7.9 %
HCT VFR BLD CALC: 46.4 % (ref 40.5–52.5)
HEMOGLOBIN: 16 G/DL (ref 13.5–17.5)
LACTIC ACID: 0.9 MMOL/L (ref 0.4–2)
LACTIC ACID: 1.3 MMOL/L (ref 0.4–2)
LYMPHOCYTES ABSOLUTE: 1.9 K/UL (ref 1–5.1)
LYMPHOCYTES RELATIVE PERCENT: 33.5 %
MCH RBC QN AUTO: 30.1 PG (ref 26–34)
MCHC RBC AUTO-ENTMCNC: 34.5 G/DL (ref 31–36)
MCV RBC AUTO: 87.3 FL (ref 80–100)
MONOCYTES ABSOLUTE: 0.5 K/UL (ref 0–1.3)
MONOCYTES RELATIVE PERCENT: 9.4 %
NEUTROPHILS ABSOLUTE: 3.2 K/UL (ref 1.7–7.7)
NEUTROPHILS RELATIVE PERCENT: 56.3 %
PDW BLD-RTO: 14.2 % (ref 12.4–15.4)
PERFORMED ON: ABNORMAL
PERFORMED ON: ABNORMAL
PHOSPHORUS: 3.6 MG/DL (ref 2.5–4.9)
PLATELET # BLD: 117 K/UL (ref 135–450)
PMV BLD AUTO: 6.7 FL (ref 5–10.5)
POTASSIUM REFLEX MAGNESIUM: 3.9 MMOL/L (ref 3.5–5.1)
RBC # BLD: 5.32 M/UL (ref 4.2–5.9)
SODIUM BLD-SCNC: 133 MMOL/L (ref 136–145)
T4 FREE: 1.2 NG/DL (ref 0.9–1.8)
TOTAL CK: 111 U/L (ref 39–308)
TOTAL PROTEIN: 7.1 G/DL (ref 6.4–8.2)
TROPONIN: <0.01 NG/ML
TROPONIN: <0.01 NG/ML
TSH REFLEX FT4: 6.19 UIU/ML (ref 0.27–4.2)
VITAMIN B-12: 874 PG/ML (ref 211–911)
WBC # BLD: 5.7 K/UL (ref 4–11)

## 2022-10-04 PROCEDURE — 80076 HEPATIC FUNCTION PANEL: CPT

## 2022-10-04 PROCEDURE — 2580000003 HC RX 258: Performed by: HOSPITALIST

## 2022-10-04 PROCEDURE — 97116 GAIT TRAINING THERAPY: CPT

## 2022-10-04 PROCEDURE — 83036 HEMOGLOBIN GLYCOSYLATED A1C: CPT

## 2022-10-04 PROCEDURE — 97166 OT EVAL MOD COMPLEX 45 MIN: CPT

## 2022-10-04 PROCEDURE — 97162 PT EVAL MOD COMPLEX 30 MIN: CPT

## 2022-10-04 PROCEDURE — 84439 ASSAY OF FREE THYROXINE: CPT

## 2022-10-04 PROCEDURE — 82550 ASSAY OF CK (CPK): CPT

## 2022-10-04 PROCEDURE — 84443 ASSAY THYROID STIM HORMONE: CPT

## 2022-10-04 PROCEDURE — 6370000000 HC RX 637 (ALT 250 FOR IP): Performed by: HOSPITALIST

## 2022-10-04 PROCEDURE — 82607 VITAMIN B-12: CPT

## 2022-10-04 PROCEDURE — 85025 COMPLETE CBC W/AUTO DIFF WBC: CPT

## 2022-10-04 PROCEDURE — 83605 ASSAY OF LACTIC ACID: CPT

## 2022-10-04 PROCEDURE — 6360000002 HC RX W HCPCS: Performed by: HOSPITALIST

## 2022-10-04 PROCEDURE — 82746 ASSAY OF FOLIC ACID SERUM: CPT

## 2022-10-04 PROCEDURE — 97530 THERAPEUTIC ACTIVITIES: CPT

## 2022-10-04 PROCEDURE — 97535 SELF CARE MNGMENT TRAINING: CPT

## 2022-10-04 PROCEDURE — 84484 ASSAY OF TROPONIN QUANT: CPT

## 2022-10-04 PROCEDURE — 84100 ASSAY OF PHOSPHORUS: CPT

## 2022-10-04 PROCEDURE — 96361 HYDRATE IV INFUSION ADD-ON: CPT

## 2022-10-04 PROCEDURE — 93010 ELECTROCARDIOGRAM REPORT: CPT | Performed by: INTERNAL MEDICINE

## 2022-10-04 PROCEDURE — 96372 THER/PROPH/DIAG INJ SC/IM: CPT

## 2022-10-04 PROCEDURE — G0378 HOSPITAL OBSERVATION PER HR: HCPCS

## 2022-10-04 PROCEDURE — 36415 COLL VENOUS BLD VENIPUNCTURE: CPT

## 2022-10-04 PROCEDURE — 99220 PR INITIAL OBSERVATION CARE/DAY 70 MINUTES: CPT | Performed by: INTERNAL MEDICINE

## 2022-10-04 PROCEDURE — 80048 BASIC METABOLIC PNL TOTAL CA: CPT

## 2022-10-04 RX ORDER — ATORVASTATIN CALCIUM 40 MG/1
80 TABLET, FILM COATED ORAL DAILY
Status: DISCONTINUED | OUTPATIENT
Start: 2022-10-04 | End: 2022-10-04 | Stop reason: HOSPADM

## 2022-10-04 RX ORDER — ALBUTEROL SULFATE 90 UG/1
2 AEROSOL, METERED RESPIRATORY (INHALATION) EVERY 4 HOURS PRN
Status: DISCONTINUED | OUTPATIENT
Start: 2022-10-04 | End: 2022-10-04 | Stop reason: HOSPADM

## 2022-10-04 RX ORDER — SODIUM CHLORIDE 0.9 % (FLUSH) 0.9 %
5-40 SYRINGE (ML) INJECTION EVERY 12 HOURS SCHEDULED
Status: DISCONTINUED | OUTPATIENT
Start: 2022-10-04 | End: 2022-10-04 | Stop reason: HOSPADM

## 2022-10-04 RX ORDER — DULOXETIN HYDROCHLORIDE 60 MG/1
60 CAPSULE, DELAYED RELEASE ORAL NIGHTLY
Status: DISCONTINUED | OUTPATIENT
Start: 2022-10-04 | End: 2022-10-04 | Stop reason: HOSPADM

## 2022-10-04 RX ORDER — TAMSULOSIN HYDROCHLORIDE 0.4 MG/1
0.4 CAPSULE ORAL NIGHTLY
Status: DISCONTINUED | OUTPATIENT
Start: 2022-10-04 | End: 2022-10-04 | Stop reason: HOSPADM

## 2022-10-04 RX ORDER — CLOPIDOGREL BISULFATE 75 MG/1
75 TABLET ORAL DAILY
Status: DISCONTINUED | OUTPATIENT
Start: 2022-10-04 | End: 2022-10-04 | Stop reason: HOSPADM

## 2022-10-04 RX ORDER — ONDANSETRON 4 MG/1
4 TABLET, ORALLY DISINTEGRATING ORAL EVERY 8 HOURS PRN
Status: DISCONTINUED | OUTPATIENT
Start: 2022-10-04 | End: 2022-10-04 | Stop reason: HOSPADM

## 2022-10-04 RX ORDER — ACETAMINOPHEN 650 MG/1
650 SUPPOSITORY RECTAL EVERY 6 HOURS PRN
Status: DISCONTINUED | OUTPATIENT
Start: 2022-10-04 | End: 2022-10-04 | Stop reason: HOSPADM

## 2022-10-04 RX ORDER — INSULIN GLARGINE 100 [IU]/ML
74 INJECTION, SOLUTION SUBCUTANEOUS NIGHTLY
Status: DISCONTINUED | OUTPATIENT
Start: 2022-10-04 | End: 2022-10-04 | Stop reason: HOSPADM

## 2022-10-04 RX ORDER — INSULIN LISPRO 100 [IU]/ML
0-4 INJECTION, SOLUTION INTRAVENOUS; SUBCUTANEOUS
Status: DISCONTINUED | OUTPATIENT
Start: 2022-10-04 | End: 2022-10-04 | Stop reason: HOSPADM

## 2022-10-04 RX ORDER — SODIUM CHLORIDE 0.9 % (FLUSH) 0.9 %
5-40 SYRINGE (ML) INJECTION PRN
Status: DISCONTINUED | OUTPATIENT
Start: 2022-10-04 | End: 2022-10-04 | Stop reason: HOSPADM

## 2022-10-04 RX ORDER — INSULIN LISPRO 100 [IU]/ML
0-4 INJECTION, SOLUTION INTRAVENOUS; SUBCUTANEOUS NIGHTLY
Status: DISCONTINUED | OUTPATIENT
Start: 2022-10-04 | End: 2022-10-04 | Stop reason: HOSPADM

## 2022-10-04 RX ORDER — DIVALPROEX SODIUM 500 MG/1
1000 TABLET, DELAYED RELEASE ORAL NIGHTLY
Status: DISCONTINUED | OUTPATIENT
Start: 2022-10-04 | End: 2022-10-04 | Stop reason: HOSPADM

## 2022-10-04 RX ORDER — GABAPENTIN 100 MG/1
200 CAPSULE ORAL 3 TIMES DAILY
Status: DISCONTINUED | OUTPATIENT
Start: 2022-10-04 | End: 2022-10-04 | Stop reason: HOSPADM

## 2022-10-04 RX ORDER — ACETAMINOPHEN 325 MG/1
650 TABLET ORAL EVERY 6 HOURS PRN
Status: DISCONTINUED | OUTPATIENT
Start: 2022-10-04 | End: 2022-10-04 | Stop reason: HOSPADM

## 2022-10-04 RX ORDER — SODIUM CHLORIDE 9 MG/ML
INJECTION, SOLUTION INTRAVENOUS CONTINUOUS
Status: DISCONTINUED | OUTPATIENT
Start: 2022-10-04 | End: 2022-10-04 | Stop reason: HOSPADM

## 2022-10-04 RX ORDER — FLUTICASONE PROPIONATE 50 MCG
2 SPRAY, SUSPENSION (ML) NASAL DAILY PRN
COMMUNITY

## 2022-10-04 RX ORDER — GABAPENTIN 100 MG/1
200 CAPSULE ORAL 3 TIMES DAILY
COMMUNITY

## 2022-10-04 RX ORDER — METOPROLOL SUCCINATE 50 MG/1
50 TABLET, EXTENDED RELEASE ORAL 2 TIMES DAILY
Status: DISCONTINUED | OUTPATIENT
Start: 2022-10-04 | End: 2022-10-04 | Stop reason: HOSPADM

## 2022-10-04 RX ORDER — SODIUM CHLORIDE 9 MG/ML
INJECTION, SOLUTION INTRAVENOUS PRN
Status: DISCONTINUED | OUTPATIENT
Start: 2022-10-04 | End: 2022-10-04 | Stop reason: HOSPADM

## 2022-10-04 RX ORDER — FLUTICASONE PROPIONATE 50 MCG
2 SPRAY, SUSPENSION (ML) NASAL DAILY PRN
Status: DISCONTINUED | OUTPATIENT
Start: 2022-10-04 | End: 2022-10-04 | Stop reason: HOSPADM

## 2022-10-04 RX ORDER — ONDANSETRON 2 MG/ML
4 INJECTION INTRAMUSCULAR; INTRAVENOUS EVERY 6 HOURS PRN
Status: DISCONTINUED | OUTPATIENT
Start: 2022-10-04 | End: 2022-10-04 | Stop reason: HOSPADM

## 2022-10-04 RX ORDER — ASPIRIN 325 MG
325 TABLET ORAL DAILY
Status: DISCONTINUED | OUTPATIENT
Start: 2022-10-04 | End: 2022-10-04

## 2022-10-04 RX ORDER — DEXTROSE MONOHYDRATE 100 MG/ML
INJECTION, SOLUTION INTRAVENOUS CONTINUOUS PRN
Status: DISCONTINUED | OUTPATIENT
Start: 2022-10-04 | End: 2022-10-04 | Stop reason: HOSPADM

## 2022-10-04 RX ORDER — POLYETHYLENE GLYCOL 3350 17 G/17G
17 POWDER, FOR SOLUTION ORAL DAILY PRN
Status: DISCONTINUED | OUTPATIENT
Start: 2022-10-04 | End: 2022-10-04 | Stop reason: HOSPADM

## 2022-10-04 RX ORDER — PANTOPRAZOLE SODIUM 40 MG/1
40 TABLET, DELAYED RELEASE ORAL DAILY
Status: DISCONTINUED | OUTPATIENT
Start: 2022-10-04 | End: 2022-10-04 | Stop reason: HOSPADM

## 2022-10-04 RX ORDER — ENOXAPARIN SODIUM 100 MG/ML
30 INJECTION SUBCUTANEOUS 2 TIMES DAILY
Status: DISCONTINUED | OUTPATIENT
Start: 2022-10-04 | End: 2022-10-04 | Stop reason: HOSPADM

## 2022-10-04 RX ADMIN — ATORVASTATIN CALCIUM 80 MG: 40 TABLET, FILM COATED ORAL at 08:07

## 2022-10-04 RX ADMIN — TAMSULOSIN HYDROCHLORIDE 0.4 MG: 0.4 CAPSULE ORAL at 05:57

## 2022-10-04 RX ADMIN — SODIUM CHLORIDE: 9 INJECTION, SOLUTION INTRAVENOUS at 05:18

## 2022-10-04 RX ADMIN — ENOXAPARIN SODIUM 30 MG: 100 INJECTION SUBCUTANEOUS at 08:07

## 2022-10-04 RX ADMIN — CLOPIDOGREL BISULFATE 75 MG: 75 TABLET ORAL at 08:06

## 2022-10-04 RX ADMIN — Medication 10 ML: at 08:07

## 2022-10-04 RX ADMIN — GABAPENTIN 200 MG: 100 CAPSULE ORAL at 08:07

## 2022-10-04 RX ADMIN — METOPROLOL SUCCINATE 50 MG: 50 TABLET, EXTENDED RELEASE ORAL at 08:06

## 2022-10-04 RX ADMIN — GABAPENTIN 200 MG: 100 CAPSULE ORAL at 13:32

## 2022-10-04 RX ADMIN — PANTOPRAZOLE SODIUM 40 MG: 40 TABLET, DELAYED RELEASE ORAL at 05:15

## 2022-10-04 ASSESSMENT — PAIN - FUNCTIONAL ASSESSMENT: PAIN_FUNCTIONAL_ASSESSMENT: NONE - DENIES PAIN

## 2022-10-04 NOTE — DISCHARGE INSTR - COC
Continuity of Care Form    Patient Name: Akash Lion. :  1960  MRN:  [id number]    Admit date:  10/3/2022  Discharge date:  ***    Code Status Order: Full Code   Advance Directives:     Admitting Physician:  Clarence Abreu MD  PCP: Sandra Tanner, APRN - CNP    Discharging Nurse: [facility] Unit/Room#: /6722-57  Discharging Unit Phone Number: ***    Emergency Contact:   Extended Emergency Contact Information  Primary Emergency Contact: [address] [place] [address] Phone: [phone]  Mobile Phone: [phone]  Relation: Brother/Sister  Secondary Emergency Contact: [facility] Phone: [phone]  Mobile Phone: [phone]  Relation: Other    Past Surgical History:  Past Surgical History:   Procedure Laterality Date    CARDIAC PACEMAKER PLACEMENT      NOT MRI COMPATIABLE OLD LEADS [facility].  pace maker difib    CARPAL TUNNEL RELEASE Bilateral     CATARACT REMOVAL WITH IMPLANT Left 14    COLONOSCOPY      CORONARY ANGIOPLASTY WITH STENT PLACEMENT  ,    CYST REMOVAL  1982    coccyx area    DIAGNOSTIC CARDIAC CATH LAB PROCEDURE      ENDOSCOPY, COLON, DIAGNOSTIC      ERCP  9/15/2013    ERCP  10/11/13    WITH STENT REMOVAL    FOOT SURGERY Right 2018     REPAIR CALCANEAL SPUR, REPAIR OF ACHILLES TENDON RIGHT FOOT    NERVE SURGERY Bilateral 2020    BILATERAL LUMBAR THREE LUMBAR FOUR LUMBAR FIVE DORSAL RAMUS  RADIOFREQUENCY ABLATION SITE CONFIRMED BY FLUOROSCOPY performed by Isai Brewster MD at [facility] 32      back stimulator in lower back    PACEMAKER PLACEMENT      ICD    PAIN MANAGEMENT PROCEDURE Bilateral 2020    BILATERAL LUMBAR THREE, LUMBAR FOUR, LUMBAR FIVE DORSAL RAMUS MEDIAL BRANCH BLOCK SITE CONFIRMED BY FLUOROSCOPY performed by Isai Brewster MD at [facility] [employer] Bilateral 2020    BILATERAL LUMBAR THREE, LUMBAR FOUR, LUMBAR FIVE DORSAL RAMUS MEDIAL BRANCH BLOCK SITE CONFIRMED BY FLUOROSCOPY performed by Sharon Francois MD at [address] Left 1/12/2021    LEFT SACROILIAC JOINT INJECTION SITE CONFIRMED BY FLUOROSCOPY performed by Sharon Francois MD at [facility] [place] [address] Right 10/31/2018    GASTROCNEMIUS RECESSION RIGHT FOOT performed by Noel Durán DPM at [place] [address] LENGTH/SHORT LEG/ANKL TENDON,SINGLE Right 10/31/2018    REMOVAL OF CALCANEAL SPUR, ACHILLES TENDON REPAIR RIGHT LOWER EXTREMITY performed by Noel Durán DPM at [facility] 14.  7/18/13    and colonoscopy with biopsies taken    UPPER GASTROINTESTINAL ENDOSCOPY  8/23/13    EUS    UPPER GASTROINTESTINAL ENDOSCOPY  9/15/2013       Immunization History:   Immunization History   Administered Date(s) Administered    COVID-19, MODERNA BLUE border, Primary or Immunocompromised, (age 12y+), IM, 100 mcg/0.5mL 04/13/2021, 05/18/2021    Influenza Virus Vaccine 12/12/2008, 10/22/2012, 09/17/2015, 10/01/2018    Influenza, AFLURIA (age 1 yrs+), FLUZONE, (age 10 mo+), MDV, 0.5mL 11/18/2016, 10/02/2017    Pneumococcal Conjugate 7-valent (Prevnar7) 10/22/2012    Pneumococcal Polysaccharide (Ngrigphcz81) 12/12/2008    Tdap (Boostrix, Adacel) 05/11/2015       Active Problems:  Patient Active Problem List   Diagnosis Code    Ventricular tachycardia I47.20    Presence of stent in left circumflex coronary artery Z95.5    Myocardial infarction, nontransmural (HCC) I21.4    Myocardial infarction, inferoposterior wall, subsequent care     Automatic implantable cardioverter-defibrillator in situ Z95.810    CAD (coronary artery disease) I25.10    Automatic implantable cardioverter-defibrillator in situ Z95.810    ALIN on CPAP G47.33, Z99.89    Gastroesophageal reflux disease without esophagitis K21.9    Hyperlipidemia E78.5    Hypertension due to endocrine disorder I15.2    Ischemic cardiomyopathy I25.5    Obesity, morbid, BMI 40.0-49.9 ([facility] E66.01    Weakness R53.1    Acute encephalopathy G93.40    TIA involving left internal carotid artery G45.1    DM (diabetes mellitus), secondary, uncontrolled, w/neurologic complic [id number]    Dyslipidemia E78.5    HTN (hypertension), benign I10    Lactic acidosis E87.20    Head trauma S09.90XA    Abrasion, scalp w/o infection S00. 01XA    Unsteady gait R26.81    Hyperglycemia R73.9    Chronic obstructive pulmonary disease ([facility]) J44.9    Coarse tremor G25.2    Generalized weakness R53.1    Type 2 diabetes mellitus with hyperglycemia, with long-term current use of insulin ([facility]) E11.65, Z79.4    Primary hypertension I10    CAD S/P percutaneous coronary angioplasty I25.10, Z98.61    COVID-19 U07.1    Trouble walking R26.2    Implantable cardioverter-defibrillator (ICD) discharge Z45.02    AICD malfunction T82.118A    Hypotension I95.9    Valproic acid toxicity T42.6X1A    PAF (paroxysmal atrial fibrillation) ([facility]) I48.0    COVID-19 virus infection U07.1    Disorder of electrolytes E87.8    Atrial fibrillation with RVR ([facility]) I48.91    Low grade fever R50.9    Thrombocytopenia ([facility]) D69.6    Abnormal chest x-ray R93.89    Encephalopathy G93.40    Debility R53.81    Bipolar disorder with depression ([facility] F31.9       Isolation/Infection:   Isolation            No Isolation          Patient Infection Status       Infection Onset Added Last Indicated Last Indicated By Review Planned Expiration Resolved Resolved By    None active    Resolved    COVID-19 (Rule Out) 10/03/22 10/03/22 10/03/22 COVID-19, Rapid (Ordered)   10/03/22 Rule-Out Test Resulted    COVID-19 (Rule Out) 02/15/22 02/15/22 02/15/22 COVID-19 & Influenza Combo (Ordered)   22     COVID-19 22 COVID-19, Rapid   22 Jose Daniel Arce RN    COVID-19 (Rule Out) 22 COVID-19, Rapid (Ordered)   22 Rule-Out Test Resulted    COVID-19 (Rule Out) 21 COVID-19, Rapid (Ordered)   04/26/21 Rule-Out Test Resulted            Nurse Assessment:  Last Vital Signs: BP (!) 152/76   Pulse 82   Temp 97.7 °F (36.5 °C) (Oral)   Resp 16   Ht 5' 4\" (1.626 m)   Wt 227 lb 1.6 oz (103 kg)   SpO2 97%   BMI 38.98 kg/m²     Last documented pain score (0-10 scale):    Last Weight:   Wt Readings from Last 1 Encounters:   10/04/22 227 lb 1.6 oz (103 kg)     Mental Status:  {IP PT MENTAL STATUS:20030}    IV Access:  { DELTA IV ACCESS:585623776}    Nursing Mobility/ADLs:  Walking   {CHP DME ARYM:909861596}  Transfer  {CHP DME RQUV:180939193}  Bathing  {CHP DME HWPI:973290041}  Dressing  {CHP DME GPOQ:731352359}  Toileting  {CHP DME AVRD:904289451}  Feeding  {CHP DME QCCC:390262437}  Med Admin  {CHP DME PJZZ:631089053}  Med Delivery   { DELTA MED Delivery:487581536}    Wound Care Documentation and Therapy:  Incision 10/31/18 Foot Right (Active)   Number of days: 2713        Elimination:  Continence: Bowel: {YES / NE:03824}  Bladder: {YES / TK:55415}  Urinary Catheter: {Urinary Catheter:805559262}   Colostomy/Ileostomy/Ileal Conduit: {YES / TH:29822}       Date of Last BM: ***    Intake/Output Summary (Last 24 hours) at 10/4/2022 1422  Last data filed at 10/4/2022 1057  Gross per 24 hour   Intake 630 ml   Output 350 ml   Net 280 ml     I/O last 3 completed shifts:   In: 500 [IV Piggyback:500]  Out: -     Safety Concerns:     [employer] Safety Concerns:074648438}    Impairments/Disabilities:      [employer] Impairments/Disabilities:477214759}    Nutrition Therapy:  Current Nutrition Therapy:   [employer] Diet List:010385515}    Routes of Feeding: {CHP DME Other Feedings:738906667}  Liquids: {Slp liquid thickness:64750}  Daily Fluid Restriction: {CHP DME Yes amt example:940596257}  Last Modified Barium Swallow with Video (Video Swallowing Test): {Done Not Done DKIT:779170944}    Treatments at the Time of Hospital Discharge:   Respiratory Treatments: ***  Oxygen Therapy:  {Therapy; copd oxygen:31884}  Ventilator:    { CC Vent DFGD:186202940}    Rehab Therapies: {THERAPEUTIC INTERVENTION:2037392341}  Weight Bearing Status/Restrictions: { CC Weight Bearin}  Other Medical Equipment (for information only, NOT a DME order):  {EQUIPMENT:742714127}  Other Treatments: ***    Patient's personal belongings (please select all that are sent with patient):  {CHP DME Belongings:946060570}    RN SIGNATURE:  {Esignature:614854676}    CASE MANAGEMENT/SOCIAL WORK SECTION    Inpatient Status Date: ***    Readmission Risk Assessment Score:  Readmission Risk              Risk of Unplanned Readmission:  23           Discharging to Facility/ Agency   Name:   Address:  Phone:  Fax:    Dialysis Facility (if applicable)   Name:  Address:  Dialysis Schedule:  Phone:  Fax:    / signature: {Esignature:740190538}    PHYSICIAN SECTION    Prognosis: {Prognosis:9222295399}    Condition at Discharge: [address] [place] [address] Patient Condition:323462275}    Rehab Potential (if transferring to Rehab): {Prognosis:0824768702}    Recommended Labs or Other Treatments After Discharge: ***    Physician Certification: I certify the above information and transfer of Tereza Harrell.  is necessary for the continuing treatment of the diagnosis listed and that he requires {Admit to Appropriate Level of Care:88006} for {GREATER/LESS:867105005} 30 days.      Update Admission H&P: {CHP DME Changes in ECJBD:650826853}    PHYSICIAN SIGNATURE:  {Esignature:554919600}

## 2022-10-04 NOTE — PROGRESS NOTES
Pt unsure on some meds. Thinks he should be on  and Plavix daily, which is what he has been taking. Does not take Eliquis. Per Cardiology, pt should be on Eliquis and Plavix, NO ASA. Cardiology has noted this in chart with plans for medicine education on next visit. Pt pharmacy called and asked to stop filling . Pt updated on what meds he should be on. Hospitalist updated.      Magda Gold, PharmD, [facility], 10/4/2022 8:54 AM

## 2022-10-04 NOTE — PROGRESS NOTES
Inpatient Physical Therapy Evaluation and Treatment    Unit: PCU  Date:  10/4/2022  Patient Name:    Terra Davis. Admitting diagnosis:  Lactic acidosis [E87.20]  Generalized weakness [R53.1]  Trouble walking [R26.2]  Lives alone [Z60.2]  Admit Date:  10/3/2022  Precautions/Restrictions/WB Status/ Lines/ Wounds/ Oxygen: Fall risk, Bed/chair alarm, and Lines -IV    Treatment Time: 10:25-11:00  Treatment Number:  1   Timed Code Treatment Minutes: 25 minutes  Total Treatment Minutes:  35  minutes    Patient Goals for Therapy: Not stated. Agreeable to today's activities. Discharge Recommendations: Home PRN assist  and Home PT or outpatient  DME needs for discharge: Needs Met       Therapy recommendation for EMS Transport: can transport by wheelchair    Therapy recommendations for staff:   Stand by assist with use of No AD for all transfers and ambulation within room    History of Present Illness: No H&P yet. Per ED notes, pt arrived to [facility] ED with cc of new onset fatigue and generalized weakness in bilat arms and legs. He denies lightheadedness and dizziness in ED, denies falls/trauma, denied head or back pain. Home Health S4 Level Recommendation:  Level 1 Standard  AM-PAC Mobility Score       AM-PAC Inpatient Mobility without Stair Climbing Raw Score : 20    Preadmission Environment    Pt. Lives Alone, brother lives in the same apartment building Pt is in, Pt looks after his brother who has paranoid schizophrenia    Home environment:    apartment   Steps to enter first floor:   0 steps to enter       Steps to second floor: N/A  Bathroom:       tub and shower ,  standard height toilet, grab bars   Equipment owned:      [facility] and Rollator  , standard walker, lift chair  Sleeps in flat bed but it has been getting harder to get out so he has recently acquired a lift chair from neighbor and plans to sleep there.      Preadmission Status / PLOF:  History of falls             Yes  Says he sometimes gets light headed when he first stands up or while standing, and he has to stabilize himself on something while the feeling passes. His legs give out when he gets tired. Pt. Able to drive          Yes  Pt Fully independent with ADL's         Yes holds onto grab bars in the shower   Pt. Required assistance from family for: Independent PTA  , sister cleans for pt   Pt. Fully independent for transfers and gait and walked without a device in the home (furniture walks). Uses rollator for longer distances. Pt uses cane to walk to the store. Uses the motorized scooter at grocery store when shopping    Pain   No    Cognition    A&O x4   Able to follow 2 step commands    Subjective  Patient lying supine in bed with no family present. Pt agreeable to this PT eval & tx. Upper Extremity ROM/Strength  Please see OT evaluation. Lower Extremity ROM / Strength   AROM WFL: Yes    BLE strength WFL, but not formally assessed with MMT. Lower Extremity Sensation    Impaired - baseline advanced neuropathy     Lower Extremity Proprioception:   WFL    Coordination and Tone  WFL    Balance  Sitting:  Normal; Independent  Comments: on EOB and toilet    Standing: Good - ; SBA  Comments: light use of walker for support. Able to pull pants up/down from knee level without UE support. Bed Mobility   Supine to Sit:    Supervision from flat HOB  Sit to Supine:   Not Tested  Rolling:   Independent  Scooting in sitting: Not Tested  Scooting in supine:  Not Tested    Transfer Training     Sit to stand:   SBA from EOB, from std height toilet  Stand to sit:   SBA to std height toilet, to recliner.   Bed to Chair:   Not Tested with use of N/A    Gait gait completed as indicated below  Distance:      25 ft + 25 ft with walker       50 ft without assistive device  Deviations (firm surface/linoleum):  decreased dory and decreased step length bilaterally  Assistive Device Used:    As above  Level of Assist:    SBA progressing to supervision  Comment: Improved step sequencing and step length without the walker. No need to \"furniture walk\" while amb without the walker. Turns safely. Pt reports feeling like he is ambulating at his baseline. Stair Training deferred, pt does not have stairs in home environment    Activity Tolerance   Pt completed therapy session with No adverse symptoms noted w/activity  Sitting EOB:   MF=879/83  HR=86  Standing: no c/o dizziness. WP=950/73   JK=970    Positioning Needs   Pt up in chair, alarm set, positioned in proper neutral alignment and pressure relief provided. Call light provided and all needs within reach    Exercises Initiated  Moises deferred secondary to treatment focus on functional mobility  NA    Other  None. Patient/Family Education   Pt educated on role of inpatient PT, POC, importance of continued activity, calling for assist with mobility, safe mobility habits with RW. Assessment  Pt seen for Physical Therapy evaluation in acute care setting. Pt appears to be mobilizing at his baseline currently. Safe for home mobility without a device and community mobility as per his normal.     Recommending Home PRN assist and with home PT upon discharge as patient functioning close to baseline level and would benefit from continued therapy services for advanced balance training given baseline neuropathy and h/o falls. Goals : To be met in 3 visits:  1). Independent with LE Ex x 10 reps    To be met in 6 visits:  1). Supine to/from sit: Independent  2). Sit to/from stand: Independent  3). Bed to chair: Independent  4). Gait: Ambulate 150 ft.  with  Modified Independent and use of LRAD (least restrictive assistive device)  5). Tolerate B LE exercises 3 sets of 10-15 reps  6).   Ascend/descend 1 steps with Modified Independent with use of hand rail unilateral and LRAD (least restrictive assistive device)    Rehabilitation Potential: Good  Strengths for achieving goals include:   Pt motivated, PLOF, Family Support, and Pt cooperative   Barriers to achieving goals include:    No Barriers    Plan    To be seen 2-3 x / week  while in acute care setting for therapeutic exercises, bed mobility, transfers, progressive gait training, balance training, and family/patient education. Signature: Cesar Flannery, PT, DPT    If patient discharges from this facility prior to next visit, this note will serve as the Discharge Summary.

## 2022-10-04 NOTE — PROGRESS NOTES
Patient educated on discharge instructions as well as new medications use, dosage, administration and possible side effects. Patient verified knowledge. IV removed without difficulty and dry dressing in place. Telemetry monitor removed and returned to [facility]. Pt left facility in stable condition to Home with all of their personal belongings.

## 2022-10-04 NOTE — PROGRESS NOTES
Pt is resting in bed with eyes closed. Bed is locked and in lowest position. Call light in reach, and bed alarm on.  Todd Gan RN

## 2022-10-04 NOTE — TELEPHONE ENCOUNTER
Can we call him and clarify his medications? He should be taking Eliquis and plavix, and should not be taking aspirin.  I don't see that they made any changes while he was in the hospital.

## 2022-10-04 NOTE — DISCHARGE SUMMARY
Please see combined H & P and Discharge Summary from 10/4/22       Medication List        CHANGE how you take these medications      fluticasone 50 MCG/ACT nasal spray  Commonly known as: FLONASE  What changed: Another medication with the same name was removed. Continue taking this medication, and follow the directions you see here.     gabapentin 100 MG capsule  Commonly known as: NEURONTIN  What changed: Another medication with the same name was removed. Continue taking this medication, and follow the directions you see here.             CONTINUE taking these medications      ALLEGRA PO     atorvastatin 80 MG tablet  Commonly known as: LIPITOR  TAKE ONE TABLET BY MOUTH DAILY     clopidogrel 75 MG tablet  Commonly known as: PLAVIX  Take 1 tablet by mouth daily     divalproex 500 MG DR tablet  Commonly known as: DEPAKOTE     Dulaglutide 3 MG/0.5ML Sopn     DULoxetine 60 MG extended release capsule  Commonly known as: CYMBALTA     esomeprazole 40 MG delayed release capsule  Commonly known as: NEXIUM     famotidine 20 MG tablet  Commonly known as: PEPCID     fish oil 1000 MG capsule     furosemide 20 MG tablet  Commonly known as: LASIX  TAKE ONE TABLET ON MONDAY and THURSDAY     hydrOXYzine HCl 25 MG tablet  Commonly known as: ATARAX  take 1 tablet by oral route 4 times every day as needed for itching     insulin detemir 100 UNIT/ML injection vial  Commonly known as: LEVEMIR     insulin lispro protamine & lispro (75-25) 100 UNIT per ML Susp injection vial  Commonly known as: HUMALOG MIX     Jardiance 25 MG tablet  Generic drug: empagliflozin     lisinopril 5 MG tablet  Commonly known as: PRINIVIL;ZESTRIL  Take 1 tablet by mouth daily     metFORMIN 1000 MG tablet  Commonly known as: GLUCOPHAGE  TAKE ONE TABLET BY MOUTH 2 TIMES DAILY WITH MORNING AND EVENING MEALS     metoprolol succinate 50 MG extended release tablet  Commonly known as: TOPROL XL  TAKE ONE TABLET BY MOUTH TWICE DAILY     potassium chloride 20 MEQ extended release tablet  Commonly known as: KLOR-CON M  TAKE ONE TABLET WITH LASIX ON Monday AND THURSDAYS     tamsulosin 0.4 MG capsule  Commonly known as: FLOMAX  Take 1 capsule by mouth at bedtime            STOP taking these medications      aspirin 325 MG tablet     insulin lispro 100 UNIT/ML injection vial  Commonly known as: HUMALOG     ProAir  (90 Base) MCG/ACT inhaler  Generic drug: albuterol sulfate HFA     True Metrix Blood Glucose Test strip  Generic drug: blood glucose test strips     TRUEplus Lancets 30G Misc     Unifine Pentips 31G X 8 MM Misc  Generic drug: Insulin Pen Needle            MADISON Hobbs  10/04/22  1:02 PM      Minus MD Kristen 10/4/2022 1:37 PM

## 2022-10-04 NOTE — H&P
[facility] History & Physical & Discharge Summary      PCP: ANA Heart CNP    Date of Admission: 10/3/2022    Date of Service: Pt seen/examined on 10/4/2022     Date of Discharge 10/4/2022    Discharging Physician: Dr. Melvi Dykes     Chief Complaint:    Chief Complaint   Patient presents with    Fatigue     Arrived via EMS. States he has had weakness for years but feels more weak in arms and legs today. History Of Present Illness: The patient is a 63 y.o. male with pmhx of CAD, COPD, HTN, HLD, ALIN, DM type 2 who presented to [facility] ED with complaint of fatigue and generalized weakness. Pt reports he has felt weak for years but it has progressively gotten worse. Feels like his legs are going to give out on him when he ambulates. He has had some falls at home but not recently, has not hit his head or LOC. He has chronic back and hip pain, has seen Dr. Donya Zavala in the past for these. He is scheduled for outpatient nerve conduction study for his neuropathy. Denies numbness or leg pain. Does have some tingling from neuropathy. Denies fever, chills, chest pain, dyspnea, nausea, vomiting, diarrhea, hematuria, dizziness, syncope. Past Medical History:        Diagnosis Date    Arthritis     Asthma     CAD (coronary artery disease)     COPD (chronic obstructive pulmonary disease) ([facility])     Dr. Olegario Carson at [facility] told the patient that he did not have COPD, just asthma    Emphysema of lung ([address]     Fatty liver     GERD (gastroesophageal reflux disease)     Hyperlipidemia     Hypertension     Medical history reviewed with no changes     MI, old     Sleep apnea     pt wears cpap at night. states does not have cpap    Type II or unspecified type diabetes mellitus without mention of complication, not stated as uncontrolled        Past Surgical History:        Procedure Laterality Date    CARDIAC PACEMAKER PLACEMENT  2011    NOT MRI COMPATIABLE OLD LEADS st [facility].  pace maker difib    CARPAL TUNNEL RELEASE Bilateral 2013    CATARACT REMOVAL WITH IMPLANT Left 2/28/14    COLONOSCOPY      CORONARY ANGIOPLASTY WITH STENT PLACEMENT  2001,2008    CYST REMOVAL  1982    coccyx area    DIAGNOSTIC CARDIAC CATH LAB PROCEDURE      ENDOSCOPY, COLON, DIAGNOSTIC      ERCP  9/15/2013    ERCP  10/11/13    WITH STENT REMOVAL    FOOT SURGERY Right 07/09/2018     REPAIR CALCANEAL SPUR, REPAIR OF ACHILLES TENDON RIGHT FOOT    NERVE SURGERY Bilateral 12/1/2020    BILATERAL LUMBAR THREE LUMBAR FOUR LUMBAR FIVE DORSAL RAMUS  RADIOFREQUENCY ABLATION SITE CONFIRMED BY FLUOROSCOPY performed by Paradise Zhong MD at [address]      back stimulator in lower back    PACEMAKER PLACEMENT      ICD    PAIN MANAGEMENT PROCEDURE Bilateral 5/26/2020    BILATERAL LUMBAR THREE, LUMBAR FOUR, LUMBAR FIVE DORSAL RAMUS MEDIAL BRANCH BLOCK SITE CONFIRMED BY FLUOROSCOPY performed by Paradise Zhong MD at [address] Bilateral 6/9/2020    BILATERAL LUMBAR THREE, LUMBAR FOUR, LUMBAR FIVE DORSAL RAMUS MEDIAL BRANCH BLOCK SITE CONFIRMED BY FLUOROSCOPY performed by Paradise Zhong MD at [address] Left 1/12/2021    LEFT SACROILIAC JOINT INJECTION SITE CONFIRMED BY FLUOROSCOPY performed by Paradies Zhong MD at [facility] Right 10/31/2018    GASTROCNEMIUS RECESSION RIGHT FOOT performed by Lady Ralf DPM at [address] Right 10/31/2018    REMOVAL OF CALCANEAL SPUR, ACHILLES TENDON REPAIR RIGHT LOWER EXTREMITY performed by Lady Ralf DPM at [facility]  7/18/13    and colonoscopy with biopsies taken    UPPER GASTROINTESTINAL ENDOSCOPY  8/23/13    EUS    UPPER GASTROINTESTINAL ENDOSCOPY  9/15/2013       Medications Prior to Admission:    Prior to Admission medications    Medication Sig Start Date End Date Taking?  Authorizing Provider   fluticasone (FLONASE) 50 MCG/ACT nasal spray 2 sprays by Each Nostril route daily as needed for Rhinitis   Yes Historical Provider, MD   gabapentin (NEURONTIN) 100 MG capsule Take 200 mg by mouth 3 times daily.    Yes Historical Provider, MD   insulin lispro protamine & lispro (HUMALOG MIX) (75-25) 100 UNIT per ML SUSP injection vial Inject subcutaneously twice daily 15 units with breakfast and 20 units with dinner   Yes Historical Provider, MD   Omega-3 Fatty Acids (FISH OIL) 1000 MG CAPS Take 1,000 mg by mouth 2 times daily    Historical Provider, MD   hydrOXYzine HCl (ATARAX) 25 MG tablet take 1 tablet by oral route 4 times every day as needed for itching 8/26/22   ANA Rodriguez CNP   famotidine (PEPCID) 20 MG tablet Take 20 mg by mouth 2 times daily    Historical Provider, MD   metoprolol succinate (TOPROL XL) 50 MG extended release tablet TAKE ONE TABLET BY MOUTH TWICE DAILY 7/21/22   ANA Healy CNP   furosemide (LASIX) 20 MG tablet TAKE ONE TABLET ON MONDAY and THURSDAY 3/17/22   Daril Hence, MD   potassium chloride (KLOR-CON M) 20 MEQ extended release tablet TAKE ONE TABLET WITH LASIX ON Monday AND [place] 3/17/22   Daril Hence, MD   lisinopril (PRINIVIL;ZESTRIL) 5 MG tablet Take 1 tablet by mouth daily 2/8/22   Theresa Lau MD   tamsulosin [facility]) 0.4 MG capsule Take 1 capsule by mouth at bedtime 2/7/22   Theresa Lau MD   clopidogrel (PLAVIX) 75 MG tablet Take 1 tablet by mouth daily 2/8/22   Theresa Lau MD   Dulaglutide 3 MG/0.5ML SOPN Inject 3 mg into the skin once a week     Historical Provider, MD   Fexofenadine HCl (ALLEGRA PO) Take 180 mg by mouth daily    Historical Provider, MD   esomeprazole (NEXIUM) 40 MG delayed release capsule Take 40 mg by mouth daily    Historical Provider, MD   JARDIANCE 25 MG tablet Take 25 mg by mouth daily  2/18/19   Historical Provider, MD   insulin detemir (LEVEMIR) 100 UNIT/ML injection vial Inject 74 Units into the skin nightly    Historical Provider, MD   metFORMIN (GLUCOPHAGE) 1000 MG tablet TAKE ONE TABLET BY MOUTH 2 TIMES DAILY WITH MORNING AND EVENING MEALS 9/26/17   ANA Chin CNP   atorvastatin (LIPITOR) 80 MG tablet TAKE ONE TABLET BY MOUTH DAILY 2/20/17   ANA Pond CNP   DULoxetine (CYMBALTA) 60 MG extended release capsule Take 60 mg by mouth nightly    Historical Provider, MD   divalproex (DEPAKOTE) 500 MG DR tablet Take 500 mg by mouth every morning and 1000 mg every evening. Historical Provider, MD       Allergies: Other and Pcn [penicillins]    Social History:  The patient currently lives home    TOBACCO:   reports that he quit smoking about 21 years ago. His smoking use included cigarettes. He has a 4.00 pack-year smoking history. He has never used smokeless tobacco.  ETOH:   reports no history of alcohol use. Family History:   Positive as follows:        Problem Relation Age of Onset    Heart Disease Mother     Heart Failure Mother     Cancer Father     Diabetes Maternal Grandmother     Heart Failure Maternal Uncle     Hypertension Maternal Uncle     Asthma Neg Hx     Emphysema Neg Hx        REVIEW OF SYSTEMS:       Constitutional: Negative for fever, +weakness   HENT: Negative for sore throat   Eyes: Negative for redness   Respiratory: Negative  for dyspnea, cough   Cardiovascular: Negative for chest pain   Gastrointestinal: Negative for vomiting, diarrhea   Genitourinary: Negative for hematuria   Musculoskeletal: Negative for arthralgias, +chronic back pain    Skin: Negative for rash   Neurological: Negative for syncope   Hematological: Negative for adenopathy   Psychiatric/Behavorial: Negative for anxiety    PHYSICAL EXAM:    BP (!) 151/83   Pulse 81   Temp 97.5 °F (36.4 °C) (Oral)   Resp 16   Ht 5' 4\" (1.626 m)   Wt 227 lb 1.6 oz (103 kg)   SpO2 98%   BMI 38.98 kg/m²     Gen: No distress. Alert. Pleasant obese male   Eyes: PERRL. No sclera icterus.  No conjunctival injection. Neck: No JVD. No Carotid Bruit. Trachea midline. Resp: No accessory muscle use. No crackles. No wheezes. No rhonchi. CV: Regular rate. Regular rhythm. No murmur. No rub. No edema. Peripheral Pulses: +2 palpable, equal bilaterally   GI: Non-tender. Non-distended. No masses. No organomegaly. Normal bowel sounds. No hernia. Skin: Warm and dry. No nodule on exposed extremities. No rash on exposed extremities. M/S: No cyanosis. No joint deformity. No clubbing. Moving all 3 extremities appropriately, sensation and pulses intact, some paraspinal tenderness present on exam   Neuro: Awake. Grossly nonfocal    Psych: Oriented x 3. No anxiety or agitation.      Lab Results   Component Value Date    WBC 5.7 10/04/2022    HGB 16.0 10/04/2022    HCT 46.4 10/04/2022    MCV 87.3 10/04/2022     (L) 10/04/2022     Lab Results   Component Value Date     (L) 10/04/2022    K 3.9 10/04/2022    CL 95 (L) 10/04/2022    CO2 26 10/04/2022    BUN 11 10/04/2022    CREATININE 0.8 (L) 10/04/2022    GLUCOSE 184 (H) 10/04/2022    CALCIUM 10.2 10/04/2022    PROT 7.1 10/04/2022    LABALBU 5.0 10/04/2022    BILITOT 0.5 10/04/2022    ALKPHOS 77 10/04/2022    AST 18 10/04/2022    ALT 26 10/04/2022    LABGLOM >60 10/04/2022    GFRAA >60 10/04/2022    AGRATIO 2.2 10/03/2022    GLOB 2.6 07/13/2021           CARDIAC ENZYMES  Recent Labs     10/03/22  1630 10/04/22  0319 10/04/22  0320 10/04/22  0705   CKTOTAL  --  111  --   --    TROPONINI <0.01  --  <0.01 <0.01       U/A:    Lab Results   Component Value Date/Time    COLORU Yellow 10/03/2022 04:35 PM    WBCUA None seen 10/03/2022 04:35 PM    RBCUA None seen 10/03/2022 04:35 PM    CLARITYU Clear 10/03/2022 04:35 PM    SPECGRAV 1.010 10/03/2022 04:35 PM    LEUKOCYTESUR Negative 10/03/2022 04:35 PM    BLOODU Negative 10/03/2022 04:35 PM    GLUCOSEU >=1000 10/03/2022 04:35 PM       ABG    Lab Results   Component Value Date/Time    SSN4BHI 21.9 01/18/2022 08:30 exam:    BP (!) 151/83   Pulse 81   Temp 97.5 °F (36.4 °C) (Oral)   Resp 16   Ht 5' 4\" (1.626 m)   Wt 227 lb 1.6 oz (103 kg)   SpO2 98%   BMI 38.98 kg/m²     Gen: No distress. Alert. Eyes: PERRL. No sclera icterus. No conjunctival injection. ENT: No discharge. Pharynx clear. Neck: Trachea midline. Normal thyroid. Resp: No accessory muscle use. No crackles. No wheezes. No rhonchi. No dullness on percussion. CV: Regular rate. Regular rhythm. No murmur or rub. No edema. GI: Non-tender. Non-distended. No masses. No organomegaly. Normal bowel sounds. No hernia. Skin: Warm and dry. No nodule on exposed extremities. No rash on exposed extremities. Lymph: No cervical LAD. No supraclavicular LAD. M/S: No cyanosis. No joint deformity. No clubbing. Neuro: Awake. Reflexes 2+ symmetric bilaterally. Moves all 4 extremities, non focal  Psych: Oriented x 3. No anxiety or agitation.             ASSESSMENT/PLAN:  #Generalized weakness   #Fatigue   #Hx of lumbar radiculopathy   -fall precautions   -PT/OT recommending home with prn assist and home PT/OT   -he is feeling better today, ready to go home   -follow up with pain management Dr. Bairon Kellogg   -can discharge home today     #CAD  #Ischemic cardiomyopathy   #Ventricular tachycardia   -s/p ICD   -s/p stents   -on statin, plavix   -last echo with EF 45-50%    #COPD without AE   -continue prn inhalers     #Paroxysmal atrial fibrillation   -NSR on presentation   -on lopressor   -on eliquis for AC-> per last cardiologist note   -not on home medication list, appears to not be compliant with this   -follow up with cardiologist     #DM type 2   -holding home regimen   -on lantus   -SSI   -monitor BG   -can resume home regimen at discharge     #HTN   -on toprol XL and lisinopril     #HLD   -on statin     #GERD   -on PPI     #Neuropathy   -on gabapentin     #ALIN   -home BIPAP and CPAP     #Depression  #Bipolar disorder   -on cymbalta and depakote      DVT Prophylaxis: Lovenox   Diet: ADULT DIET;  Regular; 4 carb choices (60 gm/meal)  Code Status: Full Code      Discharged home in stable condition     Follow up with PCP in 1 week     Lorrie Rodríguez  10/04/22  1:01 PM    Doreen Beltran MD 10/4/2022 1:37 PM

## 2022-10-04 NOTE — TELEPHONE ENCOUNTER
Pt is currently in [facility]. Pharmacy there was reviewing medications with pt. Pt sts he is taking  mg QD, clopidogrel (PLAVIX) tablet 75 mg  and NOT taking Eliquis. Pharmacy wants to have meds clarified with pt. Please advise.

## 2022-10-04 NOTE — PROGRESS NOTES
Patient admitted to room 302 from [place]. Patient oriented to room, call light, bed rails, phone, lights and bathroom. Patient instructed about the schedule of the day including: vital sign frequency, lab draws, possible tests, frequency of MD and staff rounds, daily weights, I &O's and prescribed diet. pcu Telemetry box in place, patient aware of placement and reason. Bed locked, in lowest position, side rails up 2/4, call light within reach. Recliner Assessment  Patient is able to demonstrate the ability to move from a reclining position to an upright position within the recliner. 4 Eyes Skin Assessment     The patient is being assess for   Admission    I agree that 2 RN's have performed a thorough Head to Toe Skin Assessment on the patient. ALL assessment sites listed below have been assessed.       Areas assessed for pressure by both nurses:   [x]   Head, Face, and Ears   [x]   Shoulders, Back, and Chest, Abdomen  []   Arms, Elbows, and Hands   [x]   Coccyx, Sacrum, and Ischium  [x]   Legs, Feet, and Heels          scattered bruises        **SHARE this note so that the co-signing nurse is able to place an eSignature**    Co-signer eSignature: Electronically signed by Zbigniew Luong RN on 10/4/22 at 3:39 AM EDT      Omega Prevention initiated:  No   Wound Care Orders initiated:  No      [facility] nurse consulted for Pressure Injury (Stage 3,4, Unstageable, DTI, NWPT, Complex wounds)and New or Established Ostomies:  NA      Primary Nurse eSignature: Electronically signed by Schuyler Coronado RN on 10/4/22 at 3:08 AM EDT

## 2022-10-04 NOTE — CARE COORDINATION
Case Management Assessment  Initial Evaluation and discharge note      Patient Name: Maria Dolores Morelos. YOB: 1960  Diagnosis: Lactic acidosis [E87.20]  Generalized weakness [R53.1]  Trouble walking [R26.2]  Lives alone [Z60.2]  Weakness [R53.1]  Date / Time: 10/3/2022  4:20 PM    Admission status/Date:10/3/22 observation  Chart Reviewed: Yes      Patient Interviewed: Yes   Family Interviewed:  No      Hospitalization in the last 30 days:  No      Health Care Decision Maker :   Primary Decision Maker: Miguelito Quiroga - Brother/Sister - [phone]    (CM - must 1st enter selection under Navigator - emergency contact- Health Care Decision Maker Relationship and pick relationship)   Who do you trust or have selected to make healthcare decisions for you      Met with: patient  Interview conducted  (bedside/phone):    Current PCP: ELIZABETH Lagos NP    Financial  Commercial  Precert required for SNF : Y          3 night stay required -  [employer]  Support Systems/Care Needs: Family Members  Transportation: self    Meal Preparation: self    Housing  Living Arrangements: lives 1st fl apt  Steps: 0  Intent for return to present living arrangements: Yes  Identified Issues: [address] with Home Health Care : No Agency:(Services)  Type of Home Care Services: None  Passport/Waiver : No  :                      Phone Number:    Passport/Waiver Services: N/A          Durable Medical Equiptment   DME Provider:   Equipment:   Walker_x__Cane_x__RTS___ BSC___Shower Chair___Hospital Bed___W/C____Other________  02 at ____Liter(s)---wears(frequency)_______ HHN _x__ CPAP_x__ BiPap___   N/A____      Home O2 Use :  No    If No for home O2---if presently on O2 during hospitalization:  No  if yes CM to follow for potential DC O2 need  Informed of need for care provider to bring portable home O2 tank on day of discharge for nursing to connect prior to leaving:   Not Indicated  Verbalized agreement/Understanding:   Not Indicated    Community Service Affiliation  Dialysis:  No    Agency:  Location:  Dialysis Schedule:  Phone:   Fax: Other Community Services: (ex:PT/OT,Mental Health,Wound Clinic, Cardio/Pul [address])  None    DISCHARGE PLAN: Explained Case Management role/services. Reviewed chart and met with pt at bedside. Role of CM explained. States lives home alone and plan return. States IPTA and transports self. Denies any in home services. Noted pt is dc today and will return home. Discussed therapy rec's and pt  has EMG scheduled and then will have outpt therapy at [facility] and does not want HHC. States can transport self to therapy. Also discussed diabetic needs and pt  has all needed supplies and meds.  has no difficulty obtaining these. No dc needs identified.

## 2022-10-04 NOTE — PLAN OF CARE
Problem: Discharge Planning  Goal: Discharge to home or other facility with appropriate resources  10/4/2022 1424 by Carles Burkitt, RN  Outcome: Completed  10/4/2022 0816 by Carles Burkitt, RN  Outcome: Progressing     Problem: Safety - Adult  Goal: Free from fall injury  10/4/2022 1424 by Carles Burkitt, RN  Outcome: Completed  10/4/2022 0816 by Carles Burkitt, RN  Outcome: Progressing     Problem: Chronic Conditions and Co-morbidities  Goal: Patient's chronic conditions and co-morbidity symptoms are monitored and maintained or improved  Outcome: Completed

## 2022-10-04 NOTE — FLOWSHEET NOTE
10/04/22 0712   Vital Signs   Temp 97.5 °F (36.4 °C)   Temp Source Oral   Heart Rate 78   Heart Rate Source Monitor   Resp 16   BP (!) 152/90   BP Location Right upper arm   BP Method Automatic   MAP (Calculated) 110.67   Patient Position Semi fowlers   Level of Consciousness 0   MEWS Score 1   Oxygen Therapy   SpO2 98 %   O2 Device None (Room air)   Pt. Resting in bed. Call light in reach. Shift assessment completed see flow sheet. Denies any needs at this time. Will continue to monitor.

## 2022-10-04 NOTE — PROGRESS NOTES
RT Inhaler-Nebulizer Bronchodilator Protocol Note    There is a bronchodilator order in the chart from a provider indicating to follow the RT Bronchodilator Protocol and there is an Initiate RT Inhaler-Nebulizer Bronchodilator Protocol order as well (see protocol at bottom of note). CXR Findings:  XR CHEST PORTABLE    Result Date: 10/3/2022  No radiographic evidence of an acute cardiopulmonary process. The findings from the last RT Protocol Assessment were as follows:   History Pulmonary Disease: Chronic pulmonary disease  Respiratory Pattern: Regular pattern and RR 12-20 bpm  Breath Sounds: Clear breath sounds  Cough: Strong, spontaneous, non-productive  Indication for Bronchodilator Therapy: None  Bronchodilator Assessment Score: 2    Aerosolized bronchodilator medication orders have been revised according to the RT Inhaler-Nebulizer Bronchodilator Protocol below. Respiratory Therapist to perform RT Therapy Protocol Assessment initially then follow the protocol. Repeat RT Therapy Protocol Assessment PRN for score 0-3 or on second treatment, BID, and PRN for scores above 3. No Indications - adjust the frequency to every 6 hours PRN wheezing or bronchospasm, if no treatments needed after 48 hours then discontinue using Per Protocol order mode. If indication present, adjust the RT bronchodilator orders based on the Bronchodilator Assessment Score as indicated below. Use Inhaler orders unless patient has one or more of the following: on home nebulizer, not able to hold breath for 10 seconds, is not alert and oriented, cannot activate and use MDI correctly, or respiratory rate 25 breaths per minute or more, then use the equivalent nebulizer order(s) with same Frequency and PRN reasons based on the score. If a patient is on this medication at home then do not decrease Frequency below that used at home.     0-3 - enter or revise RT bronchodilator order(s) to equivalent RT Bronchodilator order with [facility]  [address] [place] [id number]  Phone: [phone]    September 25, 2018        Keerthi Sloan  [address] [place] [id number]        To whom it may concern:      RE: Keerthi Sloan      Patient was seen and treated today at our clinic today.      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  However, she continues to have anxiety surrounding her driving position and will likely pursue a non-driving position in the future when she is more financially stable.        Please contact me for questions or concerns.          Sincerely,              Elida Cameron, DO     Frequency of every 4 hours PRN for wheezing or increased work of breathing using Per Protocol order mode. 4-6 - enter or revise RT Bronchodilator order(s) to two equivalent RT bronchodilator orders with one order with BID Frequency and one order with Frequency of every 4 hours PRN wheezing or increased work of breathing using Per Protocol order mode. 7-10 - enter or revise RT Bronchodilator order(s) to two equivalent RT bronchodilator orders with one order with TID Frequency and one order with Frequency of every 4 hours PRN wheezing or increased work of breathing using Per Protocol order mode. 11-13 - enter or revise RT Bronchodilator order(s) to one equivalent RT bronchodilator order with QID Frequency and an Albuterol order with Frequency of every 4 hours PRN wheezing or increased work of breathing using Per Protocol order mode. Greater than 13 - enter or revise RT Bronchodilator order(s) to one equivalent RT bronchodilator order with every 4 hours Frequency and an Albuterol order with Frequency of every 2 hours PRN wheezing or increased work of breathing using Per Protocol order mode.          Electronically signed by Destinee Martin RCP on 10/4/2022 at 2:40 AM

## 2022-10-04 NOTE — PROGRESS NOTES
Inpatient Occupational Therapy  Evaluation and Treatment    Unit: PCU  Date:  10/4/2022  Patient Name:    Bibi Conley Admitting diagnosis:  Lactic acidosis [E87.20]  Generalized weakness [R53.1]  Trouble walking [R26.2]  Lives alone [Z60.2]  Admit Date:  10/3/2022  Precautions/Restrictions/WB Status/ Lines/ Wounds/ Oxygen: Fall risk, Bed/chair alarm, and Lines -IV    Treatment Time:  9810- 1100  Treatment Number: 1   Timed code treatment minutes 35 minutes   Total Treatment minutes:   45   minutes    Patient Goals for Therapy:  \" gp home  \"      Discharge Recommendations: home with PRN assist and home OT   DME needs for discharge: [facility]       Therapy recommendations for staff:   Stand by assist with use of No AD for all ambulation to/from bathroom with gait belt     History of Present Illness:   : No H&P yet. Per ED notes, pt arrived to [facility] ED with cc of new onset fatigue and generalized weakness in bilat arms and legs. He denies lightheadedness and dizziness in ED, denies falls/trauma, denied head or back pain  Home Health S4 Level Recommendation:  Level 1 Standard  AM-PAC Score: 21  Preadmission Environment  na  Pt. Lives Alone, brother lives in the same apartment building Pt is in, Pt looks after his brother who has paranoid schizophrenia    Home environment:    apartment   Steps to enter first floor:   0 steps to enter       Steps to second floor: N/A  Bathroom:       tub and shower ,  standard height toilet, grab bars   Equipment owned:      [facility] and Rollator  , standard walker, lift chair     Preadmission Status / PLOF:  History of falls             Yes  gets light headed when he first stands up and his legs give out when he gets tired  Pt. Able to drive          Yes  Pt Fully independent with ADL's         Yes holds onto grab bars in the shower   Pt. Required assistance from family for: Independent PTA  , sister cleans for pt   Pt.  Fully independent for transfers and gait and walked without a device in the home (furniture walks). Uses rollator for longer distances. Pt uses cane to walk to the store. Uses the motorized scooter at grocery store when shopping    Pt has to walk dogs every hr during the day. Pain  No  Rating:NA  Location:na   Pain Medicine Status: No request made      Cognition    A&O x4   Able to follow 2 step commands    Subjective  Patient lying supine in bed with no family present. Pt agreeable to this OT eval & tx. Upper Extremity ROM:    WFL    Upper Extremity Strength:    WFL       Upper Extremity Sensation    Diminished    Upper Extremity Proprioception:  WFL    Coordination and Tone  WFL  Pt is left handed   Balance  Functional Sitting Balance:  WFL  Functional Standing Balance:diminished     Bed mobility:    Supine to sit: Independent and Supervision  Sit to supine:   Not Tested  Rolling:    Independent  Scooting in sitting:  Independent  Scooting to head of bed:   Not Tested    Bridging:   Not Tested    Transfers:    Sit to stand:  SBA  Stand to sit:  SBA  Bed to chair:   SBA  Standard toilet: SBA  Bed to BSC:  Not Tested    Dressing:      UE:   Not Tested  LE:    Min A  to don socks     Bathing:    UE:  Not Tested  LE:  Not Tested    Eating:   Not Tested    Toileting:  SBA    Activity Tolerance   Pt completed therapy session with No adverse symptoms noted w/activity  Bp sitting edge of bed 152/83  HR 86   Standing /73   Positioning Needs:   Pt up in chair, alarm set, positioned in proper neutral alignment and pressure relief provided. Exercise / Activities Initiated:   N/A    Patient/Family Education:   Role of OT    Assessment of Deficits: Pt seen for Occupational therapy evaluation in acute care setting. Pt demonstrated decreased Activity tolerance, ADLs, Balance , and Transfers. Pt functioning below baseline and will likely benefit from skilled occupational therapy services to maximize safety and independence. Goal(s) : To be met in 3 Visits:  1).

## 2022-10-05 NOTE — TELEPHONE ENCOUNTER
Yes he should be taking Eliquis to minimize his stroke risk with paroxysmal atrial fibrillation. He should continue his Plavix for coronary artery disease. He should stop taking aspirin to avoid triple therapy. Eliquis dosing is 5 mg BID. Prescription sent to [address] [place] [address]

## 2022-10-05 NOTE — TELEPHONE ENCOUNTER
Spoke to pt to clarify medications -     Pt was unaware he was supposed to be taking Eliquis. Pt has been taking Aspirin 324mg and Plavix daily. Instructed pt that he was to take Eliquis and Plavix - not Aspirin. V/U    Please verify Eliquis dosing - pt preferred pharmacy is [address] [place] [address]. They make pt med packs, will call pharmacy with med updates after Eliquis dosing is confirmed.

## 2022-10-06 ENCOUNTER — TELEPHONE (OUTPATIENT)
Dept: CARDIOLOGY CLINIC | Age: 62
End: 2022-10-06

## 2022-10-06 NOTE — TELEPHONE ENCOUNTER
Pt stated that he called his pharmacy and they do not have the prescription for Eliquis 5mg. Can we please resend?  [facility] [phone]

## 2022-10-06 NOTE — TELEPHONE ENCOUNTER
I spoke with the pharmacy and they confirmed that they have received the prescription but they have not had the chance to fill it. She stated they would notify the patient when his prescription was ready. I attempted to relay this information to the patient. TITO.

## 2022-10-07 ENCOUNTER — NURSE ONLY (OUTPATIENT)
Dept: CARDIOLOGY CLINIC | Age: 62
End: 2022-10-07

## 2022-10-07 DIAGNOSIS — I48.91 ATRIAL FIBRILLATION WITH RVR (HCC): Primary | ICD-10-CM

## 2022-10-10 ENCOUNTER — NURSE ONLY (OUTPATIENT)
Dept: CARDIOLOGY CLINIC | Age: 62
End: 2022-10-10

## 2022-10-10 DIAGNOSIS — Z95.810 AUTOMATIC IMPLANTABLE CARDIOVERTER-DEFIBRILLATOR IN SITU: ICD-10-CM

## 2022-10-10 DIAGNOSIS — I47.20 VENTRICULAR TACHYCARDIA: ICD-10-CM

## 2022-10-10 DIAGNOSIS — I25.5 ISCHEMIC CARDIOMYOPATHY: ICD-10-CM

## 2022-10-13 NOTE — PROGRESS NOTES
We received remote transmission from patient's monitor at home. Transmission shows normal sensing and pacing function. Noted NSVT. Pt is on Toprol. EP physician will review. See interrogation under cardiology tab in the [address] [place] [address] [address] field for more details. -<1%    Corvue is stable. End of 91-day monitoring period 10-10-22.

## 2022-11-10 RX ORDER — APIXABAN 5 MG/1
TABLET, FILM COATED ORAL
Qty: 56 TABLET | Refills: 0 | OUTPATIENT
Start: 2022-11-10

## 2022-12-13 ENCOUNTER — TELEPHONE (OUTPATIENT)
Dept: CARDIOLOGY CLINIC | Age: 62
End: 2022-12-13

## 2022-12-14 NOTE — TELEPHONE ENCOUNTER
I tried calling pt again at [phone] and it still says the call cannot be completed due to restrictions on this line\". The pt does not have any emergency contacts and or anyone on the hipaa form, no mercy mychart.

## 2022-12-15 NOTE — TELEPHONE ENCOUNTER
12/15 Letter has been created and mailed to listed address due to failed contact by phone. Informing pt to call office and update contact information and reschedule appt.

## 2022-12-16 NOTE — TELEPHONE ENCOUNTER
Last seen 8/23/22, told to fu in Oct no appt scheduled.   Left msg for patient to call and make appt to get med refilled and fu like he was told

## 2022-12-19 RX ORDER — HYDROXYZINE HYDROCHLORIDE 25 MG/1
TABLET, FILM COATED ORAL
Qty: 120 TABLET | Refills: 0 | OUTPATIENT
Start: 2022-12-19

## 2023-01-16 ENCOUNTER — OFFICE VISIT (OUTPATIENT)
Dept: FAMILY MEDICINE CLINIC | Age: 63
End: 2023-01-16
Payer: MEDICARE

## 2023-01-16 VITALS — SYSTOLIC BLOOD PRESSURE: 132 MMHG | BODY MASS INDEX: 38.96 KG/M2 | WEIGHT: 227 LBS | DIASTOLIC BLOOD PRESSURE: 78 MMHG

## 2023-01-16 DIAGNOSIS — Z99.89 OSA ON CPAP: ICD-10-CM

## 2023-01-16 DIAGNOSIS — Z79.4 TYPE 2 DIABETES MELLITUS WITH HYPERGLYCEMIA, WITH LONG-TERM CURRENT USE OF INSULIN (HCC): ICD-10-CM

## 2023-01-16 DIAGNOSIS — I47.20 VENTRICULAR TACHYCARDIA: ICD-10-CM

## 2023-01-16 DIAGNOSIS — I21.4: ICD-10-CM

## 2023-01-16 DIAGNOSIS — I25.5 ISCHEMIC CARDIOMYOPATHY: Primary | ICD-10-CM

## 2023-01-16 DIAGNOSIS — I10 HTN (HYPERTENSION), BENIGN: ICD-10-CM

## 2023-01-16 DIAGNOSIS — Z95.810 STATUS POST INTERNAL CARDIAC DEFIBRILLATOR PROCEDURE: ICD-10-CM

## 2023-01-16 DIAGNOSIS — E11.65 TYPE 2 DIABETES MELLITUS WITH HYPERGLYCEMIA, WITH LONG-TERM CURRENT USE OF INSULIN (HCC): ICD-10-CM

## 2023-01-16 DIAGNOSIS — G47.33 OSA ON CPAP: ICD-10-CM

## 2023-01-16 DIAGNOSIS — R25.1 TREMOR: ICD-10-CM

## 2023-01-16 DIAGNOSIS — I48.91 ATRIAL FIBRILLATION WITH RVR (HCC): ICD-10-CM

## 2023-01-16 DIAGNOSIS — F31.9 BIPOLAR DISORDER WITH DEPRESSION (HCC): ICD-10-CM

## 2023-01-16 PROCEDURE — 99214 OFFICE O/P EST MOD 30 MIN: CPT

## 2023-01-16 PROCEDURE — 3075F SYST BP GE 130 - 139MM HG: CPT

## 2023-01-16 PROCEDURE — 3078F DIAST BP <80 MM HG: CPT

## 2023-01-16 RX ORDER — DIVALPROEX SODIUM 500 MG/1
TABLET, DELAYED RELEASE ORAL
Qty: 90 TABLET | Refills: 1 | Status: SHIPPED | OUTPATIENT
Start: 2023-01-16

## 2023-01-16 RX ORDER — DULOXETIN HYDROCHLORIDE 60 MG/1
60 CAPSULE, DELAYED RELEASE ORAL NIGHTLY
Qty: 90 CAPSULE | Refills: 1 | Status: SHIPPED | OUTPATIENT
Start: 2023-01-16

## 2023-01-16 ASSESSMENT — ENCOUNTER SYMPTOMS
COLOR CHANGE: 0
DIARRHEA: 0
CONSTIPATION: 0
EYE PAIN: 0
CHEST TIGHTNESS: 0
ABDOMINAL PAIN: 0
SORE THROAT: 0
ABDOMINAL DISTENTION: 0
SHORTNESS OF BREATH: 0
EYE DISCHARGE: 0
COUGH: 0

## 2023-01-16 ASSESSMENT — PATIENT HEALTH QUESTIONNAIRE - PHQ9
SUM OF ALL RESPONSES TO PHQ QUESTIONS 1-9: 12
9. THOUGHTS THAT YOU WOULD BE BETTER OFF DEAD, OR OF HURTING YOURSELF: 0
7. TROUBLE CONCENTRATING ON THINGS, SUCH AS READING THE NEWSPAPER OR WATCHING TELEVISION: 3
SUM OF ALL RESPONSES TO PHQ QUESTIONS 1-9: 12
5. POOR APPETITE OR OVEREATING: 1
4. FEELING TIRED OR HAVING LITTLE ENERGY: 1
6. FEELING BAD ABOUT YOURSELF - OR THAT YOU ARE A FAILURE OR HAVE LET YOURSELF OR YOUR FAMILY DOWN: 1
2. FEELING DOWN, DEPRESSED OR HOPELESS: 1
SUM OF ALL RESPONSES TO PHQ QUESTIONS 1-9: 12
8. MOVING OR SPEAKING SO SLOWLY THAT OTHER PEOPLE COULD HAVE NOTICED. OR THE OPPOSITE, BEING SO FIGETY OR RESTLESS THAT YOU HAVE BEEN MOVING AROUND A LOT MORE THAN USUAL: 1
SUM OF ALL RESPONSES TO PHQ QUESTIONS 1-9: 12
3. TROUBLE FALLING OR STAYING ASLEEP: 2
SUM OF ALL RESPONSES TO PHQ9 QUESTIONS 1 & 2: 3
1. LITTLE INTEREST OR PLEASURE IN DOING THINGS: 2
10. IF YOU CHECKED OFF ANY PROBLEMS, HOW DIFFICULT HAVE THESE PROBLEMS MADE IT FOR YOU TO DO YOUR WORK, TAKE CARE OF THINGS AT HOME, OR GET ALONG WITH OTHER PEOPLE: 0

## 2023-01-16 NOTE — PROGRESS NOTES
[facility]    2023    Cheryl Briones (:  1960) is a 63 y.o. male, here to follow up on DM, HTN and CAD    Chief Complaint   Patient presents with    Hypertension    Diabetes        ASSESSMENT/ PLAN  1. Ischemic cardiomyopathy  -Echocardiogram in  did show improved ejection fraction up to 45%. Prior in 2019 was 35%  -Follow-up with cardiology in February  -Continue Toprol-XL 50 mg daily  - Comprehensive Metabolic Panel; Future  - Lipid, Fasting; Future  - CBC with Auto Differential; Future    2. HTN (hypertension), benign  -Controlled on Toprol-XL 50 mg daily, Lasix 20 mg lisinopril 5 mg daily  -Continue    3. Type 2 diabetes mellitus with hyperglycemia, with long-term current use of insulin ([facility])  -Recheck A1c this week  -Continue Humalog 75/25 mix, Levemir 74 unit, metformin 1000 mg twice daily, Jardiance  -Lisinopril for kidney protection  - Hemoglobin A1C; Future    4. Ventricular tachycardia  -Status post implanted ICD. Follows up with electrophysiology    5. Myocardial infarction, nontransmural (HCC)  -5 cardiac stents in the past  -Follow-up with cardiology February  -Would likely need repeat echocardiogram in February      6. Atrial fibrillation with RVR (HCC)  -A. fib at baseline  -Continue Eliquis  -No signs or symptoms of GI bleeding      7. ALIN on CPAP  -Strongly encouraged him to wear CPAP at home.  -Could help with his cardiomyopathy. Patient declines using CPAP throughout the night. 8. Bipolar disorder with depression ([facility] [address]  -Refill Depakote and Cymbalta  -He does have baseline tremor, flat affect and shuffling gait. Question whether this could be related to underlying Parkinson's  -Potentially could try Sinemet next visit  - divalproex (DEPAKOTE) 500 MG DR tablet; Take 500 mg by mouth every morning and 1000 mg every evening. Dispense: 90 tablet; Refill: 1  - DULoxetine (CYMBALTA) 60 MG extended release capsule;  Take 1 capsule by mouth nightly  Dispense: 90 capsule; Refill: 1    9. Status post internal cardiac defibrillator procedure  -Follow-up with EP  -Interrogation from February    10. Tremor  -Could be related to underlying Parkinson's that is undiagnosed  -Potentially try Sinemet next visit for improvement with shuffling gait, flat affect and tremor      Preventative Care:  LABS  Needs colonoscopy. Imagin2022 ECHO    Summary   This is a limited study afib, ischemic cmp, recurrent pacer firing. LV systolic function appears mildly reduced with EF estimated at 45-50%. There is more prominent HK of the inferior wall. Left ventricular size is decreased. There is mild concentric left ventricular hypertrophy. 2019 35-40% inf basal hypo, inferolateral akinesis, LVE, mild MR      Signature    NM STRESS 2021    Summary    Overall findings represent a high risk scan. Normal LV size with reduced function. EF 49%    Large mostly reversible defect involving the basal inferolateral, basal    inferior, mid inferolateral and mid inferior segments. Findings concerning for ischemia. ECG: Non-diagnostic EKG response due to failure to reach target heart rate. Return in about 4 months (around 2023) for A1c. Ferny Peña [facility]  Patient is here to establish care. Previously was being seen at [facility]. Was admitted to [facility] in January for [facility]. Discharged to [facility] for acute rehab and PT. Medical hx of GERD, HTN, MI in 2001, x5 cardiac stents, a-fib, ischemic cardiomyopathy, systolic congestive heart failure secondary to ischemic cardiomyopathy, and obstructive sleep apnea where he is supposed be wearing CPAP    Follows with Dr. Noni Steiner of cardiology. History of diabetes. He is taking Trulicity 3 mg weekly. He is also on Levemir 74 units at nighttime. He states that he is taking Humalog 15 units in the morning and then 20 units with dinner. He denies any issues with low blood sugars or symptoms of lows.   He states that his last A1c was around 6.8/6.7. He does follow with podiatry where he does have his nails trimmed. No foot ulcerations he is wearing. He does report some diabetic neuropathy. He states that he does have frequent numbness and tingling in bilateral lower extremities. He does walk with a cane due to this problem. Long history of coronary artery disease in which she has had previous cardiac stents placed. He states that he has x5 cardiac stents placed in the past.  He does have history of reduced ejection fraction according to his echocardiogram in January 2022. Ejection fraction at that time was 45%. This has improved compared to 2019. In 2019 his ejection fraction was 35%. History of obstructive sleep apnea where he has been prescribed CPAP in the past.  He is not wearing CPAP due to feelings of shortness of breath and claustrophobia. He does report a history of bipolar depression and has been on Cymbalta for about 5 years. He is also on Depakote. He states that it is symptoms of depression have improved since medication. History of hyperlipidemia. He is being treated with Lipitor 80 mg    History of atrial fibrillation with implanted ICD. Last EKG showed normal sinus rhythm. Apparently, he is on Eliquis for history of atrial fibrillation       Interval Hx 1/16/2023:    Plans on seeing Dr. Nestor Dominguez of cardiology on 2/11    Since last time patient reestablished with [facility] and now would like to reestablish with me again today. He reports that he has been following with Dr. Chevy Ashby of cardiology with plans to follow-up with Dr. Walter Ramirez of cardiology at [facility] ([facility] He also reports that he still not wearing CPAP at home. His last echocardiogram was in Jan 2022 that did show ejection fraction of 45%. Baseline atrial fibrillation. He does have an implanted defibrillator and ICD. He is on Eliquis.   Denies any symptoms of bloody stool or black stools    He is wearing freestyle porfirio to and has been checking blood sugars at home at least 3-4 times daily. He reports that his morning blood sugars have been running around 200 last A1c was 7.9. A1c was checked in October. Has not had repeat. He does report that his ambulatory pattern has worsened since last time he saw me as well as he reports worsening tremor of left upper extremity. ROS  Review of Systems   Constitutional:  Negative for chills, fever and unexpected weight change. HENT:  Negative for congestion, dental problem and sore throat. Eyes:  Negative for pain and discharge. Respiratory:  Negative for cough, chest tightness and shortness of breath. Cardiovascular:  Negative for chest pain, palpitations and leg swelling. Gastrointestinal:  Negative for abdominal distention, abdominal pain, constipation and diarrhea. Endocrine: Negative for polydipsia, polyphagia and polyuria. Genitourinary:  Negative for dysuria, flank pain, frequency, hematuria and urgency. Musculoskeletal:  Positive for gait problem. Negative for arthralgias, joint swelling and myalgias. Skin:  Negative for color change and rash. Neurological:  Positive for tremors, weakness and numbness. Negative for dizziness, light-headedness and headaches. Psychiatric/Behavioral:  Negative for agitation and behavioral problems. The patient is not nervous/anxious. HISTORIES  Current Outpatient Medications on File Prior to Visit   Medication Sig Dispense Refill    apixaban (ELIQUIS) 5 MG TABS tablet Take 1 tablet by mouth 2 times daily 120 tablet 2    fluticasone (FLONASE) 50 MCG/ACT nasal spray 2 sprays by Each Nostril route daily as needed for Rhinitis      gabapentin (NEURONTIN) 100 MG capsule Take 200 mg by mouth 3 times daily.       insulin lispro protamine & lispro (HUMALOG MIX) (75-25) 100 UNIT per ML SUSP injection vial Inject subcutaneously twice daily 15 units with breakfast and 20 units with dinner      hydrOXYzine HCl (ATARAX) 25 MG tablet take 1 tablet by oral route 4 times every day as needed for itching 120 tablet 0    famotidine (PEPCID) 20 MG tablet Take 20 mg by mouth 2 times daily      metoprolol succinate (TOPROL XL) 50 MG extended release tablet TAKE ONE TABLET BY MOUTH TWICE DAILY 180 tablet 3    furosemide (LASIX) 20 MG tablet TAKE ONE TABLET ON MONDAY and THURSDAY 90 tablet 1    potassium chloride (KLOR-CON M) 20 MEQ extended release tablet TAKE ONE TABLET WITH LASIX ON Monday AND THURSDAYS 90 tablet 1    lisinopril (PRINIVIL;ZESTRIL) 5 MG tablet Take 1 tablet by mouth daily 30 tablet 3    tamsulosin (FLOMAX) 0.4 MG capsule Take 1 capsule by mouth at bedtime 30 capsule 3    clopidogrel (PLAVIX) 75 MG tablet Take 1 tablet by mouth daily 30 tablet 3    Dulaglutide 3 MG/0.5ML SOPN Inject 3 mg into the skin once a week       Fexofenadine HCl (ALLEGRA PO) Take 180 mg by mouth daily      esomeprazole (NEXIUM) 40 MG delayed release capsule Take 40 mg by mouth daily      JARDIANCE 25 MG tablet Take 25 mg by mouth daily       insulin detemir (LEVEMIR) 100 UNIT/ML injection vial Inject 74 Units into the skin nightly      metFORMIN (GLUCOPHAGE) 1000 MG tablet TAKE ONE TABLET BY MOUTH 2 TIMES DAILY WITH MORNING AND EVENING MEALS 60 tablet 0    atorvastatin (LIPITOR) 80 MG tablet TAKE ONE TABLET BY MOUTH DAILY 30 tablet 11     No current facility-administered medications on file prior to visit.         Allergies   Allergen Reactions    Other      VASCEPA CAUSED RASH AND ITCHING    Pcn [Penicillins] Hives       Past Medical History:   Diagnosis Date    Arthritis     Asthma     CAD (coronary artery disease)     COPD (chronic obstructive pulmonary disease) ([facility])     Dr. Wilkinson Sender at [address] [place] [address] told the patient that he did not have COPD, just asthma    Emphysema of lung ([facility] [address]     Fatty liver     GERD (gastroesophageal reflux disease)     Hyperlipidemia     Hypertension     Medical history reviewed with no changes     MI, old     Sleep apnea pt wears cpap at night. states does not have cpap    Type II or unspecified type diabetes mellitus without mention of complication, not stated as uncontrolled        Patient Active Problem List   Diagnosis    Ventricular tachycardia    Presence of stent in left circumflex coronary artery    Myocardial infarction, nontransmural (HCC)    Myocardial infarction, inferoposterior wall, subsequent care    Automatic implantable cardioverter-defibrillator in situ    CAD (coronary artery disease)    Automatic implantable cardioverter-defibrillator in situ    ALIN on CPAP    Gastroesophageal reflux disease without esophagitis    Hyperlipidemia    Hypertension due to endocrine disorder    Ischemic cardiomyopathy    Obesity, morbid, BMI 40.0-49.9 ([facility] [address]    Weakness    Acute encephalopathy    TIA involving left internal carotid artery    DM (diabetes mellitus), secondary, uncontrolled, w/neurologic complic    Dyslipidemia    HTN (hypertension), benign    Lactic acidosis    Head trauma    Abrasion, scalp w/o infection    Unsteady gait    Hyperglycemia    Chronic obstructive pulmonary disease (HCC)    Coarse tremor    Generalized weakness    Type 2 diabetes mellitus with hyperglycemia, with long-term current use of insulin (HCC)    Primary hypertension    CAD S/P percutaneous coronary angioplasty    COVID-19    Trouble walking    Implantable cardioverter-defibrillator (ICD) discharge    AICD malfunction    Hypotension    Valproic acid toxicity    PAF (paroxysmal atrial fibrillation) ([facility] [address]    COVID-19 virus infection    Disorder of electrolytes    Atrial fibrillation with RVR (HCC)    Low grade fever    Thrombocytopenia (HCC)    Abnormal chest x-ray    Encephalopathy    Debility    Bipolar disorder with depression [facility])       Past Surgical History:   Procedure Laterality Date    CARDIAC PACEMAKER PLACEMENT  2011    NOT MRI COMPATIABLE OLD LEADS [facility].  pace maker difib    CARPAL TUNNEL RELEASE Bilateral 2013    CATARACT REMOVAL WITH IMPLANT Left 2/28/14    COLONOSCOPY      CORONARY ANGIOPLASTY WITH STENT PLACEMENT  2001,2008    CYST REMOVAL  1982    coccyx area    DIAGNOSTIC CARDIAC CATH LAB PROCEDURE      ENDOSCOPY, COLON, DIAGNOSTIC      ERCP  9/15/2013    ERCP  10/11/13    WITH STENT REMOVAL    FOOT SURGERY Right 07/09/2018     REPAIR CALCANEAL SPUR, REPAIR OF ACHILLES TENDON RIGHT FOOT    NERVE SURGERY Bilateral 12/1/2020    BILATERAL LUMBAR THREE LUMBAR FOUR LUMBAR FIVE DORSAL RAMUS  RADIOFREQUENCY ABLATION SITE CONFIRMED BY FLUOROSCOPY performed by Alexandria Lewis MD at [address]      back stimulator in lower back    PACEMAKER PLACEMENT      ICD    PAIN MANAGEMENT PROCEDURE Bilateral 5/26/2020    BILATERAL LUMBAR THREE, LUMBAR FOUR, LUMBAR FIVE DORSAL RAMUS MEDIAL BRANCH BLOCK SITE CONFIRMED BY FLUOROSCOPY performed by Alexandria Lewis MD at [address] Bilateral 6/9/2020    BILATERAL LUMBAR THREE, LUMBAR FOUR, LUMBAR FIVE DORSAL RAMUS MEDIAL BRANCH BLOCK SITE CONFIRMED BY FLUOROSCOPY performed by Alexandria Lewis MD at [address] Left 1/12/2021    LEFT SACROILIAC JOINT INJECTION SITE CONFIRMED BY FLUOROSCOPY performed by Alexandria Lewis MD at [place] [address] Right 10/31/2018    GASTROCNEMIUS RECESSION RIGHT FOOT performed by Lou Live DPM at [address] Right 10/31/2018    REMOVAL OF CALCANEAL SPUR, ACHILLES TENDON REPAIR RIGHT LOWER EXTREMITY performed by Lou Live DPM at [address]  7/18/13    and colonoscopy with biopsies taken    UPPER GASTROINTESTINAL ENDOSCOPY  8/23/13    EUS    UPPER GASTROINTESTINAL ENDOSCOPY  9/15/2013       Social History     Socioeconomic History    Marital status:       Spouse name: Not on file    Number of children: Not on file    Years of education: Not on file    Highest education level: Not on file   Occupational History    Not on file   Tobacco Use    Smoking status: Former     Packs/day: 2.00     Years: 2.00     Pack years: 4.00     Types: Cigarettes     Quit date: 2001     Years since quittin.6    Smokeless tobacco: Never   Vaping Use    Vaping Use: Never used   Substance and Sexual Activity    Alcohol use: No     Alcohol/week: 0.0 standard drinks    Drug use: No    Sexual activity: Never   Other Topics Concern    Not on file   Social History Narrative    Not on file     Social Determinants of Health     Financial Resource Strain: Not on file   Food Insecurity: Not on file   Transportation Needs: Not on file   Physical Activity: Not on file   Stress: Not on file   Social Connections: Not on file   Intimate Partner Violence: Not on file   Housing Stability: Not on file        Family History   Problem Relation Age of Onset    Heart Disease Mother     Heart Failure Mother     Cancer Father     Diabetes Maternal Grandmother     Heart Failure Maternal Uncle     Hypertension Maternal Uncle     Asthma Neg Hx     Emphysema Neg Hx        PE  Vitals:    23 1456   BP: 132/78   Site: Left Upper Arm   Position: Sitting   Cuff Size: Large Adult   Weight: 227 lb (103 kg)     Estimated body mass index is 38.96 kg/m² as calculated from the following:    Height as of 10/3/22: 5' 4\" (1.626 m).    Weight as of this encounter: 227 lb (103 kg).    Physical Exam  Constitutional:       General: He is not in acute distress.     Appearance: Normal appearance. He is not ill-appearing.   HENT:      Head: Normocephalic.      Right Ear: Tympanic membrane and external ear normal.      Left Ear: Tympanic membrane and external ear normal.      Nose: No congestion or rhinorrhea.      Mouth/Throat:      Mouth: Mucous membranes are moist.      Pharynx: Oropharynx is clear. No posterior oropharyngeal erythema.   Eyes:      Extraocular Movements: Extraocular movements intact.      Conjunctiva/sclera: Conjunctivae  normal.      Pupils: Pupils are equal, round, and reactive to light. Cardiovascular:      Rate and Rhythm: Normal rate and regular rhythm. Pulses: Normal pulses. Heart sounds: Normal heart sounds. No murmur heard. Pulmonary:      Effort: Pulmonary effort is normal. No respiratory distress. Breath sounds: Normal breath sounds. No wheezing. Abdominal:      General: Abdomen is flat. Bowel sounds are normal.      Palpations: Abdomen is soft. Tenderness: There is no abdominal tenderness. Hernia: No hernia is present. Musculoskeletal:         General: No swelling. Normal range of motion. Cervical back: Normal range of motion and neck supple. No tenderness. Right lower leg: No edema. Left lower leg: No edema. Feet:      Right foot:      Protective Sensation: 10 sites tested. 6 sites sensed. Left foot:      Protective Sensation: 10 sites tested. 6 sites sensed. Lymphadenopathy:      Cervical: No cervical adenopathy. Skin:     General: Skin is warm and dry. Capillary Refill: Capillary refill takes less than 2 seconds. Neurological:      General: No focal deficit present. Mental Status: He is alert and oriented to person, place, and time. Mental status is at baseline. Cranial Nerves: No cranial nerve deficit. Motor: Tremor present. Comments: Shuffling gait,    Psychiatric:         Mood and Affect: Mood normal. Affect is flat.          Behavior: Behavior normal.         Judgment: Judgment normal.       Immunization History   Administered Date(s) Administered    [facility], MODERNA BLUE border, Primary or Immunocompromised, (age 12y+), IM, 100 mcg/0.5mL 04/13/2021, 05/18/2021    Influenza Virus Vaccine 12/12/2008, 10/22/2012, 09/17/2015, 10/01/2018    Influenza, AFLURIA (age 1 yrs+), FLUZONE, (age 10 mo+), MDV, 0.5mL 11/18/2016, 10/02/2017    Pneumococcal Conjugate 7-valent (Prevnar7) 10/22/2012    Pneumococcal Polysaccharide ([id number]) 12/12/2008    Tdap (Boostrix, Adacel) 05/11/2015       Health Maintenance   Topic Date Due    Depression Monitoring  Never done    Shingles vaccine (1 of 2) Never done    Pneumococcal 0-64 years Vaccine (2 - PCV) 01/07/2014    Diabetic retinal exam  08/29/2017    Annual Wellness Visit (AWV)  Never done    COVID-19 Vaccine (3 - Booster for Moderna series) 07/13/2021    A1C test (Diabetic or Prediabetic)  01/04/2023    Hepatitis C screen  08/23/2023 (Originally 12/8/1978)    HIV screen  08/23/2023 (Originally 12/8/1975)    Diabetic foot exam  08/23/2023    Diabetic Alb to Cr ratio (uACR) test  08/26/2023    Lipids  08/26/2023    GFR test (Diabetes, CKD 3-4, OR last GFR 15-59)  10/04/2023    DTaP/Tdap/Td vaccine (2 - Td or Tdap) 05/11/2025    Colorectal Cancer Screen  02/12/2026    Flu vaccine  Completed    Hepatitis A vaccine  Aged Out    Hib vaccine  Aged Out    Meningococcal (ACWY) vaccine  Aged Out       PSH, PMH, SH and FH reviewed and noted. Recent and past labs, tests and consults also reviewed. Recent or new meds also reviewed. Yesica Jackson, APRN - CNP    This dictation was generated by voice recognition computer software. Although all attempts are made to edit the dictation for accuracy, there may be errors in the transcription that are not intended.

## 2023-01-24 ENCOUNTER — HOSPITAL ENCOUNTER (OUTPATIENT)
Age: 63
Setting detail: OBSERVATION
Discharge: HOME | End: 2023-01-25
Attending: EMERGENCY MEDICINE | Admitting: INTERNAL MEDICINE
Payer: MEDICARE

## 2023-01-24 ENCOUNTER — APPOINTMENT (OUTPATIENT)
Dept: GENERAL RADIOLOGY | Age: 63
End: 2023-01-24
Payer: MEDICARE

## 2023-01-24 ENCOUNTER — APPOINTMENT (OUTPATIENT)
Dept: CT IMAGING | Age: 63
End: 2023-01-24
Payer: MEDICARE

## 2023-01-24 ENCOUNTER — NURSE ONLY (OUTPATIENT)
Dept: FAMILY MEDICINE CLINIC | Age: 63
End: 2023-01-24
Payer: MEDICARE

## 2023-01-24 DIAGNOSIS — I25.5 ISCHEMIC CARDIOMYOPATHY: ICD-10-CM

## 2023-01-24 DIAGNOSIS — E11.65 TYPE 2 DIABETES MELLITUS WITH HYPERGLYCEMIA, WITH LONG-TERM CURRENT USE OF INSULIN (HCC): ICD-10-CM

## 2023-01-24 DIAGNOSIS — Z79.4 TYPE 2 DIABETES MELLITUS WITH HYPERGLYCEMIA, WITH LONG-TERM CURRENT USE OF INSULIN (HCC): ICD-10-CM

## 2023-01-24 DIAGNOSIS — M54.50 CHRONIC MIDLINE LOW BACK PAIN WITHOUT SCIATICA: ICD-10-CM

## 2023-01-24 DIAGNOSIS — G89.29 CHRONIC MIDLINE LOW BACK PAIN WITHOUT SCIATICA: ICD-10-CM

## 2023-01-24 DIAGNOSIS — S09.90XA TRAUMATIC INJURY OF HEAD, INITIAL ENCOUNTER: ICD-10-CM

## 2023-01-24 DIAGNOSIS — R29.6 FREQUENT FALLS: ICD-10-CM

## 2023-01-24 DIAGNOSIS — Z60.2 LIVES ALONE: ICD-10-CM

## 2023-01-24 DIAGNOSIS — R26.2 UNABLE TO AMBULATE: Primary | ICD-10-CM

## 2023-01-24 PROBLEM — I95.1 ORTHOSTATIC HYPOTENSION: Status: ACTIVE | Noted: 2023-01-24

## 2023-01-24 LAB
A/G RATIO: 2 (ref 1.1–2.2)
A/G RATIO: 2.4 (ref 1.1–2.2)
ALBUMIN SERPL-MCNC: 4.5 G/DL (ref 3.4–5)
ALBUMIN SERPL-MCNC: 4.5 G/DL (ref 3.4–5)
ALP BLD-CCNC: 67 U/L (ref 40–129)
ALP BLD-CCNC: 68 U/L (ref 40–129)
ALT SERPL-CCNC: 15 U/L (ref 10–40)
ALT SERPL-CCNC: 15 U/L (ref 10–40)
ANION GAP SERPL CALCULATED.3IONS-SCNC: 16 MMOL/L (ref 3–16)
ANION GAP SERPL CALCULATED.3IONS-SCNC: 18 MMOL/L (ref 3–16)
AST SERPL-CCNC: 13 U/L (ref 15–37)
AST SERPL-CCNC: 15 U/L (ref 15–37)
BASOPHILS ABSOLUTE: 0 K/UL (ref 0–0.2)
BASOPHILS ABSOLUTE: 0 K/UL (ref 0–0.2)
BASOPHILS RELATIVE PERCENT: 0.2 %
BASOPHILS RELATIVE PERCENT: 0.4 %
BILIRUB SERPL-MCNC: 0.6 MG/DL (ref 0–1)
BILIRUB SERPL-MCNC: 0.7 MG/DL (ref 0–1)
BILIRUBIN URINE: NEGATIVE
BLOOD, URINE: NEGATIVE
BUN BLDV-MCNC: 16 MG/DL (ref 7–20)
BUN BLDV-MCNC: 17 MG/DL (ref 7–20)
CALCIUM SERPL-MCNC: 9.2 MG/DL (ref 8.3–10.6)
CALCIUM SERPL-MCNC: 9.8 MG/DL (ref 8.3–10.6)
CHLORIDE BLD-SCNC: 92 MMOL/L (ref 99–110)
CHLORIDE BLD-SCNC: 98 MMOL/L (ref 99–110)
CHOLESTEROL, FASTING: 135 MG/DL (ref 0–199)
CLARITY: CLEAR
CO2: 23 MMOL/L (ref 21–32)
CO2: 25 MMOL/L (ref 21–32)
COLOR: YELLOW
CREAT SERPL-MCNC: 0.7 MG/DL (ref 0.8–1.3)
CREAT SERPL-MCNC: 0.7 MG/DL (ref 0.8–1.3)
EKG ATRIAL RATE: 91 BPM
EKG DIAGNOSIS: NORMAL
EKG P AXIS: 26 DEGREES
EKG P-R INTERVAL: 180 MS
EKG Q-T INTERVAL: 344 MS
EKG QRS DURATION: 98 MS
EKG QTC CALCULATION (BAZETT): 423 MS
EKG R AXIS: -13 DEGREES
EKG T AXIS: 99 DEGREES
EKG VENTRICULAR RATE: 91 BPM
EOSINOPHILS ABSOLUTE: 0 K/UL (ref 0–0.6)
EOSINOPHILS ABSOLUTE: 0 K/UL (ref 0–0.6)
EOSINOPHILS RELATIVE PERCENT: 0.2 %
EOSINOPHILS RELATIVE PERCENT: 0.7 %
EPITHELIAL CELLS, UA: ABNORMAL /HPF (ref 0–5)
GFR SERPL CREATININE-BSD FRML MDRD: >60 ML/MIN/{1.73_M2}
GFR SERPL CREATININE-BSD FRML MDRD: >60 ML/MIN/{1.73_M2}
GLUCOSE BLD-MCNC: 161 MG/DL (ref 70–99)
GLUCOSE BLD-MCNC: 171 MG/DL (ref 70–99)
GLUCOSE URINE: >=1000 MG/DL
HCT VFR BLD CALC: 47.8 % (ref 40.5–52.5)
HCT VFR BLD CALC: 50.7 % (ref 40.5–52.5)
HDLC SERPL-MCNC: 33 MG/DL (ref 40–60)
HEMOGLOBIN: 15.9 G/DL (ref 13.5–17.5)
HEMOGLOBIN: 16.7 G/DL (ref 13.5–17.5)
INFLUENZA A: NOT DETECTED
INFLUENZA B: NOT DETECTED
KETONES, URINE: 15 MG/DL
LDL CHOLESTEROL CALCULATED: ABNORMAL MG/DL
LDL CHOLESTEROL DIRECT: 60 MG/DL
LEUKOCYTE ESTERASE, URINE: NEGATIVE
LYMPHOCYTES ABSOLUTE: 1.7 K/UL (ref 1–5.1)
LYMPHOCYTES ABSOLUTE: 2.2 K/UL (ref 1–5.1)
LYMPHOCYTES RELATIVE PERCENT: 15.2 %
LYMPHOCYTES RELATIVE PERCENT: 35 %
MCH RBC QN AUTO: 29.3 PG (ref 26–34)
MCH RBC QN AUTO: 29.8 PG (ref 26–34)
MCHC RBC AUTO-ENTMCNC: 33 G/DL (ref 31–36)
MCHC RBC AUTO-ENTMCNC: 33.2 G/DL (ref 31–36)
MCV RBC AUTO: 88.2 FL (ref 80–100)
MCV RBC AUTO: 90.4 FL (ref 80–100)
MICROSCOPIC EXAMINATION: ABNORMAL
MONOCYTES ABSOLUTE: 0.4 K/UL (ref 0–1.3)
MONOCYTES ABSOLUTE: 1.1 K/UL (ref 0–1.3)
MONOCYTES RELATIVE PERCENT: 6.5 %
MONOCYTES RELATIVE PERCENT: 9.6 %
NEUTROPHILS ABSOLUTE: 3.6 K/UL (ref 1.7–7.7)
NEUTROPHILS ABSOLUTE: 8.2 K/UL (ref 1.7–7.7)
NEUTROPHILS RELATIVE PERCENT: 57.4 %
NEUTROPHILS RELATIVE PERCENT: 74.8 %
NITRITE, URINE: NEGATIVE
PDW BLD-RTO: 14.4 % (ref 12.4–15.4)
PDW BLD-RTO: 14.5 % (ref 12.4–15.4)
PH UA: 7 (ref 5–8)
PLATELET # BLD: 101 K/UL (ref 135–450)
PLATELET # BLD: 96 K/UL (ref 135–450)
PLATELET SLIDE REVIEW: ABNORMAL
PMV BLD AUTO: 7.4 FL (ref 5–10.5)
PMV BLD AUTO: 7.7 FL (ref 5–10.5)
POTASSIUM SERPL-SCNC: 3.7 MMOL/L (ref 3.5–5.1)
POTASSIUM SERPL-SCNC: 4.9 MMOL/L (ref 3.5–5.1)
PROTEIN UA: NEGATIVE MG/DL
RBC # BLD: 5.42 M/UL (ref 4.2–5.9)
RBC # BLD: 5.61 M/UL (ref 4.2–5.9)
RBC UA: ABNORMAL /HPF (ref 0–4)
SARS-COV-2 RNA, RT PCR: NOT DETECTED
SLIDE REVIEW: ABNORMAL
SODIUM BLD-SCNC: 133 MMOL/L (ref 136–145)
SODIUM BLD-SCNC: 139 MMOL/L (ref 136–145)
SPECIFIC GRAVITY UA: 1.01 (ref 1–1.03)
TOTAL PROTEIN: 6.4 G/DL (ref 6.4–8.2)
TOTAL PROTEIN: 6.8 G/DL (ref 6.4–8.2)
TRIGLYCERIDE, FASTING: 361 MG/DL (ref 0–150)
TROPONIN: <0.01 NG/ML
URINE TYPE: ABNORMAL
UROBILINOGEN, URINE: 0.2 E.U./DL
VLDLC SERPL CALC-MCNC: ABNORMAL MG/DL
WBC # BLD: 10.9 K/UL (ref 4–11)
WBC # BLD: 6.3 K/UL (ref 4–11)
WBC UA: ABNORMAL /HPF (ref 0–5)

## 2023-01-24 PROCEDURE — 99285 EMERGENCY DEPT VISIT HI MDM: CPT

## 2023-01-24 PROCEDURE — 80053 COMPREHEN METABOLIC PANEL: CPT

## 2023-01-24 PROCEDURE — 93010 ELECTROCARDIOGRAM REPORT: CPT | Performed by: INTERNAL MEDICINE

## 2023-01-24 PROCEDURE — 85025 COMPLETE CBC W/AUTO DIFF WBC: CPT

## 2023-01-24 PROCEDURE — 36415 COLL VENOUS BLD VENIPUNCTURE: CPT

## 2023-01-24 PROCEDURE — 71045 X-RAY EXAM CHEST 1 VIEW: CPT

## 2023-01-24 PROCEDURE — 84484 ASSAY OF TROPONIN QUANT: CPT

## 2023-01-24 PROCEDURE — 6370000000 HC RX 637 (ALT 250 FOR IP): Performed by: EMERGENCY MEDICINE

## 2023-01-24 PROCEDURE — 2580000003 HC RX 258: Performed by: EMERGENCY MEDICINE

## 2023-01-24 PROCEDURE — G0378 HOSPITAL OBSERVATION PER HR: HCPCS

## 2023-01-24 PROCEDURE — 81001 URINALYSIS AUTO W/SCOPE: CPT

## 2023-01-24 PROCEDURE — 70450 CT HEAD/BRAIN W/O DYE: CPT

## 2023-01-24 PROCEDURE — 72125 CT NECK SPINE W/O DYE: CPT

## 2023-01-24 PROCEDURE — 87636 SARSCOV2 & INF A&B AMP PRB: CPT

## 2023-01-24 PROCEDURE — 3209999900 CT LUMBAR SPINE TRAUMA RECONSTRUCTION

## 2023-01-24 PROCEDURE — 74176 CT ABD & PELVIS W/O CONTRAST: CPT

## 2023-01-24 PROCEDURE — 96360 HYDRATION IV INFUSION INIT: CPT

## 2023-01-24 PROCEDURE — 93005 ELECTROCARDIOGRAM TRACING: CPT | Performed by: EMERGENCY MEDICINE

## 2023-01-24 RX ORDER — OXYCODONE HYDROCHLORIDE AND ACETAMINOPHEN 5; 325 MG/1; MG/1
1 TABLET ORAL ONCE
Status: COMPLETED | OUTPATIENT
Start: 2023-01-24 | End: 2023-01-24

## 2023-01-24 RX ORDER — 0.9 % SODIUM CHLORIDE 0.9 %
500 INTRAVENOUS SOLUTION INTRAVENOUS ONCE
Status: COMPLETED | OUTPATIENT
Start: 2023-01-24 | End: 2023-01-24

## 2023-01-24 RX ADMIN — SODIUM CHLORIDE 500 ML: 9 INJECTION, SOLUTION INTRAVENOUS at 20:16

## 2023-01-24 RX ADMIN — OXYCODONE HYDROCHLORIDE AND ACETAMINOPHEN 1 TABLET: 5; 325 TABLET ORAL at 19:52

## 2023-01-24 SDOH — SOCIAL STABILITY - SOCIAL INSECURITY: PROBLEMS RELATED TO LIVING ALONE: Z60.2

## 2023-01-24 ASSESSMENT — PAIN DESCRIPTION - PAIN TYPE: TYPE: CHRONIC PAIN

## 2023-01-24 ASSESSMENT — PAIN - FUNCTIONAL ASSESSMENT: PAIN_FUNCTIONAL_ASSESSMENT: 0-10

## 2023-01-24 ASSESSMENT — PAIN SCALES - GENERAL
PAINLEVEL_OUTOF10: 6
PAINLEVEL_OUTOF10: 9
PAINLEVEL_OUTOF10: 0

## 2023-01-24 ASSESSMENT — PAIN DESCRIPTION - ORIENTATION
ORIENTATION: MID;LOWER
ORIENTATION: LOWER;MID

## 2023-01-24 ASSESSMENT — PAIN DESCRIPTION - LOCATION
LOCATION: BACK
LOCATION: BACK

## 2023-01-25 VITALS
WEIGHT: 220.7 LBS | SYSTOLIC BLOOD PRESSURE: 126 MMHG | RESPIRATION RATE: 16 BRPM | OXYGEN SATURATION: 96 % | BODY MASS INDEX: 37.68 KG/M2 | TEMPERATURE: 97.2 F | DIASTOLIC BLOOD PRESSURE: 70 MMHG | HEIGHT: 64 IN | HEART RATE: 84 BPM

## 2023-01-25 PROBLEM — G89.29 CHRONIC MIDLINE LOW BACK PAIN WITHOUT SCIATICA: Status: ACTIVE | Noted: 2023-01-25

## 2023-01-25 PROBLEM — M54.50 CHRONIC MIDLINE LOW BACK PAIN WITHOUT SCIATICA: Status: ACTIVE | Noted: 2023-01-25

## 2023-01-25 LAB
ANION GAP SERPL CALCULATED.3IONS-SCNC: 12 MMOL/L (ref 3–16)
BASOPHILS ABSOLUTE: 0 K/UL (ref 0–0.2)
BASOPHILS RELATIVE PERCENT: 0.4 %
BUN BLDV-MCNC: 15 MG/DL (ref 7–20)
CALCIUM SERPL-MCNC: 8.5 MG/DL (ref 8.3–10.6)
CHLORIDE BLD-SCNC: 98 MMOL/L (ref 99–110)
CO2: 24 MMOL/L (ref 21–32)
CREAT SERPL-MCNC: 0.6 MG/DL (ref 0.8–1.3)
EOSINOPHILS ABSOLUTE: 0 K/UL (ref 0–0.6)
EOSINOPHILS RELATIVE PERCENT: 0.6 %
ESTIMATED AVERAGE GLUCOSE: 171.4 MG/DL
GFR SERPL CREATININE-BSD FRML MDRD: >60 ML/MIN/{1.73_M2}
GLUCOSE BLD-MCNC: 145 MG/DL (ref 70–99)
GLUCOSE BLD-MCNC: 146 MG/DL (ref 70–99)
GLUCOSE BLD-MCNC: 162 MG/DL (ref 70–99)
GLUCOSE BLD-MCNC: 167 MG/DL (ref 70–99)
GLUCOSE BLD-MCNC: 195 MG/DL (ref 70–99)
HBA1C MFR BLD: 7.6 %
HCT VFR BLD CALC: 45.8 % (ref 40.5–52.5)
HEMOGLOBIN: 15.6 G/DL (ref 13.5–17.5)
LYMPHOCYTES ABSOLUTE: 2 K/UL (ref 1–5.1)
LYMPHOCYTES RELATIVE PERCENT: 30.9 %
MCH RBC QN AUTO: 29.7 PG (ref 26–34)
MCHC RBC AUTO-ENTMCNC: 33.9 G/DL (ref 31–36)
MCV RBC AUTO: 87.4 FL (ref 80–100)
MONOCYTES ABSOLUTE: 0.6 K/UL (ref 0–1.3)
MONOCYTES RELATIVE PERCENT: 9.4 %
NEUTROPHILS ABSOLUTE: 3.9 K/UL (ref 1.7–7.7)
NEUTROPHILS RELATIVE PERCENT: 58.7 %
PDW BLD-RTO: 14.4 % (ref 12.4–15.4)
PERFORMED ON: ABNORMAL
PLATELET # BLD: 88 K/UL (ref 135–450)
PLATELET SLIDE REVIEW: ABNORMAL
PMV BLD AUTO: 7.4 FL (ref 5–10.5)
POTASSIUM REFLEX MAGNESIUM: 3.8 MMOL/L (ref 3.5–5.1)
RBC # BLD: 5.24 M/UL (ref 4.2–5.9)
SLIDE REVIEW: ABNORMAL
SODIUM BLD-SCNC: 134 MMOL/L (ref 136–145)
WBC # BLD: 6.6 K/UL (ref 4–11)

## 2023-01-25 PROCEDURE — G0378 HOSPITAL OBSERVATION PER HR: HCPCS

## 2023-01-25 PROCEDURE — 36415 COLL VENOUS BLD VENIPUNCTURE: CPT

## 2023-01-25 PROCEDURE — 97162 PT EVAL MOD COMPLEX 30 MIN: CPT

## 2023-01-25 PROCEDURE — 97166 OT EVAL MOD COMPLEX 45 MIN: CPT

## 2023-01-25 PROCEDURE — 6370000000 HC RX 637 (ALT 250 FOR IP): Performed by: INTERNAL MEDICINE

## 2023-01-25 PROCEDURE — 97530 THERAPEUTIC ACTIVITIES: CPT

## 2023-01-25 PROCEDURE — 97535 SELF CARE MNGMENT TRAINING: CPT

## 2023-01-25 PROCEDURE — 99238 HOSP IP/OBS DSCHRG MGMT 30/<: CPT | Performed by: INTERNAL MEDICINE

## 2023-01-25 PROCEDURE — 97116 GAIT TRAINING THERAPY: CPT

## 2023-01-25 PROCEDURE — 85025 COMPLETE CBC W/AUTO DIFF WBC: CPT

## 2023-01-25 PROCEDURE — 80048 BASIC METABOLIC PNL TOTAL CA: CPT

## 2023-01-25 PROCEDURE — 2580000003 HC RX 258: Performed by: INTERNAL MEDICINE

## 2023-01-25 RX ORDER — INSULIN GLARGINE 100 [IU]/ML
35 INJECTION, SOLUTION SUBCUTANEOUS NIGHTLY
Status: DISCONTINUED | OUTPATIENT
Start: 2023-01-25 | End: 2023-01-25 | Stop reason: HOSPADM

## 2023-01-25 RX ORDER — ATORVASTATIN CALCIUM 40 MG/1
80 TABLET, FILM COATED ORAL DAILY
Status: DISCONTINUED | OUTPATIENT
Start: 2023-01-25 | End: 2023-01-25 | Stop reason: HOSPADM

## 2023-01-25 RX ORDER — DEXTROSE MONOHYDRATE 100 MG/ML
INJECTION, SOLUTION INTRAVENOUS CONTINUOUS PRN
Status: DISCONTINUED | OUTPATIENT
Start: 2023-01-25 | End: 2023-01-25 | Stop reason: HOSPADM

## 2023-01-25 RX ORDER — MAGNESIUM SULFATE IN WATER 40 MG/ML
2000 INJECTION, SOLUTION INTRAVENOUS PRN
Status: DISCONTINUED | OUTPATIENT
Start: 2023-01-25 | End: 2023-01-25 | Stop reason: HOSPADM

## 2023-01-25 RX ORDER — ONDANSETRON 2 MG/ML
4 INJECTION INTRAMUSCULAR; INTRAVENOUS EVERY 6 HOURS PRN
Status: DISCONTINUED | OUTPATIENT
Start: 2023-01-25 | End: 2023-01-25 | Stop reason: HOSPADM

## 2023-01-25 RX ORDER — DULOXETIN HYDROCHLORIDE 60 MG/1
60 CAPSULE, DELAYED RELEASE ORAL NIGHTLY
Status: DISCONTINUED | OUTPATIENT
Start: 2023-01-25 | End: 2023-01-25 | Stop reason: HOSPADM

## 2023-01-25 RX ORDER — SODIUM CHLORIDE 9 MG/ML
INJECTION, SOLUTION INTRAVENOUS PRN
Status: DISCONTINUED | OUTPATIENT
Start: 2023-01-25 | End: 2023-01-25 | Stop reason: HOSPADM

## 2023-01-25 RX ORDER — POLYETHYLENE GLYCOL 3350 17 G/17G
17 POWDER, FOR SOLUTION ORAL DAILY PRN
Status: DISCONTINUED | OUTPATIENT
Start: 2023-01-25 | End: 2023-01-25 | Stop reason: HOSPADM

## 2023-01-25 RX ORDER — TAMSULOSIN HYDROCHLORIDE 0.4 MG/1
0.4 CAPSULE ORAL NIGHTLY
Status: DISCONTINUED | OUTPATIENT
Start: 2023-01-25 | End: 2023-01-25 | Stop reason: HOSPADM

## 2023-01-25 RX ORDER — ONDANSETRON 4 MG/1
4 TABLET, ORALLY DISINTEGRATING ORAL EVERY 8 HOURS PRN
Status: DISCONTINUED | OUTPATIENT
Start: 2023-01-25 | End: 2023-01-25 | Stop reason: HOSPADM

## 2023-01-25 RX ORDER — ACETAMINOPHEN 325 MG/1
650 TABLET ORAL EVERY 6 HOURS PRN
Status: DISCONTINUED | OUTPATIENT
Start: 2023-01-25 | End: 2023-01-25 | Stop reason: HOSPADM

## 2023-01-25 RX ORDER — CLOPIDOGREL BISULFATE 75 MG/1
75 TABLET ORAL DAILY
Status: DISCONTINUED | OUTPATIENT
Start: 2023-01-25 | End: 2023-01-25 | Stop reason: HOSPADM

## 2023-01-25 RX ORDER — GABAPENTIN 100 MG/1
200 CAPSULE ORAL 3 TIMES DAILY
Status: DISCONTINUED | OUTPATIENT
Start: 2023-01-25 | End: 2023-01-25 | Stop reason: HOSPADM

## 2023-01-25 RX ORDER — INSULIN LISPRO 100 [IU]/ML
0-8 INJECTION, SOLUTION INTRAVENOUS; SUBCUTANEOUS
Status: DISCONTINUED | OUTPATIENT
Start: 2023-01-25 | End: 2023-01-25 | Stop reason: HOSPADM

## 2023-01-25 RX ORDER — POTASSIUM CHLORIDE 7.45 MG/ML
10 INJECTION INTRAVENOUS PRN
Status: DISCONTINUED | OUTPATIENT
Start: 2023-01-25 | End: 2023-01-25 | Stop reason: HOSPADM

## 2023-01-25 RX ORDER — POTASSIUM CHLORIDE 20 MEQ/1
40 TABLET, EXTENDED RELEASE ORAL PRN
Status: DISCONTINUED | OUTPATIENT
Start: 2023-01-25 | End: 2023-01-25 | Stop reason: HOSPADM

## 2023-01-25 RX ORDER — SODIUM CHLORIDE 0.9 % (FLUSH) 0.9 %
5-40 SYRINGE (ML) INJECTION PRN
Status: DISCONTINUED | OUTPATIENT
Start: 2023-01-25 | End: 2023-01-25 | Stop reason: HOSPADM

## 2023-01-25 RX ORDER — INSULIN LISPRO 100 [IU]/ML
0-4 INJECTION, SOLUTION INTRAVENOUS; SUBCUTANEOUS NIGHTLY
Status: DISCONTINUED | OUTPATIENT
Start: 2023-01-25 | End: 2023-01-25 | Stop reason: HOSPADM

## 2023-01-25 RX ORDER — SODIUM CHLORIDE 0.9 % (FLUSH) 0.9 %
5-40 SYRINGE (ML) INJECTION EVERY 12 HOURS SCHEDULED
Status: DISCONTINUED | OUTPATIENT
Start: 2023-01-25 | End: 2023-01-25 | Stop reason: HOSPADM

## 2023-01-25 RX ORDER — DIVALPROEX SODIUM 500 MG/1
500 TABLET, DELAYED RELEASE ORAL EVERY 12 HOURS SCHEDULED
Status: DISCONTINUED | OUTPATIENT
Start: 2023-01-25 | End: 2023-01-25 | Stop reason: HOSPADM

## 2023-01-25 RX ORDER — ACETAMINOPHEN 650 MG/1
650 SUPPOSITORY RECTAL EVERY 6 HOURS PRN
Status: DISCONTINUED | OUTPATIENT
Start: 2023-01-25 | End: 2023-01-25 | Stop reason: HOSPADM

## 2023-01-25 RX ORDER — METOPROLOL SUCCINATE 50 MG/1
50 TABLET, EXTENDED RELEASE ORAL 2 TIMES DAILY
Status: DISCONTINUED | OUTPATIENT
Start: 2023-01-25 | End: 2023-01-25 | Stop reason: HOSPADM

## 2023-01-25 RX ADMIN — DIVALPROEX SODIUM 500 MG: 500 TABLET, DELAYED RELEASE ORAL at 04:00

## 2023-01-25 RX ADMIN — ACETAMINOPHEN 650 MG: 325 TABLET ORAL at 03:00

## 2023-01-25 RX ADMIN — GABAPENTIN 200 MG: 100 CAPSULE ORAL at 14:21

## 2023-01-25 RX ADMIN — APIXABAN 5 MG: 5 TABLET, FILM COATED ORAL at 09:58

## 2023-01-25 RX ADMIN — METOPROLOL SUCCINATE 50 MG: 50 TABLET, EXTENDED RELEASE ORAL at 09:58

## 2023-01-25 RX ADMIN — TAMSULOSIN HYDROCHLORIDE 0.4 MG: 0.4 CAPSULE ORAL at 04:00

## 2023-01-25 RX ADMIN — DULOXETINE HYDROCHLORIDE 60 MG: 60 CAPSULE, DELAYED RELEASE ORAL at 04:00

## 2023-01-25 RX ADMIN — ATORVASTATIN CALCIUM 80 MG: 40 TABLET, FILM COATED ORAL at 09:58

## 2023-01-25 RX ADMIN — METOPROLOL SUCCINATE 50 MG: 50 TABLET, EXTENDED RELEASE ORAL at 04:00

## 2023-01-25 RX ADMIN — Medication 10 ML: at 09:59

## 2023-01-25 RX ADMIN — CLOPIDOGREL BISULFATE 75 MG: 75 TABLET ORAL at 09:58

## 2023-01-25 RX ADMIN — GABAPENTIN 200 MG: 100 CAPSULE ORAL at 09:58

## 2023-01-25 RX ADMIN — METFORMIN HYDROCHLORIDE 1000 MG: 500 TABLET ORAL at 09:58

## 2023-01-25 ASSESSMENT — PAIN DESCRIPTION - DESCRIPTORS: DESCRIPTORS: ACHING;DISCOMFORT;SHARP

## 2023-01-25 ASSESSMENT — PAIN SCALES - GENERAL
PAINLEVEL_OUTOF10: 0
PAINLEVEL_OUTOF10: 0
PAINLEVEL_OUTOF10: 10

## 2023-01-25 ASSESSMENT — LIFESTYLE VARIABLES
HOW OFTEN DO YOU HAVE A DRINK CONTAINING ALCOHOL: NEVER
HOW MANY STANDARD DRINKS CONTAINING ALCOHOL DO YOU HAVE ON A TYPICAL DAY: PATIENT DOES NOT DRINK

## 2023-01-25 ASSESSMENT — PAIN DESCRIPTION - ORIENTATION: ORIENTATION: LEFT

## 2023-01-25 ASSESSMENT — PAIN DESCRIPTION - LOCATION: LOCATION: HIP

## 2023-01-25 NOTE — ED PROVIDER NOTES
EMERGENCY DEPARTMENT ENCOUNTER        Pt Name: Noel Wright MRN: [id number]  Birthdate 1960  Date of evaluation: 1/24/2023  Provider: Nicholas Griffin MD  PCP: ANA Singer - CNP      CHIEF COMPLAINT       Chief Complaint   Patient presents with    Fall     Reports multiple falls recenlty. Today fell and went head first into stove. Reports generalized weakness which is ongoing, unchanged. Reports takes blood thinners and feeling \"not right\"       HISTORY OFPRESENT ILLNESS   (Location/Symptom, Timing/Onset, Context/Setting, Quality, Duration, Modifying Factors,Severity)  Note limiting factors. Noel Wright is a 63 y.o. male presenting today due to concern for having multiple falls over the past few days to the point where he fell headfirst into the stove today although denies any current headache or neck pain. He is on Eliquis along with Plavix and does report taking this as prescribed. He denies any chest pain or abdominal pain. He does report worsening low back pain since the fall since he did ultimately fall in his back as well today. He is having significant trouble getting around his home over the last few days. He denies any syncope. He does feel lightheaded. He lives by himself. Due to worsening low back pain and trouble walking, he came to the emergency department for further assessment. He denies any pain in the arms or legs. No reported fever. No visual changes. REVIEW OF SYSTEMS    (2-9 systems for level 4, 10 or more for level 5)     Review of Systems      Positives and Pertinent negatives as per HPI.       PASTMEDICAL HISTORY     Past Medical History:   Diagnosis Date    Arthritis     Asthma     CAD (coronary artery disease)     Fatty liver     GERD (gastroesophageal reflux disease)     Hyperlipidemia     Hypertension     Medical history reviewed with no changes     MI, old     Sleep apnea     pt wears cpap at night. states does not have cpap    Type II or unspecified type diabetes mellitus without mention of complication, not stated as uncontrolled          SURGICAL HISTORY       Past Surgical History:   Procedure Laterality Date    CARDIAC PACEMAKER PLACEMENT  2011    NOT MRI COMPATIABLE OLD LEADS [facility].  pace maker difib    CARPAL TUNNEL RELEASE Bilateral 2013    CATARACT REMOVAL WITH IMPLANT Left 02/28/2014    COLONOSCOPY      CORONARY ANGIOPLASTY WITH STENT PLACEMENT  2001,2008    CYST REMOVAL  1982    coccyx area    DIAGNOSTIC CARDIAC CATH LAB PROCEDURE      ENDOSCOPY, COLON, DIAGNOSTIC      ERCP  09/15/2013    ERCP  10/11/2013    WITH STENT REMOVAL    EYE SURGERY      FOOT SURGERY Right 07/09/2018     REPAIR CALCANEAL SPUR, REPAIR OF ACHILLES TENDON RIGHT FOOT    NERVE SURGERY Bilateral 12/01/2020    BILATERAL LUMBAR THREE LUMBAR FOUR LUMBAR FIVE DORSAL RAMUS  RADIOFREQUENCY ABLATION SITE CONFIRMED BY FLUOROSCOPY performed by Josephine Mabry MD at [address]      back stimulator in lower back    PACEMAKER PLACEMENT      ICD    PAIN MANAGEMENT PROCEDURE Bilateral 05/26/2020    BILATERAL LUMBAR THREE, LUMBAR FOUR, LUMBAR FIVE DORSAL RAMUS MEDIAL BRANCH BLOCK SITE CONFIRMED BY FLUOROSCOPY performed by Josephine Mabry MD at [address] Bilateral 06/09/2020    BILATERAL LUMBAR THREE, LUMBAR FOUR, LUMBAR FIVE DORSAL RAMUS MEDIAL BRANCH BLOCK SITE CONFIRMED BY FLUOROSCOPY performed by Josephine Mabry MD at [address] Left 01/12/2021    LEFT SACROILIAC JOINT INJECTION SITE CONFIRMED BY FLUOROSCOPY performed by Josephine Mabry MD at [facility] [address] Right 10/31/2018    GASTROCNEMIUS RECESSION RIGHT FOOT performed by Evelia Abdullahi DPM at [place] [address] TENDON LEG/ANKLE 1 TENDON SPX Right 10/31/2018    REMOVAL OF CALCANEAL SPUR, ACHILLES TENDON REPAIR RIGHT LOWER EXTREMITY performed by Evelia Abdullahi DPM at [address] [place] [address]  07/18/2013    and colonoscopy with biopsies taken    UPPER GASTROINTESTINAL ENDOSCOPY  08/23/2013    EUS    UPPER GASTROINTESTINAL ENDOSCOPY  09/15/2013         CURRENT MEDICATIONS       Current Discharge Medication List        CONTINUE these medications which have NOT CHANGED    Details   divalproex (DEPAKOTE) 500 MG DR tablet Take 500 mg by mouth every morning and 1000 mg every evening. Qty: 90 tablet, Refills: 1    Associated Diagnoses: Bipolar disorder with depression (HCC)      DULoxetine (CYMBALTA) 60 MG extended release capsule Take 1 capsule by mouth nightly  Qty: 90 capsule, Refills: 1    Associated Diagnoses: Bipolar disorder with depression (HCC)      apixaban (ELIQUIS) 5 MG TABS tablet Take 1 tablet by mouth 2 times daily  Qty: 120 tablet, Refills: 2      fluticasone (FLONASE) 50 MCG/ACT nasal spray 2 sprays by Each Nostril route daily as needed for Rhinitis      gabapentin (NEURONTIN) 100 MG capsule Take 200 mg by mouth 3 times daily.       insulin lispro protamine & lispro (HUMALOG MIX) (75-25) 100 UNIT per ML SUSP injection vial Inject subcutaneously twice daily 15 units with breakfast and 20 units with dinner      hydrOXYzine HCl (ATARAX) 25 MG tablet take 1 tablet by oral route 4 times every day as needed for itching  Qty: 120 tablet, Refills: 0      famotidine (PEPCID) 20 MG tablet Take 20 mg by mouth 2 times daily      metoprolol succinate (TOPROL XL) 50 MG extended release tablet TAKE ONE TABLET BY MOUTH TWICE DAILY  Qty: 180 tablet, Refills: 3      furosemide (LASIX) 20 MG tablet TAKE ONE TABLET ON MONDAY and THURSDAY  Qty: 90 tablet, Refills: 1      potassium chloride (KLOR-CON M) 20 MEQ extended release tablet TAKE ONE TABLET WITH LASIX ON Monday AND THURSDAYS  Qty: 90 tablet, Refills: 1      lisinopril (PRINIVIL;ZESTRIL) 5 MG tablet Take 1 tablet by mouth daily  Qty: 30 tablet, Refills: 3      tamsulosin (FLOMAX) 0.4 MG capsule Take 1 capsule by mouth at bedtime  Qty: 30 capsule, Refills: 3      clopidogrel (PLAVIX) 75 MG tablet Take 1 tablet by mouth daily  Qty: 30 tablet, Refills: 3      Dulaglutide 3 MG/0.5ML SOPN Inject 3 mg into the skin once a week       Fexofenadine HCl (ALLEGRA PO) Take 180 mg by mouth daily      esomeprazole (NEXIUM) 40 MG delayed release capsule Take 40 mg by mouth daily      JARDIANCE 25 MG tablet Take 25 mg by mouth daily       insulin detemir (LEVEMIR) 100 UNIT/ML injection vial Inject 74 Units into the skin nightly      metFORMIN (GLUCOPHAGE) 1000 MG tablet TAKE ONE TABLET BY MOUTH 2 TIMES DAILY WITH MORNING AND EVENING MEALS  Qty: 60 tablet, Refills: 0      atorvastatin (LIPITOR) 80 MG tablet TAKE ONE TABLET BY MOUTH DAILY  Qty: 30 tablet, Refills: 11             ALLERGIES     Other and Pcn [penicillins]    FAMILY HISTORY       Family History   Problem Relation Age of Onset    Heart Disease Mother     Heart Failure Mother     Cancer Father     Diabetes Maternal Grandmother     Heart Failure Maternal Uncle     Hypertension Maternal Uncle     Asthma Neg Hx     Emphysema Neg Hx           SOCIAL HISTORY       Social History     Socioeconomic History    Marital status:       Spouse name: None    Number of children: None    Years of education: None    Highest education level: None   Tobacco Use    Smoking status: Former     Packs/day: 2.00     Years: 2.00     Pack years: 4.00     Types: Cigarettes     Quit date: 2001     Years since quittin.6    Smokeless tobacco: Never   Vaping Use    Vaping Use: Never used   Substance and Sexual Activity    Alcohol use: No     Alcohol/week: 0.0 standard drinks    Drug use: No    Sexual activity: Never       SCREENINGS    [place] Coma Scale  Eye Opening: Spontaneous  Best Verbal Response: Oriented  Best Motor Response: Obeys commands  [place] Coma Scale Score: 15           PHYSICAL EXAM    (up to 7 for level 4, 8 or more for level 5)     ED Triage Vitals   BP Temp Temp src Heart Rate Resp SpO2 Height Weight   01/24/23 1830 01/24/23 1830 -- 01/24/23 1829 01/24/23 1829 01/24/23 1829 01/24/23 1829 01/24/23 1829   (!) 153/78 98 °F (36.7 °C)  90 18 96 % 5' 4\" (1.626 m) 246 lb (111.6 kg)       Physical Exam  Vitals and nursing note reviewed. Constitutional:       General: He is awake. He is not in acute distress. Appearance: Normal appearance. He is well-developed and well-groomed. He is obese. He is not ill-appearing, toxic-appearing or diaphoretic. HENT:      Head: Normocephalic and atraumatic. No raccoon eyes, Wei's sign, abrasion, contusion, masses, right periorbital erythema, left periorbital erythema or laceration. Hair is normal.      Jaw: There is normal jaw occlusion. No trismus or swelling. Right Ear: External ear normal. No mastoid tenderness. Left Ear: External ear normal. No mastoid tenderness. Nose: Nose normal. No nasal tenderness, mucosal edema, congestion or rhinorrhea. Right Nostril: No epistaxis or septal hematoma. Left Nostril: No epistaxis or septal hematoma. Mouth/Throat:      Lips: Pink. No lesions. Mouth: Mucous membranes are moist. No injury, lacerations, oral lesions or angioedema. Eyes:      General: No scleral icterus. Right eye: No discharge. Left eye: No discharge. Extraocular Movements: Extraocular movements intact. Conjunctiva/sclera: Conjunctivae normal.      Pupils: Pupils are equal, round, and reactive to light. Neck:      Trachea: Trachea and phonation normal. No tracheal tenderness or tracheal deviation. Cardiovascular:      Rate and Rhythm: Normal rate and regular rhythm. Pulses: Normal pulses. Heart sounds: No friction rub. No gallop. Pulmonary:      Effort: Pulmonary effort is normal. No tachypnea, bradypnea, accessory muscle usage or respiratory distress. Breath sounds: Normal breath sounds and air entry. No stridor.  No decreased breath sounds, wheezing, rhonchi or rales. Chest:      Chest wall: No tenderness. Abdominal:      General: Bowel sounds are normal. There is no distension. Palpations: Abdomen is soft. Tenderness: There is no abdominal tenderness. There is no guarding or rebound. Musculoskeletal:         General: No deformity or signs of injury. Normal range of motion. Cervical back: Full passive range of motion without pain, normal range of motion and neck supple. No edema, erythema, signs of trauma, rigidity, torticollis, tenderness, bony tenderness or crepitus. No pain with movement, spinous process tenderness or muscular tenderness. Normal range of motion. Thoracic back: No tenderness or bony tenderness. Lumbar back: Tenderness present. No bony tenderness. Right lower leg: No edema. Left lower leg: No edema. Comments: MSK: Normal range of motion of bilateral shoulders, elbows, wrists, hips, knees, ankles and nontender to palpation of all joints      Skin:     General: Skin is warm and dry. Coloration: Skin is not jaundiced or pale. Findings: No bruising, ecchymosis, erythema, signs of injury or rash. Neurological:      General: No focal deficit present. Mental Status: He is alert and oriented to person, place, and time. Mental status is at baseline. GCS: GCS eye subscore is 4. GCS verbal subscore is 5. GCS motor subscore is 6. Cranial Nerves: No dysarthria or facial asymmetry. Sensory: Sensation is intact. No sensory deficit. Motor: Motor function is intact. No weakness, tremor, atrophy, abnormal muscle tone, seizure activity or pronator drift. Gait: Gait abnormal (not initially tested due to concern for frequent falls). Psychiatric:         Attention and Perception: Attention normal.         Mood and Affect: Mood and affect normal. Mood is not anxious or depressed. Speech: Speech normal. Speech is not delayed or slurred.          Behavior: Behavior normal. Behavior is cooperative. DIAGNOSTIC RESULTS   :    Labs Reviewed   CBC WITH AUTO DIFFERENTIAL - Abnormal; Notable for the following components:       Result Value    Platelets 216 (*)     Neutrophils Absolute 8.2 (*)     All other components within normal limits   COMPREHENSIVE METABOLIC PANEL - Abnormal; Notable for the following components:    Sodium 133 (*)     Chloride 92 (*)     Anion Gap 18 (*)     Glucose 161 (*)     Creatinine 0.7 (*)     All other components within normal limits   URINALYSIS WITH MICROSCOPIC - Abnormal; Notable for the following components:    Glucose, Ur >=1000 (*)     Ketones, Urine 15 (*)     All other components within normal limits   BASIC METABOLIC PANEL W/ REFLEX TO MG FOR LOW K - Abnormal; Notable for the following components:    Sodium 134 (*)     Chloride 98 (*)     Glucose 167 (*)     Creatinine 0.6 (*)     All other components within normal limits   CBC WITH AUTO DIFFERENTIAL - Abnormal; Notable for the following components:    Platelets 88 (*)     All other components within normal limits   POCT GLUCOSE - Abnormal; Notable for the following components:    POC Glucose 146 (*)     All other components within normal limits   POCT GLUCOSE - Abnormal; Notable for the following components:    POC Glucose 145 (*)     All other components within normal limits   POCT GLUCOSE - Abnormal; Notable for the following components:    POC Glucose 195 (*)     All other components within normal limits   POCT GLUCOSE - Abnormal; Notable for the following components:    POC Glucose 162 (*)     All other components within normal limits   COVID-19 & INFLUENZA COMBO   TROPONIN   POCT GLUCOSE   POCT GLUCOSE   POCT GLUCOSE   POCT GLUCOSE       All other labs were within normal range or not returned asof this dictation. EKG:  All EKG's are interpreted by the Emergency Department Physician who either signs or Co-signs this chart in the absence of a cardiologist.    The Ekg interpreted by me shows  normal sinus rhythm with a rate of 91  Axis is   Normal  QTc is  normal  Intervals and Durations are unremarkable. ST Segments: no acute change and nonspecific changes  No significant change from prior EKG dated - 10/3/22  No STEMI         RADIOLOGY:   Non-plain film images such as CT, Ultrasound and MRI are read by the radiologist. Mala Earing images are visualized and preliminarily interpreted by the  ED Provider with the belowfindings:        Interpretation per the Radiologist below, if available at the time of this note:    [facility]   Final Result   No acute fracture or malalignment of lumbar spine. XR CHEST PORTABLE   Final Result   Left basilar atelectasis or airspace disease with small left pleural effusion. CT ABDOMEN PELVIS WO CONTRAST Additional Contrast? None   Final Result   No acute abdominopelvic trauma. CT CERVICAL SPINE WO CONTRAST   Final Result   No acute abnormality of the cervical spine. CT HEAD WO CONTRAST   Final Result   No acute intracranial abnormality. Severe brain atrophy. PROCEDURES   Unless otherwise noted below, none     Procedures    CRITICAL CARE TIME   N/A    CONSULTS: Paged hospitalist for admission.   IP CONSULT TO HOSPITALIST  IP CONSULT TO HOME CARE NEEDS    EMERGENCY DEPARTMENT COURSE and DIFFERENTIAL DIAGNOSIS/MDM:   Vitals:    Vitals:    01/25/23 0046 01/25/23 0400 01/25/23 0945 01/25/23 1430   BP: (!) 140/77 122/77 122/72 126/70   Pulse: 81 77 86 84   Resp:  16 16 16   Temp: 97.1 °F (36.2 °C) 98 °F (36.7 °C) 97.6 °F (36.4 °C) 97.2 °F (36.2 °C)   TempSrc: Oral Oral Oral Oral   SpO2: 94% 97% 98% 96%   Weight: 220 lb 11.2 oz (100.1 kg)      Height: 5' 4\" (1.626 m)          Patient was given the following medications:  Medications   apixaban (ELIQUIS) tablet 5 mg (5 mg Oral Given 1/25/23 0958)   atorvastatin (LIPITOR) tablet 80 mg (80 mg Oral Given 1/25/23 0958)   clopidogrel (PLAVIX) tablet 75 mg (75 mg Oral Given 1/25/23 0958)   divalproex (DEPAKOTE) DR tablet 500 mg (500 mg Oral Given 1/25/23 0400)   DULoxetine (CYMBALTA) extended release capsule 60 mg (60 mg Oral Given 1/25/23 0400)   gabapentin (NEURONTIN) capsule 200 mg (200 mg Oral Given 1/25/23 1421)   insulin glargine (LANTUS) injection vial 35 Units (35 Units SubCUTAneous Not Given 1/25/23 0130)   metFORMIN (GLUCOPHAGE) tablet 1,000 mg (1,000 mg Oral Given 1/25/23 0958)   metoprolol succinate (TOPROL XL) extended release tablet 50 mg (50 mg Oral Given 1/25/23 0958)   tamsulosin (FLOMAX) capsule 0.4 mg (0.4 mg Oral Given 1/25/23 0400)   sodium chloride flush 0.9 % injection 5-40 mL (10 mLs IntraVENous Given 1/25/23 0959)   sodium chloride flush 0.9 % injection 5-40 mL (has no administration in time range)   0.9 % sodium chloride infusion (has no administration in time range)   ondansetron (ZOFRAN-ODT) disintegrating tablet 4 mg (has no administration in time range)     Or   ondansetron (ZOFRAN) injection 4 mg (has no administration in time range)   polyethylene glycol (GLYCOLAX) packet 17 g (has no administration in time range)   acetaminophen (TYLENOL) tablet 650 mg (650 mg Oral Given 1/25/23 0300)     Or   acetaminophen (TYLENOL) suppository 650 mg ( Rectal See Alternative 1/25/23 0300)   potassium chloride (KLOR-CON M) extended release tablet 40 mEq (has no administration in time range)     Or   potassium bicarb-citric acid (EFFER-K) effervescent tablet 40 mEq (has no administration in time range)     Or   potassium chloride 10 mEq/100 mL IVPB (Peripheral Line) (has no administration in time range)   magnesium sulfate 2000 mg in 50 mL IVPB premix (has no administration in time range)   glucose chewable tablet 16 g (has no administration in time range)   dextrose bolus 10% 125 mL (has no administration in time range)     Or   dextrose bolus 10% 250 mL (has no administration in time range)   glucagon (rDNA) injection 1 mg (has no administration in time range)   dextrose 10 % infusion (has no administration in time range)   insulin lispro (HUMALOG) injection vial 0-8 Units (0 Units SubCUTAneous Not Given 1/25/23 1142)   insulin lispro (HUMALOG) injection vial 0-4 Units (0 Units SubCUTAneous Not Given 1/25/23 0130)   0.9 % sodium chloride bolus (0 mLs IntraVENous Stopped 1/24/23 2116)   oxyCODONE-acetaminophen (PERCOCET) 5-325 MG per tablet 1 tablet (1 tablet Oral Given 1/24/23 1952)     Patient was evaluated due to reportedly having frequent falls over the past few days to the point where he had his head today and fell on his low back as well. He denies any significant headache but is on Eliquis and therefore CT of the head was obtained showing no sign of intracranial hemorrhage per radiologist.  CT of the cervical spine was negative for any fracture. He also reported significant low back pain since the fall and therefore CT of the lumbar spine was obtained showing no acute fracture. He denies any chest pain and story not suggestive of acute coronary syndrome or pulmonary embolism. Troponin was negative. Nurse did attempt to walk the patient after orthostatics and at that point she reports that he nearly fell over after 1 step and therefore put him right back in bed to avoid any actual falls. Since he lives by himself and is on anticoagulation, he will need further evaluation in the hospital with PT/OT evaluation. He did not have any weakness in the legs but if trouble walking persist, he may need MRI of the lumbar spine as well in case there is any issues from that standpoint although at this point I do not suspect spinal epidural hematoma since he did have good dorsiflexion/plantarflexion strength bilaterally and this seems to be more of a gradually worsening issue over the past week or so based on story. He was in no acute distress on repeat assessment and agreed with admission.         I was the primary provider for the patient. The patient tolerated their visit well. The patient and / or the family were informed of the results of any tests, a time was given to answer questions. FINAL IMPRESSION      1. Unable to ambulate    2. Lives alone    3. Frequent falls    4. Traumatic injury of head, initial encounter    5.  Chronic midline low back pain without sciatica          DISPOSITION/PLAN   DISPOSITION Admitted 01/24/2023 10:35:13 PM      PATIENT REFERRED TO:  Agu[facility] Cullen [id number]  [address] [place] [address]  [phone]          DISCHARGEMEDICATIONS:  Current Discharge Medication List          DISCONTINUED MEDICATIONS:  Current Discharge Medication List                 (Please note that portions of this note were completed with a voicerecognition program.  Efforts were made to edit the dictations but occasionally words are mis-transcribed.)    Facundo Benson MD (electronically signed)            Facundo Benson MD  01/25/23 8558

## 2023-01-25 NOTE — ED NOTES
Orthostatic vitals as charted. Pt able to stand, when attempting to ambulate, immediately became of balance and had to be assisted back to bed.       Mary Carrasco RN  01/24/23 9878

## 2023-01-25 NOTE — RESULT ENCOUNTER NOTE
A1c is 7.6. Good control    CBC is grossly normal.  There is thrombocytopenia/low platelet count. We will monitor this for now    Lipid panel shows good LDL levels but elevated triglycerides.   Can consider other medication after he is discharged from the hospital    Kidney function panel is grossly normal

## 2023-01-25 NOTE — DISCHARGE INSTRUCTIONS
Your information:  Name: Bairon Amaya. : 1960    Your instructions: Follow instructions below. What to do after you leave the hospital:    Recommended diet: regular diet    Recommended activity: activity as tolerated        The following personal items were collected during your admission and were returned to you:    Belongings  Dental Appliances: None  Vision - Corrective Lenses: Eyeglasses, At home  Hearing Aid: None  Clothing: Footwear, Jacket/Coat, Shorts, Socks, Undergarments, At bedside  Jewelry: None  Body Piercings Removed: No  Electronic Devices: Cell Phone  Weapons (Notify Protective Services/Security): None  Other Valuables: Wallet, At bedside  Home Medications: None  Valuables Given To: Patient    Information obtained by:  By signing below, I understand that if any problems occur once I leave the hospital I am to contact MD.  I understand and acknowledge receipt of the instructions indicated above.

## 2023-01-25 NOTE — FLOWSHEET NOTE
Patient transferred to the unit from the ER by wheelchair w/o concern. A/O x4. Patient lives in an apartment alone. Patient has had falls at home, most recent 1/24/2023. Patient reports weakness to BLE which creates difficulty walking. Patient states, \"this has been going on for awhile and I just don't know what's going on?\" Patient reports using a walker at home to ambulate. Male pur-wick in place for urination. Bedrest at this time. Bed alarm in place. Call light and bedside table within reach.     01/25/23 0046   Vital Signs   Temp 97.1 °F (36.2 °C)   Temp Source Oral   Heart Rate 81   Heart Rate Source Monitor   BP (!) 140/77   MAP (Calculated) 98   BP Location Right upper arm   BP Method Automatic   Patient Position Semi fowlers   Level of Consciousness 0   Oxygen Therapy   SpO2 94 %   O2 Device None (Room air)   Height and Weight   Height 5' 4\" (1.626 m)   Weight 220 lb 11.2 oz (100.1 kg)   Weight Method Actual;Bed scale   BSA (Calculated - sq m) 2.13 sq meters   BMI (Calculated) 38

## 2023-01-25 NOTE — FLOWSHEET NOTE
Admission questions completed, home meds completed at the bedside with the patient. Patient has a defib/pacer to left chest area. Patient unsure if it is atrial paced or ventricular paced. Patient has a stimulator implanted in left buttocks for bowel incontinence. Male pur-wick remains in place for urination. Bed alarm in place. Call light and bedside table within reach.     01/25/23 0400   Vital Signs   Temp 98 °F (36.7 °C)   Temp Source Oral   Heart Rate 77   Heart Rate Source Monitor   Resp 16   /77   MAP (Calculated) 92   BP Location Right upper arm   BP Method Automatic   Patient Position Semi fowlers   Level of Consciousness 0   MEWS Score 1   Oxygen Therapy   SpO2 97 %   O2 Device None (Room air)

## 2023-01-25 NOTE — DISCHARGE INSTR - COC
Continuity of Care Form    Patient Name: William Burgos. :  1960  MRN:  [id number]    Admit date:  2023  Discharge date:  22    Code Status Order: Full Code   Advance Directives:     Admitting Physician:  Tod Reed MD  PCP: ANA Herring CNP    Discharging Nurse: Shelton Phelan Unit/Room#: 0213/0213-02  Discharging Unit Phone Number: [phone]    Emergency Contact:   Extended Emergency Contact Information  Primary Emergency Contact: Miguelito Quiroga  Mobile Phone: [phone]  Relation: Brother/Sister  Secondary Emergency Contact: [facility] Phone: [phone]  Mobile Phone: [phone]  Relation: Other    Past Surgical History:  Past Surgical History:   Procedure Laterality Date    CARDIAC PACEMAKER PLACEMENT      NOT MRI COMPATIABLE OLD LEADS st [facility].  pace maker difib    CARPAL TUNNEL RELEASE Bilateral 2013    CATARACT REMOVAL WITH IMPLANT Left 2014    COLONOSCOPY      CORONARY ANGIOPLASTY WITH STENT PLACEMENT  ,    CYST REMOVAL  1982    coccyx area    DIAGNOSTIC CARDIAC CATH LAB PROCEDURE      ENDOSCOPY, COLON, DIAGNOSTIC      ERCP  09/15/2013    ERCP  10/11/2013    WITH STENT REMOVAL    EYE SURGERY      FOOT SURGERY Right 2018     REPAIR CALCANEAL SPUR, REPAIR OF ACHILLES TENDON RIGHT FOOT    NERVE SURGERY Bilateral 2020    BILATERAL LUMBAR THREE LUMBAR FOUR LUMBAR FIVE DORSAL RAMUS  RADIOFREQUENCY ABLATION SITE CONFIRMED BY FLUOROSCOPY performed by Leandro Cesar MD at [facility] 32      back stimulator in lower back    PACEMAKER PLACEMENT      ICD    PAIN MANAGEMENT PROCEDURE Bilateral 2020    BILATERAL LUMBAR THREE, LUMBAR FOUR, LUMBAR FIVE DORSAL RAMUS MEDIAL BRANCH BLOCK SITE CONFIRMED BY FLUOROSCOPY performed by Leandro Cesar MD at [facility] Bilateral 2020    BILATERAL LUMBAR THREE, LUMBAR FOUR, LUMBAR FIVE DORSAL RAMUS MEDIAL BRANCH BLOCK SITE CONFIRMED BY FLUOROSCOPY performed by Kira Ponce MD at [address] Left 01/12/2021    LEFT SACROILIAC JOINT INJECTION SITE CONFIRMED BY FLUOROSCOPY performed by Kira Ponce MD at [address] Right 10/31/2018    GASTROCNEMIUS RECESSION RIGHT FOOT performed by Radha Ash DPM at [facility] [address] TENDON LEG/ANKLE 1 TENDON SPX Right 10/31/2018    REMOVAL OF CALCANEAL SPUR, ACHILLES TENDON REPAIR RIGHT LOWER EXTREMITY performed by Radha Ash DPM at [address]  07/18/2013    and colonoscopy with biopsies taken    UPPER GASTROINTESTINAL ENDOSCOPY  08/23/2013    EUS    UPPER GASTROINTESTINAL ENDOSCOPY  09/15/2013       Immunization History:   Immunization History   Administered Date(s) Administered    COVID-19, MODERNA BLUE border, Primary or Immunocompromised, (age 12y+), IM, 100 mcg/0.5mL 04/13/2021, 05/18/2021    Influenza Virus Vaccine 12/12/2008, 10/22/2012, 09/17/2015, 10/01/2018    Influenza, AFLURIA (age 1 yrs+), FLUZONE, (age 10 mo+), MDV, 0.5mL 11/18/2016, 10/02/2017    Pneumococcal Conjugate 7-valent (Prevnar7) 10/22/2012    Pneumococcal Polysaccharide ([id number]) 12/12/2008    Tdap (Boostrix, Adacel) 05/11/2015       Active Problems:  Patient Active Problem List   Diagnosis Code    Ventricular tachycardia I47.20    Presence of stent in left circumflex coronary artery Z95.5    Myocardial infarction, nontransmural (HCC) I21.4    Myocardial infarction, inferoposterior wall, subsequent care     Automatic implantable cardioverter-defibrillator in situ Z95.810    CAD (coronary artery disease) I25.10    Automatic implantable cardioverter-defibrillator in situ Z95.810    ALIN on CPAP G47.33, Z99.89    Gastroesophageal reflux disease without esophagitis K21.9    Hyperlipidemia E78.5    Hypertension due to endocrine disorder I15.2    Ischemic cardiomyopathy I25.5 Obesity, morbid, BMI 40.0-49.9 ([facility]) E66.01    Weakness R53.1    Acute encephalopathy G93.40    TIA involving left internal carotid artery G45.1    DM (diabetes mellitus), secondary, uncontrolled, w/neurologic complic [id number]    Dyslipidemia E78.5    HTN (hypertension), benign I10    Lactic acidosis E87.20    Head trauma S09.90XA    Abrasion, scalp w/o infection S00. 01XA    Unsteady gait R26.81    Hyperglycemia R73.9    Chronic obstructive pulmonary disease ([facility]) J44.9    Coarse tremor G25.2    Generalized weakness R53.1    Type 2 diabetes mellitus with hyperglycemia, with long-term current use of insulin ([facility]) E11.65, Z79.4    Primary hypertension I10    CAD S/P percutaneous coronary angioplasty I25.10, Z98.61    COVID-19 U07.1    Unable to ambulate R26.2    Implantable cardioverter-defibrillator (ICD) discharge Z45.02    AICD malfunction T82.118A    Hypotension I95.9    Valproic acid toxicity T42.6X1A    PAF (paroxysmal atrial fibrillation) ([facility]) I48.0    COVID-19 virus infection U07.1    Disorder of electrolytes E87.8    Atrial fibrillation with RVR ([facility]) I48.91    Low grade fever R50.9    Thrombocytopenia ([facility]) D69.6    Abnormal chest x-ray R93.89    Encephalopathy G93.40    Debility R53.81    Bipolar disorder with depression ([facility]) F31.9    Orthostatic hypotension I95.1    Chronic midline low back pain without sciatica M54.50, G89.29       Isolation/Infection:   Isolation            No Isolation          Patient Infection Status       Infection Onset Added Last Indicated Last Indicated By Review Planned Expiration Resolved Resolved By    None active    Resolved    COVID-19 (Rule Out) 23 COVID-19 & Influenza Combo (Ordered)   23 Rule-Out Test Resulted    COVID-19 (Rule Out) 10/03/22 10/03/22 10/03/22 COVID-19, Rapid (Ordered)   10/03/22 Rule-Out Test Resulted    COVID-19 (Rule Out) 02/15/22 02/15/22 02/15/22 COVID-19 & Influenza Combo (Ordered)   22     COVID-19 22 01/12/22 01/12/22 COVID-19, Rapid   01/26/22 Komal Wiggins RN    COVID-19 (Rule Out) 01/12/22 01/12/22 01/12/22 COVID-19, Rapid (Ordered)   01/12/22 Rule-Out Test Resulted    COVID-19 (Rule Out) 04/26/21 04/26/21 04/26/21 COVID-19, Rapid (Ordered)   04/26/21 Rule-Out Test Resulted            Nurse Assessment:  Last Vital Signs: /72   Pulse 86   Temp 97.6 °F (36.4 °C) (Oral)   Resp 16   Ht 5' 4\" (1.626 m)   Wt 220 lb 11.2 oz (100.1 kg)   SpO2 98%   BMI 37.88 kg/m²     Last documented pain score (0-10 scale): Pain Level: 0  Last Weight:   Wt Readings from Last 1 Encounters:   01/25/23 220 lb 11.2 oz (100.1 kg)     Mental Status:  oriented and alert    IV Access:  - None    Nursing Mobility/ADLs:  Walking   Assisted  Transfer  Assisted  Bathing  Independent  Dressing  Independent  [address]  Med Delivery   whole    Wound Care Documentation and Therapy:  Incision 10/31/18 Foot Right (Active)   Number of days: 5291        Elimination:  Continence: Bowel: Yes and No  Bladder: Yes  Urinary Catheter: None   Colostomy/Ileostomy/Ileal Conduit: No       Date of Last BM: 1/25/22    Intake/Output Summary (Last 24 hours) at 1/25/2023 1239  Last data filed at 1/25/2023 0850  Gross per 24 hour   Intake 980 ml   Output 850 ml   Net 130 ml     I/O last 3 completed shifts: In: 740 [P.O.:240; IV Piggyback:500]  Out: 850 [Urine:850]    Safety Concerns:     History of Falls (last 30 days) and At Risk for Falls    Impairments/Disabilities:      None    Nutrition Therapy:  Current Nutrition Therapy:   - Oral Diet:  General    Routes of Feeding: Oral  Liquids: No Restrictions  Daily Fluid Restriction: no  Last Modified Barium Swallow with Video (Video Swallowing Test): not done    Treatments at the Time of Hospital Discharge:   Respiratory Treatments:   Oxygen Therapy:  is not on home oxygen therapy.   Ventilator:        Rehab Therapies: na  Weight Bearing Status/Restrictions: No weight bearing restrictions  Other Medical Equipment (for information only, NOT a DME order):  na  Other Treatments:     Patient's personal belongings (please select all that are sent with patient):  None    RN SIGNATURE:  Electronically signed by Sunita Diehl RN on 1/25/23 at 2:33 PM EST    CASE MANAGEMENT/SOCIAL WORK SECTION    Inpatient Status Date: 1/24/23    Readmission Risk Assessment Score:  Readmission Risk              Risk of Unplanned Readmission:  0       Discharging to Facility/ Agency   Name:  [facility] care    Address: [address] [place] [address] [address] [facility] [address] [facility]  Phone: [phone]  Fax: [phone]   / signature: Electronically signed by Shakeel Rodriguez RN on 1/25/23 at 12:39 PM EST    PHYSICIAN SECTION    Prognosis: Good    Condition at Discharge: Stable    Rehab Potential (if transferring to Rehab): {Prognosis:[id number]}    Recommended Labs or Other Treatments After Discharge: SN, PT/OT  OK for PT [facility]    Physician Certification: I certify the above information and transfer of Stefani White.  is necessary for the continuing treatment of the diagnosis listed and that he requires [address] for less 30 days. Update Admission H&P: No change in H&P    PHYSICIAN SIGNATURE:  Electronically signed by LISSY Castro, RN on 1/25/23 at 12:39 PM EST

## 2023-01-25 NOTE — ED PROVIDER NOTES
I was available for consultation on the patient if deemed necessary by ER staff. I was not involved in the care of this patient nor had any participation in the medical decision making process. I was the attending on duty when the patient was boarded in the ER while awaiting a bed assignment or transfer. I was available for consultation but was not requested to evaluate the patient, nor asked supervised the case. I did not see the patient and I am signing this chart administratively after the fact.           Bill Woodward MD  01/24/23 0549

## 2023-01-25 NOTE — PROGRESS NOTES
Inpatient Physical Therapy Evaluation and Treatment    Unit: [facility]  Date:  1/25/2023  Patient Name:    Rancho Woodson. Admitting diagnosis:  Orthostatic hypotension [I95.1]  Unable to ambulate [R26.2]  Frequent falls [R29.6]  Traumatic injury of head, initial encounter [S09.90XA]  Chronic midline low back pain without sciatica [M54.50, G89.29]  Lives alone [Z60.2]  Admit Date:  1/24/2023  Precautions/Restrictions/WB Status/ Lines/ Wounds/ Oxygen: Fall risk, Bed/chair alarm, Lines -Purewick catheter, and Telemetry    Treatment Time:  08:45-09:15 (cotx with OT) + 09:15-09:40 (single treat)  Treatment Number:  1   Timed Code Treatment Minutes: 25 minutes  Total Treatment Minutes:  35  minutes    Patient Goals for Therapy: plans to return home, interested in outpatient therapy. Discharge Recommendations: Home PRN assist  and home or outpatient PT (patient states he does not want home PT but will follow up with his PCP for outpatient PT order)  DME needs for discharge: RW  Comment: pt reports his rollator at tallest height requires him to flex fwd, which causes pain in his back (has h/o back pain), thus he does not like and refuses to use. He would benefit from use of bilat UE support (ie, a walker) for stability due to swayback posture causing posterior COG. Thus RW is recommended for discharge. Therapy recommendation for EMS Transport: can transport by wheelchair    Therapy recommendations for staff:   Stand by assist with use of rolling walker (RW) for all transfers and ambulation within room    History of Present Illness: Per H&P Dr. Keesha Hutchins 1/24: \"63 y.o. male who presented to the hospital this evening after sustaining a fall at home. The patient states he was in his kitchen when he fell backwards and hit the back of his head on his oven door. The patient is on Eliquis for atrial fibrillation, fortunately he has no open wounds and is not bleeding.   The patient  denies any preceding symptoms, all he remembers is that he lost his balance and fell backwards. He however does endorse that he has been having multiple falls at home and endorses generalized weakness. In the emergency department work-up is positive for pretty significant orthostatic hypotension. He was given a bolus of IV fluids cautiously because of his history of ischemic cardiomyopathy. Unfortunately attempts to ambulate the patient were limited to weakness. The patient could not ambulate and it was considered he will be unsafe to discharge home without supervision. \"    Home Health S4 Level Recommendation:  Level 1 Standard  AM-PAC Mobility Score       AM-PAC Inpatient Mobility without Stair Climbing Raw Score : 19    Preadmission Environment    Pt. Lives Alone, brother lives in the same apartment building Pt is in, Pt looks after his brother who has paranoid schizophrenia    Home environment:    apartment   Steps to enter first floor:   0 steps to enter       Steps to second floor: N/A  Bathroom:       walk in shower  ,  standard height toilet, grab bars   Equipment owned:      [facility] and Rollator  , standard walker, lift chair     Preadmission Status / PLOF:  History of falls             Yes  Pt. Able to drive          Yes  Pt Fully independent with ADL's         Yes holds onto grab bars in the shower   Pt. Required assistance from family for: Independent PTA  , sister cleans for pt   Pt. Fully independent for transfers and gait and walked with no device typically in his house, sometimes a cane. He says occasionally he feels like \"legs don't go the way my brain tells them\" and at those times he uses the cane. In community he uses a cane. Yesterday he felt particularly unstable (had 2 falls in 1 day) and was using the rollator . When he falls, it is typically forward. Pt has to walk dogs every frequently during the day.       Pain  No- at rest  Rating:NA  Location:NA  Pain Medicine Status: No request made       Cognition    A&O Person, Place, and Situation, January 2023   Able to follow 2 step commands     Subjective  Patient lying supine in bed with no family present. Pt agreeable to this PT eval & tx. Upper Extremity ROM/Strength  Please see OT evaluation. Lower Extremity ROM / Strength   AROM WFL: Yes    Strength Assessment (measured on a 0-5 scale):  R LE   Quad   5   Ant Tib  5   Hamstring 4+   Iliopsoas 4  L LE  Quad   5   Ant Tib  5   Hamstring 4+   Iliopsoas 4    Lower Extremity Sensation    Impaired - baseline neuropathy    Lower Extremity Proprioception:   NT    Coordination and Tone  WFL    Balance  Sitting:  Normal; Independent  Comments: at EOB    Standing: Good - ; Supervision static balance, SBA dynamic  Comments: able to stand statically without UE support for ADLs at SBA. Bed Mobility   Supine to Sit:    Modified Independent  Sit to Supine:   Not Tested  Rolling:   Not Tested  Scooting in sitting: Independent  Scooting in supine:  Not Tested    Transfer Training     Sit to stand:   Supervision from EOB and recliner  Stand to sit:   Supervision  Bed to Chair:   Supervision with use of rolling walker (RW)    Gait gait completed as indicated below  Trial 1: with RW  Distance:      25 ft + 25 ft  Deviations (firm surface/linoleum):  decreased dory, shuffles, and decreased step length bilaterally; variable step length/dory - alternating every ~5-10 steps between very short shuffle steps (~1/2 foot length) and near-normal stride length. Assistive Device Used:    rolling walker (RW)  Level of Assist:    SBA  Comment: no overt unsteadiness. Trial 2: with SPC  Distance:      45 ft  Deviations (firm surface/linoleum):  Similar gait pattern and similar variability in dory and step height.  Mild trunk sway in saggital plane  Assistive Device Used:    Single point cane - left hand  Level of Assist:    CGA  Comment: Posture is relatively swaybacked - pt does not lean into cane for any amount of support and advances it inconsistently. Trial 3: without AD  Distance:      85 ft  Deviations (firm surface/linoleum):  Similar as above. Assistive Device Used:    No AD  Level of Assist:    CGA  Comment: Similar swayback posture. Discussed with patient that he is more stable translating his COG forward via use of walker for UE support anteriorly, and he is in agreement. He does not like his home rollator due to need to flex too far fwd and causing back pain. Pt is comfortable with RW trialed today. Stair Training deferred, pt does not have stairs in home environment    Activity Tolerance   Pt completed therapy session with No adverse symptoms noted w/activity  BP supine /76 Hr 82   BP sitting BP  158/82  HR 84   BP standing /81 HR 94     Positioning Needs   Pt up in chair, alarm set, positioned in proper neutral alignment and pressure relief provided. Call light provided and all needs within reach    Exercises Initiated  Moises deferred secondary to treatment focus on functional mobility  NA    Other  None. Patient/Family Education   Pt educated on role of inpatient PT, POC, importance of continued activity, DC recommendations and calling for assist with mobility. Assessment  Pt seen for Physical Therapy evaluation in acute care setting. Pt lives alone in [address], is indep with mobility and ADLs, and typically ambulates with no assistive device, sometimes a cane or rollator. He has had a number of falls. Upon evaluation, he demonstrates improved stability with use of rolling walker. Discussed this with patient who is in agreement. Pt intended to get outpatient therapy following last hospitalization but had a personal conflict with the provider. He intends to follow up with outpatient therapy following discharge. He will benefit from continued skilled PT for gait training and balance to prevent future falls. Not orthostatic during this session. Goals : To be met in 3 visits:  1). Independent with LE Ex x 10 reps    To be met in 6 visits:  1). Supine to/from sit: Independent  2). Sit to/from stand: Independent  3). Bed to chair: Modified Independent  4). Gait: Ambulate 150 ft.  with  Modified Independent and use of LRAD (least restrictive assistive device)  5). Tolerate B LE exercises 3 sets of 10-15 reps  6). Ascend/descend 1 steps with Modified Independent with use of no handrail and LRAD (least restrictive assistive device)    Rehabilitation Potential: Good  Strengths for achieving goals include:   Pt motivated, PLOF, and Pt cooperative   Barriers to achieving goals include:    No Barriers    Plan    To be seen 2-3 x / week  while in acute care setting for therapeutic exercises, bed mobility, transfers, progressive gait training, balance training, and family/patient education. Signature: Omega Kos, PT, DPT    If patient discharges from this facility prior to next visit, this note will serve as the Discharge Summary.

## 2023-01-25 NOTE — DISCHARGE SUMMARY
Name:  Elfego Woods  Room:  0213/0213-02  MRN:    [id number]    Discharge Summary      This discharge summary is in conjunction with a complete physical exam done on the day of discharge. Discharging Physician: Dr. Robbie Robersons: 1/24/2023  Discharge:  01/25/23     HPI taken from admission H&P:      63 y.o. male who presented to the hospital this evening after sustaining a fall at home. The patient states he was in his kitchen when he fell backwards and hit the back of his head on his oven door. The patient is on Eliquis for atrial fibrillation, fortunately he has no open wounds and is not bleeding. The patient  denies any preceding symptoms, all he remembers is that he lost his balance and fell backwards. He however does endorse that he has been having multiple falls at home and endorses generalized weakness. In the emergency department work-up is positive for pretty significant orthostatic hypotension. He was given a bolus of IV fluids cautiously because of his history of ischemic cardiomyopathy. Unfortunately attempts to ambulate the patient were limited to weakness. The patient could not ambulate and it was considered he will be unsafe to discharge home without supervision. He will be admitted for observation.     Diagnoses this Admission and Hospital Course   Orthostatic hypotension and generalized weakness  Similar admissions last year noted   No new changes in meds  Likely dehydration and related to BP meds   Hydrated with 500 cc bolus, orthostatic vitals qshift with improvement   -add  compression stockings on d/c - but pt refusing      Generalized weakness- started PT and recommend home with walker      Ischemic cardiomyopathy status post BiV ICD- stable compensated  Resume ASA< statins, BB     Atrial fibrillation on Eliquis- ct head neg for bleed after fall, resume eliquis         COPD/emphysema-stable , resume home inhalers     Hypertension- resumed meds and doing well      Diabetes mellitus type 2 with neuropathy- resumed home meds, monitor with SSI     Obstructive sleep apnea- non compliant with bipap     Bipolar disorder - stable on home meds          Procedures (Please Review Full Report for Details)  N/A     Consults    N/A       Physical Exam at Discharge:    /72   Pulse 86   Temp 97.6 °F (36.4 °C) (Oral)   Resp 16   Ht 5' 4\" (1.626 m)   Wt 220 lb 11.2 oz (100.1 kg)   SpO2 98%   BMI 37.88 kg/m²     See Progress Note day of discharge     CBC:   Recent Labs     01/24/23  0840 01/24/23  1859 01/25/23  0606   WBC 6.3 10.9 6.6   HGB 16.7 15.9 15.6   HCT 50.7 47.8 45.8   MCV 90.4 88.2 87.4   PLT 96* 101* 88*     BMP:   Recent Labs     01/24/23  0840 01/24/23  1859 01/25/23  0606    133* 134*   K 4.9 3.7 3.8   CL 98* 92* 98*   CO2 25 23 24   BUN 17 16 15   CREATININE 0.7* 0.7* 0.6*     LIVER PROFILE:   Recent Labs     01/24/23  0840 01/24/23  1859   AST 13* 15   ALT 15 15   BILITOT 0.7 0.6   ALKPHOS 67 68     PT/INR: No results for input(s): PROTIME, INR in the last 72 hours. APTT: No results for input(s): APTT in the last 72 hours. UA:  Recent Labs     01/24/23 1859   COLORU Yellow   PHUR 7.0   WBCUA 0-2   RBCUA 0-2   CLARITYU Clear   SPECGRAV 1.010   LEUKOCYTESUR Negative   UROBILINOGEN 0.2   BILIRUBINUR Negative   BLOODU Negative   GLUCOSEU >=1000*         CULTURES  COVID/Influenza: not detected      RADIOLOGY  CT LUMBAR SPINE TRAUMA RECONSTRUCTION   Final Result   No acute fracture or malalignment of lumbar spine. XR CHEST PORTABLE   Final Result   Left basilar atelectasis or airspace disease with small left pleural effusion. CT ABDOMEN PELVIS WO CONTRAST Additional Contrast? None   Final Result   No acute abdominopelvic trauma. CT CERVICAL SPINE WO CONTRAST   Final Result   No acute abnormality of the cervical spine. CT HEAD WO CONTRAST   Final Result   No acute intracranial abnormality. Severe brain atrophy.                Discharge Medications     Medication List        CONTINUE taking these medications      ALLEGRA PO     apixaban 5 MG Tabs tablet  Commonly known as: Eliquis  Take 1 tablet by mouth 2 times daily     atorvastatin 80 MG tablet  Commonly known as: LIPITOR  TAKE ONE TABLET BY MOUTH DAILY     clopidogrel 75 MG tablet  Commonly known as: PLAVIX  Take 1 tablet by mouth daily     divalproex 500 MG DR tablet  Commonly known as: DEPAKOTE  Take 500 mg by mouth every morning and 1000 mg every evening.      Dulaglutide 3 MG/0.5ML Sopn     DULoxetine 60 MG extended release capsule  Commonly known as: CYMBALTA  Take 1 capsule by mouth nightly     esomeprazole 40 MG delayed release capsule  Commonly known as: NEXIUM     famotidine 20 MG tablet  Commonly known as: PEPCID     fluticasone 50 MCG/ACT nasal spray  Commonly known as: FLONASE     furosemide 20 MG tablet  Commonly known as: LASIX  TAKE ONE TABLET ON MONDAY and THURSDAY     gabapentin 100 MG capsule  Commonly known as: NEURONTIN     hydrOXYzine HCl 25 MG tablet  Commonly known as: ATARAX  take 1 tablet by oral route 4 times every day as needed for itching     insulin detemir 100 UNIT/ML injection vial  Commonly known as: LEVEMIR     insulin lispro protamine & lispro (75-25) 100 UNIT per ML Susp injection vial  Commonly known as: HumaLOG MIX     Jardiance 25 MG tablet  Generic drug: empagliflozin     lisinopril 5 MG tablet  Commonly known as: PRINIVIL;ZESTRIL  Take 1 tablet by mouth daily     metFORMIN 1000 MG tablet  Commonly known as: GLUCOPHAGE  TAKE ONE TABLET BY MOUTH 2 TIMES DAILY WITH MORNING AND EVENING MEALS     metoprolol succinate 50 MG extended release tablet  Commonly known as: TOPROL XL  TAKE ONE TABLET BY MOUTH TWICE DAILY     potassium chloride 20 MEQ extended release tablet  Commonly known as: KLOR-CON M  TAKE ONE TABLET WITH LASIX ON Monday AND THURSDAYS     tamsulosin 0.4 MG capsule  Commonly known as: FLOMAX  Take 1 capsule by mouth at bedtime Discharged in stable condition to home     Follow Up:   Follow up with PCP in 1 week    ***

## 2023-01-25 NOTE — PROGRESS NOTES
Patient states he cannot find a ride home. This RN called ebony; answered but unable to come get patient. Contact will not answer any more calls or return calls. Clinical notified for cab voucher.

## 2023-01-25 NOTE — PROGRESS NOTES
4 Eyes Skin Assessment     The patient is being assess for   Admission    I agree that 2 RN's have performed a thorough Head to Toe Skin Assessment on the patient. ALL assessment sites listed below have been assessed. Areas assessed for pressure by both nurses:   [x]   Head, Face, and Ears   [x]   Shoulders, Back, and Chest, Abdomen  [x]   Arms, Elbows, and Hands   [x]   Coccyx, Sacrum, and Ischium  [x]   Legs, Feet, and Heels        Redness due to moisture to the crack of the buttocks. Scattered bruises and small scabbed abrasions to BLE. Skin Assessed Under all Medical Devices by both nurses:  N/A               All Mepilex Borders were peeled back and area peeked at by both nurses:  No: N/A  Please list where Mepilex Borders are located:  N/A             **SHARE this note so that the co-signing nurse is able to place an eSignature**    Co-signer eSignature: Electronically signed by Keith Mata RN on 1/25/23 at 6:40 AM EST    Does the Patient have Skin Breakdown related to pressure?   No            Omega Prevention initiated:  Yes   Wound Care Orders initiated:  No      [facility] nurse consulted for Pressure Injury (Stage 3,4, Unstageable, DTI, NWPT, Complex wounds)and New or Established Ostomies:  No      Primary Nurse eSignature: Electronically signed by Jovan Vaca RN on 1/25/23 at 5:27 AM EST

## 2023-01-25 NOTE — H&P
Hospital Medicine History & Physical      PCP: ANA Ching CNP    Date of Admission: 1/24/2023    Date of Service: Pt seen/examined on 1/24/2023    Chief Complaint: Fall at home    History Of Present Illness:    63 y.o. male who presented to the hospital this evening after sustaining a fall at home. The patient states he was in his kitchen when he fell backwards and hit the back of his head on his oven door. The patient is on Eliquis for atrial fibrillation, fortunately he has no open wounds and is not bleeding. The patient  denies any preceding symptoms, all he remembers is that he lost his balance and fell backwards. He however does endorse that he has been having multiple falls at home and endorses generalized weakness. In the emergency department work-up is positive for pretty significant orthostatic hypotension. He was given a bolus of IV fluids cautiously because of his history of ischemic cardiomyopathy. Unfortunately attempts to ambulate the patient were limited to weakness. The patient could not ambulate and it was considered he will be unsafe to discharge home without supervision. He will be admitted for observation. Past Medical History:          Diagnosis Date    Arthritis     Asthma     CAD (coronary artery disease)     COPD (chronic obstructive pulmonary disease) ([facility])     Dr. Smiley Woodson at [facility] told the patient that he did not have COPD, just asthma    Emphysema of lung ([address]     Fatty liver     GERD (gastroesophageal reflux disease)     Hyperlipidemia     Hypertension     Medical history reviewed with no changes     MI, old     Sleep apnea     pt wears cpap at night. states does not have cpap    Type II or unspecified type diabetes mellitus without mention of complication, not stated as uncontrolled        Past Surgical History:          Procedure Laterality Date    CARDIAC PACEMAKER PLACEMENT  2011    NOT MRI COMPATIABLE OLD LEADS st [facility].  pace maker cha CARPAL TUNNEL RELEASE Bilateral 2013    CATARACT REMOVAL WITH IMPLANT Left 2/28/14    COLONOSCOPY      CORONARY ANGIOPLASTY WITH STENT PLACEMENT  2001,2008    CYST REMOVAL  1982    coccyx area    DIAGNOSTIC CARDIAC CATH LAB PROCEDURE      ENDOSCOPY, COLON, DIAGNOSTIC      ERCP  9/15/2013    ERCP  10/11/13    WITH STENT REMOVAL    FOOT SURGERY Right 07/09/2018     REPAIR CALCANEAL SPUR, REPAIR OF ACHILLES TENDON RIGHT FOOT    NERVE SURGERY Bilateral 12/1/2020    BILATERAL LUMBAR THREE LUMBAR FOUR LUMBAR FIVE DORSAL RAMUS  RADIOFREQUENCY ABLATION SITE CONFIRMED BY FLUOROSCOPY [facility]      back stimulator in lower back    PACEMAKER PLACEMENT      ICD    PAIN MANAGEMENT PROCEDURE Bilateral 5/26/2020    BILATERAL LUMBAR THREE, LUMBAR FOUR, LUMBAR FIVE DORSAL RAMUS MEDIAL BRANCH BLOCK SITE CONFIRMED BY FLUOROSCOPY performed by Shailesh Sharma MD at [facility] Bilateral 6/9/2020    BILATERAL LUMBAR THREE, LUMBAR FOUR, LUMBAR FIVE DORSAL RAMUS MEDIAL BRANCH BLOCK SITE CONFIRMED BY FLUOROSCOPY performed by Shailesh Sharma MD at [facility] Left 1/12/2021    LEFT SACROILIAC JOINT INJECTION SITE CONFIRMED BY FLUOROSCOPY performed by Shailesh Sharma MD at [facility] Right 10/31/2018    GASTROCNEMIUS RECESSION RIGHT FOOT performed by Richard Garcia DPM at [facility] [address] [place] [address]/[facility] TENDON LEG/ANKLE 1 TENDON SPX Right 10/31/2018    REMOVAL OF CALCANEAL SPUR, ACHILLES TENDON REPAIR RIGHT LOWER EXTREMITY performed by Richard Garcia DPM at [address] [place] [address]  7/18/13    and colonoscopy with biopsies taken    UPPER GASTROINTESTINAL ENDOSCOPY  8/23/13    EUS    UPPER GASTROINTESTINAL ENDOSCOPY  9/15/2013       Medications Prior to Admission:      Prior to Admission medications    Medication Sig Start Date End Date Taking?  Authorizing Provider   divalproex (DEPAKOTE) 500 MG DR tablet Take 500 mg by mouth every morning and 1000 mg every evening. 1/16/23   Carie Mcburney, APRN - CNP   DULoxetine (CYMBALTA) 60 MG extended release capsule Take 1 capsule by mouth nightly 1/16/23   Carie Mcburney, APRN - CNP   apixaban (ELIQUIS) 5 MG TABS tablet Take 1 tablet by mouth 2 times daily 10/5/22   ANA Suarez CNP   fluticasone (FLONASE) 50 MCG/ACT nasal spray 2 sprays by Each Nostril route daily as needed for Rhinitis    Historical Provider, MD   gabapentin (NEURONTIN) 100 MG capsule Take 200 mg by mouth 3 times daily.     Historical Provider, MD   insulin lispro protamine & lispro (HUMALOG MIX) (75-25) 100 UNIT per ML SUSP injection vial Inject subcutaneously twice daily 15 units with breakfast and 20 units with dinner    Historical Provider, MD   hydrOXYzine HCl (ATARAX) 25 MG tablet take 1 tablet by oral route 4 times every day as needed for itching 8/26/22   Carie Mcburney, APRN - CNP   famotidine (PEPCID) 20 MG tablet Take 20 mg by mouth 2 times daily    Historical Provider, MD   metoprolol succinate (TOPROL XL) 50 MG extended release tablet TAKE ONE TABLET BY MOUTH TWICE DAILY 7/21/22   ANA Suarez CNP   furosemide (LASIX) 20 MG tablet TAKE ONE TABLET ON MONDAY and THURSDAY 3/17/22   Melanie Combs MD   potassium chloride (KLOR-CON M) 20 MEQ extended release tablet TAKE ONE TABLET WITH LASIX ON Monday AND Fatou  3/17/22   Melanie Combs MD   lisinopril (PRINIVIL;ZESTRIL) 5 MG tablet Take 1 tablet by mouth daily 2/8/22   Yanira Estrella MD   tamsulosin [facility]) 0.4 MG capsule Take 1 capsule by mouth at bedtime 2/7/22   Yanira Estrella MD   clopidogrel (PLAVIX) 75 MG tablet Take 1 tablet by mouth daily 2/8/22   Yanira Estrella MD   Dulaglutide 3 MG/0.5ML SOPN Inject 3 mg into the skin once a week     Historical Provider, MD   Fexofenadine HCl (ALLEGRA PO) Take 180 mg by mouth daily    Historical Provider, MD esomeprazole (NEXIUM) 40 MG delayed release capsule Take 40 mg by mouth daily    Historical Provider, MD   JARDIANCE 25 MG tablet Take 25 mg by mouth daily  2/18/19   Historical Provider, MD   insulin detemir (LEVEMIR) 100 UNIT/ML injection vial Inject 74 Units into the skin nightly    Historical Provider, MD   metFORMIN (GLUCOPHAGE) 1000 MG tablet TAKE ONE TABLET BY MOUTH 2 TIMES DAILY WITH MORNING AND EVENING MEALS 9/26/17   ANA Kim - CNP   atorvastatin (LIPITOR) 80 MG tablet TAKE ONE TABLET BY MOUTH DAILY 2/20/17   Vidhya Ponce, APRN - CNP       Allergies: Other and Pcn [penicillins]    Social History:      TOBACCO:   reports that he quit smoking about 21 years ago. His smoking use included cigarettes. He has a 4.00 pack-year smoking history. He has never used smokeless tobacco.  ETOH:   reports no history of alcohol use. Family History:          Problem Relation Age of Onset    Heart Disease Mother     Heart Failure Mother     Cancer Father     Diabetes Maternal Grandmother     Heart Failure Maternal Uncle     Hypertension Maternal Uncle     Asthma Neg Hx     Emphysema Neg Hx        REVIEW OF SYSTEMS:   Constitutional:  Negative for fever,chills or night sweats  ENT:  Negative for rhinorrhea, epistaxis, hoarseness, sore throat. Respiratory: Negative for SOB or wheezing   Cardiovascular:   Negative for  chest pain, palpitations   Gastrointestinal:  Negative for nausea, vomiting, diarrhea  Genitourinary:  Negative for polyuria, dysuria   Hematologic/Lymphatic:  Negative for  bleeding tendency, easy bruising  Musculoskeletal: Patient endorses generalized weakness  Neurologic:  Negative for  confusion,dysarthria. Skin:  Negative for itching,rash  Psychiatric:  Negative for depression,anxiety, agitation. Endocrine:  Negative for polydipsia,polyuria,heat /cold intolerance.     PHYSICAL EXAM:  /70   Pulse 93   Temp 98 °F (36.7 °C)   Resp 20   Ht 5' 4\" (1.626 m)   Wt 246 lb (111.6 kg)   SpO2 98%   BMI 42.23 kg/m²     General appearance:  No apparent distress, appears stated age and cooperative. HEENT:  Normal cephalic, atraumatic without obvious deformity. Pupils equal, round, and reactive to light. Extra ocular muscles intact. Conjunctivae/corneas clear. Neck: Supple, with full range of motion. No jugular venous distention. Trachea midline. Respiratory:  Normal respiratory effort. Clear to auscultation, bilaterally without Rales/Wheezes/Rhonchi. Cardiovascular:  Regular rate and rhythm with normal S1/S2 without murmurs, rubs or gallops. Abdomen: Soft, non-tender, non-distended with normal bowel sounds. Musculoskeletal:  No clubbing, cyanosis or edema bilaterally. Full range of motion without deformity. Skin: Skin color, texture, turgor normal.  No rashes or lesions. Neurologic:  Neurovascularly intact without any focal sensory/motor deficits. Cranial nerves: II-XII intact, grossly non-focal.  Psychiatric:  Alert and oriented, thought content appropriate, normal insight  Capillary Refill: Brisk,< 3 seconds   Peripheral Pulses: +2 palpable, equal bilaterally       Labs:   Recent Labs     01/24/23  0840 01/24/23  1859   WBC 6.3 10.9   HGB 16.7 15.9   HCT 50.7 47.8   PLT 96* 101*     Recent Labs     01/24/23  0840 01/24/23  1859    133*   K 4.9 3.7   CL 98* 92*   CO2 25 23   BUN 17 16   CREATININE 0.7* 0.7*   CALCIUM 9.8 9.2     Recent Labs     01/24/23  0840 01/24/23  1859   AST 13* 15   ALT 15 15   BILITOT 0.7 0.6   ALKPHOS 67 68     No results for input(s): INR in the last 72 hours.   Recent Labs     01/24/23  1859   TROPONINI <0.01       Urinalysis:      Lab Results   Component Value Date/Time    NITRU Negative 01/24/2023 06:59 PM    WBCUA 0-2 01/24/2023 06:59 PM    RBCUA 0-2 01/24/2023 06:59 PM    BLOODU Negative 01/24/2023 06:59 PM    SPECGRAV 1.010 01/24/2023 06:59 PM    GLUCOSEU >=1000 01/24/2023 06:59 PM       Radiology:     EKG:  I have reviewed the EKG with the following interpretation: Normal sinus rhythm    CT LUMBAR SPINE TRAUMA RECONSTRUCTION   Final Result   No acute fracture or malalignment of lumbar spine. XR CHEST PORTABLE   Final Result   Left basilar atelectasis or airspace disease with small left pleural effusion. CT ABDOMEN PELVIS WO CONTRAST Additional Contrast? None   Final Result   No acute abdominopelvic trauma. CT CERVICAL SPINE WO CONTRAST   Final Result   No acute abnormality of the cervical spine. CT HEAD WO CONTRAST   Final Result   No acute intracranial abnormality. Severe brain atrophy. ASSESSMENT:  Orthostatic hypotension  Generalized weakness  Ischemic cardiomyopathy status post BiV ICD  Atrial fibrillation on Eliquis  COPD/emphysema  Hypertension  Diabetes mellitus type 2 with neuropathy  Obstructive sleep apnea    PLAN:  Admit to [facility], PT OT  Hydrated with 500 cc bolus, orthostatic vitals in the a.m.  -Consider compression stockings on d/c   -Continue home breathing treatment  -Resume Eliquis in the a.m.  -Continue Lantus plus sliding scale insulin  - Resume home meds   -Continue statin  -Continue gabapentin    DVT Prophylaxis: Eliquis  Diet: No diet orders on file  Code Status: Prior    Dispo -admit to [facility] on telemetry      Thank you for the opportunity to be involved in this patient's care.       (Please note that portions of this note were completed with a voice recognition program. Efforts were made to edit the dictations but occasionally words are mis-transcribed.)

## 2023-01-25 NOTE — PROGRESS NOTES
IM Progress Note    Admit Date:  1/24/2023  0    Interval history:   orthostatic hypotension recurrent falls    Subjective:  Mr. Shaina Moralez seen ambulating in hallway with PT  Gait stable. Given rolling walker by PT  Feels fine and ready to go home      Similar admissions in 10/22 note    Objective:   /77   Pulse 77   Temp 98 °F (36.7 °C) (Oral)   Resp 16   Ht 5' 4\" (1.626 m)   Wt 220 lb 11.2 oz (100.1 kg)   SpO2 97%   BMI 37.88 kg/m²     Intake/Output Summary (Last 24 hours) at 1/25/2023 0740  Last data filed at 1/25/2023 0518  Gross per 24 hour   Intake 740 ml   Output 850 ml   Net -110 ml       Physical Exam:      General: middle aged male healthy appearing   Awake, alert and oriented. Appears to be not in any distress  Mucous Membranes:  Pink , anicteric  Neck: No JVD, no carotid bruit, no thyromegaly  Neck movements normal  Atraumatic skull. EOM intact  Chest:  Clear to auscultation bilaterally, no added sounds  Left sided AICD noted   Cardiovascular:  RRR S1S2 heard, no murmurs or gallops  Abdomen:  Soft, undistended, non tender, no organomegaly, BS present  Extremities: No edema or cyanosis.  Distal pulses well felt  Mild ecchymoses to left scapular region noted  Neurological : grossly normal with no focal weakness      Medications:   Scheduled Medications:    apixaban  5 mg Oral BID    atorvastatin  80 mg Oral Daily    clopidogrel  75 mg Oral Daily    divalproex  500 mg Oral 2 times per day    DULoxetine  60 mg Oral Nightly    gabapentin  200 mg Oral TID    insulin glargine  35 Units SubCUTAneous Nightly    metFORMIN  1,000 mg Oral BID WC    metoprolol succinate  50 mg Oral BID    tamsulosin  0.4 mg Oral Nightly    sodium chloride flush  5-40 mL IntraVENous 2 times per day    insulin lispro  0-8 Units SubCUTAneous TID WC    insulin lispro  0-4 Units SubCUTAneous Nightly     I   sodium chloride      dextrose       sodium chloride flush, sodium chloride, ondansetron **OR** ondansetron, polyethylene glycol, acetaminophen **OR** acetaminophen, potassium chloride **OR** potassium alternative oral replacement **OR** potassium chloride, magnesium sulfate, glucose, dextrose bolus **OR** dextrose bolus, glucagon (rDNA), dextrose    Lab Data:  Recent Labs     01/24/23  0840 01/24/23  1859 01/25/23  0606   WBC 6.3 10.9 6.6   HGB 16.7 15.9 15.6   HCT 50.7 47.8 45.8   MCV 90.4 88.2 87.4   PLT 96* 101* 88*     Recent Labs     01/24/23  0840 01/24/23  1859 01/25/23  0606    133* 134*   K 4.9 3.7 3.8   CL 98* 92* 98*   CO2 25 23 24   BUN 17 16 15   CREATININE 0.7* 0.7* 0.6*     Recent Labs     01/24/23 1859   TROPONINI <0.01       Coagulation:   Lab Results   Component Value Date/Time    INR 0.94 01/16/2022 02:26 PM     Cardiac markers:   Lab Results   Component Value Date/Time    CKTOTAL 111 10/04/2022 03:19 AM    TROPONINI <0.01 01/24/2023 06:59 PM         Lab Results   Component Value Date    ALT 15 01/24/2023    AST 15 01/24/2023    ALKPHOS 68 01/24/2023    BILITOT 0.6 01/24/2023       Lab Results   Component Value Date    INR 0.94 01/16/2022    INR 0.91 01/16/2022    INR 0.90 07/13/2021    PROTIME 10.6 01/16/2022    PROTIME 10.3 01/16/2022    PROTIME 10.1 07/13/2021          EKG:  I have reviewed the EKG with the following interpretation: Normal sinus rhythm     CT LUMBAR SPINE TRAUMA RECONSTRUCTION   Final Result   No acute fracture or malalignment of lumbar spine. XR CHEST PORTABLE   Final Result   Left basilar atelectasis or airspace disease with small left pleural effusion. CT ABDOMEN PELVIS WO CONTRAST Additional Contrast? None   Final Result   No acute abdominopelvic trauma. CT CERVICAL SPINE WO CONTRAST   Final Result   No acute abnormality of the cervical spine. CT HEAD WO CONTRAST   Final Result   No acute intracranial abnormality. Severe brain atrophy.                  ASSESSMENT and PLAN    Orthostatic hypotension and generalized weakness  Similar admissions last year noted   No new changes in meds  Likely dehydration and related to BP meds     Hydrated with 500 cc bolus, orthostatic vitals qshift with improvement   -add  compression stockings on d/c - but pt refusing     Generalized weakness- started PT and recommend home with walker     Ischemic cardiomyopathy status post BiV ICD- stable compensated  Resume ASA< statins, BB    Atrial fibrillation on Eliquis- ct head neg for bleed after fall, resume eliquis       COPD/emphysema-stable , resume home inhalers     Hypertension- resumed meds and doing well      Diabetes mellitus type 2 with neuropathy- resumed home meds, monitor with SSI    Obstructive sleep apnea- non compliant with bipap    Bipolar disorder - stable on home meds          DVT Prophylaxis: Eliquis  Diet: low carb  Full code     Dc to home  Would benefit from home care       Jorge Thomas MD, 1/25/2023 7:40 AM

## 2023-01-25 NOTE — PROGRESS NOTES
Discharge instructions gone over at this time. No new medications sent. Patient asked about walker; CM note states that patient has walker at home. Tele removed from patient. IV removed per order. Will take patient out when cab arrives.

## 2023-01-25 NOTE — PLAN OF CARE
Problem: Discharge Planning  Goal: Discharge to home or other facility with appropriate resources  1/25/2023 1036 by Matty Sandoval RN  Outcome: Progressing  1/25/2023 0510 by Trent Greene RN  Outcome: Progressing     Problem: Pain  Goal: Verbalizes/displays adequate comfort level or baseline comfort level  1/25/2023 1036 by Matty Sandoval RN  Outcome: Progressing  1/25/2023 0510 by Trent Greene RN  Outcome: Progressing     Problem: Safety - Adult  Goal: Free from fall injury  1/25/2023 1036 by Matty Sandoval RN  Outcome: Progressing  1/25/2023 0510 by Trent Greene RN  Outcome: Progressing     Problem: Skin/Tissue Integrity  Goal: Absence of new skin breakdown  Description: 1. Monitor for areas of redness and/or skin breakdown  2. Assess vascular access sites hourly  3. Every 4-6 hours minimum:  Change oxygen saturation probe site  4. Every 4-6 hours:  If on nasal continuous positive airway pressure, respiratory therapy assess nares and determine need for appliance change or resting period.   1/25/2023 1036 by Matty Sandoval RN  Outcome: Progressing  1/25/2023 0510 by Trent Greene RN  Outcome: Progressing     Problem: Chronic Conditions and Co-morbidities  Goal: Patient's chronic conditions and co-morbidity symptoms are monitored and maintained or improved  1/25/2023 1036 by Matty Sandoval RN  Outcome: Progressing  1/25/2023 0510 by Trent Greene RN  Outcome: Progressing

## 2023-01-25 NOTE — PROGRESS NOTES
Inpatient Occupational Therapy  Evaluation and Treatment    Unit: [facility]  Date:  1/25/2023  Patient Name:    Thelma Negro. Admitting diagnosis:  Orthostatic hypotension [I95.1]  Unable to ambulate [R26.2]  Frequent falls [R29.6]  Traumatic injury of head, initial encounter [S09.90XA]  Chronic midline low back pain without sciatica [M54.50, G89.29]  Lives alone [Z60.2]  Admit Date:  1/24/2023  Precautions/Restrictions/WB Status/ Lines/ Wounds/ Oxygen: Fall risk, Bed/chair alarm, Lines -Purewick catheter, and Telemetry    Patient has a defib/pacer to left chest area  Treatment Time:  815 -915   Treatment Number: 1   Timed code treatment minutes 50 minutes   Total Treatment minutes:   60   minutes    Patient Goals for Therapy:  \" go home  \"      Discharge Recommendations: Home PRN assist home OT   DME needs for discharge: shower seat        Therapy recommendations for staff:   Assist of 1 with use of rolling walker (RW) for all ambulation to/from bathroom with gait belt     History of Present Illness: per H&P   63 y.o. male who presented to the hospital this evening after sustaining a fall at home. The patient states he was in his kitchen when he fell backwards and hit the back of his head on his oven door. The patient is on Eliquis for atrial fibrillation, fortunately he has no open wounds and is not bleeding. The patient  denies any preceding symptoms, all he remembers is that he lost his balance and fell backwards. He however does endorse that he has been having multiple falls at home and endorses generalized weakness. In the emergency department work-up is positive for pretty significant orthostatic hypotension. He was given a bolus of IV fluids cautiously because of his history of ischemic cardiomyopathy. Unfortunately attempts to ambulate the patient were limited to weakness. The patient could not ambulate and it was considered he will be unsafe to discharge home without supervision.   He will be admitted for observation  Home Health S4 Level Recommendation:  Level 1 Standard  AM-PAC Score: 21    Preadmission Environment    Pt. Lives Alone, brother lives in the same apartment building Pt is in, Pt looks after his brother who has paranoid schizophrenia    Home environment:    apartment   Steps to enter first floor:   0 steps to enter       Steps to second floor: N/A  Bathroom:       walk in shower  ,  standard height toilet, grab bars   Equipment owned:      [address] and Rollator  , standard walker, lift chair     Preadmission Status / PLOF:  History of falls             Yes  Pt. Able to drive          Yes  Pt Fully independent with ADL's         Yes holds onto grab bars in the shower   Pt. Required assistance from family for: Independent PTA  , sister cleans for pt   Pt. Fully independent for transfers and gait and walked with cane however yesterday was using the rollator . Pt has to walk dogs every frequently during the day. Pain  No- at rest  Rating:NA  Location:NA  Pain Medicine Status: No request made      Cognition    A&O Person, Place, and Situation , January 2023   Able to follow 2 step commands    Subjective  Patient lying supine in bed with no family present. Pt agreeable to this OT eval & tx.      Upper Extremity ROM:    WFL    Upper Extremity Strength:    WFL    Upper Extremity Sensation    Diminished in hands at times pt reports carpal tunnel bilaterally     Upper Extremity Proprioception:  WFL    Coordination and Tone  WFL    Balance  Functional Sitting Balance:  WFL  Functional Standing Balance:Diminished    Bed mobility:    Supine to sit:   CGA  Sit to supine:   Not Tested  Rolling:    Not Tested  Scooting in sitting:  Independent  Scooting to head of bed:   Not Tested    Bridging:   Not Tested    Transfers:    Sit to stand:  Supervision  Stand to sit:  Supervision  Bed to chair:   SBA with RW   Standard toilet: SBA  Bed to [facility]:  Not Tested    Dressing:      UE:   Not Tested  LE:    Min A to don socks- pt does not wear at home     Bathing:    UE:  Not Tested  LE:  Not Tested    Eating:   Independent    Toileting:  SBA    Activity Tolerance   Pt completed therapy session with ambulation with shuffling gait   BP supine /76 Hr 82   BP sitting BP  158/82  HR 84   BP standing /81 HR 94   Positioning Needs:   Pt up in chair, alarm set, positioned in proper neutral alignment and pressure relief provided. Exercise / Activities Initiated:   N/A    Patient/Family Education:   Role of OT    Assessment of Deficits: Pt seen for Occupational therapy evaluation in acute care setting. Pt demonstrated decreased Activity tolerance, ADLs, Balance , Bed mobility, Safety Awareness, and Transfers. Pt functioning below baseline and will likely benefit from skilled occupational therapy services to maximize safety and independence. Goal(s) : To be met in 3 Visits:  1). Bed to toilet/BSC: Supervision with appropriate AD     To be met in 5 Visits:  1). Supine to/from Sit:  Supervision  2). Upper Body Bathing:   Independent  3). Lower Body Bathing:   SBA  4). Upper Body Dressing:  Independent  5). Lower Body Dressing:  Supervision  6). Pt to demonstrate UE exs x 15 reps with minimal cues    Rehabilitation Potential:  Fair for goals listed above. Strengths for achieving goals include: Pt cooperative  Barriers to achieving goals include:  Complexity of condition     Plan: To be seen 3-5 x/wk while in acute care setting for therapeutic exercises, bed mobility, transfers, dressing, bathing, family/patient education, ADL/IADL retraining, energy conservation training.      Yanely Oakley OTR/L [id number]            If patient discharges from this facility prior to next visit, this note will serve as the Discharge Summary

## 2023-01-25 NOTE — CARE COORDINATION
[facility]  Received referral regarding HC services from [address] [place] [address]. Sent request to [facility] for [facility] \[facility]\"" coverage. [facility] is able to service pt for [facility]. needs. SOC to be scheduled within 24-48 hours of discharge. CTN to follow up with pt prior to discharge. Sent referral to [facility].       Electronically signed by Farhat Hurst RN on 1/25/2023 at 12:49 PM

## 2023-01-25 NOTE — CARE COORDINATION
Case Management Assessment  Initial Evaluation and discharge note    Date/Time of Evaluation: 1/25/2023 4:12 PM  Assessment Completed by: Shola Duran RN    If patient is discharged prior to next notation, then this note serves as note for discharge by case management. Patient Name: [place]o [place]son. YOB: 1960  Diagnosis: Orthostatic hypotension [I95.1]  Unable to ambulate [R26.2]  Frequent falls [R29.6]  Traumatic injury of head, initial encounter [S09.90XA]  Chronic midline low back pain without sciatica [M54.50, G89.29]  Lives alone [Z60.2]                   Date / Time: 1/24/2023  6:21 PM    Patient Admission Status: Observation   Readmission Risk (Low < 19, Mod (19-27), High > 27): Readmission Risk Score: 14.9    Current PCP: ANA Vale CNP  PCP verified by CM? Yes Katiuska Aggarwal)    Chart Reviewed: Yes      History Provided by: Patient  Patient Orientation: Alert and Oriented    Patient Cognition: Alert    Hospitalization in the last 30 days (Readmission):  No    If yes, Readmission Assessment in CM Navigator will be completed. Advance Directives:      Code Status: Full Code   Patient's Primary Decision Maker is: Legal Next of Kin (sister Pernell)    Primary Decision Maker: Caryn Cantu - Brother/Sister - [phone]    Secondary Decision Maker: Miguelito Quiroga - Brother/Sister - [phone]    Discharge Planning:    Patient lives with: Alone Type of Home: Apartment  Primary Care Giver: Self  Patient Support Systems include: Family Members, Christianity/[employer]   Current Financial resources: Other (Comment), [employer], Medicare ([facility] Medicare Advantage and Medicaid pays Medicare part B premium)  Current community resources: Other (Comment) ([address])  Current services prior to admission: None            Current DME:              Type of Home Care services:  OT, PT, Skilled Therapy    ADLS  Prior functional level:  Independent in ADLs/IADLs  Current functional level: Independent in ADLs/IADLs    PT AM-PAC:   /24  OT AM-PAC: 21 /24    Family can provide assistance at DC: Yes (sister and brother as needed)  Would you like Case Management to discuss the discharge plan with any other family members/significant others, and if so, who? No  Plans to Return to Present Housing: Yes  Other Identified Issues/Barriers to RETURNING to current housing: Limited family and community assistance  Potential Assistance needed at discharge: Home Care            Potential DME:    Patient expects to discharge to: [address] [place] [address] for transportation at discharge: Family    Financial    Payor: Eric Roberson / Plan: Chel John ESSENTIAL/PLUS / Product Type: *No Product type* /     Does insurance require precert for SNF: Yes    Potential assistance Purchasing Medications: No  Meds-to-Beds request:        [address] [facility] [phone] - f [phone]  [address] [facility] [address]  Phone: [phone] Fax: [address] [phone]  [address] [place] [address] 99 355[address] [facility] [address]  Phone: [phone] Fax: [phone]    [address], [address]  [address]  Phone: [phone] Fax: [phone]    2 [facility], [address] [place] [address], [address] [place] [phone] - f [phone]  [address] , [address]  [facility] [address]  Phone: [phone] Fax: [phone]      Notes:    Factors facilitating achievement of predicted outcomes: Cooperative and Pleasant    Barriers to discharge: Limited family support    Additional Case Management Notes: Reviewed chart and met with pt at bedside. Order for dc noted. Pt states is IPT[facility] at [facility] and denies services. Discussed HHC at dc and pt is agreeable and chooses [facility]. Referral called to [facility] at [facility] who will arrange for [facility] needs. Noted pt has walker at home. Chart reviewed and no other dc needs identified. Spoke with Lethaniel Kayser at [facility] who states pt will be [facility] by Friday. The Plan for Transition of Care is related to the following treatment goals of Orthostatic hypotension [I95.1]  Unable to ambulate [R26.2]  Frequent falls [R29.6]  Traumatic injury of head, initial encounter [S09.90XA]  Chronic midline low back pain without sciatica [M54.50, G89.29]  Lives alone [U28.5]    IF APPLICABLE: The Patient and/or patient representative Dianelys Nurse and his family were provided with a choice of provider and agrees with the discharge plan. Freedom of choice list with basic dialogue that supports the patient's individualized plan of care/goals and shares the quality data associated with the providers was provided to:     Patient Representative Name:       The Patient and/or Patient Representative Agree with the Discharge Plan?       Mimi Macias RN  Case Management Department  Ph: [phone] Fax: [phone]

## 2023-01-26 ENCOUNTER — TELEPHONE (OUTPATIENT)
Dept: FAMILY MEDICINE CLINIC | Age: 63
End: 2023-01-26

## 2023-01-26 NOTE — TELEPHONE ENCOUNTER
Care Transitions Initial Follow Up Call    Outreach made within 2 business days of discharge: Yes    Patient: Lauren Bauer. Patient : 1960   MRN: [id number]  Reason for Admission: There are no discharge diagnoses documented for the most recent discharge. Discharge Date: 23       Spoke with: Patient     Discharge department/facility: [facility]     TCM Interactive Patient Contact:  Was patient able to fill all prescriptions: Yes  Was patient instructed to bring all medications to the follow-up visit: Yes  Is patient taking all medications as directed in the discharge summary?  Yes  Does patient understand their discharge instructions: Yes  Does patient have questions or concerns that need addressed prior to 7-14 day follow up office visit: no    Pt is needing home health and informed the office that the hospital was assisting in setting up - will discuss at TCM visit on 2023    Scheduled appointment with PCP within 7-14 days    Follow Up  Future Appointments   Date Time Provider Guillaume Barone   2023  3:00 PM ANA Argueta CNP [id number] [facility]   2023 10:00 AM SCHEDULE, [facility]   2023 10:00 AM ANA Fulton CNP [place] [facility]   2/15/2023 11:00 AM MD JORDAN Brower [facility]   3/13/2023 10:35 AM SCHEDULE, Kary York [facility]   2023  1:40 PM ANA Argueta  [facility]

## 2023-01-27 ENCOUNTER — OFFICE VISIT (OUTPATIENT)
Dept: FAMILY MEDICINE CLINIC | Age: 63
End: 2023-01-27

## 2023-01-27 VITALS
WEIGHT: 220 LBS | OXYGEN SATURATION: 98 % | SYSTOLIC BLOOD PRESSURE: 132 MMHG | BODY MASS INDEX: 37.76 KG/M2 | HEART RATE: 89 BPM | DIASTOLIC BLOOD PRESSURE: 70 MMHG

## 2023-01-27 DIAGNOSIS — G25.2 COARSE TREMOR: ICD-10-CM
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
## 2023-01-27 DIAGNOSIS — Z91.81 AT HIGH RISK FOR INJURY RELATED TO FALL: ICD-10-CM

## 2023-01-27 DIAGNOSIS — Z09 HOSPITAL DISCHARGE FOLLOW-UP: Primary | ICD-10-CM

## 2023-01-27 DIAGNOSIS — R25.8 BRADYKINESIA: ICD-10-CM

## 2023-01-27 DIAGNOSIS — I48.91 ATRIAL FIBRILLATION WITH RVR (HCC): ICD-10-CM

## 2023-01-27 DIAGNOSIS — Z74.2 NEED FOR HOME HEALTH CARE: ICD-10-CM

## 2023-01-27 NOTE — PROGRESS NOTES
Post-Discharge Transitional Care  Follow Up      [address] YOB: 1960    Date of Office Visit:  1/27/2023  Date of Hospital Admission: 1/24/23  Date of Hospital Discharge: 1/25/23  Risk of hospital readmission (high >=14%. Medium >=10%) :Readmission Risk Score: 14.9      Care management risk score Rising risk (score 2-5) and Complex Care (Scores >=6): No Risk Score On File     Non face to face  following discharge, date last encounter closed (first attempt may have been earlier): 01/26/2023    Call initiated 2 business days of discharge: Yes    ASSESSMENT/PLAN:   Hospital discharge follow-up  -Hospital discharge follow-up today  -     HI DISCHARGE MEDS RECONCILED W/ CURRENT OUTPATIENT MED LIST  Fall, subsequent encounter  -Head CT negative  -No trauma to the head  At high risk for injury related to fall  -High risk for injury related to falls while being on Eliquis and anticoagulation therapy  Atrial fibrillation with RVR ([address]  -Continue Eliquis  Need for home health care  -Need for home health care due to high risk of falls  -Evaluate and treat  Coarse tremor  -Attempt Sinemet to assess for improvement with tremor and bradykinesia  -I suspect that he may have underlying Parkinson's that has not been diagnosed  -     carbidopa-levodopa (SINEMET)  MG per tablet; Take 1 tablet by mouth 2 times daily, Disp-90 tablet, R-3Normal  Bradykinesia  -See above  -     carbidopa-levodopa (SINEMET)  MG per tablet; Take 1 tablet by mouth 2 times daily, Disp-90 tablet, R-3Normal    Medical Decision Making: low complexity  Return in about 4 weeks (around 2/24/2023) for sinemet. On this date 1/27/2023 I have spent 25 minutes reviewing previous notes, test results and face to face with the patient discussing the diagnosis and importance of compliance with the treatment plan as well as documenting on the day of the visit.        Subjective:   HPI:  Follow up of BAAKRI PRIEST problems/diagnosis(es):   Fall, bradykinesia, high risk for falls    Inpatient course: Discharge summary reviewed- see chart. Interval history/Current status:  Patient was seen in office today after being admitted to the hospital status post fall with striking the back of his head. He is on Eliquis for atrial fibrillation. CT of the head was negative for any bleed. He does have bruising to his left shoulder as well as his lower back. He does report that he does have some shuffling gait, slow movements and tremor. He has never been assessed for Parkinson's disease. Apparently, he is supposed to follow-up with [employer] for help in the home setting with evaluation and treatment.     Patient Active Problem List   Diagnosis    Ventricular tachycardia    Presence of stent in left circumflex coronary artery    Myocardial infarction, nontransmural (HCC)    Myocardial infarction, inferoposterior wall, subsequent care    Automatic implantable cardioverter-defibrillator in situ    CAD (coronary artery disease)    Automatic implantable cardioverter-defibrillator in situ    ALIN on CPAP    Gastroesophageal reflux disease without esophagitis    Hyperlipidemia    Hypertension due to endocrine disorder    Ischemic cardiomyopathy    Obesity, morbid, BMI 40.0-49.9 (HCC)    Weakness    Acute encephalopathy    TIA involving left internal carotid artery    DM (diabetes mellitus), secondary, uncontrolled, w/neurologic complic    Dyslipidemia    HTN (hypertension), benign    Lactic acidosis    Head trauma    Abrasion, scalp w/o infection    Unsteady gait    Hyperglycemia    Chronic obstructive pulmonary disease (HCC)    Coarse tremor    Generalized weakness    Type 2 diabetes mellitus with hyperglycemia, with long-term current use of insulin ([facility]    Primary hypertension    CAD S/P percutaneous coronary angioplasty    COVID-19    Unable to ambulate    Implantable cardioverter-defibrillator (ICD) discharge    AICD malfunction    Hypotension    Valproic acid toxicity    PAF (paroxysmal atrial fibrillation) (HCC)    COVID-19 virus infection    Disorder of electrolytes    Atrial fibrillation with RVR (HCC)    Low grade fever    Thrombocytopenia (HCC)    Abnormal chest x-ray    Encephalopathy    Debility    Bipolar disorder with depression ([facility]    Orthostatic hypotension    Chronic midline low back pain without sciatica       Medications listed as ordered at the time of discharge from hospital     Medication List            Accurate as of January 27, 2023 11:19 AM. If you have any questions, ask your nurse or doctor. START taking these medications      carbidopa-levodopa  MG per tablet  Commonly known as: Sinemet  Take 1 tablet by mouth 2 times daily            CONTINUE taking these medications      ALLEGRA PO     apixaban 5 MG Tabs tablet  Commonly known as: Eliquis  Take 1 tablet by mouth 2 times daily     atorvastatin 80 MG tablet  Commonly known as: LIPITOR  TAKE ONE TABLET BY MOUTH DAILY     clopidogrel 75 MG tablet  Commonly known as: PLAVIX  Take 1 tablet by mouth daily     divalproex 500 MG DR tablet  Commonly known as: DEPAKOTE  Take 500 mg by mouth every morning and 1000 mg every evening.      Dulaglutide 3 MG/0.5ML Sopn     DULoxetine 60 MG extended release capsule  Commonly known as: CYMBALTA  Take 1 capsule by mouth nightly     esomeprazole 40 MG delayed release capsule  Commonly known as: NEXIUM     famotidine 20 MG tablet  Commonly known as: PEPCID     fluticasone 50 MCG/ACT nasal spray  Commonly known as: FLONASE     furosemide 20 MG tablet  Commonly known as: LASIX  TAKE ONE TABLET ON MONDAY and THURSDAY     gabapentin 100 MG capsule  Commonly known as: NEURONTIN     hydrOXYzine HCl 25 MG tablet  Commonly known as: ATARAX  take 1 tablet by oral route 4 times every day as needed for itching     insulin detemir 100 UNIT/ML injection vial  Commonly known as: LEVEMIR     insulin lispro protamine & lispro (75-25) 100 UNIT per ML Susp injection vial  Commonly known as: HumaLOG MIX     Jardiance 25 MG tablet  Generic drug: empagliflozin     lisinopril 5 MG tablet  Commonly known as: PRINIVIL;ZESTRIL  Take 1 tablet by mouth daily     metFORMIN 1000 MG tablet  Commonly known as: GLUCOPHAGE  TAKE ONE TABLET BY MOUTH 2 TIMES DAILY WITH MORNING AND EVENING MEALS     metoprolol succinate 50 MG extended release tablet  Commonly known as: TOPROL XL  TAKE ONE TABLET BY MOUTH TWICE DAILY     potassium chloride 20 MEQ extended release tablet  Commonly known as: KLOR-CON M  TAKE ONE TABLET WITH LASIX ON Monday AND THURSDAYS     tamsulosin 0.4 MG capsule  Commonly known as: FLOMAX  Take 1 capsule by mouth at bedtime               Where to Get Your Medications        These medications were sent to [address] [place] [address] [phone] - F [phone]  [address] [place] [place] [address]      Phone: [phone]   carbidopa-levodopa  MG per tablet           Medications marked \"taking\" at this time  Outpatient Medications Marked as Taking for the 1/27/23 encounter (Office Visit) with ANA Page CNP   Medication Sig Dispense Refill    carbidopa-levodopa (SINEMET)  MG per tablet Take 1 tablet by mouth 2 times daily 90 tablet 3    divalproex (DEPAKOTE) 500 MG DR tablet Take 500 mg by mouth every morning and 1000 mg every evening. 90 tablet 1    DULoxetine (CYMBALTA) 60 MG extended release capsule Take 1 capsule by mouth nightly 90 capsule 1    apixaban (ELIQUIS) 5 MG TABS tablet Take 1 tablet by mouth 2 times daily 120 tablet 2    fluticasone (FLONASE) 50 MCG/ACT nasal spray 2 sprays by Each Nostril route daily as needed for Rhinitis      gabapentin (NEURONTIN) 100 MG capsule Take 200 mg by mouth 3 times daily.       insulin lispro protamine & lispro (HUMALOG MIX) (75-25) 100 UNIT per ML SUSP injection vial Inject subcutaneously twice daily 15 units with breakfast and 20 units with dinner      hydrOXYzine HCl (ATARAX) 25 MG tablet take 1 tablet by oral route 4 times every day as needed for itching 120 tablet 0    famotidine (PEPCID) 20 MG tablet Take 20 mg by mouth 2 times daily      metoprolol succinate (TOPROL XL) 50 MG extended release tablet TAKE ONE TABLET BY MOUTH TWICE DAILY 180 tablet 3    furosemide (LASIX) 20 MG tablet TAKE ONE TABLET ON MONDAY and THURSDAY 90 tablet 1    potassium chloride (KLOR-CON M) 20 MEQ extended release tablet TAKE ONE TABLET WITH LASIX ON Monday AND THURSDAYS 90 tablet 1    lisinopril (PRINIVIL;ZESTRIL) 5 MG tablet Take 1 tablet by mouth daily 30 tablet 3    tamsulosin (FLOMAX) 0.4 MG capsule Take 1 capsule by mouth at bedtime 30 capsule 3    clopidogrel (PLAVIX) 75 MG tablet Take 1 tablet by mouth daily 30 tablet 3    Dulaglutide 3 MG/0.5ML SOPN Inject 3 mg into the skin once a week       Fexofenadine HCl (ALLEGRA PO) Take 180 mg by mouth daily      esomeprazole (NEXIUM) 40 MG delayed release capsule Take 40 mg by mouth daily      JARDIANCE 25 MG tablet Take 25 mg by mouth daily       insulin detemir (LEVEMIR) 100 UNIT/ML injection vial Inject 74 Units into the skin nightly      metFORMIN (GLUCOPHAGE) 1000 MG tablet TAKE ONE TABLET BY MOUTH 2 TIMES DAILY WITH MORNING AND EVENING MEALS 60 tablet 0    atorvastatin (LIPITOR) 80 MG tablet TAKE ONE TABLET BY MOUTH DAILY 30 tablet 11        Medications patient taking as of now reconciled against medications ordered at time of hospital discharge: Yes    A comprehensive review of systems was negative except for what was noted in the HPI.     Objective:    /70 (Site: Left Upper Arm, Position: Sitting, Cuff Size: Medium Adult)   Pulse 89   Wt 220 lb (99.8 kg)   SpO2 98%   BMI 37.76 kg/m²   General Appearance: FLAT alert and oriented to person, place and time, well developed and well- nourished, in no acute distress  Skin: Bruising to left shoulder and low back warm and dry, no rash or erythema  Head: normocephalic and atraumatic  Eyes: pupils equal, round, and reactive to light, extraocular eye movements intact, conjunctivae normal  ENT: tympanic membrane, external ear and ear canal normal bilaterally, nose without deformity, nasal mucosa and turbinates normal without polyps  Neck: supple and non-tender without mass, no thyromegaly or thyroid nodules, no cervical lymphadenopathy  Pulmonary/Chest: clear to auscultation bilaterally- no wheezes, rales or rhonchi, normal air movement, no respiratory distress  Cardiovascular: normal rate, regular rhythm, normal S1 and S2, no murmurs, rubs, clicks, or gallops, distal pulses intact, no carotid bruits  Abdomen: soft, non-tender, non-distended, normal bowel sounds, no masses or organomegaly  Extremities: no cyanosis, clubbing or edema  Musculoskeletal: Bruising to left shoulder and low back, coarse tremor, slow movements, no joint swelling, deformity or tenderness  Neurologic: Bradykinesia and flat affect reflexes normal and symmetric, no cranial nerve deficit, gait, coordination and speech normal      An electronic signature was used to authenticate this note.   --Chandler Oneil, APRN - CNP

## 2023-02-01 ENCOUNTER — TELEPHONE (OUTPATIENT)
Dept: FAMILY MEDICINE CLINIC | Age: 63
End: 2023-02-01

## 2023-02-01 NOTE — TELEPHONE ENCOUNTER
Virgilio bruno/ [employer] (MP#[phone]) called and asked if you are willing to sign home care orders for nursing, therapy and social? Please advise.  Thank you

## 2023-02-06 NOTE — PROGRESS NOTES
[facility]   Electrophysiology Outpatient Note              Date:  February 7, 2023  Patient name: Mariaa Jain. YOB: 1960    Primary Care physician: ANA Hunter CNP    HISTORY OF PRESENT ILLNESS: The patient is a 63 y.o.  male with a history of VT, PAF, ischemic cardiomyopathy, DM, chronic systolic heart failure, CAD, MI, COPD, asthma, HTN, HLD, and GERD. In 2001, he had his first original ICD implant, and it was replaced in 2005 for battery depletion. He underwent lead extraction at [facility] in 2007 for fracture of the RV lead. He received several ICD shocks in 2008 when he did not take his medication. His current ICD was implanted in May 2011. He had a colon stimulator placed on 3/2018 and has not had any problems with device interactions. In 6/2021 he reported syncope to his primary cardiologist. In 7/2021 he had an abnormal stress test and underwent LHC. He had 3 ROSHNI placed (LCx, OM, and PDA). In 2/2022 he was hospitalized for weakness and falls and was diagnosed with COVID-19. During his hospitalization, he went into AF and his ICD inappropriately fired. VT detection was adjusted at that time. In 6/2022 he was seen in the office and had no complaints. NSVT was seen on device interrogations, but due to limitations of only having RV lead, unable to determine rhythm of NSVT events. Toprol was increased to 50 mg BID. In 10/2022 he was admitted for generalized weakness. In 1/2023 he was admitted after having a fall at home. He was positive for orthostatic hypotension. Today he is being seen for ischemic cardiomyopathy, NSVT and PAF. Orthostatics negative in the office today. He has been feeling well since his last hospitalization. He did have a fall last week off his couch. He hit his neck and right arm. He denies dizziness during this event and fall was mechanical. His balance is off and some days are worse than others. His PCP started him on Sinemet. He feels like his gait has improved. He typically uses a walker or cane, but he forgot it today in the car. He denies dizziness, palpitations, SOB and chest pain. He has not had any bleeding issues on Eliquis. He is getting home health care. Device check today shows:   Brand: Abbott  Mode: VVI  Normal function  <1%    Arrhythmias: 1 NSVT event (10 beats)  Battery life 5.8 years  RV impedance 580 ohms   RV threshold 0.75 V @ 0.5 ms  RV sensitivity >12 mV    Past Medical History:   has a past medical history of Arthritis, Asthma, CAD (coronary artery disease), Fatty liver, GERD (gastroesophageal reflux disease), Hyperlipidemia, Hypertension, Medical history reviewed with no changes, MI, old, Sleep apnea, and Type II or unspecified type diabetes mellitus without mention of complication, not stated as uncontrolled. Past Surgical History:   has a past surgical history that includes Coronary angioplasty with stent [id number]); Colonoscopy; Endoscopy, colon, diagnostic; cyst removal (1982); Upper gastrointestinal endoscopy (07/18/2013); Upper gastrointestinal endoscopy (08/23/2013); Carpal tunnel release (Bilateral, 2013); ERCP (09/15/2013); Upper gastrointestinal endoscopy (09/15/2013); ERCP (10/11/2013); Cataract removal with implant (Left, 02/28/2014); Diagnostic Cardiac Cath Lab Procedure; other surgical history; pacemaker placement; Cardiac pacemaker placement (2011); Foot surgery (Right, 07/09/2018); pr lngth/shrt tendon leg/ankle 1 tendon spx (Right, 10/31/2018); pr gastrocnemius recession (Right, 10/31/2018); Pain management procedure (Bilateral, 05/26/2020); Pain management procedure (Bilateral, 06/09/2020); Nerve Surgery (Bilateral, 12/01/2020); Pain management procedure (Left, 01/12/2021); and eye surgery. Home Medications:    Prior to Admission medications    Medication Sig Start Date End Date Taking?  Authorizing Provider   albuterol sulfate HFA (PROVENTIL;VENTOLIN;PROAIR) 108 (90 Base) MCG/ACT inhaler Inhale into the lungs as needed 1/14/23  Yes Historical Provider, MD   carbidopa-levodopa (SINEMET)  MG per tablet Take 1 tablet by mouth 2 times daily 1/27/23  Yes ANA Kay CNP   divalproex (DEPAKOTE) 500 MG DR tablet Take 500 mg by mouth every morning and 1000 mg every evening. 1/16/23  Yes ANA Kay CNP   DULoxetine (CYMBALTA) 60 MG extended release capsule Take 1 capsule by mouth nightly 1/16/23  Yes ANA Kay CNP   apixaban (ELIQUIS) 5 MG TABS tablet Take 1 tablet by mouth 2 times daily 10/5/22  Yes ANA Villalobos CNP   fluticasone (FLONASE) 50 MCG/ACT nasal spray 2 sprays by Each Nostril route daily as needed for Rhinitis   Yes Historical Provider, MD   gabapentin (NEURONTIN) 100 MG capsule Take 200 mg by mouth 3 times daily.    Yes Historical Provider, MD   insulin lispro protamine & lispro (HUMALOG MIX) (75-25) 100 UNIT per ML SUSP injection vial Inject subcutaneously twice daily 15 units with breakfast and 20 units with dinner   Yes Historical Provider, MD   hydrOXYzine HCl (ATARAX) 25 MG tablet take 1 tablet by oral route 4 times every day as needed for itching 8/26/22  Yes ANA Kay CNP   famotidine (PEPCID) 20 MG tablet Take 20 mg by mouth 2 times daily   Yes Historical Provider, MD   metoprolol succinate (TOPROL XL) 50 MG extended release tablet TAKE ONE TABLET BY MOUTH TWICE DAILY 7/21/22  Yes ANA Segovia CNP   furosemide (LASIX) 20 MG tablet TAKE ONE TABLET ON MONDAY and THURSDAY 3/17/22  Yes Agatha Mullen MD   potassium chloride (KLOR-CON M) 20 MEQ extended release tablet TAKE ONE TABLET WITH LASIX ON Monday AND Orinda Dines 3/17/22  Yes Agatha Mullen MD   lisinopril (PRINIVIL;ZESTRIL) 5 MG tablet Take 1 tablet by mouth daily 2/8/22  Yes Didi Martines MD   tamsulosin [facility]) 0.4 MG capsule Take 1 capsule by mouth at bedtime 2/7/22  Yes Didi Martines MD   clopidogrel (PLAVIX) 75 MG tablet Take 1 tablet by mouth daily 2/8/22  Yes Jeniffer Rodriguez MD   Dulaglutide 3 MG/0.5ML SOPN Inject 3 mg into the skin once a week    Yes Historical Provider, MD   Fexofenadine HCl (ALLEGRA PO) Take 180 mg by mouth daily   Yes Historical Provider, MD   esomeprazole (NEXIUM) 40 MG delayed release capsule Take 40 mg by mouth daily   Yes Historical Provider, MD   JARDIANCE 25 MG tablet Take 25 mg by mouth daily  2/18/19  Yes Historical Provider, MD   insulin detemir (LEVEMIR) 100 UNIT/ML injection vial Inject 74 Units into the skin nightly   Yes Historical Provider, MD   metFORMIN (GLUCOPHAGE) 1000 MG tablet TAKE ONE TABLET BY MOUTH 2 TIMES DAILY WITH MORNING AND EVENING MEALS 9/26/17  Yes ANA Gallagher CNP   atorvastatin (LIPITOR) 80 MG tablet TAKE ONE TABLET BY MOUTH DAILY 2/20/17  Yes ANA Davis CNP     Allergies: Other and Pcn [penicillins]    Social History:   reports that he quit smoking about 21 years ago. His smoking use included cigarettes. He has a 4.00 pack-year smoking history. He has never used smokeless tobacco. He reports that he does not drink alcohol and does not use drugs. Family History: family history includes Cancer in his father; Diabetes in his maternal grandmother; Heart Disease in his mother; Heart Failure in his maternal uncle and mother; Hypertension in his maternal uncle. All 14 point review of systems are completed and pertinent positives are mentioned in the history of present illness. Other systems are reviewed and are negative. PHYSICAL EXAM:    Vital signs:    /76   Pulse 84   Ht 5' 4\" (1.626 m)   Wt 218 lb (98.9 kg)   SpO2 95%   BMI 37.42 kg/m²      Constitutional and general appearance: alert, cooperative, no distress, appears stated age  HEENT: PERRL, no cervical lymphadenopathy. No masses palpable.  Normal oral mucosa  Respiratory:  Normal excursion and expansion without use of accessory muscles  Resp auscultation: Normal breath sounds without wheezing, rhonchi, and rales  Cardiovascular: The apical impulse is not displaced  Heart tones are crisp and normal. regular S1 and S2.  Jugular venous pulsation normal  The carotid upstroke is normal in amplitude and contour without delay or bruit  Peripheral pulses are symmetrical and full   Abdomen:  No masses or tenderness  Bowel sounds present  Extremities:   No cyanosis or clubbing   No lower extremity edema   Skin: warm and dry  Neurological:  Alert and oriented  Moves all extremities well  No abnormalities of mood, affect, memory, mentation, or behavior are noted    DATA:    Limited Echo 1/15/2022:   Conclusions   Summary   This is a limited study afib, ischemic cmp, recurrent pacer firing. LV systolic function appears mildly reduced with EF estimated at 45-50%. There is more prominent HK of the inferior wall. Left ventricular size is decreased. There is mild concentric left ventricular hypertrophy. 11- 35-40% inf basal hypo, inferolateral akinesis, LVE, mild MR     [facility] 7/13/2021:  CONCLUSION:  Successful stenting of the true circumflex into the obtuse  marginal branch, successful stenting of the left posterior descending  coronary artery and successful stenting of the obtuse marginal branch,  all 80% stenosis reduced to 0 with these three drug-eluting stents. JAMES-3 blood flow was present in the circumflex artery and all its  branches. The LAD is normal with minor irregularities only and the left  main is normal.  Nondominant right coronary artery. LV ejection  fraction of 40% with inferior wall and inferoposterior hypokinesia. PLAN:  Hydration, bedrest, risk factor modifications to continue. Echo 11/29/2019:   Conclusions   Summary   The left ventricular systolic function is moderately reduced with an   ejection fraction of 35 -40 %. There is hypokinesis of the inferior and basal inferior walls. There is akinesis of the inferolateral wall.    Left ventricular cavity size is moderately dilated. Compared to previous study from 2-2-2015 no changes noted in left   ventricular function. Mild mitral regurgitation. All labs and testing reviewed. CARDIOLOGY LABS:   CBC: No results for input(s): WBC, HGB, HCT, PLT in the last 72 hours. BMP: No results for input(s): NA, K, CO2, BUN, CREATININE, LABGLOM, GLUCOSE in the last 72 hours. PT/INR: No results for input(s): PROTIME, INR in the last 72 hours. APTT:No results for input(s): APTT in the last 72 hours. FASTING LIPID PANEL:  Lab Results   Component Value Date/Time    HDL 33 01/24/2023 08:40 AM    LDLDIRECT 60 01/24/2023 08:40 AM    LDLCALC see below 01/24/2023 08:40 AM    TRIG 442 12/29/2020 10:08 AM     LIVER PROFILE:No results for input(s): AST, ALT, ALB in the last 72 hours.     IMPRESSION:    Patient Active Problem List   Diagnosis    Ventricular tachycardia    Presence of stent in left circumflex coronary artery    Myocardial infarction, nontransmural (HCC)    Myocardial infarction, inferoposterior wall, subsequent care    Automatic implantable cardioverter-defibrillator in situ    CAD (coronary artery disease)    Automatic implantable cardioverter-defibrillator in situ    ALIN on CPAP    Gastroesophageal reflux disease without esophagitis    Hyperlipidemia    Hypertension due to endocrine disorder    Ischemic cardiomyopathy    Obesity, morbid, BMI 40.0-49.9 (HCC)    Weakness    Acute encephalopathy    TIA involving left internal carotid artery    DM (diabetes mellitus), secondary, uncontrolled, w/neurologic complic    Dyslipidemia    HTN (hypertension), benign    Lactic acidosis    Head trauma    Abrasion, scalp w/o infection    Unsteady gait    Hyperglycemia    Chronic obstructive pulmonary disease (HCC)    Coarse tremor    Generalized weakness    Type 2 diabetes mellitus with hyperglycemia, with long-term current use of insulin ([facility] [address]    Primary hypertension    CAD S/P percutaneous coronary angioplasty    COVID-19 Unable to ambulate    Implantable cardioverter-defibrillator (ICD) discharge    AICD malfunction    Hypotension    Valproic acid toxicity    PAF (paroxysmal atrial fibrillation) (HCC)    COVID-19 virus infection    Disorder of electrolytes    Atrial fibrillation with RVR (HCC)    Low grade fever    Thrombocytopenia (HCC)    Abnormal chest x-ray    Encephalopathy    Debility    Bipolar disorder with depression (HCC)    Orthostatic hypotension    Chronic midline low back pain without sciatica     Assessment:   Ventricular tachycardia: ongoing   -s/p BiV ICD implant 5/2019   -device check per HPI   NSVT: ongoing    -noted on device checks. Unclear rhythm given limitations with RV lead only  Paroxysmal atrial fibrillation: ongoing, stable   -[id number] score: 3 (HTN, CAD, DM)   -noted during hospitalization 2/2022  Ischemic cardiomyopathy: ongoing   -EF 45-50% on limited Echo 1/2022, EF 40% on [facility] 7/2021  Orthostatic hypotension:   -documented in the ED 1/2023   -negative orthostatics in office today   Coronary artery disease:   -s/p 3 ROSHNI (LCx, PDA, and OM) [facility] 7/0221  HTN: controlled  HLD  ALIN  COPD  DM    Plan:   1. Continue Toprol, Eliquis, atorvastatin, Plavix, Lasix, and lisinoprol  2. We discussed anticoagulation and his current fall risk. We reviewed Watchman Device. He is agreeable to proceed. I reached out to the Watchman Coordinator to get appointment scheduled with Dr. Ramesh Vieira   3. Continue remote device transmissions every three months   4. Annual CBC and BMP due 1/2024  5. Follow up with Dr. Mona Paredes as scheduled on 2/15/2023  6.  Follow up in 4 months or sooner if needed    SOTERO Boucher, APRN-CNP  [facility]  [phone]

## 2023-02-07 ENCOUNTER — OFFICE VISIT (OUTPATIENT)
Dept: CARDIOLOGY CLINIC | Age: 63
End: 2023-02-07
Payer: MEDICARE

## 2023-02-07 ENCOUNTER — NURSE ONLY (OUTPATIENT)
Dept: CARDIOLOGY CLINIC | Age: 63
End: 2023-02-07
Payer: MEDICARE

## 2023-02-07 VITALS
DIASTOLIC BLOOD PRESSURE: 78 MMHG | HEART RATE: 89 BPM | OXYGEN SATURATION: 97 % | SYSTOLIC BLOOD PRESSURE: 132 MMHG | HEIGHT: 64 IN | BODY MASS INDEX: 37.22 KG/M2 | WEIGHT: 218 LBS

## 2023-02-07 DIAGNOSIS — I47.20 VENTRICULAR TACHYCARDIA: ICD-10-CM

## 2023-02-07 DIAGNOSIS — I48.0 PAF (PAROXYSMAL ATRIAL FIBRILLATION) (HCC): Primary | ICD-10-CM

## 2023-02-07 DIAGNOSIS — I95.1 ORTHOSTATIC HYPOTENSION: ICD-10-CM

## 2023-02-07 DIAGNOSIS — I47.29 NSVT (NONSUSTAINED VENTRICULAR TACHYCARDIA): ICD-10-CM

## 2023-02-07 DIAGNOSIS — I25.5 ISCHEMIC CARDIOMYOPATHY: ICD-10-CM

## 2023-02-07 DIAGNOSIS — Z95.810 ICD (IMPLANTABLE CARDIOVERTER-DEFIBRILLATOR) IN PLACE: ICD-10-CM

## 2023-02-07 DIAGNOSIS — Z95.810 AUTOMATIC IMPLANTABLE CARDIOVERTER-DEFIBRILLATOR IN SITU: ICD-10-CM

## 2023-02-07 DIAGNOSIS — G45.1 TIA INVOLVING LEFT INTERNAL CAROTID ARTERY: ICD-10-CM

## 2023-02-07 PROCEDURE — 99214 OFFICE O/P EST MOD 30 MIN: CPT

## 2023-02-07 PROCEDURE — 93283 PRGRMG EVAL IMPLANTABLE DFB: CPT | Performed by: INTERNAL MEDICINE

## 2023-02-07 PROCEDURE — 3074F SYST BP LT 130 MM HG: CPT

## 2023-02-07 PROCEDURE — 3078F DIAST BP <80 MM HG: CPT

## 2023-02-07 RX ORDER — ALBUTEROL SULFATE 90 UG/1
AEROSOL, METERED RESPIRATORY (INHALATION) AS NEEDED
COMMUNITY
Start: 2023-01-14

## 2023-02-07 NOTE — PATIENT INSTRUCTIONS
No changes today, continue your current medications     Recommend using your walker with activity     I will reach out to the Watchman team to schedule an appointment due to falls at home    Remote device transmissions every three months      Follow up with Dr. Giovani Ayala as scheduled on 2/15/2023    Follow up in 4 months or sooner if needed

## 2023-02-07 NOTE — PROGRESS NOTES
Patient presents to the device clinic today for a programming evaluation for his defibrillator. Patient has a history of ICM, TIA, VT, pAF, and AF w/ RVR. Takes Eliquis, Plavix, and Toprol XL. Last device interrogation was on 10/10. Since then, 1 NSVT event recorded x 10 beats. R wave: >12 mV     <1%    All sensing and pacing parameters are within normal range. No changes need to be made at this time. Patient will see MARTHA Gomes in office today. Patient education was provided about device functionality, in home monitoring, and any other patient questions and/or concerns were addressed. Patient voices understanding. Patient will follow up in 3 months in office or remotely. Please see interrogation for more detail - Paceart report located under the Cardiology tab.

## 2023-02-15 ENCOUNTER — OFFICE VISIT (OUTPATIENT)
Dept: CARDIOLOGY CLINIC | Age: 63
End: 2023-02-15
Payer: MEDICARE

## 2023-02-15 VITALS
TEMPERATURE: 98.6 F | HEIGHT: 64 IN | OXYGEN SATURATION: 96 % | SYSTOLIC BLOOD PRESSURE: 120 MMHG | BODY MASS INDEX: 38.76 KG/M2 | DIASTOLIC BLOOD PRESSURE: 80 MMHG | WEIGHT: 227 LBS | HEART RATE: 93 BPM

## 2023-02-15 DIAGNOSIS — R06.02 SOB (SHORTNESS OF BREATH) ON EXERTION: ICD-10-CM

## 2023-02-15 DIAGNOSIS — I25.5 ISCHEMIC CARDIOMYOPATHY: ICD-10-CM

## 2023-02-15 DIAGNOSIS — Z87.891 HISTORY OF TOBACCO ABUSE: ICD-10-CM

## 2023-02-15 DIAGNOSIS — I48.0 PAF (PAROXYSMAL ATRIAL FIBRILLATION) (HCC): ICD-10-CM

## 2023-02-15 DIAGNOSIS — I10 PRIMARY HYPERTENSION: ICD-10-CM

## 2023-02-15 DIAGNOSIS — G47.33 OSA ON CPAP: ICD-10-CM

## 2023-02-15 DIAGNOSIS — E78.5 HYPERLIPIDEMIA, UNSPECIFIED HYPERLIPIDEMIA TYPE: ICD-10-CM

## 2023-02-15 DIAGNOSIS — I25.10 CORONARY ARTERY DISEASE INVOLVING NATIVE CORONARY ARTERY OF NATIVE HEART WITHOUT ANGINA PECTORIS: Primary | ICD-10-CM

## 2023-02-15 DIAGNOSIS — Z99.89 OSA ON CPAP: ICD-10-CM

## 2023-02-15 DIAGNOSIS — I21.4: ICD-10-CM

## 2023-02-15 PROCEDURE — 3079F DIAST BP 80-89 MM HG: CPT | Performed by: INTERNAL MEDICINE

## 2023-02-15 PROCEDURE — 3074F SYST BP LT 130 MM HG: CPT | Performed by: INTERNAL MEDICINE

## 2023-02-15 PROCEDURE — 99214 OFFICE O/P EST MOD 30 MIN: CPT | Performed by: INTERNAL MEDICINE

## 2023-02-15 NOTE — PROGRESS NOTES
[address]   Cardiac Consultation    Referring Provider:  Loy Bird, APRN - JENSEN     Chief Complaint   Patient presents with    New Patient     Prior Dr. Rabia Barry patient    Coronary Artery Disease    Hypertension    Hyperlipidemia      Subjective: Mr. Carolyn Malin. is here today to establish cardiology care; no complaints today    History of Present Illness:   Lauren Marquez is a 63 y.o. male who prior saw Dr. Haylee Farnsworth. Follows EP NP Raffel. He has PMH PAF on eliquis (referred for [facility] due to fall risk)  ICM, DM, chronic systolic CHF, CAD s/p 3 ROSHNI CCx, Left PDA, OM in 7/21, hx MI, hx NSVT, s/p BiV-ICD 5/19, COPD, asthma, HTN, HLD, Parkinson's disease, and GERD. In 2001, he had his first original ICD. Most recent [facility]  7/2/2021 abnormal inferior territory. Most recent [facility] 7/13/21 had 3 ROSHNI placed (LCx, OM, and PDA). Admitted 1/12-1/27/22 for a fall at home, Covid+, developed new onset AFib RVR then had inappropriate device firings x5 and additional 4-5 bursts. Most recent limited echo 1/15/22 LVEF=45-50% (EF=35-40% in 11/19); more prominent HK inferior wall. LV size is decreased. mild cLVH. Most recent device check 2/7/23 1 NSVT event recorded x 10 beats. R wave: >12 mV;  <1%; All sensing and pacing parameters are within normal range. Today, he reports he is here to transfer care. He is going to [place] appointment in March due to higher fall risk. He just started a new medication for Parkinson's. He isn't able to walk very well and poor balance due to Parkinson's. He gets SOB when he walks longer distances but overall does well in daily life. Routine ADL's he is asymptomatic. Patient denies current edema, chest pain, palpitations, dizziness or syncope. Patient is taking all cardiac medications as prescribed and tolerates them well. Weight today is 227#     Patient is vaccinated against Covid.  Moderna 2/2 Covid+ 2/2022, Covid+ 1/2022    Past Medical History:   has a past medical history of Arthritis, Asthma, CAD (coronary artery disease), Fatty liver, GERD (gastroesophageal reflux disease), Hyperlipidemia, Hypertension, Medical history reviewed with no changes, MI, old, Sleep apnea, and Type II or unspecified type diabetes mellitus without mention of complication, not stated as uncontrolled. Surgical History:   has a past surgical history that includes Coronary angioplasty with stent (6139,9009); Colonoscopy; Endoscopy, colon, diagnostic; cyst removal (1982); Upper gastrointestinal endoscopy (07/18/2013); Upper gastrointestinal endoscopy (08/23/2013); Carpal tunnel release (Bilateral, 2013); ERCP (09/15/2013); Upper gastrointestinal endoscopy (09/15/2013); ERCP (10/11/2013); Cataract removal with implant (Left, 02/28/2014); Diagnostic Cardiac Cath Lab Procedure; other surgical history; pacemaker placement; Cardiac pacemaker placement (2011); Foot surgery (Right, 07/09/2018); pr lngth/shrt tendon leg/ankle 1 tendon spx (Right, 10/31/2018); pr gastrocnemius recession (Right, 10/31/2018); Pain management procedure (Bilateral, 05/26/2020); Pain management procedure (Bilateral, 06/09/2020); Nerve Surgery (Bilateral, 12/01/2020); Pain management procedure (Left, 01/12/2021); and eye surgery. Social History:   reports that he quit smoking about 2000. His smoking use included cigarettes. He has a 4.00 pack-year smoking history. He has never used smokeless tobacco. He reports that he does not drink alcohol and does not use drugs. Family History:  family history includes Cancer in his father; Diabetes in his maternal grandmother; Heart Disease in his mother; Heart Failure in his maternal uncle and mother; Hypertension in his maternal uncle. Home Medications:  Prior to Admission medications    Medication Sig Start Date End Date Taking?  Authorizing Provider   albuterol sulfate HFA (PROVENTIL;VENTOLIN;PROAIR) 108 (90 Base) MCG/ACT inhaler Inhale into the lungs as needed 1/14/23  Yes Historical Provider, MD   carbidopa-levodopa (SINEMET)  MG per tablet Take 1 tablet by mouth 2 times daily 1/27/23  Yes ANA Kwok CNP   divalproex (DEPAKOTE) 500 MG DR tablet Take 500 mg by mouth every morning and 1000 mg every evening. 1/16/23  Yes ANA Kwok CNP   DULoxetine (CYMBALTA) 60 MG extended release capsule Take 1 capsule by mouth nightly 1/16/23  Yes ANA Kwok CNP   apixaban (ELIQUIS) 5 MG TABS tablet Take 1 tablet by mouth 2 times daily 10/5/22  Yes Jeanette RaffelANA CNP   fluticasone (FLONASE) 50 MCG/ACT nasal spray 2 sprays by Each Nostril route daily as needed for Rhinitis   Yes Historical Provider, MD   gabapentin (NEURONTIN) 100 MG capsule Take 200 mg by mouth 3 times daily.    Yes Historical Provider, MD   insulin lispro protamine & lispro (HUMALOG MIX) (75-25) 100 UNIT per ML SUSP injection vial Inject subcutaneously twice daily 15 units with breakfast and 20 units with dinner   Yes Historical Provider, MD   hydrOXYzine HCl (ATARAX) 25 MG tablet take 1 tablet by oral route 4 times every day as needed for itching 8/26/22  Yes ANA Kwok CNP   famotidine (PEPCID) 20 MG tablet Take 20 mg by mouth 2 times daily   Yes Historical Provider, MD   metoprolol succinate (TOPROL XL) 50 MG extended release tablet TAKE ONE TABLET BY MOUTH TWICE DAILY 7/21/22  Yes ANA Johnson CNP   furosemide (LASIX) 20 MG tablet TAKE ONE TABLET ON MONDAY and THURSDAY 3/17/22  Yes Gisella Rodriguez MD   potassium chloride (KLOR-CON M) 20 MEQ extended release tablet TAKE ONE TABLET WITH LASIX ON Monday AND Torrie Lisette 3/17/22  Yes Gisella Rodriguez MD   lisinopril (PRINIVIL;ZESTRIL) 5 MG tablet Take 1 tablet by mouth daily 2/8/22  Yes Dariana Ascencio MD   tamsulosin [facility]) 0.4 MG capsule Take 1 capsule by mouth at bedtime 2/7/22  Yes Dariana Ascencio MD   clopidogrel (PLAVIX) 75 MG tablet Take 1 tablet by mouth daily 2/8/22  Yes Lindsey Oconnor MD   Dulaglutide 3 MG/0.5ML SOPN Inject 3 mg into the skin once a week    Yes Historical Provider, MD   Fexofenadine HCl (ALLEGRA PO) Take 180 mg by mouth daily   Yes Historical Provider, MD   esomeprazole (NEXIUM) 40 MG delayed release capsule Take 40 mg by mouth daily   Yes Historical Provider, MD   JARDIANCE 25 MG tablet Take 25 mg by mouth daily  2/18/19  Yes Historical Provider, MD   insulin detemir (LEVEMIR) 100 UNIT/ML injection vial Inject 74 Units into the skin nightly   Yes Historical Provider, MD   metFORMIN (GLUCOPHAGE) 1000 MG tablet TAKE ONE TABLET BY MOUTH 2 TIMES DAILY WITH MORNING AND EVENING MEALS 9/26/17  Yes ANA Cummings CNP   atorvastatin (LIPITOR) 80 MG tablet TAKE ONE TABLET BY MOUTH DAILY 2/20/17  Yes ANA Martinez CNP        Allergies: Other and Pcn [penicillins]     Review of Systems:   Constitutional: there has been no unanticipated weight loss. There's been no change in energy level, sleep pattern, or activity level. Eyes: No visual changes or diplopia. No scleral icterus. ENT: No Headaches, hearing loss or vertigo. No mouth sores or sore throat. Cardiovascular: Reviewed in HPI  Respiratory: No cough or wheezing, no sputum production. No hematemesis. Gastrointestinal: No abdominal pain, appetite loss, blood in stools. No change in bowel or bladder habits. Genitourinary: No dysuria, trouble voiding, or hematuria. Musculoskeletal:  No gait disturbance, weakness or joint complaints. Integumentary: No rash or pruritis. Neurological: No headache, diplopia, change in muscle strength, numbness or tingling. No change in gait, balance, coordination, mood, affect, memory, mentation, behavior. Psychiatric: No anxiety, no depression. Endocrine: No malaise, fatigue or temperature intolerance. No excessive thirst, fluid intake, or urination. No tremor.   Hematologic/Lymphatic: No abnormal bruising or bleeding, blood clots or swollen lymph nodes. Allergic/Immunologic: No nasal congestion or hives. Physical Examination:    Vitals:    02/15/23 1108   BP: 120/80   Pulse: 93   Temp: 98.6 °F (37 °C)   SpO2: 96%   Weight: 227 lb (103 kg)   Height: 5' 4\" (1.626 m)       Constitutional and General Appearance: NAD   Respiratory:  Normal excursion and expansion without use of accessory muscles  Resp Auscultation: Clear, no crackles or wheezes   Cardiovascular: The apical impulses not displaced  Heart tones are crisp and normal  Cervical veins are not engorged  The carotid upstroke is normal in amplitude and contour without delay or bruit  Normal S1S2, No S3, No Murmur  Peripheral pulses are symmetrical and full  There is no clubbing, cyanosis of the extremities. No edema  Femoral Arteries: 2+ and equal  Pedal Pulses: 2+ and equal   Abdomen:  No masses or tenderness  Liver/Spleen: No Abnormalities Noted  Neurological/Psychiatric:  Alert and oriented in all spheres  Moves all extremities well  Exhibits normal gait balance and coordination  No abnormalities of mood, affect, memory, mentation, or behavior are noted  Skin:  Skin: warm and dry. Lab Results   Component Value Date    CHOL 170 12/29/2020    CHOL 138 04/19/2017    CHOL 117 11/18/2016     Lab Results   Component Value Date    TRIG 442 (H) 12/29/2020    TRIG 297 (H) 04/19/2017    TRIG 201 (H) 11/18/2016     Lab Results   Component Value Date    HDL 33 (L) 01/24/2023    HDL 35 (L) 08/26/2022    HDL 29 (L) 12/29/2020     Lab Results   Component Value Date    LDLCALC see below 01/24/2023    [address] see below 08/26/2022    LDLCALC see below 12/29/2020     Lab Results   Component Value Date    LABVLDL see below 01/24/2023    LABVLDL see below 08/26/2022    LABVLDL see below 12/29/2020     No results found for: CHOLHDLRATIO    Assessment:     1.  Coronary artery disease involving native coronary artery of native heart without angina pectoris: Most recent [facility] 7/13/21 had 3 ROSHNI placed (LCx, OM, and PDA). There are no concerning symptoms for angina currently. Stable and will continue present medical regimen. 2. Myocardial infarction, nontransmural ([facility] [address]): Remote in 2001. Continue current cardiac med regimen. 3. Ischemic cardiomyopathy: Improved. Most recent limited echo 1/15/22 LVEF=45-50% (EF=35-40% in 11/19). Clinically compensated NYHA Class II and will continue current CHF medical regimen. Continue BB and ACE-I. Improved EF and no concerning CHF symptoms. Does not meet criteria for MRA or SGLT2i now. 4. Primary hypertension: Well controlled and will continue current medical regimen. 5. PAF (paroxysmal atrial fibrillation) ([facility] [address]): On elquis for [facility] and referred by EP to [place] due to concern for fall risk given Parkinson's. Continue Toprol for HR control. 6. Hyperlipidemia, unspecified hyperlipidemia type: I personally reviewed most recent lipids from 1/24/23 in Epic (see above). Well controlled except for elevated TG. Needs to watch diet as aggressively and will try to add fenofibrate 54mg po daily to regimen and recheck labs 2 months. Plan:  Current medications reviewed. Refills given as warranted. If you have any leg swelling you can take the Lasix as needed. No cardiac testing at this time. I do not want to many any changes at this time. Follow up with me in 6 months, or sooner if needed    This note is scribed in the presence of Dr. Chio Alfonso. Alexander Brain by Lanny Hodgkins, RN.    I, Dr. Antonietta Morgan, personally performed the services described in this documentation, as scribed by the above signed scribe in my presence. It is both accurate and complete to my knowledge. I agree with the details independently gathered by the clinical support staff, while the remaining scribed note accurately describes my personal service to the patient. Cost of prescription medications and patient compliance have been reviewed with patient. All questions answered. Thank you for allowing me to participate in the care of this individual.    Galindo Duque.  Diana Looney M.D., [facility]

## 2023-02-15 NOTE — PATIENT INSTRUCTIONS
Plan:  Current medications reviewed. Refills given as warranted. If you have any leg swelling you can take the Lasix as needed. No cardiac testing at this time. I do not want to many any changes at this time.     Follow up with me in 6 months, or sooner if needed

## 2023-02-24 ENCOUNTER — OFFICE VISIT (OUTPATIENT)
Dept: FAMILY MEDICINE CLINIC | Age: 63
End: 2023-02-24

## 2023-02-24 VITALS
BODY MASS INDEX: 38.45 KG/M2 | HEART RATE: 84 BPM | OXYGEN SATURATION: 98 % | SYSTOLIC BLOOD PRESSURE: 112 MMHG | DIASTOLIC BLOOD PRESSURE: 70 MMHG | WEIGHT: 224 LBS

## 2023-02-24 DIAGNOSIS — R25.8 BRADYKINESIA: ICD-10-CM

## 2023-02-24 DIAGNOSIS — G25.2 COARSE TREMOR: ICD-10-CM

## 2023-02-24 DIAGNOSIS — G20 PARKINSON'S DISEASE (HCC): Primary | ICD-10-CM

## 2023-02-24 SDOH — ECONOMIC STABILITY: INCOME INSECURITY: HOW HARD IS IT FOR YOU TO PAY FOR THE VERY BASICS LIKE FOOD, HOUSING, MEDICAL CARE, AND HEATING?: NOT HARD AT ALL

## 2023-02-24 SDOH — ECONOMIC STABILITY: FOOD INSECURITY: WITHIN THE PAST 12 MONTHS, THE FOOD YOU BOUGHT JUST DIDN'T LAST AND YOU DIDN'T HAVE MONEY TO GET MORE.: NEVER TRUE

## 2023-02-24 SDOH — ECONOMIC STABILITY: FOOD INSECURITY: WITHIN THE PAST 12 MONTHS, YOU WORRIED THAT YOUR FOOD WOULD RUN OUT BEFORE YOU GOT MONEY TO BUY MORE.: NEVER TRUE

## 2023-02-24 SDOH — ECONOMIC STABILITY: HOUSING INSECURITY
IN THE LAST 12 MONTHS, WAS THERE A TIME WHEN YOU DID NOT HAVE A STEADY PLACE TO SLEEP OR SLEPT IN A SHELTER (INCLUDING NOW)?: NO

## 2023-02-24 ASSESSMENT — ENCOUNTER SYMPTOMS
DIARRHEA: 0
EYE DISCHARGE: 0
ABDOMINAL PAIN: 0
SHORTNESS OF BREATH: 0
EYE PAIN: 0
COUGH: 0
CONSTIPATION: 0
CHEST TIGHTNESS: 0
ABDOMINAL DISTENTION: 0
COLOR CHANGE: 0
SORE THROAT: 0

## 2023-02-24 NOTE — PROGRESS NOTES
[facility]  2023    Cheryl Briones (:  1960) is a 63 y.o. male, here for evaluation of the following medical concerns:    Chief Complaint   Patient presents with    Fall     Pt said the new medication is helping   Pt had no falls   Pt said he did ot and pt with [place] [facility]         ASSESSMENT/ PLAN  1. Parkinson's disease ([facility] [address]  -Diagnosed through exclusion  -Bradykinesia and coarse tremor significantly improved while on Sinemet  -Exam today in the office with ambulatory status appears significantly improved. Still has some mild shuffling gait. His affect is not as flat. -Increase Sinemet to 3 times daily  -Still use cane to help prevent falls  - carbidopa-levodopa (SINEMET)  MG per tablet; Take 1 tablet by mouth 3 times daily  Dispense: 90 tablet; Refill: 3    2. Bradykinesia  -See #1  - carbidopa-levodopa (SINEMET)  MG per tablet; Take 1 tablet by mouth 3 times daily  Dispense: 90 tablet; Refill: 3    3. Coarse tremor  -See #1  - carbidopa-levodopa (SINEMET)  MG per tablet; Take 1 tablet by mouth 3 times daily  Dispense: 90 tablet; Refill: 3         Return for keep appointment in May. Sedrick IRAHETA  Since last time patient has been on Sinemet twice a day. He states that he feels about 75 to 80% better. He states that his walking and ambulatory status has improved. He states that he does not have to use his cane all the time. He has graduated from physical therapy and Occupational Therapy. He does state that his slow movements have considerable improvement after taking Sinemet. He states that his legs do weakened throughout the day after Sinemet wears off. He does feel as if he could benefit from increasing dose to 3 times a day from twice a day. ROS  Review of Systems   Constitutional:  Negative for chills, fever and unexpected weight change. HENT:  Negative for congestion, dental problem and sore throat. Eyes:  Negative for pain and discharge. Respiratory:  Negative for cough, chest tightness and shortness of breath. Cardiovascular:  Negative for chest pain, palpitations and leg swelling. Gastrointestinal:  Negative for abdominal distention, abdominal pain, constipation and diarrhea. Endocrine: Negative for polydipsia, polyphagia and polyuria. Genitourinary:  Negative for dysuria, flank pain, frequency, hematuria and urgency. Musculoskeletal:  Positive for gait problem. Negative for arthralgias, joint swelling and myalgias. Skin:  Negative for color change and rash. Neurological:  Positive for tremors, weakness and numbness. Negative for dizziness, light-headedness and headaches. Psychiatric/Behavioral:  Negative for agitation and behavioral problems. The patient is not nervous/anxious. HISTORIES  Current Outpatient Medications on File Prior to Visit   Medication Sig Dispense Refill    albuterol sulfate HFA (PROVENTIL;VENTOLIN;PROAIR) 108 (90 Base) MCG/ACT inhaler Inhale into the lungs as needed      divalproex (DEPAKOTE) 500 MG DR tablet Take 500 mg by mouth every morning and 1000 mg every evening. 90 tablet 1    DULoxetine (CYMBALTA) 60 MG extended release capsule Take 1 capsule by mouth nightly 90 capsule 1    apixaban (ELIQUIS) 5 MG TABS tablet Take 1 tablet by mouth 2 times daily 120 tablet 2    fluticasone (FLONASE) 50 MCG/ACT nasal spray 2 sprays by Each Nostril route daily as needed for Rhinitis      gabapentin (NEURONTIN) 100 MG capsule Take 200 mg by mouth 3 times daily.       insulin lispro protamine & lispro (HUMALOG MIX) (75-25) 100 UNIT per ML SUSP injection vial Inject subcutaneously twice daily 15 units with breakfast and 20 units with dinner      hydrOXYzine HCl (ATARAX) 25 MG tablet take 1 tablet by oral route 4 times every day as needed for itching 120 tablet 0    famotidine (PEPCID) 20 MG tablet Take 20 mg by mouth 2 times daily      metoprolol succinate (TOPROL XL) 50 MG extended release tablet TAKE ONE TABLET BY MOUTH TWICE DAILY 180 tablet 3    furosemide (LASIX) 20 MG tablet TAKE ONE TABLET ON MONDAY and THURSDAY 90 tablet 1    potassium chloride (KLOR-CON M) 20 MEQ extended release tablet TAKE ONE TABLET WITH LASIX ON Monday AND THURSDAYS 90 tablet 1    lisinopril (PRINIVIL;ZESTRIL) 5 MG tablet Take 1 tablet by mouth daily 30 tablet 3    tamsulosin (FLOMAX) 0.4 MG capsule Take 1 capsule by mouth at bedtime 30 capsule 3    clopidogrel (PLAVIX) 75 MG tablet Take 1 tablet by mouth daily 30 tablet 3    Dulaglutide 3 MG/0.5ML SOPN Inject 3 mg into the skin once a week       Fexofenadine HCl (ALLEGRA PO) Take 180 mg by mouth daily      esomeprazole (NEXIUM) 40 MG delayed release capsule Take 40 mg by mouth daily      JARDIANCE 25 MG tablet Take 25 mg by mouth daily       insulin detemir (LEVEMIR) 100 UNIT/ML injection vial Inject 74 Units into the skin nightly      metFORMIN (GLUCOPHAGE) 1000 MG tablet TAKE ONE TABLET BY MOUTH 2 TIMES DAILY WITH MORNING AND EVENING MEALS 60 tablet 0    atorvastatin (LIPITOR) 80 MG tablet TAKE ONE TABLET BY MOUTH DAILY 30 tablet 11     No current facility-administered medications on file prior to visit.       Allergies   Allergen Reactions    Other      VASCEPA CAUSED RASH AND ITCHING    Pcn [Penicillins] Hives     Past Medical History:   Diagnosis Date    Arthritis     Asthma     CAD (coronary artery disease)     Fatty liver     GERD (gastroesophageal reflux disease)     Hyperlipidemia     Hypertension     Medical history reviewed with no changes     MI, old     Sleep apnea     pt wears cpap at night. states does not have cpap    Type II or unspecified type diabetes mellitus without mention of complication, not stated as uncontrolled      Patient Active Problem List   Diagnosis    Ventricular tachycardia    Presence of stent in left circumflex coronary artery    Myocardial infarction, nontransmural (HCC)    Myocardial infarction, inferoposterior wall, subsequent care    Automatic implantable cardioverter-defibrillator in situ    CAD (coronary artery disease)    Automatic implantable cardioverter-defibrillator in situ    ALIN on CPAP    Gastroesophageal reflux disease without esophagitis    Hyperlipidemia    Hypertension due to endocrine disorder    Ischemic cardiomyopathy    Obesity, morbid, BMI 40.0-49.9 ([facility] [address]    Weakness    Acute encephalopathy    TIA involving left internal carotid artery    DM (diabetes mellitus), secondary, uncontrolled, w/neurologic complic    Dyslipidemia    HTN (hypertension), benign    Lactic acidosis    Head trauma    Abrasion, scalp w/o infection    Unsteady gait    Hyperglycemia    Chronic obstructive pulmonary disease (HCC)    Coarse tremor    Generalized weakness    Type 2 diabetes mellitus with hyperglycemia, with long-term current use of insulin ([facility] [address]    Primary hypertension    CAD S/P percutaneous coronary angioplasty    COVID-19    Unable to ambulate    Implantable cardioverter-defibrillator (ICD) discharge    AICD malfunction    Hypotension    Valproic acid toxicity    PAF (paroxysmal atrial fibrillation) ([facility] [address]    COVID-19 virus infection    Disorder of electrolytes    Atrial fibrillation with RVR (HCC)    Low grade fever    Thrombocytopenia (HCC)    Abnormal chest x-ray    Encephalopathy    Debility    Bipolar disorder with depression (HCC)    Orthostatic hypotension    Chronic midline low back pain without sciatica    SOB (shortness of breath) on exertion    History of tobacco abuse     Past Surgical History:   Procedure Laterality Date    CARDIAC PACEMAKER PLACEMENT  2011    NOT MRI COMPATIABLE OLD LEADS [facility].  pace maker difib    CARPAL TUNNEL RELEASE Bilateral 2013    CATARACT REMOVAL WITH IMPLANT Left 02/28/2014    COLONOSCOPY      CORONARY ANGIOPLASTY WITH STENT PLACEMENT  2001,2008    CYST REMOVAL  1982    coccyx area    DIAGNOSTIC CARDIAC CATH LAB PROCEDURE      ENDOSCOPY, COLON, DIAGNOSTIC      ERCP  09/15/2013    ERCP  10/11/2013    WITH STENT REMOVAL EYE SURGERY      FOOT SURGERY Right 2018     REPAIR CALCANEAL SPUR, REPAIR OF ACHILLES TENDON RIGHT FOOT    NERVE SURGERY Bilateral 2020    BILATERAL LUMBAR THREE LUMBAR FOUR LUMBAR FIVE DORSAL RAMUS  RADIOFREQUENCY ABLATION SITE CONFIRMED BY FLUOROSCOPY [facility]      back stimulator in lower back    PACEMAKER PLACEMENT      ICD    PAIN MANAGEMENT PROCEDURE Bilateral 2020    BILATERAL LUMBAR THREE, LUMBAR FOUR, LUMBAR FIVE DORSAL RAMUS MEDIAL BRANCH BLOCK SITE CONFIRMED BY FLUOROSCOPY performed by Sharon Francois MD at [address] Bilateral 2020    BILATERAL LUMBAR THREE, LUMBAR FOUR, LUMBAR FIVE DORSAL RAMUS MEDIAL BRANCH BLOCK SITE CONFIRMED BY FLUOROSCOPY performed by Sharon Francois MD at [employer] Left 2021    LEFT SACROILIAC JOINT INJECTION SITE CONFIRMED BY FLUOROSCOPY [facility] Right 10/31/2018    GASTROCNEMIUS RECESSION RIGHT FOOT performed by Noel Durán DPM at [address] TENDON LEG/ANKLE 1 TENDON SPX Right 10/31/2018    REMOVAL OF CALCANEAL SPUR, ACHILLES TENDON REPAIR RIGHT LOWER EXTREMITY performed by Noel Durán DPM at [address]  2013    and colonoscopy with biopsies taken    UPPER GASTROINTESTINAL ENDOSCOPY  2013    EUS    UPPER GASTROINTESTINAL ENDOSCOPY  09/15/2013     Social History     Socioeconomic History    Marital status:       Spouse name: Not on file    Number of children: Not on file    Years of education: Not on file    Highest education level: Not on file   Occupational History    Not on file   Tobacco Use    Smoking status: Former     Packs/day: 2.00     Years: 2.00     Pack years: 4.00     Types: Cigarettes     Quit date: 2001     Years since quittin.7    Smokeless tobacco: Never   Vaping Use    Vaping Use: Never used   Substance and Sexual Activity    Alcohol use: No     Alcohol/week: 0.0 standard drinks    Drug use: No    Sexual activity: Never   Other Topics Concern    Not on file   Social History Narrative    Not on file     Social Determinants of Health     Financial Resource Strain: Low Risk     Difficulty of Paying Living Expenses: Not hard at all   Food Insecurity: No Food Insecurity    Worried About [address] in the Last Year: Never true    [address] in the Last Year: Never true   Transportation Needs: Unknown    Lack of Transportation (Medical): Not on file    Lack of Transportation (Non-Medical): No   Physical Activity: Not on file   Stress: Not on file   Social Connections: Not on file   Intimate Partner Violence: Not on file   Housing Stability: Unknown    Unable to Pay for Housing in the Last Year: Not on file    Number of Places Lived in the Last Year: Not on file    Unstable Housing in the Last Year: No      Family History   Problem Relation Age of Onset    Heart Disease Mother     Heart Failure Mother     Cancer Father     Diabetes Maternal Grandmother     Heart Failure Maternal Uncle     Hypertension Maternal Uncle     Asthma Neg Hx     Emphysema Neg Hx        PE  Vitals:    02/24/23 1003   BP: 112/70   Site: Left Upper Arm   Position: Sitting   Cuff Size: Large Adult   Pulse: 84   SpO2: 98%   Weight: 224 lb (101.6 kg)     Estimated body mass index is 38.45 kg/m² as calculated from the following:    Height as of 2/15/23: 5' 4\" (1.626 m). Weight as of this encounter: 224 lb (101.6 kg). Physical Exam  Constitutional:       General: He is not in acute distress. Appearance: Normal appearance. He is not ill-appearing. HENT:      Head: Normocephalic. Right Ear: Tympanic membrane and external ear normal.      Left Ear: Tympanic membrane and external ear normal.      Nose: No congestion or rhinorrhea.       Mouth/Throat:      Mouth: Mucous membranes are moist.      Pharynx: Oropharynx is clear. No posterior oropharyngeal erythema. Eyes:      Extraocular Movements: Extraocular movements intact. Conjunctiva/sclera: Conjunctivae normal.      Pupils: Pupils are equal, round, and reactive to light. Cardiovascular:      Rate and Rhythm: Normal rate and regular rhythm. Pulses: Normal pulses. Heart sounds: Normal heart sounds. No murmur heard. Pulmonary:      Effort: Pulmonary effort is normal. No respiratory distress. Breath sounds: Normal breath sounds. No wheezing. Abdominal:      General: Abdomen is flat. Bowel sounds are normal.      Palpations: Abdomen is soft. Tenderness: There is no abdominal tenderness. Hernia: No hernia is present. Musculoskeletal:         General: No swelling. Normal range of motion. Cervical back: Normal range of motion and neck supple. No tenderness. Right lower leg: No edema. Left lower leg: No edema. Feet:      Right foot:      Protective Sensation: 10 sites tested. 6 sites sensed. Left foot:      Protective Sensation: 10 sites tested. 6 sites sensed. Lymphadenopathy:      Cervical: No cervical adenopathy. Skin:     General: Skin is warm and dry. Capillary Refill: Capillary refill takes less than 2 seconds. Neurological:      General: No focal deficit present. Mental Status: He is alert and oriented to person, place, and time. Mental status is at baseline. Cranial Nerves: No cranial nerve deficit. Motor: Tremor present. Comments: Shuffling gait,    Psychiatric:         Mood and Affect: Mood normal. Affect is flat. Behavior: Behavior normal.         Judgment: Judgment normal.       ANA Sidhu - CNP    This dictation was generated by voice recognition computer software. Although all attempts are made to edit the dictation for accuracy, there may be errors in the transcription that are not intended.

## 2023-02-27 NOTE — PROGRESS NOTES
[address]  Cardiology Consult    Radha Mohan.  1960 March 2, 2023    Primary Cardiologist: S  Referring Physician: ALEKSANDAR Chase    Reason for Referral: Left atrial appendage closure    CC: \"I am unsteady. Multiple falls\"      Subjective:     History of Present Illness:    Radha Marie is a 63 y.o. patient with a PMH significant for paroxsymal atrial fibrillation, ventricular tachycardia, chronic systolic heart failure, diabetes, coronary artery disease, hypertension, hyperlipidemia, COPD, and GERD. In 2001, he had his first original ICD implant, and it was replaced in 2005 for battery depletion. He underwent lead extraction at [facility] in 2007 for fracture of the RV lead. He received several ICD shocks in 2008 when he did not take his medication. His current ICD was implanted in May 2011. In 6/2021 he reported syncope to his primary cardiologist. In 7/2021 he had an abnormal stress test and underwent LHC. He had 3 ROSHNI placed (LCx, OM, and PDA). In 2/2022 he was hospitalized for weakness and falls and was diagnosed with COVID-19. During his hospitalization, he went into AF and his ICD inappropriately fired. VT detection was adjusted at that time. In 6/2022 he was seen in the office and had no complaints. NSVT was seen on device interrogations, but due to limitations of only having RV lead, unable to determine rhythm of NSVT events. Toprol was increased to 50 mg BID. In 10/2022 he was admitted for generalized weakness. In 1/2023 he was admitted after having a fall at home. Patient is being referred for Left Atrial Appendage Closure with WATCHMAN device for management of stroke risk resulting from non-valvular atrial fibrillation. Based on their past history, it has been determined that they are poor candidates for long-term oral-anticoagulation, however may be tolerant of short term treatment with [facility] as necessary. Today, he is here to discuss Watchman. He is unsteady and does use a cane. He has Parkinson's as well. Patient denies exertional chest pain/pressure, dyspnea at rest, PAULA, PND, orthopnea, palpitations, lightheadedness, weight changes, changes in LE edema, and syncope. Past Medical History:   has a past medical history of Arthritis, Asthma, CAD (coronary artery disease), Fatty liver, GERD (gastroesophageal reflux disease), Hyperlipidemia, Hypertension, Medical history reviewed with no changes, MI, old, Sleep apnea, and Type II or unspecified type diabetes mellitus without mention of complication, not stated as uncontrolled. Surgical History:   has a past surgical history that includes Coronary angioplasty with stent (9213,0801); Colonoscopy; Endoscopy, colon, diagnostic; cyst removal (1982); Upper gastrointestinal endoscopy (07/18/2013); Upper gastrointestinal endoscopy (08/23/2013); Carpal tunnel release (Bilateral, 2013); ERCP (09/15/2013); Upper gastrointestinal endoscopy (09/15/2013); ERCP (10/11/2013); Cataract removal with implant (Left, 02/28/2014); Diagnostic Cardiac Cath Lab Procedure; other surgical history; pacemaker placement; Cardiac pacemaker placement (2011); Foot surgery (Right, 07/09/2018); pr lngth/shrt tendon leg/ankle 1 tendon spx (Right, 10/31/2018); pr gastrocnemius recession (Right, 10/31/2018); Pain management procedure (Bilateral, 05/26/2020); Pain management procedure (Bilateral, 06/09/2020); Nerve Surgery (Bilateral, 12/01/2020); Pain management procedure (Left, 01/12/2021); and eye surgery. Social History:   reports that he quit smoking about 21 years ago. His smoking use included cigarettes. He has a 4.00 pack-year smoking history. He has never used smokeless tobacco. He reports that he does not drink alcohol and does not use drugs.      Family History:  family history includes Cancer in his father; Diabetes in his maternal grandmother; Heart Disease in his mother; Heart Failure in his maternal uncle and mother; Hypertension in his maternal uncle. Home Medications:  Were reviewed and are listed in nursing record and/or below  Prior to Admission medications    Medication Sig Start Date End Date Taking? Authorizing Provider   carbidopa-levodopa (SINEMET)  MG per tablet Take 1 tablet by mouth 3 times daily 2/24/23  Yes ANA Hunter CNP   albuterol sulfate HFA (PROVENTIL;VENTOLIN;PROAIR) 108 (90 Base) MCG/ACT inhaler Inhale into the lungs as needed 1/14/23  Yes Historical Provider, MD   divalproex (DEPAKOTE) 500 MG DR tablet Take 500 mg by mouth every morning and 1000 mg every evening. 1/16/23  Yes ANA Hunter CNP   DULoxetine (CYMBALTA) 60 MG extended release capsule Take 1 capsule by mouth nightly 1/16/23  Yes ANA Hunter CNP   apixaban (ELIQUIS) 5 MG TABS tablet Take 1 tablet by mouth 2 times daily 10/5/22  Yes ANA Villalobos CNP   fluticasone (FLONASE) 50 MCG/ACT nasal spray 2 sprays by Each Nostril route daily as needed for Rhinitis   Yes Historical Provider, MD   gabapentin (NEURONTIN) 100 MG capsule Take 200 mg by mouth 3 times daily.    Yes Historical Provider, MD   insulin lispro protamine & lispro (HUMALOG MIX) (75-25) 100 UNIT per ML SUSP injection vial Inject subcutaneously twice daily 15 units with breakfast and 20 units with dinner   Yes Historical Provider, MD   hydrOXYzine HCl (ATARAX) 25 MG tablet take 1 tablet by oral route 4 times every day as needed for itching 8/26/22  Yes ANA Hunter CNP   famotidine (PEPCID) 20 MG tablet Take 20 mg by mouth 2 times daily   Yes Historical Provider, MD   metoprolol succinate (TOPROL XL) 50 MG extended release tablet TAKE ONE TABLET BY MOUTH TWICE DAILY 7/21/22  Yes ANA Gallegos CNP   furosemide (LASIX) 20 MG tablet TAKE ONE TABLET ON MONDAY and THURSDAY 3/17/22  Yes Lisa Armando MD   potassium chloride (KLOR-CON M) 20 MEQ extended release tablet TAKE ONE TABLET WITH LASIX ON Monday AND THURSDAYS 3/17/22  Yes Luke Schmid MD   lisinopril (PRINIVIL;ZESTRIL) 5 MG tablet Take 1 tablet by mouth daily 2/8/22  Yes Thalia Estes MD   tamsulosin [facility]) 0.4 MG capsule Take 1 capsule by mouth at bedtime 2/7/22  Yes Thalia Estes MD   clopidogrel (PLAVIX) 75 MG tablet Take 1 tablet by mouth daily 2/8/22  Yes Thalia Estes MD   Dulaglutide 3 MG/0.5ML SOPN Inject 3 mg into the skin once a week    Yes Historical Provider, MD   Fexofenadine HCl (ALLEGRA PO) Take 180 mg by mouth daily   Yes Historical Provider, MD   esomeprazole (NEXIUM) 40 MG delayed release capsule Take 40 mg by mouth daily   Yes Historical Provider, MD   JARDIANCE 25 MG tablet Take 25 mg by mouth daily  2/18/19  Yes Historical Provider, MD   insulin detemir (LEVEMIR) 100 UNIT/ML injection vial Inject 74 Units into the skin nightly   Yes Historical Provider, MD   metFORMIN (GLUCOPHAGE) 1000 MG tablet TAKE ONE TABLET BY MOUTH 2 TIMES DAILY WITH MORNING AND EVENING MEALS 9/26/17  Yes ANA Hinojosa CNP   atorvastatin (LIPITOR) 80 MG tablet TAKE ONE TABLET BY MOUTH DAILY 2/20/17  Yes ANA Pond CNP        CURRENT Medications:  No current facility-administered medications for this visit. Allergies: Other and Pcn [penicillins]           Review of Systems: All reviewed and refer to HPI  Constitutional: no unanticipated weight loss. There's been no change in energy level, sleep pattern, or activity level. No fevers, chills. Eyes: No visual changes or diplopia. No scleral icterus. ENT: No Headaches, hearing loss or vertigo. No mouth sores or sore throat. Cardiovascular: No Chest pain, tightness or discomfort. No Shortness of breath. No Dyspnea on exertion, Orthopnea, Paroxysmal nocturnal dyspnea or breathlessness at rest.  No Palpitations. No Syncope ('blackouts', 'faints', 'collapse') or dizziness. Respiratory: No cough or wheezing, no sputum production. No hematemesis.     Gastrointestinal: No abdominal pain, appetite loss, blood in stools. No change in bowel or bladder habits. Genitourinary: No dysuria, trouble voiding, or hematuria. Musculoskeletal:  No gait disturbance, no joint complaints. Integumentary: No rash or pruritis. Neurological: No headache, diplopia, change in muscle strength, numbness or tingling. Psychiatric: No anxiety or depression. Endocrine: No temperature intolerance. No excessive thirst, fluid intake, or urination. No tremor. Hematologic/Lymphatic: No abnormal bruising or bleeding, blood clots or swollen lymph nodes. Allergic/Immunologic: No nasal congestion or hives. Objective: all reveiwed      PHYSICAL EXAM:      Vitals:    03/02/23 0801   BP: 128/70   Pulse: 74   SpO2: 96%    Weight: 222 lb 9.6 oz (101 kg)       General Appearance:  Alert, cooperative, no distress, appears stated age. Head:  Normocephalic, without obvious abnormality, atraumatic. Eyes:  Pupils equal and round. No scleral icterus. Mouth: Moist mucosa, no pharyngeal erythema. Nose: Nares normal. No drainage or sinus tenderness. Neck: Supple, symmetrical, trachea midline. No adenopathy. No tenderness/mass/nodules. No carotid bruit or elevated JVD. Lungs:   Respiratory Effort: Normal   Auscultation: Clear to auscultation bilaterally, respirations unlabored. No wheeze, rales   Chest Wall:  No tenderness or deformity. Cardiovascular:    Pulses  Palpation: normal   Ascultation: Regular rate, S1/ S2 normal. No murmur, rub, or gallop. 2+ radial and pedal pulses, symmetric  Carotid  Femoral   Abdomen and Gastrointestinal:   Soft, non-tender, bowel sounds active. Liver and Spleen  Masses   Musculoskeletal: No muscle wasting  Back  Gait   Extremities: Extremities normal, atraumatic. No cyanosis or edema. No cyanosis clubbing       Skin: Inspection and palpation performed, no rashes or lesions. Pysch: Normal mood and affect.  Alert and oriented to time place person   Neurologic: Normal gross motor and sensory exam.       Labs: all labs have been reviewed      Lab Results   Component Value Date/Time    WBC 6.6 01/25/2023 06:06 AM    RBC 5.24 01/25/2023 06:06 AM    HGB 15.6 01/25/2023 06:06 AM    HCT 45.8 01/25/2023 06:06 AM    MCV 87.4 01/25/2023 06:06 AM    RDW 14.4 01/25/2023 06:06 AM    PLT 88 01/25/2023 06:06 AM     Lab Results   Component Value Date/Time     01/25/2023 06:06 AM    K 3.8 01/25/2023 06:06 AM    CL 98 01/25/2023 06:06 AM    CO2 24 01/25/2023 06:06 AM    BUN 15 01/25/2023 06:06 AM    CREATININE 0.6 01/25/2023 06:06 AM    GFRAA >60 10/04/2022 03:19 AM    GFRAA >60 05/19/2011 02:02 PM    AGRATIO 2.0 01/24/2023 06:59 PM    LABGLOM >60 01/25/2023 06:06 AM    GLUCOSE 167 01/25/2023 06:06 AM    PROT 6.8 01/24/2023 06:59 PM    PROT 6.9 11/08/2010 03:31 PM    CALCIUM 8.5 01/25/2023 06:06 AM    BILITOT 0.6 01/24/2023 06:59 PM    ALKPHOS 68 01/24/2023 06:59 PM    AST 15 01/24/2023 06:59 PM    ALT 15 01/24/2023 06:59 PM     No results found for: PTINR  Lab Results   Component Value Date    LABA1C 7.6 01/24/2023     Lab Results   Component Value Date    CKTOTAL 111 10/04/2022    TROPONINI <0.01 01/24/2023       Cardiac, Vascular and Imaging Data: All Personally Reviewed in Detail by Myself      EKG:     Echocardiogram:   ECHO (limited) 1/15/2022  This is a limited study afib, ischemic cmp, recurrent pacer firing. LV systolic function appears mildly reduced with EF estimated at 45-50%. There is more prominent HK of the inferior wall. Left ventricular size is decreased. There is mild concentric left ventricular hypertrophy. 11- 35-40% inf basal hypo, inferolateral akinesis, LVE, mild MR    ECHO 11/29/19  The left ventricular systolic function is moderately reduced with an  ejection fraction of 35 -40 %. There is hypokinesis of the inferior and basal inferior walls. There is akinesis of the inferolateral wall. Left ventricular cavity size is moderately dilated.   Compared to previous study from 2-2-2015 no changes noted in left  ventricular function. Mild mitral regurgitation. Stress Test:   Stress test 7/2/2021  Overall findings represent a high risk scan. Normal LV size with reduced function. EF 49%    Large mostly reversible defect involving the basal inferolateral, basal    inferior, mid inferolateral and mid inferior segments. Findings concerning for ischemia. ECG: Non-diagnostic EKG response due to failure to reach target heart rate. Cath: Other imaging:     Assessment and Plan     Atrial Fibrillation, Paroxysmal      Referring reason: Merryl Najjar is at least 4 (CHF,CAD,HTN,DM)   Truman is at least 2     Currently on Eliquis. He is a poor candidate for chronic anticoagulation with his history of falls. He would be an appropriate candidate for the watchman device. Patient is agreeable to proceed with the Watchman procedure and instructed Van Li, the coordinator will get in contact to facilitate scheduling. Specifically regarding risk of anticoagulation they have demonstrated:  High risk of recurrent falls  Occupation related high bleeding risk  Need for prolonged dual anti platelet therapy      Discussed left atrial appendage closure at length with patient and family members. I have discussed their unique stroke and bleeding risk both on and off oral-anticoagulation, and the rationale for this referral.  Based on both stroke and bleeding risk, a shared decision has been made to pursue closure of the left atrial appendage as an alternative to oral anticoagulant therapy for stroke prophylaxis and to reduce their long term risk of incidence of bleeding. Will do Asa and Plavix after the procedure. I had a detail discussion with the patient and family regarding the risk and benefit of the procedures. I explained to them the details of the procedure.  I explained to them that the procedure will be performed under general anesthesia using BRAYDON and fluoroscopic guidance. I explained any risk of bleeding, pericardial effusion and tamponade, perforation of the vessel, stroke, myocardial infarction or death. The patient and family understood the risk and benefits and the details of procedure and would like to go ahead with the procedure. The patient gave informed consent. All questions and concerns answered at this time. Coronary artery disease  Asymptomatic. Managed by Dr Cassidy Benton. Chronic systolic heart failure  Appears compensated on exam.     Hypertension  Controlled. Continue current medical management. Hyperlipidemia  Continue high intensity statin therapy. Thank you for allowing us to participate in the care of [address] .  Please do not hesitate to contact me if you have any questions. Damien Santiago MD, MPH    [facility], [address] [place] [address]  Ph: [phone]  Fax: [phone]    This note was scribed in the presence of Dr Jodee Chen, by Haylee Woodson RN  Physician Attestation:  The scribes documentation has been prepared under my direction and personally reviewed by me in its entirety. I confirm that the note above accurately reflects all work, treatment, procedures, and medical decision making performed by me.

## 2023-03-02 ENCOUNTER — OFFICE VISIT (OUTPATIENT)
Dept: CARDIOLOGY CLINIC | Age: 63
End: 2023-03-02
Payer: MEDICARE

## 2023-03-02 VITALS
HEART RATE: 74 BPM | WEIGHT: 222.6 LBS | SYSTOLIC BLOOD PRESSURE: 128 MMHG | BODY MASS INDEX: 38 KG/M2 | OXYGEN SATURATION: 96 % | HEIGHT: 64 IN | DIASTOLIC BLOOD PRESSURE: 70 MMHG

## 2023-03-02 DIAGNOSIS — F31.9 BIPOLAR DISORDER WITH DEPRESSION (HCC): ICD-10-CM

## 2023-03-02 DIAGNOSIS — E78.2 MIXED HYPERLIPIDEMIA: ICD-10-CM

## 2023-03-02 DIAGNOSIS — I48.0 PAF (PAROXYSMAL ATRIAL FIBRILLATION) (HCC): Primary | ICD-10-CM

## 2023-03-02 DIAGNOSIS — I50.22 CHRONIC SYSTOLIC HEART FAILURE (HCC): ICD-10-CM

## 2023-03-02 DIAGNOSIS — I10 ESSENTIAL HYPERTENSION: ICD-10-CM

## 2023-03-02 DIAGNOSIS — I25.10 CORONARY ARTERY DISEASE INVOLVING NATIVE CORONARY ARTERY OF NATIVE HEART WITHOUT ANGINA PECTORIS: ICD-10-CM

## 2023-03-02 PROCEDURE — 3078F DIAST BP <80 MM HG: CPT | Performed by: INTERNAL MEDICINE

## 2023-03-02 PROCEDURE — 3074F SYST BP LT 130 MM HG: CPT | Performed by: INTERNAL MEDICINE

## 2023-03-02 PROCEDURE — 99215 OFFICE O/P EST HI 40 MIN: CPT | Performed by: INTERNAL MEDICINE

## 2023-03-03 RX ORDER — DIVALPROEX SODIUM 500 MG/1
TABLET, DELAYED RELEASE ORAL
Qty: 84 TABLET | Refills: 5 | Status: SHIPPED | OUTPATIENT
Start: 2023-03-03

## 2023-03-03 RX ORDER — DULOXETIN HYDROCHLORIDE 60 MG/1
CAPSULE, DELAYED RELEASE ORAL
Qty: 28 CAPSULE | Refills: 5 | Status: SHIPPED | OUTPATIENT
Start: 2023-03-03

## 2023-03-13 ENCOUNTER — NURSE ONLY (OUTPATIENT)
Dept: CARDIOLOGY CLINIC | Age: 63
End: 2023-03-13

## 2023-03-13 DIAGNOSIS — Z95.810 AUTOMATIC IMPLANTABLE CARDIOVERTER-DEFIBRILLATOR IN SITU: Primary | ICD-10-CM

## 2023-03-13 DIAGNOSIS — I25.5 ISCHEMIC CARDIOMYOPATHY: ICD-10-CM

## 2023-03-13 DIAGNOSIS — I47.20 VENTRICULAR TACHYCARDIA: ICD-10-CM

## 2023-03-18 NOTE — PROGRESS NOTES
End of 91-day monitoring period 3/13   No V  arrhythmias recorded. Remote transmission received for patients single chamber ICD. Transmission shows normal sensing and pacing function. EP physician will review. See interrogation under the cardiology tab in the [address] [place] [address] [address] field for more details. Will continue to monitor remotely. Hx PAF-RVR/SVT/NSVT. (eliquis, toprol xl). Corvue is within normal limits. SVT recorded on 2/15/22. Morphology discriminator match. NSVT recordings prob SVT based on rates-no EGM to view. 1 NSVT event 2/14/22-no EGM to view w/ rate 205 bpm x 12 sec. No therapies delivered. MD Greg Ritchie  2/15/2022 event is likely SVT, but was sustained.

## 2023-03-23 ENCOUNTER — TELEPHONE (OUTPATIENT)
Dept: FAMILY MEDICINE CLINIC | Age: 63
End: 2023-03-23

## 2023-03-23 RX ORDER — ALBUTEROL SULFATE 90 UG/1
AEROSOL, METERED RESPIRATORY (INHALATION)
Qty: 8.5 G | Refills: 0 | Status: SHIPPED | OUTPATIENT
Start: 2023-03-23

## 2023-03-24 ENCOUNTER — TELEMEDICINE (OUTPATIENT)
Dept: FAMILY MEDICINE CLINIC | Age: 63
End: 2023-03-24

## 2023-03-24 DIAGNOSIS — Z00.00 INITIAL MEDICARE ANNUAL WELLNESS VISIT: Primary | ICD-10-CM

## 2023-03-24 DIAGNOSIS — I48.91 ATRIAL FIBRILLATION WITH RVR (HCC): ICD-10-CM

## 2023-03-24 DIAGNOSIS — Z99.89 OSA ON CPAP: ICD-10-CM

## 2023-03-24 DIAGNOSIS — G25.2 COARSE TREMOR: ICD-10-CM

## 2023-03-24 DIAGNOSIS — G47.33 OSA ON CPAP: ICD-10-CM

## 2023-03-24 DIAGNOSIS — R25.8 BRADYKINESIA: ICD-10-CM

## 2023-03-24 DIAGNOSIS — E11.65 TYPE 2 DIABETES MELLITUS WITH HYPERGLYCEMIA, WITH LONG-TERM CURRENT USE OF INSULIN (HCC): ICD-10-CM

## 2023-03-24 DIAGNOSIS — F31.9 BIPOLAR DISORDER WITH DEPRESSION (HCC): ICD-10-CM

## 2023-03-24 DIAGNOSIS — Z79.4 TYPE 2 DIABETES MELLITUS WITH HYPERGLYCEMIA, WITH LONG-TERM CURRENT USE OF INSULIN (HCC): ICD-10-CM

## 2023-03-24 DIAGNOSIS — G20 PARKINSON'S DISEASE (HCC): ICD-10-CM

## 2023-03-24 ASSESSMENT — PATIENT HEALTH QUESTIONNAIRE - PHQ9
SUM OF ALL RESPONSES TO PHQ QUESTIONS 1-9: 15
3. TROUBLE FALLING OR STAYING ASLEEP: 2
4. FEELING TIRED OR HAVING LITTLE ENERGY: 2
8. MOVING OR SPEAKING SO SLOWLY THAT OTHER PEOPLE COULD HAVE NOTICED. OR THE OPPOSITE, BEING SO FIGETY OR RESTLESS THAT YOU HAVE BEEN MOVING AROUND A LOT MORE THAN USUAL: 3
6. FEELING BAD ABOUT YOURSELF - OR THAT YOU ARE A FAILURE OR HAVE LET YOURSELF OR YOUR FAMILY DOWN: 2
SUM OF ALL RESPONSES TO PHQ9 QUESTIONS 1 & 2: 2
1. LITTLE INTEREST OR PLEASURE IN DOING THINGS: 1
9. THOUGHTS THAT YOU WOULD BE BETTER OFF DEAD, OR OF HURTING YOURSELF: 0
SUM OF ALL RESPONSES TO PHQ QUESTIONS 1-9: 15
2. FEELING DOWN, DEPRESSED OR HOPELESS: 1
10. IF YOU CHECKED OFF ANY PROBLEMS, HOW DIFFICULT HAVE THESE PROBLEMS MADE IT FOR YOU TO DO YOUR WORK, TAKE CARE OF THINGS AT HOME, OR GET ALONG WITH OTHER PEOPLE: 0
5. POOR APPETITE OR OVEREATING: 2
SUM OF ALL RESPONSES TO PHQ QUESTIONS 1-9: 15
SUM OF ALL RESPONSES TO PHQ QUESTIONS 1-9: 15
7. TROUBLE CONCENTRATING ON THINGS, SUCH AS READING THE NEWSPAPER OR WATCHING TELEVISION: 2

## 2023-03-24 NOTE — PROGRESS NOTES
[address]  [address] [facility]  Provider was located at [facility] ([address]): ANA Hobbs CNP

## 2023-03-24 NOTE — PATIENT INSTRUCTIONS
911, the  may tell you to chew 1 adult-strength or 2 to 4 low-dose aspirin. Wait for an ambulance. Do not try to drive yourself. Watch closely for changes in your health, and be sure to contact your doctor if you have any problems. Where can you learn more? Go to http://www.bland.com/ and enter F075 to learn more about \"A Healthy Heart: Care Instructions. \"  Current as of: September 7, 2022               Content Version: 13.6  © 4986-5527 [employer]. Care instructions adapted under license by [facility] ([facility]). If you have questions about a medical condition or this instruction, always ask your healthcare professional. [place] [address] any warranty or liability for your use of this information. Personalized Preventive Plan for Jade Rizvi. - 3/24/2023  Medicare offers a range of preventive health benefits. Some of the tests and screenings are paid in full while other may be subject to a deductible, co-insurance, and/or copay. Some of these benefits include a comprehensive review of your medical history including lifestyle, illnesses that may run in your family, and various assessments and screenings as appropriate. After reviewing your medical record and screening and assessments performed today your provider may have ordered immunizations, labs, imaging, and/or referrals for you. A list of these orders (if applicable) as well as your Preventive Care list are included within your After Visit Summary for your review. Other Preventive Recommendations:    A preventive eye exam performed by an eye specialist is recommended every 1-2 years to screen for glaucoma; cataracts, macular degeneration, and other eye disorders. A preventive dental visit is recommended every 6 months. Try to get at least 150 minutes of exercise per week or 10,000 steps per day on a pedometer .   Order or download the FREE \"Exercise & Physical Activity: Your Everyday

## 2023-03-29 RX ORDER — HYDROXYZINE HYDROCHLORIDE 25 MG/1
TABLET, FILM COATED ORAL
Qty: 120 TABLET | Refills: 0 | Status: SHIPPED | OUTPATIENT
Start: 2023-03-29

## 2023-03-29 RX ORDER — INSULIN DETEMIR 100 [IU]/ML
INJECTION, SOLUTION SUBCUTANEOUS
Qty: 15 ML | Refills: 0 | Status: SHIPPED | OUTPATIENT
Start: 2023-03-29

## 2023-03-30 ENCOUNTER — TELEPHONE (OUTPATIENT)
Dept: FAMILY MEDICINE CLINIC | Age: 63
End: 2023-03-30

## 2023-03-30 NOTE — TELEPHONE ENCOUNTER
Thuy Coello with [employer] calling to let Ramy Hall know that patient is being discharged from all home health services today.

## 2023-03-31 ENCOUNTER — TELEPHONE (OUTPATIENT)
Dept: FAMILY MEDICINE CLINIC | Age: 63
End: 2023-03-31

## 2023-03-31 RX ORDER — INSULIN LISPRO 100 [IU]/ML
INJECTION, SUSPENSION SUBCUTANEOUS
Qty: 15 ML | Refills: 0 | Status: SHIPPED | OUTPATIENT
Start: 2023-03-31

## 2023-03-31 RX ORDER — POTASSIUM CHLORIDE 20 MEQ/1
TABLET, EXTENDED RELEASE ORAL
Qty: 30 TABLET | Refills: 2 | Status: SHIPPED | OUTPATIENT
Start: 2023-03-31

## 2023-03-31 RX ORDER — FUROSEMIDE 20 MG/1
TABLET ORAL
Qty: 8 TABLET | Refills: 5 | Status: SHIPPED | OUTPATIENT
Start: 2023-03-31

## 2023-03-31 NOTE — TELEPHONE ENCOUNTER
Patient called to let the office know that he does have an appointment to see Colin Gayle with [facility] on 4/3/23

## 2023-04-03 ENCOUNTER — OFFICE VISIT (OUTPATIENT)
Dept: PULMONOLOGY | Age: 63
End: 2023-04-03
Payer: MEDICARE

## 2023-04-03 ENCOUNTER — TELEPHONE (OUTPATIENT)
Dept: PULMONOLOGY | Age: 63
End: 2023-04-03

## 2023-04-03 VITALS
DIASTOLIC BLOOD PRESSURE: 80 MMHG | HEIGHT: 64 IN | HEART RATE: 82 BPM | WEIGHT: 225 LBS | BODY MASS INDEX: 38.41 KG/M2 | SYSTOLIC BLOOD PRESSURE: 122 MMHG | OXYGEN SATURATION: 97 %

## 2023-04-03 DIAGNOSIS — G47.33 SEVERE OBSTRUCTIVE SLEEP APNEA: Primary | ICD-10-CM

## 2023-04-03 DIAGNOSIS — E66.9 OBESITY (BMI 30-39.9): ICD-10-CM

## 2023-04-03 DIAGNOSIS — G47.21 DELAYED SLEEP PHASE SYNDROME: ICD-10-CM

## 2023-04-03 DIAGNOSIS — F51.04 PSYCHOPHYSIOLOGICAL INSOMNIA: ICD-10-CM

## 2023-04-03 DIAGNOSIS — Z71.89 ENCOUNTER FOR BIPAP USE COUNSELING: ICD-10-CM

## 2023-04-03 DIAGNOSIS — Z72.821 POOR SLEEP HYGIENE: ICD-10-CM

## 2023-04-03 DIAGNOSIS — R53.83 OTHER FATIGUE: ICD-10-CM

## 2023-04-03 DIAGNOSIS — I10 HTN (HYPERTENSION), BENIGN: ICD-10-CM

## 2023-04-03 DIAGNOSIS — Z78.9 DIFFICULTY WITH BIPAP USE: ICD-10-CM

## 2023-04-03 PROCEDURE — 99204 OFFICE O/P NEW MOD 45 MIN: CPT | Performed by: NURSE PRACTITIONER

## 2023-04-03 PROCEDURE — 3074F SYST BP LT 130 MM HG: CPT | Performed by: NURSE PRACTITIONER

## 2023-04-03 PROCEDURE — 3079F DIAST BP 80-89 MM HG: CPT | Performed by: NURSE PRACTITIONER

## 2023-04-03 ASSESSMENT — SLEEP AND FATIGUE QUESTIONNAIRES
NECK CIRCUMFERENCE (INCHES): 17.5
ESS TOTAL SCORE: 10
HOW LIKELY ARE YOU TO NOD OFF OR FALL ASLEEP WHILE SITTING AND READING: 3
HOW LIKELY ARE YOU TO NOD OFF OR FALL ASLEEP WHILE LYING DOWN TO REST IN THE AFTERNOON WHEN CIRCUMSTANCES PERMIT: 2
HOW LIKELY ARE YOU TO NOD OFF OR FALL ASLEEP WHILE WATCHING TV: 3
HOW LIKELY ARE YOU TO NOD OFF OR FALL ASLEEP WHILE SITTING QUIETLY AFTER LUNCH WITHOUT ALCOHOL: 0
HOW LIKELY ARE YOU TO NOD OFF OR FALL ASLEEP WHEN YOU ARE A PASSENGER IN A CAR FOR AN HOUR WITHOUT A BREAK: 2
HOW LIKELY ARE YOU TO NOD OFF OR FALL ASLEEP IN A CAR, WHILE STOPPED FOR A FEW MINUTES IN TRAFFIC: 0
HOW LIKELY ARE YOU TO NOD OFF OR FALL ASLEEP WHILE SITTING AND TALKING TO SOMEONE: 0
HOW LIKELY ARE YOU TO NOD OFF OR FALL ASLEEP WHILE SITTING INACTIVE IN A PUBLIC PLACE: 0

## 2023-04-03 NOTE — PATIENT INSTRUCTIONS
bring: -I. D. and Insurance card  **Please note the Home Sleep Test Units are limited. It is a 1 night rental and must be dropped off the following day to ensure you are not billed a late or replacement fee**    Please refrain from or reduce the use of caffeine and/or alcohol prior to your sleep study and avoid napping the day of your study, as these will affect the accuracy of your test results. If you are ill the day of your test (COVID-19, cold, upper respiratory infection, flu, etc.) please call to reschedule your test as the test will not be accurate, and for other patient safety. If you should have any questions or need to cancel or reschedule your appointment, please call the [facility] location.  (Even if your test has been scheduled at our [place] location)   [phone]      Thank you, [facility] at [facility]

## 2023-04-03 NOTE — LETTER
April 3, 2023      [address] [facility] [address]      Patient: Britt Sahni.   MR Number: [id number]   YOB: 1960   Date of Visit: 4/3/2023       Dear Pablo Anand: Thank you for referring Arjun Enriquez to me for evaluation/treatment. Below are the relevant portions of my assessment and plan of care. Assessment:      Severe ALIN. Failed CPAP, had to return BIPAP  Obesity  Asthma followed by PCP  Ischemic cardiomyopathy, ICD, CAD, Afib- followed by cardiology    Plan:       - BIPAP Titration   -Discussed severity of sleep apnea and importance of treatment  -Discussed PAP acclamation techniques  - Advised to use PAP 6-8 hrs at night and during naps- need to increase use. - Replacement of mask, tubing, head straps every 3-6 months or sooner if damaged. - Patient instructed to contact [employer] for any mask, tubing or machine trouble shooting if problems arise.  - Sleep hygiene  - Avoid sedatives, alcohol and caffeinated drinks at bed time. - Patient counseled to never drive or operate heavy machinery while fatigue, drowsy or sleepy. - Weight loss is recommended as a long-term intervention.    - Complications of ALIN if not treated were discussed with patient patient, including: systemic hypertension, pulmonary hypertension, cardiovascular morbidities, car accidents and all cause mortality.  -Patient education handout provided regarding sleep tips and CPAP cleaning recommendations   -Continue blood pressure medications ordered by treating providers- treatment of ALIN can lower blood pressure by levels that are clinically significant. If you have questions, please do not hesitate to call me. I look forward to following Vaughn along with you.     Sincerely,        Bryon Schirmer, APRN - CNP

## 2023-04-03 NOTE — PROGRESS NOTES
Position: Sitting, Cuff Size: Medium Adult)   Pulse 82   Ht 5' 4\" (1.626 m)   Wt 225 lb (102.1 kg)   SpO2 97% Comment: RA  BMI 38.62 kg/m²     Physical exam:  Gen: No acute distress. Obese. BMI of 38.62  Eyes: PERRL. No sclera icterus. No conjunctival injection. ENT: No discharge. Pharynx clear. Mallampati class IV. Neck: Trachea midline. No obvious mass. Neck circumference 17.5\"  Resp: No accessory muscle use. No crackles. No wheezes. No rhonchi. CV: Regular rate. Regular rhythm. No murmur or rub. Skin: Warm and dry. M/S: No cyanosis. No obvious joint deformity. Neuro: Awake. Alert. Moves all four extremities. Psych: Oriented x 3. No anxiety. DATA:   2/2/15 echo EF 50%  4/5/2012 split night PSG AHI 76.3, low SpO2 81%  LVEF 44%- per cardiology note 9/28/18  3/8/2019 controlled sleep-related breathing disorder with BiPAP, PLMS 21.5, recommendations BiPAP 17/12 cm H2O  1/15/22 echo, EF 45-50%    CPAP compliance data:  Compliance download report from 11/11/18 to 12/10/18 reviewed today by me and showed patient is using machine 39 minutes hrs/night with 0% compliance and AHI 0 within this time frame. 0/30days with greater than 4 hours of machine use. CPAP 12 cm H20    Assessment:      Severe ALIN. Failed CPAP, had to return BIPAP  Obesity  Asthma followed by PCP  Ischemic cardiomyopathy, ICD, CAD, Afib- followed by cardiology    Plan:       - BIPAP Titration   -Discussed severity of sleep apnea and importance of treatment  -Discussed PAP acclamation techniques  - Advised to use PAP 6-8 hrs at night and during naps- need to increase use. - Replacement of mask, tubing, head straps every 3-6 months or sooner if damaged. - Patient instructed to contact [employer] for any mask, tubing or machine trouble shooting if problems arise.  - Sleep hygiene  - Avoid sedatives, alcohol and caffeinated drinks at bed time.   - Patient counseled to never drive or operate heavy machinery while fatigue, drowsy

## 2023-04-06 ENCOUNTER — TELEPHONE (OUTPATIENT)
Dept: FAMILY MEDICINE CLINIC | Age: 63
End: 2023-04-06

## 2023-04-06 RX ORDER — DULAGLUTIDE 3 MG/.5ML
INJECTION, SOLUTION SUBCUTANEOUS
Qty: 2 ML | Refills: 0 | Status: SHIPPED | OUTPATIENT
Start: 2023-04-06

## 2023-04-06 NOTE — TELEPHONE ENCOUNTER
Patient says he fell yesterday. he says he just fell over when he went inside of his home. Says he was not dizzy, lightheaded, or off balance and did not trip over anything. Legs and back muscles are sore. Says he has no bruising, swelling, or pain. Please advise.

## 2023-04-06 NOTE — TELEPHONE ENCOUNTER
Future appt scheduled 05/17/2023                         Last appt 03/24/2023      Last Written     Dulaglutide 3 MG/0.5ML SOPN

## 2023-04-07 RX ORDER — FEXOFENADINE HYDROCHLORIDE 180 MG/1
TABLET ORAL
Qty: 28 TABLET | Refills: 0 | Status: SHIPPED | OUTPATIENT
Start: 2023-04-07

## 2023-04-07 RX ORDER — ATORVASTATIN CALCIUM 80 MG/1
TABLET, FILM COATED ORAL
Qty: 28 TABLET | Refills: 0 | Status: SHIPPED | OUTPATIENT
Start: 2023-04-07

## 2023-04-11 ENCOUNTER — TELEPHONE (OUTPATIENT)
Dept: CARDIOLOGY CLINIC | Age: 63
End: 2023-04-11

## 2023-04-11 DIAGNOSIS — I48.0 PAROXYSMAL A-FIB (HCC): ICD-10-CM

## 2023-04-11 DIAGNOSIS — Z95.818 PRESENCE OF WATCHMAN LEFT ATRIAL APPENDAGE CLOSURE DEVICE: Primary | ICD-10-CM

## 2023-04-17 ENCOUNTER — HOSPITAL ENCOUNTER (INPATIENT)
Dept: CARDIAC CATH/INVASIVE PROCEDURES | Age: 63
LOS: 1 days | Discharge: HOME | DRG: 274 | End: 2023-04-17
Attending: INTERNAL MEDICINE | Admitting: INTERNAL MEDICINE
Payer: MEDICARE

## 2023-04-17 ENCOUNTER — ANESTHESIA (OUTPATIENT)
Dept: CARDIAC CATH/INVASIVE PROCEDURES | Age: 63
DRG: 274 | End: 2023-04-17
Payer: MEDICARE

## 2023-04-17 ENCOUNTER — ANESTHESIA EVENT (OUTPATIENT)
Dept: CARDIAC CATH/INVASIVE PROCEDURES | Age: 63
DRG: 274 | End: 2023-04-17
Payer: MEDICARE

## 2023-04-17 VITALS
TEMPERATURE: 97.6 F | BODY MASS INDEX: 38.41 KG/M2 | HEIGHT: 64 IN | RESPIRATION RATE: 18 BRPM | SYSTOLIC BLOOD PRESSURE: 118 MMHG | OXYGEN SATURATION: 93 % | WEIGHT: 225 LBS | DIASTOLIC BLOOD PRESSURE: 71 MMHG | HEART RATE: 94 BPM

## 2023-04-17 LAB
ABO + RH BLD: NORMAL
BLD GP AB SCN SERPL QL: NORMAL
GLUCOSE BLD-MCNC: 196 MG/DL (ref 70–99)
PERFORMED ON: ABNORMAL
POC ACT LR: 325 SEC

## 2023-04-17 PROCEDURE — 86901 BLOOD TYPING SEROLOGIC RH(D): CPT

## 2023-04-17 PROCEDURE — C1894 INTRO/SHEATH, NON-LASER: HCPCS

## 2023-04-17 PROCEDURE — APPNB45 APP NON BILLABLE 31-45 MINUTES: Performed by: NURSE PRACTITIONER

## 2023-04-17 PROCEDURE — 85347 COAGULATION TIME ACTIVATED: CPT

## 2023-04-17 PROCEDURE — 7100000000 HC PACU RECOVERY - FIRST 15 MIN

## 2023-04-17 PROCEDURE — 86850 RBC ANTIBODY SCREEN: CPT

## 2023-04-17 PROCEDURE — 3700000000 HC ANESTHESIA ATTENDED CARE

## 2023-04-17 PROCEDURE — 6360000002 HC RX W HCPCS

## 2023-04-17 PROCEDURE — C1769 GUIDE WIRE: HCPCS

## 2023-04-17 PROCEDURE — 7100000010 HC PHASE II RECOVERY - FIRST 15 MIN

## 2023-04-17 PROCEDURE — 86900 BLOOD TYPING SEROLOGIC ABO: CPT

## 2023-04-17 PROCEDURE — 2500000003 HC RX 250 WO HCPCS: Performed by: NURSE ANESTHETIST, CERTIFIED REGISTERED

## 2023-04-17 PROCEDURE — C1889 IMPLANT/INSERT DEVICE, NOC: HCPCS

## 2023-04-17 PROCEDURE — 2580000003 HC RX 258

## 2023-04-17 PROCEDURE — 93005 ELECTROCARDIOGRAM TRACING: CPT | Performed by: INTERNAL MEDICINE

## 2023-04-17 PROCEDURE — 6360000004 HC RX CONTRAST MEDICATION: Performed by: INTERNAL MEDICINE

## 2023-04-17 PROCEDURE — 3700000001 HC ADD 15 MINUTES (ANESTHESIA)

## 2023-04-17 PROCEDURE — 6360000002 HC RX W HCPCS: Performed by: NURSE ANESTHETIST, CERTIFIED REGISTERED

## 2023-04-17 PROCEDURE — 7100000011 HC PHASE II RECOVERY - ADDTL 15 MIN

## 2023-04-17 PROCEDURE — 36415 COLL VENOUS BLD VENIPUNCTURE: CPT

## 2023-04-17 PROCEDURE — 2580000003 HC RX 258: Performed by: NURSE ANESTHETIST, CERTIFIED REGISTERED

## 2023-04-17 PROCEDURE — 2500000003 HC RX 250 WO HCPCS

## 2023-04-17 PROCEDURE — 93355 ECHO TRANSESOPHAGEAL (TEE): CPT

## 2023-04-17 PROCEDURE — C1760 CLOSURE DEV, VASC: HCPCS

## 2023-04-17 PROCEDURE — 2709999900 HC NON-CHARGEABLE SUPPLY

## 2023-04-17 PROCEDURE — 7100000001 HC PACU RECOVERY - ADDTL 15 MIN

## 2023-04-17 PROCEDURE — 02L73DK OCCLUSION OF LEFT ATRIAL APPENDAGE WITH INTRALUMINAL DEVICE, PERCUTANEOUS APPROACH: ICD-10-PCS | Performed by: INTERNAL MEDICINE

## 2023-04-17 PROCEDURE — 1200000000 HC SEMI PRIVATE

## 2023-04-17 PROCEDURE — 33340 PERQ CLSR TCAT L ATR APNDGE: CPT

## 2023-04-17 PROCEDURE — B24BZZ4 ULTRASONOGRAPHY OF HEART WITH AORTA, TRANSESOPHAGEAL: ICD-10-PCS | Performed by: INTERNAL MEDICINE

## 2023-04-17 RX ORDER — LISINOPRIL 5 MG/1
5 TABLET ORAL DAILY
Status: DISCONTINUED | OUTPATIENT
Start: 2023-04-17 | End: 2023-04-17 | Stop reason: HOSPADM

## 2023-04-17 RX ORDER — SODIUM CHLORIDE 9 MG/ML
INJECTION, SOLUTION INTRAVENOUS CONTINUOUS PRN
Status: DISCONTINUED | OUTPATIENT
Start: 2023-04-17 | End: 2023-04-17 | Stop reason: SDUPTHER

## 2023-04-17 RX ORDER — FLUTICASONE PROPIONATE 50 MCG
2 SPRAY, SUSPENSION (ML) NASAL DAILY PRN
Status: DISCONTINUED | OUTPATIENT
Start: 2023-04-17 | End: 2023-04-17 | Stop reason: HOSPADM

## 2023-04-17 RX ORDER — TAMSULOSIN HYDROCHLORIDE 0.4 MG/1
0.4 CAPSULE ORAL NIGHTLY
Status: DISCONTINUED | OUTPATIENT
Start: 2023-04-17 | End: 2023-04-17 | Stop reason: HOSPADM

## 2023-04-17 RX ORDER — PROTAMINE SULFATE 10 MG/ML
INJECTION, SOLUTION INTRAVENOUS PRN
Status: DISCONTINUED | OUTPATIENT
Start: 2023-04-17 | End: 2023-04-17 | Stop reason: SDUPTHER

## 2023-04-17 RX ORDER — SUCCINYLCHOLINE CHLORIDE 20 MG/ML
INJECTION INTRAMUSCULAR; INTRAVENOUS PRN
Status: DISCONTINUED | OUTPATIENT
Start: 2023-04-17 | End: 2023-04-17 | Stop reason: SDUPTHER

## 2023-04-17 RX ORDER — ONDANSETRON 2 MG/ML
INJECTION INTRAMUSCULAR; INTRAVENOUS PRN
Status: DISCONTINUED | OUTPATIENT
Start: 2023-04-17 | End: 2023-04-17 | Stop reason: SDUPTHER

## 2023-04-17 RX ORDER — LIDOCAINE HYDROCHLORIDE 20 MG/ML
INJECTION, SOLUTION EPIDURAL; INFILTRATION; INTRACAUDAL; PERINEURAL PRN
Status: DISCONTINUED | OUTPATIENT
Start: 2023-04-17 | End: 2023-04-17 | Stop reason: SDUPTHER

## 2023-04-17 RX ORDER — POTASSIUM CHLORIDE 750 MG/1
10 TABLET, EXTENDED RELEASE ORAL
Status: DISCONTINUED | OUTPATIENT
Start: 2023-04-18 | End: 2023-04-17 | Stop reason: HOSPADM

## 2023-04-17 RX ORDER — SODIUM CHLORIDE 0.9 % (FLUSH) 0.9 %
5-40 SYRINGE (ML) INJECTION EVERY 12 HOURS SCHEDULED
Status: DISCONTINUED | OUTPATIENT
Start: 2023-04-17 | End: 2023-04-17 | Stop reason: HOSPADM

## 2023-04-17 RX ORDER — ACETAMINOPHEN 325 MG/1
650 TABLET ORAL EVERY 4 HOURS PRN
Status: DISCONTINUED | OUTPATIENT
Start: 2023-04-17 | End: 2023-04-17 | Stop reason: HOSPADM

## 2023-04-17 RX ORDER — HEPARIN SODIUM 1000 [USP'U]/ML
INJECTION, SOLUTION INTRAVENOUS; SUBCUTANEOUS PRN
Status: DISCONTINUED | OUTPATIENT
Start: 2023-04-17 | End: 2023-04-17 | Stop reason: SDUPTHER

## 2023-04-17 RX ORDER — SODIUM CHLORIDE 9 MG/ML
INJECTION, SOLUTION INTRAVENOUS CONTINUOUS
Status: DISCONTINUED | OUTPATIENT
Start: 2023-04-17 | End: 2023-04-17 | Stop reason: HOSPADM

## 2023-04-17 RX ORDER — ASPIRIN 81 MG/1
81 TABLET, CHEWABLE ORAL DAILY
Status: DISCONTINUED | OUTPATIENT
Start: 2023-04-18 | End: 2023-04-17 | Stop reason: HOSPADM

## 2023-04-17 RX ORDER — SODIUM CHLORIDE 0.9 % (FLUSH) 0.9 %
5-40 SYRINGE (ML) INJECTION PRN
Status: DISCONTINUED | OUTPATIENT
Start: 2023-04-17 | End: 2023-04-17 | Stop reason: HOSPADM

## 2023-04-17 RX ORDER — GABAPENTIN 100 MG/1
200 CAPSULE ORAL 3 TIMES DAILY
Status: DISCONTINUED | OUTPATIENT
Start: 2023-04-17 | End: 2023-04-17 | Stop reason: HOSPADM

## 2023-04-17 RX ORDER — HYDROMORPHONE HCL 110MG/55ML
0.5 PATIENT CONTROLLED ANALGESIA SYRINGE INTRAVENOUS EVERY 5 MIN PRN
Status: DISCONTINUED | OUTPATIENT
Start: 2023-04-17 | End: 2023-04-17 | Stop reason: HOSPADM

## 2023-04-17 RX ORDER — DIPHENHYDRAMINE HYDROCHLORIDE 50 MG/ML
12.5 INJECTION INTRAMUSCULAR; INTRAVENOUS
Status: DISCONTINUED | OUTPATIENT
Start: 2023-04-17 | End: 2023-04-17 | Stop reason: HOSPADM

## 2023-04-17 RX ORDER — HYDROXYZINE HYDROCHLORIDE 10 MG/1
10 TABLET, FILM COATED ORAL 4 TIMES DAILY PRN
Status: DISCONTINUED | OUTPATIENT
Start: 2023-04-17 | End: 2023-04-17 | Stop reason: HOSPADM

## 2023-04-17 RX ORDER — PROPOFOL 10 MG/ML
INJECTION, EMULSION INTRAVENOUS PRN
Status: DISCONTINUED | OUTPATIENT
Start: 2023-04-17 | End: 2023-04-17 | Stop reason: SDUPTHER

## 2023-04-17 RX ORDER — MEPERIDINE HYDROCHLORIDE 25 MG/ML
12.5 INJECTION INTRAMUSCULAR; INTRAVENOUS; SUBCUTANEOUS EVERY 5 MIN PRN
Status: DISCONTINUED | OUTPATIENT
Start: 2023-04-17 | End: 2023-04-17 | Stop reason: HOSPADM

## 2023-04-17 RX ORDER — SODIUM CHLORIDE 9 MG/ML
INJECTION, SOLUTION INTRAVENOUS PRN
Status: DISCONTINUED | OUTPATIENT
Start: 2023-04-17 | End: 2023-04-17 | Stop reason: HOSPADM

## 2023-04-17 RX ORDER — FUROSEMIDE 20 MG/1
10 TABLET ORAL DAILY
Status: DISCONTINUED | OUTPATIENT
Start: 2023-04-17 | End: 2023-04-17 | Stop reason: HOSPADM

## 2023-04-17 RX ORDER — FAMOTIDINE 20 MG/1
20 TABLET, FILM COATED ORAL 2 TIMES DAILY
Status: DISCONTINUED | OUTPATIENT
Start: 2023-04-17 | End: 2023-04-17 | Stop reason: HOSPADM

## 2023-04-17 RX ORDER — EPHEDRINE SULFATE/0.9% NACL/PF 50 MG/5 ML
SYRINGE (ML) INTRAVENOUS PRN
Status: DISCONTINUED | OUTPATIENT
Start: 2023-04-17 | End: 2023-04-17 | Stop reason: SDUPTHER

## 2023-04-17 RX ORDER — HYDROMORPHONE HCL 110MG/55ML
0.25 PATIENT CONTROLLED ANALGESIA SYRINGE INTRAVENOUS EVERY 5 MIN PRN
Status: DISCONTINUED | OUTPATIENT
Start: 2023-04-17 | End: 2023-04-17 | Stop reason: HOSPADM

## 2023-04-17 RX ORDER — ATORVASTATIN CALCIUM 10 MG/1
10 TABLET, FILM COATED ORAL DAILY
Status: DISCONTINUED | OUTPATIENT
Start: 2023-04-17 | End: 2023-04-17 | Stop reason: HOSPADM

## 2023-04-17 RX ORDER — DIVALPROEX SODIUM 250 MG/1
250 TABLET, DELAYED RELEASE ORAL EVERY 8 HOURS SCHEDULED
Status: DISCONTINUED | OUTPATIENT
Start: 2023-04-17 | End: 2023-04-17 | Stop reason: HOSPADM

## 2023-04-17 RX ORDER — DEXAMETHASONE SODIUM PHOSPHATE 4 MG/ML
INJECTION, SOLUTION INTRA-ARTICULAR; INTRALESIONAL; INTRAMUSCULAR; INTRAVENOUS; SOFT TISSUE PRN
Status: DISCONTINUED | OUTPATIENT
Start: 2023-04-17 | End: 2023-04-17 | Stop reason: SDUPTHER

## 2023-04-17 RX ORDER — FENTANYL CITRATE 50 UG/ML
INJECTION, SOLUTION INTRAMUSCULAR; INTRAVENOUS PRN
Status: DISCONTINUED | OUTPATIENT
Start: 2023-04-17 | End: 2023-04-17 | Stop reason: SDUPTHER

## 2023-04-17 RX ORDER — ALBUTEROL SULFATE 90 UG/1
1 AEROSOL, METERED RESPIRATORY (INHALATION) EVERY 4 HOURS PRN
Status: DISCONTINUED | OUTPATIENT
Start: 2023-04-17 | End: 2023-04-17 | Stop reason: HOSPADM

## 2023-04-17 RX ORDER — ROCURONIUM BROMIDE 10 MG/ML
INJECTION, SOLUTION INTRAVENOUS PRN
Status: DISCONTINUED | OUTPATIENT
Start: 2023-04-17 | End: 2023-04-17 | Stop reason: SDUPTHER

## 2023-04-17 RX ORDER — METOPROLOL SUCCINATE 50 MG/1
1 TABLET, EXTENDED RELEASE ORAL 2 TIMES DAILY
Status: DISCONTINUED | OUTPATIENT
Start: 2023-04-17 | End: 2023-04-17 | Stop reason: HOSPADM

## 2023-04-17 RX ORDER — PANTOPRAZOLE SODIUM 40 MG/1
40 TABLET, DELAYED RELEASE ORAL
Status: DISCONTINUED | OUTPATIENT
Start: 2023-04-18 | End: 2023-04-17 | Stop reason: HOSPADM

## 2023-04-17 RX ORDER — ONDANSETRON 2 MG/ML
4 INJECTION INTRAMUSCULAR; INTRAVENOUS
Status: DISCONTINUED | OUTPATIENT
Start: 2023-04-17 | End: 2023-04-17 | Stop reason: HOSPADM

## 2023-04-17 RX ORDER — VANCOMYCIN HYDROCHLORIDE 1 G/20ML
INJECTION, POWDER, LYOPHILIZED, FOR SOLUTION INTRAVENOUS PRN
Status: DISCONTINUED | OUTPATIENT
Start: 2023-04-17 | End: 2023-04-17 | Stop reason: SDUPTHER

## 2023-04-17 RX ORDER — CLOPIDOGREL BISULFATE 75 MG/1
75 TABLET ORAL DAILY
Status: DISCONTINUED | OUTPATIENT
Start: 2023-04-17 | End: 2023-04-17 | Stop reason: HOSPADM

## 2023-04-17 RX ORDER — DULOXETIN HYDROCHLORIDE 60 MG/1
1 CAPSULE, DELAYED RELEASE ORAL DAILY
Status: DISCONTINUED | OUTPATIENT
Start: 2023-04-17 | End: 2023-04-17 | Stop reason: HOSPADM

## 2023-04-17 RX ORDER — ASPIRIN 81 MG/1
81 TABLET, CHEWABLE ORAL DAILY
Qty: 30 TABLET | Refills: 3
Start: 2023-04-18

## 2023-04-17 RX ORDER — CLOPIDOGREL BISULFATE 75 MG/1
75 TABLET ORAL DAILY
Status: DISCONTINUED | OUTPATIENT
Start: 2023-04-18 | End: 2023-04-17 | Stop reason: HOSPADM

## 2023-04-17 RX ORDER — CETIRIZINE HYDROCHLORIDE 5 MG/1
5 TABLET ORAL DAILY
Status: DISCONTINUED | OUTPATIENT
Start: 2023-04-17 | End: 2023-04-17 | Stop reason: HOSPADM

## 2023-04-17 RX ADMIN — PHENYLEPHRINE HYDROCHLORIDE 50 MCG/MIN: 10 INJECTION INTRAVENOUS at 11:09

## 2023-04-17 RX ADMIN — FENTANYL CITRATE 50 MCG: 50 INJECTION, SOLUTION INTRAMUSCULAR; INTRAVENOUS at 10:50

## 2023-04-17 RX ADMIN — IOPAMIDOL 50 ML: 755 INJECTION, SOLUTION INTRAVENOUS at 11:16

## 2023-04-17 RX ADMIN — ONDANSETRON 4 MG: 2 INJECTION INTRAMUSCULAR; INTRAVENOUS at 10:31

## 2023-04-17 RX ADMIN — HEPARIN SODIUM 11000 UNITS: 1000 INJECTION INTRAVENOUS; SUBCUTANEOUS at 10:50

## 2023-04-17 RX ADMIN — SUGAMMADEX 200 MG: 100 INJECTION, SOLUTION INTRAVENOUS at 11:20

## 2023-04-17 RX ADMIN — SODIUM CHLORIDE: 9 INJECTION, SOLUTION INTRAVENOUS at 10:19

## 2023-04-17 RX ADMIN — ROCURONIUM BROMIDE 30 MG: 10 INJECTION, SOLUTION INTRAVENOUS at 10:31

## 2023-04-17 RX ADMIN — DEXAMETHASONE SODIUM PHOSPHATE 4 MG: 4 INJECTION, SOLUTION INTRAMUSCULAR; INTRAVENOUS at 10:29

## 2023-04-17 RX ADMIN — Medication 20 MG: at 11:04

## 2023-04-17 RX ADMIN — LIDOCAINE HYDROCHLORIDE 100 MG: 20 INJECTION, SOLUTION EPIDURAL; INFILTRATION; INTRACAUDAL; PERINEURAL at 10:27

## 2023-04-17 RX ADMIN — SUCCINYLCHOLINE CHLORIDE 140 MG: 20 INJECTION, SOLUTION INTRAMUSCULAR; INTRAVENOUS at 10:27

## 2023-04-17 RX ADMIN — PHENYLEPHRINE HYDROCHLORIDE 200 MCG: 10 INJECTION INTRAVENOUS at 10:38

## 2023-04-17 RX ADMIN — PHENYLEPHRINE HYDROCHLORIDE 200 MCG: 10 INJECTION INTRAVENOUS at 11:02

## 2023-04-17 RX ADMIN — PROPOFOL 150 MG: 10 INJECTION, EMULSION INTRAVENOUS at 10:27

## 2023-04-17 RX ADMIN — PROTAMINE SULFATE 30 MG: 10 INJECTION, SOLUTION INTRAVENOUS at 11:19

## 2023-04-17 RX ADMIN — VANCOMYCIN HYDROCHLORIDE 1000 MG: 1 INJECTION, POWDER, LYOPHILIZED, FOR SOLUTION INTRAVENOUS at 10:30

## 2023-04-17 RX ADMIN — PHENYLEPHRINE HYDROCHLORIDE 100 MCG: 10 INJECTION INTRAVENOUS at 10:42

## 2023-04-17 RX ADMIN — PHENYLEPHRINE HYDROCHLORIDE 200 MCG: 10 INJECTION INTRAVENOUS at 10:59

## 2023-04-17 RX ADMIN — FENTANYL CITRATE 50 MCG: 50 INJECTION, SOLUTION INTRAMUSCULAR; INTRAVENOUS at 10:20

## 2023-04-17 ASSESSMENT — ENCOUNTER SYMPTOMS: SHORTNESS OF BREATH: 1

## 2023-04-17 NOTE — PROGRESS NOTES
Pt stated he believes he brought his cell phone and glasses. Preop , pacu, and sameday bays all checked by staff. No glasses or cellphone were found. Pt stated he may have left them in his apartment.

## 2023-04-17 NOTE — PROGRESS NOTES
[facility] site c/d/I. Pt dressed.     PT DISCHARGED HOME WITH FAMILY, PT IN STABLE CONDITION, TRANSPORTED TO CAR VIA WHEELCHAIR WITH THIS RN

## 2023-04-17 NOTE — PROGRESS NOTES
Patient to PACU from Cath lab. Awakens to voice. VSS. Right groin site soft, no bleeding, distal pulses present. Will monitor.

## 2023-04-17 NOTE — ANESTHESIA POSTPROCEDURE EVALUATION
Department of Anesthesiology  Postprocedure Note    Patient: Alessia Craft MRN: [id number]  YOB: 1960  Date of evaluation: 4/17/2023      Procedure Summary     Date: 04/17/23 Room / Location: [facility] Cath Lab; [facility] Echocardiography    Anesthesia Start: 5397 Anesthesia Stop: 9944    Procedure: ECHO BRAYDON IN CARDIAC CATH Diagnosis: Paroxysmal atrial fibrillation    Scheduled Providers:  Responsible Provider: Stepan Birch MD    Anesthesia Type: general ASA Status: 3          Anesthesia Type: No value filed.     Shekhar Phase I: Shekhar Score: 8    Shekhar Phase II:        Anesthesia Post Evaluation    Patient location during evaluation: PACU  Patient participation: complete - patient participated  Level of consciousness: awake and alert  Pain score: 2  Airway patency: patent  Nausea & Vomiting: no vomiting  Complications: no  Cardiovascular status: blood pressure returned to baseline  Respiratory status: acceptable  Hydration status: euvolemic  Multimodal analgesia pain management approach

## 2023-04-17 NOTE — DISCHARGE SUMMARY
Allergy Relief 180 MG tablet  Generic drug: fexofenadine  TAKE 1 TABLET BY MOUTH EVERY DAY     albuterol sulfate  (90 Base) MCG/ACT inhaler  Commonly known as: PROVENTIL;VENTOLIN;PROAIR  INHALE 2 PUFFS EVERY 4 HOURS AS NEEDED     atorvastatin 80 MG tablet  Commonly known as: LIPITOR  TAKE ONE TABLET BY MOUTH DAILY AT BEDTIME     carbidopa-levodopa  MG per tablet  Commonly known as: Sinemet  Take 1 tablet by mouth 3 times daily     clopidogrel 75 MG tablet  Commonly known as: PLAVIX  Take 1 tablet by mouth daily     divalproex 500 MG DR tablet  Commonly known as: DEPAKOTE  TAKE ONE TABLET BY MOUTH EVERY MORNING AND 2 TABLETS BY MOUTH AT BEDTIME     DULoxetine 60 MG extended release capsule  Commonly known as: CYMBALTA  TAKE ONE CAPSULE BY MOUTH EVERY DAY     esomeprazole 40 MG delayed release capsule  Commonly known as: NEXIUM     famotidine 20 MG tablet  Commonly known as: PEPCID     fluticasone 50 MCG/ACT nasal spray  Commonly known as: FLONASE     furosemide 20 MG tablet  Commonly known as: LASIX  TAKE ONE TABLET BY MOUTH MONDAY AND THURSDAY     gabapentin 100 MG capsule  Commonly known as: NEURONTIN     hydrOXYzine HCl 25 MG tablet  Commonly known as: ATARAX  take 1 tablet by oral route 4 times every day as needed for itching     * insulin detemir 100 UNIT/ML injection vial  Commonly known as: LEVEMIR     * insulin lispro protamine & lispro (75-25) 100 UNIT per ML Supn injection pen  Commonly known as: HUMALOG MIX  INJECT 15 UNITS SUBCUTANEOUSLY AT BREAKFAST AND 20 UNITS AT DINNER     lisinopril 5 MG tablet  Commonly known as: PRINIVIL;ZESTRIL  Take 1 tablet by mouth daily     metFORMIN 1000 MG tablet  Commonly known as: GLUCOPHAGE  TAKE ONE TABLET BY MOUTH 2 TIMES DAILY WITH MORNING AND EVENING MEALS     metoprolol succinate 50 MG extended release tablet  Commonly known as: TOPROL XL  TAKE ONE TABLET BY MOUTH TWICE DAILY     potassium chloride 20 MEQ extended release tablet  Commonly known as:

## 2023-04-17 NOTE — PROGRESS NOTES
Pt transferred from pacu, pt a/o vss.  Report received from R Milford Paixão [address] [place] [address] c/d/I.

## 2023-04-17 NOTE — ANESTHESIA PRE PROCEDURE
4/3/2023 4/10/23 4/20/23  Jared Miguel MD   DULoxetine (CYMBALTA) 60 MG extended release capsule TAKE ONE CAPSULE BY MOUTH EVERY DAY 3/3/23   ANA Cook CNP   divalproex (DEPAKOTE) 500 MG DR tablet TAKE ONE TABLET BY MOUTH EVERY MORNING AND 2 TABLETS BY MOUTH AT BEDTIME 3/3/23   ANA Cook CNP   carbidopa-levodopa (SINEMET)  MG per tablet Take 1 tablet by mouth 3 times daily 2/24/23   ANA Cook CNP   fluticasone (FLONASE) 50 MCG/ACT nasal spray 2 sprays by Each Nostril route daily as needed for Rhinitis    Historical Provider, MD   gabapentin (NEURONTIN) 100 MG capsule Take 2 capsules by mouth 3 times daily.     Historical Provider, MD   insulin lispro protamine & lispro (HUMALOG MIX) (75-25) 100 UNIT per ML SUSP injection vial Inject subcutaneously twice daily 15 units with breakfast and 20 units with dinner  Patient not taking: Reported on 4/3/2023    Historical Provider, MD   famotidine (PEPCID) 20 MG tablet Take 1 tablet by mouth 2 times daily    Historical Provider, MD   metoprolol succinate (TOPROL XL) 50 MG extended release tablet TAKE ONE TABLET BY MOUTH TWICE DAILY 7/21/22   ANA Singer CNP   lisinopril (PRINIVIL;ZESTRIL) 5 MG tablet Take 1 tablet by mouth daily 2/8/22   Maile Gregorio MD   tamsulosin [facility]) 0.4 MG capsule Take 1 capsule by mouth at bedtime 2/7/22   Maile Gregorio MD   clopidogrel (PLAVIX) 75 MG tablet Take 1 tablet by mouth daily 2/8/22   Maile Gregorio MD   esomeprazole (NEXIUM) 40 MG delayed release capsule Take 1 capsule by mouth daily    Historical Provider, MD   JARDIANCE 25 MG tablet Take 1 tablet by mouth daily 2/18/19   Historical Provider, MD   insulin detemir (LEVEMIR) 100 UNIT/ML injection vial Inject 74 Units into the skin nightly    Historical Provider, MD   metFORMIN (GLUCOPHAGE) 1000 MG tablet TAKE ONE TABLET BY MOUTH 2 TIMES DAILY WITH MORNING AND EVENING MEALS 9/26/17   ANA Dutton

## 2023-04-17 NOTE — DISCHARGE INSTRUCTIONS
surgery. A responsible adult needs to be with you for 24 hours. You may experience lightheadedness, dizziness, or sleepiness following surgery. Rest at home today- increase activity as tolerated. Progress slowly to a regular diet unless your physician has instructed you otherwise. Drink plenty of water. If persistent nausea and vomiting becomes a problem, call your physician. Coughing, sore throat and muscle aches are other side effects of anesthesia, and should improve with time. Do not drive or operate machinery while taking narcotics. Females of childbearing potential and on hormonal birth control, should use two forms of contraception following procedure if given a medication called sugammadex and/or emend. Additional contraception should be used for 7 days for sugammadex and/or 28 days for emend. These medications have a potential to reduce the effectiveness of hormonal birth control.

## 2023-04-17 NOTE — H&P
Primary Cardiologist: S  Referring Physician: Ksenia Lucero APRN-CNP     Reason for Referral: Left atrial appendage closure     CC: \"I am unsteady. Multiple falls\"        Subjective:      History of Present Illness:     Wilber Chaudhry is a 63 y.o. patient with a PMH significant for paroxsymal atrial fibrillation, ventricular tachycardia, chronic systolic heart failure, diabetes, coronary artery disease, hypertension, hyperlipidemia, COPD, and GERD. In 2001, he had his first original ICD implant, and it was replaced in 2005 for battery depletion. He underwent lead extraction at [address] [place] [address] in 2007 for fracture of the RV lead. He received several ICD shocks in 2008 when he did not take his medication. His current ICD was implanted in May 2011. In 6/2021 he reported syncope to his primary cardiologist. In 7/2021 he had an abnormal stress test and underwent LHC. He had 3 ROSHNI placed (LCx, OM, and PDA). In 2/2022 he was hospitalized for weakness and falls and was diagnosed with COVID-19. During his hospitalization, he went into AF and his ICD inappropriately fired. VT detection was adjusted at that time. In 6/2022 he was seen in the office and had no complaints. NSVT was seen on device interrogations, but due to limitations of only having RV lead, unable to determine rhythm of NSVT events. Toprol was increased to 50 mg BID. In 10/2022 he was admitted for generalized weakness. In 1/2023 he was admitted after having a fall at home. Patient is being referred for Left Atrial Appendage Closure with WATCHMAN device for management of stroke risk resulting from non-valvular atrial fibrillation. Based on their past history, it has been determined that they are poor candidates for long-term oral-anticoagulation, however may be tolerant of short term treatment with [facility] as necessary. Today, he is here to discuss Watchman. He is unsteady and does use a cane. He has Parkinson's as well.  Patient denies

## 2023-04-17 NOTE — PROCEDURES
[address] [place] [address]     Electrophysiology Procedure Note       Date of Procedure: 4/17/2023  Patient's Name: Octavia Pelaez. YOB: 1960   Medical Record Number: [id number]  Procedure Performed by: Lakshmi Cardenas MD    Procedures performed:  Percutaneous transcatheter closure of the left atrial appendage with endocardial implant, Watchman Flx device  BRAYDON     Indication of the procedure:   Atrial fibrillation, high [id number] score and high risk for stroke and thromboembolism. High risk of bleeding, not a good candidate for long term anticoagulation     Details of procedure: The patient was brought to the electrophysiology laboratory in stable condition. The patient was in a fasting and non-sedated state. The risks, benefits and alternatives of the procedure were discussed with the patient. The risks including, but not limited to, the risks of vascular injury, bleeding, infection, device malfunction, dislodgement, radiation exposure, injury to cardiac and surrounding structures, perforation, stroke, myocardial infarction and death were discussed in detail. The patient opted to proceed with the device implantation. Written informed consent was signed and placed in the chart. Prophylactic antibiotic was given. The patient was prepped and draped in a sterile fashion. A timeout protocol was completed to identify the patient and the procedure being performed. Patient underwent general anesthesia by anesthesiology team.       Transesophageal echocardiography was performed and measurements of left atrial appendage, including ostium size and depth were obtained for selection of appropriate watchman device. Both groins were prepped in a sterile fashion. Femoral venous access was obtained under live ultrasound guidance. The femoral vein was identified with real time visualization of needle passage to the venous lumen. The vein was pre-closed using PerClose device.  A 16 F sheath was

## 2023-04-18 ENCOUNTER — TELEPHONE (OUTPATIENT)
Dept: FAMILY MEDICINE CLINIC | Age: 63
End: 2023-04-18

## 2023-04-18 LAB
EKG ATRIAL RATE: 82 BPM
EKG DIAGNOSIS: NORMAL
EKG P AXIS: 43 DEGREES
EKG P-R INTERVAL: 182 MS
EKG Q-T INTERVAL: 376 MS
EKG QRS DURATION: 96 MS
EKG QTC CALCULATION (BAZETT): 439 MS
EKG R AXIS: 8 DEGREES
EKG T AXIS: 94 DEGREES
EKG VENTRICULAR RATE: 82 BPM

## 2023-04-18 NOTE — TELEPHONE ENCOUNTER
Care Transitions Initial Follow Up Call    Outreach made within 2 business days of discharge: Yes    Patient: Bita Chahal. Patient : 1960   MRN: [id number]  Reason for Admission: There are no discharge diagnoses documented for the most recent discharge.   Discharge Date: 23       Spoke with: [facility] to call back     Discharge department/facility: [facility]       Scheduled appointment with PCP within 7-14 days    Follow Up  Future Appointments   Date Time Provider Guillaume Barone   2023  8:00  [facility]   2023  1:40 PM Alecia Dobbs APRN - JENSEN SHOOK [place] - DYD   2023  8:35 AM SCHEDULE, [facility]   2023 11:15 AM SCHEDULE, [facility]   2023 11:15 AM Cheryle Arn, MD Lee Litter [facility]   2023 11:00 AM ANA Jean - CNP CLERM PULM [facility]   2023  1:00 PM MD JORDAN Li [facility]   10/10/2023  3:00 PM Ruthy Isidro APRN - JENSEN DwyerLee Litter [address]

## 2023-04-19 NOTE — TELEPHONE ENCOUNTER
Care Transitions Initial Follow Up Call    Outreach made within 2 business days of discharge: Yes    Patient: Matty Weston Patient : 1960   MRN: [id number]  Reason for Admission: There are no discharge diagnoses documented for the most recent discharge.   Discharge Date: 23       Spoke with: [facility] to call 2nd attempt           Scheduled appointment with PCP within 7-14 days    Follow Up  Future Appointments   Date Time Provider Guillaume Barone   2023  8:00 PM Jus  [facility]   2023  1:40 PM ANA Grant CNP [place] - DYD   2023  8:35 AM SCHEDULE, [place]   2023 11:15 AM SCHEDULE, [facility]   2023 11:15 AM MD Ashley Manrique [facility]   2023 11:00 AM ANA Justin CNP PULM [facility]   2023  1:00 PM MD JORDAN Benavides [facility]   10/10/2023  3:00 PM ANA Ruvalcaba CNP [facility]       Daniel Cano

## 2023-04-20 NOTE — TELEPHONE ENCOUNTER
Care Transitions Initial Follow Up Call    Outreach made within 2 business days of discharge: Yes    Patient: Alice Cordoba. Patient : 1960   MRN: [id number]  Reason for Admission: There are no discharge diagnoses documented for the most recent discharge. Discharge Date: 23       Spoke with: Patient     Discharge department/facility: [address] [place] [address] Interactive Patient Contact:  Was patient able to fill all prescriptions: Yes  Was patient instructed to bring all medications to the follow-up visit: Yes  Is patient taking all medications as directed in the discharge summary?  Yes  Does patient understand their discharge instructions: Yes  Does patient have questions or concerns that need addressed prior to 7-14 day follow up office visit: no    Scheduled appointment with PCP within 7-14 days    Follow Up  Future Appointments   Date Time Provider Guillaume Barone   [facility]   [facility] [address] [place] [address]   20[facility]  1:40 [facility]   2023  8:35 AM SCHEDULE, [facility]   2023 11:15 AM SCHEDULE, [facility]   2023 11:15 AM Jose Costa MD [facility]   2023 11:00 AM ANA Tong CNP PULM [facility]   2023  1:00 PM Manfred Cowden, MD SARDINIA CAR [facility]   10/10/2023  3:00 PM ANA Olivo - CNP [facility] [address]

## 2023-04-24 ENCOUNTER — OFFICE VISIT (OUTPATIENT)
Dept: FAMILY MEDICINE CLINIC | Age: 63
End: 2023-04-24

## 2023-04-24 VITALS — DIASTOLIC BLOOD PRESSURE: 64 MMHG | BODY MASS INDEX: 38.28 KG/M2 | WEIGHT: 223 LBS | SYSTOLIC BLOOD PRESSURE: 110 MMHG

## 2023-04-24 DIAGNOSIS — E11.65 TYPE 2 DIABETES MELLITUS WITH HYPERGLYCEMIA, WITH LONG-TERM CURRENT USE OF INSULIN (HCC): ICD-10-CM

## 2023-04-24 DIAGNOSIS — Z09 HOSPITAL DISCHARGE FOLLOW-UP: Primary | ICD-10-CM

## 2023-04-24 DIAGNOSIS — Z95.818 PRESENCE OF WATCHMAN LEFT ATRIAL APPENDAGE CLOSURE DEVICE: ICD-10-CM

## 2023-04-24 DIAGNOSIS — Z79.4 TYPE 2 DIABETES MELLITUS WITH HYPERGLYCEMIA, WITH LONG-TERM CURRENT USE OF INSULIN (HCC): ICD-10-CM

## 2023-04-24 DIAGNOSIS — I48.0 PAF (PAROXYSMAL ATRIAL FIBRILLATION) (HCC): ICD-10-CM

## 2023-04-24 NOTE — PROGRESS NOTES
Post-Discharge Transitional Care  Follow Up      [address] YOB: 1960    Date of Office Visit:  4/24/2023  Date of Hospital Admission: 4/17/23  Date of Hospital Discharge: 4/17/23  Risk of hospital readmission (high >=14%. Medium >=10%) :Readmission Risk Score: 10.7      Care management risk score Rising risk (score 2-5) and Complex Care (Scores >=6): No Risk Score On File     Non face to face  following discharge, date last encounter closed (first attempt may have been earlier): 04/18/2023    Call initiated 2 business days of discharge: Yes    ASSESSMENT/PLAN:   Hospital discharge follow-up  -     RI DISCHARGE MEDS RECONCILED W/ CURRENT OUTPATIENT MED LIST  - No complaints or issues following his discharge  PAF (paroxysmal atrial fibrillation) ([facility] [address]       - Longstanding issue  -status post watchman, doing well. Following up with EP in June  -off NOAC and now on ASA 81 and plavix. Presence of Watchman left atrial appendage closure device  -See above       -   Type 2 diabetes mellitus with hyperglycemia, with long-term current use of insulin (HCC)    -     Continuous Blood Gluc  (FREESTYLE OTF 2 READER) KIAH; 1 Units by Does not apply route 6 times daily Diabetes/ insulin monitoring, Disp-1 each, R-0Normal  -     Continuous Blood Gluc Sensor (FREESTYLE OTF 2 SENSOR) MISC; 1 Units by Does not apply route 6 times daily For diabetes/ insulin monitoring, Disp-2 each, R-5Normal      Medical Decision Making: moderate complexity  Return for keep 5/17 appt . On this date 4/24/2023 I have spent 35 minutes reviewing previous notes, test results and face to face with the patient discussing the diagnosis and importance of compliance with the treatment plan as well as documenting on the day of the visit. Subjective:   HPI:  Follow up of Hospital problems/diagnosis(es): PAF, s/p Watchman device. Inpatient course: Discharge summary reviewed- see chart.     Interval

## 2023-04-24 NOTE — TELEPHONE ENCOUNTER
Spoke with patient. Patient is scheduled with Dr. Jason Uribe for [address] with anesthesia on 6/9/23 at [facility], arrival time of 11:30am to the Cath Lab. Please have patient arrive to the main entrance of [facility] and check in with the registration desk. Please call patient regarding medication instructions. Remind patient to be NPO after midnight (8 hours prior). Do not apply lotions/creams on skin the day of procedure.

## 2023-04-28 RX ORDER — CLOPIDOGREL BISULFATE 75 MG/1
TABLET ORAL
Qty: 28 TABLET | Refills: 0 | Status: SHIPPED | OUTPATIENT
Start: 2023-04-28

## 2023-04-28 RX ORDER — FAMOTIDINE 20 MG/1
TABLET, FILM COATED ORAL
Qty: 56 TABLET | Refills: 0 | Status: SHIPPED | OUTPATIENT
Start: 2023-04-28

## 2023-05-01 ENCOUNTER — HOSPITAL ENCOUNTER (EMERGENCY)
Age: 63
Discharge: HOME | End: 2023-05-01
Attending: EMERGENCY MEDICINE
Payer: MEDICARE

## 2023-05-01 ENCOUNTER — APPOINTMENT (OUTPATIENT)
Dept: GENERAL RADIOLOGY | Age: 63
End: 2023-05-01
Payer: MEDICARE

## 2023-05-01 VITALS
OXYGEN SATURATION: 97 % | DIASTOLIC BLOOD PRESSURE: 86 MMHG | HEART RATE: 84 BPM | BODY MASS INDEX: 38.24 KG/M2 | SYSTOLIC BLOOD PRESSURE: 152 MMHG | HEIGHT: 64 IN | RESPIRATION RATE: 16 BRPM | WEIGHT: 224 LBS | TEMPERATURE: 98 F

## 2023-05-01 DIAGNOSIS — R73.9 HYPERGLYCEMIA WITHOUT KETOSIS: ICD-10-CM

## 2023-05-01 DIAGNOSIS — E11.65 TYPE 2 DIABETES MELLITUS WITH HYPERGLYCEMIA, WITH LONG-TERM CURRENT USE OF INSULIN (HCC): ICD-10-CM

## 2023-05-01 DIAGNOSIS — Z79.4 TYPE 2 DIABETES MELLITUS WITH HYPERGLYCEMIA, WITH LONG-TERM CURRENT USE OF INSULIN (HCC): ICD-10-CM

## 2023-05-01 DIAGNOSIS — R53.83 OTHER FATIGUE: Primary | ICD-10-CM

## 2023-05-01 LAB
ALBUMIN SERPL-MCNC: 4.4 G/DL (ref 3.4–5)
ALBUMIN/GLOB SERPL: 1.9 {RATIO} (ref 1.1–2.2)
ALP SERPL-CCNC: 88 U/L (ref 40–129)
ALT SERPL-CCNC: 22 U/L (ref 10–40)
ANION GAP SERPL CALCULATED.3IONS-SCNC: 16 MMOL/L (ref 3–16)
AST SERPL-CCNC: 19 U/L (ref 15–37)
BASE EXCESS BLDV CALC-SCNC: -1.1 MMOL/L (ref -3–3)
BASOPHILS # BLD: 0 K/UL (ref 0–0.2)
BASOPHILS NFR BLD: 0.6 %
BILIRUB SERPL-MCNC: 0.6 MG/DL (ref 0–1)
BILIRUB UR QL STRIP.AUTO: NEGATIVE
BUN SERPL-MCNC: 13 MG/DL (ref 7–20)
CALCIUM SERPL-MCNC: 10.3 MG/DL (ref 8.3–10.6)
CHLORIDE SERPL-SCNC: 98 MMOL/L (ref 99–110)
CLARITY UR: CLEAR
CO2 BLDV-SCNC: 24 MMOL/L
CO2 SERPL-SCNC: 22 MMOL/L (ref 21–32)
COHGB MFR BLDV: 1.2 % (ref 0–1.5)
COLOR UR: YELLOW
CREAT SERPL-MCNC: 0.7 MG/DL (ref 0.8–1.3)
DEPRECATED RDW RBC AUTO: 14 % (ref 12.4–15.4)
EOSINOPHIL # BLD: 0 K/UL (ref 0–0.6)
EOSINOPHIL NFR BLD: 0.5 %
GFR SERPLBLD CREATININE-BSD FMLA CKD-EPI: >60 ML/MIN/{1.73_M2}
GLUCOSE SERPL-MCNC: 205 MG/DL (ref 70–99)
GLUCOSE UR STRIP.AUTO-MCNC: >=1000 MG/DL
HCO3 BLDV-SCNC: 22.8 MMOL/L (ref 23–29)
HCT VFR BLD AUTO: 46.4 % (ref 40.5–52.5)
HGB BLD-MCNC: 15.8 G/DL (ref 13.5–17.5)
HGB UR QL STRIP.AUTO: NEGATIVE
KETONES UR STRIP.AUTO-MCNC: 15 MG/DL
LEUKOCYTE ESTERASE UR QL STRIP.AUTO: NEGATIVE
LYMPHOCYTES # BLD: 2 K/UL (ref 1–5.1)
LYMPHOCYTES NFR BLD: 29.8 %
MCH RBC QN AUTO: 29.4 PG (ref 26–34)
MCHC RBC AUTO-ENTMCNC: 34.1 G/DL (ref 31–36)
MCV RBC AUTO: 86.2 FL (ref 80–100)
METHGB MFR BLDV: 0.3 %
MONOCYTES # BLD: 0.6 K/UL (ref 0–1.3)
MONOCYTES NFR BLD: 9.2 %
NEUTROPHILS # BLD: 4.1 K/UL (ref 1.7–7.7)
NEUTROPHILS NFR BLD: 59.9 %
NITRITE UR QL STRIP.AUTO: NEGATIVE
NT-PROBNP SERPL-MCNC: 250 PG/ML (ref 0–124)
O2 CT VFR BLDV CALC: 22 VOL %
O2 THERAPY: ABNORMAL
PCO2 BLDV: 36 MMHG (ref 40–50)
PH BLDV: 7.42 [PH] (ref 7.35–7.45)
PH UR STRIP.AUTO: 5.5 [PH] (ref 5–8)
PLATELET # BLD AUTO: 141 K/UL (ref 135–450)
PMV BLD AUTO: 6.9 FL (ref 5–10.5)
PO2 BLDV: 66.7 MMHG (ref 25–40)
POTASSIUM SERPL-SCNC: 4.4 MMOL/L (ref 3.5–5.1)
PROT SERPL-MCNC: 6.7 G/DL (ref 6.4–8.2)
PROT UR STRIP.AUTO-MCNC: NEGATIVE MG/DL
RBC # BLD AUTO: 5.39 M/UL (ref 4.2–5.9)
SAO2 % BLDV: 94 %
SODIUM SERPL-SCNC: 136 MMOL/L (ref 136–145)
SP GR UR STRIP.AUTO: 1.01 (ref 1–1.03)
TROPONIN, HIGH SENSITIVITY: 8 NG/L (ref 0–22)
UA COMPLETE W REFLEX CULTURE PNL UR: ABNORMAL
UA DIPSTICK W REFLEX MICRO PNL UR: ABNORMAL
URN SPEC COLLECT METH UR: ABNORMAL
UROBILINOGEN UR STRIP-ACNC: 0.2 E.U./DL
WBC # BLD AUTO: 6.8 K/UL (ref 4–11)

## 2023-05-01 PROCEDURE — 84484 ASSAY OF TROPONIN QUANT: CPT

## 2023-05-01 PROCEDURE — 80053 COMPREHEN METABOLIC PANEL: CPT

## 2023-05-01 PROCEDURE — 83880 ASSAY OF NATRIURETIC PEPTIDE: CPT

## 2023-05-01 PROCEDURE — 81003 URINALYSIS AUTO W/O SCOPE: CPT

## 2023-05-01 PROCEDURE — 71045 X-RAY EXAM CHEST 1 VIEW: CPT

## 2023-05-01 PROCEDURE — 99284 EMERGENCY DEPT VISIT MOD MDM: CPT

## 2023-05-01 PROCEDURE — 82803 BLOOD GASES ANY COMBINATION: CPT

## 2023-05-01 PROCEDURE — 36415 COLL VENOUS BLD VENIPUNCTURE: CPT

## 2023-05-01 PROCEDURE — 85025 COMPLETE CBC W/AUTO DIFF WBC: CPT

## 2023-05-01 NOTE — ED PROVIDER NOTES
Emergency Department Attending Physician Note  Location: [facility] EMERGENCY DEPARTMENT  5/1/2023       Pt Name: Mikhail Talamantes. MRN: [id number]  Birthdate 1960    Date of evaluation: 5/1/2023  Provider: Jim Perkins DO  PCP: ANA Walker Asa, CNP    Note Started: 6:00 PM EDT 5/1/23    CHIEF COMPLAINT  Chief Complaint   Patient presents with    Fatigue     Pt arrived via EMS. Pt feeling weaker than normal today. . Pt had slid down off edge of bed today, pt denies injury from event. HISTORY OF PRESENT ILLNESS:  History obtained by patient. Limitations to history : None. Mikhail Talamantes. is a 63 y.o. male with a significant PMHx of Parkinson's disease, hyperlipidemia, hypertension, type 2 diabetes, presenting with department today with concerns of generalized weakness; says he has good days and bad days with his Parkinson's disease, and today was just a little weaker than yesterday. Patient active, and 1 for lift assist, at his assisted living facility, and did not necessarily want to come to the emergency department. Says he was putting short time at the edge of his bed, and slid off edge of his bed and could not stand up, but he is called for a lift assist multiple times in the past given his disability. Denies any concerns, chest pain, shortness breath. Here, in the emergency department, he is actually ambulating much better. He denies any other issues, denies pain, shortness breath nausea, vomiting or lightheadedness. He normally. Has been performing his daily activity. No other concerns today emergency department      Nursing Notes were all reviewed and agreed with or any disagreements were addressed in the HPI.       MEDICAL HISTORY  Past Medical History:   Diagnosis Date    Arthritis     Asthma     CAD (coronary artery disease)     Fatty liver     GERD (gastroesophageal reflux disease)     Hyperlipidemia     Hypertension     Medical history reviewed

## 2023-05-01 NOTE — ED NOTES
Pt ambulated to bathroom and back to room x 2. Pt was steady on feet for duration of ambulation, pt only required standby assist during ambulation.       Brittaney Solorio RN  05/01/23 2069

## 2023-05-02 NOTE — ED NOTES
Discharge instructions reviewed with Pt. Pt verbalizes understanding at this time. Pt condition stable at this time. No concerns voiced.        Jonnie Newsome RN  05/01/23 2003

## 2023-05-02 NOTE — ED PROVIDER NOTES
Emergency Department Provider Note  Location: [place] [address] [place]    Final ED Course and MDM:  I assumed patient care at 1900 from Dr. Valencia Pendleton. Please refer to his/her note for the history, physical exam, and initial medical decision making. Briefly, this is a 63 y.o. male brought in by EMS after he called for lift assist. Patient has a history of Parkinson's. He lost his balance while trying to pull up his pants today. He called for lift assist and EMS insisted on bringing the patient here for evaluation. Patient otherwise has no complaint. At the time of sign out, workup was unremarkable without acute pathology found. UA was pending. If UA abnormal, plan was to d/c with abx. If UA normal, d/c home. Patient was observed walking in the ED without requiring assistance. He ambulated to the bathroom without RN's help. Radiology  XR CHEST PORTABLE    Result Date: 5/1/2023  EXAMINATION: ONE XRAY VIEW OF THE CHEST 5/1/2023 5:59 pm COMPARISON: 01/24/2023 HISTORY: ORDERING SYSTEM PROVIDED HISTORY: fatigue r/o PNA TECHNOLOGIST PROVIDED HISTORY: Reason for exam:->fatigue r/o PNA Reason for Exam: loss of balance, hx parkinson, no chest complaints FINDINGS: Transvenous pacer remains in place. The lungs are without acute focal process. There is no effusion or pneumothorax. The cardiomediastinal silhouette is stable. The osseous structures are stable. No acute process.       Lab  Results for orders placed or performed during the hospital encounter of 05/01/23   CBC with Auto Differential   Result Value Ref Range    WBC 6.8 4.0 - 11.0 K/uL    RBC 5.39 4.20 - 5.90 M/uL    Hemoglobin 15.8 13.5 - 17.5 g/dL    Hematocrit 46.4 40.5 - 52.5 %    MCV 86.2 80.0 - 100.0 fL    MCH 29.4 26.0 - 34.0 pg    MCHC 34.1 31.0 - 36.0 g/dL    RDW 14.0 12.4 - 15.4 %    Platelets 200 510 - 471 K/uL    MPV 6.9 5.0 - 10.5 fL    Neutrophils % 59.9 %    Lymphocytes % 29.8 %    Monocytes % 9.2 %    Eosinophils % 0.5 %    Basophils % 0.6

## 2023-05-05 RX ORDER — DULAGLUTIDE 3 MG/.5ML
INJECTION, SOLUTION SUBCUTANEOUS
Qty: 2 ML | Refills: 1 | Status: SHIPPED | OUTPATIENT
Start: 2023-05-05

## 2023-05-07 ASSESSMENT — ENCOUNTER SYMPTOMS
SHORTNESS OF BREATH: 0
NAUSEA: 0
COUGH: 0
VOMITING: 0

## 2023-05-12 RX ORDER — ALBUTEROL SULFATE 90 UG/1
AEROSOL, METERED RESPIRATORY (INHALATION)
Qty: 8.5 G | Refills: 3 | Status: SHIPPED | OUTPATIENT
Start: 2023-05-12

## 2023-05-16 ENCOUNTER — HOSPITAL ENCOUNTER (OUTPATIENT)
Dept: SLEEP CENTER | Age: 63
Discharge: HOME | End: 2023-05-18
Payer: MEDICARE

## 2023-05-16 DIAGNOSIS — Z72.821 POOR SLEEP HYGIENE: ICD-10-CM

## 2023-05-16 DIAGNOSIS — Z78.9 DIFFICULTY WITH BIPAP USE: ICD-10-CM

## 2023-05-16 DIAGNOSIS — Z71.89 ENCOUNTER FOR BIPAP USE COUNSELING: ICD-10-CM

## 2023-05-16 DIAGNOSIS — E66.9 OBESITY (BMI 30-39.9): ICD-10-CM

## 2023-05-16 DIAGNOSIS — G47.33 SEVERE OBSTRUCTIVE SLEEP APNEA: ICD-10-CM

## 2023-05-16 DIAGNOSIS — F51.04 PSYCHOPHYSIOLOGICAL INSOMNIA: ICD-10-CM

## 2023-05-16 DIAGNOSIS — R53.83 OTHER FATIGUE: ICD-10-CM

## 2023-05-16 DIAGNOSIS — I10 HTN (HYPERTENSION), BENIGN: ICD-10-CM

## 2023-05-16 DIAGNOSIS — G47.21 DELAYED SLEEP PHASE SYNDROME: ICD-10-CM

## 2023-05-16 PROCEDURE — 95811 POLYSOM 6/>YRS CPAP 4/> PARM: CPT

## 2023-05-17 ENCOUNTER — OFFICE VISIT (OUTPATIENT)
Dept: FAMILY MEDICINE CLINIC | Age: 63
End: 2023-05-17
Payer: MEDICARE

## 2023-05-17 VITALS — DIASTOLIC BLOOD PRESSURE: 70 MMHG | BODY MASS INDEX: 37.93 KG/M2 | SYSTOLIC BLOOD PRESSURE: 118 MMHG | WEIGHT: 221 LBS

## 2023-05-17 DIAGNOSIS — G20 PARKINSON'S DISEASE (HCC): ICD-10-CM

## 2023-05-17 DIAGNOSIS — Z79.4 TYPE 2 DIABETES MELLITUS WITH HYPERGLYCEMIA, WITH LONG-TERM CURRENT USE OF INSULIN (HCC): Primary | ICD-10-CM

## 2023-05-17 DIAGNOSIS — E11.65 TYPE 2 DIABETES MELLITUS WITH HYPERGLYCEMIA, WITH LONG-TERM CURRENT USE OF INSULIN (HCC): Primary | ICD-10-CM

## 2023-05-17 DIAGNOSIS — Z99.89 OSA ON CPAP: ICD-10-CM

## 2023-05-17 DIAGNOSIS — G47.33 OSA ON CPAP: ICD-10-CM

## 2023-05-17 PROBLEM — G20.A1 PARKINSON'S DISEASE: Status: ACTIVE | Noted: 2023-05-17

## 2023-05-17 LAB — HBA1C MFR BLD: 7.7 %

## 2023-05-17 PROCEDURE — 99214 OFFICE O/P EST MOD 30 MIN: CPT

## 2023-05-17 PROCEDURE — 3051F HG A1C>EQUAL 7.0%<8.0%: CPT

## 2023-05-17 PROCEDURE — 3078F DIAST BP <80 MM HG: CPT

## 2023-05-17 PROCEDURE — 83036 HEMOGLOBIN GLYCOSYLATED A1C: CPT

## 2023-05-17 PROCEDURE — 3074F SYST BP LT 130 MM HG: CPT

## 2023-05-17 ASSESSMENT — ENCOUNTER SYMPTOMS
EYE PAIN: 0
EYE DISCHARGE: 0
SORE THROAT: 0
ABDOMINAL DISTENTION: 0
SHORTNESS OF BREATH: 0
COUGH: 0
DIARRHEA: 0
ABDOMINAL PAIN: 0
CHEST TIGHTNESS: 0
COLOR CHANGE: 0
CONSTIPATION: 0

## 2023-05-17 NOTE — PROGRESS NOTES
[facility]    2023    Cheryl Briones (:  1960) is a 63 y.o. male, here to follow up on DM, HTN and CAD    Chief Complaint   Patient presents with    Diabetes     Pt said his sugar was 189 this morning    Follow-Up from Hospital     From fall         ASSESSMENT/ PLAN  1. Type 2 diabetes mellitus with hyperglycemia, with long-term current use of insulin (HCC)  -A1c is 7.7.  -Continue Trulicity 3 mg  -Continue Levemir and 75-25 insulin  -Continue to be at  - POCT glycosylated hemoglobin (Hb A1C)    2. Parkinson's disease (HCC)  -Stable on Sinemet  -Gait pattern is much improved  -Tremor has lessened  -Affect improved as well    3. ALIN on CPAP  -Recently completed sleep study. Plans to switch from CPAP to BiPAP          Imagin2022 ECHO    Summary   This is a limited study afib, ischemic cmp, recurrent pacer firing. LV systolic function appears mildly reduced with EF estimated at 45-50%. There is more prominent HK of the inferior wall. Left ventricular size is decreased. There is mild concentric left ventricular hypertrophy. 2019 35-40% inf basal hypo, inferolateral akinesis, LVE, mild MR      Signature    NM STRESS 2021    Summary    Overall findings represent a high risk scan. Normal LV size with reduced function. EF 49%    Large mostly reversible defect involving the basal inferolateral, basal    inferior, mid inferolateral and mid inferior segments. Findings concerning for ischemia. ECG: Non-diagnostic EKG response due to failure to reach target heart rate. Return in about 3 months (around 2023) for DM and A1c. HPI  Patient is here to establish care. Previously was being seen at [facility]. [facility] for acute rehab and PT.      Medical hx of GERD, HTN, MI in 2001, x5 cardiac stents, a-fib, ischemic cardiomyopathy, systolic congestive heart failure secondary to

## 2023-05-18 ENCOUNTER — TELEPHONE (OUTPATIENT)
Dept: PULMONOLOGY | Age: 63
End: 2023-05-18

## 2023-05-18 DIAGNOSIS — G47.33 SEVERE OBSTRUCTIVE SLEEP APNEA: Primary | ICD-10-CM

## 2023-05-23 ENCOUNTER — OFFICE VISIT (OUTPATIENT)
Dept: FAMILY MEDICINE CLINIC | Age: 63
End: 2023-05-23

## 2023-05-23 VITALS — DIASTOLIC BLOOD PRESSURE: 80 MMHG | WEIGHT: 217 LBS | SYSTOLIC BLOOD PRESSURE: 138 MMHG | BODY MASS INDEX: 37.25 KG/M2

## 2023-05-23 DIAGNOSIS — Z09 HOSPITAL DISCHARGE FOLLOW-UP: Primary | ICD-10-CM

## 2023-05-23 DIAGNOSIS — E86.0 DEHYDRATION: ICD-10-CM

## 2023-05-23 DIAGNOSIS — R42 DIZZINESS: ICD-10-CM

## 2023-05-23 RX ORDER — FEXOFENADINE HYDROCHLORIDE 180 MG/1
TABLET ORAL
Qty: 28 TABLET | Refills: 3 | Status: SHIPPED | OUTPATIENT
Start: 2023-05-23

## 2023-05-23 RX ORDER — CLOPIDOGREL BISULFATE 75 MG/1
TABLET ORAL
Qty: 28 TABLET | Refills: 3 | Status: SHIPPED | OUTPATIENT
Start: 2023-05-23

## 2023-05-23 RX ORDER — FAMOTIDINE 20 MG/1
TABLET, FILM COATED ORAL
Qty: 56 TABLET | Refills: 3 | Status: SHIPPED | OUTPATIENT
Start: 2023-05-23

## 2023-05-23 RX ORDER — ATORVASTATIN CALCIUM 80 MG/1
TABLET, FILM COATED ORAL
Qty: 28 TABLET | Refills: 3 | Status: SHIPPED | OUTPATIENT
Start: 2023-05-23

## 2023-05-23 NOTE — PROGRESS NOTES
capsule Take 1 capsule by mouth at bedtime 30 capsule 3    esomeprazole (NEXIUM) 40 MG delayed release capsule Take 1 capsule by mouth daily      JARDIANCE 25 MG tablet Take 1 tablet by mouth daily      insulin detemir (LEVEMIR) 100 UNIT/ML injection vial Inject 74 Units into the skin nightly      metFORMIN (GLUCOPHAGE) 1000 MG tablet TAKE ONE TABLET BY MOUTH 2 TIMES DAILY WITH MORNING AND EVENING MEALS 60 tablet 0        Medications patient taking as of now reconciled against medications ordered at time of hospital discharge: Yes    A comprehensive review of systems was negative except for what was noted in the HPI. Objective:    /80 (Site: Right Upper Arm, Position: Sitting, Cuff Size: Medium Adult)   Wt 217 lb (98.4 kg)   BMI 37.25 kg/m²   General Appearance: alert and oriented to person, place and time, well-developed and well-nourished, in no acute distress  Neck: neck supple and non tender without mass, no thyromegaly or thyroid nodules, no cervical lymphadenopathy   Pulmonary/Chest: clear to auscultation bilaterally- no wheezes, rales or rhonchi, normal air movement, no respiratory distress  Musculoskeletal: normal range of motion, no joint swelling, deformity or tenderness  Neurologic: speech normal, muscle strength normal, finger to nose normal, and resting tremor noted      An electronic signature was used to authenticate this note.   --ANA Whalen - CNP

## 2023-05-24 ENCOUNTER — TELEPHONE (OUTPATIENT)
Dept: FAMILY MEDICINE CLINIC | Age: 63
End: 2023-05-24

## 2023-05-24 DIAGNOSIS — Z74.09 IMPAIRED MOBILITY: ICD-10-CM

## 2023-05-24 DIAGNOSIS — G20 PARKINSON'S DISEASE (HCC): Primary | ICD-10-CM

## 2023-05-24 NOTE — TELEPHONE ENCOUNTER
Patient states he would like to get a manual wheelchair, was told by  that this has to be ordered by [facility]. Would like to use [place]. Please advise.

## 2023-06-09 ENCOUNTER — ANESTHESIA EVENT (OUTPATIENT)
Dept: NON INVASIVE DIAGNOSTICS | Age: 63
End: 2023-06-09
Payer: MEDICARE

## 2023-06-09 ENCOUNTER — HOSPITAL ENCOUNTER (OUTPATIENT)
Dept: CARDIAC CATH/INVASIVE PROCEDURES | Age: 63
Setting detail: OBSERVATION
Discharge: HOME | End: 2023-06-09
Attending: INTERNAL MEDICINE | Admitting: INTERNAL MEDICINE
Payer: MEDICARE

## 2023-06-09 ENCOUNTER — ANESTHESIA (OUTPATIENT)
Dept: NON INVASIVE DIAGNOSTICS | Age: 63
End: 2023-06-09
Payer: MEDICARE

## 2023-06-09 ENCOUNTER — HOSPITAL ENCOUNTER (OUTPATIENT)
Dept: NON INVASIVE DIAGNOSTICS | Age: 63
Discharge: HOME | End: 2023-06-09
Attending: INTERNAL MEDICINE
Payer: MEDICARE

## 2023-06-09 VITALS
BODY MASS INDEX: 37.56 KG/M2 | HEIGHT: 64 IN | OXYGEN SATURATION: 97 % | DIASTOLIC BLOOD PRESSURE: 61 MMHG | SYSTOLIC BLOOD PRESSURE: 111 MMHG | HEART RATE: 72 BPM | RESPIRATION RATE: 16 BRPM | TEMPERATURE: 97.9 F | WEIGHT: 220 LBS

## 2023-06-09 PROBLEM — I48.0 PAROXYSMAL ATRIAL FIBRILLATION (HCC): Status: ACTIVE | Noted: 2023-06-09

## 2023-06-09 PROBLEM — Z95.818 PRESENCE OF WATCHMAN LEFT ATRIAL APPENDAGE CLOSURE DEVICE: Status: ACTIVE | Noted: 2023-06-09

## 2023-06-09 LAB
ANION GAP SERPL CALCULATED.3IONS-SCNC: 12 MMOL/L (ref 3–16)
BUN SERPL-MCNC: 10 MG/DL (ref 7–20)
CALCIUM SERPL-MCNC: 9.4 MG/DL (ref 8.3–10.6)
CHLORIDE SERPL-SCNC: 102 MMOL/L (ref 99–110)
CHOLEST SERPL-MCNC: 99 MG/DL (ref 0–199)
CO2 SERPL-SCNC: 22 MMOL/L (ref 21–32)
CREAT SERPL-MCNC: <0.5 MG/DL (ref 0.8–1.3)
DEPRECATED RDW RBC AUTO: 14.2 % (ref 12.4–15.4)
EKG ATRIAL RATE: 76 BPM
EKG DIAGNOSIS: NORMAL
EKG P AXIS: 40 DEGREES
EKG P-R INTERVAL: 180 MS
EKG Q-T INTERVAL: 392 MS
EKG QRS DURATION: 94 MS
EKG QTC CALCULATION (BAZETT): 441 MS
EKG R AXIS: 42 DEGREES
EKG T AXIS: 70 DEGREES
EKG VENTRICULAR RATE: 76 BPM
GFR SERPLBLD CREATININE-BSD FMLA CKD-EPI: >60 ML/MIN/{1.73_M2}
GLUCOSE BLD-MCNC: 166 MG/DL (ref 70–99)
GLUCOSE SERPL-MCNC: 178 MG/DL (ref 70–99)
HCT VFR BLD AUTO: 45.3 % (ref 40.5–52.5)
HDLC SERPL-MCNC: 33 MG/DL (ref 40–60)
HGB BLD-MCNC: 15.2 G/DL (ref 13.5–17.5)
LDLC SERPL CALC-MCNC: 35 MG/DL
LV EF: 55 %
LVEF MODALITY: NORMAL
MCH RBC QN AUTO: 29.8 PG (ref 26–34)
MCHC RBC AUTO-ENTMCNC: 33.5 G/DL (ref 31–36)
MCV RBC AUTO: 88.9 FL (ref 80–100)
PERFORMED ON: ABNORMAL
PLATELET # BLD AUTO: 90 K/UL (ref 135–450)
PLATELET BLD QL SMEAR: ABNORMAL
PMV BLD AUTO: 6.9 FL (ref 5–10.5)
POTASSIUM SERPL-SCNC: 4.6 MMOL/L (ref 3.5–5.1)
RBC # BLD AUTO: 5.1 M/UL (ref 4.2–5.9)
SLIDE REVIEW: ABNORMAL
SODIUM SERPL-SCNC: 136 MMOL/L (ref 136–145)
TRIGL SERPL-MCNC: 156 MG/DL (ref 0–150)
VLDLC SERPL CALC-MCNC: 31 MG/DL
WBC # BLD AUTO: 5 K/UL (ref 4–11)

## 2023-06-09 PROCEDURE — 6370000000 HC RX 637 (ALT 250 FOR IP): Performed by: INTERNAL MEDICINE

## 2023-06-09 PROCEDURE — 93325 DOPPLER ECHO COLOR FLOW MAPG: CPT

## 2023-06-09 PROCEDURE — 3700000001 HC ADD 15 MINUTES (ANESTHESIA)

## 2023-06-09 PROCEDURE — 93005 ELECTROCARDIOGRAM TRACING: CPT | Performed by: INTERNAL MEDICINE

## 2023-06-09 PROCEDURE — 93320 DOPPLER ECHO COMPLETE: CPT

## 2023-06-09 PROCEDURE — 6360000002 HC RX W HCPCS: Performed by: INTERNAL MEDICINE

## 2023-06-09 PROCEDURE — 80061 LIPID PANEL: CPT

## 2023-06-09 PROCEDURE — 80048 BASIC METABOLIC PNL TOTAL CA: CPT

## 2023-06-09 PROCEDURE — 6360000002 HC RX W HCPCS: Performed by: NURSE ANESTHETIST, CERTIFIED REGISTERED

## 2023-06-09 PROCEDURE — 85027 COMPLETE CBC AUTOMATED: CPT

## 2023-06-09 PROCEDURE — G0378 HOSPITAL OBSERVATION PER HR: HCPCS

## 2023-06-09 PROCEDURE — 93312 ECHO TRANSESOPHAGEAL: CPT

## 2023-06-09 PROCEDURE — 93010 ELECTROCARDIOGRAM REPORT: CPT | Performed by: INTERNAL MEDICINE

## 2023-06-09 PROCEDURE — 3700000000 HC ANESTHESIA ATTENDED CARE

## 2023-06-09 RX ORDER — TAMSULOSIN HYDROCHLORIDE 0.4 MG/1
0.4 CAPSULE ORAL NIGHTLY
Status: DISCONTINUED | OUTPATIENT
Start: 2023-06-09 | End: 2023-06-09 | Stop reason: HOSPADM

## 2023-06-09 RX ORDER — INSULIN LISPRO 100 [IU]/ML
3 INJECTION, SOLUTION INTRAVENOUS; SUBCUTANEOUS
Status: DISCONTINUED | OUTPATIENT
Start: 2023-06-10 | End: 2023-06-09 | Stop reason: HOSPADM

## 2023-06-09 RX ORDER — DIVALPROEX SODIUM 250 MG/1
500 TABLET, DELAYED RELEASE ORAL EVERY MORNING
Status: DISCONTINUED | OUTPATIENT
Start: 2023-06-10 | End: 2023-06-09 | Stop reason: HOSPADM

## 2023-06-09 RX ORDER — SODIUM CHLORIDE 9 MG/ML
INJECTION, SOLUTION INTRAVENOUS PRN
Status: DISCONTINUED | OUTPATIENT
Start: 2023-06-09 | End: 2023-06-09 | Stop reason: HOSPADM

## 2023-06-09 RX ORDER — CLOPIDOGREL BISULFATE 75 MG/1
75 TABLET ORAL DAILY
Status: DISCONTINUED | OUTPATIENT
Start: 2023-06-10 | End: 2023-06-09 | Stop reason: HOSPADM

## 2023-06-09 RX ORDER — PANTOPRAZOLE SODIUM 40 MG/1
40 TABLET, DELAYED RELEASE ORAL
Status: DISCONTINUED | OUTPATIENT
Start: 2023-06-10 | End: 2023-06-09 | Stop reason: HOSPADM

## 2023-06-09 RX ORDER — ONDANSETRON 2 MG/ML
4 INJECTION INTRAMUSCULAR; INTRAVENOUS EVERY 6 HOURS PRN
Status: DISCONTINUED | OUTPATIENT
Start: 2023-06-09 | End: 2023-06-09

## 2023-06-09 RX ORDER — SODIUM CHLORIDE 0.9 % (FLUSH) 0.9 %
5-40 SYRINGE (ML) INJECTION EVERY 12 HOURS SCHEDULED
Status: DISCONTINUED | OUTPATIENT
Start: 2023-06-09 | End: 2023-06-09 | Stop reason: HOSPADM

## 2023-06-09 RX ORDER — ONDANSETRON 2 MG/ML
4 INJECTION INTRAMUSCULAR; INTRAVENOUS EVERY 6 HOURS PRN
Status: DISCONTINUED | OUTPATIENT
Start: 2023-06-09 | End: 2023-06-09 | Stop reason: HOSPADM

## 2023-06-09 RX ORDER — LORAZEPAM 0.5 MG/1
0.5 TABLET ORAL
Status: DISCONTINUED | OUTPATIENT
Start: 2023-06-09 | End: 2023-06-09 | Stop reason: HOSPADM

## 2023-06-09 RX ORDER — INSULIN LISPRO 100 [IU]/ML
4 INJECTION, SOLUTION INTRAVENOUS; SUBCUTANEOUS
Status: DISCONTINUED | OUTPATIENT
Start: 2023-06-09 | End: 2023-06-09

## 2023-06-09 RX ORDER — DIVALPROEX SODIUM 250 MG/1
500 TABLET, DELAYED RELEASE ORAL SEE ADMIN INSTRUCTIONS
Status: DISCONTINUED | OUTPATIENT
Start: 2023-06-09 | End: 2023-06-09 | Stop reason: SDUPTHER

## 2023-06-09 RX ORDER — DIVALPROEX SODIUM 250 MG/1
1000 TABLET, DELAYED RELEASE ORAL NIGHTLY
Status: DISCONTINUED | OUTPATIENT
Start: 2023-06-09 | End: 2023-06-09 | Stop reason: HOSPADM

## 2023-06-09 RX ORDER — INSULIN LISPRO 100 [IU]/ML
0-4 INJECTION, SOLUTION INTRAVENOUS; SUBCUTANEOUS NIGHTLY
Status: DISCONTINUED | OUTPATIENT
Start: 2023-06-09 | End: 2023-06-09 | Stop reason: HOSPADM

## 2023-06-09 RX ORDER — ASPIRIN 81 MG/1
81 TABLET, CHEWABLE ORAL DAILY
Status: DISCONTINUED | OUTPATIENT
Start: 2023-06-10 | End: 2023-06-09 | Stop reason: HOSPADM

## 2023-06-09 RX ORDER — PROPOFOL 10 MG/ML
INJECTION, EMULSION INTRAVENOUS PRN
Status: DISCONTINUED | OUTPATIENT
Start: 2023-06-09 | End: 2023-06-09 | Stop reason: SDUPTHER

## 2023-06-09 RX ORDER — ONDANSETRON 4 MG/1
4 TABLET, ORALLY DISINTEGRATING ORAL EVERY 8 HOURS PRN
Status: DISCONTINUED | OUTPATIENT
Start: 2023-06-09 | End: 2023-06-09 | Stop reason: HOSPADM

## 2023-06-09 RX ORDER — SODIUM CHLORIDE 0.9 % (FLUSH) 0.9 %
5-40 SYRINGE (ML) INJECTION PRN
Status: DISCONTINUED | OUTPATIENT
Start: 2023-06-09 | End: 2023-06-09 | Stop reason: HOSPADM

## 2023-06-09 RX ORDER — ATORVASTATIN CALCIUM 80 MG/1
80 TABLET, FILM COATED ORAL NIGHTLY
Status: DISCONTINUED | OUTPATIENT
Start: 2023-06-10 | End: 2023-06-09 | Stop reason: HOSPADM

## 2023-06-09 RX ORDER — GABAPENTIN 100 MG/1
200 CAPSULE ORAL 3 TIMES DAILY
Status: DISCONTINUED | OUTPATIENT
Start: 2023-06-09 | End: 2023-06-09 | Stop reason: HOSPADM

## 2023-06-09 RX ORDER — METOPROLOL SUCCINATE 50 MG/1
50 TABLET, EXTENDED RELEASE ORAL 2 TIMES DAILY
Status: DISCONTINUED | OUTPATIENT
Start: 2023-06-09 | End: 2023-06-09 | Stop reason: HOSPADM

## 2023-06-09 RX ORDER — LISINOPRIL 5 MG/1
5 TABLET ORAL DAILY
Status: DISCONTINUED | OUTPATIENT
Start: 2023-06-10 | End: 2023-06-09 | Stop reason: HOSPADM

## 2023-06-09 RX ORDER — ENOXAPARIN SODIUM 100 MG/ML
40 INJECTION SUBCUTANEOUS DAILY
Status: DISCONTINUED | OUTPATIENT
Start: 2023-06-09 | End: 2023-06-09 | Stop reason: HOSPADM

## 2023-06-09 RX ORDER — FAMOTIDINE 20 MG/1
20 TABLET, FILM COATED ORAL 2 TIMES DAILY
Status: DISCONTINUED | OUTPATIENT
Start: 2023-06-09 | End: 2023-06-09 | Stop reason: HOSPADM

## 2023-06-09 RX ORDER — DEXTROSE MONOHYDRATE 100 MG/ML
INJECTION, SOLUTION INTRAVENOUS CONTINUOUS PRN
Status: DISCONTINUED | OUTPATIENT
Start: 2023-06-09 | End: 2023-06-09 | Stop reason: HOSPADM

## 2023-06-09 RX ORDER — DULOXETIN HYDROCHLORIDE 60 MG/1
60 CAPSULE, DELAYED RELEASE ORAL DAILY
Status: DISCONTINUED | OUTPATIENT
Start: 2023-06-10 | End: 2023-06-09 | Stop reason: HOSPADM

## 2023-06-09 RX ORDER — INSULIN GLARGINE 100 [IU]/ML
95 INJECTION, SOLUTION SUBCUTANEOUS NIGHTLY
Status: DISCONTINUED | OUTPATIENT
Start: 2023-06-09 | End: 2023-06-09 | Stop reason: HOSPADM

## 2023-06-09 RX ORDER — INSULIN LISPRO 100 [IU]/ML
0-4 INJECTION, SOLUTION INTRAVENOUS; SUBCUTANEOUS
Status: DISCONTINUED | OUTPATIENT
Start: 2023-06-09 | End: 2023-06-09 | Stop reason: HOSPADM

## 2023-06-09 RX ADMIN — PROPOFOL 350 MG: 10 INJECTION, EMULSION INTRAVENOUS at 13:38

## 2023-06-09 RX ADMIN — SODIUM CHLORIDE: 9 INJECTION, SOLUTION INTRAVENOUS at 13:32

## 2023-06-09 RX ADMIN — INSULIN LISPRO 4 UNITS: 100 INJECTION, SOLUTION INTRAVENOUS; SUBCUTANEOUS at 17:26

## 2023-06-09 RX ADMIN — GABAPENTIN 200 MG: 100 CAPSULE ORAL at 17:24

## 2023-06-09 RX ADMIN — CARBIDOPA AND LEVODOPA 1 TABLET: 10; 100 TABLET ORAL at 17:25

## 2023-06-09 ASSESSMENT — ENCOUNTER SYMPTOMS: SHORTNESS OF BREATH: 1

## 2023-06-09 NOTE — H&P
Brief Pre-Op Note/Sedation Assessment      Mosheimperry Quiroga   1960  Cath Pool Rm/NONE      [id number]  12:05 PM    Planned Procedure: BRAYDON    Post Procedure Plan: Return to same level of care    Consent: I have discussed with the patient and/or the patient representative the indication, alternatives, and the possible risks and/or complications of the planned procedure and the anesthesia methods. The patient and/or patient representative appear to understand and agree to proceed. Chief Complaint: Atrial fibrillation status post #35 mm WATCHMAN 4/17    Linda Granado is a 63 y.o. male patient  with a PMH significant for paroxsymal atrial fibrillation, ventricular tachycardia, chronic systolic heart failure, diabetes, coronary artery disease, hypertension, hyperlipidemia, COPD, and GERD, ICD. Presents for BRAYDON 45 days s/p WATCHMAN. Vital Signs:  Ht 5' 4\" (1.626 m)   Wt 220 lb (99.8 kg)   BMI 37.76 kg/m²     Allergies: Allergies   Allergen Reactions    Other      VASCEPA CAUSED RASH AND ITCHING    Pcn [Penicillins] Hives       Past Medical History:  Past Medical History:   Diagnosis Date    Arthritis     Asthma     CAD (coronary artery disease)     Fatty liver     GERD (gastroesophageal reflux disease)     Hyperlipidemia     Hypertension     Medical history reviewed with no changes     MI, old     Sleep apnea     pt wears cpap at night. states does not have cpap    Type II or unspecified type diabetes mellitus without mention of complication, not stated as uncontrolled          Surgical History:  Past Surgical History:   Procedure Laterality Date    CARDIAC PACEMAKER PLACEMENT  2011    NOT MRI COMPATIABLE OLD LEADS [facility].  pace maker difib    CARDIOVASCULAR SURGERY      Watchman placement    CARPAL TUNNEL RELEASE Bilateral 2013    CATARACT REMOVAL WITH IMPLANT Left 02/28/2014    COLONOSCOPY      CORONARY ANGIOPLASTY WITH STENT PLACEMENT  2001,2008    CYST REMOVAL  1982    coccyx

## 2023-06-09 NOTE — ANESTHESIA POSTPROCEDURE EVALUATION
Department of Anesthesiology  Postprocedure Note    Patient: Brenton Batres MRN: [id number]  YOB: 1960  Date of evaluation: 6/9/2023      Procedure Summary     Date: 06/09/23 Room / Location: [facility]    Anesthesia Start: 1305 Anesthesia Stop: 7714    Procedure: TRANSESOPHAGEAL ECHOCARDIOGRAM Diagnosis: Paroxysmal atrial fibrillation    Scheduled Providers:  Responsible Provider: Torrie Glasgow MD    Anesthesia Type: MAC ASA Status: 3          Anesthesia Type: No value filed. Shekhar Phase I:      Shekhar Phase II:        Anesthesia Post Evaluation    Comments: Postoperative Anesthesia Note    Name:    Brenton Steward.   MRN:      [id number]    Patient Vitals in the past 12 hrs:  06/09/23 1614, BP:111/61, Pulse:72, Resp:16, SpO2:97 %  06/09/23 1130, Height:5' 4\" (1.626 m), Weight:220 lb (99.8 kg)     LABS:    CBC  Lab Results       Component                Value               Date/Time                  WBC                      5.0                 06/09/2023 12:20 PM        HGB                      15.2                06/09/2023 12:20 PM        HCT                      45.3                06/09/2023 12:20 PM        PLT                      90 (L)              06/09/2023 12:20 PM   RENAL  Lab Results       Component                Value               Date/Time                  NA                       136                 06/09/2023 12:20 PM        K                        4.6                 06/09/2023 12:20 PM        CL                       102                 06/09/2023 12:20 PM        CO2                      22                  06/09/2023 12:20 PM        BUN                      10                  06/09/2023 12:20 PM        CREATININE               <0.5 (L)            06/09/2023 12:20 PM        GLUCOSE                  178 (H)             06/09/2023 12:20 PM   COAGS  Lab Results       Component                Value               Date/Time                  PROTIME

## 2023-06-09 NOTE — ANESTHESIA PRE PROCEDURE
83.2 01/18/2022 08:30 AM    SJR5MXM 30.8 01/18/2022 08:30 AM    YZH0SCG 21.9 01/18/2022 08:30 AM    BEART -0.8 01/18/2022 08:30 AM    H0BYRYYY 96.9 01/18/2022 08:30 AM        Type & Screen (If Applicable):  No results found for: LABABO, LABRH    Drug/Infectious Status (If Applicable):  No results found for: HIV, HEPCAB    COVID-19 Screening (If Applicable):   Lab Results   Component Value Date/Time    COVID19 NOT DETECTED 01/24/2023 10:10 PM           Anesthesia Evaluation  Patient summary reviewed and Nursing notes reviewed no history of anesthetic complications:   Airway: Mallampati: III     Neck ROM: full     Dental:          Pulmonary:   (+) COPD:  shortness of breath:  sleep apnea:  asthma:                            Cardiovascular:    (+) hypertension:, past MI:, CAD:,                   Neuro/Psych:   (+) neuromuscular disease: Parkinson's disease, TIA, psychiatric history:            GI/Hepatic/Renal:   (+) GERD:, liver disease:,          ROS comment: obesity. Endo/Other:    (+) Diabetes, . Abdominal:             Vascular: Other Findings:           Anesthesia Plan      MAC     ASA 3     (Medications & allergies reviewed  All available lab & EKG data reviewed)  Induction: intravenous. Anesthetic plan and risks discussed with patient. Plan discussed with CRNA.                     Sveta Ornelas MD   6/9/2023

## 2023-06-09 NOTE — PROCEDURES
6/9/2023    PROCEDURE PERFORMED:     1. A Complete Transesophageal echocardiogram with color and spectral doppler and 3-D imaging was performed. INDICATIONS:   Atrial fibrillation   Status post WATCHMAN    PROCEDURE: The patient was brought to the lab in fasting state. The patient received adequate sedation with the assistance of anesthesia for the case. After ensuring adequate sedation, the esophagus was intubated and a complete transesophageal echocardiogram was completed. There were no complications related to the study. CONCLUSION:   Well-seated WATCHMAN device with no sophie-device leak or device-associated thrombus. 2.   Small residual inter-atrial shunt. See formal report for full details. PLAN:   1. Continue DAPT x 6 months from implant date then step-down to 325 mg aspirin monotherapy as per Dr. Mallorie Velazquez previous documentation. 2. Follow up with cardiology as scheduled.      Leonard Andersen MD, [facility]   [phone] [facility] office   [phone] [facility]

## 2023-06-13 NOTE — DISCHARGE SUMMARY
Physician Discharge Summary     Patient ID:  Etter Meckel  [id number]  63 y.o.  1960    Admit date: 6/9/2023    Discharge date and time: 6/9/2023  8:36 PM     Admitting Physician: Kiara Mcgowan MD     Discharge Physician: patient left AMA     Admission Diagnoses: Paroxysmal atrial fibrillation [I48.0]  Paroxysmal atrial fibrillation ([address] [I48.0]    Discharge Diagnoses:   Atrial fibrillation   Status post WATCHMAN    Admission Condition: good    Discharged Condition: good    Indication for Admission: Patient was admitted following BRAYDON performed with sedation as he had no viable transport home from his procedure and discharge with public transport was deemed unsafe. Hospital Course: Patient was admitted 6/9 post procedure for overnight observation. The patient did well postprocedure with no complications. The patient unfortunately shows to leave [address] 6/9 following procedure earlier that afternoon. Consults: none    Significant Diagnostic Studies:     BRAYDON 6/9/23   Conclusions      Summary   Normal left ventricular systolic function with Ejection fraction is visually   estimated at 55%. Normal right ventricular function. A #35 mm Watchman device appears well seated within the left atrial   appendage. No significant sophie-device leak or device-associated thrombus is noted. A small residual bidirectional interatrial shunt is noted. Mild mitral regurgitation    Treatments:   Patient to continue DAPT x6 months from implant date with stepdown to full aspirin monotherapy following this. Discharge Exam:  Unable to perform as the patient left AMA    Disposition: home    In process/preliminary results:  Outstanding Order Results       No orders found from 5/11/2023 to 6/10/2023.             Patient Instructions:   Discharge Medication List as of 6/9/2023  8:36 PM        CONTINUE these medications which have NOT CHANGED    Details   24HR ALLERGY RELIEF 180 MG tablet TAKE 1

## 2023-06-21 ENCOUNTER — OFFICE VISIT (OUTPATIENT)
Dept: FAMILY MEDICINE CLINIC | Age: 63
End: 2023-06-21
Payer: MEDICARE

## 2023-06-21 VITALS
OXYGEN SATURATION: 97 % | WEIGHT: 218 LBS | HEIGHT: 64 IN | BODY MASS INDEX: 37.22 KG/M2 | SYSTOLIC BLOOD PRESSURE: 110 MMHG | HEART RATE: 77 BPM | DIASTOLIC BLOOD PRESSURE: 60 MMHG

## 2023-06-21 DIAGNOSIS — Z95.818 PRESENCE OF WATCHMAN LEFT ATRIAL APPENDAGE CLOSURE DEVICE: Primary | ICD-10-CM

## 2023-06-21 DIAGNOSIS — G25.2 COARSE TREMOR: ICD-10-CM

## 2023-06-21 DIAGNOSIS — R25.8 BRADYKINESIA: ICD-10-CM

## 2023-06-21 DIAGNOSIS — I48.0 PAF (PAROXYSMAL ATRIAL FIBRILLATION) (HCC): ICD-10-CM

## 2023-06-21 DIAGNOSIS — G20 PARKINSON'S DISEASE (HCC): ICD-10-CM

## 2023-06-21 DIAGNOSIS — Z09 HOSPITAL DISCHARGE FOLLOW-UP: ICD-10-CM

## 2023-06-21 PROCEDURE — 3078F DIAST BP <80 MM HG: CPT

## 2023-06-21 PROCEDURE — 3074F SYST BP LT 130 MM HG: CPT

## 2023-06-21 PROCEDURE — 1111F DSCHRG MED/CURRENT MED MERGE: CPT

## 2023-06-21 PROCEDURE — 99213 OFFICE O/P EST LOW 20 MIN: CPT

## 2023-06-21 RX ORDER — METOPROLOL SUCCINATE 50 MG/1
TABLET, EXTENDED RELEASE ORAL
Qty: 56 TABLET | Refills: 0 | Status: SHIPPED | OUTPATIENT
Start: 2023-06-21

## 2023-06-21 NOTE — TELEPHONE ENCOUNTER
Future appt scheduled 08/18/2023                   Last appt 06/21/2023      Last Written 02/24/2023    carbidopa-levodopa (SINEMET)  MG per tablet  #90  3 RF

## 2023-06-21 NOTE — PROGRESS NOTES
Post-Discharge Transitional Care  Follow Up      [address] YOB: 1960    Date of Office Visit:  6/21/2023  Date of Hospital Admission: 6/9/23  Date of Hospital Discharge: 6/9/23  Risk of hospital readmission (high >=14%. Medium >=10%) :Readmission Risk Score: 10.7      Care management risk score Rising risk (score 2-5) and Complex Care (Scores >=6): No Risk Score On File     Non face to face  following discharge, date last encounter closed (first attempt may have been earlier): 06/13/2023    Call initiated 2 business days of discharge: Yes      Procedures:      BRAYDON 6/9/23:  Conclusions      Summary   Normal left ventricular systolic function with Ejection fraction is visually   estimated at 55%. Normal right ventricular function. A #35 mm Watchman device appears well seated within the left atrial   appendage. No significant sophie-device leak or device-associated thrombus is noted. A small residual bidirectional interatrial shunt is noted. Mild mitral regurgitation. ASSESSMENT/PLAN:   Presence of Watchman left atrial appendage closure device  -BRAYDON completed on 6/9. Appears to be in good position. Remains off NOAC  -Continues baby aspirin and Plavix  -No other new complaints today  PAF (paroxysmal atrial fibrillation) ([facility] [address]  -Status post Watchman device in good position  Hospital discharge follow-up  -     IA DISCHARGE MEDS RECONCILED W/ CURRENT OUTPATIENT MED LIST      Medical Decision Making: low complexity  No follow-ups on file. On this date 6/21/2023 I have spent 25 minutes reviewing previous notes, test results and face to face with the patient discussing the diagnosis and importance of compliance with the treatment plan as well as documenting on the day of the visit.        Subjective:   HPI:  Follow up of Hospital problems/diagnosis(es):   Presence of Watchman, left atrial appendage closure device  PAF    Inpatient course: Discharge summary reviewed- see

## 2023-06-23 RX ORDER — GABAPENTIN 100 MG/1
CAPSULE ORAL
Qty: 168 CAPSULE | Refills: 0 | Status: SHIPPED | OUTPATIENT
Start: 2023-06-23 | End: 2023-07-23

## 2023-06-23 RX ORDER — TAMSULOSIN HYDROCHLORIDE 0.4 MG/1
CAPSULE ORAL
Qty: 28 CAPSULE | Refills: 0 | Status: SHIPPED | OUTPATIENT
Start: 2023-06-23

## 2023-06-23 RX ORDER — FLUTICASONE PROPIONATE 50 MCG
SPRAY, SUSPENSION (ML) NASAL
Qty: 16 G | Refills: 0 | Status: SHIPPED | OUTPATIENT
Start: 2023-06-23

## 2023-06-23 RX ORDER — EMPAGLIFLOZIN 25 MG/1
TABLET, FILM COATED ORAL
Qty: 28 TABLET | Refills: 0 | Status: SHIPPED | OUTPATIENT
Start: 2023-06-23

## 2023-06-23 RX ORDER — LISINOPRIL 5 MG/1
TABLET ORAL
Qty: 28 TABLET | Refills: 0 | Status: SHIPPED | OUTPATIENT
Start: 2023-06-23

## 2023-06-26 RX ORDER — DULAGLUTIDE 3 MG/.5ML
INJECTION, SOLUTION SUBCUTANEOUS
Qty: 2 ML | Refills: 0 | Status: SHIPPED | OUTPATIENT
Start: 2023-06-26

## 2023-07-19 RX ORDER — LISINOPRIL 5 MG/1
TABLET ORAL
Qty: 28 TABLET | Refills: 0 | Status: SHIPPED | OUTPATIENT
Start: 2023-07-19

## 2023-07-19 RX ORDER — TAMSULOSIN HYDROCHLORIDE 0.4 MG/1
CAPSULE ORAL
Qty: 28 CAPSULE | Refills: 0 | Status: SHIPPED | OUTPATIENT
Start: 2023-07-19

## 2023-07-19 RX ORDER — GABAPENTIN 100 MG/1
CAPSULE ORAL
Qty: 168 CAPSULE | Refills: 0 | Status: SHIPPED | OUTPATIENT
Start: 2023-07-19 | End: 2023-08-18

## 2023-07-19 RX ORDER — EMPAGLIFLOZIN 25 MG/1
TABLET, FILM COATED ORAL
Qty: 28 TABLET | Refills: 0 | Status: SHIPPED | OUTPATIENT
Start: 2023-07-19

## 2023-07-25 ENCOUNTER — HOSPITAL ENCOUNTER (OUTPATIENT)
Age: 63
Discharge: HOME | End: 2023-07-25
Payer: MEDICARE

## 2023-07-25 ENCOUNTER — HOSPITAL ENCOUNTER (OUTPATIENT)
Dept: GENERAL RADIOLOGY | Age: 63
Discharge: HOME | End: 2023-07-25
Payer: MEDICARE

## 2023-07-25 DIAGNOSIS — M79.644 PAIN OF RIGHT THUMB: ICD-10-CM

## 2023-07-25 PROCEDURE — 73140 X-RAY EXAM OF FINGER(S): CPT

## 2023-07-27 RX ORDER — METOPROLOL SUCCINATE 50 MG/1
TABLET, EXTENDED RELEASE ORAL
Qty: 56 TABLET | Refills: 3 | Status: SHIPPED | OUTPATIENT
Start: 2023-07-27 | End: 2023-08-16 | Stop reason: SDUPTHER

## 2023-08-15 RX ORDER — LISINOPRIL 5 MG/1
TABLET ORAL
Qty: 28 TABLET | Refills: 0 | Status: SHIPPED | OUTPATIENT
Start: 2023-08-15 | End: 2023-08-16 | Stop reason: SDUPTHER

## 2023-08-15 RX ORDER — GABAPENTIN 100 MG/1
CAPSULE ORAL
Qty: 168 CAPSULE | Refills: 0 | Status: SHIPPED | OUTPATIENT
Start: 2023-08-15 | End: 2023-09-14

## 2023-08-15 RX ORDER — TAMSULOSIN HYDROCHLORIDE 0.4 MG/1
CAPSULE ORAL
Qty: 28 CAPSULE | Refills: 0 | Status: SHIPPED | OUTPATIENT
Start: 2023-08-15

## 2023-08-15 RX ORDER — EMPAGLIFLOZIN 25 MG/1
TABLET, FILM COATED ORAL
Qty: 28 TABLET | Refills: 0 | Status: SHIPPED | OUTPATIENT
Start: 2023-08-15

## 2023-08-16 ENCOUNTER — OFFICE VISIT (OUTPATIENT)
Dept: CARDIOLOGY CLINIC | Age: 63
End: 2023-08-16
Payer: MEDICARE

## 2023-08-16 ENCOUNTER — TELEPHONE (OUTPATIENT)
Dept: CARDIOLOGY CLINIC | Age: 63
End: 2023-08-16

## 2023-08-16 VITALS
SYSTOLIC BLOOD PRESSURE: 120 MMHG | WEIGHT: 227.6 LBS | OXYGEN SATURATION: 97 % | HEART RATE: 72 BPM | DIASTOLIC BLOOD PRESSURE: 62 MMHG | HEIGHT: 64 IN | BODY MASS INDEX: 38.86 KG/M2

## 2023-08-16 DIAGNOSIS — Z87.891 HISTORY OF TOBACCO ABUSE: ICD-10-CM

## 2023-08-16 DIAGNOSIS — I25.5 ISCHEMIC CARDIOMYOPATHY: ICD-10-CM

## 2023-08-16 DIAGNOSIS — G20 PARKINSON'S DISEASE (HCC): ICD-10-CM

## 2023-08-16 DIAGNOSIS — R06.02 SOB (SHORTNESS OF BREATH) ON EXERTION: ICD-10-CM

## 2023-08-16 DIAGNOSIS — I10 HTN (HYPERTENSION), BENIGN: ICD-10-CM

## 2023-08-16 DIAGNOSIS — I25.10 CORONARY ARTERY DISEASE INVOLVING NATIVE CORONARY ARTERY OF NATIVE HEART WITHOUT ANGINA PECTORIS: Primary | ICD-10-CM

## 2023-08-16 DIAGNOSIS — I48.0 PAF (PAROXYSMAL ATRIAL FIBRILLATION) (HCC): ICD-10-CM

## 2023-08-16 DIAGNOSIS — E78.5 HYPERLIPIDEMIA, UNSPECIFIED HYPERLIPIDEMIA TYPE: ICD-10-CM

## 2023-08-16 PROCEDURE — 3074F SYST BP LT 130 MM HG: CPT | Performed by: INTERNAL MEDICINE

## 2023-08-16 PROCEDURE — 3078F DIAST BP <80 MM HG: CPT | Performed by: INTERNAL MEDICINE

## 2023-08-16 PROCEDURE — 99214 OFFICE O/P EST MOD 30 MIN: CPT | Performed by: INTERNAL MEDICINE

## 2023-08-16 RX ORDER — FUROSEMIDE 20 MG/1
TABLET ORAL
Qty: 15 TABLET | Refills: 11 | Status: SHIPPED | OUTPATIENT
Start: 2023-08-16

## 2023-08-16 RX ORDER — POTASSIUM CHLORIDE 20 MEQ/1
TABLET, EXTENDED RELEASE ORAL
Qty: 30 TABLET | Refills: 2 | Status: SHIPPED | OUTPATIENT
Start: 2023-08-16

## 2023-08-16 RX ORDER — LISINOPRIL 5 MG/1
TABLET ORAL
Qty: 28 TABLET | Refills: 11 | Status: SHIPPED | OUTPATIENT
Start: 2023-08-16

## 2023-08-16 RX ORDER — METOPROLOL SUCCINATE 50 MG/1
50 TABLET, EXTENDED RELEASE ORAL 2 TIMES DAILY
Qty: 60 TABLET | Refills: 11 | Status: SHIPPED | OUTPATIENT
Start: 2023-08-16

## 2023-08-16 RX ORDER — CLOPIDOGREL BISULFATE 75 MG/1
75 TABLET ORAL DAILY
Qty: 30 TABLET | Refills: 11 | Status: SHIPPED | OUTPATIENT
Start: 2023-08-16

## 2023-08-16 RX ORDER — ATORVASTATIN CALCIUM 80 MG/1
TABLET, FILM COATED ORAL
Qty: 30 TABLET | Refills: 11 | Status: SHIPPED | OUTPATIENT
Start: 2023-08-16

## 2023-08-16 NOTE — PROGRESS NOTES
trouble voiding, or hematuria. Musculoskeletal:  No gait disturbance, weakness or joint complaints. Integumentary: No rash or pruritis. Neurological: No headache, diplopia, change in muscle strength, numbness or tingling. No change in gait, balance, coordination, mood, affect, memory, mentation, behavior. Psychiatric: No anxiety, no depression. Endocrine: No malaise, fatigue or temperature intolerance. No excessive thirst, fluid intake, or urination. No tremor. Hematologic/Lymphatic: No abnormal bruising or bleeding, blood clots or swollen lymph nodes. Allergic/Immunologic: No nasal congestion or hives. Physical Examination:    There were no vitals filed for this visit. Constitutional and General Appearance: NAD   Respiratory:  Normal excursion and expansion without use of accessory muscles  Resp Auscultation: Clear, no crackles or wheezes   Cardiovascular: The apical impulses not displaced  Heart tones are crisp and normal  Cervical veins are not engorged  The carotid upstroke is normal in amplitude and contour without delay or bruit  Normal S1S2, No S3, No Murmur  Peripheral pulses are symmetrical and full  There is no clubbing, cyanosis of the extremities. No edema  Femoral Arteries: 2+ and equal  Pedal Pulses: 2+ and equal   Abdomen:  No masses or tenderness  Liver/Spleen: No Abnormalities Noted  Neurological/Psychiatric:  Alert and oriented in all spheres  Moves all extremities well  Exhibits normal gait balance and coordination  No abnormalities of mood, affect, memory, mentation, or behavior are noted  Skin:  Skin: warm and dry.   Lab Results   Component Value Date     06/09/2023    K 4.6 06/09/2023     06/09/2023    CO2 22 06/09/2023    BUN 10 06/09/2023    CREATININE <0.5 (L) 06/09/2023    GLUCOSE 178 (H) 06/09/2023    CALCIUM 9.4 06/09/2023    PROT 6.7 05/01/2023    LABALBU 4.4 05/01/2023    BILITOT 0.6 05/01/2023    ALKPHOS 88 05/01/2023    AST 19 05/01/2023    ALT 22
gathered by the clinical support staff, while the remaining scribed note accurately describes my personal service to the patient. Cost of prescription medications and patient compliance have been reviewed with patient. All questions answered. Thank you for allowing me to participate in the care of this individual.    Faraz Lockhart.  Nina Montoya M.D., [facility] [place]

## 2023-08-16 NOTE — PATIENT INSTRUCTIONS
Plan:  Current medications reviewed. Refills given as warranted. You can stop Plavix October 17th. This is 6 months after having the watchman placed. Continue taking your aspirin. It is ok to take an extra lasix if you gain 3-4 pounds, your legs get more swollen, or you have increased shortness of breath. No cardiac testing at this time. Follow up with me in 9 months, call in December to make your next appointment.

## 2023-08-17 NOTE — TELEPHONE ENCOUNTER
His TG are much improved from 1/23. I thought fenofibrate was started last OV. If not and he has been watching diet and TG improved then cpm. I believe he takes lipitor 80mg daily.  cpm

## 2023-08-29 ENCOUNTER — OFFICE VISIT (OUTPATIENT)
Dept: CARDIOLOGY CLINIC | Age: 63
End: 2023-08-29

## 2023-08-29 ENCOUNTER — NURSE ONLY (OUTPATIENT)
Dept: CARDIOLOGY CLINIC | Age: 63
End: 2023-08-29
Payer: MEDICARE

## 2023-08-29 ENCOUNTER — TELEPHONE (OUTPATIENT)
Dept: CARDIOLOGY CLINIC | Age: 63
End: 2023-08-29

## 2023-08-29 VITALS
HEIGHT: 64 IN | HEART RATE: 82 BPM | DIASTOLIC BLOOD PRESSURE: 64 MMHG | OXYGEN SATURATION: 95 % | SYSTOLIC BLOOD PRESSURE: 120 MMHG | WEIGHT: 219.2 LBS | BODY MASS INDEX: 37.42 KG/M2

## 2023-08-29 DIAGNOSIS — Z95.818 PRESENCE OF WATCHMAN LEFT ATRIAL APPENDAGE CLOSURE DEVICE: ICD-10-CM

## 2023-08-29 DIAGNOSIS — Z95.810 AUTOMATIC IMPLANTABLE CARDIOVERTER-DEFIBRILLATOR IN SITU: ICD-10-CM

## 2023-08-29 DIAGNOSIS — Z95.810 AUTOMATIC IMPLANTABLE CARDIOVERTER-DEFIBRILLATOR IN SITU: Primary | ICD-10-CM

## 2023-08-29 DIAGNOSIS — I48.0 PAF (PAROXYSMAL ATRIAL FIBRILLATION) (HCC): ICD-10-CM

## 2023-08-29 DIAGNOSIS — I47.20 VENTRICULAR TACHYCARDIA (HCC): Primary | ICD-10-CM

## 2023-08-29 DIAGNOSIS — I25.5 ISCHEMIC CARDIOMYOPATHY: ICD-10-CM

## 2023-08-29 PROCEDURE — 93282 PRGRMG EVAL IMPLANTABLE DFB: CPT | Performed by: INTERNAL MEDICINE

## 2023-08-29 NOTE — PATIENT INSTRUCTIONS
Plan:  Remote device interrogations every 3 months  Continue medications as prescribed  Follow up with EP NP in 1 year

## 2023-08-29 NOTE — PROGRESS NOTES
[facility]   Cardiac Consultation    Date: 8/29/23  Patient Name: Kennedy Young. YOB: 1960    Primary Care Physician: ANA Lima CNP    CHIEF COMPLAINT:   Chief Complaint   Patient presents with    Follow-up     4 month follow up     Device Check    Atrial Fibrillation    Tachycardia     VT        HPI:  Kennedy Hawkins is a 63 y.o. male is a patient of Dr. Yunior Brown here today for follow up of his ischemic cardiomyopathy and ICD. The original implant was in 2001 and was replaced in 2005 for battery depletion. He underwent lead extraction at [facility] in 2007 for fracture of the RV lead. A SJM 7022 lead was implanted at that time. He received several ICD shocks in 2008 when he did not take his medication. I cannot find any records pertaining to this, but it sounds most consistent with PAF and rapid AV conduction. His current SJM device was implanted in May 2011. He had a colon stimulator placed on 3/20/18 and has not had any problems with device interactions. He uses a CPAP for sleep apnea. On 9/18/2019 he is here for follow up S/P generator change 5/16/19 with Dr. Ashley Edmond. Normal device check today. He is V paced less than 1%, no VT detected, battery is 8.5 years. He states he has been having episodes of feeling light headed with leg weakness when he stands. In 6/2021 he reported syncope to his primary cardiologist.   In 7/2021 he had an abnormal stress test and underwent LHC. He had 3 ROSHNI placed (LCx, OM, and PDA). In 2/2022 he was hospitalized for weakness and falls and was diagnosed with COVID-19. During his hospitalization, he went into AF and his ICD inappropriately fired. VT detection was adjusted at that time. In 6/2022 he was seen in the office and had no complaints. NSVT was seen on device interrogations, but due to limitations of only having RV lead, unable to determine rhythm of NSVT events. Toprol was increased to 50 mg BID.    In

## 2023-08-29 NOTE — TELEPHONE ENCOUNTER
Pt was seen today with JMB/device ck for a 4mo followup. Pt was advised to come back in 1yr with jmb/device ck. Pt was informed of this. I informed pt this 10/10/23 watchman followup might need to be changed but that I needed to speak with MAX Moses first. Tesha Oliver stated the pt still needs to be seen between the timeframe of 9/14/23-12/13/23 and can see any NP at any location for this.  Please contact the pt to inform his NPAM 10/10/23 appt is cancelled at [facility], and please reschedule his 6mo watchman followup anytime between 9/14/23-12/13/23 at [place] with [facility] as the pt lives closer

## 2023-08-30 RX ORDER — DULAGLUTIDE 3 MG/.5ML
INJECTION, SOLUTION SUBCUTANEOUS
Qty: 2 ML | Refills: 3 | Status: SHIPPED | OUTPATIENT
Start: 2023-08-30

## 2023-08-30 NOTE — TELEPHONE ENCOUNTER
LM at [phone] informing pt to call back. Please inform pt his NPAM 10/10/23 appt is cancelled, but still needs to see any NP provider at [facility] or [facility] for his 6mo watchman followup per MAX Chandler. Please see if pt would like to schedule with [facility] at [facility] anytime between 9/14/23-12/13/23.

## 2023-08-31 ENCOUNTER — TELEPHONE (OUTPATIENT)
Dept: CARDIOLOGY CLINIC | Age: 63
End: 2023-08-31

## 2023-08-31 NOTE — TELEPHONE ENCOUNTER
Pt stated that he needs a letter faxed to [phone] for his time being at the ov on 8/29/2023 with barbi.

## 2023-09-01 NOTE — TELEPHONE ENCOUNTER
Due to threatening behavior, this patient will not be allowed to see me, he needs to go to urgent care for this problem.     Letter created and faxed successful

## 2023-09-12 RX ORDER — FAMOTIDINE 20 MG/1
TABLET, FILM COATED ORAL
Qty: 56 TABLET | Refills: 5 | Status: SHIPPED | OUTPATIENT
Start: 2023-09-12

## 2023-09-12 RX ORDER — TAMSULOSIN HYDROCHLORIDE 0.4 MG/1
CAPSULE ORAL
Qty: 28 CAPSULE | Refills: 5 | Status: SHIPPED | OUTPATIENT
Start: 2023-09-12

## 2023-09-12 RX ORDER — FEXOFENADINE HYDROCHLORIDE 180 MG/1
TABLET ORAL
Qty: 28 TABLET | Refills: 5 | Status: SHIPPED | OUTPATIENT
Start: 2023-09-12

## 2023-09-12 RX ORDER — GABAPENTIN 100 MG/1
CAPSULE ORAL
Qty: 168 CAPSULE | Refills: 3 | Status: SHIPPED | OUTPATIENT
Start: 2023-09-12 | End: 2023-10-12

## 2023-09-12 RX ORDER — EMPAGLIFLOZIN 25 MG/1
TABLET, FILM COATED ORAL
Qty: 28 TABLET | Refills: 5 | Status: SHIPPED | OUTPATIENT
Start: 2023-09-12

## 2023-09-12 NOTE — TELEPHONE ENCOUNTER
Refill Request     CONFIRM preferrred pharmacy with the patient. If Mail Order Rx - Pend for 90 day refill. Last Seen: Last Seen Department: 6/21/2023  Last Seen by PCP: 6/21/2023    Last Written: 5-    If no future appointment scheduled, route STAFF MESSAGE with patient name to the [facility] for scheduling. Next Appointment:   Future Appointments   Date Time Provider [address]   9/18/2023  8:35 AM SCHEDULE, Ching Borrego REMOTE TRANSMISSION Ame Mitchell [facility]   9/18/2023  2:00 PM ANA James CNP [facility]   9/25/2023  9:15 AM Duane Cue, APRN - CNP P CLER CAR [facility]       Message sent to [address] [place] [address] to schedule appt with patient?   NO      Requested Prescriptions     Pending Prescriptions Disp Refills    famotidine (PEPCID) 20 MG tablet [Pharmacy Med Name: FAMOTIDINE 20MG TABLET] 56 tablet 3     Sig: TAKE ONE TABLET BY MOUTH TWO (2) TIMES A DAY    metFORMIN (GLUCOPHAGE) 1000 MG tablet [Pharmacy Med Name: METFORMIN HYDROCHLORIDE 1000MG TABLET] 56 tablet 0     Sig: TAKE ONE TABLET BY MOUTH TWO (2) TIMES DAILY WITH MORNING AND EVENING MEALS    24HR ALLERGY RELIEF 180 MG tablet [Pharmacy Med Name: 24HR ALLERGY RELIEF 180MG TABLET] 28 tablet 3     Sig: TAKE ONE (1) TABLET BY MOUTH EVERY DAY    gabapentin (NEURONTIN) 100 MG capsule [Pharmacy Med Name: GABAPENTIN 100MG CAPSULE] 168 capsule 0     Sig: TAKE TWO (2) CAPSULES BY MOUTH THREE (3) TIMES A DAY    JARDIANCE 25 MG tablet [Pharmacy Med Name: Sammye Prows 25MG TABLET] 28 tablet 0     Sig: TAKE ONE TABLET BY MOUTH EVERY MORNING    tamsulosin (FLOMAX) 0.4 MG capsule [Pharmacy Med Name: TAMSULOSIN HYDROCHLORIDE 0.4MG CAPSULE] 28 capsule 0     Sig: TAKE ONE (1) CAPSULE BY ORAL ROUTE EVERY DAY ONE HALF (1/2) HOUR FOLLOWING THE SAME MEAL EACH DAY

## 2023-09-15 ENCOUNTER — TELEPHONE (OUTPATIENT)
Dept: PULMONOLOGY | Age: 63
End: 2023-09-15

## 2023-09-15 DIAGNOSIS — Z79.4 TYPE 2 DIABETES MELLITUS WITH HYPERGLYCEMIA, WITH LONG-TERM CURRENT USE OF INSULIN (HCC): ICD-10-CM

## 2023-09-15 DIAGNOSIS — E11.65 TYPE 2 DIABETES MELLITUS WITH HYPERGLYCEMIA, WITH LONG-TERM CURRENT USE OF INSULIN (HCC): ICD-10-CM

## 2023-09-15 NOTE — TELEPHONE ENCOUNTER
Patient cancelled appointment on 9/18/23 with Sam Moran for 31-90 day (pt recieved 6/22). Reason: Pt states the machine isnt working for him is going to take the machine back. Patient did not reschedule appointment. Appointment rescheduled for NA. Last OV 4/3/23   Assessment:       Severe ALIN. Failed CPAP, had to return BIPAP  Obesity  Asthma followed by PCP  Ischemic cardiomyopathy, ICD, CAD, Afib- followed by cardiology     Plan:        - BIPAP Titration   -Discussed severity of sleep apnea and importance of treatment  -Discussed PAP acclamation techniques  - Advised to use PAP 6-8 hrs at night and during naps- need to increase use. - Replacement of mask, tubing, head straps every 3-6 months or sooner if damaged. - Patient instructed to contact [employer] for any mask, tubing or machine trouble shooting if problems arise.  - Sleep hygiene  - Avoid sedatives, alcohol and caffeinated drinks at bed time. - Patient counseled to never drive or operate heavy machinery while fatigue, drowsy or sleepy. - Weight loss is recommended as a long-term intervention.    - Complications of ALIN if not treated were discussed with patient patient, including: systemic hypertension, pulmonary hypertension, cardiovascular morbidities, car accidents and all cause mortality.  -Patient education handout provided regarding sleep tips and CPAP cleaning recommendations   -Continue blood pressure medications ordered by treating providers- treatment of ALIN can lower blood pressure by levels that are clinically significant.

## 2023-09-18 PROCEDURE — 93296 REM INTERROG EVL PM/IDS: CPT | Performed by: INTERNAL MEDICINE

## 2023-09-18 PROCEDURE — 93295 DEV INTERROG REMOTE 1/2/MLT: CPT | Performed by: INTERNAL MEDICINE

## 2023-09-22 NOTE — TELEPHONE ENCOUNTER
Patient has severe ALIN I recommend treatment.   Please offer patient appointment to discuss barriers to BiPAP use/alternative treatment options

## 2023-09-25 ENCOUNTER — OFFICE VISIT (OUTPATIENT)
Dept: CARDIOLOGY CLINIC | Age: 63
End: 2023-09-25
Payer: MEDICARE

## 2023-09-25 VITALS
DIASTOLIC BLOOD PRESSURE: 62 MMHG | OXYGEN SATURATION: 96 % | WEIGHT: 230 LBS | SYSTOLIC BLOOD PRESSURE: 102 MMHG | BODY MASS INDEX: 39.27 KG/M2 | HEIGHT: 64 IN | HEART RATE: 80 BPM

## 2023-09-25 DIAGNOSIS — I48.0 PAF (PAROXYSMAL ATRIAL FIBRILLATION) (HCC): Primary | ICD-10-CM

## 2023-09-25 DIAGNOSIS — Z95.818 PRESENCE OF WATCHMAN LEFT ATRIAL APPENDAGE CLOSURE DEVICE: ICD-10-CM

## 2023-09-25 DIAGNOSIS — I10 HTN (HYPERTENSION), BENIGN: ICD-10-CM

## 2023-09-25 DIAGNOSIS — I25.10 CORONARY ARTERY DISEASE INVOLVING NATIVE CORONARY ARTERY OF NATIVE HEART WITHOUT ANGINA PECTORIS: ICD-10-CM

## 2023-09-25 PROCEDURE — 99214 OFFICE O/P EST MOD 30 MIN: CPT | Performed by: NURSE PRACTITIONER

## 2023-09-25 PROCEDURE — 3074F SYST BP LT 130 MM HG: CPT | Performed by: NURSE PRACTITIONER

## 2023-09-25 PROCEDURE — 3078F DIAST BP <80 MM HG: CPT | Performed by: NURSE PRACTITIONER

## 2023-09-25 ASSESSMENT — ENCOUNTER SYMPTOMS
RESPIRATORY NEGATIVE: 1
GASTROINTESTINAL NEGATIVE: 1

## 2023-09-25 NOTE — PATIENT INSTRUCTIONS
After 10/17/2023, stop plavix and continue aspirin  Continue other medications  Check BP at home and call the office if consistently out of goal range  Stay active along with a healthy diet  Follow up with Dr. Roberto Chau in 8 months

## 2023-09-25 NOTE — PROGRESS NOTES
72 hours. PT/INR: No results for input(s): \"PROTIME\", \"INR\" in the last 72 hours. APTT:No results for input(s): \"APTT\" in the last 72 hours. FASTING LIPID PANEL:  Lab Results   Component Value Date/Time    HDL 33 06/09/2023 12:20 PM    LDLDIRECT 60 01/24/2023 08:40 AM    LDLCALC 35 06/09/2023 12:20 PM    TRIG 156 06/09/2023 12:20 PM     LIVER PROFILE:No results for input(s): \"AST\", \"ALT\", \"ALB\" in the last 72 hours. BNP:   Lab Results   Component Value Date/Time    PROBNP 250 05/01/2023 06:17 PM    PROBNP 182 10/03/2022 04:30 PM     Reviewed all labs and imaging today    Assessment:   CAD: Stable, no angina; s/p ROSHNI LCx, OM, PDA 7/2021; s/p PCI in the setting of MI 2001  Ischemic cardiomyopathy: EF appeared improved 55% on BRAYDON 6/2023; prior 35-40%    - s/p ICD 2001 with generator change 2019  HFrEF: Compensated  Paroxysmal atrial fibrillation: Stable, regular today   - s/p Watchman 4/17/2023 (frequent falls with Parkinson's)  NSVT  HTN: Controlled  HLD: Controlled, LDL 35, continue statin  DM: hgb a1c 7.6  COPD  Parkinson's    Plan:   1. Stop Plavix after 10/17/2023 (6 months post watchman)  2. Continue aspirin, atorvastatin, lisinopril, Toprol, Jardiance  3. Check BP at home and call the office if consistently out of goal range  4. Regular exercise and healthy diet encouraged  5. Follow-up with Dr. Ruben Gonzalez in 8 months  6.   Yearly EP follow-up in 2024    Leni Byrd, [address] [place] [address]  [phone]

## 2023-09-27 NOTE — TELEPHONE ENCOUNTER
Left a voice mail message for the patient to call us and schedule a follow up visit with Ramírez Bearden to discuss his issues with CPAP equipment and alternatives.

## 2023-09-29 NOTE — TELEPHONE ENCOUNTER
Left a voice mail message for the patient to call us and schedule a follow up visit with Leatha Fleming to discuss his issues with CPAP equipment and alternatives.

## 2023-10-06 RX ORDER — INSULIN LISPRO 100 [IU]/ML
INJECTION, SUSPENSION SUBCUTANEOUS
Qty: 15 ML | Refills: 1 | Status: SHIPPED | OUTPATIENT
Start: 2023-10-06

## 2023-10-12 DIAGNOSIS — R25.8 BRADYKINESIA: ICD-10-CM

## 2023-10-12 DIAGNOSIS — G25.2 COARSE TREMOR: ICD-10-CM

## 2023-10-12 DIAGNOSIS — G20.A1 PARKINSON'S DISEASE: ICD-10-CM

## 2023-10-17 ENCOUNTER — COMMUNITY OUTREACH (OUTPATIENT)
Dept: FAMILY MEDICINE CLINIC | Age: 63
End: 2023-10-17

## 2023-10-17 NOTE — PROGRESS NOTES
Patient's HM shows they are overdue for Colorectal Screening. Care Everywhere and  files searched. HM updated with 2015 & 2016 colonoscopy reports. 12/23/2021, 1/21/22 & 9/23/2022 colonoscopy canceled.

## 2023-10-28 ENCOUNTER — APPOINTMENT (OUTPATIENT)
Dept: GENERAL RADIOLOGY | Age: 63
End: 2023-10-28
Payer: MEDICARE

## 2023-10-28 ENCOUNTER — APPOINTMENT (OUTPATIENT)
Dept: CT IMAGING | Age: 63
End: 2023-10-28
Payer: MEDICARE

## 2023-10-28 ENCOUNTER — HOSPITAL ENCOUNTER (EMERGENCY)
Age: 63
Discharge: HOME | End: 2023-10-28
Attending: STUDENT IN AN ORGANIZED HEALTH CARE EDUCATION/TRAINING PROGRAM
Payer: MEDICARE

## 2023-10-28 VITALS
OXYGEN SATURATION: 98 % | TEMPERATURE: 98 F | HEART RATE: 88 BPM | SYSTOLIC BLOOD PRESSURE: 168 MMHG | DIASTOLIC BLOOD PRESSURE: 93 MMHG | RESPIRATION RATE: 16 BRPM

## 2023-10-28 DIAGNOSIS — M54.50 MIDLINE LOW BACK PAIN WITHOUT SCIATICA, UNSPECIFIED CHRONICITY: Primary | ICD-10-CM
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
## 2023-10-28 PROCEDURE — 6360000002 HC RX W HCPCS: Performed by: STUDENT IN AN ORGANIZED HEALTH CARE EDUCATION/TRAINING PROGRAM

## 2023-10-28 PROCEDURE — 72100 X-RAY EXAM L-S SPINE 2/3 VWS: CPT

## 2023-10-28 PROCEDURE — 96372 THER/PROPH/DIAG INJ SC/IM: CPT

## 2023-10-28 PROCEDURE — 6370000000 HC RX 637 (ALT 250 FOR IP): Performed by: STUDENT IN AN ORGANIZED HEALTH CARE EDUCATION/TRAINING PROGRAM

## 2023-10-28 PROCEDURE — 70450 CT HEAD/BRAIN W/O DYE: CPT

## 2023-10-28 PROCEDURE — 72070 X-RAY EXAM THORAC SPINE 2VWS: CPT

## 2023-10-28 PROCEDURE — 99284 EMERGENCY DEPT VISIT MOD MDM: CPT

## 2023-10-28 RX ORDER — ACETAMINOPHEN 500 MG
1000 TABLET ORAL
Status: COMPLETED | OUTPATIENT
Start: 2023-10-28 | End: 2023-10-28

## 2023-10-28 RX ORDER — ORPHENADRINE CITRATE 30 MG/ML
60 INJECTION INTRAMUSCULAR; INTRAVENOUS ONCE
Status: COMPLETED | OUTPATIENT
Start: 2023-10-28 | End: 2023-10-28

## 2023-10-28 RX ADMIN — ACETAMINOPHEN 1000 MG: 500 TABLET ORAL at 17:14

## 2023-10-28 RX ADMIN — ORPHENADRINE CITRATE 60 MG: 60 INJECTION INTRAMUSCULAR; INTRAVENOUS at 17:14

## 2023-10-28 ASSESSMENT — PAIN - FUNCTIONAL ASSESSMENT: PAIN_FUNCTIONAL_ASSESSMENT: 0-10

## 2023-10-28 ASSESSMENT — PAIN SCALES - GENERAL
PAINLEVEL_OUTOF10: 8
PAINLEVEL_OUTOF10: 8

## 2023-10-28 NOTE — ED NOTES
Pt incontinent, assisted to standing position and changed, pt very unsteady, states he falls \"all the time\" pt instructed to stay in the bed and call staff for assistance, pt states, \"I cant promise I will\"      Jalen Banda, MAX  10/28/23 4198

## 2023-10-28 NOTE — ED NOTES
Pt is very unsteady, needs assistance x 2 to transfer from chair to bed.  Given some pudding and a soda     Connie Avalos RN  10/28/23 0851

## 2023-10-28 NOTE — ED NOTES
Pt agreed to wear a gown, placed in wheel chair in front of desk due to his in ability to follow directions and stay in bed and call for assistance     Randie Mortimer, RN  10/28/23 4545

## 2023-10-28 NOTE — DISCHARGE INSTRUCTIONS
Return to nearest ED if you develop severe worsening pain, new numbness and tingling, other concerning symptoms. Follow-up with your PCP in 2 to 3 days. You can take 1000 mg of acetaminophen every 8 hours and 600 mg of ibuprofen every 6 hours for pain.

## 2023-10-28 NOTE — ED NOTES
Pt found sitting in chair in room. Assisted back to bed, advised to not get out of bed. Given call light. Tv turned on. Pt acknowledged all.      Talita Nava  10/28/23 2718

## 2023-10-28 NOTE — ED NOTES
Pt reminded to stay in bed and use call light prior to getting up.       Terrea Kehr, MAX  10/28/23 6939

## 2023-10-28 NOTE — ED PROVIDER NOTES
[facility]      CHIEF COMPLAINT  Back Pain (Pt states he bent over and made his chronic back pain worse, pt taking muscle relaxer's at home without relief)       HISTORY OF PRESENT ILLNESS  Juan Francisco Marte is a 63 y.o. male  who presents to the ED complaining of Diffuse back pain. Patient states that he bent over earlier today and had an acute on chronic worsening of his back pain. He does take tizanidine at home which has not been improving his symptoms. While here in our lobby patient also fell and hit his head on a chair. Denies any loss of consciousness. Denies any new numbness or tingling. States he does have occasional bowel incontinence associate with diarrhea. Does not have any numbness or tingling. When he does have a stone accident it is always associate with diarrhea completely coming on, he simply cannot get to the bathroom quick enough. No saddle anesthesia, no fevers, no IV drug use. No other complaints, modifying factors or associated symptoms. I have reviewed the following from the nursing documentation. Past Medical History:   Diagnosis Date    Arthritis     Asthma     CAD (coronary artery disease)     Fatty liver     GERD (gastroesophageal reflux disease)     Hyperlipidemia     Hypertension     Medical history reviewed with no changes     MI, old     Sleep apnea     pt wears cpap at night. states does not have cpap    Type II or unspecified type diabetes mellitus without mention of complication, not stated as uncontrolled      Past Surgical History:   Procedure Laterality Date    CARDIAC PACEMAKER PLACEMENT  2011    NOT MRI COMPATIABLE OLD LEADS st [facility].  pace maker difib    CARDIOVASCULAR SURGERY      Watchman placement    CARPAL TUNNEL RELEASE Bilateral 2013    CATARACT REMOVAL WITH IMPLANT Left 02/28/2014    COLONOSCOPY      CORONARY ANGIOPLASTY WITH STENT PLACEMENT  2001,2008    CYST REMOVAL  1982    coccyx area    DIAGNOSTIC CARDIAC CATH LAB

## 2023-10-28 NOTE — ED NOTES
Pt does not want to wear a gown, states he wants to go home, states the  helps him into his house.       Az Narvaez RN  10/28/23 2278

## 2023-11-09 NOTE — TELEPHONE ENCOUNTER
Patient notes that he only take lasix 20 and potassium 20 on Monday and Thursday and prn daily  BMP 6/9/2023    He stopped plavix 10/17/2023-I removed from med list

## 2023-11-10 RX ORDER — FUROSEMIDE 20 MG/1
TABLET ORAL
Qty: 60 TABLET | Refills: 1 | Status: SHIPPED | OUTPATIENT
Start: 2023-11-10

## 2023-11-10 RX ORDER — POTASSIUM CHLORIDE 20 MEQ/1
TABLET, EXTENDED RELEASE ORAL
Qty: 60 TABLET | Refills: 1 | Status: SHIPPED | OUTPATIENT
Start: 2023-11-10

## 2023-11-25 NOTE — TELEPHONE ENCOUNTER
Refill Request     CONFIRM preferrred pharmacy with the patient. If Mail Order Rx - Pend for 90 day refill. Last Seen: Last Seen Department: 6/21/2023  Last Seen by PCP: 6/21/2023    Last Written: 8-    If no future appointment scheduled, route STAFF MESSAGE with patient name to the [facility] for scheduling. Next Appointment:   No future appointments. Message sent to [address] [place] [address] to schedule appt with patient?   N/A      Requested Prescriptions     Pending Prescriptions Disp Refills    [facility]7.[facility]N [Pharmacy Med Name: Ana Kimble 3/0.5 SOLN PEN-INJ] 2 mL 3     Sig: INJECT 3MG SUBCUTANEOUSLY ONCE A WEEK

## 2023-11-27 RX ORDER — DULAGLUTIDE 3 MG/.5ML
INJECTION, SOLUTION SUBCUTANEOUS
Qty: 2 ML | Refills: 3 | Status: SHIPPED | OUTPATIENT
Start: 2023-11-27

## 2023-12-08 DIAGNOSIS — F31.9 BIPOLAR DISORDER WITH DEPRESSION (HCC): ICD-10-CM

## 2023-12-08 RX ORDER — DIVALPROEX SODIUM 500 MG/1
TABLET, DELAYED RELEASE ORAL
Qty: 84 TABLET | Refills: 5 | Status: SHIPPED | OUTPATIENT
Start: 2023-12-08

## 2023-12-24 ENCOUNTER — APPOINTMENT (OUTPATIENT)
Dept: CT IMAGING | Age: 63
End: 2023-12-24
Payer: MEDICARE

## 2023-12-24 ENCOUNTER — HOSPITAL ENCOUNTER (EMERGENCY)
Age: 63
Discharge: HOME | End: 2023-12-24
Attending: STUDENT IN AN ORGANIZED HEALTH CARE EDUCATION/TRAINING PROGRAM
Payer: MEDICARE

## 2023-12-24 VITALS
SYSTOLIC BLOOD PRESSURE: 138 MMHG | TEMPERATURE: 98.2 F | WEIGHT: 219 LBS | RESPIRATION RATE: 16 BRPM | HEIGHT: 64 IN | OXYGEN SATURATION: 98 % | HEART RATE: 94 BPM | BODY MASS INDEX: 37.39 KG/M2 | DIASTOLIC BLOOD PRESSURE: 70 MMHG

## 2023-12-24 DIAGNOSIS — S30.0XXA CONTUSION OF SACRUM, INITIAL ENCOUNTER: Primary | ICD-10-CM

## 2023-12-24 DIAGNOSIS — M62.838 SPASM OF MUSCLE: ICD-10-CM

## 2023-12-24 LAB
ANION GAP SERPL CALCULATED.3IONS-SCNC: 14 MMOL/L (ref 3–16)
BASOPHILS # BLD: 0 K/UL (ref 0–0.2)
BASOPHILS NFR BLD: 0.7 %
BILIRUB UR QL STRIP.AUTO: NEGATIVE
BUN SERPL-MCNC: 12 MG/DL (ref 7–20)
CALCIUM SERPL-MCNC: 10 MG/DL (ref 8.3–10.6)
CHLORIDE SERPL-SCNC: 96 MMOL/L (ref 99–110)
CLARITY UR: CLEAR
CO2 SERPL-SCNC: 25 MMOL/L (ref 21–32)
COLOR UR: YELLOW
CREAT SERPL-MCNC: 0.9 MG/DL (ref 0.8–1.3)
DEPRECATED RDW RBC AUTO: 14.9 % (ref 12.4–15.4)
EOSINOPHIL # BLD: 0 K/UL (ref 0–0.6)
EOSINOPHIL NFR BLD: 0.4 %
ERYTHROCYTE [SEDIMENTATION RATE] IN BLOOD BY WESTERGREN METHOD: 1 MM/HR (ref 0–20)
GFR SERPLBLD CREATININE-BSD FMLA CKD-EPI: >60 ML/MIN/{1.73_M2}
GLUCOSE SERPL-MCNC: 472 MG/DL (ref 70–99)
GLUCOSE UR STRIP.AUTO-MCNC: >=1000 MG/DL
HCT VFR BLD AUTO: 45.8 % (ref 40.5–52.5)
HGB BLD-MCNC: 15.6 G/DL (ref 13.5–17.5)
HGB UR QL STRIP.AUTO: NEGATIVE
KETONES UR STRIP.AUTO-MCNC: NEGATIVE MG/DL
LEUKOCYTE ESTERASE UR QL STRIP.AUTO: NEGATIVE
LYMPHOCYTES # BLD: 1.9 K/UL (ref 1–5.1)
LYMPHOCYTES NFR BLD: 33.2 %
MCH RBC QN AUTO: 29.8 PG (ref 26–34)
MCHC RBC AUTO-ENTMCNC: 34.1 G/DL (ref 31–36)
MCV RBC AUTO: 87.4 FL (ref 80–100)
MONOCYTES # BLD: 0.5 K/UL (ref 0–1.3)
MONOCYTES NFR BLD: 9.1 %
NEUTROPHILS # BLD: 3.2 K/UL (ref 1.7–7.7)
NEUTROPHILS NFR BLD: 56.6 %
NITRITE UR QL STRIP.AUTO: NEGATIVE
PH UR STRIP.AUTO: 6 [PH] (ref 5–8)
PLATELET # BLD AUTO: 107 K/UL (ref 135–450)
PMV BLD AUTO: 7.1 FL (ref 5–10.5)
POTASSIUM SERPL-SCNC: 4.5 MMOL/L (ref 3.5–5.1)
PROT UR STRIP.AUTO-MCNC: NEGATIVE MG/DL
RBC # BLD AUTO: 5.24 M/UL (ref 4.2–5.9)
SODIUM SERPL-SCNC: 135 MMOL/L (ref 136–145)
SP GR UR STRIP.AUTO: <=1.005 (ref 1–1.03)
UA COMPLETE W REFLEX CULTURE PNL UR: ABNORMAL
UA DIPSTICK W REFLEX MICRO PNL UR: ABNORMAL
URN SPEC COLLECT METH UR: ABNORMAL
UROBILINOGEN UR STRIP-ACNC: 0.2 E.U./DL
WBC # BLD AUTO: 5.6 K/UL (ref 4–11)

## 2023-12-24 PROCEDURE — 6360000002 HC RX W HCPCS: Performed by: STUDENT IN AN ORGANIZED HEALTH CARE EDUCATION/TRAINING PROGRAM

## 2023-12-24 PROCEDURE — 72131 CT LUMBAR SPINE W/O DYE: CPT

## 2023-12-24 PROCEDURE — 85025 COMPLETE CBC W/AUTO DIFF WBC: CPT

## 2023-12-24 PROCEDURE — 96372 THER/PROPH/DIAG INJ SC/IM: CPT

## 2023-12-24 PROCEDURE — 6370000000 HC RX 637 (ALT 250 FOR IP): Performed by: STUDENT IN AN ORGANIZED HEALTH CARE EDUCATION/TRAINING PROGRAM

## 2023-12-24 PROCEDURE — 85652 RBC SED RATE AUTOMATED: CPT

## 2023-12-24 PROCEDURE — 81003 URINALYSIS AUTO W/O SCOPE: CPT

## 2023-12-24 PROCEDURE — 80048 BASIC METABOLIC PNL TOTAL CA: CPT

## 2023-12-24 PROCEDURE — 99284 EMERGENCY DEPT VISIT MOD MDM: CPT

## 2023-12-24 PROCEDURE — 36415 COLL VENOUS BLD VENIPUNCTURE: CPT

## 2023-12-24 RX ORDER — LIDOCAINE 50 MG/G
1 PATCH TOPICAL DAILY
Qty: 10 PATCH | Refills: 0 | Status: SHIPPED | OUTPATIENT
Start: 2023-12-24 | End: 2023-12-24

## 2023-12-24 RX ORDER — LIDOCAINE 4 G/G
1 PATCH TOPICAL ONCE
Status: DISCONTINUED | OUTPATIENT
Start: 2023-12-24 | End: 2023-12-24 | Stop reason: HOSPADM

## 2023-12-24 RX ORDER — CYCLOBENZAPRINE HCL 10 MG
10 TABLET ORAL 3 TIMES DAILY PRN
Qty: 30 TABLET | Refills: 0 | Status: SHIPPED | OUTPATIENT
Start: 2023-12-24 | End: 2024-01-03

## 2023-12-24 RX ORDER — CYCLOBENZAPRINE HCL 10 MG
10 TABLET ORAL 3 TIMES DAILY PRN
Qty: 30 TABLET | Refills: 0 | Status: SHIPPED | OUTPATIENT
Start: 2023-12-24 | End: 2023-12-24

## 2023-12-24 RX ORDER — LIDOCAINE 50 MG/G
1 PATCH TOPICAL DAILY
Qty: 10 PATCH | Refills: 0 | Status: SHIPPED | OUTPATIENT
Start: 2023-12-24 | End: 2024-01-03

## 2023-12-24 RX ORDER — HYDROCODONE BITARTRATE AND ACETAMINOPHEN 5; 325 MG/1; MG/1
1 TABLET ORAL ONCE
Status: COMPLETED | OUTPATIENT
Start: 2023-12-24 | End: 2023-12-24

## 2023-12-24 RX ORDER — ORPHENADRINE CITRATE 30 MG/ML
60 INJECTION INTRAMUSCULAR; INTRAVENOUS ONCE
Status: COMPLETED | OUTPATIENT
Start: 2023-12-24 | End: 2023-12-24

## 2023-12-24 RX ADMIN — ORPHENADRINE CITRATE 60 MG: 60 INJECTION INTRAMUSCULAR; INTRAVENOUS at 14:17

## 2023-12-24 RX ADMIN — HYDROCODONE BITARTRATE AND ACETAMINOPHEN 1 TABLET: 5; 325 TABLET ORAL at 14:17

## 2023-12-24 NOTE — DISCHARGE INSTRUCTIONS
Take your medications as prescribed. Follow-up with your doctor as needed peer return if develop any new or worsening symptoms.

## 2023-12-24 NOTE — ED PROVIDER NOTES
contusion, lumbar radiculopathy, fracture, cauda equina,    Decision Rules/Clinical Scores utilized:             See Formal Diagnostic Results above for the lab and radiology tests and orders. ED Reassessment:  See ED course    ED Course as of 12/24/23 1509   Sun Dec 24, 2023   1432 WBC: 5.6 [AL]   1432 Hemoglobin Quant: 15.6 [AL]   1432 Platelet Count(!): 633  Baseline for the patient. [AL]   1433 Glucose, UA(!): >=1000 [AL]   1433 Ketones, Urine: Negative [AL]   1433 Nitrite, Urine: Negative [AL]   1433 Leukocyte Esterase, Urine: Negative [AL]   1433 Blood, Urine: Negative [AL]   1442 Sodium(!): 135 [AL]   1442 Potassium: 4.5 [AL]   1442 Chloride(!): 96 [AL]   1442 Creatinine: 0.9 [AL]   4845 BUN,BUNPL: 12 [AL]   1442 Anion Gap: 14 [AL]   1501 CT LUMBAR SPINE WO CONTRAST  \"IMPRESSION:  No significant finding of the lumbar spine. \" [AL]   2661 Sed Rate: 1 [AL]      ED Course User Index  [AL] Druscilla Jeans, MD       Is this patient to be included in the SEP-1 core measure? No Exclusion criteria - the patient is NOT to be included for SEP-1 Core Measure due to: 2+ SIRS criteria are not met     MDM:  History was obtained from: pt, ems, and chart review        This is a 60year old male who had a dx of parkinsons disease by his PCP a year ago present with worsnening gait stability over the last 6 months, however over the patient today he has worsening middle back pain and spasming. Felt it radiate down his his left leg and felt numb and weak and he collapsed to try to sit back on the couch landing on his buttocks no head or neck injury no loss of conscious    On examination his strength 5/5 in bilateral lower extremities with intact reflexes. Normal sensation that is equal in bilateral lower extremities from the dorsum and plantar surface of his feet all the way up to his upper inner thigh.      CT lumbar spine without contrast was obtained: Showed no acute abnormalities    Laboratory studies

## 2023-12-24 NOTE — ED NOTES
Pt waited on ride. Discharge paperwork given. Prescriptions requested to be printed for an open pharmacy. Shea Mendiola

## 2023-12-25 PROCEDURE — 93295 DEV INTERROG REMOTE 1/2/MLT: CPT | Performed by: INTERNAL MEDICINE

## 2023-12-25 PROCEDURE — 93296 REM INTERROG EVL PM/IDS: CPT | Performed by: INTERNAL MEDICINE

## 2024-01-03 RX ORDER — GABAPENTIN 100 MG/1
CAPSULE ORAL
Qty: 168 CAPSULE | Refills: 3 | Status: SHIPPED | OUTPATIENT
Start: 2024-01-03 | End: 2024-02-02

## 2024-01-10 DIAGNOSIS — G25.2 COARSE TREMOR: ICD-10-CM

## 2024-01-10 DIAGNOSIS — R25.8 BRADYKINESIA: ICD-10-CM

## 2024-01-10 DIAGNOSIS — G20.A1 PARKINSON'S DISEASE: ICD-10-CM

## 2024-01-10 NOTE — TELEPHONE ENCOUNTER
Last ov 6/21/2023  Next ov Visit date not found    Is he still are pt saw miguel mcintosh on 10/26/23

## 2024-01-15 ENCOUNTER — APPOINTMENT (OUTPATIENT)
Dept: CT IMAGING | Age: 64
End: 2024-01-15
Payer: MEDICARE

## 2024-01-15 ENCOUNTER — HOSPITAL ENCOUNTER (INPATIENT)
Age: 64
LOS: 5 days | Discharge: LEFT BEFORE DISCHARGE | End: 2024-01-20
Attending: STUDENT IN AN ORGANIZED HEALTH CARE EDUCATION/TRAINING PROGRAM | Admitting: INTERNAL MEDICINE
Payer: MEDICARE

## 2024-01-15 DIAGNOSIS — R53.1 GENERALIZED WEAKNESS: ICD-10-CM

## 2024-01-15 DIAGNOSIS — R26.2 DIFFICULTY IN WALKING: ICD-10-CM

## 2024-01-15 DIAGNOSIS — R29.6 FREQUENT FALLS: Primary | ICD-10-CM

## 2024-01-15 PROBLEM — I50.9 CONGESTIVE HEART FAILURE (HCC): Status: ACTIVE | Noted: 2024-01-15

## 2024-01-15 LAB
ALBUMIN SERPL-MCNC: 4.4 G/DL (ref 3.4–5)
ALBUMIN/GLOB SERPL: 1.8 {RATIO} (ref 1.1–2.2)
ALP SERPL-CCNC: 70 U/L (ref 40–129)
ALT SERPL-CCNC: 17 U/L (ref 10–40)
AMMONIA PLAS-SCNC: 13 UMOL/L (ref 16–60)
ANION GAP SERPL CALCULATED.3IONS-SCNC: 17 MMOL/L (ref 3–16)
AST SERPL-CCNC: 16 U/L (ref 15–37)
BASOPHILS # BLD: 0.1 K/UL (ref 0–0.2)
BASOPHILS NFR BLD: 0.8 %
BILIRUB SERPL-MCNC: 0.5 MG/DL (ref 0–1)
BUN SERPL-MCNC: 20 MG/DL (ref 7–20)
CALCIUM SERPL-MCNC: 10 MG/DL (ref 8.3–10.6)
CHLORIDE SERPL-SCNC: 93 MMOL/L (ref 99–110)
CO2 SERPL-SCNC: 23 MMOL/L (ref 21–32)
CREAT SERPL-MCNC: 0.9 MG/DL (ref 0.8–1.3)
DEPRECATED RDW RBC AUTO: 14.9 % (ref 12.4–15.4)
EOSINOPHIL # BLD: 0 K/UL (ref 0–0.6)
EOSINOPHIL NFR BLD: 0.5 %
FLUAV RNA UPPER RESP QL NAA+PROBE: NEGATIVE
FLUBV AG NPH QL: NEGATIVE
GFR SERPLBLD CREATININE-BSD FMLA CKD-EPI: >60 ML/MIN/{1.73_M2}
GLUCOSE BLD-MCNC: 186 MG/DL (ref 70–99)
GLUCOSE BLD-MCNC: 207 MG/DL (ref 70–99)
GLUCOSE SERPL-MCNC: 283 MG/DL (ref 70–99)
HCT VFR BLD AUTO: 47.1 % (ref 40.5–52.5)
HGB BLD-MCNC: 16.2 G/DL (ref 13.5–17.5)
INR PPP: 0.94 (ref 0.84–1.16)
LYMPHOCYTES # BLD: 2.6 K/UL (ref 1–5.1)
LYMPHOCYTES NFR BLD: 33.7 %
MCH RBC QN AUTO: 29.8 PG (ref 26–34)
MCHC RBC AUTO-ENTMCNC: 34.3 G/DL (ref 31–36)
MCV RBC AUTO: 86.9 FL (ref 80–100)
MONOCYTES # BLD: 0.7 K/UL (ref 0–1.3)
MONOCYTES NFR BLD: 8.9 %
NEUTROPHILS # BLD: 4.3 K/UL (ref 1.7–7.7)
NEUTROPHILS NFR BLD: 56.1 %
PERFORMED ON: ABNORMAL
PERFORMED ON: ABNORMAL
PLATELET # BLD AUTO: 110 K/UL (ref 135–450)
PMV BLD AUTO: 7.1 FL (ref 5–10.5)
POTASSIUM SERPL-SCNC: 3.8 MMOL/L (ref 3.5–5.1)
PROT SERPL-MCNC: 6.8 G/DL (ref 6.4–8.2)
PROTHROMBIN TIME: 12.5 SEC (ref 11.5–14.8)
RBC # BLD AUTO: 5.42 M/UL (ref 4.2–5.9)
SARS-COV-2 RDRP RESP QL NAA+PROBE: NOT DETECTED
SODIUM SERPL-SCNC: 133 MMOL/L (ref 136–145)
VALPROATE SERPL-MCNC: 78.5 UG/ML (ref 50–100)
WBC # BLD AUTO: 7.7 K/UL (ref 4–11)

## 2024-01-15 PROCEDURE — 85025 COMPLETE CBC W/AUTO DIFF WBC: CPT

## 2024-01-15 PROCEDURE — 99285 EMERGENCY DEPT VISIT HI MDM: CPT

## 2024-01-15 PROCEDURE — 87635 SARS-COV-2 COVID-19 AMP PRB: CPT

## 2024-01-15 PROCEDURE — 85610 PROTHROMBIN TIME: CPT

## 2024-01-15 PROCEDURE — 87804 INFLUENZA ASSAY W/OPTIC: CPT

## 2024-01-15 PROCEDURE — 80053 COMPREHEN METABOLIC PANEL: CPT

## 2024-01-15 PROCEDURE — 6370000000 HC RX 637 (ALT 250 FOR IP): Performed by: STUDENT IN AN ORGANIZED HEALTH CARE EDUCATION/TRAINING PROGRAM

## 2024-01-15 PROCEDURE — 6370000000 HC RX 637 (ALT 250 FOR IP): Performed by: NURSE PRACTITIONER

## 2024-01-15 PROCEDURE — 83036 HEMOGLOBIN GLYCOSYLATED A1C: CPT

## 2024-01-15 PROCEDURE — 82140 ASSAY OF AMMONIA: CPT

## 2024-01-15 PROCEDURE — 2580000003 HC RX 258: Performed by: NURSE PRACTITIONER

## 2024-01-15 PROCEDURE — 80164 ASSAY DIPROPYLACETIC ACD TOT: CPT

## 2024-01-15 PROCEDURE — 6360000002 HC RX W HCPCS: Performed by: STUDENT IN AN ORGANIZED HEALTH CARE EDUCATION/TRAINING PROGRAM

## 2024-01-15 PROCEDURE — 6360000002 HC RX W HCPCS: Performed by: NURSE PRACTITIONER

## 2024-01-15 PROCEDURE — 96375 TX/PRO/DX INJ NEW DRUG ADDON: CPT

## 2024-01-15 PROCEDURE — 1200000000 HC SEMI PRIVATE

## 2024-01-15 PROCEDURE — 96374 THER/PROPH/DIAG INJ IV PUSH: CPT

## 2024-01-15 PROCEDURE — 71250 CT THORAX DX C-: CPT

## 2024-01-15 PROCEDURE — 99223 1ST HOSP IP/OBS HIGH 75: CPT

## 2024-01-15 PROCEDURE — 36415 COLL VENOUS BLD VENIPUNCTURE: CPT

## 2024-01-15 RX ORDER — INSULIN LISPRO 100 [IU]/ML
0-8 INJECTION, SOLUTION INTRAVENOUS; SUBCUTANEOUS
Status: DISCONTINUED | OUTPATIENT
Start: 2024-01-15 | End: 2024-01-15

## 2024-01-15 RX ORDER — SODIUM CHLORIDE 9 MG/ML
INJECTION, SOLUTION INTRAVENOUS PRN
Status: DISCONTINUED | OUTPATIENT
Start: 2024-01-15 | End: 2024-01-20 | Stop reason: HOSPADM

## 2024-01-15 RX ORDER — LORAZEPAM 2 MG/ML
2 INJECTION INTRAMUSCULAR ONCE
Status: COMPLETED | OUTPATIENT
Start: 2024-01-15 | End: 2024-01-15

## 2024-01-15 RX ORDER — LORAZEPAM 2 MG/ML
1 INJECTION INTRAMUSCULAR ONCE
Status: COMPLETED | OUTPATIENT
Start: 2024-01-15 | End: 2024-01-15

## 2024-01-15 RX ORDER — LIDOCAINE 4 G/G
1 PATCH TOPICAL DAILY
Status: DISCONTINUED | OUTPATIENT
Start: 2024-01-15 | End: 2024-01-20 | Stop reason: HOSPADM

## 2024-01-15 RX ORDER — INSULIN GLARGINE 100 [IU]/ML
50 INJECTION, SOLUTION SUBCUTANEOUS NIGHTLY
Status: DISCONTINUED | OUTPATIENT
Start: 2024-01-15 | End: 2024-01-20 | Stop reason: HOSPADM

## 2024-01-15 RX ORDER — ACETAMINOPHEN 650 MG/1
650 SUPPOSITORY RECTAL EVERY 6 HOURS PRN
Status: DISCONTINUED | OUTPATIENT
Start: 2024-01-15 | End: 2024-01-20 | Stop reason: HOSPADM

## 2024-01-15 RX ORDER — POLYETHYLENE GLYCOL 3350 17 G/17G
17 POWDER, FOR SOLUTION ORAL DAILY PRN
Status: DISCONTINUED | OUTPATIENT
Start: 2024-01-15 | End: 2024-01-20 | Stop reason: HOSPADM

## 2024-01-15 RX ORDER — INSULIN LISPRO 100 [IU]/ML
0-4 INJECTION, SOLUTION INTRAVENOUS; SUBCUTANEOUS NIGHTLY
Status: DISCONTINUED | OUTPATIENT
Start: 2024-01-15 | End: 2024-01-15

## 2024-01-15 RX ORDER — ACETAMINOPHEN 325 MG/1
650 TABLET ORAL EVERY 6 HOURS PRN
Status: DISCONTINUED | OUTPATIENT
Start: 2024-01-15 | End: 2024-01-20 | Stop reason: HOSPADM

## 2024-01-15 RX ORDER — INSULIN GLARGINE 100 [IU]/ML
74 INJECTION, SOLUTION SUBCUTANEOUS NIGHTLY
Status: DISCONTINUED | OUTPATIENT
Start: 2024-01-15 | End: 2024-01-15

## 2024-01-15 RX ORDER — LIDOCAINE 50 MG/G
1 PATCH TOPICAL DAILY
COMMUNITY

## 2024-01-15 RX ORDER — DIVALPROEX SODIUM 500 MG/1
1000 TABLET, DELAYED RELEASE ORAL NIGHTLY
Status: DISCONTINUED | OUTPATIENT
Start: 2024-01-15 | End: 2024-01-20 | Stop reason: HOSPADM

## 2024-01-15 RX ORDER — INSULIN GLARGINE 100 [IU]/ML
50 INJECTION, SOLUTION SUBCUTANEOUS ONCE
Status: DISCONTINUED | OUTPATIENT
Start: 2024-01-15 | End: 2024-01-15 | Stop reason: SDUPTHER

## 2024-01-15 RX ORDER — FLUTICASONE PROPIONATE 50 MCG
2 SPRAY, SUSPENSION (ML) NASAL DAILY
Status: DISCONTINUED | OUTPATIENT
Start: 2024-01-15 | End: 2024-01-20 | Stop reason: HOSPADM

## 2024-01-15 RX ORDER — DEXTROSE MONOHYDRATE 100 MG/ML
INJECTION, SOLUTION INTRAVENOUS CONTINUOUS PRN
Status: DISCONTINUED | OUTPATIENT
Start: 2024-01-15 | End: 2024-01-20 | Stop reason: HOSPADM

## 2024-01-15 RX ORDER — PANTOPRAZOLE SODIUM 40 MG/1
40 TABLET, DELAYED RELEASE ORAL
Status: DISCONTINUED | OUTPATIENT
Start: 2024-01-16 | End: 2024-01-20 | Stop reason: HOSPADM

## 2024-01-15 RX ORDER — FUROSEMIDE 20 MG/1
20 TABLET ORAL SEE ADMIN INSTRUCTIONS
Status: DISCONTINUED | OUTPATIENT
Start: 2024-01-15 | End: 2024-01-15

## 2024-01-15 RX ORDER — MIDAZOLAM HYDROCHLORIDE 1 MG/ML
5 INJECTION INTRAMUSCULAR; INTRAVENOUS ONCE
Status: COMPLETED | OUTPATIENT
Start: 2024-01-15 | End: 2024-01-15

## 2024-01-15 RX ORDER — ONDANSETRON 2 MG/ML
4 INJECTION INTRAMUSCULAR; INTRAVENOUS EVERY 6 HOURS PRN
Status: DISCONTINUED | OUTPATIENT
Start: 2024-01-15 | End: 2024-01-20 | Stop reason: HOSPADM

## 2024-01-15 RX ORDER — ATORVASTATIN CALCIUM 40 MG/1
80 TABLET, FILM COATED ORAL NIGHTLY
Status: DISCONTINUED | OUTPATIENT
Start: 2024-01-15 | End: 2024-01-20 | Stop reason: HOSPADM

## 2024-01-15 RX ORDER — SODIUM CHLORIDE 0.9 % (FLUSH) 0.9 %
5-40 SYRINGE (ML) INJECTION PRN
Status: DISCONTINUED | OUTPATIENT
Start: 2024-01-15 | End: 2024-01-20 | Stop reason: HOSPADM

## 2024-01-15 RX ORDER — ASPIRIN 81 MG/1
81 TABLET, CHEWABLE ORAL DAILY
Status: DISCONTINUED | OUTPATIENT
Start: 2024-01-15 | End: 2024-01-20 | Stop reason: HOSPADM

## 2024-01-15 RX ORDER — INSULIN LISPRO 100 [IU]/ML
15 INJECTION, SOLUTION INTRAVENOUS; SUBCUTANEOUS
Status: DISCONTINUED | OUTPATIENT
Start: 2024-01-16 | End: 2024-01-20 | Stop reason: HOSPADM

## 2024-01-15 RX ORDER — DULOXETIN HYDROCHLORIDE 60 MG/1
60 CAPSULE, DELAYED RELEASE ORAL DAILY
Status: DISCONTINUED | OUTPATIENT
Start: 2024-01-15 | End: 2024-01-20 | Stop reason: HOSPADM

## 2024-01-15 RX ORDER — MECOBALAMIN 5000 MCG
5 TABLET,DISINTEGRATING ORAL NIGHTLY
Status: DISCONTINUED | OUTPATIENT
Start: 2024-01-15 | End: 2024-01-20 | Stop reason: HOSPADM

## 2024-01-15 RX ORDER — CYCLOBENZAPRINE HCL 10 MG
10 TABLET ORAL 3 TIMES DAILY PRN
COMMUNITY

## 2024-01-15 RX ORDER — INSULIN LISPRO 100 [IU]/ML
0-4 INJECTION, SOLUTION INTRAVENOUS; SUBCUTANEOUS
Status: DISCONTINUED | OUTPATIENT
Start: 2024-01-15 | End: 2024-01-15

## 2024-01-15 RX ORDER — TAMSULOSIN HYDROCHLORIDE 0.4 MG/1
0.4 CAPSULE ORAL DAILY
Status: DISCONTINUED | OUTPATIENT
Start: 2024-01-15 | End: 2024-01-20 | Stop reason: HOSPADM

## 2024-01-15 RX ORDER — POTASSIUM CHLORIDE 20 MEQ/1
40 TABLET, EXTENDED RELEASE ORAL PRN
Status: DISCONTINUED | OUTPATIENT
Start: 2024-01-15 | End: 2024-01-20 | Stop reason: HOSPADM

## 2024-01-15 RX ORDER — SODIUM CHLORIDE 0.9 % (FLUSH) 0.9 %
5-40 SYRINGE (ML) INJECTION EVERY 12 HOURS SCHEDULED
Status: DISCONTINUED | OUTPATIENT
Start: 2024-01-15 | End: 2024-01-20 | Stop reason: HOSPADM

## 2024-01-15 RX ORDER — ONDANSETRON 4 MG/1
4 TABLET, ORALLY DISINTEGRATING ORAL EVERY 8 HOURS PRN
Status: DISCONTINUED | OUTPATIENT
Start: 2024-01-15 | End: 2024-01-20 | Stop reason: HOSPADM

## 2024-01-15 RX ORDER — DIPHENHYDRAMINE HYDROCHLORIDE 50 MG/ML
50 INJECTION INTRAMUSCULAR; INTRAVENOUS ONCE
Status: COMPLETED | OUTPATIENT
Start: 2024-01-15 | End: 2024-01-15

## 2024-01-15 RX ORDER — LISINOPRIL 5 MG/1
5 TABLET ORAL DAILY
Status: DISCONTINUED | OUTPATIENT
Start: 2024-01-15 | End: 2024-01-20 | Stop reason: HOSPADM

## 2024-01-15 RX ORDER — INSULIN LISPRO 100 [IU]/ML
20 INJECTION, SOLUTION INTRAVENOUS; SUBCUTANEOUS
Status: DISCONTINUED | OUTPATIENT
Start: 2024-01-16 | End: 2024-01-20 | Stop reason: HOSPADM

## 2024-01-15 RX ORDER — POTASSIUM CHLORIDE 7.45 MG/ML
10 INJECTION INTRAVENOUS PRN
Status: DISCONTINUED | OUTPATIENT
Start: 2024-01-15 | End: 2024-01-20 | Stop reason: HOSPADM

## 2024-01-15 RX ORDER — FUROSEMIDE 20 MG/1
20 TABLET ORAL
Status: DISCONTINUED | OUTPATIENT
Start: 2024-01-15 | End: 2024-01-20 | Stop reason: HOSPADM

## 2024-01-15 RX ORDER — OLANZAPINE 5 MG/1
10 TABLET, ORALLY DISINTEGRATING ORAL ONCE
Status: COMPLETED | OUTPATIENT
Start: 2024-01-15 | End: 2024-01-15

## 2024-01-15 RX ORDER — METOPROLOL SUCCINATE 50 MG/1
50 TABLET, EXTENDED RELEASE ORAL 2 TIMES DAILY
Status: DISCONTINUED | OUTPATIENT
Start: 2024-01-15 | End: 2024-01-20 | Stop reason: HOSPADM

## 2024-01-15 RX ORDER — DIVALPROEX SODIUM 500 MG/1
500 TABLET, DELAYED RELEASE ORAL EVERY MORNING
Status: DISCONTINUED | OUTPATIENT
Start: 2024-01-16 | End: 2024-01-20 | Stop reason: HOSPADM

## 2024-01-15 RX ORDER — ENOXAPARIN SODIUM 100 MG/ML
40 INJECTION SUBCUTANEOUS DAILY
Status: DISCONTINUED | OUTPATIENT
Start: 2024-01-15 | End: 2024-01-16

## 2024-01-15 RX ORDER — DIAZEPAM 5 MG/ML
5 INJECTION, SOLUTION INTRAMUSCULAR; INTRAVENOUS ONCE
Status: DISCONTINUED | OUTPATIENT
Start: 2024-01-15 | End: 2024-01-15

## 2024-01-15 RX ORDER — GLUCAGON 1 MG/ML
1 KIT INJECTION PRN
Status: DISCONTINUED | OUTPATIENT
Start: 2024-01-15 | End: 2024-01-20 | Stop reason: HOSPADM

## 2024-01-15 RX ORDER — GABAPENTIN 100 MG/1
200 CAPSULE ORAL 3 TIMES DAILY
Status: DISCONTINUED | OUTPATIENT
Start: 2024-01-15 | End: 2024-01-20 | Stop reason: HOSPADM

## 2024-01-15 RX ORDER — HYDROXYZINE HYDROCHLORIDE 25 MG/1
25 TABLET, FILM COATED ORAL EVERY 4 HOURS PRN
Status: DISCONTINUED | OUTPATIENT
Start: 2024-01-15 | End: 2024-01-20 | Stop reason: HOSPADM

## 2024-01-15 RX ORDER — MAGNESIUM SULFATE IN WATER 40 MG/ML
2000 INJECTION, SOLUTION INTRAVENOUS PRN
Status: DISCONTINUED | OUTPATIENT
Start: 2024-01-15 | End: 2024-01-20 | Stop reason: HOSPADM

## 2024-01-15 RX ADMIN — TAMSULOSIN HYDROCHLORIDE 0.4 MG: 0.4 CAPSULE ORAL at 18:10

## 2024-01-15 RX ADMIN — Medication 10 ML: at 18:09

## 2024-01-15 RX ADMIN — MIDAZOLAM HYDROCHLORIDE 5 MG: 1 INJECTION, SOLUTION INTRAMUSCULAR; INTRAVENOUS at 14:58

## 2024-01-15 RX ADMIN — CARBIDOPA AND LEVODOPA 1 TABLET: 10; 100 TABLET ORAL at 18:10

## 2024-01-15 RX ADMIN — FLUTICASONE PROPIONATE 2 SPRAY: 50 SPRAY, METERED NASAL at 18:16

## 2024-01-15 RX ADMIN — FUROSEMIDE 20 MG: 20 TABLET ORAL at 18:10

## 2024-01-15 RX ADMIN — LORAZEPAM 1 MG: 2 INJECTION INTRAMUSCULAR; INTRAVENOUS at 10:51

## 2024-01-15 RX ADMIN — ENOXAPARIN SODIUM 40 MG: 100 INJECTION SUBCUTANEOUS at 18:10

## 2024-01-15 RX ADMIN — DIPHENHYDRAMINE HYDROCHLORIDE 50 MG: 50 INJECTION INTRAMUSCULAR; INTRAVENOUS at 11:17

## 2024-01-15 RX ADMIN — LISINOPRIL 5 MG: 5 TABLET ORAL at 18:10

## 2024-01-15 RX ADMIN — OLANZAPINE 10 MG: 5 TABLET, ORALLY DISINTEGRATING ORAL at 11:34

## 2024-01-15 RX ADMIN — MIDAZOLAM HYDROCHLORIDE 5 MG: 1 INJECTION, SOLUTION INTRAMUSCULAR; INTRAVENOUS at 13:39

## 2024-01-15 RX ADMIN — ASPIRIN 81 MG: 81 TABLET, CHEWABLE ORAL at 18:10

## 2024-01-15 RX ADMIN — MIDAZOLAM HYDROCHLORIDE 5 MG: 1 INJECTION, SOLUTION INTRAMUSCULAR; INTRAVENOUS at 11:52

## 2024-01-15 RX ADMIN — LORAZEPAM 2 MG: 2 INJECTION INTRAMUSCULAR; INTRAVENOUS at 11:07

## 2024-01-15 RX ADMIN — DULOXETINE HYDROCHLORIDE 60 MG: 60 CAPSULE, DELAYED RELEASE ORAL at 18:10

## 2024-01-15 ASSESSMENT — PAIN DESCRIPTION - ONSET: ONSET: SUDDEN

## 2024-01-15 ASSESSMENT — PAIN DESCRIPTION - LOCATION: LOCATION: RIB CAGE

## 2024-01-15 ASSESSMENT — PAIN DESCRIPTION - ORIENTATION: ORIENTATION: LEFT

## 2024-01-15 ASSESSMENT — PAIN - FUNCTIONAL ASSESSMENT
PAIN_FUNCTIONAL_ASSESSMENT: 0-10
PAIN_FUNCTIONAL_ASSESSMENT: ACTIVITIES ARE NOT PREVENTED

## 2024-01-15 ASSESSMENT — PAIN DESCRIPTION - FREQUENCY: FREQUENCY: CONTINUOUS

## 2024-01-15 ASSESSMENT — PAIN DESCRIPTION - PAIN TYPE: TYPE: ACUTE PAIN

## 2024-01-15 NOTE — H&P
Diet: No diet orders on file  Code Status: Prior    MADISON Jhaveri  01/15/24  7:44 PM

## 2024-01-15 NOTE — ED NOTES
Patient awake and attempting to get out of bed. Unable to redirect with multiple attempts. Clothing and monitoring equipment removed by the patient. Dr. Matt updated and visualized patient from the hallway. Medication per MAR.

## 2024-01-15 NOTE — ED NOTES
Patient incontinent of urine; unable to get out of bed to wheelchair/restroom r/t weakness. Incontinence brief changed, urinal provided. Discussed rehab or assisted living as an option for the patient r/t weakness and frequent falls. Patient states that he has two dogs that he will not leave to be admitted anywhere. Reports he has a home health aid three times per week. Call light in reach. Dr. Matt updated.

## 2024-01-15 NOTE — PROGRESS NOTES
Patient admitted to room 230 from [facility]. Side rails up x2. Patient has an order for telemetry. Bed is locked and in lowest position. Call light placed in patient reach. Patient explained the routine of the hospital, including but not limited to lab work, vital signs, hourly rounding, etc. Care plans and education updated, CHG wipes performed at time of admission.     BP (!) 143/74   Pulse 73   Temp 98.8 °F (37.1 °C) (Axillary)   Resp 20   Ht 1.626 m (5' 4\")   Wt 105.7 kg (233 lb)   SpO2 97%   BMI 39.99 kg/m²     Most recent set of vitals as shown.     Patient has an LDA that does not require CHG wipes, including possible a surgery incision, call catheter, or a central line.     4 Eyes Skin Assessment     The patient is being assess for   Admission    I agree that 2 RN's have performed a thorough Head to Toe Skin Assessment on the patient. ALL assessment sites listed below have been assessed.      Areas assessed for pressure by both nurses:   [x]   Head, Face, and Ears   [x]   Shoulders, Back, and Chest, Abdomen  [x]   Arms, Elbows, and Hands   [x]   Coccyx, Sacrum, and Ischium  [x]   Legs, Feet, and Heels    Smegma noted under the glans penis. Fissures to bilateral heels. Soap and water, bath wipes, and CHG wipes performed             **SHARE this note so that the co-signing nurse is able to place an eSignature**    Co-signer eSignature: Electronically signed by Keegan Jacob RN on 1/15/24 at 3:58 PM EST    Does the Patient have Skin Breakdown related to pressure?  No              Omega Prevention initiated:  No   Wound Care Orders initiated:  No      [facility] nurse consulted for Pressure Injury (Stage 3,4, Unstageable, DTI, NWPT, Complex wounds)and New or Established Ostomies:  No      Primary Nurse eSignature: Electronically signed by Jose Harp RN on 1/15/24 at 3:55 PM EST      Bedside Mobility Assessment Tool (BMAT):     Assessment Level 1- Sit and Shake    1. From a semi-reclined position, ask

## 2024-01-15 NOTE — ED NOTES
Patient alert, oriented to self and place, attempted to reoriented. Patient continues to remove monitoring equipment and clothing while attempting to get out of bed.

## 2024-01-15 NOTE — ED NOTES
Patient continues to remove clothing, medical monitoring equipment, and clothing. External catheter removed by the patient.

## 2024-01-15 NOTE — ED NOTES
Report called to MAX Hutchinson at [address]. Denies questions/concerns. Verbalized understanding in ETA.

## 2024-01-15 NOTE — PROGRESS NOTES
Lying in bed with eyes closed. Resp e/e. No distress noted. Telesitter at bedside for safety. Bed alarm in place and turned on. Call light in reach.

## 2024-01-15 NOTE — ED TRIAGE NOTES
To the ED via EMS after fall at approximately 0600 this AM. C/o falling into the arm of his chair with pain to the left side. Denies LOC. Denies recent illness. Uses a walker at baseline.

## 2024-01-15 NOTE — ED PROVIDER NOTES
Emergency Department Provider Note  Location: [facility] EMERGENCY DEPARTMENT  1/15/2024     Patient Identification  Chano Quiroga Jr. is a 63 y.o. male    Chief Complaint  Fall      Mode of Arrival  EMS    HPI  (History provided by patient)  This is a 63 y.o. male with a PMH significant for frequent falls, ALIN, CAD, bipolar, Parkinsons disease  presented today for fall.  Patient reports that he was getting up to use the bathroom when he lost his balance and fell.  Patient currently lives alone but does have a health aide that checks in occasionally.  Patient reports that he feels generally weak and fatigued.  States he normally is able to use his walker to go the bathroom but has been able to do so since this morning due to weakness.  Patient states that he hit the left side of his chest on a chair.  Since then he is on pain to his left ribs.  He denies any shortness of breath.  Denies any chest pain.  He denies any recent fever or vomiting.  Denies any cough or congestion.  Denies any abdominal pain.  Denies any change to bowel or bladder.  He denies any new numbness or tingling.  Again there is no associated loss of consciousness.  He denies any new neck or back pain.  Of note patient has been seen multiple times in the ER for frequent falls.    ROS  Review of Systems   All other systems reviewed and are negative.        I have reviewed the following nursing documentation:  Allergies:   Allergies   Allergen Reactions    Other      VASCEPA CAUSED RASH AND ITCHING    Pcn [Penicillins] Hives       Past medical history:  has a past medical history of Arthritis, Asthma, CAD (coronary artery disease), CHF (congestive heart failure) (HCC), Chronic obstructive pulmonary disease (HCC), COPD (chronic obstructive pulmonary disease) (HCC), Fatty liver, GERD (gastroesophageal reflux disease), Hyperlipidemia, Hypertension, Medical history reviewed with no changes, MI, old, Sleep apnea, and Type II or unspecified type  weakness    3. Difficulty in walking          Disposition:  Admit to med/surg floor in stable condition.    Blood pressure (!) 154/92, pulse 88, temperature 98.3 °F (36.8 °C), temperature source Oral, resp. rate 20, height 1.626 m (5' 4\"), weight 99.3 kg (219 lb), SpO2 96 %.    Patient was given scripts for the following medications. I counseled patient how to take these medications.   New Prescriptions    No medications on file       Disposition referral (if applicable):  No follow-up provider specified.      Total critical care time is 33 minutes, which excludes separately billable procedures and updating family. Time spent is specifically for management of the presenting complaint and symptoms initially, direct bedside care, reevaluation, review of records, and consultation.  There was a high probability of clinically significant life-threatening deterioration in the patient's condition, which required my urgent intervention.     This chart was generated in part by using Dragon Dictation system and may contain errors related to that system including errors in grammar, punctuation, and spelling, as well as words and phrases that may be inappropriate. If there are any questions or concerns please feel free to contact the dictating provider for clarification.     Lina Matt MD   [facility]        Lina Matt MD  01/15/24 2430

## 2024-01-15 NOTE — PLAN OF CARE
Admit from [facility]    Generalized weakness  Unable to care for self at home, fall denies hitting head  Bipolar  Parkinson's disease   On Depakote, check level and ammonia  Pt was agitated in ED, given IV ativan,, benadryl, valium, versed, Zyprexa and Benadryl in ED  Urinalysis ordered and pending  Will add tox screen  PT/OT consulted  Consider psychiatry consult pending clinical course       Mohini Sanchez, APRN - CNP

## 2024-01-15 NOTE — ED NOTES
Patient's brother, Miguelito, contacted and updated on patient admission to [facility]. States he will care for the patient's animals while he is admitted.

## 2024-01-15 NOTE — PROGRESS NOTES
Resting in bed with eyes closed. Resp e/e. No distress noted. Initial assessment complete. Bed alarm in place and turned on. Pt drowsy on admission. Unable to ask questions due to confusion and drowsy.Call light in reach.

## 2024-01-15 NOTE — DISCHARGE INSTRUCTIONS
Heart Failure Resources:  Heart Failure Interactive Workbook:  Go to https://Spontacts[employer] Square Feet.Joshfire/publication/?t=674181 for a Free Heart Failure Interactive Workbook provided by The American Heart Association. This interactive workbook will provide information on Healthier Living with Heart Failure. Please copy and paste link into search bar. Use your mouse to scroll through the pages.    HF Baker amaya:   Heart Failure Free smart phone amaya available for iPhone and Android download. Use your phone to track sodium intake, fluid intake, symptoms, and weight.     Low Sodium Diet / Recipes:  Go to www.[employer] website for “renal” diet which is Low Sodium! [employer] is a dialysis company, but this website offers free seasonal cookbooks. Each quarter, they will release 25-30 new recipes with a breakdown of calories, sodium, and glucose. You can also go to wwwL[employer] Media/recipes website for free recipes.     Discharge Instruction Video:  Scan the QR code below with your camera and click the canva.com link to open the video and watch educational information on Heart Failure and Medications from one of our nurses.   https://www.InDMusic/design/DAFZnsH_JRk/2WhozkvWHJYyeEPxyT9skj/edit    Home Exercise Program:   Identification of Green/Yellow/Red zones:  You should be able to identify when you feel good (green zone), if you have 1-2 symptoms of HF (yellow zone), or if you are in need of medical attention (red zone).  In your CHF education folder you were provided a “stop light tool” to outline this information.     We want to you to rate your exertion levels:    Our therapy team has discussed means of identification with you such as the \"Kamille scale.\"  The Kamille rating scale ranges from 6 to 20, where 6 means \"no exertion at all\" and 20 means \"maximal exertion.\" The goal is to use this to gauge how much effort it is taking for you to do your normal daily tasks.   You should be able to recognize when too much

## 2024-01-16 ENCOUNTER — APPOINTMENT (OUTPATIENT)
Dept: CT IMAGING | Age: 64
End: 2024-01-16
Payer: MEDICARE

## 2024-01-16 ENCOUNTER — APPOINTMENT (OUTPATIENT)
Dept: GENERAL RADIOLOGY | Age: 64
End: 2024-01-16
Payer: MEDICARE

## 2024-01-16 LAB
ANION GAP SERPL CALCULATED.3IONS-SCNC: 15 MMOL/L (ref 3–16)
BASOPHILS # BLD: 0 K/UL (ref 0–0.2)
BASOPHILS NFR BLD: 0.5 %
BUN SERPL-MCNC: 15 MG/DL (ref 7–20)
CALCIUM SERPL-MCNC: 9.5 MG/DL (ref 8.3–10.6)
CHLORIDE SERPL-SCNC: 94 MMOL/L (ref 99–110)
CO2 SERPL-SCNC: 25 MMOL/L (ref 21–32)
CREAT SERPL-MCNC: 0.6 MG/DL (ref 0.8–1.3)
DEPRECATED RDW RBC AUTO: 15.2 % (ref 12.4–15.4)
EOSINOPHIL # BLD: 0 K/UL (ref 0–0.6)
EOSINOPHIL NFR BLD: 0.7 %
EST. AVERAGE GLUCOSE BLD GHB EST-MCNC: 205.9 MG/DL
GFR SERPLBLD CREATININE-BSD FMLA CKD-EPI: >60 ML/MIN/{1.73_M2}
GLUCOSE BLD-MCNC: 162 MG/DL (ref 70–99)
GLUCOSE BLD-MCNC: 199 MG/DL (ref 70–99)
GLUCOSE BLD-MCNC: 199 MG/DL (ref 70–99)
GLUCOSE BLD-MCNC: 242 MG/DL (ref 70–99)
GLUCOSE BLD-MCNC: 258 MG/DL (ref 70–99)
GLUCOSE BLD-MCNC: 278 MG/DL (ref 70–99)
GLUCOSE SERPL-MCNC: 165 MG/DL (ref 70–99)
HBA1C MFR BLD: 8.8 %
HCT VFR BLD AUTO: 50.8 % (ref 40.5–52.5)
HGB BLD-MCNC: 17.1 G/DL (ref 13.5–17.5)
LYMPHOCYTES # BLD: 1.7 K/UL (ref 1–5.1)
LYMPHOCYTES NFR BLD: 31.6 %
MCH RBC QN AUTO: 29.9 PG (ref 26–34)
MCHC RBC AUTO-ENTMCNC: 33.6 G/DL (ref 31–36)
MCV RBC AUTO: 89.1 FL (ref 80–100)
MONOCYTES # BLD: 0.6 K/UL (ref 0–1.3)
MONOCYTES NFR BLD: 10.5 %
NEUTROPHILS # BLD: 3.1 K/UL (ref 1.7–7.7)
NEUTROPHILS NFR BLD: 56.7 %
PERFORMED ON: ABNORMAL
PLATELET # BLD AUTO: 109 K/UL (ref 135–450)
PMV BLD AUTO: 7.2 FL (ref 5–10.5)
POTASSIUM SERPL-SCNC: 4 MMOL/L (ref 3.5–5.1)
RBC # BLD AUTO: 5.7 M/UL (ref 4.2–5.9)
SODIUM SERPL-SCNC: 134 MMOL/L (ref 136–145)
WBC # BLD AUTO: 5.4 K/UL (ref 4–11)

## 2024-01-16 PROCEDURE — 6370000000 HC RX 637 (ALT 250 FOR IP)

## 2024-01-16 PROCEDURE — 6360000002 HC RX W HCPCS: Performed by: INTERNAL MEDICINE

## 2024-01-16 PROCEDURE — 70450 CT HEAD/BRAIN W/O DYE: CPT

## 2024-01-16 PROCEDURE — 73501 X-RAY EXAM HIP UNI 1 VIEW: CPT

## 2024-01-16 PROCEDURE — 2580000003 HC RX 258: Performed by: NURSE PRACTITIONER

## 2024-01-16 PROCEDURE — 6370000000 HC RX 637 (ALT 250 FOR IP): Performed by: INTERNAL MEDICINE

## 2024-01-16 PROCEDURE — 85025 COMPLETE CBC W/AUTO DIFF WBC: CPT

## 2024-01-16 PROCEDURE — 99223 1ST HOSP IP/OBS HIGH 75: CPT | Performed by: PSYCHIATRY & NEUROLOGY

## 2024-01-16 PROCEDURE — 97162 PT EVAL MOD COMPLEX 30 MIN: CPT

## 2024-01-16 PROCEDURE — 99233 SBSQ HOSP IP/OBS HIGH 50: CPT | Performed by: INTERNAL MEDICINE

## 2024-01-16 PROCEDURE — 95816 EEG AWAKE AND DROWSY: CPT

## 2024-01-16 PROCEDURE — 6370000000 HC RX 637 (ALT 250 FOR IP): Performed by: PSYCHIATRY & NEUROLOGY

## 2024-01-16 PROCEDURE — 95816 EEG AWAKE AND DROWSY: CPT | Performed by: PSYCHIATRY & NEUROLOGY

## 2024-01-16 PROCEDURE — 6370000000 HC RX 637 (ALT 250 FOR IP): Performed by: NURSE PRACTITIONER

## 2024-01-16 PROCEDURE — 80048 BASIC METABOLIC PNL TOTAL CA: CPT

## 2024-01-16 PROCEDURE — 36415 COLL VENOUS BLD VENIPUNCTURE: CPT

## 2024-01-16 PROCEDURE — 6360000002 HC RX W HCPCS: Performed by: NURSE PRACTITIONER

## 2024-01-16 PROCEDURE — 1200000000 HC SEMI PRIVATE

## 2024-01-16 PROCEDURE — 97530 THERAPEUTIC ACTIVITIES: CPT

## 2024-01-16 RX ORDER — OLANZAPINE 5 MG/1
5 TABLET ORAL EVERY 6 HOURS PRN
Status: DISCONTINUED | OUTPATIENT
Start: 2024-01-16 | End: 2024-01-20 | Stop reason: HOSPADM

## 2024-01-16 RX ORDER — ENOXAPARIN SODIUM 100 MG/ML
30 INJECTION SUBCUTANEOUS 2 TIMES DAILY
Status: DISCONTINUED | OUTPATIENT
Start: 2024-01-16 | End: 2024-01-20 | Stop reason: HOSPADM

## 2024-01-16 RX ADMIN — TAMSULOSIN HYDROCHLORIDE 0.4 MG: 0.4 CAPSULE ORAL at 09:49

## 2024-01-16 RX ADMIN — GABAPENTIN 200 MG: 100 CAPSULE ORAL at 20:49

## 2024-01-16 RX ADMIN — CARBIDOPA AND LEVODOPA 1.5 TABLET: 10; 100 TABLET ORAL at 20:49

## 2024-01-16 RX ADMIN — INSULIN LISPRO 20 UNITS: 100 INJECTION, SOLUTION INTRAVENOUS; SUBCUTANEOUS at 18:33

## 2024-01-16 RX ADMIN — INSULIN LISPRO 15 UNITS: 100 INJECTION, SOLUTION INTRAVENOUS; SUBCUTANEOUS at 09:47

## 2024-01-16 RX ADMIN — ATORVASTATIN CALCIUM 80 MG: 40 TABLET, FILM COATED ORAL at 20:49

## 2024-01-16 RX ADMIN — ENOXAPARIN SODIUM 40 MG: 100 INJECTION SUBCUTANEOUS at 09:50

## 2024-01-16 RX ADMIN — METOPROLOL SUCCINATE 50 MG: 50 TABLET, EXTENDED RELEASE ORAL at 09:49

## 2024-01-16 RX ADMIN — ENOXAPARIN SODIUM 30 MG: 100 INJECTION SUBCUTANEOUS at 20:49

## 2024-01-16 RX ADMIN — PANTOPRAZOLE SODIUM 40 MG: 40 TABLET, DELAYED RELEASE ORAL at 06:24

## 2024-01-16 RX ADMIN — LISINOPRIL 5 MG: 5 TABLET ORAL at 09:48

## 2024-01-16 RX ADMIN — HYDROXYZINE HYDROCHLORIDE 25 MG: 25 TABLET ORAL at 09:48

## 2024-01-16 RX ADMIN — DIVALPROEX SODIUM 1000 MG: 500 TABLET, DELAYED RELEASE ORAL at 20:50

## 2024-01-16 RX ADMIN — GABAPENTIN 200 MG: 100 CAPSULE ORAL at 09:49

## 2024-01-16 RX ADMIN — CARBIDOPA AND LEVODOPA 1 TABLET: 10; 100 TABLET ORAL at 00:18

## 2024-01-16 RX ADMIN — GABAPENTIN 200 MG: 100 CAPSULE ORAL at 14:32

## 2024-01-16 RX ADMIN — DULOXETINE HYDROCHLORIDE 60 MG: 60 CAPSULE, DELAYED RELEASE ORAL at 09:49

## 2024-01-16 RX ADMIN — OLANZAPINE 5 MG: 5 TABLET, FILM COATED ORAL at 14:32

## 2024-01-16 RX ADMIN — DIVALPROEX SODIUM 1000 MG: 500 TABLET, DELAYED RELEASE ORAL at 00:36

## 2024-01-16 RX ADMIN — METOPROLOL SUCCINATE 50 MG: 50 TABLET, EXTENDED RELEASE ORAL at 20:50

## 2024-01-16 RX ADMIN — INSULIN GLARGINE 50 UNITS: 100 INJECTION, SOLUTION SUBCUTANEOUS at 20:50

## 2024-01-16 RX ADMIN — ATORVASTATIN CALCIUM 80 MG: 40 TABLET, FILM COATED ORAL at 00:14

## 2024-01-16 RX ADMIN — DIVALPROEX SODIUM 500 MG: 500 TABLET, DELAYED RELEASE ORAL at 09:51

## 2024-01-16 RX ADMIN — CARBIDOPA AND LEVODOPA 1 TABLET: 10; 100 TABLET ORAL at 09:49

## 2024-01-16 RX ADMIN — SODIUM CHLORIDE, PRESERVATIVE FREE 10 ML: 5 INJECTION INTRAVENOUS at 20:50

## 2024-01-16 RX ADMIN — CARBIDOPA AND LEVODOPA 1.5 TABLET: 10; 100 TABLET ORAL at 14:31

## 2024-01-16 RX ADMIN — INSULIN GLARGINE 50 UNITS: 100 INJECTION, SOLUTION SUBCUTANEOUS at 00:35

## 2024-01-16 RX ADMIN — Medication 5 MG: at 20:49

## 2024-01-16 RX ADMIN — GABAPENTIN 200 MG: 100 CAPSULE ORAL at 00:36

## 2024-01-16 RX ADMIN — ASPIRIN 81 MG: 81 TABLET, CHEWABLE ORAL at 09:49

## 2024-01-16 RX ADMIN — SODIUM CHLORIDE, PRESERVATIVE FREE 10 ML: 5 INJECTION INTRAVENOUS at 00:37

## 2024-01-16 ASSESSMENT — PAIN SCALES - GENERAL: PAINLEVEL_OUTOF10: 0

## 2024-01-16 NOTE — PROGRESS NOTES
Dr. Yanez updated via [employer] that pt is very impulsive, pulling lines and continually removing monitor. Updated that with physical therapy pt unable to take 1 step, and has no spatial awareness. Pt is a max x2 to get up. Updated that pt tells RN he wants to sign out.

## 2024-01-16 NOTE — PROGRESS NOTES
Shift assessment complete; see flow sheet.  Scheduled medications administered; See MAR.  IV infusing without difficulty. Pt denies pain at this time. Pt very sleepy tonight. Easy to awake but falls asleep while in conversation. Pt finally able to stay awake enough to take medications. Pt denies any needs at this time. Pt educated on use of call light and to call out with needs, verbalized understanding, bed in low locked position for pt safety

## 2024-01-16 NOTE — PLAN OF CARE
Problem: Chronic Conditions and Co-morbidities  Goal: Patient's chronic conditions and co-morbidity symptoms are monitored and maintained or improved  Outcome: Progressing  Flowsheets (Taken 1/15/2024 2435 by Evangelina Cortez RN)  Care Plan - Patient's Chronic Conditions and Co-Morbidity Symptoms are Monitored and Maintained or Improved: Monitor and assess patient's chronic conditions and comorbid symptoms for stability, deterioration, or improvement     Problem: Discharge Planning  Goal: Discharge to home or other facility with appropriate resources  Outcome: Progressing  Flowsheets (Taken 1/15/2024 154 by Evangelina Cortez, RN)  Discharge to home or other facility with appropriate resources: Identify barriers to discharge with patient and caregiver     Problem: Risk for Elopement  Goal: Patient will not exit the unit/facility without proper excort  Outcome: Progressing     Problem: Safety - Adult  Goal: Free from fall injury  Outcome: Progressing

## 2024-01-16 NOTE — PROGRESS NOTES
Handoff report and transfer of care given at bedside to Sandra RN and Cassie RN.  Patient in stable condition, denies needs/concerns at this time.  Call light within reach.

## 2024-01-16 NOTE — PROGRESS NOTES
Pt placed in posey for patient safety. Pt continues to try to get OOB, and pull external catheter off. Dr. Yanez @ bedside.

## 2024-01-16 NOTE — PROGRESS NOTES
Inpatient Physical Therapy Evaluation & Treatment    Unit: [facility]  Date:  1/16/2024  Patient Name:    Chano Quiroga Jr.  Admitting diagnosis:  Generalized weakness [R53.1]  Frequent falls [R29.6]  Difficulty in walking [R26.2]  Admit Date:  1/15/2024  Precautions/Restrictions/WB Status/ Lines/ Wounds/ Oxygen: Fall risk, Bed/chair alarm, Lines (external catheter), Confusion, Telemetry, and Telesitter      Treatment Time:  08:10-08:58  Treatment Number:  1   Timed Code Treatment Minutes: 38 minutes  Total Treatment Minutes:  48  minutes    Patient Stated Goals for Therapy: \" I want to walk to the bathroom \"          Discharge Recommendations: SNF  DME needs for discharge: Defer to facility     Therapy recommendation for EMS Transport: requires transport by cot due to pt needs lift equipment for safe transfers and pt needs A x 2 for safe transfers    Therapy recommendations for staff:   Assist of 2 for transfers with use of HOSEA STEDY and gait belt to/from BSC  to/from chair. Needs constant close supervision if sitting on BSC due to impulsivity.     History of Present Illness:   Per H&P Abelino WALLACE 1/15: \"63 y.o. male with pmhx of parkinsons disease CHF, atrial fibrillation s/p watchman, NSVT s/p ICD, DM, HTN who presented to [facility] ED with complaint of fall. \"  Per neurology consult note 1/16, pt noted for underlying bipolar disorder with recent diagnosis of Parkinson disease. EEG pending. Neurology recommending medication adjustments.   Pt has been agitated since arrival and has received multiple medications to control his behaviors.      Home Health S4 Level Recommendation:  NA        AM-PAC Mobility Score       AM-PAC Inpatient Mobility without Stair Climbing Raw Score : 9      Subjective  Patient lying reclined in bed with no family present.  Pt agreeable to this PT session, wanting to get up and walk to bathroom. After assisting him to BSC, he became angry and threatened to leave AMA, attempting to take off  diagnosis of CHF? No    Upper Extremity ROM/Strength  Please see OT evaluation.      Lower Extremity ROM / Strength   AROM WFL: Yes    Formal strength testing deferred due to agitation and impulsivity. Appears at least 3+/5 throughout based on demonstrated functional mobility.    Lower Extremity Sensation    NT    Coordination  Impaired - delayed motor initiation for all UE / LE tasks (most pronounced in LEs/stepping patterns).     Tone  WNL    Balance  Static Sitting:  Good - ; SBA   Dynamic Sitting:  Good - ; SBA   Comments: close assist needed due to impulsivity vs balance deficit.     Static Standing: Fair ; Min A   Dynamic Standing: Poor; Max A   Comments: no appreciable proactive or reactive postural control.     Posture  Seated: Forward head and neck  Standing: Posterior lean    Bed Mobility   Supine to Sit:    SBA  Sit to Supine:   Min A   Rolling:   Min A    Scooting in sitting: SBA   Scooting in supine:  Total A   Bridging:  Not Tested    Transfer Training     Sit to stand:   Min A    Stand to sit:   Min A    Bed to/from Chair:  Max A  . Attempted to use walker but patient repeatedly pushed it away from himself and leaned forward reaching excessively towards target. Poor motor planning, poor motor initiation. Unable to take full steps away from bed, then in attempt to pivot to BSC was unable to take a full pivot step thus Max A needed for controlled pivot of pelvis towards target surface.      Gait gait deferred due to difficulty with transfers; pt ambulated 0 ft.   Distance:      NA ft  Deviations (firm surface/linoleum):  N/A  Assistive Device Used:    N/A  Level of Assist:    N/A   Comment: N/A    Stair Training deferred, pt unsafe/ not appropriate to complete stairs at this time    Therapeutic Exercises Initiated  deferred secondary to treatment focus on functional mobility and agitation.  Supine:  N/A    Seated:  N/A    Standing:  N/A    Activity Tolerance   During therapy session noted pt with no

## 2024-01-16 NOTE — PROGRESS NOTES
Pt continues to want to sign out AMA, RN explained that pt has that right, and once pt ride is here, pt may sign out AMA.

## 2024-01-16 NOTE — PROGRESS NOTES
Pt telling RN he wants to sign out and go home, pt states to call sister to come get him. Dr. Yanez updated, and advised RN to call family and if pt receives a ride pt will have to sign out AMA.

## 2024-01-16 NOTE — PROGRESS NOTES
Attempted to call pt sister, Caryn without answer. Called pt brother Migeulito, discussed pt home life. Per brother pt lives alone, and makes his own decisions. Told RN that if pt wants to leave he will come get him and that he just needs a few days of rest in his chair @ home. Updated brother that pt is impulsive and unsteady/unsafe to ambulate, and informed of possibility of requiring posey belt for pt safety.

## 2024-01-16 NOTE — PROGRESS NOTES
Pt brother Miguelito notified that pt placed in posey belt for pt safety, also notified that pt would like to sign out AMA, and needs a ride. Requested to speak to pt. Call transferred.

## 2024-01-16 NOTE — PROGRESS NOTES
Received return call from pt sister Caryn, who states she is at work and not coming to get the pt. Stated that if pt isn't able to ambulate she isn't coming to get him, and that he has done this before where she didn't know he was signing out AMA. Pt updated.

## 2024-01-16 NOTE — PROGRESS NOTES
Attempted to call pt sisterCaryn per pt request for signing out AMA, left voicemail with call back number.

## 2024-01-16 NOTE — PROGRESS NOTES
Pt wanting posey belt off. Rn educated pt once pt ride is here for him to sign out AMA, Rn will remove posey belt. Rn educated pt that carmen belt is there for pt safety, due to weakness and impulsiveness. Pt continues to try to get out of bed.

## 2024-01-16 NOTE — PROGRESS NOTES
Occupational Therapy  Orders received, chart reviewed. Patient leaving floor for testing, unavailable at this time. Will reattempt as patient status allows.  Chio Vanegas, OTR/L [id number]

## 2024-01-16 NOTE — PLAN OF CARE
Problem: Chronic Conditions and Co-morbidities  Goal: Patient's chronic conditions and co-morbidity symptoms are monitored and maintained or improved  Outcome: Progressing  Flowsheets (Taken 1/16/2024 1040)  Care Plan - Patient's Chronic Conditions and Co-Morbidity Symptoms are Monitored and Maintained or Improved:   Monitor and assess patient's chronic conditions and comorbid symptoms for stability, deterioration, or improvement   Collaborate with multidisciplinary team to address chronic and comorbid conditions and prevent exacerbation or deterioration   Update acute care plan with appropriate goals if chronic or comorbid symptoms are exacerbated and prevent overall improvement and discharge     Problem: Discharge Planning  Goal: Discharge to home or other facility with appropriate resources  Outcome: Progressing  Flowsheets (Taken 1/16/2024 1040)  Discharge to home or other facility with appropriate resources:   Identify barriers to discharge with patient and caregiver   Arrange for needed discharge resources and transportation as appropriate   Identify discharge learning needs (meds, wound care, etc)     Problem: Safety - Medical Restraint  Goal: Remains free of injury from restraints (Restraint for Interference with Medical Device)  Description: INTERVENTIONS:  1. Determine that other, less restrictive measures have been tried or would not be effective before applying the restraint  2. Evaluate the patient's condition at the time of restraint application  3. Inform patient/family regarding the reason for restraint  4. Q2H: Monitor safety, psychosocial status, comfort, nutrition and hydration  Outcome: Progressing  Flowsheets  Taken 1/16/2024 1800 by Cassie Mina RN  Remains free of injury from restraints (restraint for interference with medical device): Determine that other, less restrictive measures have been tried or would not be effective before applying the restraint  Taken 1/16/2024 1600 by  Cassie Mina RN  Remains free of injury from restraints (restraint for interference with medical device): Determine that other, less restrictive measures have been tried or would not be effective before applying the restraint  Taken 1/16/2024 1200 by Sandra Black RN  Remains free of injury from restraints (restraint for interference with medical device): Determine that other, less restrictive measures have been tried or would not be effective before applying the restraint  Taken 1/16/2024 1016 by Sandra Black RN  Remains free of injury from restraints (restraint for interference with medical device): Determine that other, less restrictive measures have been tried or would not be effective before applying the restraint

## 2024-01-16 NOTE — PROGRESS NOTES
Pt resting as manifested by eyes closed in dark room. Respirations even and easy. Call light and bedside table in reach. Bed in low locked position. Denies any needs at this time.

## 2024-01-16 NOTE — PROGRESS NOTES
Called physical therapy regarding availability to see pt. Pt continues to try and get out of bed. Pulling heart monitor, brief and external catheter off. RN attempted to educate pt to leave monitors and catheter on, pt verbalizes understanding, and then begins pulling off again.

## 2024-01-16 NOTE — PROGRESS NOTES
Pt pulled monitors and catheter off, told RN that he wants to leave and sign out. RN discussed with pt about being weak and falling @ home, and that pt should really let PT evaluate him for pt's own safety.

## 2024-01-16 NOTE — PROGRESS NOTES
Pt states since sister can't come get him he will stay in the hospital and get therapy. VS listed in flowsheet. Pt provided with warm blanket, pt states he wants to nap.

## 2024-01-16 NOTE — PROGRESS NOTES
Pt repeatedly trying to get OOB, and pulling monitors off. RN redirected pt and educated that pt is unsafe to get OOB, due to weakness. No evidence of learning.

## 2024-01-16 NOTE — PROGRESS NOTES
Pt called out asking for some water, pt now agreeable to take medications, pt repositioned, new brief in place. [place] repositioned. Telesitter in place. Pt requesting something to help him \"calm down\". PRN zyprexa given, see MAR.

## 2024-01-16 NOTE — CONSULTS
Psychiatry Initial Consultation    Admission Date:    1/15/2024    Reason for Consult:  bipolar disorder    ID: domiciled, , and physically disabled 62yo with a history of multiple chronic medical conditions, and self-reported history of anxiety and bipolar disorder who self-called 911 after falling. He became agitated in the [facility] and was given emergency medication. He was eventually transferred and admitted to medicine for generalized weakness and psychiatry was consulted.      CC: \"I fell on my hip. I just want to get out of here.\"    HPI:   Per the note, he was brought in after a fall.    In the ED he was restless. He wanted to get out of bed and take off medical equipment to leave. At some point he was given given IV ativan, benadryl, valium, versed, Zyprexa and Benadryl.     On the medical floor, he's been try to get out bed and removing medical equipment  They are concerned he will fall again.    When I met with him, he was delayed and sedated but oriented. He knows how he got here and why.  He is able to track and sustain conversation with me.    When asked about what happened in the ED, he said he was tired of being there and wanted to leave. He continues to feel that way.  He lives in an apartment by himself and takes care of a brother with schizophrenia who lives in the same building. He has aids who come into the home three times weekly.    He does not present with, endorse, or voice symptoms of fausto, depression, or psychosis. He does not have thoughts of self-harm or suicide.     He understands he may fall again if he leaves without further assistance.     Valproic acid level taken but not at trough.     Past Psychiatric History:    Previous Diagnoses: per patient bipolar disorder and anxiety  PreviousHosp:none  [facility]patient[facility]: [facility]. He missed the last few appointments and was terminated from the agency. Last appointment was August, 2023.  Med Trials: only Depakote and Cymbalta.  obtained during admission to date.      Formulation:  domiciled, , and physically disabled 64yo with a history of multiple chronic medical conditions, and self-reported history of anxiety and bipolar disorder who self-called 911 after falling. He became agitated in the [facility] and was given emergency medication. He was eventually transferred and admitted to medicine for generalized weakness and psychiatry was consulted.      Does not present with acute mood or psychotic symptoms.     Dx:   Primary Psychiatric (DSM V) Diagnosis: bipolar disorder by history   Secondary Psychiatric (DSM V) Diagnoses: none  Chemical Dependency Diagnoses: none    Recommendations:  1.  Does not require inpatient psychiatric admission   2.  Continue Depakote and Cymbalta as prescribed.   3. Re-establish with [facility] for follow-up care. Has home health aid three times weekly. Family involved.  4.  Safely plan discussed with the patient at length..   5.  Has capacity to make decisions regarding disposition including discharge AMA.    Spent > 80 minutes face to face with patient of which >50% was spent counseling and providing education regarding diagnosis, treatment options, and prognosis.    Thank you for the consult.     Percy Joel MD  Staff Psychiatrist

## 2024-01-16 NOTE — CONSULTS
Neurology consultation note    Patient name: Chano Quiroga Jr.      Chief Complaint:  Fall.    History of present illness:  This is a 63 years old right-handed male.  The patient was brought to the hospital yesterday due to fall.  The patient is noted for underlying bipolar disorder with recent diagnosis of Parkinson disease.  The patient is also noted for recurrent thrombocytopenia.  Upon admission, the patient was significantly agitated.  The patient received multiple medications to control his behaviors.  The patient denies focal weakness or numbness during my assessment.    Past medical history:    Past Medical History:   Diagnosis Date    Arthritis     Asthma     CAD (coronary artery disease)     CHF (congestive heart failure) (HCC)     Chronic obstructive pulmonary disease (HCC)     COPD (chronic obstructive pulmonary disease) (HCC)     Fatty liver     GERD (gastroesophageal reflux disease)     Hyperlipidemia     Hypertension     Medical history reviewed with no changes     MI, old     Sleep apnea     pt wears cpap at night. states does not have cpap    Type II or unspecified type diabetes mellitus without mention of complication, not stated as uncontrolled        Past surgical history:    Past Surgical History:   Procedure Laterality Date    CARDIAC PACEMAKER PLACEMENT  2011    NOT MRI COMPATIABLE OLD LEADS st [facility]. pace maker difib    CARDIOVASCULAR SURGERY      Watchman placement    CARPAL TUNNEL RELEASE Bilateral 2013    CATARACT REMOVAL WITH IMPLANT Left 02/28/2014    COLONOSCOPY      CORONARY ANGIOPLASTY WITH STENT PLACEMENT  2001,2008    CYST REMOVAL  1982    coccyx area    DIAGNOSTIC CARDIAC CATH LAB PROCEDURE      ENDOSCOPY, COLON, DIAGNOSTIC      ERCP  09/15/2013    ERCP  10/11/2013    WITH STENT REMOVAL    EYE SURGERY      FOOT SURGERY Right 07/09/2018     REPAIR CALCANEAL SPUR, REPAIR OF ACHILLES TENDON RIGHT FOOT    NERVE SURGERY Bilateral 12/01/2020    BILATERAL LUMBAR THREE LUMBAR FOUR  a day.  5.  Precaution for hallucination from Sinemet.      75 minutes were spent with the patient with greater than 50% of the time spent in counseling and coordination of care.    Taryn Coyne MD

## 2024-01-16 NOTE — PROGRESS NOTES
Pt states he wants carmen belt off, RN discussed with pt that posey belt is in place for patient safety because pt is too weak to ambulate at this time. Pt requests to speak with charge RN. RN offered to get pt repositioned in bed, pt refused to have brief replaced. Pt refused 1400 scheduled medications. Charge RN updated.

## 2024-01-16 NOTE — PROGRESS NOTES
IM Progress Note    Admit Date:  1/15/2024       Pt with bipolar disorder, CAD, CMP   Admitted with weakness, fall .     Combative in ER    Medicated with versed, artivan, zyprexa, benadryl         Subjective:  Mr. Quiroga was calm through the night.   Again getting restlesss, agitated this AM.   Asking to leave AMA .     He is alert , well oriented.   But very weak .  Not having good insight about his weakness    Seen by PT, OT.   Placed in posey for safety .        Objective:   /75   Pulse (!) 113   Temp 98 °F (36.7 °C) (Axillary)   Resp 18   Ht 1.626 m (5' 4\")   Wt 106.9 kg (235 lb 9.6 oz)   SpO2 97%   BMI 40.44 kg/m²     Intake/Output Summary (Last 24 hours) at 1/16/2024 1157  Last data filed at 1/16/2024 1056  Gross per 24 hour   Intake 560 ml   Output 1550 ml   Net -990 ml         Physical Exam:  General:  Awake, alert,  oriented X3.   Restless, anxious .   Obese   Skin:  Warm and dry  Neck:  JVD absent. Neck supple  Chest:  Clear to auscultation, respiration easy. No wheezes, rales or rhonchi.   Cardiovascular:  RRR ,S1S2 normal  Abdomen:  Soft, non tender, non distended, BS +  Extremities:  No edema.  Intact peripheral pulses. Brisk cap refill, < 2 secs  Neuro: non focal, general weakness       Medications:   Scheduled Meds:   carbidopa-levodopa  1.5 tablet Oral TID    lidocaine  1 patch TransDERmal Daily    aspirin  81 mg Oral Daily    atorvastatin  80 mg Oral Nightly    divalproex  500 mg Oral QAM    divalproex  1,000 mg Oral Nightly    DULoxetine  60 mg Oral Daily    pantoprazole  40 mg Oral QAM AC    fluticasone  2 spray Each Nostril Daily    gabapentin  200 mg Oral TID    lisinopril  5 mg Oral Daily    metoprolol succinate  50 mg Oral BID    tamsulosin  0.4 mg Oral Daily    sodium chloride flush  5-40 mL IntraVENous 2 times per day    enoxaparin  40 mg SubCUTAneous Daily    furosemide  20 mg Oral Once per day on Mon Thu    melatonin  5 mg Oral Nightly    insulin glargine  50 Units

## 2024-01-17 ENCOUNTER — APPOINTMENT (OUTPATIENT)
Dept: GENERAL RADIOLOGY | Age: 64
End: 2024-01-17
Payer: MEDICARE

## 2024-01-17 ENCOUNTER — APPOINTMENT (OUTPATIENT)
Dept: VASCULAR LAB | Age: 64
End: 2024-01-17
Payer: MEDICARE

## 2024-01-17 LAB
AMPHETAMINES UR QL SCN>1000 NG/ML: ABNORMAL
ANION GAP SERPL CALCULATED.3IONS-SCNC: 13 MMOL/L (ref 3–16)
BACTERIA URNS QL MICRO: ABNORMAL /HPF
BARBITURATES UR QL SCN>200 NG/ML: ABNORMAL
BASOPHILS # BLD: 0 K/UL (ref 0–0.2)
BASOPHILS NFR BLD: 0.6 %
BENZODIAZ UR QL SCN>200 NG/ML: POSITIVE
BILIRUB UR QL STRIP.AUTO: ABNORMAL
BUN SERPL-MCNC: 20 MG/DL (ref 7–20)
CALCIUM SERPL-MCNC: 9.2 MG/DL (ref 8.3–10.6)
CANNABINOIDS UR QL SCN>50 NG/ML: ABNORMAL
CHLORIDE SERPL-SCNC: 96 MMOL/L (ref 99–110)
CLARITY UR: CLEAR
CO2 SERPL-SCNC: 24 MMOL/L (ref 21–32)
COCAINE UR QL SCN: ABNORMAL
COLOR UR: YELLOW
CREAT SERPL-MCNC: 0.7 MG/DL (ref 0.8–1.3)
DEPRECATED RDW RBC AUTO: 15 % (ref 12.4–15.4)
DRUG SCREEN COMMENT UR-IMP: ABNORMAL
EOSINOPHIL # BLD: 0 K/UL (ref 0–0.6)
EOSINOPHIL NFR BLD: 0.7 %
EPI CELLS #/AREA URNS HPF: ABNORMAL /HPF (ref 0–5)
FENTANYL SCREEN, URINE: ABNORMAL
GFR SERPLBLD CREATININE-BSD FMLA CKD-EPI: >60 ML/MIN/{1.73_M2}
GLUCOSE BLD-MCNC: 159 MG/DL (ref 70–99)
GLUCOSE BLD-MCNC: 169 MG/DL (ref 70–99)
GLUCOSE BLD-MCNC: 201 MG/DL (ref 70–99)
GLUCOSE BLD-MCNC: 209 MG/DL (ref 70–99)
GLUCOSE BLD-MCNC: 298 MG/DL (ref 70–99)
GLUCOSE SERPL-MCNC: 203 MG/DL (ref 70–99)
GLUCOSE UR STRIP.AUTO-MCNC: >=1000 MG/DL
HCT VFR BLD AUTO: 49 % (ref 40.5–52.5)
HGB BLD-MCNC: 16.7 G/DL (ref 13.5–17.5)
HGB UR QL STRIP.AUTO: NEGATIVE
HYALINE CASTS #/AREA URNS LPF: ABNORMAL /LPF (ref 0–2)
KETONES UR STRIP.AUTO-MCNC: 15 MG/DL
LEUKOCYTE ESTERASE UR QL STRIP.AUTO: NEGATIVE
LYMPHOCYTES # BLD: 1.6 K/UL (ref 1–5.1)
LYMPHOCYTES NFR BLD: 24.1 %
MCH RBC QN AUTO: 30.2 PG (ref 26–34)
MCHC RBC AUTO-ENTMCNC: 34 G/DL (ref 31–36)
MCV RBC AUTO: 88.9 FL (ref 80–100)
METHADONE UR QL SCN>300 NG/ML: ABNORMAL
MONOCYTES # BLD: 0.7 K/UL (ref 0–1.3)
MONOCYTES NFR BLD: 9.7 %
MUCOUS THREADS #/AREA URNS LPF: ABNORMAL /LPF
NEUTROPHILS # BLD: 4.4 K/UL (ref 1.7–7.7)
NEUTROPHILS NFR BLD: 64.9 %
NITRITE UR QL STRIP.AUTO: NEGATIVE
OPIATES UR QL SCN>300 NG/ML: ABNORMAL
OXYCODONE UR QL SCN: ABNORMAL
PCP UR QL SCN>25 NG/ML: ABNORMAL
PERFORMED ON: ABNORMAL
PH UR STRIP.AUTO: 6 [PH] (ref 5–8)
PH UR STRIP: 6 [PH]
PLATELET # BLD AUTO: 106 K/UL (ref 135–450)
PMV BLD AUTO: 7.3 FL (ref 5–10.5)
POTASSIUM SERPL-SCNC: 4.4 MMOL/L (ref 3.5–5.1)
PROT UR STRIP.AUTO-MCNC: ABNORMAL MG/DL
RBC # BLD AUTO: 5.52 M/UL (ref 4.2–5.9)
RBC #/AREA URNS HPF: ABNORMAL /HPF (ref 0–4)
RENAL EPI CELLS #/AREA UR COMP ASSIST: ABNORMAL /HPF (ref 0–1)
SODIUM SERPL-SCNC: 133 MMOL/L (ref 136–145)
SP GR UR STRIP.AUTO: 1.01 (ref 1–1.03)
UA COMPLETE W REFLEX CULTURE PNL UR: ABNORMAL
UA DIPSTICK W REFLEX MICRO PNL UR: YES
URN SPEC COLLECT METH UR: ABNORMAL
UROBILINOGEN UR STRIP-ACNC: 0.2 E.U./DL
WBC # BLD AUTO: 6.8 K/UL (ref 4–11)
WBC #/AREA URNS HPF: ABNORMAL /HPF (ref 0–5)

## 2024-01-17 PROCEDURE — 74018 RADEX ABDOMEN 1 VIEW: CPT

## 2024-01-17 PROCEDURE — 97165 OT EVAL LOW COMPLEX 30 MIN: CPT

## 2024-01-17 PROCEDURE — 6370000000 HC RX 637 (ALT 250 FOR IP): Performed by: NURSE PRACTITIONER

## 2024-01-17 PROCEDURE — 36415 COLL VENOUS BLD VENIPUNCTURE: CPT

## 2024-01-17 PROCEDURE — 6360000002 HC RX W HCPCS: Performed by: INTERNAL MEDICINE

## 2024-01-17 PROCEDURE — 74230 X-RAY XM SWLNG FUNCJ C+: CPT

## 2024-01-17 PROCEDURE — 2580000003 HC RX 258: Performed by: NURSE PRACTITIONER

## 2024-01-17 PROCEDURE — 92610 EVALUATE SWALLOWING FUNCTION: CPT

## 2024-01-17 PROCEDURE — 80307 DRUG TEST PRSMV CHEM ANLYZR: CPT

## 2024-01-17 PROCEDURE — 93971 EXTREMITY STUDY: CPT

## 2024-01-17 PROCEDURE — 6370000000 HC RX 637 (ALT 250 FOR IP): Performed by: PSYCHIATRY & NEUROLOGY

## 2024-01-17 PROCEDURE — 6370000000 HC RX 637 (ALT 250 FOR IP)

## 2024-01-17 PROCEDURE — 92526 ORAL FUNCTION THERAPY: CPT

## 2024-01-17 PROCEDURE — 85025 COMPLETE CBC W/AUTO DIFF WBC: CPT

## 2024-01-17 PROCEDURE — 99233 SBSQ HOSP IP/OBS HIGH 50: CPT | Performed by: PSYCHIATRY & NEUROLOGY

## 2024-01-17 PROCEDURE — 71046 X-RAY EXAM CHEST 2 VIEWS: CPT

## 2024-01-17 PROCEDURE — 97535 SELF CARE MNGMENT TRAINING: CPT

## 2024-01-17 PROCEDURE — 6370000000 HC RX 637 (ALT 250 FOR IP): Performed by: INTERNAL MEDICINE

## 2024-01-17 PROCEDURE — 97530 THERAPEUTIC ACTIVITIES: CPT

## 2024-01-17 PROCEDURE — 81001 URINALYSIS AUTO W/SCOPE: CPT

## 2024-01-17 PROCEDURE — 1200000000 HC SEMI PRIVATE

## 2024-01-17 PROCEDURE — 80048 BASIC METABOLIC PNL TOTAL CA: CPT

## 2024-01-17 PROCEDURE — 99232 SBSQ HOSP IP/OBS MODERATE 35: CPT

## 2024-01-17 PROCEDURE — 92611 MOTION FLUOROSCOPY/SWALLOW: CPT

## 2024-01-17 RX ORDER — DOCUSATE SODIUM 100 MG/1
100 CAPSULE, LIQUID FILLED ORAL DAILY
Status: DISCONTINUED | OUTPATIENT
Start: 2024-01-17 | End: 2024-01-20 | Stop reason: HOSPADM

## 2024-01-17 RX ADMIN — SODIUM CHLORIDE, PRESERVATIVE FREE 10 ML: 5 INJECTION INTRAVENOUS at 08:55

## 2024-01-17 RX ADMIN — ASPIRIN 81 MG: 81 TABLET, CHEWABLE ORAL at 08:50

## 2024-01-17 RX ADMIN — CARBIDOPA AND LEVODOPA 1.5 TABLET: 10; 100 TABLET ORAL at 08:50

## 2024-01-17 RX ADMIN — FLUTICASONE PROPIONATE 2 SPRAY: 50 SPRAY, METERED NASAL at 09:05

## 2024-01-17 RX ADMIN — DIVALPROEX SODIUM 500 MG: 500 TABLET, DELAYED RELEASE ORAL at 08:52

## 2024-01-17 RX ADMIN — OLANZAPINE 5 MG: 5 TABLET, FILM COATED ORAL at 04:36

## 2024-01-17 RX ADMIN — METOPROLOL SUCCINATE 50 MG: 50 TABLET, EXTENDED RELEASE ORAL at 08:51

## 2024-01-17 RX ADMIN — GABAPENTIN 200 MG: 100 CAPSULE ORAL at 08:50

## 2024-01-17 RX ADMIN — SODIUM CHLORIDE, PRESERVATIVE FREE 10 ML: 5 INJECTION INTRAVENOUS at 22:15

## 2024-01-17 RX ADMIN — TAMSULOSIN HYDROCHLORIDE 0.4 MG: 0.4 CAPSULE ORAL at 08:51

## 2024-01-17 RX ADMIN — GABAPENTIN 200 MG: 100 CAPSULE ORAL at 22:01

## 2024-01-17 RX ADMIN — DULOXETINE HYDROCHLORIDE 60 MG: 60 CAPSULE, DELAYED RELEASE ORAL at 08:50

## 2024-01-17 RX ADMIN — GABAPENTIN 200 MG: 100 CAPSULE ORAL at 15:55

## 2024-01-17 RX ADMIN — DIVALPROEX SODIUM 1000 MG: 500 TABLET, DELAYED RELEASE ORAL at 22:03

## 2024-01-17 RX ADMIN — ENOXAPARIN SODIUM 30 MG: 100 INJECTION SUBCUTANEOUS at 22:01

## 2024-01-17 RX ADMIN — ACETAMINOPHEN 650 MG: 325 TABLET ORAL at 22:37

## 2024-01-17 RX ADMIN — INSULIN GLARGINE 50 UNITS: 100 INJECTION, SOLUTION SUBCUTANEOUS at 22:02

## 2024-01-17 RX ADMIN — POLYETHYLENE GLYCOL (3350) 17 G: 17 POWDER, FOR SOLUTION ORAL at 08:55

## 2024-01-17 RX ADMIN — LISINOPRIL 5 MG: 5 TABLET ORAL at 08:50

## 2024-01-17 RX ADMIN — METOPROLOL SUCCINATE 50 MG: 50 TABLET, EXTENDED RELEASE ORAL at 22:01

## 2024-01-17 RX ADMIN — CARBIDOPA AND LEVODOPA 1.5 TABLET: 10; 100 TABLET ORAL at 22:01

## 2024-01-17 RX ADMIN — ENOXAPARIN SODIUM 30 MG: 100 INJECTION SUBCUTANEOUS at 08:51

## 2024-01-17 RX ADMIN — INSULIN LISPRO 20 UNITS: 100 INJECTION, SOLUTION INTRAVENOUS; SUBCUTANEOUS at 16:59

## 2024-01-17 RX ADMIN — PANTOPRAZOLE SODIUM 40 MG: 40 TABLET, DELAYED RELEASE ORAL at 05:19

## 2024-01-17 RX ADMIN — CARBIDOPA AND LEVODOPA 1.5 TABLET: 10; 100 TABLET ORAL at 15:56

## 2024-01-17 RX ADMIN — DOCUSATE SODIUM 100 MG: 100 CAPSULE, LIQUID FILLED ORAL at 16:59

## 2024-01-17 RX ADMIN — Medication 5 MG: at 22:02

## 2024-01-17 RX ADMIN — INSULIN LISPRO 15 UNITS: 100 INJECTION, SOLUTION INTRAVENOUS; SUBCUTANEOUS at 08:50

## 2024-01-17 RX ADMIN — ATORVASTATIN CALCIUM 80 MG: 40 TABLET, FILM COATED ORAL at 22:01

## 2024-01-17 ASSESSMENT — PAIN DESCRIPTION - LOCATION: LOCATION: FLANK

## 2024-01-17 ASSESSMENT — PAIN DESCRIPTION - PAIN TYPE: TYPE: ACUTE PAIN

## 2024-01-17 ASSESSMENT — PAIN DESCRIPTION - DESCRIPTORS: DESCRIPTORS: ACHING

## 2024-01-17 ASSESSMENT — PAIN SCALES - GENERAL: PAINLEVEL_OUTOF10: 9

## 2024-01-17 ASSESSMENT — PAIN DESCRIPTION - ORIENTATION: ORIENTATION: LEFT

## 2024-01-17 ASSESSMENT — PAIN - FUNCTIONAL ASSESSMENT: PAIN_FUNCTIONAL_ASSESSMENT: ACTIVITIES ARE NOT PREVENTED

## 2024-01-17 NOTE — PROGRESS NOTES
Pt awake at this time. AM meds given. Pt denies any needs. [place] belt remains in place. Pt denies any needs. Call light within reach bed alarm on and tele sitter in room

## 2024-01-17 NOTE — PROGRESS NOTES
Speech Language Pathology  Swallowing Disorders and Dysphagia  Clinical Bedside Swallow Assessment  Facility/Department: [address] MEDICAL-SURGICAL    Instrumentation: Yes. MBSS is warranted to further assess oropharyngeal structures and function. RN to request MBS  Diet recommendation: IDDSI 7 Regular Solids; IDDSI 0 Thin Liquids; Meds whole in puree  Risk management: upright for all intake, stay upright for at least 30 mins after intake, small bites/sips, oral care 2-3x/day to reduce adverse affects in the event of aspiration, increase physical mobility as able, slow rate of intake, coordinate PO intake with Sinemet dosage, general GERD precautions, general aspiration precautions, and hold PO and contact SLP if s/s of aspiration or worsening respiratory status develop.      NAME:Chano Quiroga JrPrateek  : 1960 (63 y.o.)   MRN: [id number]  ROOM: [facility]/0230-  ADMISSION DATE: 1/15/2024  PATIENT DIAGNOSIS(ES): Generalized weakness [R53.1]  Frequent falls [R29.6]  Difficulty in walking [R26.2]  Chief Complaint   Patient presents with    Fall     Patient Active Problem List    Diagnosis Date Noted    SOB (shortness of breath) on exertion 02/15/2023    History of tobacco abuse 02/15/2023    Chronic midline low back pain without sciatica 2023    Orthostatic hypotension 2023    Bipolar disorder (HCC) 2022    Frequent falls 01/15/2024    Congestive heart failure (HCC) 01/15/2024    Presence of Watchman left atrial appendage closure device 2023    Paroxysmal atrial fibrillation (HCC) 2023    Parkinson's disease 2023    Debility 2022    Encephalopathy     Abnormal chest x-ray     PAF (paroxysmal atrial fibrillation) (HCC)     COVID-19 virus infection     Disorder of electrolytes     Atrial fibrillation with RVR (HCC)     Low grade fever     Thrombocytopenia (HCC)     AICD malfunction     Hypotension     Valproic acid toxicity     Implantable cardioverter-defibrillator  instrumental assessment when able     Long Term Goals:   Timeframe for Long Term Goals: (7 days 01/24/2024)  Goal 1: The patient will tolerate least restrictive diet with no clinical s/s of aspiration or worsening respiratory/pulmonary status    Treatment:  Skilled instruction completed with patient re: evidenced based practice regarding recommendations and POC, importance of oral care to reduce adverse affects in the event of aspiration, and instruction of recommended compensatory strategies developed based upon clinical exam. SLP demonstrates procedure of MBS and introduces strategies which may be utilized. Pt able to recall/demonstrate compensatory strategies with mod cues. Additional recommendations to follow MBS.      Pt Education: SLP educated the patient re: Role of SLP, rationale for completion of assessment, anatomical components of swallow structures as they pertain to airway protection, results of assessment, recommendations, POC, and rationale for MBS  Pt Education Response: verbalized understanding, demonstrated understanding, and RN aware    Duration/Frequency of Tx: pending MBS    Individuals Consulted:   [x]  Patient     []  NP         [x]  RN   []  RD                   []  MD      []  Family Member                        []  PA    []  Other:      Safety Devices / Report:   [x]  All fall risk precautions in place [x]  Safety handoff completed with RN  []  Bed alarm in place  []  Left in bed     [x]  Chair alarm in place  [x]  Left in chair   [x]  Call light in reach   []  Other:                                 Total Treatment Time / Charges       Time in Time out Total Time / units   Swallow Eval/Tx Time  1019  1034 1034  1045 15 min/1 unit  11 min/1 unit     Signature:  Lilliana Carballo M.A. CCC-SLP  Speech-Language Pathologist  [phone]

## 2024-01-17 NOTE — PROGRESS NOTES
Inpatient Occupational Therapy Evaluation and Treatment    Unit: [facility]  Date:  1/17/2024  Patient Name:    Chano Quiroga Jr.  Admitting diagnosis:  Generalized weakness [R53.1]  Frequent falls [R29.6]  Difficulty in walking [R26.2]  Admit Date:  1/15/2024  Precautions/Restrictions/WB Status/ Lines/ Wounds/ Oxygen: Fall risk, Bed/chair alarm, Lines (IV), and Telesitter, h/o parkinson's disease       Treatment Time:  9:34-10:07  Treatment Number:  1  Timed Code Treatment Minutes: 23 minutes  Total Treatment Minutes:  33  minutes    Patient Goals for Therapy: \"to go to the bathroom \"          Discharge Recommendations: SNF  DME needs for discharge: Defer to facility       Therapy recommendations for staff:   Assist of 2 for transfers with use of rolling walker (RW) and gait belt to/from chair/BSC    History of Present Illness: Per H&P Abelino WALLACE 1/15: \"63 y.o. male with pmhx of parkinsons disease CHF, atrial fibrillation s/p watchman, NSVT s/p ICD, DM, HTN who presented to [facility] ED with complaint of fall. \"  Per neurology consult note 1/16, pt noted for underlying bipolar disorder with recent diagnosis of Parkinson disease. EEG pending. Neurology recommending medication adjustments.   Pt has been agitated since arrival and has received multiple medications to control his behaviors.      Home Health S4 Level Recommendation:  NA    AM-PAC Score: AM-PAC Inpatient Daily Activity Raw Score: 14     Subjective:  Patient lying reclined in bed with no family present.   Pt agreeable to this OT session.     Cognition:    A&O x4   Able to follow 1 step commands.  Difficulty with motor planning during transfers. Patient has freezing episodes and has difficulty following therapist's directions at times due to motor planning deficits.     Pain:   No  Location:   Rating: NA /10  Pain Medicine Status: Denies need    Preadmission Environment: - all copied from prior PT evaluation dated 1/25/23, not re-confirmed today due to  patient's state of agitation. However, he did say he fell 2x yesterday and falls frequently prior to that. Also said he has an aide 3x weekly.   Pt. Lives Alone, brother lives in the same apartment building Pt is in, Pt looks after his brother who has paranoid schizophrenia    Home environment:    apartment   Steps to enter first floor:   0 steps to enter       Steps to second floor: N/A  Bathroom:       walk in shower  ,  standard height toilet, grab bars   Equipment owned:      SPC and Rollator  , standard walker, lift chair    Preadmission Status:  History of falls             Yes  Pt. Able to drive          Yes  Pt Fully independent with ADL's         Yes holds onto grab bars in the shower   Pt. Required assistance from family for:  Independent PTA  , sister cleans for pt   Pt. Fully independent for transfers and gait and walked with no device typically in his house, sometimes a cane.  He says occasionally he feels like \"legs don't go the way my brain tells them\" and at those times he uses the cane.  In community he uses a cane. Yesterday he felt particularly unstable (had 2 falls in 1 day) and was using the rollator . When he falls, it is typically forward.    Pt has to walk dogs every frequently during the day.       Objective:  Does this pt have an acute or acute on chronic diagnosis of CHF? No    Upper Extremity ROM:    WFL,  pt able to perform all bed mobility, transfers, and gait without ROM limitation.    Dominant Hand: Right/Left    Upper Extremity Strength:    BUE strength impaired but not formally assessed w/ MMT    Upper Extremity Sensation:    WFL    Upper Extremity Proprioception:  Impaired    Coordination:  Impaired    Tone:  Impaired- increased tone    Balance:  Sitting:    CGA  Standing:    CGA and With RW and gait belt    Bed Mobility:   Supine to Sit:    Min A   Sit to Supine:   Not Tested  Rolling:   Not Tested  Scooting in sitting: CGA  Scooting in supine:  Not Tested    Transfers:    Sit to

## 2024-01-17 NOTE — PROCEDURES
function, identify signs and symptoms of aspiration and make recommendations regarding safe dietary consistencies, effective compensatory strategies, and safe eating environment.    Recent Chest Radiography: [] Chest XR   [x] CT of Chest  [] No recent chest  radiography on file   Date:01/15/2024  Impressions  No acute cardiopulmonary disease.   Degenerative change of the spine.  Specifically no rib fracture.    Medical History and Pt Interview     Medical record review/interview: Per MD H&P::  \"The patient is a 63 y.o. male with pmhx of parkinsons disease CHF, atrial fibrillation s/p watchman, NSVT s/p ICD, DM, HTN who presented to [facility] ED with complaint of fall. Patient reports he was sitting in his chair and then lost his balance and fell. Did not hit his head or pass out. Did hit his left arm. He was able to get up himself,denies any other pain or injury but is having some mild aching in left him. Does live alone and reports several falls in the past.  Denies fever, chills, chest pain, nausea, vomiting, diarrhea   Alert and oriented x3 on exam but is quit sleepy from medication given in ED.\"     Patient Complaints:  Coughing/Choking with PO intake--pills with thin liquids     Predisposing dysphagia risk factors: Parkinson's Disease, COPD, and GERD  Clinical signs of possible chronic dysphagia: N/A  Precipitating dysphagia risk factors: reduced physical mobility      Status on Clinical Presentation     Current respiratory status:      [x] Room Air     [] Nasal cannula   [] O2 mask  []  Non-rebreather mask    []  Tracheostomy    []  Vent dependent    Vocal quality prior to PO intake:  [x]  WFL  []  Weak  []  Wet  []  Hoarse   []  Breathy   []  Aphonic   []  Dysphonic  [] N/A pt non-verbal  Presence of speaking valve:  [] N/A   [] Yes   [x] No       Cognitive status:    [x]  Alert    []  Lethargic  [] Functional   [] Confused  [] Aphasic   [] Dysarthric   [] Impulsive   [] Cognitive Deficits                 DIET  clinical s/s of aspiration or worsening respiratory/pulmonary status    Pt may benefit from use of lingual resistance and RMST upon d/c to next level of care to increase swallow function and decrease adverse outcomes associated with aspiration events    Pain: The patient does not complain of pain    Patient seen for inpatient MBS. Referring provider will be notified of results and recommendations. Please don't hesitate to contact me at [phone] with questions or concerns.      Therapy Time:    Individual   Time In  1310   Time Out  1332   Minutes  22 min/ 1 unit      Signature:  Lilliana Carballo M.A. CCC-SLP  Speech-Language Pathologist  [id number]

## 2024-01-17 NOTE — PLAN OF CARE
Problem: Discharge Planning  Goal: Discharge to home or other facility with appropriate resources  1/17/2024 0156 by Radha Salcedo, RN  Outcome: Progressing  1/16/2024 1841 by Cassie Mina RN  Outcome: Progressing  Flowsheets (Taken 1/16/2024 1040)  Discharge to home or other facility with appropriate resources:   Identify barriers to discharge with patient and caregiver   Arrange for needed discharge resources and transportation as appropriate   Identify discharge learning needs (meds, wound care, etc)     Problem: Risk for Elopement  Goal: Patient will not exit the unit/facility without proper excort  1/16/2024 1841 by Cassie Mina RN  Outcome: Progressing     Problem: Safety - Adult  Goal: Free from fall injury  1/17/2024 0156 by Radha Salcedo, RN  Outcome: Progressing  1/16/2024 1841 by Cassie Mina RN  Outcome: Progressing

## 2024-01-17 NOTE — PROGRESS NOTES
Bedside report given to Joi SANTANA  pt in stable condition no needs at this time. Call light within reach

## 2024-01-17 NOTE — PROGRESS NOTES
Progress Note    Admit Date:  1/15/2024    Subjective:  Mr. Quiroga     Laying on his R side in bed  Fall at home, complaining of L rib/flank pain  Bipolar, alert and oriented today not complaining of fausto or depression  States he is constipated and having some trouble with urination  Feels he is having some wheezing today    Objective:   No data found.       Intake/Output Summary (Last 24 hours) at 1/17/2024 1236  Last data filed at 1/17/2024 0843  Gross per 24 hour   Intake 720 ml   Output 0 ml   Net 720 ml       Physical Exam:    Gen: No distress. Alert.   Eyes: PERRL. No sclera icterus. No conjunctival injection.   ENT: No discharge. Pharynx clear.   Neck: No JVD.  Trachea midline.  Resp: No accessory muscle use. No crackles. No wheezes. +mild rhonchi and turbulence R>L  CV: Regular rate. Regular rhythm. No murmur.  No rub. No edema.    Peripheral Pulses: +2 palpable, equal bilaterally   GI: Non-tender. Non-distended.  Normal bowel sounds.  Skin: Warm and dry. No nodule on exposed extremities. No rash on exposed extremities.   M/S: No cyanosis. No joint deformity. No clubbing. +Tenderness to palpation of L subcostal/flank region  Neuro: Awake. Grossly nonfocal    Psych: Oriented x 3. No anxiety or agitation.         Medications:  carbidopa-levodopa, 1.5 tablet, TID  enoxaparin, 30 mg, BID  lidocaine, 1 patch, Daily  aspirin, 81 mg, Daily  atorvastatin, 80 mg, Nightly  divalproex, 500 mg, QAM  divalproex, 1,000 mg, Nightly  DULoxetine, 60 mg, Daily  pantoprazole, 40 mg, QAM AC  fluticasone, 2 spray, Daily  gabapentin, 200 mg, TID  lisinopril, 5 mg, Daily  metoprolol succinate, 50 mg, BID  tamsulosin, 0.4 mg, Daily  sodium chloride flush, 5-40 mL, 2 times per day  furosemide, 20 mg, Once per day on Mon Thu  melatonin, 5 mg, Nightly  insulin glargine, 50 Units, Nightly  insulin lispro, 15 Units, Daily with breakfast  insulin lispro, 20 Units, Dinner      PRN Medications:  OLANZapine, 5 mg, Q6H PRN  hydrOXYzine  pending      #Cough  -CXR pending    #Constipation  -KUB pending  -consider bowel regimen    #CAD s/p stents  -on ASA, statin, BB      #Paroxysmal atrial fibrillation   -s/p watchman      #Hx of ischemic cardiomyopathy   -improved EF now 55%  -s/p ICD   -on GDMT with jardiance, lisinopril, toprol XL, lasix   -does not appear acutely decompensated      #NSVT   -s/p ICD   -on toprol XL      #COPD without AE   -continue prn inhalers      #DM type 2 with neuropathy   -on metformin, jardiance  -continue insulin regimen   -monitor BG   -on gabapentin      #Thrombocytopenia   -monitor      #HTN  -continue home regimen     #ALIN  -non compliant with BIPAP      #HLD    -statin     DVT Prophylaxis: Lovenox  Diet: ADULT DIET; Regular; 4 carb choices (60 gm/meal); Low Sodium (2 gm); Mildly Thick (Nectar); 2000 ml  Code Status: Full Code    MADISON Quinones-S  01/17/24

## 2024-01-17 NOTE — PROGRESS NOTES
Pt up and trying to climb out of bed multiple times. Educated pt that he is to weak to get out of bed. Ubaldo belt remains in place. PRN Zyprexa given

## 2024-01-17 NOTE — PROGRESS NOTES
Progress Note    Admit Date:  1/15/2024    Subjective:  Mr. Quiroga  complaining of mild L rib/flank pain  alert and oriented today   States he is constipated and having some trouble with urination  Feels he is having some wheezing today    Objective:     Vitals:    01/16/24 1430 01/16/24 1926 01/17/24 0137 01/17/24 0730   BP: 115/75 113/73 131/76 115/70   Pulse: 94 76 92 82   Resp:  16 16 18   Temp:  97.5 °F (36.4 °C) 97.6 °F (36.4 °C) 97.7 °F (36.5 °C)   TempSrc:  Oral Oral Oral   SpO2:  94% 94% 98%   Weight:   104.1 kg (229 lb 6.4 oz)    Height:             No data found.       Intake/Output Summary (Last 24 hours) at 1/17/2024 1453  Last data filed at 1/17/2024 1239  Gross per 24 hour   Intake 540 ml   Output 0 ml   Net 540 ml       Physical Exam:  Gen: No distress. Alert.   Eyes: PERRL. No sclera icterus. No conjunctival injection.   ENT: No discharge. Pharynx clear.   Neck: No JVD.  Trachea midline.  Resp: No accessory muscle use. No crackles. No wheezes. +mild rhonchi  R>L  CV: Regular rate. Regular rhythm. No murmur.  No rub. +RLE edema.    Peripheral Pulses: +2 palpable, equal bilaterally   GI: Non-tender. Non-distended.  Normal bowel sounds.  Skin: Warm and dry. No nodule on exposed extremities. No rash on exposed extremities.   M/S: No cyanosis. No joint deformity. No clubbing. +Tenderness to palpation of L subcostal/flank region  Neuro: Awake. Grossly nonfocal    Psych: Oriented x 3. No anxiety or agitation.          Medications:  carbidopa-levodopa, 1.5 tablet, TID  enoxaparin, 30 mg, BID  lidocaine, 1 patch, Daily  aspirin, 81 mg, Daily  atorvastatin, 80 mg, Nightly  divalproex, 500 mg, QAM  divalproex, 1,000 mg, Nightly  DULoxetine, 60 mg, Daily  pantoprazole, 40 mg, QAM AC  fluticasone, 2 spray, Daily  gabapentin, 200 mg, TID  lisinopril, 5 mg, Daily  metoprolol succinate, 50 mg, BID  tamsulosin, 0.4 mg, Daily  sodium chloride flush, 5-40 mL, 2 times per day  furosemide, 20 mg, Once per day on Mon

## 2024-01-17 NOTE — PLAN OF CARE
HEART FAILURE CARE PLAN:    Comorbidities Reviewed: Yes   Patient has a past medical history of Arthritis, Asthma, CAD (coronary artery disease), CHF (congestive heart failure) (HCC), Chronic obstructive pulmonary disease (HCC), COPD (chronic obstructive pulmonary disease) (HCC), Fatty liver, GERD (gastroesophageal reflux disease), Hyperlipidemia, Hypertension, Medical history reviewed with no changes, MI, old, Sleep apnea, and Type II or unspecified type diabetes mellitus without mention of complication, not stated as uncontrolled.     ECHOCARDIOGRAM Reviewed: Yes   Patient's Ejection Fraction (EF) is greater than 40%    Weights Reviewed: Yes   Admission weight: 99.3 kg (219 lb)   Wt Readings from Last 3 Encounters:   01/17/24 104.1 kg (229 lb 6.4 oz)   12/24/23 99.3 kg (219 lb)   09/25/23 104.3 kg (230 lb)     Intake & Output Reviewed: Yes     Intake/Output Summary (Last 24 hours) at 1/17/2024 0317  Last data filed at 1/17/2024 0137  Gross per 24 hour   Intake 1160 ml   Output 1100 ml   Net 60 ml     Medications Reviewed: Yes   SCHEDULED HOSPITAL MEDICATIONS:   carbidopa-levodopa  1.5 tablet Oral TID    enoxaparin  30 mg SubCUTAneous BID    lidocaine  1 patch TransDERmal Daily    aspirin  81 mg Oral Daily    atorvastatin  80 mg Oral Nightly    divalproex  500 mg Oral QAM    divalproex  1,000 mg Oral Nightly    DULoxetine  60 mg Oral Daily    pantoprazole  40 mg Oral QAM AC    fluticasone  2 spray Each Nostril Daily    gabapentin  200 mg Oral TID    lisinopril  5 mg Oral Daily    metoprolol succinate  50 mg Oral BID    tamsulosin  0.4 mg Oral Daily    sodium chloride flush  5-40 mL IntraVENous 2 times per day    furosemide  20 mg Oral Once per day on Mon Thu    melatonin  5 mg Oral Nightly    insulin glargine  50 Units SubCUTAneous Nightly    insulin lispro  15 Units SubCUTAneous Daily with breakfast    insulin lispro  20 Units SubCUTAneous Dinner     ACE/ARB/ARNI is REQUIRED for EF </= 40% SYSTOLIC FAILURE:

## 2024-01-17 NOTE — PROGRESS NOTES
Neurology Progress Note    ID: Chano Quiroga Jr. is a 63 y.o. male    : 1960     LOS: 2 days     ASSESSMENT    Principal Problem:    Generalized weakness  Active Problems:    Bipolar disorder (HCC)    Difficulty in walking    Frequent falls    Congestive heart failure (HCC)  Resolved Problems:    * No resolved hospital problems. *    The patient has evidence of parkinsonism.  Per the patient, he has been on Depakote less than 5 years.    But it is unknown that if the patient was on antipsychotic in the past prior to Depakote.  The etiology of parkinsonism in this case can be drug-induced parkinsonism or Parkinson disease or in combination.  EEG showed minimal encephalopathy without seizure tendency.     The patient also has refractory thrombocytopenia.  From neurology perspective, the patient is high risk for bleeding if the patient continues Depakote.    24: The patient remains stabilized.  The patient has not had physical therapy assessment yet.  The patient denies worsening of shaking or cogwheel rigidity at this time.  The patient also denies significant side effect from increasing dose of Sinemet.     Plan:     1.  Continue Sinemet 1 and half tablet 3 times a day.  2.  PT/OT/ST.  3.  Consider switching Depakote in the future if the patient continues falling.  4.  Precaution for hallucination.    Medications:  Scheduled Meds:    carbidopa-levodopa  1.5 tablet Oral TID    enoxaparin  30 mg SubCUTAneous BID    lidocaine  1 patch TransDERmal Daily    aspirin  81 mg Oral Daily    atorvastatin  80 mg Oral Nightly    divalproex  500 mg Oral QAM    divalproex  1,000 mg Oral Nightly    DULoxetine  60 mg Oral Daily    pantoprazole  40 mg Oral QAM AC    fluticasone  2 spray Each Nostril Daily    gabapentin  200 mg Oral TID    lisinopril  5 mg Oral Daily    metoprolol succinate  50 mg Oral BID    tamsulosin  0.4 mg Oral Daily    sodium chloride flush  5-40 mL IntraVENous 2 times per day    furosemide  20  mg Oral Once per day on Mon Thu    melatonin  5 mg Oral Nightly    insulin glargine  50 Units SubCUTAneous Nightly    insulin lispro  15 Units SubCUTAneous Daily with breakfast    insulin lispro  20 Units SubCUTAneous Dinner     Continuous Infusions:    dextrose      sodium chloride       PRN Meds: OLANZapine, hydrOXYzine HCl, glucose, dextrose bolus **OR** dextrose bolus, glucagon (rDNA), dextrose, sodium chloride flush, sodium chloride, potassium chloride **OR** potassium alternative oral replacement **OR** potassium chloride, magnesium sulfate, ondansetron **OR** ondansetron, polyethylene glycol, acetaminophen **OR** acetaminophen    OBJECTIVE  Vital signs in the last 24 hours:  Vitals:    01/17/24 0137   BP: 131/76   Pulse: 92   Resp: 16   Temp: 97.6 °F (36.4 °C)   SpO2: 94%       Intake/Output last 3 shifts:  I/O last 3 completed shifts:  In: 1160 [P.O.:1160]  Out: 1550 [Urine:1550]    Intake/Output this shift:  No intake/output data recorded.    Yesterday's Weight:  Wt Readings from Last 1 Encounters:   01/17/24 104.1 kg (229 lb 6.4 oz)       SUBJECTIVE  There was no acute event overnight.    ROS:  Negative except in subjective.    Neurological examination:  MENTAL STATUS:  Alert and oriented to person.  LANG/SPEECH: No dysarthria.  CRANIAL NERVES: No facial asymmetry.  MOTOR: No pronator drift or leg drift.  REFLEXES: Generalized 2+.  SENSORY: Grossly intact.  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    Labs and Imaging:  I reviewed labs and brain imaging.    50 minutes were spent with the patient with greater than 50% of the time spent in counseling and coordination of care.    Taryn Coyne MD

## 2024-01-17 NOTE — PROGRESS NOTES
Pt laying in bed this AM during shift assessment. He is alert and oriented but makes impulsive decisions and is has some short-term memory concerns. Pt removed from restraints this AM and has been tolerating well. Bed alarm and video monitoring in place. Pt seen by therapy and is assist x1-2 for transfers. Speech ordered for pt as well due to coughing with pills and thin liquids this AM. MBS ordered per recommendation, MD Yanez aware. To be done today. SNF recommened on discharge, CM to meet with patient. Urine sample needed, will have to straight cath due to incontinence. No further concerns at this time.     1350: Urine collected via straight cath, tolerated well. Sent to lab.     1600: Speech therapy stated pt can have thin liquids if taken in small sips. Pt often does not follow this suggestion and will chug liquids given to him. Leaving pt on nectar thick at this time due to safety. Pt tolerating well.     Bedside Mobility Assessment Tool (BMAT):     Assessment Level 1- Sit and Shake    1. From a semi-reclined position, ask patient to sit up and rotate to a seated position at the side of the bed. Can use the bedrail.    2. Ask patient to reach out and grab your hand and shake making sure patient reaches across his/her midline.   Pass- Patient is able to come to a seated position, maintain core strength. Maintains seated balance while reaching across midline. Move on to Assessment Level 2.     Assessment Level 2- Stretch and Point   1. With patient in seated position at the side of the bed, have patient place both feet on the floor (or stool) with knees no higher than hips.    2. Ask patient to stretch one leg and straighten the knee, then bend the ankle/flex and point the toes. If appropriate, repeat with the other leg.   Pass- Patient is able to demonstrate appropriate quad strength on intended weight bearing limb(s). Move onto Assessment Level 3.     Assessment Level 3- Stand   1. Ask patient to elevate off

## 2024-01-17 NOTE — PLAN OF CARE
Problem: Chronic Conditions and Co-morbidities  Goal: Patient's chronic conditions and co-morbidity symptoms are monitored and maintained or improved  1/17/2024 1251 by Joi Burgess RN  Outcome: Progressing  Flowsheets (Taken 1/17/2024 1251)  Care Plan - Patient's Chronic Conditions and Co-Morbidity Symptoms are Monitored and Maintained or Improved:   Monitor and assess patient's chronic conditions and comorbid symptoms for stability, deterioration, or improvement   Collaborate with multidisciplinary team to address chronic and comorbid conditions and prevent exacerbation or deterioration  1/17/2024 0156 by Radha Salcedo RN  Outcome: Progressing     Problem: Discharge Planning  Goal: Discharge to home or other facility with appropriate resources  1/17/2024 0156 by Radha Salcedo RN  Outcome: Progressing     Problem: Safety - Adult  Goal: Free from fall injury  1/17/2024 1251 by Joi Burgess RN  Outcome: Progressing  Flowsheets (Taken 1/17/2024 1251)  Free From Fall Injury: Instruct family/caregiver on patient safety  1/17/2024 0156 by Radha Salcedo RN  Outcome: Progressing     Problem: Safety - Medical Restraint  Goal: Remains free of injury from restraints (Restraint for Interference with Medical Device)  Description: INTERVENTIONS:  1. Determine that other, less restrictive measures have been tried or would not be effective before applying the restraint  2. Evaluate the patient's condition at the time of restraint application  3. Inform patient/family regarding the reason for restraint  4. Q2H: Monitor safety, psychosocial status, comfort, nutrition and hydration  1/17/2024 1251 by Joi Burgess RN  Outcome: Adequate for Discharge  Flowsheets (Taken 1/16/2024 1800 by Cassie Mina RN)  Remains free of injury from restraints (restraint for interference with medical device): Determine that other, less restrictive measures have been tried or would not be effective before applying the

## 2024-01-17 NOTE — FLOWSHEET NOTE
01/16/24 1926   Vital Signs   Temp 97.5 °F (36.4 °C)   Temp Source Oral   Pulse 76   Heart Rate Source Monitor   Respirations 16   /73   MAP (Calculated) 86   BP Location Left upper arm   BP Method Automatic   Patient Position High fowlers   Pain Assessment   Pain Assessment None - Denies Pain   Oxygen Therapy   SpO2 94 %   O2 Device None (Room air)     Pt A/O assessment completed. Meds given with water pt tolerated well. Pt remains in posey belt d/t not able to follow directions and stay in bed. Pt denies any needs at this time. Call light within reach bed alarm on and tele sitter in room

## 2024-01-17 NOTE — PROCEDURES
INTERPRETATION:  This 25-minute, computer-assisted video EEG recording is mildly abnormal.  It showed mild degree of generalized slowing background activity.  No potentially epileptiform activity was present during the recording.      The EEG findings were consistent with mild degree of generalized non-specific cerebral dysfunction.    CLASSIFICATION:  Dysrhythmia grade 1, generalized.  Sleep - unsuccessful.  EKG channel.    DESCRIPTION:    BACKGROUND:  The awake recording revealed 9 Hz alpha activity over the posterior head region.  There were increased 4 to 7 Hz theta activity into the EEG background.  Given the extensive study, the patient still did not sleep.  The EEG showed normal V waves during drowsiness.  There was no significant change on the EEG background with photic stimulation.  Hyperventilation was omitted due to old age.    INTERICTAL DISCHARGES: None    CLINICAL EVENTS:  None    The EKG channel was unremarkable.

## 2024-01-18 LAB
ANION GAP SERPL CALCULATED.3IONS-SCNC: 15 MMOL/L (ref 3–16)
BASOPHILS # BLD: 0 K/UL (ref 0–0.2)
BASOPHILS NFR BLD: 0.3 %
BUN SERPL-MCNC: 19 MG/DL (ref 7–20)
CALCIUM SERPL-MCNC: 9.4 MG/DL (ref 8.3–10.6)
CHLORIDE SERPL-SCNC: 102 MMOL/L (ref 99–110)
CO2 SERPL-SCNC: 24 MMOL/L (ref 21–32)
CREAT SERPL-MCNC: 0.7 MG/DL (ref 0.8–1.3)
DEPRECATED RDW RBC AUTO: 14.9 % (ref 12.4–15.4)
EOSINOPHIL # BLD: 0.1 K/UL (ref 0–0.6)
EOSINOPHIL NFR BLD: 0.9 %
GFR SERPLBLD CREATININE-BSD FMLA CKD-EPI: >60 ML/MIN/{1.73_M2}
GLUCOSE BLD-MCNC: 119 MG/DL (ref 70–99)
GLUCOSE BLD-MCNC: 169 MG/DL (ref 70–99)
GLUCOSE BLD-MCNC: 181 MG/DL (ref 70–99)
GLUCOSE BLD-MCNC: 247 MG/DL (ref 70–99)
GLUCOSE BLD-MCNC: 96 MG/DL (ref 70–99)
GLUCOSE SERPL-MCNC: 158 MG/DL (ref 70–99)
HCT VFR BLD AUTO: 50.9 % (ref 40.5–52.5)
HGB BLD-MCNC: 17 G/DL (ref 13.5–17.5)
LYMPHOCYTES # BLD: 2.1 K/UL (ref 1–5.1)
LYMPHOCYTES NFR BLD: 28.7 %
MCH RBC QN AUTO: 29.6 PG (ref 26–34)
MCHC RBC AUTO-ENTMCNC: 33.4 G/DL (ref 31–36)
MCV RBC AUTO: 88.6 FL (ref 80–100)
MONOCYTES # BLD: 0.7 K/UL (ref 0–1.3)
MONOCYTES NFR BLD: 9.4 %
NEUTROPHILS # BLD: 4.5 K/UL (ref 1.7–7.7)
NEUTROPHILS NFR BLD: 60.7 %
PERFORMED ON: ABNORMAL
PERFORMED ON: NORMAL
PLATELET # BLD AUTO: 117 K/UL (ref 135–450)
PMV BLD AUTO: 7.5 FL (ref 5–10.5)
POTASSIUM SERPL-SCNC: 3.8 MMOL/L (ref 3.5–5.1)
RBC # BLD AUTO: 5.74 M/UL (ref 4.2–5.9)
SODIUM SERPL-SCNC: 141 MMOL/L (ref 136–145)
WBC # BLD AUTO: 7.4 K/UL (ref 4–11)

## 2024-01-18 PROCEDURE — 36415 COLL VENOUS BLD VENIPUNCTURE: CPT

## 2024-01-18 PROCEDURE — 97535 SELF CARE MNGMENT TRAINING: CPT

## 2024-01-18 PROCEDURE — 99233 SBSQ HOSP IP/OBS HIGH 50: CPT | Performed by: PSYCHIATRY & NEUROLOGY

## 2024-01-18 PROCEDURE — 6370000000 HC RX 637 (ALT 250 FOR IP): Performed by: NURSE PRACTITIONER

## 2024-01-18 PROCEDURE — 1200000000 HC SEMI PRIVATE

## 2024-01-18 PROCEDURE — 97530 THERAPEUTIC ACTIVITIES: CPT

## 2024-01-18 PROCEDURE — 97116 GAIT TRAINING THERAPY: CPT

## 2024-01-18 PROCEDURE — 99232 SBSQ HOSP IP/OBS MODERATE 35: CPT | Performed by: INTERNAL MEDICINE

## 2024-01-18 PROCEDURE — 6370000000 HC RX 637 (ALT 250 FOR IP): Performed by: INTERNAL MEDICINE

## 2024-01-18 PROCEDURE — 6370000000 HC RX 637 (ALT 250 FOR IP)

## 2024-01-18 PROCEDURE — 80048 BASIC METABOLIC PNL TOTAL CA: CPT

## 2024-01-18 PROCEDURE — 85025 COMPLETE CBC W/AUTO DIFF WBC: CPT

## 2024-01-18 PROCEDURE — 6370000000 HC RX 637 (ALT 250 FOR IP): Performed by: PSYCHIATRY & NEUROLOGY

## 2024-01-18 PROCEDURE — 92526 ORAL FUNCTION THERAPY: CPT

## 2024-01-18 PROCEDURE — 2580000003 HC RX 258: Performed by: NURSE PRACTITIONER

## 2024-01-18 PROCEDURE — 6360000002 HC RX W HCPCS: Performed by: INTERNAL MEDICINE

## 2024-01-18 RX ADMIN — Medication 5 MG: at 22:30

## 2024-01-18 RX ADMIN — OLANZAPINE 5 MG: 5 TABLET, FILM COATED ORAL at 15:11

## 2024-01-18 RX ADMIN — CARBIDOPA AND LEVODOPA 1.5 TABLET: 10; 100 TABLET ORAL at 13:05

## 2024-01-18 RX ADMIN — DIVALPROEX SODIUM 500 MG: 500 TABLET, DELAYED RELEASE ORAL at 08:38

## 2024-01-18 RX ADMIN — GABAPENTIN 200 MG: 100 CAPSULE ORAL at 22:30

## 2024-01-18 RX ADMIN — ACETAMINOPHEN 650 MG: 325 TABLET ORAL at 13:34

## 2024-01-18 RX ADMIN — INSULIN LISPRO 20 UNITS: 100 INJECTION, SOLUTION INTRAVENOUS; SUBCUTANEOUS at 16:59

## 2024-01-18 RX ADMIN — LISINOPRIL 5 MG: 5 TABLET ORAL at 08:36

## 2024-01-18 RX ADMIN — SODIUM CHLORIDE, PRESERVATIVE FREE 10 ML: 5 INJECTION INTRAVENOUS at 08:37

## 2024-01-18 RX ADMIN — CARBIDOPA AND LEVODOPA 1.5 TABLET: 10; 100 TABLET ORAL at 08:35

## 2024-01-18 RX ADMIN — TAMSULOSIN HYDROCHLORIDE 0.4 MG: 0.4 CAPSULE ORAL at 08:36

## 2024-01-18 RX ADMIN — PANTOPRAZOLE SODIUM 40 MG: 40 TABLET, DELAYED RELEASE ORAL at 06:03

## 2024-01-18 RX ADMIN — GABAPENTIN 200 MG: 100 CAPSULE ORAL at 08:36

## 2024-01-18 RX ADMIN — HYDROXYZINE HYDROCHLORIDE 25 MG: 25 TABLET ORAL at 13:05

## 2024-01-18 RX ADMIN — ATORVASTATIN CALCIUM 80 MG: 40 TABLET, FILM COATED ORAL at 22:30

## 2024-01-18 RX ADMIN — CARBIDOPA AND LEVODOPA 1.5 TABLET: 10; 100 TABLET ORAL at 22:30

## 2024-01-18 RX ADMIN — DOCUSATE SODIUM 100 MG: 100 CAPSULE, LIQUID FILLED ORAL at 08:35

## 2024-01-18 RX ADMIN — ASPIRIN 81 MG: 81 TABLET, CHEWABLE ORAL at 08:36

## 2024-01-18 RX ADMIN — DULOXETINE HYDROCHLORIDE 60 MG: 60 CAPSULE, DELAYED RELEASE ORAL at 08:36

## 2024-01-18 RX ADMIN — DIVALPROEX SODIUM 1000 MG: 500 TABLET, DELAYED RELEASE ORAL at 22:30

## 2024-01-18 RX ADMIN — METOPROLOL SUCCINATE 50 MG: 50 TABLET, EXTENDED RELEASE ORAL at 22:30

## 2024-01-18 RX ADMIN — FUROSEMIDE 20 MG: 20 TABLET ORAL at 08:36

## 2024-01-18 RX ADMIN — ENOXAPARIN SODIUM 30 MG: 100 INJECTION SUBCUTANEOUS at 08:36

## 2024-01-18 RX ADMIN — GABAPENTIN 200 MG: 100 CAPSULE ORAL at 13:05

## 2024-01-18 RX ADMIN — SODIUM CHLORIDE, PRESERVATIVE FREE 10 ML: 5 INJECTION INTRAVENOUS at 22:37

## 2024-01-18 RX ADMIN — METOPROLOL SUCCINATE 50 MG: 50 TABLET, EXTENDED RELEASE ORAL at 08:35

## 2024-01-18 RX ADMIN — INSULIN LISPRO 15 UNITS: 100 INJECTION, SOLUTION INTRAVENOUS; SUBCUTANEOUS at 08:35

## 2024-01-18 RX ADMIN — FLUTICASONE PROPIONATE 2 SPRAY: 50 SPRAY, METERED NASAL at 08:37

## 2024-01-18 RX ADMIN — ENOXAPARIN SODIUM 30 MG: 100 INJECTION SUBCUTANEOUS at 22:31

## 2024-01-18 ASSESSMENT — PAIN DESCRIPTION - DESCRIPTORS: DESCRIPTORS: ACHING

## 2024-01-18 ASSESSMENT — PAIN DESCRIPTION - LOCATION: LOCATION: BACK

## 2024-01-18 ASSESSMENT — PAIN SCALES - GENERAL: PAINLEVEL_OUTOF10: 8

## 2024-01-18 ASSESSMENT — PAIN DESCRIPTION - ORIENTATION: ORIENTATION: LEFT

## 2024-01-18 NOTE — PLAN OF CARE
Problem: Chronic Conditions and Co-morbidities  Goal: Patient's chronic conditions and co-morbidity symptoms are monitored and maintained or improved  1/17/2024 2355 by ZANDRA ALVAREZ  Outcome: Progressing     Problem: Discharge Planning  Goal: Discharge to home or other facility with appropriate resources  1/17/2024 2355 by ZANDRA ALVAREZ  Outcome: Progressing     Problem: Risk for Elopement  Goal: Patient will not exit the unit/facility without proper excort  1/18/2024 1235 by Joi Burgess, RN  Outcome: Progressing  Flowsheets (Taken 1/18/2024 1235)  Nursing Interventions for Elopement Risk:   Assist with personal care needs such as toileting, eating, dressing, as needed to reduce the risk of wandering   Collaborate with family members/caregivers to mitigate the elopement risk  1/17/2024 2355 by ZANDRA ALVAREZ  Outcome: Progressing     Problem: Safety - Adult  Goal: Free from fall injury  1/18/2024 1235 by Joi Burgess, RN  Outcome: Progressing  Flowsheets (Taken 1/18/2024 1235)  Free From Fall Injury: Instruct family/caregiver on patient safety  1/17/2024 2355 by ZANDRA ALVAREZ  Outcome: Progressing

## 2024-01-18 NOTE — PROGRESS NOTES
Inpatient Physical Therapy Treatment    Unit: 2 [place]  Date:  1/18/2024  Patient Name:    Chano Quiroga Jr.  Admitting diagnosis:  Generalized weakness [R53.1]  Frequent falls [R29.6]  Difficulty in walking [R26.2]  Admit Date:  1/15/2024  Precautions/Restrictions/WB Status/ Lines/ Wounds/ Oxygen: Fall risk and Bed/chair alarm    Treatment Time:  10:12-10:35  Treatment Number:  2   Timed Code Treatment Minutes:  23 minutes  Total Treatment Minutes: 23   minutes    Patient Stated Goals for Therapy: \" I want to walk down the [place] \"          Discharge Recommendations: SNF; pt refusing, if going home will need home PT.  DME needs for discharge: Needs Met     Therapy recommendation for EMS Transport: can transport by wheelchair    Therapy recommendations for staff:   Assist of 1 for ambulation with use of rolling walker (RW) and gait belt within room  within halls.     History of Present Illness:   Per H&P Abelino WALLACE 1/15: \"63 y.o. male with pmhx of parkinsons disease CHF, atrial fibrillation s/p watchman, NSVT s/p ICD, DM, HTN who presented to [facility] ED with complaint of fall. \"  Per neurology consult note 1/16, pt noted for underlying bipolar disorder with recent diagnosis of Parkinson disease. EEG pending. Neurology recommending medication adjustments.     Home Health S4 Level Recommendation:  NA        AM-PAC Mobility Score       AM-PAC Inpatient Mobility without Stair Climbing Raw Score : 9    Subjective  Patient lying reclined in bed with no family present.  Frustrated that he has to call for staff assist to get up.     Cognition    A&O Person, Place, Time, and Situation   Able to follow 2 step commands.   Less impulsive today.     Pain   Yes  Location: R flank (from his recent fall)  Rating: mild /10  Pain Medicine Status: No request made    Preadmission Environment / Preadmission Status - all copied from prior PT evaluation dated 1/25/23, not re-confirmed today due to patient's state of agitation. However,  baseline, demonstrates good rehab potential and unable to return home due to limited or no family support, home environment not conducive to patient recovery, and limited safety awareness.    Goals :   To be met in 3 visits:  1). Independent with LE Ex x 10 reps  2). Sit to/from stand: CGA - goal met 1/18  3). Bed to chair: CGA  4). CHF goal: N/A    To be met in 6 visits:  1).  Supine to/from sit: Supervision  2).  Sit to/from stand: Supervision  3).  Bed to chair: Supervision  4).  Gait: Ambulate 50 ft.  with SBA and use of LRAD (least restrictive assistive device)  5).  Tolerate B LE exercises 3 sets of 10-15 reps    Rehabilitation Potential: Fair  Strengths for achieving goals include:   Unknown    Barriers to achieving goals include:    Impaired cognition and decreased cooperation    Plan    To be seen 3-5 x / week  while in acute care setting for therapeutic exercises, bed mobility, transfers, progressive gait training, balance training, and family/patient education.    Signature: Jie Denny PT     If patient discharges from this facility prior to next visit, this note will serve as the Discharge Summary.

## 2024-01-18 NOTE — PLAN OF CARE
Problem: Chronic Conditions and Co-morbidities  Goal: Patient's chronic conditions and co-morbidity symptoms are monitored and maintained or improved  1/17/2024 2355 by ZANDRA ALVAREZ  Outcome: Progressing  1/17/2024 1251 by Joi Burgess RN  Outcome: Progressing  Flowsheets (Taken 1/17/2024 1251)  Care Plan - Patient's Chronic Conditions and Co-Morbidity Symptoms are Monitored and Maintained or Improved:   Monitor and assess patient's chronic conditions and comorbid symptoms for stability, deterioration, or improvement   Collaborate with multidisciplinary team to address chronic and comorbid conditions and prevent exacerbation or deterioration     Problem: Discharge Planning  Goal: Discharge to home or other facility with appropriate resources  Outcome: Progressing     Problem: Risk for Elopement  Goal: Patient will not exit the unit/facility without proper excort  1/17/2024 2355 by ZANDRA ALVAREZ  Outcome: Progressing  1/17/2024 1251 by Joi Burgess, RN  Flowsheets (Taken 1/17/2024 1251)  Nursing Interventions for Elopement Risk: Assist with personal care needs such as toileting, eating, dressing, as needed to reduce the risk of wandering     Problem: Safety - Adult  Goal: Free from fall injury  1/17/2024 2355 by ZANDRA ALVAREZ  Outcome: Progressing  1/17/2024 1251 by Joi Burgess RN  Outcome: Progressing  Flowsheets (Taken 1/17/2024 1251)  Free From Fall Injury: Instruct family/caregiver on patient safety     Problem: Safety - Medical Restraint  Goal: Remains free of injury from restraints (Restraint for Interference with Medical Device)  Description: INTERVENTIONS:  1. Determine that other, less restrictive measures have been tried or would not be effective before applying the restraint  2. Evaluate the patient's condition at the time of restraint application  3. Inform patient/family regarding the reason for restraint  4. Q2H: Monitor safety, psychosocial status, comfort, nutrition and

## 2024-01-18 NOTE — CARE COORDINATION
SNF Referrrals    #1  Referral called to Bradly @ [facility].  Faxed face sheet, H/P, MAR and therapy notes for review: Yes  Bed available?: TBD- On-site eval done today- Await decision  Insurance precertification required?  Yes  Continuity of Care Form (DELTA) initiated? No   Hospital Exemption Notification (HENS) initiated? No    Initiate Level of Care (LOC), if Medicaid is the primary payer? Not Indicated     #2   Referral called to Dinh @ [facility].  Faxed face sheet, H/P, MAR and therapy notes for review: EPIC  Bed available?: Unable to ACCEPT

## 2024-01-18 NOTE — PROGRESS NOTES
Pt is laying in bed during shift assessment. He is alert to self and situation. He is accepting of care but often reports he wants to go home and becomes upset when talking about going to Rehab. Pt is able to be reoriented. Pt working with Speech and diet orders changed today, pt to be 1:1 during meals for safety. Case management working on skilled care admission. MD notified of pt behaviors and concerns. Vitals stable. No further concerns at this time.     Bedside Mobility Assessment Tool (BMAT):     Assessment Level 1- Sit and Shake    1. From a semi-reclined position, ask patient to sit up and rotate to a seated position at the side of the bed. Can use the bedrail.    2. Ask patient to reach out and grab your hand and shake making sure patient reaches across his/her midline.   Pass- Patient is able to come to a seated position, maintain core strength. Maintains seated balance while reaching across midline. Move on to Assessment Level 2.     Assessment Level 2- Stretch and Point   1. With patient in seated position at the side of the bed, have patient place both feet on the floor (or stool) with knees no higher than hips.    2. Ask patient to stretch one leg and straighten the knee, then bend the ankle/flex and point the toes. If appropriate, repeat with the other leg.   Pass- Patient is able to demonstrate appropriate quad strength on intended weight bearing limb(s). Move onto Assessment Level 3.     Assessment Level 3- Stand   1. Ask patient to elevate off the bed or chair (seated to standing) using an assistive device (cane, bedrail).    2. Patient should be able to raise buttocks off be and hold for a count of five. May repeat once.   Pass- Patient maintains standing stability for at least 5 seconds, proceed to assessment level 4.    Assessment Level 4- Walk   1. Ask patient to march in place at bedside.    2. Then ask patient to advance step and return each foot. Some medical conditions may render a patient

## 2024-01-18 NOTE — PROGRESS NOTES
Speech Language Pathology  Swallowing Disorders and Dysphagia    Dysphagia Treatment/Follow-Up Note  Facility/Department: [address] MEDICAL-SURGICAL    Recommendations: SLP recommends to downgrade to IDDSI 6 Soft and Bite Sized Solids; IDDSI 2 Mildly Thick Liquids; Meds whole in puree  Pt requires 1:1 supervision with po intake d/t impulsivity  Impusivity is barrier to safe intake and increases risk of aspiration or airway compromise.  Risk Management: upright for all intake, stay upright for at least 30 mins after intake, small bites/sips, 1:1 supervision with intake, oral care 2-3x/day to reduce adverse affects in the event of aspiration, increase physical mobility as able, slow rate of intake, general GERD precautions, general aspiration precautions, and hold PO and contact SLP if s/s of aspiration or worsening respiratory status develop..    NAME:Chano Quiroga   : 1960 (63 y.o.)   MRN: [id number]  ROOM: [address] [place] [address]  ADMISSION DATE: 1/15/2024  PATIENT DIAGNOSIS(ES): Generalized weakness [R53.1]  Frequent falls [R29.6]  Difficulty in walking [R26.2]  Allergies   Allergen Reactions    Other      VASCEPA CAUSED RASH AND ITCHING    Pcn [Penicillins] Hives       DATE ONSET: 1/15/2024    Pain: The patient does not complain of pain       Current Diet: ADULT DIET; Regular; 4 carb choices (60 gm/meal); Low Sodium (2 gm); Mildly Thick (Nectar); 2000 ml    Diet Tolerance:  Patient is not tolerating current diet level without signs/symptoms of aspiration per report d/t impulsivity    Dysphagia Treatment and Impressions:  Subjective: Pt seen in room at bedside with RN/Joi permission  RN Report/Chart Review: RN reports pt is extremely impulsive and coughing with thin liquids as impulsivity results in large sips.  Patient tolerance to current diet and treatment:Pt coughing with thin liquids and solids d/t impulsivity.  Noteworthy events since last seen: Rn report thickening liquids to mildly thick d/t

## 2024-01-18 NOTE — PROGRESS NOTES
/67   Pulse 67   Temp 97.5 °F (36.4 °C) (Oral)   Resp 18   Ht 1.626 m (5' 4\")   Wt 104.1 kg (229 lb 6.4 oz)   SpO2 92%   BMI 39.38 kg/m²     Pt awake in bed. Pt alert and orientedX4. Pt pain level is a 9 on a 0-10 pain scale. Assessment complete. Meds passed. Pt denies needs at this time.        Bedside Mobility Assessment Tool (BMAT):     Assessment Level 1- Sit and Shake    1. From a semi-reclined position, ask patient to sit up and rotate to a seated position at the side of the bed. Can use the bedrail.    2. Ask patient to reach out and grab your hand and shake making sure patient reaches across his/her midline.   Pass- Patient is able to come to a seated position, maintain core strength. Maintains seated balance while reaching across midline. Move on to Assessment Level 2.     Assessment Level 2- Stretch and Point   1. With patient in seated position at the side of the bed, have patient place both feet on the floor (or stool) with knees no higher than hips.    2. Ask patient to stretch one leg and straighten the knee, then bend the ankle/flex and point the toes. If appropriate, repeat with the other leg.   Pass- Patient is able to demonstrate appropriate quad strength on intended weight bearing limb(s). Move onto Assessment Level 3.     Assessment Level 3- Stand   1. Ask patient to elevate off the bed or chair (seated to standing) using an assistive device (cane, bedrail).    2. Patient should be able to raise buttocks off be and hold for a count of five. May repeat once.   Pass- Patient maintains standing stability for at least 5 seconds, proceed to assessment level 4.    Assessment Level 4- Walk   1. Ask patient to march in place at bedside.    2. Then ask patient to advance step and return each foot. Some medical conditions may render a patient from stepping backwards, use your best clinical judgement.   Fail- Patient not able to complete tasks OR requires use of assistive device. Patient is

## 2024-01-18 NOTE — PROGRESS NOTES
Inpatient Occupational Therapy Treatment    Unit: 2 [place]  Date:  1/18/2024  Patient Name:    Chano Quiroga Jr.  Admitting diagnosis:  Generalized weakness [R53.1]  Frequent falls [R29.6]  Difficulty in walking [R26.2]  Admit Date:  1/15/2024  Precautions/Restrictions/WB Status/ Lines/ Wounds/ Oxygen: Fall risk, Bed/chair alarm, Lines (IV), and Telesitter      Treatment Time: 6434-6090  Treatment Number:  2  Timed Code Treatment Minutes: 39 minutes  Total Treatment Minutes:  39 minutes    Patient Goals for Therapy: \"walk\"          Discharge Recommendations: SNF  DME needs for discharge: Defer to facility    If going home despite SNF recommendation will need 24 hour initial assist, HHOT. Will need RW if he does not already have one.       Therapy recommendations for staff:   Assist of 1 for ambulation with use of rolling walker (RW) and gait belt to/from chair  to/from bathroom  within room    History of Present Illness: Per H&P Abelino WALLACE 1/15: \"63 y.o. male with pmhx of parkinsons disease CHF, atrial fibrillation s/p watchman, NSVT s/p ICD, DM, HTN who presented to [facility] ED with complaint of fall. \"  Per neurology consult note 1/16, pt noted for underlying bipolar disorder with recent diagnosis of Parkinson disease. EEG pending. Neurology recommending medication adjustments.   Pt has been agitated since arrival and has received multiple medications to control his behaviors.      Home Health S4 Level Recommendation:  NA    AM-PAC Score: AM-PAC Inpatient Daily Activity Raw Score: 17     Subjective:  Patient sitting up in chair with no family present.   Pt agreeable to this OT session.     Cognition:    A&O x4   Able to follow 1 step commands.    Pain:   Yes  Location: R flank (attributes to recent fall)  Rating: mild /10  Pain Medicine Status: No request made    Preadmission Environment: - all copied from prior PT evaluation dated 1/25/23, generally confirmed this date. Patient reports having two small dogs,

## 2024-01-18 NOTE — PROGRESS NOTES
gabapentin  200 mg Oral TID    lisinopril  5 mg Oral Daily    metoprolol succinate  50 mg Oral BID    tamsulosin  0.4 mg Oral Daily    sodium chloride flush  5-40 mL IntraVENous 2 times per day    furosemide  20 mg Oral Once per day on Mon Thu    melatonin  5 mg Oral Nightly    insulin glargine  50 Units SubCUTAneous Nightly    insulin lispro  15 Units SubCUTAneous Daily with breakfast    insulin lispro  20 Units SubCUTAneous Dinner     Continuous Infusions:    dextrose      sodium chloride       PRN Meds: OLANZapine, hydrOXYzine HCl, glucose, dextrose bolus **OR** dextrose bolus, glucagon (rDNA), dextrose, sodium chloride flush, sodium chloride, potassium chloride **OR** potassium alternative oral replacement **OR** potassium chloride, magnesium sulfate, ondansetron **OR** ondansetron, polyethylene glycol, acetaminophen **OR** acetaminophen    OBJECTIVE  Vital signs in the last 24 hours:  Vitals:    01/18/24 0800   BP: 135/70   Pulse: 75   Resp: 18   Temp: 97.8 °F (36.6 °C)   SpO2: 98%       Intake/Output last 3 shifts:  I/O last 3 completed shifts:  In: 360 [P.O.:360]  Out: 350 [Urine:350]    Intake/Output this shift:  No intake/output data recorded.    Yesterday's Weight:  Wt Readings from Last 1 Encounters:   01/18/24 105.1 kg (231 lb 11.2 oz)       SUBJECTIVE  There was no acute event overnight.    ROS:  Negative except in subjective.    Neurological examination:  MENTAL STATUS:  Alert and oriented to person.  LANG/SPEECH: No dysarthria.  CRANIAL NERVES: No facial asymmetry.  MOTOR: No pronator drift or leg drift.  REFLEXES: Generalized 2+.  SENSORY: Grossly intact.  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    Labs and Imaging:  I reviewed labs and brain imaging.    50 minutes were spent with the patient with greater than 50% of the time spent in counseling and coordination of care.    Taryn Coyne MD

## 2024-01-18 NOTE — PROGRESS NOTES
IM Progress Note    Admit Date:  1/15/2024     Pt with bipolar disorder, CAD, CMP   Admitted with weakness, fall .      Combative in ER    Medicated with versed, artivan, zyprexa, benadryl      S/P posey restraints     Subjective:  Mr. Quiroga  is calm, cooperative now .   Very weak, worked with therapy.   Needs SNF   alert and oriented today         Objective:     Vitals:    01/17/24 2013 01/18/24 0208 01/18/24 0800 01/18/24 1515   BP: 110/67 132/82 135/70 (!) 150/81   Pulse: 67 70 75 80   Resp: 18 18 18 17   Temp: 97.5 °F (36.4 °C) 97.5 °F (36.4 °C) 97.8 °F (36.6 °C) 97.8 °F (36.6 °C)   TempSrc: Oral Oral Oral Oral   SpO2: 92% 96% 98% 97%   Weight:  105.1 kg (231 lb 11.2 oz)     Height:             Patient Vitals for the past 4 hrs:   BP Temp Temp src Pulse Resp SpO2   01/18/24 1515 (!) 150/81 97.8 °F (36.6 °C) Oral 80 17 97 %          Intake/Output Summary (Last 24 hours) at 1/18/2024 1526  Last data filed at 1/18/2024 0101  Gross per 24 hour   Intake --   Output 350 ml   Net -350 ml         Physical Exam:  Gen: No distress. Alert. Awake, oriented  X3  Eyes: PERRL. No sclera icterus. No conjunctival injection.   ENT: No discharge. Pharynx clear.   Neck: No JVD.  Trachea midline.  Resp: No accessory muscle use. No crackles. No wheezes. +mild rhonchi  R>L  CV: Regular rate. Regular rhythm. No murmur.  No rub. +RLE edema.    Peripheral Pulses: +2 palpable, equal bilaterally   GI: Non-tender. Non-distended.  Normal bowel sounds.  Skin: Warm and dry. No nodule on exposed extremities. No rash on exposed extremities.   M/S: No cyanosis. No joint deformity. No clubbing. +Tenderness to palpation of L subcostal/flank region  Neuro: Awake. Grossly nonfocal    Psych: Oriented x 3. No anxiety or agitation.          Medications:  docusate sodium, 100 mg, Daily  carbidopa-levodopa, 1.5 tablet, TID  enoxaparin, 30 mg, BID  lidocaine, 1 patch, Daily  aspirin, 81 mg, Daily  atorvastatin, 80 mg, Nightly  divalproex, 500 mg,  Status: Full Code         DC planning to SNF      Abdirahman Yanez MD  01/18/24  3:26 PM

## 2024-01-19 LAB
GLUCOSE BLD-MCNC: 127 MG/DL (ref 70–99)
GLUCOSE BLD-MCNC: 139 MG/DL (ref 70–99)
GLUCOSE BLD-MCNC: 161 MG/DL (ref 70–99)
GLUCOSE BLD-MCNC: 176 MG/DL (ref 70–99)
GLUCOSE BLD-MCNC: 183 MG/DL (ref 70–99)
GLUCOSE BLD-MCNC: 198 MG/DL (ref 70–99)
PERFORMED ON: ABNORMAL

## 2024-01-19 PROCEDURE — 97530 THERAPEUTIC ACTIVITIES: CPT

## 2024-01-19 PROCEDURE — 6370000000 HC RX 637 (ALT 250 FOR IP): Performed by: NURSE PRACTITIONER

## 2024-01-19 PROCEDURE — 6370000000 HC RX 637 (ALT 250 FOR IP)

## 2024-01-19 PROCEDURE — 6370000000 HC RX 637 (ALT 250 FOR IP): Performed by: PSYCHIATRY & NEUROLOGY

## 2024-01-19 PROCEDURE — 6370000000 HC RX 637 (ALT 250 FOR IP): Performed by: INTERNAL MEDICINE

## 2024-01-19 PROCEDURE — 6360000002 HC RX W HCPCS: Performed by: INTERNAL MEDICINE

## 2024-01-19 PROCEDURE — 2580000003 HC RX 258: Performed by: NURSE PRACTITIONER

## 2024-01-19 PROCEDURE — 97161 PT EVAL LOW COMPLEX 20 MIN: CPT

## 2024-01-19 PROCEDURE — 92526 ORAL FUNCTION THERAPY: CPT

## 2024-01-19 PROCEDURE — 99233 SBSQ HOSP IP/OBS HIGH 50: CPT | Performed by: INTERNAL MEDICINE

## 2024-01-19 PROCEDURE — 99233 SBSQ HOSP IP/OBS HIGH 50: CPT | Performed by: PSYCHIATRY & NEUROLOGY

## 2024-01-19 PROCEDURE — 1200000000 HC SEMI PRIVATE

## 2024-01-19 RX ORDER — INSULIN GLARGINE 100 [IU]/ML
10 INJECTION, SOLUTION SUBCUTANEOUS ONCE
Status: COMPLETED | OUTPATIENT
Start: 2024-01-19 | End: 2024-01-19

## 2024-01-19 RX ADMIN — METOPROLOL SUCCINATE 50 MG: 50 TABLET, EXTENDED RELEASE ORAL at 10:19

## 2024-01-19 RX ADMIN — ATORVASTATIN CALCIUM 80 MG: 40 TABLET, FILM COATED ORAL at 21:28

## 2024-01-19 RX ADMIN — ASPIRIN 81 MG: 81 TABLET, CHEWABLE ORAL at 10:19

## 2024-01-19 RX ADMIN — CARBIDOPA AND LEVODOPA 1.5 TABLET: 10; 100 TABLET ORAL at 10:19

## 2024-01-19 RX ADMIN — ENOXAPARIN SODIUM 30 MG: 100 INJECTION SUBCUTANEOUS at 21:27

## 2024-01-19 RX ADMIN — DIVALPROEX SODIUM 1000 MG: 500 TABLET, DELAYED RELEASE ORAL at 21:28

## 2024-01-19 RX ADMIN — ENOXAPARIN SODIUM 30 MG: 100 INJECTION SUBCUTANEOUS at 10:19

## 2024-01-19 RX ADMIN — TAMSULOSIN HYDROCHLORIDE 0.4 MG: 0.4 CAPSULE ORAL at 10:19

## 2024-01-19 RX ADMIN — GABAPENTIN 200 MG: 100 CAPSULE ORAL at 10:19

## 2024-01-19 RX ADMIN — INSULIN GLARGINE 50 UNITS: 100 INJECTION, SOLUTION SUBCUTANEOUS at 21:27

## 2024-01-19 RX ADMIN — HYDROXYZINE HYDROCHLORIDE 25 MG: 25 TABLET ORAL at 11:15

## 2024-01-19 RX ADMIN — METOPROLOL SUCCINATE 50 MG: 50 TABLET, EXTENDED RELEASE ORAL at 21:28

## 2024-01-19 RX ADMIN — GABAPENTIN 200 MG: 100 CAPSULE ORAL at 21:29

## 2024-01-19 RX ADMIN — CARBIDOPA AND LEVODOPA 1.5 TABLET: 10; 100 TABLET ORAL at 14:47

## 2024-01-19 RX ADMIN — Medication 5 MG: at 21:28

## 2024-01-19 RX ADMIN — PANTOPRAZOLE SODIUM 40 MG: 40 TABLET, DELAYED RELEASE ORAL at 06:35

## 2024-01-19 RX ADMIN — DIVALPROEX SODIUM 500 MG: 500 TABLET, DELAYED RELEASE ORAL at 10:21

## 2024-01-19 RX ADMIN — DULOXETINE HYDROCHLORIDE 60 MG: 60 CAPSULE, DELAYED RELEASE ORAL at 10:19

## 2024-01-19 RX ADMIN — SODIUM CHLORIDE, PRESERVATIVE FREE 10 ML: 5 INJECTION INTRAVENOUS at 10:20

## 2024-01-19 RX ADMIN — INSULIN LISPRO 20 UNITS: 100 INJECTION, SOLUTION INTRAVENOUS; SUBCUTANEOUS at 17:22

## 2024-01-19 RX ADMIN — LISINOPRIL 5 MG: 5 TABLET ORAL at 10:19

## 2024-01-19 RX ADMIN — DICLOFENAC SODIUM 2 G: 10 GEL TOPICAL at 15:49

## 2024-01-19 RX ADMIN — INSULIN LISPRO 15 UNITS: 100 INJECTION, SOLUTION INTRAVENOUS; SUBCUTANEOUS at 10:20

## 2024-01-19 RX ADMIN — GABAPENTIN 200 MG: 100 CAPSULE ORAL at 14:47

## 2024-01-19 RX ADMIN — SODIUM CHLORIDE, PRESERVATIVE FREE 10 ML: 5 INJECTION INTRAVENOUS at 21:30

## 2024-01-19 RX ADMIN — CARBIDOPA AND LEVODOPA 1.5 TABLET: 10; 100 TABLET ORAL at 21:29

## 2024-01-19 RX ADMIN — INSULIN GLARGINE 10 UNITS: 100 INJECTION, SOLUTION SUBCUTANEOUS at 01:22

## 2024-01-19 RX ADMIN — FLUTICASONE PROPIONATE 2 SPRAY: 50 SPRAY, METERED NASAL at 10:20

## 2024-01-19 ASSESSMENT — PAIN DESCRIPTION - DESCRIPTORS: DESCRIPTORS: ACHING;DISCOMFORT

## 2024-01-19 ASSESSMENT — PAIN SCALES - WONG BAKER: WONGBAKER_NUMERICALRESPONSE: 2

## 2024-01-19 ASSESSMENT — PAIN DESCRIPTION - LOCATION: LOCATION: BACK

## 2024-01-19 ASSESSMENT — PAIN - FUNCTIONAL ASSESSMENT: PAIN_FUNCTIONAL_ASSESSMENT: ACTIVITIES ARE NOT PREVENTED

## 2024-01-19 ASSESSMENT — PAIN DESCRIPTION - ONSET: ONSET: ON-GOING

## 2024-01-19 ASSESSMENT — PAIN SCALES - GENERAL: PAINLEVEL_OUTOF10: 8

## 2024-01-19 ASSESSMENT — PAIN DESCRIPTION - ORIENTATION: ORIENTATION: LEFT

## 2024-01-19 ASSESSMENT — PAIN DESCRIPTION - FREQUENCY: FREQUENCY: CONTINUOUS

## 2024-01-19 ASSESSMENT — PAIN DESCRIPTION - PAIN TYPE: TYPE: CHRONIC PAIN

## 2024-01-19 NOTE — PROGRESS NOTES
Pt Glucose level was 96 and pt was due for Lantus 50 Units. MD notified and MD decided to hold the Lantus and do a glucose check at midnight.

## 2024-01-19 NOTE — FLOWSHEET NOTE
01/19/24 0228   Vital Signs   Temp 97.5 °F (36.4 °C)   Temp Source Oral   Pulse 65   Heart Rate Source Monitor   Respirations 18   /88   MAP (Calculated) 102   BP Location Right lower arm   BP Method Automatic   Patient Position Semi fowlers   Opioid-Induced Sedation   POSS Score 1   Oxygen Therapy   SpO2 97 %   O2 Device None (Room air)     Pt VS as shown above.

## 2024-01-19 NOTE — PROGRESS NOTES
Daily  atorvastatin, 80 mg, Nightly  divalproex, 500 mg, QAM  divalproex, 1,000 mg, Nightly  DULoxetine, 60 mg, Daily  pantoprazole, 40 mg, QAM AC  fluticasone, 2 spray, Daily  gabapentin, 200 mg, TID  lisinopril, 5 mg, Daily  metoprolol succinate, 50 mg, BID  tamsulosin, 0.4 mg, Daily  sodium chloride flush, 5-40 mL, 2 times per day  furosemide, 20 mg, Once per day on Mon Thu  melatonin, 5 mg, Nightly  insulin glargine, 50 Units, Nightly  insulin lispro, 15 Units, Daily with breakfast  insulin lispro, 20 Units, Dinner      PRN Medications:  diclofenac sodium, 2 g, 4x Daily PRN  OLANZapine, 5 mg, Q6H PRN  hydrOXYzine HCl, 25 mg, Q4H PRN  glucose, 4 tablet, PRN  dextrose bolus, 125 mL, PRN   Or  dextrose bolus, 250 mL, PRN  glucagon (rDNA), 1 mg, PRN  dextrose, , Continuous PRN  sodium chloride flush, 5-40 mL, PRN  sodium chloride, , PRN  potassium chloride, 40 mEq, PRN   Or  potassium alternative oral replacement, 40 mEq, PRN   Or  potassium chloride, 10 mEq, PRN  magnesium sulfate, 2,000 mg, PRN  ondansetron, 4 mg, Q8H PRN   Or  ondansetron, 4 mg, Q6H PRN  polyethylene glycol, 17 g, Daily PRN  acetaminophen, 650 mg, Q6H PRN   Or  acetaminophen, 650 mg, Q6H PRN          Data:  CBC:   Recent Labs     01/17/24  0533 01/18/24  0614   WBC 6.8 7.4   HGB 16.7 17.0   HCT 49.0 50.9   MCV 88.9 88.6   * 117*       BMP:   Recent Labs     01/17/24  0532 01/18/24  0614   * 141   K 4.4 3.8   CL 96* 102   CO2 24 24   BUN 20 19   CREATININE 0.7* 0.7*       LIVER PROFILE:   No results for input(s): \"AST\", \"ALT\", \"LIPASE\", \"AMYLASE\", \"ALB\", \"BILIDIR\", \"BILITOT\", \"ALKPHOS\" in the last 72 hours.    PT/INR:   No results for input(s): \"PROTIME\", \"INR\" in the last 72 hours.      CULTURES  Results       Procedure Component Value Units Date/Time    Rapid influenza A/B antigens [[id number]] Collected: 01/15/24 1351    Order Status: Completed Specimen: Nasopharyngeal Updated: 01/15/24 1408     Rapid Influenza A Ag Negative           #NSVT   -s/p ICD   -on toprol XL      #COPD without AE   -continue prn inhalers      #DM type 2 with neuropathy   -on metformin, jardiance  -continue insulin regimen   -monitor BG   -on gabapentin      #Thrombocytopenia   -monitor      #HTN  -continue home regimen     #ALIN  -non compliant with BIPAP      #HLD    -statin        DVT Prophylaxis: Lovenox   Diet: ADULT DIET; Dysphagia - Soft and Bite Sized; 4 carb choices (60 gm/meal); Low Sodium (2 gm); Mildly Thick (Nectar); 2000 ml  Code Status: Full Code      Overall mentation has improved, impulsive behavior and anxiety symptoms have improved.   Atarax as needed given.  Continue to monitor.  Continue PT OT     DC planning to SNF.    Plan of care discussed in detail with the patient's nurse and with case management      Abdirahman Yanez MD  01/19/24  1:46 PM

## 2024-01-19 NOTE — PROGRESS NOTES
Neurology Progress Note    ID: Chano Quiroga Jr. is a 63 y.o. male    : 1960     LOS: 4 days     ASSESSMENT    Principal Problem:    Generalized weakness  Active Problems:    Bipolar disorder (HCC)    Difficulty in walking    Frequent falls    Congestive heart failure (HCC)  Resolved Problems:    * No resolved hospital problems. *    The patient has evidence of parkinsonism.  Per the patient, he has been on Depakote less than 5 years.    But it is unknown that if the patient was on antipsychotic in the past prior to Depakote.  The etiology of parkinsonism in this case can be drug-induced parkinsonism or Parkinson disease or in combination.  EEG showed minimal encephalopathy without seizure tendency.     The patient also has refractory thrombocytopenia.  From neurology perspective, the patient is high risk for bleeding if the patient continues Depakote.    24: The patient remains stabilized.  The patient has not had physical therapy assessment yet.  The patient denies worsening of shaking or cogwheel rigidity at this time.  The patient also denies significant side effect from increasing dose of Sinemet.    24: The patient is doing okay with walker today.  The patient denies significant unsteadiness.  The patient also denies significant side effect from Sinemet.    24: The patient is neurologically stabilized.     Plan:     1.  Continue Sinemet 1 and half tablet 3 times a day.  2.  PT/OT/ST.  3.  Consider switching Depakote in the future if the patient continues falling.  4.  Precaution for hallucination.    The patient may be DC to rehab or other facilities if there is no other concern.  Follow-up with neurology in 1 to 2 months.    Medications:  Scheduled Meds:    docusate sodium  100 mg Oral Daily    carbidopa-levodopa  1.5 tablet Oral TID    enoxaparin  30 mg SubCUTAneous BID    lidocaine  1 patch TransDERmal Daily    aspirin  81 mg Oral Daily    atorvastatin  80 mg Oral Nightly

## 2024-01-19 NOTE — PROGRESS NOTES
/68   Pulse 63   Temp 97.3 °F (36.3 °C) (Oral)   Resp 18   Ht 1.626 m (5' 4\")   Wt 105.1 kg (231 lb 11.2 oz)   SpO2 99%   BMI 39.77 kg/m²     Pt awake in bed. Pt alert and orientedX4. Pt denies pain at this time. Assessment complete. Meds passed. Pt denies needs at this time.        Bedside Mobility Assessment Tool (BMAT):     Assessment Level 1- Sit and Shake    1. From a semi-reclined position, ask patient to sit up and rotate to a seated position at the side of the bed. Can use the bedrail.    2. Ask patient to reach out and grab your hand and shake making sure patient reaches across his/her midline.   Pass- Patient is able to come to a seated position, maintain core strength. Maintains seated balance while reaching across midline. Move on to Assessment Level 2.     Assessment Level 2- Stretch and Point   1. With patient in seated position at the side of the bed, have patient place both feet on the floor (or stool) with knees no higher than hips.    2. Ask patient to stretch one leg and straighten the knee, then bend the ankle/flex and point the toes. If appropriate, repeat with the other leg.   Pass- Patient is able to demonstrate appropriate quad strength on intended weight bearing limb(s). Move onto Assessment Level 3.     Assessment Level 3- Stand   1. Ask patient to elevate off the bed or chair (seated to standing) using an assistive device (cane, bedrail).    2. Patient should be able to raise buttocks off be and hold for a count of five. May repeat once.   Pass- Patient maintains standing stability for at least 5 seconds, proceed to assessment level 4.    Assessment Level 4- Walk   1. Ask patient to march in place at bedside.    2. Then ask patient to advance step and return each foot. Some medical conditions may render a patient from stepping backwards, use your best clinical judgement.   Fail- Patient not able to complete tasks OR requires use of assistive device. Patient is MOBILITY LEVEL  3.       Mobility Level- 3

## 2024-01-19 NOTE — PROGRESS NOTES
/76   Pulse 81   Temp (!) 96.5 °F (35.8 °C) (Oral)   Resp 18   Ht 1.626 m (5' 4\")   Wt 105.1 kg (231 lb 11.2 oz)   SpO2 97%   BMI 39.77 kg/m²     Assessment complete. Meds passed. Pt denies needs at this time. Prn atarax given for anxiety. Patient had a BM with formed stool and some diarrhea, colace held this morning. Patient states he has a bowel stimulator to the left lower back. Telesitter removed at 1015 this morning. Pending DC to [facility]        Bedside Mobility Assessment Tool (BMAT):     Assessment Level 1- Sit and Shake    1. From a semi-reclined position, ask patient to sit up and rotate to a seated position at the side of the bed. Can use the bedrail.    2. Ask patient to reach out and grab your hand and shake making sure patient reaches across his/her midline.   Pass- Patient is able to come to a seated position, maintain core strength. Maintains seated balance while reaching across midline. Move on to Assessment Level 2.     Assessment Level 2- Stretch and Point   1. With patient in seated position at the side of the bed, have patient place both feet on the floor (or stool) with knees no higher than hips.    2. Ask patient to stretch one leg and straighten the knee, then bend the ankle/flex and point the toes. If appropriate, repeat with the other leg.   Pass- Patient is able to demonstrate appropriate quad strength on intended weight bearing limb(s). Move onto Assessment Level 3.     Assessment Level 3- Stand   1. Ask patient to elevate off the bed or chair (seated to standing) using an assistive device (cane, bedrail).    2. Patient should be able to raise buttocks off be and hold for a count of five. May repeat once.   Pass- Patient maintains standing stability for at least 5 seconds, proceed to assessment level 4.    Assessment Level 4- Walk   1. Ask patient to march in place at bedside.    2. Then ask patient to advance step and return each foot. Some medical conditions may render  a patient from stepping backwards, use your best clinical judgement.   Fail- Patient not able to complete tasks OR requires use of assistive device. Patient is MOBILITY LEVEL 3.       Mobility Level- 3

## 2024-01-19 NOTE — CARE COORDINATION
INTERDISCIPLINARY PLAN OF CARE CONFERENCE    Date/Time: 1/19/2024 9:06 AM  Completed by: Jie Jorge RN, Case Management      Patient Name:  Chano Quiroga Jr.  YOB: 1960  Admitting Diagnosis: Generalized weakness [R53.1]  Frequent falls [R29.6]  Difficulty in walking [R26.2]     Admit Date/Time:  1/15/2024  9:31 AM    Chart reviewed. Interdisciplinary team contacted or reviewed plan related to patient progress and discharge plans.   Disciplines included Case Management, Nursing, and Dietitian.    Current Status: IP 01/15/2024  PT/OT recommendation for discharge plan of care: [facility]  Discharge Recommendations: SNF; pt refusing, if going home will need home PT.  DME needs for discharge: Needs Met    Expected D/C Disposition:  Skilled nursing facility    Discharge Plan Comments:   [facility] is following for potential admission. Bedside on-site eval by [facility] DON yesterday. [facility] can accept only after pt is tele-sitter and restraint free x 48 hours.     Denied by Paul NGUYEN, Paul Rodriguez, Dimitris Stiles, [facility], Lonny Lion and    Referrals to [facility]- PENDING review  [facility]- Would be single case agreement ONLY if pt is out of restraints and without tele-sitter x 48 hours. Jose SANTANA notified and will attempt to remove tele-sitter today.       Home O2 in place on admit: No  Pt informed of need to bring portable home O2 tank on day of discharge for nursing to connect prior to leaving:  No  Verbalized agreement/Understanding:  Not Indicated

## 2024-01-19 NOTE — PLAN OF CARE
Problem: Chronic Conditions and Co-morbidities  Goal: Patient's chronic conditions and co-morbidity symptoms are monitored and maintained or improved  Outcome: Progressing     Problem: Discharge Planning  Goal: Discharge to home or other facility with appropriate resources  Outcome: Progressing     Problem: Risk for Elopement  Goal: Patient will not exit the unit/facility without proper excort  1/19/2024 0059 by ZANDRA ALVAREZ  Outcome: Progressing  1/18/2024 1235 by Joi Burgess RN  Outcome: Progressing  Flowsheets (Taken 1/18/2024 1235)  Nursing Interventions for Elopement Risk:   Assist with personal care needs such as toileting, eating, dressing, as needed to reduce the risk of wandering   Collaborate with family members/caregivers to mitigate the elopement risk     Problem: Safety - Adult  Goal: Free from fall injury  1/19/2024 0059 by ZANDRA ALVAREZ  Outcome: Progressing  1/18/2024 1235 by Joi Burgess RN  Outcome: Progressing  Flowsheets (Taken 1/18/2024 1235)  Free From Fall Injury: Instruct family/caregiver on patient safety     Problem: Safety - Medical Restraint  Goal: Remains free of injury from restraints (Restraint for Interference with Medical Device)  Description: INTERVENTIONS:  1. Determine that other, less restrictive measures have been tried or would not be effective before applying the restraint  2. Evaluate the patient's condition at the time of restraint application  3. Inform patient/family regarding the reason for restraint  4. Q2H: Monitor safety, psychosocial status, comfort, nutrition and hydration  1/19/2024 0059 by ZANDRA ALVAREZ  Outcome: Progressing  1/18/2024 1235 by Joi Burgess RN  Outcome: Adequate for Discharge     Problem: Pain  Goal: Verbalizes/displays adequate comfort level or baseline comfort level  1/19/2024 0059 by ZANDRA ALVAREZ  Outcome: Progressing  1/18/2024 1235 by Joi Burgess RN  Outcome: Adequate for Discharge

## 2024-01-19 NOTE — PROGRESS NOTES
Pt glucose was 176 and MD was notified. A one time dose of 10 units of Lantus was ordered and given.

## 2024-01-19 NOTE — DISCHARGE INSTR - COC
Hyperlipidemia E78.5    Hypertension due to endocrine disorder I15.2    Ischemic cardiomyopathy I25.5    Obesity, morbid, BMI 40.0-49.9 ([facility]) E66.01    Weakness R53.1    Acute encephalopathy G93.40    TIA involving left internal carotid artery G45.1    DM (diabetes mellitus), secondary, uncontrolled, w/neurologic complic [id number]    Dyslipidemia E78.5    HTN (hypertension), benign I10    Lactic acidosis E87.20    Head trauma S09.90XA    Abrasion, scalp w/o infection S00.01XA    Unsteady gait R26.81    Hyperglycemia R73.9    Chronic obstructive pulmonary disease ([facility]) J44.9    Coarse tremor G25.2    Generalized weakness R53.1    Type 2 diabetes mellitus with hyperglycemia, with long-term current use of insulin ([facility]) E11.65, Z79.4    Primary hypertension I10    CAD S/P percutaneous coronary angioplasty I25.10, Z98.61    COVID-19 U07.1    Difficulty in walking R26.2    Implantable cardioverter-defibrillator (ICD) discharge Z45.02    AICD malfunction T82.118A    Hypotension I95.9    Valproic acid toxicity T42.6X1A    PAF (paroxysmal atrial fibrillation) ([facility]) I48.0    COVID-19 virus infection U07.1    Disorder of electrolytes E87.8    Atrial fibrillation with RVR ([facility]) I48.91    Low grade fever R50.9    Thrombocytopenia ([facility]) D69.6    Abnormal chest x-ray R93.89    Encephalopathy G93.40    Debility R53.81    Bipolar disorder ([facility]) F31.9    Orthostatic hypotension I95.1    Chronic midline low back pain without sciatica M54.50, G89.29    SOB (shortness of breath) on exertion R06.02    History of tobacco abuse Z87.891    Parkinson's disease G20.A1    Presence of Watchman left atrial appendage closure device Z95.818    Paroxysmal atrial fibrillation ([facility]) I48.0    Frequent falls R29.6    Congestive heart failure ([facility]) I50.9       Isolation/Infection:   Isolation            No Isolation          Patient Infection Status       Infection Onset Added Last Indicated Last Indicated By Review Planned Expiration Resolved Resolved  By    None active    Resolved    COVID-19 01/12/22 01/12/22 01/12/22 COVID-19, Rapid   01/26/22 Leonid Feliz, RN                       Nurse Assessment:  Last Vital Signs: /88   Pulse 65   Temp 97.5 °F (36.4 °C) (Oral)   Resp 18   Ht 1.626 m (5' 4\")   Wt 105.1 kg (231 lb 11.2 oz)   SpO2 97%   BMI 39.77 kg/m²     Last documented pain score (0-10 scale): Pain Level: 8  Last Weight:   Wt Readings from Last 1 Encounters:   01/19/24 105.1 kg (231 lb 11.2 oz)     Mental Status:  {IP PT MENTAL STATUS:20030}    IV Access:  { DELTA IV ACCESS:139601205}    Nursing Mobility/ADLs:  Walking   {CHP DME ADLs:785119104}  Transfer  {CHP DME ADLs:338922145}  Bathing  {CHP DME ADLs:812131909}  Dressing  {CHP DME ADLs:408754504}  Toileting  {CHP DME ADLs:924731363}  Feeding  {CHP DME ADLs:301603694}  Med Admin  {P DME ADLs:781923991}  Med Delivery   { DELTA MED Delivery:898245199}    Wound Care Documentation and Therapy:        Elimination:  Continence:   Bowel: {YES / NO:19727}  Bladder: {YES / NO:19727}  Urinary Catheter: {Urinary Catheter:514020349}   Colostomy/Ileostomy/Ileal Conduit: {YES / NO:19727}       Date of Last BM: ***  No intake or output data in the 24 hours ending 01/19/24 0904  I/O last 3 completed shifts:  In: -   Out: 350 [Urine:350]    Safety Concerns:     { DELTA Safety Concerns:831003564}    Impairments/Disabilities:      { DELTA Impairments/Disabilities:302527487}    Nutrition Therapy:  Current Nutrition Therapy:   { DELTA Diet List:948740172}    Routes of Feeding: {CHP DME Other Feedings:662410535}  Liquids: {Slp liquid thickness:91608}  Daily Fluid Restriction: {CHP DME Yes amt example:370812751}  Last Modified Barium Swallow with Video (Video Swallowing Test): {Done Not Done Date:322360950}    Treatments at the Time of Hospital Discharge:   Respiratory Treatments: ***  Oxygen Therapy:  {Therapy; copd oxygen:46702}  Ventilator:    { CC Vent List:821839836}    Rehab Therapies: {THERAPEUTIC

## 2024-01-19 NOTE — PROGRESS NOTES
Speech Language Pathology  Swallowing Disorders and Dysphagia    Dysphagia Treatment/Follow-Up Note  Facility/Department: [address] MEDICAL-SURGICAL    Recommendations: SLP recommends to continue IDDSI 6 Soft and Bite Sized Solids; IDDSI 2 Mildly Thick Liquids; Meds whole in puree  Pt requires 1:1 supervision with po intake d/t impulsivity  Impulsivity is barrier to safe intake and increases risk of aspiration or airway compromise.  Risk Management: upright for all intake, stay upright for at least 30 mins after intake, small bites/sips, 1:1 supervision with intake, oral care 2-3x/day to reduce adverse affects in the event of aspiration, increase physical mobility as able, slow rate of intake, general GERD precautions, general aspiration precautions, and hold PO and contact SLP if s/s of aspiration or worsening respiratory status develop..    NAME:Chano Quiroga JrPrateek  : 1960 (63 y.o.)   MRN: [id number]  ROOM: [address] [place] [address]  ADMISSION DATE: 1/15/2024  PATIENT DIAGNOSIS(ES): Generalized weakness [R53.1]  Frequent falls [R29.6]  Difficulty in walking [R26.2]  Allergies   Allergen Reactions    Other      VASCEPA CAUSED RASH AND ITCHING    Pcn [Penicillins] Hives       DATE ONSET: 1/15/2024    Pain: The patient does not complain of pain       Current Diet: ADULT DIET; Dysphagia - Soft and Bite Sized; 4 carb choices (60 gm/meal); Low Sodium (2 gm); Mildly Thick (Nectar); 2000 ml    Diet Tolerance:  Patient is tolerating current diet level without signs/symptoms of aspiration but requires supervision and remains impulsive    Dysphagia Treatment and Impressions:  Subjective: Pt seen in room at bedside with RN/Jose's permission  RN Report/Chart Review: RN reports no new concerns  Patient tolerance to current diet and treatment:Pt state he likes the mildly thick liquids because they \"keep me from choking.\" Pt also states he enjoys soft and bite-sized solids because they are \"already cut up into smaller  tactile cues and verbal cues to decrease pt impulsivity with feeding self. Pt requires max cues but at decreasing frequency compared to prior session.   Focus of session is on compensatory strategies d/t imminent safety issue.  SLP demonstrates effortful swallow and tongue press to show potential swallow rehab strategies. Pt completes tongue press and effortful swallow x1 but desires to complete meal rather than continuing exercises.  Pt not ideal candidate for exercises during meal at this time d/t cues and attention required to decrease impulsivity.  Impulsivity remains barrier to safe intake and increases risk of aspiration or airway compromise.  Diet modifications discussed and pt in agreement to continue  soft and bite-sized and mildly thick liquids for increased safety and efficiency of swallow. Pt requires 1:1 supervision for po intake d/t impulsivity.    Education: SLP edu pt re: Role of SLP, Rationale for dysphagia tx, Recommended compensatory strategies, Aspiration precautions, MBS results, POC, Evidenced based practice for current recommendations and treatment, Risks of not following recommendations, Anatomical components of swallow structures as they pertain to airway protection, and Importance of oral care to reduce adverse affects in the event of aspiration. Pt would benefit from ongoing education and RN aware of recommendations  Assessment: Pt not tolerating recommended diet without clinical s/s of aspiration. Poor carryover of recommended compensatory strategies      Recommendations: SLP recommends to continue IDDSI 6 Soft and Bite Sized Solids; IDDSI 2 Mildly Thick Liquids; Meds whole in puree  Pt requires 1:1 supervision with po intake d/t impulsivity  Risk Management: upright for all intake, stay upright for at least 30 mins after intake, small bites/sips, 1:1 supervision with intake, oral care 2-3x/day to reduce adverse affects in the event of aspiration, increase physical mobility as able, slow

## 2024-01-19 NOTE — PROGRESS NOTES
Inpatient Physical Therapy Treatment    Unit: [address] [facility]  Date:  1/19/2024  Patient Name:    Chano Quiroga Jr.  Admitting diagnosis:  Generalized weakness [R53.1]  Frequent falls [R29.6]  Difficulty in walking [R26.2]  Admit Date:  1/15/2024  Precautions/Restrictions/WB Status/ Lines/ Wounds/ Oxygen: Fall risk and Bed/chair alarm    Treatment Time:  1720 - 1743  Treatment Number:  3   Timed Code Treatment Minutes:  23 minutes  Total Treatment Minutes: 23   minutes    Patient Stated Goals for Therapy: \" I want to walk down the [address] \"          Discharge Recommendations: SNF; pt refusing, if going home will need home PT.  DME needs for discharge: Needs Met     Therapy recommendation for EMS Transport: can transport by wheelchair    Therapy recommendations for staff:   Assist of 1 for ambulation with use of rolling walker (RW) and gait belt within room  within halls.     History of Present Illness:   Per H&P Abelino WALLACE 1/15: \"63 y.o. male with pmhx of parkinsons disease CHF, atrial fibrillation s/p watchman, NSVT s/p ICD, DM, HTN who presented to [facility] ED with complaint of fall. \"  Per neurology consult note 1/16, pt noted for underlying bipolar disorder with recent diagnosis of Parkinson disease. EEG pending. Neurology recommending medication adjustments.     Home Health S4 Level Recommendation:  NA        AM-PAC Mobility Score       AM-PAC Inpatient Mobility without Stair Climbing Raw Score : 10    Subjective  Patient lying reclined in bed with no family present.  Frustrated that he has to call for staff assist to get up.     Cognition    A&O Person, Place, Time, and Situation   Able to follow 2 step commands.   Less impulsive today.     Pain   Yes  Location: R flank (from his recent fall)  Rating: mild /10  Pain Medicine Status: No request made    Preadmission Environment / Preadmission Status - all copied from prior PT evaluation dated 1/25/23, not re-confirmed today due to patient's state of agitation. However,

## 2024-01-20 VITALS
SYSTOLIC BLOOD PRESSURE: 116 MMHG | RESPIRATION RATE: 18 BRPM | WEIGHT: 224.8 LBS | DIASTOLIC BLOOD PRESSURE: 59 MMHG | HEIGHT: 64 IN | OXYGEN SATURATION: 96 % | HEART RATE: 60 BPM | TEMPERATURE: 97.8 F | BODY MASS INDEX: 38.38 KG/M2

## 2024-01-20 LAB
GLUCOSE BLD-MCNC: 130 MG/DL (ref 70–99)
GLUCOSE BLD-MCNC: 164 MG/DL (ref 70–99)
PERFORMED ON: ABNORMAL
PERFORMED ON: ABNORMAL

## 2024-01-20 PROCEDURE — 6370000000 HC RX 637 (ALT 250 FOR IP): Performed by: NURSE PRACTITIONER

## 2024-01-20 PROCEDURE — 6370000000 HC RX 637 (ALT 250 FOR IP)

## 2024-01-20 PROCEDURE — 6370000000 HC RX 637 (ALT 250 FOR IP): Performed by: PSYCHIATRY & NEUROLOGY

## 2024-01-20 PROCEDURE — 6360000002 HC RX W HCPCS: Performed by: INTERNAL MEDICINE

## 2024-01-20 RX ADMIN — DICLOFENAC SODIUM 2 G: 10 GEL TOPICAL at 01:19

## 2024-01-20 RX ADMIN — ASPIRIN 81 MG: 81 TABLET, CHEWABLE ORAL at 09:39

## 2024-01-20 RX ADMIN — DOCUSATE SODIUM 100 MG: 100 CAPSULE, LIQUID FILLED ORAL at 09:39

## 2024-01-20 RX ADMIN — PANTOPRAZOLE SODIUM 40 MG: 40 TABLET, DELAYED RELEASE ORAL at 06:28

## 2024-01-20 RX ADMIN — DULOXETINE HYDROCHLORIDE 60 MG: 60 CAPSULE, DELAYED RELEASE ORAL at 09:39

## 2024-01-20 RX ADMIN — GABAPENTIN 200 MG: 100 CAPSULE ORAL at 09:39

## 2024-01-20 RX ADMIN — METOPROLOL SUCCINATE 50 MG: 50 TABLET, EXTENDED RELEASE ORAL at 09:39

## 2024-01-20 RX ADMIN — LISINOPRIL 5 MG: 5 TABLET ORAL at 09:39

## 2024-01-20 RX ADMIN — FLUTICASONE PROPIONATE 2 SPRAY: 50 SPRAY, METERED NASAL at 09:39

## 2024-01-20 RX ADMIN — ENOXAPARIN SODIUM 30 MG: 100 INJECTION SUBCUTANEOUS at 09:41

## 2024-01-20 RX ADMIN — DIVALPROEX SODIUM 500 MG: 500 TABLET, DELAYED RELEASE ORAL at 09:39

## 2024-01-20 RX ADMIN — CARBIDOPA AND LEVODOPA 1.5 TABLET: 10; 100 TABLET ORAL at 09:38

## 2024-01-20 RX ADMIN — TAMSULOSIN HYDROCHLORIDE 0.4 MG: 0.4 CAPSULE ORAL at 09:39

## 2024-01-20 ASSESSMENT — PAIN SCALES - GENERAL
PAINLEVEL_OUTOF10: 8
PAINLEVEL_OUTOF10: 3

## 2024-01-20 ASSESSMENT — PAIN DESCRIPTION - LOCATION
LOCATION: HIP
LOCATION: BACK

## 2024-01-20 ASSESSMENT — PAIN DESCRIPTION - ORIENTATION
ORIENTATION: LEFT
ORIENTATION: LEFT

## 2024-01-20 ASSESSMENT — PAIN DESCRIPTION - DESCRIPTORS: DESCRIPTORS: ACHING;DISCOMFORT

## 2024-01-20 ASSESSMENT — PAIN DESCRIPTION - PAIN TYPE: TYPE: CHRONIC PAIN

## 2024-01-20 NOTE — DISCHARGE SUMMARY
Physician Discharge Summary   Patient signed out and left AGAINST MEDICAL ADVICE  Patient ID:  Chano Quiroga Jr.  [id number]  63 y.o.  1960    Admit date: 1/15/2024    Discharge date and time: No discharge date for patient encounter.     Admitting Physician: Jaxson Patrick MD     Discharge Physician: Abdirahman Yanez MD    Admission Diagnoses: Generalized weakness [R53.1]  Frequent falls [R29.6]  Difficulty in walking [R26.2]    Discharge Diagnoses: Principal Problem:    Generalized weakness  Active Problems:    Bipolar disorder (HCC)    Difficulty in walking    Frequent falls    Congestive heart failure (HCC)  Resolved Problems:    * No resolved hospital problems. *      Admission Condition: {condition:52723}    Discharged Condition: {condition:02923}    Indication for Admission: ***    Hospital Course: ***      Plan was for discharge to SNF ongoing PT OT we will waiting for pre-CERT. .  Patient is refusing to go to SNF now and signed out AGAINST MEDICAL ADVICE.  His family came in and pick them up.    Consults: {consultation:54575}    Significant Diagnostic Studies:   Data:  CBC:   Recent Labs     01/18/24  0614   WBC 7.4   RBC 5.74   HGB 17.0   HCT 50.9   MCV 88.6   RDW 14.9   *     BMP:   Recent Labs     01/18/24  0614      K 3.8      CO2 24   BUN 19   CREATININE 0.7*     BNP: No results for input(s): \"BNP\" in the last 72 hours.  PT/INR: No results for input(s): \"PROTIME\", \"INR\" in the last 72 hours.  APTT: No results for input(s): \"APTT\" in the last 72 hours.  CARDIAC ENZYMES: No results for input(s): \"CKMB\", \"CKMBINDEX\", \"TROPONINI\" in the last 72 hours.    Invalid input(s): \"CKTOTAL;3\"  FASTING LIPID PANEL:  Lab Results   Component Value Date    CHOL 99 06/09/2023    HDL 33 (L) 06/09/2023    TRIG 156 (H) 06/09/2023     LIVER PROFILE: No results for input(s): \"AST\", \"ALT\", \"ALB\", \"BILIDIR\", \"BILITOT\", \"ALKPHOS\" in the last 72 hours.      Treatments: as above    Discharge  compliant with BIPAP      #HLD    -statin         DVT Prophylaxis: Lovenox   Diet: ADULT DIET; Dysphagia - Soft and Bite Sized; 4 carb choices (60 gm/meal); Low Sodium (2 gm); Mildly Thick (Nectar); 2000 ml  Code Status: Full Code        Overall mentation has improved, impulsive behavior and anxiety symptoms have improved.   Atarax as needed given.  Continue to monitor.  Continue PT OT  DC planning to SNF.      -> pt now signed out and left AMA         Consults: neurology and psychiatry    Significant Diagnostic Studies:   Data:  CBC:   Recent Labs     01/18/24  0614   WBC 7.4   RBC 5.74   HGB 17.0   HCT 50.9   MCV 88.6   RDW 14.9   *     BMP:   Recent Labs     01/18/24  0614      K 3.8      CO2 24   BUN 19   CREATININE 0.7*     BNP: No results for input(s): \"BNP\" in the last 72 hours.  PT/INR: No results for input(s): \"PROTIME\", \"INR\" in the last 72 hours.  APTT: No results for input(s): \"APTT\" in the last 72 hours.  CARDIAC ENZYMES: No results for input(s): \"CKMB\", \"CKMBINDEX\", \"TROPONINI\" in the last 72 hours.    Invalid input(s): \"CKTOTAL;3\"  FASTING LIPID PANEL:  Lab Results   Component Value Date    CHOL 99 06/09/2023    HDL 33 (L) 06/09/2023    TRIG 156 (H) 06/09/2023     LIVER PROFILE: No results for input(s): \"AST\", \"ALT\", \"ALB\", \"BILIDIR\", \"BILITOT\", \"ALKPHOS\" in the last 72 hours.      Treatments: as above    Discharge Exam:    Patient not seen today.  Left AGAINST MEDICAL ADVICE please see progress note from previous day 1/19.      Disposition: home    Patient Instructions:      DC meds not reviewed. Pt signed and left AMA        Medication List        CONTINUE taking these medications      FreeStyle Iker 2 [place] Fabiola  1 Units by Does not apply route 6 times daily Diabetes/ insulin monitoring     FreeStyle Iker 2 Sensor Misc  ONE (1) UNITS BY DOES NOT APPLY ROUTE SIX (6) TIMES DAILY FOR DIABETES/ INSULIN MONITORING            ASK your doctor about these medications      24HR Allergy

## 2024-01-20 NOTE — PLAN OF CARE
Problem: Chronic Conditions and Co-morbidities  Goal: Patient's chronic conditions and co-morbidity symptoms are monitored and maintained or improved  Outcome: Progressing     Problem: Discharge Planning  Goal: Discharge to home or other facility with appropriate resources  Outcome: Progressing     Problem: Safety - Adult  Goal: Free from fall injury  Outcome: Progressing     Problem: Pain  Goal: Verbalizes/displays adequate comfort level or baseline comfort level  Outcome: Progressing  Flowsheets (Taken 1/19/2024 2119)  Verbalizes/displays adequate comfort level or baseline comfort level:   Encourage patient to monitor pain and request assistance   Assess pain using appropriate pain scale     Problem: Skin/Tissue Integrity  Goal: Absence of new skin breakdown  Description: 1.  Monitor for areas of redness and/or skin breakdown  2.  Assess vascular access sites hourly  3.  Every 4-6 hours minimum:  Change oxygen saturation probe site  4.  Every 4-6 hours:  If on nasal continuous positive airway pressure, respiratory therapy assess nares and determine need for appliance change or resting period.  Outcome: Progressing

## 2024-01-20 NOTE — PROGRESS NOTES
Patient provided a COPD Educational Folder that includes the following materials:     [x]  [employer] Booklet: Managing your COPD  []  ALA: Getting the Most Out of Medication Delivery Devices  []  ALA: My COPD Action Plan  []  [employer] [facility]: [facility]   []  Smoking Cessation Classes  []  Outpatient Spiritual Care Services  [x]  Magnet: Signs of COPD    PATIENT/CAREGIVER TEACHING:   Level of patient/caregiver understanding able to:   [x] Verbalize understanding   [] Demonstrate understanding       [] Teach back        [] Needs reinforcement     []  Other:     Electronically signed by Cassie Mina RN on 1/19/2024 at 11:42 PM

## 2024-01-20 NOTE — PROGRESS NOTES
Patient has left AMA. Signed AMA papers. States that he is leaving AMA because he is tired of being in his room. Brother in law took him out in a wheelchair.

## 2024-01-20 NOTE — FLOWSHEET NOTE
01/19/24 2119   Vital Signs   Temp 97.5 °F (36.4 °C)   Temp Source Oral   Pulse 71   Heart Rate Source Monitor   Respirations 18   /67   MAP (Calculated) 86   MAP (mmHg) 86   BP Location Right lower arm   BP Method Automatic   Patient Position Semi fowlers   Pain Assessment   Pain Assessment 0-10   Pain Level 8   Pain Location Back   Pain Orientation Left   Pain Descriptors Aching;Discomfort   Functional Pain Assessment Activities are not prevented   Pain Type Chronic pain   Pain Frequency Continuous   Pain Onset On-going   Non-Pharmaceutical Pain Intervention(s) Cold pack;Emotional support   Care Plan - Pain Goals   Verbalizes/displays adequate comfort level or baseline comfort level Encourage patient to monitor pain and request assistance;Assess pain using appropriate pain scale   Opioid-Induced Sedation   POSS Score 1   RASS   Sandy Agitation Sedation Scale (RASS) 0   Oxygen Therapy   SpO2 96 %   Pulse Oximetry Type Intermittent   Pulse Oximeter Device Mode Continuous   O2 Device None (Room air)     Shift assessment complete, see flow sheet, schedule medications given, see MAR. Pt VSS. Bed in lowest position, bed alarm activated for pt safety,Call light and bed side table within reach, pt educated on use of call light if needing assist., pt verbalized understanding, denies further questions at this time. Denies any needs at this time, continue to monitor.  Bedside Mobility Assessment Tool (BMAT):     Assessment Level 1- Sit and Shake    1. From a semi-reclined position, ask patient to sit up and rotate to a seated position at the side of the bed. Can use the bedrail.    2. Ask patient to reach out and grab your hand and shake making sure patient reaches across his/her midline.   Pass- Patient is able to come to a seated position, maintain core strength. Maintains seated balance while reaching across midline. Move on to Assessment Level 2.     Assessment Level 2- Stretch and Point   1. With patient in

## 2024-01-20 NOTE — FLOWSHEET NOTE
01/20/24 0937   Vital Signs   Temp 97.8 °F (36.6 °C)   Temp Source Oral   Pulse 60   Heart Rate Source Monitor   Respirations 18   BP (!) 116/59   MAP (Calculated) 78   BP Location Left upper arm   BP Method Automatic   Patient Position Up in chair   Pain Assessment   Pain Assessment 0-10   Pain Level 8   Pain Location Hip   Pain Orientation Left   Opioid-Induced Sedation   POSS Score 1   Oxygen Therapy   SpO2 96 %   O2 Device None (Room air)     Assessment complete and morning medications administered. Patient states that he will be leaving AMA as soon as his rides get here. Explained to the patient that he should wait to see DR. Patient refuses and states that he knows all the risks because has has signed out AMA previously.

## 2024-01-20 NOTE — PROGRESS NOTES
HEART FAILURE CARE PLAN:    Comorbidities Reviewed: Yes   Patient has a past medical history of Arthritis, Asthma, CAD (coronary artery disease), CHF (congestive heart failure) (HCC), Chronic obstructive pulmonary disease (HCC), COPD (chronic obstructive pulmonary disease) (HCC), Fatty liver, GERD (gastroesophageal reflux disease), Hyperlipidemia, Hypertension, Medical history reviewed with no changes, MI, old, Sleep apnea, and Type II or unspecified type diabetes mellitus without mention of complication, not stated as uncontrolled.     ECHOCARDIOGRAM Reviewed: Yes   Patient's Ejection Fraction (EF) is greater than 40%    Weights Reviewed: Yes   Admission weight: 99.3 kg (219 lb)   Wt Readings from Last 3 Encounters:   01/19/24 105.1 kg (231 lb 11.2 oz)   12/24/23 99.3 kg (219 lb)   09/25/23 104.3 kg (230 lb)     Intake & Output Reviewed: Yes     Intake/Output Summary (Last 24 hours) at 1/19/2024 2312  Last data filed at 1/19/2024 1852  Gross per 24 hour   Intake 1280 ml   Output --   Net 1280 ml     Medications Reviewed: Yes   SCHEDULED HOSPITAL MEDICATIONS:   docusate sodium  100 mg Oral Daily    carbidopa-levodopa  1.5 tablet Oral TID    enoxaparin  30 mg SubCUTAneous BID    lidocaine  1 patch TransDERmal Daily    aspirin  81 mg Oral Daily    atorvastatin  80 mg Oral Nightly    divalproex  500 mg Oral QAM    divalproex  1,000 mg Oral Nightly    DULoxetine  60 mg Oral Daily    pantoprazole  40 mg Oral QAM AC    fluticasone  2 spray Each Nostril Daily    gabapentin  200 mg Oral TID    lisinopril  5 mg Oral Daily    metoprolol succinate  50 mg Oral BID    tamsulosin  0.4 mg Oral Daily    sodium chloride flush  5-40 mL IntraVENous 2 times per day    furosemide  20 mg Oral Once per day on Mon Thu    melatonin  5 mg Oral Nightly    insulin glargine  50 Units SubCUTAneous Nightly    insulin lispro  15 Units SubCUTAneous Daily with breakfast    insulin lispro  20 Units SubCUTAneous Dinner     ACE/ARB/ARNI is REQUIRED for

## 2024-01-24 ENCOUNTER — HOSPITAL ENCOUNTER (INPATIENT)
Age: 64
LOS: 2 days | Discharge: NURSING, REHAB OR LONG-TERM CARE | End: 2024-01-26
Attending: EMERGENCY MEDICINE | Admitting: INTERNAL MEDICINE
Payer: MEDICARE

## 2024-01-24 DIAGNOSIS — R26.2 UNABLE TO AMBULATE: Primary | ICD-10-CM

## 2024-01-24 DIAGNOSIS — G20.A1 PARKINSON'S DISEASE, UNSPECIFIED WHETHER DYSKINESIA PRESENT, UNSPECIFIED WHETHER MANIFESTATIONS FLUCTUATE: ICD-10-CM

## 2024-01-24 LAB
ALBUMIN SERPL-MCNC: 4.8 G/DL (ref 3.4–5)
ALBUMIN/GLOB SERPL: 2.2 {RATIO} (ref 1.1–2.2)
ALP SERPL-CCNC: 79 U/L (ref 40–129)
ALT SERPL-CCNC: 23 U/L (ref 10–40)
ANION GAP SERPL CALCULATED.3IONS-SCNC: 15 MMOL/L (ref 3–16)
AST SERPL-CCNC: 17 U/L (ref 15–37)
BASOPHILS # BLD: 0 K/UL (ref 0–0.2)
BASOPHILS NFR BLD: 0.7 %
BILIRUB SERPL-MCNC: 0.5 MG/DL (ref 0–1)
BILIRUB UR QL STRIP.AUTO: NEGATIVE
BUN SERPL-MCNC: 16 MG/DL (ref 7–20)
CALCIUM SERPL-MCNC: 10.4 MG/DL (ref 8.3–10.6)
CHLORIDE SERPL-SCNC: 99 MMOL/L (ref 99–110)
CLARITY UR: CLEAR
CO2 SERPL-SCNC: 25 MMOL/L (ref 21–32)
COLOR UR: YELLOW
CREAT SERPL-MCNC: 0.8 MG/DL (ref 0.8–1.3)
DEPRECATED RDW RBC AUTO: 14.4 % (ref 12.4–15.4)
EOSINOPHIL # BLD: 0.1 K/UL (ref 0–0.6)
EOSINOPHIL NFR BLD: 0.8 %
FLUAV RNA RESP QL NAA+PROBE: NOT DETECTED
FLUBV RNA RESP QL NAA+PROBE: NOT DETECTED
GFR SERPLBLD CREATININE-BSD FMLA CKD-EPI: >60 ML/MIN/{1.73_M2}
GLUCOSE BLD-MCNC: 232 MG/DL (ref 70–99)
GLUCOSE BLD-MCNC: 254 MG/DL (ref 70–99)
GLUCOSE SERPL-MCNC: 191 MG/DL (ref 70–99)
GLUCOSE UR STRIP.AUTO-MCNC: >=1000 MG/DL
HCT VFR BLD AUTO: 48.5 % (ref 40.5–52.5)
HGB BLD-MCNC: 16.5 G/DL (ref 13.5–17.5)
HGB UR QL STRIP.AUTO: NEGATIVE
KETONES UR STRIP.AUTO-MCNC: ABNORMAL MG/DL
LEUKOCYTE ESTERASE UR QL STRIP.AUTO: NEGATIVE
LYMPHOCYTES # BLD: 1.8 K/UL (ref 1–5.1)
LYMPHOCYTES NFR BLD: 26.5 %
MCH RBC QN AUTO: 30 PG (ref 26–34)
MCHC RBC AUTO-ENTMCNC: 34 G/DL (ref 31–36)
MCV RBC AUTO: 88 FL (ref 80–100)
MONOCYTES # BLD: 0.4 K/UL (ref 0–1.3)
MONOCYTES NFR BLD: 6.7 %
NEUTROPHILS # BLD: 4.3 K/UL (ref 1.7–7.7)
NEUTROPHILS NFR BLD: 65.3 %
NITRITE UR QL STRIP.AUTO: NEGATIVE
PERFORMED ON: ABNORMAL
PERFORMED ON: ABNORMAL
PH UR STRIP.AUTO: 6 [PH] (ref 5–8)
PLATELET # BLD AUTO: 137 K/UL (ref 135–450)
PMV BLD AUTO: 7.5 FL (ref 5–10.5)
POTASSIUM SERPL-SCNC: 4.3 MMOL/L (ref 3.5–5.1)
PROT SERPL-MCNC: 7 G/DL (ref 6.4–8.2)
PROT UR STRIP.AUTO-MCNC: NEGATIVE MG/DL
RBC # BLD AUTO: 5.51 M/UL (ref 4.2–5.9)
SARS-COV-2 RNA RESP QL NAA+PROBE: NOT DETECTED
SODIUM SERPL-SCNC: 139 MMOL/L (ref 136–145)
SP GR UR STRIP.AUTO: 1.01 (ref 1–1.03)
UA COMPLETE W REFLEX CULTURE PNL UR: ABNORMAL
UA DIPSTICK W REFLEX MICRO PNL UR: ABNORMAL
URN SPEC COLLECT METH UR: ABNORMAL
UROBILINOGEN UR STRIP-ACNC: 0.2 E.U./DL
WBC # BLD AUTO: 6.6 K/UL (ref 4–11)

## 2024-01-24 PROCEDURE — 81003 URINALYSIS AUTO W/O SCOPE: CPT

## 2024-01-24 PROCEDURE — 6370000000 HC RX 637 (ALT 250 FOR IP)

## 2024-01-24 PROCEDURE — 36415 COLL VENOUS BLD VENIPUNCTURE: CPT

## 2024-01-24 PROCEDURE — 80053 COMPREHEN METABOLIC PANEL: CPT

## 2024-01-24 PROCEDURE — 96360 HYDRATION IV INFUSION INIT: CPT

## 2024-01-24 PROCEDURE — 85025 COMPLETE CBC W/AUTO DIFF WBC: CPT

## 2024-01-24 PROCEDURE — 83036 HEMOGLOBIN GLYCOSYLATED A1C: CPT

## 2024-01-24 PROCEDURE — 99285 EMERGENCY DEPT VISIT HI MDM: CPT

## 2024-01-24 PROCEDURE — 87636 SARSCOV2 & INF A&B AMP PRB: CPT

## 2024-01-24 PROCEDURE — 2580000003 HC RX 258

## 2024-01-24 PROCEDURE — 1200000000 HC SEMI PRIVATE

## 2024-01-24 PROCEDURE — 2580000003 HC RX 258: Performed by: EMERGENCY MEDICINE

## 2024-01-24 PROCEDURE — 99223 1ST HOSP IP/OBS HIGH 75: CPT

## 2024-01-24 PROCEDURE — 6360000002 HC RX W HCPCS

## 2024-01-24 RX ORDER — DULOXETIN HYDROCHLORIDE 60 MG/1
60 CAPSULE, DELAYED RELEASE ORAL DAILY
Status: DISCONTINUED | OUTPATIENT
Start: 2024-01-25 | End: 2024-01-26 | Stop reason: HOSPADM

## 2024-01-24 RX ORDER — ONDANSETRON 2 MG/ML
4 INJECTION INTRAMUSCULAR; INTRAVENOUS EVERY 6 HOURS PRN
Status: DISCONTINUED | OUTPATIENT
Start: 2024-01-24 | End: 2024-01-26 | Stop reason: HOSPADM

## 2024-01-24 RX ORDER — ACETAMINOPHEN 650 MG/1
650 SUPPOSITORY RECTAL EVERY 6 HOURS PRN
Status: DISCONTINUED | OUTPATIENT
Start: 2024-01-24 | End: 2024-01-26 | Stop reason: HOSPADM

## 2024-01-24 RX ORDER — DIVALPROEX SODIUM 500 MG/1
500 TABLET, DELAYED RELEASE ORAL EVERY MORNING
Status: DISCONTINUED | OUTPATIENT
Start: 2024-01-25 | End: 2024-01-26 | Stop reason: HOSPADM

## 2024-01-24 RX ORDER — INSULIN LISPRO 100 [IU]/ML
0-4 INJECTION, SOLUTION INTRAVENOUS; SUBCUTANEOUS NIGHTLY
Status: DISCONTINUED | OUTPATIENT
Start: 2024-01-24 | End: 2024-01-26 | Stop reason: HOSPADM

## 2024-01-24 RX ORDER — FUROSEMIDE 20 MG/1
20 TABLET ORAL
Status: DISCONTINUED | OUTPATIENT
Start: 2024-01-25 | End: 2024-01-26 | Stop reason: HOSPADM

## 2024-01-24 RX ORDER — ALBUTEROL SULFATE 90 UG/1
2 AEROSOL, METERED RESPIRATORY (INHALATION) EVERY 4 HOURS PRN
Status: DISCONTINUED | OUTPATIENT
Start: 2024-01-24 | End: 2024-01-26 | Stop reason: HOSPADM

## 2024-01-24 RX ORDER — FLUTICASONE PROPIONATE 50 MCG
2 SPRAY, SUSPENSION (ML) NASAL DAILY
Status: DISCONTINUED | OUTPATIENT
Start: 2024-01-25 | End: 2024-01-26 | Stop reason: HOSPADM

## 2024-01-24 RX ORDER — DIVALPROEX SODIUM 500 MG/1
1000 TABLET, DELAYED RELEASE ORAL NIGHTLY
Status: DISCONTINUED | OUTPATIENT
Start: 2024-01-24 | End: 2024-01-26 | Stop reason: HOSPADM

## 2024-01-24 RX ORDER — INSULIN GLARGINE 100 [IU]/ML
50 INJECTION, SOLUTION SUBCUTANEOUS NIGHTLY
Status: DISCONTINUED | OUTPATIENT
Start: 2024-01-24 | End: 2024-01-26 | Stop reason: HOSPADM

## 2024-01-24 RX ORDER — LISINOPRIL 5 MG/1
5 TABLET ORAL DAILY
Status: DISCONTINUED | OUTPATIENT
Start: 2024-01-25 | End: 2024-01-26 | Stop reason: HOSPADM

## 2024-01-24 RX ORDER — HYDROXYZINE HYDROCHLORIDE 25 MG/1
25 TABLET, FILM COATED ORAL 4 TIMES DAILY PRN
Status: DISCONTINUED | OUTPATIENT
Start: 2024-01-24 | End: 2024-01-26 | Stop reason: HOSPADM

## 2024-01-24 RX ORDER — ASPIRIN 81 MG/1
81 TABLET, CHEWABLE ORAL DAILY
Status: DISCONTINUED | OUTPATIENT
Start: 2024-01-25 | End: 2024-01-26 | Stop reason: HOSPADM

## 2024-01-24 RX ORDER — ACETAMINOPHEN 325 MG/1
650 TABLET ORAL EVERY 6 HOURS PRN
Status: DISCONTINUED | OUTPATIENT
Start: 2024-01-24 | End: 2024-01-26 | Stop reason: HOSPADM

## 2024-01-24 RX ORDER — SODIUM CHLORIDE 9 MG/ML
INJECTION, SOLUTION INTRAVENOUS PRN
Status: DISCONTINUED | OUTPATIENT
Start: 2024-01-24 | End: 2024-01-26 | Stop reason: HOSPADM

## 2024-01-24 RX ORDER — SODIUM CHLORIDE 0.9 % (FLUSH) 0.9 %
5-40 SYRINGE (ML) INJECTION EVERY 12 HOURS SCHEDULED
Status: DISCONTINUED | OUTPATIENT
Start: 2024-01-24 | End: 2024-01-26 | Stop reason: HOSPADM

## 2024-01-24 RX ORDER — SODIUM CHLORIDE 0.9 % (FLUSH) 0.9 %
10 SYRINGE (ML) INJECTION PRN
Status: DISCONTINUED | OUTPATIENT
Start: 2024-01-24 | End: 2024-01-26 | Stop reason: HOSPADM

## 2024-01-24 RX ORDER — GABAPENTIN 100 MG/1
200 CAPSULE ORAL 3 TIMES DAILY
Status: DISCONTINUED | OUTPATIENT
Start: 2024-01-24 | End: 2024-01-26 | Stop reason: HOSPADM

## 2024-01-24 RX ORDER — MAGNESIUM SULFATE 1 G/100ML
1000 INJECTION INTRAVENOUS PRN
Status: DISCONTINUED | OUTPATIENT
Start: 2024-01-24 | End: 2024-01-26 | Stop reason: HOSPADM

## 2024-01-24 RX ORDER — METOPROLOL SUCCINATE 50 MG/1
50 TABLET, EXTENDED RELEASE ORAL 2 TIMES DAILY
Status: DISCONTINUED | OUTPATIENT
Start: 2024-01-24 | End: 2024-01-26 | Stop reason: HOSPADM

## 2024-01-24 RX ORDER — POTASSIUM CHLORIDE 20 MEQ/1
20 TABLET, EXTENDED RELEASE ORAL
Status: DISCONTINUED | OUTPATIENT
Start: 2024-01-25 | End: 2024-01-26 | Stop reason: HOSPADM

## 2024-01-24 RX ORDER — 0.9 % SODIUM CHLORIDE 0.9 %
1000 INTRAVENOUS SOLUTION INTRAVENOUS ONCE
Status: COMPLETED | OUTPATIENT
Start: 2024-01-24 | End: 2024-01-24

## 2024-01-24 RX ORDER — PANTOPRAZOLE SODIUM 40 MG/1
40 TABLET, DELAYED RELEASE ORAL
Status: DISCONTINUED | OUTPATIENT
Start: 2024-01-25 | End: 2024-01-26 | Stop reason: HOSPADM

## 2024-01-24 RX ORDER — POTASSIUM CHLORIDE 7.45 MG/ML
10 INJECTION INTRAVENOUS PRN
Status: DISCONTINUED | OUTPATIENT
Start: 2024-01-24 | End: 2024-01-26 | Stop reason: HOSPADM

## 2024-01-24 RX ORDER — TAMSULOSIN HYDROCHLORIDE 0.4 MG/1
0.4 CAPSULE ORAL DAILY
Status: DISCONTINUED | OUTPATIENT
Start: 2024-01-25 | End: 2024-01-26 | Stop reason: HOSPADM

## 2024-01-24 RX ORDER — INSULIN LISPRO 100 [IU]/ML
0-4 INJECTION, SOLUTION INTRAVENOUS; SUBCUTANEOUS
Status: DISCONTINUED | OUTPATIENT
Start: 2024-01-24 | End: 2024-01-26 | Stop reason: HOSPADM

## 2024-01-24 RX ORDER — POTASSIUM CHLORIDE 20 MEQ/1
40 TABLET, EXTENDED RELEASE ORAL PRN
Status: DISCONTINUED | OUTPATIENT
Start: 2024-01-24 | End: 2024-01-26 | Stop reason: HOSPADM

## 2024-01-24 RX ORDER — POLYETHYLENE GLYCOL 3350 17 G/17G
17 POWDER, FOR SOLUTION ORAL DAILY PRN
Status: DISCONTINUED | OUTPATIENT
Start: 2024-01-24 | End: 2024-01-26 | Stop reason: HOSPADM

## 2024-01-24 RX ORDER — GLUCAGON 1 MG/ML
1 KIT INJECTION PRN
Status: DISCONTINUED | OUTPATIENT
Start: 2024-01-24 | End: 2024-01-26 | Stop reason: HOSPADM

## 2024-01-24 RX ORDER — ENOXAPARIN SODIUM 100 MG/ML
30 INJECTION SUBCUTANEOUS 2 TIMES DAILY
Status: DISCONTINUED | OUTPATIENT
Start: 2024-01-24 | End: 2024-01-26 | Stop reason: HOSPADM

## 2024-01-24 RX ORDER — DIVALPROEX SODIUM 500 MG/1
500 TABLET, DELAYED RELEASE ORAL EVERY 12 HOURS SCHEDULED
Status: DISCONTINUED | OUTPATIENT
Start: 2024-01-24 | End: 2024-01-24 | Stop reason: SDUPTHER

## 2024-01-24 RX ORDER — DEXTROSE MONOHYDRATE 100 MG/ML
INJECTION, SOLUTION INTRAVENOUS CONTINUOUS PRN
Status: DISCONTINUED | OUTPATIENT
Start: 2024-01-24 | End: 2024-01-26 | Stop reason: HOSPADM

## 2024-01-24 RX ORDER — FAMOTIDINE 20 MG/1
20 TABLET, FILM COATED ORAL 2 TIMES DAILY
Status: DISCONTINUED | OUTPATIENT
Start: 2024-01-24 | End: 2024-01-26 | Stop reason: HOSPADM

## 2024-01-24 RX ORDER — ONDANSETRON 4 MG/1
4 TABLET, ORALLY DISINTEGRATING ORAL EVERY 8 HOURS PRN
Status: DISCONTINUED | OUTPATIENT
Start: 2024-01-24 | End: 2024-01-26 | Stop reason: HOSPADM

## 2024-01-24 RX ORDER — ATORVASTATIN CALCIUM 40 MG/1
80 TABLET, FILM COATED ORAL NIGHTLY
Status: DISCONTINUED | OUTPATIENT
Start: 2024-01-25 | End: 2024-01-26 | Stop reason: HOSPADM

## 2024-01-24 RX ORDER — CYCLOBENZAPRINE HCL 10 MG
10 TABLET ORAL 3 TIMES DAILY PRN
Status: DISCONTINUED | OUTPATIENT
Start: 2024-01-24 | End: 2024-01-26 | Stop reason: HOSPADM

## 2024-01-24 RX ADMIN — Medication 10 ML: at 21:15

## 2024-01-24 RX ADMIN — CARBIDOPA AND LEVODOPA 1 TABLET: 10; 100 TABLET ORAL at 21:14

## 2024-01-24 RX ADMIN — FAMOTIDINE 20 MG: 20 TABLET, FILM COATED ORAL at 21:14

## 2024-01-24 RX ADMIN — DIVALPROEX SODIUM 1000 MG: 500 TABLET, DELAYED RELEASE ORAL at 21:14

## 2024-01-24 RX ADMIN — SODIUM CHLORIDE 1000 ML: 9 INJECTION, SOLUTION INTRAVENOUS at 15:23

## 2024-01-24 RX ADMIN — GABAPENTIN 200 MG: 100 CAPSULE ORAL at 21:14

## 2024-01-24 RX ADMIN — INSULIN GLARGINE 50 UNITS: 100 INJECTION, SOLUTION SUBCUTANEOUS at 21:15

## 2024-01-24 RX ADMIN — METOPROLOL SUCCINATE 50 MG: 50 TABLET, EXTENDED RELEASE ORAL at 21:14

## 2024-01-24 RX ADMIN — ENOXAPARIN SODIUM 30 MG: 100 INJECTION SUBCUTANEOUS at 21:15

## 2024-01-24 ASSESSMENT — ENCOUNTER SYMPTOMS
WHEEZING: 0
TROUBLE SWALLOWING: 0
VOICE CHANGE: 0
SHORTNESS OF BREATH: 0

## 2024-01-24 ASSESSMENT — PAIN - FUNCTIONAL ASSESSMENT: PAIN_FUNCTIONAL_ASSESSMENT: NONE - DENIES PAIN

## 2024-01-24 NOTE — H&P
Hospital Medicine History & Physical      PCP: Elisabeth Galindo APRN - CNP    Date of Admission: 1/24/2024    Date of Service: Pt seen/examined on 1/24/24     Chief Complaint:    Chief Complaint   Patient presents with    Fall     PT coming from home for falls. Pt reports no pain today but keeps losing his balance. PT left AMA a few weeks ago for same thing. Pt states he wants to see Neuro, that is the reason he called 911 today. No pain, or LOC from fall.          History Of Present Illness:      The patient is a 63 y.o. male with pmhx of parkinsons, bipolar disorder, recurrent falls, CHF, DM who presented to [facility] ED with complaint of weakness.   Patient was recently admitted for similar complaints but left AMA. Reports he keeps losing his balance at home. He fell when getting up from his chair 2/2 to generalized weakness. He did not have any dizziness, syncope. Did not his head. He landed on his knees and was able to get up on his own. He is unable to care for himself at home. Denies any injury or trauma and has full ROM of upper and lower extremities.     Past Medical History:        Diagnosis Date    Arthritis     Asthma     CAD (coronary artery disease)     CHF (congestive heart failure) (HCC)     Chronic obstructive pulmonary disease (HCC)     COPD (chronic obstructive pulmonary disease) (HCC)     Fatty liver     GERD (gastroesophageal reflux disease)     Hyperlipidemia     Hypertension     Medical history reviewed with no changes     MI, old     Sleep apnea     pt wears cpap at night. states does not have cpap    Type II or unspecified type diabetes mellitus without mention of complication, not stated as uncontrolled        Past Surgical History:        Procedure Laterality Date    CARDIAC PACEMAKER PLACEMENT  2011    NOT MRI COMPATIABLE OLD LEADS [facility]. pace maker difib    CARDIOVASCULAR SURGERY      Watchman placement    CARPAL TUNNEL RELEASE Bilateral 2013    CATARACT REMOVAL WITH IMPLANT Left  Polymerase Chain Reaction (RT-PCR)-based in vitro  diagnostic test intended for the qualitative detection of nucleic  acids from SARS-CoV-2, influenza A, and influenza B in nasopharyngeal  and nasal swab specimens for use under the FDA’s Emergency Use  Authorization (EUA) only.    Patient Fact Sheet:  https://www.fda.gov/media/321466/download  Provider Fact Sheet: https://www.fda.gov/media/250931/download  EUA: https://www.fda.gov/media/764509/download  IFU: https://www.fda.gov/media/139868/download    Methodology:  RT-PCR          INFLUENZA A NOT DETECTED     INFLUENZA B NOT DETECTED    Rapid influenza A/B antigens [[id number]] Collected: 01/15/24 1351    Order Status: Completed Specimen: Nasopharyngeal Updated: 01/15/24 1408     Rapid Influenza A Ag Negative     Rapid Influenza B Ag Negative    COVID-19, Rapid [[id number]] Collected: 01/15/24 1119    Order Status: Completed Specimen: Nares Updated: 01/15/24 1139     SARS-CoV-2, NAAT Not Detected     Comment: Rapid NAAT:   Negative results should be treated as presumptive and,  if inconsistent with clinical signs and symptoms or necessary for  patient management, should be tested with an alternative molecular  assay. Negative results do not preclude SARS-CoV-2 infection and  should not be used as the sole basis for patient management decisions.  This test has been authorized by the FDA under an Emergency Use  Authorization (EUA) for use by authorized laboratories.    Fact sheet for Healthcare Providers:  https://www.fda.gov/media/896704/download  Fact sheet for Patients: https://www.fda.gov/media/414162/download    METHODOLOGY: Isothermal Nucleic Acid Amplification                   EKG:  No results found for this or any previous visit (from the past 4464 hour(s)).      RADIOLOGY  No orders to display           ASSESSMENT/PLAN:  #Recurrent falls   #Inability to care for self   -denies syncope or hitting his head  -fell on knees-denies pain   -moving all 4

## 2024-01-24 NOTE — ED NOTES
Pt reports wanting to go to [facility] because its closer to home. PT also reports he wants to go just for rehab, pt doesn't want to be a resident at SNF.

## 2024-01-24 NOTE — ED PROVIDER NOTES
[facility] ED  eMERGENCY dEPARTMENT eNCOUnter      Pt Name: Chano Quiroga Jr.  MRN: [id number]  Birthdate 1960  Date of evaluation: 1/24/2024  Provider: Carlitos Najera MD    CHIEF COMPLAINT       Chief Complaint   Patient presents with    Fall     PT coming from home for falls. Pt reports no pain today but keeps losing his balance. PT left AMA a few weeks ago for same thing. Pt states he wants to see Neuro, that is the reason he called 911 today. No pain, or LOC from fall.      HISTORY OF PRESENT ILLNESS   (Location/Symptom, Timing/Onset, Context/Setting, Quality, Duration, Modifying Factors, Severity)  Note limiting factors.     History obtained from: the patient    Chano Quiroga Jr. is a 63 y.o. male with history of Parkinson's disease with recent admission here at [facility] for physical therapy and neurology evaluation due to recurrent falls however left AGAINST MEDICAL ADVICE as he reports he was upset when skilled nursing facility was suggested.  Patient reports that he has been home he has been unable to take care of himself and has been unable to ambulate.  Patient denies any falls or head trauma but reports there have been occasions where he has had to lower himself into a chair to avoid from falling.  Patient reports he now realizes he is unable to take care of himself and is requesting readmission.  He reports that he is willing to discuss SNF's at least in the short-term.      HPI    Nursing Notes were reviewed.    REVIEW OFSYSTEMS    (2-9 systems for level 4, 10 or more for level 5)     Review of Systems   Constitutional:  Negative for fever.   HENT:  Negative for drooling, trouble swallowing and voice change.    Eyes:  Negative for visual disturbance.   Respiratory:  Negative for shortness of breath and wheezing.    Cardiovascular:  Negative for chest pain and palpitations.   Musculoskeletal:  Positive for gait problem.   Neurological:  Negative for seizures and

## 2024-01-24 NOTE — FLOWSHEET NOTE
01/24/24 1845   Vital Signs   Temp 97.1 °F (36.2 °C)   Temp Source Oral   Pulse 74   Heart Rate Source Monitor   Respirations 16   /65   MAP (Calculated) 88   BP Location Right upper arm   BP Method Automatic   Patient Position High fowlers   Pain Assessment   Pain Assessment None - Denies Pain   Opioid-Induced Sedation   POSS Score 1   Oxygen Therapy   SpO2 95 %   O2 Device None (Room air)     Pt arrived to room 231 at this time. VSS. No c/o pain or discomfort. VSS. Bed alarm on, SR x2, call light within reach, bed in lowest position. No needs at this time.

## 2024-01-25 ENCOUNTER — APPOINTMENT (OUTPATIENT)
Dept: VASCULAR LAB | Age: 64
End: 2024-01-25
Payer: MEDICARE

## 2024-01-25 PROBLEM — I48.21 PERMANENT ATRIAL FIBRILLATION (HCC): Status: ACTIVE | Noted: 2024-01-25

## 2024-01-25 LAB
ANION GAP SERPL CALCULATED.3IONS-SCNC: 13 MMOL/L (ref 3–16)
BASOPHILS # BLD: 0 K/UL (ref 0–0.2)
BASOPHILS NFR BLD: 0.5 %
BUN SERPL-MCNC: 13 MG/DL (ref 7–20)
CALCIUM SERPL-MCNC: 9.2 MG/DL (ref 8.3–10.6)
CHLORIDE SERPL-SCNC: 105 MMOL/L (ref 99–110)
CO2 SERPL-SCNC: 24 MMOL/L (ref 21–32)
CREAT SERPL-MCNC: 0.6 MG/DL (ref 0.8–1.3)
DEPRECATED RDW RBC AUTO: 14.5 % (ref 12.4–15.4)
EOSINOPHIL # BLD: 0.1 K/UL (ref 0–0.6)
EOSINOPHIL NFR BLD: 1.2 %
EST. AVERAGE GLUCOSE BLD GHB EST-MCNC: 203 MG/DL
GFR SERPLBLD CREATININE-BSD FMLA CKD-EPI: >60 ML/MIN/{1.73_M2}
GLUCOSE BLD-MCNC: 149 MG/DL (ref 70–99)
GLUCOSE BLD-MCNC: 154 MG/DL (ref 70–99)
GLUCOSE BLD-MCNC: 168 MG/DL (ref 70–99)
GLUCOSE BLD-MCNC: 203 MG/DL (ref 70–99)
GLUCOSE BLD-MCNC: 235 MG/DL (ref 70–99)
GLUCOSE SERPL-MCNC: 154 MG/DL (ref 70–99)
HBA1C MFR BLD: 8.7 %
HCT VFR BLD AUTO: 45.9 % (ref 40.5–52.5)
HGB BLD-MCNC: 15.6 G/DL (ref 13.5–17.5)
LYMPHOCYTES # BLD: 2.4 K/UL (ref 1–5.1)
LYMPHOCYTES NFR BLD: 42.3 %
MCH RBC QN AUTO: 30.2 PG (ref 26–34)
MCHC RBC AUTO-ENTMCNC: 33.9 G/DL (ref 31–36)
MCV RBC AUTO: 89 FL (ref 80–100)
MONOCYTES # BLD: 0.5 K/UL (ref 0–1.3)
MONOCYTES NFR BLD: 9 %
NEUTROPHILS # BLD: 2.7 K/UL (ref 1.7–7.7)
NEUTROPHILS NFR BLD: 47 %
PERFORMED ON: ABNORMAL
PLATELET # BLD AUTO: 113 K/UL (ref 135–450)
PMV BLD AUTO: 7.3 FL (ref 5–10.5)
POTASSIUM SERPL-SCNC: 4.4 MMOL/L (ref 3.5–5.1)
RBC # BLD AUTO: 5.16 M/UL (ref 4.2–5.9)
SODIUM SERPL-SCNC: 142 MMOL/L (ref 136–145)
WBC # BLD AUTO: 5.7 K/UL (ref 4–11)

## 2024-01-25 PROCEDURE — 99232 SBSQ HOSP IP/OBS MODERATE 35: CPT | Performed by: INTERNAL MEDICINE

## 2024-01-25 PROCEDURE — 1200000000 HC SEMI PRIVATE

## 2024-01-25 PROCEDURE — 2580000003 HC RX 258

## 2024-01-25 PROCEDURE — 36415 COLL VENOUS BLD VENIPUNCTURE: CPT

## 2024-01-25 PROCEDURE — 80048 BASIC METABOLIC PNL TOTAL CA: CPT

## 2024-01-25 PROCEDURE — 97535 SELF CARE MNGMENT TRAINING: CPT

## 2024-01-25 PROCEDURE — 97530 THERAPEUTIC ACTIVITIES: CPT

## 2024-01-25 PROCEDURE — 97161 PT EVAL LOW COMPLEX 20 MIN: CPT

## 2024-01-25 PROCEDURE — 97166 OT EVAL MOD COMPLEX 45 MIN: CPT

## 2024-01-25 PROCEDURE — 6370000000 HC RX 637 (ALT 250 FOR IP)

## 2024-01-25 PROCEDURE — 85025 COMPLETE CBC W/AUTO DIFF WBC: CPT

## 2024-01-25 PROCEDURE — 93970 EXTREMITY STUDY: CPT

## 2024-01-25 PROCEDURE — 6360000002 HC RX W HCPCS

## 2024-01-25 RX ADMIN — GABAPENTIN 200 MG: 100 CAPSULE ORAL at 09:14

## 2024-01-25 RX ADMIN — INSULIN GLARGINE 50 UNITS: 100 INJECTION, SOLUTION SUBCUTANEOUS at 21:58

## 2024-01-25 RX ADMIN — INSULIN LISPRO 1 UNITS: 100 INJECTION, SOLUTION INTRAVENOUS; SUBCUTANEOUS at 12:47

## 2024-01-25 RX ADMIN — FUROSEMIDE 20 MG: 20 TABLET ORAL at 18:35

## 2024-01-25 RX ADMIN — ATORVASTATIN CALCIUM 80 MG: 40 TABLET, FILM COATED ORAL at 21:58

## 2024-01-25 RX ADMIN — LISINOPRIL 5 MG: 5 TABLET ORAL at 09:13

## 2024-01-25 RX ADMIN — PANTOPRAZOLE SODIUM 40 MG: 40 TABLET, DELAYED RELEASE ORAL at 05:23

## 2024-01-25 RX ADMIN — METOPROLOL SUCCINATE 50 MG: 50 TABLET, EXTENDED RELEASE ORAL at 09:12

## 2024-01-25 RX ADMIN — DIVALPROEX SODIUM 500 MG: 500 TABLET, DELAYED RELEASE ORAL at 09:14

## 2024-01-25 RX ADMIN — ASPIRIN 81 MG: 81 TABLET, CHEWABLE ORAL at 09:13

## 2024-01-25 RX ADMIN — EMPAGLIFLOZIN 25 MG: 10 TABLET, FILM COATED ORAL at 09:13

## 2024-01-25 RX ADMIN — METOPROLOL SUCCINATE 50 MG: 50 TABLET, EXTENDED RELEASE ORAL at 21:59

## 2024-01-25 RX ADMIN — Medication 10 ML: at 09:12

## 2024-01-25 RX ADMIN — DULOXETINE HYDROCHLORIDE 60 MG: 60 CAPSULE, DELAYED RELEASE ORAL at 09:15

## 2024-01-25 RX ADMIN — CARBIDOPA AND LEVODOPA 1 TABLET: 10; 100 TABLET ORAL at 13:45

## 2024-01-25 RX ADMIN — Medication 10 ML: at 22:00

## 2024-01-25 RX ADMIN — ENOXAPARIN SODIUM 30 MG: 100 INJECTION SUBCUTANEOUS at 09:12

## 2024-01-25 RX ADMIN — CARBIDOPA AND LEVODOPA 1 TABLET: 10; 100 TABLET ORAL at 09:13

## 2024-01-25 RX ADMIN — TAMSULOSIN HYDROCHLORIDE 0.4 MG: 0.4 CAPSULE ORAL at 09:12

## 2024-01-25 RX ADMIN — CARBIDOPA AND LEVODOPA 1 TABLET: 10; 100 TABLET ORAL at 21:59

## 2024-01-25 RX ADMIN — GABAPENTIN 200 MG: 100 CAPSULE ORAL at 13:45

## 2024-01-25 RX ADMIN — GABAPENTIN 200 MG: 100 CAPSULE ORAL at 21:58

## 2024-01-25 RX ADMIN — POTASSIUM CHLORIDE 20 MEQ: 1500 TABLET, EXTENDED RELEASE ORAL at 18:36

## 2024-01-25 RX ADMIN — FAMOTIDINE 20 MG: 20 TABLET, FILM COATED ORAL at 09:13

## 2024-01-25 RX ADMIN — ENOXAPARIN SODIUM 30 MG: 100 INJECTION SUBCUTANEOUS at 21:58

## 2024-01-25 RX ADMIN — FAMOTIDINE 20 MG: 20 TABLET, FILM COATED ORAL at 21:59

## 2024-01-25 RX ADMIN — DIVALPROEX SODIUM 1000 MG: 500 TABLET, DELAYED RELEASE ORAL at 21:59

## 2024-01-25 NOTE — CARE COORDINATION
Case Management Assessment  Initial Evaluation    Date/Time of Evaluation: 1/25/2024 9:58 AM  Assessment Completed by: Jie Jorge RN    If patient is discharged prior to next notation, then this note serves as note for discharge by case management.    Patient Name: Chano Quiroga Jr.                   YOB: 1960  Diagnosis: Unable to ambulate [R26.2]  Frequent falls [R29.6]  Parkinson's disease, unspecified whether dyskinesia present, unspecified whether manifestations fluctuate [G20.A1]                   Date / Time: 1/24/2024  1:16 PM    Patient Admission Status: Inpatient   Readmission Risk (Low < 19, Mod (19-27), High > 27): Readmission Risk Score: 23.9    Current PCP: Elisabeth Galindo APRN - CNP  PCP verified by CM? (P) Yes    Chart Reviewed: Yes      History Provided by: Patient  Patient Orientation: Alert and Oriented, Person, Place, Situation    Patient Cognition: Alert    Hospitalization in the last 30 days (Readmission):  Yes    If yes, Readmission Assessment in CM Navigator will be completed.    Advance Directives:      Code Status: Full Code   Patient's Primary Decision Maker is: (P) Named in Scanned ACP Document    Primary Decision Maker: Caryn Cantu - Brother/Sister - [phone]    Secondary Decision Maker: BlaynealissonMiguelito - Brother/Sister - [phone]    Discharge Planning:    Patient lives with: (P) Alone Type of Home: (P) Apartment  Primary Care Giver: Self  Patient Support Systems include: Family Members, [employer], Friends/Neighbors   Current Financial resources: (P) Medicaid, Medicare  Current community resources: (P) ECF/Home Care, Psychiatric Treatment (Active with [facility])  Current services prior to admission: (P) Durable Medical Equipment            Current DME: (P) Walker, Cane, Home Aerosol            Type of Home Care services:  (P) None    ADLS  Prior functional level: (P) Assistance with the following:, Mobility, Shopping, Housework,  Cooking  Current functional level: (P) Assistance with the following:, Cooking, Housework, Shopping, Mobility, Other (see comment) (Frequent falls)    PT AM-PAC:   /24  OT AM-PAC:   /24    Family can provide assistance at DC: (P) No  Would you like Case Management to discuss the discharge plan with any other family members/significant others, and if so, who? (P) No  Plans to Return to Present Housing: (P) Unknown at present (frequent readmissions, falls, requesting SNF)  Other Identified Issues/Barriers to RETURNING to current housing: falls, readmissions for falls  Potential Assistance needed at discharge: (P) Skilled Nursing Facility (pt requesting SNF placement)            Potential DME:    Patient expects to discharge to: (P) Skilled nursing facility  Plan for transportation at discharge:      Financial    Payor: [employer] MEDICARE / Plan: [employer]VANTAGE / Product Type: *No Product type* /     Does insurance require precert for SNF: Yes    Potential assistance Purchasing Medications: (P) No  Meds-to-Beds request: No      [facility] [place] - [address] - P [phone] - F [phone]  [address] [id number]  Phone: [phone] Fax: [phone]    [facility] - [place], [place] - [address] - P [phone] - F [phone]  [address]  [address]  [facility] [address]  Phone: [phone] Fax: [phone]    [facility] - [place], [place] - [address] - P [phone] - F [phone]  [address]  [facility] [address]  Phone: [phone] Fax: [phone]    [facility] [place] - [address] - P [phone] - F [phone]  [address] [facility] [address]  [facility] [address]  Phone: [phone] Fax: [phone]      Notes:    Factors facilitating achievement of predicted outcomes: Cooperative, Pleasant, and Has needed Durable Medical Equipment at home    Barriers to discharge: No family

## 2024-01-25 NOTE — PLAN OF CARE
Problem: Discharge Planning  Goal: Discharge to home or other facility with appropriate resources  1/25/2024 1124 by Rody Cunningham RN  Outcome: Progressing  1/24/2024 2318 by Tennille River RN  Outcome: Progressing  Flowsheets  Taken 1/24/2024 2132  Discharge to home or other facility with appropriate resources: Identify barriers to discharge with patient and caregiver  Taken 1/24/2024 2110  Discharge to home or other facility with appropriate resources: Identify barriers to discharge with patient and caregiver     Problem: Skin/Tissue Integrity  Goal: Absence of new skin breakdown  Description: 1.  Monitor for areas of redness and/or skin breakdown  2.  Assess vascular access sites hourly  3.  Every 4-6 hours minimum:  Change oxygen saturation probe site  4.  Every 4-6 hours:  If on nasal continuous positive airway pressure, respiratory therapy assess nares and determine need for appliance change or resting period.  1/25/2024 1124 by Rody Cunningham RN  Outcome: Progressing  1/24/2024 2318 by Tennille River RN  Outcome: Progressing     Problem: Safety - Adult  Goal: Free from fall injury  1/25/2024 1124 by Rody Cunningham RN  Outcome: Progressing  1/24/2024 2318 by Tennille River RN  Outcome: Progressing     Problem: Chronic Conditions and Co-morbidities  Goal: Patient's chronic conditions and co-morbidity symptoms are monitored and maintained or improved  Outcome: Progressing

## 2024-01-25 NOTE — ACP (ADVANCE CARE PLANNING)
Advance Care Planning     General Advance Care Planning (ACP) Conversation    Date of Conversation: 1/24/2024  Conducted with: Patient with Decision Making Capacity    Healthcare Decision Maker:    Primary Decision Maker: PepeCaryn - Brother/Sister - [phone]    Secondary Decision Maker: Miguelito Quiroga - Brother/Sister - [phone]  Click here to complete Healthcare Decision Makers including selection of the Healthcare Decision Maker Relationship (ie \"Primary\").   Today we documented Decision Maker(s) consistent with ACP documents on file.    Content/Action Overview:  Has ACP document(s) on file - reflects the patient's care preferences  Reviewed DNR/DNI and patient elects Full Code (Attempt Resuscitation)    Length of Voluntary ACP Conversation in minutes:  <16 minutes (Non-Billable)    Jie Jorge RN

## 2024-01-25 NOTE — FLOWSHEET NOTE
01/25/24 0900   Vital Signs   Temp 98 °F (36.7 °C)   Temp Source Oral   Pulse 70   Heart Rate Source Monitor   Respirations 16   /70   MAP (Calculated) 83   BP Location Left upper arm   BP Method Automatic   Patient Position High fowlers   Pain Assessment   Pain Assessment None - Denies Pain   Oxygen Therapy   SpO2 96 %   O2 Device None (Room air)     AM assessment completed. No complaints of pain or discomfort voiced. No signs of symptoms of distress noted. Patient tolerated morning medications well. Respirations easy and even. Bed in lowest position, bed alarm in place and functioning properly, SR up x 2 and bed in low position. Call light within reach.     Bedside Mobility Assessment Tool (BMAT):     Assessment Level 1- Sit and Shake    1. From a semi-reclined position, ask patient to sit up and rotate to a seated position at the side of the bed. Can use the bedrail.    2. Ask patient to reach out and grab your hand and shake making sure patient reaches across his/her midline.   Pass- Patient is able to come to a seated position, maintain core strength. Maintains seated balance while reaching across midline. Move on to Assessment Level 2.     Assessment Level 2- Stretch and Point   1. With patient in seated position at the side of the bed, have patient place both feet on the floor (or stool) with knees no higher than hips.    2. Ask patient to stretch one leg and straighten the knee, then bend the ankle/flex and point the toes. If appropriate, repeat with the other leg.   Pass- Patient is able to demonstrate appropriate quad strength on intended weight bearing limb(s). Move onto Assessment Level 3.     Assessment Level 3- Stand   1. Ask patient to elevate off the bed or chair (seated to standing) using an assistive device (cane, bedrail).    2. Patient should be able to raise buttocks off be and hold for a count of five. May repeat once.   Fail- Patient unable to demonstrate standing stability. Patient

## 2024-01-25 NOTE — CARE COORDINATION
SNF Referral:    Referral called to [facility] @ [facility].  Faxed face sheet, H/P, MAR and therapy notes for review: Will be pulled from EPIC  Bed available?: ACCEPTED   Insurance precertification required?  Yes - submitted today  Continuity of Care Form (DELTA) initiated?\ YES  Hospital Exemption Notification (HENS) initiated? No    Initiate Level of Care (LOC), if Medicaid is the primary payer? Not Indicated

## 2024-01-25 NOTE — PROGRESS NOTES
/66   Pulse 74   Temp 97.7 °F (36.5 °C) (Oral)   Resp 17   Ht 1.626 m (5' 4\")   Wt 112.9 kg (249 lb)   SpO2 96%   BMI 42.74 kg/m²     Patient alert and oriented x4. Assessment completed. Respiration easy, even and unlabored. Patient tolerated night meds well. Call light and bedside table within reach. Bed at lowest position, locked, side rails x2. Pt denies needs at this time.

## 2024-01-25 NOTE — PROGRESS NOTES
Patient resting in bed with eyes closed, respiration easy, even, unlabored. No s/s of distress noted. No complains of pain or discomfort at this time. Call light within reach.

## 2024-01-25 NOTE — PROGRESS NOTES
Admit: 2024    Name:  Chano Quiroga  Room:  [address] [place] [address]  MRN:    [id number]    Daily Progress Note for 2024   Admitted with gen weakness and recurrent falls   Interval History:   Denies complaints  Scheduled Meds:   aspirin  81 mg Oral Daily    atorvastatin  80 mg Oral Nightly    carbidopa-levodopa  1 tablet Oral TID    DULoxetine  60 mg Oral Daily    pantoprazole  40 mg Oral QAM AC    famotidine  20 mg Oral BID    gabapentin  200 mg Oral TID    tamsulosin  0.4 mg Oral Daily    potassium chloride  20 mEq Oral Once per day on     metoprolol succinate  50 mg Oral BID    lisinopril  5 mg Oral Daily    insulin glargine  50 Units SubCUTAneous Nightly    fluticasone  2 spray Each Nostril Daily    furosemide  20 mg Oral Once per day on     sodium chloride flush  5-40 mL IntraVENous 2 times per day    enoxaparin  30 mg SubCUTAneous BID    empagliflozin  25 mg Oral QAM    insulin lispro  0-4 Units SubCUTAneous TID WC    insulin lispro  0-4 Units SubCUTAneous Nightly    divalproex  1,000 mg Oral Nightly    And    divalproex  500 mg Oral QAM       Continuous Infusions:   sodium chloride      dextrose         PRN Meds:  albuterol sulfate HFA, cyclobenzaprine, hydrOXYzine HCl, sodium chloride flush, sodium chloride, potassium chloride **OR** potassium alternative oral replacement **OR** potassium chloride, magnesium sulfate, ondansetron **OR** ondansetron, polyethylene glycol, acetaminophen **OR** acetaminophen, glucose, dextrose bolus **OR** dextrose bolus, glucagon (rDNA), dextrose                  Objective:     Temp  Av.7 °F (36.5 °C)  Min: 97.1 °F (36.2 °C)  Max: 98.2 °F (36.8 °C)  Pulse  Av  Min: 63  Max: 88  BP  Min: 110/73  Max: 175/80  SpO2  Av.3 %  Min: 94 %  Max: 96 %  No data found.      Intake/Output Summary (Last 24 hours) at 2024 0831  Last data filed at 2024 2126  Gross per 24 hour   Intake 100 ml   Output 350 ml   Net -250 ml       Physical Exam:    Gen: No  02/15/2023    Chronic midline low back pain without sciatica 01/25/2023    Orthostatic hypotension 01/24/2023    Bipolar disorder ([facility]) 08/23/2022    Frequent falls 01/15/2024    Congestive heart failure ([facility]) 01/15/2024    Presence of Watchman left atrial appendage closure device 06/09/2023    Paroxysmal atrial fibrillation ([facility]) 06/09/2023    Parkinson's disease 05/17/2023    Debility 01/27/2022    Encephalopathy     Abnormal chest x-ray     PAF (paroxysmal atrial fibrillation) ([facility])     COVID-19 virus infection     Disorder of electrolytes     Atrial fibrillation with RVR ([facility])     Low grade fever     Thrombocytopenia ([facility])     AICD malfunction     Hypotension     Valproic acid toxicity     Implantable cardioverter-defibrillator (ICD) discharge     Unable to ambulate     COVID-19 01/13/2022    CAD S/P percutaneous coronary angioplasty 07/13/2021    Type 2 diabetes mellitus without complication, with long-term current use of insulin ([facility])     Primary hypertension     Generalized weakness 04/27/2021    Head trauma     Abrasion, scalp w/o infection     Unsteady gait     Hyperglycemia     Chronic obstructive pulmonary disease ([facility])     Coarse tremor     Lactic acidosis 04/26/2021    Acute encephalopathy     TIA involving left internal carotid artery     DM (diabetes mellitus), secondary, uncontrolled, w/neurologic complic     Dyslipidemia     HTN (hypertension), benign     Weakness 11/27/2019    Obesity, morbid, BMI 40.0-49.9 ([facility]) 08/21/2018    Ischemic cardiomyopathy 05/23/2018    Hypertension due to endocrine disorder 01/24/2017    ALIN on CPAP 08/11/2015    Gastroesophageal reflux disease without esophagitis 08/11/2015    Hyperlipidemia 08/11/2015    Automatic implantable cardioverter-defibrillator in situ 06/03/2014    CAD (coronary artery disease) 05/28/2014    Automatic implantable cardioverter-defibrillator in situ 05/29/2013    Ventricular tachycardia ([facility]) 02/22/2012    Presence of stent in left circumflex

## 2024-01-25 NOTE — DISCHARGE INSTRUCTIONS
Heart Failure Resources:  Heart Failure Interactive Workbook:  Go to [email or web address] for a Free Heart Failure Interactive Workbook provided by The American Heart Association. This interactive workbook will provide information on Healthier Living with Heart Failure. Please copy and paste link into search bar. Use your mouse to scroll through the pages.    HF [place] amaya:   Heart Failure Free smart phone amaya available for iPhone and Android download. Use your phone to track sodium intake, fluid intake, symptoms, and weight.     Low Sodium Diet / Recipes:  Go to www.[employer] website for “renal” diet which is Low Sodium! [employer] is a dialysis company, but this website offers free seasonal cookbooks. Each quarter, they will release 25-30 new recipes with a breakdown of calories, sodium, and glucose. You can also go to wwwthePlatform/recipes website for free recipes.     Discharge Instruction Video:  Scan the QR code below with your camera and click the canva.com link to open the video and watch educational information on Heart Failure and Medications from one of our nurses.   [email or web address]/edit    Home Exercise Program:   Identification of Green/Yellow/Red zones:  You should be able to identify when you feel good (green zone), if you have 1-2 symptoms of HF (yellow zone), or if you are in need of medical attention (red zone).  In your CHF education folder you were provided a “stop light tool” to outline this information.     We want to you to rate your exertion levels:    Our therapy team has discussed means of identification with you such as the \"Kamille scale.\"  The Kamille rating scale ranges from 6 to 20, where 6 means \"no exertion at all\" and 20 means \"maximal exertion.\" The goal is to use this to gauge how much effort it is taking for you to do your normal daily tasks.   You should be able to recognize when too much exertion is

## 2024-01-25 NOTE — PROGRESS NOTES
RT Inhaler-Nebulizer Bronchodilator Protocol Note    There is a bronchodilator order in the chart from a provider indicating to follow the RT Bronchodilator Protocol and there is an “Initiate RT Inhaler-Nebulizer Bronchodilator Protocol” order as well (see protocol at bottom of note).    CXR Findings:  No results found.    The findings from the last RT Protocol Assessment were as follows:   History Pulmonary Disease: (P) Chronic pulmonary disease  Respiratory Pattern: (P) Regular pattern and RR 12-20 bpm  Breath Sounds: (P) Clear breath sounds  Cough: (P) Strong, spontaneous, non-productive  Indication for Bronchodilator Therapy: (P) On home bronchodilators  Bronchodilator Assessment Score: (P) 2    Aerosolized bronchodilator medication orders have been revised according to the RT Inhaler-Nebulizer Bronchodilator Protocol below.    Respiratory Therapist to perform RT Therapy Protocol Assessment initially then follow the protocol.  Repeat RT Therapy Protocol Assessment PRN for score 0-3 or on second treatment, BID, and PRN for scores above 3.    No Indications - adjust the frequency to every 6 hours PRN wheezing or bronchospasm, if no treatments needed after 48 hours then discontinue using Per Protocol order mode.     If indication present, adjust the RT bronchodilator orders based on the Bronchodilator Assessment Score as indicated below.  Use Inhaler orders unless patient has one or more of the following: on home nebulizer, not able to hold breath for 10 seconds, is not alert and oriented, cannot activate and use MDI correctly, or respiratory rate 25 breaths per minute or more, then use the equivalent nebulizer order(s) with same Frequency and PRN reasons based on the score.  If a patient is on this medication at home then do not decrease Frequency below that used at home.    0-3 - enter or revise RT bronchodilator order(s) to equivalent RT Bronchodilator order with Frequency of every 4 hours PRN for wheezing or

## 2024-01-25 NOTE — DISCHARGE INSTR - COC
BILATERAL LUMBAR THREE, LUMBAR FOUR, LUMBAR FIVE DORSAL RAMUS MEDIAL BRANCH BLOCK SITE CONFIRMED BY FLUOROSCOPY performed by Nikki Lundberg MD at [facility] OR    PAIN MANAGEMENT PROCEDURE Left 01/12/2021    LEFT SACROILIAC JOINT INJECTION SITE CONFIRMED BY FLUOROSCOPY performed by Nikki Lundberg MD at [facility] OR    MT GASTROCNEMIUS RECESSION Right 10/31/2018    GASTROCNEMIUS RECESSION RIGHT FOOT performed by Gonzales Gottlieb DPM at [facility] OR    MT LNGTH/SHRT TENDON LEG/ANKLE 1 TENDON SPX Right 10/31/2018    REMOVAL OF CALCANEAL SPUR, ACHILLES TENDON REPAIR RIGHT LOWER EXTREMITY performed by Gonzales Gottlieb DPM at [facility] OR    UPPER GASTROINTESTINAL ENDOSCOPY  07/18/2013    and colonoscopy with biopsies taken    UPPER GASTROINTESTINAL ENDOSCOPY  08/23/2013    EUS    UPPER GASTROINTESTINAL ENDOSCOPY  09/15/2013       Immunization History:   Immunization History   Administered Date(s) Administered    COVID-19, MODERNA BLUE border, Primary or Immunocompromised, (age 12y+), IM, 100 mcg/0.5mL 04/13/2021, 05/18/2021    Influenza Virus Vaccine 12/12/2008, 10/22/2012, 09/17/2015, 10/01/2018    Influenza, AFLURIA (age 3 yrs+), FLUZONE, (age 6 mo+), MDV, 0.5mL 11/18/2016, 10/02/2017    Pneumococcal Conjugate 7-valent (Prevnar7) 10/22/2012    Pneumococcal, PPSV23, PNEUMOVAX 23, (age 2y+), SC/IM, 0.5mL 12/12/2008    TDaP, ADACEL (age 10y-64y), BOOSTRIX (age 10y+), IM, 0.5mL 05/11/2015       Active Problems:  Patient Active Problem List   Diagnosis Code    Ventricular tachycardia (HCC) I47.20    Presence of stent in left circumflex coronary artery Z95.5    Myocardial infarction, nontransmural (HCC) I21.4    Myocardial infarction, inferoposterior wall, subsequent care     Automatic implantable cardioverter-defibrillator in situ Z95.810    CAD (coronary artery disease) I25.10    Automatic implantable cardioverter-defibrillator in situ Z95.810    ALIN on CPAP G47.33    Gastroesophageal reflux disease without esophagitis  Treatments:     Patient's personal belongings (please select all that are sent with patient):       RN SIGNATURE:  Electronically signed by BENJIE ORNELAS RN on 1/26/24 at 2:13 PM EST    CASE MANAGEMENT/SOCIAL WORK SECTION    Inpatient Status Date: 01/24/2024    Readmission Risk Assessment Score:  Readmission Risk              Risk of Unplanned Readmission:  28           Discharging to Facility/ Agency   Name: [facility]  Address: [address] [place] [address]. [address]  Phone: [phone]  Fax: [phone]     Dialysis Facility (if applicable)   Name:  Address:  Dialysis Schedule:  Phone:  Fax:    / signature: Electronically signed by Jie Jorge RN on 1/26/24 at 2:48 PM EST    PHYSICIAN SECTION    Prognosis: Good    Condition at Discharge: Stable    Rehab Potential (if transferring to Rehab): Good    Recommended Labs or Other Treatments After Discharge: none    Physician Certification: I certify the above information and transfer of Chano LUJAN Blaynenaeanna Harper  is necessary for the continuing treatment of the diagnosis listed and that he requires Skilled Nursing Facility for less 30 days.     Update Admission H&P: No change in H&P    PHYSICIAN SIGNATURE:  Electronically signed by DANIELLE BOSWELL MD on 1/26/24 at 1:58 PM EST

## 2024-01-25 NOTE — PROGRESS NOTES
Inpatient Occupational Therapy Evaluation and Treatment    Unit: [address] [place]  Date:  1/25/2024  Patient Name:    Chano Quiroga Jr.  Admitting diagnosis:  Unable to ambulate [R26.2]  Frequent falls [R29.6]  Parkinson's disease, unspecified whether dyskinesia present, unspecified whether manifestations fluctuate [G20.A1]  Admit Date:  1/24/2024  Precautions/Restrictions/WB Status/ Lines/ Wounds/ Oxygen: Fall risk and Bed/chair alarm    Pt seen for cotreatment this date due to patient safety, patient endurance, and limited functional status information    Treatment Time:  2325-2742  Treatment Number:  1  Timed Code Treatment Minutes: 23 minutes  Total Treatment Minutes:  33  minutes    Patient Goals for Therapy: \"go to therapy somewhere, I'm not sure where\"          Discharge Recommendations: SNF  DME needs for discharge: Defer to facility    If going home will need 24 hour assist, HHOT.       Therapy recommendations for staff:   Assist of 1 for ambulation with use of rolling walker (RW) and gait belt to/from chair  to/from bathroom  within room    History of Present Illness: per DISHA WALLACE H&P 1/24/24:  \"The patient is a 63 y.o. male with pmhx of parkinsons, bipolar disorder, recurrent falls, CHF, DM who presented to [facility] ED with complaint of weakness.   Patient was recently admitted for similar complaints but left AMA. Reports he keeps losing his balance at home. He fell when getting up from his chair 2/2 to generalized weakness. He did not have any dizziness, syncope. Did not his head. He landed on his knees and was able to get up on his own. He is unable to care for himself at home. Denies any injury or trauma and has full ROM of upper and lower extremities.\"    Home Health S4 Level Recommendation:  Level 2 Social if going home    AM-PAC Score: AM-PAC Inpatient Daily Activity Raw Score: 17     Subjective:  Patient lying reclined in bed with no family present.   Pt agreeable to this OT session.  limited or no family support and home environment not conducive to patient recovery.    Goal(s) :   To be met in 3 Visits:  Bed to toilet/BSC:       SBA  Pt will complete 3/3 CHF goals     N/A    To be met in 5 Visits:  Supine to/from Sit in preparation for ADL task:   SBA flat bed  Toileting        SBA  Grooming       SBA  Upper Body Dressing:      SBA  Lower Body Dressing:      Min A with LB AE  Pt to demonstrate UE therapeutic exs x 15 reps with minimal cues    Rehabilitation Potential: Good  Strengths for achieving goals include: Pt motivated, Family Support, and Pt cooperative   Barriers to achieving goals include:  No Barriers    Plan:  To be seen 3-5 x/wk while in acute care setting for therapeutic exercises, bed mobility, transfers, family/patient education, ADL/IADL retraining, and energy conservation training.    Electronically signed by Chio Vanegas OT on 1/25/2024 at 10:52 AM      If patient discharges from this facility prior to next visit, this note will serve as the Discharge Summary

## 2024-01-25 NOTE — PROGRESS NOTES
Patient has no  LDA that require CHG wipes, including possible a surgery incision, call catheter, or a central line.     4 Eyes Skin Assessment     The patient is being assess for   Admission    I agree that 2 RN's have performed a thorough Head to Toe Skin Assessment on the patient. ALL assessment sites listed below have been assessed.      Areas assessed for pressure by both nurses:   [x]   Head, Face, and Ears   [x]   Shoulders, Back, and Chest, Abdomen  [x]   Arms, Elbows, and Hands   [x]   Coccyx, Sacrum, and Ischium  [x]   Legs, Feet, and Heels    Scattered bruises (mainly in abdomen). Scratches over the right thigh        Skin Assessed Under all Medical Devices by both nurses:  NA               All Mepilex Borders were peeled back and area peeked at by both nurses:  No: NA  Please list where Mepilex Borders are located:  NA             **SHARE this note so that the co-signing nurse is able to place an eSignature**    Co-signer eSignature: Electronically signed by Ulysses Celeste RN on 1/26/24 at 12:25 AM EST    Does the Patient have Skin Breakdown related to pressure?  No     (Insert Photo here NA)         Omega Prevention initiated:  NA   Wound Care Orders initiated:  NA      [facility] nurse consulted for Pressure Injury (Stage 3,4, Unstageable, DTI, NWPT, Complex wounds)and New or Established Ostomies:  NA      Primary Nurse eSignature: Electronically signed by Tennille River RN on 1/25/24 at 3:08 AM EST      Bedside Mobility Assessment Tool (BMAT):     Assessment Level 1- Sit and Shake    1. From a semi-reclined position, ask patient to sit up and rotate to a seated position at the side of the bed. Can use the bedrail.    2. Ask patient to reach out and grab your hand and shake making sure patient reaches across his/her midline.   Pass- Patient is able to come to a seated position, maintain core strength. Maintains seated balance while reaching across midline. Move on to Assessment Level 2.     Assessment  Level 2- Stretch and Point   1. With patient in seated position at the side of the bed, have patient place both feet on the floor (or stool) with knees no higher than hips.    2. Ask patient to stretch one leg and straighten the knee, then bend the ankle/flex and point the toes. If appropriate, repeat with the other leg.   Pass- Patient is able to demonstrate appropriate quad strength on intended weight bearing limb(s). Move onto Assessment Level 3.     Assessment Level 3- Stand   1. Ask patient to elevate off the bed or chair (seated to standing) using an assistive device (cane, bedrail).    2. Patient should be able to raise buttocks off be and hold for a count of five. May repeat once.   Pass- Patient maintains standing stability for at least 5 seconds, proceed to assessment level 4.    Assessment Level 4- Walk   1. Ask patient to march in place at bedside.    2. Then ask patient to advance step and return each foot. Some medical conditions may render a patient from stepping backwards, use your best clinical judgement.   Pass- Patient demonstrates balance while shifting weight and ability to step, takes independent steps, does not use assistive device patient is MOBILITY LEVEL 4.      Mobility Level- 4    Patient is able to demonstrate the ability to move from a reclining position to an upright position within the recliner.

## 2024-01-25 NOTE — PLAN OF CARE
Problem: Discharge Planning  Goal: Discharge to home or other facility with appropriate resources  Outcome: Progressing  Flowsheets  Taken 1/24/2024 2132  Discharge to home or other facility with appropriate resources: Identify barriers to discharge with patient and caregiver  Taken 1/24/2024 2110  Discharge to home or other facility with appropriate resources: Identify barriers to discharge with patient and caregiver     Problem: Skin/Tissue Integrity  Goal: Absence of new skin breakdown  Description: 1.  Monitor for areas of redness and/or skin breakdown  2.  Assess vascular access sites hourly  3.  Every 4-6 hours minimum:  Change oxygen saturation probe site  4.  Every 4-6 hours:  If on nasal continuous positive airway pressure, respiratory therapy assess nares and determine need for appliance change or resting period.  Outcome: Progressing     Problem: Safety - Adult  Goal: Free from fall injury  Outcome: Progressing

## 2024-01-25 NOTE — PROGRESS NOTES
Physician Progress Note      PATIENT:               DEEP VELA  CSN #:                  [id number]  :                       1960  ADMIT DATE:       1/15/2024 9:31 AM  DISCH DATE:        2024 10:45 AM  RESPONDING  PROVIDER #:        Abdirahman Yanez MD          QUERY TEXT:    Pt admitted with weakness. Pt noted to have recent diagnosis of Parkinson   disease. If possible, please document in progress notes and discharge summary   if you are evaluating and /or treating any of the following:    The medical record reflects the following:  Risk Factors: Parkinson's disease, Inability to care for self at home  Clinical Indicators: neurology - The etiology of parkinsonism in this case can   be drug-induced parkinsonism or Parkinson disease or in combination  Treatment: PT/OT evaluation, fall precautions, neurology consult    Thank You Toma Leonard RN, CDS [email or web address]  Options provided:  -- Weakness due to Parkinson's disease  -- Other - I will add my own diagnosis  -- Disagree - Not applicable / Not valid  -- Disagree - Clinically unable to determine / Unknown  -- Refer to Clinical Documentation Reviewer    PROVIDER RESPONSE TEXT:    This patient has weakness due to Parkinson's disease.    Query created by: Toma Leonard on 2024 2:43 PM      Electronically signed by:  Abdirahman Yanez MD 2024 11:49 AM

## 2024-01-26 VITALS
HEIGHT: 64 IN | TEMPERATURE: 98 F | OXYGEN SATURATION: 96 % | DIASTOLIC BLOOD PRESSURE: 66 MMHG | BODY MASS INDEX: 42.51 KG/M2 | WEIGHT: 249 LBS | SYSTOLIC BLOOD PRESSURE: 110 MMHG | HEART RATE: 71 BPM | RESPIRATION RATE: 18 BRPM

## 2024-01-26 LAB
ANION GAP SERPL CALCULATED.3IONS-SCNC: 15 MMOL/L (ref 3–16)
BASOPHILS # BLD: 0 K/UL (ref 0–0.2)
BASOPHILS NFR BLD: 0.5 %
BUN SERPL-MCNC: 17 MG/DL (ref 7–20)
CALCIUM SERPL-MCNC: 9.5 MG/DL (ref 8.3–10.6)
CHLORIDE SERPL-SCNC: 103 MMOL/L (ref 99–110)
CO2 SERPL-SCNC: 22 MMOL/L (ref 21–32)
CREAT SERPL-MCNC: 0.6 MG/DL (ref 0.8–1.3)
DEPRECATED RDW RBC AUTO: 14.5 % (ref 12.4–15.4)
EOSINOPHIL # BLD: 0.1 K/UL (ref 0–0.6)
EOSINOPHIL NFR BLD: 1.2 %
GFR SERPLBLD CREATININE-BSD FMLA CKD-EPI: >60 ML/MIN/{1.73_M2}
GLUCOSE BLD-MCNC: 126 MG/DL (ref 70–99)
GLUCOSE BLD-MCNC: 132 MG/DL (ref 70–99)
GLUCOSE BLD-MCNC: 176 MG/DL (ref 70–99)
GLUCOSE BLD-MCNC: 193 MG/DL (ref 70–99)
GLUCOSE SERPL-MCNC: 135 MG/DL (ref 70–99)
HCT VFR BLD AUTO: 47.5 % (ref 40.5–52.5)
HGB BLD-MCNC: 16.1 G/DL (ref 13.5–17.5)
LYMPHOCYTES # BLD: 2.1 K/UL (ref 1–5.1)
LYMPHOCYTES NFR BLD: 37.5 %
MCH RBC QN AUTO: 30.3 PG (ref 26–34)
MCHC RBC AUTO-ENTMCNC: 34 G/DL (ref 31–36)
MCV RBC AUTO: 89.2 FL (ref 80–100)
MONOCYTES # BLD: 0.6 K/UL (ref 0–1.3)
MONOCYTES NFR BLD: 10.3 %
NEUTROPHILS # BLD: 2.8 K/UL (ref 1.7–7.7)
NEUTROPHILS NFR BLD: 50.5 %
PERFORMED ON: ABNORMAL
PLATELET # BLD AUTO: 121 K/UL (ref 135–450)
PMV BLD AUTO: 7.2 FL (ref 5–10.5)
POTASSIUM SERPL-SCNC: 4.3 MMOL/L (ref 3.5–5.1)
RBC # BLD AUTO: 5.32 M/UL (ref 4.2–5.9)
SODIUM SERPL-SCNC: 140 MMOL/L (ref 136–145)
WBC # BLD AUTO: 5.5 K/UL (ref 4–11)

## 2024-01-26 PROCEDURE — 80048 BASIC METABOLIC PNL TOTAL CA: CPT

## 2024-01-26 PROCEDURE — 6370000000 HC RX 637 (ALT 250 FOR IP)

## 2024-01-26 PROCEDURE — 6360000002 HC RX W HCPCS

## 2024-01-26 PROCEDURE — 2580000003 HC RX 258

## 2024-01-26 PROCEDURE — 36415 COLL VENOUS BLD VENIPUNCTURE: CPT

## 2024-01-26 PROCEDURE — 85025 COMPLETE CBC W/AUTO DIFF WBC: CPT

## 2024-01-26 PROCEDURE — 99239 HOSP IP/OBS DSCHRG MGMT >30: CPT | Performed by: INTERNAL MEDICINE

## 2024-01-26 RX ADMIN — DIVALPROEX SODIUM 500 MG: 500 TABLET, DELAYED RELEASE ORAL at 08:12

## 2024-01-26 RX ADMIN — DULOXETINE HYDROCHLORIDE 60 MG: 60 CAPSULE, DELAYED RELEASE ORAL at 08:11

## 2024-01-26 RX ADMIN — FAMOTIDINE 20 MG: 20 TABLET, FILM COATED ORAL at 08:12

## 2024-01-26 RX ADMIN — CARBIDOPA AND LEVODOPA 1 TABLET: 10; 100 TABLET ORAL at 13:58

## 2024-01-26 RX ADMIN — Medication 10 ML: at 08:13

## 2024-01-26 RX ADMIN — GABAPENTIN 200 MG: 100 CAPSULE ORAL at 13:58

## 2024-01-26 RX ADMIN — CARBIDOPA AND LEVODOPA 1 TABLET: 10; 100 TABLET ORAL at 08:12

## 2024-01-26 RX ADMIN — ASPIRIN 81 MG: 81 TABLET, CHEWABLE ORAL at 08:12

## 2024-01-26 RX ADMIN — TAMSULOSIN HYDROCHLORIDE 0.4 MG: 0.4 CAPSULE ORAL at 08:13

## 2024-01-26 RX ADMIN — METOPROLOL SUCCINATE 50 MG: 50 TABLET, EXTENDED RELEASE ORAL at 08:13

## 2024-01-26 RX ADMIN — LISINOPRIL 5 MG: 5 TABLET ORAL at 08:12

## 2024-01-26 RX ADMIN — GABAPENTIN 200 MG: 100 CAPSULE ORAL at 08:12

## 2024-01-26 RX ADMIN — PANTOPRAZOLE SODIUM 40 MG: 40 TABLET, DELAYED RELEASE ORAL at 05:27

## 2024-01-26 RX ADMIN — EMPAGLIFLOZIN 25 MG: 10 TABLET, FILM COATED ORAL at 08:13

## 2024-01-26 RX ADMIN — ENOXAPARIN SODIUM 30 MG: 100 INJECTION SUBCUTANEOUS at 08:13

## 2024-01-26 NOTE — FLOWSHEET NOTE
01/26/24 0800   Vital Signs   Temp 98.5 °F (36.9 °C)   Temp Source Oral   Pulse 66   Heart Rate Source Monitor   Respirations 18   /67   MAP (Calculated) 84   BP Location Left upper arm   BP Method Automatic   Patient Position Semi ojwlers   Pain Assessment   Pain Assessment None - Denies Pain   Oxygen Therapy   SpO2 99 %   O2 Device None (Room air)     AM assessment completed. No complaints of pain or discomfort voiced. Pt alert and oriented x4. No signs of symptoms of distress noted. Patient tolerated morning medications well. Respirations easy and even. Bed in lowest position, bed alarm in place and functioning properly, SR up x 2 and bed in low position. Call light within reach.     Bedside Mobility Assessment Tool (BMAT):     Assessment Level 1- Sit and Shake    1. From a semi-reclined position, ask patient to sit up and rotate to a seated position at the side of the bed. Can use the bedrail.    2. Ask patient to reach out and grab your hand and shake making sure patient reaches across his/her midline.   Pass- Patient is able to come to a seated position, maintain core strength. Maintains seated balance while reaching across midline. Move on to Assessment Level 2.     Assessment Level 2- Stretch and Point   1. With patient in seated position at the side of the bed, have patient place both feet on the floor (or stool) with knees no higher than hips.    2. Ask patient to stretch one leg and straighten the knee, then bend the ankle/flex and point the toes. If appropriate, repeat with the other leg.   Pass- Patient is able to demonstrate appropriate quad strength on intended weight bearing limb(s). Move onto Assessment Level 3.     Assessment Level 3- Stand   1. Ask patient to elevate off the bed or chair (seated to standing) using an assistive device (cane, bedrail).    2. Patient should be able to raise buttocks off be and hold for a count of five. May repeat once.   Pass- Patient maintains standing

## 2024-01-26 NOTE — DISCHARGE SUMMARY
Name:  Chano Quiroga  Room:  0231/0231-02  MRN:    [id number]    Discharge Summary      This discharge summary is in conjunction with a complete physical exam done on the day of discharge.      Discharging Physician: DANIELLE BOSWELL MD      Admit: 1/24/2024  Discharge:  1/26/2024     Diagnoses this Admission    Principal Problem:    Frequent falls  Active Problems:    Type 2 diabetes mellitus without complication, with long-term current use of insulin (HCC)    Unable to ambulate    Permanent atrial fibrillation (HCC)  Resolved Problems:    * No resolved hospital problems. *          Procedures (Please Review Full Report for Details)      Consults    IP CONSULT TO CASE MANAGEMENT      HPI:    The patient is a 63 y.o. male with pmhx of parkinsons, bipolar disorder, recurrent falls, CHF, DM who presented to [facility] ED with complaint of weakness.   Patient was recently admitted for similar complaints but left AMA. Reports he keeps losing his balance at home. He fell when getting up from his chair 2/2 to generalized weakness. He did not have any dizziness, syncope. Did not his head. He landed on his knees and was able to get up on his own. He is unable to care for himself at home. Denies any injury or trauma and has full ROM of upper and lower extremities.         Hospital Course      #Recurrent falls   #Inability to care for self   -denies syncope or hitting his head  -fell on knees-denies pain   -moving all 4 extremities appropriately  -PT/OT  -fall precautions   -is agreeable to SNF this time      #Bipolar disorder   #Parkinsons disease  -on depakote and cymbalta  -melatonin nightly   -psychiatry consulted during last admit, reestablish with [facility] and have home health aid come 3x a week   Continue Sinemet; neurologist may consider switching Depakote in the future if patient has recurrent falls     #Left calf pain/tenderness and swelling   -venous US no DVT       #CAD s/p stents  -on ASA,  Diabetes/ insulin monitoring     FreeStyle Iker 2 Sensor Misc  ONE (1) UNITS BY DOES NOT APPLY ROUTE SIX (6) TIMES DAILY FOR DIABETES/ INSULIN MONITORING     furosemide 20 MG tablet  Commonly known as: LASIX  TAKE ONE TABLET BY MOUTH MONDAY AND Thursday, take one extra tablet for weight gain of 3-4 pounds     gabapentin 100 MG capsule  Commonly known as: NEURONTIN  TAKE TWO (2) CAPSULES BY MOUTH THREE (3) TIMES A DAY     hydrOXYzine HCl 25 MG tablet  Commonly known as: ATARAX  take 1 tablet by oral route 4 times every day as needed for itching     Jardiance 25 MG tablet  Generic drug: empagliflozin  TAKE ONE TABLET BY MOUTH EVERY MORNING     lidocaine 5 %  Commonly known as: LIDODERM     lisinopril 5 MG tablet  Commonly known as: PRINIVIL;ZESTRIL  TAKE ONE (1) TABLET BY ORAL ROUTE EVERY DAY     metFORMIN 1000 MG tablet  Commonly known as: GLUCOPHAGE  TAKE ONE TABLET BY MOUTH TWO (2) TIMES DAILY WITH MORNING AND EVENING MEALS     metoprolol succinate 50 MG extended release tablet  Commonly known as: TOPROL XL  Take 1 tablet by mouth 2 times daily     * NOVOLOG SC     * NOVOLOG SC     potassium chloride 20 MEQ extended release tablet  Commonly known as: KLOR-CON M  TAKE ONE TABLET BY MOUTH WITH LASIX ON MONDAY AND THURSDAY     tamsulosin 0.4 MG capsule  Commonly known as: FLOMAX  TAKE ONE (1) CAPSULE BY ORAL ROUTE EVERY DAY ONE HALF (1/2) HOUR FOLLOWING THE SAME MEAL EACH DAY     Trulicity 3 MG/0.5ML Sopn  Generic drug: Dulaglutide  INJECT 3MG SUBCUTANEOUSLY ONCE A WEEK           * This list has 2 medication(s) that are the same as other medications prescribed for you. Read the directions carefully, and ask your doctor or other care provider to review them with you.                    Discharge Condition/Location: Stable to SNF     Follow Up:  Follow up with PCP.    Total time spent on discharge is 35 minutes        DANIELLE BOSWELL MD 1/26/2024 1:59 PM

## 2024-01-26 NOTE — PROGRESS NOTES
Pt leaving via transport via squad to [facility].  Report given to transport.  Report called earlier to [facility] see previous note.  BENJIE ORNELAS RN

## 2024-01-26 NOTE — CARE COORDINATION
CM Update:     Accepted for admission to [facility]. [place] pre-cert submitted on 1/25 by Bradly ([facility])

## 2024-01-26 NOTE — PLAN OF CARE
HEART FAILURE CARE PLAN:    Comorbidities Reviewed: Yes   Patient has a past medical history of Arthritis, Asthma, CAD (coronary artery disease), CHF (congestive heart failure) (HCC), Chronic obstructive pulmonary disease (HCC), COPD (chronic obstructive pulmonary disease) (HCC), Fatty liver, GERD (gastroesophageal reflux disease), Hyperlipidemia, Hypertension, Medical history reviewed with no changes, MI, old, Sleep apnea, and Type II or unspecified type diabetes mellitus without mention of complication, not stated as uncontrolled.     ECHOCARDIOGRAM Reviewed: Yes   Patient's Ejection Fraction (EF) is greater than 40%    Weights Reviewed: Yes   Admission weight: 112.9 kg (249 lb)   Wt Readings from Last 3 Encounters:   01/24/24 112.9 kg (249 lb)   01/20/24 102 kg (224 lb 12.8 oz)   12/24/23 99.3 kg (219 lb)     Intake & Output Reviewed: Yes     Intake/Output Summary (Last 24 hours) at 1/26/2024 0787  Last data filed at 1/26/2024 0458  Gross per 24 hour   Intake --   Output 300 ml   Net -300 ml     Medications Reviewed: Yes   SCHEDULED HOSPITAL MEDICATIONS:   aspirin  81 mg Oral Daily    atorvastatin  80 mg Oral Nightly    carbidopa-levodopa  1 tablet Oral TID    DULoxetine  60 mg Oral Daily    pantoprazole  40 mg Oral QAM AC    famotidine  20 mg Oral BID    gabapentin  200 mg Oral TID    tamsulosin  0.4 mg Oral Daily    potassium chloride  20 mEq Oral Once per day on Mon Thu    metoprolol succinate  50 mg Oral BID    lisinopril  5 mg Oral Daily    insulin glargine  50 Units SubCUTAneous Nightly    fluticasone  2 spray Each Nostril Daily    furosemide  20 mg Oral Once per day on Mon Thu    sodium chloride flush  5-40 mL IntraVENous 2 times per day    enoxaparin  30 mg SubCUTAneous BID    empagliflozin  25 mg Oral QAM    insulin lispro  0-4 Units SubCUTAneous TID WC    insulin lispro  0-4 Units SubCUTAneous Nightly    divalproex  1,000 mg Oral Nightly    And    divalproex  500 mg Oral QAM     ACE/ARB/ARNI is REQUIRED  for EF </= 40% SYSTOLIC FAILURE:   ACE:: Lisinopril  ARB:: None  ARNI:: None    Evidenced-Based Beta Blocker is REQUIRED for EF </= 40% SYSTOLIC FAILURE:   :: Metoprolol SUCCinate- Toprol XL    Diuretics:  :: Furosemide    Diet Reviewed: Yes   ADULT DIET; Regular; 4 carb choices (60 gm/meal)    Goal of Care Reviewed: Yes   Patient and/or Family's stated Goal of Care this Admission: better understand heart failure and disease management, be more comfortable, and reduce lower extremity edema prior to discharge.

## 2024-01-26 NOTE — PROGRESS NOTES
/64   Pulse 69   Temp 97.5 °F (36.4 °C) (Oral)   Resp 16   Ht 1.626 m (5' 4\")   Wt 112.9 kg (249 lb)   SpO2 96%   BMI 42.74 kg/m²     Patient alert and oriented x4. Assessment completed. Respiration easy, even and unlabored. Patient tolerated night meds well. Call light and bedside table within reach. Bed at lowest position, locked, side rails x2. Pt denies needs at this time.

## 2024-01-26 NOTE — PLAN OF CARE
Problem: Discharge Planning  Goal: Discharge to home or other facility with appropriate resources  1/26/2024 0021 by Tennille River RN  Outcome: Progressing  Flowsheets (Taken 1/25/2024 2155)  Discharge to home or other facility with appropriate resources: Identify barriers to discharge with patient and caregiver     Problem: Skin/Tissue Integrity  Goal: Absence of new skin breakdown  Description: 1.  Monitor for areas of redness and/or skin breakdown  2.  Assess vascular access sites hourly  3.  Every 4-6 hours minimum:  Change oxygen saturation probe site  4.  Every 4-6 hours:  If on nasal continuous positive airway pressure, respiratory therapy assess nares and determine need for appliance change or resting period.  1/26/2024 0021 by Tennille River RN  Outcome: Progressing     Problem: Safety - Adult  Goal: Free from fall injury  1/26/2024 0021 by Tennille River RN  Outcome: Progressing     Problem: Chronic Conditions and Co-morbidities  Goal: Patient's chronic conditions and co-morbidity symptoms are monitored and maintained or improved  1/26/2024 0021 by Tennille River RN  Outcome: Progressing  Flowsheets (Taken 1/25/2024 2155)  Care Plan - Patient's Chronic Conditions and Co-Morbidity Symptoms are Monitored and Maintained or Improved: Monitor and assess patient's chronic conditions and comorbid symptoms for stability, deterioration, or improvement

## 2024-01-26 NOTE — PLAN OF CARE
Problem: Discharge Planning  Goal: Discharge to home or other facility with appropriate resources  1/26/2024 1414 by Rody Cunningham RN  Outcome: Completed  1/26/2024 1038 by Rody Cunningham RN  Outcome: Progressing  1/26/2024 0021 by Tennille River RN  Outcome: Progressing  Flowsheets (Taken 1/25/2024 2155)  Discharge to home or other facility with appropriate resources: Identify barriers to discharge with patient and caregiver     Problem: Skin/Tissue Integrity  Goal: Absence of new skin breakdown  Description: 1.  Monitor for areas of redness and/or skin breakdown  2.  Assess vascular access sites hourly  3.  Every 4-6 hours minimum:  Change oxygen saturation probe site  4.  Every 4-6 hours:  If on nasal continuous positive airway pressure, respiratory therapy assess nares and determine need for appliance change or resting period.  1/26/2024 1414 by Rody Cunningham RN  Outcome: Completed  1/26/2024 1038 by Rody Cunningham RN  Outcome: Progressing  1/26/2024 0021 by Tennille River RN  Outcome: Progressing     Problem: Safety - Adult  Goal: Free from fall injury  1/26/2024 1414 by Rody Cunningham RN  Outcome: Completed  1/26/2024 1038 by Rody Cunningham RN  Outcome: Progressing  1/26/2024 0021 by Tennille River RN  Outcome: Progressing     Problem: Chronic Conditions and Co-morbidities  Goal: Patient's chronic conditions and co-morbidity symptoms are monitored and maintained or improved  1/26/2024 1414 by Rody Cunningham RN  Outcome: Completed  1/26/2024 1038 by Rody Cunningham RN  Outcome: Progressing  1/26/2024 0021 by Tennille River RN  Outcome: Progressing  Flowsheets (Taken 1/25/2024 2155)  Care Plan - Patient's Chronic Conditions and Co-Morbidity Symptoms are Monitored and Maintained or Improved: Monitor and assess patient's chronic conditions and comorbid symptoms for stability, deterioration, or improvement

## 2024-01-26 NOTE — PROGRESS NOTES
01/26/24 1332   Encounter Summary   Encounter Overview/Reason  Volunteer Encounter   Service Provided For: Patient   Referral/Consult From: Farrukh   Last Encounter  01/26/24  (Pastoral Care Volunteer visit)   Assessment/Intervention/Outcome   Intervention Prayer (assurance of)/[place]        Patient is complaining of chronic back pain  Continue Suboxone as per home regimen starting tomorrow  [facility]  Note reviewed    He is supposed to be on 08/02 mg 1 film sublingual t i d  if patient is more awake later today he may receive a dose tonight as well

## 2024-01-26 NOTE — PROGRESS NOTES
Report called to Laila at [facility].  Pt Iv D/C per protocol.  Pt belongings packed up.  Pt will be picked up for transport at 7pm. BENJIE ORNELAS RN

## 2024-01-26 NOTE — PROGRESS NOTES
Admit: 2024    Name:  Chano Quiroga  Room:  [address] [place] [address]  MRN:    [id number]    Daily Progress Note for 2024   Admitted with gen weakness and recurrent falls   Interval History:   Denies complaints    Scheduled Meds:   aspirin  81 mg Oral Daily    atorvastatin  80 mg Oral Nightly    carbidopa-levodopa  1 tablet Oral TID    DULoxetine  60 mg Oral Daily    pantoprazole  40 mg Oral QAM AC    famotidine  20 mg Oral BID    gabapentin  200 mg Oral TID    tamsulosin  0.4 mg Oral Daily    potassium chloride  20 mEq Oral Once per day on     metoprolol succinate  50 mg Oral BID    lisinopril  5 mg Oral Daily    insulin glargine  50 Units SubCUTAneous Nightly    fluticasone  2 spray Each Nostril Daily    furosemide  20 mg Oral Once per day on     sodium chloride flush  5-40 mL IntraVENous 2 times per day    enoxaparin  30 mg SubCUTAneous BID    empagliflozin  25 mg Oral QAM    insulin lispro  0-4 Units SubCUTAneous TID WC    insulin lispro  0-4 Units SubCUTAneous Nightly    divalproex  1,000 mg Oral Nightly    And    divalproex  500 mg Oral QAM       Continuous Infusions:   sodium chloride      dextrose         PRN Meds:  albuterol sulfate HFA, cyclobenzaprine, hydrOXYzine HCl, sodium chloride flush, sodium chloride, potassium chloride **OR** potassium alternative oral replacement **OR** potassium chloride, magnesium sulfate, ondansetron **OR** ondansetron, polyethylene glycol, acetaminophen **OR** acetaminophen, glucose, dextrose bolus **OR** dextrose bolus, glucagon (rDNA), dextrose                  Objective:     Temp  Av °F (36.7 °C)  Min: 97.5 °F (36.4 °C)  Max: 98.5 °F (36.9 °C)  Pulse  Av.8  Min: 66  Max: 76  BP  Min: 110/64  Max: 134/86  SpO2  Av %  Min: 95 %  Max: 99 %  Patient Vitals for the past 4 hrs:   BP Temp Temp src Pulse Resp SpO2   24 0800 117/67 98.5 °F (36.9 °C) Oral 66 18 99 %         Intake/Output Summary (Last 24 hours) at 2024 0901  Last data filed  cardioverter-defibrillator in situ 06/03/2014    CAD (coronary artery disease) 05/28/2014    Automatic implantable cardioverter-defibrillator in situ 05/29/2013    Ventricular tachycardia (HCC) 02/22/2012    Presence of stent in left circumflex coronary artery 02/22/2012    Myocardial infarction, nontransmural (HCC) 02/22/2012    Myocardial infarction, inferoposterior wall, subsequent care 02/22/2012     #Recurrent falls   #Inability to care for self   -denies syncope or hitting his head  -fell on knees-denies pain   -moving all 4 extremities appropriately  -PT/OT  -fall precautions   -is agreeable to SNF this time      #Bipolar disorder   #Parkinsons disease  -on depakote and cymbalta  -melatonin nightly   -psychiatry consulted during last admit, reestablish with [facility] and have home health aid come 3x a week   Continue Sinemet; neurologist may consider switching Depakote in the future if patient has recurrent falls     #Left calf pain/tenderness and swelling   -venous US pending       #CAD s/p stents  -on ASA, statin, BB      #Paroxysmal atrial fibrillation   -s/p watchman      #Hx of ischemic cardiomyopathy   -improved EF now 55%  -s/p ICD   -on GDMT with jardiance, lisinopril, toprol XL, lasix   -does not appear acutely decompensated     Mild thrombocytopenia  Dao lfollow      #NSVT   -s/p ICD   -on toprol XL      #COPD without AE   -continue prn inhalers      #DM type 2 with neuropathy   -on metformin, jardiance  -continue insulin regimen   -monitor BG   -on gabapentin      #HTN  -continue home regimen     #ALIN  -non compliant with BIPAP        DVT Prophylaxis: Lovenox  Diet: gen   Code Status: full     Dc to SNF       DANIELLE BOSWELL MD

## 2024-01-26 NOTE — CARE COORDINATION
DISCHARGE ORDER  Date/Time 2024 3:43 PM  Completed by: Jie Jorge RN, Case Management    Patient Name: Chano Quiroga Jr.    : 1960      Admit order Date and Status: IP 2024  Noted discharge order. (verify MD's last order for status of admission/Traditional Medicare 3 [place] Inpatient qualifying stay required for SNF)    Confirmed discharge plan with:              Patient:  Yes                                 Discharge to Facility:   Name: [facility]  Address: [address] [place] [address], [place]. [address]  Phone: [phone]  Fax: [phone]    Facility phone number for staff giving report: see above   Pre-certification completed: YES   Hospital Exemption Notification (HENS) completed: MONIKA   Discharge orders and Continuity of Care faxed to facility:  [facility]      Transportation:               Medical Transport explained with choice list offered to pt/family.                Choice: (no preference)  Agency used: [facility]   time:   19:00 PM      Pt/family/Nursing/Facility aware of  time:   Patient  Rody Abdullahi [facility]  Ambulance form completed: YES     Date Last IMM Given: NA    Comments:    Pt is being d/c'd to [facility] (SNF) today. Pt's O2 sats are 96% on RA.    Discharge timeout done with Rody SANTANA. All discharge needs and concerns addressed.    Discharging nurse to complete DELTA, reconcile AVS, and place final copy with patient's discharge packet. Discharging RN to ensure that written prescriptions for  Level II medications are sent with patient to the facility as per protocol.

## 2024-01-30 NOTE — PROGRESS NOTES
Physician Progress Note      PATIENT:               DEEP VELA  CSN #:                  [id number]  :                       1960  ADMIT DATE:       2024 1:16 PM  DISCH DATE:        2024 6:17 PM  RESPONDING  PROVIDER #:        Jaxson Patrick MD          QUERY TEXT:    Pt admitted with repeated falls and noted inability to care for self and   Parkinson's disease.  If possible, please document in the progress notes and   discharge summary if you are evaluating and / or treating any of the   following:    The medical record reflects the following:  Risk Factors: advanced age, Parkinson's disease  Clinical Indicators: OT notes, \"demonstrated decreased Activity tolerance,   ADLs, IADLs, Bed mobility, and Transfers. Pt functioning below baseline and   will likely benefit from skilled occupational therapy services to maximize   safety and independence.\"  Treatment: depakote, cymbalta, Sinemet, PT/OT consult, recommend SNF at   discharge, supportive care    Thank you,  Monique Holly RN, CDS  [email or web address]  Options provided:  -- Age Related Physical Debility  -- Repeated falls and inability to care for self due to Parkinson's disease  -- Repeated falls and inability to care for self due to other, Please document   other cause.  -- Other - I will add my own diagnosis  -- Disagree - Not applicable / Not valid  -- Disagree - Clinically unable to determine / Unknown  -- Refer to Clinical Documentation Reviewer    PROVIDER RESPONSE TEXT:    This patient has age related physical debility.    Query created by: Monique Holly on 2024 11:32 AM      Electronically signed by:  Jaxson Patrick MD 2024 6:27 PM

## 2024-02-29 RX ORDER — FAMOTIDINE 20 MG/1
TABLET, FILM COATED ORAL
Qty: 56 TABLET | Refills: 5 | OUTPATIENT
Start: 2024-02-29

## 2024-02-29 RX ORDER — TAMSULOSIN HYDROCHLORIDE 0.4 MG/1
CAPSULE ORAL
Qty: 28 CAPSULE | Refills: 5 | OUTPATIENT
Start: 2024-02-29

## 2024-02-29 RX ORDER — FEXOFENADINE HYDROCHLORIDE 180 MG/1
TABLET ORAL
Qty: 28 TABLET | Refills: 5 | OUTPATIENT
Start: 2024-02-29

## 2024-02-29 RX ORDER — EMPAGLIFLOZIN 25 MG/1
TABLET, FILM COATED ORAL
Qty: 28 TABLET | Refills: 5 | OUTPATIENT
Start: 2024-02-29

## 2024-03-19 RX ORDER — DULAGLUTIDE 3 MG/.5ML
INJECTION, SOLUTION SUBCUTANEOUS
Qty: 2 ML | Refills: 3 | Status: SHIPPED | OUTPATIENT
Start: 2024-03-19

## 2024-03-26 ENCOUNTER — TELEPHONE (OUTPATIENT)
Dept: CARDIOLOGY CLINIC | Age: 64
End: 2024-03-26

## 2024-03-26 NOTE — TELEPHONE ENCOUNTER
Attempted to call pt, no answer. LMOVM for pt to return call back to office. Is pt taking Plavix? Per last OV note:    Plan:   1.  Stop Plavix after 10/17/2023 (6 months post watchman

## 2024-03-26 NOTE — TELEPHONE ENCOUNTER
Ok to proceed from a cardiology perspective. Ok to hold aspirin day of procedure and resume when safe after.

## 2024-03-26 NOTE — TELEPHONE ENCOUNTER
Chano Quiroga Jr., 1960    Cardiac Risk Assessment    What type of procedure are you having?  Colonoscopy    When is your procedure scheduled for?  24    Medications to be stopped.  Plavix - 5 days prior  ASA - day of the procedure    What physician is performing your procedure?  Dr. Poole    Phone Number:   [phone]    Fax number to send the letter:   [phone]    Cardiologist:   JEET  GE    Last Appointment:   23 NPLR    Next Appointment:   No upcoming OV    Are you on any blood thinners?   ASA  Plavix? It's not on pt's med list  Per last OV NPLR:    Plan:   1.  Stop Plavix after 10/17/2023 (6 months post watchman)    Last EK23    Last Echo:   23    Device Type and :   Sudden battery depletion occurred - [facility] single chamber ICD replaced by Dr Siu 19 at [facility] single chamber ICD implanted by Dr Ortega 11.   PT SEES DR HERNANDEZ AT [facility], LIVES IN [facility]-will see barbi at [facility] from now on as of 18   WATCHMAN 23-AF

## 2024-04-05 ENCOUNTER — HOSPITAL ENCOUNTER (EMERGENCY)
Age: 64
Discharge: HOME | End: 2024-04-05
Attending: EMERGENCY MEDICINE
Payer: MEDICARE

## 2024-04-05 ENCOUNTER — APPOINTMENT (OUTPATIENT)
Dept: GENERAL RADIOLOGY | Age: 64
End: 2024-04-05
Payer: MEDICARE

## 2024-04-05 VITALS
DIASTOLIC BLOOD PRESSURE: 81 MMHG | OXYGEN SATURATION: 98 % | BODY MASS INDEX: 39.09 KG/M2 | TEMPERATURE: 98.1 F | HEART RATE: 83 BPM | SYSTOLIC BLOOD PRESSURE: 156 MMHG | WEIGHT: 229 LBS | RESPIRATION RATE: 18 BRPM | HEIGHT: 64 IN

## 2024-04-05 DIAGNOSIS — S30.0XXA CONTUSION OF COCCYX, INITIAL ENCOUNTER: Primary | ICD-10-CM

## 2024-04-05 PROCEDURE — 99283 EMERGENCY DEPT VISIT LOW MDM: CPT

## 2024-04-05 PROCEDURE — 6370000000 HC RX 637 (ALT 250 FOR IP): Performed by: EMERGENCY MEDICINE

## 2024-04-05 PROCEDURE — 72220 X-RAY EXAM SACRUM TAILBONE: CPT

## 2024-04-05 RX ORDER — ACETAMINOPHEN 325 MG/1
650 TABLET ORAL ONCE
Status: COMPLETED | OUTPATIENT
Start: 2024-04-05 | End: 2024-04-05

## 2024-04-05 RX ADMIN — ACETAMINOPHEN 650 MG: 325 TABLET ORAL at 13:53

## 2024-04-05 ASSESSMENT — PAIN - FUNCTIONAL ASSESSMENT
PAIN_FUNCTIONAL_ASSESSMENT: 0-10
PAIN_FUNCTIONAL_ASSESSMENT: 0-10
PAIN_FUNCTIONAL_ASSESSMENT: PREVENTS OR INTERFERES SOME ACTIVE ACTIVITIES AND ADLS
PAIN_FUNCTIONAL_ASSESSMENT: PREVENTS OR INTERFERES SOME ACTIVE ACTIVITIES AND ADLS

## 2024-04-05 ASSESSMENT — PAIN DESCRIPTION - DESCRIPTORS
DESCRIPTORS: SHARP;SHOOTING;STABBING
DESCRIPTORS: THROBBING;SHARP;SHOOTING;STABBING

## 2024-04-05 ASSESSMENT — PAIN DESCRIPTION - ONSET
ONSET: ON-GOING
ONSET: ON-GOING

## 2024-04-05 ASSESSMENT — PAIN DESCRIPTION - FREQUENCY
FREQUENCY: CONTINUOUS
FREQUENCY: CONTINUOUS

## 2024-04-05 ASSESSMENT — PAIN DESCRIPTION - LOCATION
LOCATION: COCCYX

## 2024-04-05 ASSESSMENT — PAIN DESCRIPTION - ORIENTATION
ORIENTATION: MID
ORIENTATION: MID

## 2024-04-05 ASSESSMENT — PAIN SCALES - GENERAL
PAINLEVEL_OUTOF10: 9

## 2024-04-05 ASSESSMENT — PAIN DESCRIPTION - PAIN TYPE
TYPE: ACUTE PAIN
TYPE: ACUTE PAIN

## 2024-04-05 NOTE — ED PROVIDER NOTES
[facility] EMERGENCY DEPARTMENT  EMERGENCY DEPARTMENT ENCOUNTER        Pt Name: Chano Quiroga Jr.  MRN: [id number]  Birthdate 1960  Date of evaluation: 4/5/2024  Provider: Susan Hodges MD  PCP: Elisabeth Galindo APRN - CNP  Note Started: 1:21 PM EDT 4/5/24    CHIEF COMPLAINT       Chief Complaint   Patient presents with    Fall     STATES HE STOOD UP GOT DIZZY FELL SEVERAL DAYS AGO. C/O TAILBONE PAIN.        HISTORY OF PRESENT ILLNESS: 1 or more Elements     History from : Patient and EMS    Limitations to history : None    Chano Qiuroga Jr. is a 63 y.o. male who presents to the emergency department with tailbone pain.  Patient states several days ago he slipped and fell and landed on his tailbone.  He states has been hurting ever since.  Today he was driving his car when he decided that the pain became too severe and he could not drive any further.  He pulled over and pressed his life alert bracelet.  EMS arrived and brought him to the scene.  Patient has been seen here previously for falls.  He was admitted to a nursing facility about 2 months ago but states his insurance only paid for a week so he was kicked out after that.  He does live alone.  No other injuries from the fall    Nursing Notes were all reviewed and agreed with or any disagreements were addressed in the HPI.    REVIEW OF SYSTEMS :      Review of Systems    10 systems reviewed and negative except as in HPI/MDM    SURGICAL HISTORY     Past Surgical History:   Procedure Laterality Date    CARDIAC PACEMAKER PLACEMENT  2011    NOT MRI COMPATIABLE OLD LEADS [facility]. pace maker difib    CARDIOVASCULAR SURGERY      Watchman placement    CARPAL TUNNEL RELEASE Bilateral 2013    CATARACT REMOVAL WITH IMPLANT Left 02/28/2014    COLONOSCOPY      CORONARY ANGIOPLASTY WITH STENT PLACEMENT  2001,2008    CYST REMOVAL  1982    coccyx area    DIAGNOSTIC CARDIAC CATH LAB PROCEDURE      ENDOSCOPY, COLON, DIAGNOSTIC      ERCP  09/15/2013    ERCP   Never used   Substance Use Topics    Alcohol use: No     Alcohol/week: 0.0 standard drinks of alcohol    Drug use: No       SCREENINGS        [place] Coma Scale  Eye Opening: Spontaneous  Best Verbal Response: Oriented  Best Motor Response: Obeys commands  [place] Coma Scale Score: 15                CIWA Assessment  BP: (!) 156/81  Pulse: 83           PHYSICAL EXAM  1 or more Elements     ED Triage Vitals   BP Temp Temp src Pulse Resp SpO2 Height Weight   -- -- -- -- -- -- -- --       Physical Exam    My pulse oximetry interpretation is which is within the normal range    Patient awake and alert.  No midline CTL spinal tenderness palpation.  Mild midline sacral and coccyx area tenderness palpation without bruising, crepitus or swelling.  5 out of 5 strength in bilateral lower extremities.  Sensation intact.  Skin exam normal.  Abdomen soft, nontender nondistended \"her symptoms this.  No acute distress        DIAGNOSTIC RESULTS   LABS:    Labs Reviewed - No data to display    When ordered only abnormal lab results are displayed. All other labs were within normal range or not returned as of this dictation.    EKG:     RADIOLOGY:   Non-plain film images such as CT, Ultrasound and MRI are read by the radiologist. Plain radiographic images are visualized and preliminarily interpreted by the ED Provider with the below findings:        Interpretation per the Radiologist below, if available at the time of this note:    XR SACRUM COCCYX (MIN 2 VIEWS)   Preliminary Result   No obvious fractures or malalignment. If there is persistent clinical   concern, CT or MRI could be obtained.           No results found.    No results found.    PROCEDURES   Unless otherwise noted below, none     Procedures    CRITICAL CARE TIME       EMERGENCY DEPARTMENT COURSE and DIFFERENTIAL DIAGNOSIS/MDM:   Vitals:    Vitals:    04/05/24 1322   BP: (!) 156/81   Pulse: 83   Resp: 18   Temp: 98.1 °F (36.7 °C)   SpO2: 98%   Weight: 103.9 kg (229 lb)

## 2024-04-24 RX ORDER — GABAPENTIN 100 MG/1
CAPSULE ORAL
Qty: 168 CAPSULE | Refills: 3 | OUTPATIENT
Start: 2024-04-24 | End: 2024-05-24

## 2024-04-26 RX ORDER — GABAPENTIN 100 MG/1
CAPSULE ORAL
Qty: 168 CAPSULE | Refills: 3 | OUTPATIENT
Start: 2024-04-26

## 2024-04-26 RX ORDER — GABAPENTIN 100 MG/1
CAPSULE ORAL
Qty: 168 CAPSULE | Refills: 3 | Status: SHIPPED | OUTPATIENT
Start: 2024-04-26 | End: 2024-05-26

## 2024-04-26 NOTE — TELEPHONE ENCOUNTER
Date of last refill of this med was 1/3/24, # of pills given 168 and # of refills given 3.  Their next appointment is None, the last date patient was seen was 6/21/23.  Does patient have medication agreement on file? No  Has drug screen been done in last 12 months if needed? no

## 2024-04-30 ENCOUNTER — APPOINTMENT (OUTPATIENT)
Dept: CT IMAGING | Age: 64
End: 2024-04-30
Payer: MEDICARE

## 2024-04-30 ENCOUNTER — HOSPITAL ENCOUNTER (INPATIENT)
Age: 64
LOS: 3 days | Discharge: NURSING, REHAB OR LONG-TERM CARE | End: 2024-05-03
Attending: STUDENT IN AN ORGANIZED HEALTH CARE EDUCATION/TRAINING PROGRAM | Admitting: INTERNAL MEDICINE
Payer: MEDICARE

## 2024-04-30 ENCOUNTER — APPOINTMENT (OUTPATIENT)
Dept: GENERAL RADIOLOGY | Age: 64
End: 2024-04-30
Payer: MEDICARE

## 2024-04-30 DIAGNOSIS — S80.811A ABRASION OF RIGHT LEG, INITIAL ENCOUNTER: ICD-10-CM

## 2024-04-30 DIAGNOSIS — R29.6 MULTIPLE FALLS: Primary | ICD-10-CM

## 2024-04-30 DIAGNOSIS — R26.81 UNSTEADY GAIT WHEN WALKING: ICD-10-CM

## 2024-04-30 DIAGNOSIS — S32.2XXA CLOSED FRACTURE OF COCCYX, INITIAL ENCOUNTER (HCC): ICD-10-CM

## 2024-04-30 DIAGNOSIS — R42 DIZZINESS: ICD-10-CM
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
## 2024-04-30 LAB
ALBUMIN SERPL-MCNC: 4 G/DL (ref 3.4–5)
ALBUMIN/GLOB SERPL: 1.7 {RATIO} (ref 1.1–2.2)
ALP SERPL-CCNC: 83 U/L (ref 40–129)
ALT SERPL-CCNC: 6 U/L (ref 10–40)
ANION GAP SERPL CALCULATED.3IONS-SCNC: 17 MMOL/L (ref 3–16)
AST SERPL-CCNC: 12 U/L (ref 15–37)
BASOPHILS # BLD: 0 K/UL (ref 0–0.2)
BASOPHILS NFR BLD: 0.6 %
BILIRUB SERPL-MCNC: 0.4 MG/DL (ref 0–1)
BUN SERPL-MCNC: 16 MG/DL (ref 7–20)
CALCIUM SERPL-MCNC: 9.1 MG/DL (ref 8.3–10.6)
CHLORIDE SERPL-SCNC: 98 MMOL/L (ref 99–110)
CO2 SERPL-SCNC: 21 MMOL/L (ref 21–32)
CREAT SERPL-MCNC: 0.7 MG/DL (ref 0.8–1.3)
DEPRECATED RDW RBC AUTO: 14.5 % (ref 12.4–15.4)
EOSINOPHIL # BLD: 0 K/UL (ref 0–0.6)
EOSINOPHIL NFR BLD: 0.5 %
GFR SERPLBLD CREATININE-BSD FMLA CKD-EPI: >90 ML/MIN/{1.73_M2}
GLUCOSE SERPL-MCNC: 206 MG/DL (ref 70–99)
HCT VFR BLD AUTO: 43.9 % (ref 40.5–52.5)
HGB BLD-MCNC: 15.1 G/DL (ref 13.5–17.5)
LYMPHOCYTES # BLD: 1.7 K/UL (ref 1–5.1)
LYMPHOCYTES NFR BLD: 21.9 %
MAGNESIUM SERPL-MCNC: 1.7 MG/DL (ref 1.8–2.4)
MCH RBC QN AUTO: 29.8 PG (ref 26–34)
MCHC RBC AUTO-ENTMCNC: 34.4 G/DL (ref 31–36)
MCV RBC AUTO: 86.7 FL (ref 80–100)
MONOCYTES # BLD: 0.6 K/UL (ref 0–1.3)
MONOCYTES NFR BLD: 7.8 %
NEUTROPHILS # BLD: 5.4 K/UL (ref 1.7–7.7)
NEUTROPHILS NFR BLD: 69.2 %
PLATELET # BLD AUTO: 106 K/UL (ref 135–450)
PMV BLD AUTO: 7.6 FL (ref 5–10.5)
POTASSIUM SERPL-SCNC: 4.6 MMOL/L (ref 3.5–5.1)
PROT SERPL-MCNC: 6.4 G/DL (ref 6.4–8.2)
RBC # BLD AUTO: 5.07 M/UL (ref 4.2–5.9)
SARS-COV-2 RDRP RESP QL NAA+PROBE: NOT DETECTED
SODIUM SERPL-SCNC: 136 MMOL/L (ref 136–145)
TROPONIN, HIGH SENSITIVITY: 15 NG/L (ref 0–22)
TROPONIN, HIGH SENSITIVITY: 18 NG/L (ref 0–22)
WBC # BLD AUTO: 7.8 K/UL (ref 4–11)

## 2024-04-30 PROCEDURE — 6370000000 HC RX 637 (ALT 250 FOR IP): Performed by: STUDENT IN AN ORGANIZED HEALTH CARE EDUCATION/TRAINING PROGRAM

## 2024-04-30 PROCEDURE — 80053 COMPREHEN METABOLIC PANEL: CPT

## 2024-04-30 PROCEDURE — 72192 CT PELVIS W/O DYE: CPT

## 2024-04-30 PROCEDURE — 70450 CT HEAD/BRAIN W/O DYE: CPT

## 2024-04-30 PROCEDURE — 71045 X-RAY EXAM CHEST 1 VIEW: CPT

## 2024-04-30 PROCEDURE — 87635 SARS-COV-2 COVID-19 AMP PRB: CPT

## 2024-04-30 PROCEDURE — 90471 IMMUNIZATION ADMIN: CPT | Performed by: STUDENT IN AN ORGANIZED HEALTH CARE EDUCATION/TRAINING PROGRAM

## 2024-04-30 PROCEDURE — 90715 TDAP VACCINE 7 YRS/> IM: CPT | Performed by: STUDENT IN AN ORGANIZED HEALTH CARE EDUCATION/TRAINING PROGRAM

## 2024-04-30 PROCEDURE — 99285 EMERGENCY DEPT VISIT HI MDM: CPT

## 2024-04-30 PROCEDURE — 93005 ELECTROCARDIOGRAM TRACING: CPT | Performed by: STUDENT IN AN ORGANIZED HEALTH CARE EDUCATION/TRAINING PROGRAM

## 2024-04-30 PROCEDURE — 1200000000 HC SEMI PRIVATE

## 2024-04-30 PROCEDURE — 6360000002 HC RX W HCPCS: Performed by: STUDENT IN AN ORGANIZED HEALTH CARE EDUCATION/TRAINING PROGRAM

## 2024-04-30 PROCEDURE — 85025 COMPLETE CBC W/AUTO DIFF WBC: CPT

## 2024-04-30 PROCEDURE — 36415 COLL VENOUS BLD VENIPUNCTURE: CPT

## 2024-04-30 PROCEDURE — 83735 ASSAY OF MAGNESIUM: CPT

## 2024-04-30 PROCEDURE — 84484 ASSAY OF TROPONIN QUANT: CPT

## 2024-04-30 RX ORDER — OXYCODONE HYDROCHLORIDE AND ACETAMINOPHEN 5; 325 MG/1; MG/1
1 TABLET ORAL ONCE
Status: COMPLETED | OUTPATIENT
Start: 2024-04-30 | End: 2024-04-30

## 2024-04-30 RX ADMIN — OXYCODONE HYDROCHLORIDE AND ACETAMINOPHEN 1 TABLET: 5; 325 TABLET ORAL at 21:37

## 2024-04-30 RX ADMIN — TETANUS TOXOID, REDUCED DIPHTHERIA TOXOID AND ACELLULAR PERTUSSIS VACCINE, ADSORBED 0.5 ML: 5; 2.5; 8; 8; 2.5 SUSPENSION INTRAMUSCULAR at 20:38

## 2024-04-30 ASSESSMENT — PAIN SCALES - GENERAL
PAINLEVEL_OUTOF10: 6
PAINLEVEL_OUTOF10: 8

## 2024-04-30 ASSESSMENT — PAIN - FUNCTIONAL ASSESSMENT
PAIN_FUNCTIONAL_ASSESSMENT: 0-10
PAIN_FUNCTIONAL_ASSESSMENT: NONE - DENIES PAIN

## 2024-05-01 DIAGNOSIS — G25.2 COARSE TREMOR: ICD-10-CM

## 2024-05-01 DIAGNOSIS — F31.9 BIPOLAR DISORDER WITH DEPRESSION (HCC): ICD-10-CM

## 2024-05-01 DIAGNOSIS — G20.A1 PARKINSON'S DISEASE (HCC): ICD-10-CM

## 2024-05-01 DIAGNOSIS — Z95.810 ICD (IMPLANTABLE CARDIOVERTER-DEFIBRILLATOR) IN PLACE: ICD-10-CM

## 2024-05-01 DIAGNOSIS — R25.8 BRADYKINESIA: ICD-10-CM

## 2024-05-01 LAB
ALBUMIN SERPL-MCNC: 4 G/DL (ref 3.4–5)
ALBUMIN/GLOB SERPL: 1.7 {RATIO} (ref 1.1–2.2)
ALP SERPL-CCNC: 66 U/L (ref 40–129)
ALT SERPL-CCNC: 13 U/L (ref 10–40)
ANION GAP SERPL CALCULATED.3IONS-SCNC: 14 MMOL/L (ref 3–16)
AST SERPL-CCNC: 12 U/L (ref 15–37)
BASOPHILS # BLD: 0 K/UL (ref 0–0.2)
BASOPHILS NFR BLD: 0.2 %
BILIRUB SERPL-MCNC: 0.3 MG/DL (ref 0–1)
BILIRUB UR QL STRIP.AUTO: NEGATIVE
BUN SERPL-MCNC: 14 MG/DL (ref 7–20)
CALCIUM SERPL-MCNC: 9 MG/DL (ref 8.3–10.6)
CHLORIDE SERPL-SCNC: 100 MMOL/L (ref 99–110)
CLARITY UR: CLEAR
CO2 SERPL-SCNC: 24 MMOL/L (ref 21–32)
COLOR UR: YELLOW
CREAT SERPL-MCNC: 0.6 MG/DL (ref 0.8–1.3)
DEPRECATED RDW RBC AUTO: 14.7 % (ref 12.4–15.4)
EKG ATRIAL RATE: 88 BPM
EKG DIAGNOSIS: NORMAL
EKG P AXIS: 53 DEGREES
EKG P-R INTERVAL: 162 MS
EKG Q-T INTERVAL: 336 MS
EKG QRS DURATION: 90 MS
EKG QTC CALCULATION (BAZETT): 428 MS
EKG R AXIS: 45 DEGREES
EKG T AXIS: 59 DEGREES
EKG VENTRICULAR RATE: 98 BPM
EOSINOPHIL # BLD: 0 K/UL (ref 0–0.6)
EOSINOPHIL NFR BLD: 0.4 %
GFR SERPLBLD CREATININE-BSD FMLA CKD-EPI: >90 ML/MIN/{1.73_M2}
GLUCOSE BLD-MCNC: 179 MG/DL (ref 70–99)
GLUCOSE BLD-MCNC: 331 MG/DL (ref 70–99)
GLUCOSE SERPL-MCNC: 128 MG/DL (ref 70–99)
GLUCOSE UR STRIP.AUTO-MCNC: >=1000 MG/DL
HCT VFR BLD AUTO: 43.5 % (ref 40.5–52.5)
HGB BLD-MCNC: 14.8 G/DL (ref 13.5–17.5)
HGB UR QL STRIP.AUTO: NEGATIVE
KETONES UR STRIP.AUTO-MCNC: ABNORMAL MG/DL
LEUKOCYTE ESTERASE UR QL STRIP.AUTO: NEGATIVE
LYMPHOCYTES # BLD: 1.8 K/UL (ref 1–5.1)
LYMPHOCYTES NFR BLD: 25.4 %
MCH RBC QN AUTO: 30.2 PG (ref 26–34)
MCHC RBC AUTO-ENTMCNC: 34.1 G/DL (ref 31–36)
MCV RBC AUTO: 88.5 FL (ref 80–100)
MONOCYTES # BLD: 0.7 K/UL (ref 0–1.3)
MONOCYTES NFR BLD: 9.4 %
MUCOUS THREADS #/AREA URNS LPF: ABNORMAL /LPF
NEUTROPHILS # BLD: 4.6 K/UL (ref 1.7–7.7)
NEUTROPHILS NFR BLD: 64.6 %
NITRITE UR QL STRIP.AUTO: NEGATIVE
PERFORMED ON: ABNORMAL
PERFORMED ON: ABNORMAL
PH UR STRIP.AUTO: 6 [PH] (ref 5–8)
PLATELET # BLD AUTO: 114 K/UL (ref 135–450)
PMV BLD AUTO: 7.3 FL (ref 5–10.5)
POTASSIUM SERPL-SCNC: 3.7 MMOL/L (ref 3.5–5.1)
PROT SERPL-MCNC: 6.4 G/DL (ref 6.4–8.2)
PROT UR STRIP.AUTO-MCNC: ABNORMAL MG/DL
RBC # BLD AUTO: 4.91 M/UL (ref 4.2–5.9)
RBC #/AREA URNS HPF: ABNORMAL /HPF (ref 0–4)
SODIUM SERPL-SCNC: 138 MMOL/L (ref 136–145)
SP GR UR STRIP.AUTO: 1.02 (ref 1–1.03)
UA COMPLETE W REFLEX CULTURE PNL UR: ABNORMAL
UA DIPSTICK W REFLEX MICRO PNL UR: YES
URN SPEC COLLECT METH UR: ABNORMAL
UROBILINOGEN UR STRIP-ACNC: 0.2 E.U./DL
VALPROATE SERPL-MCNC: 41.2 UG/ML (ref 50–100)
WBC # BLD AUTO: 7.1 K/UL (ref 4–11)
WBC #/AREA URNS HPF: ABNORMAL /HPF (ref 0–5)

## 2024-05-01 PROCEDURE — 97116 GAIT TRAINING THERAPY: CPT

## 2024-05-01 PROCEDURE — 99223 1ST HOSP IP/OBS HIGH 75: CPT | Performed by: PSYCHIATRY & NEUROLOGY

## 2024-05-01 PROCEDURE — 6370000000 HC RX 637 (ALT 250 FOR IP): Performed by: INTERNAL MEDICINE

## 2024-05-01 PROCEDURE — 80164 ASSAY DIPROPYLACETIC ACD TOT: CPT

## 2024-05-01 PROCEDURE — 1200000000 HC SEMI PRIVATE

## 2024-05-01 PROCEDURE — 97162 PT EVAL MOD COMPLEX 30 MIN: CPT

## 2024-05-01 PROCEDURE — 97530 THERAPEUTIC ACTIVITIES: CPT

## 2024-05-01 PROCEDURE — 81001 URINALYSIS AUTO W/SCOPE: CPT

## 2024-05-01 PROCEDURE — 2580000003 HC RX 258: Performed by: INTERNAL MEDICINE

## 2024-05-01 PROCEDURE — 97166 OT EVAL MOD COMPLEX 45 MIN: CPT

## 2024-05-01 PROCEDURE — 94640 AIRWAY INHALATION TREATMENT: CPT

## 2024-05-01 PROCEDURE — 80053 COMPREHEN METABOLIC PANEL: CPT

## 2024-05-01 PROCEDURE — 97535 SELF CARE MNGMENT TRAINING: CPT

## 2024-05-01 PROCEDURE — 6360000002 HC RX W HCPCS: Performed by: INTERNAL MEDICINE

## 2024-05-01 PROCEDURE — 85025 COMPLETE CBC W/AUTO DIFF WBC: CPT

## 2024-05-01 PROCEDURE — 94761 N-INVAS EAR/PLS OXIMETRY MLT: CPT

## 2024-05-01 PROCEDURE — 36415 COLL VENOUS BLD VENIPUNCTURE: CPT

## 2024-05-01 PROCEDURE — 93010 ELECTROCARDIOGRAM REPORT: CPT | Performed by: INTERNAL MEDICINE

## 2024-05-01 RX ORDER — DULOXETIN HYDROCHLORIDE 60 MG/1
60 CAPSULE, DELAYED RELEASE ORAL DAILY
Status: DISCONTINUED | OUTPATIENT
Start: 2024-05-01 | End: 2024-05-03 | Stop reason: HOSPADM

## 2024-05-01 RX ORDER — MAGNESIUM SULFATE IN WATER 40 MG/ML
2000 INJECTION, SOLUTION INTRAVENOUS PRN
Status: DISCONTINUED | OUTPATIENT
Start: 2024-05-01 | End: 2024-05-03 | Stop reason: HOSPADM

## 2024-05-01 RX ORDER — DIVALPROEX SODIUM 500 MG/1
TABLET, DELAYED RELEASE ORAL
Qty: 84 TABLET | Refills: 5 | OUTPATIENT
Start: 2024-05-01

## 2024-05-01 RX ORDER — ACETAMINOPHEN 650 MG/1
650 SUPPOSITORY RECTAL EVERY 6 HOURS PRN
Status: DISCONTINUED | OUTPATIENT
Start: 2024-05-01 | End: 2024-05-03 | Stop reason: HOSPADM

## 2024-05-01 RX ORDER — DEXTROSE MONOHYDRATE 100 MG/ML
INJECTION, SOLUTION INTRAVENOUS CONTINUOUS PRN
Status: DISCONTINUED | OUTPATIENT
Start: 2024-05-01 | End: 2024-05-03 | Stop reason: HOSPADM

## 2024-05-01 RX ORDER — GABAPENTIN 100 MG/1
100 CAPSULE ORAL 3 TIMES DAILY
Status: DISCONTINUED | OUTPATIENT
Start: 2024-05-01 | End: 2024-05-03 | Stop reason: HOSPADM

## 2024-05-01 RX ORDER — SODIUM CHLORIDE 9 MG/ML
INJECTION, SOLUTION INTRAVENOUS PRN
Status: DISCONTINUED | OUTPATIENT
Start: 2024-05-01 | End: 2024-05-03 | Stop reason: HOSPADM

## 2024-05-01 RX ORDER — ONDANSETRON 4 MG/1
4 TABLET, ORALLY DISINTEGRATING ORAL EVERY 8 HOURS PRN
Status: DISCONTINUED | OUTPATIENT
Start: 2024-05-01 | End: 2024-05-03 | Stop reason: HOSPADM

## 2024-05-01 RX ORDER — CYCLOBENZAPRINE HCL 10 MG
10 TABLET ORAL 3 TIMES DAILY PRN
Status: DISCONTINUED | OUTPATIENT
Start: 2024-05-01 | End: 2024-05-03 | Stop reason: HOSPADM

## 2024-05-01 RX ORDER — POLYETHYLENE GLYCOL 3350 17 G/17G
17 POWDER, FOR SOLUTION ORAL DAILY PRN
Status: DISCONTINUED | OUTPATIENT
Start: 2024-05-01 | End: 2024-05-03 | Stop reason: HOSPADM

## 2024-05-01 RX ORDER — FAMOTIDINE 20 MG/1
20 TABLET, FILM COATED ORAL 2 TIMES DAILY
Status: DISCONTINUED | OUTPATIENT
Start: 2024-05-01 | End: 2024-05-03 | Stop reason: HOSPADM

## 2024-05-01 RX ORDER — ATORVASTATIN CALCIUM 40 MG/1
80 TABLET, FILM COATED ORAL NIGHTLY
Status: DISCONTINUED | OUTPATIENT
Start: 2024-05-01 | End: 2024-05-03 | Stop reason: HOSPADM

## 2024-05-01 RX ORDER — ALBUTEROL SULFATE 90 UG/1
2 AEROSOL, METERED RESPIRATORY (INHALATION) EVERY 4 HOURS PRN
Status: DISCONTINUED | OUTPATIENT
Start: 2024-05-01 | End: 2024-05-03 | Stop reason: HOSPADM

## 2024-05-01 RX ORDER — METOPROLOL SUCCINATE 50 MG/1
50 TABLET, EXTENDED RELEASE ORAL 2 TIMES DAILY
Status: DISCONTINUED | OUTPATIENT
Start: 2024-05-01 | End: 2024-05-03 | Stop reason: HOSPADM

## 2024-05-01 RX ORDER — POTASSIUM CHLORIDE 7.45 MG/ML
10 INJECTION INTRAVENOUS PRN
Status: DISCONTINUED | OUTPATIENT
Start: 2024-05-01 | End: 2024-05-03 | Stop reason: HOSPADM

## 2024-05-01 RX ORDER — DIVALPROEX SODIUM 500 MG/1
500 TABLET, DELAYED RELEASE ORAL EVERY 12 HOURS SCHEDULED
Status: DISCONTINUED | OUTPATIENT
Start: 2024-05-01 | End: 2024-05-01 | Stop reason: ALTCHOICE

## 2024-05-01 RX ORDER — ENOXAPARIN SODIUM 100 MG/ML
40 INJECTION SUBCUTANEOUS DAILY
Status: DISCONTINUED | OUTPATIENT
Start: 2024-05-01 | End: 2024-05-03

## 2024-05-01 RX ORDER — POTASSIUM CHLORIDE 20 MEQ/1
40 TABLET, EXTENDED RELEASE ORAL PRN
Status: DISCONTINUED | OUTPATIENT
Start: 2024-05-01 | End: 2024-05-03 | Stop reason: HOSPADM

## 2024-05-01 RX ORDER — DIVALPROEX SODIUM 500 MG/1
1000 TABLET, DELAYED RELEASE ORAL NIGHTLY
Status: DISCONTINUED | OUTPATIENT
Start: 2024-05-01 | End: 2024-05-03 | Stop reason: HOSPADM

## 2024-05-01 RX ORDER — DIVALPROEX SODIUM 500 MG/1
500 TABLET, DELAYED RELEASE ORAL EVERY MORNING
Status: DISCONTINUED | OUTPATIENT
Start: 2024-05-01 | End: 2024-05-03 | Stop reason: HOSPADM

## 2024-05-01 RX ORDER — GLUCAGON 1 MG/ML
1 KIT INJECTION PRN
Status: DISCONTINUED | OUTPATIENT
Start: 2024-05-01 | End: 2024-05-03 | Stop reason: HOSPADM

## 2024-05-01 RX ORDER — SODIUM CHLORIDE 0.9 % (FLUSH) 0.9 %
5-40 SYRINGE (ML) INJECTION PRN
Status: DISCONTINUED | OUTPATIENT
Start: 2024-05-01 | End: 2024-05-03 | Stop reason: HOSPADM

## 2024-05-01 RX ORDER — ACETAMINOPHEN 325 MG/1
650 TABLET ORAL EVERY 6 HOURS PRN
Status: DISCONTINUED | OUTPATIENT
Start: 2024-05-01 | End: 2024-05-03 | Stop reason: HOSPADM

## 2024-05-01 RX ORDER — ONDANSETRON 2 MG/ML
4 INJECTION INTRAMUSCULAR; INTRAVENOUS EVERY 6 HOURS PRN
Status: DISCONTINUED | OUTPATIENT
Start: 2024-05-01 | End: 2024-05-03 | Stop reason: HOSPADM

## 2024-05-01 RX ORDER — SODIUM CHLORIDE 0.9 % (FLUSH) 0.9 %
5-40 SYRINGE (ML) INJECTION EVERY 12 HOURS SCHEDULED
Status: DISCONTINUED | OUTPATIENT
Start: 2024-05-01 | End: 2024-05-03 | Stop reason: HOSPADM

## 2024-05-01 RX ORDER — CETIRIZINE HYDROCHLORIDE 10 MG/1
10 TABLET ORAL DAILY
Status: DISCONTINUED | OUTPATIENT
Start: 2024-05-01 | End: 2024-05-01

## 2024-05-01 RX ORDER — LISINOPRIL 5 MG/1
5 TABLET ORAL DAILY
Status: DISCONTINUED | OUTPATIENT
Start: 2024-05-01 | End: 2024-05-03 | Stop reason: HOSPADM

## 2024-05-01 RX ORDER — INSULIN GLARGINE 100 [IU]/ML
50 INJECTION, SOLUTION SUBCUTANEOUS NIGHTLY
Status: DISCONTINUED | OUTPATIENT
Start: 2024-05-01 | End: 2024-05-03 | Stop reason: HOSPADM

## 2024-05-01 RX ORDER — PANTOPRAZOLE SODIUM 40 MG/1
40 TABLET, DELAYED RELEASE ORAL
Status: DISCONTINUED | OUTPATIENT
Start: 2024-05-01 | End: 2024-05-01

## 2024-05-01 RX ORDER — HYDROXYZINE HYDROCHLORIDE 25 MG/1
25 TABLET, FILM COATED ORAL 4 TIMES DAILY PRN
Status: DISCONTINUED | OUTPATIENT
Start: 2024-05-01 | End: 2024-05-01

## 2024-05-01 RX ORDER — ASPIRIN 81 MG/1
81 TABLET, CHEWABLE ORAL DAILY
Status: DISCONTINUED | OUTPATIENT
Start: 2024-05-01 | End: 2024-05-03 | Stop reason: HOSPADM

## 2024-05-01 RX ADMIN — METFORMIN HYDROCHLORIDE 1000 MG: 500 TABLET ORAL at 16:20

## 2024-05-01 RX ADMIN — METOPROLOL SUCCINATE 50 MG: 50 TABLET, EXTENDED RELEASE ORAL at 08:58

## 2024-05-01 RX ADMIN — ATORVASTATIN CALCIUM 80 MG: 40 TABLET, FILM COATED ORAL at 19:53

## 2024-05-01 RX ADMIN — LISINOPRIL 5 MG: 5 TABLET ORAL at 08:58

## 2024-05-01 RX ADMIN — ENOXAPARIN SODIUM 40 MG: 100 INJECTION SUBCUTANEOUS at 08:58

## 2024-05-01 RX ADMIN — FAMOTIDINE 20 MG: 20 TABLET, FILM COATED ORAL at 08:58

## 2024-05-01 RX ADMIN — ASPIRIN 81 MG: 81 TABLET, CHEWABLE ORAL at 08:57

## 2024-05-01 RX ADMIN — CARBIDOPA AND LEVODOPA 1 TABLET: 10; 100 TABLET ORAL at 08:57

## 2024-05-01 RX ADMIN — GABAPENTIN 100 MG: 100 CAPSULE ORAL at 14:24

## 2024-05-01 RX ADMIN — DULOXETINE HYDROCHLORIDE 60 MG: 60 CAPSULE, DELAYED RELEASE ORAL at 08:58

## 2024-05-01 RX ADMIN — DIVALPROEX SODIUM 500 MG: 500 TABLET, DELAYED RELEASE ORAL at 08:58

## 2024-05-01 RX ADMIN — INSULIN GLARGINE 50 UNITS: 100 INJECTION, SOLUTION SUBCUTANEOUS at 19:53

## 2024-05-01 RX ADMIN — Medication 10 ML: at 19:53

## 2024-05-01 RX ADMIN — METFORMIN HYDROCHLORIDE 1000 MG: 500 TABLET ORAL at 08:58

## 2024-05-01 RX ADMIN — Medication 2 PUFF: at 09:05

## 2024-05-01 RX ADMIN — GABAPENTIN 100 MG: 100 CAPSULE ORAL at 19:55

## 2024-05-01 RX ADMIN — GABAPENTIN 100 MG: 100 CAPSULE ORAL at 08:58

## 2024-05-01 RX ADMIN — CARBIDOPA AND LEVODOPA 1 TABLET: 10; 100 TABLET ORAL at 19:53

## 2024-05-01 RX ADMIN — Medication 10 ML: at 08:58

## 2024-05-01 RX ADMIN — CYCLOBENZAPRINE 10 MG: 10 TABLET, FILM COATED ORAL at 08:58

## 2024-05-01 RX ADMIN — FAMOTIDINE 20 MG: 20 TABLET, FILM COATED ORAL at 19:53

## 2024-05-01 RX ADMIN — DIVALPROEX SODIUM 1000 MG: 500 TABLET, DELAYED RELEASE ORAL at 19:53

## 2024-05-01 RX ADMIN — CARBIDOPA AND LEVODOPA 1 TABLET: 10; 100 TABLET ORAL at 14:24

## 2024-05-01 ASSESSMENT — PAIN SCALES - GENERAL
PAINLEVEL_OUTOF10: 8
PAINLEVEL_OUTOF10: 8

## 2024-05-01 ASSESSMENT — LIFESTYLE VARIABLES
HOW MANY STANDARD DRINKS CONTAINING ALCOHOL DO YOU HAVE ON A TYPICAL DAY: PATIENT DOES NOT DRINK
HOW OFTEN DO YOU HAVE A DRINK CONTAINING ALCOHOL: NEVER

## 2024-05-01 ASSESSMENT — PAIN DESCRIPTION - LOCATION
LOCATION: COCCYX
LOCATION: COCCYX

## 2024-05-01 ASSESSMENT — PAIN - FUNCTIONAL ASSESSMENT: PAIN_FUNCTIONAL_ASSESSMENT: NONE - DENIES PAIN

## 2024-05-01 NOTE — H&P
[facility] Medicine History & Physical      Patient Name: Chano Quiroga Jr.    : 1960    PCP: Elisabeth Galindo APRN - CNP    Date of Service:  Patient seen and examined on 24     Chief Complaint:  fall    History Of Present Illness:    Chano Quiroga Jr. is a 63 y.o. male with a PMH of COPD, CAD status post PCI, CHF, GERD, hypertension, hyperlipidemia, insulin-dependent diabetes mellitus, Parkinson's disease, bipolar disorder, recurrent falls who presented to ED with complaint of recurrent fall.    Of note patient was admitted  to  for frequent falls.  Patient endorses that he has been having frequent falls over the last year..  States that his most recent fall he fell on his coccyx with associated pain.  Difficulty in ambulation, with lightheadedness on moving from lying to standing position.    Labs showed sodium 136, potassium 4.6 chloride 98 creatinine 0.7 anion gap 17 magnesium 1.7 glucose 126 troponin 18/15.  WBC 7.8 hemoglobin 15.1.  COVID-negative.  CT head shows no acute abnormality CT pelvis shows acute comminuted nondisplaced fracture of the proximal coccyx.  Chest x-ray shows no acute abnormality  EKG shows arrhythmia.  In the ED patient received Percocet and Tdap vaccine    Admission for neuroconsult PT OT with possible place    Past Medical History:    Patient has a past medical history of Arthritis, Asthma, CAD (coronary artery disease), CHF (congestive heart failure) (HCC), Chronic obstructive pulmonary disease (HCC), COPD (chronic obstructive pulmonary disease) (HCC), Fatty liver, GERD (gastroesophageal reflux disease), Hyperlipidemia, Hypertension, Medical history reviewed with no changes, MI, old, Myocardial infarction, nontransmural (HCC), Parkinson's disease (HCC), Sleep apnea, and Type II or unspecified type diabetes mellitus without mention of complication, not stated as uncontrolled.    Past Surgical History:    Patient has a past surgical history that  tablet take 1 tablet by oral route 4 times every day as needed for itching 3/29/23   Ismael Espinoza, APRN - CNP   DULoxetine (CYMBALTA) 60 MG extended release capsule TAKE ONE CAPSULE BY MOUTH EVERY DAY 3/3/23   Ismael Espinoza, APRN - CNP   esomeprazole (NEXIUM) 40 MG delayed release capsule Take 1 capsule by mouth daily    Provider, MD Giovanny       Allergies:  Other and Pcn [penicillins]    Social History:      TOBACCO:   reports that he quit smoking about 22 years ago. His smoking use included cigarettes. He started smoking about 46 years ago. He has a 48.0 pack-year smoking history. He has never used smokeless tobacco.  ETOH:   reports no history of alcohol use.  DRUGS:  reports no history of drug use.    Family History:      Reviewed in detail positive as follows:        Problem Relation Age of Onset    Heart Disease Mother     Heart Failure Mother     Cancer Father     Diabetes Maternal Grandmother     Heart Failure Maternal Uncle     Hypertension Maternal Uncle     Asthma Neg Hx     Emphysema Neg Hx        REVIEW OF SYSTEMS:   Pertinent positives as noted in the HPI. All other systems reviewed and negative.    PHYSICAL EXAM PERFORMED:    /65   Pulse 68   Temp 97 °F (36.1 °C) (Oral)   Resp 18   Ht 1.626 m (5' 4\")   Wt 99.3 kg (219 lb)   SpO2 96%   BMI 37.59 kg/m²     General appearance:  Awake, alert, no apparent distress  HEENT:  Normocephalic, atraumatic   Neck: Supple, with full range of motion. No JVD. Trachea midline.  Respiratory:  Clear to auscultation bilaterally   Cardiovascular:  Regular rate and rhythm   Abdomen: Soft, NT, ND, Normal bowel sounds.  Extremities:  No clubbing, cyanosis, or edema bilaterally.    Skin: No rashes or lesions. Warm/dry.  Neurologic:  grossly non-focal. Alert and oriented x 3. Normal speech.    Labs:   CBC   Recent Labs     04/30/24 2019   WBC 7.8   HGB 15.1   HCT 43.9   *        RENAL  Recent Labs     04/30/24 2019      K 4.6   CL

## 2024-05-01 NOTE — ED PROVIDER NOTES
[facility] EMERGENCY DEPARTMENT  EMERGENCY DEPARTMENT ENCOUNTER        Pt Name: Chano Quiroga Jr.  MRN: [id number]  Birthdate 1960  Date of evaluation: 4/30/2024  Provider: Luke Pradhan MD  PCP: Elisabeth Galindo APRN - CNP  Note Started: 9:33 PM EDT 4/30/24    CHIEF COMPLAINT       Chief Complaint   Patient presents with    Fall     Pt here after falling while trying to get into the shower. Pt denies hitting his head. Report pain in his tailbone and shins where he scraped his legs.        HISTORY OF PRESENT ILLNESS: 1 or more Elements     History from : Patient    Limitations to history : None    Chano Quiroga Jr. is a 63 y.o. male who presents with sacral pain after fall while getting into the shower.  Has been dizzy x 1 year, progressively worsening, feels constantly unsteady like the room is moving around him.  No focal weakness.  Abrasion to the R leg.  Symptoms not otherwise alleviated or exacerbated by other factors.      Nursing Notes were all reviewed and agreed with or any disagreements were addressed in the HPI.    REVIEW OF SYSTEMS :      Positives and Pertinent negatives as per HPI.  ROS otherwise unremarkable.    SURGICAL HISTORY     Past Surgical History:   Procedure Laterality Date    CARDIAC PACEMAKER PLACEMENT  2011    NOT MRI COMPATIABLE OLD LEADS st [facility]. pace maker difib    CARDIOVASCULAR SURGERY      Watchman placement    CARPAL TUNNEL RELEASE Bilateral 2013    CATARACT REMOVAL WITH IMPLANT Left 02/28/2014    COLONOSCOPY      CORONARY ANGIOPLASTY WITH STENT PLACEMENT  2001,2008    CYST REMOVAL  1982    coccyx area    DIAGNOSTIC CARDIAC CATH LAB PROCEDURE      ENDOSCOPY, COLON, DIAGNOSTIC      ERCP  09/15/2013    ERCP  10/11/2013    WITH STENT REMOVAL    EYE SURGERY      FOOT SURGERY Right 07/09/2018     REPAIR CALCANEAL SPUR, REPAIR OF ACHILLES TENDON RIGHT FOOT    NERVE SURGERY Bilateral 12/01/2020    BILATERAL LUMBAR THREE LUMBAR FOUR LUMBAR FIVE DORSAL RAMUS

## 2024-05-01 NOTE — PROGRESS NOTES
[facility] called and sent tele strips. Pt is having lots of bigeminy/PVC's this AM. MD notified. No new orders at this time.

## 2024-05-01 NOTE — PROGRESS NOTES
Per [facility] approved formulary policy.     SGLT2's are only formulary with the indication of CKD or CHF therefore:    Please note that the  Empagliflozin (Jardiance) is non-formulary with indications of type 2 diabetes and has been discontinued while inpatient. If you feel the patient needs to continue their home therapy during the inpatient stay, the patient may bring their medication bottle for verification and administration pursuant to our home medication use policy.      Please contact the pharmacy with any questions or concerns.  Thank you.    Vicki Johnson RPH  5/1/2024 7:21 AM

## 2024-05-01 NOTE — DISCHARGE INSTRUCTIONS
Your information:  Name: Chano Quiroga Jr.  : 1960    Your instructions:    Follow up with family doctor in 1 week.    What to do after you leave the hospital:    Recommended diet: diabetic    Recommended activity: activity as tolerated        The following personal items were collected during your admission and were returned to you:    Belongings  Dental Appliances: None  Vision - Corrective Lenses: None  Hearing Aid: None  Clothing: At home  Jewelry: None  Body Piercings Removed: N/A  Electronic Devices: Other (Comment) (LIFE ALERT NECKLACE)  Weapons (Notify Protective Services/Security): None  Other Valuables: Emergency Alert Device  Home Medications: None  Valuables Given To: Patient  Provide Name(s) of Who Valuable(s) Were Given To: PATIENT  Responsible person(s) in the waiting room: Carynrose mary Cantu, sister  Patient approves for provider to speak to responsible person post operatively: Yes    Information obtained by:  By signing below, I understand that if any problems occur once I leave the hospital I am to contact family doctor.  I understand and acknowledge receipt of the instructions indicated above.   Heart Failure Resources:  Heart Failure Interactive Workbook:  Go to [email or web address]/publication/?x=326716 for a Free Heart Failure Interactive Workbook provided by The American Heart Association. This interactive workbook will provide information on Healthier Living with Heart Failure. Please copy and paste link into search bar. Use your mouse to scroll through the pages.    HF [place] amaya:   Heart Failure Free smart phone amaya available for iPhone and Android download. Use your phone to track sodium intake, fluid intake, symptoms, and weight.     Low Sodium Diet / Recipes:  Go to www.[employer]ump!.org website for “renal” diet which is Low Sodium! [employer] is a dialysis company, but this website offers free seasonal cookbooks. Each quarter, they will release 25-30 new recipes with a  breakdown of calories, sodium, and glucose. You can also go to [email or web address] [email or web address]/recipes website for free recipes.     Discharge Instruction Video:  Scan the QR code below with your camera and click the canva.com link to open the video and watch educational information on Heart Failure and Medications from one of our nurses.   https://[email or web address]/design/DAFZnsH_JRk/9LvktnbOSQUfeWZjxK6qnx/edit    Home Exercise Program:   Identification of Green/Yellow/Red zones:  You should be able to identify when you feel good (green zone), if you have 1-2 symptoms of HF (yellow zone), or if you are in need of medical attention (red zone).  In your CHF education folder you were provided a “stop light tool” to outline this information.     We want to you to rate your exertion levels:    Our therapy team has discussed means of identification with you such as the \"Kamille scale.\"  The Kamille rating scale ranges from 6 to 20, where 6 means \"no exertion at all\" and 20 means \"maximal exertion.\" The goal is to use this to gauge how much effort it is taking for you to do your normal daily tasks.   You should be able to recognize when too much exertion is being expended.    Elements of Energy Conservation:   Prioritize/Plan: Decide what needs to be done today, and what can wait for a later date, write to do lists, plan ahead to avoid extra trips, and gather supplies and equipment needed before starting an activity.   Position: Avoid tiring and awkward posture that may impair breathing.  Pace: Slow and steady pace, never rushing!  Pursed lip breathing.  Pursed Lip Breathing: \"Smell the roses, blow out the candles\".               Home

## 2024-05-01 NOTE — PLAN OF CARE
Pt with parkinsons disease, bipolar disorder presenting with fall and coccyx fracture   Meds and orders reviewed  Start PT   Avoid hypotension, cut down BP meds

## 2024-05-01 NOTE — PROGRESS NOTES
Denied dizziness during ortho BP's but states that he does experience dizziness at times while at home when he goes from sitting to standing

## 2024-05-01 NOTE — PROGRESS NOTES
Inpatient Occupational Therapy Evaluation and Treatment    Unit: 2 [place]  Date:  2024  Patient Name:    Chano Quiroga Jr.  Admitting diagnosis:  Dizziness [R42]  Multiple falls [R29.6]  Abrasion of right leg, initial encounter [S80.811A]  Closed fracture of coccyx, initial encounter ([facility]) [S32.2XXA]  Fall as cause of accidental injury in home as place of occurrence, sequela [W19.XXXS, Y92.009]  Unsteady gait when walking [R26.81]  Admit Date:  2024  Precautions/Restrictions/WB Status/ Lines/ Wounds/ Oxygen: Fall risk and Bed/chair alarm; up as tolerated.   Closed fracture of coccyx   Pt seen for cotreatment this date due to complexity of condition    Treatment Time:  9846-1531  Treatment Number:  1  Timed Code Treatment Minutes: 33 minutes  Total Treatment Minutes:  43  minutes    Patient Goals for Therapy: \"go to rehab       Discharge Recommendations: SNF  DME needs for discharge: Defer to facility       Therapy recommendations for staff:   Assist of 1 for transfers with use of rolling walker (RW) and gait belt to/from bathroom    History of Present Illness: per H&P   Taken by EMS to hospital following a fall at home, resulting in coccygeal fracture.   H/O PD, evaluated by neurology 24: \"The etiology is likely Parkinson disease or drug-induced parkinsonism from chronic use of Depakote. The patient was started Sinemet on the last hospitalization. But the patient never had any follow-up appointment with neurology. The patient denies significant side effect from Sinemet.\"  Home Health S4 Level Recommendation:  NA    AM-PAC Score: 16    Subjective:  Patient lying reclined in bed with no family present.   Pt agreeable to this OT session.     Cognition:    A&O x4   Able to follow 2 step commands    Pain:   Yes  Location: coccyx  Ratin /10 with sit to stand   Pain Medicine Status: No request made    Preadmission Environment:   Pt. Lives Alone, brother lives in the same apartment building   Pt looks  after his brother who has paranoid schizophrenia, pt states that his brother is able to provide some assistance    Home environment:    apartment   Steps to enter first floor:   0 steps to enter       Steps to second floor: N/A  Bathroom:       walk in shower  ,  standard height toilet, grab bars   Equipment owned:      SPC and Rollator, standard walker, rolling walker, lift chair     Preadmission Status:  History of falls             Yes, numerous  Pt. Able to drive          Yes, drives a minivan  Pt Fully independent with ADL's         Yes holds onto grab bars in the shower   Pt. Required assistance from family for:  Independent PTA  , sister cleans for pt   Pt. Fully independent for transfers and gait and walked with rollator.  Pt has to walk dogs very frequently during the day.   HHA comes 3x/weekly - they do all cooking and cleaning.    Objective:  Does this pt have an acute or acute on chronic diagnosis of CHF? No    Upper Extremity ROM:    WFL,  pt able to perform all bed mobility, transfers, and gait without ROM limitation.    Dominant Hand:   Left    Upper Extremity Strength:    4/5    Upper Extremity Sensation:    WFL    Upper Extremity Proprioception:  WFL    Coordination:  WFL    Tone:  WFL    Balance:  Sitting:    Supervision  Standing:    CGA and With RW and gait belt    Bed Mobility:   Supine to Sit:    SBA  Sit to Supine:   Not Tested  Rolling:   Not Tested  Scooting in sitting: SBA  Scooting in supine:  Not Tested    Transfers:    Sit to stand:    CGA and With RW and gait belt- pt impulsive with decreased safety awareness   Stand to sit:    CGA and With RW and gait belt  Bed to chair:     CGA and With RW and gait belt  Bed/ chair to standard toilet:  CGA and With RW and gait belt  Bed/chair to BSC:   Not Tested  Functional Mobility:   CGA and With RW and gait belt- pt pauses frequently when ambulating     See PT note for gait analysis.    ADLs:  Dressing:      UE:   Not Tested  LE:    Max A  to don

## 2024-05-01 NOTE — PROGRESS NOTES
4 Eyes Skin Assessment     NAME:  Chano Quiroga Jr.  YOB: 1960  MEDICAL RECORD NUMBER:  [id number]    The patient is being assessed for  Admission    I agree that at least one RN has performed a thorough Head to Toe Skin Assessment on the patient. ALL assessment sites listed below have been assessed.      Areas assessed by both nurses:    Head, Face, Ears, Shoulders, Back, Chest, Arms, Elbows, Hands, Sacrum. Buttock, Coccyx, Ischium, and Legs. Feet and Heels:  bruise R chest; R hip.  Abrasions bilat shins and scattered        Does the Patient have a Wound? No noted wound(s)       Omega Prevention initiated by RN: Yes  Wound Care Orders initiated by RN: No    Pressure Injury (Stage 3,4, Unstageable, DTI, NWPT, and Complex wounds) if present, place Wound referral order by RN under : No    New Ostomies, if present place, Ostomy referral order under : No     Nurse 1 eSignature: Electronically signed by Lauren Jacobson RN on 5/1/24 at 4:03 AM EDT    **SHARE this note so that the co-signing nurse can place an eSignature**    Nurse 2 eSignature: Electronically signed by Katerine Caldwell RN on 5/1/24 at 6:34 AM EDT

## 2024-05-01 NOTE — PROGRESS NOTES
Admitted to [facility] at this time.  A/o; states he cannot ambulate to the bed.  Laying supine; states that he is comfortable laying this way.  Suggested that he try to sleep on his side to alleviate pressure; states that he is fine at this time.  Pillow support for coccyx.  Attempted to get oob to stand and void; instructed that he needs to stay in bed with his h/o falls.  States that he fell yesterday and his tailbone became more painful so he used his life alert necklace.  Admitted for SNF placement.  Pleasant and cooperative, call light in reach.

## 2024-05-01 NOTE — CARE COORDINATION
Discharge Planning Note Re: Skilled Nursing     CM/SW noted consult for discharge planning. Chart reviewed. Noted recommendations for SNF.  CM met with patient. Introduced self and explained role of CM and discharge planning.    Patient is agreeable to SNF on dc.     [place] of choice list was provided with basic dialogue that supports the patient's individualized plan of care/goals, treatment preferences and shares the quality data associated with the providers. [x] Yes [] No.  Patient has reviewed the provided list and made selections.    Referral made to [facility] - no beds available   2. [facility] - referral in progress    CM/SW will follow-up on referrals and provide any additional documentation necessary to facilitate placement.

## 2024-05-01 NOTE — PLAN OF CARE
Problem: Discharge Planning  Goal: Discharge to home or other facility with appropriate resources  Outcome: Progressing  Flowsheets (Taken 5/1/2024 0203 by Lauren Jacobson, RN)  Discharge to home or other facility with appropriate resources: Refer to discharge planning if patient needs post-hospital services based on physician order or complex needs related to functional status, cognitive ability or social support system     Problem: Pain  Goal: Verbalizes/displays adequate comfort level or baseline comfort level  5/1/2024 1118 by Dariana Edwards RN  Outcome: Progressing  5/1/2024 0400 by Lauren Jacobson RN  Outcome: Progressing     Problem: Safety - Adult  Goal: Free from fall injury  5/1/2024 1118 by Dariana Edwards RN  Outcome: Progressing  5/1/2024 0400 by Lauren Jacobson RN  Outcome: Progressing     Problem: Skin/Tissue Integrity  Goal: Absence of new skin breakdown  Description: 1.  Monitor for areas of redness and/or skin breakdown  2.  Assess vascular access sites hourly  3.  Every 4-6 hours minimum:  Change oxygen saturation probe site  4.  Every 4-6 hours:  If on nasal continuous positive airway pressure, respiratory therapy assess nares and determine need for appliance change or resting period.  5/1/2024 1118 by Dariana Edwards RN  Outcome: Progressing  5/1/2024 0400 by Lauren Jacobson RN  Outcome: Progressing     Problem: Chronic Conditions and Co-morbidities  Goal: Patient's chronic conditions and co-morbidity symptoms are monitored and maintained or improved  Outcome: Progressing

## 2024-05-01 NOTE — CARE COORDINATION
Case Management Assessment  Initial Evaluation    Date/Time of Evaluation: 5/1/2024 8:51 AM  Assessment Completed by: Rachel Holly    If patient is discharged prior to next notation, then this note serves as note for discharge by case management.    Patient Name: Chano Quiroga Jr.                   YOB: 1960  Diagnosis: Dizziness [R42]  Multiple falls [R29.6]  Abrasion of right leg, initial encounter [S80.811A]  Closed fracture of coccyx, initial encounter ([facility]) [S32.2XXA]  Fall as cause of accidental injury in home as place of occurrence, sequela [W19.XXXS, Y92.009]  Unsteady gait when walking [R26.81]                   Date / Time: 4/30/2024  7:22 PM    Patient Admission Status: Inpatient   Readmission Risk (Low < 19, Mod (19-27), High > 27): Readmission Risk Score: 17.4    Current PCP: Elisabeth Galindo APRN - CNP  PCP verified by CM? Yes    Chart Reviewed: Yes      History Provided by: Patient  Patient Orientation: Alert and Oriented    Patient Cognition: Alert    Hospitalization in the last 30 days (Readmission):  No    If yes, Readmission Assessment in CM Navigator will be completed.    Advance Directives:      Code Status: Full Code   Patient's Primary Decision Maker is: Named in Scanned ACP Document    Primary Decision Maker: Caryn Cantu - Brother/Sister - [phone]    Secondary Decision Maker: Miguelito Quiroga - Brother/Sister - [phone]    Discharge Planning:    Patient lives with: Alone Type of Home: Apartment  Primary Care Giver: Self  Patient Support Systems include: Family Members   Current Financial resources: Medicare  Current community resources: None  Current services prior to admission: Durable Medical Equipment, Home Care            Current DME: Walker, Cane            Type of Home Care services:  Aide Services    ADLS  Prior functional level: Assistance with the following:, Mobility  Current functional level: Assistance with the following:, Mobility    PT AM-PAC:    /24  OT AM-PAC:   /24    Family can provide assistance at DC: No  Would you like Case Management to discuss the discharge plan with any other family members/significant others, and if so, who? Yes (Miguelito - brother)  Plans to Return to Present Housing: Unknown at present  Other Identified Issues/Barriers to RETURNING to current housing: stairs, falls   Potential Assistance needed at discharge: Skilled Nursing Facility            Potential DME:    Patient expects to discharge to: Skilled nursing facility  Plan for transportation at discharge:      Financial    Payor: [facility] MEDICARE / Plan: [employer] / Product Type: *No Product type* /     Does insurance require precert for SNF: Yes    Potential assistance Purchasing Medications: No  Meds-to-Beds request: No      [facility] [place] - [address] [place] [address] - P [phone] - F [phone]  [address] [place], [place] [address] [address]  Phone: [phone] Fax: [phone]    [facility] - [place], [place] - [address] - P [phone] - F [phone]  [address]  [address]  [facility] [address]  Phone: [phone] Fax: [phone]    [facility] - [place], [place] - [address] - P [phone] - F [phone]  [address]  [facility] [address]  Phone: [phone] Fax: [phone]    [facility] - [place] [address] - P [phone] - F [phone]  [address] [place] [address] 2  [facility] [address]  Phone: [phone] Fax: [phone]      Notes:    Factors facilitating achievement of predicted outcomes: Family support, Has needed Durable Medical Equipment at home, and Knowledge about rehab    Barriers to discharge: Medical complications and Stairs at home    Additional Case Management Notes: Spoke to pt at bedside and explained role of CM.  Pt is from home alone.  Frequent falls, uses cane and walker at baseline.  Active with [facility] prior to admission.  Agreeable

## 2024-05-01 NOTE — PROGRESS NOTES
Inpatient Physical Therapy Evaluation & Treatment    Unit: [facility]  Date:  5/1/2024  Patient Name:    Chano Quiroga Jr.  Admitting diagnosis:  Dizziness [R42]  Multiple falls [R29.6]  Abrasion of right leg, initial encounter [S80.811A]  Closed fracture of coccyx, initial encounter ([facility]) [S32.2XXA]  Fall as cause of accidental injury in home as place of occurrence, sequela [W19.XXXS, Y92.009]  Unsteady gait when walking [R26.81]  Admit Date:  4/30/2024  Precautions/Restrictions/WB Status/ Lines/ Wounds/ Oxygen: Fall risk and Bed/chair alarm; up as tolerated.    Pt seen for cotreatment this date due to acute illness/injury    Treatment Time: 10:12- 10:50  Treatment Number:  1   Timed Code Treatment Minutes: 28 minutes  Total Treatment Minutes:  38  minutes    Patient Stated Goals for Therapy: \" Maybe \"  when asked if he wanted to go to SNF again.         Discharge Recommendations: SNF  DME needs for discharge: Needs Met       Therapy recommendation for EMS Transport: can transport by wheelchair    Therapy recommendations for staff:   Assist of 1 for ambulation with use of rolling walker (RW) and gait belt within room  within halls    History of Present Illness:   Taken by EMS to hospital following a fall at home, resulting in coccygeal fracture.   H/O PD, evaluated by neurology 5/1/24: \"The etiology is likely Parkinson disease or drug-induced parkinsonism from chronic use of Depakote. The patient was started Sinemet on the last hospitalization. But the patient never had any follow-up appointment with neurology. The patient denies significant side effect from Sinemet.\"    Home Health S4 Level Recommendation:  NA        AM-PAC Mobility Score       AM-PAC Inpatient Mobility without Stair Climbing Raw Score : 17      Subjective  Patient lying reclined in bed with no family present.  Pt agreeable to this PT session.   Was at [facility] for 1 week following last hospitalization. Did not like it there, \"people were  pt with no adverse symptoms to activity. Orthostatic vitals gathered earlier today by RN. He denied dizziness during this session.    Positioning Needs   Pt up in chair, alarm set, positioned in proper neutral alignment and pressure relief provided.   Call light provided and all needs within reach    Other Activities  None.    Patient/Family Education   Pt educated on role of inpatient PT, POC, importance of continued activity, safety awareness, transfer techniques, and calling for assist with mobility.    Assessment  Pt seen today for physical therapy Evaluation & Treatment. Since leaving SNF ~3 weeks ago, he has had 4-5 falls, per his report all related to dizziness upon standing. Was not orthostatic this morning per RN and no complaints of dizziness during this session.  He mobilized today similar to prior evaluations in past, with typical Parkinsonian gait pattern. He demonstrates mildly impulsive behaviors during transfers which create unsafe situations. He is able to verbally describe safe habits, however does not demonstrate equivalently.     Recommending SNF upon discharge as patient functioning well below baseline, demonstrates good rehab potential and unable to return home due to limited or no family support, home environment not conducive to patient recovery, and limited safety awareness.    Goals :   To be met in 3 visits:  1). Independent with LE Ex x 10 reps  2). Sit to/from stand: Supervision  3). Bed to chair: Supervision    To be met in 6 visits:  1).  Supine to/from sit: Independent  2).  Sit to/from stand: Modified Independent  3).  Bed to chair: Modified Independent  4).  Gait: Ambulate 150 ft.  with Modified Independent and use of LRAD (least restrictive assistive device)  5).  Tolerate B LE exercises 3 sets of 10-15 reps    Rehabilitation Potential: Good  Strengths for achieving goals include:   Pt cooperative   Barriers to achieving goals include:    Chronicity of condition    Plan    To be

## 2024-05-01 NOTE — CONSULTS
Neurology consultation note    Patient name: Chano Quiroga Jr.      Chief Complaint:  Frequent falls.    History of present illness:  This is a 63 years old right-handed male.  The patient was brought to the hospital early this morning due to another fall with coccygeal fracture.  The patient was diagnosed with acute shortness from essentially.  The etiology is likely Parkinson disease or drug-induced parkinsonism from chronic use of Depakote.  The patient was started Sinemet on the last hospitalization.  But the patient never had any follow-up appointment with neurology.  The patient denies significant side effect from Sinemet.  The patient also denies focal weakness or numbness.    Past medical history:    Past Medical History:   Diagnosis Date    Arthritis     Asthma     CAD (coronary artery disease)     CHF (congestive heart failure) (HCC)     Chronic obstructive pulmonary disease (HCC)     COPD (chronic obstructive pulmonary disease) (HCC)     Fatty liver     GERD (gastroesophageal reflux disease)     Hyperlipidemia     Hypertension     Medical history reviewed with no changes     MI, old     Myocardial infarction, nontransmural (HCC) 02/22/2012    Parkinson's disease (HCC)     Sleep apnea     pt wears cpap at night. states does not have cpap    Type II or unspecified type diabetes mellitus without mention of complication, not stated as uncontrolled        Past surgical history:    Past Surgical History:   Procedure Laterality Date    CARDIAC PACEMAKER PLACEMENT  2011    NOT MRI COMPATIABLE OLD LEADS [facility]. pace maker difib    CARDIOVASCULAR SURGERY      Watchman placement    CARPAL TUNNEL RELEASE Bilateral 2013    CATARACT REMOVAL WITH IMPLANT Left 02/28/2014    COLONOSCOPY      CORONARY ANGIOPLASTY WITH STENT PLACEMENT  2001,2008    CYST REMOVAL  1982    coccyx area    DIAGNOSTIC CARDIAC CATH LAB PROCEDURE      ENDOSCOPY, COLON, DIAGNOSTIC      ERCP  09/15/2013    ERCP  10/11/2013    WITH STENT REMOVAL  joint pain, change in speech/vision or new onset of weakness/numbness. Remaining as per HPI.      BP (!) 117/53   Pulse 73   Temp 97 °F (36.1 °C) (Oral)   Resp 16   Ht 1.626 m (5' 4\")   Wt 99.3 kg (219 lb)   SpO2 95%   BMI 37.59 kg/m²     Neurological examination:  MENTAL STATUS:  Alert and oriented to person.  LANG/SPEECH: No dysarthria.  CRANIAL NERVES: No facial asymmetry.  MOTOR: No pronator drift or leg drift.  REFLEXES: 1+ on bilateral ankles..  SENSORY: Loss of proprioception on both feet.  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    Imaging, procedure, and laboratory data:   I reviewed the CT brain.    Assessment:    1.  Recurrent falls.  2.  Parkinsonism.  3.  Sensory ataxia.    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  The patient also has a certain degree of decreased proprioceptive sense with sensory ataxia.    From neurology perspective, these 2 combinations can contribute to the fall in this case.    Plan:    1.  Continue Sinemet 3 times a day.  2.  Sinemet CR 1 tablet at nighttime.  3.  PT/OT/ST.  4.  Minimize sedation and anticholinergic medication.  5.  Management of pain/coccygeal fracture per primary team and orthopedics.      75 minutes were spent with the patient with greater than 50% of the time spent in counseling and coordination of care.    Taryn Coyne MD

## 2024-05-01 NOTE — PROGRESS NOTES
AM assessment completed. C/o coccyx pain 8/10, meds given, see MAR. No signs of symptoms of distress noted. Patient tolerated medications well. Respirations easy and even. Bed in lowest position, bed alarm in place and functioning properly, SR up x 2 and bed in low position. Call light within reach.     Bedside Mobility Assessment Tool (BMAT):     Assessment Level 1- Sit and Shake    1. From a semi-reclined position, ask patient to sit up and rotate to a seated position at the side of the bed. Can use the bedrail.    2. Ask patient to reach out and grab your hand and shake making sure patient reaches across his/her midline.   Pass- Patient is able to come to a seated position, maintain core strength. Maintains seated balance while reaching across midline. Move on to Assessment Level 2.     Assessment Level 2- Stretch and Point   1. With patient in seated position at the side of the bed, have patient place both feet on the floor (or stool) with knees no higher than hips.    2. Ask patient to stretch one leg and straighten the knee, then bend the ankle/flex and point the toes. If appropriate, repeat with the other leg.   Pass- Patient is able to demonstrate appropriate quad strength on intended weight bearing limb(s). Move onto Assessment Level 3.     Assessment Level 3- Stand   1. Ask patient to elevate off the bed or chair (seated to standing) using an assistive device (cane, bedrail).    2. Patient should be able to raise buttocks off be and hold for a count of five. May repeat once.   Pass- Patient maintains standing stability for at least 5 seconds, proceed to assessment level 4.    Assessment Level 4- Walk   1. Ask patient to march in place at bedside.    2. Then ask patient to advance step and return each foot. Some medical conditions may render a patient from stepping backwards, use your best clinical judgement.   Fail- Patient not able to complete tasks OR requires use of assistive device. Patient is MOBILITY

## 2024-05-01 NOTE — PROGRESS NOTES
Transferred care to MAX Aguilar. Face to face bedside report given, no need voiced at this time.

## 2024-05-02 PROBLEM — S32.2XXA CLOSED FRACTURE OF COCCYX (HCC): Status: ACTIVE | Noted: 2024-05-02

## 2024-05-02 PROBLEM — S80.811A ABRASION OF RIGHT LEG, INITIAL ENCOUNTER: Status: ACTIVE | Noted: 2024-05-02

## 2024-05-02 LAB
GLUCOSE BLD-MCNC: 152 MG/DL (ref 70–99)
GLUCOSE BLD-MCNC: 184 MG/DL (ref 70–99)
GLUCOSE BLD-MCNC: 227 MG/DL (ref 70–99)
GLUCOSE BLD-MCNC: 228 MG/DL (ref 70–99)
PERFORMED ON: ABNORMAL

## 2024-05-02 PROCEDURE — 6370000000 HC RX 637 (ALT 250 FOR IP): Performed by: PSYCHIATRY & NEUROLOGY

## 2024-05-02 PROCEDURE — 6360000002 HC RX W HCPCS: Performed by: INTERNAL MEDICINE

## 2024-05-02 PROCEDURE — 99232 SBSQ HOSP IP/OBS MODERATE 35: CPT | Performed by: INTERNAL MEDICINE

## 2024-05-02 PROCEDURE — 6370000000 HC RX 637 (ALT 250 FOR IP): Performed by: INTERNAL MEDICINE

## 2024-05-02 PROCEDURE — 1200000000 HC SEMI PRIVATE

## 2024-05-02 PROCEDURE — 2580000003 HC RX 258: Performed by: INTERNAL MEDICINE

## 2024-05-02 RX ORDER — INSULIN GLARGINE 100 [IU]/ML
10 INJECTION, SOLUTION SUBCUTANEOUS ONCE
Status: COMPLETED | OUTPATIENT
Start: 2024-05-02 | End: 2024-05-02

## 2024-05-02 RX ORDER — POTASSIUM CHLORIDE 20 MEQ/1
TABLET, EXTENDED RELEASE ORAL
Qty: 28 TABLET | Refills: 3 | Status: SHIPPED | OUTPATIENT
Start: 2024-05-02

## 2024-05-02 RX ORDER — CARBIDOPA AND LEVODOPA 50; 200 MG/1; MG/1
1 TABLET, EXTENDED RELEASE ORAL NIGHTLY
Status: DISCONTINUED | OUTPATIENT
Start: 2024-05-02 | End: 2024-05-03 | Stop reason: HOSPADM

## 2024-05-02 RX ADMIN — METOPROLOL SUCCINATE 50 MG: 50 TABLET, EXTENDED RELEASE ORAL at 09:17

## 2024-05-02 RX ADMIN — ASPIRIN 81 MG: 81 TABLET, CHEWABLE ORAL at 09:18

## 2024-05-02 RX ADMIN — METFORMIN HYDROCHLORIDE 1000 MG: 500 TABLET ORAL at 09:17

## 2024-05-02 RX ADMIN — DIVALPROEX SODIUM 500 MG: 500 TABLET, DELAYED RELEASE ORAL at 09:17

## 2024-05-02 RX ADMIN — CARBIDOPA AND LEVODOPA 1 TABLET: 10; 100 TABLET ORAL at 15:06

## 2024-05-02 RX ADMIN — METFORMIN HYDROCHLORIDE 1000 MG: 500 TABLET ORAL at 18:05

## 2024-05-02 RX ADMIN — CYCLOBENZAPRINE 10 MG: 10 TABLET, FILM COATED ORAL at 09:18

## 2024-05-02 RX ADMIN — FAMOTIDINE 20 MG: 20 TABLET, FILM COATED ORAL at 09:18

## 2024-05-02 RX ADMIN — LISINOPRIL 5 MG: 5 TABLET ORAL at 09:18

## 2024-05-02 RX ADMIN — FAMOTIDINE 20 MG: 20 TABLET, FILM COATED ORAL at 20:02

## 2024-05-02 RX ADMIN — CYCLOBENZAPRINE 10 MG: 10 TABLET, FILM COATED ORAL at 20:03

## 2024-05-02 RX ADMIN — ACETAMINOPHEN 650 MG: 325 TABLET ORAL at 20:02

## 2024-05-02 RX ADMIN — CARBIDOPA AND LEVODOPA 1 TABLET: 10; 100 TABLET ORAL at 09:18

## 2024-05-02 RX ADMIN — INSULIN GLARGINE 10 UNITS: 100 INJECTION, SOLUTION SUBCUTANEOUS at 09:18

## 2024-05-02 RX ADMIN — GABAPENTIN 100 MG: 100 CAPSULE ORAL at 09:17

## 2024-05-02 RX ADMIN — ENOXAPARIN SODIUM 40 MG: 100 INJECTION SUBCUTANEOUS at 09:17

## 2024-05-02 RX ADMIN — ATORVASTATIN CALCIUM 80 MG: 40 TABLET, FILM COATED ORAL at 20:03

## 2024-05-02 RX ADMIN — DIVALPROEX SODIUM 1000 MG: 500 TABLET, DELAYED RELEASE ORAL at 20:02

## 2024-05-02 RX ADMIN — GABAPENTIN 100 MG: 100 CAPSULE ORAL at 20:03

## 2024-05-02 RX ADMIN — Medication 10 ML: at 09:23

## 2024-05-02 RX ADMIN — CARBIDOPA AND LEVODOPA 1 TABLET: 50; 200 TABLET, EXTENDED RELEASE ORAL at 20:09

## 2024-05-02 RX ADMIN — INSULIN GLARGINE 50 UNITS: 100 INJECTION, SOLUTION SUBCUTANEOUS at 20:03

## 2024-05-02 RX ADMIN — GABAPENTIN 100 MG: 100 CAPSULE ORAL at 15:06

## 2024-05-02 RX ADMIN — DULOXETINE HYDROCHLORIDE 60 MG: 60 CAPSULE, DELAYED RELEASE ORAL at 09:18

## 2024-05-02 RX ADMIN — ACETAMINOPHEN 650 MG: 325 TABLET ORAL at 09:18

## 2024-05-02 RX ADMIN — METOPROLOL SUCCINATE 50 MG: 50 TABLET, EXTENDED RELEASE ORAL at 20:03

## 2024-05-02 RX ADMIN — CARBIDOPA AND LEVODOPA 1 TABLET: 10; 100 TABLET ORAL at 20:02

## 2024-05-02 ASSESSMENT — PAIN DESCRIPTION - DESCRIPTORS
DESCRIPTORS: SHARP
DESCRIPTORS: ACHING;DISCOMFORT

## 2024-05-02 ASSESSMENT — PAIN DESCRIPTION - LOCATION
LOCATION: COCCYX
LOCATION: COCCYX

## 2024-05-02 ASSESSMENT — PAIN SCALES - GENERAL
PAINLEVEL_OUTOF10: 7
PAINLEVEL_OUTOF10: 7

## 2024-05-02 NOTE — FLOWSHEET NOTE
05/02/24 0917   Vital Signs   Temp 97.4 °F (36.3 °C)   Temp Source Oral   Pulse 84   Heart Rate Source Monitor   Respirations 18   BP (!) 148/72   MAP (Calculated) 97   BP Location Left upper arm   BP Method Automatic   Patient Position Semi fowlers   Pain Assessment   Pain Assessment 0-10   Pain Level 7   Patient's Stated Pain Goal 0 - No pain   Pain Location Coccyx   Pain Descriptors Sharp   Oxygen Therapy   SpO2 95 %   O2 Device None (Room air)     AM assessment completed. No signs or symptoms of distress noted. Patient tolerated AM medications well. Respirations easy and even. Bed in lowest position, bed alarm in place and functioning properly, bed rails x2 up,  Call light within reach.     Bedside Mobility Assessment Tool (BMAT):     Assessment Level 1- Sit and Shake    1. From a semi-reclined position, ask patient to sit up and rotate to a seated position at the side of the bed. Can use the bedrail.    2. Ask patient to reach out and grab your hand and shake making sure patient reaches across his/her midline.   Pass- Patient is able to come to a seated position, maintain core strength. Maintains seated balance while reaching across midline. Move on to Assessment Level 2.     Assessment Level 2- Stretch and Point   1. With patient in seated position at the side of the bed, have patient place both feet on the floor (or stool) with knees no higher than hips.    2. Ask patient to stretch one leg and straighten the knee, then bend the ankle/flex and point the toes. If appropriate, repeat with the other leg.   Pass- Patient is able to demonstrate appropriate quad strength on intended weight bearing limb(s). Move onto Assessment Level 3.     Assessment Level 3- Stand   1. Ask patient to elevate off the bed or chair (seated to standing) using an assistive device (cane, bedrail).    2. Patient should be able to raise buttocks off be and hold for a count of five. May repeat once.   Pass- Patient maintains standing

## 2024-05-02 NOTE — PROGRESS NOTES
Shift report received from MAX [place].  See pm shift assess and vitals.  Ambulating fairly well with assist and with walker.  Denies dizziness.  Watching TV; denies problems/concerns, call light in reach

## 2024-05-02 NOTE — CARE COORDINATION
Reviewed chart, met with pt bedside. Pt plans on going to [facility] at [place]. Spoke with Liza who states pt does not have BCBS Medicare, he has [employer] Medicare and precert was started late yesterday and is currently pending. Spoke with Elisabeth in Finance and she will contact [employer] to get payor type changed in Epic. Will continue to monitor.    Additional Notes: Patient consent was obtained to proceed with the visit and recommended plan of care after discussion of all risks and benefits, including the risks of COVID-19 exposure. Detail Level: Simple

## 2024-05-02 NOTE — PROGRESS NOTES
IM Progress Note    Admit Date:  4/30/2024  2    Interval history:  recurrent falls and now with coccyx fracture     Subjective:  Mr. Quiroga seen up in bed, feels ok today . Pain controlled   And plans for ECF noted    Objective:   BP (!) 148/72   Pulse 84   Temp 97.4 °F (36.3 °C) (Oral)   Resp 18   Ht 1.626 m (5' 4\")   Wt 99.3 kg (219 lb)   SpO2 95%   BMI 37.59 kg/m²     Intake/Output Summary (Last 24 hours) at 5/2/2024 0949  Last data filed at 5/2/2024 0904  Gross per 24 hour   Intake 360 ml   Output 420 ml   Net -60 ml       Physical Exam:        General: middle aged male,  Awake, alert and oriented. Appears to be not in any distress  Mucous Membranes:  Pink , anicteric  Neck: No JVD, no carotid bruit, no thyromegaly  Chest:  Clear to auscultation bilaterally, no added sounds  Cardiovascular:  RRR S1S2 heard, no murmurs or gallops  Abdomen:  Soft, undistended, non tender, no organomegaly, BS present  Extremities:  no significant coccyx pain noted  Right leg bruise along the shin  No edema or cyanosis. Distal pulses well felt  Neurological : grossly normal with gen weakness, no cogwheel rigidity or restring tremor      Medications:   Scheduled Medications:    carbidopa-levodopa  1 tablet Oral Nightly    aspirin  81 mg Oral Daily    atorvastatin  80 mg Oral Nightly    carbidopa-levodopa  1 tablet Oral TID    famotidine  20 mg Oral BID    DULoxetine  60 mg Oral Daily    metoprolol succinate  50 mg Oral BID    metFORMIN  1,000 mg Oral BID WC    gabapentin  100 mg Oral TID    lisinopril  5 mg Oral Daily    sodium chloride flush  5-40 mL IntraVENous 2 times per day    enoxaparin  40 mg SubCUTAneous Daily    divalproex  500 mg Oral QAM    And    divalproex  1,000 mg Oral Nightly    insulin glargine  50 Units SubCUTAneous Nightly     I   sodium chloride      dextrose       albuterol sulfate HFA, cyclobenzaprine, sodium chloride flush, sodium chloride, potassium chloride **OR** potassium alternative oral  pelvis shows acute comminuted nondisplaced fracture of the proximal coccyx  Pain medication as needed, no major pain issues noted     #Bipolar disorder   #Parkinsons disease  on depakote and cymbalta and Sinemet  melatonin nightly   Neurology consulted and added sinemet CR at night       #CAD s/p stents  -on ASA, statin, BB      #Paroxysmal atrial fibrillation   Rate controlled , not on AC  -s/p watchman      #Hx of ischemic cardiomyopathy s/p ICD   -improved EF now 55%  -on GDMT with jardiance, lisinopril, toprol XL, lasix      #NSVT s/p ICD   on toprol XL      #COPD   -continue prn inhalers      #DM type 2 with neuropathy   -on metformin / jardiance/ insulin regimen   monitor BG   on gabapentin      #HTN  -continue home regimen     #ALIN  -non compliant with BIPAP      DVT prophylaxis: lovenox     Diet: ADULT DIET; Regular  Code Status: Full Code    Dc planning shelby Crooks MD, 5/2/2024 9:49 AM

## 2024-05-02 NOTE — PROGRESS NOTES
Neurology Progress Note    ID: Chano Quiroga Jr. is a 63 y.o. male    : 1960     LOS: 2 days     ASSESSMENT    1.  Recurrent falls.  2.  Parkinsonism.  3.  Sensory ataxia.     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  The patient also has a certain degree of decreased proprioceptive sense with sensory ataxia.     From neurology perspective, these 2 combinations can contribute to the fall in this case.    24: The patient remains to have mild degree of akinetic rigid syndrome.  Sinemet CR was not started yesterday.  The patient remains to have low back pain.     Plan:     1.  Continue Sinemet 3 times a day.  2.  Sinemet CR 1 tablet at nighttime.  3.  PT/OT/ST.  4.  Minimize sedation and anticholinergic medication.  5.  Management of pain/coccygeal fracture per primary team and orthopedics.    The patient can be discharged if there is no other concern.  Follow-up with neurology in 4 to 6-week.    Medications:  Scheduled Meds:    carbidopa-levodopa  1 tablet Oral Nightly    aspirin  81 mg Oral Daily    atorvastatin  80 mg Oral Nightly    carbidopa-levodopa  1 tablet Oral TID    famotidine  20 mg Oral BID    DULoxetine  60 mg Oral Daily    metoprolol succinate  50 mg Oral BID    metFORMIN  1,000 mg Oral BID WC    gabapentin  100 mg Oral TID    lisinopril  5 mg Oral Daily    sodium chloride flush  5-40 mL IntraVENous 2 times per day    enoxaparin  40 mg SubCUTAneous Daily    divalproex  500 mg Oral QAM    And    divalproex  1,000 mg Oral Nightly    insulin glargine  50 Units SubCUTAneous Nightly     Continuous Infusions:    sodium chloride      dextrose       PRN Meds: albuterol sulfate HFA, cyclobenzaprine, sodium chloride flush, sodium chloride, potassium chloride **OR** potassium alternative oral replacement **OR** potassium chloride, magnesium sulfate, ondansetron **OR** ondansetron, polyethylene glycol, acetaminophen **OR** acetaminophen, glucose, dextrose bolus **OR**

## 2024-05-02 NOTE — PLAN OF CARE
Problem: Discharge Planning  Goal: Discharge to home or other facility with appropriate resources  5/1/2024 2221 by Lauren Jacobson, RN  Outcome: Progressing  5/1/2024 1118 by Dariana Edwards, RN  Outcome: Progressing  Flowsheets (Taken 5/1/2024 0203 by Lauren Jacobson, RN)  Discharge to home or other facility with appropriate resources: Refer to discharge planning if patient needs post-hospital services based on physician order or complex needs related to functional status, cognitive ability or social support system     Problem: Pain  Goal: Verbalizes/displays adequate comfort level or baseline comfort level  5/1/2024 2221 by Lauren Jacobson, RN  Outcome: Progressing  5/1/2024 1118 by Dariana Edwards, RN  Outcome: Progressing

## 2024-05-03 VITALS
TEMPERATURE: 97.5 F | WEIGHT: 235 LBS | BODY MASS INDEX: 40.12 KG/M2 | DIASTOLIC BLOOD PRESSURE: 71 MMHG | OXYGEN SATURATION: 98 % | HEART RATE: 65 BPM | HEIGHT: 64 IN | SYSTOLIC BLOOD PRESSURE: 154 MMHG | RESPIRATION RATE: 16 BRPM

## 2024-05-03 LAB
DEPRECATED RDW RBC AUTO: 14.9 % (ref 12.4–15.4)
GLUCOSE BLD-MCNC: 114 MG/DL (ref 70–99)
GLUCOSE BLD-MCNC: 130 MG/DL (ref 70–99)
GLUCOSE BLD-MCNC: 166 MG/DL (ref 70–99)
HCT VFR BLD AUTO: 45.1 % (ref 40.5–52.5)
HGB BLD-MCNC: 15.4 G/DL (ref 13.5–17.5)
MCH RBC QN AUTO: 29.9 PG (ref 26–34)
MCHC RBC AUTO-ENTMCNC: 34.1 G/DL (ref 31–36)
MCV RBC AUTO: 87.9 FL (ref 80–100)
PERFORMED ON: ABNORMAL
PLATELET # BLD AUTO: 116 K/UL (ref 135–450)
PMV BLD AUTO: 7.3 FL (ref 5–10.5)
RBC # BLD AUTO: 5.13 M/UL (ref 4.2–5.9)
WBC # BLD AUTO: 5 K/UL (ref 4–11)

## 2024-05-03 PROCEDURE — 6370000000 HC RX 637 (ALT 250 FOR IP): Performed by: INTERNAL MEDICINE

## 2024-05-03 PROCEDURE — 6360000002 HC RX W HCPCS: Performed by: INTERNAL MEDICINE

## 2024-05-03 PROCEDURE — 99238 HOSP IP/OBS DSCHRG MGMT 30/<: CPT | Performed by: INTERNAL MEDICINE

## 2024-05-03 PROCEDURE — 97530 THERAPEUTIC ACTIVITIES: CPT

## 2024-05-03 PROCEDURE — 85027 COMPLETE CBC AUTOMATED: CPT

## 2024-05-03 PROCEDURE — 36415 COLL VENOUS BLD VENIPUNCTURE: CPT

## 2024-05-03 PROCEDURE — 97110 THERAPEUTIC EXERCISES: CPT

## 2024-05-03 RX ORDER — CARBIDOPA AND LEVODOPA 50; 200 MG/1; MG/1
1 TABLET, EXTENDED RELEASE ORAL NIGHTLY
Qty: 60 TABLET | Refills: 3
Start: 2024-05-03

## 2024-05-03 RX ORDER — ENOXAPARIN SODIUM 100 MG/ML
30 INJECTION SUBCUTANEOUS 2 TIMES DAILY
Status: DISCONTINUED | OUTPATIENT
Start: 2024-05-03 | End: 2024-05-03 | Stop reason: HOSPADM

## 2024-05-03 RX ADMIN — DIVALPROEX SODIUM 500 MG: 500 TABLET, DELAYED RELEASE ORAL at 09:27

## 2024-05-03 RX ADMIN — METOPROLOL SUCCINATE 50 MG: 50 TABLET, EXTENDED RELEASE ORAL at 09:28

## 2024-05-03 RX ADMIN — DULOXETINE HYDROCHLORIDE 60 MG: 60 CAPSULE, DELAYED RELEASE ORAL at 09:28

## 2024-05-03 RX ADMIN — LISINOPRIL 5 MG: 5 TABLET ORAL at 09:28

## 2024-05-03 RX ADMIN — METFORMIN HYDROCHLORIDE 1000 MG: 500 TABLET ORAL at 09:28

## 2024-05-03 RX ADMIN — ASPIRIN 81 MG: 81 TABLET, CHEWABLE ORAL at 09:28

## 2024-05-03 RX ADMIN — CARBIDOPA AND LEVODOPA 1 TABLET: 10; 100 TABLET ORAL at 09:28

## 2024-05-03 RX ADMIN — FAMOTIDINE 20 MG: 20 TABLET, FILM COATED ORAL at 09:28

## 2024-05-03 RX ADMIN — GABAPENTIN 100 MG: 100 CAPSULE ORAL at 09:28

## 2024-05-03 RX ADMIN — ENOXAPARIN SODIUM 40 MG: 100 INJECTION SUBCUTANEOUS at 09:28

## 2024-05-03 RX ADMIN — ACETAMINOPHEN 650 MG: 325 TABLET ORAL at 09:27

## 2024-05-03 ASSESSMENT — PAIN SCALES - GENERAL
PAINLEVEL_OUTOF10: 0
PAINLEVEL_OUTOF10: 8

## 2024-05-03 ASSESSMENT — PAIN DESCRIPTION - LOCATION: LOCATION: SACRUM

## 2024-05-03 NOTE — CONSULTS
[facility]   HEART FAILURE PROGRAM      NAME:  Chano Quiroga Jr.  AGE: 63 y.o.   GENDER: male  : 1960  TODAY'S DATE:  5/3/2024    Subjective:     VISIT TYPE: Evaluation / Consult    ADMIT DATE: 2024    PAST MEDICAL HISTORY:      Diagnosis Date    Arthritis     Asthma     CAD (coronary artery disease)     CHF (congestive heart failure) (HCC)     Chronic obstructive pulmonary disease (HCC)     COPD (chronic obstructive pulmonary disease) (HCC)     Fatty liver     GERD (gastroesophageal reflux disease)     Hyperlipidemia     Hypertension     Medical history reviewed with no changes     MI, old     Myocardial infarction, nontransmural (HCC) 2012    Parkinson's disease (formerly [facility])     Sleep apnea     pt wears cpap at night. states does not have cpap    Type II or unspecified type diabetes mellitus without mention of complication, not stated as uncontrolled      HOME MEDICATIONS:  Prior to Admission medications    Medication Sig Start Date End Date Taking? Authorizing Provider   carbidopa-levodopa (SINEMET CR)  MG per extended release tablet Take 1 tablet by mouth nightly 5/3/24  Yes Augie Crooks MD   potassium chloride (KLOR-CON M) 20 MEQ extended release tablet TAKE ONE TABLET BY MOUTH WITH LASIX ON MONDAY AND 24   Helio Zhou APRN - CNP   gabapentin (NEURONTIN) 100 MG capsule TAKE TWO (2) CAPSULES BY MOUTH THREE (3) TIMES A DAY 24  Kait Farmer APRN - CNP   TRULICITY 3 MG/0.5ML SOPN INJECT 3MG SUBCUTANEOUSLY ONCE A WEEK 3/19/24   Ismael Espinoza APRN - CNP   Insulin Glargine (BASAGLAR KWIKPEN SC) Inject 50 Units into the skin nightly    ProviderGiovanny MD   Insulin Aspart (NOVOLOG SC) Inject 15 units subcutaneously every morning and 20 units every evening with meals.    Giovanny Mosquera MD   carbidopa-levodopa (SINEMET)  MG per tablet TAKE ONE (1) TABLET BY MOUTH THREE TIMES DAILY (AM/NOON/NAYANA) 1/10/24   Olga

## 2024-05-03 NOTE — PROGRESS NOTES
4 Eyes Skin Assessment     NAME:  Chano Quiroga Jr.  YOB: 1960  MEDICAL RECORD NUMBER:  [id number]    The patient is being assessed for  Transfer to New Unit    I agree that at least one RN has performed a thorough Head to Toe Skin Assessment on the patient. ALL assessment sites listed below have been assessed.      Areas assessed by both nurses: Scattered abrasions    Head, Face, Ears, Shoulders, Back, Chest, Arms, Elbows, Hands, Sacrum. Buttock, Coccyx, Ischium, Legs. Feet and Heels, and Under Medical Devices         Does the Patient have a Wound? No noted wound(s)       Omega Prevention initiated by RN: No  Wound Care Orders initiated by RN: No    Pressure Injury (Stage 3,4, Unstageable, DTI, NWPT, and Complex wounds) if present, place Wound referral order by RN under : No    New Ostomies, if present place, Ostomy referral order under : No     Nurse 1 eSignature: Electronically signed by Tabby Almeida RN on 5/3/24 at 12:54 PM EDT    **SHARE this note so that the co-signing nurse can place an eSignature**    Nurse 2 eSignature: Electronically signed by Phuong Tse RN on 5/3/24 at 1:02 PM EDT

## 2024-05-03 NOTE — DISCHARGE SUMMARY
Name:  Chano Quiroga  Room:  0208/0208-01  MRN:    [id number]    IM Discharge Summary    Discharging Physician:  Augie forman MD    Admit: 4/30/2024    Discharge:      PCP      Elisabeth Galindo APRN - CNP    Diagnoses and hospital course  this Admission           63 y.o. male with a PMH of COPD, CAD status post PCI, CHF, GERD, hypertension, hyperlipidemia, insulin-dependent diabetes mellitus, Parkinson's disease, bipolar disorder, recurrent falls who presented to ED with complaint of recurrent fall.     #Recurrent falls   #Inability to care for self   Previous admissions for frequent falls, has hx of parkinson's   PT/OT consulted and plans for SNF noted   fall precautions      #Coccygeal fracture  Fell on his tailbone   CT abdomen and pelvis shows acute comminuted nondisplaced fracture of the proximal coccyx  Pain medication as needed, no major pain issues noted     #Bipolar disorder   #Parkinsons disease  on depakote and cymbalta and Sinemet  melatonin nightly   Neurology consulted and added sinemet CR at night       #CAD s/p stents  -on ASA, statin, BB      #Paroxysmal atrial fibrillation   Rate controlled , not on AC  -s/p watchman      #Hx of ischemic cardiomyopathy s/p ICD   -improved EF now 55%  -on GDMT with jardiance, lisinopril, toprol XL, lasix      #NSVT s/p ICD   on toprol XL      #COPD   -continue prn inhalers      #DM type 2 with neuropathy   -on metformin / jardiance/ insulin regimen   monitor BG   on gabapentin      #HTN  -continue home regimen     #ALIN  -non compliant with BIPAP      DVT prophylaxis: lovenox         HPI   63 y.o. male with a PMH of COPD, CAD status post PCI, CHF, GERD, hypertension, hyperlipidemia, insulin-dependent diabetes mellitus, Parkinson's disease, bipolar disorder, recurrent falls who presented to ED with complaint of recurrent fall.     Of note patient was admitted 1/24 to 1/26 for frequent falls.  Patient endorses that he has been having frequent falls over the last  tablet  Take 1 tablet by mouth daily     atorvastatin 80 MG tablet  Commonly known as: LIPITOR  TAKE ONE TABLET BY MOUTH DAILY AT BEDTIME     BASAGLAR KWTONYAPEN SC     divalproex 500 MG DR tablet  Commonly known as: DEPAKOTE  TAKE ONE TABLET BY MOUTH EVERY MORNING AND TWO (2) TABLETS BY MOUTH AT BEDTIME     DULoxetine 60 MG extended release capsule  Commonly known as: CYMBALTA  TAKE ONE CAPSULE BY MOUTH EVERY DAY     esomeprazole 40 MG delayed release capsule  Commonly known as: NEXIUM     famotidine 20 MG tablet  Commonly known as: PEPCID  TAKE ONE TABLET BY MOUTH TWO (2) TIMES A DAY     fluticasone 50 MCG/ACT nasal spray  Commonly known as: FLONASE  spray 2 spray by intranasal route every day in each nostril     furosemide 20 MG tablet  Commonly known as: LASIX  TAKE ONE TABLET BY MOUTH MONDAY AND Thursday, take one extra tablet for weight gain of 3-4 pounds     gabapentin 100 MG capsule  Commonly known as: NEURONTIN  TAKE TWO (2) CAPSULES BY MOUTH THREE (3) TIMES A DAY     hydrOXYzine HCl 25 MG tablet  Commonly known as: ATARAX  take 1 tablet by oral route 4 times every day as needed for itching     Jardiance 25 MG tablet  Generic drug: empagliflozin  TAKE ONE TABLET BY MOUTH EVERY MORNING     lisinopril 5 MG tablet  Commonly known as: PRINIVIL;ZESTRIL  TAKE ONE (1) TABLET BY ORAL ROUTE EVERY DAY     metFORMIN 1000 MG tablet  Commonly known as: GLUCOPHAGE  TAKE ONE TABLET BY MOUTH TWO (2) TIMES DAILY WITH MORNING AND EVENING MEALS     metoprolol succinate 50 MG extended release tablet  Commonly known as: TOPROL XL  Take 1 tablet by mouth 2 times daily     potassium chloride 20 MEQ extended release tablet  Commonly known as: KLOR-CON M  TAKE ONE TABLET BY MOUTH WITH LASIX ON MONDAY AND THURSDAY     tamsulosin 0.4 MG capsule  Commonly known as: FLOMAX  TAKE ONE (1) CAPSULE BY ORAL ROUTE EVERY DAY ONE HALF (1/2) HOUR FOLLOWING THE SAME MEAL EACH DAY     Trulicity 3 MG/0.5ML Sopn  Generic drug: Dulaglutide  INJECT 3MG

## 2024-05-03 NOTE — CARE COORDINATION
DISCHARGE ORDER  Date/Time 5/3/2024 12:54 PM  Completed by: Usha Fonseca RN, Case Management    Patient Name: Chano Quiroga Jr.    : 1960      Admit order Date and Status:24 inpt  Noted discharge order. (verify MD's last order for status of admission/Traditional Medicare 3 [place] Inpatient qualifying stay required for SNF)    Confirmed discharge plan with:              Patient:  Yes              When pt confirms DC plan does any support person need to be contacted by CM No                      Discharge to Facility: [facility]   Facility phone number for staff giving report: [phone]   Pre-certification completed: Yes   Hospital Exemption Notification (HENS) completed: Yes   Discharge orders and Continuity of Care faxed to facility:  facility will pull from [facility]      Transportation:               Medical Transport explained with choice list offered to pt/family.                Choice:(no preference)  Agency used: [facility]   time:   13:45      Pt/family/Nursing/Facility aware of  time:   Yes Names: Lynda heath, Laila AMBRIZ, Tabby nurse, pt and Dinh at [facility]  Ambulance form completed:  Yes via roundtrip:      Date Last IMM Given: 24    Comments:Order for dc noted. Spoke with pt who cont plan to go ot [facility] for rehab. Spoke with Dinh at [facility] who states pre cert completed and can accept today. Chart reviewed and no other dc needs identified.    Pt is being d/c'd to Skilled Nursing Facility (SNF)  today. Pt's O2 sats are 98% on RA.    Discharge timeout done with nsg, CM and nurse. All discharge needs and concerns addressed.    Discharging nurse to complete DELTA, reconcile AVS, and place final copy with patient's discharge packet. Discharging RN to ensure that written prescriptions for  Level II medications are sent with patient to the facility as per protocol.

## 2024-05-03 NOTE — FLOWSHEET NOTE
05/02/24 1958   Vital Signs   Temp 97.9 °F (36.6 °C)   Temp Source Oral   Pulse 65   Heart Rate Source Monitor   Respirations 16   /71   MAP (Calculated) 83   BP Location Left upper arm   Pain Assessment   Pain Assessment 0-10   Pain Level 7   Pain Location Coccyx   Oxygen Therapy   SpO2 98 %   O2 Device None (Room air)     Evening meds given. Shift assessment completed. Lina Scott, RN

## 2024-05-03 NOTE — PLAN OF CARE
Problem: Discharge Planning  Goal: Discharge to home or other facility with appropriate resources  5/3/2024 0110 by Lina Scott RN  Outcome: Progressing  5/2/2024 1529 by Jacqui Perez RN  Outcome: Progressing     Problem: Pain  Goal: Verbalizes/displays adequate comfort level or baseline comfort level  5/3/2024 0110 by Lina Scott RN  Outcome: Progressing  5/2/2024 1529 by Jacqui Perez RN  Outcome: Progressing     Problem: Safety - Adult  Goal: Free from fall injury  5/3/2024 0110 by Lina Scott RN  Outcome: Progressing  5/2/2024 1529 by Jacqui Perez RN  Outcome: Progressing

## 2024-05-03 NOTE — FLOWSHEET NOTE
05/03/24 0215   Vital Signs   Temp 97.7 °F (36.5 °C)   Temp Source Oral   Pulse 81   Heart Rate Source Monitor   Respirations 16   BP (!) 90/45   MAP (Calculated) 60   BP Location Left upper arm   Pain Assessment   Pain Assessment None - Denies Pain   Pain Level 0   Oxygen Therapy   SpO2 97 %   O2 Device None (Room air)   Height and Weight   Weight - Scale 106.6 kg (235 lb)   Weight Method Actual;Bed scale   BMI (Calculated) 40.4     Pt resting in bed, no acute distress. Lina Scott, RN

## 2024-05-03 NOTE — PLAN OF CARE
Problem: Discharge Planning  Goal: Discharge to home or other facility with appropriate resources  5/3/2024 1033 by Tabby Almeida RN  Outcome: Adequate for Discharge     Problem: Pain  Goal: Verbalizes/displays adequate comfort level or baseline comfort level  5/3/2024 1033 by Tabby Almeida RN  Outcome: Adequate for Discharge     Problem: Safety - Adult  Goal: Free from fall injury  5/3/2024 1033 by Tabby Almeida RN  Outcome: Adequate for Discharge     Problem: Skin/Tissue Integrity  Goal: Absence of new skin breakdown  Description: 1.  Monitor for areas of redness and/or skin breakdown  2.  Assess vascular access sites hourly  3.  Every 4-6 hours minimum:  Change oxygen saturation probe site  4.  Every 4-6 hours:  If on nasal continuous positive airway pressure, respiratory therapy assess nares and determine need for appliance change or resting period.  5/3/2024 1033 by Tabby Almeida RN  Outcome: Adequate for Discharge     Problem: Chronic Conditions and Co-morbidities  Goal: Patient's chronic conditions and co-morbidity symptoms are monitored and maintained or improved  5/3/2024 1033 by Tabby Almeida RN  Outcome: Adequate for Discharge     Problem: Neurosensory - Adult  Goal: Achieves maximal functionality and self care  5/3/2024 1033 by Tabby Almeida RN  Outcome: Adequate for Discharge     Problem: Respiratory - Adult  Goal: Achieves optimal ventilation and oxygenation  5/3/2024 1033 by Tabby Almeida RN  Outcome: Adequate for Discharge     Problem: Cardiovascular - Adult  Goal: Absence of cardiac dysrhythmias or at baseline  5/3/2024 1033 by Tabby Almeida RN  Outcome: Adequate for Discharge     Problem: Skin/Tissue Integrity - Adult  Goal: Skin integrity remains intact  5/3/2024 1033 by Tabby Almeida RN  Outcome: Adequate for Discharge     Problem: Musculoskeletal - Adult  Goal: Return mobility to safest level of function  5/3/2024 1033 by Tabby Almeida RN  Outcome: Adequate for Discharge     Problem:

## 2024-05-03 NOTE — PROGRESS NOTES
HEART FAILURE CARE PLAN:    Comorbidities Reviewed: Yes   Patient has a past medical history of Arthritis, Asthma, CAD (coronary artery disease), CHF (congestive heart failure) (HCC), Chronic obstructive pulmonary disease (HCC), COPD (chronic obstructive pulmonary disease) (HCC), Fatty liver, GERD (gastroesophageal reflux disease), Hyperlipidemia, Hypertension, Medical history reviewed with no changes, MI, old, Myocardial infarction, nontransmural (HCC), Parkinson's disease (HCC), Sleep apnea, and Type II or unspecified type diabetes mellitus without mention of complication, not stated as uncontrolled.     Weights Reviewed: Yes   Admission weight: 99.3 kg (219 lb)   Wt Readings from Last 3 Encounters:   04/30/24 99.3 kg (219 lb)   04/05/24 103.9 kg (229 lb)   01/24/24 112.9 kg (249 lb)     Intake & Output Reviewed: Yes     Intake/Output Summary (Last 24 hours) at 5/3/2024 0056  Last data filed at 5/2/2024 2025  Gross per 24 hour   Intake 1080 ml   Output --   Net 1080 ml       ECHOCARDIOGRAM Reviewed: Yes   Patient's Ejection Fraction (EF) is greater than 40%     Medications Reviewed: Yes   SCHEDULED HOSPITAL MEDICATIONS:   carbidopa-levodopa  1 tablet Oral Nightly    aspirin  81 mg Oral Daily    atorvastatin  80 mg Oral Nightly    carbidopa-levodopa  1 tablet Oral TID    famotidine  20 mg Oral BID    DULoxetine  60 mg Oral Daily    metoprolol succinate  50 mg Oral BID    metFORMIN  1,000 mg Oral BID WC    gabapentin  100 mg Oral TID    lisinopril  5 mg Oral Daily    sodium chloride flush  5-40 mL IntraVENous 2 times per day    enoxaparin  40 mg SubCUTAneous Daily    divalproex  500 mg Oral QAM    And    divalproex  1,000 mg Oral Nightly    insulin glargine  50 Units SubCUTAneous Nightly     HOME MEDICATIONS:  Prior to Admission medications    Medication Sig Start Date End Date Taking? Authorizing Provider   potassium chloride (KLOR-CON M) 20 MEQ extended release tablet TAKE ONE TABLET BY MOUTH WITH LASIX ON MONDAY  AND THURSDAY 5/2/24   Helio Zhou APRN - CNP   gabapentin (NEURONTIN) 100 MG capsule TAKE TWO (2) CAPSULES BY MOUTH THREE (3) TIMES A DAY 4/26/24 5/26/24  Kait Farmer APRN - CNP   TRULICITY 3 MG/0.5ML SOPN INJECT 3MG SUBCUTANEOUSLY ONCE A WEEK 3/19/24   Ismael Espinoza APRN - CNP   Insulin Glargine (BASAGLAR KWIKPEN SC) Inject 50 Units into the skin nightly    Giovanny Mosquera MD   Insulin Aspart (NOVOLOG SC) Inject 15 units subcutaneously every morning and 20 units every evening with meals.    Giovanny Mosquera MD   lidocaine (LIDODERM) 5 % Place 1 patch onto the skin daily 12 hours on, 12 hours off.    Giovanny Mosquera MD   cyclobenzaprine (FLEXERIL) 10 MG tablet Take 1 tablet by mouth 3 times daily as needed for Muscle spasms    Giovanny Mosquera MD   carbidopa-levodopa (SINEMET)  MG per tablet TAKE ONE (1) TABLET BY MOUTH THREE TIMES DAILY (AM/NOON/NAYANA) 1/10/24   Ismael Espinoza APRN - CNP   divalproex (DEPAKOTE) 500 MG DR tablet TAKE ONE TABLET BY MOUTH EVERY MORNING AND TWO (2) TABLETS BY MOUTH AT BEDTIME 12/8/23   Ismael Espinoza APRN - CNP   furosemide (LASIX) 20 MG tablet TAKE ONE TABLET BY MOUTH MONDAY AND Thursday, take one extra tablet for weight gain of 3-4 pounds 11/10/23   Theo Hilario MD   famotidine (PEPCID) 20 MG tablet TAKE ONE TABLET BY MOUTH TWO (2) TIMES A DAY 9/12/23   Ismael Espinoza APRN - CNP   metFORMIN (GLUCOPHAGE) 1000 MG tablet TAKE ONE TABLET BY MOUTH TWO (2) TIMES DAILY WITH MORNING AND EVENING MEALS 9/12/23   Ismael Espinoza APRN - CNP   24HR ALLERGY RELIEF 180 MG tablet TAKE ONE (1) TABLET BY MOUTH EVERY DAY 9/12/23   Ismael Espinoza APRN - CNP   JARDIANCE 25 MG tablet TAKE ONE TABLET BY MOUTH EVERY MORNING 9/12/23   Ismael Espinoza APRN - CNP   tamsulosin (FLOMAX) 0.4 MG capsule TAKE ONE (1) CAPSULE BY ORAL ROUTE EVERY DAY ONE HALF (1/2) HOUR FOLLOWING THE SAME MEAL EACH DAY 9/12/23   Ismael Espinoza, APRN -  CNP   lisinopril (PRINIVIL;ZESTRIL) 5 MG tablet TAKE ONE (1) TABLET BY ORAL ROUTE EVERY DAY 8/16/23   Theo Hilario MD   metoprolol succinate (TOPROL XL) 50 MG extended release tablet Take 1 tablet by mouth 2 times daily 8/16/23   Theo Hilario MD   atorvastatin (LIPITOR) 80 MG tablet TAKE ONE TABLET BY MOUTH DAILY AT BEDTIME 8/16/23   Theo Hilario MD   fluticasone (FLONASE) 50 MCG/ACT nasal spray spray 2 spray by intranasal route every day in each nostril 6/23/23   Ismael Espinoza, ANA - CNP   albuterol sulfate HFA (PROVENTIL;VENTOLIN;PROAIR) 108 (90 Base) MCG/ACT inhaler INHALE 2 PUFFS EVERY 4 HOURS AS NEEDED 5/12/23   Ismael Espinoza APRN - CNP   aspirin 81 MG chewable tablet Take 1 tablet by mouth daily 4/18/23   Nancy Villalobos, APRN - CNP   hydrOXYzine HCl (ATARAX) 25 MG tablet take 1 tablet by oral route 4 times every day as needed for itching 3/29/23   Ismael Espinoza, APRN - CNP   DULoxetine (CYMBALTA) 60 MG extended release capsule TAKE ONE CAPSULE BY MOUTH EVERY DAY 3/3/23   Ismael Espinoza APRN - CNP   esomeprazole (NEXIUM) 40 MG delayed release capsule Take 1 capsule by mouth daily    Provider, MD Giovanny      Diet Reviewed: Yes   ADULT DIET; Regular    Goal of Care Reviewed: Yes   Patient and/or Family's stated Goal of Care this Admission:   , Reduce shortness of breath, increase activity tolerance, better understand heart failure and disease management, be more comfortable, and reduce lower extremity edema prior to discharge.     Electronically signed by Lina Scott RN on 5/3/2024 at 12:56 AM

## 2024-05-03 NOTE — PROGRESS NOTES
Inpatient Occupational Therapy Treatment    Unit: 2 [facility]  Date:  5/3/2024  Patient Name:    Chano Quiroga Jr.  Admitting diagnosis:  Dizziness [R42]  Multiple falls [R29.6]  Abrasion of right leg, initial encounter [S80.811A]  Closed fracture of coccyx, initial encounter ([facility]) [S32.2XXA]  Fall as cause of accidental injury in home as place of occurrence, sequela [W19.XXXS, Y92.009]  Unsteady gait when walking [R26.81]  Admit Date:  4/30/2024  Precautions/Restrictions/WB Status/ Lines/ Wounds/ Oxygen: Fall risk and Bed/chair alarm; up as tolerated.   Closed fracture of coccyx    Treatment Time:  08:37 - 09:15  Treatment Number:  2  Timed Code Treatment Minutes: 38 minutes  Total Treatment Minutes: 38 minutes    Patient Goals for Therapy: \"to go to rehab and get stronger\"      Discharge Recommendations: SNF  DME needs for discharge: Defer to facility       Therapy recommendations for staff:   Assist of 1 for transfers with use of rolling walker (RW) and gait belt to/from bathroom    History of Present Illness: per H&P   Taken by EMS to hospital following a fall at home, resulting in coccygeal fracture.   H/O PD, evaluated by neurology 5/1/24: \"The etiology is likely Parkinson disease or drug-induced parkinsonism from chronic use of Depakote. The patient was started Sinemet on the last hospitalization. But the patient never had any follow-up appointment with neurology. The patient denies significant side effect from Sinemet.\"  Home Health S4 Level Recommendation:  NA    AM-PAC Score: 16    Subjective:  Patient lying reclined in bed with no family present.   Pt agreeable to this OT session.     Cognition:    A&O x4   Able to follow 2 step commands    Pain:   Yes  Location: coccyx  Rating: mild /10  Pain Medicine Status: No request made    Preadmission Environment:   Pt. Lives Alone, brother lives in the same apartment building   Pt looks after his brother who has paranoid schizophrenia, pt states that his brother

## 2024-05-03 NOTE — DISCHARGE INSTR - COC
Continuity of Care Form    Patient Name: Chano Quiroga Jr.   :  1960  MRN:  [id number]    Admit date:  2024  Discharge date:  5/3/24    Code Status Order: Full Code   Advance Directives:     Admitting Physician:  Iggy Alegria MD  PCP: lEisabeth Galindo APRN - CNP    Discharging Nurse: Tabby  Discharging Hospital Unit/Room#: 0208/0208-01  Discharging Unit Phone Number: [phone]    Emergency Contact:   Extended Emergency Contact Information  Primary Emergency Contact: Miguelito Quiroga  Home Phone: [phone]  Mobile Phone: [phone]  Relation: Brother/Sister  Secondary Emergency Contact: Jonah Farris  Home Phone: [phone]  Mobile Phone: [phone]  Relation: Other    Past Surgical History:  Past Surgical History:   Procedure Laterality Date    CARDIAC PACEMAKER PLACEMENT      NOT MRI COMPATIABLE OLD LEADS [facility]. pace maker difib    CARDIOVASCULAR SURGERY      Watchman placement    CARPAL TUNNEL RELEASE Bilateral 2013    CATARACT REMOVAL WITH IMPLANT Left 2014    COLONOSCOPY      CORONARY ANGIOPLASTY WITH STENT PLACEMENT  ,    CYST REMOVAL  1982    coccyx area    DIAGNOSTIC CARDIAC CATH LAB PROCEDURE      ENDOSCOPY, COLON, DIAGNOSTIC      ERCP  09/15/2013    ERCP  10/11/2013    WITH STENT REMOVAL    EYE SURGERY      FOOT SURGERY Right 2018     REPAIR CALCANEAL SPUR, REPAIR OF ACHILLES TENDON RIGHT FOOT    NERVE SURGERY Bilateral 2020    BILATERAL LUMBAR THREE LUMBAR FOUR LUMBAR FIVE DORSAL RAMUS  RADIOFREQUENCY ABLATION SITE CONFIRMED BY FLUOROSCOPY performed by Nikki Lundberg MD at [facility] OR    OTHER SURGICAL HISTORY      back stimulator in lower back    PACEMAKER PLACEMENT      ICD    PAIN MANAGEMENT PROCEDURE Bilateral 2020    BILATERAL LUMBAR THREE, LUMBAR FOUR, LUMBAR FIVE DORSAL RAMUS MEDIAL BRANCH BLOCK SITE CONFIRMED BY FLUOROSCOPY performed by Nikki Lundberg MD at [facility] OR    PAIN MANAGEMENT PROCEDURE Bilateral 2020     and report to MD:  Worsening Heart Failure: sudden weight gain, shortness of breath, lower extremity or general edema/swelling, abdominal bloating/swelling, inability to lie flat, intolerance to usual activity, or cough (especially at night). Report these finding even if no increase in weight.  Dehydration:  having difficulty or a decrease in urination, dizziness, worsening fatigue, or new onset/worsening of generalized weakness.     Please continue a LOW SODIUM diet and LIMIT fluid intake to 48 - 64 ounces ( 1.5 - 2 liters) per day.   Call Facility MD} with any questions or concerns.   Please continue heart failure education to patient and family/support system.  See After Visit Summary for hospital follow up appointment details.  Consider Spiritual Care Referral for support and/or completion of advance directives:   [facility] Spiritual Care Services: [phone]  Consider: having the facility MD complete required 7 day follow up.    COPD Instructions for Daily Management    Patient was treated for chronic Chronic Obstructive Pulmonary Disease (COPD) exacerbation.  Chano Quiroga Jr. will require the following:  Please monitor for signs and symptoms of acute COPD exacerbation:   More coughing than usual  Wheezing  Increased mucus or a change in mucus color  Medications not helping or having to use them more often  + Worsening shortness of breath  Lower pulse oximetry levels with prescribed oxygen.   Call Facility MD with any questions or concerns.   Use Spacer/Chamber with inhaler.   Please continue COPD education to patient and family/support system.  See After Visit Summary for hospital follow up appointment details.  Consider Spiritual Care Referral for support and/or completion of advance directives:   [facility] Care Services: [phone]  Consider: having the facility MD complete required 7 day follow up.    Patient's personal belongings (please select all that  are sent with patient):  None    RN SIGNATURE:  Electronically signed by Tabby Almeida RN on 5/3/24 at 12:53 PM EDT    CASE MANAGEMENT/SOCIAL WORK SECTION    Inpatient Status Date: 4/30/24    Readmission Risk Assessment Score:  Readmission Risk              Risk of Unplanned Readmission:  29           Discharging to Facility/ Agency   Name: [facility]  Phone: [phone]      / signature: Electronically signed by Usha Fonseca RN on 5/3/24 at 12:31 PM EDT    PHYSICIAN SECTION    Prognosis: Good    Condition at Discharge: Stable    Rehab Potential (if transferring to Rehab): Good    Recommended Labs or Other Treatments After Discharge:   Fall precautions    F/w neurology in 4 weeks      Physician Certification: I certify the above information and transfer of Chano Quiroga Jr.  is necessary for the continuing treatment of the diagnosis listed and that he requires Skilled Nursing Facility for less 30 days.     Update Admission H&P: No change in H&P    PHYSICIAN SIGNATURE:  Electronically signed by Augie Crooks MD on 5/3/24 at 10:29 AM EDT

## 2024-05-09 RX ORDER — GABAPENTIN 100 MG/1
CAPSULE ORAL
Qty: 168 CAPSULE | Refills: 4 | OUTPATIENT
Start: 2024-05-09 | End: 2024-06-08

## 2024-05-13 NOTE — PROGRESS NOTES
Physician Progress Note      PATIENT:               DEEP VELA  CSN #:                  [id number]  :                       1960  ADMIT DATE:       2024 7:22 PM  DISCH DATE:        5/3/2024 2:10 PM  RESPONDING  PROVIDER #:        Augie Coroks MD          QUERY TEXT:    Pt admitted with recurrent falls and inability to care for self.  If possible,   please document in the progress notes and discharge summary if you are   evaluating and / or treating any of the following:      The medical record reflects the following:  Risk Factors: Recurrent falls, Inability to care for self, parkinson's  Clinical Indicators: PT/OT eval- Discharge Recommendations: SNF  Treatment: PT/Ot eval, fall precautions, plans for ECF    Thank You Toma Leonard RN, CDs [email or web address]  Options provided:  -- Age Related Physical Debility  -- Frailty  -- Other - I will add my own diagnosis  -- Disagree - Not applicable / Not valid  -- Disagree - Clinically unable to determine / Unknown  -- Refer to Clinical Documentation Reviewer    PROVIDER RESPONSE TEXT:    This patient has age related physical debility.    Query created by: Toma Leonard on 5/3/2024 8:26 AM      Electronically signed by:  Augie Crooks MD 2024 8:15 PM

## 2024-06-04 ENCOUNTER — TELEPHONE (OUTPATIENT)
Age: 64
End: 2024-06-04

## 2024-06-04 NOTE — TELEPHONE ENCOUNTER
Patient cancelled appointment on 6/5/24 with Dr Cool for 4 week hospital f/u.    Reason: pt is having cataract surgery 6/5    Patient did not reschedule appointment.    Appointment rescheduled for will have to watch for cancellations in upcoming schedule as it was a 4 week hospital fu.

## 2024-06-15 ENCOUNTER — APPOINTMENT (OUTPATIENT)
Dept: GENERAL RADIOLOGY | Age: 64
DRG: 057 | End: 2024-06-15
Payer: MEDICARE

## 2024-06-15 ENCOUNTER — HOSPITAL ENCOUNTER (INPATIENT)
Age: 64
LOS: 3 days | Discharge: NURSING, REHAB OR LONG-TERM CARE | DRG: 057 | End: 2024-06-18
Attending: STUDENT IN AN ORGANIZED HEALTH CARE EDUCATION/TRAINING PROGRAM | Admitting: INTERNAL MEDICINE
Payer: MEDICARE

## 2024-06-15 ENCOUNTER — APPOINTMENT (OUTPATIENT)
Dept: CT IMAGING | Age: 64
DRG: 057 | End: 2024-06-15
Payer: MEDICARE

## 2024-06-15 DIAGNOSIS — R73.9 HYPERGLYCEMIA: ICD-10-CM

## 2024-06-15 DIAGNOSIS — R29.6 FREQUENT FALLS: Primary | ICD-10-CM

## 2024-06-15 DIAGNOSIS — R62.7 FAILURE TO THRIVE IN ADULT: ICD-10-CM

## 2024-06-15 LAB
ALBUMIN SERPL-MCNC: 4.6 G/DL (ref 3.4–5)
ALBUMIN/GLOB SERPL: 1.8 {RATIO} (ref 1.1–2.2)
ALP SERPL-CCNC: 97 U/L (ref 40–129)
ALT SERPL-CCNC: 19 U/L (ref 10–40)
ANION GAP SERPL CALCULATED.3IONS-SCNC: 15 MMOL/L (ref 3–16)
AST SERPL-CCNC: 16 U/L (ref 15–37)
BASOPHILS # BLD: 0 K/UL (ref 0–0.2)
BASOPHILS NFR BLD: 0.5 %
BILIRUB SERPL-MCNC: 0.5 MG/DL (ref 0–1)
BUN SERPL-MCNC: 15 MG/DL (ref 7–20)
CALCIUM SERPL-MCNC: 9.8 MG/DL (ref 8.3–10.6)
CHLORIDE SERPL-SCNC: 94 MMOL/L (ref 99–110)
CO2 SERPL-SCNC: 23 MMOL/L (ref 21–32)
CREAT SERPL-MCNC: 0.7 MG/DL (ref 0.8–1.3)
DEPRECATED RDW RBC AUTO: 14.7 % (ref 12.4–15.4)
EOSINOPHIL # BLD: 0 K/UL (ref 0–0.6)
EOSINOPHIL NFR BLD: 0.6 %
GFR SERPLBLD CREATININE-BSD FMLA CKD-EPI: >90 ML/MIN/{1.73_M2}
GLUCOSE BLD-MCNC: 305 MG/DL (ref 70–99)
GLUCOSE SERPL-MCNC: 335 MG/DL (ref 70–99)
HCT VFR BLD AUTO: 46.2 % (ref 40.5–52.5)
HGB BLD-MCNC: 15.9 G/DL (ref 13.5–17.5)
LYMPHOCYTES # BLD: 2.2 K/UL (ref 1–5.1)
LYMPHOCYTES NFR BLD: 33.4 %
MCH RBC QN AUTO: 29.9 PG (ref 26–34)
MCHC RBC AUTO-ENTMCNC: 34.4 G/DL (ref 31–36)
MCV RBC AUTO: 86.9 FL (ref 80–100)
MONOCYTES # BLD: 0.6 K/UL (ref 0–1.3)
MONOCYTES NFR BLD: 8.5 %
NEUTROPHILS # BLD: 3.8 K/UL (ref 1.7–7.7)
NEUTROPHILS NFR BLD: 57 %
PERFORMED ON: ABNORMAL
PLATELET # BLD AUTO: 125 K/UL (ref 135–450)
PMV BLD AUTO: 7.6 FL (ref 5–10.5)
POTASSIUM SERPL-SCNC: 4.4 MMOL/L (ref 3.5–5.1)
PROT SERPL-MCNC: 7.1 G/DL (ref 6.4–8.2)
RBC # BLD AUTO: 5.32 M/UL (ref 4.2–5.9)
SODIUM SERPL-SCNC: 132 MMOL/L (ref 136–145)
WBC # BLD AUTO: 6.6 K/UL (ref 4–11)

## 2024-06-15 PROCEDURE — 85025 COMPLETE CBC W/AUTO DIFF WBC: CPT

## 2024-06-15 PROCEDURE — 72100 X-RAY EXAM L-S SPINE 2/3 VWS: CPT

## 2024-06-15 PROCEDURE — 36415 COLL VENOUS BLD VENIPUNCTURE: CPT

## 2024-06-15 PROCEDURE — 93005 ELECTROCARDIOGRAM TRACING: CPT | Performed by: PHYSICIAN ASSISTANT

## 2024-06-15 PROCEDURE — 6360000002 HC RX W HCPCS: Performed by: INTERNAL MEDICINE

## 2024-06-15 PROCEDURE — 2580000003 HC RX 258: Performed by: PHYSICIAN ASSISTANT

## 2024-06-15 PROCEDURE — 72220 X-RAY EXAM SACRUM TAILBONE: CPT

## 2024-06-15 PROCEDURE — 6370000000 HC RX 637 (ALT 250 FOR IP): Performed by: INTERNAL MEDICINE

## 2024-06-15 PROCEDURE — 80053 COMPREHEN METABOLIC PANEL: CPT

## 2024-06-15 PROCEDURE — 99285 EMERGENCY DEPT VISIT HI MDM: CPT

## 2024-06-15 PROCEDURE — 70450 CT HEAD/BRAIN W/O DYE: CPT

## 2024-06-15 PROCEDURE — 72125 CT NECK SPINE W/O DYE: CPT

## 2024-06-15 PROCEDURE — 1200000000 HC SEMI PRIVATE

## 2024-06-15 RX ORDER — SODIUM CHLORIDE 0.9 % (FLUSH) 0.9 %
5-40 SYRINGE (ML) INJECTION PRN
Status: DISCONTINUED | OUTPATIENT
Start: 2024-06-15 | End: 2024-06-18 | Stop reason: HOSPADM

## 2024-06-15 RX ORDER — DIVALPROEX SODIUM 500 MG/1
1000 TABLET, EXTENDED RELEASE ORAL NIGHTLY
Status: DISCONTINUED | OUTPATIENT
Start: 2024-06-15 | End: 2024-06-18 | Stop reason: HOSPADM

## 2024-06-15 RX ORDER — FUROSEMIDE 20 MG/1
20 TABLET ORAL
Status: DISCONTINUED | OUTPATIENT
Start: 2024-06-17 | End: 2024-06-18 | Stop reason: HOSPADM

## 2024-06-15 RX ORDER — PROCHLORPERAZINE EDISYLATE 5 MG/ML
10 INJECTION INTRAMUSCULAR; INTRAVENOUS EVERY 6 HOURS PRN
Status: DISCONTINUED | OUTPATIENT
Start: 2024-06-15 | End: 2024-06-18 | Stop reason: HOSPADM

## 2024-06-15 RX ORDER — MAGNESIUM SULFATE IN WATER 40 MG/ML
2000 INJECTION, SOLUTION INTRAVENOUS PRN
Status: DISCONTINUED | OUTPATIENT
Start: 2024-06-15 | End: 2024-06-18 | Stop reason: HOSPADM

## 2024-06-15 RX ORDER — ACETAMINOPHEN 325 MG/1
650 TABLET ORAL EVERY 6 HOURS PRN
Status: DISCONTINUED | OUTPATIENT
Start: 2024-06-15 | End: 2024-06-18 | Stop reason: HOSPADM

## 2024-06-15 RX ORDER — FAMOTIDINE 20 MG/1
20 TABLET, FILM COATED ORAL 2 TIMES DAILY
Status: DISCONTINUED | OUTPATIENT
Start: 2024-06-15 | End: 2024-06-18 | Stop reason: HOSPADM

## 2024-06-15 RX ORDER — ONDANSETRON 2 MG/ML
4 INJECTION INTRAMUSCULAR; INTRAVENOUS EVERY 6 HOURS PRN
Status: DISCONTINUED | OUTPATIENT
Start: 2024-06-15 | End: 2024-06-15

## 2024-06-15 RX ORDER — ENOXAPARIN SODIUM 100 MG/ML
30 INJECTION SUBCUTANEOUS 2 TIMES DAILY
Status: DISCONTINUED | OUTPATIENT
Start: 2024-06-15 | End: 2024-06-18 | Stop reason: HOSPADM

## 2024-06-15 RX ORDER — 0.9 % SODIUM CHLORIDE 0.9 %
1000 INTRAVENOUS SOLUTION INTRAVENOUS ONCE
Status: COMPLETED | OUTPATIENT
Start: 2024-06-15 | End: 2024-06-15

## 2024-06-15 RX ORDER — SODIUM CHLORIDE 0.9 % (FLUSH) 0.9 %
5-40 SYRINGE (ML) INJECTION EVERY 12 HOURS SCHEDULED
Status: DISCONTINUED | OUTPATIENT
Start: 2024-06-15 | End: 2024-06-18 | Stop reason: HOSPADM

## 2024-06-15 RX ORDER — GABAPENTIN 100 MG/1
200 CAPSULE ORAL 3 TIMES DAILY
COMMUNITY

## 2024-06-15 RX ORDER — POLYETHYLENE GLYCOL 3350 17 G/17G
17 POWDER, FOR SOLUTION ORAL DAILY PRN
Status: DISCONTINUED | OUTPATIENT
Start: 2024-06-15 | End: 2024-06-18 | Stop reason: HOSPADM

## 2024-06-15 RX ORDER — LISINOPRIL 5 MG/1
5 TABLET ORAL DAILY
Status: DISCONTINUED | OUTPATIENT
Start: 2024-06-16 | End: 2024-06-18 | Stop reason: HOSPADM

## 2024-06-15 RX ORDER — DEXTROSE MONOHYDRATE 100 MG/ML
INJECTION, SOLUTION INTRAVENOUS CONTINUOUS PRN
Status: DISCONTINUED | OUTPATIENT
Start: 2024-06-15 | End: 2024-06-18 | Stop reason: HOSPADM

## 2024-06-15 RX ORDER — ATORVASTATIN CALCIUM 40 MG/1
80 TABLET, FILM COATED ORAL NIGHTLY
Status: DISCONTINUED | OUTPATIENT
Start: 2024-06-15 | End: 2024-06-18 | Stop reason: HOSPADM

## 2024-06-15 RX ORDER — ALBUTEROL SULFATE 90 UG/1
1 AEROSOL, METERED RESPIRATORY (INHALATION) EVERY 6 HOURS PRN
Status: DISCONTINUED | OUTPATIENT
Start: 2024-06-15 | End: 2024-06-18 | Stop reason: HOSPADM

## 2024-06-15 RX ORDER — TAMSULOSIN HYDROCHLORIDE 0.4 MG/1
0.4 CAPSULE ORAL DAILY
Status: DISCONTINUED | OUTPATIENT
Start: 2024-06-15 | End: 2024-06-18 | Stop reason: HOSPADM

## 2024-06-15 RX ORDER — PANTOPRAZOLE SODIUM 40 MG/1
40 TABLET, DELAYED RELEASE ORAL
Status: DISCONTINUED | OUTPATIENT
Start: 2024-06-16 | End: 2024-06-18 | Stop reason: HOSPADM

## 2024-06-15 RX ORDER — ONDANSETRON 4 MG/1
4 TABLET, ORALLY DISINTEGRATING ORAL EVERY 8 HOURS PRN
Status: DISCONTINUED | OUTPATIENT
Start: 2024-06-15 | End: 2024-06-18 | Stop reason: HOSPADM

## 2024-06-15 RX ORDER — POTASSIUM CHLORIDE 7.45 MG/ML
10 INJECTION INTRAVENOUS PRN
Status: DISCONTINUED | OUTPATIENT
Start: 2024-06-15 | End: 2024-06-18 | Stop reason: HOSPADM

## 2024-06-15 RX ORDER — ACETAMINOPHEN 650 MG/1
650 SUPPOSITORY RECTAL EVERY 6 HOURS PRN
Status: DISCONTINUED | OUTPATIENT
Start: 2024-06-15 | End: 2024-06-18 | Stop reason: HOSPADM

## 2024-06-15 RX ORDER — INSULIN LISPRO 100 [IU]/ML
0-4 INJECTION, SOLUTION INTRAVENOUS; SUBCUTANEOUS NIGHTLY
Status: DISCONTINUED | OUTPATIENT
Start: 2024-06-15 | End: 2024-06-18 | Stop reason: HOSPADM

## 2024-06-15 RX ORDER — POTASSIUM CHLORIDE 20 MEQ/1
40 TABLET, EXTENDED RELEASE ORAL PRN
Status: DISCONTINUED | OUTPATIENT
Start: 2024-06-15 | End: 2024-06-18 | Stop reason: HOSPADM

## 2024-06-15 RX ORDER — GABAPENTIN 100 MG/1
200 CAPSULE ORAL 3 TIMES DAILY
Status: DISCONTINUED | OUTPATIENT
Start: 2024-06-15 | End: 2024-06-18 | Stop reason: HOSPADM

## 2024-06-15 RX ORDER — METOPROLOL SUCCINATE 50 MG/1
50 TABLET, EXTENDED RELEASE ORAL 2 TIMES DAILY
Status: DISCONTINUED | OUTPATIENT
Start: 2024-06-15 | End: 2024-06-18 | Stop reason: HOSPADM

## 2024-06-15 RX ORDER — DULOXETIN HYDROCHLORIDE 60 MG/1
60 CAPSULE, DELAYED RELEASE ORAL DAILY
Status: DISCONTINUED | OUTPATIENT
Start: 2024-06-16 | End: 2024-06-18 | Stop reason: HOSPADM

## 2024-06-15 RX ORDER — SODIUM CHLORIDE 9 MG/ML
INJECTION, SOLUTION INTRAVENOUS PRN
Status: DISCONTINUED | OUTPATIENT
Start: 2024-06-15 | End: 2024-06-18 | Stop reason: HOSPADM

## 2024-06-15 RX ORDER — INSULIN GLARGINE 100 [IU]/ML
40 INJECTION, SOLUTION SUBCUTANEOUS NIGHTLY
Status: DISCONTINUED | OUTPATIENT
Start: 2024-06-15 | End: 2024-06-18 | Stop reason: HOSPADM

## 2024-06-15 RX ORDER — INSULIN LISPRO 100 [IU]/ML
0-8 INJECTION, SOLUTION INTRAVENOUS; SUBCUTANEOUS
Status: DISCONTINUED | OUTPATIENT
Start: 2024-06-16 | End: 2024-06-18 | Stop reason: HOSPADM

## 2024-06-15 RX ORDER — GLUCAGON 1 MG/ML
1 KIT INJECTION PRN
Status: DISCONTINUED | OUTPATIENT
Start: 2024-06-15 | End: 2024-06-18 | Stop reason: HOSPADM

## 2024-06-15 RX ORDER — DIVALPROEX SODIUM 500 MG/1
500 TABLET, DELAYED RELEASE ORAL DAILY
Status: DISCONTINUED | OUTPATIENT
Start: 2024-06-16 | End: 2024-06-18 | Stop reason: HOSPADM

## 2024-06-15 RX ORDER — ASPIRIN 81 MG/1
81 TABLET, CHEWABLE ORAL DAILY
Status: DISCONTINUED | OUTPATIENT
Start: 2024-06-16 | End: 2024-06-18 | Stop reason: HOSPADM

## 2024-06-15 RX ORDER — HYDROXYZINE HYDROCHLORIDE 25 MG/1
25 TABLET, FILM COATED ORAL 4 TIMES DAILY PRN
Status: DISCONTINUED | OUTPATIENT
Start: 2024-06-15 | End: 2024-06-15

## 2024-06-15 RX ORDER — CARBIDOPA AND LEVODOPA 50; 200 MG/1; MG/1
1 TABLET, EXTENDED RELEASE ORAL NIGHTLY
Status: DISCONTINUED | OUTPATIENT
Start: 2024-06-15 | End: 2024-06-15

## 2024-06-15 RX ADMIN — FAMOTIDINE 20 MG: 20 TABLET, FILM COATED ORAL at 21:32

## 2024-06-15 RX ADMIN — INSULIN LISPRO 4 UNITS: 100 INJECTION, SOLUTION INTRAVENOUS; SUBCUTANEOUS at 21:47

## 2024-06-15 RX ADMIN — ATORVASTATIN CALCIUM 80 MG: 40 TABLET, FILM COATED ORAL at 21:32

## 2024-06-15 RX ADMIN — ENOXAPARIN SODIUM 30 MG: 100 INJECTION SUBCUTANEOUS at 21:32

## 2024-06-15 RX ADMIN — DIVALPROEX SODIUM 1000 MG: 500 TABLET, FILM COATED, EXTENDED RELEASE ORAL at 21:33

## 2024-06-15 RX ADMIN — TAMSULOSIN HYDROCHLORIDE 0.4 MG: 0.4 CAPSULE ORAL at 21:47

## 2024-06-15 RX ADMIN — CARBIDOPA AND LEVODOPA 1 TABLET: 10; 100 TABLET ORAL at 21:32

## 2024-06-15 RX ADMIN — SODIUM CHLORIDE 1000 ML: 9 INJECTION, SOLUTION INTRAVENOUS at 18:00

## 2024-06-15 RX ADMIN — INSULIN GLARGINE 40 UNITS: 100 INJECTION, SOLUTION SUBCUTANEOUS at 21:33

## 2024-06-15 RX ADMIN — METOPROLOL SUCCINATE 50 MG: 50 TABLET, EXTENDED RELEASE ORAL at 21:34

## 2024-06-15 RX ADMIN — GABAPENTIN 200 MG: 100 CAPSULE ORAL at 21:32

## 2024-06-15 ASSESSMENT — PAIN - FUNCTIONAL ASSESSMENT: PAIN_FUNCTIONAL_ASSESSMENT: 0-10

## 2024-06-15 ASSESSMENT — PAIN DESCRIPTION - DESCRIPTORS: DESCRIPTORS: SHARP

## 2024-06-15 ASSESSMENT — PAIN SCALES - GENERAL
PAINLEVEL_OUTOF10: 8
PAINLEVEL_OUTOF10: 0

## 2024-06-15 ASSESSMENT — PAIN DESCRIPTION - PAIN TYPE: TYPE: ACUTE PAIN

## 2024-06-15 ASSESSMENT — PAIN DESCRIPTION - LOCATION: LOCATION: OTHER (COMMENT)

## 2024-06-15 NOTE — ED TRIAGE NOTES
Pt states that he fell two weeks ago and broke his tailbone. Pt states that he fell again today and is c/o of 8/10 in his tailbone. States that he hit his head but denies LOC. Pt states that he feels that he needs to go into a nursing home because he can't live alone.  Pt states that he has fallen probably 70 times in the past year.  Pt uses a cane to ambulate.

## 2024-06-15 NOTE — ED PROVIDER NOTES
[facility] ED  EMERGENCY DEPARTMENT ENCOUNTER        Pt Name: Chano Quiroga Jr.  MRN: [id number]  Birthdate 1960  Date of evaluation: 6/15/2024  Provider: Swetha Dillard PA-C  PCP: Amalai Phillip APRN - NP  Note Started: 4:19 PM EDT 6/15/24      EBONI. I have evaluated this patient.  With my attending physician Dr. Chapa      CHIEF COMPLAINT       Chief Complaint   Patient presents with    Fall       HISTORY OF PRESENT ILLNESS: 1 or more Elements     History From: Patient            Chief Complaint: Fall    Chano Quiroga Jr. is a 63 y.o. male who presents because he tells me he frequently falls, he states his legs feel weak and they gave out on him while he was walking in the kitchen.  He fell onto his low back and bottom.  He does not remember hitting his head but he is not sure he does not remember falling really, he has been complaining of low back pain and pain to his tailbone and pain in his neck on the way over via EMS.  He denies recent nausea vomiting fever congestion cough abdominal pain shortness of breath chest pain.  He states he has home health taking care of him 3 days a week.  He has a history of urinary incontinence Parkinson's history of Watchman procedure cardiac pacemaker no blood thinners    Nursing Notes were all reviewed and agreed with or any disagreements were addressed in the HPI.    REVIEW OF SYSTEMS :      Review of Systems    Positives and Pertinent negatives as per HPI.     SURGICAL HISTORY     Past Surgical History:   Procedure Laterality Date    CARDIAC PACEMAKER PLACEMENT  2011    NOT MRI COMPATIABLE OLD LEADS st derrick. pace maker difib    CARDIOVASCULAR SURGERY      Watchman placement    CARPAL TUNNEL RELEASE Bilateral 2013    CATARACT REMOVAL WITH IMPLANT Left 02/28/2014    COLONOSCOPY      CORONARY ANGIOPLASTY WITH STENT PLACEMENT  2001,2008    CYST REMOVAL  1982    coccyx area    DIAGNOSTIC CARDIAC CATH LAB PROCEDURE      ENDOSCOPY, COLON, 
contain errors related to that system including errors in grammar, punctuation, and spelling, as well as words and phrases that may be inappropriate. If there are any questions or concerns please feel free to contact the dictating provider for clarification.      Justo Chapa,   06/15/24 1820

## 2024-06-16 LAB
ANION GAP SERPL CALCULATED.3IONS-SCNC: 12 MMOL/L (ref 3–16)
BUN SERPL-MCNC: 12 MG/DL (ref 7–20)
CALCIUM SERPL-MCNC: 8.8 MG/DL (ref 8.3–10.6)
CHLORIDE SERPL-SCNC: 98 MMOL/L (ref 99–110)
CO2 SERPL-SCNC: 25 MMOL/L (ref 21–32)
CREAT SERPL-MCNC: 0.6 MG/DL (ref 0.8–1.3)
DEPRECATED RDW RBC AUTO: 15.1 % (ref 12.4–15.4)
EKG ATRIAL RATE: 88 BPM
EKG DIAGNOSIS: NORMAL
EKG P AXIS: 70 DEGREES
EKG P-R INTERVAL: 160 MS
EKG Q-T INTERVAL: 454 MS
EKG QRS DURATION: 92 MS
EKG QTC CALCULATION (BAZETT): 533 MS
EKG R AXIS: 69 DEGREES
EKG T AXIS: 44 DEGREES
EKG VENTRICULAR RATE: 83 BPM
GFR SERPLBLD CREATININE-BSD FMLA CKD-EPI: >90 ML/MIN/{1.73_M2}
GLUCOSE BLD-MCNC: 184 MG/DL (ref 70–99)
GLUCOSE BLD-MCNC: 217 MG/DL (ref 70–99)
GLUCOSE BLD-MCNC: 246 MG/DL (ref 70–99)
GLUCOSE BLD-MCNC: 252 MG/DL (ref 70–99)
GLUCOSE BLD-MCNC: 283 MG/DL (ref 70–99)
GLUCOSE SERPL-MCNC: 238 MG/DL (ref 70–99)
HCT VFR BLD AUTO: 42.2 % (ref 40.5–52.5)
HGB BLD-MCNC: 14.4 G/DL (ref 13.5–17.5)
MCH RBC QN AUTO: 29.8 PG (ref 26–34)
MCHC RBC AUTO-ENTMCNC: 34.1 G/DL (ref 31–36)
MCV RBC AUTO: 87.3 FL (ref 80–100)
PERFORMED ON: ABNORMAL
PLATELET # BLD AUTO: 106 K/UL (ref 135–450)
PMV BLD AUTO: 7.1 FL (ref 5–10.5)
POTASSIUM SERPL-SCNC: 4.2 MMOL/L (ref 3.5–5.1)
RBC # BLD AUTO: 4.83 M/UL (ref 4.2–5.9)
SODIUM SERPL-SCNC: 135 MMOL/L (ref 136–145)
WBC # BLD AUTO: 5 K/UL (ref 4–11)

## 2024-06-16 PROCEDURE — 6370000000 HC RX 637 (ALT 250 FOR IP): Performed by: INTERNAL MEDICINE

## 2024-06-16 PROCEDURE — 85027 COMPLETE CBC AUTOMATED: CPT

## 2024-06-16 PROCEDURE — 2580000003 HC RX 258: Performed by: INTERNAL MEDICINE

## 2024-06-16 PROCEDURE — 97530 THERAPEUTIC ACTIVITIES: CPT

## 2024-06-16 PROCEDURE — 97110 THERAPEUTIC EXERCISES: CPT

## 2024-06-16 PROCEDURE — 97166 OT EVAL MOD COMPLEX 45 MIN: CPT

## 2024-06-16 PROCEDURE — 97162 PT EVAL MOD COMPLEX 30 MIN: CPT

## 2024-06-16 PROCEDURE — 36415 COLL VENOUS BLD VENIPUNCTURE: CPT

## 2024-06-16 PROCEDURE — 80048 BASIC METABOLIC PNL TOTAL CA: CPT

## 2024-06-16 PROCEDURE — 93010 ELECTROCARDIOGRAM REPORT: CPT | Performed by: STUDENT IN AN ORGANIZED HEALTH CARE EDUCATION/TRAINING PROGRAM

## 2024-06-16 PROCEDURE — 99232 SBSQ HOSP IP/OBS MODERATE 35: CPT | Performed by: INTERNAL MEDICINE

## 2024-06-16 PROCEDURE — 6360000002 HC RX W HCPCS: Performed by: INTERNAL MEDICINE

## 2024-06-16 PROCEDURE — 1200000000 HC SEMI PRIVATE

## 2024-06-16 RX ADMIN — CARBIDOPA AND LEVODOPA 1 TABLET: 10; 100 TABLET ORAL at 08:15

## 2024-06-16 RX ADMIN — INSULIN LISPRO 4 UNITS: 100 INJECTION, SOLUTION INTRAVENOUS; SUBCUTANEOUS at 16:44

## 2024-06-16 RX ADMIN — TAMSULOSIN HYDROCHLORIDE 0.4 MG: 0.4 CAPSULE ORAL at 08:15

## 2024-06-16 RX ADMIN — FAMOTIDINE 20 MG: 20 TABLET, FILM COATED ORAL at 20:12

## 2024-06-16 RX ADMIN — GABAPENTIN 200 MG: 100 CAPSULE ORAL at 08:15

## 2024-06-16 RX ADMIN — INSULIN GLARGINE 40 UNITS: 100 INJECTION, SOLUTION SUBCUTANEOUS at 20:13

## 2024-06-16 RX ADMIN — INSULIN LISPRO 2 UNITS: 100 INJECTION, SOLUTION INTRAVENOUS; SUBCUTANEOUS at 12:09

## 2024-06-16 RX ADMIN — CARBIDOPA AND LEVODOPA 1 TABLET: 10; 100 TABLET ORAL at 16:44

## 2024-06-16 RX ADMIN — DULOXETINE HYDROCHLORIDE 60 MG: 60 CAPSULE, DELAYED RELEASE ORAL at 08:15

## 2024-06-16 RX ADMIN — FAMOTIDINE 20 MG: 20 TABLET, FILM COATED ORAL at 08:15

## 2024-06-16 RX ADMIN — DIVALPROEX SODIUM 1000 MG: 500 TABLET, FILM COATED, EXTENDED RELEASE ORAL at 20:13

## 2024-06-16 RX ADMIN — LISINOPRIL 5 MG: 5 TABLET ORAL at 08:15

## 2024-06-16 RX ADMIN — PANTOPRAZOLE SODIUM 40 MG: 40 TABLET, DELAYED RELEASE ORAL at 06:03

## 2024-06-16 RX ADMIN — DIVALPROEX SODIUM 500 MG: 500 TABLET, DELAYED RELEASE ORAL at 08:14

## 2024-06-16 RX ADMIN — INSULIN LISPRO 2 UNITS: 100 INJECTION, SOLUTION INTRAVENOUS; SUBCUTANEOUS at 08:15

## 2024-06-16 RX ADMIN — CARBIDOPA AND LEVODOPA 1 TABLET: 10; 100 TABLET ORAL at 20:12

## 2024-06-16 RX ADMIN — CARBIDOPA AND LEVODOPA 1 TABLET: 10; 100 TABLET ORAL at 12:09

## 2024-06-16 RX ADMIN — SODIUM CHLORIDE, PRESERVATIVE FREE 5 ML: 5 INJECTION INTRAVENOUS at 20:14

## 2024-06-16 RX ADMIN — ENOXAPARIN SODIUM 30 MG: 100 INJECTION SUBCUTANEOUS at 08:15

## 2024-06-16 RX ADMIN — GABAPENTIN 200 MG: 100 CAPSULE ORAL at 12:09

## 2024-06-16 RX ADMIN — ASPIRIN 81 MG: 81 TABLET, CHEWABLE ORAL at 08:15

## 2024-06-16 RX ADMIN — METOPROLOL SUCCINATE 50 MG: 50 TABLET, EXTENDED RELEASE ORAL at 08:14

## 2024-06-16 RX ADMIN — ATORVASTATIN CALCIUM 80 MG: 40 TABLET, FILM COATED ORAL at 20:12

## 2024-06-16 RX ADMIN — GABAPENTIN 200 MG: 100 CAPSULE ORAL at 20:13

## 2024-06-16 RX ADMIN — METOPROLOL SUCCINATE 50 MG: 50 TABLET, EXTENDED RELEASE ORAL at 20:12

## 2024-06-16 RX ADMIN — ENOXAPARIN SODIUM 30 MG: 100 INJECTION SUBCUTANEOUS at 20:12

## 2024-06-16 ASSESSMENT — PAIN SCALES - GENERAL
PAINLEVEL_OUTOF10: 0

## 2024-06-16 NOTE — H&P
osteophyte complex.  Soft tissues of the neck appear grossly unremarkable. Lung apices are clear.     No acute intracranial abnormality.  Global volume loss. No acute fracture dislocation involving cervical spine.  Severe degenerative changes ben at C3-C4 with large left paracentral posterior disc osteophyte complex.     XR SACRUM COCCYX (MIN 2 VIEWS)    Result Date: 6/15/2024  EXAMINATION: THREE XRAY VIEWS OF THE SACRUM/COCCYX; 3 XRAY VIEWS OF THE LUMBAR SPINE 6/15/2024 4:30 pm COMPARISON: CT pelvis 04/30/2024 HISTORY: ORDERING SYSTEM PROVIDED HISTORY: trauma TECHNOLOGIST PROVIDED HISTORY: Reason for exam:->trauma Reason for Exam: fall FINDINGS: No obvious acute fracture dislocation involving the sacrum or lumbar spine. Deformity in within the coccyx presumably related to prior injury. Degenerative changes are noted within the lower lumbar spine and sacrum. Stimulator device is present in the left gluteal region.     No acute radiographic abnormality.  Deformity within the coccyx likely related to prior injury.     XR LUMBAR SPINE (2-3 VIEWS)    Result Date: 6/15/2024  EXAMINATION: THREE XRAY VIEWS OF THE SACRUM/COCCYX; 3 XRAY VIEWS OF THE LUMBAR SPINE 6/15/2024 4:30 pm COMPARISON: CT pelvis 04/30/2024 HISTORY: ORDERING SYSTEM PROVIDED HISTORY: trauma TECHNOLOGIST PROVIDED HISTORY: Reason for exam:->trauma Reason for Exam: fall FINDINGS: No obvious acute fracture dislocation involving the sacrum or lumbar spine. Deformity in within the coccyx presumably related to prior injury. Degenerative changes are noted within the lower lumbar spine and sacrum. Stimulator device is present in the left gluteal region.     No acute radiographic abnormality.  Deformity within the coccyx likely related to prior injury.         Electronically signed by Juana Brasher MD on 6/15/2024 at 8:22 PM

## 2024-06-17 LAB
GLUCOSE BLD-MCNC: 199 MG/DL (ref 70–99)
GLUCOSE BLD-MCNC: 242 MG/DL (ref 70–99)
GLUCOSE BLD-MCNC: 243 MG/DL (ref 70–99)
GLUCOSE BLD-MCNC: 270 MG/DL (ref 70–99)
GLUCOSE BLD-MCNC: 315 MG/DL (ref 70–99)
GLUCOSE BLD-MCNC: 441 MG/DL (ref 70–99)
PERFORMED ON: ABNORMAL

## 2024-06-17 PROCEDURE — 99232 SBSQ HOSP IP/OBS MODERATE 35: CPT | Performed by: INTERNAL MEDICINE

## 2024-06-17 PROCEDURE — 99223 1ST HOSP IP/OBS HIGH 75: CPT | Performed by: PSYCHIATRY & NEUROLOGY

## 2024-06-17 PROCEDURE — 6360000002 HC RX W HCPCS: Performed by: INTERNAL MEDICINE

## 2024-06-17 PROCEDURE — 6370000000 HC RX 637 (ALT 250 FOR IP): Performed by: INTERNAL MEDICINE

## 2024-06-17 PROCEDURE — 1200000000 HC SEMI PRIVATE

## 2024-06-17 PROCEDURE — 2580000003 HC RX 258: Performed by: INTERNAL MEDICINE

## 2024-06-17 RX ADMIN — INSULIN LISPRO 4 UNITS: 100 INJECTION, SOLUTION INTRAVENOUS; SUBCUTANEOUS at 13:19

## 2024-06-17 RX ADMIN — FUROSEMIDE 20 MG: 20 TABLET ORAL at 08:07

## 2024-06-17 RX ADMIN — GABAPENTIN 200 MG: 100 CAPSULE ORAL at 08:07

## 2024-06-17 RX ADMIN — SODIUM CHLORIDE, PRESERVATIVE FREE 5 ML: 5 INJECTION INTRAVENOUS at 08:09

## 2024-06-17 RX ADMIN — FAMOTIDINE 20 MG: 20 TABLET, FILM COATED ORAL at 08:07

## 2024-06-17 RX ADMIN — DIVALPROEX SODIUM 1000 MG: 500 TABLET, FILM COATED, EXTENDED RELEASE ORAL at 19:53

## 2024-06-17 RX ADMIN — TAMSULOSIN HYDROCHLORIDE 0.4 MG: 0.4 CAPSULE ORAL at 08:07

## 2024-06-17 RX ADMIN — CARBIDOPA AND LEVODOPA 1 TABLET: 10; 100 TABLET ORAL at 13:20

## 2024-06-17 RX ADMIN — PANTOPRAZOLE SODIUM 40 MG: 40 TABLET, DELAYED RELEASE ORAL at 07:28

## 2024-06-17 RX ADMIN — INSULIN LISPRO 2 UNITS: 100 INJECTION, SOLUTION INTRAVENOUS; SUBCUTANEOUS at 08:08

## 2024-06-17 RX ADMIN — LISINOPRIL 5 MG: 5 TABLET ORAL at 08:07

## 2024-06-17 RX ADMIN — ENOXAPARIN SODIUM 30 MG: 100 INJECTION SUBCUTANEOUS at 19:53

## 2024-06-17 RX ADMIN — DULOXETINE HYDROCHLORIDE 60 MG: 60 CAPSULE, DELAYED RELEASE ORAL at 08:07

## 2024-06-17 RX ADMIN — INSULIN LISPRO 4 UNITS: 100 INJECTION, SOLUTION INTRAVENOUS; SUBCUTANEOUS at 19:52

## 2024-06-17 RX ADMIN — ENOXAPARIN SODIUM 30 MG: 100 INJECTION SUBCUTANEOUS at 08:07

## 2024-06-17 RX ADMIN — ACETAMINOPHEN 650 MG: 325 TABLET ORAL at 19:53

## 2024-06-17 RX ADMIN — GABAPENTIN 200 MG: 100 CAPSULE ORAL at 13:20

## 2024-06-17 RX ADMIN — INSULIN GLARGINE 40 UNITS: 100 INJECTION, SOLUTION SUBCUTANEOUS at 19:52

## 2024-06-17 RX ADMIN — METOPROLOL SUCCINATE 50 MG: 50 TABLET, EXTENDED RELEASE ORAL at 19:53

## 2024-06-17 RX ADMIN — CARBIDOPA AND LEVODOPA 1 TABLET: 10; 100 TABLET ORAL at 08:07

## 2024-06-17 RX ADMIN — METOPROLOL SUCCINATE 50 MG: 50 TABLET, EXTENDED RELEASE ORAL at 08:07

## 2024-06-17 RX ADMIN — FAMOTIDINE 20 MG: 20 TABLET, FILM COATED ORAL at 19:52

## 2024-06-17 RX ADMIN — ASPIRIN 81 MG: 81 TABLET, CHEWABLE ORAL at 08:07

## 2024-06-17 RX ADMIN — DIVALPROEX SODIUM 500 MG: 500 TABLET, DELAYED RELEASE ORAL at 08:07

## 2024-06-17 RX ADMIN — CARBIDOPA AND LEVODOPA 1 TABLET: 10; 100 TABLET ORAL at 16:58

## 2024-06-17 RX ADMIN — CARBIDOPA AND LEVODOPA 1 TABLET: 10; 100 TABLET ORAL at 19:53

## 2024-06-17 RX ADMIN — ATORVASTATIN CALCIUM 80 MG: 40 TABLET, FILM COATED ORAL at 19:53

## 2024-06-17 RX ADMIN — GABAPENTIN 200 MG: 100 CAPSULE ORAL at 19:53

## 2024-06-17 ASSESSMENT — PAIN DESCRIPTION - ORIENTATION: ORIENTATION: MID

## 2024-06-17 ASSESSMENT — PAIN SCALES - GENERAL
PAINLEVEL_OUTOF10: 0
PAINLEVEL_OUTOF10: 3

## 2024-06-17 ASSESSMENT — PAIN DESCRIPTION - LOCATION: LOCATION: SACRUM

## 2024-06-17 ASSESSMENT — PAIN DESCRIPTION - PAIN TYPE: TYPE: ACUTE PAIN

## 2024-06-17 ASSESSMENT — PAIN DESCRIPTION - DESCRIPTORS: DESCRIPTORS: DISCOMFORT;SORE

## 2024-06-17 ASSESSMENT — PAIN - FUNCTIONAL ASSESSMENT: PAIN_FUNCTIONAL_ASSESSMENT: PREVENTS OR INTERFERES SOME ACTIVE ACTIVITIES AND ADLS

## 2024-06-17 NOTE — CONSULTS
Neurology consultation note    Patient name: Chano Quiroga Jr.      Chief Complaint:  Fall.    History of present illness:  This is a 63 years old right-handed male.  The patient was brought to the hospital yesterday after the patient fall on his tailbone.  The patient is noted for underlying parkinsonism after admission in the hospital about 6 months ago.  However, the patient never had establish care as an outpatient with neurology to address his Sinemet.  The patient remains to have mild degree of resting tremor and cogwheel rigidity.  The patient is noted for underlying bipolar disorder and history of antipsychotic use/Depakote.  Therefore, the etiology of parkinsonism can be Parkinson disease or drug-induced parkinsonism.    Past medical history:    Past Medical History:   Diagnosis Date    Arthritis     Asthma     CAD (coronary artery disease)     CHF (congestive heart failure) (HCC)     Chronic obstructive pulmonary disease (HCC)     COPD (chronic obstructive pulmonary disease) (HCC)     Fatty liver     GERD (gastroesophageal reflux disease)     Hyperlipidemia     Hypertension     Medical history reviewed with no changes     MI, old     Myocardial infarction, nontransmural (HCC) 02/22/2012    Parkinson's disease (HCC)     Sleep apnea     pt wears cpap at night. states does not have cpap    Type II or unspecified type diabetes mellitus without mention of complication, not stated as uncontrolled        Past surgical history:    Past Surgical History:   Procedure Laterality Date    CARDIAC PACEMAKER PLACEMENT  2011    NOT MRI COMPATIABLE OLD LEADS st derrick. pace maker difib    CARDIOVASCULAR SURGERY      Watchman placement    CARPAL TUNNEL RELEASE Bilateral 2013    CATARACT REMOVAL WITH IMPLANT Left 02/28/2014    COLONOSCOPY      CORONARY ANGIOPLASTY WITH STENT PLACEMENT  2001,2008    CYST REMOVAL  1982    coccyx area    DIAGNOSTIC CARDIAC CATH LAB PROCEDURE      ENDOSCOPY, COLON, DIAGNOSTIC      ERCP   The patient is a 49y Female complaining of hand pain/injury.

## 2024-06-17 NOTE — DISCHARGE INSTRUCTIONS
Heart Failure Resources:  Heart Failure Interactive Workbook:  Go to [email or web address].[email or web address]/[email or web address] for a Free Heart Failure Interactive Workbook provided by The American [employer] Association. This interactive workbook will provide information on Healthier Living with Heart Failure. Please copy and paste link into search bar. Use your mouse to scroll through the pages.    HF [place]oma amaya:   Heart Failure Free smart phone amaya available for iPhone and Android download. Use your phone to track sodium intake, fluid intake, symptoms, and weight.     Low Sodium Diet / Recipes:  Go to www.[employer] website for “renal” diet which is Low Sodium! [employer] is a dialysis company, but this website offers free seasonal cookbooks. Each quarter, they will release 25-30 new recipes with a breakdown of calories, sodium, and glucose. You can also go to wwwGÃ[employer]nie NumÃ©rique/recipes website for free recipes.     Discharge Instruction Video:  Scan the QR code below with your camera and click the canva.com link to open the video and watch educational information on Heart Failure and Medications from one of our nurses.   [email or web address]/edit    Home Exercise Program:   Identification of Green/Yellow/Red zones:  You should be able to identify when you feel good (green zone), if you have 1-2 symptoms of HF (yellow zone), or if you are in need of medical attention (red zone).  In your CHF education folder you were provided a “stop light tool” to outline this information.     We want to you to rate your exertion levels:    Our therapy team has discussed means of identification with you such as the \"Kamille scale.\"  The Kamille rating scale ranges from 6 to 20, where 6 means \"no exertion at all\" and 20 means \"maximal exertion.\" The goal is to use this to gauge how much effort it is taking for you to do your normal daily tasks.   You should be able to recognize when too much exertion is

## 2024-06-17 NOTE — CARE COORDINATION
Case Management Assessment  Initial Evaluation    Date/Time of Evaluation: 6/17/2024 3:34 PM  Assessment Completed by: Usha Fonseca RN    If patient is discharged prior to next notation, then this note serves as note for discharge by case management.    Patient Name: Chano Quiroga Jr.                   YOB: 1960  Diagnosis: Hyperglycemia [R73.9]  Failure to thrive in adult [R62.7]  Frequent falls [R29.6]                   Date / Time: 6/15/2024  3:51 PM    Patient Admission Status: Inpatient   Readmission Risk (Low < 19, Mod (19-27), High > 27): Readmission Risk Score: 23    Current PCP: Amalia Phillip APRN - NP  PCP verified by CM? Yes (MAGAN Phillip)    Chart Reviewed: Yes      History Provided by: Patient  Patient Orientation: Alert and Oriented    Patient Cognition: Alert    Hospitalization in the last 30 days (Readmission):  No    If yes, Readmission Assessment in CM Navigator will be completed.    Advance Directives:      Code Status: Full Code   Patient's Primary Decision Maker is: Legal Next of Kin    Primary Decision Maker: Caryn Cantu - Brother/Sister - [phone]    Secondary Decision Maker: Miguelito Quiroga - Brother/Sister - [phone]    Discharge Planning:    Patient lives with: Alone Type of Home: Apartment  Primary Care Giver: Self  Patient Support Systems include: Family Members, ANALY/Passport   Current Financial resources: Other (Comment), Medicaid ([employer] Medicare)  Current community resources: Assisted Living ([facility] at [facility]  Current services prior to admission: Durable Medical Equipment, Meals On Wheels, Other (Comment), ANALY/Passport (Faye has home delivered meals and HHA 3 days per week. Also active passport LYNN Jacobs)            Current DME: Walker, Cane, Other (Comment) (hank)            Type of Home Care services:  Aide Services    ADLS  Prior functional level: Assistance with the following:, Cooking, Housework, Shopping  Current

## 2024-06-18 VITALS
DIASTOLIC BLOOD PRESSURE: 79 MMHG | RESPIRATION RATE: 18 BRPM | HEIGHT: 64 IN | BODY MASS INDEX: 39.57 KG/M2 | WEIGHT: 231.8 LBS | TEMPERATURE: 97 F | OXYGEN SATURATION: 100 % | SYSTOLIC BLOOD PRESSURE: 129 MMHG | HEART RATE: 64 BPM

## 2024-06-18 LAB
GLUCOSE BLD-MCNC: 163 MG/DL (ref 70–99)
GLUCOSE BLD-MCNC: 214 MG/DL (ref 70–99)
GLUCOSE BLD-MCNC: 245 MG/DL (ref 70–99)
PERFORMED ON: ABNORMAL

## 2024-06-18 PROCEDURE — 99238 HOSP IP/OBS DSCHRG MGMT 30/<: CPT | Performed by: INTERNAL MEDICINE

## 2024-06-18 PROCEDURE — 6370000000 HC RX 637 (ALT 250 FOR IP): Performed by: INTERNAL MEDICINE

## 2024-06-18 PROCEDURE — 97116 GAIT TRAINING THERAPY: CPT

## 2024-06-18 PROCEDURE — 97530 THERAPEUTIC ACTIVITIES: CPT

## 2024-06-18 PROCEDURE — 2580000003 HC RX 258: Performed by: INTERNAL MEDICINE

## 2024-06-18 PROCEDURE — 99233 SBSQ HOSP IP/OBS HIGH 50: CPT | Performed by: PSYCHIATRY & NEUROLOGY

## 2024-06-18 PROCEDURE — 6360000002 HC RX W HCPCS: Performed by: INTERNAL MEDICINE

## 2024-06-18 PROCEDURE — 97535 SELF CARE MNGMENT TRAINING: CPT

## 2024-06-18 RX ORDER — CARBIDOPA AND LEVODOPA 25; 100 MG/1; MG/1
1 TABLET, EXTENDED RELEASE ORAL 2 TIMES DAILY
Qty: 60 TABLET | Refills: 1
Start: 2024-06-18

## 2024-06-18 RX ADMIN — INSULIN LISPRO 2 UNITS: 100 INJECTION, SOLUTION INTRAVENOUS; SUBCUTANEOUS at 08:11

## 2024-06-18 RX ADMIN — LISINOPRIL 5 MG: 5 TABLET ORAL at 08:11

## 2024-06-18 RX ADMIN — GABAPENTIN 200 MG: 100 CAPSULE ORAL at 13:48

## 2024-06-18 RX ADMIN — INSULIN LISPRO 2 UNITS: 100 INJECTION, SOLUTION INTRAVENOUS; SUBCUTANEOUS at 11:32

## 2024-06-18 RX ADMIN — SODIUM CHLORIDE, PRESERVATIVE FREE 5 ML: 5 INJECTION INTRAVENOUS at 08:13

## 2024-06-18 RX ADMIN — GABAPENTIN 200 MG: 100 CAPSULE ORAL at 08:11

## 2024-06-18 RX ADMIN — DULOXETINE HYDROCHLORIDE 60 MG: 60 CAPSULE, DELAYED RELEASE ORAL at 08:11

## 2024-06-18 RX ADMIN — FAMOTIDINE 20 MG: 20 TABLET, FILM COATED ORAL at 08:12

## 2024-06-18 RX ADMIN — TAMSULOSIN HYDROCHLORIDE 0.4 MG: 0.4 CAPSULE ORAL at 08:11

## 2024-06-18 RX ADMIN — DIVALPROEX SODIUM 500 MG: 500 TABLET, DELAYED RELEASE ORAL at 08:11

## 2024-06-18 RX ADMIN — ENOXAPARIN SODIUM 30 MG: 100 INJECTION SUBCUTANEOUS at 08:12

## 2024-06-18 RX ADMIN — ASPIRIN 81 MG: 81 TABLET, CHEWABLE ORAL at 08:11

## 2024-06-18 RX ADMIN — CARBIDOPA AND LEVODOPA 1 TABLET: 10; 100 TABLET ORAL at 13:46

## 2024-06-18 RX ADMIN — CARBIDOPA AND LEVODOPA 1 TABLET: 10; 100 TABLET ORAL at 08:11

## 2024-06-18 RX ADMIN — METOPROLOL SUCCINATE 50 MG: 50 TABLET, EXTENDED RELEASE ORAL at 08:11

## 2024-06-18 NOTE — CONSULTS
[facility]   HEART FAILURE PROGRAM      NAME:  Chano Quiroga Jr.  AGE: 63 y.o.   GENDER: male  : 1960  TODAY'S DATE:  2024    Subjective:     VISIT TYPE: Evaluation / Consult    ADMIT DATE: 6/15/2024    PAST MEDICAL HISTORY:      Diagnosis Date    Arthritis     Asthma     CAD (coronary artery disease)     CHF (congestive heart failure) (HCC)     Chronic obstructive pulmonary disease (HCC)     COPD (chronic obstructive pulmonary disease) ([facility])     Fatty liver     GERD (gastroesophageal reflux disease)     Hyperlipidemia     Hypertension     Medical history reviewed with no changes     MI, old     Myocardial infarction, nontransmural (HCC) 2012    Parkinson's disease ([facility])     Sleep apnea     pt wears cpap at night. states does not have cpap    Type II or unspecified type diabetes mellitus without mention of complication, not stated as uncontrolled      HOME MEDICATIONS:  Prior to Admission medications    Medication Sig Start Date End Date Taking? Authorizing Provider   gabapentin (NEURONTIN) 100 MG capsule Take 2 capsules by mouth 3 times daily.   Yes Giovanny Mosquera MD   carbidopa-levodopa (SINEMET CR)  MG per extended release tablet Take 1 tablet by mouth nightly 5/3/24   Augie Crooks MD   potassium chloride (KLOR-CON M) 20 MEQ extended release tablet TAKE ONE TABLET BY MOUTH WITH LASIX ON MONDAY AND 24   Helio Zhou APRN - CNP   TRULICITY 3 MG/0.5ML SOPN INJECT 3MG SUBCUTANEOUSLY ONCE A WEEK 3/19/24   Ismael Espinoza APRN - CNP   Insulin Glargine (BASAGLAR KWIKPEN SC) Inject 50 Units into the skin nightly    Giovanny Mosquera MD   Insulin Aspart (NOVOLOG SC) Inject 15 units subcutaneously every morning and 20 units every evening with meals.    Giovanny Mosquera MD   carbidopa-levodopa (SINEMET)  MG per tablet TAKE ONE (1) TABLET BY MOUTH THREE TIMES DAILY (AM/NOON/NAYANA) 1/10/24   Ismael Espinoza APRN - CNP

## 2024-06-18 NOTE — DISCHARGE SUMMARY
Name:  Chano Quiroga  Room:  0202/0202-01  MRN:    [id number]    Discharge Summary      This discharge summary is in conjunction with a complete physical exam done on the day of discharge.    Attending Physician: Dr. Maldonado  Discharging Physician: Dr. Maldonado      Admit: 6/15/2024  Discharge:  6/18/2024    HPI:    Chano Quiroga Jr. is 63 y.o. male with history of parkinson disease, diagnosed about one year ago  He lives alone. His wife passed away in 2017. He has no children.   He presented with complaint of frequent falls and unable to care for himself at home.   He recently stayed at a skilled nursing facility for 2 weeks.   He was then sent home.   He started to fall multiple times a day and unable to do his ADLs.   He pressed his emergency button and brought to the ED via ambulance.   He has chronic low back and sacrum pain.   He denies constipation. He wants to be placed at a nursing facility     Diagnoses this Admission and Hospital Course     # Frequent falls.   - PT OT consult.    -Neurology consultation -> increased Sinemet, follow up OP   -Will need SNF.     # Parkinson's disease.    - Continue Sinemet 4 times daily while IP  -Sinemet CR (100/25) BID after discharge, not available IP     # Paroxysmal A-fib.    -status post Watchman left atrial appendage closure device      # CAD.   - on asa, statin, BB  - Stable  -No chest pain     #HTN  - on lasix, Toprol XL, Lisinopril   - monitor BP     # Status post AICD.     # Diabetes mellitus type 2   - SSI, Lantus  - monitor BG     # Lovenox for DVT prophylaxis.       Procedures (Please Review Full Report for Details)  N/A    Consults    Neurology       Physical Exam at Discharge:    /76   Pulse 65   Temp 97 °F (36.1 °C) (Oral)   Resp 18   Ht 1.626 m (5' 4\")   Wt 105.1 kg (231 lb 12.8 oz)   SpO2 97%   BMI 39.79 kg/m²     Gen: No distress. Alert.   Eyes: PERRL. No sclera icterus. No conjunctival injection.   ENT: No discharge. Pharynx

## 2024-06-18 NOTE — PLAN OF CARE
Generalized weakness, fall today  Unable to care for self    2W  PT/OT  
  Problem: Discharge Planning  Goal: Discharge to home or other facility with appropriate resources  6/16/2024 0243 by Cassie Mina RN  Outcome: Progressing  6/16/2024 0243 by Cassie Mina RN  Outcome: Progressing     Problem: Cardiovascular - Adult  Goal: Maintains optimal cardiac output and hemodynamic stability  Outcome: Progressing  Goal: Absence of cardiac dysrhythmias or at baseline  Outcome: Progressing     Problem: Skin/Tissue Integrity - Adult  Goal: Skin integrity remains intact  Outcome: Progressing     Problem: Musculoskeletal - Adult  Goal: Return mobility to safest level of function  Outcome: Progressing     Problem: Genitourinary - Adult  Goal: Absence of urinary retention  Outcome: Progressing     Problem: Infection - Adult  Goal: Absence of infection at discharge  Outcome: Progressing  Goal: Absence of infection during hospitalization  Outcome: Progressing     Problem: Infection - Adult  Goal: Absence of infection during hospitalization  Outcome: Progressing     Problem: Metabolic/Fluid and Electrolytes - Adult  Goal: Electrolytes maintained within normal limits  Outcome: Progressing     
  Problem: Discharge Planning  Goal: Discharge to home or other facility with appropriate resources  6/18/2024 0949 by Samantha Brizuela, RN  Outcome: Progressing  Flowsheets (Taken 6/18/2024 0734)  Discharge to home or other facility with appropriate resources:   Identify barriers to discharge with patient and caregiver   Arrange for needed discharge resources and transportation as appropriate   Identify discharge learning needs (meds, wound care, etc)   Arrange for interpreters to assist at discharge as needed   Refer to discharge planning if patient needs post-hospital services based on physician order or complex needs related to functional status, cognitive ability or social support system  6/18/2024 0129 by Lina Scott, RN  Outcome: Progressing     Problem: Respiratory - Adult  Goal: Achieves optimal ventilation and oxygenation  6/18/2024 0949 by Samantha Brizuela, RN  Outcome: Progressing  Flowsheets (Taken 6/18/2024 0734)  Achieves optimal ventilation and oxygenation:   Assess for changes in respiratory status   Assess for changes in mentation and behavior   Position to facilitate oxygenation and minimize respiratory effort   Oxygen supplementation based on oxygen saturation or arterial blood gases   Initiate smoking cessation protocol as indicated   Encourage broncho-pulmonary hygiene including cough, deep breathe, incentive spirometry   Assess the need for suctioning and aspirate as needed  6/18/2024 0129 by Lina Scott, RN  Outcome: Progressing     Problem: Cardiovascular - Adult  Goal: Maintains optimal cardiac output and hemodynamic stability  6/18/2024 0949 by Samantha Brizuela, RN  Outcome: Progressing  Flowsheets (Taken 6/18/2024 0734)  Maintains optimal cardiac output and hemodynamic stability:   Monitor blood pressure and heart rate   Monitor urine output and notify Licensed Independent Practitioner for values outside of normal range   Assess for signs of decreased cardiac 
  Problem: Discharge Planning  Goal: Discharge to home or other facility with appropriate resources  Outcome: Progressing     Problem: Respiratory - Adult  Goal: Achieves optimal ventilation and oxygenation  Outcome: Progressing     Problem: Cardiovascular - Adult  Goal: Maintains optimal cardiac output and hemodynamic stability  Outcome: Progressing     Problem: Skin/Tissue Integrity - Adult  Goal: Skin integrity remains intact  Outcome: Progressing     Problem: Musculoskeletal - Adult  Goal: Return mobility to safest level of function  Outcome: Progressing     Problem: Genitourinary - Adult  Goal: Absence of urinary retention  Outcome: Progressing  Goal: Urinary catheter remains patent  Outcome: Progressing     Problem: Genitourinary - Adult  Goal: Urinary catheter remains patent  Outcome: Progressing     Problem: Infection - Adult  Goal: Absence of infection at discharge  Outcome: Progressing  Goal: Absence of infection during hospitalization  Outcome: Progressing     Problem: Metabolic/Fluid and Electrolytes - Adult  Goal: Electrolytes maintained within normal limits  Outcome: Progressing     Problem: Skin/Tissue Integrity  Goal: Absence of new skin breakdown  Description: 1.  Monitor for areas of redness and/or skin breakdown  2.  Assess vascular access sites hourly  3.  Every 4-6 hours minimum:  Change oxygen saturation probe site  4.  Every 4-6 hours:  If on nasal continuous positive airway pressure, respiratory therapy assess nares and determine need for appliance change or resting period.  Outcome: Progressing     Problem: Safety - Adult  Goal: Free from fall injury  Outcome: Progressing     Problem: ABCDS Injury Assessment  Goal: Absence of physical injury  Outcome: Progressing     
  Problem: Pain  Goal: Verbalizes/displays adequate comfort level or baseline comfort level  Outcome: Progressing     Problem: Safety - Adult  Goal: Free from fall injury  Outcome: Progressing     
HEART FAILURE CARE PLAN:    Comorbidities Reviewed: Yes   Patient has a past medical history of Arthritis, Asthma, CAD (coronary artery disease), CHF (congestive heart failure) (HCC), Chronic obstructive pulmonary disease (HCC), COPD (chronic obstructive pulmonary disease) (HCC), Fatty liver, GERD (gastroesophageal reflux disease), Hyperlipidemia, Hypertension, Medical history reviewed with no changes, MI, old, Myocardial infarction, nontransmural (HCC), Parkinson's disease (HCC), Sleep apnea, and Type II or unspecified type diabetes mellitus without mention of complication, not stated as uncontrolled.     Weights Reviewed: Yes   Admission weight: 112.9 kg (249 lb)   Wt Readings from Last 3 Encounters:   06/15/24 112.9 kg (249 lb)   05/03/24 106.6 kg (235 lb)   04/05/24 103.9 kg (229 lb)     Intake & Output Reviewed: Yes     Intake/Output Summary (Last 24 hours) at 6/16/2024 0240  Last data filed at 6/15/2024 2112  Gross per 24 hour   Intake --   Output 200 ml   Net -200 ml       ECHOCARDIOGRAM Reviewed: Yes   Patient's Ejection Fraction (EF) is greater than 40%     Medications Reviewed: Yes   SCHEDULED HOSPITAL MEDICATIONS:   aspirin  81 mg Oral Daily    atorvastatin  80 mg Oral Nightly    divalproex  500 mg Oral Daily    divalproex  1,000 mg Oral Nightly    DULoxetine  60 mg Oral Daily    famotidine  20 mg Oral BID    gabapentin  200 mg Oral TID    metoprolol succinate  50 mg Oral BID    tamsulosin  0.4 mg Oral Daily    insulin lispro  0-8 Units SubCUTAneous TID WC    insulin lispro  0-4 Units SubCUTAneous Nightly    lisinopril  5 mg Oral Daily    insulin glargine  40 Units SubCUTAneous Nightly    sodium chloride flush  5-40 mL IntraVENous 2 times per day    enoxaparin  30 mg SubCUTAneous BID    [START ON 6/17/2024] furosemide  20 mg Oral Once per day on Mon Thu    pantoprazole  40 mg Oral QAM AC    carbidopa-levodopa  1 tablet Oral 4x Daily     HOME MEDICATIONS:  Prior to Admission medications    Medication Sig 
Patient provided a COPD Educational Folder that includes the following materials:     [x]  PlanG Booklet: Managing your COPD  [x]  ALA: Getting the Most Out of Medication Delivery Devices  [x]  ALA: My COPD Action Plan  [x]  Better Breathers Club: [facility]   [x]  Smoking Cessation Classes  [x]  Outpatient Spiritual Care Services  [x]  Magnet: Signs of COPD    PATIENT/CAREGIVER TEACHING:   Level of patient/caregiver understanding able to:   [x] Verbalize understanding   [] Demonstrate understanding       [] Teach back        [x] Needs reinforcement     []  Other:     Electronically signed by Cassie Mina RN on 6/17/2024 at 1:51 AM    
Patient provided a COPD Educational Folder that includes the following materials:     [x]  [employer] Booklet: Managing your COPD  [x]  ALA: Getting the Most Out of Medication Delivery Devices  [x]  ALA: My COPD Action Plan  [x]  Better [facility]reathers [facility]: [facility]   [x]  Smoking Cessation Classes  [x]  Outpatient Spiritual Care Services  [x]  Magnet: Signs of COPD    PATIENT/CAREGIVER TEACHING:   Level of patient/caregiver understanding able to:   [x] Verbalize understanding   [] Demonstrate understanding       [] Teach back        [x] Needs reinforcement     []  Other:     Electronically signed by Cassie Mina RN on 6/16/2024 at 2:38 AM    
Theo CAROLINA MD   metoprolol succinate (TOPROL XL) 50 MG extended release tablet Take 1 tablet by mouth 2 times daily 8/16/23   Theo Hilario MD   atorvastatin (LIPITOR) 80 MG tablet TAKE ONE TABLET BY MOUTH DAILY AT BEDTIME 8/16/23   Theo Hilario MD   fluticasone (FLONASE) 50 MCG/ACT nasal spray spray 2 spray by intranasal route every day in each nostril 6/23/23   Ismael Espinoza, APRN - CNP   albuterol sulfate HFA (PROVENTIL;VENTOLIN;PROAIR) 108 (90 Base) MCG/ACT inhaler INHALE 2 PUFFS EVERY 4 HOURS AS NEEDED 5/12/23   Ismael Espinoza, ANA - CNP   aspirin 81 MG chewable tablet Take 1 tablet by mouth daily 4/18/23   Nancy Villalobos, APRN - CNP   hydrOXYzine HCl (ATARAX) 25 MG tablet take 1 tablet by oral route 4 times every day as needed for itching 3/29/23   Ismael Espinoza, APRN - CNP   DULoxetine (CYMBALTA) 60 MG extended release capsule TAKE ONE CAPSULE BY MOUTH EVERY DAY 3/3/23   Ismael Espinoaz, APRN - CNP   esomeprazole (NEXIUM) 40 MG delayed release capsule Take 1 capsule by mouth daily    Provider, MD Giovanny      Diet Reviewed: Yes   ADULT DIET; Regular; 4 carb choices (60 gm/meal)    Goal of Care Reviewed: Yes   Patient and/or Family's stated Goal of Care this Admission: Reduce shortness of breath, increase activity tolerance, better understand heart failure and disease management, be more comfortable, and reduce lower extremity edema prior to discharge.     Electronically signed by Cassie Mina RN on 6/17/2024 at 1:51 AM    
Cassie Mina RN  Outcome: Progressing     Problem: Infection - Adult  Goal: Absence of infection at discharge  Recent Flowsheet Documentation  Taken 6/17/2024 0756 by Samantha Brizuela RN  Absence of infection at discharge:   Assess and monitor for signs and symptoms of infection   Monitor lab/diagnostic results   Monitor all insertion sites i.e., indwelling lines, tubes and drains   Monitor endotracheal (as able) and nasal secretions for changes in amount and color   [place] appropriate cooling/warming therapies per order   Administer medications as ordered   Instruct and encourage patient and family to use good hand hygiene technique   Identify and instruct in appropriate isolation precautions for identified infection/condition  6/17/2024 0149 by Cassie Mina RN  Outcome: Progressing  Flowsheets (Taken 6/16/2024 2011)  Absence of infection at discharge: Assess and monitor for signs and symptoms of infection  Goal: Absence of infection during hospitalization  Recent Flowsheet Documentation  Taken 6/17/2024 0756 by Samantha Brizuela RN  Absence of infection during hospitalization:   Assess and monitor for signs and symptoms of infection   Monitor lab/diagnostic results   Monitor all insertion sites i.e., indwelling lines, tubes and drains   Monitor endotracheal (as able) and nasal secretions for changes in amount and color   [place] appropriate cooling/warming therapies per order   Administer medications as ordered   Instruct and encourage patient and family to use good hand hygiene technique   Identify and instruct in appropriate isolation precautions for identified infection/condition  6/17/2024 0149 by Cassie Mina RN  Outcome: Progressing  Flowsheets (Taken 6/16/2024 2011)  Absence of infection during hospitalization: Assess and monitor for signs and symptoms of infection     Problem: Metabolic/Fluid and Electrolytes - Adult  Goal: Electrolytes maintained within normal 
deterioration, or improvement   Collaborate with multidisciplinary team to address chronic and comorbid conditions and prevent exacerbation or deterioration   Update acute care plan with appropriate goals if chronic or comorbid symptoms are exacerbated and prevent overall improvement and discharge  6/18/2024 0129 by Lina Scott, RN  Outcome: Progressing     Problem: Pain  Goal: Verbalizes/displays adequate comfort level or baseline comfort level  6/18/2024 1420 by Samantha Brizuela, RN  Outcome: Completed  Flowsheets (Taken 6/18/2024 1330)  Verbalizes/displays adequate comfort level or baseline comfort level:   Encourage patient to monitor pain and request assistance   Assess pain using appropriate pain scale   Administer analgesics based on type and severity of pain and evaluate response   Implement non-pharmacological measures as appropriate and evaluate response   Consider cultural and social influences on pain and pain management   Notify Licensed Independent Practitioner if interventions unsuccessful or patient reports new pain  6/18/2024 0949 by Samantha Brizuela, RN  Outcome: Progressing  Flowsheets (Taken 6/18/2024 0730)  Verbalizes/displays adequate comfort level or baseline comfort level:   Encourage patient to monitor pain and request assistance   Assess pain using appropriate pain scale   Administer analgesics based on type and severity of pain and evaluate response   Implement non-pharmacological measures as appropriate and evaluate response   Consider cultural and social influences on pain and pain management   Notify Licensed Independent Practitioner if interventions unsuccessful or patient reports new pain  6/18/2024 0129 by Lina Scott, RN  Outcome: Progressing     Problem: Neurosensory - Adult  Goal: Achieves maximal functionality and self care  6/18/2024 1420 by Samantha Brizuela, RN  Outcome: Completed  6/18/2024 0949 by Samantha Brizuela, RN  Outcome:

## 2024-06-18 NOTE — CARE COORDINATION
DISCHARGE ORDER  Date/Time 2024 1:49 PM  Completed by: Usha Fonseca RN, Case Management    Patient Name: Chano Quiroga Jr.    : 1960      Admit order Date and Status:6/15/24 inpt  Noted discharge order. (verify MD's last order for status of admission/Traditional Medicare 3 [place] Inpatient qualifying stay required for SNF)    Confirmed discharge plan with:              Patient:  Yes              When pt confirms DC plan does any support person need to be contacted by CM   Yes - brothchris Farley           Discharge to Facility: T   Facility phone number for staff giving report: [phone]   Pre-certification completed: Yes   Hospital Exemption Notification (HENS) completed: Yes   Discharge orders and Continuity of Care faxed to facility:  facility will pull from [facility]      Transportation:               Medical Transport explained with choice list offered to pt/family.                Choice:(no preference)  Agency used: [facility]   time:   15:00      Pt/family/Nursing/Facility aware of  time:   Yes Names: Evangelina Charge, Marjorie AMBRIZ, Samantha nurse, pt, pt brother Miguelito and Dinh at [facility]  Ambulance form completed:  Yes:      Date Last IMM Given: 24    Comments:Order for dc noted. Spoke with Dinh at [facility] who states pre cert completed and can accept today. Spoke with pt who cont plan for [facility]. Spoke with pt brother Miguelito as unable to reach [place] re: dc today and plan for [facility]. Miguelito will inform Caryn. Chart reviewed and no other dc needs identified.    Pt is being d/c'd to Skilled Nursing Facility (SNF)  today. Pt's O2 sats are 100% on RA.    Discharge timeout done with nsg, CM and pt. All discharge needs and concerns addressed.    Discharging nurse to complete DELTA, reconcile AVS, and place final copy with patient's discharge packet. Discharging RN to ensure that written prescriptions

## 2024-06-18 NOTE — DISCHARGE INSTR - COC
Continuity of Care Form    Patient Name: Chano Quiroga Jr.   :  1960  MRN:  [id number]    Admit date:  6/15/2024  Discharge date:  2024    Code Status Order: Full Code   Advance Directives:     Admitting Physician:  Radha Méndez DO  PCP: Amalia Phillip APRN - NP    Discharging Nurse: Samantha  Discharging Hospital Unit/Room#: 0202/0202-01  Discharging Unit Phone Number:     Emergency Contact:   Extended Emergency Contact Information  Primary Emergency Contact: Miguelito Quiroga  Home Phone: [phone]  Mobile Phone: [phone]  Relation: Brother/Sister  Secondary Emergency Contact: Jonah Farris  Home Phone: [phone]  Mobile Phone: [phone]  Relation: Other    Past Surgical History:  Past Surgical History:   Procedure Laterality Date    CARDIAC PACEMAKER PLACEMENT      NOT MRI COMPATIABLE OLD LEADS st [facility]. pace maker difib    CARDIOVASCULAR SURGERY      Watchman placement    CARPAL TUNNEL RELEASE Bilateral 2013    CATARACT REMOVAL WITH IMPLANT Left 2014    COLONOSCOPY      CORONARY ANGIOPLASTY WITH STENT PLACEMENT  ,    CYST REMOVAL  1982    coccyx area    DIAGNOSTIC CARDIAC CATH LAB PROCEDURE      ENDOSCOPY, COLON, DIAGNOSTIC      ERCP  09/15/2013    ERCP  10/11/2013    WITH STENT REMOVAL    EYE SURGERY      FOOT SURGERY Right 2018     REPAIR CALCANEAL SPUR, REPAIR OF ACHILLES TENDON RIGHT FOOT    NERVE SURGERY Bilateral 2020    BILATERAL LUMBAR THREE LUMBAR FOUR LUMBAR FIVE DORSAL RAMUS  RADIOFREQUENCY ABLATION SITE CONFIRMED BY FLUOROSCOPY performed by Nikki Lundberg MD at [facility] OR    OTHER SURGICAL HISTORY      back stimulator in lower back    PACEMAKER PLACEMENT      ICD    PAIN MANAGEMENT PROCEDURE Bilateral 2020    BILATERAL LUMBAR THREE, LUMBAR FOUR, LUMBAR FIVE DORSAL RAMUS MEDIAL BRANCH BLOCK SITE CONFIRMED BY FLUOROSCOPY performed by Nikki Lundberg MD at [facility] OR    PAIN MANAGEMENT PROCEDURE Bilateral

## 2024-06-18 NOTE — FLOWSHEET NOTE
06/15/24 2011   Vital Signs   Temp 97.7 °F (36.5 °C)   Temp Source Oral   Pulse 80   Heart Rate Source Monitor   Respirations 16   /68   MAP (Calculated) 87   MAP (mmHg) 85   BP Location Right upper arm   BP Method Automatic   Patient Position Semi fowlers   Pain Assessment   Pain Assessment None - Denies Pain   Pain Level 0   Opioid-Induced Sedation   POSS Score 1   Oxygen Therapy   SpO2 98 %   O2 Device None (Room air)   O2 Flow Rate (L/min) 0 L/min     Admission assessment complete, see flow sheet, schedule medications given, see MAR. IV flush without difficulty at this time. Pt VSS.  Bed in lowest position, bed alarm activated for pt safety,tele sitter in place.Call light and bed side table within reach, pt educated on use of call light if needing assist., pt verbalized understanding, denies further questions at this time. Denies any needs at this time, continue to monitor.  Bedside Mobility Assessment Tool (BMAT):     Assessment Level 1- Sit and Shake    1. From a semi-reclined position, ask patient to sit up and rotate to a seated position at the side of the bed. Can use the bedrail.    2. Ask patient to reach out and grab your hand and shake making sure patient reaches across his/her midline.   Pass- Patient is able to come to a seated position, maintain core strength. Maintains seated balance while reaching across midline. Move on to Assessment Level 2.     Assessment Level 2- Stretch and Point   1. With patient in seated position at the side of the bed, have patient place both feet on the floor (or stool) with knees no higher than hips.    2. Ask patient to stretch one leg and straighten the knee, then bend the ankle/flex and point the toes. If appropriate, repeat with the other leg.   Fail- Patient is unable to complete task. Patient is MOBILITY LEVEL 2.     Assessment Level 3- Stand   1. Ask patient to elevate off the bed or chair (seated to standing) using an assistive device (cane, bedrail). 
   06/17/24 0749   Vital Signs   Temp 97.9 °F (36.6 °C)   Temp Source Oral   Pulse 61   Heart Rate Source Monitor   Respirations 18   /60   MAP (Calculated) 82   Pain Assessment   Pain Assessment None - Denies Pain   Opioid-Induced Sedation   POSS Score 1   Oxygen Therapy   SpO2 93 %   O2 Device None (Room air)   Height and Weight   Weight - Scale 106.6 kg (235 lb)   BMI (Calculated) 40.4     Patient fed self 1005 of breakfast. States tailbone is sore, not painful. No discoloration noted tailbone. VSS. Watching TV at this time. Will continue to monitor. Skin  pink warm and dry. Tele sitter in room, call bell and bedside table within reach. Bed in low position with alarm on.  
   06/17/24 1930   Vital Signs   Temp 98.2 °F (36.8 °C)   Temp Source Oral   Pulse 60   Heart Rate Source Monitor   Respirations 18   BP (!) 125/57   MAP (Calculated) 80   BP Location Left upper arm   Pain Assessment   Pain Assessment 0-10   Pain Level 3   Pain Location Sacrum   Pain Orientation Mid   Pain Descriptors Discomfort;Sore   Functional Pain Assessment Prevents or interferes some active activities and ADLs   Pain Type Acute pain   Opioid-Induced Sedation   POSS Score 1   Oxygen Therapy   SpO2 94 %   O2 Device None (Room air)     Tylenol 2 po given for c/o sacum pain. No other needs voiced. Lina Scott RN    
   06/18/24 0200   Vital Signs   Temp 97.5 °F (36.4 °C)   Temp Source Oral   Pulse 59   Respirations 18   /63   MAP (Calculated) 79   BP Location Left upper arm   Pain Assessment   Pain Assessment None - Denies Pain   Oxygen Therapy   SpO2 98 %   O2 Device None (Room air)   Height and Weight   Weight - Scale 105.1 kg (231 lb 12.8 oz)   Weight Method Actual;Bed scale   BMI (Calculated) 39.9     Pt resting in bed, no acute distress. Lina Scott, RN    
   06/18/24 0730   Vital Signs   Temp 97 °F (36.1 °C)   Temp Source Oral   Pulse 65   Heart Rate Source Monitor   Respirations 18   /76   MAP (Calculated) 90   Pain Assessment   Pain Assessment None - Denies Pain   Care Plan - Pain Goals   Verbalizes/displays adequate comfort level or baseline comfort level Encourage patient to monitor pain and request assistance;Assess pain using appropriate pain scale;Administer analgesics based on type and severity of pain and evaluate response;Implement non-pharmacological measures as appropriate and evaluate response;Consider cultural and social influences on pain and pain management;Notify Licensed Independent Practitioner if interventions unsuccessful or patient reports new pain   Opioid-Induced Sedation   POSS Score 1   Oxygen Therapy   SpO2 97 %   O2 Device None (Room air)     VSS. Patient without complains of. Pacemaker L chest. Tail bone puffy no discoloration. Neurovascular checks within normal limits. Will continue to monitor. Bed in  low position, call bell and bedside table within reach. Tele sitter in room. Bed alarm on bed.  
Level 3- Stand   1. Ask patient to elevate off the bed or chair (seated to standing) using an assistive device (cane, bedrail).    2. Patient should be able to raise buttocks off be and hold for a count of five. May repeat once.   Pass- Patient maintains standing stability for at least 5 seconds, proceed to assessment level 4.    Assessment Level 4- Walk   1. Ask patient to march in place at bedside.    2. Then ask patient to advance step and return each foot. Some medical conditions may render a patient from stepping backwards, use your best clinical judgement.   Pass- Patient demonstrates balance while shifting weight and ability to step, takes independent steps, does not use assistive device patient is MOBILITY LEVEL 4.      Mobility Level- 3

## 2024-06-18 NOTE — PROGRESS NOTES
4 Eyes Skin Assessment     NAME:  Chano Quiroga Jr.  YOB: 1960  MEDICAL RECORD NUMBER:  [id number]    The patient is being assessed for  Admission    I agree that at least one RN has performed a thorough Head to Toe Skin Assessment on the patient. ALL assessment sites listed below have been assessed.      Areas assessed by both nurses:    Head, Face, Ears, Shoulders, Back, Chest, Arms, Elbows, Hands, Sacrum. Buttock, Coccyx, Ischium, Legs. Feet and Heels, and Under Medical Devices .     Skin assessed on admission,noted redness to groin foldings, excoriated perineal areas.    Noted scattered bruises on both upper and lower extremities  from recent falls.     CHG bath completed, Gown changed.        Does the Patient have a Wound? No noted wound(s)       Omega Prevention initiated by RN: No  Wound Care Orders initiated by RN: No    Pressure Injury (Stage 3,4, Unstageable, DTI, NWPT, and Complex wounds) if present, place Wound referral order by RN under : No    New Ostomies, if present place, Ostomy referral order under : No     Nurse 1 eSignature: Electronically signed by Cassie Mina RN on 6/16/24 at 3:12 AM EDT    **SHARE this note so that the co-signing nurse can place an eSignature**    Nurse 2 eSignature: Electronically signed by Jeanette Kyle RN on 6/16/24 at 4:02 AM EDT eyes    
Bedside Mobility Assessment Tool (BMAT):     Assessment Level 1- Sit and Shake    1. From a semi-reclined position, ask patient to sit up and rotate to a seated position at the side of the bed. Can use the bedrail.    2. Ask patient to reach out and grab your hand and shake making sure patient reaches across his/her midline.   Pass- Patient is able to come to a seated position, maintain core strength. Maintains seated balance while reaching across midline. Move on to Assessment Level 2.     Assessment Level 2- Stretch and Point   1. With patient in seated position at the side of the bed, have patient place both feet on the floor (or stool) with knees no higher than hips.    2. Ask patient to stretch one leg and straighten the knee, then bend the ankle/flex and point the toes. If appropriate, repeat with the other leg.   Fail- Patient is unable to complete task. Patient is MOBILITY LEVEL 2.     Assessment Level 3- Stand   1. Ask patient to elevate off the bed or chair (seated to standing) using an assistive device (cane, bedrail).    2. Patient should be able to raise buttocks off be and hold for a count of five. May repeat once.   Fail- Patient unable to demonstrate standing stability. Patient is MOBILITY LEVEL 3.     Assessment Level 4- Walk   1. Ask patient to march in place at bedside.    2. Then ask patient to advance step and return each foot. Some medical conditions may render a patient from stepping backwards, use your best clinical judgement.   Fail- Patient not able to complete tasks OR requires use of assistive device. Patient is MOBILITY LEVEL 3.       Mobility Level- 3    
Bedside Mobility Assessment Tool (BMAT):     Assessment Level 1- Sit and Shake    1. From a semi-reclined position, ask patient to sit up and rotate to a seated position at the side of the bed. Can use the bedrail.    2. Ask patient to reach out and grab your hand and shake making sure patient reaches across his/her midline.   Pass- Patient is able to come to a seated position, maintain core strength. Maintains seated balance while reaching across midline. Move on to Assessment Level 2.     Assessment Level 2- Stretch and Point   1. With patient in seated position at the side of the bed, have patient place both feet on the floor (or stool) with knees no higher than hips.    2. Ask patient to stretch one leg and straighten the knee, then bend the ankle/flex and point the toes. If appropriate, repeat with the other leg.   Pass- Patient is able to demonstrate appropriate quad strength on intended weight bearing limb(s). Move onto Assessment Level 3.     Assessment Level 3- Stand   1. Ask patient to elevate off the bed or chair (seated to standing) using an assistive device (cane, bedrail).    2. Patient should be able to raise buttocks off be and hold for a count of five. May repeat once.   Fail- Patient unable to demonstrate standing stability. Patient is MOBILITY LEVEL 3.     Assessment Level 4- Walk   1. Ask patient to march in place at bedside.    2. Then ask patient to advance step and return each foot. Some medical conditions may render a patient from stepping backwards, use your best clinical judgement.   Fail- Patient not able to complete tasks OR requires use of assistive device. Patient is MOBILITY LEVEL 3.       Mobility Level- 3   
HEART FAILURE CARE PLAN:    Comorbidities Reviewed: Yes   Patient has a past medical history of Arthritis, Asthma, CAD (coronary artery disease), CHF (congestive heart failure) (HCC), Chronic obstructive pulmonary disease (HCC), COPD (chronic obstructive pulmonary disease) (HCC), Fatty liver, GERD (gastroesophageal reflux disease), Hyperlipidemia, Hypertension, Medical history reviewed with no changes, MI, old, Myocardial infarction, nontransmural (HCC), Parkinson's disease (HCC), Sleep apnea, and Type II or unspecified type diabetes mellitus without mention of complication, not stated as uncontrolled.     Weights Reviewed: Yes   Admission weight: 112.9 kg (249 lb)   Wt Readings from Last 3 Encounters:   06/17/24 106.6 kg (235 lb)   05/03/24 106.6 kg (235 lb)   04/05/24 103.9 kg (229 lb)     Intake & Output Reviewed: Yes     Intake/Output Summary (Last 24 hours) at 6/17/2024 1021  Last data filed at 6/17/2024 0749  Gross per 24 hour   Intake 1000 ml   Output 3250 ml   Net -2250 ml       ECHOCARDIOGRAM Reviewed: Yes   Patient's Ejection Fraction (EF) is greater than 40%     Medications Reviewed: Yes   SCHEDULED HOSPITAL MEDICATIONS:   aspirin  81 mg Oral Daily    atorvastatin  80 mg Oral Nightly    divalproex  500 mg Oral Daily    divalproex  1,000 mg Oral Nightly    DULoxetine  60 mg Oral Daily    famotidine  20 mg Oral BID    gabapentin  200 mg Oral TID    metoprolol succinate  50 mg Oral BID    tamsulosin  0.4 mg Oral Daily    insulin lispro  0-8 Units SubCUTAneous TID WC    insulin lispro  0-4 Units SubCUTAneous Nightly    lisinopril  5 mg Oral Daily    insulin glargine  40 Units SubCUTAneous Nightly    sodium chloride flush  5-40 mL IntraVENous 2 times per day    enoxaparin  30 mg SubCUTAneous BID    furosemide  20 mg Oral Once per day on Mon Thu    pantoprazole  40 mg Oral QAM AC    carbidopa-levodopa  1 tablet Oral 4x Daily     HOME MEDICATIONS:  Prior to Admission medications    Medication Sig Start Date End 
Handoff report and transfer of care given at bedside to Alysia SANTANA. Patient in stable condition, denies needs/concerns at this time.  Call light within reach.     
Handoff report and transfer of care given at bedside to Shea SANTANA  Patient in stable condition, denies needs/concerns at this time.  Call light within reach.     
Inpatient Occupational Therapy Evaluation and Treatment    Unit: [facility]  Date:  2024  Patient Name:    Chano Quiroga Jr.  Admitting diagnosis:  Hyperglycemia [R73.9]  Failure to thrive in adult [R62.7]  Frequent falls [R29.6]  Admit Date:  6/15/2024  Precautions/Restrictions/WB Status/ Lines/ Wounds/ Oxygen: Fall risk, Bed/chair alarm, and Lines (IV and external catheter)    Pt seen for cotreatment this date due to patient safety, patient endurance, and limited functional status information    Treatment Time:  08:41-09:29  Treatment Number:  1  Timed Code Treatment Minutes: 38 minutes  Total Treatment Minutes: 48 minutes    Patient Goals for Therapy: \"to find out why he keeps falling\"          Discharge Recommendations: SNF  DME needs for discharge: Defer to facility       Therapy recommendations for staff:   Assist of 1 for ambulation with use of rolling walker (RW) and gait belt to/from chair  to/from bathroom  within room    History of Present Illness: Per H&P  \"Patient is a 63 year old. male with history of parkinson disease, diagnosed about one year ago. He lives alone. His wife passed away in . He has no children. He presented with complaint of frequent falls and unable to care for himself at home. He recently stayed at a skilled nursing facility for 2 weeks. He was then sent home. He started to fall multiple times a day and unable to do his ADLs. He pressed his emergency button and brought to the ED via ambulance. He has chronic low back and sacrum pain. He denies constipation. He wants to be placed at a nursing facility.\"    AM-PAC Score: AM-PAC Inpatient Daily Activity Raw Score: 16     Subjective:  Patient lying reclined in bed with no family present.   Pt agreeable to this OT session.     Cognition:    A&O x4   Able to follow 2 step commands    Pain:   Yes  Location: tailbone  Ratin /10  Pain Medicine Status: No request made    Preadmission Environment:   Pt. Lives Alone, brother lives 
Inpatient Occupational Therapy Evaluation and Treatment    Unit: [facility]  Date:  6/18/2024  Patient Name:    Chano Quiroga Jr.  Admitting diagnosis:  Hyperglycemia [R73.9]  Failure to thrive in adult [R62.7]  Frequent falls [R29.6]  Admit Date:  6/15/2024  Precautions/Restrictions/WB Status/ Lines/ Wounds/ Oxygen: Fall risk, Bed/chair alarm, and Lines (IV and external catheter)    Treatment Time:  9:30-9:57  Treatment Number:  2  Timed Code Treatment Minutes: 27 minutes  Total Treatment Minutes: 27 minutes    Patient Goals for Therapy: \"to find out why he keeps falling\"          Discharge Recommendations: SNF  DME needs for discharge: Defer to facility       Therapy recommendations for staff:   Assist of 1 for ambulation with use of rolling walker (RW) and gait belt to/from chair  to/from bathroom  within room    History of Present Illness: Per H&P  \"Patient is a 63 year old. male with history of parkinson disease, diagnosed about one year ago. He lives alone. His wife passed away in 2017. He has no children. He presented with complaint of frequent falls and unable to care for himself at home. He recently stayed at a skilled nursing facility for 2 weeks. He was then sent home. He started to fall multiple times a day and unable to do his ADLs. He pressed his emergency button and brought to the ED via ambulance. He has chronic low back and sacrum pain. He denies constipation. He wants to be placed at a nursing facility.\"    AM-PAC Score: AM-PAC Inpatient Daily Activity Raw Score: 16     Subjective:  Patient lying reclined in bed with no family present.   Pt agreeable to this OT session.     Cognition:    A&O x4   Able to follow 2 step commands    Pain:   Yes  Location: back pain (chronic but says its hurting because he's been in bed to long)  Rating: moderate /10  Pain Medicine Status: No request made    Preadmission Environment:   Pt. Lives Alone, brother lives in the same apartment building   Pt looks after his 
Inpatient Physical Therapy Treatment    Unit: [facility]  Date:  6/18/2024  Patient Name:    Chano Quiroga Jr.  Admitting diagnosis:  Hyperglycemia [R73.9]  Failure to thrive in adult [R62.7]  Frequent falls [R29.6]  Admit Date:  6/15/2024  Precautions/Restrictions/WB Status/ Lines/ Wounds/ Oxygen: Fall risk, Bed/chair alarm, and Lines (external catheter)    Treatment Time:  13:10-13:39  Treatment Number:  2   Timed Code Treatment Minutes: 19  minutes  Total Treatment Minutes:  19 minutes    Patient Stated Goals for Therapy: not stated       Discharge Recommendations: SNF  DME needs for discharge: Defer to facility       Therapy recommendation for EMS Transport: can transport by wheelchair    Therapy recommendations for staff:   Assist of 1 for ambulation with use of rolling walker (RW) and gait belt to/from chair  to/from bathroom  within room    History of Present Illness:   Per admission H&P:  \"Chano Quiroga Jr. is 63 y.o. male with history of parkinson disease, diagnosed about one year ago  He lives alone. His wife passed away in 2017. He has no children.   He presented with complaint of frequent falls and unable to care for himself at home.   He recently stayed at a skilled nursing facility for 2 weeks.   He was then sent home.   He started to fall multiple times a day and unable to do his ADLs.   He pressed his emergency button and brought to the ED via ambulance.   He has chronic low back and sacrum pain.   He denies constipation. He wants to be placed at a nursing facility\"    AM-PAC Mobility Score    AM-PAC Inpatient Mobility Raw Score : 16         Subjective  Patient lying reclined in bed with no family present.  Pt agreeable to this PT session.     Cognition    A&O x4   Able to follow 2 step commands    Pain   No  Location:   Rating: NA /10  Pain Medicine Status: No request made    Preadmission Environment:  Pt. Lives Alone, brother lives in the same apartment building   Pt looks after his brother 
Patient left via stretcher via ambulance, all belongings sent.  
Perfect serve sent to Dr. Cool regarding consult @ 0805 6/17/2024 kiana paul  
Progress Note    Admit Date:  6/15/2024    Subjective:  Mr. Quiroga has Parkinson's disease.  He was admitted for frequent falls.  PT OT have seen the patient and recommend SNF.    Objective:   /72   Pulse 74   Temp 97.1 °F (36.2 °C) (Oral)   Resp 18   Ht 1.626 m (5' 4\")   Wt 112.9 kg (249 lb)   SpO2 97%   BMI 42.74 kg/m²        Intake/Output Summary (Last 24 hours) at 6/16/2024 1215  Last data filed at 6/16/2024 1214  Gross per 24 hour   Intake 1220 ml   Output 500 ml   Net 720 ml       Physical Exam:    General appearance: alert, appears stated age and cooperative  Head: Normocephalic, without obvious abnormality, atraumatic  Eyes: conjunctivae/corneas clear. PERRL, EOM's intact.  Neck: no adenopathy, no carotid bruit, no JVD, supple, symmetrical, trachea midline and thyroid not enlarged, symmetric, no tenderness/mass/nodules  Lungs: clear to auscultation bilaterally  Heart: regular rate and rhythm, S1, S2 normal, no murmur, click, rub or gallop  Abdomen: soft, non-tender; bowel sounds normal; no masses,  no organomegaly  Extremities: extremities normal, atraumatic, no cyanosis or edema  Pulses: 2+ and symmetric  Skin: Skin color, texture, turgor normal. No rashes or lesions  Neurologic: Tremor present.  Cranial nerves II through XII normal.  Motor system strength 5/5.  Gait not tested.    Scheduled Meds:   aspirin  81 mg Oral Daily    atorvastatin  80 mg Oral Nightly    divalproex  500 mg Oral Daily    divalproex  1,000 mg Oral Nightly    DULoxetine  60 mg Oral Daily    famotidine  20 mg Oral BID    gabapentin  200 mg Oral TID    metoprolol succinate  50 mg Oral BID    tamsulosin  0.4 mg Oral Daily    insulin lispro  0-8 Units SubCUTAneous TID     insulin lispro  0-4 Units SubCUTAneous Nightly    lisinopril  5 mg Oral Daily    insulin glargine  40 Units SubCUTAneous Nightly    sodium chloride flush  5-40 mL IntraVENous 2 times per day    enoxaparin  30 mg SubCUTAneous BID    [START ON 6/17/2024] 
Pt. Admitted To room 202 from ED via bed.  Pt. VSS, patient denies pain, patient oriented to room.  Pt. Updated on POC, denies questions. Pt. Given toiletries, denies further needs. Pt placed on fall risk precautions.    Bed in lowest position, bed alarm activated, call light and bedside table within reach.  Will continue to monitor.    Cassie Mina RN     
Report given to Diya at the [facility].  
/10  Pain Medicine Status: No request made    Preadmission Environment:  Pt. Lives Alone, brother lives in the same apartment building   Pt looks after his brother who has paranoid schizophrenia, pt states that his brother is able to provide some assistance    Home environment:    apartment   Steps to enter first floor:   0 steps to enter       Steps to second floor: N/A  Bathroom:       walk in shower  ,  standard height toilet, grab bars   Equipment owned:      SPC and Rollator, standard walker, rolling walker, lift chair     Preadmission Status:  History of falls             Yes, numerous  Pt. Able to drive          Yes, drives a minivan  Pt Fully independent with ADL's         Yes holds onto grab bars in the shower   Pt. Required assistance from family for:  Independent PTA  , sister cleans for pt   Pt. Fully independent for transfers and gait and walked with rollator.  Pt has to walk dogs very frequently during the day.   [facility] comes 3x/weekly - they do all cooking and cleaning. Home PT. Home RN    Objective  Does this pt have an acute or acute on chronic diagnosis of CHF? No    Upper Extremity ROM/Strength  Please see OT evaluation.      Lower Extremity ROM / Strength   AROM WFL: Yes  ROM limitations:     Strength Assessment (measured on a 0-5 scale):  R LE   Quad   5   Ant Tib  4+   Hamstring 4+   Iliopsoas 4+  L LE  Quad   5   Ant Tib  4+   Hamstring 4+   Iliopsoas 4+    Lower Extremity Sensation    Diminished    Coordination  WNL    Tone  WNL    Balance  Static Sitting:  Good ; SBA  Dynamic Sitting:  Good ; SBA   Comments:     Static Standing: Fair ; CGA  Dynamic Standing: Fair ; CGA  Comments: Pt with significant gait disturbance effecting balance during ambulation. Pt switched to a shuffling gait pattern under fatigue resulting in decreased balance/stability.    Posture  Seated: Forward head and neck  Standing: Forward head and neck    Bed Mobility   Supine to Sit:    SBA  Sit to Supine:   SBA  Rolling: 
50 mg Oral BID    tamsulosin  0.4 mg Oral Daily    insulin lispro  0-8 Units SubCUTAneous TID WC    insulin lispro  0-4 Units SubCUTAneous Nightly    lisinopril  5 mg Oral Daily    insulin glargine  40 Units SubCUTAneous Nightly    sodium chloride flush  5-40 mL IntraVENous 2 times per day    enoxaparin  30 mg SubCUTAneous BID    furosemide  20 mg Oral Once per day on Mon Thu    pantoprazole  40 mg Oral QAM AC    carbidopa-levodopa  1 tablet Oral 4x Daily     Continuous Infusions:    dextrose      sodium chloride       PRN Meds: albuterol sulfate HFA, glucose, dextrose bolus **OR** dextrose bolus, glucagon (rDNA), dextrose, sodium chloride flush, sodium chloride, potassium chloride **OR** potassium alternative oral replacement **OR** potassium chloride, magnesium sulfate, ondansetron **OR** [DISCONTINUED] ondansetron, polyethylene glycol, acetaminophen **OR** acetaminophen, prochlorperazine    OBJECTIVE  Vital signs in the last 24 hours:  Vitals:    06/18/24 0730   BP: 118/76   Pulse: 65   Resp: 18   Temp: 97 °F (36.1 °C)   SpO2: 97%       Intake/Output last 3 shifts:  I/O last 3 completed shifts:  In: 1080 [P.O.:1080]  Out: 3750 [Urine:3750]    Intake/Output this shift:  I/O this shift:  In: 240 [P.O.:240]  Out: -     Yesterday's Weight:  Wt Readings from Last 1 Encounters:   06/18/24 105.1 kg (231 lb 12.8 oz)       SUBJECTIVE  There was no acute event overnight.    ROS:  Negative except in subjective.    Neurological examination:  MENTAL STATUS:  Alert and oriented to person.  LANG/SPEECH: No dysarthria.  CRANIAL NERVES: No facial asymmetry.  MOTOR: No pronator drift or leg drift.  REFLEXES: Generalized 2+.  SENSORY: Grossly intact.  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    Labs and Imaging:  I reviewed labs and brain imaging.    50 minutes were spent with the patient with greater than 50% of the time spent in counseling and coordination of care.    Taryn Coyne MD   
Date Taking? Authorizing Provider   gabapentin (NEURONTIN) 100 MG capsule Take 2 capsules by mouth 3 times daily.   Yes Giovanny Mosquera MD   carbidopa-levodopa (SINEMET CR)  MG per extended release tablet Take 1 tablet by mouth nightly 5/3/24   Augie Crooks MD   potassium chloride (KLOR-CON M) 20 MEQ extended release tablet TAKE ONE TABLET BY MOUTH WITH LASIX ON MONDAY AND THURSDAY 5/2/24   Helio Zhou APRN - CNP   TRULICITY 3 MG/0.5ML SOPN INJECT 3MG SUBCUTANEOUSLY ONCE A WEEK 3/19/24   Ismael Espinoza APRN - CNP   Insulin Glargine (BASAGLAR KWIKPEN SC) Inject 50 Units into the skin nightly    Giovanny Mosquera MD   Insulin Aspart (NOVOLOG SC) Inject 15 units subcutaneously every morning and 20 units every evening with meals.    Giovanny Mosquera MD   carbidopa-levodopa (SINEMET)  MG per tablet TAKE ONE (1) TABLET BY MOUTH THREE TIMES DAILY (AM/NOON/NAYANA) 1/10/24   Ismael Espinoza APRN - CNP   divalproex (DEPAKOTE) 500 MG DR tablet TAKE ONE TABLET BY MOUTH EVERY MORNING AND TWO (2) TABLETS BY MOUTH AT BEDTIME 12/8/23   Ismael Espinoza APRN - CNP   furosemide (LASIX) 20 MG tablet TAKE ONE TABLET BY MOUTH MONDAY AND Thursday, take one extra tablet for weight gain of 3-4 pounds 11/10/23   Theo Hilario MD   famotidine (PEPCID) 20 MG tablet TAKE ONE TABLET BY MOUTH TWO (2) TIMES A DAY 9/12/23   Ismael Espinoza APRN - CNP   metFORMIN (GLUCOPHAGE) 1000 MG tablet TAKE ONE TABLET BY MOUTH TWO (2) TIMES DAILY WITH MORNING AND EVENING MEALS 9/12/23   Ismael Espinoza APRN - CNP   JARDIANCE 25 MG tablet TAKE ONE TABLET BY MOUTH EVERY MORNING 9/12/23   Ismael Espinoza APRN - CNP   tamsulosin (FLOMAX) 0.4 MG capsule TAKE ONE (1) CAPSULE BY ORAL ROUTE EVERY DAY ONE HALF (1/2) HOUR FOLLOWING THE SAME MEAL EACH DAY 9/12/23   Ismael Espinoza APRN - CNP   lisinopril (PRINIVIL;ZESTRIL) 5 MG tablet TAKE ONE (1) TABLET BY ORAL ROUTE EVERY DAY 8/16/23   Yanelis 
Theo Hilario MD   metoprolol succinate (TOPROL XL) 50 MG extended release tablet Take 1 tablet by mouth 2 times daily 8/16/23   Theo Hilario MD   atorvastatin (LIPITOR) 80 MG tablet TAKE ONE TABLET BY MOUTH DAILY AT BEDTIME 8/16/23   Theo Hilario MD   fluticasone (FLONASE) 50 MCG/ACT nasal spray spray 2 spray by intranasal route every day in each nostril 6/23/23   Ismael Espinoza, APRN - CNP   albuterol sulfate HFA (PROVENTIL;VENTOLIN;PROAIR) 108 (90 Base) MCG/ACT inhaler INHALE 2 PUFFS EVERY 4 HOURS AS NEEDED 5/12/23   Ismael Espinoza, ANA - CNP   aspirin 81 MG chewable tablet Take 1 tablet by mouth daily 4/18/23   Nancy Villalobos, APRN - CNP   hydrOXYzine HCl (ATARAX) 25 MG tablet take 1 tablet by oral route 4 times every day as needed for itching 3/29/23   Ismael Espinoza, APRN - CNP   DULoxetine (CYMBALTA) 60 MG extended release capsule TAKE ONE CAPSULE BY MOUTH EVERY DAY 3/3/23   Ismael Espinoza, APRN - CNP   esomeprazole (NEXIUM) 40 MG delayed release capsule Take 1 capsule by mouth daily    Provider, MD Giovanny      Diet Reviewed: Yes   ADULT DIET; Regular; 4 carb choices (60 gm/meal); 2000 ml    Goal of Care Reviewed: Yes   Patient and/or Family's stated Goal of Care this Admission:  to, Reduce shortness of breath, increase activity tolerance, better understand heart failure and disease management, be more comfortable, and reduce lower extremity edema prior to discharge.     Electronically signed by Samantha Lockett RN on 6/18/2024 at 9:46 AM   
appendage closure device     # CAD.   - on asa, statin, BB  - Stable  -No chest pain    #HTN  - on lasix, Toprol XL, Lisinopril   - monitor BP    # Status post AICD.    # Diabetes mellitus type 2   - SSI, Lantus  - monitor BG    # Lovenox for DVT prophylaxis.            ANA Russell - CNP 6/17/2024 11:56 AM      ELIZABETH COLVIN MD 6/17/2024 1:47 PM

## 2024-06-19 NOTE — PLAN OF CARE
Patient  Free from falls this shift. Avysis and bed alarms in  Place to notify staff of attempts of unassisted transfers. Fall precautions in place. Call light within reach. sitter at bedside Endy Barba(Resident)

## 2024-06-25 ENCOUNTER — TELEPHONE (OUTPATIENT)
Age: 64
End: 2024-06-25

## 2024-06-25 NOTE — TELEPHONE ENCOUNTER
[facility] called and cancelled appointment on 6/25/24 with Dr Cool for 4 week hospital f/u.    Reason: transportation did not show up to  pt    Patient did not reschedule appointment.    Appointment rescheduled for will have to watch cancellations in providers schedule to r/s

## 2024-07-01 NOTE — TELEPHONE ENCOUNTER
From: Lydia Norton  To: Eneida Craig MD  Sent: 4/9/2019 10:26 AM CDT  Subject: Medication Question    Hi Dr Craig  Can you refill my norco and Methocarbamol ( muscle relaxant) prescriptions. I will be out tomorrow . I have been trying to clean my house and am walking a few times a week. But it’s been rough. Back is aggravated     I meet with the new spine/ortho doctor in a few weeks. We can talk about it at my annual check up  Nisha Freeman   Watching for cancellations

## 2024-07-03 ENCOUNTER — APPOINTMENT (OUTPATIENT)
Dept: CT IMAGING | Age: 64
End: 2024-07-03
Payer: MEDICARE

## 2024-07-03 ENCOUNTER — APPOINTMENT (OUTPATIENT)
Dept: GENERAL RADIOLOGY | Age: 64
End: 2024-07-03
Payer: MEDICARE

## 2024-07-03 ENCOUNTER — HOSPITAL ENCOUNTER (OUTPATIENT)
Age: 64
Setting detail: OBSERVATION
Discharge: NURSING, REHAB OR LONG-TERM CARE | End: 2024-07-03
Attending: EMERGENCY MEDICINE | Admitting: INTERNAL MEDICINE
Payer: MEDICARE

## 2024-07-03 VITALS
HEIGHT: 64 IN | OXYGEN SATURATION: 99 % | BODY MASS INDEX: 39.09 KG/M2 | SYSTOLIC BLOOD PRESSURE: 132 MMHG | DIASTOLIC BLOOD PRESSURE: 74 MMHG | HEART RATE: 58 BPM | WEIGHT: 229 LBS | RESPIRATION RATE: 16 BRPM | TEMPERATURE: 97.6 F

## 2024-07-03 DIAGNOSIS — Z78.9 UNABLE TO CARE FOR SELF: ICD-10-CM

## 2024-07-03 DIAGNOSIS — R29.6 FREQUENT FALLS: Primary | ICD-10-CM

## 2024-07-03 DIAGNOSIS — G20.A1 PARKINSON'S DISEASE, UNSPECIFIED WHETHER DYSKINESIA PRESENT, UNSPECIFIED WHETHER MANIFESTATIONS FLUCTUATE (HCC): ICD-10-CM

## 2024-07-03 LAB
ALBUMIN SERPL-MCNC: 4.5 G/DL (ref 3.4–5)
ALBUMIN/GLOB SERPL: 2.4 {RATIO} (ref 1.1–2.2)
ALP SERPL-CCNC: 68 U/L (ref 40–129)
ALT SERPL-CCNC: 6 U/L (ref 10–40)
ANION GAP SERPL CALCULATED.3IONS-SCNC: 11 MMOL/L (ref 3–16)
AST SERPL-CCNC: 16 U/L (ref 15–37)
BASOPHILS # BLD: 0 K/UL (ref 0–0.2)
BASOPHILS NFR BLD: 0.7 %
BILIRUB SERPL-MCNC: 0.8 MG/DL (ref 0–1)
BUN SERPL-MCNC: 8 MG/DL (ref 7–20)
CALCIUM SERPL-MCNC: 9.3 MG/DL (ref 8.3–10.6)
CHLORIDE SERPL-SCNC: 98 MMOL/L (ref 99–110)
CO2 SERPL-SCNC: 26 MMOL/L (ref 21–32)
CREAT SERPL-MCNC: 0.7 MG/DL (ref 0.8–1.3)
DEPRECATED RDW RBC AUTO: 14.3 % (ref 12.4–15.4)
EKG ATRIAL RATE: 76 BPM
EKG DIAGNOSIS: NORMAL
EKG P AXIS: 35 DEGREES
EKG P-R INTERVAL: 196 MS
EKG Q-T INTERVAL: 360 MS
EKG QRS DURATION: 92 MS
EKG QTC CALCULATION (BAZETT): 405 MS
EKG R AXIS: 62 DEGREES
EKG T AXIS: 79 DEGREES
EKG VENTRICULAR RATE: 76 BPM
EOSINOPHIL # BLD: 0.1 K/UL (ref 0–0.6)
EOSINOPHIL NFR BLD: 1.2 %
GFR SERPLBLD CREATININE-BSD FMLA CKD-EPI: >90 ML/MIN/{1.73_M2}
GLUCOSE BLD-MCNC: 116 MG/DL (ref 70–99)
GLUCOSE SERPL-MCNC: 205 MG/DL (ref 70–99)
HCT VFR BLD AUTO: 44.4 % (ref 40.5–52.5)
HGB BLD-MCNC: 15.1 G/DL (ref 13.5–17.5)
LYMPHOCYTES # BLD: 2.3 K/UL (ref 1–5.1)
LYMPHOCYTES NFR BLD: 35.6 %
MCH RBC QN AUTO: 29.7 PG (ref 26–34)
MCHC RBC AUTO-ENTMCNC: 33.9 G/DL (ref 31–36)
MCV RBC AUTO: 87.7 FL (ref 80–100)
MONOCYTES # BLD: 0.4 K/UL (ref 0–1.3)
MONOCYTES NFR BLD: 6.3 %
NEUTROPHILS # BLD: 3.6 K/UL (ref 1.7–7.7)
NEUTROPHILS NFR BLD: 56.2 %
PERFORMED ON: ABNORMAL
PLATELET # BLD AUTO: 121 K/UL (ref 135–450)
PMV BLD AUTO: 7.3 FL (ref 5–10.5)
POTASSIUM SERPL-SCNC: 4.5 MMOL/L (ref 3.5–5.1)
PROT SERPL-MCNC: 6.4 G/DL (ref 6.4–8.2)
RBC # BLD AUTO: 5.06 M/UL (ref 4.2–5.9)
SODIUM SERPL-SCNC: 135 MMOL/L (ref 136–145)
TROPONIN, HIGH SENSITIVITY: 15 NG/L (ref 0–22)
WBC # BLD AUTO: 6.4 K/UL (ref 4–11)

## 2024-07-03 PROCEDURE — 6370000000 HC RX 637 (ALT 250 FOR IP): Performed by: PHYSICIAN ASSISTANT

## 2024-07-03 PROCEDURE — 71045 X-RAY EXAM CHEST 1 VIEW: CPT

## 2024-07-03 PROCEDURE — G0378 HOSPITAL OBSERVATION PER HR: HCPCS

## 2024-07-03 PROCEDURE — 70450 CT HEAD/BRAIN W/O DYE: CPT

## 2024-07-03 PROCEDURE — 85025 COMPLETE CBC W/AUTO DIFF WBC: CPT

## 2024-07-03 PROCEDURE — 99285 EMERGENCY DEPT VISIT HI MDM: CPT

## 2024-07-03 PROCEDURE — 6370000000 HC RX 637 (ALT 250 FOR IP): Performed by: NURSE PRACTITIONER

## 2024-07-03 PROCEDURE — 80053 COMPREHEN METABOLIC PANEL: CPT

## 2024-07-03 PROCEDURE — 93010 ELECTROCARDIOGRAM REPORT: CPT | Performed by: INTERNAL MEDICINE

## 2024-07-03 PROCEDURE — 84484 ASSAY OF TROPONIN QUANT: CPT

## 2024-07-03 PROCEDURE — 73060 X-RAY EXAM OF HUMERUS: CPT

## 2024-07-03 PROCEDURE — 93005 ELECTROCARDIOGRAM TRACING: CPT | Performed by: PHYSICIAN ASSISTANT

## 2024-07-03 PROCEDURE — 72125 CT NECK SPINE W/O DYE: CPT

## 2024-07-03 PROCEDURE — 99223 1ST HOSP IP/OBS HIGH 75: CPT | Performed by: NURSE PRACTITIONER

## 2024-07-03 PROCEDURE — 72220 X-RAY EXAM SACRUM TAILBONE: CPT

## 2024-07-03 RX ORDER — METOPROLOL SUCCINATE 50 MG/1
50 TABLET, EXTENDED RELEASE ORAL 2 TIMES DAILY
Status: DISCONTINUED | OUTPATIENT
Start: 2024-07-03 | End: 2024-07-03 | Stop reason: HOSPADM

## 2024-07-03 RX ORDER — GABAPENTIN 100 MG/1
200 CAPSULE ORAL 3 TIMES DAILY
Status: DISCONTINUED | OUTPATIENT
Start: 2024-07-03 | End: 2024-07-03 | Stop reason: HOSPADM

## 2024-07-03 RX ORDER — ACETAMINOPHEN 650 MG/1
650 SUPPOSITORY RECTAL EVERY 6 HOURS PRN
Status: DISCONTINUED | OUTPATIENT
Start: 2024-07-03 | End: 2024-07-03 | Stop reason: HOSPADM

## 2024-07-03 RX ORDER — GLUCAGON 1 MG/ML
1 KIT INJECTION PRN
Status: DISCONTINUED | OUTPATIENT
Start: 2024-07-03 | End: 2024-07-03 | Stop reason: HOSPADM

## 2024-07-03 RX ORDER — POTASSIUM CHLORIDE 20 MEQ/1
40 TABLET, EXTENDED RELEASE ORAL PRN
Status: DISCONTINUED | OUTPATIENT
Start: 2024-07-03 | End: 2024-07-03 | Stop reason: HOSPADM

## 2024-07-03 RX ORDER — ASPIRIN 81 MG/1
81 TABLET, CHEWABLE ORAL DAILY
Status: DISCONTINUED | OUTPATIENT
Start: 2024-07-03 | End: 2024-07-03 | Stop reason: HOSPADM

## 2024-07-03 RX ORDER — ACETAMINOPHEN 325 MG/1
650 TABLET ORAL EVERY 6 HOURS PRN
Status: DISCONTINUED | OUTPATIENT
Start: 2024-07-03 | End: 2024-07-03 | Stop reason: HOSPADM

## 2024-07-03 RX ORDER — ENOXAPARIN SODIUM 100 MG/ML
30 INJECTION SUBCUTANEOUS 2 TIMES DAILY
Status: DISCONTINUED | OUTPATIENT
Start: 2024-07-03 | End: 2024-07-03 | Stop reason: HOSPADM

## 2024-07-03 RX ORDER — ACETAMINOPHEN 325 MG/1
650 TABLET ORAL ONCE
Status: COMPLETED | OUTPATIENT
Start: 2024-07-03 | End: 2024-07-03

## 2024-07-03 RX ORDER — FLUTICASONE PROPIONATE 50 MCG
2 SPRAY, SUSPENSION (ML) NASAL DAILY
Status: DISCONTINUED | OUTPATIENT
Start: 2024-07-03 | End: 2024-07-03 | Stop reason: HOSPADM

## 2024-07-03 RX ORDER — DIVALPROEX SODIUM 500 MG/1
500 TABLET, DELAYED RELEASE ORAL
Status: DISCONTINUED | OUTPATIENT
Start: 2024-07-04 | End: 2024-07-03 | Stop reason: HOSPADM

## 2024-07-03 RX ORDER — DULOXETIN HYDROCHLORIDE 60 MG/1
60 CAPSULE, DELAYED RELEASE ORAL DAILY
Status: DISCONTINUED | OUTPATIENT
Start: 2024-07-03 | End: 2024-07-03 | Stop reason: HOSPADM

## 2024-07-03 RX ORDER — ONDANSETRON 4 MG/1
4 TABLET, ORALLY DISINTEGRATING ORAL EVERY 8 HOURS PRN
Status: DISCONTINUED | OUTPATIENT
Start: 2024-07-03 | End: 2024-07-03 | Stop reason: HOSPADM

## 2024-07-03 RX ORDER — DEXTROSE MONOHYDRATE 100 MG/ML
INJECTION, SOLUTION INTRAVENOUS CONTINUOUS PRN
Status: DISCONTINUED | OUTPATIENT
Start: 2024-07-03 | End: 2024-07-03 | Stop reason: HOSPADM

## 2024-07-03 RX ORDER — SODIUM CHLORIDE 0.9 % (FLUSH) 0.9 %
5-40 SYRINGE (ML) INJECTION PRN
Status: DISCONTINUED | OUTPATIENT
Start: 2024-07-03 | End: 2024-07-03 | Stop reason: HOSPADM

## 2024-07-03 RX ORDER — DIVALPROEX SODIUM 500 MG/1
1000 TABLET, DELAYED RELEASE ORAL NIGHTLY
Status: DISCONTINUED | OUTPATIENT
Start: 2024-07-03 | End: 2024-07-03 | Stop reason: HOSPADM

## 2024-07-03 RX ORDER — INSULIN LISPRO 100 [IU]/ML
0-4 INJECTION, SOLUTION INTRAVENOUS; SUBCUTANEOUS NIGHTLY
Status: DISCONTINUED | OUTPATIENT
Start: 2024-07-03 | End: 2024-07-03 | Stop reason: HOSPADM

## 2024-07-03 RX ORDER — ONDANSETRON 2 MG/ML
4 INJECTION INTRAMUSCULAR; INTRAVENOUS EVERY 6 HOURS PRN
Status: DISCONTINUED | OUTPATIENT
Start: 2024-07-03 | End: 2024-07-03 | Stop reason: HOSPADM

## 2024-07-03 RX ORDER — SODIUM CHLORIDE 9 MG/ML
INJECTION, SOLUTION INTRAVENOUS PRN
Status: DISCONTINUED | OUTPATIENT
Start: 2024-07-03 | End: 2024-07-03 | Stop reason: HOSPADM

## 2024-07-03 RX ORDER — FAMOTIDINE 20 MG/1
20 TABLET, FILM COATED ORAL DAILY
Status: DISCONTINUED | OUTPATIENT
Start: 2024-07-03 | End: 2024-07-03 | Stop reason: HOSPADM

## 2024-07-03 RX ORDER — ATORVASTATIN CALCIUM 40 MG/1
80 TABLET, FILM COATED ORAL NIGHTLY
Status: DISCONTINUED | OUTPATIENT
Start: 2024-07-03 | End: 2024-07-03 | Stop reason: HOSPADM

## 2024-07-03 RX ORDER — POTASSIUM CHLORIDE 20 MEQ/1
20 TABLET, EXTENDED RELEASE ORAL
Status: DISCONTINUED | OUTPATIENT
Start: 2024-07-04 | End: 2024-07-03 | Stop reason: HOSPADM

## 2024-07-03 RX ORDER — TAMSULOSIN HYDROCHLORIDE 0.4 MG/1
0.4 CAPSULE ORAL DAILY
Status: DISCONTINUED | OUTPATIENT
Start: 2024-07-03 | End: 2024-07-03 | Stop reason: HOSPADM

## 2024-07-03 RX ORDER — CARBIDOPA AND LEVODOPA 25; 100 MG/1; MG/1
1 TABLET, EXTENDED RELEASE ORAL 2 TIMES DAILY
Status: DISCONTINUED | OUTPATIENT
Start: 2024-07-03 | End: 2024-07-03 | Stop reason: HOSPADM

## 2024-07-03 RX ORDER — MAGNESIUM SULFATE IN WATER 40 MG/ML
2000 INJECTION, SOLUTION INTRAVENOUS PRN
Status: DISCONTINUED | OUTPATIENT
Start: 2024-07-03 | End: 2024-07-03 | Stop reason: HOSPADM

## 2024-07-03 RX ORDER — FUROSEMIDE 20 MG/1
20 TABLET ORAL
Status: DISCONTINUED | OUTPATIENT
Start: 2024-07-04 | End: 2024-07-03 | Stop reason: HOSPADM

## 2024-07-03 RX ORDER — POLYETHYLENE GLYCOL 3350 17 G/17G
17 POWDER, FOR SOLUTION ORAL DAILY PRN
Status: DISCONTINUED | OUTPATIENT
Start: 2024-07-03 | End: 2024-07-03 | Stop reason: HOSPADM

## 2024-07-03 RX ORDER — POTASSIUM CHLORIDE 7.45 MG/ML
10 INJECTION INTRAVENOUS PRN
Status: DISCONTINUED | OUTPATIENT
Start: 2024-07-03 | End: 2024-07-03 | Stop reason: HOSPADM

## 2024-07-03 RX ORDER — ALBUTEROL SULFATE 90 UG/1
2 AEROSOL, METERED RESPIRATORY (INHALATION) EVERY 4 HOURS PRN
Status: DISCONTINUED | OUTPATIENT
Start: 2024-07-03 | End: 2024-07-03 | Stop reason: HOSPADM

## 2024-07-03 RX ORDER — SODIUM CHLORIDE 0.9 % (FLUSH) 0.9 %
5-40 SYRINGE (ML) INJECTION EVERY 12 HOURS SCHEDULED
Status: DISCONTINUED | OUTPATIENT
Start: 2024-07-03 | End: 2024-07-03 | Stop reason: HOSPADM

## 2024-07-03 RX ORDER — INSULIN LISPRO 100 [IU]/ML
0-8 INJECTION, SOLUTION INTRAVENOUS; SUBCUTANEOUS
Status: DISCONTINUED | OUTPATIENT
Start: 2024-07-03 | End: 2024-07-03 | Stop reason: HOSPADM

## 2024-07-03 RX ORDER — LISINOPRIL 5 MG/1
5 TABLET ORAL DAILY
Status: DISCONTINUED | OUTPATIENT
Start: 2024-07-03 | End: 2024-07-03 | Stop reason: HOSPADM

## 2024-07-03 RX ADMIN — ACETAMINOPHEN 650 MG: 325 TABLET ORAL at 12:51

## 2024-07-03 ASSESSMENT — PAIN - FUNCTIONAL ASSESSMENT: PAIN_FUNCTIONAL_ASSESSMENT: NONE - DENIES PAIN

## 2024-07-03 NOTE — PLAN OF CARE
Problem: Discharge Planning  Goal: Discharge to home or other facility with appropriate resources  Outcome: Adequate for Discharge     Problem: Risk for Elopement  Goal: Patient will not exit the unit/facility without proper excort  Outcome: Adequate for Discharge  Flowsheets (Taken 7/3/2024 1038 by Edna Mccullough RN)  Nursing Interventions for Elopement Risk: Assist with personal care needs such as toileting, eating, dressing, as needed to reduce the risk of wandering     Problem: Safety - Adult  Goal: Free from fall injury  Outcome: Adequate for Discharge      no

## 2024-07-03 NOTE — CARE COORDINATION
Case Management Assessment  Initial Evaluation    Date/Time of Evaluation: 7/3/2024 3:48 PM  Assessment Completed by: Lina Can    If patient is discharged prior to next notation, then this note serves as note for discharge by case management.    Patient Name: Chano Quiroga Jr.                   YOB: 1960  Diagnosis: Generalized weakness [R53.1]  Frequent falls [R29.6]  Unable to care for self [Z78.9]  Parkinson's disease, unspecified whether dyskinesia present, unspecified whether manifestations fluctuate (HCC) [G20.A1]                   Date / Time: 7/3/2024 10:29 AM    Patient Admission Status: Observation   Readmission Risk (Low < 19, Mod (19-27), High > 27): Readmission Risk Score: 23.2    Current PCP: Amalia Phillip APRN - NP  PCP verified by CM? Yes (Marjan)    Chart Reviewed: Yes      History Provided by: Patient  Patient Orientation: Alert and Oriented    Patient Cognition: Alert    Hospitalization in the last 30 days (Readmission):  No    If yes, Readmission Assessment in  Navigator will be completed.    Advance Directives:      Code Status: Prior   Patient's Primary Decision Maker is: Patient Declined (Legal Next of Kin Remains as Decision Maker)    Primary Decision Maker: Caryn Cantu - Brother/Sister - [phone]    Secondary Decision Maker: Miguelito Quiroga - Brother/Sister - [phone]    Discharge Planning:    Patient lives with: Spouse/Significant Other Type of Home: Apartment  Primary Care Giver: Self  Patient Support Systems include: Family Members   Current Financial resources: Medicare, Medicaid  Current community resources: None  Current services prior to admission: Durable Medical Equipment, Meals On Wheels, Other (Comment) (Passport)            Current DME: Walker, Cane, Other (Comment) (rollator)            Type of Home Care services:  Aide Services    ADLS  Prior functional level: Assistance with the following:, Dressing, Bathing, Cooking, Housework,

## 2024-07-03 NOTE — CARE COORDINATION
Spoke with ER who states they have consulted CM for NH placed. Advised pt has [employer] Medicare and he would need a precert and these usually take a few days. He needs PT/OT evals to  start precert. Advised he needs admitted.    Spoke with pt who states he wants to go to [facility]. Spoke with Dinh at [facility] who states pt can come today under his medicaid with a new HENS since he was just at the facility. Perfect Serve sent to Dr. Méndez. Will continue to monitor.

## 2024-07-03 NOTE — H&P
Medications Prior to Admission:    Prior to Admission medications    Medication Sig Start Date End Date Taking? Authorizing Provider   carbidopa-levodopa (SINEMET CR)  MG per extended release tablet Take 1 tablet by mouth 2 times daily 6/18/24   Kimberley Denny APRN - CNP   gabapentin (NEURONTIN) 100 MG capsule Take 2 capsules by mouth 3 times daily.    Giovanny Mosquera MD   potassium chloride (KLOR-CON M) 20 MEQ extended release tablet TAKE ONE TABLET BY MOUTH WITH LASIX ON MONDAY AND THURSDAY 5/2/24   Helio Zhou APRN - CNP   TRULICITY 3 MG/0.5ML SOPN INJECT 3MG SUBCUTANEOUSLY ONCE A WEEK 3/19/24   Ismael Espinoza APRN - CNP   Insulin Glargine (BASAGLAR KWIKPEN SC) Inject 50 Units into the skin nightly    Giovanny Mosquera MD   Insulin Aspart (NOVOLOG SC) Inject 15 units subcutaneously every morning and 20 units every evening with meals.    Giovanny Mosquera MD   divalproex (DEPAKOTE) 500 MG DR tablet TAKE ONE TABLET BY MOUTH EVERY MORNING AND TWO (2) TABLETS BY MOUTH AT BEDTIME 12/8/23   Ismael Espinoza APRN - CNP   furosemide (LASIX) 20 MG tablet TAKE ONE TABLET BY MOUTH MONDAY AND Thursday, take one extra tablet for weight gain of 3-4 pounds 11/10/23   Theo Hilario MD   famotidine (PEPCID) 20 MG tablet TAKE ONE TABLET BY MOUTH TWO (2) TIMES A DAY 9/12/23   Ismael Espinoza APRN - CNP   metFORMIN (GLUCOPHAGE) 1000 MG tablet TAKE ONE TABLET BY MOUTH TWO (2) TIMES DAILY WITH MORNING AND EVENING MEALS 9/12/23   Ismael Espinoza APRN - CNP   JARDIANCE 25 MG tablet TAKE ONE TABLET BY MOUTH EVERY MORNING 9/12/23   Ismael Espinoza APRN - CNP   tamsulosin (FLOMAX) 0.4 MG capsule TAKE ONE (1) CAPSULE BY ORAL ROUTE EVERY DAY ONE HALF (1/2) HOUR FOLLOWING THE SAME MEAL EACH DAY 9/12/23   Ismael Espinoza APRN - CNP   lisinopril (PRINIVIL;ZESTRIL) 5 MG tablet TAKE ONE (1) TABLET BY ORAL ROUTE EVERY DAY 8/16/23   Theo Hilario MD   metoprolol succinate (TOPROL

## 2024-07-03 NOTE — DISCHARGE SUMMARY
See Combined H & P and Discharge Summary     [facility].      Mohini Sanchez, APRN - CNP

## 2024-07-03 NOTE — ED PROVIDER NOTES
ED Attending Attestation Note    I personally saw the patient and made/approved the management plan and take responsibility for patient management.     Briefly, 63 y.o. male presents with concern for multiple falls.  Patient tells me that he has had 97 falls.  He was recently in a nursing facility but sent home recently since then has continued to have falls..     Focused exam:   Gen: 63 y.o. male, NAD  Patient is awake alert with GCS 15.  Clear speech.    Imaging:   XR SACRUM COCCYX (MIN 2 VIEWS)   Final Result   1. No significant change.         CT HEAD WO CONTRAST   Final Result   No acute intracranial abnormality.         XR CHEST PORTABLE   Final Result   No acute cardiopulmonary findings         XR HUMERUS LEFT (MIN 2 VIEWS)   Final Result   No acute fracture or dislocation         CT CERVICAL SPINE WO CONTRAST    (Results Pending)      The Ekg interpreted by me shows  normal sinus rhythm with a rate of 76 with PVCs  Axis is   Normal  QTc is  normal  Intervals and Durations are unremarkable.      ST Segments: nonspecific changes  No significant change from prior EKG dated 6/15/24      MDM:   Patient presenting with recurrent falls and I am concerned about his safety at home.  Imaging obtained today including head CT showing no intracranial hemorrhage.  No significant change on x-ray of the coccyx.  At this time I feel that placement to a nursing facility for further care would be most appropriate but we are unable to do so directly from the emergency department unfortunately.  Will consult the hospitalist team for inpatient management and further coordination of care.    For further details of the patient's emergency department visit, please see the advanced practice provider's documentation.    Kary Morris MD     This report has been produced using speech recognition software and may contain errors related to that system including errors in grammar, punctuation, and spelling, as well as words and phrases 
obstructive pulmonary disease (HCC), COPD (chronic obstructive pulmonary disease) (HCC), Fatty liver, GERD (gastroesophageal reflux disease), Hyperlipidemia, Hypertension, Medical history reviewed with no changes, MI, old, Myocardial infarction, nontransmural (HCC) (02/22/2012), Parkinson's disease (HCC), Sleep apnea, and Type II or unspecified type diabetes mellitus without mention of complication, not stated as uncontrolled.    CONSULTS: (Who and What was discussed)  None      Records Reviewed (External and Source)   Inpatient admission here 6/15/2024 due to frequent falls and failure to thrive.  PT OT consult.  Neurology consult increase Sinemet will follow-up as outpatient.  Discharge to skilled nursing facility.      CC/HPI Summary, DDx, ED Course, and Reassessment:       Patient presented to the ER for evaluation after fall today has Parkinson's has history of frequent falls states he had just gotten out of the nursing home on Monday he was at home today states he was using his walker and his legs gave out he fell landed on his left side injuring his left upper arm he did not know if he hit his head or not he does not believe he lost consciousness also having pain in his tailbone has a known coccyx fracture.  He is awake, alert, oriented x 3 he is moving all extremities he has intact sensation and and equal strength bilaterally.  Patient stating he needs to go back into the nursing home states this time he may need to go back permanently because he cannot take care of himself he keeps falling and he lives alone.  Imaging studies ordered to the areas that were injured and obtained EKG and basic labs.  He is stable.  Given Tylenol.      On labs white count is normal 6.4.  Hemoglobin 15.  Platelets 121, stable.  Sodium 135.  Potassium 4.5.  Glucose 205.  Normal CO2 and gap of 11.  BUN 8.  Creatinine 0.7.  ALT 6.  AST 16.  Troponin normal 15.  Denies any chest pain do not suspect ACS.      CT brain no acute

## 2024-07-03 NOTE — DISCHARGE INSTR - COC
07/01/2024 per pt  No intake or output data in the 24 hours ending 07/03/24 1615  No intake/output data recorded.    Safety Concerns:     History of Falls (last 30 days) and At Risk for Falls    Impairments/Disabilities:      None    Nutrition Therapy:  Current Nutrition Therapy:   - Oral Diet:  Carb Control 4 carbs/meal (1800kcals/day)    Routes of Feeding: Oral  Liquids: Thin Liquids  Daily Fluid Restriction: yes - amount 2000mL  Last Modified Barium Swallow with Video (Video Swallowing Test): not done    Treatments at the Time of Hospital Discharge:   Respiratory Treatments: N/A  Oxygen Therapy:  is not on home oxygen therapy.  Ventilator:    - No ventilator support    Rehab Therapies: Physical Therapy and Occupational Therapy  Weight Bearing Status/Restrictions: No weight bearing restrictions  Other Medical Equipment (for information only, NOT a DME order):  N/A  Other Treatments: N/A    Patient's personal belongings (please select all that are sent with patient):  At bedside    RN SIGNATURE:  Electronically signed by Dariana Edwards RN on 7/3/24 at 4:49 PM EDT    CASE MANAGEMENT/SOCIAL WORK SECTION    Inpatient Status Date: OBS    Readmission Risk Assessment Score: NA  Readmission Risk              Risk of Unplanned Readmission:  0           Discharging to Facility/ Agency   Name: Dimitris Stiles  Phone: [phone]      / signature: Electronically signed by Lina Can on 7/3/24 at 4:15 PM EDT    PHYSICIAN SECTION    Prognosis: Fair    Condition at Discharge: Stable    Rehab Potential (if transferring to Rehab): Good    Recommended Labs or Other Treatments After Discharge: PT/OT    Physician Certification: I certify the above information and transfer of Chano Quiroga Jr.  is necessary for the continuing treatment of the diagnosis listed and that he requires Skilled Nursing Facility for greater 30 days.     Update Admission H&P: No change in H&P    PHYSICIAN SIGNATURE:

## 2024-07-03 NOTE — PROGRESS NOTES
Pt given written and verbal discharge instructions. Pt indicated understanding of home medication and care instructions. No new prescriptions. Pt's belongings packed. IV removed, pt tolerated. Transfer center arrived at this time. No needs voiced.    Report called to Oliverio SANTANA at [facility] at this time, all questions answered.   
assistive device. Patient is MOBILITY LEVEL 3.       Mobility Level- 3     Patient is not able to demonstrated the ability to move from a reclining position to an upright position within the recliner. however patient is alert, oriented and able to provide informed consent    4 Eyes Skin Assessment     NAME:  Lisset Leonard  YOB: 1972  MEDICAL RECORD NUMBER:  [id number]    The patient is being assessed for  Admission    I agree that at least one RN has performed a thorough Head to Toe Skin Assessment on the patient. ALL assessment sites listed below have been assessed.      Areas assessed by both nurses:    Head, Face, Ears, Shoulders, Back, Chest, Arms, Elbows, Hands, Sacrum. Buttock, Coccyx, Ischium, Legs. Feet and Heels, and Under Medical Devices         Does the Patient have a Wound? No noted wound(s)       Omega Prevention initiated by RN: No  Wound Care Orders initiated by RN: No    Pressure Injury (Stage 3,4, Unstageable, DTI, NWPT, and Complex wounds) if present, place Wound referral order by RN under : No    New Ostomies, if present place, Ostomy referral order under : No     Nurse 1 eSignature: Electronically signed by Dariana Edwards RN on 7/3/24 at 3:58 PM EDT    **SHARE this note so that the co-signing nurse can place an eSignature**    Scabs/abrasions, not open or draining, to BLE.     Nurse 2 eSignature: Electronically signed by Keegan Jacob RN on 7/3/24 at 4:44 PM EDT

## 2024-07-03 NOTE — ED NOTES
Patient complaining of neck pain. States that he is unsure if its chronic or due to the fall. PA notified. CT Neck ordered.

## 2024-07-09 NOTE — TELEPHONE ENCOUNTER
LULÚ with the [facility] to see if patient could be brought to [facility] location 7/10 due to cancellation

## 2024-07-10 NOTE — TELEPHONE ENCOUNTER
Office offered cancellation slot to someone else due to no return call. Will continue to watch for cancellations

## 2024-07-18 DIAGNOSIS — I48.91 ATRIAL FIBRILLATION WITH RVR (HCC): ICD-10-CM

## 2024-07-18 DIAGNOSIS — E11.9 TYPE 2 DIABETES MELLITUS WITHOUT COMPLICATION, WITH LONG-TERM CURRENT USE OF INSULIN (HCC): ICD-10-CM

## 2024-07-18 DIAGNOSIS — I15.2 HYPERTENSION DUE TO ENDOCRINE DISORDER: ICD-10-CM

## 2024-07-18 DIAGNOSIS — I10 PRIMARY HYPERTENSION: ICD-10-CM

## 2024-07-18 DIAGNOSIS — I95.1 ORTHOSTATIC HYPOTENSION: Primary | ICD-10-CM

## 2024-07-18 DIAGNOSIS — Z79.4 TYPE 2 DIABETES MELLITUS WITHOUT COMPLICATION, WITH LONG-TERM CURRENT USE OF INSULIN (HCC): ICD-10-CM

## 2024-07-18 NOTE — TELEPHONE ENCOUNTER
Last ov: 8/16/23 [facility]  Upcoming ov: n/a    Labs- BMP 6/16/24, LIPID/LFT 6/9/23    Lipids ordered, Attempted to reach pt, unable to leave a vm to call office back, box was full.    Need to set up OV for pt    \" Follow up with me in 9 months, call in December to make your next appointment.\" - [facility] 8/16/23

## 2024-07-19 RX ORDER — LISINOPRIL 5 MG/1
TABLET ORAL
Qty: 28 TABLET | Refills: 5 | Status: SHIPPED | OUTPATIENT
Start: 2024-07-19

## 2024-07-19 RX ORDER — ATORVASTATIN CALCIUM 80 MG/1
TABLET, FILM COATED ORAL
Qty: 28 TABLET | Refills: 5 | Status: SHIPPED | OUTPATIENT
Start: 2024-07-19

## 2024-07-19 RX ORDER — ALBUTEROL SULFATE 90 UG/1
AEROSOL, METERED RESPIRATORY (INHALATION)
Qty: 8.5 G | Refills: 3 | OUTPATIENT
Start: 2024-07-19

## 2024-07-19 NOTE — TELEPHONE ENCOUNTER
Pt had another ED visit on 7/3/24 for fall.  Still watching for cancellations at [place] office for hosp f/u re: Parkinson's & Sinemet management.  His is no longer residing at [facility].    Called pt but his VM is full so I couldn't LM.  Will try to reach him again next week.

## 2024-07-23 ENCOUNTER — TELEPHONE (OUTPATIENT)
Age: 64
End: 2024-07-23

## 2024-07-23 NOTE — TELEPHONE ENCOUNTER
Per Dr. Cool - he will not fill any medications for pt until he comes in for appt.  He cx 6/5 appt on 6/4 and same day cancelled his appt on 6/25.

## 2024-07-23 NOTE — TELEPHONE ENCOUNTER
LOV: Needs 4 week hospital fua from 6/25.  Next appt: watching for cancellations in providers schedule  Last refill: pt requesting refill on carbidopa-levodopa. Pt is not sure on what dosage he is currently taking. Patient uses [facility].

## 2024-07-24 NOTE — TELEPHONE ENCOUNTER
He is a frequent flyer in the hospital.  I don't feel like he will come to our clinic after two cancellation.  I will have his PCP take care of this medication as we are unable to take the patient by phone.

## 2024-07-24 NOTE — TELEPHONE ENCOUNTER
Pt called again today to ask why Dr. Cool isn't filling his Sinemet.  Nursing home cx 6/25 appt which was not pt's fault.  He cx his 6/5 appt the day prior because he was scheduled for cataract surgery.  Has only ever been seen by Dr. Cool in pt.      I verified with [facility] they do have 1 additional refill on file for him & they're stating they put it in his \"bubble pack\" that he's due to begin on 8/4/24.  Therefore, he has a 28-day supply.    Pt says the dose he had at home is Sinemet  mg TID & again stated he took the last tab last night which does not coincide w/ what [facility] says he should have left.  They're going to call him to find out why he ran out prior to starting his next 28-day bubble pack on 8/4.    Pt says Dr. Cool increased his Sinement to  mg BID but pharmacy doesn't have Rx for that.  I added him to the schedule for next Thurs. 8/1 @ 12:30 in [place] & told pt he has to confirm appt the day prior or it will be cx.

## 2024-08-01 ENCOUNTER — OFFICE VISIT (OUTPATIENT)
Age: 64
End: 2024-08-01
Payer: MEDICARE

## 2024-08-01 VITALS
HEART RATE: 78 BPM | SYSTOLIC BLOOD PRESSURE: 118 MMHG | HEIGHT: 64 IN | DIASTOLIC BLOOD PRESSURE: 72 MMHG | RESPIRATION RATE: 14 BRPM | WEIGHT: 230 LBS | OXYGEN SATURATION: 98 % | BODY MASS INDEX: 39.27 KG/M2

## 2024-08-01 DIAGNOSIS — G62.9 NEUROPATHY: ICD-10-CM

## 2024-08-01 DIAGNOSIS — G20.C PARKINSONISM, UNSPECIFIED PARKINSONISM TYPE (HCC): Primary | ICD-10-CM

## 2024-08-01 PROCEDURE — 3074F SYST BP LT 130 MM HG: CPT | Performed by: PSYCHIATRY & NEUROLOGY

## 2024-08-01 PROCEDURE — 3078F DIAST BP <80 MM HG: CPT | Performed by: PSYCHIATRY & NEUROLOGY

## 2024-08-01 PROCEDURE — 99214 OFFICE O/P EST MOD 30 MIN: CPT | Performed by: PSYCHIATRY & NEUROLOGY

## 2024-08-01 RX ORDER — CARBIDOPA AND LEVODOPA 25; 100 MG/1; MG/1
1 TABLET, EXTENDED RELEASE ORAL 2 TIMES DAILY
Qty: 60 TABLET | Refills: 3 | Status: SHIPPED | OUTPATIENT
Start: 2024-08-01

## 2024-08-01 RX ORDER — CARBIDOPA AND LEVODOPA 25; 100 MG/1; MG/1
1 TABLET, EXTENDED RELEASE ORAL 2 TIMES DAILY
Qty: 60 TABLET | Refills: 1 | Status: SHIPPED | OUTPATIENT
Start: 2024-08-01 | End: 2024-08-01

## 2024-08-01 NOTE — PROGRESS NOTES
Neurology outpatient follow-up visit    Patient name: Chano Quiroga Jr.      Chief Complaint:  Parkinsonism.    History of present illness:  This is a 63 years old right-handed male.  The patient was brought to the hospital yesterday after the patient fall on his tailbone.  The patient is noted for underlying parkinsonism after admission in the hospital about 6 months ago.  However, the patient never had establish care as an outpatient with neurology to address his Sinemet.  The patient remains to have mild degree of resting tremor and cogwheel rigidity.  The patient is noted for underlying bipolar disorder and history of antipsychotic use/Depakote.  Therefore, the etiology of parkinsonism can be Parkinson disease or drug-induced parkinsonism.     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  The patient is currently on Sinemet 4 times a day.  The patient was on Sinemet CR from the previous admission.    Interval history:  08/1/24: The patient is here for follow-up parkinsonism.  The patient remains to have significant tremor.  The patient is wheelchair-bound right now.  The patient is able to walk only short distance to the bathroom.  The patient also has home health for physical therapy 3-4 times a week.  The patient is living by himself but his brother also is living in the same building.  The patient denies significant side effect from Sinemet.    Past medical history:    Past Medical History:   Diagnosis Date    Arthritis     Asthma     CAD (coronary artery disease)     CHF (congestive heart failure) (HCC)     Chronic obstructive pulmonary disease (HCC)     COPD (chronic obstructive pulmonary disease) (HCC)     Fatty liver     GERD (gastroesophageal reflux disease)     Hyperlipidemia     Hypertension     Medical history reviewed with no changes     MI, old     Myocardial infarction, nontransmural (HCC) 02/22/2012    Parkinson's disease (HCC)     Sleep apnea     pt wears cpap at

## 2024-08-01 NOTE — PATIENT INSTRUCTIONS

## 2024-08-14 RX ORDER — METOPROLOL SUCCINATE 50 MG/1
50 TABLET, EXTENDED RELEASE ORAL 2 TIMES DAILY
Qty: 60 TABLET | Refills: 0 | Status: SHIPPED | OUTPATIENT
Start: 2024-08-14

## 2024-08-29 NOTE — PROGRESS NOTES
[facility]   Cardiac Consultation    Date: 9/3/24  Patient Name: Chano Quiroga Jr.  YOB: 1960    Primary Care Physician: Amalia Phillip APRN - NP    CHIEF COMPLAINT:   Chief Complaint   Patient presents with    Device Check    1 Year Follow Up    Tachycardia    Atrial Fibrillation       HPI:  Chano Quiroga Jr. is a 63 y.o. male is a patient of Dr. Reji Roland here today for follow up of his ischemic cardiomyopathy and ICD. The original implant was in 2001 and was replaced in 2005 for battery depletion. He underwent lead extraction at [facility] in 2007 for fracture of the RV lead. A SJM 7022 lead was implanted at that time. He received several ICD shocks in 2008 when he did not take his medication. I cannot find any records pertaining to this, but it sounds most consistent with PAF and rapid AV conduction. His current SJM device was implanted in May 2011. He had a colon stimulator placed on 3/20/18 and has not had any problems with device interactions. He uses a CPAP for sleep apnea.   On 9/18/2019 he is here for follow up S/P generator change 5/16/19 with Dr. Siu. Normal device check today. He is V paced less than 1%, no VT detected, battery is 8.5 years.  He states he has been having episodes of feeling light headed with leg weakness when he stands.    In 6/2021 he reported syncope to his primary cardiologist.   In 7/2021 he had an abnormal stress test and underwent LHC. He had 3 ROSHNI placed (LCx, OM, and PDA).   In 2/2022 he was hospitalized for weakness and falls and was diagnosed with COVID-19. During his hospitalization, he went into AF and his ICD inappropriately fired. VT detection was adjusted at that time.   In 6/2022 he was seen in the office and had no complaints. NSVT was seen on device interrogations, but due to limitations of only having RV lead, unable to determine rhythm of NSVT events. Toprol was increased to 50 mg BID.   In 10/2022 he was admitted for

## 2024-09-03 ENCOUNTER — OFFICE VISIT (OUTPATIENT)
Dept: CARDIOLOGY CLINIC | Age: 64
End: 2024-09-03

## 2024-09-03 ENCOUNTER — NURSE ONLY (OUTPATIENT)
Dept: CARDIOLOGY CLINIC | Age: 64
End: 2024-09-03
Payer: MEDICARE

## 2024-09-03 VITALS
HEART RATE: 81 BPM | WEIGHT: 229.5 LBS | HEIGHT: 64 IN | DIASTOLIC BLOOD PRESSURE: 68 MMHG | SYSTOLIC BLOOD PRESSURE: 132 MMHG | OXYGEN SATURATION: 96 % | BODY MASS INDEX: 39.18 KG/M2

## 2024-09-03 DIAGNOSIS — I47.29 NSVT (NONSUSTAINED VENTRICULAR TACHYCARDIA) (HCC): Primary | ICD-10-CM

## 2024-09-03 DIAGNOSIS — I48.0 PAF (PAROXYSMAL ATRIAL FIBRILLATION) (HCC): ICD-10-CM

## 2024-09-03 DIAGNOSIS — I48.91 ATRIAL FIBRILLATION WITH RVR (HCC): ICD-10-CM

## 2024-09-03 DIAGNOSIS — I48.0 PAROXYSMAL ATRIAL FIBRILLATION (HCC): ICD-10-CM

## 2024-09-03 DIAGNOSIS — I25.5 ISCHEMIC CARDIOMYOPATHY: ICD-10-CM

## 2024-09-03 DIAGNOSIS — Z95.810 AUTOMATIC IMPLANTABLE CARDIOVERTER-DEFIBRILLATOR IN SITU: Primary | ICD-10-CM

## 2024-09-03 PROCEDURE — 93282 PRGRMG EVAL IMPLANTABLE DFB: CPT | Performed by: INTERNAL MEDICINE

## 2024-09-03 RX ORDER — SEMAGLUTIDE 0.68 MG/ML
INJECTION, SOLUTION SUBCUTANEOUS
COMMUNITY
Start: 2024-08-29

## 2024-09-03 NOTE — PATIENT INSTRUCTIONS
Plan:  Remote device interrogations every 3 months  Continue medications as prescribed  Follow up with Dr. Hilario as scheduled  Follow up with EP NP in 1 year

## 2024-09-05 RX ORDER — METOPROLOL SUCCINATE 50 MG/1
50 TABLET, EXTENDED RELEASE ORAL 2 TIMES DAILY
Qty: 60 TABLET | Refills: 0 | Status: SHIPPED | OUTPATIENT
Start: 2024-09-05

## 2024-09-29 ENCOUNTER — APPOINTMENT (OUTPATIENT)
Dept: CT IMAGING | Age: 64
End: 2024-09-29
Payer: MEDICARE

## 2024-09-29 ENCOUNTER — HOSPITAL ENCOUNTER (INPATIENT)
Age: 64
LOS: 5 days | Discharge: NURSING, REHAB OR LONG-TERM CARE | End: 2024-10-04
Attending: EMERGENCY MEDICINE | Admitting: STUDENT IN AN ORGANIZED HEALTH CARE EDUCATION/TRAINING PROGRAM
Payer: MEDICARE

## 2024-09-29 DIAGNOSIS — R29.6 RECURRENT FALLS: Primary | ICD-10-CM

## 2024-09-29 DIAGNOSIS — G20.C PARKINSONISM, UNSPECIFIED PARKINSONISM TYPE (HCC): ICD-10-CM

## 2024-09-29 DIAGNOSIS — E87.1 HYPONATREMIA: ICD-10-CM
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
## 2024-09-29 LAB
ALBUMIN SERPL-MCNC: 4.4 G/DL (ref 3.4–5)
ALBUMIN/GLOB SERPL: 2.2 {RATIO} (ref 1.1–2.2)
ALP SERPL-CCNC: 105 U/L (ref 40–129)
ALT SERPL-CCNC: 9 U/L (ref 10–40)
ANION GAP SERPL CALCULATED.3IONS-SCNC: 10 MMOL/L (ref 3–16)
AST SERPL-CCNC: 16 U/L (ref 15–37)
BASOPHILS # BLD: 0 K/UL (ref 0–0.2)
BASOPHILS NFR BLD: 0.8 %
BILIRUB SERPL-MCNC: 0.4 MG/DL (ref 0–1)
BUN SERPL-MCNC: 8 MG/DL (ref 7–20)
CALCIUM SERPL-MCNC: 9.6 MG/DL (ref 8.3–10.6)
CHLORIDE SERPL-SCNC: 92 MMOL/L (ref 99–110)
CO2 SERPL-SCNC: 27 MMOL/L (ref 21–32)
CREAT SERPL-MCNC: 0.7 MG/DL (ref 0.8–1.3)
DEPRECATED RDW RBC AUTO: 14.9 % (ref 12.4–15.4)
EOSINOPHIL # BLD: 0 K/UL (ref 0–0.6)
EOSINOPHIL NFR BLD: 0.3 %
GFR SERPLBLD CREATININE-BSD FMLA CKD-EPI: >90 ML/MIN/{1.73_M2}
GLUCOSE BLD-MCNC: 218 MG/DL (ref 70–99)
GLUCOSE SERPL-MCNC: 274 MG/DL (ref 70–99)
HCT VFR BLD AUTO: 41.5 % (ref 40.5–52.5)
HGB BLD-MCNC: 14.3 G/DL (ref 13.5–17.5)
LYMPHOCYTES # BLD: 1.5 K/UL (ref 1–5.1)
LYMPHOCYTES NFR BLD: 31.1 %
MAGNESIUM SERPL-MCNC: 1.8 MG/DL (ref 1.8–2.4)
MCH RBC QN AUTO: 30.1 PG (ref 26–34)
MCHC RBC AUTO-ENTMCNC: 34.6 G/DL (ref 31–36)
MCV RBC AUTO: 87.1 FL (ref 80–100)
MONOCYTES # BLD: 0.5 K/UL (ref 0–1.3)
MONOCYTES NFR BLD: 9.6 %
NEUTROPHILS # BLD: 2.8 K/UL (ref 1.7–7.7)
NEUTROPHILS NFR BLD: 58.2 %
PERFORMED ON: ABNORMAL
PLATELET # BLD AUTO: 94 K/UL (ref 135–450)
PLATELET BLD QL SMEAR: ABNORMAL
PMV BLD AUTO: 7.4 FL (ref 5–10.5)
POTASSIUM SERPL-SCNC: 4.6 MMOL/L (ref 3.5–5.1)
PROT SERPL-MCNC: 6.4 G/DL (ref 6.4–8.2)
RBC # BLD AUTO: 4.76 M/UL (ref 4.2–5.9)
SLIDE REVIEW: ABNORMAL
SODIUM SERPL-SCNC: 129 MMOL/L (ref 136–145)
TROPONIN, HIGH SENSITIVITY: 21 NG/L (ref 0–22)
WBC # BLD AUTO: 4.8 K/UL (ref 4–11)

## 2024-09-29 PROCEDURE — 85025 COMPLETE CBC W/AUTO DIFF WBC: CPT

## 2024-09-29 PROCEDURE — 83735 ASSAY OF MAGNESIUM: CPT

## 2024-09-29 PROCEDURE — 70450 CT HEAD/BRAIN W/O DYE: CPT

## 2024-09-29 PROCEDURE — 36415 COLL VENOUS BLD VENIPUNCTURE: CPT

## 2024-09-29 PROCEDURE — 72131 CT LUMBAR SPINE W/O DYE: CPT

## 2024-09-29 PROCEDURE — 81003 URINALYSIS AUTO W/O SCOPE: CPT

## 2024-09-29 PROCEDURE — 99285 EMERGENCY DEPT VISIT HI MDM: CPT

## 2024-09-29 PROCEDURE — 72125 CT NECK SPINE W/O DYE: CPT

## 2024-09-29 PROCEDURE — 80053 COMPREHEN METABOLIC PANEL: CPT

## 2024-09-29 PROCEDURE — 84484 ASSAY OF TROPONIN QUANT: CPT

## 2024-09-29 PROCEDURE — 1200000000 HC SEMI PRIVATE

## 2024-09-29 PROCEDURE — 93005 ELECTROCARDIOGRAM TRACING: CPT | Performed by: EMERGENCY MEDICINE

## 2024-09-29 ASSESSMENT — PAIN - FUNCTIONAL ASSESSMENT: PAIN_FUNCTIONAL_ASSESSMENT: 0-10

## 2024-09-29 ASSESSMENT — PAIN DESCRIPTION - LOCATION: LOCATION: BACK

## 2024-09-29 ASSESSMENT — PAIN SCALES - GENERAL: PAINLEVEL_OUTOF10: 8

## 2024-09-29 ASSESSMENT — PAIN DESCRIPTION - ORIENTATION: ORIENTATION: LOWER

## 2024-09-30 PROBLEM — E87.1 HYPONATREMIA: Status: ACTIVE | Noted: 2024-09-30

## 2024-09-30 LAB
ALBUMIN SERPL-MCNC: 3.8 G/DL (ref 3.4–5)
ALBUMIN/GLOB SERPL: 2.1 {RATIO} (ref 1.1–2.2)
ALP SERPL-CCNC: 78 U/L (ref 40–129)
ALT SERPL-CCNC: 9 U/L (ref 10–40)
ANION GAP SERPL CALCULATED.3IONS-SCNC: 9 MMOL/L (ref 3–16)
ANISOCYTOSIS BLD QL SMEAR: ABNORMAL
AST SERPL-CCNC: 15 U/L (ref 15–37)
BASOPHILS # BLD: 0 K/UL (ref 0–0.2)
BASOPHILS NFR BLD: 0 %
BILIRUB SERPL-MCNC: 0.5 MG/DL (ref 0–1)
BILIRUB UR QL STRIP.AUTO: NEGATIVE
BUN SERPL-MCNC: 7 MG/DL (ref 7–20)
CALCIUM SERPL-MCNC: 9.3 MG/DL (ref 8.3–10.6)
CHLORIDE SERPL-SCNC: 98 MMOL/L (ref 99–110)
CHP ED QC CHECK: NORMAL
CLARITY UR: CLEAR
CO2 SERPL-SCNC: 29 MMOL/L (ref 21–32)
COLOR UR: YELLOW
CREAT SERPL-MCNC: 0.6 MG/DL (ref 0.8–1.3)
DEPRECATED RDW RBC AUTO: 15.1 % (ref 12.4–15.4)
EKG ATRIAL RATE: 87 BPM
EKG DIAGNOSIS: NORMAL
EKG P AXIS: 64 DEGREES
EKG P-R INTERVAL: 172 MS
EKG Q-T INTERVAL: 332 MS
EKG QRS DURATION: 96 MS
EKG QTC CALCULATION (BAZETT): 399 MS
EKG R AXIS: 38 DEGREES
EKG T AXIS: 68 DEGREES
EKG VENTRICULAR RATE: 87 BPM
EOSINOPHIL # BLD: 0.1 K/UL (ref 0–0.6)
EOSINOPHIL NFR BLD: 2 %
EST. AVERAGE GLUCOSE BLD GHB EST-MCNC: 208.7 MG/DL
GFR SERPLBLD CREATININE-BSD FMLA CKD-EPI: >90 ML/MIN/{1.73_M2}
GLUCOSE BLD-MCNC: 147 MG/DL (ref 70–99)
GLUCOSE BLD-MCNC: 220 MG/DL
GLUCOSE BLD-MCNC: 220 MG/DL (ref 70–99)
GLUCOSE BLD-MCNC: 265 MG/DL (ref 70–99)
GLUCOSE BLD-MCNC: 346 MG/DL (ref 70–99)
GLUCOSE SERPL-MCNC: 220 MG/DL (ref 70–99)
GLUCOSE UR STRIP.AUTO-MCNC: >=1000 MG/DL
HBA1C MFR BLD: 8.9 %
HCT VFR BLD AUTO: 42.9 % (ref 40.5–52.5)
HGB BLD-MCNC: 14.7 G/DL (ref 13.5–17.5)
HGB UR QL STRIP.AUTO: NEGATIVE
KETONES UR STRIP.AUTO-MCNC: ABNORMAL MG/DL
LEUKOCYTE ESTERASE UR QL STRIP.AUTO: NEGATIVE
LYMPHOCYTES # BLD: 2 K/UL (ref 1–5.1)
LYMPHOCYTES NFR BLD: 38 %
MCH RBC QN AUTO: 30.2 PG (ref 26–34)
MCHC RBC AUTO-ENTMCNC: 34.3 G/DL (ref 31–36)
MCV RBC AUTO: 88.1 FL (ref 80–100)
MONOCYTES # BLD: 0.2 K/UL (ref 0–1.3)
MONOCYTES NFR BLD: 4 %
NEUTROPHILS # BLD: 2.4 K/UL (ref 1.7–7.7)
NEUTROPHILS NFR BLD: 51 %
NEUTS BAND NFR BLD MANUAL: 1 % (ref 0–7)
NITRITE UR QL STRIP.AUTO: NEGATIVE
PERFORMED ON: ABNORMAL
PH UR STRIP.AUTO: 6 [PH] (ref 5–8)
PLATELET # BLD AUTO: 90 K/UL (ref 135–450)
PLATELET BLD QL SMEAR: ABNORMAL
PMV BLD AUTO: 7 FL (ref 5–10.5)
POTASSIUM SERPL-SCNC: 4.3 MMOL/L (ref 3.5–5.1)
PROT SERPL-MCNC: 5.6 G/DL (ref 6.4–8.2)
PROT UR STRIP.AUTO-MCNC: NEGATIVE MG/DL
RBC # BLD AUTO: 4.87 M/UL (ref 4.2–5.9)
SLIDE REVIEW: ABNORMAL
SODIUM SERPL-SCNC: 136 MMOL/L (ref 136–145)
SP GR UR STRIP.AUTO: 1.02 (ref 1–1.03)
UA COMPLETE W REFLEX CULTURE PNL UR: ABNORMAL
UA DIPSTICK W REFLEX MICRO PNL UR: ABNORMAL
URN SPEC COLLECT METH UR: ABNORMAL
UROBILINOGEN UR STRIP-ACNC: 0.2 E.U./DL
VARIANT LYMPHS NFR BLD MANUAL: 4 % (ref 0–6)
WBC # BLD AUTO: 4.7 K/UL (ref 4–11)

## 2024-09-30 PROCEDURE — 6370000000 HC RX 637 (ALT 250 FOR IP): Performed by: STUDENT IN AN ORGANIZED HEALTH CARE EDUCATION/TRAINING PROGRAM

## 2024-09-30 PROCEDURE — 85025 COMPLETE CBC W/AUTO DIFF WBC: CPT

## 2024-09-30 PROCEDURE — 80053 COMPREHEN METABOLIC PANEL: CPT

## 2024-09-30 PROCEDURE — 99232 SBSQ HOSP IP/OBS MODERATE 35: CPT

## 2024-09-30 PROCEDURE — 1200000000 HC SEMI PRIVATE

## 2024-09-30 PROCEDURE — 93010 ELECTROCARDIOGRAM REPORT: CPT | Performed by: STUDENT IN AN ORGANIZED HEALTH CARE EDUCATION/TRAINING PROGRAM

## 2024-09-30 PROCEDURE — 83036 HEMOGLOBIN GLYCOSYLATED A1C: CPT

## 2024-09-30 PROCEDURE — 6370000000 HC RX 637 (ALT 250 FOR IP)

## 2024-09-30 PROCEDURE — 2580000003 HC RX 258: Performed by: STUDENT IN AN ORGANIZED HEALTH CARE EDUCATION/TRAINING PROGRAM

## 2024-09-30 PROCEDURE — 36415 COLL VENOUS BLD VENIPUNCTURE: CPT

## 2024-09-30 RX ORDER — INSULIN LISPRO 100 [IU]/ML
15 INJECTION, SOLUTION INTRAVENOUS; SUBCUTANEOUS
Status: DISCONTINUED | OUTPATIENT
Start: 2024-09-30 | End: 2024-10-04 | Stop reason: HOSPADM

## 2024-09-30 RX ORDER — POTASSIUM CHLORIDE 7.45 MG/ML
10 INJECTION INTRAVENOUS PRN
Status: DISCONTINUED | OUTPATIENT
Start: 2024-09-30 | End: 2024-10-04 | Stop reason: HOSPADM

## 2024-09-30 RX ORDER — FUROSEMIDE 20 MG
20 TABLET ORAL
Status: DISCONTINUED | OUTPATIENT
Start: 2024-10-07 | End: 2024-10-04 | Stop reason: HOSPADM

## 2024-09-30 RX ORDER — ONDANSETRON 2 MG/ML
4 INJECTION INTRAMUSCULAR; INTRAVENOUS EVERY 6 HOURS PRN
Status: DISCONTINUED | OUTPATIENT
Start: 2024-09-30 | End: 2024-10-04 | Stop reason: HOSPADM

## 2024-09-30 RX ORDER — ONDANSETRON 4 MG/1
4 TABLET, ORALLY DISINTEGRATING ORAL EVERY 8 HOURS PRN
Status: DISCONTINUED | OUTPATIENT
Start: 2024-09-30 | End: 2024-10-04 | Stop reason: HOSPADM

## 2024-09-30 RX ORDER — INSULIN LISPRO 100 [IU]/ML
0-4 INJECTION, SOLUTION INTRAVENOUS; SUBCUTANEOUS NIGHTLY
Status: DISCONTINUED | OUTPATIENT
Start: 2024-09-30 | End: 2024-10-04 | Stop reason: HOSPADM

## 2024-09-30 RX ORDER — SODIUM CHLORIDE 0.9 % (FLUSH) 0.9 %
5-40 SYRINGE (ML) INJECTION PRN
Status: DISCONTINUED | OUTPATIENT
Start: 2024-09-30 | End: 2024-10-04 | Stop reason: HOSPADM

## 2024-09-30 RX ORDER — FAMOTIDINE 20 MG/1
20 TABLET, FILM COATED ORAL 2 TIMES DAILY
Status: DISCONTINUED | OUTPATIENT
Start: 2024-09-30 | End: 2024-10-04 | Stop reason: HOSPADM

## 2024-09-30 RX ORDER — GLUCAGON 1 MG/ML
1 KIT INJECTION PRN
Status: DISCONTINUED | OUTPATIENT
Start: 2024-09-30 | End: 2024-10-04 | Stop reason: HOSPADM

## 2024-09-30 RX ORDER — INSULIN LISPRO 100 [IU]/ML
30 INJECTION, SOLUTION INTRAVENOUS; SUBCUTANEOUS NIGHTLY
Status: DISCONTINUED | OUTPATIENT
Start: 2024-09-30 | End: 2024-10-04 | Stop reason: HOSPADM

## 2024-09-30 RX ORDER — TAMSULOSIN HYDROCHLORIDE 0.4 MG/1
0.4 CAPSULE ORAL DAILY
Status: DISCONTINUED | OUTPATIENT
Start: 2024-09-30 | End: 2024-10-04 | Stop reason: HOSPADM

## 2024-09-30 RX ORDER — MAGNESIUM SULFATE IN WATER 40 MG/ML
2000 INJECTION, SOLUTION INTRAVENOUS PRN
Status: DISCONTINUED | OUTPATIENT
Start: 2024-09-30 | End: 2024-10-04 | Stop reason: HOSPADM

## 2024-09-30 RX ORDER — LISINOPRIL 5 MG/1
5 TABLET ORAL DAILY
Status: DISCONTINUED | OUTPATIENT
Start: 2024-09-30 | End: 2024-10-04 | Stop reason: HOSPADM

## 2024-09-30 RX ORDER — POTASSIUM CHLORIDE 1500 MG/1
40 TABLET, EXTENDED RELEASE ORAL PRN
Status: DISCONTINUED | OUTPATIENT
Start: 2024-09-30 | End: 2024-10-04 | Stop reason: HOSPADM

## 2024-09-30 RX ORDER — FUROSEMIDE 20 MG
20 TABLET ORAL
Status: DISCONTINUED | OUTPATIENT
Start: 2024-10-03 | End: 2024-10-04 | Stop reason: HOSPADM

## 2024-09-30 RX ORDER — CARBIDOPA AND LEVODOPA 25; 100 MG/1; MG/1
1 TABLET ORAL 4 TIMES DAILY
Status: DISCONTINUED | OUTPATIENT
Start: 2024-09-30 | End: 2024-10-04 | Stop reason: HOSPADM

## 2024-09-30 RX ORDER — POLYETHYLENE GLYCOL 3350 17 G/17G
17 POWDER, FOR SOLUTION ORAL DAILY PRN
Status: DISCONTINUED | OUTPATIENT
Start: 2024-09-30 | End: 2024-10-04 | Stop reason: HOSPADM

## 2024-09-30 RX ORDER — DEXTROSE MONOHYDRATE 100 MG/ML
INJECTION, SOLUTION INTRAVENOUS CONTINUOUS PRN
Status: DISCONTINUED | OUTPATIENT
Start: 2024-09-30 | End: 2024-10-04 | Stop reason: SDUPTHER

## 2024-09-30 RX ORDER — CARBIDOPA AND LEVODOPA 25; 100 MG/1; MG/1
1 TABLET, EXTENDED RELEASE ORAL 2 TIMES DAILY
Status: DISCONTINUED | OUTPATIENT
Start: 2024-09-30 | End: 2024-10-04 | Stop reason: HOSPADM

## 2024-09-30 RX ORDER — DULOXETIN HYDROCHLORIDE 60 MG/1
60 CAPSULE, DELAYED RELEASE ORAL DAILY
Status: DISCONTINUED | OUTPATIENT
Start: 2024-09-30 | End: 2024-10-04 | Stop reason: HOSPADM

## 2024-09-30 RX ORDER — METOPROLOL SUCCINATE 50 MG/1
50 TABLET, EXTENDED RELEASE ORAL 2 TIMES DAILY
Status: DISCONTINUED | OUTPATIENT
Start: 2024-09-30 | End: 2024-10-04 | Stop reason: HOSPADM

## 2024-09-30 RX ORDER — QUETIAPINE FUMARATE 100 MG/1
100 TABLET, FILM COATED ORAL NIGHTLY
Status: DISCONTINUED | OUTPATIENT
Start: 2024-09-30 | End: 2024-10-04 | Stop reason: HOSPADM

## 2024-09-30 RX ORDER — GABAPENTIN 100 MG/1
200 CAPSULE ORAL 3 TIMES DAILY
Status: DISCONTINUED | OUTPATIENT
Start: 2024-09-30 | End: 2024-10-04 | Stop reason: HOSPADM

## 2024-09-30 RX ORDER — ACETAMINOPHEN 325 MG/1
650 TABLET ORAL EVERY 6 HOURS PRN
Status: DISCONTINUED | OUTPATIENT
Start: 2024-09-30 | End: 2024-10-04 | Stop reason: HOSPADM

## 2024-09-30 RX ORDER — INSULIN LISPRO 100 [IU]/ML
0-4 INJECTION, SOLUTION INTRAVENOUS; SUBCUTANEOUS
Status: DISCONTINUED | OUTPATIENT
Start: 2024-09-30 | End: 2024-10-04 | Stop reason: HOSPADM

## 2024-09-30 RX ORDER — DIVALPROEX SODIUM 500 MG/1
500 TABLET, DELAYED RELEASE ORAL
Status: DISCONTINUED | OUTPATIENT
Start: 2024-09-30 | End: 2024-10-04 | Stop reason: HOSPADM

## 2024-09-30 RX ORDER — ENOXAPARIN SODIUM 100 MG/ML
40 INJECTION SUBCUTANEOUS DAILY
Status: DISCONTINUED | OUTPATIENT
Start: 2024-09-30 | End: 2024-10-04 | Stop reason: HOSPADM

## 2024-09-30 RX ORDER — ASPIRIN 81 MG/1
81 TABLET, CHEWABLE ORAL DAILY
Status: DISCONTINUED | OUTPATIENT
Start: 2024-09-30 | End: 2024-10-04 | Stop reason: HOSPADM

## 2024-09-30 RX ORDER — SODIUM CHLORIDE 0.9 % (FLUSH) 0.9 %
5-40 SYRINGE (ML) INJECTION EVERY 12 HOURS SCHEDULED
Status: DISCONTINUED | OUTPATIENT
Start: 2024-09-30 | End: 2024-10-04 | Stop reason: HOSPADM

## 2024-09-30 RX ORDER — ACETAMINOPHEN 650 MG/1
650 SUPPOSITORY RECTAL EVERY 6 HOURS PRN
Status: DISCONTINUED | OUTPATIENT
Start: 2024-09-30 | End: 2024-10-04 | Stop reason: HOSPADM

## 2024-09-30 RX ORDER — DIVALPROEX SODIUM 500 MG/1
1000 TABLET, DELAYED RELEASE ORAL NIGHTLY
Status: DISCONTINUED | OUTPATIENT
Start: 2024-09-30 | End: 2024-10-04 | Stop reason: HOSPADM

## 2024-09-30 RX ORDER — ATORVASTATIN CALCIUM 40 MG/1
80 TABLET, FILM COATED ORAL NIGHTLY
Status: DISCONTINUED | OUTPATIENT
Start: 2024-09-30 | End: 2024-10-04 | Stop reason: HOSPADM

## 2024-09-30 RX ORDER — SODIUM CHLORIDE 9 MG/ML
INJECTION, SOLUTION INTRAVENOUS PRN
Status: DISCONTINUED | OUTPATIENT
Start: 2024-09-30 | End: 2024-10-04 | Stop reason: HOSPADM

## 2024-09-30 RX ADMIN — GABAPENTIN 200 MG: 100 CAPSULE ORAL at 08:05

## 2024-09-30 RX ADMIN — CARBIDOPA AND LEVODOPA 1 TABLET: 25; 100 TABLET, EXTENDED RELEASE ORAL at 08:05

## 2024-09-30 RX ADMIN — METOPROLOL SUCCINATE 50 MG: 50 TABLET, EXTENDED RELEASE ORAL at 20:08

## 2024-09-30 RX ADMIN — ATORVASTATIN CALCIUM 80 MG: 40 TABLET, FILM COATED ORAL at 03:40

## 2024-09-30 RX ADMIN — DIVALPROEX SODIUM 1000 MG: 500 TABLET, DELAYED RELEASE ORAL at 20:09

## 2024-09-30 RX ADMIN — QUETIAPINE FUMARATE 100 MG: 100 TABLET ORAL at 20:09

## 2024-09-30 RX ADMIN — QUETIAPINE FUMARATE 100 MG: 100 TABLET ORAL at 03:40

## 2024-09-30 RX ADMIN — INSULIN LISPRO 4 UNITS: 100 INJECTION, SOLUTION INTRAVENOUS; SUBCUTANEOUS at 20:08

## 2024-09-30 RX ADMIN — CARBIDOPA AND LEVODOPA 1 TABLET: 25; 100 TABLET, EXTENDED RELEASE ORAL at 03:40

## 2024-09-30 RX ADMIN — DIVALPROEX SODIUM 500 MG: 500 TABLET, DELAYED RELEASE ORAL at 08:14

## 2024-09-30 RX ADMIN — CARBIDOPA AND LEVODOPA 1 TABLET: 25; 100 TABLET ORAL at 08:06

## 2024-09-30 RX ADMIN — CARBIDOPA AND LEVODOPA 1 TABLET: 25; 100 TABLET ORAL at 17:18

## 2024-09-30 RX ADMIN — CARBIDOPA AND LEVODOPA 1 TABLET: 25; 100 TABLET ORAL at 12:07

## 2024-09-30 RX ADMIN — FAMOTIDINE 20 MG: 20 TABLET ORAL at 03:39

## 2024-09-30 RX ADMIN — TAMSULOSIN HYDROCHLORIDE 0.4 MG: 0.4 CAPSULE ORAL at 08:14

## 2024-09-30 RX ADMIN — INSULIN LISPRO 2 UNITS: 100 INJECTION, SOLUTION INTRAVENOUS; SUBCUTANEOUS at 17:18

## 2024-09-30 RX ADMIN — METOPROLOL SUCCINATE 50 MG: 50 TABLET, EXTENDED RELEASE ORAL at 08:14

## 2024-09-30 RX ADMIN — GABAPENTIN 200 MG: 100 CAPSULE ORAL at 13:40

## 2024-09-30 RX ADMIN — INSULIN LISPRO 1 UNITS: 100 INJECTION, SOLUTION INTRAVENOUS; SUBCUTANEOUS at 08:06

## 2024-09-30 RX ADMIN — INSULIN LISPRO 15 UNITS: 100 INJECTION, SOLUTION INTRAVENOUS; SUBCUTANEOUS at 08:06

## 2024-09-30 RX ADMIN — ASPIRIN 81 MG: 81 TABLET, CHEWABLE ORAL at 08:06

## 2024-09-30 RX ADMIN — EMPAGLIFLOZIN 25 MG: 10 TABLET, FILM COATED ORAL at 08:14

## 2024-09-30 RX ADMIN — CARBIDOPA AND LEVODOPA 1 TABLET: 25; 100 TABLET, EXTENDED RELEASE ORAL at 20:11

## 2024-09-30 RX ADMIN — INSULIN LISPRO 30 UNITS: 100 INJECTION, SOLUTION INTRAVENOUS; SUBCUTANEOUS at 23:38

## 2024-09-30 RX ADMIN — ATORVASTATIN CALCIUM 80 MG: 40 TABLET, FILM COATED ORAL at 20:09

## 2024-09-30 RX ADMIN — DULOXETINE HYDROCHLORIDE 60 MG: 60 CAPSULE, DELAYED RELEASE ORAL at 08:06

## 2024-09-30 RX ADMIN — Medication 10 ML: at 20:10

## 2024-09-30 RX ADMIN — LISINOPRIL 5 MG: 5 TABLET ORAL at 08:14

## 2024-09-30 RX ADMIN — FAMOTIDINE 20 MG: 20 TABLET ORAL at 08:06

## 2024-09-30 RX ADMIN — GABAPENTIN 200 MG: 100 CAPSULE ORAL at 03:39

## 2024-09-30 RX ADMIN — GABAPENTIN 200 MG: 100 CAPSULE ORAL at 20:09

## 2024-09-30 RX ADMIN — Medication 10 ML: at 08:07

## 2024-09-30 RX ADMIN — FAMOTIDINE 20 MG: 20 TABLET ORAL at 20:10

## 2024-09-30 RX ADMIN — CARBIDOPA AND LEVODOPA 1 TABLET: 25; 100 TABLET ORAL at 20:08

## 2024-09-30 ASSESSMENT — PAIN DESCRIPTION - PAIN TYPE
TYPE: CHRONIC PAIN
TYPE: CHRONIC PAIN

## 2024-09-30 ASSESSMENT — PAIN DESCRIPTION - DESCRIPTORS
DESCRIPTORS: ACHING
DESCRIPTORS: ACHING

## 2024-09-30 ASSESSMENT — PAIN SCALES - GENERAL
PAINLEVEL_OUTOF10: 6
PAINLEVEL_OUTOF10: 8
PAINLEVEL_OUTOF10: 8

## 2024-09-30 ASSESSMENT — PAIN DESCRIPTION - ORIENTATION
ORIENTATION: LOWER
ORIENTATION: LOWER

## 2024-09-30 ASSESSMENT — PAIN DESCRIPTION - LOCATION
LOCATION: BACK
LOCATION: BACK

## 2024-09-30 ASSESSMENT — PAIN - FUNCTIONAL ASSESSMENT: PAIN_FUNCTIONAL_ASSESSMENT: 0-10

## 2024-09-30 NOTE — PLAN OF CARE
Problem: Safety - Adult  Goal: Free from fall injury  9/30/2024 1427 by Laila Funes RN  Outcome: Progressing  9/30/2024 1252 by Nafisa Claire RN  Outcome: Progressing  9/30/2024 0912 by Edna Mccullough RN  Outcome: Progressing  9/30/2024 0647 by Elliot Garcia RN  Outcome: Progressing     Problem: Pain  Goal: Verbalizes/displays adequate comfort level or baseline comfort level  9/30/2024 1427 by Laila Funes RN  Outcome: Progressing  9/30/2024 1252 by Nafisa Claire RN  Outcome: Progressing  Flowsheets (Taken 9/30/2024 1146)  Verbalizes/displays adequate comfort level or baseline comfort level:   Encourage patient to monitor pain and request assistance   Assess pain using appropriate pain scale   Administer analgesics based on type and severity of pain and evaluate response   Implement non-pharmacological measures as appropriate and evaluate response  9/30/2024 0912 by Edna Mccullough RN  Outcome: Progressing  9/30/2024 0647 by Elliot Garcia RN  Outcome: Progressing     Problem: Discharge Planning  Goal: Discharge to home or other facility with appropriate resources  Outcome: Progressing     Problem: Chronic Conditions and Co-morbidities  Goal: Patient's chronic conditions and co-morbidity symptoms are monitored and maintained or improved  Outcome: Progressing

## 2024-09-30 NOTE — FLOWSHEET NOTE
09/30/24 1952   Vital Signs   Temp 97.9 °F (36.6 °C)   Temp Source Oral   Pulse 55   Respirations 16   /68   MAP (Calculated) 84   MAP (mmHg) 83   BP Location Left upper arm   BP Method Automatic   Patient Position Semi fowlers   Care Plan - Pain Goals   Verbalizes/displays adequate comfort level or baseline comfort level Encourage patient to monitor pain and request assistance   Opioid-Induced Sedation   POSS Score 1   Oxygen Therapy   SpO2 95 %   Pulse Oximetry Type Intermittent   O2 Device None (Room air)   O2 Flow Rate (L/min) 0 L/min     Shift assessment complete, see flow sheet, schedule medications given, see MAR. IV infusing without difficulty at this time. Pt VSS.  Bed in lowest position, bed alarm activated for pt safety,Call light and bed side table within reach, pt educated on use of call light if needing assist., pt verbalized understanding, denies further questions at this time. Denies any needs at this time, continue to monitor.  Bedside Mobility Assessment Tool (BMAT):     Assessment Level 1- Sit and Shake    1. From a semi-reclined position, ask patient to sit up and rotate to a seated position at the side of the bed. Can use the bedrail.    2. Ask patient to reach out and grab your hand and shake making sure patient reaches across his/her midline.   Pass- Patient is able to come to a seated position, maintain core strength. Maintains seated balance while reaching across midline. Move on to Assessment Level 2.     Assessment Level 2- Stretch and Point   1. With patient in seated position at the side of the bed, have patient place both feet on the floor (or stool) with knees no higher than hips.    2. Ask patient to stretch one leg and straighten the knee, then bend the ankle/flex and point the toes. If appropriate, repeat with the other leg.   Pass- Patient is able to demonstrate appropriate quad strength on intended weight bearing limb(s). Move onto Assessment Level 3.     Assessment Level

## 2024-09-30 NOTE — ED NOTES
Called and left a voicemail for this patient on the [facility] case management line.

## 2024-09-30 NOTE — PROGRESS NOTES
Bedside Mobility Assessment Tool (BMAT):     Assessment Level 1- Sit and Shake    1. From a semi-reclined position, ask patient to sit up and rotate to a seated position at the side of the bed. Can use the bedrail.    2. Ask patient to reach out and grab your hand and shake making sure patient reaches across his/her midline.   Pass- Patient is able to come to a seated position, maintain core strength. Maintains seated balance while reaching across midline. Move on to Assessment Level 2.     Assessment Level 2- Stretch and Point   1. With patient in seated position at the side of the bed, have patient place both feet on the floor (or stool) with knees no higher than hips.    2. Ask patient to stretch one leg and straighten the knee, then bend the ankle/flex and point the toes. If appropriate, repeat with the other leg.   Pass- Patient is able to demonstrate appropriate quad strength on intended weight bearing limb(s). Move onto Assessment Level 3.     Assessment Level 3- Stand   1. Ask patient to elevate off the bed or chair (seated to standing) using an assistive device (cane, bedrail).    2. Patient should be able to raise buttocks off be and hold for a count of five. May repeat once.   Fail- Patient unable to demonstrate standing stability. Patient is MOBILITY LEVEL 3.     Assessment Level 4- Walk   1. Ask patient to march in place at bedside.    2. Then ask patient to advance step and return each foot. Some medical conditions may render a patient from stepping backwards, use your best clinical judgement.   Fail- Patient not able to complete tasks OR requires use of assistive device. Patient is MOBILITY LEVEL 3.       Mobility Level- 2

## 2024-09-30 NOTE — ACP (ADVANCE CARE PLANNING)
Advance Care Planning     General Advance Care Planning (ACP) Conversation    Date of Conversation: 9/30/2024  Conducted with: Patient with Decision Making Capacity  Other persons present: None    Healthcare Decision Maker:   Primary Decision Maker: Caryn Cantu - Brother/Sister - [phone]    Secondary Decision Maker: Miguelito Quiroga - Brother/Sister - [phone]       Content/Action Overview:  DECLINED ACP Conversation - will revisit periodically  Reviewed DNR/DNI and patient elects Full Code (Attempt Resuscitation)        Length of Voluntary ACP Conversation in minutes:  <16 minutes (Non-Billable)    Licha Vazquez RN

## 2024-09-30 NOTE — PROGRESS NOTES
Patient transferred to [facility] from ED room 18. Report given to MAX Ulloa. VSS at the time of transfer.

## 2024-09-30 NOTE — PROGRESS NOTES
4 Eyes Skin Assessment     NAME:  Chano Quiroga Jr.  YOB: 1960  MEDICAL RECORD NUMBER:  [id number]    The patient is being assessed for  Admission    I agree that at least one RN has performed a thorough Head to Toe Skin Assessment on the patient. ALL assessment sites listed below have been assessed.      Areas assessed by both nurses:    Head, Face, Ears, Shoulders, Back, Chest, Arms, Elbows, Hands, Sacrum. Buttock, Coccyx, Ischium, and Legs. Feet and Heels        Patient with scattered bruising and abrasions      Does the Patient have a Wound? No noted wound(s)       Omega Prevention initiated by RN: No  Wound Care Orders initiated by RN: No    Pressure Injury (Stage 3,4, Unstageable, DTI, NWPT, and Complex wounds) if present, place Wound referral order by RN under : No    New Ostomies, if present place, Ostomy referral order under : No     Nurse 1 eSignature: Electronically signed by Laila Funes RN on 9/30/24 at 2:30 PM EDT    **SHARE this note so that the co-signing nurse can place an eSignature**    Nurse 2 eSignature: Electronically signed by Joi Burgess RN on 9/30/24 at 2:35 PM EDT

## 2024-09-30 NOTE — ED PROVIDER NOTES
[facility] ED  EMERGENCY DEPARTMENT ENCOUNTER        Patient Name: Chano Quiroga Jr.  MRN: [id number]  Birthdate 1960  Date of evaluation: 9/29/2024  Provider: Ismael Bajwa MD  PCP: Amalia Phillip APRN - NP  Note Started: 9:56 PM EDT 9/29/24    CHIEF COMPLAINT       Fall (EMT states called 3 times in last 2 hours. The first two visits pt had fallen. Pt complaining of lower back pain. )      HISTORY OF PRESENT ILLNESS: 1 or more Elements     History from : Patient    Limitations to history : None    Chano Quiroga Jr. is a 63 y.o. male who presents for evaluation of fall.  EMS states that they had been called 3 times the past 3 hours for recurrent falls and lift assist.  Patient denies hitting his head with the falls.  He is not on anticoagulation.  He states he has had some pain with his right lower back with a fall.  He denies any chest pain or abdominal pain.  He states that he had 90+ falls over the summer.  He is requesting possible placement in a nursing facility.    Nursing Notes were all reviewed and agreed with or any disagreements were addressed in the HPI.    REVIEW OF SYSTEMS :      Review of Systems    Positives and Pertinent negatives as per HPI.     SURGICAL HISTORY     Past Surgical History:   Procedure Laterality Date    CARDIAC PACEMAKER PLACEMENT  2011    NOT MRI COMPATIABLE OLD LEADS [facility]. pace maker difib    CARDIOVASCULAR SURGERY      Watchman placement    CARPAL TUNNEL RELEASE Bilateral 2013    CATARACT REMOVAL WITH IMPLANT Left 02/28/2014    COLONOSCOPY      CORONARY ANGIOPLASTY WITH STENT PLACEMENT  2001,2008    CYST REMOVAL  1982    coccyx area    DIAGNOSTIC CARDIAC CATH LAB PROCEDURE      ENDOSCOPY, COLON, DIAGNOSTIC      ERCP  09/15/2013    ERCP  10/11/2013    WITH STENT REMOVAL    EYE SURGERY      FOOT SURGERY Right 07/09/2018     REPAIR CALCANEAL SPUR, REPAIR OF ACHILLES TENDON RIGHT FOOT    NERVE SURGERY Bilateral 12/01/2020    BILATERAL LUMBAR  display          Is this patient to be included in the SEP-1 Core Measure due to severe sepsis or septic shock?   No   Exclusion criteria - the patient is NOT to be included for SEP-1 Core Measure due to:  Infection is not suspected    CC/HPI Summary, DDx, ED Course, and Reassessment:     63-year-old male present for evaluation of recurrent falls.  He has history of parkinsonism likely contributing to the falls.  He has had 3 falls today.  He is requesting placement into a nursing facility.  He has no overt traumatic injuries he has reproducible right lumbar paraspinal muscle tenderness to palpation.  No focal neurologic deficits.  Will obtain laboratory evaluation, CT head, C-spine and CT lumbar spine for rule out of traumatic injury.    The differential diagnosis associated with the patient's presentation includes, but is not limited to: Lumbar fracture, intracranial bleed, electrolyte abnormality, deconditioning, muscle weakness,    CONSULTS: (Who and What was discussed)  None    Discussion with Other Professionals : Admitting Team      Management of the patient was discussed with the admitting hospitalist.  CT head, C-spine without acute traumatic abnormality.  CT lumbar spine without acute traumatic lumbar spine fracture however does show subacute rib fracture, L1 and L2 transverse process fracture.  Because of his recurrent falls, patient will be admitted for placement.  Laboratory evaluation is overall unremarkable, mild hyponatremia to 129.    Social Determinants : None    Patient's care impacted by chronic condition(s):   Past Medical History:   Diagnosis Date    Arthritis     Asthma     CAD (coronary artery disease)     CHF (congestive heart failure) (HCC)     Chronic obstructive pulmonary disease (HCC)     COPD (chronic obstructive pulmonary disease) (HCC)     Fatty liver     GERD (gastroesophageal reflux disease)     Hyperlipidemia     Hypertension     Medical history reviewed with no changes     MI, old

## 2024-09-30 NOTE — FLOWSHEET NOTE
09/30/24 1415   Vital Signs   Temp 97.6 °F (36.4 °C)   Temp Source Oral   Pulse 58   Heart Rate Source Monitor   Respirations 18   BP 92/60   MAP (Calculated) 71   BP Location Left upper arm   BP Method Automatic   Patient Position High fowlers   Pain Assessment   Pain Assessment None - Denies Pain   Opioid-Induced Sedation   POSS Score 1   RASS   Sandy Agitation Sedation Scale (RASS) 0   Oxygen Therapy   SpO2 93 %   O2 Device None (Room air)     Alert and oriented. Skin w/d. Resp ee unlabored. Orders released. Arrived by transport from ER. No ss distress noted. Call light in easy reach. Bed alarm on.

## 2024-09-30 NOTE — PROGRESS NOTES
4 Eyes Skin Assessment     NAME:  Chano Quiroga Jr.  YOB: 1960  MEDICAL RECORD NUMBER:  [id number]    The patient is being assessed for  Admission    I agree that at least one RN has performed a thorough Head to Toe Skin Assessment on the patient. ALL assessment sites listed below have been assessed.      Areas assessed by both nurses:    Head, Face, Ears, Shoulders, Back, Chest, Arms, Elbows, Hands, Sacrum. Buttock, Coccyx, Ischium, Legs. Feet and Heels, and Under Medical Devices         Does the Patient have a Wound? No noted wound(s)  Scattered scratches        Omega Prevention initiated by RN: Yes  Wound Care Orders initiated by RN: No    Pressure Injury (Stage 3,4, Unstageable, DTI, NWPT, and Complex wounds) if present, place Wound referral order by RN under : No    New Ostomies, if present place, Ostomy referral order under : No     Nurse 1 eSignature: Electronically signed by Elliot Garcia RN on 9/30/24 at 6:58 AM EDT    **SHARE this note so that the co-signing nurse can place an eSignature**    Nurse 2 eSignature: {Esignature:[id number]}

## 2024-09-30 NOTE — PROGRESS NOTES
Progress Note    Admit Date:  9/29/2024    Subjective:  Mr. Quiroga laying in bed, watching TV. Fell 2-3 times yesterday. Typically falls every couple of days. Legs feel more weak on days he falls. Can't get himself up when he falls, has to call ambulance for lift assist. Would like placement.    Objective:   Patient Vitals for the past 4 hrs:   BP Temp Temp src Pulse Resp SpO2   09/30/24 0507 111/61 98.6 °F (37 °C) Oral 78 16 96 %        No intake or output data in the 24 hours ending 09/30/24 0716    Physical Exam:   Gen: No distress. Alert.   Eyes: PERRL. No sclera icterus. No conjunctival injection.   ENT: No discharge. Pharynx clear.   Neck: No JVD.  No Carotid Bruit. Trachea midline.  Resp: No accessory muscle use. No crackles. No wheezes. No rhonchi.   CV: Regular rate. Regular rhythm. No murmur.  No rub. No edema.   Capillary Refill: Brisk,< 3 seconds   Peripheral Pulses: +2 palpable, equal bilaterally   GI: Non-tender. Non-distended. No masses. No organomegaly. Normal bowel sounds. No hernia.   Skin: Warm and dry. No nodule on exposed extremities. No rash on exposed extremities.   M/S: No cyanosis. No joint deformity. No clubbing.   Neuro: Awake. Grossly nonfocal    Psych: Oriented x 3. No anxiety or agitation.         Medications:  sodium chloride flush, 5-40 mL, 2 times per day  [Held by provider] enoxaparin, 40 mg, Daily  aspirin, 81 mg, Daily  atorvastatin, 80 mg, Nightly  carbidopa-levodopa, 1 tablet, BID  carbidopa-levodopa, 1 tablet, 4x Daily  DULoxetine, 60 mg, Daily  famotidine, 20 mg, BID  gabapentin, 200 mg, TID  insulin lispro, 30 Units, Nightly  insulin lispro, 15 Units, Daily with breakfast  QUEtiapine, 100 mg, Nightly  insulin lispro, 0-4 Units, TID WC  insulin lispro, 0-4 Units, Nightly      PRN Medications:  sodium chloride flush, 5-40 mL, PRN  sodium chloride, , PRN  potassium chloride, 40 mEq, PRN   Or  potassium alternative oral replacement, 40 mEq, PRN   Or  potassium chloride, 10

## 2024-09-30 NOTE — PLAN OF CARE
Problem: Safety - Adult  Goal: Free from fall injury  9/30/2024 1252 by Nafisa Claire RN  Outcome: Progressing  9/30/2024 0912 by Edna Mccullough RN  Outcome: Progressing  9/30/2024 0647 by Elliot Garcia RN  Outcome: Progressing     Problem: Pain  Goal: Verbalizes/displays adequate comfort level or baseline comfort level  9/30/2024 1252 by Nafisa Claire RN  Outcome: Progressing  9/30/2024 0912 by Edna Mccullough RN  Outcome: Progressing  9/30/2024 0647 by Elliot Garcia RN  Outcome: Progressing

## 2024-09-30 NOTE — CARE COORDINATION
with any other family members/significant others, and if so, who? No  Plans to Return to Present Housing: Unknown at present  Other Identified Issues/Barriers to RETURNING to current housing: na    Potential Assistance needed at discharge: N/A            Potential DME:    Patient expects to discharge to: Skilled nursing facility  Plan for transportation at discharge:      Financial    Payor: [employer] / Plan: [employer] MEDICARE ADVANTAGE HMO / Product Type: Medicare /     Does insurance require precert for SNF: Yes    Potential assistance Purchasing Medications: No  Meds-to-Beds request: No      [facility] [place] - [address] [place] [address] Rd - P [phone] - F [phone]  [address] [place] [address]  [place] [address]  Phone: [phone] Fax: [phone]    [facility] - [place] - [address] [place] [address] [address] - P [phone] - F [phone]  [address] [place] [address] [address]  [place] [address]  Phone: [phone] Fax: [phone]      Notes:    Factors facilitating achievement of predicted outcomes: Family support, Cooperative, and Pleasant    Barriers to discharge: hyponatremia    Additional Case Management Notes: Reviewed chart. Met with pt at bedside. Role of cm explained. Lives alone in apt. Has HHA through [employer]. MOW. Pt has outpatient physical therapy appt. On 10/18. Pt has had multiple falls the past 1-2 months. Pt agreeable to SNF. Requested [facility] then [facility]. Referral called and made to Suzy ALEMAN. Awaiting decision.      The Plan for Transition of Care is related to the following treatment goals of Hyponatremia [E87.1]  Recurrent falls [R29.6]  Falls, initial encounter [W19.XXXA]  Parkinsonism, unspecified Parkinsonism type (HCC) [G20.C]    IF APPLICABLE: The Patient and/or patient representative Chano and his family were provided with a choice of provider and agrees with the discharge plan. Freedom of choice list with basic dialogue that supports the patient's  individualized plan of care/goals and shares the quality data associated with the providers was provided to:     Patient Representative Name:       The Patient and/or Patient Representative Agree with the Discharge Plan?      Licha Vazquez RN  Case Management Department  Ph: [phone] Fax: [phone]

## 2024-09-30 NOTE — PLAN OF CARE
Pt remains fall free at this time; comfortable in bed; c/o chronic pain but does not require medications at this time

## 2024-09-30 NOTE — PLAN OF CARE
Problem: Safety - Adult  Goal: Free from fall injury  9/30/2024 1919 by Laila Funes RN  Outcome: Progressing  9/30/2024 1427 by Laila Funes RN  Outcome: Progressing  9/30/2024 1252 by Nafisa Claire RN  Outcome: Progressing  9/30/2024 0912 by Edna Mccullough RN  Outcome: Progressing  9/30/2024 0647 by Elliot Garcia RN  Outcome: Progressing     Problem: Pain  Goal: Verbalizes/displays adequate comfort level or baseline comfort level  9/30/2024 1919 by Laila Funes RN  Outcome: Progressing  9/30/2024 1427 by Laila Funes RN  Outcome: Progressing  9/30/2024 1252 by Nafisa Claire RN  Outcome: Progressing  Flowsheets (Taken 9/30/2024 1146)  Verbalizes/displays adequate comfort level or baseline comfort level:   Encourage patient to monitor pain and request assistance   Assess pain using appropriate pain scale   Administer analgesics based on type and severity of pain and evaluate response   Implement non-pharmacological measures as appropriate and evaluate response  9/30/2024 0912 by Edna Mccullough RN  Outcome: Progressing  9/30/2024 0647 by Elliot Garcia RN  Outcome: Progressing     Problem: Discharge Planning  Goal: Discharge to home or other facility with appropriate resources  9/30/2024 1919 by Laila Funes RN  Outcome: Progressing  9/30/2024 1427 by Laila Funes RN  Outcome: Progressing     Problem: Chronic Conditions and Co-morbidities  Goal: Patient's chronic conditions and co-morbidity symptoms are monitored and maintained or improved  9/30/2024 1919 by Laila Funes RN  Outcome: Progressing  9/30/2024 1427 by Laila Funes RN  Outcome: Progressing     Problem: Respiratory - Adult  Goal: Achieves optimal ventilation and oxygenation  Outcome: Progressing     Problem: Cardiovascular - Adult  Goal: Maintains optimal cardiac output and hemodynamic stability  Outcome: Progressing  Goal: Absence of cardiac dysrhythmias or at baseline  Outcome: Progressing

## 2024-09-30 NOTE — H&P
MD Giovanny   potassium chloride (KLOR-CON M) 20 MEQ extended release tablet TAKE ONE TABLET BY MOUTH WITH LASIX ON MONDAY AND THURSDAY 5/2/24   Helio Zhou APRN - CNP   TRULICITY 3 MG/0.5ML SOPN INJECT 3MG SUBCUTANEOUSLY ONCE A WEEK 3/19/24   Ismael Espinoza APRN - CNP   Insulin Glargine (BASAGLAR KWIKPEN SC) Inject 50 Units into the skin nightly  Patient not taking: Reported on 9/3/2024    Provider, MD Giovanny   Insulin Aspart (NOVOLOG SC) Inject 15 units subcutaneously every morning and 30 units every evening with meals.    ProviderGiovanny MD   divalproex (DEPAKOTE) 500 MG DR tablet TAKE ONE TABLET BY MOUTH EVERY MORNING AND TWO (2) TABLETS BY MOUTH AT BEDTIME 12/8/23   Ismael Espinoza APRN - CNP   furosemide (LASIX) 20 MG tablet TAKE ONE TABLET BY MOUTH MONDAY AND Thursday, take one extra tablet for weight gain of 3-4 pounds 11/10/23   Theo Hilario MD   famotidine (PEPCID) 20 MG tablet TAKE ONE TABLET BY MOUTH TWO (2) TIMES A DAY 9/12/23   Ismael Espinoza APRN - CNP   metFORMIN (GLUCOPHAGE) 1000 MG tablet TAKE ONE TABLET BY MOUTH TWO (2) TIMES DAILY WITH MORNING AND EVENING MEALS 9/12/23   Ismael Espinoza APRN - CNP   JARDIANCE 25 MG tablet TAKE ONE TABLET BY MOUTH EVERY MORNING 9/12/23   Ismael Espinoza APRN - CNP   tamsulosin (FLOMAX) 0.4 MG capsule TAKE ONE (1) CAPSULE BY ORAL ROUTE EVERY DAY ONE HALF (1/2) HOUR FOLLOWING THE SAME MEAL EACH DAY 9/12/23   Ismael Espinoza APRN - CNP   fluticasone (FLONASE) 50 MCG/ACT nasal spray spray 2 spray by intranasal route every day in each nostril 6/23/23   Ismael Espinoza APRN - CNP   albuterol sulfate HFA (PROVENTIL;VENTOLIN;PROAIR) 108 (90 Base) MCG/ACT inhaler INHALE 2 PUFFS EVERY 4 HOURS AS NEEDED 5/12/23   Ismael Espinoza APRN - CNP   aspirin 81 MG chewable tablet Take 1 tablet by mouth daily 4/18/23   Nancy Villalobos, APRN - CNP   hydrOXYzine HCl (ATARAX) 25 MG tablet take 1 tablet by oral route 4 times  every day as needed for itching 3/29/23   Ismael Espinoza, APRN - CNP   DULoxetine (CYMBALTA) 60 MG extended release capsule TAKE ONE CAPSULE BY MOUTH EVERY DAY 3/3/23   Ismael Espinoza, APRN - CNP       Labs: Personally reviewed and interpreted for clinical significance.   Recent Labs     09/29/24 2138   WBC 4.8   HGB 14.3   HCT 41.5   PLT 94*     Recent Labs     09/29/24 2139   *   K 4.6   CL 92*   CO2 27   BUN 8   CREATININE 0.7*   CALCIUM 9.6   MG 1.80     Recent Labs     09/29/24 2139   TROPHS 21     No results for input(s): \"LABA1C\" in the last 72 hours.  Recent Labs     09/29/24 2139   AST 16   ALT 9*   BILITOT 0.4   ALKPHOS 105     No results for input(s): \"INR\", \"LACTA\", \"TSH\" in the last 72 hours.     Thuan Cao, DO

## 2024-10-01 PROBLEM — G20.C PARKINSONISM (HCC): Status: ACTIVE | Noted: 2023-05-17

## 2024-10-01 LAB
ALBUMIN SERPL-MCNC: 3.9 G/DL (ref 3.4–5)
ALBUMIN/GLOB SERPL: 2 {RATIO} (ref 1.1–2.2)
ALP SERPL-CCNC: 75 U/L (ref 40–129)
ALT SERPL-CCNC: 6 U/L (ref 10–40)
ANION GAP SERPL CALCULATED.3IONS-SCNC: 10 MMOL/L (ref 3–16)
AST SERPL-CCNC: 17 U/L (ref 15–37)
BASOPHILS # BLD: 0 K/UL (ref 0–0.2)
BASOPHILS NFR BLD: 0.6 %
BILIRUB SERPL-MCNC: 0.4 MG/DL (ref 0–1)
BUN SERPL-MCNC: 11 MG/DL (ref 7–20)
CALCIUM SERPL-MCNC: 9.1 MG/DL (ref 8.3–10.6)
CHLORIDE SERPL-SCNC: 105 MMOL/L (ref 99–110)
CO2 SERPL-SCNC: 26 MMOL/L (ref 21–32)
CREAT SERPL-MCNC: 0.7 MG/DL (ref 0.8–1.3)
DEPRECATED RDW RBC AUTO: 14.9 % (ref 12.4–15.4)
EOSINOPHIL # BLD: 0 K/UL (ref 0–0.6)
EOSINOPHIL NFR BLD: 0.9 %
GFR SERPLBLD CREATININE-BSD FMLA CKD-EPI: >90 ML/MIN/{1.73_M2}
GLUCOSE BLD-MCNC: 168 MG/DL (ref 70–99)
GLUCOSE BLD-MCNC: 171 MG/DL (ref 70–99)
GLUCOSE BLD-MCNC: 174 MG/DL (ref 70–99)
GLUCOSE BLD-MCNC: 205 MG/DL (ref 70–99)
GLUCOSE BLD-MCNC: 222 MG/DL (ref 70–99)
GLUCOSE SERPL-MCNC: 121 MG/DL (ref 70–99)
HCT VFR BLD AUTO: 42.5 % (ref 40.5–52.5)
HGB BLD-MCNC: 14.7 G/DL (ref 13.5–17.5)
LYMPHOCYTES # BLD: 1.8 K/UL (ref 1–5.1)
LYMPHOCYTES NFR BLD: 39.2 %
MCH RBC QN AUTO: 30.4 PG (ref 26–34)
MCHC RBC AUTO-ENTMCNC: 34.6 G/DL (ref 31–36)
MCV RBC AUTO: 87.7 FL (ref 80–100)
MONOCYTES # BLD: 0.5 K/UL (ref 0–1.3)
MONOCYTES NFR BLD: 10.6 %
NEUTROPHILS # BLD: 2.2 K/UL (ref 1.7–7.7)
NEUTROPHILS NFR BLD: 48.7 %
PERFORMED ON: ABNORMAL
PLATELET # BLD AUTO: 96 K/UL (ref 135–450)
PMV BLD AUTO: 7.4 FL (ref 5–10.5)
POTASSIUM SERPL-SCNC: 3.9 MMOL/L (ref 3.5–5.1)
PROT SERPL-MCNC: 5.9 G/DL (ref 6.4–8.2)
RBC # BLD AUTO: 4.85 M/UL (ref 4.2–5.9)
SODIUM SERPL-SCNC: 141 MMOL/L (ref 136–145)
WBC # BLD AUTO: 4.5 K/UL (ref 4–11)

## 2024-10-01 PROCEDURE — 36415 COLL VENOUS BLD VENIPUNCTURE: CPT

## 2024-10-01 PROCEDURE — 97535 SELF CARE MNGMENT TRAINING: CPT

## 2024-10-01 PROCEDURE — 97166 OT EVAL MOD COMPLEX 45 MIN: CPT

## 2024-10-01 PROCEDURE — 1200000000 HC SEMI PRIVATE

## 2024-10-01 PROCEDURE — 80053 COMPREHEN METABOLIC PANEL: CPT

## 2024-10-01 PROCEDURE — 97530 THERAPEUTIC ACTIVITIES: CPT

## 2024-10-01 PROCEDURE — 97116 GAIT TRAINING THERAPY: CPT

## 2024-10-01 PROCEDURE — 6370000000 HC RX 637 (ALT 250 FOR IP)

## 2024-10-01 PROCEDURE — 6370000000 HC RX 637 (ALT 250 FOR IP): Performed by: STUDENT IN AN ORGANIZED HEALTH CARE EDUCATION/TRAINING PROGRAM

## 2024-10-01 PROCEDURE — 99232 SBSQ HOSP IP/OBS MODERATE 35: CPT | Performed by: INTERNAL MEDICINE

## 2024-10-01 PROCEDURE — 2580000003 HC RX 258: Performed by: STUDENT IN AN ORGANIZED HEALTH CARE EDUCATION/TRAINING PROGRAM

## 2024-10-01 PROCEDURE — 85025 COMPLETE CBC W/AUTO DIFF WBC: CPT

## 2024-10-01 PROCEDURE — 97162 PT EVAL MOD COMPLEX 30 MIN: CPT

## 2024-10-01 RX ADMIN — Medication 10 ML: at 20:37

## 2024-10-01 RX ADMIN — CARBIDOPA AND LEVODOPA 1 TABLET: 25; 100 TABLET ORAL at 12:39

## 2024-10-01 RX ADMIN — DIVALPROEX SODIUM 500 MG: 500 TABLET, DELAYED RELEASE ORAL at 09:15

## 2024-10-01 RX ADMIN — METOPROLOL SUCCINATE 50 MG: 50 TABLET, EXTENDED RELEASE ORAL at 20:35

## 2024-10-01 RX ADMIN — CARBIDOPA AND LEVODOPA 1 TABLET: 25; 100 TABLET ORAL at 20:35

## 2024-10-01 RX ADMIN — EMPAGLIFLOZIN 25 MG: 10 TABLET, FILM COATED ORAL at 09:15

## 2024-10-01 RX ADMIN — CARBIDOPA AND LEVODOPA 1 TABLET: 25; 100 TABLET, EXTENDED RELEASE ORAL at 09:19

## 2024-10-01 RX ADMIN — GABAPENTIN 200 MG: 100 CAPSULE ORAL at 09:16

## 2024-10-01 RX ADMIN — INSULIN LISPRO 30 UNITS: 100 INJECTION, SOLUTION INTRAVENOUS; SUBCUTANEOUS at 20:36

## 2024-10-01 RX ADMIN — FAMOTIDINE 20 MG: 20 TABLET ORAL at 20:35

## 2024-10-01 RX ADMIN — ASPIRIN 81 MG: 81 TABLET, CHEWABLE ORAL at 09:16

## 2024-10-01 RX ADMIN — CARBIDOPA AND LEVODOPA 1 TABLET: 25; 100 TABLET ORAL at 09:15

## 2024-10-01 RX ADMIN — CARBIDOPA AND LEVODOPA 1 TABLET: 25; 100 TABLET, EXTENDED RELEASE ORAL at 20:35

## 2024-10-01 RX ADMIN — LISINOPRIL 5 MG: 5 TABLET ORAL at 09:16

## 2024-10-01 RX ADMIN — CARBIDOPA AND LEVODOPA 1 TABLET: 25; 100 TABLET ORAL at 17:15

## 2024-10-01 RX ADMIN — INSULIN LISPRO 15 UNITS: 100 INJECTION, SOLUTION INTRAVENOUS; SUBCUTANEOUS at 09:20

## 2024-10-01 RX ADMIN — Medication 10 ML: at 09:20

## 2024-10-01 RX ADMIN — ACETAMINOPHEN 650 MG: 325 TABLET ORAL at 09:25

## 2024-10-01 RX ADMIN — METOPROLOL SUCCINATE 50 MG: 50 TABLET, EXTENDED RELEASE ORAL at 09:16

## 2024-10-01 RX ADMIN — INSULIN LISPRO 1 UNITS: 100 INJECTION, SOLUTION INTRAVENOUS; SUBCUTANEOUS at 12:39

## 2024-10-01 RX ADMIN — QUETIAPINE FUMARATE 100 MG: 100 TABLET ORAL at 20:35

## 2024-10-01 RX ADMIN — FAMOTIDINE 20 MG: 20 TABLET ORAL at 09:16

## 2024-10-01 RX ADMIN — GABAPENTIN 200 MG: 100 CAPSULE ORAL at 20:35

## 2024-10-01 RX ADMIN — GABAPENTIN 200 MG: 100 CAPSULE ORAL at 14:20

## 2024-10-01 RX ADMIN — DIVALPROEX SODIUM 1000 MG: 500 TABLET, DELAYED RELEASE ORAL at 20:35

## 2024-10-01 RX ADMIN — DULOXETINE HYDROCHLORIDE 60 MG: 60 CAPSULE, DELAYED RELEASE ORAL at 09:16

## 2024-10-01 RX ADMIN — TAMSULOSIN HYDROCHLORIDE 0.4 MG: 0.4 CAPSULE ORAL at 09:16

## 2024-10-01 RX ADMIN — ATORVASTATIN CALCIUM 80 MG: 40 TABLET, FILM COATED ORAL at 20:35

## 2024-10-01 ASSESSMENT — PAIN SCALES - GENERAL
PAINLEVEL_OUTOF10: 0
PAINLEVEL_OUTOF10: 5
PAINLEVEL_OUTOF10: 1

## 2024-10-01 ASSESSMENT — PAIN DESCRIPTION - ONSET: ONSET: GRADUAL

## 2024-10-01 ASSESSMENT — PAIN DESCRIPTION - FREQUENCY: FREQUENCY: INTERMITTENT

## 2024-10-01 ASSESSMENT — PAIN DESCRIPTION - PAIN TYPE: TYPE: CHRONIC PAIN

## 2024-10-01 ASSESSMENT — PAIN DESCRIPTION - ORIENTATION: ORIENTATION: RIGHT

## 2024-10-01 ASSESSMENT — PAIN - FUNCTIONAL ASSESSMENT: PAIN_FUNCTIONAL_ASSESSMENT: ACTIVITIES ARE NOT PREVENTED

## 2024-10-01 ASSESSMENT — PAIN DESCRIPTION - LOCATION: LOCATION: NECK

## 2024-10-01 ASSESSMENT — PAIN DESCRIPTION - DESCRIPTORS: DESCRIPTORS: ACHING

## 2024-10-01 NOTE — PLAN OF CARE
Problem: Safety - Adult  Goal: Free from fall injury  Outcome: Progressing     Problem: Pain  Goal: Verbalizes/displays adequate comfort level or baseline comfort level  Outcome: Progressing     Problem: Discharge Planning  Goal: Discharge to home or other facility with appropriate resources  Outcome: Progressing     Problem: Chronic Conditions and Co-morbidities  Goal: Patient's chronic conditions and co-morbidity symptoms are monitored and maintained or improved  Outcome: Progressing     Problem: Respiratory - Adult  Goal: Achieves optimal ventilation and oxygenation  Outcome: Progressing     Problem: Cardiovascular - Adult  Goal: Maintains optimal cardiac output and hemodynamic stability  Outcome: Progressing  Goal: Absence of cardiac dysrhythmias or at baseline  Outcome: Progressing

## 2024-10-01 NOTE — PROGRESS NOTES
tolerated treatment well    Plan  Physical Therapy Plan  General Plan: 3-5 times per week  Current Treatment Recommendations: Strengthening, Balance training, Gait training, Functional mobility training, Transfer training, Endurance training, Equipment evaluation, education, & procurement, Patient/Caregiver education & training, Safety education & training, Therapeutic activities  Safety Devices  Type of Devices: All fall risk precautions in place, Left in chair, Nurse notified, Gait belt, Chair alarm in place, Call light within reach, Patient at risk for falls  Restraints  Restraints Initially in Place: No    Restrictions  Restrictions/Precautions  Restrictions/Precautions: Up as Tolerated, Fall Risk     Subjective  General  Chart Reviewed: Yes  Patient assessed for rehabilitation services?: Yes  Response To Previous Treatment: Not applicable  Family / Caregiver Present: No  Referring Practitioner: Thuan Cao DO  Referral Date : 09/30/24  Diagnosis: falls  Follows Commands: Within Functional Limits  General Comment  Comments: cleared by nursing  Subjective  Subjective: pt resting in bed. c/o neck pain 8/10 from \"sleeping funny\"         Social/Functional History  Social/Functional History  Lives With: Alone  Type of Home: Apartment  Home Layout: One level  Home Access: Level entry  Bathroom Shower/Tub: Walk-in shower  Bathroom Toilet: Handicap height  Bathroom Equipment: Shower chair, Grab bars in shower, Grab bars around toilet  Bathroom Accessibility: Wheelchair accessible  Home Equipment: Cane, Walker - Rolling, Rollator, Lift chair, Cane - Quad, Alert button  Has the patient had two or more falls in the past year or any fall with injury in the past year?: Yes  ADL Assistance: Independent  Homemaking Assistance: Needs assistance  Homemaking Responsibilities: No  Ambulation Assistance: Independent (no AD in the apartment, uses cane when going outside)  Transfer Assistance: Independent  Active : Yes (\"I'm  turning from your back to your side while in a flat bed without using bedrails?: A Little  How much help is needed moving from lying on your back to sitting on the side of a flat bed without using bedrails?: A Lot  How much help is needed moving to and from a bed to a chair?: A Little  How much help is needed standing up from a chair using your arms?: A Little  How much help is needed walking in hospital room?: A Lot  How much help is needed climbing 3-5 steps with a railing?: Total  AM-PAC Inpatient Mobility Raw Score : 14  AM-PAC Inpatient T-Scale Score : 38.1  Mobility Inpatient CMS 0-100% Score: 61.29  Mobility Inpatient CMS G-Code Modifier : CL     Goals  Short Term Goals  Time Frame for Short Term Goals: Pt will complete bed mobility from a flat bed with Mod I  Short Term Goal 1: Pt will STS with supervision  Short Term Goal 2: Pt will ambulate x 25' with RW with CGA  Short Term Goal 3: Pt will complete B LE exercises to improve balance by 10/3  Patient Goals   Patient Goals : to get to rehab       Education  Patient Education  Education Given To: Patient  Education Provided: Role of Therapy;Plan of Care;Transfer Training;Equipment  Education Method: Verbal  Barriers to Learning: None  Education Outcome: Verbalized understanding;Demonstrated understanding      Therapy Time   Individual Concurrent Group Co-treatment   Time In 0845         Time Out 0930         Minutes 45         Timed Code Treatment Minutes: 35 Minutes       Lisa Martin, PT

## 2024-10-01 NOTE — PROGRESS NOTES
Handoff report and transfer of care given at bedside to Laila RN.Patient in stable condition, denies needs/concerns at this time.  Call light within reach.

## 2024-10-01 NOTE — PLAN OF CARE
Patient provided a COPD Educational Folder that includes the following materials:     [x]  [employer] Booklet: Managing your COPD  [x]  ALA: Getting the Most Out of Medication Delivery Devices  [x]  ALA: My COPD Action Plan  [x]  [employer] [facility]: [facility]   [x]  Smoking Cessation Classes  [x]  Outpatient Spiritual Care Services  [x]  Magnet: Signs of COPD    PATIENT/CAREGIVER TEACHING:   Level of patient/caregiver understanding able to:   [x] Verbalize understanding   [] Demonstrate understanding       [] Teach back        [x] Needs reinforcement     []  Other:     Electronically signed by Cassie Mina RN on 10/1/2024 at 8:20 AM

## 2024-10-01 NOTE — FLOWSHEET NOTE
10/01/24 0858   Vital Signs   Temp 97.8 °F (36.6 °C)   Temp Source Oral   Pulse 77   Heart Rate Source Monitor   Respirations 18   /61   MAP (Calculated) 76   BP Location Left upper arm   BP Method Automatic   Patient Position High fowlers   Pain Assessment   Pain Assessment None - Denies Pain   Opioid-Induced Sedation   POSS Score 1   RASS   Sandy Agitation Sedation Scale (RASS) 0   Oxygen Therapy   SpO2 98 %   O2 Device None (Room air)     Alert and oriented. Skin w/d. Resp ee unlabored. Meds given. Nrsg asmt completed. See flow sheet and MAR. No s/s distress noted. Call light in easy reach.

## 2024-10-01 NOTE — PLAN OF CARE
SubCUTAneous Daily    aspirin  81 mg Oral Daily    atorvastatin  80 mg Oral Nightly    carbidopa-levodopa  1 tablet Oral BID    carbidopa-levodopa  1 tablet Oral 4x Daily    DULoxetine  60 mg Oral Daily    famotidine  20 mg Oral BID    gabapentin  200 mg Oral TID    insulin lispro  30 Units SubCUTAneous Nightly    insulin lispro  15 Units SubCUTAneous Daily with breakfast    QUEtiapine  100 mg Oral Nightly    insulin lispro  0-4 Units SubCUTAneous TID WC    insulin lispro  0-4 Units SubCUTAneous Nightly    lisinopril  5 mg Oral Daily    metoprolol succinate  50 mg Oral BID    divalproex  500 mg Oral Daily with breakfast    divalproex  1,000 mg Oral Nightly    [START ON 10/7/2024] furosemide  20 mg Oral Weekly - Monday    And    [START ON 10/3/2024] furosemide  20 mg Oral Weekly - Thursday    empagliflozin  25 mg Oral QAM    tamsulosin  0.4 mg Oral Daily     HOME MEDICATIONS:  Prior to Admission medications    Medication Sig Start Date End Date Taking? Authorizing Provider   metoprolol succinate (TOPROL XL) 50 MG extended release tablet TAKE ONE TABLET BY MOUTH TWICE DAILY 9/5/24  Yes Helio Zhou, APRN - CNP   OZEMPIC, 0.25 OR 0.5 MG/DOSE, 2 MG/3ML SOPN  8/29/24  Yes Giovanny Mosquera MD   carbidopa-levodopa (SINEMET CR)  MG per extended release tablet Take 1 tablet by mouth 2 times daily 8/1/24  Yes Taryn Coyne MD   carbidopa-levodopa (SINEMET)  MG per tablet Take 1 tablet by mouth 4 times daily At 8:00, 12:00, 16:00, 20:00 8/1/24  Yes Taryn Coyne MD   atorvastatin (LIPITOR) 80 MG tablet TAKE ONE TABLET BY MOUTH DAILY AT BEDTIME 7/19/24  Yes Theo Hilario MD   lisinopril (PRINIVIL;ZESTRIL) 5 MG tablet TAKE ONE (1) TABLET BY ORAL ROUTE EVERY DAY 7/19/24  Yes Theo Hilario MD   gabapentin (NEURONTIN) 100 MG capsule Take 2 capsules by mouth 3 times daily.   Yes ProviderGiovanny MD   potassium chloride (KLOR-CON M) 20 MEQ extended release tablet TAKE ONE  TABLET BY MOUTH WITH LASIX ON MONDAY AND THURSDAY 5/2/24  Yes Helio Zhou APRN - CNP   Insulin Aspart (NOVOLOG SC) Inject 15 units subcutaneously every morning and 30 units every evening with meals.   Yes Provider, MD Giovanny   divalproex (DEPAKOTE) 500 MG DR tablet TAKE ONE TABLET BY MOUTH EVERY MORNING AND TWO (2) TABLETS BY MOUTH AT BEDTIME 12/8/23  Yes Ismael Espinoza APRN - CNP   furosemide (LASIX) 20 MG tablet TAKE ONE TABLET BY MOUTH MONDAY AND Thursday, take one extra tablet for weight gain of 3-4 pounds 11/10/23  Yes Theo Hilario MD   famotidine (PEPCID) 20 MG tablet TAKE ONE TABLET BY MOUTH TWO (2) TIMES A DAY 9/12/23  Yes Ismael Espinoza APRN - CNP   metFORMIN (GLUCOPHAGE) 1000 MG tablet TAKE ONE TABLET BY MOUTH TWO (2) TIMES DAILY WITH MORNING AND EVENING MEALS 9/12/23  Yes Ismael Espinoza APRN - CNP   JARDIANCE 25 MG tablet TAKE ONE TABLET BY MOUTH EVERY MORNING 9/12/23  Yes Ismael Espinoza APRN - CNP   tamsulosin (FLOMAX) 0.4 MG capsule TAKE ONE (1) CAPSULE BY ORAL ROUTE EVERY DAY ONE HALF (1/2) HOUR FOLLOWING THE SAME MEAL EACH DAY 9/12/23  Yes Ismael Espinoza APRN - CNP   albuterol sulfate HFA (PROVENTIL;VENTOLIN;PROAIR) 108 (90 Base) MCG/ACT inhaler INHALE 2 PUFFS EVERY 4 HOURS AS NEEDED 5/12/23  Yes Ismael Espinoza APRN - CNP   aspirin 81 MG chewable tablet Take 1 tablet by mouth daily 4/18/23  Yes Nancy Villalobos APRN - CNP   DULoxetine (CYMBALTA) 60 MG extended release capsule TAKE ONE CAPSULE BY MOUTH EVERY DAY 3/3/23  Yes Ismael Espinoza APRN - CNP   TRULICITY 3 MG/0.5ML SOPN INJECT 3MG SUBCUTANEOUSLY ONCE A WEEK 3/19/24   Ismael Espinoza APRN - CNP   Insulin Glargine (BASAGLAR KWIKPEN SC) Inject 50 Units into the skin nightly  Patient not taking: Reported on 9/3/2024    ProviderGiovanny MD   fluticasone (FLONASE) 50 MCG/ACT nasal spray spray 2 spray by intranasal route every day in each nostril 6/23/23   Ismael Espinoza, APRN - CNP

## 2024-10-01 NOTE — PROGRESS NOTES
correct    ADLs:  Dressing:      UE:   Mod A to put on hospital gown  LE:    Max A to don socks    Bathing:    UE:  SBA to wipe front of chest and arms with bath wipe  LE:  Not Tested    Eating:   Independent beverage mgmt    Toileting:  Not Tested    Grooming/hygiene: Supervision oral hygiene while seated in recliner    Ther Ex / Activities Initiated:   N/A    Activity Tolerance:   Pt completed therapy session with pain    Positioning Needs:   Pt reclined in chair, alarm set, call light provided and all needs within reach .     Patient/Family Education:   Pt educated on role of inpatient OT, plan of care, importance of continued activity, DC recommendations, Functional transfer/mobility safety, and Calling for assist with mobility.    CHF Education  N/A    Assessment:  Pt seen for Occupational therapy evaluation in acute care setting.  Pt demonstrated decreased Activity tolerance, ADLs, Balance , Bathing, Bed mobility, Dressing, Safety Awareness, and Transfers. Pt requires assist with bed mobility, transfers, functional mobility and all ADLs in standing.  Pt required extended time to complete functional mobility from bed to chair with multiple stops made with pt unable to state a reason for them; questionable distractibility.  Pt functioning below baseline and will likely benefit from skilled occupational therapy services to maximize safety and independence.     Recommending SNF upon discharge as patient functioning well below baseline, demonstrates good rehab potential and unable to return home due to burden of care beyond caregiver ability and home environment not conducive to patient recovery.    Goal(s) :   To be met in 3 Visits:  Bed to toilet/BSC:       SBA    To be met in 5 Visits:  Supine to/from Sit in preparation for ADL task:   CGA  Toileting        Min A  Grooming       Independent  Upper Body Dressing:      Independent  Lower Body Dressing:      Min A  Pt to demonstrate UE therapeutic exs x 15 reps  with minimal cues    Rehabilitation Potential: Good  Strengths for achieving goals include: PLOF and Pt cooperative   Barriers to achieving goals include:  Pain    Plan:  To be seen 3-5 x/wk while in acute care setting for therapeutic exercises, bed mobility, transfers, family/patient education, ADL/IADL retraining, and energy conservation training.    Electronically signed by Barby Arceo OT on 10/1/2024 at 9:49 AM      If patient discharges from this facility prior to next visit, this note will serve as the Discharge Summary

## 2024-10-01 NOTE — CARE COORDINATION
Called and spoke with Suzy at [facility] regarding decision of accepting pt. Suzy will call back around 1000 with decision after discussion at meeting due to possible no bed availability.     1030- call back from Suzy stating unable to accept at this time due to no bed availability.     1033- referral called and made to Kylah at [facility]. LVM. Awaiting return call.    1355- call from Lavelle at [facility]. Accepted pt. Precert started.

## 2024-10-01 NOTE — PROGRESS NOTES
Progress Note    Admit Date:  9/29/2024    Subjective:  Mr. Quiroga laying in bed, watching TV. Fell 2-3 times yesterday. Typically falls every couple of days. Legs feel more weak on days he falls. Can't get himself up when he falls, has to call ambulance for lift assist. Would like placement.    Objective:   Patient Vitals for the past 4 hrs:   BP Temp Temp src Pulse Resp SpO2   10/01/24 0858 105/61 97.8 °F (36.6 °C) Oral 77 18 98 %          Intake/Output Summary (Last 24 hours) at 10/1/2024 1008  Last data filed at 10/1/2024 0920  Gross per 24 hour   Intake 365 ml   Output 2050 ml   Net -1685 ml       Physical Exam:   Gen: No distress. Alert.   Eyes: PERRL. No sclera icterus. No conjunctival injection.   ENT: No discharge. Pharynx clear.   Neck: No JVD.  No Carotid Bruit. Trachea midline.  Resp: No accessory muscle use. No crackles. No wheezes. No rhonchi.   CV: Regular rate. Regular rhythm. No murmur.  No rub. No edema.   Capillary Refill: Brisk,< 3 seconds   Peripheral Pulses: +2 palpable, equal bilaterally   GI: Non-tender. Non-distended. No masses. No organomegaly. Normal bowel sounds. No hernia.   Skin: Warm and dry. No nodule on exposed extremities. No rash on exposed extremities.   M/S: No cyanosis. No joint deformity. No clubbing.   Neuro: Awake. Grossly nonfocal    Psych: Oriented x 3. No anxiety or agitation.         Medications:  sodium chloride flush, 5-40 mL, 2 times per day  [Held by provider] enoxaparin, 40 mg, Daily  aspirin, 81 mg, Daily  atorvastatin, 80 mg, Nightly  carbidopa-levodopa, 1 tablet, BID  carbidopa-levodopa, 1 tablet, 4x Daily  DULoxetine, 60 mg, Daily  famotidine, 20 mg, BID  gabapentin, 200 mg, TID  insulin lispro, 30 Units, Nightly  insulin lispro, 15 Units, Daily with breakfast  QUEtiapine, 100 mg, Nightly  insulin lispro, 0-4 Units, TID WC  insulin lispro, 0-4 Units, Nightly  lisinopril, 5 mg, Daily  metoprolol succinate, 50 mg, BID  divalproex, 500 mg, Daily with

## 2024-10-01 NOTE — PROGRESS NOTES
HEART FAILURE CARE PLAN:    Comorbidities Reviewed: Yes   Patient has a past medical history of Arthritis, Asthma, CAD (coronary artery disease), CHF (congestive heart failure) (HCC), Chronic obstructive pulmonary disease (HCC), COPD (chronic obstructive pulmonary disease) (HCC), Fatty liver, GERD (gastroesophageal reflux disease), Hyperlipidemia, Hypertension, Medical history reviewed with no changes, MI, old, Myocardial infarction, nontransmural (HCC), Parkinson's disease (HCC), Sleep apnea, and Type II or unspecified type diabetes mellitus without mention of complication, not stated as uncontrolled.     Weights Reviewed: Yes   Admission weight: 99.8 kg (220 lb)   Wt Readings from Last 3 Encounters:   09/29/24 99.8 kg (220 lb)   09/03/24 104.1 kg (229 lb 8 oz)   08/01/24 104.3 kg (230 lb)     Intake & Output Reviewed: Yes     Intake/Output Summary (Last 24 hours) at 10/1/2024 0830  Last data filed at 9/30/2024 2000  Gross per 24 hour   Intake 360 ml   Output 1500 ml   Net -1140 ml       ECHOCARDIOGRAM Reviewed: Yes   Patient's Ejection Fraction (EF) is greater than 40%     Medications Reviewed: Yes   SCHEDULED HOSPITAL MEDICATIONS:   sodium chloride flush  5-40 mL IntraVENous 2 times per day    [Held by provider] enoxaparin  40 mg SubCUTAneous Daily    aspirin  81 mg Oral Daily    atorvastatin  80 mg Oral Nightly    carbidopa-levodopa  1 tablet Oral BID    carbidopa-levodopa  1 tablet Oral 4x Daily    DULoxetine  60 mg Oral Daily    famotidine  20 mg Oral BID    gabapentin  200 mg Oral TID    insulin lispro  30 Units SubCUTAneous Nightly    insulin lispro  15 Units SubCUTAneous Daily with breakfast    QUEtiapine  100 mg Oral Nightly    insulin lispro  0-4 Units SubCUTAneous TID     insulin lispro  0-4 Units SubCUTAneous Nightly    lisinopril  5 mg Oral Daily    metoprolol succinate  50 mg Oral BID    divalproex  500 mg Oral Daily with breakfast    divalproex  1,000 mg Oral Nightly    [START ON 10/7/2024]

## 2024-10-02 LAB
ALBUMIN SERPL-MCNC: 3.7 G/DL (ref 3.4–5)
ALBUMIN/GLOB SERPL: 1.9 {RATIO} (ref 1.1–2.2)
ALP SERPL-CCNC: 66 U/L (ref 40–129)
ALT SERPL-CCNC: 6 U/L (ref 10–40)
ANION GAP SERPL CALCULATED.3IONS-SCNC: 11 MMOL/L (ref 3–16)
AST SERPL-CCNC: 13 U/L (ref 15–37)
BASOPHILS # BLD: 0 K/UL (ref 0–0.2)
BASOPHILS NFR BLD: 0.6 %
BILIRUB SERPL-MCNC: 0.5 MG/DL (ref 0–1)
BUN SERPL-MCNC: 11 MG/DL (ref 7–20)
CALCIUM SERPL-MCNC: 8.9 MG/DL (ref 8.3–10.6)
CHLORIDE SERPL-SCNC: 102 MMOL/L (ref 99–110)
CO2 SERPL-SCNC: 25 MMOL/L (ref 21–32)
CREAT SERPL-MCNC: 0.7 MG/DL (ref 0.8–1.3)
DEPRECATED RDW RBC AUTO: 15.1 % (ref 12.4–15.4)
EOSINOPHIL # BLD: 0 K/UL (ref 0–0.6)
EOSINOPHIL NFR BLD: 0.9 %
GFR SERPLBLD CREATININE-BSD FMLA CKD-EPI: >90 ML/MIN/{1.73_M2}
GLUCOSE BLD-MCNC: 170 MG/DL (ref 70–99)
GLUCOSE BLD-MCNC: 197 MG/DL (ref 70–99)
GLUCOSE BLD-MCNC: 200 MG/DL (ref 70–99)
GLUCOSE BLD-MCNC: 227 MG/DL (ref 70–99)
GLUCOSE SERPL-MCNC: 159 MG/DL (ref 70–99)
HCT VFR BLD AUTO: 40.8 % (ref 40.5–52.5)
HGB BLD-MCNC: 14.2 G/DL (ref 13.5–17.5)
LYMPHOCYTES # BLD: 1.9 K/UL (ref 1–5.1)
LYMPHOCYTES NFR BLD: 40.8 %
MCH RBC QN AUTO: 30.4 PG (ref 26–34)
MCHC RBC AUTO-ENTMCNC: 34.9 G/DL (ref 31–36)
MCV RBC AUTO: 87.2 FL (ref 80–100)
MONOCYTES # BLD: 0.5 K/UL (ref 0–1.3)
MONOCYTES NFR BLD: 10.9 %
NEUTROPHILS # BLD: 2.1 K/UL (ref 1.7–7.7)
NEUTROPHILS NFR BLD: 46.8 %
PERFORMED ON: ABNORMAL
PLATELET # BLD AUTO: 94 K/UL (ref 135–450)
PMV BLD AUTO: 7.2 FL (ref 5–10.5)
POTASSIUM SERPL-SCNC: 3.9 MMOL/L (ref 3.5–5.1)
PROT SERPL-MCNC: 5.6 G/DL (ref 6.4–8.2)
RBC # BLD AUTO: 4.68 M/UL (ref 4.2–5.9)
SODIUM SERPL-SCNC: 138 MMOL/L (ref 136–145)
WBC # BLD AUTO: 4.5 K/UL (ref 4–11)

## 2024-10-02 PROCEDURE — 80053 COMPREHEN METABOLIC PANEL: CPT

## 2024-10-02 PROCEDURE — 1200000000 HC SEMI PRIVATE

## 2024-10-02 PROCEDURE — 6370000000 HC RX 637 (ALT 250 FOR IP)

## 2024-10-02 PROCEDURE — 6370000000 HC RX 637 (ALT 250 FOR IP): Performed by: STUDENT IN AN ORGANIZED HEALTH CARE EDUCATION/TRAINING PROGRAM

## 2024-10-02 PROCEDURE — 36415 COLL VENOUS BLD VENIPUNCTURE: CPT

## 2024-10-02 PROCEDURE — 99231 SBSQ HOSP IP/OBS SF/LOW 25: CPT | Performed by: INTERNAL MEDICINE

## 2024-10-02 PROCEDURE — 85025 COMPLETE CBC W/AUTO DIFF WBC: CPT

## 2024-10-02 PROCEDURE — 2580000003 HC RX 258: Performed by: STUDENT IN AN ORGANIZED HEALTH CARE EDUCATION/TRAINING PROGRAM

## 2024-10-02 PROCEDURE — 97530 THERAPEUTIC ACTIVITIES: CPT

## 2024-10-02 RX ADMIN — METOPROLOL SUCCINATE 50 MG: 50 TABLET, EXTENDED RELEASE ORAL at 09:39

## 2024-10-02 RX ADMIN — LISINOPRIL 5 MG: 5 TABLET ORAL at 09:40

## 2024-10-02 RX ADMIN — CARBIDOPA AND LEVODOPA 1 TABLET: 25; 100 TABLET ORAL at 17:22

## 2024-10-02 RX ADMIN — CARBIDOPA AND LEVODOPA 1 TABLET: 25; 100 TABLET, EXTENDED RELEASE ORAL at 09:55

## 2024-10-02 RX ADMIN — INSULIN LISPRO 1 UNITS: 100 INJECTION, SOLUTION INTRAVENOUS; SUBCUTANEOUS at 09:40

## 2024-10-02 RX ADMIN — EMPAGLIFLOZIN 25 MG: 10 TABLET, FILM COATED ORAL at 09:38

## 2024-10-02 RX ADMIN — DULOXETINE HYDROCHLORIDE 60 MG: 60 CAPSULE, DELAYED RELEASE ORAL at 09:37

## 2024-10-02 RX ADMIN — Medication 10 ML: at 09:46

## 2024-10-02 RX ADMIN — CARBIDOPA AND LEVODOPA 1 TABLET: 25; 100 TABLET, EXTENDED RELEASE ORAL at 20:55

## 2024-10-02 RX ADMIN — QUETIAPINE FUMARATE 100 MG: 100 TABLET ORAL at 20:39

## 2024-10-02 RX ADMIN — ASPIRIN 81 MG: 81 TABLET, CHEWABLE ORAL at 09:37

## 2024-10-02 RX ADMIN — ATORVASTATIN CALCIUM 80 MG: 40 TABLET, FILM COATED ORAL at 20:39

## 2024-10-02 RX ADMIN — TAMSULOSIN HYDROCHLORIDE 0.4 MG: 0.4 CAPSULE ORAL at 09:37

## 2024-10-02 RX ADMIN — FAMOTIDINE 20 MG: 20 TABLET ORAL at 09:38

## 2024-10-02 RX ADMIN — INSULIN LISPRO 15 UNITS: 100 INJECTION, SOLUTION INTRAVENOUS; SUBCUTANEOUS at 09:41

## 2024-10-02 RX ADMIN — DIVALPROEX SODIUM 1000 MG: 500 TABLET, DELAYED RELEASE ORAL at 20:39

## 2024-10-02 RX ADMIN — ACETAMINOPHEN 650 MG: 325 TABLET ORAL at 09:37

## 2024-10-02 RX ADMIN — FAMOTIDINE 20 MG: 20 TABLET ORAL at 20:39

## 2024-10-02 RX ADMIN — Medication 10 ML: at 20:44

## 2024-10-02 RX ADMIN — METOPROLOL SUCCINATE 50 MG: 50 TABLET, EXTENDED RELEASE ORAL at 20:39

## 2024-10-02 RX ADMIN — CARBIDOPA AND LEVODOPA 1 TABLET: 25; 100 TABLET ORAL at 13:01

## 2024-10-02 RX ADMIN — DIVALPROEX SODIUM 500 MG: 500 TABLET, DELAYED RELEASE ORAL at 09:39

## 2024-10-02 RX ADMIN — CARBIDOPA AND LEVODOPA 1 TABLET: 25; 100 TABLET ORAL at 20:39

## 2024-10-02 RX ADMIN — GABAPENTIN 200 MG: 100 CAPSULE ORAL at 20:39

## 2024-10-02 RX ADMIN — GABAPENTIN 200 MG: 100 CAPSULE ORAL at 09:37

## 2024-10-02 RX ADMIN — GABAPENTIN 200 MG: 100 CAPSULE ORAL at 13:00

## 2024-10-02 RX ADMIN — CARBIDOPA AND LEVODOPA 1 TABLET: 25; 100 TABLET ORAL at 09:37

## 2024-10-02 RX ADMIN — INSULIN LISPRO 30 UNITS: 100 INJECTION, SOLUTION INTRAVENOUS; SUBCUTANEOUS at 20:54

## 2024-10-02 ASSESSMENT — PAIN - FUNCTIONAL ASSESSMENT: PAIN_FUNCTIONAL_ASSESSMENT: ACTIVITIES ARE NOT PREVENTED

## 2024-10-02 ASSESSMENT — PAIN DESCRIPTION - LOCATION: LOCATION: COCCYX

## 2024-10-02 ASSESSMENT — PAIN DESCRIPTION - DESCRIPTORS: DESCRIPTORS: ACHING;SORE

## 2024-10-02 ASSESSMENT — PAIN SCALES - GENERAL
PAINLEVEL_OUTOF10: 8
PAINLEVEL_OUTOF10: 0
PAINLEVEL_OUTOF10: 8

## 2024-10-02 ASSESSMENT — PAIN DESCRIPTION - FREQUENCY: FREQUENCY: INTERMITTENT

## 2024-10-02 ASSESSMENT — PAIN DESCRIPTION - PAIN TYPE: TYPE: ACUTE PAIN

## 2024-10-02 ASSESSMENT — PAIN DESCRIPTION - ONSET: ONSET: PROGRESSIVE

## 2024-10-02 ASSESSMENT — PAIN DESCRIPTION - ORIENTATION: ORIENTATION: LOWER;MID

## 2024-10-02 NOTE — PROGRESS NOTES
/70   Pulse 61   Temp 97.9 °F (36.6 °C) (Oral)   Resp 16   Ht 1.626 m (5' 4\")   Wt 99.8 kg (220 lb)   SpO2 95%   BMI 37.76 kg/m²     Pt awake in bed. Pt alert and orientedX4. Pt states no pain at this time. Assessment complete. Meds passed. Pt denies needs at this time.        Bedside Mobility Assessment Tool (BMAT):     Assessment Level 1- Sit and Shake    1. From a semi-reclined position, ask patient to sit up and rotate to a seated position at the side of the bed. Can use the bedrail.    2. Ask patient to reach out and grab your hand and shake making sure patient reaches across his/her midline.   Pass- Patient is able to come to a seated position, maintain core strength. Maintains seated balance while reaching across midline. Move on to Assessment Level 2.     Assessment Level 2- Stretch and Point   1. With patient in seated position at the side of the bed, have patient place both feet on the floor (or stool) with knees no higher than hips.    2. Ask patient to stretch one leg and straighten the knee, then bend the ankle/flex and point the toes. If appropriate, repeat with the other leg.   Pass- Patient is able to demonstrate appropriate quad strength on intended weight bearing limb(s). Move onto Assessment Level 3.     Assessment Level 3- Stand   1. Ask patient to elevate off the bed or chair (seated to standing) using an assistive device (cane, bedrail).    2. Patient should be able to raise buttocks off be and hold for a count of five. May repeat once.   Pass- Patient maintains standing stability for at least 5 seconds, proceed to assessment level 4.    Assessment Level 4- Walk   1. Ask patient to march in place at bedside.    2. Then ask patient to advance step and return each foot. Some medical conditions may render a patient from stepping backwards, use your best clinical judgement.   Fail- Patient not able to complete tasks OR requires use of assistive device. Patient is MOBILITY LEVEL 3.        Mobility Level- 3

## 2024-10-02 NOTE — PROGRESS NOTES
Assessment completed. Pt c/o tailbone pain 8/10, pt content with tylenol for medical tx for pain at this time. Meds given, see MAR. Respirations are even & easy. No complaints voiced. Pt denies needs at this time. SR up x 2, and bed in low position. Call light is within reach.    Bedside Mobility Assessment Tool (BMAT):     Assessment Level 1- Sit and Shake    1. From a semi-reclined position, ask patient to sit up and rotate to a seated position at the side of the bed. Can use the bedrail.    2. Ask patient to reach out and grab your hand and shake making sure patient reaches across his/her midline.   Pass- Patient is able to come to a seated position, maintain core strength. Maintains seated balance while reaching across midline. Move on to Assessment Level 2.     Assessment Level 2- Stretch and Point   1. With patient in seated position at the side of the bed, have patient place both feet on the floor (or stool) with knees no higher than hips.    2. Ask patient to stretch one leg and straighten the knee, then bend the ankle/flex and point the toes. If appropriate, repeat with the other leg.   Pass- Patient is able to demonstrate appropriate quad strength on intended weight bearing limb(s). Move onto Assessment Level 3.     Assessment Level 3- Stand   1. Ask patient to elevate off the bed or chair (seated to standing) using an assistive device (cane, bedrail).    2. Patient should be able to raise buttocks off be and hold for a count of five. May repeat once.   Pass- Patient maintains standing stability for at least 5 seconds, proceed to assessment level 4.    Assessment Level 4- Walk   1. Ask patient to march in place at bedside.    2. Then ask patient to advance step and return each foot. Some medical conditions may render a patient from stepping backwards, use your best clinical judgement.   Fail- Patient not able to complete tasks OR requires use of assistive device. Patient is MOBILITY LEVEL 3.       Mobility

## 2024-10-02 NOTE — CARE COORDINATION
Called and spoke with Kylah at [facility] regarding update on precert. Precert still pending. Ref # [id number]. Will cont to follow

## 2024-10-02 NOTE — PROGRESS NOTES
HEART FAILURE CARE PLAN:    Comorbidities Reviewed: Yes   Patient has a past medical history of Arthritis, Asthma, CAD (coronary artery disease), CHF (congestive heart failure) (HCC), Chronic obstructive pulmonary disease (HCC), COPD (chronic obstructive pulmonary disease) (HCC), Fatty liver, GERD (gastroesophageal reflux disease), Hyperlipidemia, Hypertension, Medical history reviewed with no changes, MI, old, Myocardial infarction, nontransmural (HCC), Parkinson's disease (HCC), Sleep apnea, and Type II or unspecified type diabetes mellitus without mention of complication, not stated as uncontrolled.     Weights Reviewed: Yes   Admission weight: 99.8 kg (220 lb)   Wt Readings from Last 3 Encounters:   09/29/24 99.8 kg (220 lb)   09/03/24 104.1 kg (229 lb 8 oz)   08/01/24 104.3 kg (230 lb)     Intake & Output Reviewed: Yes     Intake/Output Summary (Last 24 hours) at 10/2/2024 0318  Last data filed at 10/1/2024 2200  Gross per 24 hour   Intake 5 ml   Output 2650 ml   Net -2645 ml       ECHOCARDIOGRAM Reviewed: Yes   Patient's Ejection Fraction (EF) is greater than 40%     Medications Reviewed: Yes   SCHEDULED HOSPITAL MEDICATIONS:   sodium chloride flush  5-40 mL IntraVENous 2 times per day    [Held by provider] enoxaparin  40 mg SubCUTAneous Daily    aspirin  81 mg Oral Daily    atorvastatin  80 mg Oral Nightly    carbidopa-levodopa  1 tablet Oral BID    carbidopa-levodopa  1 tablet Oral 4x Daily    DULoxetine  60 mg Oral Daily    famotidine  20 mg Oral BID    gabapentin  200 mg Oral TID    insulin lispro  30 Units SubCUTAneous Nightly    insulin lispro  15 Units SubCUTAneous Daily with breakfast    QUEtiapine  100 mg Oral Nightly    insulin lispro  0-4 Units SubCUTAneous TID     insulin lispro  0-4 Units SubCUTAneous Nightly    lisinopril  5 mg Oral Daily    metoprolol succinate  50 mg Oral BID    divalproex  500 mg Oral Daily with breakfast    divalproex  1,000 mg Oral Nightly    [START ON 10/7/2024]    metFORMIN (GLUCOPHAGE) 1000 MG tablet TAKE ONE TABLET BY MOUTH TWO (2) TIMES DAILY WITH MORNING AND EVENING MEALS 9/12/23  Yes Ismael Espinoza APRN - CNP   JARDIANCE 25 MG tablet TAKE ONE TABLET BY MOUTH EVERY MORNING 9/12/23  Yes Ismael Espinoza APRN - CNP   tamsulosin (FLOMAX) 0.4 MG capsule TAKE ONE (1) CAPSULE BY ORAL ROUTE EVERY DAY ONE HALF (1/2) HOUR FOLLOWING THE SAME MEAL EACH DAY 9/12/23  Yes Ismael Espinoza APRN - CNP   albuterol sulfate HFA (PROVENTIL;VENTOLIN;PROAIR) 108 (90 Base) MCG/ACT inhaler INHALE 2 PUFFS EVERY 4 HOURS AS NEEDED 5/12/23  Yes Ismael Espionza APRN - CNP   aspirin 81 MG chewable tablet Take 1 tablet by mouth daily 4/18/23  Yes Nancy Villalobos APRN - CNP   DULoxetine (CYMBALTA) 60 MG extended release capsule TAKE ONE CAPSULE BY MOUTH EVERY DAY 3/3/23  Yes Ismael Espinoza APRN - CNP   TRULICITY 3 MG/0.5ML SOPN INJECT 3MG SUBCUTANEOUSLY ONCE A WEEK 3/19/24   Ismael Espinoza APRN - CNP   Insulin Glargine (BASAGLAR KWIKPEN SC) Inject 50 Units into the skin nightly  Patient not taking: Reported on 9/3/2024    Provider, MD Giovanny   fluticasone (FLONASE) 50 MCG/ACT nasal spray spray 2 spray by intranasal route every day in each nostril 6/23/23   Ismael Espinoza APRN - CNP   hydrOXYzine HCl (ATARAX) 25 MG tablet take 1 tablet by oral route 4 times every day as needed for itching 3/29/23   Ismael Espinoza APRN - CNP      Diet Reviewed: Yes   ADULT DIET; Regular    Goal of Care Reviewed: Yes   Patient and/or Family's stated Goal of Care this Admission: Reduce shortness of breath, increase activity tolerance, better understand heart failure and disease management, be more comfortable, and reduce lower extremity edema prior to discharge.     Electronically signed by ZANDRA ALVAREZ RN on 10/2/2024 at 3:18 AM

## 2024-10-02 NOTE — PROGRESS NOTES
PRN   Or  potassium alternative oral replacement, 40 mEq, PRN   Or  potassium chloride, 10 mEq, PRN  magnesium sulfate, 2,000 mg, PRN  ondansetron, 4 mg, Q8H PRN   Or  ondansetron, 4 mg, Q6H PRN  polyethylene glycol, 17 g, Daily PRN  acetaminophen, 650 mg, Q6H PRN   Or  acetaminophen, 650 mg, Q6H PRN  glucose, 4 tablet, PRN  dextrose bolus, 125 mL, PRN   Or  dextrose bolus, 250 mL, PRN  glucagon (rDNA), 1 mg, PRN  dextrose, , Continuous PRN          Data:  CBC:   Recent Labs     09/30/24  0834 10/01/24  0601 10/02/24  0639   WBC 4.7 4.5 4.5   HGB 14.7 14.7 14.2   HCT 42.9 42.5 40.8   MCV 88.1 87.7 87.2   PLT 90* 96* 94*     BMP:   Recent Labs     09/30/24  0834 10/01/24  0601 10/02/24  0639    141 138   K 4.3 3.9 3.9   CL 98* 105 102   CO2 29 26 25   BUN 7 11 11   CREATININE 0.6* 0.7* 0.7*     LIVER PROFILE:   Recent Labs     09/30/24  0834 10/01/24  0601 10/02/24  0639   AST 15 17 13*   ALT 9* 6* 6*   BILITOT 0.5 0.4 0.5   ALKPHOS 78 75 66     PT/INR: No results for input(s): \"PROTIME\", \"INR\" in the last 72 hours.    CULTURES  Results       ** No results found for the last 336 hours. **              RADIOLOGY  CT LUMBAR SPINE WO CONTRAST   Final Result   No acute osseous abnormality.  Healing fractures of the right posterior 12th   rib and right transverse processes of L1 and L2.         CT CERVICAL SPINE WO CONTRAST   Final Result   No acute intracranial abnormality.      Moderate to severe generalized cerebral volume loss and mild chronic   microvascular ischemic changes.      No acute fracture in the cervical spine.      Moderate multilevel spondylosis of the cervical spine, most notably at C3-C4   with a large posterior disc osteophyte complex causing severe effacement of   the left lateral recess.         CT HEAD WO CONTRAST   Final Result   No acute intracranial abnormality.      Moderate to severe generalized cerebral volume loss and mild chronic   microvascular ischemic changes.      No acute fracture  in the cervical spine.      Moderate multilevel spondylosis of the cervical spine, most notably at C3-C4   with a large posterior disc osteophyte complex causing severe effacement of   the left lateral recess.             Assessment/Plan:  Frequent falls.  -due to progressive Parkinson's.  -fall precautions.  -PT/OT.  -case management consult for placement.    Hyponatremia.  -Na 129, corrects to 132 in the setting of hyperglycemia.     Hyperlipidemia.   -continue statin.     Parkinson disease.  -continue Sinemet.     HTN.  -continue lasix M/T, Toprol XL, lisinopril.    CAD.  -S/p stends.  -on ASA, statin, BB.    Paroxysmal atrial fibrillation.  -s/p watchman.    NSVT.  Hx ischemic CM.  -s/p ICD.  -continue jardiace, lisinopril, lasix, Toprol XL.    Depression.  -continue Cymbalta, Seroquel.    GERD.  -continue Pepcid.    Bipolar disorder.  -continue home regimen.     COPD.  -does not appear to be in acute exacerbation.  -continue home meds.     Type II diabetes mellitus.  -  continue SSI  - POC Glucose, carb control diet     Thrombocytopenia  Chronic     DVT Prophylaxis: SCDs.  Diet: ADULT DIET; Regular  Code Status: Full Code    Pt/ot    Needs donovan BOSWELL MD 10/2/2024 7:49 AM

## 2024-10-02 NOTE — PROGRESS NOTES
Physician Progress Note      PATIENT:               DEEP VELA  CSN #:                  [id number]  :                       1960  ADMIT DATE:       2024 9:11 PM  DISCH DATE:  RESPONDING  PROVIDER #:        Jaxson Patrick MD          QUERY TEXT:    Pt admitted with frequent falls 2/2 parkinsonism . Pt noted to have PMH CHF.    Receiving lisinopril and metoprolol.  Last BRAYDON on 23 with EF 55%. If   possible, please document in progress notes and discharge summary if you are   evaluating and/or treating any of the following:    The medical record reflects the following:  Risk Factors: 63 y.o. male with hx of HLD, Parkinson's disease, depression,   GERD, DM2  Clinical Indicators: No sob or edema  Treatment: lisinopril, metoprolol  Options provided:  -- Chronic Diastolic CHF/HFpEF  -- Other - I will add my own diagnosis  -- Disagree - Not applicable / Not valid  -- Disagree - Clinically unable to determine / Unknown  -- Refer to Clinical Documentation Reviewer    PROVIDER RESPONSE TEXT:    This patient has chronic diastolic CHF/HFpEF.    Query created by: Gage Thomas on 10/1/2024 8:05 PM      Electronically signed by:  Jaxson Patrick MD 10/2/2024 12:19 PM

## 2024-10-02 NOTE — FLOWSHEET NOTE
10/02/24 0342   Vital Signs   Temp 97.4 °F (36.3 °C)   Temp Source Oral   Pulse 72   Heart Rate Source Monitor   Respirations 16   BP 98/61   MAP (Calculated) 73   BP Location Left upper arm   BP Method Automatic   Patient Position Semi wlers   Pain Assessment   Pain Assessment None - Denies Pain   Pain Level 0   Opioid-Induced Sedation   POSS Score 1   Oxygen Therapy   SpO2 97 %   O2 Device None (Room air)     Pt VS as shown above.

## 2024-10-02 NOTE — PROGRESS NOTES
Inpatient Physical Therapy Treatment    Unit: 2 [place]  Date:  10/2/2024  Patient Name:    Chano Quiroga Jr.  Admitting diagnosis:  Hyponatremia [E87.1]  Recurrent falls [R29.6]  Falls, initial encounter [W19.XXXA]  Parkinsonism, unspecified Parkinsonism type (HCC) [G20.C]  Admit Date:  9/29/2024  Precautions/Restrictions/WB Status/ Lines/ Wounds/ Oxygen: Fall risk, Bed/chair alarm, Lines (IV), and Telemetry    Treatment Time:  1455 - 1522  Treatment Number:  2  Timed Code Treatment Minutes: 27 minutes  Total Treatment Minutes:  27  minutes    Patient Stated Goals for Therapy: \"go to rehab\"         Discharge Recommendations: SNF  DME needs for discharge: Defer to facility       Therapy recommendation for EMS Transport: can transport by wheelchair    Therapy recommendations for staff:   Assist of 1 for ambulation with use of rolling walker (RW) and gait belt to/from chair  to/from bathroom    History of Present Illness:   Per Dr. Cao H&P 9/29/24:  \"63 y.o. male with past medical history of hyperlipidemia, Parkinson disease, depression, GERD, type 2 diabetes presented to emergency department with chief complaint of falls.  Patient states that he fell 3 times in 2 hours at home and called EMS to bring him in for evaluation.  Patient states that he has had increasing unsteadiness on his feet for the past several months.  Patient would like to be placed in a facility as he is unable to safely complete ADLs.\"     AM-PAC Mobility Score    AM-PAC Inpatient Mobility Raw Score : 14         Subjective  Patient lying reclined in bed with no family present.  Pt agreeable to this PT session.     Cognition    A&O orientation not directly assessed.    Able to follow 2 step commands    Pain   Yes  Location: tailbone  Rating: mild /10  Pain Medicine Status: No request made    Activity Tolerance   During therapy session noted pt with no adverse symptoms to activity    Pt Position BP (mmHg) HR (bpm) SpO2 (%) on RA  Comments    Supine at rest       Seated at EOB       Standing       End of session           Objective  Does this pt have an acute or acute on chronic diagnosis of CHF? No      Balance  Static Sitting:  Good - ; SBA  Dynamic Sitting:  Good - ; SBA   Comments: EOB    Static Standing: Good - ; CGA  Dynamic Standing: Good - ; CGA  Comments: to RW    Posture  Seated: Forward head and neck and Thoracic kyphosis  Standing: Forward head and neck and Thoracic kyphosis    Bed Mobility   Supine to Sit:    SBA  Sit to Supine:   SBA  Rolling:   Not Tested   Scooting in sitting: SBA   Scooting in supine:  Total A with hercules sheet   Bridging:  Not Tested  Comment:     Transfer Training     Sit to stand:   CGA   Stand to sit:   CGA   Bed to/from Chair:  CGA with use of gait belt and rolling walker (RW)  Comment:     Gait gait completed as indicated below  Distance:      15 ft, 15 ft   Deviations (firm surface/linoleum):  decreased dory, parkinsonian pattern, and decreased step length bilaterally  Assistive Device Used:    gait belt and rolling walker (RW)  Level of Assist:    Min A  - CGA  Comment: freezing episodes after walking 4 ft, min A for freezing episodes to maintain balance    Stair Training deferred, pt unsafe/ not appropriate to complete stairs at this time    Therapeutic Exercises Initiated  deferred secondary to treatment focus on functional mobility  Supine:  N/A    Seated:  N/A    Standing:  N/A      Positioning Needs   Pt in bed, alarm set, positioned in proper neutral alignment and pressure relief provided.   Call light provided and all needs within reach    Other Activities  Assisted Pt to bathroom    Patient/Family Education   Pt educated on role of inpatient PT, POC, importance of continued activity, DC recommendations, pacing activity, and calling for assist with mobility.      Assessment  Pt seen today for physical therapy treatment in the acute care setting. Pt demonstrates improvements in bed mobility,

## 2024-10-02 NOTE — PLAN OF CARE
Problem: Safety - Adult  Goal: Free from fall injury  10/1/2024 2049 by Mireille Wilks RN  Outcome: Progressing  10/1/2024 1027 by Laila Funes RN  Outcome: Progressing     Problem: Pain  Goal: Verbalizes/displays adequate comfort level or baseline comfort level  10/1/2024 2049 by Mireille Wilks RN  Outcome: Progressing  10/1/2024 1027 by Laila Funes RN  Outcome: Progressing     Problem: Discharge Planning  Goal: Discharge to home or other facility with appropriate resources  10/1/2024 2049 by Mireille Wilks RN  Outcome: Progressing  10/1/2024 1027 by Laila Funes RN  Outcome: Progressing     Problem: Chronic Conditions and Co-morbidities  Goal: Patient's chronic conditions and co-morbidity symptoms are monitored and maintained or improved  10/1/2024 2049 by Mireille Wilks RN  Outcome: Progressing  10/1/2024 1027 by Laila Funes RN  Outcome: Progressing     Problem: Respiratory - Adult  Goal: Achieves optimal ventilation and oxygenation  10/1/2024 2049 by Mireille Wilks RN  Outcome: Progressing  10/1/2024 1027 by Laila Funes RN  Outcome: Progressing     Problem: Cardiovascular - Adult  Goal: Maintains optimal cardiac output and hemodynamic stability  10/1/2024 2049 by Mireille Wilks RN  Outcome: Progressing  10/1/2024 1027 by Laila Funes RN  Outcome: Progressing  Goal: Absence of cardiac dysrhythmias or at baseline  10/1/2024 2049 by Mireille Wilks RN  Outcome: Progressing  10/1/2024 1027 by Laila Funes RN  Outcome: Progressing

## 2024-10-03 LAB
GLUCOSE BLD-MCNC: 179 MG/DL (ref 70–99)
GLUCOSE BLD-MCNC: 186 MG/DL (ref 70–99)
GLUCOSE BLD-MCNC: 190 MG/DL (ref 70–99)
GLUCOSE BLD-MCNC: 227 MG/DL (ref 70–99)
GLUCOSE BLD-MCNC: 274 MG/DL (ref 70–99)
PERFORMED ON: ABNORMAL

## 2024-10-03 PROCEDURE — 99231 SBSQ HOSP IP/OBS SF/LOW 25: CPT | Performed by: INTERNAL MEDICINE

## 2024-10-03 PROCEDURE — 6370000000 HC RX 637 (ALT 250 FOR IP): Performed by: STUDENT IN AN ORGANIZED HEALTH CARE EDUCATION/TRAINING PROGRAM

## 2024-10-03 PROCEDURE — 1200000000 HC SEMI PRIVATE

## 2024-10-03 PROCEDURE — 2580000003 HC RX 258: Performed by: STUDENT IN AN ORGANIZED HEALTH CARE EDUCATION/TRAINING PROGRAM

## 2024-10-03 PROCEDURE — 6370000000 HC RX 637 (ALT 250 FOR IP)

## 2024-10-03 RX ADMIN — TAMSULOSIN HYDROCHLORIDE 0.4 MG: 0.4 CAPSULE ORAL at 08:14

## 2024-10-03 RX ADMIN — GABAPENTIN 200 MG: 100 CAPSULE ORAL at 21:57

## 2024-10-03 RX ADMIN — CARBIDOPA AND LEVODOPA 1 TABLET: 25; 100 TABLET ORAL at 08:14

## 2024-10-03 RX ADMIN — QUETIAPINE FUMARATE 100 MG: 100 TABLET ORAL at 21:57

## 2024-10-03 RX ADMIN — LISINOPRIL 5 MG: 5 TABLET ORAL at 08:14

## 2024-10-03 RX ADMIN — METOPROLOL SUCCINATE 50 MG: 50 TABLET, EXTENDED RELEASE ORAL at 21:57

## 2024-10-03 RX ADMIN — INSULIN LISPRO 15 UNITS: 100 INJECTION, SOLUTION INTRAVENOUS; SUBCUTANEOUS at 08:12

## 2024-10-03 RX ADMIN — EMPAGLIFLOZIN 25 MG: 10 TABLET, FILM COATED ORAL at 08:14

## 2024-10-03 RX ADMIN — CARBIDOPA AND LEVODOPA 1 TABLET: 25; 100 TABLET ORAL at 13:09

## 2024-10-03 RX ADMIN — FAMOTIDINE 20 MG: 20 TABLET ORAL at 08:20

## 2024-10-03 RX ADMIN — FAMOTIDINE 20 MG: 20 TABLET ORAL at 21:57

## 2024-10-03 RX ADMIN — ASPIRIN 81 MG: 81 TABLET, CHEWABLE ORAL at 08:13

## 2024-10-03 RX ADMIN — CARBIDOPA AND LEVODOPA 1 TABLET: 25; 100 TABLET ORAL at 21:57

## 2024-10-03 RX ADMIN — CARBIDOPA AND LEVODOPA 1 TABLET: 25; 100 TABLET, EXTENDED RELEASE ORAL at 08:56

## 2024-10-03 RX ADMIN — METOPROLOL SUCCINATE 50 MG: 50 TABLET, EXTENDED RELEASE ORAL at 08:14

## 2024-10-03 RX ADMIN — CARBIDOPA AND LEVODOPA 1 TABLET: 25; 100 TABLET ORAL at 17:24

## 2024-10-03 RX ADMIN — DIVALPROEX SODIUM 500 MG: 500 TABLET, DELAYED RELEASE ORAL at 08:13

## 2024-10-03 RX ADMIN — GABAPENTIN 200 MG: 100 CAPSULE ORAL at 13:09

## 2024-10-03 RX ADMIN — DULOXETINE HYDROCHLORIDE 60 MG: 60 CAPSULE, DELAYED RELEASE ORAL at 08:14

## 2024-10-03 RX ADMIN — GABAPENTIN 200 MG: 100 CAPSULE ORAL at 08:14

## 2024-10-03 RX ADMIN — DIVALPROEX SODIUM 1000 MG: 500 TABLET, DELAYED RELEASE ORAL at 21:57

## 2024-10-03 RX ADMIN — ATORVASTATIN CALCIUM 80 MG: 40 TABLET, FILM COATED ORAL at 21:57

## 2024-10-03 RX ADMIN — FUROSEMIDE 20 MG: 20 TABLET ORAL at 08:14

## 2024-10-03 RX ADMIN — Medication 10 ML: at 08:12

## 2024-10-03 RX ADMIN — Medication 10 ML: at 21:57

## 2024-10-03 RX ADMIN — CARBIDOPA AND LEVODOPA 1 TABLET: 25; 100 TABLET, EXTENDED RELEASE ORAL at 21:57

## 2024-10-03 NOTE — PROGRESS NOTES
Patient provided a COPD Educational Folder that includes the following materials:     [x]  [employer] Booklet: Managing your COPD  [x]  ALA: Getting the Most Out of Medication Delivery Devices  [x]  ALA: My COPD Action Plan  [x]  [employer]: [facility]   [x]  Smoking Cessation Classes  [x]  Outpatient Spiritual Care Services  [x]  Magnet: Signs of COPD    PATIENT/CAREGIVER TEACHING:   Level of patient/caregiver understanding able to:   [x] Verbalize understanding   [] Demonstrate understanding       [] Teach back        [] Needs reinforcement     []  Other:     Electronically signed by Dana Partida RN (Mandy) on 10/3/2024 at 1:01 PM

## 2024-10-03 NOTE — PROGRESS NOTES
Medications:  sodium chloride flush, 5-40 mL, PRN  sodium chloride, , PRN  potassium chloride, 40 mEq, PRN   Or  potassium alternative oral replacement, 40 mEq, PRN   Or  potassium chloride, 10 mEq, PRN  magnesium sulfate, 2,000 mg, PRN  ondansetron, 4 mg, Q8H PRN   Or  ondansetron, 4 mg, Q6H PRN  polyethylene glycol, 17 g, Daily PRN  acetaminophen, 650 mg, Q6H PRN   Or  acetaminophen, 650 mg, Q6H PRN  glucose, 4 tablet, PRN  dextrose bolus, 125 mL, PRN   Or  dextrose bolus, 250 mL, PRN  glucagon (rDNA), 1 mg, PRN  dextrose, , Continuous PRN          Data:  CBC:   Recent Labs     10/01/24  0601 10/02/24  0639   WBC 4.5 4.5   HGB 14.7 14.2   HCT 42.5 40.8   MCV 87.7 87.2   PLT 96* 94*     BMP:   Recent Labs     10/01/24  0601 10/02/24  0639    138   K 3.9 3.9    102   CO2 26 25   BUN 11 11   CREATININE 0.7* 0.7*     LIVER PROFILE:   Recent Labs     10/01/24  0601 10/02/24  0639   AST 17 13*   ALT 6* 6*   BILITOT 0.4 0.5   ALKPHOS 75 66     PT/INR: No results for input(s): \"PROTIME\", \"INR\" in the last 72 hours.    CULTURES  Results       ** No results found for the last 336 hours. **              RADIOLOGY  CT LUMBAR SPINE WO CONTRAST   Final Result   No acute osseous abnormality.  Healing fractures of the right posterior 12th   rib and right transverse processes of L1 and L2.         CT CERVICAL SPINE WO CONTRAST   Final Result   No acute intracranial abnormality.      Moderate to severe generalized cerebral volume loss and mild chronic   microvascular ischemic changes.      No acute fracture in the cervical spine.      Moderate multilevel spondylosis of the cervical spine, most notably at C3-C4   with a large posterior disc osteophyte complex causing severe effacement of   the left lateral recess.         CT HEAD WO CONTRAST   Final Result   No acute intracranial abnormality.      Moderate to severe generalized cerebral volume loss and mild chronic   microvascular ischemic changes.      No acute fracture  in the cervical spine.      Moderate multilevel spondylosis of the cervical spine, most notably at C3-C4   with a large posterior disc osteophyte complex causing severe effacement of   the left lateral recess.             Assessment/Plan:  Frequent falls.  -due to progressive Parkinson's.  -fall precautions.  -PT/OT.  -case management consult for placement.    Hyponatremia.  -Na 129, corrects to 132 in the setting of hyperglycemia.     Hyperlipidemia.   -continue statin.     Parkinson disease.  -continue Sinemet.     HTN.  -continue lasix M/T, Toprol XL, lisinopril.    CAD.  -S/p stends.  -on ASA, statin, BB.    Paroxysmal atrial fibrillation.  -s/p watchman.    NSVT.  Hx ischemic CM.  -s/p ICD.  -continue jardiace, lisinopril, lasix, Toprol XL.    Depression.  -continue Cymbalta, Seroquel.    GERD.  -continue Pepcid.    Bipolar disorder.  -continue home regimen.     COPD.  -does not appear to be in acute exacerbation.  -continue home meds.     Type II diabetes mellitus.  -  continue SSI  - POC Glucose, carb control diet     Thrombocytopenia  Chronic     DVT Prophylaxis: SCDs.  Diet: ADULT DIET; Regular  Code Status: Full Code    Pt/ot    Needs donovan BOSWELL MD 10/3/2024 10:04 AM

## 2024-10-03 NOTE — PLAN OF CARE
Problem: Safety - Adult  Goal: Free from fall injury  10/3/2024 1256 by Dana Partida RN  Outcome: Progressing  10/3/2024 0344 by Samantha Cordero RN  Outcome: Progressing  10/3/2024 0253 by Samantha Cordero RN  Outcome: Progressing     Problem: Pain  Goal: Verbalizes/displays adequate comfort level or baseline comfort level  10/3/2024 1256 by Dana Partida RN  Outcome: Progressing  10/3/2024 0344 by Samantha Cordero RN  Outcome: Progressing  10/3/2024 0253 by Samantha Cordero RN  Outcome: Progressing     Problem: Discharge Planning  Goal: Discharge to home or other facility with appropriate resources  10/3/2024 1256 by Dana Partida RN  Outcome: Progressing  10/3/2024 0344 by Samantha Cordero RN  Outcome: Progressing  10/3/2024 0253 by Samantha Cordero RN  Outcome: Progressing     Problem: Chronic Conditions and Co-morbidities  Goal: Patient's chronic conditions and co-morbidity symptoms are monitored and maintained or improved  10/3/2024 1256 by Dana Partida RN  Outcome: Progressing  10/3/2024 0344 by Samantha Cordero RN  Outcome: Progressing  10/3/2024 0253 by Samantha Cordero RN  Outcome: Progressing     Problem: Respiratory - Adult  Goal: Achieves optimal ventilation and oxygenation  10/3/2024 1256 by Dana Partida RN  Outcome: Progressing  10/3/2024 0344 by Samantha Cordero RN  Outcome: Progressing  10/3/2024 0253 by Samantha Cordero RN  Outcome: Progressing     Problem: Cardiovascular - Adult  Goal: Maintains optimal cardiac output and hemodynamic stability  10/3/2024 1256 by Dana Partida RN  Outcome: Progressing  10/3/2024 0344 by Samantha Cordero RN  Outcome: Progressing  10/3/2024 0253 by Samantha Cordero RN  Outcome: Progressing  Goal: Absence of cardiac dysrhythmias or at baseline  10/3/2024 1256 by Dana Partida, RN  Outcome: Progressing  10/3/2024 0344 by Samantha Cordero RN  Outcome: Progressing  10/3/2024 0253 by

## 2024-10-03 NOTE — PROGRESS NOTES
HEART FAILURE CARE PLAN:    Comorbidities Reviewed: Yes   Patient has a past medical history of Arthritis, Asthma, CAD (coronary artery disease), CHF (congestive heart failure) (HCC), Chronic obstructive pulmonary disease (HCC), COPD (chronic obstructive pulmonary disease) (HCC), Fatty liver, GERD (gastroesophageal reflux disease), Hyperlipidemia, Hypertension, Medical history reviewed with no changes, MI, old, Myocardial infarction, nontransmural (HCC), Parkinson's disease (HCC), Sleep apnea, and Type II or unspecified type diabetes mellitus without mention of complication, not stated as uncontrolled.     Weights Reviewed: Yes   Admission weight: 99.8 kg (220 lb)   Wt Readings from Last 3 Encounters:   10/03/24 100.2 kg (221 lb)   09/03/24 104.1 kg (229 lb 8 oz)   08/01/24 104.3 kg (230 lb)     Intake & Output Reviewed: Yes     Intake/Output Summary (Last 24 hours) at 10/3/2024 1301  Last data filed at 10/3/2024 0812  Gross per 24 hour   Intake 530 ml   Output 1300 ml   Net -770 ml       ECHOCARDIOGRAM Reviewed: Yes   Patient's Ejection Fraction (EF) is greater than 40%     Medications Reviewed: Yes   SCHEDULED HOSPITAL MEDICATIONS:   sodium chloride flush  5-40 mL IntraVENous 2 times per day    [Held by provider] enoxaparin  40 mg SubCUTAneous Daily    aspirin  81 mg Oral Daily    atorvastatin  80 mg Oral Nightly    carbidopa-levodopa  1 tablet Oral BID    carbidopa-levodopa  1 tablet Oral 4x Daily    DULoxetine  60 mg Oral Daily    famotidine  20 mg Oral BID    gabapentin  200 mg Oral TID    insulin lispro  30 Units SubCUTAneous Nightly    insulin lispro  15 Units SubCUTAneous Daily with breakfast    QUEtiapine  100 mg Oral Nightly    insulin lispro  0-4 Units SubCUTAneous TID     insulin lispro  0-4 Units SubCUTAneous Nightly    lisinopril  5 mg Oral Daily    metoprolol succinate  50 mg Oral BID    divalproex  500 mg Oral Daily with breakfast    divalproex  1,000 mg Oral Nightly    [START ON 10/7/2024]  furosemide  20 mg Oral Weekly - Monday    And    furosemide  20 mg Oral Weekly - Thursday    empagliflozin  25 mg Oral QAM    tamsulosin  0.4 mg Oral Daily     HOME MEDICATIONS:  Prior to Admission medications    Medication Sig Start Date End Date Taking? Authorizing Provider   metoprolol succinate (TOPROL XL) 50 MG extended release tablet TAKE ONE TABLET BY MOUTH TWICE DAILY 9/5/24  Yes Helio Zhou APRN - CNP   OZEMPIC, 0.25 OR 0.5 MG/DOSE, 2 MG/3ML SOPN  8/29/24  Yes Giovanny Mosquera MD   carbidopa-levodopa (SINEMET CR)  MG per extended release tablet Take 1 tablet by mouth 2 times daily 8/1/24  Yes Taryn Coyne MD   carbidopa-levodopa (SINEMET)  MG per tablet Take 1 tablet by mouth 4 times daily At 8:00, 12:00, 16:00, 20:00 8/1/24  Yes Taryn Coyne MD   atorvastatin (LIPITOR) 80 MG tablet TAKE ONE TABLET BY MOUTH DAILY AT BEDTIME 7/19/24  Yes Theo Hilario MD   lisinopril (PRINIVIL;ZESTRIL) 5 MG tablet TAKE ONE (1) TABLET BY ORAL ROUTE EVERY DAY 7/19/24  Yes Theo Hilario MD   gabapentin (NEURONTIN) 100 MG capsule Take 2 capsules by mouth 3 times daily.   Yes Giovanny Mosquera MD   potassium chloride (KLOR-CON M) 20 MEQ extended release tablet TAKE ONE TABLET BY MOUTH WITH LASIX ON MONDAY AND THURSDAY 5/2/24  Yes Helio Zhou APRN - CNP   Insulin Aspart (NOVOLOG SC) Inject 15 units subcutaneously every morning and 30 units every evening with meals.   Yes Giovanny Mosquera MD   divalproex (DEPAKOTE) 500 MG DR tablet TAKE ONE TABLET BY MOUTH EVERY MORNING AND TWO (2) TABLETS BY MOUTH AT BEDTIME 12/8/23  Yes Ismael Espinoza APRN - CNP   furosemide (LASIX) 20 MG tablet TAKE ONE TABLET BY MOUTH MONDAY AND Thursday, take one extra tablet for weight gain of 3-4 pounds 11/10/23  Yes Theo Hilario MD   famotidine (PEPCID) 20 MG tablet TAKE ONE TABLET BY MOUTH TWO (2) TIMES A DAY 9/12/23  Yes Ismael Espinoza, APRN - CNP   metFORMIN (GLUCOPHAGE)

## 2024-10-03 NOTE — CARE COORDINATION
10:36 AM    Called and spoke with Kylah at [facility] [phone] regarding update on precert. Precert still pending. Ref # [id number]. Will cont to follow.

## 2024-10-03 NOTE — PROGRESS NOTES
Pt tried getting OOB all night. Pt was moved closer to the nurse's station. Telesitter in place. Pt finally fell asleep about 3 am.

## 2024-10-03 NOTE — PROGRESS NOTES
Handoff report and transfer of care given at bedside to alena .  Patient in stable condition, denies needs/concerns at this time.  Call light within reach.

## 2024-10-03 NOTE — FLOWSHEET NOTE
10/03/24 0800   Vital Signs   Temp 97.4 °F (36.3 °C)   Temp Source Oral   Pulse 70   Heart Rate Source Monitor   Respirations 18   /75   MAP (Calculated) 94   BP Location Left upper arm   BP Method Automatic   Patient Position Semi fowlers   Pain Assessment   Pain Assessment None - Denies Pain   Opioid-Induced Sedation   POSS Score 1   RASS   Sandy Agitation Sedation Scale (RASS) 0   Oxygen Therapy   SpO2 98 %   O2 Device None (Room air)     Pt a/o. Am assessment completed see flow sheet. Pt denies any pain/ needs at this time. Call light within reach. Will continue to monitor.

## 2024-10-04 VITALS
HEART RATE: 77 BPM | TEMPERATURE: 97.9 F | DIASTOLIC BLOOD PRESSURE: 64 MMHG | HEIGHT: 64 IN | RESPIRATION RATE: 18 BRPM | BODY MASS INDEX: 37.73 KG/M2 | OXYGEN SATURATION: 95 % | WEIGHT: 221 LBS | SYSTOLIC BLOOD PRESSURE: 120 MMHG

## 2024-10-04 LAB
GLUCOSE BLD-MCNC: 148 MG/DL (ref 70–99)
GLUCOSE BLD-MCNC: 184 MG/DL (ref 70–99)
PERFORMED ON: ABNORMAL
PERFORMED ON: ABNORMAL

## 2024-10-04 PROCEDURE — 6370000000 HC RX 637 (ALT 250 FOR IP): Performed by: STUDENT IN AN ORGANIZED HEALTH CARE EDUCATION/TRAINING PROGRAM

## 2024-10-04 PROCEDURE — 97116 GAIT TRAINING THERAPY: CPT

## 2024-10-04 PROCEDURE — 97530 THERAPEUTIC ACTIVITIES: CPT

## 2024-10-04 PROCEDURE — 2580000003 HC RX 258: Performed by: STUDENT IN AN ORGANIZED HEALTH CARE EDUCATION/TRAINING PROGRAM

## 2024-10-04 PROCEDURE — 99239 HOSP IP/OBS DSCHRG MGMT >30: CPT | Performed by: INTERNAL MEDICINE

## 2024-10-04 PROCEDURE — 6370000000 HC RX 637 (ALT 250 FOR IP)

## 2024-10-04 RX ORDER — INSULIN LISPRO 100 [IU]/ML
40 INJECTION, SOLUTION INTRAVENOUS; SUBCUTANEOUS NIGHTLY
Qty: 1 ML | Refills: 0
Start: 2024-10-04

## 2024-10-04 RX ORDER — QUETIAPINE FUMARATE 100 MG/1
100 TABLET, FILM COATED ORAL NIGHTLY
Qty: 30 TABLET | Refills: 0
Start: 2024-10-04

## 2024-10-04 RX ORDER — DEXTROSE MONOHYDRATE 100 MG/ML
INJECTION, SOLUTION INTRAVENOUS CONTINUOUS PRN
Status: DISCONTINUED | OUTPATIENT
Start: 2024-10-04 | End: 2024-10-04 | Stop reason: HOSPADM

## 2024-10-04 RX ADMIN — GABAPENTIN 200 MG: 100 CAPSULE ORAL at 08:48

## 2024-10-04 RX ADMIN — FAMOTIDINE 20 MG: 20 TABLET ORAL at 08:48

## 2024-10-04 RX ADMIN — ASPIRIN 81 MG: 81 TABLET, CHEWABLE ORAL at 08:46

## 2024-10-04 RX ADMIN — INSULIN LISPRO 15 UNITS: 100 INJECTION, SOLUTION INTRAVENOUS; SUBCUTANEOUS at 08:45

## 2024-10-04 RX ADMIN — Medication 5 ML: at 08:44

## 2024-10-04 RX ADMIN — GABAPENTIN 200 MG: 100 CAPSULE ORAL at 13:57

## 2024-10-04 RX ADMIN — CARBIDOPA AND LEVODOPA 1 TABLET: 25; 100 TABLET, EXTENDED RELEASE ORAL at 10:52

## 2024-10-04 RX ADMIN — DIVALPROEX SODIUM 500 MG: 500 TABLET, DELAYED RELEASE ORAL at 08:48

## 2024-10-04 RX ADMIN — LISINOPRIL 5 MG: 5 TABLET ORAL at 08:48

## 2024-10-04 RX ADMIN — EMPAGLIFLOZIN 25 MG: 10 TABLET, FILM COATED ORAL at 08:46

## 2024-10-04 RX ADMIN — CARBIDOPA AND LEVODOPA 1 TABLET: 25; 100 TABLET ORAL at 13:57

## 2024-10-04 RX ADMIN — CARBIDOPA AND LEVODOPA 1 TABLET: 25; 100 TABLET ORAL at 08:48

## 2024-10-04 RX ADMIN — DULOXETINE HYDROCHLORIDE 60 MG: 60 CAPSULE, DELAYED RELEASE ORAL at 08:48

## 2024-10-04 RX ADMIN — TAMSULOSIN HYDROCHLORIDE 0.4 MG: 0.4 CAPSULE ORAL at 08:48

## 2024-10-04 RX ADMIN — METOPROLOL SUCCINATE 50 MG: 50 TABLET, EXTENDED RELEASE ORAL at 08:48

## 2024-10-04 ASSESSMENT — PAIN SCALES - GENERAL
PAINLEVEL_OUTOF10: 0

## 2024-10-04 NOTE — PLAN OF CARE
Problem: Safety - Adult  Goal: Free from fall injury  10/4/2024 1531 by Dana Bolton RN  Outcome: Adequate for Discharge  10/4/2024 0859 by Dana Bolton RN  Outcome: Progressing     Problem: Pain  Goal: Verbalizes/displays adequate comfort level or baseline comfort level  10/4/2024 1531 by Dana Bolton RN  Outcome: Adequate for Discharge  10/4/2024 0859 by Dana Bolton RN  Outcome: Progressing  Flowsheets (Taken 10/4/2024 0848)  Verbalizes/displays adequate comfort level or baseline comfort level:   Encourage patient to monitor pain and request assistance   Assess pain using appropriate pain scale     Problem: Discharge Planning  Goal: Discharge to home or other facility with appropriate resources  10/4/2024 1531 by Dana oBlton RN  Outcome: Adequate for Discharge  10/4/2024 0859 by Dana Bolton RN  Outcome: Progressing     Problem: Chronic Conditions and Co-morbidities  Goal: Patient's chronic conditions and co-morbidity symptoms are monitored and maintained or improved  10/4/2024 1531 by Dana Bolton RN  Outcome: Adequate for Discharge  10/4/2024 0859 by Dana Bolton RN  Outcome: Progressing     Problem: Respiratory - Adult  Goal: Achieves optimal ventilation and oxygenation  10/4/2024 1531 by Dana Bolton RN  Outcome: Adequate for Discharge  10/4/2024 0859 by Dana Bolton RN  Outcome: Progressing     Problem: Cardiovascular - Adult  Goal: Maintains optimal cardiac output and hemodynamic stability  10/4/2024 1531 by Dana Bolton RN  Outcome: Adequate for Discharge  10/4/2024 0859 by Dana Bolton RN  Outcome: Progressing  Goal: Absence of cardiac dysrhythmias or at baseline  10/4/2024 1531 by Dnaa Bolton RN  Outcome: Adequate for Discharge  10/4/2024 0859 by Dana Bolton RN  Outcome: Progressing     Problem: Neurosensory - Adult  Goal: Achieves maximal functionality and self care  10/4/2024 1531 by Dana Bolton RN  Outcome: Adequate for

## 2024-10-04 NOTE — FLOWSHEET NOTE
Removed PIV for discharge. Applied drsg. Waiting on transport.       10/04/24 1556   Vital Signs   Temp 97.9 °F (36.6 °C)   Temp Source Oral   Pulse 77   Heart Rate Source Monitor   Respirations 18   /64   MAP (Calculated) 83   BP Location Left upper arm   BP Method Automatic   Patient Position Sitting   Pain Assessment   Pain Assessment None - Denies Pain   Pain Level 0   Oxygen Therapy   SpO2 95 %   O2 Device None (Room air)

## 2024-10-04 NOTE — PROGRESS NOTES
Virtual  & chair alarm activated. This RN and MAX Garcia promptly responded. Pt got out of chair without assistance. Pt in standing position with RW when this RN arrived. MAX Garcia in room prior. Patient returned to chair with assistance. Gait unsteady. Educated patient to use call light for needs and to not get up without assistance. Pt needs reinforcement.

## 2024-10-04 NOTE — PROGRESS NOTES
Progress Note    Admit Date:  9/29/2024    Subjective:  Mr. Quiroga denies complaints     Objective:   Patient Vitals for the past 4 hrs:   BP Temp Temp src Pulse Resp SpO2   10/04/24 0848 126/77 97.6 °F (36.4 °C) Oral 69 18 97 %            Intake/Output Summary (Last 24 hours) at 10/4/2024 1010  Last data filed at 10/4/2024 0929  Gross per 24 hour   Intake 1055 ml   Output 2100 ml   Net -1045 ml       Physical Exam:   Gen: No distress. Alert.   Eyes: PERRL. No sclera icterus. No conjunctival injection.   ENT: No discharge. Pharynx clear.   Neck: No JVD.  No Carotid Bruit. Trachea midline.  Resp: No accessory muscle use. No crackles. No wheezes. No rhonchi.   CV: Regular rate. Regular rhythm. No murmur.  No rub. No edema.   Capillary Refill: Brisk,< 3 seconds   Peripheral Pulses: +2 palpable, equal bilaterally   GI: Non-tender. Non-distended. No masses. No organomegaly. Normal bowel sounds. No hernia.   Skin: Warm and dry. No nodule on exposed extremities. No rash on exposed extremities.   M/S: No cyanosis. No joint deformity. No clubbing.   Neuro: Awake. Grossly nonfocal    Psych: Oriented x 3. No anxiety or agitation.         Medications:  sodium chloride flush, 5-40 mL, 2 times per day  [Held by provider] enoxaparin, 40 mg, Daily  aspirin, 81 mg, Daily  atorvastatin, 80 mg, Nightly  carbidopa-levodopa, 1 tablet, BID  carbidopa-levodopa, 1 tablet, 4x Daily  DULoxetine, 60 mg, Daily  famotidine, 20 mg, BID  gabapentin, 200 mg, TID  insulin lispro, 30 Units, Nightly  insulin lispro, 15 Units, Daily with breakfast  QUEtiapine, 100 mg, Nightly  insulin lispro, 0-4 Units, TID WC  insulin lispro, 0-4 Units, Nightly  lisinopril, 5 mg, Daily  metoprolol succinate, 50 mg, BID  divalproex, 500 mg, Daily with breakfast  divalproex, 1,000 mg, Nightly  [START ON 10/7/2024] furosemide, 20 mg, Weekly - Monday   And  furosemide, 20 mg, Weekly - Thursday  empagliflozin, 25 mg, QAM  tamsulosin, 0.4 mg, Daily      PRN  C3-C4   with a large posterior disc osteophyte complex causing severe effacement of   the left lateral recess.             Assessment/Plan:  Frequent falls.  -due to progressive Parkinson's.  -fall precautions.  -PT/OT.  -case management consult for placement.    Hyponatremia.  -Na 129, corrects to 132 in the setting of hyperglycemia.     Hyperlipidemia.   -continue statin.     Parkinson disease.  -continue Sinemet.     HTN.  -continue lasix M/T, Toprol XL, lisinopril.    CAD.  -S/p stends.  -on ASA, statin, BB.    Paroxysmal atrial fibrillation.  -s/p watchman.    NSVT.  Hx ischemic CM.  -s/p ICD.  -continue jardiace, lisinopril, lasix, Toprol XL.    Depression.  -continue Cymbalta, Seroquel.    GERD.  -continue Pepcid.    Bipolar disorder.  -continue home regimen.     COPD.  -does not appear to be in acute exacerbation.  -continue home meds.     Type II diabetes mellitus.  -  continue SSI  - POC Glucose, carb control diet     Thrombocytopenia  Chronic     DVT Prophylaxis: SCDs.  Diet: ADULT DIET; Regular; 4 carb choices (60 gm/meal); Low Sodium (2 gm)  Code Status: Full Code    Pt/ot    Needs donovan BOSWELL MD 10/4/2024 10:10 AM

## 2024-10-04 NOTE — CARE COORDINATION
DISCHARGE ORDER  Date/Time 10/4/2024 2:42 PM  Completed by: Licha Vazquez RN, Case Management    Patient Name: Chano Quiroga Jr.    : 1960      Admit order Date and Status:24 IP  Noted discharge order. (verify MD's last order for status of admission/Traditional Medicare 3 [place] Inpatient qualifying stay required for SNF)    Confirmed discharge plan with:              Patient:  Yes              When pt confirms DC plan does any support person need to be contacted by CM Yes . Pt to call family                     Discharge to Facility: [facility] phone number for staff giving report: [phone]   Pre-certification completed: yes. Pt able to DC to facility today per [facility] Exemption Notification (HENS) completed: yes   Discharge orders and Continuity of Care faxed to facility:  epic      Transportation:               Medical Transport explained with choice list offered to pt/family.                Choice:(no preference)  Agency used: [facility]   time:   1600      Pt/family/Nursing/Facility aware of  time: 1600  Yes Names: Chano Hurt CarrieDana  Ambulance form completed:  yes:      Date Last IMM Given: 10/4    Comments:    Pt is being d/c'd to [facility] today. Pt's O2 sats are 98% on RA.    Discharge timeout done with Pt, CM, RN. All discharge needs and concerns addressed.    Discharging nurse to complete DELTA, reconcile AVS, and place final copy with patient's discharge packet. Discharging RN to ensure that written prescriptions for  Level II medications are sent with patient to the facility as per protocol.

## 2024-10-04 NOTE — DISCHARGE SUMMARY
Name:  Chano Quiroga  Room:  0229/0229-02  MRN:    [id number]    Discharge Summary      This discharge summary is in conjunction with a complete physical exam done on the day of discharge.      Discharging Physician: DANIELLE BOSWELL MD      Admit: 9/29/2024  Discharge:  10/4/2024     Diagnoses this Admission    Principal Problem:    Falls, initial encounter  Active Problems:    CAD (coronary artery disease)    Gastroesophageal reflux disease without esophagitis    Ischemic cardiomyopathy    Diabetes (HCC)    Parkinsonism (HCC)    Recurrent falls    Hyponatremia  Resolved Problems:    * No resolved hospital problems. *          Procedures (Please Review Full Report for Details)      Consults    IP CONSULT TO CASE MANAGEMENT      HPI:    63 y.o. male with past medical history of hyperlipidemia, Parkinson disease, depression, GERD, type 2 diabetes presented to emergency department with chief complaint of falls.  Patient states that he fell 3 times in 2 hours at home and called EMS to bring him in for evaluation.  Patient states that he has had increasing unsteadiness on his feet for the past several months.  Patient would like to be placed in a facility as he is unable to safely complete ADLs.  Patient denies rest of review of systems.       Hospital Course      Frequent falls.  -due to progressive Parkinson's.  -fall precautions.  -PT/OT.  -case management consult for placement.     Hyponatremia.  -Na 129, corrects to 132 in the setting of hyperglycemia.      Hyperlipidemia.   -continue statin.      Parkinson disease.  -continue Sinemet.      HTN.  -continue lasix M/T, Toprol XL, lisinopril.     CAD.  -S/p stends.  -on ASA, statin, BB.     Paroxysmal atrial fibrillation.  -s/p watchman.     NSVT.  Hx ischemic CM.  -s/p ICD.  -continue jardiace, lisinopril, lasix, Toprol XL.     Depression.  -continue Cymbalta, Seroquel.     GERD.  -continue Pepcid.     Bipolar disorder.  -continue home regimen.     carefully, and ask your doctor or other care provider to review them with you.                STOP taking these medications      BASAGLAR KWIKPEN SC     Ozempic (0.25 or 0.5 MG/DOSE) 2 MG/3ML Sopn  Generic drug: Semaglutide(0.25 or 0.5MG/DOS)     Trulicity 3 MG/0.5ML Sopn  Generic drug: Dulaglutide               Where to Get Your Medications        Information about where to get these medications is not yet available    Ask your nurse or doctor about these medications  insulin lispro 100 UNIT/ML Soln injection vial  QUEtiapine 100 MG tablet           Discharge Condition/Location: Stable to SNF     Follow Up:  Follow up with PCP.    Total time spent on discharge is 35 minutes      DANIELLE BOSWELL MD 10/4/2024 1:52 PM

## 2024-10-04 NOTE — PLAN OF CARE
HEART FAILURE CARE PLAN:    Comorbidities Reviewed: Yes   Patient has a past medical history of Arthritis, Asthma, CAD (coronary artery disease), CHF (congestive heart failure) (HCC), Chronic obstructive pulmonary disease (HCC), COPD (chronic obstructive pulmonary disease) (HCC), Fatty liver, GERD (gastroesophageal reflux disease), Hyperlipidemia, Hypertension, Medical history reviewed with no changes, MI, old, Myocardial infarction, nontransmural (HCC), Parkinson's disease (HCC), Sleep apnea, and Type II or unspecified type diabetes mellitus without mention of complication, not stated as uncontrolled.     Weights Reviewed: Yes   Admission weight: 99.8 kg (220 lb)   Wt Readings from Last 3 Encounters:   10/03/24 100.2 kg (221 lb)   09/03/24 104.1 kg (229 lb 8 oz)   08/01/24 104.3 kg (230 lb)     Intake & Output Reviewed: Yes     Intake/Output Summary (Last 24 hours) at 10/4/2024 0859  Last data filed at 10/4/2024 0848  Gross per 24 hour   Intake 1055 ml   Output 1650 ml   Net -595 ml       ECHOCARDIOGRAM Reviewed: Yes   Patient's Ejection Fraction (EF) is greater than 40%     Medications Reviewed: Yes   SCHEDULED HOSPITAL MEDICATIONS:   sodium chloride flush  5-40 mL IntraVENous 2 times per day    [Held by provider] enoxaparin  40 mg SubCUTAneous Daily    aspirin  81 mg Oral Daily    atorvastatin  80 mg Oral Nightly    carbidopa-levodopa  1 tablet Oral BID    carbidopa-levodopa  1 tablet Oral 4x Daily    DULoxetine  60 mg Oral Daily    famotidine  20 mg Oral BID    gabapentin  200 mg Oral TID    insulin lispro  30 Units SubCUTAneous Nightly    insulin lispro  15 Units SubCUTAneous Daily with breakfast    QUEtiapine  100 mg Oral Nightly    insulin lispro  0-4 Units SubCUTAneous TID     insulin lispro  0-4 Units SubCUTAneous Nightly    lisinopril  5 mg Oral Daily    metoprolol succinate  50 mg Oral BID    divalproex  500 mg Oral Daily with breakfast    divalproex  1,000 mg Oral Nightly    [START ON 10/7/2024]

## 2024-10-04 NOTE — PLAN OF CARE
Problem: Safety - Adult  Goal: Free from fall injury  10/4/2024 0054 by Jeanette Kyle RN  Outcome: Progressing  10/3/2024 1256 by Dana Partida RN  Outcome: Progressing     Problem: Pain  Goal: Verbalizes/displays adequate comfort level or baseline comfort level  10/4/2024 0054 by Jeanette Kyle RN  Outcome: Progressing  10/3/2024 1256 by Dana Partida RN  Outcome: Progressing     Problem: Discharge Planning  Goal: Discharge to home or other facility with appropriate resources  10/4/2024 0054 by Jeanette Kyle RN  Outcome: Progressing  10/3/2024 1256 by Dana Partida RN  Outcome: Progressing     Problem: Chronic Conditions and Co-morbidities  Goal: Patient's chronic conditions and co-morbidity symptoms are monitored and maintained or improved  10/3/2024 1256 by Dana Partida RN  Outcome: Progressing     Problem: Respiratory - Adult  Goal: Achieves optimal ventilation and oxygenation  10/4/2024 0054 by Jeanette Kyle RN  Outcome: Progressing  10/3/2024 1256 by Dana Partida RN  Outcome: Progressing     Problem: Cardiovascular - Adult  Goal: Maintains optimal cardiac output and hemodynamic stability  10/4/2024 0054 by Jeanette Kyle RN  Outcome: Progressing  10/3/2024 1256 by Dana Partida RN  Outcome: Progressing  Goal: Absence of cardiac dysrhythmias or at baseline  10/4/2024 0054 by Jeanette Kyle RN  Outcome: Progressing  10/3/2024 1256 by Dana Partida RN  Outcome: Progressing

## 2024-10-04 NOTE — DISCHARGE INSTR - COC
10/3/2024    Intake/Output Summary (Last 24 hours) at 10/4/2024 1351  Last data filed at 10/4/2024 0929  Gross per 24 hour   Intake 815 ml   Output 2100 ml   Net -1285 ml     I/O last 3 completed shifts:  In: 1010 [P.O.:1000; I.V.:10]  Out: 2350 [Urine:2350]    Safety Concerns:     History of Falls (last 30 days) and At Risk for Falls    Impairments/Disabilities:      None    Nutrition Therapy:  Current Nutrition Therapy:   - Oral Diet:  Carb Control 4 carbs/meal (1800kcals/day) and Low Sodium (2gm)    Routes of Feeding: Oral  Liquids: Thin Liquids  Daily Fluid Restriction: no  Last Modified Barium Swallow with Video (Video Swallowing Test): not done    Treatments at the Time of Hospital Discharge:   Respiratory Treatments: N/A  Oxygen Therapy:  is not on home oxygen therapy.  Ventilator:    - No ventilator support    Rehab Therapies: Physical Therapy and Occupational Therapy  Weight Bearing Status/Restrictions: No weight bearing restrictions  Other Medical Equipment (for information only, NOT a DME order):  walker  Other Treatments: N/A    Patient's personal belongings (please select all that are sent with patient):  One pair of red shorts and personal fan.    RN SIGNATURE:  Electronically signed by Dana Bolton RN on 10/4/24 at 3:37 PM EDT    CASE MANAGEMENT/SOCIAL WORK SECTION    Inpatient Status Date: 9/29/24 IP    Readmission Risk Assessment Score: 20%  Readmission Risk              Risk of Unplanned Readmission:  34           Discharging to Facility/ Agency   Name: [facility]  Address:  Phone: [phone]  Fax:        / signature: Electronically signed by Licha Vazquez RN on 10/4/24 at 2:07 PM EDT    PHYSICIAN SECTION    Prognosis: Good    Condition at Discharge: Stable    Rehab Potential (if transferring to Rehab): Good    Recommended Labs or Other Treatments After Discharge: none    Physician Certification: I certify the above information and transfer of Chano Quiroga  Jr.  is necessary for the continuing treatment of the diagnosis listed and that he requires Skilled Nursing Facility for less 30 days.     Update Admission H&P: No change in H&P    PHYSICIAN SIGNATURE:  Electronically signed by DANIELLE BOSWELL MD on 10/4/24 at 1:51 PM EDT

## 2024-10-04 NOTE — PROGRESS NOTES
Progress Note    Admit Date:  9/29/2024    Subjective:  Mr. Quiroga denies complaints.  Awaiting placement.    Objective:   Patient Vitals for the past 4 hrs:   BP Temp Temp src Pulse Resp SpO2   10/04/24 0848 126/77 97.6 °F (36.4 °C) Oral 69 18 97 %            Intake/Output Summary (Last 24 hours) at 10/4/2024 1007  Last data filed at 10/4/2024 0929  Gross per 24 hour   Intake 1055 ml   Output 2100 ml   Net -1045 ml       Physical Exam:   Gen: No distress. Alert.   Eyes: PERRL. No sclera icterus. No conjunctival injection.   ENT: No discharge. Pharynx clear.   Neck: No JVD.  No Carotid Bruit. Trachea midline.  Resp: No accessory muscle use. No crackles. No wheezes. No rhonchi.   CV: Regular rate. Regular rhythm. No murmur.  No rub. No edema.   Capillary Refill: Brisk,< 3 seconds   Peripheral Pulses: +2 palpable, equal bilaterally   GI: Non-tender. Non-distended. No masses. No organomegaly. Normal bowel sounds. No hernia.   Skin: Warm and dry. No nodule on exposed extremities. No rash on exposed extremities.   M/S: No cyanosis. No joint deformity. No clubbing.   Neuro: Awake. Grossly nonfocal    Psych: Oriented x 3. No anxiety or agitation.         Medications:  sodium chloride flush, 5-40 mL, 2 times per day  [Held by provider] enoxaparin, 40 mg, Daily  aspirin, 81 mg, Daily  atorvastatin, 80 mg, Nightly  carbidopa-levodopa, 1 tablet, BID  carbidopa-levodopa, 1 tablet, 4x Daily  DULoxetine, 60 mg, Daily  famotidine, 20 mg, BID  gabapentin, 200 mg, TID  insulin lispro, 30 Units, Nightly  insulin lispro, 15 Units, Daily with breakfast  QUEtiapine, 100 mg, Nightly  insulin lispro, 0-4 Units, TID WC  insulin lispro, 0-4 Units, Nightly  lisinopril, 5 mg, Daily  metoprolol succinate, 50 mg, BID  divalproex, 500 mg, Daily with breakfast  divalproex, 1,000 mg, Nightly  [START ON 10/7/2024] furosemide, 20 mg, Weekly - Monday   And  furosemide, 20 mg, Weekly - Thursday  empagliflozin, 25 mg, QAM  tamsulosin, 0.4 mg,

## 2024-10-04 NOTE — FLOWSHEET NOTE
Pt A/Ox4. VSS. Denies pain. Pt unlabored; respirations even, regular, effortless. RA. No distress noted. Shift assessment complete. See flowsheet. AM med's given. See MAR. Denies needs at this time. Bed alarm on. Telesitter remains in place. Side rails 2/4. Bed in low position. Call light within reach.     Bedside Mobility Assessment Tool (BMAT):     Assessment Level 1- Sit and Shake    1. From a semi-reclined position, ask patient to sit up and rotate to a seated position at the side of the bed. Can use the bedrail.    2. Ask patient to reach out and grab your hand and shake making sure patient reaches across his/her midline.   Pass- Patient is able to come to a seated position, maintain core strength. Maintains seated balance while reaching across midline. Move on to Assessment Level 2.     Assessment Level 2- Stretch and Point   1. With patient in seated position at the side of the bed, have patient place both feet on the floor (or stool) with knees no higher than hips.    2. Ask patient to stretch one leg and straighten the knee, then bend the ankle/flex and point the toes. If appropriate, repeat with the other leg.   Pass- Patient is able to demonstrate appropriate quad strength on intended weight bearing limb(s). Move onto Assessment Level 3.     Assessment Level 3- Stand   1. Ask patient to elevate off the bed or chair (seated to standing) using an assistive device (cane, bedrail).    2. Patient should be able to raise buttocks off be and hold for a count of five. May repeat once.   Pass- Patient maintains standing stability for at least 5 seconds, proceed to assessment level 4.    Assessment Level 4- Walk   1. Ask patient to march in place at bedside.    2. Then ask patient to advance step and return each foot. Some medical conditions may render a patient from stepping backwards, use your best clinical judgement.   Fail- Patient not able to complete tasks OR requires use of assistive device. Patient is

## 2024-10-04 NOTE — FLOWSHEET NOTE
10/03/24 2011   Vital Signs   Temp 97.4 °F (36.3 °C)   Temp Source Oral   Pulse 82   Heart Rate Source Monitor   Respirations 16   /64   MAP (Calculated) 82   BP Location Left lower arm   BP Method Automatic   Patient Position Semi fowlers   Oxygen Therapy   SpO2 97 %   O2 Device None (Room air)     PM assessment complete. Medications given as ordered. Vital signs stable. Pt alert and oriented x4 at this time. Snack provided. Bed alarm set. Apaja video monitor in place for safety. Call light within reach. Belongings within reach. Plan of care reviewed. No other needs identified at this time.

## 2024-10-04 NOTE — PROGRESS NOTES
4 Eyes Skin Assessment     NAME:  Chano Quiroga Jr.  YOB: 1960  MEDICAL RECORD NUMBER:  [id number]    The patient is being assessed for  Other Discharge to SNF    I agree that at least one RN has performed a thorough Head to Toe Skin Assessment on the patient. ALL assessment sites listed below have been assessed.      Areas assessed by both nurses: Scattered abrasions to bilateral knees. Small abrasion to left anterior great toe.    Head, Face, Ears, Shoulders, Back, Chest, Arms, Elbows, Hands, Sacrum. Buttock, Coccyx, Ischium, and Legs. Feet and Heels        Does the Patient have a Wound? No noted wound(s)       Omega Prevention initiated by RN: No  Wound Care Orders initiated by RN: No    Pressure Injury (Stage 3,4, Unstageable, DTI, NWPT, and Complex wounds) if present, place Wound referral order by RN under : No    New Ostomies, if present place, Ostomy referral order under : No     Nurse 1 eSignature: Electronically signed by Dana Bolton RN on 10/4/24 at 3:27 PM EDT    **SHARE this note so that the co-signing nurse can place an eSignature**    Nurse 2 eSignature: Electronically signed by Phuong Tse RN on 10/4/24 at 3:53 PM EDT

## 2024-10-04 NOTE — PROGRESS NOTES
Inpatient Physical Therapy Treatment    Unit: [address] [facility]  Date:  10/4/2024  Patient Name:    Chano Quiroga Jr.  Admitting diagnosis:  Hyponatremia [E87.1]  Recurrent falls [R29.6]  Falls, initial encounter [W19.XXXA]  Parkinsonism, unspecified Parkinsonism type (HCC) [G20.C]  Admit Date:  9/29/2024  Precautions/Restrictions/WB Status/ Lines/ Wounds/ Oxygen: Fall risk, Bed/chair alarm, Lines (IV), Confusion, and Telesitter    Treatment Time:  2069-4278  Treatment Number:  3  Timed Code Treatment Minutes: 23 minutes  Total Treatment Minutes:  23  minutes    Patient Stated Goals for Therapy: \" to go home \"          Discharge Recommendations: SNF  DME needs for discharge: Defer to facility       Therapy recommendation for EMS Transport: can transport by wheelchair    Therapy recommendations for staff:   Assist of 1 for ambulation with use of rolling walker (RW) and gait belt to/from chair  to/from bathroom    History of Present Illness:   Per Dr. Cao H&P 9/29/24:  \"63 y.o. male with past medical history of hyperlipidemia, Parkinson disease, depression, GERD, type 2 diabetes presented to emergency department with chief complaint of falls.  Patient states that he fell 3 times in 2 hours at home and called EMS to bring him in for evaluation.  Patient states that he has had increasing unsteadiness on his feet for the past several months.  Patient would like to be placed in a facility as he is unable to safely complete ADLs.\"     AM-PAC Mobility Score    AM-PAC Inpatient Mobility Raw Score : 13       Subjective  Patient lying reclined in bed with no family present.  Pt agreeable to this PT session. Pt agreeable to ambulation.     Cognition    A&O orientation not directly assessed.    Able to follow 1 step commands    Pain   No  Location:   Rating: NA /10  Pain Medicine Status: Denies need    Activity Tolerance   During therapy session noted pt with fatigue- vitals not assessed due to pt impulsivity and getting up to EOB  proper neutral alignment and pressure relief provided.   Call light provided and all needs within reach  RN aware of pt position/status  Telesitter present in room    Other Activities  none    Patient/Family Education   Pt educated on role of inpatient PT, POC, importance of continued activity, DC recommendations, safety awareness, transfer techniques, and calling for assist with mobility.      Assessment  Pt requires assistance throughout all functional transfers and ambulation this date due to decreased balance, decreased safety and decreased insight into deficits. Pt requires Max VC for safety throughout session. Pt is at an increased risk of falls and is not safe to return home alone at this time. Pt would continue to benefit from skilled PT services to promote increased strength, balance and functional activity tolerance. Recommend continued skilled PT services upon discharge.       Recommending SNF upon discharge as patient functioning well below baseline, demonstrates good rehab potential and unable to return home due to limited or no family support, inability to negotiate stairs to enter home/bedroom/bathroom, burden of care beyond caregiver ability, home environment not conducive to patient recovery, and limited safety awareness.      Goals :   To be met in 3 visits:  1). Independent with LE Ex x 10 reps  2). Sit to/from stand: Supervision  3). Bed to chair: Supervision    To be met in 6 visits:  1).  Supine to/from sit: Independent  2).  Sit to/from stand: Independent  3).  Bed to chair: Independent  4).  Gait: Ambulate 50 ft.  with Independent and use of LRAD (least restrictive assistive device)  5).  Tolerate B LE exercises 3 sets of 10-15 reps  6).  Ascend/descend curb step with Independent with use of no handrail and LRAD (least restrictive assistive device)    Rehabilitation Potential: Good  Strengths for achieving goals include:              Pt motivated and Pt cooperative   Barriers to achieving

## 2024-10-10 NOTE — TELEPHONE ENCOUNTER
ELLIS Enciso at [facility] and confirmed they did receive CPAP orders. They are just waiting on verification from insurance. Needs to schedule 31-90 day. Scheduled for 11/30/18. Pt informed.
Pt cancelled 31-90 day fu appt with Jose Bennett on 10/1/18. He has not been set up. He went to [employer] but could not afford machine or copay. I suggested that patient contact his insurance to get the name of other companies who are covered under his plan and then call them to see if they have a better payment plan that he can afford. Pt verbalized understanding and said he would call around. I told him we would follow up with him in 1 week to check on his status. Last OV 8/20/18  Assessment:       · Severe ALIN. CPAP 12 cm H2O- CPAP broke, lost pressure and patient is no longer using it. · Obesity  · Asthma followed by PCP  · Ischemic cardiomyopathy, ICD, CAD- followed by cardiology  Plan:        - Order new CPAP 12 cm H2O (ASAP) as old CPAP had loss of pressure no longer working.  -Titration if no improvement  - Advised to use CPAP 6-8 hrs at night and during naps. - Replacement of mask, tubing, head straps every 3-6 months or sooner if damaged. - Patient instructed to contact DME company for any mask, tubing or machine trouble shooting if problems arise.  - Sleep hygiene  - Avoid sedatives, alcohol and caffeinated drinks at bed time. - Patient counseled to never drive or operate heavy machinery while fatigue, drowsy or sleepy.    - Weight loss is recommended as a long-term intervention.    - Complications of ALIN if not treated were discussed with patient patient, including: systemic hypertension, pulmonary hypertension, cardiovascular morbidities, car accidents and all cause mortality.  -Patient education handout provided regarding sleep tips and CPAP cleaning recommendations    
Recommend treat severe sleep apnea. Noted, agree f/u in a week.
0 (no pain/absence of nonverbal indicators of pain)

## 2024-10-11 RX ORDER — METOPROLOL SUCCINATE 50 MG/1
50 TABLET, EXTENDED RELEASE ORAL 2 TIMES DAILY
Qty: 56 TABLET | Refills: 0 | OUTPATIENT
Start: 2024-10-11

## 2024-10-17 NOTE — TELEPHONE ENCOUNTER
Last OV 9/25/23 NPLR  No upcoming OV  CMP 10/2/24    Plan:   1.  Stop Plavix after 10/17/2023 (6 months post watchman)  2.  Continue aspirin, atorvastatin, lisinopril, Toprol, Jardiance  3.  Check BP at home and call the office if consistently out of goal range  4.  Regular exercise and healthy diet encouraged  5.  Follow-up with Dr. Hilario in 8 months  6.  Yearly EP follow-up in 2024    Attempted to call pt, no answer. Unable to LMOVM no VM set up.

## 2024-10-18 ENCOUNTER — OFFICE VISIT (OUTPATIENT)
Dept: ORTHOPEDIC SURGERY | Age: 64
End: 2024-10-18
Payer: MEDICARE

## 2024-10-18 ENCOUNTER — HOSPITAL ENCOUNTER (EMERGENCY)
Age: 64
Discharge: HOME | End: 2024-10-18
Attending: EMERGENCY MEDICINE
Payer: MEDICARE

## 2024-10-18 ENCOUNTER — APPOINTMENT (OUTPATIENT)
Dept: GENERAL RADIOLOGY | Age: 64
End: 2024-10-18
Payer: MEDICARE

## 2024-10-18 ENCOUNTER — APPOINTMENT (OUTPATIENT)
Dept: CT IMAGING | Age: 64
End: 2024-10-18
Payer: MEDICARE

## 2024-10-18 VITALS
DIASTOLIC BLOOD PRESSURE: 60 MMHG | RESPIRATION RATE: 18 BRPM | HEART RATE: 81 BPM | BODY MASS INDEX: 39.27 KG/M2 | WEIGHT: 230 LBS | OXYGEN SATURATION: 96 % | TEMPERATURE: 97.5 F | SYSTOLIC BLOOD PRESSURE: 154 MMHG | HEIGHT: 64 IN

## 2024-10-18 VITALS — HEIGHT: 64 IN | BODY MASS INDEX: 39.27 KG/M2 | WEIGHT: 230 LBS

## 2024-10-18 DIAGNOSIS — M25.561 PAIN IN BOTH KNEES, UNSPECIFIED CHRONICITY: Primary | ICD-10-CM
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
## 2024-10-18 DIAGNOSIS — M25.562 PAIN IN BOTH KNEES, UNSPECIFIED CHRONICITY: Primary | ICD-10-CM

## 2024-10-18 DIAGNOSIS — T07.XXXA ABRASIONS OF MULTIPLE SITES: ICD-10-CM

## 2024-10-18 DIAGNOSIS — S09.90XA INJURY OF HEAD, INITIAL ENCOUNTER: ICD-10-CM

## 2024-10-18 DIAGNOSIS — M47.812 CERVICAL SPONDYLOSIS: ICD-10-CM

## 2024-10-18 LAB
ALBUMIN SERPL-MCNC: 4.4 G/DL (ref 3.4–5)
ALBUMIN/GLOB SERPL: 2 {RATIO} (ref 1.1–2.2)
ALP SERPL-CCNC: 87 U/L (ref 40–129)
ALT SERPL-CCNC: 7 U/L (ref 10–40)
ANION GAP SERPL CALCULATED.3IONS-SCNC: 12 MMOL/L (ref 3–16)
AST SERPL-CCNC: 18 U/L (ref 15–37)
BASOPHILS # BLD: 0 K/UL (ref 0–0.2)
BASOPHILS NFR BLD: 0.4 %
BILIRUB SERPL-MCNC: 0.6 MG/DL (ref 0–1)
BUN SERPL-MCNC: 8 MG/DL (ref 7–20)
CALCIUM SERPL-MCNC: 9.8 MG/DL (ref 8.3–10.6)
CHLORIDE SERPL-SCNC: 99 MMOL/L (ref 99–110)
CO2 SERPL-SCNC: 26 MMOL/L (ref 21–32)
CREAT SERPL-MCNC: 0.7 MG/DL (ref 0.8–1.3)
DEPRECATED RDW RBC AUTO: 15.7 % (ref 12.4–15.4)
EOSINOPHIL # BLD: 0 K/UL (ref 0–0.6)
EOSINOPHIL NFR BLD: 0.5 %
GFR SERPLBLD CREATININE-BSD FMLA CKD-EPI: >90 ML/MIN/{1.73_M2}
GLUCOSE SERPL-MCNC: 167 MG/DL (ref 70–99)
HCT VFR BLD AUTO: 44.4 % (ref 40.5–52.5)
HGB BLD-MCNC: 14.4 G/DL (ref 13.5–17.5)
LYMPHOCYTES # BLD: 1.6 K/UL (ref 1–5.1)
LYMPHOCYTES NFR BLD: 27.9 %
MCH RBC QN AUTO: 29.6 PG (ref 26–34)
MCHC RBC AUTO-ENTMCNC: 32.5 G/DL (ref 31–36)
MCV RBC AUTO: 90.9 FL (ref 80–100)
MONOCYTES # BLD: 0.6 K/UL (ref 0–1.3)
MONOCYTES NFR BLD: 9.7 %
NEUTROPHILS # BLD: 3.5 K/UL (ref 1.7–7.7)
NEUTROPHILS NFR BLD: 61.5 %
NT-PROBNP SERPL-MCNC: 461 PG/ML (ref 0–124)
PLATELET # BLD AUTO: 99 K/UL (ref 135–450)
PMV BLD AUTO: 7.2 FL (ref 5–10.5)
POTASSIUM SERPL-SCNC: 4.6 MMOL/L (ref 3.5–5.1)
PROT SERPL-MCNC: 6.6 G/DL (ref 6.4–8.2)
RBC # BLD AUTO: 4.88 M/UL (ref 4.2–5.9)
SODIUM SERPL-SCNC: 137 MMOL/L (ref 136–145)
TROPONIN, HIGH SENSITIVITY: 17 NG/L (ref 0–22)
WBC # BLD AUTO: 5.7 K/UL (ref 4–11)

## 2024-10-18 PROCEDURE — 84484 ASSAY OF TROPONIN QUANT: CPT

## 2024-10-18 PROCEDURE — 99205 OFFICE O/P NEW HI 60 MIN: CPT | Performed by: STUDENT IN AN ORGANIZED HEALTH CARE EDUCATION/TRAINING PROGRAM

## 2024-10-18 PROCEDURE — 71045 X-RAY EXAM CHEST 1 VIEW: CPT

## 2024-10-18 PROCEDURE — 99285 EMERGENCY DEPT VISIT HI MDM: CPT

## 2024-10-18 PROCEDURE — 36415 COLL VENOUS BLD VENIPUNCTURE: CPT

## 2024-10-18 PROCEDURE — 83880 ASSAY OF NATRIURETIC PEPTIDE: CPT

## 2024-10-18 PROCEDURE — 85025 COMPLETE CBC W/AUTO DIFF WBC: CPT

## 2024-10-18 PROCEDURE — 96360 HYDRATION IV INFUSION INIT: CPT

## 2024-10-18 PROCEDURE — 70450 CT HEAD/BRAIN W/O DYE: CPT

## 2024-10-18 PROCEDURE — 2580000003 HC RX 258: Performed by: EMERGENCY MEDICINE

## 2024-10-18 PROCEDURE — 93005 ELECTROCARDIOGRAM TRACING: CPT | Performed by: EMERGENCY MEDICINE

## 2024-10-18 PROCEDURE — 80053 COMPREHEN METABOLIC PANEL: CPT

## 2024-10-18 RX ORDER — 0.9 % SODIUM CHLORIDE 0.9 %
1000 INTRAVENOUS SOLUTION INTRAVENOUS ONCE
Status: COMPLETED | OUTPATIENT
Start: 2024-10-18 | End: 2024-10-18

## 2024-10-18 RX ADMIN — SODIUM CHLORIDE 1000 ML: 9 INJECTION, SOLUTION INTRAVENOUS at 16:28

## 2024-10-18 ASSESSMENT — PAIN DESCRIPTION - ONSET: ONSET: ON-GOING

## 2024-10-18 ASSESSMENT — PAIN DESCRIPTION - LOCATION: LOCATION: ABDOMEN

## 2024-10-18 ASSESSMENT — PAIN - FUNCTIONAL ASSESSMENT
PAIN_FUNCTIONAL_ASSESSMENT: 0-10
PAIN_FUNCTIONAL_ASSESSMENT: ACTIVITIES ARE NOT PREVENTED

## 2024-10-18 ASSESSMENT — PAIN DESCRIPTION - DESCRIPTORS: DESCRIPTORS: DISCOMFORT

## 2024-10-18 ASSESSMENT — PAIN DESCRIPTION - ORIENTATION: ORIENTATION: MID

## 2024-10-18 ASSESSMENT — PAIN DESCRIPTION - PAIN TYPE: TYPE: ACUTE PAIN

## 2024-10-18 NOTE — PROGRESS NOTES
Date:  10/18/2024    Name:  Chano Quiroga Jr.  Address:  [address] [place] [address]  [address] [place] [id number]    :  1960      Age:   63 y.o.    SSN:  [id number]      Medical Record Number:  [id number]    Reason for Visit:    Chief Complaint    Knee Pain (NP BILAT KNEE )      DOS:10/18/2024     HPI: Chano Quiroga is a 63 y.o. male here today for new patient evaluation regarding his bilateral knees and balance.  The patient reports that he has had at least 94 falls since the summer.  He has been picked up multiple times by EMS due to these falls.  His wife passed away in 2017 so he lives alone.  The patient denies any knee pain.  He reports some neck and lower back pain.  The patient had a recent hospitalization at the end of September, where CT scans of the cervical and lumbar spine were performed demonstrating multilevel degenerative disease especially in the cervical spine.  The patient does have a history of Parkinson's disease and has a shuffling gait.         ROS: All systems reviewed on patient intake form.  Pertinent items are noted in HPI.        Past Medical History:   Diagnosis Date    Arthritis     Asthma     CAD (coronary artery disease)     CHF (congestive heart failure) (HCC)     Chronic obstructive pulmonary disease (HCC)     COPD (chronic obstructive pulmonary disease) (HCC)     Fatty liver     GERD (gastroesophageal reflux disease)     Hyperlipidemia     Hypertension     Medical history reviewed with no changes     MI, old     Myocardial infarction, nontransmural (HCC) 2012    Parkinson's disease (HCC)     Sleep apnea     pt wears cpap at night. states does not have cpap    Type II or unspecified type diabetes mellitus without mention of complication, not stated as uncontrolled         Past Surgical History:   Procedure Laterality Date    CARDIAC PACEMAKER PLACEMENT      NOT MRI COMPATIABLE OLD LEADS [facility]. pace maker difib    CARDIOVASCULAR SURGERY      Watchman

## 2024-10-18 NOTE — ED NOTES
AVS provided and reviewed with the patient. The patient verbalized understanding of care at home, follow up care, and emergent symptoms to return for. No questions or concerns verbalized at this time. The patient is alert, oriented, stable, and assisted out of the department wheelchair to the lobby at the time of discharge.

## 2024-10-18 NOTE — ED TRIAGE NOTES
Discharged home from the nursing home yesterday. States he fell twice today. Abrasions to the left side of the head, bilateral knees, and left wrist. Reports dizziness and nausea. Denies LOC with fall; reports he lost his balance, which is not new for him. Zofran 4 mg PO given by EMS.

## 2024-10-18 NOTE — ED PROVIDER NOTES
EMERGENCY DEPARTMENT ENCOUNTER     [facility]     Pt Name: Chano Quiroga Jr.   MRN: [id number]   Birthdate 1960   Date of evaluation: 10/18/2024   Provider: VI PLATT MD   PCP: Amalia Phillip APRN - NP   Note Started: 4:27 PM EDT 10/18/24     CHIEF COMPLAINT     Chief Complaint   Patient presents with    Nausea        HISTORY OF PRESENT ILLNESS:  History from : Patient   Limitations to history : None     Chano Quiroga Jr. is a 63 y.o. male who presents with 2 falls today, 1 in the bank and 1 in his home.  The patient states that he has difficulty with his balance.  He has a walker, cane and gets dizzy easily.  He was just released from the nursing home yesterday back to his apartment.  He has had no changes to his medications he has been eating and drinking normally.  He was able to get himself up and off the ground both times, but felt nauseated after hitting his head this afternoon.  He denies having chest pain or shortness of breath.  He has no dysuria, frequency or urgency.  He has no pain in his back.  He did scrape his head, knees and left wrist.  He does not have any swelling of his knees or his wrist.  He does have a pacemaker and defibrillator.    Nursing Notes were all reviewed and agreed with or any disagreements were addressed in the HPI.     ROS: Positives and Pertinent negatives as per HPI.    PAST MEDICAL HISTORY     Past medical history:  has a past medical history of Arthritis, Asthma, CAD (coronary artery disease), CHF (congestive heart failure) ([facility]), Chronic obstructive pulmonary disease (HCC), COPD (chronic obstructive pulmonary disease) ([facility]), Fatty liver, GERD (gastroesophageal reflux disease), Hyperlipidemia, Hypertension, Medical history reviewed with no changes, MI, old, Myocardial infarction, nontransmural (HCC) (02/22/2012), Parkinson's disease (HCC), Sleep apnea, and Type II or unspecified type diabetes mellitus without mention of

## 2024-10-19 LAB
EKG ATRIAL RATE: 69 BPM
EKG DIAGNOSIS: NORMAL
EKG P AXIS: 59 DEGREES
EKG P-R INTERVAL: 180 MS
EKG Q-T INTERVAL: 374 MS
EKG QRS DURATION: 98 MS
EKG QTC CALCULATION (BAZETT): 400 MS
EKG R AXIS: 10 DEGREES
EKG T AXIS: 12 DEGREES
EKG VENTRICULAR RATE: 69 BPM

## 2024-10-19 PROCEDURE — 93010 ELECTROCARDIOGRAM REPORT: CPT | Performed by: INTERNAL MEDICINE

## 2024-10-21 RX ORDER — METOPROLOL SUCCINATE 50 MG/1
50 TABLET, EXTENDED RELEASE ORAL 2 TIMES DAILY
Qty: 56 TABLET | Refills: 0 | Status: SHIPPED | OUTPATIENT
Start: 2024-10-21

## 2024-10-23 ENCOUNTER — HOSPITAL ENCOUNTER (EMERGENCY)
Age: 64
Discharge: HOME | End: 2024-10-23
Attending: EMERGENCY MEDICINE
Payer: MEDICARE

## 2024-10-23 VITALS
DIASTOLIC BLOOD PRESSURE: 70 MMHG | RESPIRATION RATE: 16 BRPM | TEMPERATURE: 97.9 F | BODY MASS INDEX: 44.63 KG/M2 | WEIGHT: 260 LBS | HEART RATE: 78 BPM | OXYGEN SATURATION: 98 % | SYSTOLIC BLOOD PRESSURE: 155 MMHG

## 2024-10-23 DIAGNOSIS — R29.6 FREQUENT FALLS: Primary | ICD-10-CM

## 2024-10-23 PROCEDURE — 99283 EMERGENCY DEPT VISIT LOW MDM: CPT

## 2024-10-23 ASSESSMENT — PAIN DESCRIPTION - LOCATION: LOCATION: BACK

## 2024-10-23 ASSESSMENT — PAIN DESCRIPTION - DESCRIPTORS: DESCRIPTORS: SHARP

## 2024-10-23 ASSESSMENT — PAIN DESCRIPTION - FREQUENCY: FREQUENCY: INTERMITTENT

## 2024-10-23 ASSESSMENT — PAIN DESCRIPTION - PAIN TYPE: TYPE: CHRONIC PAIN

## 2024-10-23 ASSESSMENT — PAIN SCALES - GENERAL: PAINLEVEL_OUTOF10: 8

## 2024-10-23 ASSESSMENT — PAIN DESCRIPTION - ORIENTATION: ORIENTATION: LOWER

## 2024-10-23 ASSESSMENT — PAIN - FUNCTIONAL ASSESSMENT: PAIN_FUNCTIONAL_ASSESSMENT: 0-10

## 2024-10-23 NOTE — ED TRIAGE NOTES
Pt states that he was recently discharge from a nursing home and would like to go back. He states that he keeps at falling at home. He states that he keeps falling at home and would like to be in a nursing home

## 2024-10-23 NOTE — ED PROVIDER NOTES
department requesting admission.  Patient has a history of multiple falls.  He states that sometimes he gets dizzy and sometimes his legs give out on him.  He has been seen numerous times for the same complaint and admitted multiple times this year for this.  The last time he was admitted he was discharged to the nursing home.  He states that he discharged him about a week ago and he thinks it was due to insurance issues.  He now wants to go back.  He did not call his primary care physician nor has he told his primary care physician about his desire to go back to the nursing home.  He does not even know his primary care physician's name.  He states that he was told by that [facility] that he could be readmitted.  Patient states he did fall today and hit both of his knees.  He does have multiple abrasions to his knees as well as 1 to the side of his head that is old.  He has not fallen or hit his head today    Patient states he does use a walker and a cane at home but still falls.  He is just unable to care for himself and is requesting placement.  I explained to the patient that we do not have  or case management here and I can attempt to get him admitted again to [facility] but I will get some basic blood work and a urinalysis before hand.     Not long after in the room the patient now wants to leave.  He asked to put the railing down so that he could stand and was helped to a nearby chair.  I asked him then why he came to the emergency department if he just wants to leave soon as he got here.  He states that \"EMS just showed up.\"  When I asked what that meant he pointed to a life alert necklace that he is wearing.  I asked if they forced him to come here and he states \"well they did not hold a gun to my head.\" Patient now just wants to leave.     I have recommended further evaluation, but Chano Quiroga Jr. refuses. The risks (including but not limited to suffering and death) as well as the

## 2024-10-24 ENCOUNTER — OFFICE VISIT (OUTPATIENT)
Dept: ORTHOPEDIC SURGERY | Age: 64
End: 2024-10-24
Payer: MEDICARE

## 2024-10-24 VITALS — WEIGHT: 260 LBS | BODY MASS INDEX: 44.39 KG/M2 | HEIGHT: 64 IN

## 2024-10-24 DIAGNOSIS — M54.50 LUMBAR PAIN: Primary | ICD-10-CM

## 2024-10-24 DIAGNOSIS — M48.02 CERVICAL STENOSIS OF SPINE: ICD-10-CM

## 2024-10-24 DIAGNOSIS — G20.A1 PARKINSON'S DISEASE, UNSPECIFIED WHETHER DYSKINESIA PRESENT, UNSPECIFIED WHETHER MANIFESTATIONS FLUCTUATE (HCC): ICD-10-CM

## 2024-10-24 PROCEDURE — 99214 OFFICE O/P EST MOD 30 MIN: CPT | Performed by: PHYSICIAN ASSISTANT

## 2024-10-24 NOTE — PROGRESS NOTES
ulcerations or lesions.  Strength and tone are normal.    Diagnostic Testing:  CT scan of the cervical spine that was obtained on 9/29/2024 were independently reviewed with the patient which shows a large posterior disc osteophyte complex causing severe effacement of the left lateral recess at C3-4.  There is moderate multilevel spondylosis with disc space narrowing noted.    CT scan of the lumbar spine that was obtained on 9/29/2024 which was independently reviewed with the patient shows healing fracture of the right posterior 12th rib with healing fractures of the right transverse process of L1 and L2.    X-rays: 2 views of the lumbar spine include AP and lateral were obtained today in the office and independently reviewed with the patient which shows the healing fractures of the right transverse processes of L1 and L2 with multilevel spondylosis.      Impression:   Parkinsonism  Imbalance  Healing transverse process fractures right L1 and L2  Cervical stenosis C3-4 which is severe    1. Lumbar pain        Plan:      We discussed treatment options including observation, physical therapy, epidural injections, or additional imaging. He wishes to proceed with referral to the [facility] further continue evaluation care for his ongoing neck pain and arm and lower extremity weakness.    Follow Up: Follow up PRN.    Old records were reviewed.    Total evaluation time including coordination of care: 32 minutes    Jas Handy PA-C  Senior Physician Assistant  Board certified by the [facility]  [facility]

## 2024-11-06 DIAGNOSIS — G20.C PARKINSONISM, UNSPECIFIED PARKINSONISM TYPE (HCC): Primary | ICD-10-CM

## 2024-11-06 RX ORDER — METOPROLOL SUCCINATE 50 MG/1
50 TABLET, EXTENDED RELEASE ORAL 2 TIMES DAILY
Qty: 180 TABLET | Refills: 0 | Status: SHIPPED | OUTPATIENT
Start: 2024-11-06

## 2024-11-06 NOTE — TELEPHONE ENCOUNTER
Last seen: 8/1/24    Next appt: 12/2/24    Last filled: both meds last filled 8/1/24 for 30-day supply with 3 refills.  Pt should have enough refills on file to hold him over until his appt on 12/2/24 but will send to MD in case he wants to send 30-day supply.

## 2024-11-06 NOTE — TELEPHONE ENCOUNTER
Last Office Visit: 8/16/23 Provider: ML  Is provider OOT? No    Next Office Visit: 1/20/25 Provider: ML  **Has patient already had 30 day supply? Yes    LAST LABS:   CMP:   Lab Results   Component Value Date     10/18/2024    K 4.6 10/18/2024    CL 99 10/18/2024    CO2 26 10/18/2024    BUN 8 10/18/2024    CREATININE 0.7 (L) 10/18/2024    GLUCOSE 167 (H) 10/18/2024    CALCIUM 9.8 10/18/2024    BILITOT 0.6 10/18/2024    ALKPHOS 87 10/18/2024    AST 18 10/18/2024    ALT 7 (L) 10/18/2024    LABGLOM >90 10/18/2024    GFRAA >60 10/04/2022    AGRATIO 2.0 10/18/2024    GLOB 2.6 07/13/2021       **Check Care Everywhere? N/A  **Lab orders needed? no    Is encounter provider correct?   Yes  Does refill dosage match last filled?   Yes  Changes to script from Tele Encounters or LOV Plan?   no  Did you adjust dispensed amount or refills to reflect last and upcoming OV?  Yes pt will need enough to get him to next appt.     Requested Prescriptions     Pending Prescriptions Disp Refills    metoprolol succinate (TOPROL XL) 50 MG extended release tablet [Pharmacy Med Name: METOPROLOL SUCCINATE ER 50MG ER TABLET ER 24HR] 56 tablet 5     Sig: TAKE ONE TABLET BY MOUTH TWICE DAILY

## 2024-11-06 NOTE — ED NOTES
History of Present Illness    Immunotherapy Pre-Injection Questionnaire:         Are you currently pregnant or been diagnosed with a new medical condition? No    1. Have you had increased asthmas symptoms (chest tightness, increased cough, wheezing, or shortness of breath) in the past week? No    2. Have you had increase allergy symptoms (itching eyes or nose, sneezing, runny nose, post-nasal drip, or throat-clearing) in the past week? No    3. Have you had a cold, respiratory tract infection, or flu-like symptoms in the past two weeks? No    4. Did you have any problems such as increased allergy or asthma symptoms, hives, or generalized itching within 12 hours or receiving your last injection? No    5. Are you on any new medications? New eye drops? No    6. Patient confirmed their name and birth date on allergy vials are correct. Yes     Assessment/Plan   Diagnoses and all orders for this visit:    1. Allergic rhinitis, unspecified seasonality, unspecified trigger (Primary)      Diagnoses and all orders for this visit                Karely Siddiqui MA 11/6/2024   Pt refusing to take ordered 0900 meds, MD made aware     Rishabh Alxe RN  01/13/22 [id number]

## 2024-11-12 RX ORDER — CARBIDOPA AND LEVODOPA 25; 100 MG/1; MG/1
TABLET ORAL
Qty: 120 TABLET | Refills: 0 | Status: SHIPPED | OUTPATIENT
Start: 2024-11-12

## 2024-11-12 RX ORDER — CARBIDOPA AND LEVODOPA 25; 100 MG/1; MG/1
1 TABLET, EXTENDED RELEASE ORAL 2 TIMES DAILY
Qty: 60 TABLET | Refills: 0 | Status: SHIPPED | OUTPATIENT
Start: 2024-11-12

## 2024-11-13 RX ORDER — FUROSEMIDE 20 MG/1
TABLET ORAL
Qty: 60 TABLET | Refills: 0 | Status: SHIPPED | OUTPATIENT
Start: 2024-11-13

## 2024-11-13 NOTE — TELEPHONE ENCOUNTER
Last Office Visit: 8/16/2023 Provider: ML  Is provider OOT? Yes - RKG is covering provider    Next Office Visit: 1/20/2025 Provider: ML  **Has patient already had 30 day supply? No    LAST LABS:   CMP:   Lab Results   Component Value Date     10/18/2024    K 4.6 10/18/2024    CL 99 10/18/2024    CO2 26 10/18/2024    BUN 8 10/18/2024    CREATININE 0.7 (L) 10/18/2024    GLUCOSE 167 (H) 10/18/2024    CALCIUM 9.8 10/18/2024    BILITOT 0.6 10/18/2024    ALKPHOS 87 10/18/2024    AST 18 10/18/2024    ALT 7 (L) 10/18/2024    LABGLOM >90 10/18/2024    GFRAA >60 10/04/2022    AGRATIO 2.0 10/18/2024    GLOB 2.6 07/13/2021     **Check Care Everywhere? N/A  **Lab orders needed? no - pt has updated lab work in chart.     Is encounter provider correct?   Yes  Does refill dosage match last filled?   Yes  Changes to script from Tele Encounters or LOV Plan?   no  Did you adjust dispensed amount or refills to reflect last and upcoming OV?  Yes- enough to next OV.     Requested Prescriptions     Pending Prescriptions Disp Refills    furosemide (LASIX) 20 MG tablet [Pharmacy Med Name: FUROSEMIDE 20MG TABLET] 60 tablet 1     Sig: TAKE ONE TABLET BY MOUTH MONDAY AND THURSDAY-TAKE ONE EXTRA TABLET FOR WEIGHT GAIN OF THREE (3) TO FOUR (4) POUNDS

## 2024-12-02 ENCOUNTER — OFFICE VISIT (OUTPATIENT)
Age: 64
End: 2024-12-02
Payer: MEDICAID

## 2024-12-02 VITALS
RESPIRATION RATE: 16 BRPM | OXYGEN SATURATION: 98 % | DIASTOLIC BLOOD PRESSURE: 80 MMHG | HEIGHT: 64 IN | BODY MASS INDEX: 37.56 KG/M2 | SYSTOLIC BLOOD PRESSURE: 132 MMHG | HEART RATE: 85 BPM | WEIGHT: 220 LBS

## 2024-12-02 DIAGNOSIS — T88.7XXA SIDE EFFECT OF MEDICATION: Primary | ICD-10-CM

## 2024-12-02 DIAGNOSIS — G20.C PARKINSONISM, UNSPECIFIED PARKINSONISM TYPE (HCC): ICD-10-CM

## 2024-12-02 PROCEDURE — 99214 OFFICE O/P EST MOD 30 MIN: CPT | Performed by: PSYCHIATRY & NEUROLOGY

## 2024-12-02 PROCEDURE — 3075F SYST BP GE 130 - 139MM HG: CPT | Performed by: PSYCHIATRY & NEUROLOGY

## 2024-12-02 PROCEDURE — 3079F DIAST BP 80-89 MM HG: CPT | Performed by: PSYCHIATRY & NEUROLOGY

## 2024-12-02 RX ORDER — CARBIDOPA AND LEVODOPA 25; 100 MG/1; MG/1
1 TABLET ORAL 4 TIMES DAILY
Qty: 120 TABLET | Refills: 3 | Status: SHIPPED | OUTPATIENT
Start: 2024-12-02

## 2024-12-02 RX ORDER — CARBIDOPA AND LEVODOPA 25; 100 MG/1; MG/1
2 TABLET, EXTENDED RELEASE ORAL 2 TIMES DAILY
Qty: 120 TABLET | Refills: 3 | Status: SHIPPED | OUTPATIENT
Start: 2024-12-02

## 2024-12-02 NOTE — PATIENT INSTRUCTIONS

## 2024-12-02 NOTE — PROGRESS NOTES
Neurology outpatient follow-up visit    Patient name: Chano Quiroga Jr.      Chief Complaint:  Parkinsonism.    History of present illness:  This is a 63 years old right-handed male.  The patient was brought to the hospital yesterday after the patient fall on his tailbone.  The patient is noted for underlying parkinsonism after admission in the hospital about 6 months ago.  However, the patient never had establish care as an outpatient with neurology to address his Sinemet.  The patient remains to have mild degree of resting tremor and cogwheel rigidity.  The patient is noted for underlying bipolar disorder and history of antipsychotic use/Depakote.  Therefore, the etiology of parkinsonism can be Parkinson disease or drug-induced parkinsonism.     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  The patient is currently on Sinemet 4 times a day.  The patient was on Sinemet CR from the previous admission.    Interval history:  08/1/24: The patient is here for follow-up parkinsonism.  The patient remains to have significant tremor.  The patient is wheelchair-bound right now.  The patient is able to walk only short distance to the bathroom.  The patient also has home health for physical therapy 3-4 times a week.  The patient is living by himself but his brother also is living in the same building.  The patient denies significant side effect from Sinemet.    12/2/24: The patient is here for follow-up parkinsonism.  The patient reports moderate improvement of gait difficulty with Sinemet.  The patient also denies significant side effect from Sinemet or Sinemet CR.  The patient reports few falls but it has been less frequent when compared to the previous visit.  The patient remains on Depakote for behavioral disturbance.    Past medical history:    Past Medical History:   Diagnosis Date    Arthritis     Asthma     CAD (coronary artery disease)     CHF (congestive heart failure) ([facility])     Chronic

## 2024-12-23 PROCEDURE — 93295 DEV INTERROG REMOTE 1/2/MLT: CPT | Performed by: INTERNAL MEDICINE

## 2024-12-23 PROCEDURE — 93296 REM INTERROG EVL PM/IDS: CPT | Performed by: INTERNAL MEDICINE

## 2025-01-02 RX ORDER — LISINOPRIL 5 MG/1
TABLET ORAL
Qty: 28 TABLET | Refills: 0 | Status: SHIPPED | OUTPATIENT
Start: 2025-01-02

## 2025-01-02 RX ORDER — ATORVASTATIN CALCIUM 80 MG/1
TABLET, FILM COATED ORAL
Qty: 28 TABLET | Refills: 0 | Status: SHIPPED | OUTPATIENT
Start: 2025-01-02

## 2025-01-02 NOTE — TELEPHONE ENCOUNTER
LOV 9/25/23 with LR NP  LOV 8/16/23 with SMM  NOV 1/20/25  Lab Results   Component Value Date    CHOL 134 07/25/2023    TRIG 215 07/25/2023    HDL 39 07/25/2023    LDL 60 07/25/2023    VLDL 31 06/09/2023       Lab Results   Component Value Date/Time     10/18/2024 04:24 PM    K 4.6 10/18/2024 04:24 PM    CL 99 10/18/2024 04:24 PM    CO2 26 10/18/2024 04:24 PM    BUN 8 10/18/2024 04:24 PM    CREATININE 0.7 10/18/2024 04:24 PM    GLUCOSE 167 10/18/2024 04:24 PM    CALCIUM 9.8 10/18/2024 04:24 PM    LABGLOM >90 10/18/2024 04:24 PM    LABGLOM 101 09/19/2024 07:20 AM

## 2025-02-02 NOTE — TELEPHONE ENCOUNTER
Dysphagia 1 puree w/ honey thick liquids  Meds  crushed    Okay to place order for w/c. Use Parkinson's disease, impaired mobility.   Mobility not improved with cane as diagnosis

## 2025-03-04 NOTE — PLAN OF CARE
Problem: Discharge Planning  Goal: Discharge to home or other facility with appropriate resources  1/26/2024 1038 by Rody Cunningham RN  Outcome: Progressing  1/26/2024 0021 by Tennille River RN  Outcome: Progressing  Flowsheets (Taken 1/25/2024 2155)  Discharge to home or other facility with appropriate resources: Identify barriers to discharge with patient and caregiver     Problem: Skin/Tissue Integrity  Goal: Absence of new skin breakdown  Description: 1.  Monitor for areas of redness and/or skin breakdown  2.  Assess vascular access sites hourly  3.  Every 4-6 hours minimum:  Change oxygen saturation probe site  4.  Every 4-6 hours:  If on nasal continuous positive airway pressure, respiratory therapy assess nares and determine need for appliance change or resting period.  1/26/2024 1038 by Rody Cunningham RN  Outcome: Progressing  1/26/2024 0021 by Tennille River RN  Outcome: Progressing     Problem: Safety - Adult  Goal: Free from fall injury  1/26/2024 1038 by Rody Cunningham RN  Outcome: Progressing  1/26/2024 0021 by Tennille River RN  Outcome: Progressing     Problem: Chronic Conditions and Co-morbidities  Goal: Patient's chronic conditions and co-morbidity symptoms are monitored and maintained or improved  1/26/2024 1038 by Rody Cunningham RN  Outcome: Progressing  1/26/2024 0021 by Tennille River RN  Outcome: Progressing  Flowsheets (Taken 1/25/2024 2155)  Care Plan - Patient's Chronic Conditions and Co-Morbidity Symptoms are Monitored and Maintained or Improved: Monitor and assess patient's chronic conditions and comorbid symptoms for stability, deterioration, or improvement      Blood pressure stable.  Continue monitoring.
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
## 2025-04-16 PROBLEM — E87.20 LACTIC ACIDOSIS: Status: RESOLVED | Noted: 2021-04-26 | Resolved: 2025-04-16

## 2025-04-16 PROBLEM — R53.1 WEAKNESS: Status: RESOLVED | Noted: 2019-11-27 | Resolved: 2025-04-16
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
## 2025-04-16 PROBLEM — I95.1 ORTHOSTATIC HYPOTENSION: Status: RESOLVED | Noted: 2023-01-24 | Resolved: 2025-04-16

## 2025-04-16 PROBLEM — S80.811A ABRASION OF RIGHT LEG, INITIAL ENCOUNTER: Status: RESOLVED | Noted: 2024-05-02 | Resolved: 2025-04-16

## 2025-04-16 PROBLEM — R53.81 DEBILITY: Status: RESOLVED | Noted: 2022-01-27 | Resolved: 2025-04-16

## 2025-04-16 PROBLEM — U07.1 COVID-19: Status: RESOLVED | Noted: 2022-01-13 | Resolved: 2025-04-16
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
## 2025-04-16 PROBLEM — S32.2XXA CLOSED FRACTURE OF COCCYX (HCC): Status: RESOLVED | Noted: 2024-05-02 | Resolved: 2025-04-16

## 2025-04-16 PROBLEM — R06.02 SOB (SHORTNESS OF BREATH) ON EXERTION: Status: RESOLVED | Noted: 2023-02-15 | Resolved: 2025-04-16

## 2025-04-16 PROBLEM — R53.1 GENERALIZED WEAKNESS: Status: RESOLVED | Noted: 2021-04-27 | Resolved: 2025-04-16

## 2025-05-31 PROCEDURE — 93295 DEV INTERROG REMOTE 1/2/MLT: CPT | Performed by: INTERNAL MEDICINE

## 2025-05-31 PROCEDURE — 93296 REM INTERROG EVL PM/IDS: CPT | Performed by: INTERNAL MEDICINE

## 2025-06-22 NOTE — PROGRESS NOTES
RT Inhaler-Nebulizer Bronchodilator Protocol Note    There is a bronchodilator order in the chart from a provider indicating to follow the RT Bronchodilator Protocol and there is an “Initiate RT Inhaler-Nebulizer Bronchodilator Protocol” order as well (see protocol at bottom of note).    CXR Findings:  XR CHEST PORTABLE    Result Date: 4/30/2024  No acute abnormality.       The findings from the last RT Protocol Assessment were as follows:   History Pulmonary Disease: (P) Chronic pulmonary disease  Respiratory Pattern: (P) Regular pattern and RR 12-20 bpm  Breath Sounds: (P) Clear breath sounds  Cough: (P) Strong, spontaneous, non-productive  Indication for Bronchodilator Therapy: (P) On home bronchodilators  Bronchodilator Assessment Score: (P) 2    Aerosolized bronchodilator medication orders have been revised according to the RT Inhaler-Nebulizer Bronchodilator Protocol below.    Respiratory Therapist to perform RT Therapy Protocol Assessment initially then follow the protocol.  Repeat RT Therapy Protocol Assessment PRN for score 0-3 or on second treatment, BID, and PRN for scores above 3.    No Indications - adjust the frequency to every 6 hours PRN wheezing or bronchospasm, if no treatments needed after 48 hours then discontinue using Per Protocol order mode.     If indication present, adjust the RT bronchodilator orders based on the Bronchodilator Assessment Score as indicated below.  Use Inhaler orders unless patient has one or more of the following: on home nebulizer, not able to hold breath for 10 seconds, is not alert and oriented, cannot activate and use MDI correctly, or respiratory rate 25 breaths per minute or more, then use the equivalent nebulizer order(s) with same Frequency and PRN reasons based on the score.  If a patient is on this medication at home then do not decrease Frequency below that used at home.    0-3 - enter or revise RT bronchodilator order(s) to equivalent RT Bronchodilator order  with Frequency of every 4 hours PRN for wheezing or increased work of breathing using Per Protocol order mode.        4-6 - enter or revise RT Bronchodilator order(s) to two equivalent RT bronchodilator orders with one order with BID Frequency and one order with Frequency of every 4 hours PRN wheezing or increased work of breathing using Per Protocol order mode.        7-10 - enter or revise RT Bronchodilator order(s) to two equivalent RT bronchodilator orders with one order with TID Frequency and one order with Frequency of every 4 hours PRN wheezing or increased work of breathing using Per Protocol order mode.       11-13 - enter or revise RT Bronchodilator order(s) to one equivalent RT bronchodilator order with QID Frequency and an Albuterol order with Frequency of every 4 hours PRN wheezing or increased work of breathing using Per Protocol order mode.      Greater than 13 - enter or revise RT Bronchodilator order(s) to one equivalent RT bronchodilator order with every 4 hours Frequency and an Albuterol order with Frequency of every 2 hours PRN wheezing or increased work of breathing using Per Protocol order mode.       Electronically signed by Abbie Berrios RCP on 5/1/2024 at 4:25 AM   Stable

## (undated) DEVICE — DRAPE C ARM W54XL84IN MINI FOR OEC 6800

## (undated) DEVICE — GAUZE,SPONGE,4"X4",16PLY,STRL,LF,10/TRAY: Brand: MEDLINE

## (undated) DEVICE — SUTURE ETHLN SZ 3-0 L18IN NONABSORBABLE BLK PS-2 L19MM 3/8 1669H

## (undated) DEVICE — CHLORAPREP 26ML ORANGE

## (undated) DEVICE — GLOVE SURG SZ 75 L12IN FNGR THK94MIL WHT POLYMER LTX BEAD

## (undated) DEVICE — STERILE VELCLOSE ELASTIC BANDAGE, 6IN X 10 YARDS: Brand: VELCLOSE

## (undated) DEVICE — STERILE POLYISOPRENE POWDER-FREE SURGICAL GLOVES: Brand: PROTEXIS

## (undated) DEVICE — SUTURE VCRL SZ 4-0 L27IN ABSRB UD L26MM SH 1/2 CIR J415H

## (undated) DEVICE — CANNULA NSL AD TBNG L7FT PVC STR NONFLARED PRNG O2 DEL W STD

## (undated) DEVICE — ALCOHOL RUBBING 16OZ 70% ISO

## (undated) DEVICE — PODIATRY PK

## (undated) DEVICE — GARMENT,MEDLINE,DVT,INT,CALF,MED, GEN2: Brand: MEDLINE

## (undated) DEVICE — GOWN,SIRUS,FABRNF,L,20/CS: Brand: MEDLINE

## (undated) DEVICE — BLADE ES L2.75IN ELASTOMERIC COAT DURABLE BEND UPTO 90DEG

## (undated) DEVICE — BLADE SAW SAG MIC 25.5X9.5X0.6 MM FN TOOTH FOR MICRO100

## (undated) DEVICE — GOWN,SIRUS,POLYRNF,SETINSLV,XL,20/CS: Brand: MEDLINE

## (undated) DEVICE — SUTURE VCRL SZ 2-0 L27IN ABSRB UD L26MM SH 1/2 CIR J417H

## (undated) DEVICE — TURNOVER KIT RM INF CTRL TECH

## (undated) DEVICE — PADDING UNDERCAST W4INXL4YD 100% COT CRIMPED FINISH WBRL II

## (undated) DEVICE — AVANOS* UNIVERSAL BLOCK TRAYS: Brand: AVANOS

## (undated) DEVICE — INTENDED FOR TISSUE SEPARATION, AND OTHER PROCEDURES THAT REQUIRE A SHARP SURGICAL BLADE TO PUNCTURE OR CUT.: Brand: BARD-PARKER ® CARBON RIB-BACK BLADES

## (undated) DEVICE — DRAPE EQUIP FOOTSWITCH 24X20 IN PUL CORDAND CRD LCK NS

## (undated) DEVICE — UNIVERSAL BLOCK TRAY: Brand: MEDLINE INDUSTRIES, INC.

## (undated) DEVICE — ZIMMER® STERILE DISPOSABLE TOURNIQUET CUFF WITH PLC, DUAL PORT, SINGLE BLADDER, 34 IN. (86 CM)

## (undated) DEVICE — ELECTRODE PT RET AD L9FT HI MOIST COND ADH HYDRGEL CORDED

## (undated) DEVICE — NEEDLE HYPO 18GA L1.5IN PNK POLYPR HUB S STL THN WALL FILL

## (undated) DEVICE — SOLUTION IV 1000ML 0.9% SOD CHL

## (undated) DEVICE — TOWEL,OR,DSP,ST,BLUE,STD,4/PK,20PK/CS: Brand: MEDLINE

## (undated) DEVICE — SPLINT CAST 4INX15FT ORTHO GLS

## (undated) DEVICE — STRIP,CLOSURE,WOUND,MEDI-STRIP,1/2X4: Brand: MEDLINE

## (undated) DEVICE — GOWN,SIRUS,POLYRNF,SETINSLV,L,20/CS: Brand: MEDLINE

## (undated) DEVICE — COVER LT HNDL BLU PLAS

## (undated) DEVICE — TOWEL OR BLUEE 16X26IN ST 8 PACK ORB08 16X26ORTWL